# Patient Record
Sex: FEMALE | Race: WHITE | NOT HISPANIC OR LATINO | Employment: FULL TIME | URBAN - METROPOLITAN AREA
[De-identification: names, ages, dates, MRNs, and addresses within clinical notes are randomized per-mention and may not be internally consistent; named-entity substitution may affect disease eponyms.]

---

## 2017-01-18 PROBLEM — J68.0: Status: ACTIVE | Noted: 2017-01-18

## 2017-01-18 PROBLEM — J45.901 ASTHMA WITH ACUTE EXACERBATION: Status: ACTIVE | Noted: 2017-01-18

## 2017-01-18 PROBLEM — J96.01 ACUTE HYPOXEMIC RESPIRATORY FAILURE: Status: ACTIVE | Noted: 2017-01-18

## 2017-11-01 ENCOUNTER — OFFICE VISIT (OUTPATIENT)
Dept: FAMILY MEDICINE | Facility: CLINIC | Age: 32
End: 2017-11-01
Payer: COMMERCIAL

## 2017-11-01 VITALS
HEIGHT: 67 IN | BODY MASS INDEX: 40.83 KG/M2 | TEMPERATURE: 99 F | HEART RATE: 104 BPM | DIASTOLIC BLOOD PRESSURE: 93 MMHG | RESPIRATION RATE: 18 BRPM | OXYGEN SATURATION: 98 % | SYSTOLIC BLOOD PRESSURE: 156 MMHG | WEIGHT: 260.13 LBS

## 2017-11-01 DIAGNOSIS — J45.42 MODERATE PERSISTENT ASTHMA WITH STATUS ASTHMATICUS: Primary | ICD-10-CM

## 2017-11-01 PROCEDURE — 99999 PR PBB SHADOW E&M-EST. PATIENT-LVL III: CPT | Mod: PBBFAC,,, | Performed by: FAMILY MEDICINE

## 2017-11-01 PROCEDURE — 94640 AIRWAY INHALATION TREATMENT: CPT | Mod: S$GLB,,, | Performed by: FAMILY MEDICINE

## 2017-11-01 PROCEDURE — 96372 THER/PROPH/DIAG INJ SC/IM: CPT | Mod: 59,S$GLB,, | Performed by: FAMILY MEDICINE

## 2017-11-01 PROCEDURE — 99214 OFFICE O/P EST MOD 30 MIN: CPT | Mod: 25,S$GLB,, | Performed by: FAMILY MEDICINE

## 2017-11-01 RX ORDER — ALBUTEROL SULFATE 2.5 MG/.5ML
2.5 SOLUTION RESPIRATORY (INHALATION)
Status: COMPLETED | OUTPATIENT
Start: 2017-11-01 | End: 2017-11-01

## 2017-11-01 RX ORDER — EPINEPHRINE 1 MG/ML
0.3 INJECTION, SOLUTION INTRACARDIAC; INTRAMUSCULAR; INTRAVENOUS; SUBCUTANEOUS ONCE
Status: COMPLETED | OUTPATIENT
Start: 2017-11-01 | End: 2017-11-01

## 2017-11-01 RX ADMIN — ALBUTEROL SULFATE 2.5 MG: 2.5 SOLUTION RESPIRATORY (INHALATION) at 04:11

## 2017-11-01 RX ADMIN — EPINEPHRINE 0.3 MG: 1 INJECTION, SOLUTION INTRACARDIAC; INTRAMUSCULAR; INTRAVENOUS; SUBCUTANEOUS at 04:11

## 2017-11-01 NOTE — PROGRESS NOTES
Subjective:       Patient ID: Sonia Goldberg is a 31 y.o. female.    Chief Complaint: Cough    Onset 5 days ago with coughing and wheezing.  [facility] visit and given IM steroids and Zpak; ER visit 2 days later and given treatments, and IV Mag and SoulMedrol with improvement.  Past 2 days declining again and has been using Neds or MDI q 4 hours with no relief.  Sats down to 92% yesterday.  In past intubation has been recommended but she had refuised and turned around.       Asthma   She complains of chest tightness, cough, difficulty breathing, shortness of breath and wheezing. This is a recurrent problem. The current episode started in the past 7 days. The problem has been rapidly worsening. The cough is productive of purulent sputum. Associated symptoms include dyspnea on exertion. Pertinent negatives include no chest pain or fever. Her symptoms are alleviated by nothing. Her symptoms are not alleviated by beta-agonist and oral steroids. Her past medical history is significant for asthma.     Review of Systems   Constitutional: Negative for fever.   Respiratory: Positive for cough, shortness of breath and wheezing.    Cardiovascular: Positive for dyspnea on exertion. Negative for chest pain.   Gastrointestinal: Negative for abdominal pain and nausea.   Skin: Negative for rash.   All other systems reviewed and are negative.      Objective:      Physical Exam   Constitutional: She appears well-developed and well-nourished. No distress.   HENT:   Right Ear: Tympanic membrane is not erythematous.   Left Ear: Tympanic membrane is not erythematous.   Nose: Mucosal edema present. Right sinus exhibits no maxillary sinus tenderness. Left sinus exhibits no maxillary sinus tenderness.   Mouth/Throat: Posterior oropharyngeal erythema present.   Eyes: Pupils are equal, round, and reactive to light. No scleral icterus.   Neck: Neck supple.   Cardiovascular: Normal rate and regular rhythm.    No murmur heard.  Pulmonary/Chest:  Effort normal. She has wheezes. She has no rales.   98% sat but she has tachypnea and I and E wheezing diffusely upon arrival.  Decreased BS at post bases.  Re-check after epi SQ and Albuterol Neb and still full wheezing with a little better basilar air movement   Musculoskeletal: She exhibits no edema or tenderness.   Lymphadenopathy:     She has no cervical adenopathy.   Skin: Skin is warm and dry.       Assessment:       1. Moderate persistent asthma with status asthmaticus        Plan:         Sonia was seen today for cough.    Diagnoses and all orders for this visit:    Moderate persistent asthma with status asthmaticus    Other orders  -     EPINEPHrine injection 0.3 mg; Inject 0.3 mLs (0.3 mg total) into the skin once.  -     albuterol sulfate nebulizer solution 2.5 mg; Take 2.5 mg by nebulization one time.    Outpatient treament failure.  To [facility] ER now for eval for admit.  I called ED and gave report to Will.  VS are stable as is O2 sat; she declined transport to ED and has improved some after Rx here and she appears stable and safe to drive herself.

## 2017-11-01 NOTE — PROGRESS NOTES
2 patient identifiers used ( name &  ).  Administered 0.3 mg Epi Pen lateral left thigh.  Patient tolerated well.  No bleeding at insertion site noted.  Pain scale 0/10.  Allergies reviewed.    Aseptic technique maintained      2 patient identifiers used ( name &  ).  Administered Albuterol for breathing treatment.

## 2017-12-19 ENCOUNTER — TELEPHONE (OUTPATIENT)
Dept: FAMILY MEDICINE | Facility: CLINIC | Age: 32
End: 2017-12-19

## 2017-12-19 NOTE — TELEPHONE ENCOUNTER
Spoke w/ pt , pt wants to be seen for antidepressant / anxiety and something to sleep . Pt aware that NP cannot prescribe controlled substances for sleep and anxiety. Pt states that she will be happy to get antidepressant. Pt did not want to change appt to physician . Pt understands that no controlled substances will be prescribed.--lp

## 2017-12-19 NOTE — TELEPHONE ENCOUNTER
"Can you please ask her what she needs refilled as I can not write controlled meds for what she scheduled the appt for "behavioral meds per therapist"  She will need to make it with a physician   "

## 2018-01-02 ENCOUNTER — OFFICE VISIT (OUTPATIENT)
Dept: FAMILY MEDICINE | Facility: CLINIC | Age: 33
End: 2018-01-02
Payer: COMMERCIAL

## 2018-01-02 VITALS
HEIGHT: 67 IN | DIASTOLIC BLOOD PRESSURE: 82 MMHG | HEART RATE: 80 BPM | WEIGHT: 257.06 LBS | RESPIRATION RATE: 16 BRPM | SYSTOLIC BLOOD PRESSURE: 124 MMHG | OXYGEN SATURATION: 97 % | BODY MASS INDEX: 40.35 KG/M2 | TEMPERATURE: 99 F

## 2018-01-02 DIAGNOSIS — N63.20 LEFT BREAST MASS: ICD-10-CM

## 2018-01-02 DIAGNOSIS — Z91.09 ENVIRONMENTAL ALLERGIES: ICD-10-CM

## 2018-01-02 DIAGNOSIS — F32.A DEPRESSION, UNSPECIFIED DEPRESSION TYPE: Primary | ICD-10-CM

## 2018-01-02 DIAGNOSIS — F41.9 ANXIETY: ICD-10-CM

## 2018-01-02 DIAGNOSIS — E78.5 HYPERLIPIDEMIA, UNSPECIFIED HYPERLIPIDEMIA TYPE: ICD-10-CM

## 2018-01-02 DIAGNOSIS — J45.909 ASTHMA, UNSPECIFIED ASTHMA SEVERITY, UNSPECIFIED WHETHER COMPLICATED, UNSPECIFIED WHETHER PERSISTENT: ICD-10-CM

## 2018-01-02 DIAGNOSIS — G47.00 INSOMNIA, UNSPECIFIED TYPE: ICD-10-CM

## 2018-01-02 PROBLEM — J68.0: Status: RESOLVED | Noted: 2017-01-18 | Resolved: 2018-01-02

## 2018-01-02 PROBLEM — J96.01 ACUTE HYPOXEMIC RESPIRATORY FAILURE: Status: RESOLVED | Noted: 2017-01-18 | Resolved: 2018-01-02

## 2018-01-02 PROBLEM — J45.901 ASTHMA WITH ACUTE EXACERBATION: Status: RESOLVED | Noted: 2017-01-18 | Resolved: 2018-01-02

## 2018-01-02 PROCEDURE — 99999 PR PBB SHADOW E&M-EST. PATIENT-LVL V: CPT | Mod: PBBFAC,,, | Performed by: NURSE PRACTITIONER

## 2018-01-02 PROCEDURE — 99214 OFFICE O/P EST MOD 30 MIN: CPT | Mod: S$GLB,,, | Performed by: NURSE PRACTITIONER

## 2018-01-02 RX ORDER — FLUOXETINE 20 MG/1
20 TABLET ORAL DAILY
Qty: 30 TABLET | Refills: 11 | Status: SHIPPED | OUTPATIENT
Start: 2018-01-02 | End: 2018-06-06 | Stop reason: SDUPTHER

## 2018-01-02 RX ORDER — TEMAZEPAM 15 MG/1
15 CAPSULE ORAL NIGHTLY PRN
Qty: 30 CAPSULE | Refills: 0 | Status: SHIPPED | OUTPATIENT
Start: 2018-01-02 | End: 2018-02-01

## 2018-01-02 RX ORDER — ATORVASTATIN CALCIUM 20 MG/1
20 TABLET, FILM COATED ORAL DAILY
Qty: 30 TABLET | Refills: 1 | Status: SHIPPED | OUTPATIENT
Start: 2018-01-02 | End: 2018-02-25 | Stop reason: SDUPTHER

## 2018-01-02 RX ORDER — EPINEPHRINE 0.3 MG/.3ML
1 INJECTION SUBCUTANEOUS ONCE
Qty: 0.3 ML | Refills: 0 | Status: ON HOLD | OUTPATIENT
Start: 2018-01-02 | End: 2018-01-29 | Stop reason: HOSPADM
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
## 2018-01-05 ENCOUNTER — HOSPITAL ENCOUNTER (OUTPATIENT)
Dept: RADIOLOGY | Facility: HOSPITAL | Age: 33
Discharge: HOME | End: 2018-01-05
Attending: NURSE PRACTITIONER
Payer: COMMERCIAL

## 2018-01-05 DIAGNOSIS — N63.20 LEFT BREAST MASS: ICD-10-CM

## 2018-01-05 PROCEDURE — 77066 DX MAMMO INCL CAD BI: CPT | Mod: 26,,, | Performed by: RADIOLOGY

## 2018-01-05 PROCEDURE — 77062 BREAST TOMOSYNTHESIS BI: CPT | Mod: TC,PO

## 2018-01-05 PROCEDURE — 77062 BREAST TOMOSYNTHESIS BI: CPT | Mod: 26,,, | Performed by: RADIOLOGY

## 2018-01-05 PROCEDURE — 76642 ULTRASOUND BREAST LIMITED: CPT | Mod: 26,LT,, | Performed by: RADIOLOGY

## 2018-01-05 PROCEDURE — 76642 ULTRASOUND BREAST LIMITED: CPT | Mod: TC,PO,LT

## 2018-01-28 ENCOUNTER — HOSPITAL ENCOUNTER (OUTPATIENT)
Facility: OTHER | Age: 33
Discharge: HOME | End: 2018-01-29
Attending: EMERGENCY MEDICINE | Admitting: EMERGENCY MEDICINE
Payer: COMMERCIAL

## 2018-01-28 DIAGNOSIS — J45.901 ASTHMA EXACERBATION: Primary | ICD-10-CM

## 2018-01-28 DIAGNOSIS — J45.909 ASTHMA: ICD-10-CM

## 2018-01-28 DIAGNOSIS — R06.02 SHORTNESS OF BREATH: ICD-10-CM

## 2018-01-28 LAB
ALBUMIN SERPL BCP-MCNC: 3.5 G/DL
ALP SERPL-CCNC: 83 U/L
ALT SERPL W/O P-5'-P-CCNC: 14 U/L
ANION GAP SERPL CALC-SCNC: 8 MMOL/L
AST SERPL-CCNC: 14 U/L
B-HCG UR QL: NEGATIVE
BASOPHILS # BLD AUTO: 0.02 K/UL
BASOPHILS NFR BLD: 0.2 %
BILIRUB SERPL-MCNC: 0.3 MG/DL
BUN SERPL-MCNC: 16 MG/DL
CALCIUM SERPL-MCNC: 9.3 MG/DL
CHLORIDE SERPL-SCNC: 107 MMOL/L
CO2 SERPL-SCNC: 25 MMOL/L
CREAT SERPL-MCNC: 0.7 MG/DL
CTP QC/QA: YES
DIFFERENTIAL METHOD: ABNORMAL
EOSINOPHIL # BLD AUTO: 0.2 K/UL
EOSINOPHIL NFR BLD: 1.6 %
ERYTHROCYTE [DISTWIDTH] IN BLOOD BY AUTOMATED COUNT: 15.4 %
EST. GFR  (AFRICAN AMERICAN): >60 ML/MIN/1.73 M^2
EST. GFR  (NON AFRICAN AMERICAN): >60 ML/MIN/1.73 M^2
FLUAV AG SPEC QL IA: NEGATIVE
FLUBV AG SPEC QL IA: NEGATIVE
GLUCOSE SERPL-MCNC: 84 MG/DL
HCT VFR BLD AUTO: 37.7 %
HGB BLD-MCNC: 11.9 G/DL
LYMPHOCYTES # BLD AUTO: 3.6 K/UL
LYMPHOCYTES NFR BLD: 35.3 %
MCH RBC QN AUTO: 26.6 PG
MCHC RBC AUTO-ENTMCNC: 31.6 G/DL
MCV RBC AUTO: 84 FL
MONOCYTES # BLD AUTO: 0.6 K/UL
MONOCYTES NFR BLD: 6.1 %
NEUTROPHILS # BLD AUTO: 5.8 K/UL
NEUTROPHILS NFR BLD: 56.6 %
PLATELET # BLD AUTO: 300 K/UL
PMV BLD AUTO: 10.5 FL
POTASSIUM SERPL-SCNC: 3.6 MMOL/L
PROT SERPL-MCNC: 6.7 G/DL
RBC # BLD AUTO: 4.47 M/UL
SODIUM SERPL-SCNC: 140 MMOL/L
SPECIMEN SOURCE: NORMAL
WBC # BLD AUTO: 10.17 K/UL

## 2018-01-28 PROCEDURE — 99900035 HC TECH TIME PER 15 MIN (STAT)

## 2018-01-28 PROCEDURE — 94645 CONT INHLJ TX EACH ADDL HOUR: CPT | Mod: 59

## 2018-01-28 PROCEDURE — G0378 HOSPITAL OBSERVATION PER HR: HCPCS

## 2018-01-28 PROCEDURE — 25000242 PHARM REV CODE 250 ALT 637 W/ HCPCS: Performed by: EMERGENCY MEDICINE

## 2018-01-28 PROCEDURE — 94640 AIRWAY INHALATION TREATMENT: CPT

## 2018-01-28 PROCEDURE — 25000003 PHARM REV CODE 250: Performed by: HOSPITALIST

## 2018-01-28 PROCEDURE — 85025 COMPLETE CBC W/AUTO DIFF WBC: CPT

## 2018-01-28 PROCEDURE — 63600175 PHARM REV CODE 636 W HCPCS: Performed by: EMERGENCY MEDICINE

## 2018-01-28 PROCEDURE — 63600175 PHARM REV CODE 636 W HCPCS: Performed by: HOSPITALIST

## 2018-01-28 PROCEDURE — 93010 ELECTROCARDIOGRAM REPORT: CPT | Mod: ,,, | Performed by: INTERNAL MEDICINE

## 2018-01-28 PROCEDURE — 27000190 HC CPAP FULL FACE MASK W/VALVE

## 2018-01-28 PROCEDURE — 81025 URINE PREGNANCY TEST: CPT | Performed by: EMERGENCY MEDICINE

## 2018-01-28 PROCEDURE — 99220 PR INITIAL OBSERVATION CARE,LEVL III: CPT | Mod: SA,,, | Performed by: PHYSICIAN ASSISTANT

## 2018-01-28 PROCEDURE — 96365 THER/PROPH/DIAG IV INF INIT: CPT

## 2018-01-28 PROCEDURE — 25000242 PHARM REV CODE 250 ALT 637 W/ HCPCS: Performed by: HOSPITALIST

## 2018-01-28 PROCEDURE — 96372 THER/PROPH/DIAG INJ SC/IM: CPT | Mod: 59

## 2018-01-28 PROCEDURE — 27000221 HC OXYGEN, UP TO 24 HOURS

## 2018-01-28 PROCEDURE — 80053 COMPREHEN METABOLIC PANEL: CPT

## 2018-01-28 PROCEDURE — 94660 CPAP INITIATION&MGMT: CPT

## 2018-01-28 PROCEDURE — 25000003 PHARM REV CODE 250: Performed by: PHYSICIAN ASSISTANT

## 2018-01-28 PROCEDURE — 94644 CONT INHLJ TX 1ST HOUR: CPT

## 2018-01-28 PROCEDURE — 99285 EMERGENCY DEPT VISIT HI MDM: CPT | Mod: 25

## 2018-01-28 PROCEDURE — 96375 TX/PRO/DX INJ NEW DRUG ADDON: CPT

## 2018-01-28 PROCEDURE — 94761 N-INVAS EAR/PLS OXIMETRY MLT: CPT

## 2018-01-28 PROCEDURE — 25000003 PHARM REV CODE 250: Performed by: EMERGENCY MEDICINE

## 2018-01-28 PROCEDURE — 87400 INFLUENZA A/B EACH AG IA: CPT

## 2018-01-28 RX ORDER — MAGNESIUM SULFATE HEPTAHYDRATE 40 MG/ML
2 INJECTION, SOLUTION INTRAVENOUS
Status: COMPLETED | OUTPATIENT
Start: 2018-01-28 | End: 2018-01-28

## 2018-01-28 RX ORDER — ZOLPIDEM TARTRATE 5 MG/1
5 TABLET ORAL NIGHTLY PRN
Status: DISCONTINUED | OUTPATIENT
Start: 2018-01-28 | End: 2018-01-28

## 2018-01-28 RX ORDER — KETOROLAC TROMETHAMINE 30 MG/ML
15 INJECTION, SOLUTION INTRAMUSCULAR; INTRAVENOUS EVERY 6 HOURS PRN
Status: DISCONTINUED | OUTPATIENT
Start: 2018-01-28 | End: 2018-01-29 | Stop reason: HOSPADM

## 2018-01-28 RX ORDER — MAGNESIUM SULFATE HEPTAHYDRATE 40 MG/ML
INJECTION, SOLUTION INTRAVENOUS
Status: DISPENSED
Start: 2018-01-28 | End: 2018-01-28

## 2018-01-28 RX ORDER — ALPRAZOLAM 0.5 MG/1
0.5 TABLET ORAL NIGHTLY PRN
Status: DISCONTINUED | OUTPATIENT
Start: 2018-01-28 | End: 2018-01-29 | Stop reason: HOSPADM

## 2018-01-28 RX ORDER — ENOXAPARIN SODIUM 100 MG/ML
40 INJECTION SUBCUTANEOUS DAILY
Status: DISCONTINUED | OUTPATIENT
Start: 2018-01-28 | End: 2018-01-29 | Stop reason: HOSPADM

## 2018-01-28 RX ORDER — ALBUTEROL SULFATE 0.83 MG/ML
2.5 SOLUTION RESPIRATORY (INHALATION)
Status: DISCONTINUED | OUTPATIENT
Start: 2018-01-28 | End: 2018-01-29 | Stop reason: HOSPADM

## 2018-01-28 RX ORDER — PREDNISONE 20 MG/1
40 TABLET ORAL DAILY
Status: DISCONTINUED | OUTPATIENT
Start: 2018-01-28 | End: 2018-01-29 | Stop reason: HOSPADM

## 2018-01-28 RX ORDER — MONTELUKAST SODIUM 10 MG/1
10 TABLET ORAL DAILY
Status: DISCONTINUED | OUTPATIENT
Start: 2018-01-28 | End: 2018-01-29 | Stop reason: HOSPADM

## 2018-01-28 RX ORDER — ALBUTEROL SULFATE 0.83 MG/ML
15 SOLUTION RESPIRATORY (INHALATION)
Status: COMPLETED | OUTPATIENT
Start: 2018-01-28 | End: 2018-01-28

## 2018-01-28 RX ORDER — IPRATROPIUM BROMIDE 0.5 MG/2.5ML
1 SOLUTION RESPIRATORY (INHALATION)
Status: COMPLETED | OUTPATIENT
Start: 2018-01-28 | End: 2018-01-28

## 2018-01-28 RX ORDER — RAMELTEON 8 MG/1
8 TABLET ORAL NIGHTLY PRN
Status: DISCONTINUED | OUTPATIENT
Start: 2018-01-28 | End: 2018-01-29 | Stop reason: HOSPADM

## 2018-01-28 RX ORDER — FLUTICASONE PROPIONATE 50 MCG
2 SPRAY, SUSPENSION (ML) NASAL DAILY
Status: DISCONTINUED | OUTPATIENT
Start: 2018-01-29 | End: 2018-01-29 | Stop reason: HOSPADM

## 2018-01-28 RX ORDER — KETOROLAC TROMETHAMINE 30 MG/ML
30 INJECTION, SOLUTION INTRAMUSCULAR; INTRAVENOUS
Status: COMPLETED | OUTPATIENT
Start: 2018-01-28 | End: 2018-01-28

## 2018-01-28 RX ORDER — FLUTICASONE FUROATE AND VILANTEROL 100; 25 UG/1; UG/1
1 POWDER RESPIRATORY (INHALATION) DAILY
Status: DISCONTINUED | OUTPATIENT
Start: 2018-01-29 | End: 2018-01-29 | Stop reason: HOSPADM

## 2018-01-28 RX ORDER — ATORVASTATIN CALCIUM 20 MG/1
20 TABLET, FILM COATED ORAL DAILY
Status: DISCONTINUED | OUTPATIENT
Start: 2018-01-29 | End: 2018-01-29 | Stop reason: HOSPADM

## 2018-01-28 RX ORDER — FLUOXETINE 10 MG/1
20 CAPSULE ORAL DAILY
Status: DISCONTINUED | OUTPATIENT
Start: 2018-01-29 | End: 2018-01-29 | Stop reason: HOSPADM

## 2018-01-28 RX ORDER — METHYLPREDNISOLONE SOD SUCC 125 MG
125 VIAL (EA) INJECTION
Status: COMPLETED | OUTPATIENT
Start: 2018-01-28 | End: 2018-01-28

## 2018-01-28 RX ADMIN — ALBUTEROL SULFATE 2.5 MG: 2.5 SOLUTION RESPIRATORY (INHALATION) at 08:01

## 2018-01-28 RX ADMIN — IPRATROPIUM BROMIDE 1 MG: 0.5 SOLUTION RESPIRATORY (INHALATION) at 10:01

## 2018-01-28 RX ADMIN — ALPRAZOLAM 0.5 MG: 0.5 TABLET ORAL at 09:01

## 2018-01-28 RX ADMIN — SODIUM CHLORIDE 1000 ML: 0.9 INJECTION, SOLUTION INTRAVENOUS at 10:01

## 2018-01-28 RX ADMIN — MONTELUKAST SODIUM 10 MG: 10 TABLET, FILM COATED ORAL at 05:01

## 2018-01-28 RX ADMIN — ALBUTEROL SULFATE 2.5 MG: 2.5 SOLUTION RESPIRATORY (INHALATION) at 03:01

## 2018-01-28 RX ADMIN — METHYLPREDNISOLONE SODIUM SUCCINATE 125 MG: 125 INJECTION, POWDER, FOR SOLUTION INTRAMUSCULAR; INTRAVENOUS at 10:01

## 2018-01-28 RX ADMIN — ALBUTEROL SULFATE 15 MG: 2.5 SOLUTION RESPIRATORY (INHALATION) at 10:01

## 2018-01-28 RX ADMIN — MAGNESIUM SULFATE IN WATER 2 G: 40 INJECTION, SOLUTION INTRAVENOUS at 10:01

## 2018-01-28 RX ADMIN — ALBUTEROL SULFATE 15 MG: 2.5 SOLUTION RESPIRATORY (INHALATION) at 12:01

## 2018-01-28 RX ADMIN — KETOROLAC TROMETHAMINE 30 MG: 30 INJECTION, SOLUTION INTRAMUSCULAR at 01:01

## 2018-01-28 RX ADMIN — RAMELTEON 8 MG: 8 TABLET, FILM COATED ORAL at 09:01

## 2018-01-28 RX ADMIN — ENOXAPARIN SODIUM 40 MG: 100 INJECTION SUBCUTANEOUS at 03:01

## 2018-01-28 RX ADMIN — IPRATROPIUM BROMIDE 1 MG: 0.5 SOLUTION RESPIRATORY (INHALATION) at 12:01

## 2018-01-28 RX ADMIN — KETOROLAC TROMETHAMINE 15 MG: 30 INJECTION, SOLUTION INTRAMUSCULAR at 09:01

## 2018-01-28 RX ADMIN — PREDNISONE 40 MG: 20 TABLET ORAL at 02:01

## 2018-01-28 NOTE — CARE UPDATE
Report received from Raiza in ER. Pt transferred to room 354. Pt AAO x 4. Resp. Even and unlabored. No SOB or wheezing noted. Has c/o bilateral chest pain and side pain @ 6/10. Peripheral iv's patent x 2. Oriented to room and surroundings. VSS. SAfety maintained. Call light in reach.

## 2018-01-28 NOTE — ED PROVIDER NOTES
"Encounter Date: 2018    SCRIBE #1 NOTE: Lynne SUGGS am scribing for, and in the presence of, Dr. Pride.       History     Chief Complaint   Patient presents with    Shortness of Breath     hx of asthma, hx of intubation, SOB x3 days, home meds not helping, diminished air movement w/ audible wheezing     Time seen by provider: 9:54 AM    This is a 32 y.o. female with asthma, sleep apnea, HTN, and migraine HA who presents with complaint of SOB x 5 days, exacerbated one hour ago. She reports associated fever and wheezing, but denies chest pain, chills, lightheadedness, and weakness. Pt started to have a respiratory infection 5 days ago with associated cough/congestion and has been on 10mg/day prednisone for 5 days, and she was started on Z-Joesph 2 days ago when seen at urgent care. Pt is negative for influenza. She has a hx of intubation "a while ago." Pt has not had any relief with home breathing treatments.      The history is provided by the patient.     Review of patient's allergies indicates:   Allergen Reactions    Contrast media Anaphylaxis    Iodine and iodide containing products Anaphylaxis    Sulfa (sulfonamide antibiotics) Anaphylaxis    Magnesium     Morphine Hives    Adhesive Rash    Nut [tree nut] Hives     Past Medical History:   Diagnosis Date    Asthma     Heart palpitations     Herniated disc     Hypertension     IBS (irritable bowel syndrome)     Insomnia     Lower back pain     L5 S1 herniated disks    Migraine headache     Palpitations     and pvcs with stress.  Not on any meds.    Sleep apnea     history of.  Dont use cpap.  lost weight.     Past Surgical History:   Procedure Laterality Date    ABDOMINAL SURGERY           SECTION, CLASSIC       SECTION, LOW TRANSVERSE      DILATION AND CURETTAGE OF UTERUS      DILATION AND CURETTAGE OF UTERUS      perferated uterus during procedure    endometrioma      removed on right lower quadrant of " uterus    epidural steriod injections  x 3    TONSILLECTOMY      TUBAL LIGATION       Family History   Problem Relation Age of Onset    Heart disease Mother     Hyperlipidemia Mother     Asthma Mother     Cancer Father     Heart disease Father     Hypertension Father     Hyperlipidemia Father     Arthritis Father     Diabetes Maternal Grandmother     Cancer Maternal Grandmother     Cancer Maternal Grandfather     Diabetes Paternal Grandfather     Breast cancer Maternal Aunt 40     Social History   Substance Use Topics    Smoking status: Passive Smoke Exposure - Never Smoker     Types: Vaping with nicotine    Smokeless tobacco: Never Used    Alcohol use Yes      Comment: socially, occasionally     Review of Systems   Constitutional: Positive for fever. Negative for chills.   HENT: Negative for congestion, rhinorrhea and sore throat.    Respiratory: Positive for shortness of breath and wheezing. Negative for cough.    Cardiovascular: Negative for chest pain.   Gastrointestinal: Negative for abdominal pain, diarrhea, nausea and vomiting.   Endocrine: Negative for polyuria.   Genitourinary: Negative for decreased urine volume and dysuria.   Musculoskeletal: Negative for back pain.   Skin: Negative for rash.   Allergic/Immunologic: Negative for immunocompromised state.   Neurological: Negative for dizziness, weakness and headaches.   Hematological: Does not bruise/bleed easily.   Psychiatric/Behavioral: Negative for confusion.       Physical Exam     Initial Vitals   BP Pulse Resp Temp SpO2   -- -- -- -- --      MAP       --         Physical Exam    Nursing note and vitals reviewed.  Constitutional: She appears well-developed and well-nourished. She is not diaphoretic. No distress.   HENT:   Head: Normocephalic and atraumatic.   Right Ear: External ear normal.   Left Ear: External ear normal.   Eyes: Right eye exhibits no discharge. Left eye exhibits no discharge.   Neck: Normal range of motion. Neck  supple.   Cardiovascular: Regular rhythm and normal heart sounds. Tachycardia present.  Exam reveals no gallop.    Pulmonary/Chest: Accessory muscle usage present. She is in respiratory distress. She has wheezes. She has no rhonchi. She has no rales.   Moderate to severe respiratory distress. Diffuse wheezing with decreased air movement and prolonged expiration.   Abdominal: Soft. Bowel sounds are normal. She exhibits no distension. There is no tenderness. There is no rebound and no guarding.   Musculoskeletal: Normal range of motion. She exhibits no edema or tenderness.   Neurological: She is alert and oriented to person, place, and time. She has normal strength. No sensory deficit.   Skin: Skin is warm and dry. No rash noted. No erythema.   Psychiatric: She has a normal mood and affect. Her behavior is normal.         ED Course   Critical Care  Date/Time: 1/28/2018 9:54 AM  Performed by: FOREIGN PHAM.  Authorized by: FOREIGN PHAM   Direct patient critical care time: 25 minutes  Additional history critical care time: 5 minutes  Ordering / reviewing critical care time: 5 minutes  Documentation critical care time: 5 minutes  Total critical care time (exclusive of procedural time) : 40 minutes  Critical care time was exclusive of separately billable procedures and treating other patients.  Critical care was necessary to treat or prevent imminent or life-threatening deterioration of the following conditions: respiratory failure.  Critical care was time spent personally by me on the following activities: development of treatment plan with patient or surrogate, evaluation of patient's response to treatment, examination of patient, obtaining history from patient or surrogate, ordering and performing treatments and interventions, ordering and review of laboratory studies, ordering and review of radiographic studies, re-evaluation of patient's condition and review of old charts.        Labs Reviewed   CBC W/ AUTO  DIFFERENTIAL - Abnormal; Notable for the following:        Result Value    Hemoglobin 11.9 (*)     MCH 26.6 (*)     MCHC 31.6 (*)     RDW 15.4 (*)     All other components within normal limits   COMPREHENSIVE METABOLIC PANEL   INFLUENZA A AND B ANTIGEN   POCT URINE PREGNANCY     EKG Readings: (Independently Interpreted)   10:10 - NSR at 66 bpm. Normal axis. Normal intervals. No ST or ischemic changes.       X-Rays:   Independently Interpreted Readings:   Chest X-Ray: Trachea midline. No cardiomegaly. No effusion, edema or pneumothorax.      Imaging Results          X-Ray Chest 1 View (Final result)  Result time 01/28/18 11:47:27    Final result by Jared Lo DO (01/28/18 11:47:27)                 Impression:        1. No acute cardiopulmonary process.      Electronically signed by: JARED LO D.O.  Date:     01/28/18  Time:    11:47              Narrative:    Single view chest    Comparison:01/18/2017    Findings:     There are no infiltrates or pleural effusions.The heart is not enlarged.                              Medical Decision Making:   History:   Old Medical Records: I decided to obtain old medical records.  Old Records Summarized: records from clinic visits, records from previous admission(s) and other records.  Initial Assessment:   9:54AM:  Pt is a 31 y/o F who presents to ED with SOB, wheezing. Pt is in moderate-severe respiratory distress with increased WOB and accessory muscle usage.  Will plan for labs, breathing treatments, will continue to follow and reassess.    Independently Interpreted Test(s):   I have ordered and independently interpreted X-rays - see prior notes.  I have ordered and independently interpreted EKG Reading(s) - see prior notes  Clinical Tests:   Lab Tests: Ordered and Reviewed  Radiological Study: Ordered and Reviewed  Medical Tests: Ordered and Reviewed  Other:   I have discussed this case with another health care provider.    10:22AM:  Pt continues to be in  respiratory distress.  Will plan for BIPAP, will continue to follow.      12:30PM:  Pt improved, but still with significant WOB, though not requiring BIPAP anymore.  Will plan for another hour of continuous neb treatmetns, will continue to follow.    1:27 PM:  Pt feeling better, much improved air movement, will continue to follow.      2:19 PM:  Pt able to ambulate, though became very SOB with labored breathing with some mild ambulation.  Pt's labs/CXR unremarkable. I updated pt regarding results.  Given her WOB with mild ambulation, will plan to admit for further observation and management.  Pt agreeable to plan and all questions answered    2:26 PM:  I discussed pt with Dr. Velasquez who will admit patient.            Scribe Attestation:   Scribe #1: I performed the above scribed service and the documentation accurately describes the services I performed. I attest to the accuracy of the note.    Attending Attestation:           Physician Attestation for Scribe:  Physician Attestation Statement for Scribe #1: I, Dr. Pride, reviewed documentation, as scribed by Lynne Johnson in my presence, and it is both accurate and complete.                 ED Course      Clinical Impression:     1. Asthma exacerbation    2. Shortness of breath    3. Asthma                               Hina Pride MD  01/28/18 1266

## 2018-01-28 NOTE — HPI
"31 y/o female presents to ED with c/o SOB that started today. Patient states that she has been feeling "ill" for the last week with on and off fevers. She was been taking Zithromax and prednisone 10 mg daily. States she felt fine yesterday and went to work when symptoms worsened. Reports fever this a.m. for which she has been taking Ibuprofen. Symptoms unrelieved by her rescue inhaler. Denies CP, N/V/D, abd pain, dysuria. Hx of HTN, asthma, migraines, sleep apnea, depression.     ED evaluation patient was initially in respiratory distress and placed on BIPAP; currently in no distress, symptoms improved on NC. CXR no cardiopulmonary disease.   "

## 2018-01-28 NOTE — ED NOTES
"Pt is EMS employee, was brining a pt to ED, reporting acute onset of wheezing, SOB, and diffculty breathing. Pt with audible wheezing heard, accessory muscle use, and tachypnea  in ED stretcher. Pt reporting general illness with nonproductive cough, URI symptoms, "feeling bad" over the last couple days. Has PMH of asthma, used her inhaler yesterday. Pt AAOx4 and appropriate at this time.  Awaiting further orders. Pt updated on POC. Bed is locked and in lowest position with side rails up x2. Call bell within reach and pt oriented to use of call bell. Pt placed on continuous cardiac monitoring, continuous pulse ox, and continuous BP cuff. Will continue to monitor.     "

## 2018-01-28 NOTE — ED NOTES
Pt with less labored RR after being on BIPAP. No use of accessory muscle used noted. No audible wheezing heard. Pt AAOx4 and appropriate at this time. Respirations even and unlabored. No acute distress noted.

## 2018-01-28 NOTE — PLAN OF CARE
Received pt on RA sat's 99% with increased WOB and  SOB continuous albuterol 15 mg and ipratropium 1 mg given.

## 2018-01-28 NOTE — ED NOTES
"Rounding on pt has been done. Pt still in stretcher with even non labored RR. Pt AAOx4 and appropriate at this time. Respirations even and unlabored. No acute distress noted. Pt reporting pain in her ribs "from breathing so hard earlier." MD notified of pt statement.   "

## 2018-01-28 NOTE — H&P
"[facility] Hospital Medicine  History & Physical    Patient Name: Sonia Goldberg  MRN: [id number]  Admission Date: 2018  Attending Physician: Hina Pride MD   Primary Care Provider: Malcolm Ma MD         Patient information was obtained from patient, past medical records and ER records.     Subjective:     Principal Problem:Asthma exacerbation    Chief Complaint:   Chief Complaint   Patient presents with    Shortness of Breath     hx of asthma, hx of intubation, SOB x3 days, home meds not helping, diminished air movement w/ audible wheezing        HPI: 31 y/o female presents to ED with c/o SOB that started today. Patient states that she has been feeling "ill" for the last week with on and off fevers. She was been taking Zithromax and prednisone 10 mg daily. States she felt fine yesterday and went to work when symptoms worsened. Reports fever this a.m. for which she has been taking Ibuprofen. Symptoms unrelieved by her rescue inhaler. Denies CP, N/V/D, abd pain, dysuria. Hx of HTN, asthma, migraines, sleep apnea, depression.     ED evaluation patient was initially in respiratory distress and placed on BIPAP; currently in no distress, symptoms improved on NC. CXR no cardiopulmonary disease.     Past Medical History:   Diagnosis Date    Asthma     Heart palpitations     Herniated disc     Hypertension     IBS (irritable bowel syndrome)     Insomnia     Lower back pain     L5 S1 herniated disks    Migraine headache     Palpitations     and pvcs with stress.  Not on any meds.    Sleep apnea     history of.  Dont use cpap.  lost weight.       Past Surgical History:   Procedure Laterality Date    ABDOMINAL SURGERY           SECTION, CLASSIC       SECTION, LOW TRANSVERSE      DILATION AND CURETTAGE OF UTERUS      DILATION AND CURETTAGE OF UTERUS      perferated uterus during procedure    endometrioma      removed on right lower quadrant of uterus "    epidural steriod injections  x 3    TONSILLECTOMY      TUBAL LIGATION         Review of patient's allergies indicates:   Allergen Reactions    Contrast media Anaphylaxis    Iodine and iodide containing products Anaphylaxis    Sulfa (sulfonamide antibiotics) Anaphylaxis    Magnesium      Pt reporting she is allergic to magnesium citrate oral drink.     Morphine Hives    Adhesive Rash    Nut [tree nut] Hives       No current facility-administered medications on file prior to encounter.      Current Outpatient Prescriptions on File Prior to Encounter   Medication Sig    atorvastatin (LIPITOR) 20 MG tablet Take 1 tablet (20 mg total) by mouth once daily.    FLUoxetine 20 MG tablet Take 1 tablet (20 mg total) by mouth once daily.    PROAIR HFA 90 mcg/actuation inhaler INHALE ONE TO TWO INHALATIONS EVERY 4-6 HOURS AS NEEDED    temazepam (RESTORIL) 15 mg Cap Take 1 capsule (15 mg total) by mouth nightly as needed.    ADVAIR DISKUS 250-50 mcg/dose diskus inhaler     albuterol-ipratropium 2.5mg-0.5mg/3mL (DUO-NEB) 0.5 mg-3 mg(2.5 mg base)/3 mL nebulizer solution Take 3 mLs by nebulization 4 (four) times daily.    EPINEPHrine (EPIPEN) 0.3 mg/0.3 mL AtIn Inject 0.3 mLs (0.3 mg total) into the muscle once.    fluticasone (FLONASE) 50 mcg/actuation nasal spray 2 sprays by Each Nare route once daily.     Family History     Problem Relation (Age of Onset)    Arthritis Father    Asthma Mother    Breast cancer Maternal Aunt (40)    Cancer Father, Maternal Grandmother, Maternal Grandfather    Diabetes Maternal Grandmother, Paternal Grandfather    Heart disease Mother, Father    Hyperlipidemia Mother, Father    Hypertension Father        Social History Main Topics    Smoking status: Passive Smoke Exposure - Never Smoker     Types: Vaping with nicotine    Smokeless tobacco: Never Used    Alcohol use Yes      Comment: socially, occasionally    Drug use: No    Sexual activity: Yes     Partners: Male     Birth  control/ protection: None     Review of Systems   Constitutional: Positive for fever. Negative for activity change, chills and unexpected weight change.   HENT: Positive for congestion. Negative for rhinorrhea and sore throat.    Eyes: Negative.    Respiratory: Positive for shortness of breath.    Cardiovascular: Negative for chest pain, palpitations and leg swelling.   Gastrointestinal: Negative for abdominal pain, diarrhea, nausea and vomiting.   Genitourinary: Negative for difficulty urinating.   Musculoskeletal: Negative for arthralgias and myalgias.   Skin: Negative for color change.   Neurological: Negative for dizziness, light-headedness and numbness.   Psychiatric/Behavioral: Negative for agitation.     Objective:     Vital Signs (Most Recent):  Temp: 98.2 °F (36.8 °C) (01/28/18 1517)  Pulse: 98 (01/28/18 1540)  Resp: 20 (01/28/18 1537)  BP: (!) 113/56 (01/28/18 1540)  SpO2: 100 % (01/28/18 1540) Vital Signs (24h Range):  Temp:  [98.2 °F (36.8 °C)] 98.2 °F (36.8 °C)  Pulse:  [] 98  Resp:  [17-35] 20  SpO2:  [95 %-100 %] 100 %  BP: (110-148)/(54-68) 113/56        There is no height or weight on file to calculate BMI.    Physical Exam   Constitutional: She is oriented to person, place, and time. She appears well-developed and well-nourished. No distress.   HENT:   Head: Normocephalic and atraumatic.   Mouth/Throat: Oropharynx is clear and moist.   Eyes: Conjunctivae are normal. Pupils are equal, round, and reactive to light. Right eye exhibits no discharge. Left eye exhibits no discharge. No scleral icterus.   Neck: Normal range of motion. Neck supple.   Cardiovascular: Intact distal pulses.    tachycardic   Pulmonary/Chest: No respiratory distress. She has wheezes (mild exp).   Abdominal: Soft. Bowel sounds are normal. She exhibits no distension. There is no tenderness.   Musculoskeletal: Normal range of motion. She exhibits no edema or deformity.   Neurological: She is alert and oriented to person,  place, and time. No cranial nerve deficit.   Skin: Skin is warm and dry. Capillary refill takes less than 2 seconds. She is not diaphoretic.   Psychiatric: She has a normal mood and affect.   Nursing note and vitals reviewed.        CRANIAL NERVES     CN III, IV, VI   Pupils are equal, round, and reactive to light.       Significant Labs:   CBC:   Recent Labs  Lab 01/28/18  1033   WBC 10.17   HGB 11.9*   HCT 37.7        CMP:   Recent Labs  Lab 01/28/18  1033      K 3.6      CO2 25   GLU 84   BUN 16   CREATININE 0.7   CALCIUM 9.3   PROT 6.7   ALBUMIN 3.5   BILITOT 0.3   ALKPHOS 83   AST 14   ALT 14   ANIONGAP 8   EGFRNONAA >60     All pertinent labs within the past 24 hours have been reviewed.    Significant Imaging: I have reviewed all pertinent imaging results/findings within the past 24 hours.   Imaging Results          X-Ray Chest 1 View (Final result)  Result time 01/28/18 11:47:27    Final result by Jared Lo DO (01/28/18 11:47:27)                 Impression:        1. No acute cardiopulmonary process.      Electronically signed by: JARED LO D.O.  Date:     01/28/18  Time:    11:47              Narrative:    Single view chest    Comparison:01/18/2017    Findings:     There are no infiltrates or pleural effusions.The heart is not enlarged.                                Assessment/Plan:     * Asthma exacerbation    - off Bipap, now on nasal cannula  - albuterol nebs  - prednisone 40 mg daily  - singulair              VTE Risk Mitigation         Ordered     enoxaparin injection 40 mg  Daily     Route:  Subcutaneous        01/28/18 2272             Emelia Xavier PA-C  Department of Hospital Medicine   [facility]

## 2018-01-28 NOTE — ED NOTES
"Pt requesting to be removed from BIPAP. States "I feel better, less short of breath" Pt with even, non labored RR noted while resting in stretcher. Pt o2 sats noted at 100% on monitor. No accessory muscle use noted. Pt AAOx4 and appropriate at this time. Respirations even and unlabored. No acute distress noted.  Pt remains on cont cardiac, pulse ox and BP monitoring.   "

## 2018-01-28 NOTE — ED NOTES
Pt ambulatory with steady gait. Pt placed on O2 and HR monitor while ambulating. Pt with oxygen sats at 98% and 116 bpm HR noted on monitor. Pt reporting SOB with ambulation. Sat pt down in ED bed. Oxygen sats at 98%. Notified MD.

## 2018-01-28 NOTE — SUBJECTIVE & OBJECTIVE
Past Medical History:   Diagnosis Date    Asthma     Heart palpitations     Herniated disc     Hypertension     IBS (irritable bowel syndrome)     Insomnia     Lower back pain     L5 S1 herniated disks    Migraine headache     Palpitations     and pvcs with stress.  Not on any meds.    Sleep apnea     history of.  Dont use cpap.  lost weight.       Past Surgical History:   Procedure Laterality Date    ABDOMINAL SURGERY           SECTION, CLASSIC       SECTION, LOW TRANSVERSE      DILATION AND CURETTAGE OF UTERUS      DILATION AND CURETTAGE OF UTERUS      perferated uterus during procedure    endometrioma      removed on right lower quadrant of uterus    epidural steriod injections  x 3    TONSILLECTOMY      TUBAL LIGATION         Review of patient's allergies indicates:   Allergen Reactions    Contrast media Anaphylaxis    Iodine and iodide containing products Anaphylaxis    Sulfa (sulfonamide antibiotics) Anaphylaxis    Magnesium      Pt reporting she is allergic to magnesium citrate oral drink.     Morphine Hives    Adhesive Rash    Nut [tree nut] Hives       No current facility-administered medications on file prior to encounter.      Current Outpatient Prescriptions on File Prior to Encounter   Medication Sig    atorvastatin (LIPITOR) 20 MG tablet Take 1 tablet (20 mg total) by mouth once daily.    FLUoxetine 20 MG tablet Take 1 tablet (20 mg total) by mouth once daily.    PROAIR HFA 90 mcg/actuation inhaler INHALE ONE TO TWO INHALATIONS EVERY 4-6 HOURS AS NEEDED    temazepam (RESTORIL) 15 mg Cap Take 1 capsule (15 mg total) by mouth nightly as needed.    ADVAIR DISKUS 250-50 mcg/dose diskus inhaler     albuterol-ipratropium 2.5mg-0.5mg/3mL (DUO-NEB) 0.5 mg-3 mg(2.5 mg base)/3 mL nebulizer solution Take 3 mLs by nebulization 4 (four) times daily.    EPINEPHrine (EPIPEN) 0.3 mg/0.3 mL AtIn Inject 0.3 mLs (0.3 mg total) into the muscle once.    fluticasone  (FLONASE) 50 mcg/actuation nasal spray 2 sprays by Each Nare route once daily.     Family History     Problem Relation (Age of Onset)    Arthritis Father    Asthma Mother    Breast cancer Maternal Aunt (40)    Cancer Father, Maternal Grandmother, Maternal Grandfather    Diabetes Maternal Grandmother, Paternal Grandfather    Heart disease Mother, Father    Hyperlipidemia Mother, Father    Hypertension Father        Social History Main Topics    Smoking status: Passive Smoke Exposure - Never Smoker     Types: Vaping with nicotine    Smokeless tobacco: Never Used    Alcohol use Yes      Comment: socially, occasionally    Drug use: No    Sexual activity: Yes     Partners: Male     Birth control/ protection: None     Review of Systems   Constitutional: Positive for fever. Negative for activity change, chills and unexpected weight change.   HENT: Positive for congestion. Negative for rhinorrhea and sore throat.    Eyes: Negative.    Respiratory: Positive for shortness of breath.    Cardiovascular: Negative for chest pain, palpitations and leg swelling.   Gastrointestinal: Negative for abdominal pain, diarrhea, nausea and vomiting.   Genitourinary: Negative for difficulty urinating.   Musculoskeletal: Negative for arthralgias and myalgias.   Skin: Negative for color change.   Neurological: Negative for dizziness, light-headedness and numbness.   Psychiatric/Behavioral: Negative for agitation.     Objective:     Vital Signs (Most Recent):  Temp: 98.2 °F (36.8 °C) (01/28/18 1517)  Pulse: 98 (01/28/18 1540)  Resp: 20 (01/28/18 1537)  BP: (!) 113/56 (01/28/18 1540)  SpO2: 100 % (01/28/18 1540) Vital Signs (24h Range):  Temp:  [98.2 °F (36.8 °C)] 98.2 °F (36.8 °C)  Pulse:  [] 98  Resp:  [17-35] 20  SpO2:  [95 %-100 %] 100 %  BP: (110-148)/(54-68) 113/56        There is no height or weight on file to calculate BMI.    Physical Exam   Constitutional: She is oriented to person, place, and time. She appears  well-developed and well-nourished. No distress.   HENT:   Head: Normocephalic and atraumatic.   Mouth/Throat: Oropharynx is clear and moist.   Eyes: Conjunctivae are normal. Pupils are equal, round, and reactive to light. Right eye exhibits no discharge. Left eye exhibits no discharge. No scleral icterus.   Neck: Normal range of motion. Neck supple.   Cardiovascular: Intact distal pulses.    tachycardic   Pulmonary/Chest: No respiratory distress. She has wheezes (mild exp).   Abdominal: Soft. Bowel sounds are normal. She exhibits no distension. There is no tenderness.   Musculoskeletal: Normal range of motion. She exhibits no edema or deformity.   Neurological: She is alert and oriented to person, place, and time. No cranial nerve deficit.   Skin: Skin is warm and dry. Capillary refill takes less than 2 seconds. She is not diaphoretic.   Psychiatric: She has a normal mood and affect.   Nursing note and vitals reviewed.        CRANIAL NERVES     CN III, IV, VI   Pupils are equal, round, and reactive to light.       Significant Labs:   CBC:   Recent Labs  Lab 01/28/18  1033   WBC 10.17   HGB 11.9*   HCT 37.7        CMP:   Recent Labs  Lab 01/28/18  1033      K 3.6      CO2 25   GLU 84   BUN 16   CREATININE 0.7   CALCIUM 9.3   PROT 6.7   ALBUMIN 3.5   BILITOT 0.3   ALKPHOS 83   AST 14   ALT 14   ANIONGAP 8   EGFRNONAA >60     All pertinent labs within the past 24 hours have been reviewed.    Significant Imaging: I have reviewed all pertinent imaging results/findings within the past 24 hours.   Imaging Results          X-Ray Chest 1 View (Final result)  Result time 01/28/18 11:47:27    Final result by Jared Lo DO (01/28/18 11:47:27)                 Impression:        1. No acute cardiopulmonary process.      Electronically signed by: JARED LO D.O.  Date:     01/28/18  Time:    11:47              Narrative:    Single view chest    Comparison:01/18/2017    Findings:     There are no  infiltrates or pleural effusions.The heart is not enlarged.

## 2018-01-29 VITALS
DIASTOLIC BLOOD PRESSURE: 67 MMHG | HEART RATE: 98 BPM | WEIGHT: 260.13 LBS | TEMPERATURE: 98 F | BODY MASS INDEX: 40.83 KG/M2 | SYSTOLIC BLOOD PRESSURE: 115 MMHG | RESPIRATION RATE: 20 BRPM | OXYGEN SATURATION: 99 % | HEIGHT: 67 IN

## 2018-01-29 PROCEDURE — 25000003 PHARM REV CODE 250: Performed by: HOSPITALIST

## 2018-01-29 PROCEDURE — 94640 AIRWAY INHALATION TREATMENT: CPT

## 2018-01-29 PROCEDURE — G0378 HOSPITAL OBSERVATION PER HR: HCPCS

## 2018-01-29 PROCEDURE — 25000242 PHARM REV CODE 250 ALT 637 W/ HCPCS: Performed by: HOSPITALIST

## 2018-01-29 PROCEDURE — 63600175 PHARM REV CODE 636 W HCPCS: Performed by: HOSPITALIST

## 2018-01-29 PROCEDURE — 25000003 PHARM REV CODE 250: Performed by: PHYSICIAN ASSISTANT

## 2018-01-29 PROCEDURE — 99217 PR OBSERVATION CARE DISCHARGE: CPT | Mod: SA,,, | Performed by: PHYSICIAN ASSISTANT

## 2018-01-29 PROCEDURE — 94761 N-INVAS EAR/PLS OXIMETRY MLT: CPT

## 2018-01-29 RX ORDER — FLUTICASONE PROPIONATE AND SALMETEROL 50; 250 UG/1; UG/1
1 POWDER RESPIRATORY (INHALATION) 2 TIMES DAILY
Qty: 1 EACH | Refills: 0 | Status: SHIPPED | OUTPATIENT
Start: 2018-01-29 | End: 2019-08-26

## 2018-01-29 RX ORDER — PREDNISONE 20 MG/1
40 TABLET ORAL DAILY
Qty: 8 TABLET | Refills: 0 | Status: SHIPPED | OUTPATIENT
Start: 2018-01-30 | End: 2018-02-03

## 2018-01-29 RX ORDER — MONTELUKAST SODIUM 10 MG/1
10 TABLET ORAL DAILY
Qty: 30 TABLET | Refills: 0 | Status: SHIPPED | OUTPATIENT
Start: 2018-01-30 | End: 2018-03-01

## 2018-01-29 RX ADMIN — ENOXAPARIN SODIUM 40 MG: 100 INJECTION SUBCUTANEOUS at 08:01

## 2018-01-29 RX ADMIN — ATORVASTATIN CALCIUM 20 MG: 20 TABLET, FILM COATED ORAL at 08:01

## 2018-01-29 RX ADMIN — ALBUTEROL SULFATE 2.5 MG: 2.5 SOLUTION RESPIRATORY (INHALATION) at 08:01

## 2018-01-29 RX ADMIN — FLUOXETINE 20 MG: 10 CAPSULE ORAL at 08:01

## 2018-01-29 RX ADMIN — FLUTICASONE FUROATE AND VILANTEROL TRIFENATATE 1 PUFF: 100; 25 POWDER RESPIRATORY (INHALATION) at 09:01

## 2018-01-29 RX ADMIN — FLUTICASONE PROPIONATE 100 MCG: 50 SPRAY, METERED NASAL at 08:01

## 2018-01-29 RX ADMIN — MONTELUKAST SODIUM 10 MG: 10 TABLET, FILM COATED ORAL at 08:01

## 2018-01-29 RX ADMIN — PREDNISONE 40 MG: 20 TABLET ORAL at 08:01

## 2018-01-29 NOTE — PLAN OF CARE
SW met pt at bedside to  Complete discharge assessment. Pt independent with ADLs, IADLs and drives self.  No needs identified at this time.     01/29/18 0959   Discharge Assessment   Assessment Type Discharge Planning Assessment   Confirmed/corrected address and phone number on facesheet? Yes   Assessment information obtained from? Patient   Communicated expected length of stay with patient/caregiver no   Prior to hospitilization cognitive status: Alert/Oriented   Prior to hospitalization functional status: Independent   Current cognitive status: Alert/Oriented   Current Functional Status: Independent   Lives With spouse;child(meenakshi), dependent  (15 and 10 yo)   Able to Return to Prior Arrangements yes   Is patient able to care for self after discharge? Yes   Patient's perception of discharge disposition home or selfcare   Readmission Within The Last 30 Days no previous admission in last 30 days   Patient currently being followed by outpatient case management? No   Patient currently receives any other outside agency services? No   Equipment Currently Used at Home none   Do you have any problems affording any of your prescribed medications? No   Is the patient taking medications as prescribed? yes   Does the patient have transportation home? Yes   Transportation Available family or friend will provide   Does the patient receive services at the [facility]? No   Discharge Plan A Home   Patient/Family In Agreement With Plan yes

## 2018-01-29 NOTE — PLAN OF CARE
Problem: Patient Care Overview  Goal: Plan of Care Review  Outcome: Ongoing (interventions implemented as appropriate)  Pt remains on RA. Tx given and tolerated well. No distress noted.

## 2018-01-29 NOTE — PLAN OF CARE
Problem: Patient Care Overview  Goal: Plan of Care Review  Outcome: Ongoing (interventions implemented as appropriate)  Patient on RA. Sats 97%. Tx given. Pt. in no distress, will continue to monitor.

## 2018-01-29 NOTE — HOSPITAL COURSE
Admitted with asthma exacerbation initially placed on Bipap in ED with continuous breathing treatments. Weaned off Bipap admitted to floor with frequent bronchodilators, prednisone. Symptoms improved, spo2 98% on RA. Clinically improved, vitals stable, discharged home.

## 2018-01-29 NOTE — ASSESSMENT & PLAN NOTE
- clinically improved  - short course prednisone 40 mg daily  - singulair  - follow up outpt, discussed with patient

## 2018-01-29 NOTE — DISCHARGE SUMMARY
"[facility]  Hospital Medicine  Discharge Summary      Patient Name: Sonia Goldberg  MRN: [id number]  Admission Date: 1/28/2018  Hospital Length of Stay: 0 days  Discharge Date and Time:  01/29/2018 10:50 AM  Attending Physician: Fortunato Velasquez MD   Discharging Provider: Emelia Xavier PA-C  Primary Care Provider: Malcolm Ma MD      HPI:   31 y/o female presents to ED with c/o SOB that started today. Patient states that she has been feeling "ill" for the last week with on and off fevers. She was been taking Zithromax and prednisone 10 mg daily. States she felt fine yesterday and went to work when symptoms worsened. Reports fever this a.m. for which she has been taking Ibuprofen. Symptoms unrelieved by her rescue inhaler. Denies CP, N/V/D, abd pain, dysuria. Hx of HTN, asthma, migraines, sleep apnea, depression.     ED evaluation patient was initially in respiratory distress and placed on BIPAP; currently in no distress, symptoms improved on NC. CXR no cardiopulmonary disease.     * No surgery found *      Hospital Course:   Admitted with asthma exacerbation initially placed on Bipap in ED with continuous breathing treatments. Weaned off Bipap admitted to floor with frequent bronchodilators, prednisone. Symptoms improved, spo2 98% on RA. Clinically improved, vitals stable, discharged home.      Consults:     * Asthma exacerbation    - clinically improved  - short course prednisone 40 mg daily  - singulair  - follow up outpt, discussed with patient              Final Active Diagnoses:    Diagnosis Date Noted POA    PRINCIPAL PROBLEM:  Asthma exacerbation [J45.901] 01/18/2017 Yes      Problems Resolved During this Admission:    Diagnosis Date Noted Date Resolved POA       Discharged Condition: stable    Disposition: Home or Self Care    Follow Up:  Follow-up Information     Schedule an appointment as soon as possible for a visit with Malcolm Ma MD.    Specialties:  Family Medicine, Internal " Medicine  Contact information:  [address]  [facility] [address]  [phone]                 Patient Instructions:     Diet Adult Regular     Activity as tolerated     Notify your health care provider if you experience any of the following:  temperature >100.4     Notify your health care provider if you experience any of the following:  difficulty breathing or increased cough     Notify your health care provider if you experience any of the following:  persistent nausea and vomiting or diarrhea         Significant Diagnostic Studies: Labs:   CMP   Recent Labs  Lab 01/28/18  1033      K 3.6      CO2 25   GLU 84   BUN 16   CREATININE 0.7   CALCIUM 9.3   PROT 6.7   ALBUMIN 3.5   BILITOT 0.3   ALKPHOS 83   AST 14   ALT 14   ANIONGAP 8   ESTGFRAFRICA >60   EGFRNONAA >60   , CBC   Recent Labs  Lab 01/28/18  1033   WBC 10.17   HGB 11.9*   HCT 37.7       and All labs within the past 24 hours have been reviewed  Radiology:  Imaging Results          X-Ray Chest 1 View (Final result)  Result time 01/28/18 11:47:27    Final result by Jared Lo DO (01/28/18 11:47:27)                 Impression:        1. No acute cardiopulmonary process.      Electronically signed by: JARED LO D.O.  Date:     01/28/18  Time:    11:47              Narrative:    Single view chest    Comparison:01/18/2017    Findings:     There are no infiltrates or pleural effusions.The heart is not enlarged.                                Pending Diagnostic Studies:     None         Medications:  Reconciled Home Medications:   Current Discharge Medication List      START taking these medications    Details   montelukast (SINGULAIR) 10 mg tablet Take 1 tablet (10 mg total) by mouth once daily.  Qty: 30 tablet, Refills: 0      predniSONE (DELTASONE) 20 MG tablet Take 2 tablets (40 mg total) by mouth once daily.  Qty: 8 tablet, Refills: 0         CONTINUE these medications which have CHANGED    Details   ADVAIR DISKUS 250-50  mcg/dose diskus inhaler Inhale 1 puff into the lungs 2 (two) times daily.  Qty: 1 each, Refills: 0         CONTINUE these medications which have NOT CHANGED    Details   atorvastatin (LIPITOR) 20 MG tablet Take 1 tablet (20 mg total) by mouth once daily.  Qty: 30 tablet, Refills: 1    Associated Diagnoses: Hyperlipidemia, unspecified hyperlipidemia type      FLUoxetine 20 MG tablet Take 1 tablet (20 mg total) by mouth once daily.  Qty: 30 tablet, Refills: 11    Associated Diagnoses: Depression, unspecified depression type; Anxiety      PROAIR HFA 90 mcg/actuation inhaler INHALE ONE TO TWO INHALATIONS EVERY 4-6 HOURS AS NEEDED  Refills: 2      temazepam (RESTORIL) 15 mg Cap Take 1 capsule (15 mg total) by mouth nightly as needed.  Qty: 30 capsule, Refills: 0    Associated Diagnoses: Insomnia, unspecified type         STOP taking these medications       albuterol-ipratropium 2.5mg-0.5mg/3mL (DUO-NEB) 0.5 mg-3 mg(2.5 mg base)/3 mL nebulizer solution Comments:   Reason for Stopping:         EPINEPHrine (EPIPEN) 0.3 mg/0.3 mL AtIn Comments:   Reason for Stopping:         fluticasone (FLONASE) 50 mcg/actuation nasal spray Comments:   Reason for Stopping:               Indwelling Lines/Drains at time of discharge:   Lines/Drains/Airways          No matching active lines, drains, or airways          Time spent on the discharge of patient: >30 minutes  Patient was seen and examined on the date of discharge and determined to be suitable for discharge.         Emelia Xavier PA-C  Department of Hospital Medicine  [facility]

## 2018-01-29 NOTE — TREATMENT PLAN
1/29/2018    Patient: Sonia Goldberg    YOB: 1985    To whom it may concern,    Sonia Goldberg was admitted to the hospital under my care on 1/28/2018 and was discharged on 1/29/2018.  Cleared to return to work on 1/31/2018.      If you have any questions or concerns, please don't hesitate to contact the department of hospital medicine at [phone]        Sincerely,          Emelia Xavier PA-C  Department of Hospital Medicine

## 2018-01-29 NOTE — PLAN OF CARE
Problem: Patient Care Overview  Goal: Plan of Care Review  Outcome: Ongoing (interventions implemented as appropriate)  Patient admitted with asthma exacerbation, stable on room air. Pain d/t work injury managed effectively with Toradol IVP. Insomnia managed effectively with xanax and remeron.

## 2018-01-29 NOTE — PLAN OF CARE
01/29/18 1103   Final Note   Assessment Type Final Discharge Note   Discharge Disposition Home   What phone number can be called within the next 1-3 days to see how you are doing after discharge? [phone]   Discharge plans and expectations educations in teach back method with documentation complete? Yes   Right Care Referral Info   Post Acute Recommendation No Care

## 2018-02-25 DIAGNOSIS — E78.5 HYPERLIPIDEMIA, UNSPECIFIED HYPERLIPIDEMIA TYPE: ICD-10-CM

## 2018-02-25 RX ORDER — ATORVASTATIN CALCIUM 20 MG/1
20 TABLET, FILM COATED ORAL DAILY
Qty: 30 TABLET | Refills: 1 | Status: SHIPPED | OUTPATIENT
Start: 2018-02-25 | End: 2020-05-07

## 2018-06-06 ENCOUNTER — TELEPHONE (OUTPATIENT)
Dept: FAMILY MEDICINE | Facility: CLINIC | Age: 33
End: 2018-06-06

## 2018-06-06 DIAGNOSIS — F32.A DEPRESSION, UNSPECIFIED DEPRESSION TYPE: ICD-10-CM

## 2018-06-06 DIAGNOSIS — F41.9 ANXIETY: ICD-10-CM

## 2018-06-06 RX ORDER — FLUOXETINE HYDROCHLORIDE 20 MG/1
CAPSULE ORAL
Qty: 30 CAPSULE | Refills: 0 | Status: SHIPPED | OUTPATIENT
Start: 2018-06-06 | End: 2018-06-06 | Stop reason: DRUGHIGH

## 2018-06-06 RX ORDER — FLUOXETINE HYDROCHLORIDE 40 MG/1
40 CAPSULE ORAL DAILY
Qty: 30 CAPSULE | Refills: 0 | Status: SHIPPED | OUTPATIENT
Start: 2018-06-06 | End: 2018-07-24 | Stop reason: SDUPTHER

## 2018-06-06 NOTE — TELEPHONE ENCOUNTER
Patient notified that last refill was sent in. States hat she is on 40 mg now. States that she will try and get established with pcp.

## 2018-07-24 DIAGNOSIS — F32.A DEPRESSION, UNSPECIFIED DEPRESSION TYPE: ICD-10-CM

## 2018-07-24 RX ORDER — FLUOXETINE HYDROCHLORIDE 40 MG/1
40 CAPSULE ORAL DAILY
Qty: 30 CAPSULE | Refills: 0 | Status: SHIPPED | OUTPATIENT
Start: 2018-07-24 | End: 2019-08-26

## 2019-05-01 ENCOUNTER — LAB VISIT (OUTPATIENT)
Dept: LAB | Facility: HOSPITAL | Age: 34
End: 2019-05-01
Attending: SPECIALIST
Payer: COMMERCIAL

## 2019-05-01 ENCOUNTER — OFFICE VISIT (OUTPATIENT)
Dept: OBSTETRICS AND GYNECOLOGY | Facility: CLINIC | Age: 34
End: 2019-05-01
Payer: COMMERCIAL

## 2019-05-01 VITALS
SYSTOLIC BLOOD PRESSURE: 128 MMHG | DIASTOLIC BLOOD PRESSURE: 74 MMHG | WEIGHT: 291.88 LBS | HEIGHT: 67 IN | BODY MASS INDEX: 45.81 KG/M2

## 2019-05-01 DIAGNOSIS — N64.3 GALACTORRHEA OF LEFT BREAST: ICD-10-CM

## 2019-05-01 DIAGNOSIS — Z12.4 ENCOUNTER FOR PAP SMEAR OF CERVIX WITH HPV DNA COTESTING: ICD-10-CM

## 2019-05-01 DIAGNOSIS — N91.0 DELAYED MENSES: Primary | ICD-10-CM

## 2019-05-01 LAB
B-HCG UR QL: NEGATIVE
CTP QC/QA: YES
PROLACTIN SERPL IA-MCNC: 40.8 NG/ML (ref 5.2–26.5)

## 2019-05-01 PROCEDURE — 36415 COLL VENOUS BLD VENIPUNCTURE: CPT | Mod: PO

## 2019-05-01 PROCEDURE — 3078F DIAST BP <80 MM HG: CPT | Mod: CPTII,S$GLB,, | Performed by: SPECIALIST

## 2019-05-01 PROCEDURE — 81025 URINE PREGNANCY TEST: CPT | Mod: S$GLB,,, | Performed by: SPECIALIST

## 2019-05-01 PROCEDURE — 99999 PR PBB SHADOW E&M-EST. PATIENT-LVL III: CPT | Mod: PBBFAC,,, | Performed by: SPECIALIST

## 2019-05-01 PROCEDURE — 99999 PR PBB SHADOW E&M-EST. PATIENT-LVL III: ICD-10-PCS | Mod: PBBFAC,,, | Performed by: SPECIALIST

## 2019-05-01 PROCEDURE — 3078F PR MOST RECENT DIASTOLIC BLOOD PRESSURE < 80 MM HG: ICD-10-PCS | Mod: CPTII,S$GLB,, | Performed by: SPECIALIST

## 2019-05-01 PROCEDURE — 99204 PR OFFICE/OUTPT VISIT, NEW, LEVL IV, 45-59 MIN: ICD-10-PCS | Mod: S$GLB,,, | Performed by: SPECIALIST

## 2019-05-01 PROCEDURE — 84146 ASSAY OF PROLACTIN: CPT

## 2019-05-01 PROCEDURE — 3008F BODY MASS INDEX DOCD: CPT | Mod: CPTII,S$GLB,, | Performed by: SPECIALIST

## 2019-05-01 PROCEDURE — 99204 OFFICE O/P NEW MOD 45 MIN: CPT | Mod: S$GLB,,, | Performed by: SPECIALIST

## 2019-05-01 PROCEDURE — 88175 CYTOPATH C/V AUTO FLUID REDO: CPT

## 2019-05-01 PROCEDURE — 3074F SYST BP LT 130 MM HG: CPT | Mod: CPTII,S$GLB,, | Performed by: SPECIALIST

## 2019-05-01 PROCEDURE — 3008F PR BODY MASS INDEX (BMI) DOCUMENTED: ICD-10-PCS | Mod: CPTII,S$GLB,, | Performed by: SPECIALIST

## 2019-05-01 PROCEDURE — 3074F PR MOST RECENT SYSTOLIC BLOOD PRESSURE < 130 MM HG: ICD-10-PCS | Mod: CPTII,S$GLB,, | Performed by: SPECIALIST

## 2019-05-01 PROCEDURE — 87624 HPV HI-RISK TYP POOLED RSLT: CPT

## 2019-05-01 PROCEDURE — 81025 POCT URINE PREGNANCY: ICD-10-PCS | Mod: S$GLB,,, | Performed by: SPECIALIST

## 2019-05-01 RX ORDER — DESVENLAFAXINE 100 MG/1
100 TABLET, EXTENDED RELEASE ORAL DAILY
Refills: 2 | COMMUNITY
Start: 2019-04-23 | End: 2019-09-04

## 2019-05-01 RX ORDER — MODAFINIL 100 MG/1
TABLET ORAL
Refills: 2 | COMMUNITY
Start: 2019-04-26 | End: 2020-08-20

## 2019-05-01 RX ORDER — LURASIDONE HYDROCHLORIDE 80 MG/1
80 TABLET, FILM COATED ORAL NIGHTLY
Refills: 2 | COMMUNITY
Start: 2019-04-23 | End: 2021-06-16 | Stop reason: SDUPTHER

## 2019-05-01 NOTE — PROGRESS NOTES
34 yo obese WF , H/o BTL presents for evaluation c/o left breast gallactorhea x a pprox 1 week as well as missed menses with LMP approx 3/6.  Pt UPT negative. Denies change in diet, med, activity. Negative family and personal breast history.  Past Medical History:   Diagnosis Date    Asthma     Heart palpitations     Herniated disc     Hypertension     IBS (irritable bowel syndrome)     Insomnia     Lower back pain     L5 S1 herniated disks    Migraine headache     Palpitations     and pvcs with stress.  Not on any meds.    Sleep apnea     history of.  Dont use cpap.  lost weight.       Past Surgical History:   Procedure Laterality Date    ABDOMINAL SURGERY           SECTION, CLASSIC       SECTION, LOW TRANSVERSE      DILATION AND CURETTAGE OF UTERUS      DILATION AND CURETTAGE OF UTERUS      perferated uterus during procedure    endometrioma      removed on right lower quadrant of uterus    epidural steriod injections  x 3    EXCISION-MASS N/A 2012    [facility]    TONSILLECTOMY      TUBAL LIGATION         Family History   Problem Relation Age of Onset    Heart disease Mother     Hyperlipidemia Mother     Asthma Mother     Cancer Father     Heart disease Father     Hypertension Father     Hyperlipidemia Father     Arthritis Father     Diabetes Maternal Grandmother     Cancer Maternal Grandmother     Cancer Maternal Grandfather     Diabetes Paternal Grandfather     Breast cancer Maternal Aunt 40       Social History     Socioeconomic History    Marital status:      Spouse name: Not on file    Number of children: Not on file    Years of education: Not on file    Highest education level: Not on file   Occupational History    Not on file   Social Needs    Financial resource strain: Not on file    Food insecurity:     Worry: Not on file     Inability: Not on file    Transportation needs:     Medical: Not on  file     Non-medical: Not on file   Tobacco Use    Smoking status: Current Every Day Smoker     Types: Vaping with nicotine    Smokeless tobacco: Never Used   Substance and Sexual Activity    Alcohol use: Yes     Comment: socially, occasionally    Drug use: No    Sexual activity: Yes     Partners: Male     Birth control/protection: See Surgical Hx   Lifestyle    Physical activity:     Days per week: Not on file     Minutes per session: Not on file    Stress: Not on file   Relationships    Social connections:     Talks on phone: Not on file     Gets together: Not on file     Attends [employer] service: Not on file     Active member of club or organization: Not on file     Attends meetings of clubs or organizations: Not on file     Relationship status: Not on file   Other Topics Concern    Not on file   Social History Narrative    ** Merged History Encounter **            Current Outpatient Medications   Medication Sig Dispense Refill    desvenlafaxine succinate (PRISTIQ) 100 MG Tb24 Take 100 mg by mouth once daily.  2    LATUDA 80 mg Tab tablet Take 80 mg by mouth once daily. With food.  2    modafinil (PROVIGIL) 100 MG Tab TAKE 1 TABLET (100 MG) BY MOUTH DAILY IN THE MORNING  2    ADVAIR DISKUS 250-50 mcg/dose diskus inhaler Inhale 1 puff into the lungs 2 (two) times daily. 1 each 0    atorvastatin (LIPITOR) 20 MG tablet TAKE 1 TABLET (20 MG TOTAL) BY MOUTH ONCE DAILY. 30 tablet 1    FLUoxetine (PROZAC) 40 MG capsule TAKE 1 CAPSULE (40 MG TOTAL) BY MOUTH ONCE DAILY. PLEASE DISCONTINUE THE 20 MG TABLETS 30 capsule 0    PROAIR HFA 90 mcg/actuation inhaler INHALE ONE TO TWO INHALATIONS EVERY 4-6 HOURS AS NEEDED  2     No current facility-administered medications for this visit.        Review of patient's allergies indicates:   Allergen Reactions    Contrast media Anaphylaxis    Iodine and iodide containing products Anaphylaxis    Sulfa (sulfonamide antibiotics) Anaphylaxis    Magnesium      Pt  reporting she is allergic to magnesium citrate oral drink.     Morphine Hives    Adhesive Rash    Nut [tree nut] Hives       Review of System:   General: no chills, fever, night sweats, weight gain or weight loss  Psychological: no depression or suicidal ideation  Breasts: no new or changing breast lumps,  or masses. LEFT BREAST LEAKING  Respiratory: no cough, shortness of breath, or wheezing  Cardiovascular: no chest pain or dyspnea on exertion  Gastrointestinal: no abdominal pain, change in bowel habits, or black or bloody stools  Genito-Urinary: no incontinence, urinary frequency/urgency or vulvar/vaginal symptoms, pelvic pain.POSITIVE  Missed menses  Musculoskeletal: no gait disturbance or muscular weakness                                              General Appearance    A and O x 4, Cooperative, no distress   Breasts    Abdomen   Symmetrical, no masses, no discharge, skin changes , erythema or retraction. No adenopathy  Soft, non-tender, bowel sounds active all four quadrants,  no masses, no organomegaly    Genitourinary:   External rectal exam shows no thrombosed external hemorrhoids.   Pelvic exam was performed with patient supine.  No labial fusion.  There is no rash, lesion or injury on the right labia.  There is no rash, lesion or injury on the left labia.  No bleeding and no signs of injury around the vaginal introitus, urethra is without lesions and well supported. The cervix is visualized with no discharge, lesions or friability.  No vaginal discharge found.  No significant Cystocele, Enterocele or rectocele, and uterus well supported.  Bimanual exam:  The urethra is normal to palpation and there are no palpable vaginal wall masses.  Uterus is not deviated, not enlarged, not fixed, normal shape and not tender.  Cervix exhibits no motion tenderness.   Right adnexum displays no mass and no tenderness.  Left adnexum displays no mass and no tenderness.   Extremities: Extremities normal, atraumatic, no  cyanosis or edema                       PAP submitted    I discussed idiopathic gallactorhea and possible elevated prolactin with resultant anovulation and amenorrhea  I rec a serum prolactin level and will obtain a Dx mammo  I will follow results with care plan pending results  At the end of patient visit, nurse and MD both asked pt if any improvements needed in visit experience and asked whether any further pt questions or concerns.

## 2019-05-02 ENCOUNTER — PATIENT MESSAGE (OUTPATIENT)
Dept: OBSTETRICS AND GYNECOLOGY | Facility: CLINIC | Age: 34
End: 2019-05-02

## 2019-05-06 ENCOUNTER — HOSPITAL ENCOUNTER (OUTPATIENT)
Dept: RADIOLOGY | Facility: HOSPITAL | Age: 34
Discharge: HOME | End: 2019-05-06
Attending: SPECIALIST
Payer: COMMERCIAL

## 2019-05-06 DIAGNOSIS — N64.3 GALACTORRHEA OF LEFT BREAST: ICD-10-CM

## 2019-05-06 PROCEDURE — 77066 MAMMO DIGITAL DIAGNOSTIC BILAT WITH TOMOSYNTHESIS_CAD: ICD-10-PCS | Mod: 26,,, | Performed by: RADIOLOGY

## 2019-05-06 PROCEDURE — 77066 DX MAMMO INCL CAD BI: CPT | Mod: 26,,, | Performed by: RADIOLOGY

## 2019-05-06 PROCEDURE — 76642 ULTRASOUND BREAST LIMITED: CPT | Mod: 26,RT,, | Performed by: RADIOLOGY

## 2019-05-06 PROCEDURE — 76642 PR US BREAST UNILAT LIMITED: ICD-10-PCS | Mod: 26,LT,, | Performed by: RADIOLOGY

## 2019-05-06 PROCEDURE — 76642 ULTRASOUND BREAST LIMITED: CPT | Mod: TC,50

## 2019-05-06 PROCEDURE — 77062 BREAST TOMOSYNTHESIS BI: CPT | Mod: TC

## 2019-05-06 PROCEDURE — 77062 MAMMO DIGITAL DIAGNOSTIC BILAT WITH TOMOSYNTHESIS_CAD: ICD-10-PCS | Mod: 26,,, | Performed by: RADIOLOGY

## 2019-05-06 PROCEDURE — 77062 BREAST TOMOSYNTHESIS BI: CPT | Mod: 26,,, | Performed by: RADIOLOGY

## 2019-05-06 PROCEDURE — 76642 ULTRASOUND BREAST LIMITED: CPT | Mod: 26,LT,, | Performed by: RADIOLOGY

## 2019-05-07 LAB
HPV HR 12 DNA CVX QL NAA+PROBE: NEGATIVE
HPV16 AG SPEC QL: NEGATIVE
HPV18 DNA SPEC QL NAA+PROBE: NEGATIVE

## 2019-06-10 RX ORDER — BROMOCRIPTINE MESYLATE 2.5 MG/1
TABLET ORAL
Qty: 42 TABLET | Refills: 0 | Status: SHIPPED | OUTPATIENT
Start: 2019-06-10 | End: 2019-08-26

## 2019-07-17 RX ORDER — BROMOCRIPTINE MESYLATE 2.5 MG/1
TABLET ORAL
Qty: 30 TABLET | Refills: 1 | OUTPATIENT
Start: 2019-07-17

## 2019-08-12 ENCOUNTER — TELEPHONE (OUTPATIENT)
Dept: FAMILY MEDICINE | Facility: CLINIC | Age: 34
End: 2019-08-12

## 2019-08-12 NOTE — TELEPHONE ENCOUNTER
----- Message from Ny Velasquez sent at 8/9/2019  4:58 PM CDT -----  Contact: [phone]-self  Patient would like to re-est care. Unable to book, please call.

## 2019-08-26 ENCOUNTER — OFFICE VISIT (OUTPATIENT)
Dept: FAMILY MEDICINE | Facility: CLINIC | Age: 34
End: 2019-08-26
Payer: COMMERCIAL

## 2019-08-26 ENCOUNTER — DOCUMENTATION ONLY (OUTPATIENT)
Dept: FAMILY MEDICINE | Facility: CLINIC | Age: 34
End: 2019-08-26

## 2019-08-26 VITALS
SYSTOLIC BLOOD PRESSURE: 116 MMHG | OXYGEN SATURATION: 97 % | TEMPERATURE: 98 F | BODY MASS INDEX: 45.5 KG/M2 | HEIGHT: 67 IN | WEIGHT: 289.88 LBS | HEART RATE: 84 BPM | RESPIRATION RATE: 16 BRPM | DIASTOLIC BLOOD PRESSURE: 86 MMHG

## 2019-08-26 DIAGNOSIS — N30.01 ACUTE CYSTITIS WITH HEMATURIA: Primary | ICD-10-CM

## 2019-08-26 DIAGNOSIS — G47.33 OBSTRUCTIVE SLEEP APNEA: ICD-10-CM

## 2019-08-26 DIAGNOSIS — E66.01 MORBID OBESITY: ICD-10-CM

## 2019-08-26 LAB
BILIRUB SERPL-MCNC: ABNORMAL MG/DL
BLOOD URINE, POC: 250
COLOR, POC UA: YELLOW
GLUCOSE UR QL STRIP: ABNORMAL
KETONES UR QL STRIP: ABNORMAL
LEUKOCYTE ESTERASE URINE, POC: ABNORMAL
NITRITE, POC UA: ABNORMAL
PH, POC UA: 7
PROTEIN, POC: ABNORMAL
SPECIFIC GRAVITY, POC UA: 1.01
UROBILINOGEN, POC UA: ABNORMAL

## 2019-08-26 PROCEDURE — 99213 OFFICE O/P EST LOW 20 MIN: CPT | Mod: 25,S$GLB,, | Performed by: INTERNAL MEDICINE

## 2019-08-26 PROCEDURE — 3008F BODY MASS INDEX DOCD: CPT | Mod: CPTII,S$GLB,, | Performed by: INTERNAL MEDICINE

## 2019-08-26 PROCEDURE — 87088 URINE BACTERIA CULTURE: CPT

## 2019-08-26 PROCEDURE — 81002 URINALYSIS NONAUTO W/O SCOPE: CPT | Mod: S$GLB,,, | Performed by: INTERNAL MEDICINE

## 2019-08-26 PROCEDURE — 3079F PR MOST RECENT DIASTOLIC BLOOD PRESSURE 80-89 MM HG: ICD-10-PCS | Mod: CPTII,S$GLB,, | Performed by: INTERNAL MEDICINE

## 2019-08-26 PROCEDURE — 81002 POCT URINE DIPSTICK WITHOUT MICROSCOPE: ICD-10-PCS | Mod: S$GLB,,, | Performed by: INTERNAL MEDICINE

## 2019-08-26 PROCEDURE — 3074F SYST BP LT 130 MM HG: CPT | Mod: CPTII,S$GLB,, | Performed by: INTERNAL MEDICINE

## 2019-08-26 PROCEDURE — 99213 PR OFFICE/OUTPT VISIT, EST, LEVL III, 20-29 MIN: ICD-10-PCS | Mod: 25,S$GLB,, | Performed by: INTERNAL MEDICINE

## 2019-08-26 PROCEDURE — 87186 SC STD MICRODIL/AGAR DIL: CPT

## 2019-08-26 PROCEDURE — 3008F PR BODY MASS INDEX (BMI) DOCUMENTED: ICD-10-PCS | Mod: CPTII,S$GLB,, | Performed by: INTERNAL MEDICINE

## 2019-08-26 PROCEDURE — 87086 URINE CULTURE/COLONY COUNT: CPT

## 2019-08-26 PROCEDURE — 87077 CULTURE AEROBIC IDENTIFY: CPT

## 2019-08-26 PROCEDURE — 3079F DIAST BP 80-89 MM HG: CPT | Mod: CPTII,S$GLB,, | Performed by: INTERNAL MEDICINE

## 2019-08-26 PROCEDURE — 3074F PR MOST RECENT SYSTOLIC BLOOD PRESSURE < 130 MM HG: ICD-10-PCS | Mod: CPTII,S$GLB,, | Performed by: INTERNAL MEDICINE

## 2019-08-26 RX ORDER — NITROFURANTOIN 25; 75 MG/1; MG/1
100 CAPSULE ORAL 2 TIMES DAILY
Qty: 10 CAPSULE | Refills: 0 | Status: SHIPPED | OUTPATIENT
Start: 2019-08-26 | End: 2019-08-31

## 2019-08-26 NOTE — PROGRESS NOTES
"Subjective:      9:16 AM     Patient ID: Sonia Goldberg is a 33 y.o. female.    Chief Complaint: Establish Care and Dysuria (odor and urge to go)    HPI          CHIEF COMPLAINT: Bladder/UTI(+).  HPI:     ONSET/TIMING: Onset  2 days    ago. Sudden:: no .     DURATION:  continuous    QUALITY/COURSE:   Worse     LOCATION: pain/pressure: suprapubic    .     INTENSITY/SEVERITY: # 5   /10 (on a 1 to 10 scale).    CONTEXT/WHEN: --Similar problems: yes. .  Past_treatments: no . Past_evaluations: no    MODIFIERS/TREATMENTS:  .     The following symptoms are positive if BOLD, negative otherwise.       REVIEW OF SYMPTOMS:Urine_Frequency . Urine_Urgency . Dysuria . Suprapubic_Pain . Hematuria . Urine_Incontinence . . Fever . Chills . Nausea . Vomiting . Perineal Pain .  Sharp_pain . Dull_pain . Burning_pain .Tenesmus . Back_pain . Vaginal_Discharge . Vaginal_Itching . Vaginal_Burning . Dyspareunia .       The patient has morbid obesity.  She has not been checked for sleep apnea even though she snored her whole life.  She has daytime fatigue for which she takes Provigil.  She knows that she is having pain in her knees.  She is interested in bariatric surgery.                     Review of Systems      Objective:      Vitals:    08/26/19 0901   BP: 116/86   Pulse: 84   Resp: 16   Temp: 98 °F (36.7 °C)   TempSrc: Oral   SpO2: 97%   Weight: 131.5 kg (289 lb 14.5 oz)   Height: 5' 7" (1.702 m)   PainSc: 0-No pain     Physical Exam      Assessment:       1. Acute cystitis with hematuria    2. Obstructive sleep apnea    3. Morbid obesity          Plan:       Acute cystitis with hematuria  -     nitrofurantoin, macrocrystal-monohydrate, (MACROBID) 100 MG capsule; Take 1 capsule (100 mg total) by mouth 2 (two) times daily. for 5 days  Dispense: 10 capsule; Refill: 0  -     Urine culture    Obstructive sleep apnea  -     Ambulatory referral to Pulmonology    Morbid obesity  -     Ambulatory Referral to Bariatric " Medicine      Follow up for if you are not better return in one week.

## 2019-08-26 NOTE — PROGRESS NOTES
Health Maintenance Due   Topic Date Due    Pneumococcal Vaccine (Medium Risk) (1 of 1 - PPSV23) 12/02/2002

## 2019-08-29 LAB — BACTERIA UR CULT: ABNORMAL

## 2019-09-04 ENCOUNTER — OFFICE VISIT (OUTPATIENT)
Dept: BARIATRICS | Facility: CLINIC | Age: 34
End: 2019-09-04
Payer: COMMERCIAL

## 2019-09-04 VITALS — HEIGHT: 67 IN | WEIGHT: 286.63 LBS | RESPIRATION RATE: 16 BRPM | BODY MASS INDEX: 44.99 KG/M2

## 2019-09-04 DIAGNOSIS — E66.01 MORBID OBESITY WITH BMI OF 40.0-44.9, ADULT: ICD-10-CM

## 2019-09-04 DIAGNOSIS — E66.01 MORBID OBESITY: Primary | ICD-10-CM

## 2019-09-04 DIAGNOSIS — G47.33 OSA (OBSTRUCTIVE SLEEP APNEA): ICD-10-CM

## 2019-09-04 PROCEDURE — 3008F BODY MASS INDEX DOCD: CPT | Mod: CPTII,S$GLB,, | Performed by: SURGERY

## 2019-09-04 PROCEDURE — 99999 PR PBB SHADOW E&M-EST. PATIENT-LVL III: ICD-10-PCS | Mod: PBBFAC,,, | Performed by: SURGERY

## 2019-09-04 PROCEDURE — 99999 PR PBB SHADOW E&M-EST. PATIENT-LVL III: CPT | Mod: PBBFAC,,, | Performed by: SURGERY

## 2019-09-04 PROCEDURE — 99201 PR OFFICE/OUTPT VISIT,NEW,LEVL I: ICD-10-PCS | Mod: S$GLB,,, | Performed by: SURGERY

## 2019-09-04 PROCEDURE — 99201 PR OFFICE/OUTPT VISIT,NEW,LEVL I: CPT | Mod: S$GLB,,, | Performed by: SURGERY

## 2019-09-04 PROCEDURE — 3008F PR BODY MASS INDEX (BMI) DOCUMENTED: ICD-10-PCS | Mod: CPTII,S$GLB,, | Performed by: SURGERY

## 2019-09-04 NOTE — PROGRESS NOTES
Medically Supervised Weight Loss Documentation    Date of Visit: 09/04/2019    Patient: Sonia Glodberg    Current Weight: 286  Current BMI: Body mass index is 44.89 kg/m².  Weight Change: 0    Last Weight: 286    Beginning Weight: 286      Vitals:   Vitals:    09/04/19 1326   Resp: 16       Comorbidities:   Past Medical History:   Diagnosis Date    Asthma     Heart palpitations     Herniated disc     Hypertension     IBS (irritable bowel syndrome)     Insomnia     Lower back pain     L5 S1 herniated disks    Migraine headache     Palpitations     and pvcs with stress.  Not on any meds.    Sleep apnea     history of.  Dont use cpap.  lost weight.       Medications:  Current Outpatient Medications on File Prior to Visit   Medication Sig Dispense Refill    bupropion HCl (WELLBUTRIN ORAL) Take 1 tablet by mouth once daily.      LATUDA 80 mg Tab tablet Take 80 mg by mouth once daily. With food.  2    modafinil (PROVIGIL) 100 MG Tab TAKE 1 TABLET (100 MG) BY MOUTH DAILY IN THE MORNING  2    atorvastatin (LIPITOR) 20 MG tablet TAKE 1 TABLET (20 MG TOTAL) BY MOUTH ONCE DAILY. 30 tablet 1    [DISCONTINUED] desvenlafaxine succinate (PRISTIQ) 100 MG Tb24 Take 100 mg by mouth once daily.  2     No current facility-administered medications on file prior to visit.          Body comp:  Fat Percent:  50.8 %  Fat Mass:  145.6 lb  FFM:  141 lb  TBW: 105 lb  TBW %:  36.6 %  BMR: 2053 kcal      Diet Education Discussed:    Made some changes    Exercise/Activity Discussed:    Walking some    Behavior or Diet Goals for this patient:    Low carb, low pierre dieting  Cardiovascular exercising 20 minutes 3 times per week      dietary consult  psych consult   Seminar  Labs  Off steroids for 6 weeks before surgery     I will obtain the following clearances prior to surgery: none          Diet plan: high protein low carb- mainly meats and vegetables  Exercise plan: Cardiovascular exercise, get HR over 100 for 20 minutes 3  times per week.  Start multivitamin      : I met with the patient for 15 minutes and counseled her for over 50% of that time

## 2020-01-07 ENCOUNTER — TELEPHONE (OUTPATIENT)
Dept: FAMILY MEDICINE | Facility: CLINIC | Age: 35
End: 2020-01-07

## 2020-01-07 NOTE — TELEPHONE ENCOUNTER
----- Message from Karen Duran sent at 1/7/2020 11:56 AM CST -----  Contact: Pt   PT would like to be seen tomorrow for ear infection   She can be reached at [phone]    thanks

## 2020-01-09 ENCOUNTER — OFFICE VISIT (OUTPATIENT)
Dept: FAMILY MEDICINE | Facility: CLINIC | Age: 35
End: 2020-01-09
Payer: COMMERCIAL

## 2020-01-09 VITALS
HEART RATE: 83 BPM | DIASTOLIC BLOOD PRESSURE: 88 MMHG | TEMPERATURE: 98 F | SYSTOLIC BLOOD PRESSURE: 130 MMHG | OXYGEN SATURATION: 96 % | BODY MASS INDEX: 43.08 KG/M2 | HEIGHT: 67 IN | WEIGHT: 274.5 LBS

## 2020-01-09 DIAGNOSIS — M62.838 MUSCLE SPASM: Primary | ICD-10-CM

## 2020-01-09 DIAGNOSIS — H92.02 LEFT EAR PAIN: ICD-10-CM

## 2020-01-09 PROCEDURE — 99213 PR OFFICE/OUTPT VISIT, EST, LEVL III, 20-29 MIN: ICD-10-PCS | Mod: S$GLB,,, | Performed by: NURSE PRACTITIONER

## 2020-01-09 PROCEDURE — 99213 OFFICE O/P EST LOW 20 MIN: CPT | Mod: S$GLB,,, | Performed by: NURSE PRACTITIONER

## 2020-01-09 RX ORDER — CYCLOBENZAPRINE HCL 5 MG
TABLET ORAL
Qty: 60 TABLET | Refills: 0 | Status: SHIPPED | OUTPATIENT
Start: 2020-01-09 | End: 2020-08-20

## 2020-01-09 RX ORDER — NAPROXEN SODIUM 550 MG/1
550 TABLET ORAL 2 TIMES DAILY WITH MEALS
Qty: 60 TABLET | Refills: 0 | Status: SHIPPED | OUTPATIENT
Start: 2020-01-09 | End: 2020-08-20

## 2020-01-09 NOTE — PROGRESS NOTES
Subjective:       Patient ID: Sonia Goldberg is a 34 y.o. female.    Chief Complaint: Otalgia (left side. currently no pain present)  This is an established patient of Dr. Ma. I have not seen her in more than 3 years.   Otalgia    There is pain in the left ear. This is a new problem. The current episode started in the past 7 days. The problem occurs every few hours. The problem has been unchanged. There has been no fever. The pain is at a severity of 8/10. Associated symptoms include hearing loss (tinnitus). Pertinent negatives include no coughing, ear discharge, rhinorrhea or sore throat. She has tried nothing for the symptoms. There is no history of a chronic ear infection, hearing loss or a tympanostomy tube.     Has high LDL cholesterol, was on a statin in the past, but risk score (with age of 38 as calculator will not run under that age) is 5.7% based on lipids from 2016.Denies any severe family history of MIs.   Review of Systems   HENT: Positive for ear pain and hearing loss (tinnitus). Negative for ear discharge, rhinorrhea and sore throat.    Respiratory: Negative for cough.        Objective:      Physical Exam   Constitutional: She is oriented to person, place, and time. She appears well-developed and well-nourished. No distress.   HENT:   Head: Normocephalic and atraumatic.   Right Ear: External ear normal.   Left Ear: External ear normal.   Mouth/Throat: Oropharynx is clear and moist. No oropharyngeal exudate.   TMs pearly grey with light reflex bilaterally.      Eyes: Conjunctivae are normal. Right eye exhibits no discharge. Left eye exhibits no discharge. No scleral icterus.   Neck: Normal range of motion. Neck supple.   Cardiovascular: Normal rate, regular rhythm and normal heart sounds. Exam reveals no gallop and no friction rub.   No murmur heard.  Pulmonary/Chest: Effort normal and breath sounds normal. No stridor. No respiratory distress. She has no wheezes. She has no rales.    Musculoskeletal: She exhibits no edema.   Lymphadenopathy:     She has no cervical adenopathy.   Neurological: She is alert and oriented to person, place, and time.   Skin: Skin is warm and dry. No rash noted. She is not diaphoretic. No pallor.   Psychiatric: She has a normal mood and affect. Her behavior is normal.   Nursing note and vitals reviewed.    Assessment:       1. Muscle spasm    2. Left ear pain        Plan:       Muscle spasm  -     cyclobenzaprine (FLEXERIL) 5 MG tablet; 1-2 tabs po qhs  Dispense: 60 tablet; Refill: 0    Left ear pain  -     naproxen sodium (ANAPROX) 550 MG tablet; Take 1 tablet (550 mg total) by mouth 2 (two) times daily with meals.  Dispense: 60 tablet; Refill: 0         if rash on face or neck develops, come in immediately. Eat soft foods, no chewing gum for 1 month, use muscle relaxer before you go to sleep and naproxen twice a day with food (about 12 hours apart). If pain persists with treatment, see your dentist. No need for cholesterol medication at this time. Try to modify diet with low fat foods except for fatty fish like salmon, tuna, mackerel and sardines. Avoid fried foods and try to quit smoking.

## 2020-01-09 NOTE — PATIENT INSTRUCTIONS
if rash on face or neck develops, come in immediately. Eat soft foods, no chewing gum for 1 month, use muscle relaxer before you go to sleep and naproxen twice a day with food (about 12 hours apart). If pain persists with treatment, see your dentist. No need for cholesterol medication at this time. Try to modify diet with low fat foods except for fatty fish like salmon, tuna, mackerel and sardines. Avoid fried foods and try to quit smoking.

## 2020-04-20 ENCOUNTER — TELEPHONE (OUTPATIENT)
Dept: FAMILY MEDICINE | Facility: CLINIC | Age: 35
End: 2020-04-20

## 2020-04-20 NOTE — TELEPHONE ENCOUNTER
----- Message from Karen Kelley sent at 4/20/2020  9:16 AM CDT -----  Contact: pt  Type:  Sooner Apoointment Request    Caller is requesting a sooner appointment.  Caller declined first available appointment listed below.  Caller will not accept being placed on the waitlist and is requesting a message be sent to doctor.  Name of Caller PtWhen is the first available appointment?  Symptoms UTI  Would the patient rather a call back or a response via [facility]?   Best Call Back [facility] [phone]  Additional Information:

## 2020-04-23 ENCOUNTER — OFFICE VISIT (OUTPATIENT)
Dept: FAMILY MEDICINE | Facility: CLINIC | Age: 35
End: 2020-04-23
Payer: COMMERCIAL

## 2020-04-23 DIAGNOSIS — N30.01 ACUTE CYSTITIS WITH HEMATURIA: Primary | ICD-10-CM

## 2020-04-23 LAB
BILIRUB SERPL-MCNC: NEGATIVE MG/DL
BLOOD URINE, POC: ABNORMAL
COLOR, POC UA: YELLOW
GLUCOSE UR QL STRIP: NORMAL
KETONES UR QL STRIP: NEGATIVE
LEUKOCYTE ESTERASE URINE, POC: ABNORMAL
NITRITE, POC UA: NEGATIVE
PH, POC UA: 5
PROTEIN, POC: ABNORMAL
SPECIFIC GRAVITY, POC UA: 1.02
UROBILINOGEN, POC UA: NEGATIVE

## 2020-04-23 PROCEDURE — 87086 URINE CULTURE/COLONY COUNT: CPT

## 2020-04-23 PROCEDURE — 99213 PR OFFICE/OUTPT VISIT, EST, LEVL III, 20-29 MIN: ICD-10-PCS | Mod: 95,25,, | Performed by: NURSE PRACTITIONER

## 2020-04-23 PROCEDURE — 99213 OFFICE O/P EST LOW 20 MIN: CPT | Mod: 95,25,, | Performed by: NURSE PRACTITIONER

## 2020-04-23 PROCEDURE — 81002 URINALYSIS NONAUTO W/O SCOPE: CPT | Mod: ,,, | Performed by: NURSE PRACTITIONER

## 2020-04-23 PROCEDURE — 81002 POCT URINE DIPSTICK WITHOUT MICROSCOPE: ICD-10-PCS | Mod: ,,, | Performed by: NURSE PRACTITIONER

## 2020-04-23 RX ORDER — PHENAZOPYRIDINE HYDROCHLORIDE 200 MG/1
200 TABLET, FILM COATED ORAL 3 TIMES DAILY PRN
Qty: 20 TABLET | Refills: 0 | Status: SHIPPED | OUTPATIENT
Start: 2020-04-23 | End: 2020-04-26

## 2020-04-23 RX ORDER — METHOCARBAMOL 500 MG/1
TABLET, FILM COATED ORAL
COMMUNITY
Start: 2020-02-27 | End: 2020-08-20

## 2020-04-23 RX ORDER — NITROFURANTOIN (MACROCRYSTALS) 100 MG/1
100 CAPSULE ORAL EVERY 12 HOURS
Qty: 14 CAPSULE | Refills: 0 | Status: SHIPPED | OUTPATIENT
Start: 2020-04-23 | End: 2020-08-20

## 2020-04-23 NOTE — PROGRESS NOTES
Subjective:       Patient ID: Sonia Goldberg is a 34 y.o. female.  The patient location is: [place]  The chief complaint leading to consultation is: uti  Visit type: audiovisual  Total time spent with patient: 12 minutes  Each patient to whom he or she provides medical services by telemedicine is:  (1) informed of the relationship between the physician and patient and the respective role of any other health care provider with respect to management of the patient; and (2) notified that he or she may decline to receive medical services by telemedicine and may withdraw from such care at any time.    Notes: Patient was in office earlier today and gave urine specimen.   Chief Complaint: Urinary Tract Infection    Urinary Tract Infection    This is a new problem. The current episode started acute onset (started 5 days ago). The problem occurs every urination. The quality of the pain is described as burning and stabbing. The pain is at a severity of 6/10. There has been no fever. She is sexually active. There is no history of pyelonephritis. Associated symptoms include flank pain. Pertinent negatives include no chills, sweats, vomiting or bubble bath use. She has tried increased fluids for the symptoms. The treatment provided no relief.     Review of Systems   Constitutional: Negative for chills.   Gastrointestinal: Negative for vomiting.   Genitourinary: Positive for flank pain.       Objective:     There is no physical exam except for those things that patient could perform under my direction, or that I could see, as this was a virtual visit. All medications were reviewed with the patient.   U/A positive for leukocytes 2+, blood trace, protein trace. Negative for nitrites  Physical Exam   Constitutional: She is oriented to person, place, and time. She appears well-developed and well-nourished. No distress.   HENT:   Head: Normocephalic and atraumatic.   Pulmonary/Chest: No respiratory distress.   Musculoskeletal:  She exhibits tenderness.   Tender over left lower back, below CVA, with self percussion (as directed by me)   Neurological: She is alert and oriented to person, place, and time.   Skin: She is not diaphoretic.   Psychiatric: She has a normal mood and affect. Her behavior is normal. Thought content normal.       Assessment:       1. Acute cystitis with hematuria        Plan:       Acute cystitis with hematuria  -     nitrofurantoin (MACRODANTIN) 100 MG capsule; Take 1 capsule (100 mg total) by mouth every 12 (twelve) hours.  Dispense: 14 capsule; Refill: 0  -     POCT urine dipstick without microscope  -     Urine culture  -     phenazopyridine (PYRIDIUM) 200 MG tablet; Take 1 tablet (200 mg total) by mouth 3 (three) times daily as needed for Pain.  Dispense: 20 tablet; Refill: 0         Use ice on muscle spasm. 20 minutes on, 20 minutes off. Repeat as often as needed. Always use ice after exercising.  If you don't have an ice pack, Smart temp cold packs are sold at [facility] and other pharmacies.  Drink at least 64 ounces of fluid a day.   If back pain or abdominal pain worsens, you start running fever, or have uncontrolled vomiting, or cannot urinate, go to ER.

## 2020-04-23 NOTE — PATIENT INSTRUCTIONS
If you are asked to answer a survey from [facility], please do so and let them know how I did at your visit today.        Use ice on muscle spasm. 20 minutes on, 20 minutes off. Repeat as often as needed. Always use ice after exercising.  If you don't have an ice pack, Smart temp cold packs are sold at [facility] and other pharmacies.  Drink at least 64 ounces of fluid a day.   If back pain or abdominal pain worsens, you start running fever, or have uncontrolled vomiting, or cannot urinate, go to ER. We will send urine for culture and put results in your [facility] portal (probably will not be until Monday).   Follow up in 1 week if not improving.

## 2020-04-24 LAB
BACTERIA UR CULT: NORMAL
BACTERIA UR CULT: NORMAL

## 2020-05-07 ENCOUNTER — TELEPHONE (OUTPATIENT)
Dept: FAMILY MEDICINE | Facility: CLINIC | Age: 35
End: 2020-05-07

## 2020-05-07 ENCOUNTER — OFFICE VISIT (OUTPATIENT)
Dept: FAMILY MEDICINE | Facility: CLINIC | Age: 35
End: 2020-05-07
Payer: COMMERCIAL

## 2020-05-07 DIAGNOSIS — J45.901 EXACERBATION OF ASTHMA, UNSPECIFIED ASTHMA SEVERITY, UNSPECIFIED WHETHER PERSISTENT: Primary | ICD-10-CM

## 2020-05-07 DIAGNOSIS — F41.9 ANXIETY: ICD-10-CM

## 2020-05-07 PROCEDURE — 99213 OFFICE O/P EST LOW 20 MIN: CPT | Mod: 95,,, | Performed by: INTERNAL MEDICINE

## 2020-05-07 PROCEDURE — 99213 PR OFFICE/OUTPT VISIT, EST, LEVL III, 20-29 MIN: ICD-10-PCS | Mod: 95,,, | Performed by: INTERNAL MEDICINE

## 2020-05-07 RX ORDER — BUSPIRONE HYDROCHLORIDE 15 MG/1
15 TABLET ORAL 2 TIMES DAILY
Qty: 60 TABLET | Refills: 11 | Status: SHIPPED | OUTPATIENT
Start: 2020-05-07 | End: 2020-08-20

## 2020-05-07 RX ORDER — ALBUTEROL SULFATE 90 UG/1
2 AEROSOL, METERED RESPIRATORY (INHALATION) EVERY 6 HOURS PRN
Qty: 18 G | Refills: 11 | Status: ON HOLD | OUTPATIENT
Start: 2020-05-07 | End: 2023-06-26 | Stop reason: HOSPADM

## 2020-05-07 NOTE — TELEPHONE ENCOUNTER
----- Message from Karen Duran sent at 5/7/2020  2:37 PM CDT -----  Contact: PT   Pt is requesting a call from the office in regards to a letter that Dr aM wrote   She can be reached at [phone]    Thanks

## 2020-05-07 NOTE — PROGRESS NOTES
Subjective:      1:06 PM     Patient ID: Sonia Goldberg is a 34 y.o. female.  Video visit:    The patient location is: [place]  The chief complaint leading to consultation is: work excuse  Visit type: Virtual visit with synchronous audio and video  Total time spent with patient: 16 mins  Each patient to whom he or she provides medical services by telemedicine is:  (1) informed of the relationship between the physician and patient and the respective role of any other health care provider with respect to management of the patient; and (2) notified that he or she may decline to receive medical services by telemedicine and may withdraw from such care at any time.    Notes:       Chief Complaint: No chief complaint on file.    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  She has been off work because of corona virus but the amount of corona virus on the calls is much less now.  She wishes to go back and states that they now have full PPE.      eReview of Systems      Objective:      There were no vitals filed for this visit.  Physical Exam  Recent Results (from the past 1008 hour(s))   Urine culture    Collection Time: 04/23/20  1:54 PM   Result Value Ref Range    Urine Culture, Routine       Multiple organisms isolated. None in predominance.  Repeat if    Urine Culture, Routine clinically necessary.    POCT urine dipstick without microscope    Collection Time: 04/23/20  2:39 PM   Result Value Ref Range    Color, UA yellow     Spec Grav UA 1.020     pH, UA 5     WBC, UA ++     Nitrite, UA negative     Protein trace     Glucose, UA normal     Ketones, UA negative     Urobilinogen, UA negative     Bilirubin negative     Blood, UA trace           Assessment:       1. Exacerbation of asthma, unspecified asthma severity, unspecified whether persistent    2. Anxiety          Plan:   Discussed with the patient the need to disinfected after each run not just once a day.  The patient is released for full work  duty  Exacerbation of asthma, unspecified asthma severity, unspecified whether persistent  -     albuterol (PROVENTIL/VENTOLIN HFA) 90 mcg/actuation inhaler; Inhale 2 puffs into the lungs every 6 (six) hours as needed for Wheezing.  Dispense: 18 g; Refill: 11  -     NEBULIZER FOR HOME USE    Anxiety  -     busPIRone (BUSPAR) 15 MG tablet; Take 1 tablet (15 mg total) by mouth 2 (two) times daily.  Dispense: 60 tablet; Refill: 11      Follow up in about 3 months (around 8/7/2020).

## 2020-05-07 NOTE — PATIENT INSTRUCTIONS
Get immunotouch bracelets    Disinfect after each run.    Thank you for choosing [facility].     Please fill out the patient experience survey.

## 2020-05-08 ENCOUNTER — TELEPHONE (OUTPATIENT)
Dept: FAMILY MEDICINE | Facility: CLINIC | Age: 35
End: 2020-05-08

## 2020-08-20 ENCOUNTER — OFFICE VISIT (OUTPATIENT)
Dept: FAMILY MEDICINE | Facility: CLINIC | Age: 35
End: 2020-08-20
Payer: COMMERCIAL

## 2020-08-20 VITALS
DIASTOLIC BLOOD PRESSURE: 84 MMHG | WEIGHT: 243.63 LBS | SYSTOLIC BLOOD PRESSURE: 126 MMHG | HEIGHT: 67 IN | RESPIRATION RATE: 16 BRPM | BODY MASS INDEX: 38.24 KG/M2 | OXYGEN SATURATION: 97 % | TEMPERATURE: 98 F | HEART RATE: 87 BPM

## 2020-08-20 DIAGNOSIS — R42 ORTHOSTATIC DIZZINESS: ICD-10-CM

## 2020-08-20 DIAGNOSIS — R00.2 PALPITATIONS: Primary | ICD-10-CM

## 2020-08-20 DIAGNOSIS — R55 NEAR SYNCOPE: ICD-10-CM

## 2020-08-20 DIAGNOSIS — E66.9 CLASS 2 OBESITY WITH BODY MASS INDEX (BMI) OF 37.0 TO 37.9 IN ADULT, UNSPECIFIED OBESITY TYPE, UNSPECIFIED WHETHER SERIOUS COMORBIDITY PRESENT: ICD-10-CM

## 2020-08-20 PROCEDURE — 93010 ELECTROCARDIOGRAM REPORT: CPT | Mod: S$GLB,,, | Performed by: INTERNAL MEDICINE

## 2020-08-20 PROCEDURE — 99214 OFFICE O/P EST MOD 30 MIN: CPT | Mod: S$GLB,,, | Performed by: INTERNAL MEDICINE

## 2020-08-20 PROCEDURE — 93005 EKG 12-LEAD: ICD-10-PCS | Mod: S$GLB,,, | Performed by: INTERNAL MEDICINE

## 2020-08-20 PROCEDURE — 99214 PR OFFICE/OUTPT VISIT, EST, LEVL IV, 30-39 MIN: ICD-10-PCS | Mod: S$GLB,,, | Performed by: INTERNAL MEDICINE

## 2020-08-20 PROCEDURE — 93010 EKG 12-LEAD: ICD-10-PCS | Mod: S$GLB,,, | Performed by: INTERNAL MEDICINE

## 2020-08-20 PROCEDURE — 93005 ELECTROCARDIOGRAM TRACING: CPT | Mod: S$GLB,,, | Performed by: INTERNAL MEDICINE

## 2020-08-20 PROCEDURE — 3074F PR MOST RECENT SYSTOLIC BLOOD PRESSURE < 130 MM HG: ICD-10-PCS | Mod: CPTII,S$GLB,, | Performed by: INTERNAL MEDICINE

## 2020-08-20 PROCEDURE — 3079F PR MOST RECENT DIASTOLIC BLOOD PRESSURE 80-89 MM HG: ICD-10-PCS | Mod: CPTII,S$GLB,, | Performed by: INTERNAL MEDICINE

## 2020-08-20 PROCEDURE — 3079F DIAST BP 80-89 MM HG: CPT | Mod: CPTII,S$GLB,, | Performed by: INTERNAL MEDICINE

## 2020-08-20 PROCEDURE — 3008F BODY MASS INDEX DOCD: CPT | Mod: CPTII,S$GLB,, | Performed by: INTERNAL MEDICINE

## 2020-08-20 PROCEDURE — 3008F PR BODY MASS INDEX (BMI) DOCUMENTED: ICD-10-PCS | Mod: CPTII,S$GLB,, | Performed by: INTERNAL MEDICINE

## 2020-08-20 PROCEDURE — 3074F SYST BP LT 130 MM HG: CPT | Mod: CPTII,S$GLB,, | Performed by: INTERNAL MEDICINE

## 2020-08-20 RX ORDER — LORAZEPAM 0.5 MG/1
1 TABLET ORAL DAILY PRN
COMMUNITY
Start: 2020-06-05 | End: 2021-08-20 | Stop reason: CLARIF

## 2020-08-20 RX ORDER — BUPROPION HYDROCHLORIDE 150 MG/1
150 TABLET ORAL DAILY
COMMUNITY
Start: 2020-06-05 | End: 2021-06-16 | Stop reason: SDUPTHER

## 2020-08-20 NOTE — PATIENT INSTRUCTIONS
Push salty fluids.  Try a low carb diet    Lose 5 pounds.  Suggest Maybee diet    Thank you for choosing [facility].     Please fill out the patient experience survey.

## 2020-08-20 NOTE — PROGRESS NOTES
Subjective:      11:22 AM     Patient ID: Sonia Goldberg is a 34 y.o. female.    Chief Complaint: Headache, Dizziness, near syncope, and Hypertension    HPI     CHIEF COMPLAINT: dizziness .  HPI:  Near syncope on standing.  Had diarrhea yesterday before it started.  She is only eating 1 meal a day.  She had eaten a high carb meal an hour before the 1st episode yesterday.    ONSET/TIMING: Onset     1 day  ago.         Trauma: no    DURATION:  Intermittent     QUALITY/COURSE:  unchanged    INTENSITY/SEVERITY:  severity 8  (on a 1-10 scale).        MODIFIERS/TREATMENTS:   Taking medications:   . ENT consult done: no.     SYMPTOMS/RELATED:  Possible medication side effects include:        The following symptoms/statements are positive if BOLDED, otherwise negative.          CONTEXT/WHEN:  Lying down.  Sitting up .Standing up. turning head.  Sudden.. Trauma. Similar_problems_in_past.           .     REVIEW OF SYSTEMS :   vertigo . visual aura.    CULPRIT MEDICATIONS: : alpha blockers, beta blockers, calcium channel blockers, diuretics, epileptogenic medication, hypoglycemic agents, hypotensive medications                CHIEF COMPLAINT: Hypertension  HPI:     ONSET:      QUALITY/COURSE:   Unchanged.     INTENSITY/SEVERITY:  Average blood pressure is unknown.      MODIFIERS/TREATMENTS:  Taking medications: yes. .High sodium intake: no. alcohol: no      The following symptoms are positive only if BOLDED, otherwise are negative.      SYMPTOMS/RELATED: Possible medication side effects include:   Depression..  . Cough. . Constipation.    REVIEW OF SYMPTOMS: . Weight_loss . Weight_gain . Leg_cramps .Potency_problems .    TARGET ORGAN DAMAGE:: angina/ prior myocardial infarction, chronic kidney disease, heart failure, left ventricular hypertrophy, peripheral artery disease, prior coronary revascularization, retinopathy, stroke. transient ischemic attack.      Review of Systems      Objective:      Vitals:    08/20/20 1107  "  BP: 126/84   Pulse: 87   Resp: 16   Temp: 97.9 °F (36.6 °C)   TempSrc: Oral   SpO2: 97%   Weight: 110.5 kg (243 lb 9.7 oz)   Height: 5' 7" (1.702 m)   PainSc: 0-No pain    The blood pressure lying 124/84 pulse 80  Blood pressure standing 132/88 pulse 97    Physical Exam  Vitals signs and nursing note reviewed.   Constitutional:       Appearance: She is well-developed.   HENT:      Right Ear: Tympanic membrane and external ear normal.      Left Ear: Tympanic membrane and external ear normal.      Ears:      Comments: Hallpike positive on turning the head to the right     Mouth/Throat:      Pharynx: No oropharyngeal exudate.   Cardiovascular:      Rate and Rhythm: Normal rate.      Heart sounds: Normal heart sounds. No murmur. No friction rub.      Comments: When she became dizzy on standing I checked her pulse and she had a 5 beat run of tachycardia by palpation.  Pulmonary:      Effort: Pulmonary effort is normal. No respiratory distress.      Breath sounds: Normal breath sounds. No wheezing or rales.   Chest:      Chest wall: No tenderness.   Abdominal:      General: Bowel sounds are normal.      Palpations: Abdomen is soft.      Tenderness: There is no abdominal tenderness.   Lymphadenopathy:      Cervical: No cervical adenopathy.   Neurological:      Mental Status: She is alert.      Gait: Gait abnormal (Unsteady).   Psychiatric:         Behavior: Behavior normal.         Thought Content: Thought content normal.       No results found for this or any previous visit (from the past 1008 hour(s)).       Assessment:       1. Palpitations    2. Orthostatic dizziness    3. Near syncope    4. Class 2 obesity with body mass index (BMI) of 37.0 to 37.9 in adult, unspecified obesity type, unspecified whether serious comorbidity present          Plan:   Chicken soup given    Palpitations  -     EKG 12-lead; Future  -     GLUCOSE TOLERANCE, 3 HOURS; Future; Expected date: 08/20/2020  -     Holter monitor - 24 hour; " Future    Orthostatic dizziness  -     EKG 12-lead; Future    Near syncope  -     EKG 12-lead; Future  -     Holter monitor - 24 hour; Future    Class 2 obesity with body mass index (BMI) of 37.0 to 37.9 in adult, unspecified obesity type, unspecified whether serious comorbidity present      Follow up for if you are not better return in one week.

## 2020-08-24 ENCOUNTER — HOSPITAL ENCOUNTER (OUTPATIENT)
Dept: CARDIOLOGY | Facility: HOSPITAL | Age: 35
Discharge: HOME | End: 2020-08-24
Attending: INTERNAL MEDICINE
Payer: COMMERCIAL

## 2020-08-24 DIAGNOSIS — R00.2 PALPITATIONS: ICD-10-CM

## 2020-08-24 DIAGNOSIS — R55 NEAR SYNCOPE: ICD-10-CM

## 2020-08-24 PROCEDURE — 93226 XTRNL ECG REC<48 HR SCAN A/R: CPT

## 2020-08-31 LAB
OHS CV EVENT MONITOR DAY: 0
OHS CV HOLTER LENGTH DECIMAL HOURS: 23.98
OHS CV HOLTER LENGTH HOURS: 23
OHS CV HOLTER LENGTH MINUTES: 59

## 2020-09-03 ENCOUNTER — OFFICE VISIT (OUTPATIENT)
Dept: FAMILY MEDICINE | Facility: CLINIC | Age: 35
End: 2020-09-03
Payer: COMMERCIAL

## 2020-09-03 VITALS
HEART RATE: 90 BPM | OXYGEN SATURATION: 99 % | DIASTOLIC BLOOD PRESSURE: 82 MMHG | HEIGHT: 67 IN | WEIGHT: 246.5 LBS | RESPIRATION RATE: 16 BRPM | BODY MASS INDEX: 38.69 KG/M2 | TEMPERATURE: 98 F | SYSTOLIC BLOOD PRESSURE: 118 MMHG

## 2020-09-03 DIAGNOSIS — R00.0 SINUS TACHYCARDIA: Primary | ICD-10-CM

## 2020-09-03 DIAGNOSIS — R55 SYNCOPE, NEAR: ICD-10-CM

## 2020-09-03 PROCEDURE — 3008F PR BODY MASS INDEX (BMI) DOCUMENTED: ICD-10-PCS | Mod: CPTII,S$GLB,, | Performed by: INTERNAL MEDICINE

## 2020-09-03 PROCEDURE — 3074F PR MOST RECENT SYSTOLIC BLOOD PRESSURE < 130 MM HG: ICD-10-PCS | Mod: CPTII,S$GLB,, | Performed by: INTERNAL MEDICINE

## 2020-09-03 PROCEDURE — 99214 OFFICE O/P EST MOD 30 MIN: CPT | Mod: S$GLB,,, | Performed by: INTERNAL MEDICINE

## 2020-09-03 PROCEDURE — 3008F BODY MASS INDEX DOCD: CPT | Mod: CPTII,S$GLB,, | Performed by: INTERNAL MEDICINE

## 2020-09-03 PROCEDURE — 3079F DIAST BP 80-89 MM HG: CPT | Mod: CPTII,S$GLB,, | Performed by: INTERNAL MEDICINE

## 2020-09-03 PROCEDURE — 3079F PR MOST RECENT DIASTOLIC BLOOD PRESSURE 80-89 MM HG: ICD-10-PCS | Mod: CPTII,S$GLB,, | Performed by: INTERNAL MEDICINE

## 2020-09-03 PROCEDURE — 3074F SYST BP LT 130 MM HG: CPT | Mod: CPTII,S$GLB,, | Performed by: INTERNAL MEDICINE

## 2020-09-03 PROCEDURE — 99214 PR OFFICE/OUTPT VISIT, EST, LEVL IV, 30-39 MIN: ICD-10-PCS | Mod: S$GLB,,, | Performed by: INTERNAL MEDICINE

## 2020-09-03 RX ORDER — VERAPAMIL HYDROCHLORIDE 120 MG/1
120 CAPSULE, EXTENDED RELEASE ORAL DAILY
Qty: 30 CAPSULE | Refills: 11 | Status: SHIPPED | OUTPATIENT
Start: 2020-09-03 | End: 2020-10-09

## 2020-09-03 NOTE — PATIENT INSTRUCTIONS
Increase the salt in your diet  .  Increase fiber and possibly MiraLax to prevent constipation on the new medicine.    If dizzy drink some Gatorade or preferably Pedialyte or chicken soup    When having intercourse try to do recumbent, that is laying down    Thank you for choosing [facility].     Please fill out the patient experience survey.

## 2020-09-03 NOTE — PROGRESS NOTES
"Subjective:      11:48 AM     Patient ID: Sonia Goldberg is a 34 y.o. female.    Chief Complaint: ekg results (no refills needed)    HPI   The patient had a Holter showing tachycardia for an hour in 42 min with the highest rate 143.  She says that that time she was just lying down.  There is no atrial fibrillation.  Patient did not have any dizziness or other symptoms during that time period.  Today she did a Holter she was off work and on under stress.  She is worried that might be much worse was during work day.  During the Holter she had intercourse and nearly passed out.  She was in the sitting position at that time, on top.  She says that she has been having these episodes last month.      Review of Systems      Objective:      Vitals:    09/03/20 1137   BP: 118/82   Pulse: 90   Resp: 16   Temp: 97.7 °F (36.5 °C)   TempSrc: Oral   SpO2: 99%   Weight: 111.8 kg (246 lb 7.6 oz)   Height: 5' 7" (1.702 m)   PainSc: 0-No pain     Physical Exam  Vitals signs and nursing note reviewed.   Constitutional:       Appearance: She is well-developed.   Cardiovascular:      Rate and Rhythm: Normal rate and regular rhythm.      Heart sounds: Normal heart sounds.   Pulmonary:      Effort: Pulmonary effort is normal.      Breath sounds: Normal breath sounds.   Abdominal:      Palpations: Abdomen is soft.      Tenderness: There is no abdominal tenderness.   Neurological:      Mental Status: She is alert.   Psychiatric:         Behavior: Behavior normal.         Thought Content: Thought content normal.       Recent Results (from the past 1008 hour(s))   GLUCOSE TOLERANCE, 3 HOURS    Collection Time: 08/24/20  8:53 AM   Result Value Ref Range    Gluc Fast 86 70 - 110 mg/dL    Gluc 1  mg/dL    Gluc 2  mg/dL    Gluc 3 HR 96 mg/dL   Holter monitor - 24 hour    Collection Time: 08/24/20  9:09 AM   Result Value Ref Range    Holter length hours 23     holter length minutes 59     holter length dec hours 23.98     Event " Monitor Day 0           Assessment:       1. Sinus tachycardia    2. Syncope, near          Plan:       Sinus tachycardia  -     verapamiL (VERELAN) 120 MG C24P; Take 1 capsule (120 mg total) by mouth once daily.  Dispense: 30 capsule; Refill: 11  -     Ambulatory referral/consult to Cardiology; Future; Expected date: 09/10/2020    Syncope, near      Follow up in about 3 months (around 12/3/2020).

## 2020-09-16 ENCOUNTER — OFFICE VISIT (OUTPATIENT)
Dept: CARDIOLOGY | Facility: CLINIC | Age: 35
End: 2020-09-16
Payer: COMMERCIAL

## 2020-09-16 VITALS
HEIGHT: 67 IN | RESPIRATION RATE: 16 BRPM | HEART RATE: 108 BPM | DIASTOLIC BLOOD PRESSURE: 80 MMHG | WEIGHT: 249 LBS | BODY MASS INDEX: 39.08 KG/M2 | SYSTOLIC BLOOD PRESSURE: 118 MMHG | OXYGEN SATURATION: 98 %

## 2020-09-16 DIAGNOSIS — R00.0 SINUS TACHYCARDIA: ICD-10-CM

## 2020-09-16 DIAGNOSIS — F41.9 ANXIETY: ICD-10-CM

## 2020-09-16 DIAGNOSIS — E66.01 MORBID OBESITY: ICD-10-CM

## 2020-09-16 DIAGNOSIS — R07.89 CHEST DISCOMFORT: ICD-10-CM

## 2020-09-16 DIAGNOSIS — I95.1 ORTHOSTATIC HYPOTENSION: ICD-10-CM

## 2020-09-16 DIAGNOSIS — R00.2 PALPITATIONS: Primary | ICD-10-CM

## 2020-09-16 DIAGNOSIS — R06.02 SHORTNESS OF BREATH: ICD-10-CM

## 2020-09-16 DIAGNOSIS — I47.10 PAROXYSMAL SVT (SUPRAVENTRICULAR TACHYCARDIA): ICD-10-CM

## 2020-09-16 DIAGNOSIS — R55 NEAR SYNCOPE: ICD-10-CM

## 2020-09-16 DIAGNOSIS — R55 SYNCOPE AND COLLAPSE: ICD-10-CM

## 2020-09-16 PROCEDURE — 3079F PR MOST RECENT DIASTOLIC BLOOD PRESSURE 80-89 MM HG: ICD-10-PCS | Mod: CPTII,S$GLB,, | Performed by: INTERNAL MEDICINE

## 2020-09-16 PROCEDURE — 93000 EKG 12-LEAD: ICD-10-PCS | Mod: S$GLB,,, | Performed by: SPECIALIST

## 2020-09-16 PROCEDURE — 1126F AMNT PAIN NOTED NONE PRSNT: CPT | Mod: S$GLB,,, | Performed by: INTERNAL MEDICINE

## 2020-09-16 PROCEDURE — 99205 OFFICE O/P NEW HI 60 MIN: CPT | Mod: S$GLB,,, | Performed by: INTERNAL MEDICINE

## 2020-09-16 PROCEDURE — 3074F SYST BP LT 130 MM HG: CPT | Mod: CPTII,S$GLB,, | Performed by: INTERNAL MEDICINE

## 2020-09-16 PROCEDURE — 1126F PR PAIN SEVERITY QUANTIFIED, NO PAIN PRESENT: ICD-10-PCS | Mod: S$GLB,,, | Performed by: INTERNAL MEDICINE

## 2020-09-16 PROCEDURE — 3008F BODY MASS INDEX DOCD: CPT | Mod: CPTII,S$GLB,, | Performed by: INTERNAL MEDICINE

## 2020-09-16 PROCEDURE — 3008F PR BODY MASS INDEX (BMI) DOCUMENTED: ICD-10-PCS | Mod: CPTII,S$GLB,, | Performed by: INTERNAL MEDICINE

## 2020-09-16 PROCEDURE — 99205 PR OFFICE/OUTPT VISIT, NEW, LEVL V, 60-74 MIN: ICD-10-PCS | Mod: S$GLB,,, | Performed by: INTERNAL MEDICINE

## 2020-09-16 PROCEDURE — 93000 ELECTROCARDIOGRAM COMPLETE: CPT | Mod: S$GLB,,, | Performed by: SPECIALIST

## 2020-09-16 PROCEDURE — 3074F PR MOST RECENT SYSTOLIC BLOOD PRESSURE < 130 MM HG: ICD-10-PCS | Mod: CPTII,S$GLB,, | Performed by: INTERNAL MEDICINE

## 2020-09-16 PROCEDURE — 3079F DIAST BP 80-89 MM HG: CPT | Mod: CPTII,S$GLB,, | Performed by: INTERNAL MEDICINE

## 2020-09-16 NOTE — PROGRESS NOTES
Subjective:    Patient ID:  Sonia Goldberg is a 34 y.o. female patient here for initial evaluation Palpitations and Dizziness      History of Present Illness  Mrs. Goldberg is here for her initial consultation.  She has been having syncopal and near syncopal episodes for the past 3-4 weeks time.  Patient stated that she has been having syncopal episode she had 2 major episodes both witnessed an unwitnessed.  And mushtaq time she has realized that both of them are related to are getting up from a lying position to standing position the 1st episode happened when she was planning to go to the bathroom and woke up in the middle of the night and found herself lying between the bed and the wall.  She has multiple near syncopal episodes on during the day and they are transient however they are very inconvenient to her.  Her primary physician had done a 24 hr Holter monitor and was found to have episodes of tachycardia and was diagnosed with having paroxysmal supraventricular tachycardia.  Patient was started on verapamil 120 mg p.o. q.day for the same.  Patient states that when she checks her heart rate her heart rate is usually in the 120s and 130s intermittently.  And goes up as high as 140s.  Patient tries to compensate by drinking lots of water.  Patient also stated that in her younger days when she was a child she had syncopal vasovagal syncope and after the she was 13 the episodes pretty much did not bother her anymore.  And patient did not have any more significant episodes.  When number patient has having episodes of tachycardia with heart rate goes up as high as 140 she does feel some short winded at times and also has chest discomfort.  Does not happen all the time but certainly does happen when her heart rate is fast.          Review of patient's allergies indicates:   Allergen Reactions    Contrast media Anaphylaxis    Iodine and iodide containing products Anaphylaxis    Sulfa (sulfonamide antibiotics)  Anaphylaxis and Hives    Iodinated contrast media Hives    Magnesium      Pt reporting she is allergic to magnesium citrate oral drink.     Morphine Hives    Adhesive Rash    Nut [tree nut] Hives       Past Medical History:   Diagnosis Date    Asthma     Heart palpitations     Herniated disc     Hypertension     IBS (irritable bowel syndrome)     Insomnia     Lower back pain     L5 S1 herniated disks    Migraine headache     Palpitations     and pvcs with stress.  Not on any meds.    Sleep apnea     history of.  Dont use cpap.  lost weight.     Past Surgical History:   Procedure Laterality Date    ABDOMINAL SURGERY           SECTION, CLASSIC       SECTION, LOW TRANSVERSE      DILATION AND CURETTAGE OF UTERUS      DILATION AND CURETTAGE OF UTERUS      perferated uterus during procedure    endometrioma      removed on right lower quadrant of uterus    epidural steriod injections  x 3    TONSILLECTOMY      TUBAL LIGATION       Social History     Tobacco Use    Smoking status: Current Every Day Smoker     Types: Vaping with nicotine, Vaping w/o nicotine    Smokeless tobacco: Never Used   Substance Use Topics    Alcohol use: Yes     Comment: socially, occasionally    Drug use: No        Review of Systems     Constitutional: Negative for chills, fatigue and fever.   Eyes: No double vision, No blurred vision  Neuro:  Migraine headaches 3 months ago.  intermittent episodes of dizziness and lightheadedness and near syncopal episodes.  And 2 rosa syncopal episodes.  Respiratory: Negative for cough, shortness of breath and wheezing.    Cardiovascular:  Negative for palpitations and leg swelling.  Episodes of chest discomfort and shortness of breath with tachycardia.  Syncopal and near syncopal episodes    Gastrointestinal: Negative for abdominal pain, No melena, diarrhea, nausea and vomiting.   Genitourinary: Negative for dysuria and frequency, Negative for hematuria  Skin:  Negative for bruising, Negative for edema or discoloration noted.   Endocrine: Negative for polyphagia, Negative for heat intolerance, Negative for cold intolerance  Psychiatric: Negative for depression, Negative for anxiety, Negative for memory loss  Musculoskeletal: Negative for neck pain, Negative for muscle weakness, Negative for back pain          Objective:        Vitals:    09/16/20 1452   BP: 118/80   Pulse: 108   Resp: 16       Lab Results   Component Value Date    WBC 10.17 01/28/2018    HGB 11.9 (L) 01/28/2018    HCT 37.7 01/28/2018     01/28/2018    CHOL 266 (H) 08/05/2016    TRIG 233 (H) 08/05/2016    HDL 48 08/05/2016    ALT 14 01/28/2018    AST 14 01/28/2018     01/28/2018    K 3.6 01/28/2018     01/28/2018    CREATININE 0.7 01/28/2018    BUN 16 01/28/2018    CO2 25 01/28/2018    TSH 2.114 08/16/2016        ECHOCARDIOGRAM RESULTS  No results found for this or any previous visit.      CURRENT/PREVIOUS VISIT EKG  Results for orders placed or performed in visit on 09/16/20   IN OFFICE EKG 12-LEAD (to [facility])    Collection Time: 09/16/20  3:00 PM    Narrative    Test Reason : R00.0,R00.2,    Vent. Rate : 106 BPM     Atrial Rate : 106 BPM     P-R Int : 134 ms          QRS Dur : 084 ms      QT Int : 354 ms       P-R-T Axes : 035 076 -07 degrees     QTc Int : 470 ms    Sinus tachycardia  Possible Inferior infarct ,age undetermined  Possible Anterolateral infarct ,age undetermined  Abnormal ECG  When compared with ECG of 20-AUG-2020 11:25,  Borderline criteria for Anterior infarct are now Present  Borderline criteria for Anterolateral infarct are now Present  Borderline criteria for Inferior infarct are now Present  Nonspecific T wave abnormality now evident in Anterior-lateral leads    Referred By: MELISSA BUTT           Confirmed By:      No procedure found.   No results found for this or any previous visit.  No procedure found.        PHYSICAL EXAM    GENERAL: well built, well nourished,  well-developed in no apparent distress alert and oriented.   HEENT: Normocephalic. Pupils normal and conjunctivae normal.  Mucous membranes normal, no cyanosis or icterus, trachea central,no pallor or icterus is noted..   NECK: No JVD. No bruit..   THYROID: Thyroid not enlarged. No nodules present..   CARDIAC: Regular rate and rhythm. S1 is normal.S2 is normal.No gallops, clicks or murmurs noted at this time.  CHEST ANATOMY: normal.   LUNGS: Clear to auscultation. No wheezing or rhonchi..   ABDOMEN: Soft no masses or organomegaly.  No abdomen pulsations or bruits.  Normal bowel sounds. No pulsations and no masses felt, No guarding or rebound.   URINARY: No evangelista catheter with clear yellow urine  EXTREMITIES: No cyanosis, clubbing or edema noted at this time., no calf tenderness bilaterally.   PERIPHERAL VASCULAR SYSTEM: Good palpable distal pulses.   CENTRAL NERVOUS SYSTEM: No focal motor or sensory deficits noted.   SKIN: Skin without lesions, moist, well perfused.   MUSCLE STRENGTH & TONE: No noteable weakness, atrophy or abnormal movement.     I HAVE REVIEWED :    The vital signs, nurses notes, and all the pertinent radiology and labs.         Current Outpatient Medications   Medication Instructions    albuterol (PROVENTIL/VENTOLIN HFA) 90 mcg/actuation inhaler 2 puffs, Inhalation, Every 6 hours PRN    buPROPion (WELLBUTRIN XL) 150 mg, Oral, Daily    LATUDA 80 mg, Oral, Daily, With food.     LORazepam (ATIVAN) 0.5 MG tablet 1 tablet, Oral, Daily PRN    verapamiL (VERELAN) 120 mg, Oral, Daily                  Assessment:   1.  Syncope/rosa syncopal episodes x2  2.  Multiple near syncopal episodes  3.  Orthostatic hypotension  4.  History of vasovagal syncope  5.  Asthma  6.  Hypertension associated with steroid use  7.  Chest discomfort and shortness of breath with tachycardia  8.  Obstructive sleep apnea  9.  Obesity  10.  Anxiety  11.  SVT/super ventricular tachycardia/tachycardia.      Plan:   1.  Patient  has been having episodes of syncope and near-syncope that started probably 3-4 weeks ago.  When she started realizing that she has been having syncopal episodes when she gets up from lying position to sitting position.  Etiology is unclear why it started all of sudden.  Discussed with patient that she would need further testing to evaluate her syncopal episodes.  With schedule her for repeat Holter monitor for 24 hr patient would also need a tilt-table test to evaluate for orthostatic hypotension as well as to rule out POTS  2.  Patient has had SVT and the try to get the images of her rhythm strip.  Would also consult EP for further evaluation of for SVT.  3.  Discussed with patient that would also do a 2D echocardiogram for structural and organic heart disease   4.  Discussed with patient that compression stockings would help her out and that she needs to wear knee-high compression stocking at the very least.  Would prefer thigh-high compression stockings if possible  5.  Patient to do exercises especially to buildup the tone of her lower extremities.  6.  Patient to keep Gatorade or Powerade to help keep the blood pressure up.  7.  Patient to check her blood pressure when her when she is having near syncopal episodes.  8.  I will see her back in the office after the testing is completed.      Follow up in about 4 weeks (around 10/14/2020).

## 2020-09-17 ENCOUNTER — HOSPITAL ENCOUNTER (OUTPATIENT)
Dept: RADIOLOGY | Facility: HOSPITAL | Age: 35
Discharge: HOME | End: 2020-09-17
Attending: INTERNAL MEDICINE
Payer: COMMERCIAL

## 2020-09-17 PROCEDURE — 71046 X-RAY EXAM CHEST 2 VIEWS: CPT | Mod: TC

## 2020-09-18 ENCOUNTER — PATIENT MESSAGE (OUTPATIENT)
Dept: FAMILY MEDICINE | Facility: CLINIC | Age: 35
End: 2020-09-18

## 2020-09-21 ENCOUNTER — TELEPHONE (OUTPATIENT)
Dept: CARDIOLOGY | Facility: HOSPITAL | Age: 35
End: 2020-09-21

## 2020-09-22 ENCOUNTER — CLINICAL SUPPORT (OUTPATIENT)
Dept: CARDIOLOGY | Facility: HOSPITAL | Age: 35
End: 2020-09-22
Attending: INTERNAL MEDICINE
Payer: COMMERCIAL

## 2020-09-22 VITALS — HEIGHT: 67 IN | WEIGHT: 243.94 LBS | BODY MASS INDEX: 38.29 KG/M2

## 2020-09-22 DIAGNOSIS — R07.89 CHEST DISCOMFORT: ICD-10-CM

## 2020-09-22 DIAGNOSIS — R00.0 SINUS TACHYCARDIA: ICD-10-CM

## 2020-09-22 DIAGNOSIS — R55 SYNCOPE AND COLLAPSE: ICD-10-CM

## 2020-09-22 DIAGNOSIS — R00.2 PALPITATIONS: ICD-10-CM

## 2020-09-22 DIAGNOSIS — R55 NEAR SYNCOPE: ICD-10-CM

## 2020-09-22 DIAGNOSIS — I47.10 PAROXYSMAL SVT (SUPRAVENTRICULAR TACHYCARDIA): ICD-10-CM

## 2020-09-22 DIAGNOSIS — I95.1 ORTHOSTATIC HYPOTENSION: ICD-10-CM

## 2020-09-22 DIAGNOSIS — R06.02 SHORTNESS OF BREATH: ICD-10-CM

## 2020-09-22 PROCEDURE — 93660 TILT TABLE EVALUATION: CPT | Mod: 26,,, | Performed by: INTERNAL MEDICINE

## 2020-09-22 PROCEDURE — 93660 TILT TABLE EVALUATION: CPT

## 2020-09-22 PROCEDURE — 93660 TILT TABLE (CUPID ONLY): ICD-10-PCS | Mod: 26,,, | Performed by: INTERNAL MEDICINE

## 2020-09-25 ENCOUNTER — HOSPITAL ENCOUNTER (OUTPATIENT)
Dept: CARDIOLOGY | Facility: CLINIC | Age: 35
Discharge: HOME | End: 2020-09-25
Attending: INTERNAL MEDICINE
Payer: COMMERCIAL

## 2020-09-25 VITALS — BODY MASS INDEX: 38.14 KG/M2 | HEIGHT: 67 IN | WEIGHT: 243 LBS

## 2020-09-25 DIAGNOSIS — R07.89 CHEST DISCOMFORT: ICD-10-CM

## 2020-09-25 DIAGNOSIS — I95.1 ORTHOSTATIC HYPOTENSION: ICD-10-CM

## 2020-09-25 DIAGNOSIS — R55 SYNCOPE AND COLLAPSE: ICD-10-CM

## 2020-09-25 DIAGNOSIS — R06.02 SHORTNESS OF BREATH: ICD-10-CM

## 2020-09-25 DIAGNOSIS — I47.10 PAROXYSMAL SVT (SUPRAVENTRICULAR TACHYCARDIA): ICD-10-CM

## 2020-09-25 DIAGNOSIS — R00.2 PALPITATIONS: ICD-10-CM

## 2020-09-25 DIAGNOSIS — R55 NEAR SYNCOPE: ICD-10-CM

## 2020-09-25 DIAGNOSIS — R00.0 SINUS TACHYCARDIA: ICD-10-CM

## 2020-09-25 PROCEDURE — 93224 XTRNL ECG REC UP TO 48 HRS: CPT | Mod: S$GLB,,, | Performed by: INTERNAL MEDICINE

## 2020-09-25 PROCEDURE — 93306 ECHO (CUPID ONLY): ICD-10-PCS | Mod: S$GLB,,, | Performed by: INTERNAL MEDICINE

## 2020-09-25 PROCEDURE — 93306 TTE W/DOPPLER COMPLETE: CPT | Mod: S$GLB,,, | Performed by: INTERNAL MEDICINE

## 2020-09-25 PROCEDURE — 93224 HOLTER MONITOR - 24 HOUR (CUPID ONLY): ICD-10-PCS | Mod: S$GLB,,, | Performed by: INTERNAL MEDICINE

## 2020-09-30 ENCOUNTER — TELEPHONE (OUTPATIENT)
Dept: ELECTROPHYSIOLOGY | Facility: CLINIC | Age: 35
End: 2020-09-30

## 2020-09-30 NOTE — TELEPHONE ENCOUNTER
Records will be requested from ----- Message from Mira Pickard sent at 9/30/2020  4:15 PM CDT -----  Regarding: returning a call  Contact: patient  The pt is returning a call. Please call her back @ [phone]. Thanks, Mira

## 2020-10-05 ENCOUNTER — TELEPHONE (OUTPATIENT)
Dept: ELECTROPHYSIOLOGY | Facility: CLINIC | Age: 35
End: 2020-10-05

## 2020-10-05 NOTE — TELEPHONE ENCOUNTER
----- Message from Marla Ackerman sent at 10/5/2020 11:36 AM CDT -----  Regarding: ret call  Pt is returning your call and can be reached at [phone].    Thank you

## 2020-10-06 ENCOUNTER — PATIENT OUTREACH (OUTPATIENT)
Dept: ADMINISTRATIVE | Facility: OTHER | Age: 35
End: 2020-10-06

## 2020-10-06 NOTE — PROGRESS NOTES
Chart was reviewed for overdue Proactive [facility] Encounters ([facility]ARON)  topics  Updates were requested from care everywhere  Health Maintenance has been updated  LINKS immunization registry triggered

## 2020-10-08 NOTE — PROGRESS NOTES
"Subjective:     HPI    I had the pleasure of seeing Sonia Goldberg in consultation at your request for the evaluation of syncope. She is a 34 year old female with a history of morbid obesity, KERI (has not worn CPAP for years), and asthma, who was well until 2 months ago when she began to have episodes of syncope which would occur when transferring positions. She denies trauma, and or loss of bowel/bladder function. Last episode was about 1 month ago. Ms. Goldberg also describes occasional palpitations which occur "out of the blue", which last several minutes and resolve on their own.    Notably, a work-up was initiated earlier this year, which included a 24 hour Holter monitor which showed periods of tachycardia. She was started on verapamil sr 120 mg daiy at that point. She does not believe the verapamil helped with either her palpitations or syncope. I reviewed a 24 hour Holter monitor (8/2020) which showed sinus rhythm with an average HR of 85 bpm (range  bpm). She had an episode of chest pains and palpitations during this Holter. A repeat Holter monitor was performed in 9/2020 (on verapamil) which showed sinus rhythm with an average HR of 99 bpm (range  bpm). She was asymptomatic with the second Holter.    I reviewed all ECGs in the EMR dating back to 2013, which show sinus rhythm and sinus tachycardia.    My interpretation of today's ECG is sinus rhythm at 88 bpm.    Review of Systems   Constitution: Positive for malaise/fatigue. Negative for decreased appetite, weight gain and weight loss.   HENT: Negative for sore throat.    Eyes: Negative for blurred vision.   Cardiovascular: Positive for palpitations and syncope. Negative for chest pain, dyspnea on exertion, irregular heartbeat, leg swelling, near-syncope, orthopnea and paroxysmal nocturnal dyspnea.   Respiratory: Negative for shortness of breath.    Skin: Negative for rash.   Musculoskeletal: Negative for arthritis.   Gastrointestinal: " Negative for abdominal pain.   Neurological: Negative for focal weakness.   Psychiatric/Behavioral: Negative for altered mental status.        Objective:    Physical Exam   Constitutional: She is oriented to person, place, and time. She appears well-developed and well-nourished. No distress.   HENT:   Head: Normocephalic and atraumatic.   Mouth/Throat: Oropharynx is clear and moist.   Eyes: Pupils are equal, round, and reactive to light. Conjunctivae are normal. No scleral icterus.   Neck: Normal range of motion. Neck supple. No JVD present. No thyromegaly present.   Cardiovascular: Normal rate, regular rhythm, normal heart sounds and intact distal pulses. Exam reveals no gallop and no friction rub.   No murmur heard.  Pulmonary/Chest: Effort normal and breath sounds normal. No respiratory distress.   Abdominal: Soft. Bowel sounds are normal. She exhibits no distension.   Musculoskeletal:         General: No edema.   Neurological: She is alert and oriented to person, place, and time.   Skin: Skin is warm and dry.   Psychiatric: She has a normal mood and affect. Her behavior is normal.   Vitals reviewed.        Assessment:       1. Syncope and collapse    2. KERI (obstructive sleep apnea)    3. Chest discomfort    4. Paroxysmal SVT (supraventricular tachycardia)    5. Orthostatic hypotension    6. Morbid obesity with BMI of 40.0-44.9, adult         Plan:       In summary, Sonia Goldberg is a 34 year old female with a history of reflex syncope and symptomatic sinus tachycardia. With regard to the syncope, we discussed the importance of maintaining adequate hydration, taking time when changing positions, and the role compression stockings play in preventing events. With regard to her palpitations, they seem to be due to symptomatic sinus tachycardia. There is no evidence on Holter or ECGs that she has SVT. She cannot be on beta blockers (asthma), and verapamil  mg daily is not helping her symptoms. The plan  is to increase her dose to 180 mg daily. She will see me again in 3 months.    Thank you for allowing me to participate in the care of this patient. Please do not hesitate to call me with any questions or concerns.

## 2020-10-09 ENCOUNTER — OFFICE VISIT (OUTPATIENT)
Dept: CARDIOLOGY | Facility: CLINIC | Age: 35
End: 2020-10-09
Payer: COMMERCIAL

## 2020-10-09 VITALS
DIASTOLIC BLOOD PRESSURE: 80 MMHG | OXYGEN SATURATION: 98 % | BODY MASS INDEX: 38.3 KG/M2 | SYSTOLIC BLOOD PRESSURE: 134 MMHG | RESPIRATION RATE: 16 BRPM | HEART RATE: 97 BPM | WEIGHT: 244 LBS | HEIGHT: 67 IN

## 2020-10-09 DIAGNOSIS — I95.1 ORTHOSTATIC HYPOTENSION: ICD-10-CM

## 2020-10-09 DIAGNOSIS — G47.33 OSA (OBSTRUCTIVE SLEEP APNEA): ICD-10-CM

## 2020-10-09 DIAGNOSIS — E66.01 MORBID OBESITY WITH BMI OF 40.0-44.9, ADULT: ICD-10-CM

## 2020-10-09 DIAGNOSIS — R07.89 CHEST DISCOMFORT: ICD-10-CM

## 2020-10-09 DIAGNOSIS — R55 SYNCOPE AND COLLAPSE: Primary | ICD-10-CM

## 2020-10-09 DIAGNOSIS — I47.10 PAROXYSMAL SVT (SUPRAVENTRICULAR TACHYCARDIA): ICD-10-CM

## 2020-10-09 LAB
AORTIC ROOT ANNULUS: 2.6 CM
AORTIC VALVE CUSP SEPERATION: 1.9 CM
AV INDEX (PROSTH): 0.82
AV MEAN GRADIENT: 3 MMHG
AV PEAK GRADIENT: 5 MMHG
AV VALVE AREA: 3.1 CM2
AV VELOCITY RATIO: 0.79
BSA FOR ECHO PROCEDURE: 2.28 M2
CV ECHO LV RWT: 0.45 CM
DOP CALC AO PEAK VEL: 1.17 M/S
DOP CALC AO VTI: 22.2 CM
DOP CALC LVOT AREA: 3.8 CM2
DOP CALC LVOT DIAMETER: 2.2 CM
DOP CALC LVOT PEAK VEL: 0.93 M/S
DOP CALC LVOT STROKE VOLUME: 68.77 CM3
DOP CALCLVOT PEAK VEL VTI: 18.1 CM
E WAVE DECELERATION TIME: 248 MS
E/A RATIO: 1.44
E/E' RATIO: 6.53 M/S
ECHO LV POSTERIOR WALL: 1 CM (ref 0.6–1.1)
FRACTIONAL SHORTENING: 37 % (ref 28–44)
INTERVENTRICULAR SEPTUM: 0.93 CM (ref 0.6–1.1)
IVRT: 74 MS
LA MAJOR: 3.2 CM
LEFT ATRIUM SIZE: 3.2 CM
LEFT INTERNAL DIMENSION IN SYSTOLE: 2.77 CM (ref 2.1–4)
LEFT VENTRICLE MASS INDEX: 65 G/M2
LEFT VENTRICULAR INTERNAL DIMENSION IN DIASTOLE: 4.42 CM (ref 3.5–6)
LEFT VENTRICULAR MASS: 141.79 G
LV LATERAL E/E' RATIO: 5.64 M/S
LV SEPTAL E/E' RATIO: 7.75 M/S
MV PEAK A VEL: 0.43 M/S
MV PEAK E VEL: 0.62 M/S
MV STENOSIS PRESSURE HALF TIME: 41 MS
MV VALVE AREA P 1/2 METHOD: 5.37 CM2
PISA TR MAX VEL: 2.17 M/S
RIGHT VENTRICULAR END-DIASTOLIC DIMENSION: 3.07 CM
TDI LATERAL: 0.11 M/S
TDI SEPTAL: 0.08 M/S
TDI: 0.1 M/S
TR MAX PG: 19 MMHG

## 2020-10-09 PROCEDURE — 93010 ELECTROCARDIOGRAM REPORT: CPT | Mod: S$GLB,,, | Performed by: INTERNAL MEDICINE

## 2020-10-09 PROCEDURE — 93005 EKG 12-LEAD: ICD-10-PCS | Mod: S$GLB,,, | Performed by: INTERNAL MEDICINE

## 2020-10-09 PROCEDURE — 93010 EKG 12-LEAD: ICD-10-PCS | Mod: S$GLB,,, | Performed by: INTERNAL MEDICINE

## 2020-10-09 PROCEDURE — 93005 ELECTROCARDIOGRAM TRACING: CPT | Mod: S$GLB,,, | Performed by: INTERNAL MEDICINE

## 2020-10-09 PROCEDURE — 3075F PR MOST RECENT SYSTOLIC BLOOD PRESS GE 130-139MM HG: ICD-10-PCS | Mod: CPTII,S$GLB,, | Performed by: INTERNAL MEDICINE

## 2020-10-09 PROCEDURE — 3079F DIAST BP 80-89 MM HG: CPT | Mod: CPTII,S$GLB,, | Performed by: INTERNAL MEDICINE

## 2020-10-09 PROCEDURE — 99205 PR OFFICE/OUTPT VISIT, NEW, LEVL V, 60-74 MIN: ICD-10-PCS | Mod: S$GLB,,, | Performed by: INTERNAL MEDICINE

## 2020-10-09 PROCEDURE — 3075F SYST BP GE 130 - 139MM HG: CPT | Mod: CPTII,S$GLB,, | Performed by: INTERNAL MEDICINE

## 2020-10-09 PROCEDURE — 3079F PR MOST RECENT DIASTOLIC BLOOD PRESSURE 80-89 MM HG: ICD-10-PCS | Mod: CPTII,S$GLB,, | Performed by: INTERNAL MEDICINE

## 2020-10-09 PROCEDURE — 99205 OFFICE O/P NEW HI 60 MIN: CPT | Mod: S$GLB,,, | Performed by: INTERNAL MEDICINE

## 2020-10-09 PROCEDURE — 3008F PR BODY MASS INDEX (BMI) DOCUMENTED: ICD-10-PCS | Mod: CPTII,S$GLB,, | Performed by: INTERNAL MEDICINE

## 2020-10-09 PROCEDURE — 3008F BODY MASS INDEX DOCD: CPT | Mod: CPTII,S$GLB,, | Performed by: INTERNAL MEDICINE

## 2020-10-09 RX ORDER — VERAPAMIL HYDROCHLORIDE 180 MG/1
180 CAPSULE, EXTENDED RELEASE ORAL DAILY
Qty: 30 CAPSULE | Refills: 11 | Status: SHIPPED | OUTPATIENT
Start: 2020-10-09 | End: 2021-01-25 | Stop reason: SDUPTHER

## 2020-10-09 NOTE — LETTER
October 9, 2020      Trey Hernandez MD  [address]  [address]  [place] [address]           [facility] [place] [facility]  [address]  [place] [address]  Phone: [phone]  Fax: [phone]          Patient: Sonia Goldberg   MR Number: [id number]   YOB: 1985   Date of Visit: 10/9/2020       Dear Dr. Trey Hernandez:    Thank you for referring Sonia Goldberg to me for evaluation. Attached you will find relevant portions of my assessment and plan of care.    If you have questions, please do not hesitate to call me. I look forward to following Sonia Goldberg along with you.    Sincerely,    Malcolm Church MD    Enclosure  CC:  No Recipients    If you would like to receive this communication electronically, please contact [email or web address] or [phone] to request more information on [employer] Link access.    For providers and/or their staff who would like to refer a patient to [facility], please contact us through our one-stop-shop provider referral line, [facility], at [phone].    If you feel you have received this communication in error or would no longer like to receive these types of communications, please e-mail [email or web address]

## 2020-10-14 ENCOUNTER — OFFICE VISIT (OUTPATIENT)
Dept: CARDIOLOGY | Facility: CLINIC | Age: 35
End: 2020-10-14
Payer: COMMERCIAL

## 2020-10-14 ENCOUNTER — TELEPHONE (OUTPATIENT)
Dept: FAMILY MEDICINE | Facility: CLINIC | Age: 35
End: 2020-10-14

## 2020-10-14 ENCOUNTER — PATIENT MESSAGE (OUTPATIENT)
Dept: FAMILY MEDICINE | Facility: CLINIC | Age: 35
End: 2020-10-14

## 2020-10-14 VITALS
OXYGEN SATURATION: 99 % | HEART RATE: 84 BPM | SYSTOLIC BLOOD PRESSURE: 138 MMHG | RESPIRATION RATE: 16 BRPM | WEIGHT: 245 LBS | BODY MASS INDEX: 38.45 KG/M2 | HEIGHT: 67 IN | DIASTOLIC BLOOD PRESSURE: 74 MMHG

## 2020-10-14 DIAGNOSIS — E66.01 MORBID OBESITY: ICD-10-CM

## 2020-10-14 DIAGNOSIS — I10 HYPERTENSION, UNSPECIFIED TYPE: ICD-10-CM

## 2020-10-14 DIAGNOSIS — F41.9 ANXIETY: ICD-10-CM

## 2020-10-14 DIAGNOSIS — J45.909 ASTHMA, UNSPECIFIED ASTHMA SEVERITY, UNSPECIFIED WHETHER COMPLICATED, UNSPECIFIED WHETHER PERSISTENT: ICD-10-CM

## 2020-10-14 DIAGNOSIS — G47.33 OBSTRUCTIVE SLEEP APNEA: ICD-10-CM

## 2020-10-14 DIAGNOSIS — I95.1 ORTHOSTATIC HYPOTENSION: ICD-10-CM

## 2020-10-14 DIAGNOSIS — D64.9 ANEMIA, UNSPECIFIED TYPE: Primary | ICD-10-CM

## 2020-10-14 DIAGNOSIS — R00.0 TACHYCARDIA: ICD-10-CM

## 2020-10-14 DIAGNOSIS — R55 SYNCOPE AND COLLAPSE: ICD-10-CM

## 2020-10-14 PROCEDURE — 1126F PR PAIN SEVERITY QUANTIFIED, NO PAIN PRESENT: ICD-10-PCS | Mod: S$GLB,,, | Performed by: INTERNAL MEDICINE

## 2020-10-14 PROCEDURE — 3075F SYST BP GE 130 - 139MM HG: CPT | Mod: CPTII,S$GLB,, | Performed by: INTERNAL MEDICINE

## 2020-10-14 PROCEDURE — 3078F PR MOST RECENT DIASTOLIC BLOOD PRESSURE < 80 MM HG: ICD-10-PCS | Mod: CPTII,S$GLB,, | Performed by: INTERNAL MEDICINE

## 2020-10-14 PROCEDURE — 3075F PR MOST RECENT SYSTOLIC BLOOD PRESS GE 130-139MM HG: ICD-10-PCS | Mod: CPTII,S$GLB,, | Performed by: INTERNAL MEDICINE

## 2020-10-14 PROCEDURE — 1126F AMNT PAIN NOTED NONE PRSNT: CPT | Mod: S$GLB,,, | Performed by: INTERNAL MEDICINE

## 2020-10-14 PROCEDURE — 3008F PR BODY MASS INDEX (BMI) DOCUMENTED: ICD-10-PCS | Mod: CPTII,S$GLB,, | Performed by: INTERNAL MEDICINE

## 2020-10-14 PROCEDURE — 3008F BODY MASS INDEX DOCD: CPT | Mod: CPTII,S$GLB,, | Performed by: INTERNAL MEDICINE

## 2020-10-14 PROCEDURE — 3078F DIAST BP <80 MM HG: CPT | Mod: CPTII,S$GLB,, | Performed by: INTERNAL MEDICINE

## 2020-10-14 PROCEDURE — 99215 PR OFFICE/OUTPT VISIT, EST, LEVL V, 40-54 MIN: ICD-10-PCS | Mod: S$GLB,,, | Performed by: INTERNAL MEDICINE

## 2020-10-14 PROCEDURE — 99215 OFFICE O/P EST HI 40 MIN: CPT | Mod: S$GLB,,, | Performed by: INTERNAL MEDICINE

## 2020-10-14 NOTE — TELEPHONE ENCOUNTER
----- Message from Betty Martin sent at 10/14/2020  4:26 PM CDT -----  Regarding: Appt Request  Contact: patient  Type:  Sooner Apoointment Request    Caller is requesting a sooner appointment.  Caller declined first available appointment listed below.  Caller will not accept being placed on the waitlist and is requesting a message be sent to doctor.    Name of Caller:  patient  When is the first available appointment?  11/10  Symptoms:  possible internal bleeding per cardiologist  Best Call Back Number:  [phone] (home)     Additional Information:  pt states she will see him in [place] or [place] which ever is available first.

## 2020-10-15 ENCOUNTER — OFFICE VISIT (OUTPATIENT)
Dept: FAMILY MEDICINE | Facility: CLINIC | Age: 35
End: 2020-10-15
Payer: COMMERCIAL

## 2020-10-15 VITALS
WEIGHT: 243.38 LBS | HEIGHT: 67 IN | HEART RATE: 85 BPM | BODY MASS INDEX: 38.2 KG/M2 | RESPIRATION RATE: 16 BRPM | DIASTOLIC BLOOD PRESSURE: 86 MMHG | OXYGEN SATURATION: 99 % | TEMPERATURE: 98 F | SYSTOLIC BLOOD PRESSURE: 132 MMHG

## 2020-10-15 DIAGNOSIS — R63.4 UNINTENTIONAL WEIGHT LOSS: ICD-10-CM

## 2020-10-15 DIAGNOSIS — R19.7 DIARRHEA, UNSPECIFIED TYPE: ICD-10-CM

## 2020-10-15 DIAGNOSIS — D64.9 ANEMIA, UNSPECIFIED TYPE: Primary | ICD-10-CM

## 2020-10-15 PROCEDURE — 99214 PR OFFICE/OUTPT VISIT, EST, LEVL IV, 30-39 MIN: ICD-10-PCS | Mod: S$GLB,,, | Performed by: NURSE PRACTITIONER

## 2020-10-15 PROCEDURE — 99214 OFFICE O/P EST MOD 30 MIN: CPT | Mod: S$GLB,,, | Performed by: NURSE PRACTITIONER

## 2020-10-15 NOTE — PROGRESS NOTES
Subjective:      2:48 PM     Patient ID: Sonia Goldberg is a 34 y.o. female.    Chief Complaint: Follow-up (poss internal bleeding)  Was seen by cardiologist for tachycardia and he got cbc which shows significant anemia which is new. Cardiologist sent her here for work up. Has also been losing weight, about 40 pounds in past year, without significant change in diet or exercise.   Anemia  Presents for initial visit. Symptoms include bruises/bleeds easily, malaise/fatigue, palpitations and weight loss. There has been no abdominal pain, fever, pallor or pica. Signs of blood loss that are not present include hematemesis, hematochezia, melena and menorrhagia. Past treatments include nothing. Family history of anemia: none.   Father had colon cancer in his 50s. She is unaware if anyone on father's side of family had breast or ovarian cancer, but there is a strong history of cancer in his family. Had normal mammogram 1 year ago. Had normal CXR 1 month ago.   Review of Systems   Constitutional: Positive for fatigue, malaise/fatigue, unexpected weight change and weight loss. Negative for fever.   HENT: Negative for congestion, hearing loss and sore throat.    Eyes: Negative for pain, redness and visual disturbance.   Respiratory: Positive for shortness of breath. Negative for cough.    Cardiovascular: Positive for chest pain and palpitations. Negative for leg swelling.   Gastrointestinal: Positive for diarrhea. Negative for abdominal pain, blood in stool, constipation, hematemesis, hematochezia, melena, nausea and vomiting.   Endocrine: Negative for cold intolerance and heat intolerance.   Genitourinary: Negative for dysuria, hematuria, menorrhagia, menstrual problem and pelvic pain.   Musculoskeletal: Negative for arthralgias and myalgias.   Skin: Negative for pallor and rash.   Neurological: Positive for dizziness, syncope and weakness. Negative for headaches.   Hematological: Bruises/bleeds easily.  "  Psychiatric/Behavioral: Negative for dysphoric mood. The patient is not nervous/anxious.          Objective:      Vitals:    10/15/20 1429   BP: 132/86   Pulse: 85   Resp: 16   Temp: 98.1 °F (36.7 °C)   TempSrc: Temporal   SpO2: 99%   Weight: 110.4 kg (243 lb 6.2 oz)   Height: 5' 7" (1.702 m)   PainSc: 0-No pain     Recent Results (from the past 1008 hour(s))   CBC auto differential    Collection Time: 09/17/20  7:58 AM   Result Value Ref Range    WBC 9.01 3.90 - 12.70 K/uL    RBC 4.48 4.00 - 5.40 M/uL    Hemoglobin 8.8 (L) 12.0 - 16.0 g/dL    Hematocrit 31.4 (L) 37.0 - 48.5 %    Mean Corpuscular Volume 70 (L) 82 - 98 fL    Mean Corpuscular Hemoglobin 19.6 (L) 27.0 - 31.0 pg    Mean Corpuscular Hemoglobin Conc 28.0 (L) 32.0 - 36.0 g/dL    RDW 18.6 (H) 11.5 - 14.5 %    Platelets 443 (H) 150 - 350 K/uL    MPV 10.3 9.2 - 12.9 fL    Immature Granulocytes 0.3 0.0 - 0.5 %    Gran # (ANC) 5.5 1.8 - 7.7 K/uL    Immature Grans (Abs) 0.03 0.00 - 0.04 K/uL    Lymph # 2.7 1.0 - 4.8 K/uL    Mono # 0.6 0.3 - 1.0 K/uL    Eos # 0.1 0.0 - 0.5 K/uL    Baso # 0.03 0.00 - 0.20 K/uL    nRBC 0 0 /100 WBC    Gran% 61.5 38.0 - 73.0 %    Lymph% 30.0 18.0 - 48.0 %    Mono% 6.3 4.0 - 15.0 %    Eosinophil% 1.6 0.0 - 8.0 %    Basophil% 0.3 0.0 - 1.9 %    Differential Method Automated    Comprehensive metabolic panel    Collection Time: 09/17/20  7:58 AM   Result Value Ref Range    Sodium 140 136 - 145 mmol/L    Potassium 4.0 3.5 - 5.1 mmol/L    Chloride 105 95 - 110 mmol/L    CO2 27 23 - 29 mmol/L    Glucose 96 70 - 110 mg/dL    BUN, Bld 9 6 - 20 mg/dL    Creatinine 0.7 0.5 - 1.4 mg/dL    Calcium 9.7 8.7 - 10.5 mg/dL    Total Protein 6.4 6.0 - 8.4 g/dL    Albumin 3.5 3.5 - 5.2 g/dL    Total Bilirubin 0.4 0.1 - 1.0 mg/dL    Alkaline Phosphatase 71 55 - 135 U/L    AST 14 10 - 40 U/L    ALT 13 10 - 44 U/L    Anion Gap 8 8 - 16 mmol/L    eGFR if African American >60.0 >60 mL/min/1.73 m^2    eGFR if non African American >60.0 >60 mL/min/1.73 m^2 "   Lipid Panel    Collection Time: 09/17/20  7:58 AM   Result Value Ref Range    Cholesterol 189 120 - 199 mg/dL    Triglycerides 86 30 - 150 mg/dL    HDL 38 (L) 40 - 75 mg/dL    LDL Cholesterol 133.8 63.0 - 159.0 mg/dL    Hdl/Cholesterol Ratio 20.1 20.0 - 50.0 %    Total Cholesterol/HDL Ratio 5.0 2.0 - 5.0    Non-HDL Cholesterol 151 mg/dL   Magnesium    Collection Time: 09/17/20  7:58 AM   Result Value Ref Range    Magnesium 2.3 1.6 - 2.6 mg/dL   TSH    Collection Time: 09/17/20  7:58 AM   Result Value Ref Range    TSH 1.920 0.340 - 5.600 uIU/mL   Hemoglobin A1C    Collection Time: 09/17/20  7:58 AM   Result Value Ref Range    Hemoglobin A1C 5.2 4.5 - 6.2 %    Estimated Avg Glucose 103 68 - 131 mg/dL   Echo Color Flow Doppler? Yes; Bubble Contrast? No    Collection Time: 09/25/20 12:26 PM   Result Value Ref Range    AORTIC VALVE CUSP SEPERATION 1.90 cm    AV mean gradient 3 mmHg    Ao VTI 22.20 cm    Ao peak patricio 1.17 m/s    AV peak gradient 5 mmHg    Ao root annulus 2.60 cm    IVRT 74.00 ms    IVS 0.93 0.6 - 1.1 cm    LVIDd 4.42 3.5 - 6.0 cm    LVIDs 2.77 2.1 - 4.0 cm    LVOT diameter 2.20 cm    LVOT peak VTI 18.10 cm    LVOT peak patricio 0.93 m/s    Posterior Wall 1.00 0.6 - 1.1 cm    Left Atrium Major Axis 3.20 cm    LA size 3.20 cm    E wave decelartion time 248.00 ms    MV stenosis pressure 1/2 time 41.00 ms    MV Peak A Patricio 0.43 m/s    MV Peak E Patricio 0.62 m/s    RVDD 3.07 cm    Triscuspid Valve Regurgitation Peak Gradient 19 mmHg    BSA 2.28 m2    TDI SEPTAL 0.08 m/s    LV LATERAL E/E' RATIO 5.64 m/s    LV SEPTAL E/E' RATIO 7.75 m/s    TDI LATERAL 0.11 m/s    FS 37 28 - 44 %    LV mass 141.79 g    Left Ventricle Relative Wall Thickness 0.45 cm    AV valve area 3.10 cm2    AV Velocity Ratio 0.79     AV index (prosthetic) 0.82     MV valve area p 1/2 method 5.37 cm2    E/A ratio 1.44     Mean e' 0.10 m/s    LVOT area 3.8 cm2    LVOT stroke volume 68.77 cm3    E/E' ratio 6.53 m/s    TR Max Patricio 2.17 m/s    LV Mass Index  65 g/m2   X-Ray Chest PA And Lateral  Order: [id number]  Status:  Final result   Visible to patient:  Yes (Patient Portal) Next appt:  10/19/2020 at 06:00 AM in Lab (LAB, [facility]) Dx:  Palpitations; Morbid obesity; Orthost...  Details    Reading Physician Reading Date Result Priority   Amita Cabrera MD  [phone] 9/17/2020 Routine      Narrative & Impression     EXAMINATION:  XR CHEST PA AND LATERAL     CLINICAL HISTORY:  Tachycardia, unspecified     FINDINGS:  PA and lateral chest is compared to 05/06/2018 shows normal cardiomediastinal silhouette.     Lungs are clear. Pulmonary vasculature is normal. No acute osseous abnormality.     Impression:     Normal chest.        Electronically signed by: Amita Cabrera MD  Date:                                            09/17/2020  Time:                                           10:28             Last Resulted: 09/17/20 10:28 Order Details View Encounter Lab and Collection Details Routing Result History            External   Result:   US Breast Bilateral Limited  Mammo Digital Diagnostic Bilat w/ Allan     History:  Patient is 33 y.o. and is seen for a diagnostic mammogram.     Films Compared:  Prior images (if available) were compared.     Findings:     The breasts are heterogeneously dense, which may obscure small masses.      Mammo Digital Diagnostic Bilat w/ Allan  Right  Mass: There is a round mass with obscured margins seen in the upper region of the right breast. This is a new finding.      US Breast Bilateral Limited  Right  Cyst: There is a 14 mm simple cyst seen in the right breast at 12 o'clock.      Left  There is no evidence of suspicious masses, calcifications, or other abnormal findings.     Impression:  Bilateral  There is no mammographic or sonographic evidence of malignancy.     BI-RADS Category:   Overall: 2 - Benign     Recommendation:  Annual mammogram is recommended beginning at age 40.     The patient's estimated lifetime risk of breast  cancer (to age 85) based on Tyrer-Cuzick - 7 risk assessment model is: Tyrer-Cuzick: 17.65 %. According to the American Cancer Society,  patients with a lifetime breast cancer risk of 20% or higher might benefit from supplemental screening tests.             Last Resulted: 05/06/19 17:31            Physical Exam  Vitals signs and nursing note reviewed.   Constitutional:       General: She is not in acute distress.     Appearance: Normal appearance. She is well-developed. She is not diaphoretic.   HENT:      Head: Normocephalic and atraumatic.   Eyes:      General: No scleral icterus.        Right eye: No discharge.         Left eye: No discharge.      Conjunctiva/sclera: Conjunctivae normal.   Cardiovascular:      Rate and Rhythm: Normal rate and regular rhythm.      Heart sounds: Normal heart sounds. No murmur. No friction rub. No gallop.    Pulmonary:      Effort: Pulmonary effort is normal. No respiratory distress.      Breath sounds: Normal breath sounds. No stridor. No wheezing, rhonchi or rales.   Abdominal:      General: There is no distension.      Palpations: Abdomen is soft. There is no mass.      Tenderness: There is no abdominal tenderness. There is no guarding or rebound.      Comments: bs mildly hyperactive.    Musculoskeletal:         General: No swelling.   Skin:     General: Skin is warm and dry.      Capillary Refill: Capillary refill takes less than 2 seconds.      Coloration: Skin is not jaundiced or pale.      Findings: Bruising present. No rash.   Neurological:      Mental Status: She is alert and oriented to person, place, and time.   Psychiatric:         Mood and Affect: Mood normal.         Behavior: Behavior normal.           Assessment:       1. Anemia, unspecified type    2. Unintentional weight loss    3. Diarrhea, unspecified type          Plan:       Anemia, unspecified type  -     Ferritin; Future; Expected date: 10/15/2020  -     Iron and TIBC; Future; Expected date: 10/15/2020  -      Vitamin B12; Future; Expected date: 10/15/2020  -     Ambulatory referral/consult to Gastroenterology; Future; Expected date: 10/22/2020  -     Occult blood x 1, stool; Future; Expected date: 10/15/2020  -     Occult blood x 1, stool; Future; Expected date: 10/15/2020  -     Occult blood x 1, stool; Future; Expected date: 10/15/2020    Unintentional weight loss  -     US Abdomen Complete; Future; Expected date: 10/15/2020    Diarrhea, unspecified type  -     Occult blood x 1, stool; Future; Expected date: 10/15/2020  -     Occult blood x 1, stool; Future; Expected date: 10/15/2020  -     Occult blood x 1, stool; Future; Expected date: 10/15/2020      2 weeks

## 2020-10-16 PROBLEM — D64.9 ANEMIA: Status: ACTIVE | Noted: 2020-10-16

## 2020-10-19 ENCOUNTER — HOSPITAL ENCOUNTER (OUTPATIENT)
Dept: RADIOLOGY | Facility: HOSPITAL | Age: 35
Discharge: HOME | End: 2020-10-19
Attending: NURSE PRACTITIONER
Payer: COMMERCIAL

## 2020-10-19 ENCOUNTER — PATIENT MESSAGE (OUTPATIENT)
Dept: FAMILY MEDICINE | Facility: CLINIC | Age: 35
End: 2020-10-19

## 2020-10-19 DIAGNOSIS — R63.4 UNINTENTIONAL WEIGHT LOSS: ICD-10-CM

## 2020-10-19 PROCEDURE — 76700 US EXAM ABDOM COMPLETE: CPT | Mod: TC

## 2020-10-19 PROCEDURE — 76700 US EXAM ABDOM COMPLETE: CPT | Mod: 26,,, | Performed by: RADIOLOGY

## 2020-10-19 PROCEDURE — 76700 US ABDOMEN COMPLETE: ICD-10-PCS | Mod: 26,,, | Performed by: RADIOLOGY

## 2020-10-20 ENCOUNTER — TELEPHONE (OUTPATIENT)
Dept: FAMILY MEDICINE | Facility: CLINIC | Age: 35
End: 2020-10-20

## 2020-10-20 NOTE — TELEPHONE ENCOUNTER
----- Message from MAYITO Harden sent at 10/19/2020  6:28 PM CDT -----  Call the lab and find out what happened to her TIBC and IRON results?

## 2020-10-24 ENCOUNTER — HOSPITAL ENCOUNTER (EMERGENCY)
Facility: HOSPITAL | Age: 35
Discharge: HOME | End: 2020-10-24
Attending: EMERGENCY MEDICINE
Payer: COMMERCIAL

## 2020-10-24 VITALS
BODY MASS INDEX: 37.67 KG/M2 | OXYGEN SATURATION: 98 % | WEIGHT: 240 LBS | TEMPERATURE: 99 F | SYSTOLIC BLOOD PRESSURE: 176 MMHG | RESPIRATION RATE: 16 BRPM | HEART RATE: 86 BPM | HEIGHT: 67 IN | DIASTOLIC BLOOD PRESSURE: 98 MMHG

## 2020-10-24 DIAGNOSIS — R10.13 EPIGASTRIC PAIN: ICD-10-CM

## 2020-10-24 DIAGNOSIS — R10.84 GENERALIZED ABDOMINAL PAIN: Primary | ICD-10-CM

## 2020-10-24 LAB
ALBUMIN SERPL BCP-MCNC: 3.8 G/DL (ref 3.5–5.2)
ALP SERPL-CCNC: 90 U/L (ref 55–135)
ALT SERPL W/O P-5'-P-CCNC: 13 U/L (ref 10–44)
ANION GAP SERPL CALC-SCNC: 8 MMOL/L (ref 8–16)
AST SERPL-CCNC: 11 U/L (ref 10–40)
B-HCG UR QL: NEGATIVE
BASOPHILS # BLD AUTO: 0.03 K/UL (ref 0–0.2)
BASOPHILS NFR BLD: 0.3 % (ref 0–1.9)
BILIRUB SERPL-MCNC: 0.2 MG/DL (ref 0.1–1)
BILIRUB UR QL STRIP: NEGATIVE
BUN SERPL-MCNC: 11 MG/DL (ref 6–20)
CALCIUM SERPL-MCNC: 10.8 MG/DL (ref 8.7–10.5)
CHLORIDE SERPL-SCNC: 107 MMOL/L (ref 95–110)
CLARITY UR: CLEAR
CO2 SERPL-SCNC: 25 MMOL/L (ref 23–29)
COLOR UR: YELLOW
CREAT SERPL-MCNC: 0.9 MG/DL (ref 0.5–1.4)
CTP QC/QA: YES
DIFFERENTIAL METHOD: ABNORMAL
EOSINOPHIL # BLD AUTO: 0.2 K/UL (ref 0–0.5)
EOSINOPHIL NFR BLD: 2.3 % (ref 0–8)
ERYTHROCYTE [DISTWIDTH] IN BLOOD BY AUTOMATED COUNT: 19.5 % (ref 11.5–14.5)
EST. GFR  (AFRICAN AMERICAN): >60 ML/MIN/1.73 M^2
EST. GFR  (NON AFRICAN AMERICAN): >60 ML/MIN/1.73 M^2
GLUCOSE SERPL-MCNC: 89 MG/DL (ref 70–110)
GLUCOSE UR QL STRIP: NEGATIVE
HCT VFR BLD AUTO: 36.1 % (ref 37–48.5)
HGB BLD-MCNC: 9.9 G/DL (ref 12–16)
HGB UR QL STRIP: ABNORMAL
IMM GRANULOCYTES # BLD AUTO: 0.03 K/UL (ref 0–0.04)
IMM GRANULOCYTES NFR BLD AUTO: 0.3 % (ref 0–0.5)
KETONES UR QL STRIP: NEGATIVE
LEUKOCYTE ESTERASE UR QL STRIP: NEGATIVE
LIPASE SERPL-CCNC: 23 U/L (ref 4–60)
LYMPHOCYTES # BLD AUTO: 2.8 K/UL (ref 1–4.8)
LYMPHOCYTES NFR BLD: 29.2 % (ref 18–48)
MCH RBC QN AUTO: 19.7 PG (ref 27–31)
MCHC RBC AUTO-ENTMCNC: 27.4 G/DL (ref 32–36)
MCV RBC AUTO: 72 FL (ref 82–98)
MONOCYTES # BLD AUTO: 0.6 K/UL (ref 0.3–1)
MONOCYTES NFR BLD: 5.7 % (ref 4–15)
NEUTROPHILS # BLD AUTO: 6 K/UL (ref 1.8–7.7)
NEUTROPHILS NFR BLD: 62.2 % (ref 38–73)
NITRITE UR QL STRIP: NEGATIVE
NRBC BLD-RTO: 0 /100 WBC
PH UR STRIP: 6 [PH] (ref 5–8)
PLATELET # BLD AUTO: 458 K/UL (ref 150–350)
PMV BLD AUTO: 9.3 FL (ref 9.2–12.9)
POTASSIUM SERPL-SCNC: 4.2 MMOL/L (ref 3.5–5.1)
PROT SERPL-MCNC: 7.4 G/DL (ref 6–8.4)
PROT UR QL STRIP: NEGATIVE
RBC # BLD AUTO: 5.02 M/UL (ref 4–5.4)
SODIUM SERPL-SCNC: 140 MMOL/L (ref 136–145)
SP GR UR STRIP: 1.02 (ref 1–1.03)
URN SPEC COLLECT METH UR: ABNORMAL
UROBILINOGEN UR STRIP-ACNC: NEGATIVE EU/DL
WBC # BLD AUTO: 9.67 K/UL (ref 3.9–12.7)

## 2020-10-24 PROCEDURE — 81003 URINALYSIS AUTO W/O SCOPE: CPT

## 2020-10-24 PROCEDURE — 25000003 PHARM REV CODE 250: Performed by: EMERGENCY MEDICINE

## 2020-10-24 PROCEDURE — 81025 URINE PREGNANCY TEST: CPT | Performed by: EMERGENCY MEDICINE

## 2020-10-24 PROCEDURE — 83690 ASSAY OF LIPASE: CPT

## 2020-10-24 PROCEDURE — 36415 COLL VENOUS BLD VENIPUNCTURE: CPT

## 2020-10-24 PROCEDURE — 99283 EMERGENCY DEPT VISIT LOW MDM: CPT

## 2020-10-24 PROCEDURE — 85025 COMPLETE CBC W/AUTO DIFF WBC: CPT

## 2020-10-24 PROCEDURE — 80053 COMPREHEN METABOLIC PANEL: CPT

## 2020-10-24 RX ORDER — OMEPRAZOLE 20 MG/1
20 CAPSULE, DELAYED RELEASE ORAL DAILY
Qty: 30 CAPSULE | Refills: 0 | Status: SHIPPED | OUTPATIENT
Start: 2020-10-24 | End: 2021-04-21

## 2020-10-24 RX ORDER — DICYCLOMINE HYDROCHLORIDE 10 MG/1
10 CAPSULE ORAL
Status: COMPLETED | OUTPATIENT
Start: 2020-10-24 | End: 2020-10-24

## 2020-10-24 RX ADMIN — DICYCLOMINE HYDROCHLORIDE 10 MG: 10 CAPSULE ORAL at 10:10

## 2020-10-24 NOTE — ED PROVIDER NOTES
"Encounter Date: 10/24/2020    SCRIBE #1 NOTE: I, Wood Salgado, am scribing for, and in the presence of, Dr. Chaves.       History     Chief Complaint   Patient presents with    Abdominal Pain     radiating to back beginning last night, N/V/D last night     Time seen by provider: 9:51 AM on 10/24/2020      Sonia Goldberg is a 34 y.o. female with a PMHx of palpations, HTN, and IBS who presents to the ED for diffuse abdominal pain that started 1 day ago. She states that this pain started gradually and has worsened since onset. She states that this pain is generalized but worse in the epigastric region, but diffuse throughout the upper abdomen and radiates to the back slightly. She states that this pain is more of a cramping soreness like pain. She reports that she has had nausea, 1 episode of non-bloody emesis, and 2 episodes of non-bloody diarrhea. She states that "I go to a cardiologist for tachycardia and he thinks I have a bleed because my H&H is low." She denies chest pain, SOB, fever, sore throat, hematemesis, or any other complaint at this time. The patient has a PSHx of D&C, , tonsillectomy, tubal ligation, and abdominal surgery.     The history is provided by the patient.     Review of patient's allergies indicates:   Allergen Reactions    Contrast media Anaphylaxis    Iodine and iodide containing products Anaphylaxis    Sulfa (sulfonamide antibiotics) Anaphylaxis and Hives    Iodinated contrast media Hives    Magnesium      Pt reporting she is allergic to magnesium citrate oral drink.     Morphine Hives    Adhesive Rash    Nut [tree nut] Hives     Past Medical History:   Diagnosis Date    Asthma     Heart palpitations     Herniated disc     Hypertension     IBS (irritable bowel syndrome)     Insomnia     Lower back pain     L5 S1 herniated disks    Migraine headache     Obstructive sleep apnea     Palpitations     and pvcs with stress.  Not on any meds.    Sleep apnea     " history of.  Dont use cpap.  lost weight.     Past Surgical History:   Procedure Laterality Date    ABDOMINAL SURGERY           SECTION, CLASSIC       SECTION, LOW TRANSVERSE      DILATION AND CURETTAGE OF UTERUS      DILATION AND CURETTAGE OF UTERUS      perferated uterus during procedure    endometrioma      removed on right lower quadrant of uterus    epidural steriod injections  x 3    TONSILLECTOMY      TUBAL LIGATION       Family History   Problem Relation Age of Onset    Heart disease Mother     Hyperlipidemia Mother     Asthma Mother     Cancer Father     Heart disease Father     Hypertension Father     Hyperlipidemia Father     Arthritis Father     Diabetes Maternal Grandmother     Cancer Maternal Grandmother     Cancer Maternal Grandfather     Diabetes Paternal Grandfather     Breast cancer Maternal Aunt 40     Social History     Tobacco Use    Smoking status: Current Every Day Smoker     Types: Vaping with nicotine, Vaping w/o nicotine    Smokeless tobacco: Never Used   Substance Use Topics    Alcohol use: Yes     Comment: socially, occasionally    Drug use: No     Review of Systems   Constitutional: Negative for activity change, diaphoresis and fever.   HENT: Negative for ear pain, rhinorrhea, sore throat and trouble swallowing.    Eyes: Negative for pain and visual disturbance.   Respiratory: Negative for cough, shortness of breath and stridor.    Cardiovascular: Negative for chest pain.   Gastrointestinal: Positive for abdominal pain, diarrhea, nausea and vomiting. Negative for anal bleeding, blood in stool and rectal pain.   Genitourinary: Negative for dysuria, hematuria, vaginal bleeding and vaginal discharge.   Musculoskeletal: Negative for gait problem.   Skin: Negative for rash and wound.   Neurological: Negative for seizures and headaches.   Psychiatric/Behavioral: Negative for hallucinations and suicidal ideas.       Physical Exam     Initial  Vitals [10/24/20 0932]   BP Pulse Resp Temp SpO2   (!) 176/98 86 16 98.6 °F (37 °C) 98 %      MAP       --         Physical Exam    Nursing note and vitals reviewed.  Constitutional: She appears well-developed. She is not diaphoretic. No distress.   HENT:   Head: Normocephalic and atraumatic.   Nose: Nose normal.   Eyes: EOM are normal. No scleral icterus.   Neck: Normal range of motion. Neck supple.   Cardiovascular: Normal rate, regular rhythm, normal heart sounds and intact distal pulses. Exam reveals no gallop and no friction rub.    No murmur heard.  Pulmonary/Chest: Breath sounds normal. No stridor. No respiratory distress. She has no wheezes. She has no rhonchi. She has no rales.   Abdominal: Soft. Bowel sounds are normal. She exhibits no distension. There is no abdominal tenderness. There is no rebound and no guarding.   No signs of peritonitis   Musculoskeletal: Normal range of motion.   Neurological: She is alert and oriented to person, place, and time. No cranial nerve deficit.   Skin: Skin is warm and dry. Capillary refill takes less than 2 seconds. No rash noted.   Psychiatric: She has a normal mood and affect.         ED Course   Procedures  Labs Reviewed   CBC W/ AUTO DIFFERENTIAL - Abnormal; Notable for the following components:       Result Value    Hemoglobin 9.9 (*)     Hematocrit 36.1 (*)     Mean Corpuscular Volume 72 (*)     Mean Corpuscular Hemoglobin 19.7 (*)     Mean Corpuscular Hemoglobin Conc 27.4 (*)     RDW 19.5 (*)     Platelets 458 (*)     All other components within normal limits   COMPREHENSIVE METABOLIC PANEL - Abnormal; Notable for the following components:    Calcium 10.8 (*)     All other components within normal limits   URINALYSIS, REFLEX TO URINE CULTURE - Abnormal; Notable for the following components:    Occult Blood UA Trace (*)     All other components within normal limits    Narrative:     Specimen Source->Urine   LIPASE   POCT URINE PREGNANCY          Imaging Results     None          Medical Decision Making:   History:   Old Medical Records: I decided to obtain old medical records.  Clinical Tests:   Lab Tests: Reviewed and Ordered  ED Management:  Patient's symptoms not typical for other emergent causes of abdominal pain such as, but not limited to, appendicitis, abdominal aortic aneurysm, surgical biliary disease, pancreatitis, SBO, mesenteric ischemia, serious intra-abdominal bacterial illness. Presentation also not typical of gynecologic emergencies such as TOA, Ovarian Torsion, PID.  Not Ectopic.  Doubt atypical ACS.  Pt tolerating PO and pain controlled.  Patient will be discharged with strict return precautions and follow up closely with PCP/Gyn for further evaluation. Pt understands and agrees with discharge instructions. Pt also given strict return precautions for any new or worsening symptoms and plans to follow up closely with PCP.               Scribe Attestation:   Scribe #1: I performed the above scribed service and the documentation accurately describes the services I performed. I attest to the accuracy of the note.      Attending Attestation:     Physician Attestation for Scribe:    I, Dr. Ruben Chaves, personally performed the services described in this documentation.   All medical record entries made by the scribe were at my direction and in my presence.   I have reviewed the chart and agree that the record is accurate and complete.   Ruben Chaves MD  10:07 PM 10/24/2020     DISCLAIMER: This note was prepared with [employer] voice recognition transcription software. Garbled syntax, mangled pronouns, and other bizarre constructions may be attributed to that software system.                    Clinical Impression:     ICD-10-CM ICD-9-CM   1. Generalized abdominal pain  R10.84 789.07   2. Epigastric pain  R10.13 789.06                      Disposition:   Disposition: Discharged  Condition: Stable     ED Disposition Condition    Discharge Stable         ED Prescriptions     Medication Sig Dispense Start Date End Date Auth. Provider    omeprazole (PRILOSEC) 20 MG capsule Take 1 capsule (20 mg total) by mouth once daily. 30 capsule 10/24/2020 11/23/2020 Ruben Chaves MD        Follow-up Information     Follow up With Specialties Details Why Contact Info    Enrike Garcia MD Gastroenterology Go in 2 days  [address] [place] [address]  [phone]      Malcolm Ma MD Family Medicine, Internal Medicine Go to  As needed, If symptoms worsen [address]  [facility] [address]  [phone]      [facility] Emergency Medicine Go to  As needed, If symptoms worsen [address]  [facility] [address] [phone]                                       Ruben Chaves MD  10/24/20 [id number]

## 2020-10-24 NOTE — ED NOTES
HPI: Syncopal episodes x 2 months ago. Sent to cardiology, wore Holter, Dx tachycardia, not SVT, had blood work that showed low Hgb. Iron low. Had abd US last week, showing gallstone. Started with epigastric abd pain, n,v,d last night. Hx IBS. Diarrhea not normal for pt. Denies black,bloody stools/emesis.  Denies fever, urinary problems.

## 2020-10-25 ENCOUNTER — HOSPITAL ENCOUNTER (OUTPATIENT)
Facility: HOSPITAL | Age: 35
Discharge: HOME | End: 2020-10-28
Attending: EMERGENCY MEDICINE | Admitting: INTERNAL MEDICINE
Payer: COMMERCIAL

## 2020-10-25 DIAGNOSIS — K92.2 GI BLEED: Primary | ICD-10-CM

## 2020-10-25 DIAGNOSIS — D50.9 IRON DEFICIENCY ANEMIA, UNSPECIFIED IRON DEFICIENCY ANEMIA TYPE: ICD-10-CM

## 2020-10-25 DIAGNOSIS — D64.9 ANEMIA: ICD-10-CM

## 2020-10-25 DIAGNOSIS — R19.7 DIARRHEA, UNSPECIFIED TYPE: ICD-10-CM

## 2020-10-25 PROBLEM — K21.9 GERD (GASTROESOPHAGEAL REFLUX DISEASE): Status: ACTIVE | Noted: 2020-10-25

## 2020-10-25 LAB
ALBUMIN SERPL BCP-MCNC: 3.7 G/DL (ref 3.5–5.2)
ALP SERPL-CCNC: 78 U/L (ref 55–135)
ALT SERPL W/O P-5'-P-CCNC: 13 U/L (ref 10–44)
ANION GAP SERPL CALC-SCNC: 10 MMOL/L (ref 8–16)
AST SERPL-CCNC: 12 U/L (ref 10–40)
B-HCG UR QL: NEGATIVE
BASOPHILS # BLD AUTO: 0.04 K/UL (ref 0–0.2)
BASOPHILS NFR BLD: 0.4 % (ref 0–1.9)
BILIRUB SERPL-MCNC: 0.2 MG/DL (ref 0.1–1)
BILIRUB UR QL STRIP: NEGATIVE
BUN SERPL-MCNC: 12 MG/DL (ref 6–20)
CALCIUM SERPL-MCNC: 9.4 MG/DL (ref 8.7–10.5)
CHLORIDE SERPL-SCNC: 102 MMOL/L (ref 95–110)
CLARITY UR: CLEAR
CO2 SERPL-SCNC: 25 MMOL/L (ref 23–29)
COLOR UR: YELLOW
CREAT SERPL-MCNC: 0.9 MG/DL (ref 0.5–1.4)
CTP QC/QA: YES
DIFFERENTIAL METHOD: ABNORMAL
EOSINOPHIL # BLD AUTO: 0.2 K/UL (ref 0–0.5)
EOSINOPHIL NFR BLD: 1.5 % (ref 0–8)
ERYTHROCYTE [DISTWIDTH] IN BLOOD BY AUTOMATED COUNT: 18.9 % (ref 11.5–14.5)
EST. GFR  (AFRICAN AMERICAN): >60 ML/MIN/1.73 M^2
EST. GFR  (NON AFRICAN AMERICAN): >60 ML/MIN/1.73 M^2
GLUCOSE SERPL-MCNC: 87 MG/DL (ref 70–110)
GLUCOSE UR QL STRIP: NEGATIVE
HCT VFR BLD AUTO: 32.3 % (ref 37–48.5)
HGB BLD-MCNC: 9 G/DL (ref 12–16)
HGB UR QL STRIP: NEGATIVE
IMM GRANULOCYTES # BLD AUTO: 0.05 K/UL (ref 0–0.04)
IMM GRANULOCYTES NFR BLD AUTO: 0.5 % (ref 0–0.5)
KETONES UR QL STRIP: NEGATIVE
LEUKOCYTE ESTERASE UR QL STRIP: NEGATIVE
LIPASE SERPL-CCNC: 24 U/L (ref 4–60)
LYMPHOCYTES # BLD AUTO: 3.1 K/UL (ref 1–4.8)
LYMPHOCYTES NFR BLD: 28.7 % (ref 18–48)
MCH RBC QN AUTO: 19.5 PG (ref 27–31)
MCHC RBC AUTO-ENTMCNC: 27.9 G/DL (ref 32–36)
MCV RBC AUTO: 70 FL (ref 82–98)
MONOCYTES # BLD AUTO: 0.6 K/UL (ref 0.3–1)
MONOCYTES NFR BLD: 5.6 % (ref 4–15)
NEUTROPHILS # BLD AUTO: 6.9 K/UL (ref 1.8–7.7)
NEUTROPHILS NFR BLD: 63.3 % (ref 38–73)
NITRITE UR QL STRIP: NEGATIVE
NRBC BLD-RTO: 0 /100 WBC
PH UR STRIP: 6 [PH] (ref 5–8)
PLATELET # BLD AUTO: 400 K/UL (ref 150–350)
PMV BLD AUTO: 9.7 FL (ref 9.2–12.9)
POTASSIUM SERPL-SCNC: 3.6 MMOL/L (ref 3.5–5.1)
PROT SERPL-MCNC: 7 G/DL (ref 6–8.4)
PROT UR QL STRIP: NEGATIVE
RBC # BLD AUTO: 4.61 M/UL (ref 4–5.4)
SARS-COV-2 RDRP RESP QL NAA+PROBE: NEGATIVE
SODIUM SERPL-SCNC: 137 MMOL/L (ref 136–145)
SP GR UR STRIP: 1.01 (ref 1–1.03)
URN SPEC COLLECT METH UR: NORMAL
UROBILINOGEN UR STRIP-ACNC: NEGATIVE EU/DL
WBC # BLD AUTO: 10.8 K/UL (ref 3.9–12.7)

## 2020-10-25 PROCEDURE — U0002 COVID-19 LAB TEST NON-CDC: HCPCS

## 2020-10-25 PROCEDURE — 96361 HYDRATE IV INFUSION ADD-ON: CPT

## 2020-10-25 PROCEDURE — 81003 URINALYSIS AUTO W/O SCOPE: CPT

## 2020-10-25 PROCEDURE — G0378 HOSPITAL OBSERVATION PER HR: HCPCS

## 2020-10-25 PROCEDURE — 80053 COMPREHEN METABOLIC PANEL: CPT

## 2020-10-25 PROCEDURE — 96375 TX/PRO/DX INJ NEW DRUG ADDON: CPT

## 2020-10-25 PROCEDURE — C9113 INJ PANTOPRAZOLE SODIUM, VIA: HCPCS | Performed by: NURSE PRACTITIONER

## 2020-10-25 PROCEDURE — 81025 URINE PREGNANCY TEST: CPT | Performed by: EMERGENCY MEDICINE

## 2020-10-25 PROCEDURE — 36415 COLL VENOUS BLD VENIPUNCTURE: CPT

## 2020-10-25 PROCEDURE — 99285 EMERGENCY DEPT VISIT HI MDM: CPT | Mod: 25

## 2020-10-25 PROCEDURE — 96374 THER/PROPH/DIAG INJ IV PUSH: CPT

## 2020-10-25 PROCEDURE — 63600175 PHARM REV CODE 636 W HCPCS: Performed by: NURSE PRACTITIONER

## 2020-10-25 PROCEDURE — 25000003 PHARM REV CODE 250: Performed by: EMERGENCY MEDICINE

## 2020-10-25 PROCEDURE — 63600175 PHARM REV CODE 636 W HCPCS: Performed by: EMERGENCY MEDICINE

## 2020-10-25 PROCEDURE — 85025 COMPLETE CBC W/AUTO DIFF WBC: CPT

## 2020-10-25 PROCEDURE — 83690 ASSAY OF LIPASE: CPT

## 2020-10-25 RX ORDER — PANTOPRAZOLE SODIUM 40 MG/10ML
40 INJECTION, POWDER, LYOPHILIZED, FOR SOLUTION INTRAVENOUS ONCE
Status: COMPLETED | OUTPATIENT
Start: 2020-10-25 | End: 2020-10-25

## 2020-10-25 RX ORDER — HYDROMORPHONE HYDROCHLORIDE 2 MG/ML
1 INJECTION, SOLUTION INTRAMUSCULAR; INTRAVENOUS; SUBCUTANEOUS
Status: COMPLETED | OUTPATIENT
Start: 2020-10-25 | End: 2020-10-25

## 2020-10-25 RX ADMIN — SODIUM CHLORIDE 1000 ML: 0.9 INJECTION, SOLUTION INTRAVENOUS at 09:10

## 2020-10-25 RX ADMIN — HYDROMORPHONE HYDROCHLORIDE 1 MG: 2 INJECTION, SOLUTION INTRAMUSCULAR; INTRAVENOUS; SUBCUTANEOUS at 10:10

## 2020-10-25 RX ADMIN — PANTOPRAZOLE SODIUM 40 MG: 40 INJECTION, POWDER, LYOPHILIZED, FOR SOLUTION INTRAVENOUS at 11:10

## 2020-10-25 NOTE — LETTER
October 28, 2020         [address] [place] [address]  Phone: [phone]       Patient: Sonia Goldberg   YOB: 1985  Date of Visit: 10/28/2020    To Whom It May Concern:    Ness Goldberg  was at [facility] on 10/28/2020. She may return to work/school on 10/29/2020 with no restrictions. If you have any questions or concerns, or if I can be of further assistance, please do not hesitate to contact me.    Sincerely,    Yarelis Herbert MD

## 2020-10-25 NOTE — LETTER
October 28, 2020         [address] [place] [address]  [facility] [address]  Phone: [phone]       Patient: Sonia Goldberg   YOB: 1985  Date of Visit: 10/28/2020    To Whom It May Concern:    Ness Goldberg  was at [facility] on 10/25/20 - 10/28/2020 She may return to work/school on 11/01/2020 with no restrictions. If you have any questions or concerns, or if I can be of further assistance, please do not hesitate to contact me.    Sincerely,    Yarelis Herbert MD

## 2020-10-25 NOTE — LETTER
October 28, 2020         [address] [place] [address]  Phone: [phone]       Patient: Sonia Goldberg   YOB: 1985  Date of Visit: 10/28/2020    To Whom It May Concern:    Ness Goldberg  was at [facility] on 10/28/2020. She may return to work/school on 10/01/2020 with no restrictions. If you have any questions or concerns, or if I can be of further assistance, please do not hesitate to contact me.    Sincerely,    Yarelis Herbert MD

## 2020-10-26 ENCOUNTER — ANESTHESIA EVENT (OUTPATIENT)
Dept: ENDOSCOPY | Facility: HOSPITAL | Age: 35
End: 2020-10-26
Payer: COMMERCIAL

## 2020-10-26 ENCOUNTER — ANESTHESIA (OUTPATIENT)
Dept: ENDOSCOPY | Facility: HOSPITAL | Age: 35
End: 2020-10-26
Payer: COMMERCIAL

## 2020-10-26 LAB
ABO + RH BLD: NORMAL
BASOPHILS # BLD AUTO: 0.02 K/UL (ref 0–0.2)
BASOPHILS NFR BLD: 0.2 % (ref 0–1.9)
BLD GP AB SCN CELLS X3 SERPL QL: NORMAL
DIFFERENTIAL METHOD: ABNORMAL
EOSINOPHIL # BLD AUTO: 0.2 K/UL (ref 0–0.5)
EOSINOPHIL NFR BLD: 1.9 % (ref 0–8)
ERYTHROCYTE [DISTWIDTH] IN BLOOD BY AUTOMATED COUNT: 18.8 % (ref 11.5–14.5)
FERRITIN SERPL-MCNC: <1 NG/ML (ref 20–300)
HCT VFR BLD AUTO: 28.8 % (ref 37–48.5)
HGB BLD-MCNC: 7.9 G/DL (ref 12–16)
IMM GRANULOCYTES # BLD AUTO: 0.03 K/UL (ref 0–0.04)
IMM GRANULOCYTES NFR BLD AUTO: 0.3 % (ref 0–0.5)
IRON SERPL-MCNC: 17 UG/DL (ref 30–160)
LYMPHOCYTES # BLD AUTO: 2.8 K/UL (ref 1–4.8)
LYMPHOCYTES NFR BLD: 31.4 % (ref 18–48)
MCH RBC QN AUTO: 19.6 PG (ref 27–31)
MCHC RBC AUTO-ENTMCNC: 27.4 G/DL (ref 32–36)
MCV RBC AUTO: 72 FL (ref 82–98)
MONOCYTES # BLD AUTO: 0.6 K/UL (ref 0.3–1)
MONOCYTES NFR BLD: 6.6 % (ref 4–15)
NEUTROPHILS # BLD AUTO: 5.4 K/UL (ref 1.8–7.7)
NEUTROPHILS NFR BLD: 59.6 % (ref 38–73)
NRBC BLD-RTO: 0 /100 WBC
PLATELET # BLD AUTO: 340 K/UL (ref 150–350)
PMV BLD AUTO: 9.9 FL (ref 9.2–12.9)
RBC # BLD AUTO: 4.03 M/UL (ref 4–5.4)
SATURATED IRON: 4 % (ref 20–50)
TOTAL IRON BINDING CAPACITY: 462 UG/DL (ref 250–450)
TRANSFERRIN SERPL-MCNC: 312 MG/DL (ref 200–375)
VIT B12 SERPL-MCNC: 332 PG/ML (ref 210–950)
WBC # BLD AUTO: 8.99 K/UL (ref 3.9–12.7)

## 2020-10-26 PROCEDURE — 99223 PR INITIAL HOSPITAL CARE,LEVL III: ICD-10-PCS | Mod: 25,,, | Performed by: INTERNAL MEDICINE

## 2020-10-26 PROCEDURE — 96376 TX/PRO/DX INJ SAME DRUG ADON: CPT

## 2020-10-26 PROCEDURE — C9113 INJ PANTOPRAZOLE SODIUM, VIA: HCPCS | Performed by: NURSE PRACTITIONER

## 2020-10-26 PROCEDURE — 82728 ASSAY OF FERRITIN: CPT

## 2020-10-26 PROCEDURE — 86920 COMPATIBILITY TEST SPIN: CPT

## 2020-10-26 PROCEDURE — 37000008 HC ANESTHESIA 1ST 15 MINUTES: Performed by: INTERNAL MEDICINE

## 2020-10-26 PROCEDURE — 25000003 PHARM REV CODE 250: Performed by: INTERNAL MEDICINE

## 2020-10-26 PROCEDURE — 94761 N-INVAS EAR/PLS OXIMETRY MLT: CPT

## 2020-10-26 PROCEDURE — D9220A PRA ANESTHESIA: Mod: ANES,,, | Performed by: ANESTHESIOLOGY

## 2020-10-26 PROCEDURE — P9016 RBC LEUKOCYTES REDUCED: HCPCS

## 2020-10-26 PROCEDURE — 82607 VITAMIN B-12: CPT

## 2020-10-26 PROCEDURE — 83540 ASSAY OF IRON: CPT

## 2020-10-26 PROCEDURE — 88342 CHG IMMUNOCYTOCHEMISTRY: ICD-10-PCS | Mod: 26,,, | Performed by: PATHOLOGY

## 2020-10-26 PROCEDURE — 88342 IMHCHEM/IMCYTCHM 1ST ANTB: CPT | Performed by: PATHOLOGY

## 2020-10-26 PROCEDURE — 88305 TISSUE EXAM BY PATHOLOGIST: CPT | Performed by: PATHOLOGY

## 2020-10-26 PROCEDURE — 88305 TISSUE EXAM BY PATHOLOGIST: CPT | Mod: 26,,, | Performed by: PATHOLOGY

## 2020-10-26 PROCEDURE — G0378 HOSPITAL OBSERVATION PER HR: HCPCS

## 2020-10-26 PROCEDURE — 96372 THER/PROPH/DIAG INJ SC/IM: CPT | Mod: 59

## 2020-10-26 PROCEDURE — D9220A PRA ANESTHESIA: ICD-10-PCS | Mod: ANES,,, | Performed by: ANESTHESIOLOGY

## 2020-10-26 PROCEDURE — 25000003 PHARM REV CODE 250: Performed by: NURSE ANESTHETIST, CERTIFIED REGISTERED

## 2020-10-26 PROCEDURE — 86901 BLOOD TYPING SEROLOGIC RH(D): CPT

## 2020-10-26 PROCEDURE — D9220A PRA ANESTHESIA: Mod: CRNA,,, | Performed by: NURSE ANESTHETIST, CERTIFIED REGISTERED

## 2020-10-26 PROCEDURE — 36430 TRANSFUSION BLD/BLD COMPNT: CPT

## 2020-10-26 PROCEDURE — 43239 PR EGD, FLEX, W/BIOPSY, SGL/MULTI: ICD-10-PCS | Mod: ,,, | Performed by: INTERNAL MEDICINE

## 2020-10-26 PROCEDURE — 88305 TISSUE EXAM BY PATHOLOGIST: ICD-10-PCS | Mod: 26,,, | Performed by: PATHOLOGY

## 2020-10-26 PROCEDURE — 43239 EGD BIOPSY SINGLE/MULTIPLE: CPT | Performed by: INTERNAL MEDICINE

## 2020-10-26 PROCEDURE — 99223 1ST HOSP IP/OBS HIGH 75: CPT | Mod: 25,,, | Performed by: INTERNAL MEDICINE

## 2020-10-26 PROCEDURE — 25000003 PHARM REV CODE 250: Performed by: NURSE PRACTITIONER

## 2020-10-26 PROCEDURE — 88342 IMHCHEM/IMCYTCHM 1ST ANTB: CPT | Mod: 26,,, | Performed by: PATHOLOGY

## 2020-10-26 PROCEDURE — 85025 COMPLETE CBC W/AUTO DIFF WBC: CPT

## 2020-10-26 PROCEDURE — D9220A PRA ANESTHESIA: ICD-10-PCS | Mod: CRNA,,, | Performed by: NURSE ANESTHETIST, CERTIFIED REGISTERED

## 2020-10-26 PROCEDURE — 37000009 HC ANESTHESIA EA ADD 15 MINS: Performed by: INTERNAL MEDICINE

## 2020-10-26 PROCEDURE — 43239 EGD BIOPSY SINGLE/MULTIPLE: CPT | Mod: ,,, | Performed by: INTERNAL MEDICINE

## 2020-10-26 PROCEDURE — 36415 COLL VENOUS BLD VENIPUNCTURE: CPT

## 2020-10-26 PROCEDURE — 27201012 HC FORCEPS, HOT/COLD, DISP: Performed by: INTERNAL MEDICINE

## 2020-10-26 PROCEDURE — 96361 HYDRATE IV INFUSION ADD-ON: CPT

## 2020-10-26 PROCEDURE — 99900035 HC TECH TIME PER 15 MIN (STAT)

## 2020-10-26 PROCEDURE — 63600175 PHARM REV CODE 636 W HCPCS: Performed by: NURSE PRACTITIONER

## 2020-10-26 PROCEDURE — 12000002 HC ACUTE/MED SURGE SEMI-PRIVATE ROOM

## 2020-10-26 PROCEDURE — 63600175 PHARM REV CODE 636 W HCPCS: Performed by: NURSE ANESTHETIST, CERTIFIED REGISTERED

## 2020-10-26 RX ORDER — LIDOCAINE HCL/PF 100 MG/5ML
SYRINGE (ML) INTRAVENOUS
Status: DISCONTINUED | OUTPATIENT
Start: 2020-10-26 | End: 2020-10-26

## 2020-10-26 RX ORDER — HYDROCODONE BITARTRATE AND ACETAMINOPHEN 500; 5 MG/1; MG/1
TABLET ORAL
Status: DISCONTINUED | OUTPATIENT
Start: 2020-10-26 | End: 2020-10-28 | Stop reason: HOSPADM

## 2020-10-26 RX ORDER — SODIUM CHLORIDE 9 MG/ML
INJECTION, SOLUTION INTRAVENOUS CONTINUOUS
Status: DISCONTINUED | OUTPATIENT
Start: 2020-10-26 | End: 2020-10-27

## 2020-10-26 RX ORDER — PANTOPRAZOLE SODIUM 40 MG/10ML
40 INJECTION, POWDER, LYOPHILIZED, FOR SOLUTION INTRAVENOUS 2 TIMES DAILY
Status: DISCONTINUED | OUTPATIENT
Start: 2020-10-26 | End: 2020-10-28 | Stop reason: HOSPADM

## 2020-10-26 RX ORDER — BUPROPION HYDROCHLORIDE 150 MG/1
150 TABLET ORAL DAILY
Status: DISCONTINUED | OUTPATIENT
Start: 2020-10-26 | End: 2020-10-26

## 2020-10-26 RX ORDER — PROPOFOL 10 MG/ML
INJECTION, EMULSION INTRAVENOUS
Status: DISCONTINUED | OUTPATIENT
Start: 2020-10-26 | End: 2020-10-26

## 2020-10-26 RX ORDER — ALBUTEROL SULFATE 90 UG/1
2 AEROSOL, METERED RESPIRATORY (INHALATION) EVERY 4 HOURS PRN
Status: DISCONTINUED | OUTPATIENT
Start: 2020-10-26 | End: 2020-10-28 | Stop reason: HOSPADM

## 2020-10-26 RX ORDER — MONTELUKAST SODIUM 10 MG/1
10 TABLET ORAL DAILY
Status: DISCONTINUED | OUTPATIENT
Start: 2020-10-26 | End: 2020-10-28 | Stop reason: HOSPADM

## 2020-10-26 RX ORDER — FERROUS SULFATE 325(65) MG
325 TABLET, DELAYED RELEASE (ENTERIC COATED) ORAL DAILY
Status: DISCONTINUED | OUTPATIENT
Start: 2020-10-26 | End: 2020-10-28 | Stop reason: HOSPADM

## 2020-10-26 RX ORDER — BISACODYL 5 MG
10 TABLET, DELAYED RELEASE (ENTERIC COATED) ORAL ONCE
Status: COMPLETED | OUTPATIENT
Start: 2020-10-26 | End: 2020-10-26

## 2020-10-26 RX ORDER — DICYCLOMINE HYDROCHLORIDE 10 MG/ML
10 INJECTION INTRAMUSCULAR EVERY 6 HOURS PRN
Status: DISCONTINUED | OUTPATIENT
Start: 2020-10-26 | End: 2020-10-28 | Stop reason: HOSPADM

## 2020-10-26 RX ORDER — ONDANSETRON 2 MG/ML
4 INJECTION INTRAMUSCULAR; INTRAVENOUS EVERY 6 HOURS PRN
Status: DISCONTINUED | OUTPATIENT
Start: 2020-10-26 | End: 2020-10-28 | Stop reason: HOSPADM

## 2020-10-26 RX ORDER — POLYETHYLENE GLYCOL 3350, SODIUM CHLORIDE, SODIUM BICARBONATE, POTASSIUM CHLORIDE 420; 11.2; 5.72; 1.48 G/4L; G/4L; G/4L; G/4L
4000 POWDER, FOR SOLUTION ORAL ONCE
Status: COMPLETED | OUTPATIENT
Start: 2020-10-26 | End: 2020-10-26

## 2020-10-26 RX ORDER — BUPROPION HYDROCHLORIDE 150 MG/1
150 TABLET ORAL NIGHTLY
Status: DISCONTINUED | OUTPATIENT
Start: 2020-10-26 | End: 2020-10-28 | Stop reason: HOSPADM

## 2020-10-26 RX ADMIN — BISACODYL 10 MG: 5 TABLET, COATED ORAL at 06:10

## 2020-10-26 RX ADMIN — LIDOCAINE HYDROCHLORIDE 100 MG: 20 INJECTION INTRAVENOUS at 12:10

## 2020-10-26 RX ADMIN — PANTOPRAZOLE SODIUM 40 MG: 40 INJECTION, POWDER, LYOPHILIZED, FOR SOLUTION INTRAVENOUS at 09:10

## 2020-10-26 RX ADMIN — POLYETHYLENE GLYCOL 3350, SODIUM CHLORIDE, SODIUM BICARBONATE AND POTASSIUM CHLORIDE 4000 ML: 420; 5.72; 11.2; 1.48 POWDER, FOR SOLUTION ORAL at 06:10

## 2020-10-26 RX ADMIN — PROPOFOL 40 MG: 10 INJECTION, EMULSION INTRAVENOUS at 12:10

## 2020-10-26 RX ADMIN — FERROUS SULFATE TAB EC 325 MG (65 MG FE EQUIVALENT) 325 MG: 325 (65 FE) TABLET DELAYED RESPONSE at 08:10

## 2020-10-26 RX ADMIN — ONDANSETRON 4 MG: 2 INJECTION INTRAMUSCULAR; INTRAVENOUS at 07:10

## 2020-10-26 RX ADMIN — BUPROPION HYDROCHLORIDE 150 MG: 150 TABLET, FILM COATED, EXTENDED RELEASE ORAL at 02:10

## 2020-10-26 RX ADMIN — BUPROPION HYDROCHLORIDE 150 MG: 150 TABLET, FILM COATED, EXTENDED RELEASE ORAL at 09:10

## 2020-10-26 RX ADMIN — SODIUM CHLORIDE: 0.9 INJECTION, SOLUTION INTRAVENOUS at 12:10

## 2020-10-26 RX ADMIN — PROPOFOL 120 MG: 10 INJECTION, EMULSION INTRAVENOUS at 12:10

## 2020-10-26 RX ADMIN — DICYCLOMINE HYDROCHLORIDE 10 MG: 20 INJECTION, SOLUTION INTRAMUSCULAR at 08:10

## 2020-10-26 RX ADMIN — PANTOPRAZOLE SODIUM 40 MG: 40 INJECTION, POWDER, LYOPHILIZED, FOR SOLUTION INTRAVENOUS at 08:10

## 2020-10-26 NOTE — PROGRESS NOTES
[facility] Hospital Medicine  Progress Note    Patient Name: Sonia Goldberg  MRN: [id number]  Patient Class: IP- Inpatient   Admission Date: 10/25/2020  Length of Stay: 0 days  Attending Physician: Yarelis Herbert MD  Primary Care Provider: Malcolm Ma MD        Subjective:     Principal Problem:GI bleed        HPI:  Sonia Goldberg is a 35 y/o female who presented to the ED with c/o of a near syncopal episode today.  Reports that she is being worked up outpatient for tachycardia, near syncopal episodes and anemia.  Yesterday, she began having abdominal pain and reports a large melanotic stool today.  Pt was seen in the ED yesterday with c/o of N/V/D and abd pain.  Hgb of 9.9 noted yesterday which has decreased to 9.0 today.  Denies fever, chest pain or dysuria.  Pt required IV narcotics while in the ED for pain control.  She will be placed in observation under the service of hospital medicine for continued treatment.    Overview/Hospital Course:  No notes on file    Interval History:  Patient is seen and examined today.  Patient complaining of fatigue.  She has not had a bowel movement over the night however the hemoglobin has dropped to 7.9 will transfuse 2 units today.  Patient scheduled to get EGD today.  Also complains of lower abdominal crampy pain, denies nausea vomiting    Review of Systems   Constitutional: Positive for appetite change and fatigue. Negative for chills and fever.   HENT: Negative for congestion and rhinorrhea.    Eyes: Negative for photophobia and visual disturbance.   Respiratory: Positive for shortness of breath. Negative for cough and wheezing.    Cardiovascular: Positive for palpitations. Negative for chest pain.   Gastrointestinal: Positive for abdominal pain and blood in stool. Negative for nausea and vomiting.   Genitourinary: Negative for dysuria and frequency.   Musculoskeletal: Negative for back pain and neck pain.   Skin: Negative for rash and wound.    Neurological: Positive for dizziness, syncope (near) and light-headedness.   Hematological: Negative for adenopathy.   Psychiatric/Behavioral: Negative for agitation and confusion.   All other systems reviewed and are negative.    Objective:     Vital Signs (Most Recent):  Temp: 98.4 °F (36.9 °C) (10/26/20 1304)  Pulse: 72 (10/26/20 1304)  Resp: 16 (10/26/20 1304)  BP: 134/70 (10/26/20 1304)  SpO2: 99 % (10/26/20 1304) Vital Signs (24h Range):  Temp:  [96.4 °F (35.8 °C)-98.4 °F (36.9 °C)] 98.4 °F (36.9 °C)  Pulse:  [] 72  Resp:  [14-20] 16  SpO2:  [97 %-100 %] 99 %  BP: (116-161)/(57-92) 134/70     Weight: 108.9 kg (240 lb)  Body mass index is 37.59 kg/m².    Intake/Output Summary (Last 24 hours) at 10/26/2020 1322  Last data filed at 10/26/2020 1246  Gross per 24 hour   Intake 1300 ml   Output --   Net 1300 ml      Physical Exam  Vitals signs and nursing note reviewed.   Constitutional:       General: She is not in acute distress.     Appearance: She is well-developed.   HENT:      Head: Normocephalic and atraumatic.   Eyes:      Conjunctiva/sclera: Conjunctivae normal.      Pupils: Pupils are equal, round, and reactive to light.   Neck:      Musculoskeletal: Normal range of motion and neck supple.   Cardiovascular:      Rate and Rhythm: Normal rate and regular rhythm.      Heart sounds: Normal heart sounds.   Pulmonary:      Effort: Pulmonary effort is normal. No respiratory distress.      Breath sounds: Normal breath sounds.   Abdominal:      General: Bowel sounds are normal.      Palpations: Abdomen is soft.      Tenderness: There is abdominal tenderness (LLQ, RLQ).   Musculoskeletal: Normal range of motion.   Skin:     General: Skin is warm and dry.      Findings: No rash.   Neurological:      Mental Status: She is alert and oriented to person, place, and time.   Psychiatric:         Mood and Affect: Mood normal.         Behavior: Behavior normal.         Thought Content: Thought content normal.          Judgment: Judgment normal.         Significant Labs:   BMP:   Recent Labs   Lab 10/25/20  2152   GLU 87      K 3.6      CO2 25   BUN 12   CREATININE 0.9   CALCIUM 9.4     CBC:   Recent Labs   Lab 10/25/20  2152 10/26/20  0612   WBC 10.80 8.99   HGB 9.0* 7.9*   HCT 32.3* 28.8*   * 340     CMP:   Recent Labs   Lab 10/25/20  2152      K 3.6      CO2 25   GLU 87   BUN 12   CREATININE 0.9   CALCIUM 9.4   PROT 7.0   ALBUMIN 3.7   BILITOT 0.2   ALKPHOS 78   AST 12   ALT 13   ANIONGAP 10   EGFRNONAA >60       Significant Imaging: I have reviewed all pertinent imaging results/findings within the past 24 hours.      Assessment/Plan:      * GI bleed  Keep patient NPO, plan for EGD today  Follow H/H closely. Type and screen blood and transfuse as needed.  Continue IV Protonix b.i.d.  GI consulted   Continue IVF hydration.   Use IV anti-emetics as needed.     GERD (gastroesophageal reflux disease)  Chronic, continue PPI.    Anemia  Patient's anemia is currently controlled.  Will transfuse 2 units of PRBC today plan for EGD  Lab Results   Component Value Date    HGB 7.9 (L) 10/26/2020    HCT 28.8 (L) 10/26/2020     Monitor serial CBC and transfuse if patient becomes hemodynamically unstable, symptomatic or H/H drops below 7/21.     Near syncope  Appears to be a chronic complaint for which patient is being worked up outpatient.  Hold chronic verapamil for now and check orthostatic VS. Resume chronic verapamil when appropriate.  Monitor telemetry.  Pt with report of melanotic stool today-trend H/H.  Fall precautions.    Asthma  Chronic, stable.   Continue singulair and prn albuterol inhaler.    Class 2 obesity in adult  Body mass index is 37.59 kg/m². Obesity complicates all aspects of disease management from diagnostic modalities to treatment. Weight loss encouraged and health benefits explained to patient.      VTE Risk Mitigation (From admission, onward)         Ordered     IP VTE HIGH RISK PATIENT   Once      10/26/20 0017     Place sequential compression device  Until discontinued      10/26/20 0017                Discharge Planning   [place]:      Code Status: Full Code   Is the patient medically ready for discharge?:     Reason for patient still in hospital (select all that apply): Patient trending condition he                     Yarelis Herbert MD  Department of Hospital Medicine   [facility]

## 2020-10-26 NOTE — PLAN OF CARE
Patient AAO, VSS. PIV CDI/Infusing as ordered. Orthostatic vitals performed this shift. Pt c/o pain, PRN meds ordered. SCD in place. Telemetry monitoring in place. GI to be consulted. Monitoring for bleed. Pt verbalized understanding of POC. Purposeful hourly/q2hr rounding done during shift to promote patient safety. Patient free from falls and injury during shift.  Bed in lowest position, brakes locked, and call light within reach.  Will continue to monitor and report.

## 2020-10-26 NOTE — [employer] PATIENT INSTRUCTIONS
Discharge Summary/Instructions after an Endoscopic Procedure  Patient Name: Sonia Goldberg  Patient MRN: [id number]  Patient YOB: 1985 Monday, October 26, 2020  Enrike Garcia MD  RESTRICTIONS:  During your procedure today, you received medications for sedation.  These   medications may affect your judgment, balance and coordination.  Therefore,   for 24 hours, you have the following restrictions:   - DO NOT drive a car, operate machinery, make legal/financial decisions,   sign important papers or drink alcohol.    ACTIVITY:  Today: no heavy lifting, straining or running due to procedural   sedation/anesthesia.  The following day: return to full activity including work.  DIET:  Eat and drink normally unless instructed otherwise.     TREATMENT FOR COMMON SIDE EFFECTS:  - Mild abdominal pain, nausea, belching, bloating or excessive gas:  rest,   eat lightly and use a heating pad.  - Sore Throat: treat with throat lozenges and/or gargle with warm salt   water.  - Because air was used during the procedure, expelling large amounts of air   from your rectum or belching is normal.  - If a bowel prep was taken, you may not have a bowel movement for 1-3 days.    This is normal.  SYMPTOMS TO WATCH FOR AND REPORT TO YOUR PHYSICIAN:  1. Abdominal pain or bloating, other than gas cramps.  2. Chest pain.  3. Back pain.  4. Signs of infection such as: chills or fever occurring within 24 hours   after the procedure.  5. Rectal bleeding, which would show as bright red, maroon, or black stools.   (A tablespoon of blood from the rectum is not serious, especially if   hemorrhoids are present.)  6. Vomiting.  7. Weakness or dizziness.  GO DIRECTLY TO THE NEAREST EMERGENCY ROOM IF YOU HAVE ANY OF THE FOLLOWING:      Difficulty breathing              Chills and/or fever over 101 F   Persistent vomiting and/or vomiting blood   Severe abdominal pain   Severe chest pain   Black, tarry stools   Bleeding- more than one  tablespoon   Any other symptom or condition that you feel may need urgent attention  Your doctor recommends these additional instructions:  If any biopsies were taken, your doctors clinic will contact you in 1 to 2   weeks with any results.  - Return patient to hospital horn for ongoing care.   - Clear liquid diet.   - Continue present medications.   - Await pathology results.   - Perform a colonoscopy tomorrow.  For questions, problems or results please call your physician - Enrike Garcia MD at Work:  [phone].  [facility], EMERGENCY ROOM PHONE NUMBER: [phone]  IF A COMPLICATION OR EMERGENCY SITUATION ARISES AND YOU ARE UNABLE TO REACH   YOUR PHYSICIAN - GO DIRECTLY TO THE EMERGENCY ROOM.  Enrike Garcia MD  10/26/2020 12:51:40 PM  This report has been verified and signed electronically.  [employer]

## 2020-10-26 NOTE — PLAN OF CARE
POC discussed with patient. Patient verbalized understanding. VSS. Afebrile. AAO. Abd pain controlled with dicyclomine successfully. ENDO negative for blood. Pt on clear liquids. Colonoscopy tomorrow with colon prep. Pt given one unit of blood. Tolerated well. Tele monitoring maintained. SCD's maintained. Bed low. Call light in place. Safety maintained. Will continue to monitor.

## 2020-10-26 NOTE — ASSESSMENT & PLAN NOTE
Body mass index is 37.59 kg/m². Obesity complicates all aspects of disease management from diagnostic modalities to treatment. Weight loss encouraged and health benefits explained to patient.

## 2020-10-26 NOTE — ED TRIAGE NOTES
"Sonia CHEPE Ashlyn is here with abdominal pain and with "lower GI bleed and syncopal episode", seen here yesterday and started with bleeding 30 minutes PTA, with "dark tarry stool with bright red blood"  "

## 2020-10-26 NOTE — ASSESSMENT & PLAN NOTE
Keep patient NPO.  Follow H/H closely. Type and screen blood and transfuse as needed.  Continue IV Protonix b.i.d.  Consult GI.   Continue IVF hydration.   Use IV anti-emetics as needed.

## 2020-10-26 NOTE — PLAN OF CARE
Report received from RHIANNA Salgado RN. Assumed care of patient via wheelchair with blood transfusion in progress from room 315 to Endoscopy. Pre op in progress.

## 2020-10-26 NOTE — ANESTHESIA POSTPROCEDURE EVALUATION
Anesthesia Post Evaluation    Patient: Sonia Goldberg    Procedure(s) Performed: Procedure(s) (LRB):  EGD (ESOPHAGOGASTRODUODENOSCOPY) (N/A)    Final Anesthesia Type: general    Patient location during evaluation: PACU  Patient participation: Yes- Able to Participate  Level of consciousness: awake and alert  Post-procedure vital signs: reviewed and stable  Pain management: adequate  Airway patency: patent    PONV status at discharge: No PONV  Anesthetic complications: no      Cardiovascular status: hemodynamically stable  Respiratory status: unassisted and room air  Hydration status: euvolemic  Follow-up not needed.          Vitals Value Taken Time   /70 10/26/20 1304   Temp 36.9 °C (98.4 °F) 10/26/20 1304   Pulse 72 10/26/20 1304   Resp 16 10/26/20 1304   SpO2 99 % 10/26/20 1304         Event Time   Out of Recovery 10/26/2020 13:08:59         Pain/Kevin Score: Pain Rating Prior to Med Admin: 8 (10/25/2020 10:05 PM)  Kevin Score: 10 (10/26/2020  1:05 PM)

## 2020-10-26 NOTE — ED PROVIDER NOTES
"Encounter Date: 10/25/2020    SCRIBE #1 NOTE: I, Luz Huff, am scribing for, and in the presence of, Dr. Agosto.       History     Chief Complaint   Patient presents with    Abdominal Pain     with "lower GI bleed and syncopal episode"       Time seen by provider: 9:26 PM on 10/25/2020    Sonia Goldberg is a 34 y.o. female with PMHx of palpations, HTN, and IBS who presents to the ED with an onset of abdominal pain x 2 days and bloody stool today.  Patient was last seen 1 day ago for abdominal pain. She believes that she has a GI bleed and is scheduled to follow up with GI doctor on 2020.  Patient reports tachycardia "all of the time", being "severly anemic" and having a near syncopal episodes earlier today.  She confirms 1 episode of bright red bloody stool and notes the last occurrence was long ago.  A colonoscopy and EGD was performed 10+ years prior.  She endorses lower abdominal pain that is exacerbated with laying flat.  The patient denies SOB, back pain, painful urination, bloody urine, N/V or any other symptoms at this time.  PSHx includes D&C, , tubal ligation and endometrioma.  She also denies a SHx of appendectomy, missing any cycles or being pregnant currently.  Patient is allergic to morphine and takes Latuda and Wellbutrin currently.    The history is provided by the patient.     Review of patient's allergies indicates:   Allergen Reactions    Contrast media Anaphylaxis    Iodine and iodide containing products Anaphylaxis    Sulfa (sulfonamide antibiotics) Anaphylaxis and Hives    Iodinated contrast media Hives    Magnesium      Pt reporting she is allergic to magnesium citrate oral drink.     Morphine Hives    Adhesive Rash    Nut [tree nut] Hives     Past Medical History:   Diagnosis Date    Asthma     Heart palpitations     Herniated disc     Hypertension     IBS (irritable bowel syndrome)     Insomnia     Lower back pain     L5 S1 herniated disks    " Migraine headache     Obstructive sleep apnea     Palpitations     and pvcs with stress.  Not on any meds.    Sleep apnea     history of.  Dont use cpap.  lost weight.     Past Surgical History:   Procedure Laterality Date    ABDOMINAL SURGERY           SECTION, CLASSIC       SECTION, LOW TRANSVERSE      DILATION AND CURETTAGE OF UTERUS      DILATION AND CURETTAGE OF UTERUS      perferated uterus during procedure    endometrioma      removed on right lower quadrant of uterus    epidural steriod injections  x 3    TONSILLECTOMY      TUBAL LIGATION       Family History   Problem Relation Age of Onset    Heart disease Mother     Hyperlipidemia Mother     Asthma Mother     Cancer Father     Heart disease Father     Hypertension Father     Hyperlipidemia Father     Arthritis Father     Diabetes Maternal Grandmother     Cancer Maternal Grandmother     Cancer Maternal Grandfather     Diabetes Paternal Grandfather     Breast cancer Maternal Aunt 40     Social History     Tobacco Use    Smoking status: Current Every Day Smoker     Types: Vaping with nicotine, Vaping w/o nicotine    Smokeless tobacco: Never Used   Substance Use Topics    Alcohol use: Yes     Comment: socially, occasionally    Drug use: No     Review of Systems   Constitutional: Negative.    Respiratory: Negative for shortness of breath.    Cardiovascular: Positive for palpitations.   Gastrointestinal: Positive for abdominal pain and blood in stool. Negative for nausea and vomiting.   Genitourinary: Negative for dysuria and hematuria.   Musculoskeletal: Negative for back pain.   Neurological: Positive for syncope.   All other systems reviewed and are negative.      Physical Exam     Initial Vitals [10/25/20 2112]   BP Pulse Resp Temp SpO2   (!) 160/92 101 18 98.2 °F (36.8 °C) 99 %      MAP       --         Physical Exam    Nursing note and vitals reviewed.  Constitutional: She appears well-developed and  well-nourished. She is not diaphoretic.  Non-toxic appearance. She does not have a sickly appearance. She does not appear ill. No distress.   HENT:   Head: Normocephalic and atraumatic.   Eyes: EOM are normal.   Neck: Normal range of motion. Neck supple.   Cardiovascular: Normal rate, regular rhythm and normal heart sounds. Exam reveals no gallop and no friction rub.    No murmur heard.  Pulmonary/Chest: Breath sounds normal. No respiratory distress. She has no wheezes. She has no rhonchi. She has no rales.   Abdominal: There is abdominal tenderness in the suprapubic area and left lower quadrant.   Musculoskeletal: Normal range of motion.   Neurological: She is alert and oriented to person, place, and time.   Skin: Skin is warm and dry.   Psychiatric: She has a normal mood and affect. Her behavior is normal. Judgment and thought content normal.         ED Course   Procedures  Labs Reviewed   CBC W/ AUTO DIFFERENTIAL - Abnormal; Notable for the following components:       Result Value    Hemoglobin 9.0 (*)     Hematocrit 32.3 (*)     Mean Corpuscular Volume 70 (*)     Mean Corpuscular Hemoglobin 19.5 (*)     Mean Corpuscular Hemoglobin Conc 27.9 (*)     RDW 18.9 (*)     Platelets 400 (*)     Immature Grans (Abs) 0.05 (*)     All other components within normal limits   COMPREHENSIVE METABOLIC PANEL   LIPASE   URINALYSIS, REFLEX TO URINE CULTURE    Narrative:     Specimen Source->Urine   SARS-COV-2 RNA AMPLIFICATION, QUAL   POCT URINE PREGNANCY          Imaging Results          CT Abdomen Pelvis  Without Contrast (Final result)  Result time 10/25/20 22:34:37    Final result by Dorian Lopez MD (10/25/20 22:34:37)                 Impression:      No acute findings identified in the abdomen or pelvis.  No findings to account for the patient's reported symptoms.      Electronically signed by: Dorian Lopez  Date:    10/25/2020  Time:    22:34             Narrative:    EXAMINATION:  CT ABDOMEN PELVIS WITHOUT  CONTRAST    CLINICAL HISTORY:  LLQ abdominal pain, diverticulitis suspected;Abdominal pain, acute, nonlocalized;    TECHNIQUE:  Low dose axial images, sagittal and coronal reformations were obtained from the lung bases to the pubic symphysis.    COMPARISON:  Abdominal ultrasound performed 10/19/2020.  CT of the abdomen pelvis performed 04/12/2014    FINDINGS:  This examination is limited due to lack of intravenous contrast.    Lower chest: Unremarkable.    Liver: Normal contour.    Gallbladder and bile ducts: Cholelithiasis without CT findings suggestive of acute cholecystitis.    Pancreas: Normal contour.    Spleen: Normal contour.    Adrenals: Normal contour.    Kidneys: Normal contour both kidneys.  Nonobstructing 4 mm calculus in the left inferior pole.    Lymph nodes: No abdominal or pelvic lymphadenopathy.    Bowel and mesentery: No evidence of bowel obstruction or inflammation.  Normal appearing appendix is identified.  Evidence of diverticulosis without CT findings of acute diverticulitis.    Abdominal aorta: Unremarkable.    Inferior vena cava: Unremarkable.    Free fluid or free air: None.    Pelvis: Uterus is present.    Urinary bladder: Unremarkable.    Body wall: Unremarkable.    Bones: Unremarkable.                                 Medical Decision Making:   History:   Old Medical Records: I decided to obtain old medical records.  Clinical Tests:   Lab Tests: Ordered and Reviewed  Radiological Study: Ordered and Reviewed            Scribe Attestation:   Scribe #1: I performed the above scribed service and the documentation accurately describes the services I performed. I attest to the accuracy of the note.    I, Dr. Agosto, personally performed the services described in this documentation. All medical record entries made by the scribe were at my direction and in my presence.  I have reviewed the chart and agree that the record reflects my personal performance and is accurate and complete.12:11 AM  10/26/2020            ED Course as of Oct 25 2319   Sun Oct 25, 2020   2116 SpO2: 99 % [EF]   2116 Resp: 18 [EF]   2116 Pulse: 101 [EF]   2116 Temp src: Oral [EF]   2116 Temp: 98.2 °F (36.8 °C) [EF]   2116 BP(!): 160/92 [EF]   2157 Preg Test, Ur: Negative [EF]   2204 Hemoglobin(!): 9.0 [EF]   2204 Platelets(!): 400 [EF]   2243 CT Abdomen Pelvis  Without Contrast [EF]   2259 SARS-CoV-2 RNA, Amplification, Qual: Negative [EF]   2300 Case to admit    [EF]      ED Course User Index  [EF] Gagandeep Agosto MD            Clinical Impression:     ICD-10-CM ICD-9-CM   1. GI bleed  K92.2 578.9                      Disposition:   Disposition: Placed in Observation     ED Disposition Condition    Observation                      34-year-old female presents to the ER for an episode of syncope today also bloody bowel movement x1.  Hemoglobin is 9, was 10 yesterday.  Patient requests to be admitted to the hospital for further evaluation.  [facility] to admit.  CT unremarkable.       Gagandeep Agosto MD  10/26/20 0011

## 2020-10-26 NOTE — SUBJECTIVE & OBJECTIVE
Interval History:  Patient is seen and examined today.  Patient complaining of fatigue.  She has not had a bowel movement over the night however the hemoglobin has dropped to 7.9 will transfuse 2 units today.  Patient scheduled to get EGD today.  Also complains of lower abdominal crampy pain, denies nausea vomiting    Review of Systems   Constitutional: Positive for appetite change and fatigue. Negative for chills and fever.   HENT: Negative for congestion and rhinorrhea.    Eyes: Negative for photophobia and visual disturbance.   Respiratory: Positive for shortness of breath. Negative for cough and wheezing.    Cardiovascular: Positive for palpitations. Negative for chest pain.   Gastrointestinal: Positive for abdominal pain and blood in stool. Negative for nausea and vomiting.   Genitourinary: Negative for dysuria and frequency.   Musculoskeletal: Negative for back pain and neck pain.   Skin: Negative for rash and wound.   Neurological: Positive for dizziness, syncope (near) and light-headedness.   Hematological: Negative for adenopathy.   Psychiatric/Behavioral: Negative for agitation and confusion.   All other systems reviewed and are negative.    Objective:     Vital Signs (Most Recent):  Temp: 98.4 °F (36.9 °C) (10/26/20 1304)  Pulse: 72 (10/26/20 1304)  Resp: 16 (10/26/20 1304)  BP: 134/70 (10/26/20 1304)  SpO2: 99 % (10/26/20 1304) Vital Signs (24h Range):  Temp:  [96.4 °F (35.8 °C)-98.4 °F (36.9 °C)] 98.4 °F (36.9 °C)  Pulse:  [] 72  Resp:  [14-20] 16  SpO2:  [97 %-100 %] 99 %  BP: (116-161)/(57-92) 134/70     Weight: 108.9 kg (240 lb)  Body mass index is 37.59 kg/m².    Intake/Output Summary (Last 24 hours) at 10/26/2020 1322  Last data filed at 10/26/2020 1246  Gross per 24 hour   Intake 1300 ml   Output --   Net 1300 ml      Physical Exam  Vitals signs and nursing note reviewed.   Constitutional:       General: She is not in acute distress.     Appearance: She is well-developed.   HENT:      Head:  Normocephalic and atraumatic.   Eyes:      Conjunctiva/sclera: Conjunctivae normal.      Pupils: Pupils are equal, round, and reactive to light.   Neck:      Musculoskeletal: Normal range of motion and neck supple.   Cardiovascular:      Rate and Rhythm: Normal rate and regular rhythm.      Heart sounds: Normal heart sounds.   Pulmonary:      Effort: Pulmonary effort is normal. No respiratory distress.      Breath sounds: Normal breath sounds.   Abdominal:      General: Bowel sounds are normal.      Palpations: Abdomen is soft.      Tenderness: There is abdominal tenderness (LLQ, RLQ).   Musculoskeletal: Normal range of motion.   Skin:     General: Skin is warm and dry.      Findings: No rash.   Neurological:      Mental Status: She is alert and oriented to person, place, and time.   Psychiatric:         Mood and Affect: Mood normal.         Behavior: Behavior normal.         Thought Content: Thought content normal.         Judgment: Judgment normal.         Significant Labs:   BMP:   Recent Labs   Lab 10/25/20  2152   GLU 87      K 3.6      CO2 25   BUN 12   CREATININE 0.9   CALCIUM 9.4     CBC:   Recent Labs   Lab 10/25/20  2152 10/26/20  0612   WBC 10.80 8.99   HGB 9.0* 7.9*   HCT 32.3* 28.8*   * 340     CMP:   Recent Labs   Lab 10/25/20  2152      K 3.6      CO2 25   GLU 87   BUN 12   CREATININE 0.9   CALCIUM 9.4   PROT 7.0   ALBUMIN 3.7   BILITOT 0.2   ALKPHOS 78   AST 12   ALT 13   ANIONGAP 10   EGFRNONAA >60       Significant Imaging: I have reviewed all pertinent imaging results/findings within the past 24 hours.

## 2020-10-26 NOTE — ED NOTES
Patient identifiers for Sonia Goldberg checked and correct.  LOC:  Sonia Goldberg is awake, alert, and aware of environment with an appropriate affect. She is oriented x 3 and speaking appropriately.  APPEARANCE:  She is resting comfortably with mild distress noted . She is clean and well groomed, patient's clothing is properly fastened.  SKIN:  The skin is warm and dry. She has normal skin turgor and moist mucus membranes. Skin is intact; no bruising or breakdown noted.  MUSCULOSKELETAL:  She is moving all extremities well, no obvious deformities noted. Pulses intact.   RESPIRATORY:  Airway is open and patent. Respirations are spontaneous and non-labored with normal effort and rate.  CARDIAC:  She has a normal rate and rhythm. No peripheral edema noted. Capillary refill < 3 seconds.  ABDOMEN:  No distention noted.  Diffuse lower ABD pain x 2 days.  Was seen here yesterday for same without improvement.  Pt endorses that she had a bloody BM today and a near syncopal episode earlier today as well.   NEUROLOGICAL:  PERRL. Facial expression is symmetrical. Hand grasps are equal bilaterally. Normal sensation in all extremities when touched with finger.  Allergies reported:    Review of patient's allergies indicates:   Allergen Reactions    Contrast media Anaphylaxis    Iodine and iodide containing products Anaphylaxis    Sulfa (sulfonamide antibiotics) Anaphylaxis and Hives    Iodinated contrast media Hives    Magnesium      Pt reporting she is allergic to magnesium citrate oral drink.     Morphine Hives    Adhesive Rash    Nut [tree nut] Hives     OTHER NOTES:

## 2020-10-26 NOTE — ANESTHESIA PREPROCEDURE EVALUATION
10/26/2020  Sonia Goldberg is a 34 y.o., female.    Anesthesia Evaluation    I have reviewed the Patient Summary Reports.    I have reviewed the Nursing Notes. I have reviewed the NPO Status.   I have reviewed the Medications.     Review of Systems  Anesthesia Hx:  No problems with previous Anesthesia    Social:  Smoker    Hematology/Oncology:         -- Anemia:   Cardiovascular:   Hypertension    Pulmonary:   Asthma Shortness of breath Sleep Apnea    Hepatic/GI:   GERD    Neurological:   Headaches    Psych:   anxiety          Physical Exam  General:  Obesity    Airway/Jaw/Neck:  Airway Findings: Mouth Opening: Normal Tongue: Normal  General Airway Assessment: Adult  Mallampati: II  TM Distance: Normal, at least 6 cm        Eyes/Ears/Nose:  EYES/EARS/NOSE FINDINGS: Normal   Dental:  Dental Findings: In tact   Chest/Lungs:  Chest/Lungs Findings: Clear to auscultation, Normal Respiratory Rate     Heart/Vascular:  Heart Findings: Rate: Normal  Rhythm: Regular Rhythm        Mental Status:  Mental Status Findings:  Cooperative, Alert and Oriented         Anesthesia Plan  Type of Anesthesia, risks & benefits discussed:  Anesthesia Type:  general  Patient's Preference:   Intra-op Monitoring Plan: standard ASA monitors  Intra-op Monitoring Plan Comments:   Post Op Pain Control Plan:   Post Op Pain Control Plan Comments:   Induction:   IV  Beta Blocker:  Patient is not currently on a Beta-Blocker (No further documentation required).       Informed Consent: Patient understands risks and agrees with Anesthesia plan.  Questions answered. Anesthesia consent signed with patient.  ASA Score: 2     Day of Surgery Review of History & Physical: I have interviewed and examined the patient. I have reviewed the patient's H&P dated:    H&P update referred to the provider.         Ready For Surgery From Anesthesia  Perspective.

## 2020-10-26 NOTE — PLAN OF CARE
Report given to Gracia. Patient to 315 via wheelchair per RN. Vital signs and RN assessment per flowsheet.

## 2020-10-26 NOTE — SUBJECTIVE & OBJECTIVE
Past Medical History:   Diagnosis Date    Asthma     Heart palpitations     Herniated disc     Hypertension     IBS (irritable bowel syndrome)     Insomnia     Lower back pain     L5 S1 herniated disks    Migraine headache     Obstructive sleep apnea     Palpitations     and pvcs with stress.  Not on any meds.    Sleep apnea     history of.  Dont use cpap.  lost weight.       Past Surgical History:   Procedure Laterality Date    ABDOMINAL SURGERY           SECTION, CLASSIC       SECTION, LOW TRANSVERSE      DILATION AND CURETTAGE OF UTERUS      DILATION AND CURETTAGE OF UTERUS      perferated uterus during procedure    endometrioma      removed on right lower quadrant of uterus    epidural steriod injections  x 3    TONSILLECTOMY      TUBAL LIGATION         Review of patient's allergies indicates:   Allergen Reactions    Contrast media Anaphylaxis    Iodine and iodide containing products Anaphylaxis    Sulfa (sulfonamide antibiotics) Anaphylaxis and Hives    Iodinated contrast media Hives    Magnesium      Pt reporting she is allergic to magnesium citrate oral drink.     Morphine Hives    Adhesive Rash    Nut [tree nut] Hives       No current facility-administered medications on file prior to encounter.      Current Outpatient Medications on File Prior to Encounter   Medication Sig    buPROPion (WELLBUTRIN XL) 150 MG TB24 tablet Take 150 mg by mouth once daily.    LATUDA 80 mg Tab tablet Take 80 mg by mouth once daily. With food.    LORazepam (ATIVAN) 0.5 MG tablet Take 1 tablet by mouth daily as needed.    verapamiL (VERELAN) 180 MG C24P Take 1 capsule (180 mg total) by mouth once daily.    albuterol (PROVENTIL/VENTOLIN HFA) 90 mcg/actuation inhaler Inhale 2 puffs into the lungs every 6 (six) hours as needed for Wheezing.    omeprazole (PRILOSEC) 20 MG capsule Take 1 capsule (20 mg total) by mouth once daily.     Family History     Problem Relation (Age of  Onset)    Arthritis Father    Asthma Mother    Breast cancer Maternal Aunt (40)    Cancer Father, Maternal Grandmother, Maternal Grandfather    Diabetes Maternal Grandmother, Paternal Grandfather    Heart disease Mother, Father    Hyperlipidemia Mother, Father    Hypertension Father        Tobacco Use    Smoking status: Current Every Day Smoker     Types: Vaping with nicotine, Vaping w/o nicotine    Smokeless tobacco: Never Used   Substance and Sexual Activity    Alcohol use: Yes     Comment: socially, occasionally    Drug use: No    Sexual activity: Yes     Partners: Male     Birth control/protection: See Surgical Hx     Review of Systems   Constitutional: Positive for appetite change and fatigue. Negative for chills and fever.   HENT: Negative for congestion and rhinorrhea.    Eyes: Negative for photophobia and visual disturbance.   Respiratory: Positive for shortness of breath. Negative for cough and wheezing.    Cardiovascular: Positive for palpitations. Negative for chest pain.   Gastrointestinal: Positive for abdominal pain, blood in stool, nausea and vomiting.   Genitourinary: Negative for dysuria and frequency.   Musculoskeletal: Negative for back pain and neck pain.   Skin: Negative for rash and wound.   Neurological: Positive for dizziness, syncope (near) and light-headedness.   Hematological: Negative for adenopathy.   Psychiatric/Behavioral: Negative for agitation and confusion.   All other systems reviewed and are negative.    Objective:     Vital Signs (Most Recent):  Temp: 96.4 °F (35.8 °C) (10/26/20 0007)  Pulse: 84 (10/26/20 0034)  Resp: 16 (10/26/20 0033)  BP: 139/70 (10/26/20 0034)  SpO2: 100 % (10/26/20 0033) Vital Signs (24h Range):  Temp:  [96.4 °F (35.8 °C)-98.2 °F (36.8 °C)] 96.4 °F (35.8 °C)  Pulse:  [] 84  Resp:  [16-18] 16  SpO2:  [97 %-100 %] 100 %  BP: (118-160)/(68-92) 139/70     Weight: 108.9 kg (240 lb)  Body mass index is 37.59 kg/m².    Physical Exam  Vitals signs and  nursing note reviewed.   Constitutional:       General: She is not in acute distress.     Appearance: She is well-developed.   HENT:      Head: Normocephalic and atraumatic.   Eyes:      Conjunctiva/sclera: Conjunctivae normal.      Pupils: Pupils are equal, round, and reactive to light.   Neck:      Musculoskeletal: Normal range of motion and neck supple.   Cardiovascular:      Rate and Rhythm: Normal rate and regular rhythm.      Heart sounds: Normal heart sounds.   Pulmonary:      Effort: Pulmonary effort is normal. No respiratory distress.      Breath sounds: Normal breath sounds.   Abdominal:      General: Bowel sounds are normal.      Palpations: Abdomen is soft.      Tenderness: There is abdominal tenderness (LLQ, RLQ).   Musculoskeletal: Normal range of motion.   Skin:     General: Skin is warm and dry.      Findings: No rash.   Neurological:      Mental Status: She is alert and oriented to person, place, and time.   Psychiatric:         Mood and Affect: Mood normal.         Behavior: Behavior normal.         Thought Content: Thought content normal.         Judgment: Judgment normal.           CRANIAL NERVES     CN III, IV, VI   Pupils are equal, round, and reactive to light.       Significant Labs:   CBC:   Recent Labs   Lab 10/24/20  1008 10/25/20  2152   WBC 9.67 10.80   HGB 9.9* 9.0*   HCT 36.1* 32.3*   * 400*     CMP:   Recent Labs   Lab 10/24/20  1008 10/25/20  2152    137   K 4.2 3.6    102   CO2 25 25   GLU 89 87   BUN 11 12   CREATININE 0.9 0.9   CALCIUM 10.8* 9.4   PROT 7.4 7.0   ALBUMIN 3.8 3.7   BILITOT 0.2 0.2   ALKPHOS 90 78   AST 11 12   ALT 13 13   ANIONGAP 8 10   EGFRNONAA >60 >60     Urine Studies:   Recent Labs   Lab 10/25/20  2149   COLORU Yellow   APPEARANCEUA Clear   PHUR 6.0   SPECGRAV 1.015   PROTEINUA Negative   GLUCUA Negative   KETONESU Negative   BILIRUBINUA Negative   OCCULTUA Negative   NITRITE Negative   UROBILINOGEN Negative   LEUKOCYTESUR Negative        Significant Imaging: CT abd/pelvis:   No acute findings identified in the abdomen or pelvis.  No findings to account for the patient's reported symptoms.

## 2020-10-26 NOTE — H&P
"[facility] Medicine  History & Physical    Patient Name: Sonia Goldberg  MRN: [id number]  Admission Date: 10/25/2020  Attending Physician: Yarelis Herbert MD   Primary Care Provider: Malcolm Ma MD         Patient information was obtained from patient, spouse/SO, past medical records and ER records.     Subjective:     Principal Problem:GI bleed    Chief Complaint:   Chief Complaint   Patient presents with    Abdominal Pain     with "lower GI bleed and syncopal episode"        HPI: Sonia Goldberg is a 35 y/o female who presented to the ED with c/o of a near syncopal episode today.  Reports that she is being worked up outpatient for tachycardia, near syncopal episodes and anemia.  Yesterday, she began having abdominal pain and reports a large melanotic stool today.  Pt was seen in the ED yesterday with c/o of N/V/D and abd pain.  Hgb of 9.9 noted yesterday which has decreased to 9.0 today.  Denies fever, chest pain or dysuria.  Pt required IV narcotics while in the ED for pain control.  She will be placed in observation under the service of hospital medicine for continued treatment.    Past Medical History:   Diagnosis Date    Asthma     Heart palpitations     Herniated disc     Hypertension     IBS (irritable bowel syndrome)     Insomnia     Lower back pain     L5 S1 herniated disks    Migraine headache     Obstructive sleep apnea     Palpitations     and pvcs with stress.  Not on any meds.    Sleep apnea     history of.  Dont use cpap.  lost weight.       Past Surgical History:   Procedure Laterality Date    ABDOMINAL SURGERY           SECTION, CLASSIC       SECTION, LOW TRANSVERSE      DILATION AND CURETTAGE OF UTERUS      DILATION AND CURETTAGE OF UTERUS      perferated uterus during procedure    endometrioma      removed on right lower quadrant of uterus    epidural steriod injections  x 3    TONSILLECTOMY      TUBAL LIGATION   "       Review of patient's allergies indicates:   Allergen Reactions    Contrast media Anaphylaxis    Iodine and iodide containing products Anaphylaxis    Sulfa (sulfonamide antibiotics) Anaphylaxis and Hives    Iodinated contrast media Hives    Magnesium      Pt reporting she is allergic to magnesium citrate oral drink.     Morphine Hives    Adhesive Rash    Nut [tree nut] Hives       No current facility-administered medications on file prior to encounter.      Current Outpatient Medications on File Prior to Encounter   Medication Sig    buPROPion (WELLBUTRIN XL) 150 MG TB24 tablet Take 150 mg by mouth once daily.    LATUDA 80 mg Tab tablet Take 80 mg by mouth once daily. With food.    LORazepam (ATIVAN) 0.5 MG tablet Take 1 tablet by mouth daily as needed.    verapamiL (VERELAN) 180 MG C24P Take 1 capsule (180 mg total) by mouth once daily.    albuterol (PROVENTIL/VENTOLIN HFA) 90 mcg/actuation inhaler Inhale 2 puffs into the lungs every 6 (six) hours as needed for Wheezing.    omeprazole (PRILOSEC) 20 MG capsule Take 1 capsule (20 mg total) by mouth once daily.     Family History     Problem Relation (Age of Onset)    Arthritis Father    Asthma Mother    Breast cancer Maternal Aunt (40)    Cancer Father, Maternal Grandmother, Maternal Grandfather    Diabetes Maternal Grandmother, Paternal Grandfather    Heart disease Mother, Father    Hyperlipidemia Mother, Father    Hypertension Father        Tobacco Use    Smoking status: Current Every Day Smoker     Types: Vaping with nicotine, Vaping w/o nicotine    Smokeless tobacco: Never Used   Substance and Sexual Activity    Alcohol use: Yes     Comment: socially, occasionally    Drug use: No    Sexual activity: Yes     Partners: Male     Birth control/protection: See Surgical Hx     Review of Systems   Constitutional: Positive for appetite change and fatigue. Negative for chills and fever.   HENT: Negative for congestion and rhinorrhea.    Eyes:  Negative for photophobia and visual disturbance.   Respiratory: Positive for shortness of breath. Negative for cough and wheezing.    Cardiovascular: Positive for palpitations. Negative for chest pain.   Gastrointestinal: Positive for abdominal pain, blood in stool, nausea and vomiting.   Genitourinary: Negative for dysuria and frequency.   Musculoskeletal: Negative for back pain and neck pain.   Skin: Negative for rash and wound.   Neurological: Positive for dizziness, syncope (near) and light-headedness.   Hematological: Negative for adenopathy.   Psychiatric/Behavioral: Negative for agitation and confusion.   All other systems reviewed and are negative.    Objective:     Vital Signs (Most Recent):  Temp: 96.4 °F (35.8 °C) (10/26/20 0007)  Pulse: 84 (10/26/20 0034)  Resp: 16 (10/26/20 0033)  BP: 139/70 (10/26/20 0034)  SpO2: 100 % (10/26/20 0033) Vital Signs (24h Range):  Temp:  [96.4 °F (35.8 °C)-98.2 °F (36.8 °C)] 96.4 °F (35.8 °C)  Pulse:  [] 84  Resp:  [16-18] 16  SpO2:  [97 %-100 %] 100 %  BP: (118-160)/(68-92) 139/70     Weight: 108.9 kg (240 lb)  Body mass index is 37.59 kg/m².    Physical Exam  Vitals signs and nursing note reviewed.   Constitutional:       General: She is not in acute distress.     Appearance: She is well-developed.   HENT:      Head: Normocephalic and atraumatic.   Eyes:      Conjunctiva/sclera: Conjunctivae normal.      Pupils: Pupils are equal, round, and reactive to light.   Neck:      Musculoskeletal: Normal range of motion and neck supple.   Cardiovascular:      Rate and Rhythm: Normal rate and regular rhythm.      Heart sounds: Normal heart sounds.   Pulmonary:      Effort: Pulmonary effort is normal. No respiratory distress.      Breath sounds: Normal breath sounds.   Abdominal:      General: Bowel sounds are normal.      Palpations: Abdomen is soft.      Tenderness: There is abdominal tenderness (LLQ, RLQ).   Musculoskeletal: Normal range of motion.   Skin:     General:  Skin is warm and dry.      Findings: No rash.   Neurological:      Mental Status: She is alert and oriented to person, place, and time.   Psychiatric:         Mood and Affect: Mood normal.         Behavior: Behavior normal.         Thought Content: Thought content normal.         Judgment: Judgment normal.           CRANIAL NERVES     CN III, IV, VI   Pupils are equal, round, and reactive to light.       Significant Labs:   CBC:   Recent Labs   Lab 10/24/20  1008 10/25/20  2152   WBC 9.67 10.80   HGB 9.9* 9.0*   HCT 36.1* 32.3*   * 400*     CMP:   Recent Labs   Lab 10/24/20  1008 10/25/20  2152    137   K 4.2 3.6    102   CO2 25 25   GLU 89 87   BUN 11 12   CREATININE 0.9 0.9   CALCIUM 10.8* 9.4   PROT 7.4 7.0   ALBUMIN 3.8 3.7   BILITOT 0.2 0.2   ALKPHOS 90 78   AST 11 12   ALT 13 13   ANIONGAP 8 10   EGFRNONAA >60 >60     Urine Studies:   Recent Labs   Lab 10/25/20  2149   COLORU Yellow   APPEARANCEUA Clear   PHUR 6.0   SPECGRAV 1.015   PROTEINUA Negative   GLUCUA Negative   KETONESU Negative   BILIRUBINUA Negative   OCCULTUA Negative   NITRITE Negative   UROBILINOGEN Negative   LEUKOCYTESUR Negative       Significant Imaging: CT abd/pelvis:   No acute findings identified in the abdomen or pelvis.  No findings to account for the patient's reported symptoms.    Assessment/Plan:     * GI bleed  Keep patient NPO.  Follow H/H closely. Type and screen blood and transfuse as needed.  Continue IV Protonix b.i.d.  Consult GI.   Continue IVF hydration.   Use IV anti-emetics as needed.     GERD (gastroesophageal reflux disease)  Chronic, continue PPI.    Anemia  Patient's anemia is currently controlled. S/p 0 units of PRBCs.   Current CBC reviewed-   Lab Results   Component Value Date    HGB 9.0 (L) 10/25/2020    HCT 32.3 (L) 10/25/2020     Monitor serial CBC and transfuse if patient becomes hemodynamically unstable, symptomatic or H/H drops below 7/21.     Near syncope  Appears to be a chronic complaint  for which patient is being worked up outpatient.  Hold chronic verapamil for now and check orthostatic VS. Resume chronic verapamil when appropriate.  Monitor telemetry.  Pt with report of melanotic stool today-trend H/H.  Fall precautions.    Asthma  Chronic, stable.   Continue singulair and prn albuterol inhaler.    Class 2 obesity in adult  Body mass index is 37.59 kg/m². Obesity complicates all aspects of disease management from diagnostic modalities to treatment. Weight loss encouraged and health benefits explained to patient.      VTE Risk Mitigation (From admission, onward)         Ordered     IP VTE HIGH RISK PATIENT  Once      10/26/20 0017     Place sequential compression device  Until discontinued      10/26/20 0017                   JUAREZ Helms  Department of Hospital Medicine   [facility]

## 2020-10-26 NOTE — ED NOTES
Pt placed on remote Telemetry.  [address] applied to pt Monitor room notified and confirmed placement

## 2020-10-26 NOTE — ASSESSMENT & PLAN NOTE
Keep patient NPO, plan for EGD today  Follow H/H closely. Type and screen blood and transfuse as needed.  Continue IV Protonix b.i.d.  GI consulted   Continue IVF hydration.   Use IV anti-emetics as needed.

## 2020-10-26 NOTE — TRANSFER OF CARE
"Anesthesia Transfer of Care Note    Patient: Sonia Goldberg    Procedure(s) Performed: Procedure(s) (LRB):  EGD (ESOPHAGOGASTRODUODENOSCOPY) (N/A)    Patient location: PACU    Anesthesia Type: general    Transport from OR: Transported from OR on room air with adequate spontaneous ventilation    Post pain: adequate analgesia    Post assessment: no apparent anesthetic complications    Post vital signs: stable    Level of consciousness: awake    Nausea/Vomiting: no nausea/vomiting    Complications: none    Transfer of care protocol was followed      Last vitals:   Visit Vitals  /64 (BP Location: Left arm, Patient Position: Lying)   Pulse 82   Temp 36.9 °C (98.4 °F) (Skin)   Resp 18   Ht 5' 7" (1.702 m)   Wt 108.9 kg (240 lb)   LMP 10/10/2020   SpO2 100%   Breastfeeding No   BMI 37.59 kg/m²     "

## 2020-10-26 NOTE — ASSESSMENT & PLAN NOTE
Patient's anemia is currently controlled. S/p 0 units of PRBCs.   Current CBC reviewed-   Lab Results   Component Value Date    HGB 9.0 (L) 10/25/2020    HCT 32.3 (L) 10/25/2020     Monitor serial CBC and transfuse if patient becomes hemodynamically unstable, symptomatic or H/H drops below 7/21.

## 2020-10-26 NOTE — NURSING
0000 received report from MAXIMINO Fitzgerald  Pt arrived to unit, oriented to and settled into room. Vitals taken, orders reconciled, assuming care    Ortho static vs taken per order.

## 2020-10-26 NOTE — ASSESSMENT & PLAN NOTE
Appears to be a chronic complaint for which patient is being worked up outpatient.  Hold chronic verapamil for now and check orthostatic VS. Resume chronic verapamil when appropriate.  Monitor telemetry.  Pt with report of melanotic stool today-trend H/H.  Fall precautions.

## 2020-10-26 NOTE — H&P
History & Physical - Short Stay  Gastroenterology    SUBJECTIVE:     Procedure: EGD    Chief Complaint/Indication for Procedure:DARRION, Anemia, GI bleeding    PTA Medications   Medication Sig    buPROPion (WELLBUTRIN XL) 150 MG TB24 tablet Take 150 mg by mouth once daily.    LATUDA 80 mg Tab tablet Take 80 mg by mouth once daily. With food.    LORazepam (ATIVAN) 0.5 MG tablet Take 1 tablet by mouth daily as needed.    verapamiL (VERELAN) 180 MG C24P Take 1 capsule (180 mg total) by mouth once daily.    albuterol (PROVENTIL/VENTOLIN HFA) 90 mcg/actuation inhaler Inhale 2 puffs into the lungs every 6 (six) hours as needed for Wheezing.    omeprazole (PRILOSEC) 20 MG capsule Take 1 capsule (20 mg total) by mouth once daily.     Review of patient's allergies indicates:   Allergen Reactions    Contrast media Anaphylaxis    Iodine and iodide containing products Anaphylaxis    Sulfa (sulfonamide antibiotics) Anaphylaxis and Hives    Iodinated contrast media Hives    Magnesium      Pt reporting she is allergic to magnesium citrate oral drink.     Morphine Hives    Adhesive Rash    Nut [tree nut] Hives        Past Medical History:   Diagnosis Date    Asthma     Heart palpitations     Herniated disc     Hypertension     IBS (irritable bowel syndrome)     Insomnia     Lower back pain     L5 S1 herniated disks    Migraine headache     Obstructive sleep apnea     Palpitations     and pvcs with stress.  Not on any meds.    Sleep apnea     history of.  Dont use cpap.  lost weight.     Past Surgical History:   Procedure Laterality Date    ABDOMINAL SURGERY           SECTION, CLASSIC       SECTION, LOW TRANSVERSE      DILATION AND CURETTAGE OF UTERUS      DILATION AND CURETTAGE OF UTERUS      perferated uterus during procedure    endometrioma      removed on right lower quadrant of uterus    epidural steriod injections  x 3    TONSILLECTOMY      TUBAL LIGATION       Family  History   Problem Relation Age of Onset    Heart disease Mother     Hyperlipidemia Mother     Asthma Mother     Cancer Father     Heart disease Father     Hypertension Father     Hyperlipidemia Father     Arthritis Father     Diabetes Maternal Grandmother     Cancer Maternal Grandmother     Cancer Maternal Grandfather     Diabetes Paternal Grandfather     Breast cancer Maternal Aunt 40     Social History     Tobacco Use    Smoking status: Current Every Day Smoker     Types: Vaping with nicotine, Vaping w/o nicotine    Smokeless tobacco: Never Used   Substance Use Topics    Alcohol use: Yes     Comment: socially, occasionally    Drug use: No     OBJECTIVE:     Vital Signs (Most Recent)  Temp: 98.4 °F (36.9 °C) (10/26/20 1222)  Pulse: 82 (10/26/20 1222)  Resp: 18 (10/26/20 1222)  BP: 128/64 (10/26/20 1222)  SpO2: 100 % (10/26/20 1222)    Physical Exam:                                                       GENERAL:  Comfortable, in no acute distress.        HEENT EXAM:  Nonicteric.  No adenopathy.  Oropharynx is clear.               NECK:  Supple.                                                          LUNGS:  Clear.                                                          CARDIAC:  Regular rate and rhythm.  S1, S2.  No murmur.                      ABDOMEN:  Soft, positive bowel sounds, nontender.  No hepatosplenomegaly or masses.  No rebound or guarding.                                            EXTREMITIES:  No edema.     MENTAL STATUS:  Normal, alert and oriented.    ASSESSMENT/PLAN:     Assessment: DARRION, Anemia, GI bleeding    Plan: EGD    Anesthesia Plan: General    ASA Grade: ASA 2 - Patient with mild systemic disease with no functional limitations    MALLAMPATI SCORE:  II (hard and soft palate, upper portion of tonsils anduvula visible)

## 2020-10-26 NOTE — ASSESSMENT & PLAN NOTE
Patient's anemia is currently controlled.  Will transfuse 2 units of PRBC today plan for EGD  Lab Results   Component Value Date    HGB 7.9 (L) 10/26/2020    HCT 28.8 (L) 10/26/2020     Monitor serial CBC and transfuse if patient becomes hemodynamically unstable, symptomatic or H/H drops below 7/21.

## 2020-10-26 NOTE — HPI
Sonia Goldberg is a 35 y/o female who presented to the ED with c/o of a near syncopal episode today.  Reports that she is being worked up outpatient for tachycardia, near syncopal episodes and anemia.  Yesterday, she began having abdominal pain and reports a large melanotic stool today.  Pt was seen in the ED yesterday with c/o of N/V/D and abd pain.  Hgb of 9.9 noted yesterday which has decreased to 9.0 today.  Denies fever, chest pain or dysuria.  Pt required IV narcotics while in the ED for pain control.  She will be placed in observation under the service of hospital medicine for continued treatment.

## 2020-10-27 ENCOUNTER — ANESTHESIA EVENT (OUTPATIENT)
Dept: ENDOSCOPY | Facility: HOSPITAL | Age: 35
End: 2020-10-27
Payer: COMMERCIAL

## 2020-10-27 ENCOUNTER — ANESTHESIA (OUTPATIENT)
Dept: ENDOSCOPY | Facility: HOSPITAL | Age: 35
End: 2020-10-27
Payer: COMMERCIAL

## 2020-10-27 LAB
BASOPHILS # BLD AUTO: 0.03 K/UL (ref 0–0.2)
BASOPHILS NFR BLD: 0.4 % (ref 0–1.9)
DIFFERENTIAL METHOD: ABNORMAL
EOSINOPHIL # BLD AUTO: 0.2 K/UL (ref 0–0.5)
EOSINOPHIL NFR BLD: 2.2 % (ref 0–8)
ERYTHROCYTE [DISTWIDTH] IN BLOOD BY AUTOMATED COUNT: 20.2 % (ref 11.5–14.5)
HCT VFR BLD AUTO: 36.5 % (ref 37–48.5)
HGB BLD-MCNC: 10.2 G/DL (ref 12–16)
IMM GRANULOCYTES # BLD AUTO: 0.03 K/UL (ref 0–0.04)
IMM GRANULOCYTES NFR BLD AUTO: 0.4 % (ref 0–0.5)
LYMPHOCYTES # BLD AUTO: 1.9 K/UL (ref 1–4.8)
LYMPHOCYTES NFR BLD: 25.9 % (ref 18–48)
MCH RBC QN AUTO: 20.4 PG (ref 27–31)
MCHC RBC AUTO-ENTMCNC: 27.9 G/DL (ref 32–36)
MCV RBC AUTO: 73 FL (ref 82–98)
MONOCYTES # BLD AUTO: 0.5 K/UL (ref 0.3–1)
MONOCYTES NFR BLD: 6.2 % (ref 4–15)
NEUTROPHILS # BLD AUTO: 4.7 K/UL (ref 1.8–7.7)
NEUTROPHILS NFR BLD: 64.9 % (ref 38–73)
NRBC BLD-RTO: 0 /100 WBC
PLATELET # BLD AUTO: 375 K/UL (ref 150–350)
PMV BLD AUTO: 10.4 FL (ref 9.2–12.9)
RBC # BLD AUTO: 5.01 M/UL (ref 4–5.4)
WBC # BLD AUTO: 7.26 K/UL (ref 3.9–12.7)

## 2020-10-27 PROCEDURE — D9220A PRA ANESTHESIA: ICD-10-PCS | Mod: ANES,,, | Performed by: ANESTHESIOLOGY

## 2020-10-27 PROCEDURE — C9113 INJ PANTOPRAZOLE SODIUM, VIA: HCPCS | Performed by: NURSE PRACTITIONER

## 2020-10-27 PROCEDURE — 36415 COLL VENOUS BLD VENIPUNCTURE: CPT

## 2020-10-27 PROCEDURE — 12000002 HC ACUTE/MED SURGE SEMI-PRIVATE ROOM

## 2020-10-27 PROCEDURE — G0378 HOSPITAL OBSERVATION PER HR: HCPCS

## 2020-10-27 PROCEDURE — D9220A PRA ANESTHESIA: ICD-10-PCS | Mod: CRNA,,, | Performed by: NURSE ANESTHETIST, CERTIFIED REGISTERED

## 2020-10-27 PROCEDURE — 37000009 HC ANESTHESIA EA ADD 15 MINS: Performed by: INTERNAL MEDICINE

## 2020-10-27 PROCEDURE — 45378 DIAGNOSTIC COLONOSCOPY: CPT | Performed by: INTERNAL MEDICINE

## 2020-10-27 PROCEDURE — 37000008 HC ANESTHESIA 1ST 15 MINUTES: Performed by: INTERNAL MEDICINE

## 2020-10-27 PROCEDURE — 63600175 PHARM REV CODE 636 W HCPCS: Performed by: NURSE PRACTITIONER

## 2020-10-27 PROCEDURE — 45378 DIAGNOSTIC COLONOSCOPY: CPT | Mod: ,,, | Performed by: INTERNAL MEDICINE

## 2020-10-27 PROCEDURE — 94761 N-INVAS EAR/PLS OXIMETRY MLT: CPT

## 2020-10-27 PROCEDURE — 25000003 PHARM REV CODE 250: Performed by: NURSE ANESTHETIST, CERTIFIED REGISTERED

## 2020-10-27 PROCEDURE — D9220A PRA ANESTHESIA: Mod: ANES,,, | Performed by: ANESTHESIOLOGY

## 2020-10-27 PROCEDURE — 63600175 PHARM REV CODE 636 W HCPCS: Performed by: NURSE ANESTHETIST, CERTIFIED REGISTERED

## 2020-10-27 PROCEDURE — 45378 PR COLONOSCOPY,DIAGNOSTIC: ICD-10-PCS | Mod: ,,, | Performed by: INTERNAL MEDICINE

## 2020-10-27 PROCEDURE — 25000003 PHARM REV CODE 250: Performed by: NURSE PRACTITIONER

## 2020-10-27 PROCEDURE — 99900035 HC TECH TIME PER 15 MIN (STAT)

## 2020-10-27 PROCEDURE — 85025 COMPLETE CBC W/AUTO DIFF WBC: CPT

## 2020-10-27 PROCEDURE — D9220A PRA ANESTHESIA: Mod: CRNA,,, | Performed by: NURSE ANESTHETIST, CERTIFIED REGISTERED

## 2020-10-27 RX ORDER — FERROUS SULFATE 325(65) MG
325 TABLET, DELAYED RELEASE (ENTERIC COATED) ORAL 2 TIMES DAILY
Qty: 60 TABLET | Refills: 3 | Status: SHIPPED | OUTPATIENT
Start: 2020-10-27 | End: 2021-01-25

## 2020-10-27 RX ORDER — DOCUSATE SODIUM 100 MG/1
100 CAPSULE, LIQUID FILLED ORAL 2 TIMES DAILY PRN
Qty: 60 CAPSULE | Refills: 0 | Status: SHIPPED | OUTPATIENT
Start: 2020-10-27 | End: 2021-01-25

## 2020-10-27 RX ORDER — PROPOFOL 10 MG/ML
VIAL (ML) INTRAVENOUS
Status: DISCONTINUED | OUTPATIENT
Start: 2020-10-27 | End: 2020-10-27

## 2020-10-27 RX ORDER — LIDOCAINE HYDROCHLORIDE 20 MG/ML
INJECTION INTRAVENOUS
Status: DISCONTINUED | OUTPATIENT
Start: 2020-10-27 | End: 2020-10-27

## 2020-10-27 RX ADMIN — PROPOFOL 50 MG: 10 INJECTION, EMULSION INTRAVENOUS at 01:10

## 2020-10-27 RX ADMIN — PANTOPRAZOLE SODIUM 40 MG: 40 INJECTION, POWDER, LYOPHILIZED, FOR SOLUTION INTRAVENOUS at 08:10

## 2020-10-27 RX ADMIN — ONDANSETRON 4 MG: 2 INJECTION INTRAMUSCULAR; INTRAVENOUS at 10:10

## 2020-10-27 RX ADMIN — PROPOFOL 150 MG: 10 INJECTION, EMULSION INTRAVENOUS at 12:10

## 2020-10-27 RX ADMIN — BUPROPION HYDROCHLORIDE 150 MG: 150 TABLET, FILM COATED, EXTENDED RELEASE ORAL at 08:10

## 2020-10-27 RX ADMIN — ONDANSETRON 4 MG: 2 INJECTION INTRAMUSCULAR; INTRAVENOUS at 07:10

## 2020-10-27 RX ADMIN — LIDOCAINE HYDROCHLORIDE 50 MG: 20 INJECTION, SOLUTION INTRAVENOUS at 12:10

## 2020-10-27 NOTE — PLAN OF CARE
POC discussed with patient. Pt verbalized understanding. VSS. Afebrile. AAO. Fiance at bedside. Colonoscopy shows hemorroids. PIV cdi and infusing. . Tele monitoring NSR. Pt ambulates to the bathroom. SCD's maintained when in be. Bed low. Call in place. Safety maintained. Will continue to monitor.

## 2020-10-27 NOTE — PLAN OF CARE
Cm completed the assessment with pt at bedside. Pt lives with significant other-carlos.  Pt is independent in care and works for [employer].  PCP is Dr. Ma.  Insurance verified as [facility]. Pt denies diabetes, dialysis and coumadin. Disposition: Pt will discharge to home.  No needs verbalized  at this time.. Pharmacy of choice is [facility] on [address].       10/27/20 1535   Discharge Assessment   Assessment Type Discharge Planning Assessment   Confirmed/corrected address and phone number on facesheet? Yes   Assessment information obtained from? Patient   Expected Length of Stay (days) 3   Communicated expected length of stay with patient/caregiver yes   Prior to hospitilization cognitive status: Alert/Oriented   Prior to hospitalization functional status: Independent   Current cognitive status: Alert/Oriented   Current Functional Status: Independent   Facility Arrived From: home   Lives With significant other   Able to Return to Prior Arrangements yes   Is patient able to care for self after discharge? Yes   Who are your caregiver(s) and their phone number(s)? [phone]   Patient's perception of discharge disposition home or selfcare   Readmission Within the Last 30 Days no previous admission in last 30 days   Patient currently being followed by outpatient case management? No   Patient currently receives any other outside agency services? No   Equipment Currently Used at Home nebulizer   Do you have any problems affording any of your prescribed medications? No   Is the patient taking medications as prescribed? yes   Does the patient have transportation home? Yes   Transportation Anticipated family or friend will provide   Dialysis Name and Scheduled days na   Does the patient receive services at the Coumadin Clinic? No   Discharge Plan A Home with family   Discharge Plan B Home with family   DME Needed Upon Discharge  none   Patient/Family in Agreement with Plan yes

## 2020-10-27 NOTE — CONSULTS
CC: anemia    HPI 34 y.o. female with history of obesity, HTN, KERI, migraines, asthma who presents with acute onset, microcytic iron deficiency anemia associated with pre-syncope. She does report nausea, vomiting and associated abdominal pain with diarrhea. She was found to have H/H 9.9/36.1 on admission. Ferritin was checked and <1. She has reported episodes of tachycardia for which she is seeing cardiology for work-up. She denies heavy menstrual cycles. On review of records she has been anemic dating back to . She does have overt GI blood loss with rectal bleeding intermittently. No family history of colon cancer. No prior endoscopy. EGD today unrevealing of cause of GI blood loss.    Medical records reviewed. Additional history supplemented by nursing.     Past Medical History:   Diagnosis Date    Asthma     Heart palpitations     Herniated disc     Hypertension     IBS (irritable bowel syndrome)     Insomnia     Lower back pain     L5 S1 herniated disks    Migraine headache     Obstructive sleep apnea     Palpitations     and pvcs with stress.  Not on any meds.    Sleep apnea     history of.  Dont use cpap.  lost weight.         Past Surgical History:   Procedure Laterality Date    ABDOMINAL SURGERY           SECTION, CLASSIC       SECTION, LOW TRANSVERSE      DILATION AND CURETTAGE OF UTERUS      DILATION AND CURETTAGE OF UTERUS      perferated uterus during procedure    endometrioma      removed on right lower quadrant of uterus    epidural steriod injections  x 3    TONSILLECTOMY      TUBAL LIGATION         Social History  Social History     Tobacco Use    Smoking status: Current Every Day Smoker     Types: Vaping with nicotine, Vaping w/o nicotine    Smokeless tobacco: Never Used   Substance Use Topics    Alcohol use: Yes     Comment: socially, occasionally    Drug use: No         Family History   Problem Relation Age of Onset    Heart disease Mother   "   Hyperlipidemia Mother     Asthma Mother     Cancer Father     Heart disease Father     Hypertension Father     Hyperlipidemia Father     Arthritis Father     Diabetes Maternal Grandmother     Cancer Maternal Grandmother     Cancer Maternal Grandfather     Diabetes Paternal Grandfather     Breast cancer Maternal Aunt 40       Review of Systems  General ROS: +fatigue, +lethargy, negative for chills, fever or weight loss  Psychological ROS: negative for hallucination, depression or suicidal ideation  Ophthalmic ROS: negative for blurry vision, photophobia or eye pain  ENT ROS: negative for epistaxis, sore throat or rhinorrhea  Respiratory ROS: no cough, +shortness of breath, or wheezing  Cardiovascular ROS: +palpitations, no chest pain, +dyspnea on exertion  Gastrointestinal ROS: +diarrhea, +abdominal pain, +change in bowel habits, +blood in stools  Genito-Urinary ROS: no dysuria, trouble voiding, or hematuria  Musculoskeletal ROS: negative for gait disturbance or muscular weakness  Neurological ROS:+pre-syncope, no seizures; no ataxia  Dermatological ROS: negative for pruritis, rash and jaundice    Physical Examination  /62   Pulse 81   Temp 99.1 °F (37.3 °C) (Oral)   Resp 18   Ht 5' 7" (1.702 m)   Wt 108.9 kg (240 lb)   LMP 10/10/2020   SpO2 96%   Breastfeeding No   BMI 37.59 kg/m²   General appearance: morbidly obese, alert, cooperative, no distress  HENT: Normocephalic, atraumatic, neck symmetrical, no nasal discharge   Eyes: conjunctivae/corneas clear, PERRL, EOM's intact  Lungs: clear to auscultation bilaterally, symmetric chest wall expansion bilaterally  Heart: regular rate and rhythm without rub; no displacement of the PMI   Abdomen: soft, protuberant abdomen, non-tender; bowel sounds normoactive; no organomegaly  Extremities: extremities symmetric; no clubbing, cyanosis, or edema  Integument: Skin color, texture, turgor normal; no rashes; hair distrubution normal  Neurologic: " Alert and oriented X 3, normal strength, normal coordination and gait  Psychiatric: no pressured speech; normal affect; no evidence of impaired cognition     Labs:  Lab Results   Component Value Date    WBC 8.99 10/26/2020    HGB 7.9 (L) 10/26/2020    HCT 28.8 (L) 10/26/2020    MCV 72 (L) 10/26/2020     10/26/2020       CMP  Sodium   Date Value Ref Range Status   10/25/2020 137 136 - 145 mmol/L Final     Potassium   Date Value Ref Range Status   10/25/2020 3.6 3.5 - 5.1 mmol/L Final     Chloride   Date Value Ref Range Status   10/25/2020 102 95 - 110 mmol/L Final     CO2   Date Value Ref Range Status   10/25/2020 25 23 - 29 mmol/L Final     Glucose   Date Value Ref Range Status   10/25/2020 87 70 - 110 mg/dL Final     BUN, Bld   Date Value Ref Range Status   10/25/2020 12 6 - 20 mg/dL Final     Creatinine   Date Value Ref Range Status   10/25/2020 0.9 0.5 - 1.4 mg/dL Final   08/15/2013 0.9 0.5 - 1.4 mg/dL Final     Calcium   Date Value Ref Range Status   10/25/2020 9.4 8.7 - 10.5 mg/dL Final   08/15/2013 9.7 8.7 - 10.5 mg/dL Final     Total Protein   Date Value Ref Range Status   10/25/2020 7.0 6.0 - 8.4 g/dL Final     Albumin   Date Value Ref Range Status   10/25/2020 3.7 3.5 - 5.2 g/dL Final     Total Bilirubin   Date Value Ref Range Status   10/25/2020 0.2 0.1 - 1.0 mg/dL Final     Comment:     For infants and newborns, interpretation of results should be based  on gestational age, weight and in agreement with clinical  observations.  Premature Infant recommended reference ranges:  Up to 24 hours.............<8.0 mg/dL  Up to 48 hours............<12.0 mg/dL  3-5 days..................<15.0 mg/dL  6-29 days.................<15.0 mg/dL       Alkaline Phosphatase   Date Value Ref Range Status   10/25/2020 78 55 - 135 U/L Final     AST   Date Value Ref Range Status   10/25/2020 12 10 - 40 U/L Final     ALT   Date Value Ref Range Status   10/25/2020 13 10 - 44 U/L Final     Anion Gap   Date Value Ref Range  Status   10/25/2020 10 8 - 16 mmol/L Final   08/15/2013 11 5 - 15 meq/L Final     eGFR if    Date Value Ref Range Status   10/25/2020 >60 >60 mL/min/1.73 m^2 Final     eGFR if non    Date Value Ref Range Status   10/25/2020 >60 >60 mL/min/1.73 m^2 Final     Comment:     Calculation used to obtain the estimated glomerular filtration  rate (eGFR) is the CKD-EPI equation.          Imaging:  CT A/P independently reviewed by me and showed  No acute findings identified in the abdomen or pelvis. No findings to account for the patient's reported symptoms.    Assessment:   1. Iron deficiency anemia  2. Chronic diarrhea  3. Blood per rectum    Plan:   -Recommend intravenous iron administration  -Transfusion for Hb<7 or signs of active blood loss  -EGD performed today without evidence of GI blood loss or etiology of anemia, will plan clear liquid diet and colonoscopy tomorrow  -Golytely split prep starting this evening, npo tomorrow after completion of prep by 5:00 AM  -Dulcolax 10 mg PO x1 now  -Further recommendations to follow endoscopy  -Dr. Vergara will be covering tomorrow        Enrike Garcia MD  [facility]  [address] [place] [address]  Office: [phone]  Fax: [phone]

## 2020-10-27 NOTE — PLAN OF CARE
Report received from RHIANNA Salgado RN. Assumed care of patient from room 315 via  Wheelchair. Pre op in progress.

## 2020-10-27 NOTE — CARE UPDATE
10/27/20 0710   Patient Assessment/Suction   Level of Consciousness (AVPU) alert   Respiratory Effort Normal   Expansion/Accessory Muscles/Retractions expansion symmetric   All Lung Fields Breath Sounds Anterior:   ZACH Breath Sounds clear   RUL Breath Sounds clear   Cough Frequency no cough   PRE-TX-O2   O2 Device (Oxygen Therapy) room air   Oxygen Concentration (%) 21   SpO2 95 %   Pulse Oximetry Type Intermittent   $ Pulse Oximetry - Multiple Charge Pulse Oximetry - Multiple   Probe Placed On (Pulse Ox) finger   Pulse 74   Resp 15   Positioning HOB elevated 30 degrees   Inhaler   $ Inhaler Charges PRN treatment not required   Roberto Persaud

## 2020-10-27 NOTE — ANESTHESIA PREPROCEDURE EVALUATION
10/27/2020  Sonia Goldberg is a 34 y.o., female.    Pre-op Assessment    I have reviewed the Patient Summary Reports.     I have reviewed the Nursing Notes. I have reviewed the NPO Status.   I have reviewed the Medications.     Review of Systems  Anesthesia Hx:  No problems with previous Anesthesia    Social:  Smoker    Hematology/Oncology:         -- Anemia:   Cardiovascular:   Hypertension    Pulmonary:   Asthma Shortness of breath Sleep Apnea    Hepatic/GI:   Bowel Prep. GERD    Neurological:   Headaches    Psych:   anxiety          Physical Exam  General:  Obesity    Airway/Jaw/Neck:  Airway Findings: Mouth Opening: Normal Tongue: Normal  General Airway Assessment: Adult  Mallampati: II  TM Distance: Normal, at least 6 cm        Eyes/Ears/Nose:  EYES/EARS/NOSE FINDINGS: Normal   Dental:  Dental Findings: In tact   Chest/Lungs:  Chest/Lungs Findings: Clear to auscultation, Normal Respiratory Rate     Heart/Vascular:  Heart Findings: Rate: Normal  Rhythm: Regular Rhythm        Mental Status:  Mental Status Findings:  Cooperative, Alert and Oriented         Anesthesia Plan  Type of Anesthesia, risks & benefits discussed:  Anesthesia Type:  general  Patient's Preference:   Intra-op Monitoring Plan: standard ASA monitors  Intra-op Monitoring Plan Comments:   Post Op Pain Control Plan:   Post Op Pain Control Plan Comments:   Induction:   IV  Beta Blocker:  Patient is not currently on a Beta-Blocker (No further documentation required).       Informed Consent: Patient understands risks and agrees with Anesthesia plan.  Questions answered. Anesthesia consent signed with patient.  ASA Score: 2     Day of Surgery Review of History & Physical: I have interviewed and examined the patient. I have reviewed the patient's H&P dated:    H&P update referred to the provider.         Ready For Surgery From Anesthesia  Perspective.

## 2020-10-27 NOTE — PLAN OF CARE
Patient free of falls and injuries this shift. Awake alert and oriented x4. Follows commands. Up out of bed and ambulates to bathroom independently. Attempt to drink bowel prep unsuccessful. Reports c/o nausea when drinking the prep. About a quarter of the prep consumed even with great encouragement. Denies any complaints. Sinus on monitor. Reviewed with this patient NPO status at 5am. Verbalizes understanding.

## 2020-10-27 NOTE — TRANSFER OF CARE
"Anesthesia Transfer of Care Note    Patient: Sonia Goldberg    Procedure(s) Performed: Procedure(s) (LRB):  COLONOSCOPY (N/A)    Patient location: PACU    Anesthesia Type: general    Transport from OR: Transported from OR on 2-3 L/min O2 by NC with adequate spontaneous ventilation    Post pain: adequate analgesia    Post assessment: no apparent anesthetic complications and tolerated procedure well    Post vital signs: stable    Level of consciousness: sedated    Nausea/Vomiting: no nausea/vomiting    Complications: none    Transfer of care protocol was followed      Last vitals:   Visit Vitals  /74 (BP Location: Left arm, Patient Position: Lying)   Pulse 70   Temp 36.7 °C (98.1 °F) (Skin)   Resp 16   Ht 5' 7" (1.702 m)   Wt 108.9 kg (240 lb)   LMP 10/10/2020   SpO2 99%   Breastfeeding No   BMI 37.59 kg/m²     "

## 2020-10-27 NOTE — NURSING
"Patient has consumed about one quarter of the bowel prep at bedside. States "I can't drink any of this anymore." "I can't it is making me feel like I am just going to throw it all back up." Questions answered and reassurance given,  denies need for anti-emetic at this time. Charge nurse notified.  "

## 2020-10-27 NOTE — PLAN OF CARE
Colonoscopy:  -Hemorrhoids, otherwise unremarkable    Recommendation:  -Ok to discharge home today from GI perspective  -Regular diet  -Oral iron supplementation at discharge  -Will arrange outpatient VCE  -HIgh fiber diet/fiber supplement + stool softener daily for hemorrhoidal disease    Patito Vergara MD

## 2020-10-27 NOTE — PLAN OF CARE
Report given to Gracia. Patient to room 315 via wheelchair per RN. RN assessment and vital signs per flowsheet.

## 2020-10-27 NOTE — [employer] PATIENT INSTRUCTIONS
Discharge Summary/Instructions after an Endoscopic Procedure  Patient Name: Sonia Goldberg  Patient MRN: [id number]  Patient YOB: 1985 Tuesday, October 27, 2020  Patito Vergara MD  RESTRICTIONS:  During your procedure today, you received medications for sedation.  These   medications may affect your judgment, balance and coordination.  Therefore,   for 24 hours, you have the following restrictions:   - DO NOT drive a car, operate machinery, make legal/financial decisions,   sign important papers or drink alcohol.    ACTIVITY:  Today: no heavy lifting, straining or running due to procedural   sedation/anesthesia.  The following day: return to full activity including work.  DIET:  Eat and drink normally unless instructed otherwise.     TREATMENT FOR COMMON SIDE EFFECTS:  - Mild abdominal pain, nausea, belching, bloating or excessive gas:  rest,   eat lightly and use a heating pad.  - Sore Throat: treat with throat lozenges and/or gargle with warm salt   water.  - Because air was used during the procedure, expelling large amounts of air   from your rectum or belching is normal.  - If a bowel prep was taken, you may not have a bowel movement for 1-3 days.    This is normal.  SYMPTOMS TO WATCH FOR AND REPORT TO YOUR PHYSICIAN:  1. Abdominal pain or bloating, other than gas cramps.  2. Chest pain.  3. Back pain.  4. Signs of infection such as: chills or fever occurring within 24 hours   after the procedure.  5. Rectal bleeding, which would show as bright red, maroon, or black stools.   (A tablespoon of blood from the rectum is not serious, especially if   hemorrhoids are present.)  6. Vomiting.  7. Weakness or dizziness.  GO DIRECTLY TO THE NEAREST EMERGENCY ROOM IF YOU HAVE ANY OF THE FOLLOWING:      Difficulty breathing              Chills and/or fever over 101 F   Persistent vomiting and/or vomiting blood   Severe abdominal pain   Severe chest pain   Black, tarry stools   Bleeding- more  than one tablespoon   Any other symptom or condition that you feel may need urgent attention  Your doctor recommends these additional instructions:  If any biopsies were taken, your doctors clinic will contact you in 1 to 2   weeks with any results.  - Return patient to hospital horn for ongoing care.   - Resume regular diet.   - Continue present medications.   - Repeat colonoscopy at age 48 for screening purposes.   - Return to my office PRN.  -High fiber diet/fiber supplement daily + stool softener for hemorrhoidal   disease  For questions, problems or results please call your physician - Patito Vergara MD at Work:  [phone].  [facility], EMERGENCY ROOM PHONE NUMBER: [phone]  IF A COMPLICATION OR EMERGENCY SITUATION ARISES AND YOU ARE UNABLE TO REACH   YOUR PHYSICIAN - GO DIRECTLY TO THE EMERGENCY ROOM.  Patito Vergara MD  10/27/2020 1:11:46 PM  This report has been verified and signed electronically.  [employer]

## 2020-10-28 VITALS
BODY MASS INDEX: 37.67 KG/M2 | OXYGEN SATURATION: 98 % | RESPIRATION RATE: 15 BRPM | SYSTOLIC BLOOD PRESSURE: 117 MMHG | HEART RATE: 79 BPM | DIASTOLIC BLOOD PRESSURE: 68 MMHG | HEIGHT: 67 IN | TEMPERATURE: 98 F | WEIGHT: 240 LBS

## 2020-10-28 LAB
BASOPHILS # BLD AUTO: 0.05 K/UL (ref 0–0.2)
BASOPHILS NFR BLD: 0.6 % (ref 0–1.9)
DIFFERENTIAL METHOD: ABNORMAL
EOSINOPHIL # BLD AUTO: 0.2 K/UL (ref 0–0.5)
EOSINOPHIL NFR BLD: 2.2 % (ref 0–8)
ERYTHROCYTE [DISTWIDTH] IN BLOOD BY AUTOMATED COUNT: 20.2 % (ref 11.5–14.5)
HCT VFR BLD AUTO: 33.2 % (ref 37–48.5)
HGB BLD-MCNC: 9.3 G/DL (ref 12–16)
IMM GRANULOCYTES # BLD AUTO: 0.02 K/UL (ref 0–0.04)
IMM GRANULOCYTES NFR BLD AUTO: 0.2 % (ref 0–0.5)
LYMPHOCYTES # BLD AUTO: 2.4 K/UL (ref 1–4.8)
LYMPHOCYTES NFR BLD: 26 % (ref 18–48)
MCH RBC QN AUTO: 20.8 PG (ref 27–31)
MCHC RBC AUTO-ENTMCNC: 28 G/DL (ref 32–36)
MCV RBC AUTO: 74 FL (ref 82–98)
MONOCYTES # BLD AUTO: 0.6 K/UL (ref 0.3–1)
MONOCYTES NFR BLD: 6.4 % (ref 4–15)
NEUTROPHILS # BLD AUTO: 5.9 K/UL (ref 1.8–7.7)
NEUTROPHILS NFR BLD: 64.6 % (ref 38–73)
NRBC BLD-RTO: 0 /100 WBC
PLATELET # BLD AUTO: 329 K/UL (ref 150–350)
PMV BLD AUTO: 10.1 FL (ref 9.2–12.9)
RBC # BLD AUTO: 4.48 M/UL (ref 4–5.4)
WBC # BLD AUTO: 9.09 K/UL (ref 3.9–12.7)

## 2020-10-28 PROCEDURE — 85025 COMPLETE CBC W/AUTO DIFF WBC: CPT

## 2020-10-28 PROCEDURE — 36415 COLL VENOUS BLD VENIPUNCTURE: CPT

## 2020-10-28 PROCEDURE — 94761 N-INVAS EAR/PLS OXIMETRY MLT: CPT

## 2020-10-28 PROCEDURE — 99900035 HC TECH TIME PER 15 MIN (STAT)

## 2020-10-28 PROCEDURE — G0378 HOSPITAL OBSERVATION PER HR: HCPCS

## 2020-10-28 NOTE — PLAN OF CARE
Pt is cleared form  for D/C.       10/28/20 0827   Final Note   Assessment Type Final Discharge Note

## 2020-10-28 NOTE — PLAN OF CARE
POC reviewed with pt, pt verbalizes understanding. AAOx4. Ambulatory. Resting comfortably throughout night, denies pain, denies n/v. Regular diet. SCDs. Tele [id number] NSR. VSS. Afebrile. Room air. No complaints or requests at this time. Safety maintained, bed locked and low, bed alarm on, call light within reach, rounding for safety.

## 2020-10-28 NOTE — CARE UPDATE
10/28/20 0728   Patient Assessment/Suction   Level of Consciousness (AVPU) alert   Respiratory Effort Normal   Expansion/Accessory Muscles/Retractions expansion symmetric   All Lung Fields Breath Sounds Anterior:   ZACH Breath Sounds clear   RUL Breath Sounds clear   Rhythm/Pattern, Respiratory pattern regular   Cough Frequency no cough   PRE-TX-O2   O2 Device (Oxygen Therapy) room air   Oxygen Concentration (%) 21   SpO2 98 %   Pulse Oximetry Type Intermittent   $ Pulse Oximetry - Multiple Charge Pulse Oximetry - Multiple   Probe Placed On (Pulse Ox) finger   Pulse 79   Resp 15   Positioning HOB elevated 30 degrees   Inhaler   $ Inhaler Charges PRN treatment not required   POx, nebs prn

## 2020-10-28 NOTE — ANESTHESIA POSTPROCEDURE EVALUATION
Anesthesia Post Evaluation    Patient: Sonia Goldberg    Procedure(s) Performed: Procedure(s) (LRB):  COLONOSCOPY (N/A)    Final Anesthesia Type: general    Patient location during evaluation: PACU  Patient participation: Yes- Able to Participate  Level of consciousness: awake and alert  Post-procedure vital signs: reviewed and stable  Pain management: adequate  Airway patency: patent    PONV status at discharge: No PONV  Anesthetic complications: no      Cardiovascular status: hemodynamically stable  Respiratory status: unassisted and room air  Hydration status: euvolemic  Follow-up not needed.          Vitals Value Taken Time   /68 10/28/20 0324   Temp 36.7 °C (98.1 °F) 10/28/20 0324   Pulse 80 10/28/20 0324   Resp 18 10/28/20 0324   SpO2 98 % 10/28/20 0324         Event Time   Out of Recovery 10/27/2020 13:35:08         Pain/Kevin Score: Kevin Score: 10 (10/27/2020  1:34 PM)

## 2020-10-28 NOTE — NURSING
Pt discharged per MD orders. IV sites discontinued with no bleeding. Pt verbalized understanding of discharge instructions. Pt in NAD.

## 2020-10-29 ENCOUNTER — PATIENT MESSAGE (OUTPATIENT)
Dept: GASTROENTEROLOGY | Facility: CLINIC | Age: 35
End: 2020-10-29

## 2020-10-29 ENCOUNTER — TELEPHONE (OUTPATIENT)
Dept: GASTROENTEROLOGY | Facility: CLINIC | Age: 35
End: 2020-10-29

## 2020-10-29 LAB
FINAL PATHOLOGIC DIAGNOSIS: NORMAL
GROSS: NORMAL
Lab: NORMAL
SUPPLEMENTAL DIAGNOSIS: NORMAL

## 2020-10-29 NOTE — TELEPHONE ENCOUNTER
----- Message from Patito Vergara MD sent at 10/28/2020  3:12 PM CDT -----  Please arrange outpatient VCE for DARRION

## 2020-10-30 ENCOUNTER — TELEPHONE (OUTPATIENT)
Dept: MEDSURG UNIT | Facility: HOSPITAL | Age: 35
End: 2020-10-30

## 2020-10-30 LAB
BLD PROD TYP BPU: NORMAL
BLD PROD TYP BPU: NORMAL
BLOOD UNIT EXPIRATION DATE: NORMAL
BLOOD UNIT EXPIRATION DATE: NORMAL
BLOOD UNIT TYPE CODE: 5100
BLOOD UNIT TYPE CODE: 5100
BLOOD UNIT TYPE: NORMAL
BLOOD UNIT TYPE: NORMAL
CODING SYSTEM: NORMAL
CODING SYSTEM: NORMAL
DISPENSE STATUS: NORMAL
DISPENSE STATUS: NORMAL
NUM UNITS TRANS PACKED RBC: NORMAL
NUM UNITS TRANS PACKED RBC: NORMAL

## 2020-10-30 NOTE — DISCHARGE SUMMARY
[facility] Hospital Medicine  Discharge Summary      Patient Name: Sonia Goldberg  MRN: [id number]  Admission Date: 10/25/2020  Hospital Length of Stay: 2 days  Discharge Date and Time: 10/28/2020  9:58 AM  Attending Physician: Cara att. providers found   Discharging Provider: Yarelis Herbert MD  Primary Care Provider: Malcolm Ma MD      HPI:   Sonia Goldberg is a 33 y/o female who presented to the ED with c/o of a near syncopal episode today.  Reports that she is being worked up outpatient for tachycardia, near syncopal episodes and anemia.  Yesterday, she began having abdominal pain and reports a large melanotic stool today.  Pt was seen in the ED yesterday with c/o of N/V/D and abd pain.  Hgb of 9.9 noted yesterday which has decreased to 9.0 today.  Denies fever, chest pain or dysuria.  Pt required IV narcotics while in the ED for pain control.  She will be placed in observation under the service of hospital medicine for continued treatment.    Procedure(s) (LRB):  COLONOSCOPY (N/A)      Hospital Course:   Patient was admitted for GI bleeding.  Her hemoglobin trended down to from 9 to 7.9 she received 2 units of PRBC and hemoglobin was stable after that at 10.2 patient did not have any active GI bleeding further.  Gastroenterology was consulted and patient received EGD and colonoscopy.  Colonoscopy was showed hemorrhoids otherwise unremarkable.  EGD was unremarkable as well GI cleared patient to discharge patient Ester rating oral diet.  Patient has been discharged on oral iron supplementation and will follow-up with GI outpatient patient has been instructed to have high-fiber diet and take stool softeners daily for hemorrhoid physician outpatient al disease.  Patient will follow-up with primary care. Patient  was seen on day of discharge by myself and was examined.  She was stable for discharge. Discharge plan, follow up instructions, and contacts in case of further needs were  discussed in detail.       Consults:   Consults (From admission, onward)        Status Ordering Provider     Inpatient consult to Gastroenterology  Once     Provider:  MD Cj Barker BARBARA D.          No new Assessment & Plan notes have been filed under this hospital service since the last note was generated.  Service: Hospital Medicine    Final Active Diagnoses:    Diagnosis Date Noted POA    PRINCIPAL PROBLEM:  GI bleed [K92.2] 10/25/2020 Yes    GERD (gastroesophageal reflux disease) [K21.9] 10/25/2020 Yes    Anemia [D64.9] 10/16/2020 Yes    Near syncope [R55] 09/16/2020 Yes    Asthma [J45.909] 01/18/2017 Yes    Class 2 obesity in adult [E66.9] 08/17/2016 Yes      Problems Resolved During this Admission:       Discharged Condition: stable    Disposition: Home or Self Care    Follow Up:  Follow-up Information     MAYITO Miranda In 1 week.    Specialty: Family Medicine  Why: appt in avs  Contact information:  [address]  [facility] [address] [phone]             Patito Vergara MD On 11/17/2020.    Specialties: Gastroenterology, Internal Medicine  Why: appt in avs  Contact information:  [address]  [place] [address] [phone]                 Patient Instructions:      Ambulatory referral/consult to Gastroenterology   Standing Status: Future   Referral Priority: Routine Referral Type: Consultation   Referral Reason: Specialty Services Required   Requested Specialty: Gastroenterology   Number of Visits Requested: 1     Diet Adult Regular     Notify your health care provider if you experience any of the following:  persistent nausea and vomiting or diarrhea     Notify your health care provider if you experience any of the following:  persistent dizziness, light-headedness, or visual disturbances     Activity as tolerated       Significant Diagnostic Studies: Labs: BMP: No results for input(s): GLU, NA, K, CL, CO2, BUN, CREATININE, CALCIUM, MG  in the last 48 hours., CMP No results for input(s): NA, K, CL, CO2, GLU, BUN, CREATININE, CALCIUM, PROT, ALBUMIN, BILITOT, ALKPHOS, AST, ALT, ANIONGAP, ESTGFRAFRICA, EGFRNONAA in the last 48 hours. and CBC No results for input(s): WBC, HGB, HCT, PLT in the last 48 hours.    Pending Diagnostic Studies:     None         Medications:  Reconciled Home Medications:      Medication List      START taking these medications    docusate sodium 100 MG capsule  Commonly known as: COLACE  Take 1 capsule (100 mg total) by mouth 2 (two) times daily as needed for Constipation.     ferrous sulfate 325 (65 FE) MG EC tablet  Take 1 tablet (325 mg total) by mouth 2 (two) times daily.        CONTINUE taking these medications    albuterol 90 mcg/actuation inhaler  Commonly known as: PROVENTIL/VENTOLIN HFA  Inhale 2 puffs into the lungs every 6 (six) hours as needed for Wheezing.     buPROPion 150 MG TB24 tablet  Commonly known as: WELLBUTRIN XL  Take 150 mg by mouth once daily.     LATUDA 80 mg Tab tablet  Generic drug: lurasidone  Take 80 mg by mouth once daily. With food.     LORazepam 0.5 MG tablet  Commonly known as: ATIVAN  Take 1 tablet by mouth daily as needed.     omeprazole 20 MG capsule  Commonly known as: PRILOSEC  Take 1 capsule (20 mg total) by mouth once daily.     verapamiL 180 MG C24p  Commonly known as: VERELAN  Take 1 capsule (180 mg total) by mouth once daily.            Indwelling Lines/Drains at time of discharge:   Lines/Drains/Airways     None                 Time spent on the discharge of patient: 45 minutes  Patient was seen and examined on the date of discharge and determined to be suitable for discharge.         Yarelis Herbert MD  Department of Hospital Medicine  [facility]

## 2020-10-30 NOTE — HOSPITAL COURSE
Patient was admitted for GI bleeding.  Her hemoglobin trended down to from 9 to 7.9 she received 2 units of PRBC and hemoglobin was stable after that at 10.2 patient did not have any active GI bleeding further.  Gastroenterology was consulted and patient received EGD and colonoscopy.  Colonoscopy was showed hemorrhoids otherwise unremarkable.  EGD was unremarkable.

## 2020-11-02 ENCOUNTER — TELEPHONE (OUTPATIENT)
Dept: GASTROENTEROLOGY | Facility: CLINIC | Age: 35
End: 2020-11-02

## 2020-11-02 DIAGNOSIS — D50.9 IRON DEFICIENCY ANEMIA, UNSPECIFIED IRON DEFICIENCY ANEMIA TYPE: Primary | ICD-10-CM

## 2020-11-02 NOTE — TELEPHONE ENCOUNTER
Call placed to MsVickie Ashlyn. I advised her that I cancelled her appt that she had with Dr. Vergara on 11/17 since she is being seen in the office tomorrow 11/3. She verbalized understanding, and thought her procedure was on 11/17. I advised her that her VCE is scheduled for 11/20. She verbalized understanding. No further issues noted.

## 2020-11-02 NOTE — TELEPHONE ENCOUNTER
----- Message from Gracia Carl sent at 11/2/2020  4:55 PM CST -----  Regarding: pt advice  Contact: pt  Pt would like to know why her procedure on 11-17 was cancelled. She can be reached at [phone]

## 2020-11-03 ENCOUNTER — LAB VISIT (OUTPATIENT)
Dept: LAB | Facility: HOSPITAL | Age: 35
End: 2020-11-03
Attending: INTERNAL MEDICINE
Payer: COMMERCIAL

## 2020-11-03 ENCOUNTER — OFFICE VISIT (OUTPATIENT)
Dept: GASTROENTEROLOGY | Facility: CLINIC | Age: 35
End: 2020-11-03
Payer: COMMERCIAL

## 2020-11-03 VITALS
HEIGHT: 67 IN | DIASTOLIC BLOOD PRESSURE: 92 MMHG | SYSTOLIC BLOOD PRESSURE: 139 MMHG | WEIGHT: 246.06 LBS | HEART RATE: 86 BPM | BODY MASS INDEX: 38.62 KG/M2

## 2020-11-03 DIAGNOSIS — D50.0 IRON DEFICIENCY ANEMIA DUE TO CHRONIC BLOOD LOSS: ICD-10-CM

## 2020-11-03 DIAGNOSIS — D64.9 ANEMIA, UNSPECIFIED TYPE: ICD-10-CM

## 2020-11-03 DIAGNOSIS — R10.84 GENERALIZED POSTPRANDIAL ABDOMINAL PAIN: ICD-10-CM

## 2020-11-03 DIAGNOSIS — D50.0 IRON DEFICIENCY ANEMIA DUE TO CHRONIC BLOOD LOSS: Primary | ICD-10-CM

## 2020-11-03 DIAGNOSIS — K62.5 GASTROINTESTINAL HEMORRHAGE ASSOCIATED WITH ANORECTAL SOURCE: ICD-10-CM

## 2020-11-03 LAB
BASOPHILS # BLD AUTO: 0.04 K/UL (ref 0–0.2)
BASOPHILS NFR BLD: 0.5 % (ref 0–1.9)
DIFFERENTIAL METHOD: ABNORMAL
EOSINOPHIL # BLD AUTO: 0.1 K/UL (ref 0–0.5)
EOSINOPHIL NFR BLD: 1.5 % (ref 0–8)
ERYTHROCYTE [DISTWIDTH] IN BLOOD BY AUTOMATED COUNT: 21.6 % (ref 11.5–14.5)
HCT VFR BLD AUTO: 35.4 % (ref 37–48.5)
HGB BLD-MCNC: 10.1 G/DL (ref 12–16)
IMM GRANULOCYTES # BLD AUTO: 0.04 K/UL (ref 0–0.04)
IMM GRANULOCYTES NFR BLD AUTO: 0.5 % (ref 0–0.5)
LYMPHOCYTES # BLD AUTO: 2.8 K/UL (ref 1–4.8)
LYMPHOCYTES NFR BLD: 31.4 % (ref 18–48)
MCH RBC QN AUTO: 21.2 PG (ref 27–31)
MCHC RBC AUTO-ENTMCNC: 28.5 G/DL (ref 32–36)
MCV RBC AUTO: 74 FL (ref 82–98)
MONOCYTES # BLD AUTO: 0.6 K/UL (ref 0.3–1)
MONOCYTES NFR BLD: 6.5 % (ref 4–15)
NEUTROPHILS # BLD AUTO: 5.2 K/UL (ref 1.8–7.7)
NEUTROPHILS NFR BLD: 59.6 % (ref 38–73)
NRBC BLD-RTO: 0 /100 WBC
PLATELET # BLD AUTO: 374 K/UL (ref 150–350)
PMV BLD AUTO: 9.8 FL (ref 9.2–12.9)
RBC # BLD AUTO: 4.77 M/UL (ref 4–5.4)
WBC # BLD AUTO: 8.76 K/UL (ref 3.9–12.7)

## 2020-11-03 PROCEDURE — 3080F PR MOST RECENT DIASTOLIC BLOOD PRESSURE >= 90 MM HG: ICD-10-PCS | Mod: CPTII,S$GLB,, | Performed by: INTERNAL MEDICINE

## 2020-11-03 PROCEDURE — 99214 OFFICE O/P EST MOD 30 MIN: CPT | Mod: S$GLB,,, | Performed by: INTERNAL MEDICINE

## 2020-11-03 PROCEDURE — 3075F PR MOST RECENT SYSTOLIC BLOOD PRESS GE 130-139MM HG: ICD-10-PCS | Mod: CPTII,S$GLB,, | Performed by: INTERNAL MEDICINE

## 2020-11-03 PROCEDURE — 85025 COMPLETE CBC W/AUTO DIFF WBC: CPT

## 2020-11-03 PROCEDURE — 3075F SYST BP GE 130 - 139MM HG: CPT | Mod: CPTII,S$GLB,, | Performed by: INTERNAL MEDICINE

## 2020-11-03 PROCEDURE — 3008F BODY MASS INDEX DOCD: CPT | Mod: CPTII,S$GLB,, | Performed by: INTERNAL MEDICINE

## 2020-11-03 PROCEDURE — 99999 PR PBB SHADOW E&M-EST. PATIENT-LVL IV: ICD-10-PCS | Mod: PBBFAC,,, | Performed by: INTERNAL MEDICINE

## 2020-11-03 PROCEDURE — 99214 PR OFFICE/OUTPT VISIT, EST, LEVL IV, 30-39 MIN: ICD-10-PCS | Mod: S$GLB,,, | Performed by: INTERNAL MEDICINE

## 2020-11-03 PROCEDURE — 3008F PR BODY MASS INDEX (BMI) DOCUMENTED: ICD-10-PCS | Mod: CPTII,S$GLB,, | Performed by: INTERNAL MEDICINE

## 2020-11-03 PROCEDURE — 99999 PR PBB SHADOW E&M-EST. PATIENT-LVL IV: CPT | Mod: PBBFAC,,, | Performed by: INTERNAL MEDICINE

## 2020-11-03 PROCEDURE — 3080F DIAST BP >= 90 MM HG: CPT | Mod: CPTII,S$GLB,, | Performed by: INTERNAL MEDICINE

## 2020-11-03 PROCEDURE — 36415 COLL VENOUS BLD VENIPUNCTURE: CPT

## 2020-11-03 NOTE — PROGRESS NOTES
"[facility] Note    CC: Anemia, blood in stool     HPI 34 y.o. female with history of obesity, HTN, KERI, migraines and asthma presents for follow up after recent hospitalization for acute, severe, DARRION requiring blood transfusion, associated with red blood per rectum likely due to hemorrhoidal disease. EGD and colonoscopy last week notable for hiatal hernia (biopsies negative for celiac and H.pylori) and hemorrhoids respectively. She continues to have small amount of red blood in stool with occasional near syncopal episodes. She notes mid-abdominal pain after eating, specifically after eating Chinese food and milk.     Past Medical History:   Diagnosis Date    Asthma     Heart palpitations     Herniated disc     Hypertension     IBS (irritable bowel syndrome)     Insomnia     Lower back pain     L5 S1 herniated disks    Migraine headache     Obstructive sleep apnea     Palpitations     and pvcs with stress.  Not on any meds.    Sleep apnea     history of.  Dont use cpap.  lost weight.       Allergies and Medications reviewed     Review of Systems  General ROS: negative for - chills, fever or weight loss  Cardiovascular ROS: no chest pain or dyspnea on exertion  Gastrointestinal ROS: + blood in stool, + abdominal pain, no hematemesis     Physical Examination  BP (!) 139/92 (BP Location: Left arm, Patient Position: Sitting)   Pulse 86   Ht 5' 7" (1.702 m)   Wt 111.6 kg (246 lb 0.5 oz)   LMP 10/10/2020   BMI 38.53 kg/m²   General appearance: alert, cooperative, no distress  HENT: Normocephalic, atraumatic, neck symmetrical, no nasal discharge, sclera anicteric   Lungs: clear to auscultation bilaterally, symmetric chest wall expansion bilaterally  Heart: regular rate and rhythm without rub; no displacement of the PMI   Abdomen: obese, soft, mild ttp in epigastrium without rebound or guarding, BS active ,no masses appreciated   Extremities: extremities symmetric; no clubbing, cyanosis, or " edema    Labs:  10/28:  -Hgb- 9.3, Hct - 33.2, MCV- 78     -Ferritin- <1, FE- 17, TIBC- 462    Imaging:  CT abdomen was independently visualized and reviewed by me and showed No acute findings identified in the abdomen or pelvis, cholelithiasis    Assessment:   34 y.o. female presents to follow up significant DARRION, hemorrhoidal bleeding and post-prandial abdominal pain.    Plan:  1.DARRION  -IV iron infusion  -CBC today  -Keep schedule VCE      2.Post-prandial abdominal pain  -Continue daily PPI  -HIDA scan with EF      Patito Vergara MD  [facility]  [address], [address]  [place] [address]  Office: [phone]  Fax: [phone]

## 2020-11-03 NOTE — LETTER
November 4, 2020      Saloin Rodríguez, APRN  [address]  [facility] [address]           [facility]  [address]  [facility] [address]  Phone: [phone]          Patient: Sonia Goldberg   MR Number: [id number]   YOB: 1985   Date of Visit: 11/3/2020       Dear Saloni Rodríguez:    Thank you for referring Sonia Goldberg to me for evaluation. Attached you will find relevant portions of my assessment and plan of care.    If you have questions, please do not hesitate to call me. I look forward to following Sonia Goldberg along with you.    Sincerely,    Patito Vergara MD    Enclosure  CC:  No Recipients    If you would like to receive this communication electronically, please contact [email or web address] or [phone] to request more information on [facility] access.    For providers and/or their staff who would like to refer a patient to [facility], please contact us through our one-stop-shop provider referral line, [facility], at [phone].    If you feel you have received this communication in error or would no longer like to receive these types of communications, please e-mail [email or web address]

## 2020-11-04 ENCOUNTER — TELEPHONE (OUTPATIENT)
Dept: GASTROENTEROLOGY | Facility: CLINIC | Age: 35
End: 2020-11-04

## 2020-11-04 PROBLEM — D50.0 IRON DEFICIENCY ANEMIA DUE TO CHRONIC BLOOD LOSS: Status: ACTIVE | Noted: 2020-11-04

## 2020-11-04 RX ORDER — METHYLPREDNISOLONE SOD SUCC 125 MG
125 VIAL (EA) INJECTION ONCE AS NEEDED
Status: CANCELLED | OUTPATIENT
Start: 2020-11-04

## 2020-11-04 RX ORDER — EPINEPHRINE 0.3 MG/.3ML
0.3 INJECTION SUBCUTANEOUS ONCE AS NEEDED
Status: CANCELLED | OUTPATIENT
Start: 2020-11-04

## 2020-11-04 RX ORDER — HEPARIN 100 UNIT/ML
500 SYRINGE INTRAVENOUS
Status: CANCELLED | OUTPATIENT
Start: 2020-11-04

## 2020-11-04 RX ORDER — SODIUM CHLORIDE 0.9 % (FLUSH) 0.9 %
10 SYRINGE (ML) INJECTION
Status: CANCELLED | OUTPATIENT
Start: 2020-11-04

## 2020-11-04 RX ORDER — DIPHENHYDRAMINE HYDROCHLORIDE 50 MG/ML
50 INJECTION INTRAMUSCULAR; INTRAVENOUS ONCE AS NEEDED
Status: CANCELLED | OUTPATIENT
Start: 2020-11-04

## 2020-11-04 NOTE — TELEPHONE ENCOUNTER
----- Message from Patito Vergara MD sent at 11/4/2020  4:21 PM CST -----  Please let [facility] infusion know I ordered IV iron thanks

## 2020-11-06 ENCOUNTER — PATIENT MESSAGE (OUTPATIENT)
Dept: ENDOSCOPY | Facility: HOSPITAL | Age: 35
End: 2020-11-06

## 2020-11-13 ENCOUNTER — PATIENT MESSAGE (OUTPATIENT)
Dept: ENDOSCOPY | Facility: HOSPITAL | Age: 35
End: 2020-11-13

## 2020-11-18 ENCOUNTER — HOSPITAL ENCOUNTER (EMERGENCY)
Facility: HOSPITAL | Age: 35
Discharge: HOME | End: 2020-11-18
Attending: EMERGENCY MEDICINE
Payer: COMMERCIAL

## 2020-11-18 VITALS
OXYGEN SATURATION: 100 % | TEMPERATURE: 98 F | RESPIRATION RATE: 20 BRPM | SYSTOLIC BLOOD PRESSURE: 157 MMHG | BODY MASS INDEX: 38.56 KG/M2 | HEIGHT: 67 IN | DIASTOLIC BLOOD PRESSURE: 91 MMHG | HEART RATE: 71 BPM | WEIGHT: 245.69 LBS

## 2020-11-18 DIAGNOSIS — R11.0 NAUSEA: ICD-10-CM

## 2020-11-18 DIAGNOSIS — R10.13 EPIGASTRIC ABDOMINAL PAIN: Primary | ICD-10-CM

## 2020-11-18 LAB
ALBUMIN SERPL BCP-MCNC: 3.5 G/DL (ref 3.5–5.2)
ALP SERPL-CCNC: 88 U/L (ref 55–135)
ALT SERPL W/O P-5'-P-CCNC: 14 U/L (ref 10–44)
ANION GAP SERPL CALC-SCNC: 9 MMOL/L (ref 8–16)
AST SERPL-CCNC: 16 U/L (ref 10–40)
B-HCG UR QL: NEGATIVE
BASOPHILS # BLD AUTO: 0.04 K/UL (ref 0–0.2)
BASOPHILS NFR BLD: 0.5 % (ref 0–1.9)
BILIRUB SERPL-MCNC: 0.2 MG/DL (ref 0.1–1)
BILIRUB UR QL STRIP: NEGATIVE
BUN SERPL-MCNC: 12 MG/DL (ref 6–20)
CALCIUM SERPL-MCNC: 9.8 MG/DL (ref 8.7–10.5)
CHLORIDE SERPL-SCNC: 106 MMOL/L (ref 95–110)
CLARITY UR: CLEAR
CO2 SERPL-SCNC: 24 MMOL/L (ref 23–29)
COLOR UR: YELLOW
CREAT SERPL-MCNC: 0.8 MG/DL (ref 0.5–1.4)
CTP QC/QA: YES
DIFFERENTIAL METHOD: ABNORMAL
EOSINOPHIL # BLD AUTO: 0.1 K/UL (ref 0–0.5)
EOSINOPHIL NFR BLD: 1.5 % (ref 0–8)
ERYTHROCYTE [DISTWIDTH] IN BLOOD BY AUTOMATED COUNT: 21 % (ref 11.5–14.5)
EST. GFR  (AFRICAN AMERICAN): >60 ML/MIN/1.73 M^2
EST. GFR  (NON AFRICAN AMERICAN): >60 ML/MIN/1.73 M^2
GLUCOSE SERPL-MCNC: 89 MG/DL (ref 70–110)
GLUCOSE UR QL STRIP: NEGATIVE
HCT VFR BLD AUTO: 35.4 % (ref 37–48.5)
HGB BLD-MCNC: 10.1 G/DL (ref 12–16)
HGB UR QL STRIP: ABNORMAL
IMM GRANULOCYTES # BLD AUTO: 0.04 K/UL (ref 0–0.04)
IMM GRANULOCYTES NFR BLD AUTO: 0.5 % (ref 0–0.5)
KETONES UR QL STRIP: NEGATIVE
LEUKOCYTE ESTERASE UR QL STRIP: NEGATIVE
LIPASE SERPL-CCNC: 23 U/L (ref 4–60)
LYMPHOCYTES # BLD AUTO: 2.6 K/UL (ref 1–4.8)
LYMPHOCYTES NFR BLD: 29 % (ref 18–48)
MCH RBC QN AUTO: 21.4 PG (ref 27–31)
MCHC RBC AUTO-ENTMCNC: 28.5 G/DL (ref 32–36)
MCV RBC AUTO: 75 FL (ref 82–98)
MICROSCOPIC COMMENT: NORMAL
MONOCYTES # BLD AUTO: 0.6 K/UL (ref 0.3–1)
MONOCYTES NFR BLD: 6.5 % (ref 4–15)
NEUTROPHILS # BLD AUTO: 5.5 K/UL (ref 1.8–7.7)
NEUTROPHILS NFR BLD: 62 % (ref 38–73)
NITRITE UR QL STRIP: NEGATIVE
NRBC BLD-RTO: 0 /100 WBC
PH UR STRIP: 6 [PH] (ref 5–8)
PLATELET # BLD AUTO: 421 K/UL (ref 150–350)
PMV BLD AUTO: 10 FL (ref 9.2–12.9)
POTASSIUM SERPL-SCNC: 3.8 MMOL/L (ref 3.5–5.1)
PROT SERPL-MCNC: 6.6 G/DL (ref 6–8.4)
PROT UR QL STRIP: NEGATIVE
RBC # BLD AUTO: 4.73 M/UL (ref 4–5.4)
RBC #/AREA URNS HPF: 2 /HPF (ref 0–4)
SODIUM SERPL-SCNC: 139 MMOL/L (ref 136–145)
SP GR UR STRIP: >=1.03 (ref 1–1.03)
SQUAMOUS #/AREA URNS HPF: 4 /HPF
URN SPEC COLLECT METH UR: ABNORMAL
UROBILINOGEN UR STRIP-ACNC: NEGATIVE EU/DL
WBC # BLD AUTO: 8.82 K/UL (ref 3.9–12.7)
WBC #/AREA URNS HPF: 1 /HPF (ref 0–5)

## 2020-11-18 PROCEDURE — 99284 EMERGENCY DEPT VISIT MOD MDM: CPT | Mod: 25

## 2020-11-18 PROCEDURE — 96361 HYDRATE IV INFUSION ADD-ON: CPT

## 2020-11-18 PROCEDURE — 81025 URINE PREGNANCY TEST: CPT | Performed by: EMERGENCY MEDICINE

## 2020-11-18 PROCEDURE — 36415 COLL VENOUS BLD VENIPUNCTURE: CPT

## 2020-11-18 PROCEDURE — 96375 TX/PRO/DX INJ NEW DRUG ADDON: CPT

## 2020-11-18 PROCEDURE — 80053 COMPREHEN METABOLIC PANEL: CPT

## 2020-11-18 PROCEDURE — 96365 THER/PROPH/DIAG IV INF INIT: CPT

## 2020-11-18 PROCEDURE — 81000 URINALYSIS NONAUTO W/SCOPE: CPT

## 2020-11-18 PROCEDURE — 85025 COMPLETE CBC W/AUTO DIFF WBC: CPT

## 2020-11-18 PROCEDURE — 63600175 PHARM REV CODE 636 W HCPCS: Performed by: EMERGENCY MEDICINE

## 2020-11-18 PROCEDURE — 83690 ASSAY OF LIPASE: CPT

## 2020-11-18 PROCEDURE — 96374 THER/PROPH/DIAG INJ IV PUSH: CPT

## 2020-11-18 PROCEDURE — 25000003 PHARM REV CODE 250: Performed by: EMERGENCY MEDICINE

## 2020-11-18 RX ORDER — PROMETHAZINE HYDROCHLORIDE 25 MG/1
25 TABLET ORAL EVERY 6 HOURS PRN
Qty: 15 TABLET | Refills: 0 | Status: SHIPPED | OUTPATIENT
Start: 2020-11-18 | End: 2021-01-25

## 2020-11-18 RX ORDER — HYDROCODONE BITARTRATE AND ACETAMINOPHEN 5; 325 MG/1; MG/1
1 TABLET ORAL EVERY 8 HOURS PRN
Qty: 6 TABLET | Refills: 0 | Status: SHIPPED | OUTPATIENT
Start: 2020-11-18 | End: 2020-11-22 | Stop reason: SDUPTHER

## 2020-11-18 RX ORDER — HYDROMORPHONE HYDROCHLORIDE 2 MG/ML
1 INJECTION, SOLUTION INTRAMUSCULAR; INTRAVENOUS; SUBCUTANEOUS
Status: COMPLETED | OUTPATIENT
Start: 2020-11-18 | End: 2020-11-18

## 2020-11-18 RX ADMIN — PROMETHAZINE HYDROCHLORIDE 12.5 MG: 25 INJECTION INTRAMUSCULAR; INTRAVENOUS at 04:11

## 2020-11-18 RX ADMIN — HYDROMORPHONE HYDROCHLORIDE 1 MG: 2 INJECTION INTRAMUSCULAR; INTRAVENOUS; SUBCUTANEOUS at 04:11

## 2020-11-18 RX ADMIN — SODIUM CHLORIDE 1000 ML: 0.9 INJECTION, SOLUTION INTRAVENOUS at 04:11

## 2020-11-18 NOTE — Clinical Note
"Sonia"Dinorah Goldberg was seen and treated in our emergency department on 11/18/2020.  She may return to work on 11/19/2020.       If you have any questions or concerns, please don't hesitate to call.      hCina SOLITARIO Rn RN    "

## 2020-11-18 NOTE — ED PROVIDER NOTES
Encounter Date: 2020       History     Chief Complaint   Patient presents with    Abdominal Pain     all across upper abdomen. Started around 1 am. No vomiting or diarrhea but nausea.     This patient is a 34-year-old female with a past history of asthma, herniated disc, hypertension, IBS, insomnia, migraines, KERI, sleep apnea presenting complaints of epigastric abdominal pain, nausea starting approximately 3 hr prior to arrival.  She denies associated vomiting or diarrhea.  She denies fever, back pain, urinary symptoms, vaginal symptoms.  She reports she is scheduled to receive a HIDA scan in approximately 1 week.  She additionally states she is supposed to receive pill endoscopy within the next few weeks also.  She reports a history of gallbladder stones.  She denies taking anything for symptoms prior to arrival.        Review of patient's allergies indicates:   Allergen Reactions    Contrast media Anaphylaxis    Iodine and iodide containing products Anaphylaxis    Sulfa (sulfonamide antibiotics) Anaphylaxis and Hives    Iodinated contrast media Hives    Magnesium      Pt reporting she is allergic to magnesium citrate oral drink.     Morphine Hives    Adhesive Rash    Nut [tree nut] Hives     Past Medical History:   Diagnosis Date    Asthma     Heart palpitations     Herniated disc     Hypertension     IBS (irritable bowel syndrome)     Insomnia     Lower back pain     L5 S1 herniated disks    Migraine headache     Obstructive sleep apnea     Palpitations     and pvcs with stress.  Not on any meds.    Sleep apnea     history of.  Dont use cpap.  lost weight.     Past Surgical History:   Procedure Laterality Date    ABDOMINAL SURGERY           SECTION, CLASSIC       SECTION, LOW TRANSVERSE      COLONOSCOPY N/A 10/27/2020    Procedure: COLONOSCOPY;  Surgeon: Patito Vergara MD;  [facility];  Service: Endoscopy;  Laterality: N/A;    DILATION AND  CURETTAGE OF UTERUS      DILATION AND CURETTAGE OF UTERUS      perferated uterus during procedure    endometrioma      removed on right lower quadrant of uterus    epidural steriod injections  x 3    ESOPHAGOGASTRODUODENOSCOPY N/A 10/26/2020    Procedure: EGD (ESOPHAGOGASTRODUODENOSCOPY);  Surgeon: Enrike Garcia MD;  Location: [facility] ENDO;  Service: Endoscopy;  Laterality: N/A;    INTRALUMINAL GASTROINTESTINAL TRACT IMAGING VIA CAPSULE N/A 11/20/2020    Procedure: IMAGING PROCEDURE, GI TRACT, INTRALUMINAL, VIA CAPSULE;  Surgeon: Patito Vergara MD;  Location: [facility] ENDO;  Service: Endoscopy;  Laterality: N/A;    TONSILLECTOMY      TUBAL LIGATION       Family History   Problem Relation Age of Onset    Heart disease Mother     Hyperlipidemia Mother     Asthma Mother     Cancer Father     Heart disease Father     Hypertension Father     Hyperlipidemia Father     Arthritis Father     Diabetes Maternal Grandmother     Cancer Maternal Grandmother     Cancer Maternal Grandfather     Diabetes Paternal Grandfather     Breast cancer Maternal Aunt 40     Social History     Tobacco Use    Smoking status: Current Every Day Smoker     Types: Vaping with nicotine, Vaping w/o nicotine    Smokeless tobacco: Never Used   Substance Use Topics    Alcohol use: Yes     Comment: socially, occasionally    Drug use: No     Review of Systems   Constitutional: Negative for fever.   HENT: Negative for congestion.    Respiratory: Negative for shortness of breath.    Cardiovascular: Negative for chest pain.   Gastrointestinal: Positive for abdominal pain.   Genitourinary: Negative for dysuria.   Musculoskeletal: Negative for back pain.   Skin: Negative for rash.   Neurological: Negative for headaches.   Psychiatric/Behavioral: Negative for confusion.       Physical Exam     Initial Vitals [11/18/20 0340]   BP Pulse Resp Temp SpO2   (!) 157/91 71 20 98 °F (36.7 °C) 100 %      MAP       --         Physical  Exam    Nursing note and vitals reviewed.  Constitutional: She appears well-developed and well-nourished.   HENT:   Head: Normocephalic and atraumatic.   Eyes: Conjunctivae and EOM are normal.   Neck: Normal range of motion. Neck supple.   Cardiovascular: Normal rate and regular rhythm.   Pulmonary/Chest: Breath sounds normal. No respiratory distress. She has no wheezes.   Abdominal: Bowel sounds are normal. She exhibits no distension. There is abdominal tenderness. There is no rebound and no guarding.   Epigastric tenderness to palpation, Morrow sign negative, no guarding, no mass, abdomen is soft and nonsurgical   Musculoskeletal: Normal range of motion.   Neurological: She is alert and oriented to person, place, and time. She has normal strength. No sensory deficit. GCS score is 15. GCS eye subscore is 4. GCS verbal subscore is 5. GCS motor subscore is 6.   Skin: Skin is warm and dry.   Psychiatric: She has a normal mood and affect. Thought content normal.         ED Course   Procedures  Labs Reviewed   URINALYSIS, REFLEX TO URINE CULTURE - Abnormal; Notable for the following components:       Result Value    Specific Gravity, UA >=1.030 (*)     Occult Blood UA 2+ (*)     All other components within normal limits    Narrative:     Specimen Source->Urine   CBC W/ AUTO DIFFERENTIAL - Abnormal; Notable for the following components:    Hemoglobin 10.1 (*)     Hematocrit 35.4 (*)     MCV 75 (*)     MCH 21.4 (*)     MCHC 28.5 (*)     RDW 21.0 (*)     Platelets 421 (*)     All other components within normal limits   COMPREHENSIVE METABOLIC PANEL   LIPASE   URINALYSIS MICROSCOPIC    Narrative:     Specimen Source->Urine   POCT URINE PREGNANCY          Imaging Results          US Abdomen Limited (Final result)  Result time 11/18/20 08:06:49    Final result by Irineo Jackson Jr., MD (11/18/20 08:06:49)                 Impression:      Mild hepatomegaly.      Electronically signed by: Irineo Jackson  MD  Date:    11/18/2020  Time:    08:06             Narrative:    EXAMINATION:  US ABDOMEN LIMITED    CLINICAL HISTORY:  Abdominal Pain - Gallbladder;    TECHNIQUE:  Limited ultrasound of the right upper quadrant of the abdomen (including pancreas, liver, gallbladder, common bile duct, and spleen) was performed.    COMPARISON:  None.    FINDINGS:  Liver: Enlarged measuring 19.7 cm. Homogeneous echotexture. No focal hepatic lesions.    Gallbladder: No calculi, wall thickening, or pericholecystic fluid.  No sonographic Morrow's sign.    Biliary system: The common duct is not dilated, measuring 4.8 mm.  No intrahepatic ductal dilatation.    The right kidney measures 11.1 cm without mass or hydronephrosis    Miscellaneous: No upper abdominal ascites.                                 Medical Decision Making:   ED Management:  This patient was emergently received and assessed upon arrival.  Initial vital signs are stable.  Patient reporting epigastric and right upper quadrant abdominal pain.  There is no guarding.  Morrow sign negative.  Patient had full resolution of pain and nausea single dosing morphine and IV antiemetic here.  Screening labs were obtained and indicate persistent anemia not significantly changed from previous values.  Additional labs including LFTs, bilirubin are within normal limits.  Ultrasound does not indicate evidence of progressing gallbladder disease.  On reassessment patient continues to deny ongoing symptomatology. I do think she is stable for discharge. I discussed with patient and sig other that evaluation in the ED does not suggest any emergent or life threatening condition medical condition requiring immediate intervention beyond what was provided in the ED, and I believe patient is safe for discharge.  Regardless, an unremarkable evaluation in the ED does not preclude the development or presence of a serious of life threatening condition. As such, patient was instructed to return  immediately for any worsening or change in current symptoms.  She is strongly encouraged follow up with her gastroenterologist and her scheduled HIDA scan. Pt agreeable and she was discharged in stable condition with her sig other as a .                             Clinical Impression:     ICD-10-CM ICD-9-CM   1. Epigastric abdominal pain  R10.13 789.06   2. Nausea  R11.0 787.02                      Disposition:   Disposition: Discharged  Condition: Stable     ED Disposition Condition    Discharge Stable        ED Prescriptions     Medication Sig Dispense Start Date End Date Auth. Provider    promethazine (PHENERGAN) 25 MG tablet Take 1 tablet (25 mg total) by mouth every 6 (six) hours as needed for Nausea. 15 tablet 2020  Juan Roper MD    HYDROcodone-acetaminophen (NORCO) 5-325 mg per tablet () Take 1 tablet by mouth every 8 (eight) hours as needed for Pain. 6 tablet 2020 Juan Roper MD        Follow-up Information     Follow up With Specialties Details Why Contact Info    Patito Vergara MD Gastroenterology, Internal Medicine Schedule an appointment as soon as possible for a visit   [address]  [address]  [facility] [address]  [phone]                                         Juan Roper MD  20 0810

## 2020-11-20 ENCOUNTER — HOSPITAL ENCOUNTER (OUTPATIENT)
Facility: HOSPITAL | Age: 35
Discharge: HOME | End: 2020-11-20
Attending: INTERNAL MEDICINE | Admitting: INTERNAL MEDICINE
Payer: COMMERCIAL

## 2020-11-20 VITALS
RESPIRATION RATE: 18 BRPM | DIASTOLIC BLOOD PRESSURE: 101 MMHG | HEART RATE: 88 BPM | SYSTOLIC BLOOD PRESSURE: 140 MMHG | OXYGEN SATURATION: 99 % | TEMPERATURE: 98 F

## 2020-11-20 DIAGNOSIS — D50.9 IRON DEFICIENCY ANEMIA: ICD-10-CM

## 2020-11-20 PROCEDURE — 25000003 PHARM REV CODE 250: Performed by: INTERNAL MEDICINE

## 2020-11-20 PROCEDURE — 91110 GI TRC IMG INTRAL ESOPH-ILE: CPT | Performed by: INTERNAL MEDICINE

## 2020-11-20 RX ORDER — DEXTROMETHORPHAN/PSEUDOEPHED 2.5-7.5/.8
40 DROPS ORAL ONCE
Status: COMPLETED | OUTPATIENT
Start: 2020-11-20 | End: 2020-11-20

## 2020-11-20 RX ADMIN — Medication 40 MG: at 07:11

## 2020-11-20 NOTE — DISCHARGE INSTRUCTIONS
Capsule Discharge Instructions     You have just swallowed a capsule endoscope.  This contains information about what to expect over the next 8 hours.  Please call our office if you have severe or persistent abdominal or chest pain, fever, difficulty swallowing or if you have any questions.  Our phone number is [phone].    Time Capsule ingested:___________0730____    · You may drink clear liquids (water, apple juice) 4 hours after swallowing the capsule.  · You may eat a light meal 6 hours after swallowing the capsule.  Medications may be resumed at 6 hours after swallowing the capsule.  · Do not exercise, avoid heavy lifting.  You may walk, sit and lay down.  You can drive a car.  You may return to work, if you work allows avoiding unsuitable environments and /or physical movements.  · Avoid going near MRI machines and radio transmitters.  You may use a computer, cell phone, radio or stereo.  · Do not stand directly next to another person undergoing capsule endoscopy.  · Try not to touch the recorder or the sensor array leads.  Do not remove the leads before 8 hours.  · Avoid getting the data recorder or sensor array leads wet.  · You may loosen the belt to allow yourself to go to the bathroom.  Do not take the belt off until the 8 hours have passed.  · Observe the LED light on the data recorder at least every 15 minutes.  If the light stops blinking, document the time and call our office.  · Return the unit to the hospital at the completion of 8 hour time frame.    May have clear liquids at __________1130____    May have light snack and medications at _____1330_________    May remove the unit at _________1530_____    Return the unit to Registration Desk at the completion of the study.    Post Capsule Instructions     This information is what to expect over the next 2 days.  Please call us or your doctor if you have severe or persistent abdominal or chest pain, fever, difficulty swallowing, or if you have  any questions.  Our phone number is [phone].    · Pain:  Pain is uncommon following capsule endoscopy.  Should you feel sharp or persistent pain, please call your doctor's office.  · Nausea:  Nausea is also very uncommon and should it occur, please notify your doctor's office  · Diet:  You may eat and drink with no restrictions 8 hours after ingesting capsule.  · Activities:  Following the exam you may resume normal activities, including exercise.  · Medications:  You may resume your medications 6 hours after ingesting the capsule.  Do NOT make up doses you have missed, just resume your normal dosage.  · Further Testing:  Until the capsule passes, further testing which includes any type of MRI should be avoided.  If you have any type of MRI examination scheduled in the next 3 days, it should be postponed.  · The Capsule:  The capsule passes naturally in a bowel movement typically in about 24 hours.  Most likely, you will be unaware of its passage.  It does not need to be retrieved and can safely be flushed down the toilet.  Occasionally the capsule light will still be flashing when it passes.  Should you be concerned that the capsule didn't pass, in the absence of symptoms; an abdominal x-ray can be obtained after 7 days to confirm its passage.  · The  will make a beeping noise when finished.  It will shut off automatically.

## 2020-11-22 ENCOUNTER — HOSPITAL ENCOUNTER (EMERGENCY)
Facility: HOSPITAL | Age: 35
Discharge: HOME | End: 2020-11-22
Attending: EMERGENCY MEDICINE
Payer: COMMERCIAL

## 2020-11-22 VITALS
HEART RATE: 64 BPM | RESPIRATION RATE: 18 BRPM | TEMPERATURE: 98 F | BODY MASS INDEX: 38.4 KG/M2 | OXYGEN SATURATION: 99 % | DIASTOLIC BLOOD PRESSURE: 111 MMHG | HEIGHT: 67 IN | WEIGHT: 244.69 LBS | SYSTOLIC BLOOD PRESSURE: 154 MMHG

## 2020-11-22 DIAGNOSIS — R10.10 PAIN OF UPPER ABDOMEN: Primary | ICD-10-CM

## 2020-11-22 LAB
ALBUMIN SERPL BCP-MCNC: 3.6 G/DL (ref 3.5–5.2)
ALP SERPL-CCNC: 92 U/L (ref 55–135)
ALT SERPL W/O P-5'-P-CCNC: 12 U/L (ref 10–44)
ANION GAP SERPL CALC-SCNC: 13 MMOL/L (ref 8–16)
AST SERPL-CCNC: 15 U/L (ref 10–40)
BASOPHILS # BLD AUTO: 0.07 K/UL (ref 0–0.2)
BASOPHILS NFR BLD: 0.6 % (ref 0–1.9)
BILIRUB SERPL-MCNC: 0.2 MG/DL (ref 0.1–1)
BUN SERPL-MCNC: 9 MG/DL (ref 6–20)
CALCIUM SERPL-MCNC: 10 MG/DL (ref 8.7–10.5)
CHLORIDE SERPL-SCNC: 102 MMOL/L (ref 95–110)
CO2 SERPL-SCNC: 25 MMOL/L (ref 23–29)
CREAT SERPL-MCNC: 0.8 MG/DL (ref 0.5–1.4)
DIFFERENTIAL METHOD: ABNORMAL
EOSINOPHIL # BLD AUTO: 0.2 K/UL (ref 0–0.5)
EOSINOPHIL NFR BLD: 1.7 % (ref 0–8)
ERYTHROCYTE [DISTWIDTH] IN BLOOD BY AUTOMATED COUNT: 21.7 % (ref 11.5–14.5)
EST. GFR  (AFRICAN AMERICAN): >60 ML/MIN/1.73 M^2
EST. GFR  (NON AFRICAN AMERICAN): >60 ML/MIN/1.73 M^2
GLUCOSE SERPL-MCNC: 117 MG/DL (ref 70–110)
HCT VFR BLD AUTO: 38.7 % (ref 37–48.5)
HGB BLD-MCNC: 10.6 G/DL (ref 12–16)
IMM GRANULOCYTES # BLD AUTO: 0.04 K/UL (ref 0–0.04)
IMM GRANULOCYTES NFR BLD AUTO: 0.4 % (ref 0–0.5)
LIPASE SERPL-CCNC: 31 U/L (ref 4–60)
LYMPHOCYTES # BLD AUTO: 3.5 K/UL (ref 1–4.8)
LYMPHOCYTES NFR BLD: 31.1 % (ref 18–48)
MCH RBC QN AUTO: 20.7 PG (ref 27–31)
MCHC RBC AUTO-ENTMCNC: 27.4 G/DL (ref 32–36)
MCV RBC AUTO: 75 FL (ref 82–98)
MONOCYTES # BLD AUTO: 0.7 K/UL (ref 0.3–1)
MONOCYTES NFR BLD: 6.4 % (ref 4–15)
NEUTROPHILS # BLD AUTO: 6.7 K/UL (ref 1.8–7.7)
NEUTROPHILS NFR BLD: 59.8 % (ref 38–73)
NRBC BLD-RTO: 0 /100 WBC
PLATELET # BLD AUTO: 447 K/UL (ref 150–350)
PMV BLD AUTO: 10.3 FL (ref 9.2–12.9)
POTASSIUM SERPL-SCNC: 3.6 MMOL/L (ref 3.5–5.1)
PROT SERPL-MCNC: 7 G/DL (ref 6–8.4)
RBC # BLD AUTO: 5.13 M/UL (ref 4–5.4)
SODIUM SERPL-SCNC: 140 MMOL/L (ref 136–145)
WBC # BLD AUTO: 11.17 K/UL (ref 3.9–12.7)

## 2020-11-22 PROCEDURE — 96374 THER/PROPH/DIAG INJ IV PUSH: CPT

## 2020-11-22 PROCEDURE — 63600175 PHARM REV CODE 636 W HCPCS: Performed by: EMERGENCY MEDICINE

## 2020-11-22 PROCEDURE — 80053 COMPREHEN METABOLIC PANEL: CPT

## 2020-11-22 PROCEDURE — 36415 COLL VENOUS BLD VENIPUNCTURE: CPT

## 2020-11-22 PROCEDURE — 96375 TX/PRO/DX INJ NEW DRUG ADDON: CPT

## 2020-11-22 PROCEDURE — 83690 ASSAY OF LIPASE: CPT

## 2020-11-22 PROCEDURE — 85025 COMPLETE CBC W/AUTO DIFF WBC: CPT

## 2020-11-22 PROCEDURE — 25000003 PHARM REV CODE 250: Performed by: EMERGENCY MEDICINE

## 2020-11-22 PROCEDURE — 99284 EMERGENCY DEPT VISIT MOD MDM: CPT | Mod: 25

## 2020-11-22 RX ORDER — ONDANSETRON 4 MG/1
4 TABLET, ORALLY DISINTEGRATING ORAL EVERY 8 HOURS PRN
Qty: 30 TABLET | Refills: 0 | Status: SHIPPED | OUTPATIENT
Start: 2020-11-22 | End: 2021-04-21

## 2020-11-22 RX ORDER — OXYCODONE AND ACETAMINOPHEN 5; 325 MG/1; MG/1
1 TABLET ORAL
Status: COMPLETED | OUTPATIENT
Start: 2020-11-22 | End: 2020-11-22

## 2020-11-22 RX ORDER — DICYCLOMINE HYDROCHLORIDE 10 MG/1
20 CAPSULE ORAL
Status: COMPLETED | OUTPATIENT
Start: 2020-11-22 | End: 2020-11-22

## 2020-11-22 RX ORDER — HYDROCODONE BITARTRATE AND ACETAMINOPHEN 5; 325 MG/1; MG/1
1 TABLET ORAL EVERY 8 HOURS PRN
Qty: 9 TABLET | Refills: 0 | Status: ON HOLD | OUTPATIENT
Start: 2020-11-22 | End: 2020-11-28

## 2020-11-22 RX ORDER — HALOPERIDOL 5 MG/ML
5 INJECTION INTRAMUSCULAR
Status: COMPLETED | OUTPATIENT
Start: 2020-11-22 | End: 2020-11-22

## 2020-11-22 RX ORDER — DIPHENHYDRAMINE HYDROCHLORIDE 50 MG/ML
25 INJECTION INTRAMUSCULAR; INTRAVENOUS
Status: COMPLETED | OUTPATIENT
Start: 2020-11-22 | End: 2020-11-22

## 2020-11-22 RX ORDER — DICYCLOMINE HYDROCHLORIDE 20 MG/1
20 TABLET ORAL 2 TIMES DAILY
Qty: 20 TABLET | Refills: 0 | Status: SHIPPED | OUTPATIENT
Start: 2020-11-22 | End: 2020-12-22

## 2020-11-22 RX ADMIN — DIPHENHYDRAMINE HYDROCHLORIDE 25 MG: 50 INJECTION INTRAMUSCULAR; INTRAVENOUS at 06:11

## 2020-11-22 RX ADMIN — DICYCLOMINE HYDROCHLORIDE 20 MG: 10 CAPSULE ORAL at 08:11

## 2020-11-22 RX ADMIN — OXYCODONE HYDROCHLORIDE AND ACETAMINOPHEN 1 TABLET: 5; 325 TABLET ORAL at 08:11

## 2020-11-22 RX ADMIN — LIDOCAINE HYDROCHLORIDE: 20 SOLUTION ORAL; TOPICAL at 06:11

## 2020-11-22 RX ADMIN — HALOPERIDOL LACTATE 5 MG: 5 INJECTION, SOLUTION INTRAMUSCULAR at 06:11

## 2020-11-22 NOTE — ED PROVIDER NOTES
Encounter Date: 2020    SCRIBE #1 NOTE: Mignon SUGGS am scribing for, and in the presence of, Wood Lopez MD.       History     Chief Complaint   Patient presents with    Abdominal Pain     Time seen by provider: 6:02 AM on 2020    Sonia Goldberg is a 34 y.o. female with a PMHx of HTN, and iron deficiency anemia secondary to blood loss with iron transfusions, and IBS who presents to the ED with an onset of abdominal pain. The patient complains of middle abdominal pain radiating into her chest and back x1 week. She has associated nausea, vomiting, and diarrhea. She reports the pain onset is around 3 am every morning. The patient has had multiple ED visits regarding similar symptoms, along with follow up with a gastroenterologist. Patient had upper and lower scopes performed which did not show any source of active bleeding, gastritis, ulcers, or diverticulitis, but did have some hemorrhoids. She had a CT scan performed on 10/25/20, which was normal. She was also evaluated through US on 20, which was normal. She is directed to undergo video capsular endoscopy and HIDA with ejection fracture. Patient is on PPIs. She denies relief with Norco and Phenergan. The patient denies blood in stool, hematemesis, fever, dysuria, hematuria or any other symptoms at this time. PSHx of  sections, tubal ligation, dilation and curettage of uterus, endometrioma, EGD, and colonoscopy.    The history is provided by the patient and medical records.     Review of patient's allergies indicates:   Allergen Reactions    Contrast media Anaphylaxis    Iodine and iodide containing products Anaphylaxis    Sulfa (sulfonamide antibiotics) Anaphylaxis and Hives    Iodinated contrast media Hives    Magnesium      Pt reporting she is allergic to magnesium citrate oral drink.     Morphine Hives    Adhesive Rash    Nut [tree nut] Hives     Past Medical History:   Diagnosis Date    Asthma     Heart  palpitations     Herniated disc     Hypertension     IBS (irritable bowel syndrome)     Insomnia     Lower back pain     L5 S1 herniated disks    Migraine headache     Obstructive sleep apnea     Palpitations     and pvcs with stress.  Not on any meds.    Sleep apnea     history of.  Dont use cpap.  lost weight.     Past Surgical History:   Procedure Laterality Date    ABDOMINAL SURGERY           SECTION, CLASSIC       SECTION, LOW TRANSVERSE      COLONOSCOPY N/A 10/27/2020    Procedure: COLONOSCOPY;  Surgeon: Patito Vergara MD;  Location: [facility] ENDO;  Service: Endoscopy;  Laterality: N/A;    DILATION AND CURETTAGE OF UTERUS      DILATION AND CURETTAGE OF UTERUS      perferated uterus during procedure    endometrioma      removed on right lower quadrant of uterus    epidural steriod injections  x 3    ESOPHAGOGASTRODUODENOSCOPY N/A 10/26/2020    Procedure: EGD (ESOPHAGOGASTRODUODENOSCOPY);  Surgeon: Enrike Garcia MD;  Location: [facility] ENDO;  Service: Endoscopy;  Laterality: N/A;    TONSILLECTOMY      TUBAL LIGATION       Family History   Problem Relation Age of Onset    Heart disease Mother     Hyperlipidemia Mother     Asthma Mother     Cancer Father     Heart disease Father     Hypertension Father     Hyperlipidemia Father     Arthritis Father     Diabetes Maternal Grandmother     Cancer Maternal Grandmother     Cancer Maternal Grandfather     Diabetes Paternal Grandfather     Breast cancer Maternal Aunt 40     Social History     Tobacco Use    Smoking status: Current Every Day Smoker     Types: Vaping with nicotine, Vaping w/o nicotine    Smokeless tobacco: Never Used   Substance Use Topics    Alcohol use: Yes     Comment: socially, occasionally    Drug use: No     Review of Systems   Constitutional: Negative for fever.   HENT: Negative for sore throat.    Respiratory: Negative for shortness of breath.    Cardiovascular: Negative for chest pain.    Gastrointestinal: Positive for abdominal pain, diarrhea, nausea and vomiting. Negative for blood in stool.        - Hematemesis.   Genitourinary: Negative for dysuria and hematuria.   Musculoskeletal: Negative for back pain.   Skin: Negative for rash.   Neurological: Negative for weakness.   Hematological: Does not bruise/bleed easily.       Physical Exam     Initial Vitals [11/22/20 0546]   BP Pulse Resp Temp SpO2   (!) 151/93 90 (!) 24 97.8 °F (36.6 °C) 100 %      MAP       --         Physical Exam    Nursing note and vitals reviewed.  Constitutional: She appears well-developed.  Non-toxic appearance.   Nontoxic, well appearing female.    HENT:   Head: Normocephalic and atraumatic.   Eyes: EOM are normal. Pupils are equal, round, and reactive to light.   Neck: Neck supple.   Cardiovascular: Normal rate, regular rhythm, normal heart sounds and intact distal pulses. Exam reveals no gallop and no friction rub.    No murmur heard.  Pulmonary/Chest: Breath sounds normal. No respiratory distress. She has no decreased breath sounds. She has no wheezes. She has no rhonchi. She has no rales.   Abdominal: Soft. Bowel sounds are normal. She exhibits no distension. There is no abdominal tenderness.   Musculoskeletal: Normal range of motion.   Neurological: She is alert and oriented to person, place, and time.   Skin: Skin is warm and dry.   Psychiatric: She has a normal mood and affect.         ED Course   Procedures  Labs Reviewed   CBC W/ AUTO DIFFERENTIAL - Abnormal; Notable for the following components:       Result Value    Hemoglobin 10.6 (*)     MCV 75 (*)     MCH 20.7 (*)     MCHC 27.4 (*)     RDW 21.7 (*)     Platelets 447 (*)     All other components within normal limits   COMPREHENSIVE METABOLIC PANEL - Abnormal; Notable for the following components:    Glucose 117 (*)     All other components within normal limits   LIPASE          Imaging Results    None          Medical Decision Making:   History:   Old Medical  Records: I decided to obtain old medical records.  Clinical Tests:   Lab Tests: Ordered and Reviewed            Scribe Attestation:   Scribe #1: I performed the above scribed service and the documentation accurately describes the services I performed. I attest to the accuracy of the note.    I, Dr. Wood Lopez personally performed the services described in this documentation. All medical record entries made by the scribe were at my direction and in my presence.  I have reviewed the chart and agree that the record reflects my personal performance and is accurate and complete. Wood Lopez MD.  3:46 AM 11/24/2020    DISCLAIMER: This note was prepared with Dragon NaturallySpeaking voice recognition transcription software. Garbled syntax, mangled pronouns, and other bizarre constructions may be attributed to that software system         ED Course as of Nov 22 0840   Sun Nov 22, 2020   0652 Patient appears to be   Restless and nauseous and in pain.  According to nurse she had similar presentation last time.  I will get labs give Haldol and Zofran fluids and reassess.  Will repeat CBC CMP and lipase.  Apparently, patient is awaiting a HIDA scan as an outpatient.  She has bilateral upper quadrant abdominal tenderness or radiates upwards towards her chest.  I do not think this is cardiac in nature.  However, I will add on a troponin.    [JS]   0731 Liver function test appear to be normal as well as lipase.  Awaiting CBC.    [JS]   0740 Patient's LFTs are normal.  Hemoglobin is 10.6 and 38.7.  I do not think she needs a CT scan at this time.  She has a HIDA scan ordered for Wednesday.    [JS]      ED Course User Index  [JS] Wood Lopez MD            Clinical Impression:     ICD-10-CM ICD-9-CM   1. Pain of upper abdomen  R10.10 789.09                      Disposition:   Disposition: Discharged  Condition: Stable     ED Disposition Condition    Discharge Stable        ED Prescriptions     Medication Sig Dispense  Start Date End Date Auth. Provider    dicyclomine (BENTYL) 20 mg tablet Take 1 tablet (20 mg total) by mouth 2 (two) times daily. 20 tablet 11/22/2020 12/22/2020 Wood Lopez MD    ondansetron (ZOFRAN-ODT) 4 MG TbDL Take 1 tablet (4 mg total) by mouth every 8 (eight) hours as needed. 30 tablet 11/22/2020  Wood Lopez MD    HYDROcodone-acetaminophen (NORCO) 5-325 mg per tablet Take 1 tablet by mouth every 8 (eight) hours as needed for Pain. 9 tablet 11/22/2020 11/25/2020 Wood Lopez MD        Follow-up Information     Follow up With Specialties Details Why Contact Info        Follow-up with your gastroenterologist and make sure to get her HIDA scan on Wednesday.    Patito Vergara MD Gastroenterology, Internal Medicine Schedule an appointment as soon as possible for a visit  Return to the ER for persistent vomiting fevers increasing abdominal pain despite medications [address]  [address]  [place] [facility] [address]  [phone]                                         Wood Lopez MD  11/24/20 0345

## 2020-11-22 NOTE — ED TRIAGE NOTES
Seen Wednesday for same.  meds worked each day until this morning.  Worsening diarrhea and vomiting.

## 2020-11-22 NOTE — ED NOTES
Received report from Zeferino Mo RN. Assumed care of the pt at this time. Pt resting in bed. No needs at this time. Will continue to monitor.

## 2020-11-23 ENCOUNTER — PATIENT MESSAGE (OUTPATIENT)
Dept: GASTROENTEROLOGY | Facility: CLINIC | Age: 35
End: 2020-11-23

## 2020-11-24 ENCOUNTER — PATIENT MESSAGE (OUTPATIENT)
Dept: GASTROENTEROLOGY | Facility: CLINIC | Age: 35
End: 2020-11-24

## 2020-11-24 DIAGNOSIS — K80.20 CHOLELITHIASIS WITHOUT CHOLECYSTITIS: Primary | ICD-10-CM

## 2020-11-25 ENCOUNTER — HOSPITAL ENCOUNTER (OUTPATIENT)
Dept: RADIOLOGY | Facility: HOSPITAL | Age: 35
Discharge: HOME | DRG: 419 | End: 2020-11-25
Attending: INTERNAL MEDICINE
Payer: COMMERCIAL

## 2020-11-25 ENCOUNTER — PATIENT MESSAGE (OUTPATIENT)
Dept: GASTROENTEROLOGY | Facility: CLINIC | Age: 35
End: 2020-11-25

## 2020-11-25 ENCOUNTER — TELEPHONE (OUTPATIENT)
Dept: GASTROENTEROLOGY | Facility: HOSPITAL | Age: 35
End: 2020-11-25

## 2020-11-25 DIAGNOSIS — R10.84 GENERALIZED POSTPRANDIAL ABDOMINAL PAIN: ICD-10-CM

## 2020-11-25 PROCEDURE — 78226 HEPATOBILIARY SYSTEM IMAGING: CPT | Mod: 26,,, | Performed by: RADIOLOGY

## 2020-11-25 PROCEDURE — 78226 NM HEPATOBILIARY IMAGING INC GB (HIDA): ICD-10-PCS | Mod: 26,,, | Performed by: RADIOLOGY

## 2020-11-25 PROCEDURE — A9537 TC99M MEBROFENIN: HCPCS

## 2020-11-25 NOTE — TELEPHONE ENCOUNTER
Called patient due to abnormal HIDA scan showing nonvisualization of the gallbladder, no answer, left message for patient to go the ED     Patito Vergara MD

## 2020-11-26 ENCOUNTER — HOSPITAL ENCOUNTER (OUTPATIENT)
Facility: HOSPITAL | Age: 35
Discharge: HOME | DRG: 419 | End: 2020-11-28
Attending: EMERGENCY MEDICINE | Admitting: INTERNAL MEDICINE
Payer: COMMERCIAL

## 2020-11-26 DIAGNOSIS — K80.20 CHOLELITHIASES: ICD-10-CM

## 2020-11-26 DIAGNOSIS — E66.01 MORBID OBESITY: ICD-10-CM

## 2020-11-26 DIAGNOSIS — K80.00 CALCULUS OF GALLBLADDER WITH ACUTE CHOLECYSTITIS WITHOUT OBSTRUCTION: Primary | ICD-10-CM

## 2020-11-26 LAB
ALBUMIN SERPL BCP-MCNC: 3.4 G/DL (ref 3.5–5.2)
ALP SERPL-CCNC: 80 U/L (ref 55–135)
ALT SERPL W/O P-5'-P-CCNC: 13 U/L (ref 10–44)
ANION GAP SERPL CALC-SCNC: 10 MMOL/L (ref 8–16)
AST SERPL-CCNC: 14 U/L (ref 10–40)
BACTERIA #/AREA URNS HPF: ABNORMAL /HPF
BASOPHILS # BLD AUTO: 0.04 K/UL (ref 0–0.2)
BASOPHILS NFR BLD: 0.4 % (ref 0–1.9)
BILIRUB SERPL-MCNC: 0.2 MG/DL (ref 0.1–1)
BILIRUB UR QL STRIP: NEGATIVE
BUN SERPL-MCNC: 12 MG/DL (ref 6–20)
CALCIUM SERPL-MCNC: 9.7 MG/DL (ref 8.7–10.5)
CHLORIDE SERPL-SCNC: 102 MMOL/L (ref 95–110)
CLARITY UR: CLEAR
CO2 SERPL-SCNC: 25 MMOL/L (ref 23–29)
COLOR UR: YELLOW
CREAT SERPL-MCNC: 1 MG/DL (ref 0.5–1.4)
DIFFERENTIAL METHOD: ABNORMAL
EOSINOPHIL # BLD AUTO: 0.2 K/UL (ref 0–0.5)
EOSINOPHIL NFR BLD: 1.8 % (ref 0–8)
ERYTHROCYTE [DISTWIDTH] IN BLOOD BY AUTOMATED COUNT: 21.3 % (ref 11.5–14.5)
EST. GFR  (AFRICAN AMERICAN): >60 ML/MIN/1.73 M^2
EST. GFR  (NON AFRICAN AMERICAN): >60 ML/MIN/1.73 M^2
GLUCOSE SERPL-MCNC: 78 MG/DL (ref 70–110)
GLUCOSE UR QL STRIP: NEGATIVE
HCT VFR BLD AUTO: 35.1 % (ref 37–48.5)
HGB BLD-MCNC: 10.1 G/DL (ref 12–16)
HGB UR QL STRIP: ABNORMAL
IMM GRANULOCYTES # BLD AUTO: 0.03 K/UL (ref 0–0.04)
IMM GRANULOCYTES NFR BLD AUTO: 0.3 % (ref 0–0.5)
KETONES UR QL STRIP: NEGATIVE
LEUKOCYTE ESTERASE UR QL STRIP: ABNORMAL
LIPASE SERPL-CCNC: 27 U/L (ref 4–60)
LYMPHOCYTES # BLD AUTO: 3.2 K/UL (ref 1–4.8)
LYMPHOCYTES NFR BLD: 32.7 % (ref 18–48)
MCH RBC QN AUTO: 21.7 PG (ref 27–31)
MCHC RBC AUTO-ENTMCNC: 28.8 G/DL (ref 32–36)
MCV RBC AUTO: 75 FL (ref 82–98)
MICROSCOPIC COMMENT: ABNORMAL
MONOCYTES # BLD AUTO: 0.7 K/UL (ref 0.3–1)
MONOCYTES NFR BLD: 6.8 % (ref 4–15)
NEUTROPHILS # BLD AUTO: 5.7 K/UL (ref 1.8–7.7)
NEUTROPHILS NFR BLD: 58 % (ref 38–73)
NITRITE UR QL STRIP: NEGATIVE
NRBC BLD-RTO: 0 /100 WBC
PH UR STRIP: 6 [PH] (ref 5–8)
PLATELET # BLD AUTO: 355 K/UL (ref 150–350)
PMV BLD AUTO: 9.8 FL (ref 9.2–12.9)
POTASSIUM SERPL-SCNC: 3.9 MMOL/L (ref 3.5–5.1)
PROT SERPL-MCNC: 6.7 G/DL (ref 6–8.4)
PROT UR QL STRIP: NEGATIVE
RBC # BLD AUTO: 4.66 M/UL (ref 4–5.4)
RBC #/AREA URNS HPF: 6 /HPF (ref 0–4)
SARS-COV-2 RDRP RESP QL NAA+PROBE: NEGATIVE
SODIUM SERPL-SCNC: 137 MMOL/L (ref 136–145)
SP GR UR STRIP: 1.01 (ref 1–1.03)
SQUAMOUS #/AREA URNS HPF: 43 /HPF
URN SPEC COLLECT METH UR: ABNORMAL
UROBILINOGEN UR STRIP-ACNC: NEGATIVE EU/DL
WBC # BLD AUTO: 9.85 K/UL (ref 3.9–12.7)
WBC #/AREA URNS HPF: >100 /HPF (ref 0–5)

## 2020-11-26 PROCEDURE — 99285 EMERGENCY DEPT VISIT HI MDM: CPT | Mod: 25

## 2020-11-26 PROCEDURE — 85025 COMPLETE CBC W/AUTO DIFF WBC: CPT

## 2020-11-26 PROCEDURE — 87086 URINE CULTURE/COLONY COUNT: CPT

## 2020-11-26 PROCEDURE — 80053 COMPREHEN METABOLIC PANEL: CPT

## 2020-11-26 PROCEDURE — 96361 HYDRATE IV INFUSION ADD-ON: CPT

## 2020-11-26 PROCEDURE — 25000003 PHARM REV CODE 250: Performed by: EMERGENCY MEDICINE

## 2020-11-26 PROCEDURE — 96367 TX/PROPH/DG ADDL SEQ IV INF: CPT

## 2020-11-26 PROCEDURE — 36415 COLL VENOUS BLD VENIPUNCTURE: CPT

## 2020-11-26 PROCEDURE — 96365 THER/PROPH/DIAG IV INF INIT: CPT

## 2020-11-26 PROCEDURE — 25000003 PHARM REV CODE 250: Performed by: NURSE PRACTITIONER

## 2020-11-26 PROCEDURE — 63600175 PHARM REV CODE 636 W HCPCS: Performed by: EMERGENCY MEDICINE

## 2020-11-26 PROCEDURE — 63600175 PHARM REV CODE 636 W HCPCS: Performed by: NURSE PRACTITIONER

## 2020-11-26 PROCEDURE — 81000 URINALYSIS NONAUTO W/SCOPE: CPT

## 2020-11-26 PROCEDURE — 96375 TX/PRO/DX INJ NEW DRUG ADDON: CPT

## 2020-11-26 PROCEDURE — G0378 HOSPITAL OBSERVATION PER HR: HCPCS

## 2020-11-26 PROCEDURE — 83690 ASSAY OF LIPASE: CPT

## 2020-11-26 PROCEDURE — U0002 COVID-19 LAB TEST NON-CDC: HCPCS

## 2020-11-26 RX ORDER — SODIUM CHLORIDE 9 MG/ML
INJECTION, SOLUTION INTRAVENOUS CONTINUOUS
Status: DISCONTINUED | OUTPATIENT
Start: 2020-11-26 | End: 2020-11-28 | Stop reason: HOSPADM

## 2020-11-26 RX ORDER — POTASSIUM CHLORIDE 1.5 G/1.58G
40 POWDER, FOR SOLUTION ORAL
Status: DISCONTINUED | OUTPATIENT
Start: 2020-11-26 | End: 2020-11-28 | Stop reason: HOSPADM

## 2020-11-26 RX ORDER — VERAPAMIL HYDROCHLORIDE 180 MG/1
180 TABLET, FILM COATED, EXTENDED RELEASE ORAL DAILY
Status: CANCELLED | OUTPATIENT
Start: 2020-11-27

## 2020-11-26 RX ORDER — ALBUTEROL SULFATE 90 UG/1
2 AEROSOL, METERED RESPIRATORY (INHALATION) EVERY 6 HOURS PRN
Status: DISCONTINUED | OUTPATIENT
Start: 2020-11-26 | End: 2020-11-28 | Stop reason: HOSPADM

## 2020-11-26 RX ORDER — ONDANSETRON 2 MG/ML
8 INJECTION INTRAMUSCULAR; INTRAVENOUS EVERY 8 HOURS PRN
Status: DISCONTINUED | OUTPATIENT
Start: 2020-11-26 | End: 2020-11-28 | Stop reason: HOSPADM

## 2020-11-26 RX ORDER — HYDROMORPHONE HYDROCHLORIDE 2 MG/ML
1 INJECTION, SOLUTION INTRAMUSCULAR; INTRAVENOUS; SUBCUTANEOUS EVERY 6 HOURS PRN
Status: DISCONTINUED | OUTPATIENT
Start: 2020-11-27 | End: 2020-11-28 | Stop reason: HOSPADM

## 2020-11-26 RX ORDER — ACETAMINOPHEN 325 MG/1
650 TABLET ORAL EVERY 6 HOURS PRN
Status: DISCONTINUED | OUTPATIENT
Start: 2020-11-26 | End: 2020-11-28 | Stop reason: HOSPADM

## 2020-11-26 RX ORDER — HYDROMORPHONE HYDROCHLORIDE 2 MG/ML
1 INJECTION, SOLUTION INTRAMUSCULAR; INTRAVENOUS; SUBCUTANEOUS
Status: COMPLETED | OUTPATIENT
Start: 2020-11-26 | End: 2020-11-26

## 2020-11-26 RX ORDER — GLUCAGON 1 MG
1 KIT INJECTION
Status: DISCONTINUED | OUTPATIENT
Start: 2020-11-26 | End: 2020-11-28 | Stop reason: HOSPADM

## 2020-11-26 RX ORDER — IBUPROFEN 200 MG
16 TABLET ORAL
Status: DISCONTINUED | OUTPATIENT
Start: 2020-11-26 | End: 2020-11-28 | Stop reason: HOSPADM

## 2020-11-26 RX ORDER — IBUPROFEN 200 MG
24 TABLET ORAL
Status: DISCONTINUED | OUTPATIENT
Start: 2020-11-26 | End: 2020-11-28 | Stop reason: HOSPADM

## 2020-11-26 RX ORDER — TALC
9 POWDER (GRAM) TOPICAL NIGHTLY PRN
Status: DISCONTINUED | OUTPATIENT
Start: 2020-11-26 | End: 2020-11-28 | Stop reason: HOSPADM

## 2020-11-26 RX ORDER — BUPROPION HYDROCHLORIDE 150 MG/1
150 TABLET ORAL DAILY
Status: DISCONTINUED | OUTPATIENT
Start: 2020-11-27 | End: 2020-11-28 | Stop reason: HOSPADM

## 2020-11-26 RX ORDER — SODIUM CHLORIDE 0.9 % (FLUSH) 0.9 %
10 SYRINGE (ML) INJECTION
Status: DISCONTINUED | OUTPATIENT
Start: 2020-11-26 | End: 2020-11-28 | Stop reason: HOSPADM

## 2020-11-26 RX ADMIN — HYDROMORPHONE HYDROCHLORIDE 1 MG: 2 INJECTION INTRAMUSCULAR; INTRAVENOUS; SUBCUTANEOUS at 10:11

## 2020-11-26 RX ADMIN — ONDANSETRON 8 MG: 2 INJECTION INTRAMUSCULAR; INTRAVENOUS at 10:11

## 2020-11-26 RX ADMIN — HYDROMORPHONE HYDROCHLORIDE 1 MG: 2 INJECTION INTRAMUSCULAR; INTRAVENOUS; SUBCUTANEOUS at 06:11

## 2020-11-26 RX ADMIN — SODIUM CHLORIDE 1000 ML: 0.9 INJECTION, SOLUTION INTRAVENOUS at 06:11

## 2020-11-26 RX ADMIN — PROMETHAZINE HYDROCHLORIDE 12.5 MG: 25 INJECTION INTRAMUSCULAR; INTRAVENOUS at 06:11

## 2020-11-26 RX ADMIN — SODIUM CHLORIDE: 0.9 INJECTION, SOLUTION INTRAVENOUS at 09:11

## 2020-11-26 RX ADMIN — PIPERACILLIN AND TAZOBACTAM 4.5 G: 4; .5 INJECTION, POWDER, LYOPHILIZED, FOR SOLUTION INTRAVENOUS; PARENTERAL at 07:11

## 2020-11-27 ENCOUNTER — ANESTHESIA EVENT (OUTPATIENT)
Dept: SURGERY | Facility: HOSPITAL | Age: 35
DRG: 419 | End: 2020-11-27
Payer: COMMERCIAL

## 2020-11-27 ENCOUNTER — ANESTHESIA (OUTPATIENT)
Dept: SURGERY | Facility: HOSPITAL | Age: 35
DRG: 419 | End: 2020-11-27
Payer: COMMERCIAL

## 2020-11-27 PROBLEM — K80.00 CALCULUS OF GALLBLADDER WITH ACUTE CHOLECYSTITIS WITHOUT OBSTRUCTION: Status: ACTIVE | Noted: 2020-11-26

## 2020-11-27 PROBLEM — K80.00 ACUTE CALCULOUS CHOLECYSTITIS: Status: ACTIVE | Noted: 2020-11-27

## 2020-11-27 LAB
ALBUMIN SERPL BCP-MCNC: 2.8 G/DL (ref 3.5–5.2)
ALP SERPL-CCNC: 64 U/L (ref 55–135)
ALT SERPL W/O P-5'-P-CCNC: 10 U/L (ref 10–44)
ANION GAP SERPL CALC-SCNC: 6 MMOL/L (ref 8–16)
AST SERPL-CCNC: 14 U/L (ref 10–40)
B-HCG UR QL: NEGATIVE
BASOPHILS # BLD AUTO: 0.03 K/UL (ref 0–0.2)
BASOPHILS NFR BLD: 0.3 % (ref 0–1.9)
BILIRUB SERPL-MCNC: 0.2 MG/DL (ref 0.1–1)
BUN SERPL-MCNC: 11 MG/DL (ref 6–20)
CALCIUM SERPL-MCNC: 8.6 MG/DL (ref 8.7–10.5)
CHLORIDE SERPL-SCNC: 107 MMOL/L (ref 95–110)
CO2 SERPL-SCNC: 23 MMOL/L (ref 23–29)
CREAT SERPL-MCNC: 0.7 MG/DL (ref 0.5–1.4)
CTP QC/QA: YES
DIFFERENTIAL METHOD: ABNORMAL
EOSINOPHIL # BLD AUTO: 0.2 K/UL (ref 0–0.5)
EOSINOPHIL NFR BLD: 2 % (ref 0–8)
ERYTHROCYTE [DISTWIDTH] IN BLOOD BY AUTOMATED COUNT: 21.3 % (ref 11.5–14.5)
EST. GFR  (AFRICAN AMERICAN): >60 ML/MIN/1.73 M^2
EST. GFR  (NON AFRICAN AMERICAN): >60 ML/MIN/1.73 M^2
GLUCOSE SERPL-MCNC: 79 MG/DL (ref 70–110)
HCT VFR BLD AUTO: 32.9 % (ref 37–48.5)
HGB BLD-MCNC: 9.4 G/DL (ref 12–16)
IMM GRANULOCYTES # BLD AUTO: 0.02 K/UL (ref 0–0.04)
IMM GRANULOCYTES NFR BLD AUTO: 0.2 % (ref 0–0.5)
LYMPHOCYTES # BLD AUTO: 2.8 K/UL (ref 1–4.8)
LYMPHOCYTES NFR BLD: 31.1 % (ref 18–48)
MAGNESIUM SERPL-MCNC: 2 MG/DL (ref 1.6–2.6)
MCH RBC QN AUTO: 21.7 PG (ref 27–31)
MCHC RBC AUTO-ENTMCNC: 28.6 G/DL (ref 32–36)
MCV RBC AUTO: 76 FL (ref 82–98)
MONOCYTES # BLD AUTO: 0.5 K/UL (ref 0.3–1)
MONOCYTES NFR BLD: 5.6 % (ref 4–15)
NEUTROPHILS # BLD AUTO: 5.5 K/UL (ref 1.8–7.7)
NEUTROPHILS NFR BLD: 60.8 % (ref 38–73)
NRBC BLD-RTO: 0 /100 WBC
PHOSPHATE SERPL-MCNC: 2.8 MG/DL (ref 2.7–4.5)
PLATELET # BLD AUTO: 291 K/UL (ref 150–350)
PMV BLD AUTO: 10.1 FL (ref 9.2–12.9)
POTASSIUM SERPL-SCNC: 3.9 MMOL/L (ref 3.5–5.1)
PROT SERPL-MCNC: 5.5 G/DL (ref 6–8.4)
RBC # BLD AUTO: 4.34 M/UL (ref 4–5.4)
SODIUM SERPL-SCNC: 136 MMOL/L (ref 136–145)
WBC # BLD AUTO: 8.97 K/UL (ref 3.9–12.7)

## 2020-11-27 PROCEDURE — 25000003 PHARM REV CODE 250: Performed by: REGISTERED NURSE

## 2020-11-27 PROCEDURE — 99222 PR INITIAL HOSPITAL CARE,LEVL II: ICD-10-PCS | Mod: 57,,, | Performed by: SURGERY

## 2020-11-27 PROCEDURE — 25000003 PHARM REV CODE 250: Performed by: ANESTHESIOLOGY

## 2020-11-27 PROCEDURE — 63600175 PHARM REV CODE 636 W HCPCS: Performed by: ANESTHESIOLOGY

## 2020-11-27 PROCEDURE — 11000001 HC ACUTE MED/SURG PRIVATE ROOM

## 2020-11-27 PROCEDURE — 36415 COLL VENOUS BLD VENIPUNCTURE: CPT

## 2020-11-27 PROCEDURE — 25000003 PHARM REV CODE 250: Performed by: SURGERY

## 2020-11-27 PROCEDURE — 37000008 HC ANESTHESIA 1ST 15 MINUTES: Performed by: SURGERY

## 2020-11-27 PROCEDURE — 63600175 PHARM REV CODE 636 W HCPCS: Performed by: SURGERY

## 2020-11-27 PROCEDURE — D9220A PRA ANESTHESIA: Mod: ANES,,, | Performed by: ANESTHESIOLOGY

## 2020-11-27 PROCEDURE — 27201423 OPTIME MED/SURG SUP & DEVICES STERILE SUPPLY: Performed by: SURGERY

## 2020-11-27 PROCEDURE — 88304 PR  SURG PATH,LEVEL III: ICD-10-PCS | Mod: 26,,, | Performed by: PATHOLOGY

## 2020-11-27 PROCEDURE — 25000242 PHARM REV CODE 250 ALT 637 W/ HCPCS: Performed by: ANESTHESIOLOGY

## 2020-11-27 PROCEDURE — D9220A PRA ANESTHESIA: Mod: CRNA,,, | Performed by: REGISTERED NURSE

## 2020-11-27 PROCEDURE — G0378 HOSPITAL OBSERVATION PER HR: HCPCS

## 2020-11-27 PROCEDURE — 99222 1ST HOSP IP/OBS MODERATE 55: CPT | Mod: 57,,, | Performed by: SURGERY

## 2020-11-27 PROCEDURE — 47562 LAPAROSCOPIC CHOLECYSTECTOMY: CPT | Mod: ,,, | Performed by: SURGERY

## 2020-11-27 PROCEDURE — 63600175 PHARM REV CODE 636 W HCPCS: Performed by: REGISTERED NURSE

## 2020-11-27 PROCEDURE — D9220A PRA ANESTHESIA: ICD-10-PCS | Mod: ANES,,, | Performed by: ANESTHESIOLOGY

## 2020-11-27 PROCEDURE — 85025 COMPLETE CBC W/AUTO DIFF WBC: CPT

## 2020-11-27 PROCEDURE — 80053 COMPREHEN METABOLIC PANEL: CPT

## 2020-11-27 PROCEDURE — 25000003 PHARM REV CODE 250: Performed by: NURSE PRACTITIONER

## 2020-11-27 PROCEDURE — 88304 TISSUE EXAM BY PATHOLOGIST: CPT | Mod: 26,,, | Performed by: PATHOLOGY

## 2020-11-27 PROCEDURE — C9290 INJ, BUPIVACAINE LIPOSOME: HCPCS | Performed by: SURGERY

## 2020-11-27 PROCEDURE — 94761 N-INVAS EAR/PLS OXIMETRY MLT: CPT

## 2020-11-27 PROCEDURE — 47562 PR LAP,CHOLECYSTECTOMY: ICD-10-PCS | Mod: ,,, | Performed by: SURGERY

## 2020-11-27 PROCEDURE — 81025 URINE PREGNANCY TEST: CPT | Performed by: ANESTHESIOLOGY

## 2020-11-27 PROCEDURE — 36000708 HC OR TIME LEV III 1ST 15 MIN: Performed by: SURGERY

## 2020-11-27 PROCEDURE — 63600175 PHARM REV CODE 636 W HCPCS: Performed by: NURSE PRACTITIONER

## 2020-11-27 PROCEDURE — 99900103 DSU ONLY-NO CHARGE-INITIAL HR (STAT): Performed by: SURGERY

## 2020-11-27 PROCEDURE — 71000033 HC RECOVERY, INTIAL HOUR: Performed by: SURGERY

## 2020-11-27 PROCEDURE — 25000003 PHARM REV CODE 250: Performed by: INTERNAL MEDICINE

## 2020-11-27 PROCEDURE — 37000009 HC ANESTHESIA EA ADD 15 MINS: Performed by: SURGERY

## 2020-11-27 PROCEDURE — D9220A PRA ANESTHESIA: ICD-10-PCS | Mod: CRNA,,, | Performed by: REGISTERED NURSE

## 2020-11-27 PROCEDURE — 84100 ASSAY OF PHOSPHORUS: CPT

## 2020-11-27 PROCEDURE — 94799 UNLISTED PULMONARY SVC/PX: CPT

## 2020-11-27 PROCEDURE — 99900035 HC TECH TIME PER 15 MIN (STAT)

## 2020-11-27 PROCEDURE — 36000709 HC OR TIME LEV III EA ADD 15 MIN: Performed by: SURGERY

## 2020-11-27 PROCEDURE — 88304 TISSUE EXAM BY PATHOLOGIST: CPT | Performed by: PATHOLOGY

## 2020-11-27 PROCEDURE — 71000039 HC RECOVERY, EACH ADD'L HOUR: Performed by: SURGERY

## 2020-11-27 PROCEDURE — 94640 AIRWAY INHALATION TREATMENT: CPT

## 2020-11-27 PROCEDURE — 83735 ASSAY OF MAGNESIUM: CPT

## 2020-11-27 RX ORDER — SODIUM CHLORIDE 0.9 % (FLUSH) 0.9 %
3 SYRINGE (ML) INJECTION
Status: DISCONTINUED | OUTPATIENT
Start: 2020-11-27 | End: 2020-11-27 | Stop reason: HOSPADM

## 2020-11-27 RX ORDER — ROCURONIUM BROMIDE 10 MG/ML
INJECTION, SOLUTION INTRAVENOUS
Status: DISCONTINUED | OUTPATIENT
Start: 2020-11-27 | End: 2020-11-27

## 2020-11-27 RX ORDER — BUPIVACAINE HYDROCHLORIDE 2.5 MG/ML
INJECTION, SOLUTION EPIDURAL; INFILTRATION; INTRACAUDAL
Status: DISCONTINUED | OUTPATIENT
Start: 2020-11-27 | End: 2020-11-27 | Stop reason: HOSPADM

## 2020-11-27 RX ORDER — KETOROLAC TROMETHAMINE 30 MG/ML
INJECTION, SOLUTION INTRAMUSCULAR; INTRAVENOUS
Status: DISCONTINUED | OUTPATIENT
Start: 2020-11-27 | End: 2020-11-27

## 2020-11-27 RX ORDER — OXYCODONE HYDROCHLORIDE 5 MG/1
5 TABLET ORAL
Status: DISCONTINUED | OUTPATIENT
Start: 2020-11-27 | End: 2020-11-27 | Stop reason: HOSPADM

## 2020-11-27 RX ORDER — DIPHENHYDRAMINE HYDROCHLORIDE 50 MG/ML
25 INJECTION INTRAMUSCULAR; INTRAVENOUS EVERY 6 HOURS PRN
Status: DISCONTINUED | OUTPATIENT
Start: 2020-11-27 | End: 2020-11-27 | Stop reason: HOSPADM

## 2020-11-27 RX ORDER — FENTANYL CITRATE 50 UG/ML
INJECTION, SOLUTION INTRAMUSCULAR; INTRAVENOUS
Status: DISCONTINUED | OUTPATIENT
Start: 2020-11-27 | End: 2020-11-27

## 2020-11-27 RX ORDER — HYDROCODONE BITARTRATE AND ACETAMINOPHEN 5; 325 MG/1; MG/1
1 TABLET ORAL EVERY 4 HOURS PRN
Status: DISCONTINUED | OUTPATIENT
Start: 2020-11-27 | End: 2020-11-28 | Stop reason: HOSPADM

## 2020-11-27 RX ORDER — ACETAMINOPHEN 10 MG/ML
INJECTION, SOLUTION INTRAVENOUS
Status: DISCONTINUED | OUTPATIENT
Start: 2020-11-27 | End: 2020-11-27

## 2020-11-27 RX ORDER — PROPOFOL 10 MG/ML
VIAL (ML) INTRAVENOUS
Status: DISCONTINUED | OUTPATIENT
Start: 2020-11-27 | End: 2020-11-27

## 2020-11-27 RX ORDER — LEVALBUTEROL 1.25 MG/.5ML
1.25 SOLUTION, CONCENTRATE RESPIRATORY (INHALATION)
Status: COMPLETED | OUTPATIENT
Start: 2020-11-27 | End: 2020-11-27

## 2020-11-27 RX ORDER — MIDAZOLAM HYDROCHLORIDE 1 MG/ML
INJECTION, SOLUTION INTRAMUSCULAR; INTRAVENOUS
Status: DISCONTINUED | OUTPATIENT
Start: 2020-11-27 | End: 2020-11-27

## 2020-11-27 RX ORDER — NEOSTIGMINE METHYLSULFATE 1 MG/ML
INJECTION, SOLUTION INTRAVENOUS
Status: DISCONTINUED | OUTPATIENT
Start: 2020-11-27 | End: 2020-11-27

## 2020-11-27 RX ORDER — HYDROMORPHONE HYDROCHLORIDE 2 MG/ML
0.2 INJECTION, SOLUTION INTRAMUSCULAR; INTRAVENOUS; SUBCUTANEOUS EVERY 5 MIN PRN
Status: DISCONTINUED | OUTPATIENT
Start: 2020-11-27 | End: 2020-11-27 | Stop reason: HOSPADM

## 2020-11-27 RX ORDER — SODIUM CHLORIDE 0.9 % (FLUSH) 0.9 %
3 SYRINGE (ML) INJECTION EVERY 8 HOURS
Status: DISCONTINUED | OUTPATIENT
Start: 2020-11-27 | End: 2020-11-27 | Stop reason: HOSPADM

## 2020-11-27 RX ORDER — FENTANYL CITRATE 50 UG/ML
25 INJECTION, SOLUTION INTRAMUSCULAR; INTRAVENOUS EVERY 5 MIN PRN
Status: COMPLETED | OUTPATIENT
Start: 2020-11-27 | End: 2020-11-27

## 2020-11-27 RX ORDER — MUPIROCIN 20 MG/G
1 OINTMENT TOPICAL 2 TIMES DAILY
Status: DISCONTINUED | OUTPATIENT
Start: 2020-11-27 | End: 2020-11-28 | Stop reason: HOSPADM

## 2020-11-27 RX ORDER — ONDANSETRON 4 MG/1
8 TABLET, ORALLY DISINTEGRATING ORAL EVERY 8 HOURS PRN
Status: DISCONTINUED | OUTPATIENT
Start: 2020-11-27 | End: 2020-11-28 | Stop reason: HOSPADM

## 2020-11-27 RX ORDER — SUCCINYLCHOLINE CHLORIDE 20 MG/ML
INJECTION INTRAMUSCULAR; INTRAVENOUS
Status: DISCONTINUED | OUTPATIENT
Start: 2020-11-27 | End: 2020-11-27

## 2020-11-27 RX ORDER — DEXAMETHASONE SODIUM PHOSPHATE 4 MG/ML
INJECTION, SOLUTION INTRA-ARTICULAR; INTRALESIONAL; INTRAMUSCULAR; INTRAVENOUS; SOFT TISSUE
Status: DISCONTINUED | OUTPATIENT
Start: 2020-11-27 | End: 2020-11-27

## 2020-11-27 RX ORDER — ONDANSETRON 2 MG/ML
4 INJECTION INTRAMUSCULAR; INTRAVENOUS EVERY 12 HOURS PRN
Status: DISCONTINUED | OUTPATIENT
Start: 2020-11-27 | End: 2020-11-28 | Stop reason: HOSPADM

## 2020-11-27 RX ORDER — LIDOCAINE HYDROCHLORIDE 20 MG/ML
INJECTION INTRAVENOUS
Status: DISCONTINUED | OUTPATIENT
Start: 2020-11-27 | End: 2020-11-27

## 2020-11-27 RX ORDER — ONDANSETRON 2 MG/ML
4 INJECTION INTRAMUSCULAR; INTRAVENOUS DAILY PRN
Status: DISCONTINUED | OUTPATIENT
Start: 2020-11-27 | End: 2020-11-27 | Stop reason: HOSPADM

## 2020-11-27 RX ADMIN — PIPERACILLIN SODIUM AND TAZOBACTAM SODIUM 4.5 G: 4; .5 INJECTION, POWDER, LYOPHILIZED, FOR SOLUTION INTRAVENOUS at 10:11

## 2020-11-27 RX ADMIN — FENTANYL CITRATE 25 MCG: 50 INJECTION INTRAMUSCULAR; INTRAVENOUS at 12:11

## 2020-11-27 RX ADMIN — DEXAMETHASONE SODIUM PHOSPHATE 4 MG: 4 INJECTION, SOLUTION INTRA-ARTICULAR; INTRALESIONAL; INTRAMUSCULAR; INTRAVENOUS; SOFT TISSUE at 10:11

## 2020-11-27 RX ADMIN — FENTANYL CITRATE 50 MCG: 50 INJECTION, SOLUTION INTRAMUSCULAR; INTRAVENOUS at 10:11

## 2020-11-27 RX ADMIN — MUPIROCIN 1 G: 20 OINTMENT TOPICAL at 08:11

## 2020-11-27 RX ADMIN — MIDAZOLAM 1 MG: 1 INJECTION INTRAMUSCULAR; INTRAVENOUS at 10:11

## 2020-11-27 RX ADMIN — SODIUM CHLORIDE, SODIUM GLUCONATE, SODIUM ACETATE, POTASSIUM CHLORIDE, MAGNESIUM CHLORIDE, SODIUM PHOSPHATE, DIBASIC, AND POTASSIUM PHOSPHATE: .53; .5; .37; .037; .03; .012; .00082 INJECTION, SOLUTION INTRAVENOUS at 10:11

## 2020-11-27 RX ADMIN — ONDANSETRON 8 MG: 2 INJECTION INTRAMUSCULAR; INTRAVENOUS at 08:11

## 2020-11-27 RX ADMIN — FENTANYL CITRATE 25 MCG: 50 INJECTION INTRAMUSCULAR; INTRAVENOUS at 01:11

## 2020-11-27 RX ADMIN — KETOROLAC TROMETHAMINE 30 MG: 30 INJECTION, SOLUTION INTRAMUSCULAR; INTRAVENOUS at 11:11

## 2020-11-27 RX ADMIN — HYDROCODONE BITARTRATE AND ACETAMINOPHEN 1 TABLET: 5; 325 TABLET ORAL at 06:11

## 2020-11-27 RX ADMIN — ROCURONIUM BROMIDE 5 MG: 10 INJECTION, SOLUTION INTRAVENOUS at 10:11

## 2020-11-27 RX ADMIN — PIPERACILLIN SODIUM AND TAZOBACTAM SODIUM 4.5 G: 4; .5 INJECTION, POWDER, LYOPHILIZED, FOR SOLUTION INTRAVENOUS at 03:11

## 2020-11-27 RX ADMIN — FENTANYL CITRATE 50 MCG: 50 INJECTION, SOLUTION INTRAMUSCULAR; INTRAVENOUS at 11:11

## 2020-11-27 RX ADMIN — BUPROPION HYDROCHLORIDE 150 MG: 150 TABLET, FILM COATED, EXTENDED RELEASE ORAL at 02:11

## 2020-11-27 RX ADMIN — HYDROMORPHONE HYDROCHLORIDE 1 MG: 2 INJECTION INTRAMUSCULAR; INTRAVENOUS; SUBCUTANEOUS at 05:11

## 2020-11-27 RX ADMIN — OXYCODONE HYDROCHLORIDE 5 MG: 5 TABLET ORAL at 01:11

## 2020-11-27 RX ADMIN — ONDANSETRON 4 MG: 2 INJECTION INTRAMUSCULAR; INTRAVENOUS at 01:11

## 2020-11-27 RX ADMIN — ACETAMINOPHEN 1000 MG: 10 INJECTION, SOLUTION INTRAVENOUS at 11:11

## 2020-11-27 RX ADMIN — SODIUM CHLORIDE, SODIUM GLUCONATE, SODIUM ACETATE, POTASSIUM CHLORIDE, MAGNESIUM CHLORIDE, SODIUM PHOSPHATE, DIBASIC, AND POTASSIUM PHOSPHATE: .53; .5; .37; .037; .03; .012; .00082 INJECTION, SOLUTION INTRAVENOUS at 12:11

## 2020-11-27 RX ADMIN — MIDAZOLAM 2 MG: 1 INJECTION INTRAMUSCULAR; INTRAVENOUS at 10:11

## 2020-11-27 RX ADMIN — SUCCINYLCHOLINE CHLORIDE 160 MG: 20 INJECTION, SOLUTION INTRAMUSCULAR; INTRAVENOUS; PARENTERAL at 10:11

## 2020-11-27 RX ADMIN — LEVALBUTEROL HYDROCHLORIDE 1.25 MG: 1.25 SOLUTION, CONCENTRATE RESPIRATORY (INHALATION) at 10:11

## 2020-11-27 RX ADMIN — SODIUM CHLORIDE, SODIUM GLUCONATE, SODIUM ACETATE, POTASSIUM CHLORIDE, MAGNESIUM CHLORIDE, SODIUM PHOSPHATE, DIBASIC, AND POTASSIUM PHOSPHATE: .53; .5; .37; .037; .03; .012; .00082 INJECTION, SOLUTION INTRAVENOUS at 11:11

## 2020-11-27 RX ADMIN — HYDROMORPHONE HYDROCHLORIDE 1 MG: 2 INJECTION INTRAMUSCULAR; INTRAVENOUS; SUBCUTANEOUS at 02:11

## 2020-11-27 RX ADMIN — GLYCOPYRROLATE 0.4 MG: 0.2 INJECTION, SOLUTION INTRAMUSCULAR; INTRAVENOUS at 11:11

## 2020-11-27 RX ADMIN — HYDROMORPHONE HYDROCHLORIDE 1 MG: 2 INJECTION INTRAMUSCULAR; INTRAVENOUS; SUBCUTANEOUS at 11:11

## 2020-11-27 RX ADMIN — PROPOFOL 200 MG: 10 INJECTION, EMULSION INTRAVENOUS at 10:11

## 2020-11-27 RX ADMIN — ROCURONIUM BROMIDE 25 MG: 10 INJECTION, SOLUTION INTRAVENOUS at 11:11

## 2020-11-27 RX ADMIN — PIPERACILLIN SODIUM AND TAZOBACTAM SODIUM 4.5 G: 4; .5 INJECTION, POWDER, LYOPHILIZED, FOR SOLUTION INTRAVENOUS at 06:11

## 2020-11-27 RX ADMIN — NEOSTIGMINE METHYLSULFATE 4 MG: 1 INJECTION INTRAVENOUS at 11:11

## 2020-11-27 RX ADMIN — LIDOCAINE HYDROCHLORIDE 75 MG: 20 INJECTION, SOLUTION INTRAVENOUS at 10:11

## 2020-11-27 NOTE — TRANSFER OF CARE
"Anesthesia Transfer of Care Note    Patient: Sonia Goldberg    Procedure(s) Performed: Procedure(s) (LRB):  CHOLECYSTECTOMY, LAPAROSCOPIC (N/A)    Patient location: PACU    Anesthesia Type: general    Transport from OR: Transported from OR on 6-10 L/min O2 by face mask with adequate spontaneous ventilation    Post pain: adequate analgesia    Post assessment: tolerated procedure well and no apparent anesthetic complications    Post vital signs: stable    Level of consciousness: sedated    Nausea/Vomiting: no nausea/vomiting    Complications: none    Transfer of care protocol was followed      Last vitals:   Visit Vitals  BP (!) 114/57 (BP Location: Left arm, Patient Position: Lying)   Pulse 63   Temp 36.8 °C (98.2 °F) (Temporal)   Resp 16   Ht 5' 7" (1.702 m)   Wt 112 kg (247 lb)   LMP 11/11/2020 (Exact Date)   SpO2 96%   Breastfeeding No   BMI 38.69 kg/m²     "

## 2020-11-27 NOTE — ANESTHESIA PROCEDURE NOTES
Intubation  Performed by: Petar Lipscomb CRNA  Authorized by: Irineo Benavidez MD     Intubation:     Induction:  Intravenous    Intubated:  Postinduction    Attempts:  1    Attempted By:  CRNA    Method of Intubation:  Direct    Laryngeal View Grade: Grade IIA - cords partially seen      Difficult Airway Encountered?: No      Complications:  None    Airway Device:  Oral endotracheal tube    Airway Device Size:  7.0    Style/Cuff Inflation:  Cuffed    Tube secured:  22    Secured at:  The lips    Placement Verified By:  Capnometry    Complicating Factors:  None    Findings Post-Intubation:  BS equal bilateral

## 2020-11-27 NOTE — ANESTHESIA POSTPROCEDURE EVALUATION
Anesthesia Post Evaluation    Patient: Sonia Goldberg    Procedure(s) Performed: Procedure(s) (LRB):  CHOLECYSTECTOMY, LAPAROSCOPIC (N/A)    Final Anesthesia Type: general    Patient location during evaluation: PACU  Patient participation: Yes- Able to Participate  Level of consciousness: awake and alert and oriented  Post-procedure vital signs: reviewed and stable  Pain management: adequate  Airway patency: patent    PONV status at discharge: No PONV  Anesthetic complications: no      Cardiovascular status: blood pressure returned to baseline and stable  Respiratory status: unassisted and spontaneous ventilation  Hydration status: euvolemic  Follow-up not needed.          Vitals Value Taken Time   /70 11/27/20 1251   Temp 36.7 °C (98.1 °F) 11/27/20 1215   Pulse 76 11/27/20 1255   Resp 29 11/27/20 1255   SpO2 100 % 11/27/20 1255   Vitals shown include unvalidated device data.      No case tracking events are documented in the log.      Pain/Kevin Score: Pain Rating Prior to Med Admin: 10 (11/27/2020 12:45 PM)  Pain Rating Post Med Admin: 1 (11/27/2020  5:35 AM)  Kevin Score: 8 (11/27/2020 12:40 PM)

## 2020-11-27 NOTE — ANESTHESIA PREPROCEDURE EVALUATION
11/27/2020  Sonia Goldberg is a 34 y.o., female.    Anesthesia Evaluation    I have reviewed the Patient Summary Reports.    I have reviewed the Nursing Notes. I have reviewed the NPO Status.   I have reviewed the Medications.     Review of Systems  Social:  Smoker Smoking Status: Current Every Day Smoker  Smokeless Tobacco Status: Never Used  Alcohol use: Yes, unspecified volume  Drug use: No       Cardiovascular:   Hypertension    Pulmonary:   Asthma asymptomatic Shortness of breath Sleep Apnea    Hepatic/GI:   GERD    Neurological:   Headaches        Physical Exam  General:  Morbid Obesity    Airway/Jaw/Neck:  Airway Findings: Mouth Opening: Normal Tongue: Normal  General Airway Assessment: Adult, Good  Mallampati: II  Improves to II with phonation.  TM Distance: 4-6 cm      Dental:  Dental Findings: In tact   Chest/Lungs:  Chest/Lungs Findings: Clear to auscultation, Normal Respiratory Rate     Heart/Vascular:  Heart Findings: Rate: Normal  Rhythm: Regular Rhythm  Sounds: Normal  Heart murmur: negative       Mental Status:  Mental Status Findings:  Cooperative, Alert and Oriented         Anesthesia Plan  Type of Anesthesia, risks & benefits discussed:  Anesthesia Type:  general  Patient's Preference:   Intra-op Monitoring Plan:   Intra-op Monitoring Plan Comments:   Post Op Pain Control Plan:   Post Op Pain Control Plan Comments:   Induction:   IV  Beta Blocker:  Patient is not currently on a Beta-Blocker (No further documentation required).       Informed Consent: Patient understands risks and agrees with Anesthesia plan.  Questions answered. Anesthesia consent signed with patient.  ASA Score: 2     Day of Surgery Review of History & Physical: I have interviewed and examined the patient. I have reviewed the patient's H&P dated:  There are no significant changes.  H&P update referred to the surgeon.   H&P completed by Anesthesiologist.       Ready For Surgery From Anesthesia Perspective.

## 2020-11-28 VITALS
OXYGEN SATURATION: 97 % | BODY MASS INDEX: 38.77 KG/M2 | WEIGHT: 247 LBS | HEIGHT: 67 IN | TEMPERATURE: 98 F | RESPIRATION RATE: 16 BRPM | DIASTOLIC BLOOD PRESSURE: 60 MMHG | SYSTOLIC BLOOD PRESSURE: 112 MMHG | HEART RATE: 69 BPM

## 2020-11-28 LAB
ALBUMIN SERPL BCP-MCNC: 3 G/DL (ref 3.5–5.2)
ALP SERPL-CCNC: 77 U/L (ref 55–135)
ALT SERPL W/O P-5'-P-CCNC: 29 U/L (ref 10–44)
ANION GAP SERPL CALC-SCNC: 8 MMOL/L (ref 8–16)
AST SERPL-CCNC: 32 U/L (ref 10–40)
BACTERIA UR CULT: NO GROWTH
BASOPHILS # BLD AUTO: 0.03 K/UL (ref 0–0.2)
BASOPHILS NFR BLD: 0.2 % (ref 0–1.9)
BILIRUB SERPL-MCNC: 0.3 MG/DL (ref 0.1–1)
BUN SERPL-MCNC: 6 MG/DL (ref 6–20)
CALCIUM SERPL-MCNC: 9.1 MG/DL (ref 8.7–10.5)
CHLORIDE SERPL-SCNC: 106 MMOL/L (ref 95–110)
CO2 SERPL-SCNC: 22 MMOL/L (ref 23–29)
CREAT SERPL-MCNC: 0.8 MG/DL (ref 0.5–1.4)
DIFFERENTIAL METHOD: ABNORMAL
EOSINOPHIL # BLD AUTO: 0 K/UL (ref 0–0.5)
EOSINOPHIL NFR BLD: 0.1 % (ref 0–8)
ERYTHROCYTE [DISTWIDTH] IN BLOOD BY AUTOMATED COUNT: 21.3 % (ref 11.5–14.5)
EST. GFR  (AFRICAN AMERICAN): >60 ML/MIN/1.73 M^2
EST. GFR  (NON AFRICAN AMERICAN): >60 ML/MIN/1.73 M^2
GLUCOSE SERPL-MCNC: 118 MG/DL (ref 70–110)
HCT VFR BLD AUTO: 31.8 % (ref 37–48.5)
HGB BLD-MCNC: 8.9 G/DL (ref 12–16)
IMM GRANULOCYTES # BLD AUTO: 0.05 K/UL (ref 0–0.04)
IMM GRANULOCYTES NFR BLD AUTO: 0.4 % (ref 0–0.5)
LYMPHOCYTES # BLD AUTO: 1.7 K/UL (ref 1–4.8)
LYMPHOCYTES NFR BLD: 12.4 % (ref 18–48)
MAGNESIUM SERPL-MCNC: 2.3 MG/DL (ref 1.6–2.6)
MCH RBC QN AUTO: 21.4 PG (ref 27–31)
MCHC RBC AUTO-ENTMCNC: 28 G/DL (ref 32–36)
MCV RBC AUTO: 77 FL (ref 82–98)
MONOCYTES # BLD AUTO: 0.5 K/UL (ref 0.3–1)
MONOCYTES NFR BLD: 4 % (ref 4–15)
NEUTROPHILS # BLD AUTO: 11.3 K/UL (ref 1.8–7.7)
NEUTROPHILS NFR BLD: 82.9 % (ref 38–73)
NRBC BLD-RTO: 0 /100 WBC
PHOSPHATE SERPL-MCNC: 2.1 MG/DL (ref 2.7–4.5)
PLATELET # BLD AUTO: 325 K/UL (ref 150–350)
PMV BLD AUTO: 10.7 FL (ref 9.2–12.9)
POTASSIUM SERPL-SCNC: 4.2 MMOL/L (ref 3.5–5.1)
PROT SERPL-MCNC: 6 G/DL (ref 6–8.4)
RBC # BLD AUTO: 4.15 M/UL (ref 4–5.4)
SODIUM SERPL-SCNC: 136 MMOL/L (ref 136–145)
WBC # BLD AUTO: 13.6 K/UL (ref 3.9–12.7)

## 2020-11-28 PROCEDURE — 85025 COMPLETE CBC W/AUTO DIFF WBC: CPT

## 2020-11-28 PROCEDURE — 36415 COLL VENOUS BLD VENIPUNCTURE: CPT

## 2020-11-28 PROCEDURE — 83735 ASSAY OF MAGNESIUM: CPT

## 2020-11-28 PROCEDURE — G0378 HOSPITAL OBSERVATION PER HR: HCPCS

## 2020-11-28 PROCEDURE — 25000003 PHARM REV CODE 250: Performed by: SURGERY

## 2020-11-28 PROCEDURE — 94799 UNLISTED PULMONARY SVC/PX: CPT

## 2020-11-28 PROCEDURE — 99900035 HC TECH TIME PER 15 MIN (STAT)

## 2020-11-28 PROCEDURE — 63600175 PHARM REV CODE 636 W HCPCS: Performed by: SURGERY

## 2020-11-28 PROCEDURE — 80053 COMPREHEN METABOLIC PANEL: CPT

## 2020-11-28 PROCEDURE — 94761 N-INVAS EAR/PLS OXIMETRY MLT: CPT

## 2020-11-28 PROCEDURE — 84100 ASSAY OF PHOSPHORUS: CPT

## 2020-11-28 RX ORDER — HYDROCODONE BITARTRATE AND ACETAMINOPHEN 5; 325 MG/1; MG/1
1 TABLET ORAL EVERY 8 HOURS PRN
Qty: 12 TABLET | Refills: 0 | Status: SHIPPED | OUTPATIENT
Start: 2020-11-28 | End: 2020-12-01

## 2020-11-28 RX ADMIN — PIPERACILLIN SODIUM AND TAZOBACTAM SODIUM 4.5 G: 4; .5 INJECTION, POWDER, LYOPHILIZED, FOR SOLUTION INTRAVENOUS at 02:11

## 2020-11-28 RX ADMIN — HYDROCODONE BITARTRATE AND ACETAMINOPHEN 1 TABLET: 5; 325 TABLET ORAL at 02:11

## 2020-11-28 RX ADMIN — SODIUM CHLORIDE: 0.9 INJECTION, SOLUTION INTRAVENOUS at 02:11

## 2020-11-28 RX ADMIN — HYDROCODONE BITARTRATE AND ACETAMINOPHEN 1 TABLET: 5; 325 TABLET ORAL at 07:11

## 2020-11-28 RX ADMIN — BUPROPION HYDROCHLORIDE 150 MG: 150 TABLET, FILM COATED, EXTENDED RELEASE ORAL at 08:11

## 2020-11-30 ENCOUNTER — TELEPHONE (OUTPATIENT)
Dept: MEDSURG UNIT | Facility: HOSPITAL | Age: 35
End: 2020-11-30

## 2020-12-01 ENCOUNTER — PATIENT MESSAGE (OUTPATIENT)
Dept: GASTROENTEROLOGY | Facility: CLINIC | Age: 35
End: 2020-12-01

## 2020-12-01 PROCEDURE — 91110 GI TRC IMG INTRAL ESOPH-ILE: CPT | Mod: 26,,, | Performed by: INTERNAL MEDICINE

## 2020-12-01 PROCEDURE — 91110 PR GI TRACT CAPSULE ENDOSCOPY: ICD-10-PCS | Mod: 26,,, | Performed by: INTERNAL MEDICINE

## 2020-12-01 NOTE — [employer] PATIENT INSTRUCTIONS
Discharge Summary/Instructions after an Endoscopic Procedure  Patient Name: Sonia Goldberg  Patient MRN: [id number]  Patient YOB: 1985 Friday, November 20, 2020  Patito Vergara MD  RESTRICTIONS:  During your procedure today, you received medications for sedation.  These   medications may affect your judgment, balance and coordination.  Therefore,   for 24 hours, you have the following restrictions:   - DO NOT drive a car, operate machinery, make legal/financial decisions,   sign important papers or drink alcohol.    ACTIVITY:  Today: no heavy lifting, straining or running due to procedural   sedation/anesthesia.  The following day: return to full activity including work.  DIET:  Eat and drink normally unless instructed otherwise.     TREATMENT FOR COMMON SIDE EFFECTS:  - Mild abdominal pain, nausea, belching, bloating or excessive gas:  rest,   eat lightly and use a heating pad.  - Sore Throat: treat with throat lozenges and/or gargle with warm salt   water.  - Because air was used during the procedure, expelling large amounts of air   from your rectum or belching is normal.  - If a bowel prep was taken, you may not have a bowel movement for 1-3 days.    This is normal.  SYMPTOMS TO WATCH FOR AND REPORT TO YOUR PHYSICIAN:  1. Abdominal pain or bloating, other than gas cramps.  2. Chest pain.  3. Back pain.  4. Signs of infection such as: chills or fever occurring within 24 hours   after the procedure.  5. Rectal bleeding, which would show as bright red, maroon, or black stools.   (A tablespoon of blood from the rectum is not serious, especially if   hemorrhoids are present.)  6. Vomiting.  7. Weakness or dizziness.  GO DIRECTLY TO THE NEAREST EMERGENCY ROOM IF YOU HAVE ANY OF THE FOLLOWING:      Difficulty breathing              Chills and/or fever over 101 F   Persistent vomiting and/or vomiting blood   Severe abdominal pain   Severe chest pain   Black, tarry stools   Bleeding- more  than one tablespoon   Any other symptom or condition that you feel may need urgent attention  Your doctor recommends these additional instructions:  If any biopsies were taken, your doctors clinic will contact you in 1 to 2   weeks with any results.  -Continue IV iron as discussed in clinic  -Repeat CBC with iron studies in 3 months  For questions, problems or results please call your physician - Patito Vergara MD at Work:  [phone].  [facility], EMERGENCY ROOM PHONE NUMBER: [phone]  IF A COMPLICATION OR EMERGENCY SITUATION ARISES AND YOU ARE UNABLE TO REACH   YOUR PHYSICIAN - GO DIRECTLY TO THE EMERGENCY ROOM.  Patito Vergara MD  12/1/2020 2:30:59 PM  This report has been verified and signed electronically.  [employer]

## 2020-12-02 LAB
FINAL PATHOLOGIC DIAGNOSIS: NORMAL
GROSS: NORMAL
Lab: NORMAL

## 2020-12-04 ENCOUNTER — PATIENT OUTREACH (OUTPATIENT)
Dept: ADMINISTRATIVE | Facility: CLINIC | Age: 35
End: 2020-12-04

## 2020-12-04 NOTE — PROGRESS NOTES
C3 nurse attempted to contact patient. No answer. The following message was left for the patient to return the call:  Good afternoon I am a nurse calling on behalf of [facility] from the Care Coordination Center.  This is a Transitional Care Call for Sonia. When you have a moment please contact us at [phone] or [phone] Monday through Friday, between the hours of 8 am to 4 pm. We look forward to speaking with you. On behalf of [facility] have a nice day.    The patient does not have a scheduled HOSFU appointment within 7-14 days post hospital discharge date 11/28/20. Message sent to Physician staff to assist with HOSFU appointment scheduling.

## 2020-12-08 RX ORDER — HEPARIN 100 UNIT/ML
500 SYRINGE INTRAVENOUS
Status: CANCELLED | OUTPATIENT
Start: 2020-12-08

## 2020-12-08 RX ORDER — DIPHENHYDRAMINE HYDROCHLORIDE 50 MG/ML
50 INJECTION INTRAMUSCULAR; INTRAVENOUS ONCE AS NEEDED
Status: CANCELLED | OUTPATIENT
Start: 2020-12-08

## 2020-12-08 RX ORDER — EPINEPHRINE 0.3 MG/.3ML
0.3 INJECTION SUBCUTANEOUS ONCE AS NEEDED
Status: CANCELLED | OUTPATIENT
Start: 2020-12-08

## 2020-12-08 RX ORDER — METHYLPREDNISOLONE SOD SUCC 125 MG
125 VIAL (EA) INJECTION ONCE AS NEEDED
Status: CANCELLED | OUTPATIENT
Start: 2020-12-08

## 2020-12-08 RX ORDER — SODIUM CHLORIDE 0.9 % (FLUSH) 0.9 %
10 SYRINGE (ML) INJECTION
Status: CANCELLED | OUTPATIENT
Start: 2020-12-08

## 2020-12-09 ENCOUNTER — TELEPHONE (OUTPATIENT)
Dept: GASTROENTEROLOGY | Facility: CLINIC | Age: 35
End: 2020-12-09

## 2020-12-09 DIAGNOSIS — D50.0 IRON DEFICIENCY ANEMIA DUE TO CHRONIC BLOOD LOSS: Primary | ICD-10-CM

## 2020-12-09 NOTE — TELEPHONE ENCOUNTER
----- Message from Jessica Peterson MA sent at 12/9/2020  9:28 AM CST -----  Type: Needs Medical Advice  Who Called:  Sonia  Bert Call Back Number: [phone]  Additional Information: patient reports she is having more near syncope episodes.  She would like to know if labs to check her HNH need to be done.  Please call to discuss

## 2020-12-10 ENCOUNTER — LAB VISIT (OUTPATIENT)
Dept: LAB | Facility: HOSPITAL | Age: 35
End: 2020-12-10
Attending: INTERNAL MEDICINE
Payer: COMMERCIAL

## 2020-12-10 ENCOUNTER — INFUSION (OUTPATIENT)
Dept: INFUSION THERAPY | Facility: HOSPITAL | Age: 35
End: 2020-12-10
Attending: INTERNAL MEDICINE
Payer: COMMERCIAL

## 2020-12-10 VITALS
BODY MASS INDEX: 38.45 KG/M2 | DIASTOLIC BLOOD PRESSURE: 73 MMHG | RESPIRATION RATE: 18 BRPM | TEMPERATURE: 99 F | HEIGHT: 67 IN | HEART RATE: 77 BPM | WEIGHT: 245 LBS | SYSTOLIC BLOOD PRESSURE: 108 MMHG

## 2020-12-10 DIAGNOSIS — D50.0 IRON DEFICIENCY ANEMIA DUE TO CHRONIC BLOOD LOSS: ICD-10-CM

## 2020-12-10 DIAGNOSIS — D50.0 IRON DEFICIENCY ANEMIA DUE TO CHRONIC BLOOD LOSS: Primary | ICD-10-CM

## 2020-12-10 LAB
BASOPHILS # BLD AUTO: 0.05 K/UL (ref 0–0.2)
BASOPHILS NFR BLD: 0.6 % (ref 0–1.9)
DIFFERENTIAL METHOD: ABNORMAL
EOSINOPHIL # BLD AUTO: 0.2 K/UL (ref 0–0.5)
EOSINOPHIL NFR BLD: 2.9 % (ref 0–8)
ERYTHROCYTE [DISTWIDTH] IN BLOOD BY AUTOMATED COUNT: 20.9 % (ref 11.5–14.5)
HCT VFR BLD AUTO: 36 % (ref 37–48.5)
HGB BLD-MCNC: 10.2 G/DL (ref 12–16)
IMM GRANULOCYTES # BLD AUTO: 0.03 K/UL (ref 0–0.04)
IMM GRANULOCYTES NFR BLD AUTO: 0.4 % (ref 0–0.5)
LYMPHOCYTES # BLD AUTO: 2.5 K/UL (ref 1–4.8)
LYMPHOCYTES NFR BLD: 32.4 % (ref 18–48)
MCH RBC QN AUTO: 21.9 PG (ref 27–31)
MCHC RBC AUTO-ENTMCNC: 28.3 G/DL (ref 32–36)
MCV RBC AUTO: 77 FL (ref 82–98)
MONOCYTES # BLD AUTO: 0.5 K/UL (ref 0.3–1)
MONOCYTES NFR BLD: 6.8 % (ref 4–15)
NEUTROPHILS # BLD AUTO: 4.5 K/UL (ref 1.8–7.7)
NEUTROPHILS NFR BLD: 56.9 % (ref 38–73)
NRBC BLD-RTO: 0 /100 WBC
PLATELET # BLD AUTO: 391 K/UL (ref 150–350)
PMV BLD AUTO: 9.9 FL (ref 9.2–12.9)
RBC # BLD AUTO: 4.65 M/UL (ref 4–5.4)
WBC # BLD AUTO: 7.83 K/UL (ref 3.9–12.7)

## 2020-12-10 PROCEDURE — 63600175 PHARM REV CODE 636 W HCPCS: Performed by: INTERNAL MEDICINE

## 2020-12-10 PROCEDURE — 25000003 PHARM REV CODE 250: Performed by: INTERNAL MEDICINE

## 2020-12-10 PROCEDURE — 36415 COLL VENOUS BLD VENIPUNCTURE: CPT

## 2020-12-10 PROCEDURE — 85025 COMPLETE CBC W/AUTO DIFF WBC: CPT

## 2020-12-10 PROCEDURE — 96365 THER/PROPH/DIAG IV INF INIT: CPT

## 2020-12-10 RX ORDER — HEPARIN 100 UNIT/ML
500 SYRINGE INTRAVENOUS
Status: CANCELLED | OUTPATIENT
Start: 2020-12-17

## 2020-12-10 RX ORDER — METHYLPREDNISOLONE SOD SUCC 125 MG
125 VIAL (EA) INJECTION ONCE AS NEEDED
Status: CANCELLED | OUTPATIENT
Start: 2020-12-17

## 2020-12-10 RX ORDER — SODIUM CHLORIDE 0.9 % (FLUSH) 0.9 %
10 SYRINGE (ML) INJECTION
Status: CANCELLED | OUTPATIENT
Start: 2020-12-17

## 2020-12-10 RX ORDER — DIPHENHYDRAMINE HYDROCHLORIDE 50 MG/ML
50 INJECTION INTRAMUSCULAR; INTRAVENOUS ONCE AS NEEDED
Status: CANCELLED | OUTPATIENT
Start: 2020-12-17

## 2020-12-10 RX ORDER — EPINEPHRINE 0.3 MG/.3ML
0.3 INJECTION SUBCUTANEOUS ONCE AS NEEDED
Status: CANCELLED | OUTPATIENT
Start: 2020-12-17

## 2020-12-10 RX ADMIN — IRON SUCROSE 100 MG: 20 INJECTION, SOLUTION INTRAVENOUS at 11:12

## 2020-12-10 NOTE — PLAN OF CARE
Problem: Activity Intolerance  Goal: Enhanced Capacity and Energy  Outcome: Ongoing, Progressing  Intervention: Optimize Activity Tolerance  Flowsheets (Taken 12/10/2020 1123)  Self-Care Promotion: independence encouraged  Activity Management:   ambulated - L4   ambulated in farrar - L4  Environmental Support:   calm environment promoted   distractions minimized   rest periods encouraged

## 2020-12-13 LAB
OHS CV EVENT MONITOR DAY: 0
OHS CV HOLTER LENGTH DECIMAL HOURS: 24
OHS CV HOLTER LENGTH HOURS: 24
OHS CV HOLTER LENGTH MINUTES: 0

## 2020-12-15 ENCOUNTER — OFFICE VISIT (OUTPATIENT)
Dept: SURGERY | Facility: CLINIC | Age: 35
End: 2020-12-15
Payer: COMMERCIAL

## 2020-12-15 VITALS
HEIGHT: 67 IN | HEART RATE: 79 BPM | DIASTOLIC BLOOD PRESSURE: 80 MMHG | RESPIRATION RATE: 20 BRPM | BODY MASS INDEX: 38.45 KG/M2 | SYSTOLIC BLOOD PRESSURE: 120 MMHG | WEIGHT: 245 LBS | TEMPERATURE: 97 F

## 2020-12-15 DIAGNOSIS — K80.00 CALCULUS OF GALLBLADDER WITH ACUTE CHOLECYSTITIS WITHOUT OBSTRUCTION: Primary | ICD-10-CM

## 2020-12-15 PROCEDURE — 99024 PR POST-OP FOLLOW-UP VISIT: ICD-10-PCS | Mod: S$GLB,,, | Performed by: SURGERY

## 2020-12-15 PROCEDURE — 99024 POSTOP FOLLOW-UP VISIT: CPT | Mod: S$GLB,,, | Performed by: SURGERY

## 2020-12-15 PROCEDURE — 3008F BODY MASS INDEX DOCD: CPT | Mod: CPTII,S$GLB,, | Performed by: SURGERY

## 2020-12-15 PROCEDURE — 1125F AMNT PAIN NOTED PAIN PRSNT: CPT | Mod: S$GLB,,, | Performed by: SURGERY

## 2020-12-15 PROCEDURE — 1125F PR PAIN SEVERITY QUANTIFIED, PAIN PRESENT: ICD-10-PCS | Mod: S$GLB,,, | Performed by: SURGERY

## 2020-12-15 PROCEDURE — 3008F PR BODY MASS INDEX (BMI) DOCUMENTED: ICD-10-PCS | Mod: CPTII,S$GLB,, | Performed by: SURGERY

## 2020-12-15 NOTE — PROGRESS NOTES
Subjective:       Patient ID: Sonia Goldberg is a 35 y.o. female.    Chief Complaint: Other (FU DOS 11/27/20 Cholecystectomy, laparosopic)      HPI:  Patient is status post a risk op cholecystectomy for acute cholecystitis.  Patient has no fevers or chills.  States that certain fatty foods still cause some nausea and diarrhea but overall tolerating diet.  Pathology is benign    Review of Systems    Objective:      Physical Exam  Constitutional:       General: She is not in acute distress.  Pulmonary:      Effort: Pulmonary effort is normal. No respiratory distress.   Abdominal:      General: There is no distension.      Palpations: Abdomen is soft.      Tenderness: There is no abdominal tenderness. There is no guarding or rebound.   Skin:     Comments: Incisions are clean, dry and intact  There is no evidence of infection, hematoma or seroma    Neurological:      Mental Status: She is alert and oriented to person, place, and time.   Psychiatric:         Behavior: Behavior is cooperative.         Assessment/Plan:   Calculus of gallbladder with acute cholecystitis without obstruction      Status post cholecystectomy.  Doing well.  Okay to return to work to light duty.  Would not recommend heavy duty work until 8 weeks postop.  Follow up if symptoms worsen or fail to improve.

## 2020-12-17 ENCOUNTER — INFUSION (OUTPATIENT)
Dept: INFUSION THERAPY | Facility: HOSPITAL | Age: 35
End: 2020-12-17
Attending: INTERNAL MEDICINE
Payer: COMMERCIAL

## 2020-12-17 VITALS
BODY MASS INDEX: 39.56 KG/M2 | DIASTOLIC BLOOD PRESSURE: 66 MMHG | RESPIRATION RATE: 16 BRPM | SYSTOLIC BLOOD PRESSURE: 120 MMHG | WEIGHT: 252.63 LBS | HEART RATE: 69 BPM | TEMPERATURE: 98 F | OXYGEN SATURATION: 100 %

## 2020-12-17 DIAGNOSIS — D50.0 IRON DEFICIENCY ANEMIA DUE TO CHRONIC BLOOD LOSS: Primary | ICD-10-CM

## 2020-12-17 PROCEDURE — 63600175 PHARM REV CODE 636 W HCPCS: Performed by: INTERNAL MEDICINE

## 2020-12-17 PROCEDURE — 25000003 PHARM REV CODE 250: Performed by: INTERNAL MEDICINE

## 2020-12-17 PROCEDURE — 96365 THER/PROPH/DIAG IV INF INIT: CPT

## 2020-12-17 RX ORDER — HEPARIN 100 UNIT/ML
500 SYRINGE INTRAVENOUS
Status: CANCELLED | OUTPATIENT
Start: 2020-12-24

## 2020-12-17 RX ORDER — SODIUM CHLORIDE 0.9 % (FLUSH) 0.9 %
10 SYRINGE (ML) INJECTION
Status: DISCONTINUED | OUTPATIENT
Start: 2020-12-17 | End: 2020-12-17 | Stop reason: HOSPADM

## 2020-12-17 RX ORDER — DIPHENHYDRAMINE HYDROCHLORIDE 50 MG/ML
50 INJECTION INTRAMUSCULAR; INTRAVENOUS ONCE AS NEEDED
Status: CANCELLED | OUTPATIENT
Start: 2020-12-24

## 2020-12-17 RX ORDER — METHYLPREDNISOLONE SOD SUCC 125 MG
125 VIAL (EA) INJECTION ONCE AS NEEDED
Status: CANCELLED | OUTPATIENT
Start: 2020-12-24

## 2020-12-17 RX ORDER — SODIUM CHLORIDE 0.9 % (FLUSH) 0.9 %
10 SYRINGE (ML) INJECTION
Status: CANCELLED | OUTPATIENT
Start: 2020-12-24

## 2020-12-17 RX ORDER — EPINEPHRINE 0.3 MG/.3ML
0.3 INJECTION SUBCUTANEOUS ONCE AS NEEDED
Status: CANCELLED | OUTPATIENT
Start: 2020-12-24

## 2020-12-17 RX ADMIN — SODIUM CHLORIDE: 0.9 INJECTION, SOLUTION INTRAVENOUS at 02:12

## 2020-12-17 RX ADMIN — IRON SUCROSE 100 MG: 20 INJECTION, SOLUTION INTRAVENOUS at 02:12

## 2020-12-17 NOTE — PLAN OF CARE
Problem: Anemia  Goal: Anemia Symptom Improvement  Outcome: Ongoing, Progressing  Intervention: Monitor and Manage Anemia  Flowsheets (Taken 12/17/2020 1350)  Oral Nutrition Promotion: rest periods promoted  Fatigue Management: frequent rest breaks encouraged

## 2020-12-22 ENCOUNTER — INFUSION (OUTPATIENT)
Dept: INFUSION THERAPY | Facility: HOSPITAL | Age: 35
End: 2020-12-22
Attending: INTERNAL MEDICINE
Payer: COMMERCIAL

## 2020-12-22 VITALS
TEMPERATURE: 98 F | WEIGHT: 251.38 LBS | RESPIRATION RATE: 18 BRPM | DIASTOLIC BLOOD PRESSURE: 70 MMHG | HEART RATE: 55 BPM | SYSTOLIC BLOOD PRESSURE: 128 MMHG | BODY MASS INDEX: 39.37 KG/M2

## 2020-12-22 DIAGNOSIS — D50.0 IRON DEFICIENCY ANEMIA DUE TO CHRONIC BLOOD LOSS: Primary | ICD-10-CM

## 2020-12-22 PROCEDURE — 96365 THER/PROPH/DIAG IV INF INIT: CPT

## 2020-12-22 PROCEDURE — 25000003 PHARM REV CODE 250: Performed by: INTERNAL MEDICINE

## 2020-12-22 PROCEDURE — 63600175 PHARM REV CODE 636 W HCPCS: Performed by: INTERNAL MEDICINE

## 2020-12-22 RX ORDER — SODIUM CHLORIDE 0.9 % (FLUSH) 0.9 %
10 SYRINGE (ML) INJECTION
Status: CANCELLED | OUTPATIENT
Start: 2020-12-24

## 2020-12-22 RX ORDER — DIPHENHYDRAMINE HYDROCHLORIDE 50 MG/ML
50 INJECTION INTRAMUSCULAR; INTRAVENOUS ONCE AS NEEDED
Status: CANCELLED | OUTPATIENT
Start: 2020-12-24

## 2020-12-22 RX ORDER — HEPARIN 100 UNIT/ML
500 SYRINGE INTRAVENOUS
Status: CANCELLED | OUTPATIENT
Start: 2020-12-24

## 2020-12-22 RX ORDER — METHYLPREDNISOLONE SOD SUCC 125 MG
125 VIAL (EA) INJECTION ONCE AS NEEDED
Status: CANCELLED | OUTPATIENT
Start: 2020-12-24

## 2020-12-22 RX ORDER — HEPARIN 100 UNIT/ML
500 SYRINGE INTRAVENOUS
Status: DISCONTINUED | OUTPATIENT
Start: 2020-12-22 | End: 2020-12-22 | Stop reason: HOSPADM

## 2020-12-22 RX ORDER — EPINEPHRINE 0.3 MG/.3ML
0.3 INJECTION SUBCUTANEOUS ONCE AS NEEDED
Status: CANCELLED | OUTPATIENT
Start: 2020-12-24

## 2020-12-22 RX ORDER — SODIUM CHLORIDE 0.9 % (FLUSH) 0.9 %
10 SYRINGE (ML) INJECTION
Status: DISCONTINUED | OUTPATIENT
Start: 2020-12-22 | End: 2020-12-22 | Stop reason: HOSPADM

## 2020-12-22 RX ADMIN — IRON SUCROSE 100 MG: 20 INJECTION, SOLUTION INTRAVENOUS at 02:12

## 2020-12-29 ENCOUNTER — INFUSION (OUTPATIENT)
Dept: INFUSION THERAPY | Facility: HOSPITAL | Age: 35
End: 2020-12-29
Attending: INTERNAL MEDICINE
Payer: COMMERCIAL

## 2020-12-29 VITALS
DIASTOLIC BLOOD PRESSURE: 70 MMHG | TEMPERATURE: 98 F | HEIGHT: 67 IN | SYSTOLIC BLOOD PRESSURE: 120 MMHG | OXYGEN SATURATION: 100 % | BODY MASS INDEX: 39.46 KG/M2 | WEIGHT: 251.44 LBS | HEART RATE: 79 BPM | RESPIRATION RATE: 18 BRPM

## 2020-12-29 DIAGNOSIS — D50.0 IRON DEFICIENCY ANEMIA DUE TO CHRONIC BLOOD LOSS: Primary | ICD-10-CM

## 2020-12-29 PROCEDURE — 96365 THER/PROPH/DIAG IV INF INIT: CPT

## 2020-12-29 PROCEDURE — 25000003 PHARM REV CODE 250: Performed by: INTERNAL MEDICINE

## 2020-12-29 PROCEDURE — 63600175 PHARM REV CODE 636 W HCPCS: Performed by: INTERNAL MEDICINE

## 2020-12-29 RX ORDER — METHYLPREDNISOLONE SOD SUCC 125 MG
125 VIAL (EA) INJECTION ONCE AS NEEDED
Status: CANCELLED | OUTPATIENT
Start: 2021-01-05

## 2020-12-29 RX ORDER — HEPARIN 100 UNIT/ML
500 SYRINGE INTRAVENOUS
Status: CANCELLED | OUTPATIENT
Start: 2021-01-05

## 2020-12-29 RX ORDER — SODIUM CHLORIDE 0.9 % (FLUSH) 0.9 %
10 SYRINGE (ML) INJECTION
Status: DISCONTINUED | OUTPATIENT
Start: 2020-12-29 | End: 2020-12-29 | Stop reason: HOSPADM

## 2020-12-29 RX ORDER — DIPHENHYDRAMINE HYDROCHLORIDE 50 MG/ML
50 INJECTION INTRAMUSCULAR; INTRAVENOUS ONCE AS NEEDED
Status: CANCELLED | OUTPATIENT
Start: 2021-01-05

## 2020-12-29 RX ORDER — EPINEPHRINE 0.3 MG/.3ML
0.3 INJECTION SUBCUTANEOUS ONCE AS NEEDED
Status: CANCELLED | OUTPATIENT
Start: 2021-01-05

## 2020-12-29 RX ORDER — HEPARIN 100 UNIT/ML
500 SYRINGE INTRAVENOUS
Status: DISCONTINUED | OUTPATIENT
Start: 2020-12-29 | End: 2020-12-29 | Stop reason: HOSPADM

## 2020-12-29 RX ORDER — SODIUM CHLORIDE 0.9 % (FLUSH) 0.9 %
10 SYRINGE (ML) INJECTION
Status: CANCELLED | OUTPATIENT
Start: 2021-01-05

## 2020-12-29 RX ADMIN — IRON SUCROSE 100 MG: 20 INJECTION, SOLUTION INTRAVENOUS at 02:12

## 2020-12-29 NOTE — PLAN OF CARE
Problem: Anemia  Goal: Anemia Symptom Improvement  Outcome: Ongoing, Progressing  Intervention: Monitor and Manage Anemia  Flowsheets (Taken 12/29/2020 4729)  Fatigue Management:   frequent rest breaks encouraged   paced activity encouraged

## 2021-01-05 ENCOUNTER — LAB VISIT (OUTPATIENT)
Dept: LAB | Facility: HOSPITAL | Age: 36
End: 2021-01-05
Attending: INTERNAL MEDICINE
Payer: COMMERCIAL

## 2021-01-05 ENCOUNTER — INFUSION (OUTPATIENT)
Dept: INFUSION THERAPY | Facility: HOSPITAL | Age: 36
End: 2021-01-05
Attending: INTERNAL MEDICINE
Payer: COMMERCIAL

## 2021-01-05 ENCOUNTER — OFFICE VISIT (OUTPATIENT)
Dept: GASTROENTEROLOGY | Facility: CLINIC | Age: 36
End: 2021-01-05
Payer: COMMERCIAL

## 2021-01-05 VITALS
TEMPERATURE: 98 F | BODY MASS INDEX: 39.28 KG/M2 | SYSTOLIC BLOOD PRESSURE: 119 MMHG | HEIGHT: 67 IN | HEART RATE: 72 BPM | RESPIRATION RATE: 18 BRPM | WEIGHT: 250.25 LBS | DIASTOLIC BLOOD PRESSURE: 80 MMHG

## 2021-01-05 VITALS
DIASTOLIC BLOOD PRESSURE: 72 MMHG | HEIGHT: 67 IN | BODY MASS INDEX: 39.35 KG/M2 | SYSTOLIC BLOOD PRESSURE: 141 MMHG | WEIGHT: 250.69 LBS | HEART RATE: 92 BPM

## 2021-01-05 DIAGNOSIS — D50.0 IRON DEFICIENCY ANEMIA DUE TO CHRONIC BLOOD LOSS: ICD-10-CM

## 2021-01-05 DIAGNOSIS — D50.0 IRON DEFICIENCY ANEMIA DUE TO CHRONIC BLOOD LOSS: Primary | ICD-10-CM

## 2021-01-05 DIAGNOSIS — R19.7 DIARRHEA, UNSPECIFIED TYPE: Primary | ICD-10-CM

## 2021-01-05 LAB
BASOPHILS # BLD AUTO: 0.03 K/UL (ref 0–0.2)
BASOPHILS NFR BLD: 0.5 % (ref 0–1.9)
DIFFERENTIAL METHOD: ABNORMAL
EOSINOPHIL # BLD AUTO: 0.2 K/UL (ref 0–0.5)
EOSINOPHIL NFR BLD: 3.1 % (ref 0–8)
ERYTHROCYTE [DISTWIDTH] IN BLOOD BY AUTOMATED COUNT: 21.9 % (ref 11.5–14.5)
FERRITIN SERPL-MCNC: 62 NG/ML (ref 20–300)
HCT VFR BLD AUTO: 38.5 % (ref 37–48.5)
HGB BLD-MCNC: 10.8 G/DL (ref 12–16)
IGA SERPL-MCNC: 184 MG/DL (ref 40–350)
IMM GRANULOCYTES # BLD AUTO: 0.02 K/UL (ref 0–0.04)
IMM GRANULOCYTES NFR BLD AUTO: 0.3 % (ref 0–0.5)
IRON SERPL-MCNC: 247 UG/DL (ref 30–160)
LYMPHOCYTES # BLD AUTO: 1.9 K/UL (ref 1–4.8)
LYMPHOCYTES NFR BLD: 31.3 % (ref 18–48)
MCH RBC QN AUTO: 22.9 PG (ref 27–31)
MCHC RBC AUTO-ENTMCNC: 28.1 G/DL (ref 32–36)
MCV RBC AUTO: 82 FL (ref 82–98)
MONOCYTES # BLD AUTO: 0.4 K/UL (ref 0.3–1)
MONOCYTES NFR BLD: 6.9 % (ref 4–15)
NEUTROPHILS # BLD AUTO: 3.5 K/UL (ref 1.8–7.7)
NEUTROPHILS NFR BLD: 57.9 % (ref 38–73)
NRBC BLD-RTO: 0 /100 WBC
PLATELET # BLD AUTO: 331 K/UL (ref 150–350)
PMV BLD AUTO: 10.3 FL (ref 9.2–12.9)
RBC # BLD AUTO: 4.71 M/UL (ref 4–5.4)
SATURATED IRON: 51 % (ref 20–50)
TOTAL IRON BINDING CAPACITY: 487 UG/DL (ref 250–450)
TRANSFERRIN SERPL-MCNC: 329 MG/DL (ref 200–375)
WBC # BLD AUTO: 6.11 K/UL (ref 3.9–12.7)

## 2021-01-05 PROCEDURE — 3074F PR MOST RECENT SYSTOLIC BLOOD PRESSURE < 130 MM HG: ICD-10-PCS | Mod: CPTII,S$GLB,, | Performed by: INTERNAL MEDICINE

## 2021-01-05 PROCEDURE — 99214 PR OFFICE/OUTPT VISIT, EST, LEVL IV, 30-39 MIN: ICD-10-PCS | Mod: S$GLB,,, | Performed by: INTERNAL MEDICINE

## 2021-01-05 PROCEDURE — 1126F PR PAIN SEVERITY QUANTIFIED, NO PAIN PRESENT: ICD-10-PCS | Mod: S$GLB,,, | Performed by: INTERNAL MEDICINE

## 2021-01-05 PROCEDURE — 83540 ASSAY OF IRON: CPT

## 2021-01-05 PROCEDURE — 82728 ASSAY OF FERRITIN: CPT

## 2021-01-05 PROCEDURE — 25000003 PHARM REV CODE 250: Performed by: INTERNAL MEDICINE

## 2021-01-05 PROCEDURE — 85025 COMPLETE CBC W/AUTO DIFF WBC: CPT

## 2021-01-05 PROCEDURE — 99999 PR PBB SHADOW E&M-EST. PATIENT-LVL III: ICD-10-PCS | Mod: PBBFAC,,, | Performed by: INTERNAL MEDICINE

## 2021-01-05 PROCEDURE — 96365 THER/PROPH/DIAG IV INF INIT: CPT

## 2021-01-05 PROCEDURE — 99214 OFFICE O/P EST MOD 30 MIN: CPT | Mod: S$GLB,,, | Performed by: INTERNAL MEDICINE

## 2021-01-05 PROCEDURE — 63600175 PHARM REV CODE 636 W HCPCS: Performed by: INTERNAL MEDICINE

## 2021-01-05 PROCEDURE — 82784 ASSAY IGA/IGD/IGG/IGM EACH: CPT

## 2021-01-05 PROCEDURE — 83516 IMMUNOASSAY NONANTIBODY: CPT

## 2021-01-05 PROCEDURE — 3078F DIAST BP <80 MM HG: CPT | Mod: CPTII,S$GLB,, | Performed by: INTERNAL MEDICINE

## 2021-01-05 PROCEDURE — 3078F PR MOST RECENT DIASTOLIC BLOOD PRESSURE < 80 MM HG: ICD-10-PCS | Mod: CPTII,S$GLB,, | Performed by: INTERNAL MEDICINE

## 2021-01-05 PROCEDURE — 1126F AMNT PAIN NOTED NONE PRSNT: CPT | Mod: S$GLB,,, | Performed by: INTERNAL MEDICINE

## 2021-01-05 PROCEDURE — 36415 COLL VENOUS BLD VENIPUNCTURE: CPT

## 2021-01-05 PROCEDURE — 3008F PR BODY MASS INDEX (BMI) DOCUMENTED: ICD-10-PCS | Mod: CPTII,S$GLB,, | Performed by: INTERNAL MEDICINE

## 2021-01-05 PROCEDURE — 3074F SYST BP LT 130 MM HG: CPT | Mod: CPTII,S$GLB,, | Performed by: INTERNAL MEDICINE

## 2021-01-05 PROCEDURE — 99999 PR PBB SHADOW E&M-EST. PATIENT-LVL III: CPT | Mod: PBBFAC,,, | Performed by: INTERNAL MEDICINE

## 2021-01-05 PROCEDURE — 3008F BODY MASS INDEX DOCD: CPT | Mod: CPTII,S$GLB,, | Performed by: INTERNAL MEDICINE

## 2021-01-05 RX ORDER — DIPHENHYDRAMINE HYDROCHLORIDE 50 MG/ML
50 INJECTION INTRAMUSCULAR; INTRAVENOUS ONCE AS NEEDED
Status: CANCELLED | OUTPATIENT
Start: 2021-01-12

## 2021-01-05 RX ORDER — SODIUM CHLORIDE 0.9 % (FLUSH) 0.9 %
10 SYRINGE (ML) INJECTION
Status: CANCELLED | OUTPATIENT
Start: 2021-01-12

## 2021-01-05 RX ORDER — EPINEPHRINE 0.3 MG/.3ML
0.3 INJECTION SUBCUTANEOUS ONCE AS NEEDED
Status: CANCELLED | OUTPATIENT
Start: 2021-01-12

## 2021-01-05 RX ORDER — HEPARIN 100 UNIT/ML
500 SYRINGE INTRAVENOUS
Status: DISCONTINUED | OUTPATIENT
Start: 2021-01-05 | End: 2021-01-05 | Stop reason: HOSPADM

## 2021-01-05 RX ORDER — HEPARIN 100 UNIT/ML
500 SYRINGE INTRAVENOUS
Status: CANCELLED | OUTPATIENT
Start: 2021-01-12

## 2021-01-05 RX ORDER — METHYLPREDNISOLONE SOD SUCC 125 MG
125 VIAL (EA) INJECTION ONCE AS NEEDED
Status: CANCELLED | OUTPATIENT
Start: 2021-01-12

## 2021-01-05 RX ORDER — SODIUM CHLORIDE 0.9 % (FLUSH) 0.9 %
10 SYRINGE (ML) INJECTION
Status: DISCONTINUED | OUTPATIENT
Start: 2021-01-05 | End: 2021-01-05 | Stop reason: HOSPADM

## 2021-01-05 RX ADMIN — IRON SUCROSE 100 MG: 20 INJECTION, SOLUTION INTRAVENOUS at 11:01

## 2021-01-07 ENCOUNTER — OFFICE VISIT (OUTPATIENT)
Dept: SURGERY | Facility: CLINIC | Age: 36
End: 2021-01-07
Payer: COMMERCIAL

## 2021-01-07 VITALS
HEIGHT: 67 IN | TEMPERATURE: 97 F | BODY MASS INDEX: 39.24 KG/M2 | RESPIRATION RATE: 20 BRPM | SYSTOLIC BLOOD PRESSURE: 130 MMHG | DIASTOLIC BLOOD PRESSURE: 90 MMHG | HEART RATE: 90 BPM | WEIGHT: 250 LBS

## 2021-01-07 DIAGNOSIS — K80.00 CALCULUS OF GALLBLADDER WITH ACUTE CHOLECYSTITIS WITHOUT OBSTRUCTION: Primary | ICD-10-CM

## 2021-01-07 LAB — TTG IGA SER-ACNC: 4 UNITS

## 2021-01-07 PROCEDURE — 1125F AMNT PAIN NOTED PAIN PRSNT: CPT | Mod: S$GLB,,, | Performed by: SURGERY

## 2021-01-07 PROCEDURE — 99024 POSTOP FOLLOW-UP VISIT: CPT | Mod: S$GLB,,, | Performed by: SURGERY

## 2021-01-07 PROCEDURE — 99024 PR POST-OP FOLLOW-UP VISIT: ICD-10-PCS | Mod: S$GLB,,, | Performed by: SURGERY

## 2021-01-07 PROCEDURE — 3008F PR BODY MASS INDEX (BMI) DOCUMENTED: ICD-10-PCS | Mod: CPTII,S$GLB,, | Performed by: SURGERY

## 2021-01-07 PROCEDURE — 3008F BODY MASS INDEX DOCD: CPT | Mod: CPTII,S$GLB,, | Performed by: SURGERY

## 2021-01-07 PROCEDURE — 1125F PR PAIN SEVERITY QUANTIFIED, PAIN PRESENT: ICD-10-PCS | Mod: S$GLB,,, | Performed by: SURGERY

## 2021-01-12 ENCOUNTER — INFUSION (OUTPATIENT)
Dept: INFUSION THERAPY | Facility: HOSPITAL | Age: 36
End: 2021-01-12
Attending: INTERNAL MEDICINE
Payer: COMMERCIAL

## 2021-01-12 VITALS
RESPIRATION RATE: 18 BRPM | SYSTOLIC BLOOD PRESSURE: 126 MMHG | WEIGHT: 253.81 LBS | BODY MASS INDEX: 39.84 KG/M2 | OXYGEN SATURATION: 96 % | TEMPERATURE: 98 F | HEART RATE: 79 BPM | DIASTOLIC BLOOD PRESSURE: 76 MMHG | HEIGHT: 67 IN

## 2021-01-12 DIAGNOSIS — D50.0 IRON DEFICIENCY ANEMIA DUE TO CHRONIC BLOOD LOSS: Primary | ICD-10-CM

## 2021-01-12 PROCEDURE — 96365 THER/PROPH/DIAG IV INF INIT: CPT

## 2021-01-12 PROCEDURE — 25000003 PHARM REV CODE 250: Performed by: INTERNAL MEDICINE

## 2021-01-12 PROCEDURE — 63600175 PHARM REV CODE 636 W HCPCS: Performed by: INTERNAL MEDICINE

## 2021-01-12 RX ORDER — SODIUM CHLORIDE 0.9 % (FLUSH) 0.9 %
10 SYRINGE (ML) INJECTION
Status: DISCONTINUED | OUTPATIENT
Start: 2021-01-12 | End: 2021-01-12 | Stop reason: HOSPADM

## 2021-01-12 RX ORDER — METHYLPREDNISOLONE SOD SUCC 125 MG
125 VIAL (EA) INJECTION ONCE AS NEEDED
Status: CANCELLED | OUTPATIENT
Start: 2021-01-19

## 2021-01-12 RX ORDER — HEPARIN 100 UNIT/ML
500 SYRINGE INTRAVENOUS
Status: CANCELLED | OUTPATIENT
Start: 2021-01-19

## 2021-01-12 RX ORDER — HEPARIN 100 UNIT/ML
500 SYRINGE INTRAVENOUS
Status: DISCONTINUED | OUTPATIENT
Start: 2021-01-12 | End: 2021-01-12 | Stop reason: HOSPADM

## 2021-01-12 RX ORDER — SODIUM CHLORIDE 0.9 % (FLUSH) 0.9 %
10 SYRINGE (ML) INJECTION
Status: CANCELLED | OUTPATIENT
Start: 2021-01-19

## 2021-01-12 RX ORDER — EPINEPHRINE 0.3 MG/.3ML
0.3 INJECTION SUBCUTANEOUS ONCE AS NEEDED
Status: CANCELLED | OUTPATIENT
Start: 2021-01-19

## 2021-01-12 RX ORDER — DIPHENHYDRAMINE HYDROCHLORIDE 50 MG/ML
50 INJECTION INTRAMUSCULAR; INTRAVENOUS ONCE AS NEEDED
Status: CANCELLED | OUTPATIENT
Start: 2021-01-19

## 2021-01-12 RX ADMIN — IRON SUCROSE 100 MG: 20 INJECTION, SOLUTION INTRAVENOUS at 10:01

## 2021-01-19 ENCOUNTER — INFUSION (OUTPATIENT)
Dept: INFUSION THERAPY | Facility: HOSPITAL | Age: 36
End: 2021-01-19
Attending: INTERNAL MEDICINE
Payer: COMMERCIAL

## 2021-01-19 VITALS
HEART RATE: 75 BPM | OXYGEN SATURATION: 98 % | TEMPERATURE: 98 F | WEIGHT: 250.63 LBS | RESPIRATION RATE: 18 BRPM | SYSTOLIC BLOOD PRESSURE: 120 MMHG | BODY MASS INDEX: 39.25 KG/M2 | DIASTOLIC BLOOD PRESSURE: 80 MMHG

## 2021-01-19 DIAGNOSIS — D50.0 IRON DEFICIENCY ANEMIA DUE TO CHRONIC BLOOD LOSS: Primary | ICD-10-CM

## 2021-01-19 PROCEDURE — 96365 THER/PROPH/DIAG IV INF INIT: CPT

## 2021-01-19 PROCEDURE — 63600175 PHARM REV CODE 636 W HCPCS: Performed by: INTERNAL MEDICINE

## 2021-01-19 PROCEDURE — 25000003 PHARM REV CODE 250: Performed by: INTERNAL MEDICINE

## 2021-01-19 RX ORDER — EPINEPHRINE 0.3 MG/.3ML
0.3 INJECTION SUBCUTANEOUS ONCE AS NEEDED
Status: CANCELLED | OUTPATIENT
Start: 2021-01-26

## 2021-01-19 RX ORDER — HEPARIN 100 UNIT/ML
500 SYRINGE INTRAVENOUS
Status: DISCONTINUED | OUTPATIENT
Start: 2021-01-19 | End: 2021-01-19 | Stop reason: HOSPADM

## 2021-01-19 RX ORDER — HEPARIN 100 UNIT/ML
500 SYRINGE INTRAVENOUS
Status: CANCELLED | OUTPATIENT
Start: 2021-01-26

## 2021-01-19 RX ORDER — SODIUM CHLORIDE 0.9 % (FLUSH) 0.9 %
10 SYRINGE (ML) INJECTION
Status: DISCONTINUED | OUTPATIENT
Start: 2021-01-19 | End: 2021-01-19 | Stop reason: HOSPADM

## 2021-01-19 RX ORDER — DIPHENHYDRAMINE HYDROCHLORIDE 50 MG/ML
50 INJECTION INTRAMUSCULAR; INTRAVENOUS ONCE AS NEEDED
Status: CANCELLED | OUTPATIENT
Start: 2021-01-26

## 2021-01-19 RX ORDER — METHYLPREDNISOLONE SOD SUCC 125 MG
125 VIAL (EA) INJECTION ONCE AS NEEDED
Status: CANCELLED | OUTPATIENT
Start: 2021-01-26

## 2021-01-19 RX ORDER — SODIUM CHLORIDE 0.9 % (FLUSH) 0.9 %
10 SYRINGE (ML) INJECTION
Status: CANCELLED | OUTPATIENT
Start: 2021-01-26

## 2021-01-19 RX ADMIN — IRON SUCROSE 100 MG: 20 INJECTION, SOLUTION INTRAVENOUS at 02:01

## 2021-01-25 ENCOUNTER — OFFICE VISIT (OUTPATIENT)
Dept: FAMILY MEDICINE | Facility: CLINIC | Age: 36
End: 2021-01-25
Payer: COMMERCIAL

## 2021-01-25 ENCOUNTER — LAB VISIT (OUTPATIENT)
Dept: LAB | Facility: HOSPITAL | Age: 36
End: 2021-01-25
Attending: INTERNAL MEDICINE
Payer: COMMERCIAL

## 2021-01-25 ENCOUNTER — TELEPHONE (OUTPATIENT)
Dept: OBSTETRICS AND GYNECOLOGY | Facility: CLINIC | Age: 36
End: 2021-01-25

## 2021-01-25 VITALS
RESPIRATION RATE: 16 BRPM | TEMPERATURE: 98 F | BODY MASS INDEX: 40.07 KG/M2 | SYSTOLIC BLOOD PRESSURE: 110 MMHG | HEART RATE: 90 BPM | DIASTOLIC BLOOD PRESSURE: 76 MMHG | OXYGEN SATURATION: 98 % | WEIGHT: 255.31 LBS | HEIGHT: 67 IN

## 2021-01-25 DIAGNOSIS — E66.01 MORBID (SEVERE) OBESITY DUE TO EXCESS CALORIES: ICD-10-CM

## 2021-01-25 DIAGNOSIS — R53.81 MALAISE AND FATIGUE: ICD-10-CM

## 2021-01-25 DIAGNOSIS — K55.20 ANGIODYSPLASIA OF COLON: ICD-10-CM

## 2021-01-25 DIAGNOSIS — R53.81 MALAISE AND FATIGUE: Primary | ICD-10-CM

## 2021-01-25 DIAGNOSIS — R53.83 MALAISE AND FATIGUE: Primary | ICD-10-CM

## 2021-01-25 DIAGNOSIS — R53.83 MALAISE AND FATIGUE: ICD-10-CM

## 2021-01-25 DIAGNOSIS — N92.6 LATE MENSES: ICD-10-CM

## 2021-01-25 DIAGNOSIS — I47.10 PAROXYSMAL SVT (SUPRAVENTRICULAR TACHYCARDIA): ICD-10-CM

## 2021-01-25 LAB
B-HCG UR QL: NEGATIVE
BASOPHILS # BLD AUTO: 0.04 K/UL (ref 0–0.2)
BASOPHILS NFR BLD: 0.5 % (ref 0–1.9)
CTP QC/QA: YES
DIFFERENTIAL METHOD: ABNORMAL
EOSINOPHIL # BLD AUTO: 0.1 K/UL (ref 0–0.5)
EOSINOPHIL NFR BLD: 1.6 % (ref 0–8)
ERYTHROCYTE [DISTWIDTH] IN BLOOD BY AUTOMATED COUNT: 19.9 % (ref 11.5–14.5)
HCT VFR BLD AUTO: 39.8 % (ref 37–48.5)
HGB BLD-MCNC: 11.6 G/DL (ref 12–16)
IMM GRANULOCYTES # BLD AUTO: 0.02 K/UL (ref 0–0.04)
IMM GRANULOCYTES NFR BLD AUTO: 0.3 % (ref 0–0.5)
LYMPHOCYTES # BLD AUTO: 2.3 K/UL (ref 1–4.8)
LYMPHOCYTES NFR BLD: 31.5 % (ref 18–48)
MCH RBC QN AUTO: 24.8 PG (ref 27–31)
MCHC RBC AUTO-ENTMCNC: 29.1 G/DL (ref 32–36)
MCV RBC AUTO: 85 FL (ref 82–98)
MONOCYTES # BLD AUTO: 0.5 K/UL (ref 0.3–1)
MONOCYTES NFR BLD: 6.8 % (ref 4–15)
NEUTROPHILS # BLD AUTO: 4.4 K/UL (ref 1.8–7.7)
NEUTROPHILS NFR BLD: 59.3 % (ref 38–73)
NRBC BLD-RTO: 0 /100 WBC
PLATELET # BLD AUTO: 343 K/UL (ref 150–350)
PMV BLD AUTO: 10.3 FL (ref 9.2–12.9)
RBC # BLD AUTO: 4.68 M/UL (ref 4–5.4)
WBC # BLD AUTO: 7.39 K/UL (ref 3.9–12.7)

## 2021-01-25 PROCEDURE — 36415 COLL VENOUS BLD VENIPUNCTURE: CPT

## 2021-01-25 PROCEDURE — 99214 OFFICE O/P EST MOD 30 MIN: CPT | Mod: S$GLB,,, | Performed by: INTERNAL MEDICINE

## 2021-01-25 PROCEDURE — 3074F PR MOST RECENT SYSTOLIC BLOOD PRESSURE < 130 MM HG: ICD-10-PCS | Mod: CPTII,S$GLB,, | Performed by: INTERNAL MEDICINE

## 2021-01-25 PROCEDURE — 3008F PR BODY MASS INDEX (BMI) DOCUMENTED: ICD-10-PCS | Mod: CPTII,S$GLB,, | Performed by: INTERNAL MEDICINE

## 2021-01-25 PROCEDURE — 85025 COMPLETE CBC W/AUTO DIFF WBC: CPT

## 2021-01-25 PROCEDURE — 3074F SYST BP LT 130 MM HG: CPT | Mod: CPTII,S$GLB,, | Performed by: INTERNAL MEDICINE

## 2021-01-25 PROCEDURE — 1126F PR PAIN SEVERITY QUANTIFIED, NO PAIN PRESENT: ICD-10-PCS | Mod: S$GLB,,, | Performed by: INTERNAL MEDICINE

## 2021-01-25 PROCEDURE — 3008F BODY MASS INDEX DOCD: CPT | Mod: CPTII,S$GLB,, | Performed by: INTERNAL MEDICINE

## 2021-01-25 PROCEDURE — 3078F PR MOST RECENT DIASTOLIC BLOOD PRESSURE < 80 MM HG: ICD-10-PCS | Mod: CPTII,S$GLB,, | Performed by: INTERNAL MEDICINE

## 2021-01-25 PROCEDURE — 1126F AMNT PAIN NOTED NONE PRSNT: CPT | Mod: S$GLB,,, | Performed by: INTERNAL MEDICINE

## 2021-01-25 PROCEDURE — 99214 PR OFFICE/OUTPT VISIT, EST, LEVL IV, 30-39 MIN: ICD-10-PCS | Mod: S$GLB,,, | Performed by: INTERNAL MEDICINE

## 2021-01-25 PROCEDURE — 3078F DIAST BP <80 MM HG: CPT | Mod: CPTII,S$GLB,, | Performed by: INTERNAL MEDICINE

## 2021-01-25 RX ORDER — VERAPAMIL HYDROCHLORIDE 180 MG/1
180 CAPSULE, EXTENDED RELEASE ORAL DAILY
Qty: 30 CAPSULE | Refills: 11 | Status: SHIPPED | OUTPATIENT
Start: 2021-01-25 | End: 2021-04-21

## 2021-01-26 ENCOUNTER — INFUSION (OUTPATIENT)
Dept: INFUSION THERAPY | Facility: HOSPITAL | Age: 36
End: 2021-01-26
Attending: INTERNAL MEDICINE
Payer: COMMERCIAL

## 2021-01-26 ENCOUNTER — PATIENT MESSAGE (OUTPATIENT)
Dept: FAMILY MEDICINE | Facility: CLINIC | Age: 36
End: 2021-01-26

## 2021-01-26 VITALS
BODY MASS INDEX: 39.63 KG/M2 | WEIGHT: 253 LBS | DIASTOLIC BLOOD PRESSURE: 83 MMHG | SYSTOLIC BLOOD PRESSURE: 130 MMHG | HEART RATE: 84 BPM | RESPIRATION RATE: 18 BRPM | TEMPERATURE: 98 F

## 2021-01-26 DIAGNOSIS — D50.0 IRON DEFICIENCY ANEMIA DUE TO CHRONIC BLOOD LOSS: Primary | ICD-10-CM

## 2021-01-26 PROCEDURE — 96365 THER/PROPH/DIAG IV INF INIT: CPT

## 2021-01-26 PROCEDURE — 63600175 PHARM REV CODE 636 W HCPCS: Performed by: INTERNAL MEDICINE

## 2021-01-26 PROCEDURE — 25000003 PHARM REV CODE 250: Performed by: INTERNAL MEDICINE

## 2021-01-26 RX ORDER — SODIUM CHLORIDE 0.9 % (FLUSH) 0.9 %
10 SYRINGE (ML) INJECTION
Status: CANCELLED | OUTPATIENT
Start: 2021-02-02

## 2021-01-26 RX ORDER — EPINEPHRINE 0.3 MG/.3ML
0.3 INJECTION SUBCUTANEOUS ONCE AS NEEDED
Status: CANCELLED | OUTPATIENT
Start: 2021-02-02

## 2021-01-26 RX ORDER — DIPHENHYDRAMINE HYDROCHLORIDE 50 MG/ML
50 INJECTION INTRAMUSCULAR; INTRAVENOUS ONCE AS NEEDED
Status: CANCELLED | OUTPATIENT
Start: 2021-02-02

## 2021-01-26 RX ORDER — METHYLPREDNISOLONE SOD SUCC 125 MG
125 VIAL (EA) INJECTION ONCE AS NEEDED
Status: CANCELLED | OUTPATIENT
Start: 2021-02-02

## 2021-01-26 RX ORDER — HEPARIN 100 UNIT/ML
500 SYRINGE INTRAVENOUS
Status: CANCELLED | OUTPATIENT
Start: 2021-02-02

## 2021-01-26 RX ORDER — HEPARIN 100 UNIT/ML
500 SYRINGE INTRAVENOUS
Status: DISCONTINUED | OUTPATIENT
Start: 2021-01-26 | End: 2021-01-26 | Stop reason: HOSPADM

## 2021-01-26 RX ORDER — SODIUM CHLORIDE 0.9 % (FLUSH) 0.9 %
10 SYRINGE (ML) INJECTION
Status: DISCONTINUED | OUTPATIENT
Start: 2021-01-26 | End: 2021-01-26 | Stop reason: HOSPADM

## 2021-01-26 RX ADMIN — IRON SUCROSE 100 MG: 20 INJECTION, SOLUTION INTRAVENOUS at 02:01

## 2021-01-31 NOTE — PROGRESS NOTES
[facility] Hospital Medicine  Progress Note    Patient Name: Sonia Goldberg  MRN: [id number]  Patient Class: OP- Observation   Admission Date: 10/25/2020  Length of Stay: 0 days  Attending Physician: No att. providers found  Primary Care Provider: Malcolm Ma MD        Subjective:     Principal Problem:GI bleed        HPI:  Sonia Goldberg is a 33 y/o female who presented to the ED with c/o of a near syncopal episode today.  Reports that she is being worked up outpatient for tachycardia, near syncopal episodes and anemia.  Yesterday, she began having abdominal pain and reports a large melanotic stool today.  Pt was seen in the ED yesterday with c/o of N/V/D and abd pain.  Hgb of 9.9 noted yesterday which has decreased to 9.0 today.  Denies fever, chest pain or dysuria.  Pt required IV narcotics while in the ED for pain control.  She will be placed in observation under the service of hospital medicine for continued treatment.      Overview/Hospital Course:  Patient was admitted for GI bleeding.  Her hemoglobin trended down to from 9 to 7.9 she received 2 units of PRBC and hemoglobin was stable after that at 10.2 patient did not have any active GI bleeding further.  Gastroenterology was consulted and patient received EGD and colonoscopy.  Colonoscopy was showed hemorrhoids otherwise unremarkable.  EGD was unremarkable.      Interval History:  Patient is seen and examined today.  Iron in fusion as per GI recommendations.  EEG performed on October 26, 2020 without any evidence of GI blood loss, patient is NPO  Patient is scheduled for colonoscopy by Dr. watson today.    Review of Systems   Constitutional: Positive for appetite change and fatigue. Negative for chills and fever.   HENT: Negative for congestion and rhinorrhea.    Eyes: Negative for photophobia and visual disturbance.   Respiratory: Positive for shortness of breath. Negative for cough and wheezing.    Cardiovascular: Positive  for palpitations. Negative for chest pain.   Gastrointestinal: Positive for abdominal pain and blood in stool. Negative for nausea and vomiting.   Genitourinary: Negative for dysuria and frequency.   Musculoskeletal: Negative for back pain and neck pain.   Skin: Negative for rash and wound.   Neurological: Positive for dizziness, syncope (near) and light-headedness.   Hematological: Negative for adenopathy.   Psychiatric/Behavioral: Negative for agitation and confusion.   All other systems reviewed and are negative.    Objective:     Vital Signs (Most Recent):  Temp: 98.1 °F (36.7 °C) (10/28/20 0324)  Pulse: 79 (10/28/20 0728)  Resp: 15 (10/28/20 0728)  BP: 117/68 (10/28/20 0324)  SpO2: 98 % (10/28/20 0728) Vital Signs (24h Range):        Weight: 108.9 kg (240 lb)  Body mass index is 37.59 kg/m².  No intake or output data in the 24 hours ending 01/31/21 0843   Physical Exam  Vitals and nursing note reviewed.   Constitutional:       General: She is not in acute distress.     Appearance: She is well-developed.   HENT:      Head: Normocephalic and atraumatic.   Eyes:      Conjunctiva/sclera: Conjunctivae normal.      Pupils: Pupils are equal, round, and reactive to light.   Cardiovascular:      Rate and Rhythm: Normal rate and regular rhythm.      Heart sounds: Normal heart sounds.   Pulmonary:      Effort: Pulmonary effort is normal. No respiratory distress.      Breath sounds: Normal breath sounds.   Abdominal:      General: Bowel sounds are normal.      Palpations: Abdomen is soft.      Tenderness: There is abdominal tenderness (LLQ, RLQ).   Musculoskeletal:         General: Normal range of motion.      Cervical back: Normal range of motion and neck supple.   Skin:     General: Skin is warm and dry.      Findings: No rash.   Neurological:      Mental Status: She is alert and oriented to person, place, and time.   Psychiatric:         Mood and Affect: Mood normal.         Behavior: Behavior normal.         Thought  Content: Thought content normal.         Judgment: Judgment normal.         Significant Labs: BMP: No results for input(s): GLU, NA, K, CL, CO2, BUN, CREATININE, CALCIUM, MG in the last 48 hours.  CBC: No results for input(s): WBC, HGB, HCT, PLT in the last 48 hours.    Significant Imaging: I have reviewed all pertinent imaging results/findings within the past 24 hours.      Assessment/Plan:      * GI bleed  Keep patient NPO, plan for colonoscopy today  Follow H/H closely.   Continue IV Protonix b.i.d.  GI consulted   Continue IVF hydration.   Use IV anti-emetics as needed.     GERD (gastroesophageal reflux disease)  Chronic, continue PPI.    Anemia  Patient's anemia is currently controlled.  Will transfuse 2 units of PRBC today plan for EGD  Lab Results   Component Value Date    HGB 7.9 (L) 10/26/2020    HCT 28.8 (L) 10/26/2020     Monitor serial CBC and transfuse if patient becomes hemodynamically unstable, symptomatic or H/H drops below 7/21.     Near syncope  Appears to be a chronic complaint for which patient is being worked up outpatient.  Hold chronic verapamil for now and check orthostatic VS. Resume chronic verapamil when appropriate.  Monitor telemetry.  Pt with report of melanotic stool today-trend H/H.  Fall precautions.    Asthma  Chronic, stable.   Continue singulair and prn albuterol inhaler.    Class 2 obesity in adult  Body mass index is 37.59 kg/m². Obesity complicates all aspects of disease management from diagnostic modalities to treatment. Weight loss encouraged and health benefits explained to patient.      VTE Risk Mitigation (From admission, onward)         Ordered     IP VTE HIGH RISK PATIENT  Once      10/26/20 0017                Discharge Planning   [place]: 10/28/2020     Code Status: Prior   Is the patient medically ready for discharge?:     Reason for patient still in hospital (select all that apply): Patient trending condition  Discharge Plan A: Home with family                  Yarelis  MD Keon  Department of Hospital Medicine   [facility]

## 2021-01-31 NOTE — ASSESSMENT & PLAN NOTE
Keep patient NPO, plan for colonoscopy today  Follow H/H closely.   Continue IV Protonix b.i.d.  GI consulted   Continue IVF hydration.   Use IV anti-emetics as needed.

## 2021-01-31 NOTE — SUBJECTIVE & OBJECTIVE
Interval History:  Patient is seen and examined today.  Iron in fusion as per GI recommendations.  EEG performed on October 26, 2020 without any evidence of GI blood loss, patient is NPO  Patient is scheduled for colonoscopy by Dr. watson today.    Review of Systems   Constitutional: Positive for appetite change and fatigue. Negative for chills and fever.   HENT: Negative for congestion and rhinorrhea.    Eyes: Negative for photophobia and visual disturbance.   Respiratory: Positive for shortness of breath. Negative for cough and wheezing.    Cardiovascular: Positive for palpitations. Negative for chest pain.   Gastrointestinal: Positive for abdominal pain and blood in stool. Negative for nausea and vomiting.   Genitourinary: Negative for dysuria and frequency.   Musculoskeletal: Negative for back pain and neck pain.   Skin: Negative for rash and wound.   Neurological: Positive for dizziness, syncope (near) and light-headedness.   Hematological: Negative for adenopathy.   Psychiatric/Behavioral: Negative for agitation and confusion.   All other systems reviewed and are negative.    Objective:     Vital Signs (Most Recent):  Temp: 98.1 °F (36.7 °C) (10/28/20 0324)  Pulse: 79 (10/28/20 0728)  Resp: 15 (10/28/20 0728)  BP: 117/68 (10/28/20 0324)  SpO2: 98 % (10/28/20 0728) Vital Signs (24h Range):        Weight: 108.9 kg (240 lb)  Body mass index is 37.59 kg/m².  No intake or output data in the 24 hours ending 01/31/21 0843   Physical Exam  Vitals and nursing note reviewed.   Constitutional:       General: She is not in acute distress.     Appearance: She is well-developed.   HENT:      Head: Normocephalic and atraumatic.   Eyes:      Conjunctiva/sclera: Conjunctivae normal.      Pupils: Pupils are equal, round, and reactive to light.   Cardiovascular:      Rate and Rhythm: Normal rate and regular rhythm.      Heart sounds: Normal heart sounds.   Pulmonary:      Effort: Pulmonary effort is normal. No respiratory  distress.      Breath sounds: Normal breath sounds.   Abdominal:      General: Bowel sounds are normal.      Palpations: Abdomen is soft.      Tenderness: There is abdominal tenderness (LLQ, RLQ).   Musculoskeletal:         General: Normal range of motion.      Cervical back: Normal range of motion and neck supple.   Skin:     General: Skin is warm and dry.      Findings: No rash.   Neurological:      Mental Status: She is alert and oriented to person, place, and time.   Psychiatric:         Mood and Affect: Mood normal.         Behavior: Behavior normal.         Thought Content: Thought content normal.         Judgment: Judgment normal.         Significant Labs: BMP: No results for input(s): GLU, NA, K, CL, CO2, BUN, CREATININE, CALCIUM, MG in the last 48 hours.  CBC: No results for input(s): WBC, HGB, HCT, PLT in the last 48 hours.    Significant Imaging: I have reviewed all pertinent imaging results/findings within the past 24 hours.

## 2021-02-04 ENCOUNTER — INFUSION (OUTPATIENT)
Dept: INFUSION THERAPY | Facility: HOSPITAL | Age: 36
End: 2021-02-04
Attending: INTERNAL MEDICINE
Payer: COMMERCIAL

## 2021-02-04 VITALS
DIASTOLIC BLOOD PRESSURE: 80 MMHG | HEART RATE: 85 BPM | HEIGHT: 67 IN | RESPIRATION RATE: 18 BRPM | WEIGHT: 252.63 LBS | BODY MASS INDEX: 39.65 KG/M2 | SYSTOLIC BLOOD PRESSURE: 128 MMHG | TEMPERATURE: 98 F

## 2021-02-04 DIAGNOSIS — D50.0 IRON DEFICIENCY ANEMIA DUE TO CHRONIC BLOOD LOSS: Primary | ICD-10-CM

## 2021-02-04 PROCEDURE — 25000003 PHARM REV CODE 250: Performed by: INTERNAL MEDICINE

## 2021-02-04 PROCEDURE — 96365 THER/PROPH/DIAG IV INF INIT: CPT

## 2021-02-04 PROCEDURE — A4216 STERILE WATER/SALINE, 10 ML: HCPCS | Performed by: INTERNAL MEDICINE

## 2021-02-04 PROCEDURE — 63600175 PHARM REV CODE 636 W HCPCS: Performed by: INTERNAL MEDICINE

## 2021-02-04 RX ORDER — SODIUM CHLORIDE 0.9 % (FLUSH) 0.9 %
10 SYRINGE (ML) INJECTION
Status: DISCONTINUED | OUTPATIENT
Start: 2021-02-04 | End: 2021-02-04 | Stop reason: HOSPADM

## 2021-02-04 RX ORDER — SODIUM CHLORIDE 0.9 % (FLUSH) 0.9 %
10 SYRINGE (ML) INJECTION
Status: CANCELLED | OUTPATIENT
Start: 2021-02-09

## 2021-02-04 RX ORDER — METHYLPREDNISOLONE SOD SUCC 125 MG
125 VIAL (EA) INJECTION ONCE AS NEEDED
Status: CANCELLED | OUTPATIENT
Start: 2021-02-09

## 2021-02-04 RX ORDER — HEPARIN 100 UNIT/ML
500 SYRINGE INTRAVENOUS
Status: CANCELLED | OUTPATIENT
Start: 2021-02-09

## 2021-02-04 RX ORDER — EPINEPHRINE 0.3 MG/.3ML
0.3 INJECTION SUBCUTANEOUS ONCE AS NEEDED
Status: CANCELLED | OUTPATIENT
Start: 2021-02-09

## 2021-02-04 RX ORDER — DIPHENHYDRAMINE HYDROCHLORIDE 50 MG/ML
50 INJECTION INTRAMUSCULAR; INTRAVENOUS ONCE AS NEEDED
Status: CANCELLED | OUTPATIENT
Start: 2021-02-09

## 2021-02-04 RX ADMIN — IRON SUCROSE 100 MG: 20 INJECTION, SOLUTION INTRAVENOUS at 02:02

## 2021-02-04 RX ADMIN — SODIUM CHLORIDE 10 ML: 9 INJECTION INTRAMUSCULAR; INTRAVENOUS; SUBCUTANEOUS at 03:02

## 2021-02-09 ENCOUNTER — INFUSION (OUTPATIENT)
Dept: INFUSION THERAPY | Facility: HOSPITAL | Age: 36
End: 2021-02-09
Attending: INTERNAL MEDICINE
Payer: COMMERCIAL

## 2021-02-09 VITALS
RESPIRATION RATE: 18 BRPM | TEMPERATURE: 98 F | WEIGHT: 258.13 LBS | SYSTOLIC BLOOD PRESSURE: 134 MMHG | DIASTOLIC BLOOD PRESSURE: 74 MMHG | BODY MASS INDEX: 40.42 KG/M2 | HEART RATE: 80 BPM | OXYGEN SATURATION: 98 %

## 2021-02-09 DIAGNOSIS — D50.0 IRON DEFICIENCY ANEMIA DUE TO CHRONIC BLOOD LOSS: Primary | ICD-10-CM

## 2021-02-09 PROCEDURE — 25000003 PHARM REV CODE 250: Performed by: INTERNAL MEDICINE

## 2021-02-09 PROCEDURE — 96365 THER/PROPH/DIAG IV INF INIT: CPT

## 2021-02-09 PROCEDURE — 63600175 PHARM REV CODE 636 W HCPCS: Performed by: INTERNAL MEDICINE

## 2021-02-09 RX ORDER — HEPARIN 100 UNIT/ML
500 SYRINGE INTRAVENOUS
Status: DISCONTINUED | OUTPATIENT
Start: 2021-02-09 | End: 2021-02-09 | Stop reason: HOSPADM

## 2021-02-09 RX ORDER — EPINEPHRINE 0.3 MG/.3ML
0.3 INJECTION SUBCUTANEOUS ONCE AS NEEDED
Status: CANCELLED | OUTPATIENT
Start: 2021-02-16

## 2021-02-09 RX ORDER — SODIUM CHLORIDE 0.9 % (FLUSH) 0.9 %
10 SYRINGE (ML) INJECTION
Status: DISCONTINUED | OUTPATIENT
Start: 2021-02-09 | End: 2021-02-09 | Stop reason: HOSPADM

## 2021-02-09 RX ORDER — SODIUM CHLORIDE 0.9 % (FLUSH) 0.9 %
10 SYRINGE (ML) INJECTION
Status: CANCELLED | OUTPATIENT
Start: 2021-02-16

## 2021-02-09 RX ORDER — DIPHENHYDRAMINE HYDROCHLORIDE 50 MG/ML
50 INJECTION INTRAMUSCULAR; INTRAVENOUS ONCE AS NEEDED
Status: CANCELLED | OUTPATIENT
Start: 2021-02-16

## 2021-02-09 RX ORDER — METHYLPREDNISOLONE SOD SUCC 125 MG
125 VIAL (EA) INJECTION ONCE AS NEEDED
Status: CANCELLED | OUTPATIENT
Start: 2021-02-16

## 2021-02-09 RX ORDER — HEPARIN 100 UNIT/ML
500 SYRINGE INTRAVENOUS
Status: CANCELLED | OUTPATIENT
Start: 2021-02-16

## 2021-02-09 RX ADMIN — IRON SUCROSE 100 MG: 20 INJECTION, SOLUTION INTRAVENOUS at 02:02

## 2021-02-28 ENCOUNTER — HOSPITAL ENCOUNTER (EMERGENCY)
Facility: HOSPITAL | Age: 36
Discharge: HOME | End: 2021-02-28
Attending: EMERGENCY MEDICINE
Payer: OTHER MISCELLANEOUS

## 2021-02-28 VITALS
SYSTOLIC BLOOD PRESSURE: 137 MMHG | WEIGHT: 251 LBS | HEART RATE: 94 BPM | RESPIRATION RATE: 20 BRPM | BODY MASS INDEX: 39.39 KG/M2 | DIASTOLIC BLOOD PRESSURE: 79 MMHG | OXYGEN SATURATION: 96 % | HEIGHT: 67 IN | TEMPERATURE: 98 F

## 2021-02-28 DIAGNOSIS — M25.569 KNEE PAIN: Primary | ICD-10-CM

## 2021-02-28 LAB
B-HCG UR QL: NEGATIVE
CTP QC/QA: YES

## 2021-02-28 PROCEDURE — 81025 URINE PREGNANCY TEST: CPT | Performed by: PHYSICIAN ASSISTANT

## 2021-02-28 PROCEDURE — 25000003 PHARM REV CODE 250: Performed by: PHYSICIAN ASSISTANT

## 2021-02-28 PROCEDURE — 29505 APPLICATION LONG LEG SPLINT: CPT | Mod: RT

## 2021-02-28 PROCEDURE — 99283 EMERGENCY DEPT VISIT LOW MDM: CPT | Mod: 25

## 2021-02-28 RX ORDER — HYDROCODONE BITARTRATE AND ACETAMINOPHEN 5; 325 MG/1; MG/1
1 TABLET ORAL EVERY 4 HOURS PRN
Qty: 18 TABLET | Refills: 0 | Status: SHIPPED | OUTPATIENT
Start: 2021-02-28 | End: 2021-05-14 | Stop reason: ALTCHOICE

## 2021-02-28 RX ORDER — HYDROCODONE BITARTRATE AND ACETAMINOPHEN 5; 325 MG/1; MG/1
1 TABLET ORAL
Status: COMPLETED | OUTPATIENT
Start: 2021-02-28 | End: 2021-02-28

## 2021-02-28 RX ADMIN — HYDROCODONE BITARTRATE AND ACETAMINOPHEN 1 TABLET: 5; 325 TABLET ORAL at 02:02

## 2021-03-03 ENCOUNTER — TELEPHONE (OUTPATIENT)
Dept: URGENT CARE | Facility: CLINIC | Age: 36
End: 2021-03-03

## 2021-03-04 ENCOUNTER — PATIENT OUTREACH (OUTPATIENT)
Dept: EMERGENCY MEDICINE | Facility: HOSPITAL | Age: 36
End: 2021-03-04

## 2021-03-08 ENCOUNTER — OFFICE VISIT (OUTPATIENT)
Dept: ORTHOPEDICS | Facility: CLINIC | Age: 36
End: 2021-03-08
Payer: OTHER MISCELLANEOUS

## 2021-03-08 VITALS
HEART RATE: 91 BPM | DIASTOLIC BLOOD PRESSURE: 82 MMHG | WEIGHT: 250 LBS | SYSTOLIC BLOOD PRESSURE: 128 MMHG | BODY MASS INDEX: 39.24 KG/M2 | HEIGHT: 67 IN

## 2021-03-08 DIAGNOSIS — S83.241A ACUTE MEDIAL MENISCUS TEAR OF RIGHT KNEE, INITIAL ENCOUNTER: Primary | ICD-10-CM

## 2021-03-08 PROCEDURE — 99203 OFFICE O/P NEW LOW 30 MIN: CPT | Mod: S$GLB,,, | Performed by: ORTHOPAEDIC SURGERY

## 2021-03-08 PROCEDURE — 99203 PR OFFICE/OUTPT VISIT, NEW, LEVL III, 30-44 MIN: ICD-10-PCS | Mod: S$GLB,,, | Performed by: ORTHOPAEDIC SURGERY

## 2021-03-08 RX ORDER — DICLOFENAC SODIUM 10 MG/G
GEL TOPICAL
COMMUNITY
Start: 2021-02-23 | End: 2021-05-14 | Stop reason: ALTCHOICE

## 2021-03-08 RX ORDER — TRAMADOL HYDROCHLORIDE 50 MG/1
50 TABLET ORAL EVERY 6 HOURS PRN
Qty: 28 TABLET | Refills: 0 | Status: SHIPPED | OUTPATIENT
Start: 2021-03-08 | End: 2021-03-15

## 2021-03-08 RX ORDER — IBUPROFEN 800 MG/1
800 TABLET ORAL EVERY 8 HOURS PRN
COMMUNITY
Start: 2021-02-23 | End: 2021-04-21

## 2021-03-08 RX ORDER — METHOCARBAMOL 500 MG/1
500 TABLET, FILM COATED ORAL 3 TIMES DAILY
Qty: 30 TABLET | Refills: 0 | Status: SHIPPED | OUTPATIENT
Start: 2021-03-08 | End: 2021-03-18

## 2021-03-10 ENCOUNTER — PATIENT MESSAGE (OUTPATIENT)
Dept: ORTHOPEDICS | Facility: CLINIC | Age: 36
End: 2021-03-10

## 2021-03-19 ENCOUNTER — HOSPITAL ENCOUNTER (OUTPATIENT)
Dept: RADIOLOGY | Facility: HOSPITAL | Age: 36
Discharge: HOME | End: 2021-03-19
Attending: ORTHOPAEDIC SURGERY
Payer: OTHER MISCELLANEOUS

## 2021-03-19 DIAGNOSIS — S83.241A ACUTE MEDIAL MENISCUS TEAR OF RIGHT KNEE, INITIAL ENCOUNTER: ICD-10-CM

## 2021-03-19 PROCEDURE — 73721 MRI JNT OF LWR EXTRE W/O DYE: CPT | Mod: TC,PO,RT

## 2021-03-24 ENCOUNTER — OFFICE VISIT (OUTPATIENT)
Dept: ORTHOPEDICS | Facility: CLINIC | Age: 36
End: 2021-03-24
Payer: OTHER MISCELLANEOUS

## 2021-03-24 VITALS
HEART RATE: 86 BPM | WEIGHT: 250 LBS | SYSTOLIC BLOOD PRESSURE: 136 MMHG | HEIGHT: 67 IN | DIASTOLIC BLOOD PRESSURE: 82 MMHG | BODY MASS INDEX: 39.24 KG/M2

## 2021-03-24 DIAGNOSIS — M22.41 CHONDROMALACIA PATELLAE, RIGHT: ICD-10-CM

## 2021-03-24 DIAGNOSIS — M94.262: Primary | ICD-10-CM

## 2021-03-24 PROCEDURE — 99213 OFFICE O/P EST LOW 20 MIN: CPT | Mod: S$GLB,,, | Performed by: ORTHOPAEDIC SURGERY

## 2021-03-24 PROCEDURE — 99213 PR OFFICE/OUTPT VISIT, EST, LEVL III, 20-29 MIN: ICD-10-PCS | Mod: S$GLB,,, | Performed by: ORTHOPAEDIC SURGERY

## 2021-04-14 ENCOUNTER — OFFICE VISIT (OUTPATIENT)
Dept: ORTHOPEDICS | Facility: CLINIC | Age: 36
End: 2021-04-14
Payer: OTHER MISCELLANEOUS

## 2021-04-14 VITALS
DIASTOLIC BLOOD PRESSURE: 74 MMHG | WEIGHT: 250 LBS | SYSTOLIC BLOOD PRESSURE: 138 MMHG | BODY MASS INDEX: 39.24 KG/M2 | HEIGHT: 67 IN | HEART RATE: 80 BPM

## 2021-04-14 DIAGNOSIS — M22.41 CHONDROMALACIA PATELLAE, RIGHT: ICD-10-CM

## 2021-04-14 DIAGNOSIS — S83.001S: Primary | ICD-10-CM

## 2021-04-14 PROCEDURE — 99213 OFFICE O/P EST LOW 20 MIN: CPT | Mod: S$GLB,,, | Performed by: ORTHOPAEDIC SURGERY

## 2021-04-14 PROCEDURE — 99213 PR OFFICE/OUTPT VISIT, EST, LEVL III, 20-29 MIN: ICD-10-PCS | Mod: S$GLB,,, | Performed by: ORTHOPAEDIC SURGERY

## 2021-04-21 ENCOUNTER — OFFICE VISIT (OUTPATIENT)
Dept: ORTHOPEDICS | Facility: CLINIC | Age: 36
End: 2021-04-21
Payer: OTHER MISCELLANEOUS

## 2021-04-21 VITALS — BODY MASS INDEX: 39.24 KG/M2 | HEIGHT: 67 IN | WEIGHT: 250 LBS

## 2021-04-21 DIAGNOSIS — M22.41 CHONDROMALACIA PATELLAE, RIGHT: ICD-10-CM

## 2021-04-21 DIAGNOSIS — S83.001S: Primary | ICD-10-CM

## 2021-04-21 DIAGNOSIS — R29.6 FREQUENT FALLS: ICD-10-CM

## 2021-04-21 DIAGNOSIS — M23.91 KNEE LOCKING, RIGHT: ICD-10-CM

## 2021-04-21 PROCEDURE — 20610 DRAIN/INJ JOINT/BURSA W/O US: CPT | Mod: RT,S$GLB,, | Performed by: ORTHOPAEDIC SURGERY

## 2021-04-21 PROCEDURE — 99213 PR OFFICE/OUTPT VISIT, EST, LEVL III, 20-29 MIN: ICD-10-PCS | Mod: 25,S$GLB,, | Performed by: ORTHOPAEDIC SURGERY

## 2021-04-21 PROCEDURE — 20610 LARGE JOINT ASPIRATION/INJECTION: R KNEE: ICD-10-PCS | Mod: RT,S$GLB,, | Performed by: ORTHOPAEDIC SURGERY

## 2021-04-21 PROCEDURE — 99213 OFFICE O/P EST LOW 20 MIN: CPT | Mod: 25,S$GLB,, | Performed by: ORTHOPAEDIC SURGERY

## 2021-04-21 RX ORDER — METHYLPREDNISOLONE ACETATE 40 MG/ML
40 INJECTION, SUSPENSION INTRA-ARTICULAR; INTRALESIONAL; INTRAMUSCULAR; SOFT TISSUE
Status: DISCONTINUED | OUTPATIENT
Start: 2021-04-21 | End: 2021-04-21 | Stop reason: HOSPADM

## 2021-04-21 RX ORDER — OXYCODONE AND ACETAMINOPHEN 7.5; 325 MG/1; MG/1
1 TABLET ORAL EVERY 6 HOURS PRN
Qty: 28 TABLET | Refills: 0 | Status: SHIPPED | OUTPATIENT
Start: 2021-04-21 | End: 2021-08-20 | Stop reason: CLARIF

## 2021-04-21 RX ORDER — METHOCARBAMOL 500 MG/1
500 TABLET, FILM COATED ORAL 4 TIMES DAILY PRN
COMMUNITY
End: 2021-08-20 | Stop reason: CLARIF

## 2021-04-21 RX ADMIN — METHYLPREDNISOLONE ACETATE 40 MG: 40 INJECTION, SUSPENSION INTRA-ARTICULAR; INTRALESIONAL; INTRAMUSCULAR; SOFT TISSUE at 02:04

## 2021-04-27 ENCOUNTER — OFFICE VISIT (OUTPATIENT)
Dept: ORTHOPEDICS | Facility: CLINIC | Age: 36
End: 2021-04-27
Payer: OTHER MISCELLANEOUS

## 2021-04-27 VITALS
WEIGHT: 250 LBS | DIASTOLIC BLOOD PRESSURE: 94 MMHG | HEART RATE: 94 BPM | SYSTOLIC BLOOD PRESSURE: 138 MMHG | HEIGHT: 67 IN | BODY MASS INDEX: 39.24 KG/M2

## 2021-04-27 DIAGNOSIS — M17.11 PATELLOFEMORAL ARTHRITIS OF RIGHT KNEE: ICD-10-CM

## 2021-04-27 DIAGNOSIS — M22.41 CHONDROMALACIA PATELLAE, RIGHT: ICD-10-CM

## 2021-04-27 DIAGNOSIS — S83.001S: Primary | ICD-10-CM

## 2021-04-27 PROCEDURE — 99213 OFFICE O/P EST LOW 20 MIN: CPT | Mod: S$GLB,,, | Performed by: ORTHOPAEDIC SURGERY

## 2021-04-27 PROCEDURE — 99213 PR OFFICE/OUTPT VISIT, EST, LEVL III, 20-29 MIN: ICD-10-PCS | Mod: S$GLB,,, | Performed by: ORTHOPAEDIC SURGERY

## 2021-05-11 ENCOUNTER — TELEPHONE (OUTPATIENT)
Dept: ORTHOPEDICS | Facility: CLINIC | Age: 36
End: 2021-05-11

## 2021-05-13 DIAGNOSIS — M22.41 CHONDROMALACIA PATELLAE, RIGHT: ICD-10-CM

## 2021-05-13 DIAGNOSIS — S83.001S: Primary | ICD-10-CM

## 2021-05-14 ENCOUNTER — HOSPITAL ENCOUNTER (OUTPATIENT)
Dept: PREADMISSION TESTING | Facility: HOSPITAL | Age: 36
Discharge: HOME | End: 2021-05-14
Attending: ORTHOPAEDIC SURGERY
Payer: OTHER MISCELLANEOUS

## 2021-05-14 ENCOUNTER — HOSPITAL ENCOUNTER (OUTPATIENT)
Dept: RADIOLOGY | Facility: HOSPITAL | Age: 36
Discharge: HOME | End: 2021-05-14
Attending: ORTHOPAEDIC SURGERY
Payer: OTHER MISCELLANEOUS

## 2021-05-14 ENCOUNTER — OFFICE VISIT (OUTPATIENT)
Dept: FAMILY MEDICINE | Facility: CLINIC | Age: 36
End: 2021-05-14
Payer: COMMERCIAL

## 2021-05-14 VITALS
WEIGHT: 269.63 LBS | HEART RATE: 83 BPM | HEIGHT: 67 IN | TEMPERATURE: 98 F | RESPIRATION RATE: 18 BRPM | BODY MASS INDEX: 42.32 KG/M2 | SYSTOLIC BLOOD PRESSURE: 133 MMHG | DIASTOLIC BLOOD PRESSURE: 91 MMHG | OXYGEN SATURATION: 97 %

## 2021-05-14 DIAGNOSIS — J01.00 ACUTE MAXILLARY SINUSITIS, RECURRENCE NOT SPECIFIED: Primary | ICD-10-CM

## 2021-05-14 DIAGNOSIS — F41.9 CHRONIC ANXIETY: ICD-10-CM

## 2021-05-14 DIAGNOSIS — R06.2 WHEEZING: ICD-10-CM

## 2021-05-14 DIAGNOSIS — M22.41 CHONDROMALACIA PATELLAE, RIGHT: ICD-10-CM

## 2021-05-14 DIAGNOSIS — J02.9 SORE THROAT: ICD-10-CM

## 2021-05-14 DIAGNOSIS — S83.001S: ICD-10-CM

## 2021-05-14 DIAGNOSIS — F51.3 SLEEP WALKING: ICD-10-CM

## 2021-05-14 DIAGNOSIS — Z01.818 PRE-OP TESTING: Primary | ICD-10-CM

## 2021-05-14 LAB
ALBUMIN SERPL BCP-MCNC: 3.6 G/DL (ref 3.5–5.2)
ALP SERPL-CCNC: 81 U/L (ref 55–135)
ALT SERPL W/O P-5'-P-CCNC: 13 U/L (ref 10–44)
ANION GAP SERPL CALC-SCNC: 9 MMOL/L (ref 8–16)
AST SERPL-CCNC: 12 U/L (ref 10–40)
BASOPHILS # BLD AUTO: 0.04 K/UL (ref 0–0.2)
BASOPHILS NFR BLD: 0.4 % (ref 0–1.9)
BILIRUB SERPL-MCNC: 0.4 MG/DL (ref 0.1–1)
BUN SERPL-MCNC: 13 MG/DL (ref 6–20)
CALCIUM SERPL-MCNC: 10.1 MG/DL (ref 8.7–10.5)
CHLORIDE SERPL-SCNC: 101 MMOL/L (ref 95–110)
CO2 SERPL-SCNC: 28 MMOL/L (ref 23–29)
CREAT SERPL-MCNC: 0.7 MG/DL (ref 0.5–1.4)
DIFFERENTIAL METHOD: ABNORMAL
EOSINOPHIL # BLD AUTO: 0.1 K/UL (ref 0–0.5)
EOSINOPHIL NFR BLD: 1.3 % (ref 0–8)
ERYTHROCYTE [DISTWIDTH] IN BLOOD BY AUTOMATED COUNT: 14 % (ref 11.5–14.5)
EST. GFR  (AFRICAN AMERICAN): >60 ML/MIN/1.73 M^2
EST. GFR  (NON AFRICAN AMERICAN): >60 ML/MIN/1.73 M^2
GLUCOSE SERPL-MCNC: 93 MG/DL (ref 70–110)
HCT VFR BLD AUTO: 40.9 % (ref 37–48.5)
HGB BLD-MCNC: 12.9 G/DL (ref 12–16)
IMM GRANULOCYTES # BLD AUTO: 0.03 K/UL (ref 0–0.04)
IMM GRANULOCYTES NFR BLD AUTO: 0.3 % (ref 0–0.5)
LYMPHOCYTES # BLD AUTO: 2 K/UL (ref 1–4.8)
LYMPHOCYTES NFR BLD: 20.2 % (ref 18–48)
MCH RBC QN AUTO: 29.4 PG (ref 27–31)
MCHC RBC AUTO-ENTMCNC: 31.5 G/DL (ref 32–36)
MCV RBC AUTO: 93 FL (ref 82–98)
MONOCYTES # BLD AUTO: 0.5 K/UL (ref 0.3–1)
MONOCYTES NFR BLD: 5.6 % (ref 4–15)
NEUTROPHILS # BLD AUTO: 7 K/UL (ref 1.8–7.7)
NEUTROPHILS NFR BLD: 72.2 % (ref 38–73)
NRBC BLD-RTO: 0 /100 WBC
PLATELET # BLD AUTO: 347 K/UL (ref 150–450)
PMV BLD AUTO: 10.5 FL (ref 9.2–12.9)
POTASSIUM SERPL-SCNC: 4 MMOL/L (ref 3.5–5.1)
PROT SERPL-MCNC: 6.8 G/DL (ref 6–8.4)
RBC # BLD AUTO: 4.39 M/UL (ref 4–5.4)
SODIUM SERPL-SCNC: 138 MMOL/L (ref 136–145)
WBC # BLD AUTO: 9.7 K/UL (ref 3.9–12.7)

## 2021-05-14 PROCEDURE — 85025 COMPLETE CBC W/AUTO DIFF WBC: CPT | Performed by: ORTHOPAEDIC SURGERY

## 2021-05-14 PROCEDURE — 71046 X-RAY EXAM CHEST 2 VIEWS: CPT | Mod: TC

## 2021-05-14 PROCEDURE — 99214 OFFICE O/P EST MOD 30 MIN: CPT | Mod: 95,,, | Performed by: PHYSICIAN ASSISTANT

## 2021-05-14 PROCEDURE — 80053 COMPREHEN METABOLIC PANEL: CPT | Performed by: ORTHOPAEDIC SURGERY

## 2021-05-14 PROCEDURE — 36415 COLL VENOUS BLD VENIPUNCTURE: CPT | Performed by: ORTHOPAEDIC SURGERY

## 2021-05-14 PROCEDURE — 99214 PR OFFICE/OUTPT VISIT, EST, LEVL IV, 30-39 MIN: ICD-10-PCS | Mod: 95,,, | Performed by: PHYSICIAN ASSISTANT

## 2021-05-14 RX ORDER — AMOXICILLIN AND CLAVULANATE POTASSIUM 875; 125 MG/1; MG/1
1 TABLET, FILM COATED ORAL 2 TIMES DAILY
Qty: 20 TABLET | Refills: 0 | Status: SHIPPED | OUTPATIENT
Start: 2021-05-14 | End: 2021-05-24

## 2021-05-14 RX ORDER — ALBUTEROL SULFATE 1.25 MG/3ML
1.25 SOLUTION RESPIRATORY (INHALATION) EVERY 6 HOURS PRN
Qty: 1 BOX | Refills: 0 | Status: SHIPPED | OUTPATIENT
Start: 2021-05-14 | End: 2022-05-14

## 2021-05-14 RX ORDER — METHYLPREDNISOLONE 4 MG/1
TABLET ORAL
Qty: 1 PACKAGE | Refills: 0 | Status: ON HOLD | OUTPATIENT
Start: 2021-05-14 | End: 2021-05-26

## 2021-05-17 ENCOUNTER — TELEPHONE (OUTPATIENT)
Dept: FAMILY MEDICINE | Facility: CLINIC | Age: 36
End: 2021-05-17

## 2021-05-24 ENCOUNTER — TELEPHONE (OUTPATIENT)
Dept: CARDIOLOGY | Facility: CLINIC | Age: 36
End: 2021-05-24

## 2021-05-25 ENCOUNTER — HOSPITAL ENCOUNTER (OUTPATIENT)
Dept: PREADMISSION TESTING | Facility: HOSPITAL | Age: 36
Discharge: HOME | End: 2021-05-25
Attending: ORTHOPAEDIC SURGERY
Payer: OTHER MISCELLANEOUS

## 2021-05-25 DIAGNOSIS — Z01.818 PRE-OP TESTING: Primary | ICD-10-CM

## 2021-05-25 LAB — SARS-COV-2 RDRP RESP QL NAA+PROBE: NEGATIVE

## 2021-05-25 PROCEDURE — 93010 EKG 12-LEAD: ICD-10-PCS | Mod: ,,, | Performed by: SPECIALIST

## 2021-05-25 PROCEDURE — 93005 ELECTROCARDIOGRAM TRACING: CPT | Performed by: SPECIALIST

## 2021-05-25 PROCEDURE — 93010 ELECTROCARDIOGRAM REPORT: CPT | Mod: ,,, | Performed by: SPECIALIST

## 2021-05-25 PROCEDURE — U0002 COVID-19 LAB TEST NON-CDC: HCPCS | Performed by: ORTHOPAEDIC SURGERY

## 2021-05-26 ENCOUNTER — ANESTHESIA EVENT (OUTPATIENT)
Dept: SURGERY | Facility: HOSPITAL | Age: 36
End: 2021-05-26
Payer: OTHER MISCELLANEOUS

## 2021-05-26 ENCOUNTER — HOSPITAL ENCOUNTER (OUTPATIENT)
Facility: HOSPITAL | Age: 36
Discharge: HOME | End: 2021-05-26
Attending: ORTHOPAEDIC SURGERY | Admitting: ORTHOPAEDIC SURGERY
Payer: OTHER MISCELLANEOUS

## 2021-05-26 ENCOUNTER — ANESTHESIA (OUTPATIENT)
Dept: SURGERY | Facility: HOSPITAL | Age: 36
End: 2021-05-26
Payer: OTHER MISCELLANEOUS

## 2021-05-26 VITALS
OXYGEN SATURATION: 95 % | TEMPERATURE: 98 F | SYSTOLIC BLOOD PRESSURE: 118 MMHG | RESPIRATION RATE: 16 BRPM | DIASTOLIC BLOOD PRESSURE: 87 MMHG | HEART RATE: 74 BPM

## 2021-05-26 DIAGNOSIS — M22.41 CHONDROMALACIA PATELLAE, RIGHT: Primary | ICD-10-CM

## 2021-05-26 DIAGNOSIS — Z01.818 PREOP TESTING: ICD-10-CM

## 2021-05-26 DIAGNOSIS — S83.001S: ICD-10-CM

## 2021-05-26 PROCEDURE — 25000003 PHARM REV CODE 250: Performed by: NURSE ANESTHETIST, CERTIFIED REGISTERED

## 2021-05-26 PROCEDURE — 63600175 PHARM REV CODE 636 W HCPCS: Performed by: ANESTHESIOLOGY

## 2021-05-26 PROCEDURE — 63600175 PHARM REV CODE 636 W HCPCS: Performed by: NURSE ANESTHETIST, CERTIFIED REGISTERED

## 2021-05-26 PROCEDURE — 37000009 HC ANESTHESIA EA ADD 15 MINS: Performed by: ORTHOPAEDIC SURGERY

## 2021-05-26 PROCEDURE — 27000671 HC TUBING MICROBORE EXT: Performed by: ANESTHESIOLOGY

## 2021-05-26 PROCEDURE — 63600175 PHARM REV CODE 636 W HCPCS: Performed by: ORTHOPAEDIC SURGERY

## 2021-05-26 PROCEDURE — 36000711: Performed by: ORTHOPAEDIC SURGERY

## 2021-05-26 PROCEDURE — 27200651 HC AIRWAY, LMA: Performed by: ANESTHESIOLOGY

## 2021-05-26 PROCEDURE — 27000673 HC TUBING BLOOD Y: Performed by: ANESTHESIOLOGY

## 2021-05-26 PROCEDURE — 71000033 HC RECOVERY, INTIAL HOUR: Performed by: ORTHOPAEDIC SURGERY

## 2021-05-26 PROCEDURE — 71000039 HC RECOVERY, EACH ADD'L HOUR: Performed by: ORTHOPAEDIC SURGERY

## 2021-05-26 PROCEDURE — 27202107 HC XP QUATRO SENSOR: Performed by: ANESTHESIOLOGY

## 2021-05-26 PROCEDURE — 71000015 HC POSTOP RECOV 1ST HR: Performed by: ORTHOPAEDIC SURGERY

## 2021-05-26 PROCEDURE — 25000003 PHARM REV CODE 250: Performed by: ORTHOPAEDIC SURGERY

## 2021-05-26 PROCEDURE — 25000003 PHARM REV CODE 250: Performed by: STUDENT IN AN ORGANIZED HEALTH CARE EDUCATION/TRAINING PROGRAM

## 2021-05-26 PROCEDURE — 25000003 PHARM REV CODE 250: Performed by: ANESTHESIOLOGY

## 2021-05-26 PROCEDURE — 36000710: Performed by: ORTHOPAEDIC SURGERY

## 2021-05-26 PROCEDURE — 37000008 HC ANESTHESIA 1ST 15 MINUTES: Performed by: ORTHOPAEDIC SURGERY

## 2021-05-26 PROCEDURE — 27201423 OPTIME MED/SURG SUP & DEVICES STERILE SUPPLY: Performed by: ORTHOPAEDIC SURGERY

## 2021-05-26 RX ORDER — FENTANYL CITRATE 50 UG/ML
25 INJECTION, SOLUTION INTRAMUSCULAR; INTRAVENOUS
Status: COMPLETED | OUTPATIENT
Start: 2021-05-26 | End: 2021-05-26

## 2021-05-26 RX ORDER — CEFAZOLIN SODIUM 2 G/50ML
2 SOLUTION INTRAVENOUS
Status: DISCONTINUED | OUTPATIENT
Start: 2021-05-26 | End: 2021-05-26 | Stop reason: HOSPADM

## 2021-05-26 RX ORDER — DIPHENHYDRAMINE HYDROCHLORIDE 50 MG/ML
12.5 INJECTION INTRAMUSCULAR; INTRAVENOUS
Status: DISCONTINUED | OUTPATIENT
Start: 2021-05-26 | End: 2021-05-26 | Stop reason: HOSPADM

## 2021-05-26 RX ORDER — OXYCODONE HYDROCHLORIDE 5 MG/1
5 TABLET ORAL
Status: DISCONTINUED | OUTPATIENT
Start: 2021-05-26 | End: 2021-05-26 | Stop reason: HOSPADM

## 2021-05-26 RX ORDER — GABAPENTIN 300 MG/1
300 CAPSULE ORAL ONCE
Status: COMPLETED | OUTPATIENT
Start: 2021-05-26 | End: 2021-05-26

## 2021-05-26 RX ORDER — FENTANYL CITRATE 50 UG/ML
INJECTION, SOLUTION INTRAMUSCULAR; INTRAVENOUS
Status: DISCONTINUED | OUTPATIENT
Start: 2021-05-26 | End: 2021-05-26

## 2021-05-26 RX ORDER — METHYLPREDNISOLONE ACETATE 80 MG/ML
INJECTION, SUSPENSION INTRA-ARTICULAR; INTRALESIONAL; INTRAMUSCULAR; SOFT TISSUE
Status: DISCONTINUED | OUTPATIENT
Start: 2021-05-26 | End: 2021-05-26 | Stop reason: HOSPADM

## 2021-05-26 RX ORDER — LIDOCAINE HYDROCHLORIDE 20 MG/ML
INJECTION, SOLUTION EPIDURAL; INFILTRATION; INTRACAUDAL; PERINEURAL
Status: DISCONTINUED | OUTPATIENT
Start: 2021-05-26 | End: 2021-05-26

## 2021-05-26 RX ORDER — MUPIROCIN 20 MG/G
1 OINTMENT TOPICAL 2 TIMES DAILY
Status: CANCELLED | OUTPATIENT
Start: 2021-05-26 | End: 2021-05-31

## 2021-05-26 RX ORDER — HYDROCODONE BITARTRATE AND ACETAMINOPHEN 5; 325 MG/1; MG/1
1 TABLET ORAL EVERY 4 HOURS PRN
Status: CANCELLED | OUTPATIENT
Start: 2021-05-26

## 2021-05-26 RX ORDER — OXYCODONE AND ACETAMINOPHEN 7.5; 325 MG/1; MG/1
1 TABLET ORAL EVERY 4 HOURS PRN
Qty: 28 TABLET | Refills: 0 | Status: SHIPPED | OUTPATIENT
Start: 2021-05-26 | End: 2021-07-13 | Stop reason: ALTCHOICE

## 2021-05-26 RX ORDER — FENTANYL CITRATE 50 UG/ML
25 INJECTION, SOLUTION INTRAMUSCULAR; INTRAVENOUS
Status: DISCONTINUED | OUTPATIENT
Start: 2021-05-26 | End: 2021-05-26 | Stop reason: HOSPADM

## 2021-05-26 RX ORDER — BENZONATATE 200 MG/1
200 CAPSULE ORAL 3 TIMES DAILY PRN
COMMUNITY
End: 2021-07-13 | Stop reason: ALTCHOICE

## 2021-05-26 RX ORDER — PROPOFOL 10 MG/ML
VIAL (ML) INTRAVENOUS
Status: DISCONTINUED | OUTPATIENT
Start: 2021-05-26 | End: 2021-05-26

## 2021-05-26 RX ORDER — ACETAMINOPHEN 10 MG/ML
INJECTION, SOLUTION INTRAVENOUS
Status: DISCONTINUED | OUTPATIENT
Start: 2021-05-26 | End: 2021-05-26

## 2021-05-26 RX ORDER — DEXAMETHASONE SODIUM PHOSPHATE 4 MG/ML
INJECTION, SOLUTION INTRA-ARTICULAR; INTRALESIONAL; INTRAMUSCULAR; INTRAVENOUS; SOFT TISSUE
Status: DISCONTINUED | OUTPATIENT
Start: 2021-05-26 | End: 2021-05-26

## 2021-05-26 RX ORDER — AMOXICILLIN AND CLAVULANATE POTASSIUM 875; 125 MG/1; MG/1
1 TABLET, FILM COATED ORAL
COMMUNITY
End: 2021-07-13 | Stop reason: ALTCHOICE

## 2021-05-26 RX ORDER — LIDOCAINE HYDROCHLORIDE 20 MG/ML
JELLY TOPICAL
Status: DISCONTINUED | OUTPATIENT
Start: 2021-05-26 | End: 2021-05-26

## 2021-05-26 RX ORDER — CELECOXIB 100 MG/1
100 CAPSULE ORAL ONCE
Status: COMPLETED | OUTPATIENT
Start: 2021-05-26 | End: 2021-05-26

## 2021-05-26 RX ORDER — SODIUM CHLORIDE 0.9 % (FLUSH) 0.9 %
10 SYRINGE (ML) INJECTION
Status: DISCONTINUED | OUTPATIENT
Start: 2021-05-26 | End: 2021-05-26 | Stop reason: HOSPADM

## 2021-05-26 RX ORDER — SODIUM CHLORIDE, SODIUM LACTATE, POTASSIUM CHLORIDE, CALCIUM CHLORIDE 600; 310; 30; 20 MG/100ML; MG/100ML; MG/100ML; MG/100ML
INJECTION, SOLUTION INTRAVENOUS CONTINUOUS PRN
Status: DISCONTINUED | OUTPATIENT
Start: 2021-05-26 | End: 2021-05-26

## 2021-05-26 RX ORDER — ONDANSETRON 2 MG/ML
4 INJECTION INTRAMUSCULAR; INTRAVENOUS DAILY PRN
Status: DISCONTINUED | OUTPATIENT
Start: 2021-05-26 | End: 2021-05-26 | Stop reason: HOSPADM

## 2021-05-26 RX ORDER — MIDAZOLAM HYDROCHLORIDE 1 MG/ML
INJECTION INTRAMUSCULAR; INTRAVENOUS
Status: DISCONTINUED | OUTPATIENT
Start: 2021-05-26 | End: 2021-05-26

## 2021-05-26 RX ORDER — ONDANSETRON 2 MG/ML
INJECTION INTRAMUSCULAR; INTRAVENOUS
Status: DISCONTINUED | OUTPATIENT
Start: 2021-05-26 | End: 2021-05-26

## 2021-05-26 RX ORDER — MEPERIDINE HYDROCHLORIDE 50 MG/ML
12.5 INJECTION INTRAMUSCULAR; INTRAVENOUS; SUBCUTANEOUS EVERY 10 MIN PRN
Status: DISCONTINUED | OUTPATIENT
Start: 2021-05-26 | End: 2021-05-26 | Stop reason: HOSPADM

## 2021-05-26 RX ORDER — OXYCODONE HYDROCHLORIDE 5 MG/1
5 TABLET ORAL ONCE
Status: COMPLETED | OUTPATIENT
Start: 2021-05-26 | End: 2021-05-26

## 2021-05-26 RX ORDER — BUPIVACAINE HYDROCHLORIDE 2.5 MG/ML
INJECTION, SOLUTION EPIDURAL; INFILTRATION; INTRACAUDAL
Status: DISCONTINUED | OUTPATIENT
Start: 2021-05-26 | End: 2021-05-26 | Stop reason: HOSPADM

## 2021-05-26 RX ADMIN — FENTANYL CITRATE 25 MCG: 50 INJECTION, SOLUTION INTRAMUSCULAR; INTRAVENOUS at 09:05

## 2021-05-26 RX ADMIN — FENTANYL CITRATE 50 MCG: 50 INJECTION INTRAMUSCULAR; INTRAVENOUS at 08:05

## 2021-05-26 RX ADMIN — PROPOFOL 170 MG: 10 INJECTION, EMULSION INTRAVENOUS at 08:05

## 2021-05-26 RX ADMIN — LIDOCAINE HYDROCHLORIDE 4 ML: 20 JELLY TOPICAL at 08:05

## 2021-05-26 RX ADMIN — ACETAMINOPHEN 1000 MG: 10 INJECTION, SOLUTION INTRAVENOUS at 08:05

## 2021-05-26 RX ADMIN — ONDANSETRON 4 MG: 2 INJECTION INTRAMUSCULAR; INTRAVENOUS at 08:05

## 2021-05-26 RX ADMIN — OXYCODONE HYDROCHLORIDE 5 MG: 5 TABLET ORAL at 08:05

## 2021-05-26 RX ADMIN — FENTANYL CITRATE 25 MCG: 50 INJECTION, SOLUTION INTRAMUSCULAR; INTRAVENOUS at 08:05

## 2021-05-26 RX ADMIN — GABAPENTIN 300 MG: 300 CAPSULE ORAL at 07:05

## 2021-05-26 RX ADMIN — MIDAZOLAM HYDROCHLORIDE 2 MG: 1 INJECTION, SOLUTION INTRAMUSCULAR; INTRAVENOUS at 08:05

## 2021-05-26 RX ADMIN — SODIUM CHLORIDE, SODIUM LACTATE, POTASSIUM CHLORIDE, AND CALCIUM CHLORIDE: .6; .31; .03; .02 INJECTION, SOLUTION INTRAVENOUS at 08:05

## 2021-05-26 RX ADMIN — DEXAMETHASONE SODIUM PHOSPHATE 8 MG: 4 INJECTION, SOLUTION INTRAMUSCULAR; INTRAVENOUS at 08:05

## 2021-05-26 RX ADMIN — OXYCODONE HYDROCHLORIDE 5 MG: 5 TABLET ORAL at 09:05

## 2021-05-26 RX ADMIN — CELECOXIB 100 MG: 100 CAPSULE ORAL at 07:05

## 2021-05-26 RX ADMIN — LIDOCAINE HYDROCHLORIDE 80 MG: 20 INJECTION, SOLUTION EPIDURAL; INFILTRATION; INTRACAUDAL; PERINEURAL at 08:05

## 2021-06-08 ENCOUNTER — OFFICE VISIT (OUTPATIENT)
Dept: ORTHOPEDICS | Facility: CLINIC | Age: 36
End: 2021-06-08
Payer: OTHER MISCELLANEOUS

## 2021-06-08 VITALS — HEIGHT: 67 IN | WEIGHT: 269 LBS | BODY MASS INDEX: 42.22 KG/M2

## 2021-06-08 DIAGNOSIS — M17.11 PRIMARY OSTEOARTHRITIS OF RIGHT KNEE: ICD-10-CM

## 2021-06-08 DIAGNOSIS — Z98.890 S/P RIGHT KNEE ARTHROSCOPY: Primary | ICD-10-CM

## 2021-06-08 PROCEDURE — 99024 PR POST-OP FOLLOW-UP VISIT: ICD-10-PCS | Mod: S$GLB,,, | Performed by: ORTHOPAEDIC SURGERY

## 2021-06-08 PROCEDURE — 99024 POSTOP FOLLOW-UP VISIT: CPT | Mod: S$GLB,,, | Performed by: ORTHOPAEDIC SURGERY

## 2021-06-16 ENCOUNTER — OFFICE VISIT (OUTPATIENT)
Dept: PSYCHIATRY | Facility: CLINIC | Age: 36
End: 2021-06-16
Payer: COMMERCIAL

## 2021-06-16 VITALS
BODY MASS INDEX: 41.39 KG/M2 | HEART RATE: 81 BPM | DIASTOLIC BLOOD PRESSURE: 93 MMHG | SYSTOLIC BLOOD PRESSURE: 134 MMHG | WEIGHT: 263.75 LBS | HEIGHT: 67 IN

## 2021-06-16 DIAGNOSIS — F41.1 GAD (GENERALIZED ANXIETY DISORDER): ICD-10-CM

## 2021-06-16 DIAGNOSIS — F43.10 PTSD (POST-TRAUMATIC STRESS DISORDER): ICD-10-CM

## 2021-06-16 DIAGNOSIS — F31.81 BIPOLAR II DISORDER: Primary | ICD-10-CM

## 2021-06-16 PROCEDURE — 1125F PR PAIN SEVERITY QUANTIFIED, PAIN PRESENT: ICD-10-PCS | Mod: S$GLB,,, | Performed by: PHYSICIAN ASSISTANT

## 2021-06-16 PROCEDURE — 99999 PR PBB SHADOW E&M-EST. PATIENT-LVL IV: ICD-10-PCS | Mod: PBBFAC,,, | Performed by: PHYSICIAN ASSISTANT

## 2021-06-16 PROCEDURE — 3008F PR BODY MASS INDEX (BMI) DOCUMENTED: ICD-10-PCS | Mod: CPTII,S$GLB,, | Performed by: PHYSICIAN ASSISTANT

## 2021-06-16 PROCEDURE — 90792 PR PSYCHIATRIC DIAGNOSTIC EVALUATION W/MEDICAL SERVICES: ICD-10-PCS | Mod: S$GLB,,, | Performed by: PHYSICIAN ASSISTANT

## 2021-06-16 PROCEDURE — 99999 PR PBB SHADOW E&M-EST. PATIENT-LVL IV: CPT | Mod: PBBFAC,,, | Performed by: PHYSICIAN ASSISTANT

## 2021-06-16 PROCEDURE — 90792 PSYCH DIAG EVAL W/MED SRVCS: CPT | Mod: S$GLB,,, | Performed by: PHYSICIAN ASSISTANT

## 2021-06-16 PROCEDURE — 1125F AMNT PAIN NOTED PAIN PRSNT: CPT | Mod: S$GLB,,, | Performed by: PHYSICIAN ASSISTANT

## 2021-06-16 PROCEDURE — 3008F BODY MASS INDEX DOCD: CPT | Mod: CPTII,S$GLB,, | Performed by: PHYSICIAN ASSISTANT

## 2021-06-16 RX ORDER — BUPROPION HYDROCHLORIDE 150 MG/1
150 TABLET ORAL DAILY
Qty: 30 TABLET | Refills: 1 | Status: SHIPPED | OUTPATIENT
Start: 2021-06-16 | End: 2021-07-09

## 2021-06-16 RX ORDER — LURASIDONE HYDROCHLORIDE 80 MG/1
80 TABLET, FILM COATED ORAL NIGHTLY
Qty: 30 TABLET | Refills: 1 | Status: SHIPPED | OUTPATIENT
Start: 2021-06-16 | End: 2021-07-13 | Stop reason: SDUPTHER

## 2021-06-17 ENCOUNTER — HOSPITAL ENCOUNTER (EMERGENCY)
Facility: HOSPITAL | Age: 36
Discharge: HOME | End: 2021-06-17
Attending: EMERGENCY MEDICINE
Payer: COMMERCIAL

## 2021-06-17 ENCOUNTER — PATIENT MESSAGE (OUTPATIENT)
Dept: GASTROENTEROLOGY | Facility: CLINIC | Age: 36
End: 2021-06-17

## 2021-06-17 VITALS
SYSTOLIC BLOOD PRESSURE: 124 MMHG | WEIGHT: 263 LBS | HEART RATE: 78 BPM | OXYGEN SATURATION: 96 % | HEIGHT: 67 IN | TEMPERATURE: 98 F | BODY MASS INDEX: 41.28 KG/M2 | RESPIRATION RATE: 18 BRPM | DIASTOLIC BLOOD PRESSURE: 84 MMHG

## 2021-06-17 DIAGNOSIS — K62.5 RECTAL BLEEDING: ICD-10-CM

## 2021-06-17 DIAGNOSIS — R19.7 NAUSEA VOMITING AND DIARRHEA: Primary | ICD-10-CM

## 2021-06-17 DIAGNOSIS — R11.2 NAUSEA VOMITING AND DIARRHEA: Primary | ICD-10-CM

## 2021-06-17 LAB
ALBUMIN SERPL BCP-MCNC: 3.4 G/DL (ref 3.5–5.2)
ALP SERPL-CCNC: 90 U/L (ref 55–135)
ALT SERPL W/O P-5'-P-CCNC: 13 U/L (ref 10–44)
ANION GAP SERPL CALC-SCNC: 7 MMOL/L (ref 8–16)
AST SERPL-CCNC: 7 U/L (ref 10–40)
B-HCG UR QL: NEGATIVE
BACTERIA #/AREA URNS HPF: ABNORMAL /HPF
BASOPHILS # BLD AUTO: 0.05 K/UL (ref 0–0.2)
BASOPHILS NFR BLD: 0.5 % (ref 0–1.9)
BILIRUB SERPL-MCNC: 0.2 MG/DL (ref 0.1–1)
BILIRUB UR QL STRIP: NEGATIVE
BUN SERPL-MCNC: 10 MG/DL (ref 6–20)
CALCIUM SERPL-MCNC: 10.4 MG/DL (ref 8.7–10.5)
CHLORIDE SERPL-SCNC: 102 MMOL/L (ref 95–110)
CLARITY UR: CLEAR
CO2 SERPL-SCNC: 29 MMOL/L (ref 23–29)
COLOR UR: YELLOW
CREAT SERPL-MCNC: 0.8 MG/DL (ref 0.5–1.4)
CTP QC/QA: YES
DIFFERENTIAL METHOD: NORMAL
EOSINOPHIL # BLD AUTO: 0.1 K/UL (ref 0–0.5)
EOSINOPHIL NFR BLD: 0.9 % (ref 0–8)
ERYTHROCYTE [DISTWIDTH] IN BLOOD BY AUTOMATED COUNT: 13.6 % (ref 11.5–14.5)
EST. GFR  (AFRICAN AMERICAN): >60 ML/MIN/1.73 M^2
EST. GFR  (NON AFRICAN AMERICAN): >60 ML/MIN/1.73 M^2
GLUCOSE SERPL-MCNC: 84 MG/DL (ref 70–110)
GLUCOSE UR QL STRIP: NEGATIVE
HCT VFR BLD AUTO: 41.4 % (ref 37–48.5)
HGB BLD-MCNC: 13.4 G/DL (ref 12–16)
HGB UR QL STRIP: ABNORMAL
IMM GRANULOCYTES # BLD AUTO: 0.03 K/UL (ref 0–0.04)
IMM GRANULOCYTES NFR BLD AUTO: 0.3 % (ref 0–0.5)
KETONES UR QL STRIP: NEGATIVE
LEUKOCYTE ESTERASE UR QL STRIP: NEGATIVE
LIPASE SERPL-CCNC: 26 U/L (ref 4–60)
LYMPHOCYTES # BLD AUTO: 3 K/UL (ref 1–4.8)
LYMPHOCYTES NFR BLD: 28.3 % (ref 18–48)
MCH RBC QN AUTO: 29.8 PG (ref 27–31)
MCHC RBC AUTO-ENTMCNC: 32.4 G/DL (ref 32–36)
MCV RBC AUTO: 92 FL (ref 82–98)
MICROSCOPIC COMMENT: ABNORMAL
MONOCYTES # BLD AUTO: 0.6 K/UL (ref 0.3–1)
MONOCYTES NFR BLD: 5.6 % (ref 4–15)
NEUTROPHILS # BLD AUTO: 6.8 K/UL (ref 1.8–7.7)
NEUTROPHILS NFR BLD: 64.4 % (ref 38–73)
NITRITE UR QL STRIP: NEGATIVE
NRBC BLD-RTO: 0 /100 WBC
PH UR STRIP: 6 [PH] (ref 5–8)
PLATELET # BLD AUTO: 369 K/UL (ref 150–450)
PMV BLD AUTO: 9.7 FL (ref 9.2–12.9)
POTASSIUM SERPL-SCNC: 3.9 MMOL/L (ref 3.5–5.1)
PROT SERPL-MCNC: 6.7 G/DL (ref 6–8.4)
PROT UR QL STRIP: NEGATIVE
RBC # BLD AUTO: 4.49 M/UL (ref 4–5.4)
RBC #/AREA URNS HPF: 13 /HPF (ref 0–4)
SODIUM SERPL-SCNC: 138 MMOL/L (ref 136–145)
SP GR UR STRIP: 1.02 (ref 1–1.03)
SQUAMOUS #/AREA URNS HPF: 1 /HPF
URN SPEC COLLECT METH UR: ABNORMAL
UROBILINOGEN UR STRIP-ACNC: NEGATIVE EU/DL
WBC # BLD AUTO: 10.59 K/UL (ref 3.9–12.7)
WBC #/AREA URNS HPF: 1 /HPF (ref 0–5)

## 2021-06-17 PROCEDURE — 81000 URINALYSIS NONAUTO W/SCOPE: CPT | Performed by: EMERGENCY MEDICINE

## 2021-06-17 PROCEDURE — 81025 URINE PREGNANCY TEST: CPT | Performed by: EMERGENCY MEDICINE

## 2021-06-17 PROCEDURE — 80053 COMPREHEN METABOLIC PANEL: CPT | Performed by: EMERGENCY MEDICINE

## 2021-06-17 PROCEDURE — 25000003 PHARM REV CODE 250: Performed by: EMERGENCY MEDICINE

## 2021-06-17 PROCEDURE — 83690 ASSAY OF LIPASE: CPT | Performed by: EMERGENCY MEDICINE

## 2021-06-17 PROCEDURE — 96374 THER/PROPH/DIAG INJ IV PUSH: CPT

## 2021-06-17 PROCEDURE — 36415 COLL VENOUS BLD VENIPUNCTURE: CPT | Performed by: EMERGENCY MEDICINE

## 2021-06-17 PROCEDURE — 63600175 PHARM REV CODE 636 W HCPCS: Performed by: EMERGENCY MEDICINE

## 2021-06-17 PROCEDURE — 85025 COMPLETE CBC W/AUTO DIFF WBC: CPT | Performed by: EMERGENCY MEDICINE

## 2021-06-17 PROCEDURE — 99284 EMERGENCY DEPT VISIT MOD MDM: CPT | Mod: 25

## 2021-06-17 RX ORDER — ONDANSETRON 4 MG/1
4 TABLET, FILM COATED ORAL EVERY 6 HOURS
Qty: 12 TABLET | Refills: 0 | Status: SHIPPED | OUTPATIENT
Start: 2021-06-17 | End: 2021-07-13 | Stop reason: ALTCHOICE

## 2021-06-17 RX ORDER — DROPERIDOL 2.5 MG/ML
2.5 INJECTION, SOLUTION INTRAMUSCULAR; INTRAVENOUS
Status: COMPLETED | OUTPATIENT
Start: 2021-06-17 | End: 2021-06-17

## 2021-06-17 RX ADMIN — DROPERIDOL 2.5 MG: 2.5 INJECTION, SOLUTION INTRAMUSCULAR; INTRAVENOUS at 10:06

## 2021-06-17 RX ADMIN — SODIUM CHLORIDE 500 ML: 0.9 INJECTION, SOLUTION INTRAVENOUS at 10:06

## 2021-06-22 ENCOUNTER — OFFICE VISIT (OUTPATIENT)
Dept: ORTHOPEDICS | Facility: CLINIC | Age: 36
End: 2021-06-22
Payer: OTHER MISCELLANEOUS

## 2021-06-22 VITALS — HEIGHT: 67 IN | WEIGHT: 263 LBS | BODY MASS INDEX: 41.28 KG/M2

## 2021-06-22 DIAGNOSIS — Z98.890 S/P RIGHT KNEE ARTHROSCOPY: Primary | ICD-10-CM

## 2021-06-22 DIAGNOSIS — M17.11 PRIMARY OSTEOARTHRITIS OF RIGHT KNEE: ICD-10-CM

## 2021-06-22 PROCEDURE — 20610 DRAIN/INJ JOINT/BURSA W/O US: CPT | Mod: 58,RT,S$GLB, | Performed by: ORTHOPAEDIC SURGERY

## 2021-06-22 PROCEDURE — 20610 LARGE JOINT ASPIRATION/INJECTION: R KNEE: ICD-10-PCS | Mod: 58,RT,S$GLB, | Performed by: ORTHOPAEDIC SURGERY

## 2021-06-22 PROCEDURE — 99024 PR POST-OP FOLLOW-UP VISIT: ICD-10-PCS | Mod: S$GLB,,, | Performed by: ORTHOPAEDIC SURGERY

## 2021-06-22 PROCEDURE — 99024 POSTOP FOLLOW-UP VISIT: CPT | Mod: S$GLB,,, | Performed by: ORTHOPAEDIC SURGERY

## 2021-06-22 RX ORDER — METHYLPREDNISOLONE ACETATE 40 MG/ML
40 INJECTION, SUSPENSION INTRA-ARTICULAR; INTRALESIONAL; INTRAMUSCULAR; SOFT TISSUE
Status: DISCONTINUED | OUTPATIENT
Start: 2021-06-22 | End: 2021-06-22 | Stop reason: HOSPADM

## 2021-06-22 RX ADMIN — METHYLPREDNISOLONE ACETATE 40 MG: 40 INJECTION, SUSPENSION INTRA-ARTICULAR; INTRALESIONAL; INTRAMUSCULAR; SOFT TISSUE at 11:06

## 2021-07-13 ENCOUNTER — OFFICE VISIT (OUTPATIENT)
Dept: PSYCHIATRY | Facility: CLINIC | Age: 36
End: 2021-07-13
Payer: COMMERCIAL

## 2021-07-13 DIAGNOSIS — F31.81 BIPOLAR II DISORDER: Primary | ICD-10-CM

## 2021-07-13 DIAGNOSIS — F43.10 PTSD (POST-TRAUMATIC STRESS DISORDER): ICD-10-CM

## 2021-07-13 DIAGNOSIS — F41.1 GAD (GENERALIZED ANXIETY DISORDER): ICD-10-CM

## 2021-07-13 PROCEDURE — 99214 OFFICE O/P EST MOD 30 MIN: CPT | Mod: 95,,, | Performed by: PHYSICIAN ASSISTANT

## 2021-07-13 PROCEDURE — 99214 PR OFFICE/OUTPT VISIT, EST, LEVL IV, 30-39 MIN: ICD-10-PCS | Mod: 95,,, | Performed by: PHYSICIAN ASSISTANT

## 2021-07-13 RX ORDER — BUPROPION HYDROCHLORIDE 300 MG/1
300 TABLET ORAL DAILY
Qty: 30 TABLET | Refills: 2 | Status: SHIPPED | OUTPATIENT
Start: 2021-07-13 | End: 2021-10-26

## 2021-07-13 RX ORDER — LURASIDONE HYDROCHLORIDE 80 MG/1
80 TABLET, FILM COATED ORAL NIGHTLY
Qty: 30 TABLET | Refills: 2 | Status: SHIPPED | OUTPATIENT
Start: 2021-07-13 | End: 2022-04-04

## 2021-07-27 ENCOUNTER — OFFICE VISIT (OUTPATIENT)
Dept: ORTHOPEDICS | Facility: CLINIC | Age: 36
End: 2021-07-27
Payer: OTHER MISCELLANEOUS

## 2021-07-27 VITALS — BODY MASS INDEX: 41.28 KG/M2 | HEIGHT: 67 IN | WEIGHT: 263 LBS

## 2021-07-27 DIAGNOSIS — M17.11 PRIMARY OSTEOARTHRITIS OF RIGHT KNEE: ICD-10-CM

## 2021-07-27 DIAGNOSIS — Z98.890 S/P RIGHT KNEE ARTHROSCOPY: Primary | ICD-10-CM

## 2021-07-27 PROCEDURE — 20610 LARGE JOINT ASPIRATION/INJECTION: R KNEE: ICD-10-PCS | Mod: 58,RT,S$GLB, | Performed by: ORTHOPAEDIC SURGERY

## 2021-07-27 PROCEDURE — 99213 OFFICE O/P EST LOW 20 MIN: CPT | Mod: 25,S$GLB,, | Performed by: ORTHOPAEDIC SURGERY

## 2021-07-27 PROCEDURE — 99213 PR OFFICE/OUTPT VISIT, EST, LEVL III, 20-29 MIN: ICD-10-PCS | Mod: 25,S$GLB,, | Performed by: ORTHOPAEDIC SURGERY

## 2021-07-27 PROCEDURE — 20610 DRAIN/INJ JOINT/BURSA W/O US: CPT | Mod: 58,RT,S$GLB, | Performed by: ORTHOPAEDIC SURGERY

## 2021-07-27 RX ORDER — METHYLPREDNISOLONE ACETATE 40 MG/ML
40 INJECTION, SUSPENSION INTRA-ARTICULAR; INTRALESIONAL; INTRAMUSCULAR; SOFT TISSUE
Status: DISCONTINUED | OUTPATIENT
Start: 2021-07-27 | End: 2021-07-27 | Stop reason: HOSPADM

## 2021-07-27 RX ADMIN — METHYLPREDNISOLONE ACETATE 40 MG: 40 INJECTION, SUSPENSION INTRA-ARTICULAR; INTRALESIONAL; INTRAMUSCULAR; SOFT TISSUE at 01:07

## 2021-08-06 ENCOUNTER — TELEPHONE (OUTPATIENT)
Dept: PSYCHIATRY | Facility: CLINIC | Age: 36
End: 2021-08-06

## 2021-08-19 ENCOUNTER — OFFICE VISIT (OUTPATIENT)
Dept: ORTHOPEDICS | Facility: CLINIC | Age: 36
End: 2021-08-19
Payer: OTHER MISCELLANEOUS

## 2021-08-19 VITALS — WEIGHT: 263 LBS | HEIGHT: 67 IN | BODY MASS INDEX: 41.28 KG/M2

## 2021-08-19 DIAGNOSIS — M17.11 PRIMARY OSTEOARTHRITIS OF RIGHT KNEE: ICD-10-CM

## 2021-08-19 DIAGNOSIS — Z98.890 S/P RIGHT KNEE ARTHROSCOPY: Primary | ICD-10-CM

## 2021-08-19 PROCEDURE — 20610 DRAIN/INJ JOINT/BURSA W/O US: CPT | Mod: 58,RT,S$GLB, | Performed by: ORTHOPAEDIC SURGERY

## 2021-08-19 PROCEDURE — 20610 LARGE JOINT ASPIRATION/INJECTION: R KNEE: ICD-10-PCS | Mod: 58,RT,S$GLB, | Performed by: ORTHOPAEDIC SURGERY

## 2021-08-19 PROCEDURE — 99499 NO LOS: ICD-10-PCS | Mod: S$GLB,,, | Performed by: ORTHOPAEDIC SURGERY

## 2021-08-19 PROCEDURE — 99499 UNLISTED E&M SERVICE: CPT | Mod: S$GLB,,, | Performed by: ORTHOPAEDIC SURGERY

## 2021-08-20 ENCOUNTER — HOSPITAL ENCOUNTER (EMERGENCY)
Facility: HOSPITAL | Age: 36
Discharge: HOME | End: 2021-08-20
Attending: EMERGENCY MEDICINE
Payer: COMMERCIAL

## 2021-08-20 VITALS
RESPIRATION RATE: 16 BRPM | HEART RATE: 78 BPM | SYSTOLIC BLOOD PRESSURE: 114 MMHG | DIASTOLIC BLOOD PRESSURE: 74 MMHG | OXYGEN SATURATION: 96 %

## 2021-08-20 DIAGNOSIS — R51.9 ACUTE NONINTRACTABLE HEADACHE, UNSPECIFIED HEADACHE TYPE: ICD-10-CM

## 2021-08-20 DIAGNOSIS — N39.0 ACUTE UTI: Primary | ICD-10-CM

## 2021-08-20 DIAGNOSIS — R42 DIZZINESS: ICD-10-CM

## 2021-08-20 LAB
ALBUMIN SERPL BCP-MCNC: 3.3 G/DL (ref 3.5–5.2)
ALP SERPL-CCNC: 83 U/L (ref 55–135)
ALT SERPL W/O P-5'-P-CCNC: 11 U/L (ref 10–44)
ANION GAP SERPL CALC-SCNC: 9 MMOL/L (ref 8–16)
AST SERPL-CCNC: 10 U/L (ref 10–40)
B-HCG UR QL: NEGATIVE
BACTERIA #/AREA URNS HPF: ABNORMAL /HPF
BASOPHILS # BLD AUTO: 0.05 K/UL (ref 0–0.2)
BASOPHILS NFR BLD: 0.4 % (ref 0–1.9)
BILIRUB SERPL-MCNC: 0.2 MG/DL (ref 0.1–1)
BILIRUB UR QL STRIP: NEGATIVE
BUN SERPL-MCNC: 11 MG/DL (ref 6–20)
CALCIUM SERPL-MCNC: 10.1 MG/DL (ref 8.7–10.5)
CHLORIDE SERPL-SCNC: 104 MMOL/L (ref 95–110)
CLARITY UR: ABNORMAL
CO2 SERPL-SCNC: 27 MMOL/L (ref 23–29)
COLOR UR: YELLOW
CREAT SERPL-MCNC: 0.7 MG/DL (ref 0.5–1.4)
CTP QC/QA: YES
DIFFERENTIAL METHOD: ABNORMAL
EOSINOPHIL # BLD AUTO: 0.1 K/UL (ref 0–0.5)
EOSINOPHIL NFR BLD: 0.6 % (ref 0–8)
ERYTHROCYTE [DISTWIDTH] IN BLOOD BY AUTOMATED COUNT: 12.8 % (ref 11.5–14.5)
EST. GFR  (AFRICAN AMERICAN): >60 ML/MIN/1.73 M^2
EST. GFR  (NON AFRICAN AMERICAN): >60 ML/MIN/1.73 M^2
GLUCOSE SERPL-MCNC: 83 MG/DL (ref 70–110)
GLUCOSE UR QL STRIP: NEGATIVE
HCT VFR BLD AUTO: 39.2 % (ref 37–48.5)
HGB BLD-MCNC: 12.4 G/DL (ref 12–16)
HGB UR QL STRIP: ABNORMAL
IMM GRANULOCYTES # BLD AUTO: 0.06 K/UL (ref 0–0.04)
IMM GRANULOCYTES NFR BLD AUTO: 0.4 % (ref 0–0.5)
KETONES UR QL STRIP: NEGATIVE
LEUKOCYTE ESTERASE UR QL STRIP: ABNORMAL
LYMPHOCYTES # BLD AUTO: 3.3 K/UL (ref 1–4.8)
LYMPHOCYTES NFR BLD: 23.6 % (ref 18–48)
MAGNESIUM SERPL-MCNC: 2 MG/DL (ref 1.6–2.6)
MCH RBC QN AUTO: 29.3 PG (ref 27–31)
MCHC RBC AUTO-ENTMCNC: 31.6 G/DL (ref 32–36)
MCV RBC AUTO: 93 FL (ref 82–98)
MICROSCOPIC COMMENT: ABNORMAL
MONOCYTES # BLD AUTO: 0.7 K/UL (ref 0.3–1)
MONOCYTES NFR BLD: 5.2 % (ref 4–15)
NEUTROPHILS # BLD AUTO: 9.7 K/UL (ref 1.8–7.7)
NEUTROPHILS NFR BLD: 69.8 % (ref 38–73)
NITRITE UR QL STRIP: NEGATIVE
NRBC BLD-RTO: 0 /100 WBC
PH UR STRIP: 6 [PH] (ref 5–8)
PLATELET # BLD AUTO: 340 K/UL (ref 150–450)
PMV BLD AUTO: 10.1 FL (ref 9.2–12.9)
POTASSIUM SERPL-SCNC: 3.5 MMOL/L (ref 3.5–5.1)
PROT SERPL-MCNC: 6.3 G/DL (ref 6–8.4)
PROT UR QL STRIP: NEGATIVE
RBC # BLD AUTO: 4.23 M/UL (ref 4–5.4)
RBC #/AREA URNS HPF: 2 /HPF (ref 0–4)
SARS-COV-2 RDRP RESP QL NAA+PROBE: NEGATIVE
SODIUM SERPL-SCNC: 140 MMOL/L (ref 136–145)
SP GR UR STRIP: 1.01 (ref 1–1.03)
SQUAMOUS #/AREA URNS HPF: 18 /HPF
URN SPEC COLLECT METH UR: ABNORMAL
UROBILINOGEN UR STRIP-ACNC: NEGATIVE EU/DL
WBC # BLD AUTO: 13.85 K/UL (ref 3.9–12.7)
WBC #/AREA URNS HPF: 30 /HPF (ref 0–5)

## 2021-08-20 PROCEDURE — 87077 CULTURE AEROBIC IDENTIFY: CPT | Performed by: EMERGENCY MEDICINE

## 2021-08-20 PROCEDURE — 85025 COMPLETE CBC W/AUTO DIFF WBC: CPT | Performed by: EMERGENCY MEDICINE

## 2021-08-20 PROCEDURE — 25000003 PHARM REV CODE 250: Performed by: EMERGENCY MEDICINE

## 2021-08-20 PROCEDURE — 93005 ELECTROCARDIOGRAM TRACING: CPT

## 2021-08-20 PROCEDURE — 96365 THER/PROPH/DIAG IV INF INIT: CPT

## 2021-08-20 PROCEDURE — 96375 TX/PRO/DX INJ NEW DRUG ADDON: CPT

## 2021-08-20 PROCEDURE — 87186 SC STD MICRODIL/AGAR DIL: CPT | Performed by: EMERGENCY MEDICINE

## 2021-08-20 PROCEDURE — 96361 HYDRATE IV INFUSION ADD-ON: CPT

## 2021-08-20 PROCEDURE — 83735 ASSAY OF MAGNESIUM: CPT | Performed by: EMERGENCY MEDICINE

## 2021-08-20 PROCEDURE — 81025 URINE PREGNANCY TEST: CPT | Performed by: EMERGENCY MEDICINE

## 2021-08-20 PROCEDURE — 99285 EMERGENCY DEPT VISIT HI MDM: CPT | Mod: 25

## 2021-08-20 PROCEDURE — 63600175 PHARM REV CODE 636 W HCPCS: Performed by: EMERGENCY MEDICINE

## 2021-08-20 PROCEDURE — 81000 URINALYSIS NONAUTO W/SCOPE: CPT | Performed by: EMERGENCY MEDICINE

## 2021-08-20 PROCEDURE — 87086 URINE CULTURE/COLONY COUNT: CPT | Performed by: EMERGENCY MEDICINE

## 2021-08-20 PROCEDURE — 87088 URINE BACTERIA CULTURE: CPT | Performed by: EMERGENCY MEDICINE

## 2021-08-20 PROCEDURE — 36415 COLL VENOUS BLD VENIPUNCTURE: CPT | Performed by: EMERGENCY MEDICINE

## 2021-08-20 PROCEDURE — U0002 COVID-19 LAB TEST NON-CDC: HCPCS | Performed by: EMERGENCY MEDICINE

## 2021-08-20 PROCEDURE — 80053 COMPREHEN METABOLIC PANEL: CPT | Performed by: EMERGENCY MEDICINE

## 2021-08-20 RX ORDER — ONDANSETRON 4 MG/1
4 TABLET, FILM COATED ORAL EVERY 6 HOURS PRN
Qty: 12 TABLET | Refills: 0 | Status: ON HOLD | OUTPATIENT
Start: 2021-08-20 | End: 2021-10-27

## 2021-08-20 RX ORDER — CEPHALEXIN 500 MG/1
500 CAPSULE ORAL EVERY 8 HOURS
Qty: 18 CAPSULE | Refills: 0 | Status: SHIPPED | OUTPATIENT
Start: 2021-08-20 | End: 2021-08-26

## 2021-08-20 RX ORDER — CEPHALEXIN 250 MG/1
500 CAPSULE ORAL
Status: DISCONTINUED | OUTPATIENT
Start: 2021-08-20 | End: 2021-08-20

## 2021-08-20 RX ORDER — KETOROLAC TROMETHAMINE 30 MG/ML
15 INJECTION, SOLUTION INTRAMUSCULAR; INTRAVENOUS
Status: COMPLETED | OUTPATIENT
Start: 2021-08-20 | End: 2021-08-20

## 2021-08-20 RX ORDER — SODIUM CHLORIDE 9 MG/ML
INJECTION, SOLUTION INTRAVENOUS
Status: COMPLETED | OUTPATIENT
Start: 2021-08-20 | End: 2021-08-20

## 2021-08-20 RX ADMIN — CEFTRIAXONE 1 G: 1 INJECTION, SOLUTION INTRAVENOUS at 07:08

## 2021-08-20 RX ADMIN — KETOROLAC TROMETHAMINE 15 MG: 30 INJECTION, SOLUTION INTRAMUSCULAR; INTRAVENOUS at 08:08

## 2021-08-20 RX ADMIN — SODIUM CHLORIDE: 0.9 INJECTION, SOLUTION INTRAVENOUS at 08:08

## 2021-08-20 RX ADMIN — SODIUM CHLORIDE 1000 ML: 0.9 INJECTION, SOLUTION INTRAVENOUS at 06:08

## 2021-08-23 LAB — BACTERIA UR CULT: ABNORMAL

## 2021-09-10 ENCOUNTER — OFFICE VISIT (OUTPATIENT)
Dept: PSYCHIATRY | Facility: CLINIC | Age: 36
End: 2021-09-10
Payer: COMMERCIAL

## 2021-09-10 DIAGNOSIS — F41.1 GAD (GENERALIZED ANXIETY DISORDER): ICD-10-CM

## 2021-09-10 DIAGNOSIS — F31.81 BIPOLAR II DISORDER: Primary | ICD-10-CM

## 2021-09-10 DIAGNOSIS — F43.10 PTSD (POST-TRAUMATIC STRESS DISORDER): ICD-10-CM

## 2021-09-10 PROCEDURE — 1160F RVW MEDS BY RX/DR IN RCRD: CPT | Mod: CPTII,95,, | Performed by: PHYSICIAN ASSISTANT

## 2021-09-10 PROCEDURE — 1159F MED LIST DOCD IN RCRD: CPT | Mod: CPTII,95,, | Performed by: PHYSICIAN ASSISTANT

## 2021-09-10 PROCEDURE — 99214 OFFICE O/P EST MOD 30 MIN: CPT | Mod: 95,,, | Performed by: PHYSICIAN ASSISTANT

## 2021-09-10 PROCEDURE — 99214 PR OFFICE/OUTPT VISIT, EST, LEVL IV, 30-39 MIN: ICD-10-PCS | Mod: 95,,, | Performed by: PHYSICIAN ASSISTANT

## 2021-09-10 PROCEDURE — 1160F PR REVIEW ALL MEDS BY PRESCRIBER/CLIN PHARMACIST DOCUMENTED: ICD-10-PCS | Mod: CPTII,95,, | Performed by: PHYSICIAN ASSISTANT

## 2021-09-10 PROCEDURE — 1159F PR MEDICATION LIST DOCUMENTED IN MEDICAL RECORD: ICD-10-PCS | Mod: CPTII,95,, | Performed by: PHYSICIAN ASSISTANT

## 2021-09-13 ENCOUNTER — TELEPHONE (OUTPATIENT)
Dept: PSYCHIATRY | Facility: CLINIC | Age: 36
End: 2021-09-13

## 2021-09-24 ENCOUNTER — PATIENT MESSAGE (OUTPATIENT)
Dept: PSYCHIATRY | Facility: CLINIC | Age: 36
End: 2021-09-24

## 2021-09-27 ENCOUNTER — TELEPHONE (OUTPATIENT)
Dept: PSYCHIATRY | Facility: CLINIC | Age: 36
End: 2021-09-27

## 2021-09-27 ENCOUNTER — OFFICE VISIT (OUTPATIENT)
Dept: PSYCHIATRY | Facility: CLINIC | Age: 36
End: 2021-09-27
Payer: COMMERCIAL

## 2021-09-27 DIAGNOSIS — F43.10 PTSD (POST-TRAUMATIC STRESS DISORDER): ICD-10-CM

## 2021-09-27 DIAGNOSIS — F41.1 GAD (GENERALIZED ANXIETY DISORDER): ICD-10-CM

## 2021-09-27 DIAGNOSIS — F31.81 BIPOLAR II DISORDER: Primary | ICD-10-CM

## 2021-09-27 PROCEDURE — 1160F PR REVIEW ALL MEDS BY PRESCRIBER/CLIN PHARMACIST DOCUMENTED: ICD-10-PCS | Mod: CPTII,95,, | Performed by: PHYSICIAN ASSISTANT

## 2021-09-27 PROCEDURE — 1159F PR MEDICATION LIST DOCUMENTED IN MEDICAL RECORD: ICD-10-PCS | Mod: CPTII,95,, | Performed by: PHYSICIAN ASSISTANT

## 2021-09-27 PROCEDURE — 99214 PR OFFICE/OUTPT VISIT, EST, LEVL IV, 30-39 MIN: ICD-10-PCS | Mod: 95,,, | Performed by: PHYSICIAN ASSISTANT

## 2021-09-27 PROCEDURE — 1160F RVW MEDS BY RX/DR IN RCRD: CPT | Mod: CPTII,95,, | Performed by: PHYSICIAN ASSISTANT

## 2021-09-27 PROCEDURE — 1159F MED LIST DOCD IN RCRD: CPT | Mod: CPTII,95,, | Performed by: PHYSICIAN ASSISTANT

## 2021-09-27 PROCEDURE — 99214 OFFICE O/P EST MOD 30 MIN: CPT | Mod: 95,,, | Performed by: PHYSICIAN ASSISTANT

## 2021-09-27 RX ORDER — DIVALPROEX SODIUM 250 MG/1
500 TABLET, FILM COATED, EXTENDED RELEASE ORAL NIGHTLY
Qty: 60 TABLET | Refills: 1 | Status: SHIPPED | OUTPATIENT
Start: 2021-09-27 | End: 2021-10-19

## 2021-09-29 ENCOUNTER — TELEPHONE (OUTPATIENT)
Dept: PSYCHIATRY | Facility: CLINIC | Age: 36
End: 2021-09-29

## 2021-09-30 ENCOUNTER — OFFICE VISIT (OUTPATIENT)
Dept: FAMILY MEDICINE | Facility: CLINIC | Age: 36
End: 2021-09-30
Payer: COMMERCIAL

## 2021-09-30 ENCOUNTER — OFFICE VISIT (OUTPATIENT)
Dept: ORTHOPEDICS | Facility: CLINIC | Age: 36
End: 2021-09-30
Payer: OTHER MISCELLANEOUS

## 2021-09-30 ENCOUNTER — HOSPITAL ENCOUNTER (OUTPATIENT)
Dept: RADIOLOGY | Facility: HOSPITAL | Age: 36
Discharge: HOME | End: 2021-09-30
Attending: FAMILY MEDICINE
Payer: COMMERCIAL

## 2021-09-30 VITALS — WEIGHT: 263 LBS | BODY MASS INDEX: 41.28 KG/M2 | HEIGHT: 67 IN

## 2021-09-30 VITALS
WEIGHT: 269.63 LBS | BODY MASS INDEX: 39.93 KG/M2 | SYSTOLIC BLOOD PRESSURE: 122 MMHG | RESPIRATION RATE: 18 BRPM | OXYGEN SATURATION: 98 % | DIASTOLIC BLOOD PRESSURE: 76 MMHG | HEIGHT: 69 IN | HEART RATE: 87 BPM

## 2021-09-30 DIAGNOSIS — N39.0 URINARY TRACT INFECTION WITH HEMATURIA, SITE UNSPECIFIED: ICD-10-CM

## 2021-09-30 DIAGNOSIS — R31.9 URINARY TRACT INFECTION WITH HEMATURIA, SITE UNSPECIFIED: Primary | ICD-10-CM

## 2021-09-30 DIAGNOSIS — N39.0 URINARY TRACT INFECTION WITH HEMATURIA, SITE UNSPECIFIED: Primary | ICD-10-CM

## 2021-09-30 DIAGNOSIS — M17.11 PRIMARY OSTEOARTHRITIS OF RIGHT KNEE: ICD-10-CM

## 2021-09-30 DIAGNOSIS — R31.9 URINARY TRACT INFECTION WITH HEMATURIA, SITE UNSPECIFIED: ICD-10-CM

## 2021-09-30 DIAGNOSIS — N39.0 RECURRENT UTI (URINARY TRACT INFECTION): ICD-10-CM

## 2021-09-30 DIAGNOSIS — Z98.890 HISTORY OF ARTHROSCOPY OF RIGHT KNEE: Primary | ICD-10-CM

## 2021-09-30 LAB
BILIRUB SERPL-MCNC: NEGATIVE MG/DL
BLOOD URINE, POC: NORMAL
CLARITY, POC UA: CLEAR
COLOR, POC UA: NORMAL
GLUCOSE UR QL STRIP: NEGATIVE
KETONES UR QL STRIP: NEGATIVE
LEUKOCYTE ESTERASE URINE, POC: NEGATIVE
NITRITE, POC UA: NEGATIVE
PH, POC UA: 6
PROTEIN, POC: NEGATIVE
SPECIFIC GRAVITY, POC UA: 1.01
UROBILINOGEN, POC UA: NORMAL

## 2021-09-30 PROCEDURE — 3074F SYST BP LT 130 MM HG: CPT | Mod: CPTII,S$GLB,, | Performed by: FAMILY MEDICINE

## 2021-09-30 PROCEDURE — 99999 PR PBB SHADOW E&M-EST. PATIENT-LVL IV: ICD-10-PCS | Mod: PBBFAC,,, | Performed by: FAMILY MEDICINE

## 2021-09-30 PROCEDURE — 96372 PR INJECTION,THERAP/PROPH/DIAG2ST, IM OR SUBCUT: ICD-10-PCS | Mod: S$GLB,,, | Performed by: FAMILY MEDICINE

## 2021-09-30 PROCEDURE — 3008F PR BODY MASS INDEX (BMI) DOCUMENTED: ICD-10-PCS | Mod: CPTII,S$GLB,, | Performed by: FAMILY MEDICINE

## 2021-09-30 PROCEDURE — 99214 PR OFFICE/OUTPT VISIT, EST, LEVL IV, 30-39 MIN: ICD-10-PCS | Mod: 25,S$GLB,, | Performed by: FAMILY MEDICINE

## 2021-09-30 PROCEDURE — 87086 URINE CULTURE/COLONY COUNT: CPT | Performed by: FAMILY MEDICINE

## 2021-09-30 PROCEDURE — 3008F BODY MASS INDEX DOCD: CPT | Mod: CPTII,S$GLB,, | Performed by: FAMILY MEDICINE

## 2021-09-30 PROCEDURE — 99214 OFFICE O/P EST MOD 30 MIN: CPT | Mod: 25,S$GLB,, | Performed by: FAMILY MEDICINE

## 2021-09-30 PROCEDURE — 76770 US EXAM ABDO BACK WALL COMP: CPT | Mod: TC

## 2021-09-30 PROCEDURE — 81002 POCT URINE DIPSTICK WITHOUT MICROSCOPE: ICD-10-PCS | Mod: S$GLB,,, | Performed by: FAMILY MEDICINE

## 2021-09-30 PROCEDURE — 20610 DRAIN/INJ JOINT/BURSA W/O US: CPT | Mod: RT,S$GLB,, | Performed by: ORTHOPAEDIC SURGERY

## 2021-09-30 PROCEDURE — 99999 PR PBB SHADOW E&M-EST. PATIENT-LVL IV: CPT | Mod: PBBFAC,,, | Performed by: FAMILY MEDICINE

## 2021-09-30 PROCEDURE — 81001 URINALYSIS AUTO W/SCOPE: CPT | Performed by: FAMILY MEDICINE

## 2021-09-30 PROCEDURE — 3078F PR MOST RECENT DIASTOLIC BLOOD PRESSURE < 80 MM HG: ICD-10-PCS | Mod: CPTII,S$GLB,, | Performed by: FAMILY MEDICINE

## 2021-09-30 PROCEDURE — 3078F DIAST BP <80 MM HG: CPT | Mod: CPTII,S$GLB,, | Performed by: FAMILY MEDICINE

## 2021-09-30 PROCEDURE — 81002 URINALYSIS NONAUTO W/O SCOPE: CPT | Mod: S$GLB,,, | Performed by: FAMILY MEDICINE

## 2021-09-30 PROCEDURE — 99213 OFFICE O/P EST LOW 20 MIN: CPT | Mod: 25,S$GLB,, | Performed by: ORTHOPAEDIC SURGERY

## 2021-09-30 PROCEDURE — 96372 THER/PROPH/DIAG INJ SC/IM: CPT | Mod: S$GLB,,, | Performed by: FAMILY MEDICINE

## 2021-09-30 PROCEDURE — 1159F MED LIST DOCD IN RCRD: CPT | Mod: CPTII,S$GLB,, | Performed by: FAMILY MEDICINE

## 2021-09-30 PROCEDURE — 99213 PR OFFICE/OUTPT VISIT, EST, LEVL III, 20-29 MIN: ICD-10-PCS | Mod: 25,S$GLB,, | Performed by: ORTHOPAEDIC SURGERY

## 2021-09-30 PROCEDURE — 3074F PR MOST RECENT SYSTOLIC BLOOD PRESSURE < 130 MM HG: ICD-10-PCS | Mod: CPTII,S$GLB,, | Performed by: FAMILY MEDICINE

## 2021-09-30 PROCEDURE — 20610 LARGE JOINT ASPIRATION/INJECTION: R KNEE: ICD-10-PCS | Mod: RT,S$GLB,, | Performed by: ORTHOPAEDIC SURGERY

## 2021-09-30 PROCEDURE — 1159F PR MEDICATION LIST DOCUMENTED IN MEDICAL RECORD: ICD-10-PCS | Mod: CPTII,S$GLB,, | Performed by: FAMILY MEDICINE

## 2021-09-30 RX ORDER — LEVOFLOXACIN 750 MG/1
750 TABLET ORAL DAILY
Qty: 14 TABLET | Refills: 0 | Status: SHIPPED | OUTPATIENT
Start: 2021-09-30 | End: 2021-10-05

## 2021-09-30 RX ORDER — CEFTRIAXONE 1 G/1
1 INJECTION, POWDER, FOR SOLUTION INTRAMUSCULAR; INTRAVENOUS
Status: COMPLETED | OUTPATIENT
Start: 2021-09-30 | End: 2021-09-30

## 2021-09-30 RX ORDER — TRIAMCINOLONE ACETONIDE 40 MG/ML
40 INJECTION, SUSPENSION INTRA-ARTICULAR; INTRAMUSCULAR
Status: DISCONTINUED | OUTPATIENT
Start: 2021-09-30 | End: 2021-09-30 | Stop reason: HOSPADM

## 2021-09-30 RX ORDER — TRIAMCINOLONE ACETONIDE 40 MG/ML
20 INJECTION, SUSPENSION INTRA-ARTICULAR; INTRAMUSCULAR
Status: DISCONTINUED | OUTPATIENT
Start: 2021-09-30 | End: 2021-09-30 | Stop reason: HOSPADM

## 2021-09-30 RX ORDER — TRAMADOL HYDROCHLORIDE 50 MG/1
50 TABLET ORAL EVERY 8 HOURS PRN
Qty: 21 TABLET | Refills: 0 | Status: ON HOLD | OUTPATIENT
Start: 2021-09-30 | End: 2021-10-27 | Stop reason: HOSPADM

## 2021-09-30 RX ADMIN — CEFTRIAXONE 1 G: 1 INJECTION, POWDER, FOR SOLUTION INTRAMUSCULAR; INTRAVENOUS at 04:09

## 2021-09-30 RX ADMIN — TRIAMCINOLONE ACETONIDE 40 MG: 40 INJECTION, SUSPENSION INTRA-ARTICULAR; INTRAMUSCULAR at 10:09

## 2021-09-30 RX ADMIN — TRIAMCINOLONE ACETONIDE 20 MG: 40 INJECTION, SUSPENSION INTRA-ARTICULAR; INTRAMUSCULAR at 10:09

## 2021-10-01 ENCOUNTER — NURSE TRIAGE (OUTPATIENT)
Dept: ADMINISTRATIVE | Facility: CLINIC | Age: 36
End: 2021-10-01

## 2021-10-01 ENCOUNTER — HOSPITAL ENCOUNTER (EMERGENCY)
Facility: HOSPITAL | Age: 36
Discharge: HOME | End: 2021-10-01
Attending: EMERGENCY MEDICINE
Payer: COMMERCIAL

## 2021-10-01 VITALS
SYSTOLIC BLOOD PRESSURE: 129 MMHG | TEMPERATURE: 99 F | HEART RATE: 66 BPM | DIASTOLIC BLOOD PRESSURE: 65 MMHG | OXYGEN SATURATION: 96 % | HEIGHT: 67 IN | RESPIRATION RATE: 28 BRPM | WEIGHT: 262.38 LBS | BODY MASS INDEX: 41.18 KG/M2

## 2021-10-01 DIAGNOSIS — T78.40XA ALLERGIC REACTION, INITIAL ENCOUNTER: Primary | ICD-10-CM

## 2021-10-01 LAB
MICROSCOPIC COMMENT: NORMAL
RBC #/AREA URNS AUTO: 2 /HPF (ref 0–4)
SQUAMOUS #/AREA URNS AUTO: 3 /HPF
WBC #/AREA URNS AUTO: 0 /HPF (ref 0–5)

## 2021-10-01 PROCEDURE — 96375 TX/PRO/DX INJ NEW DRUG ADDON: CPT

## 2021-10-01 PROCEDURE — 99284 EMERGENCY DEPT VISIT MOD MDM: CPT | Mod: 25

## 2021-10-01 PROCEDURE — 96374 THER/PROPH/DIAG INJ IV PUSH: CPT

## 2021-10-01 PROCEDURE — 96372 THER/PROPH/DIAG INJ SC/IM: CPT

## 2021-10-01 PROCEDURE — 25000003 PHARM REV CODE 250: Performed by: EMERGENCY MEDICINE

## 2021-10-01 PROCEDURE — 63600175 PHARM REV CODE 636 W HCPCS: Performed by: EMERGENCY MEDICINE

## 2021-10-01 RX ORDER — FAMOTIDINE 10 MG/ML
20 INJECTION INTRAVENOUS
Status: COMPLETED | OUTPATIENT
Start: 2021-10-01 | End: 2021-10-01

## 2021-10-01 RX ORDER — EPINEPHRINE 0.3 MG/.3ML
0.3 INJECTION SUBCUTANEOUS
Status: COMPLETED | OUTPATIENT
Start: 2021-10-01 | End: 2021-10-01

## 2021-10-01 RX ORDER — DIPHENHYDRAMINE HYDROCHLORIDE 50 MG/ML
50 INJECTION INTRAMUSCULAR; INTRAVENOUS
Status: COMPLETED | OUTPATIENT
Start: 2021-10-01 | End: 2021-10-01

## 2021-10-01 RX ORDER — DOXYCYCLINE 100 MG/1
100 CAPSULE ORAL 2 TIMES DAILY
Qty: 20 CAPSULE | Refills: 0 | Status: SHIPPED | OUTPATIENT
Start: 2021-10-01 | End: 2021-10-05

## 2021-10-01 RX ORDER — EPINEPHRINE 0.3 MG/.3ML
1 INJECTION SUBCUTANEOUS
Qty: 1 EACH | Refills: 1 | Status: ON HOLD | OUTPATIENT
Start: 2021-10-01 | End: 2023-06-26 | Stop reason: HOSPADM

## 2021-10-01 RX ORDER — METHYLPREDNISOLONE SOD SUCC 125 MG
125 VIAL (EA) INJECTION
Status: COMPLETED | OUTPATIENT
Start: 2021-10-01 | End: 2021-10-01

## 2021-10-01 RX ADMIN — METHYLPREDNISOLONE SODIUM SUCCINATE 125 MG: 125 INJECTION, POWDER, LYOPHILIZED, FOR SOLUTION INTRAMUSCULAR; INTRAVENOUS at 06:10

## 2021-10-01 RX ADMIN — EPINEPHRINE 0.3 MG: 0.3 INJECTION INTRAMUSCULAR at 06:10

## 2021-10-01 RX ADMIN — DIPHENHYDRAMINE HYDROCHLORIDE 50 MG: 50 INJECTION INTRAMUSCULAR; INTRAVENOUS at 06:10

## 2021-10-01 RX ADMIN — FAMOTIDINE 20 MG: 10 INJECTION INTRAVENOUS at 06:10

## 2021-10-02 ENCOUNTER — NURSE TRIAGE (OUTPATIENT)
Dept: ADMINISTRATIVE | Facility: CLINIC | Age: 36
End: 2021-10-02

## 2021-10-02 LAB — BACTERIA UR CULT: NO GROWTH

## 2021-10-04 ENCOUNTER — TELEPHONE (OUTPATIENT)
Dept: FAMILY MEDICINE | Facility: CLINIC | Age: 36
End: 2021-10-04

## 2021-10-04 ENCOUNTER — PATIENT MESSAGE (OUTPATIENT)
Dept: PSYCHIATRY | Facility: CLINIC | Age: 36
End: 2021-10-04

## 2021-10-04 ENCOUNTER — LAB VISIT (OUTPATIENT)
Dept: LAB | Facility: HOSPITAL | Age: 36
End: 2021-10-04
Attending: PHYSICIAN ASSISTANT
Payer: COMMERCIAL

## 2021-10-04 DIAGNOSIS — N39.0 RECURRENT UTI (URINARY TRACT INFECTION): Primary | ICD-10-CM

## 2021-10-04 DIAGNOSIS — F31.81 BIPOLAR II DISORDER: ICD-10-CM

## 2021-10-04 DIAGNOSIS — F41.1 GAD (GENERALIZED ANXIETY DISORDER): Primary | ICD-10-CM

## 2021-10-04 LAB
ALBUMIN SERPL BCP-MCNC: 3.1 G/DL (ref 3.5–5.2)
ALP SERPL-CCNC: 68 U/L (ref 55–135)
ALT SERPL W/O P-5'-P-CCNC: 23 U/L (ref 10–44)
ANION GAP SERPL CALC-SCNC: 8 MMOL/L (ref 8–16)
AST SERPL-CCNC: 12 U/L (ref 10–40)
BASOPHILS # BLD AUTO: 0.04 K/UL (ref 0–0.2)
BASOPHILS NFR BLD: 0.3 % (ref 0–1.9)
BILIRUB SERPL-MCNC: 0.3 MG/DL (ref 0.1–1)
BUN SERPL-MCNC: 14 MG/DL (ref 6–20)
CALCIUM SERPL-MCNC: 9.7 MG/DL (ref 8.7–10.5)
CHLORIDE SERPL-SCNC: 105 MMOL/L (ref 95–110)
CO2 SERPL-SCNC: 28 MMOL/L (ref 23–29)
CREAT SERPL-MCNC: 0.8 MG/DL (ref 0.5–1.4)
DIFFERENTIAL METHOD: ABNORMAL
EOSINOPHIL # BLD AUTO: 0.1 K/UL (ref 0–0.5)
EOSINOPHIL NFR BLD: 1 % (ref 0–8)
ERYTHROCYTE [DISTWIDTH] IN BLOOD BY AUTOMATED COUNT: 13.6 % (ref 11.5–14.5)
EST. GFR  (AFRICAN AMERICAN): >60 ML/MIN/1.73 M^2
EST. GFR  (NON AFRICAN AMERICAN): >60 ML/MIN/1.73 M^2
GLUCOSE SERPL-MCNC: 75 MG/DL (ref 70–110)
HCT VFR BLD AUTO: 42.8 % (ref 37–48.5)
HGB BLD-MCNC: 13.3 G/DL (ref 12–16)
IMM GRANULOCYTES # BLD AUTO: 0.08 K/UL (ref 0–0.04)
IMM GRANULOCYTES NFR BLD AUTO: 0.7 % (ref 0–0.5)
LYMPHOCYTES # BLD AUTO: 3.9 K/UL (ref 1–4.8)
LYMPHOCYTES NFR BLD: 34.2 % (ref 18–48)
MCH RBC QN AUTO: 28.3 PG (ref 27–31)
MCHC RBC AUTO-ENTMCNC: 31.1 G/DL (ref 32–36)
MCV RBC AUTO: 91 FL (ref 82–98)
MONOCYTES # BLD AUTO: 0.8 K/UL (ref 0.3–1)
MONOCYTES NFR BLD: 6.9 % (ref 4–15)
NEUTROPHILS # BLD AUTO: 6.5 K/UL (ref 1.8–7.7)
NEUTROPHILS NFR BLD: 56.9 % (ref 38–73)
NRBC BLD-RTO: 0 /100 WBC
PLATELET # BLD AUTO: 389 K/UL (ref 150–450)
PMV BLD AUTO: 10.3 FL (ref 9.2–12.9)
POTASSIUM SERPL-SCNC: 3.8 MMOL/L (ref 3.5–5.1)
PROT SERPL-MCNC: 6 G/DL (ref 6–8.4)
RBC # BLD AUTO: 4.7 M/UL (ref 4–5.4)
SODIUM SERPL-SCNC: 141 MMOL/L (ref 136–145)
VALPROATE SERPL-MCNC: <12.5 UG/ML (ref 50–100)
WBC # BLD AUTO: 11.47 K/UL (ref 3.9–12.7)

## 2021-10-04 PROCEDURE — 80164 ASSAY DIPROPYLACETIC ACD TOT: CPT | Performed by: PHYSICIAN ASSISTANT

## 2021-10-04 PROCEDURE — 80053 COMPREHEN METABOLIC PANEL: CPT | Performed by: PHYSICIAN ASSISTANT

## 2021-10-04 PROCEDURE — 85025 COMPLETE CBC W/AUTO DIFF WBC: CPT | Performed by: PHYSICIAN ASSISTANT

## 2021-10-04 PROCEDURE — 36415 COLL VENOUS BLD VENIPUNCTURE: CPT | Performed by: PHYSICIAN ASSISTANT

## 2021-10-04 RX ORDER — LORAZEPAM 0.5 MG/1
0.5 TABLET ORAL EVERY 6 HOURS PRN
Qty: 15 TABLET | Refills: 0 | Status: ON HOLD | OUTPATIENT
Start: 2021-10-04 | End: 2022-02-08 | Stop reason: HOSPADM

## 2021-10-04 RX ORDER — BUSPIRONE HYDROCHLORIDE 5 MG/1
5 TABLET ORAL 3 TIMES DAILY
Qty: 90 TABLET | Refills: 1 | Status: ON HOLD | OUTPATIENT
Start: 2021-10-04 | End: 2022-02-08 | Stop reason: HOSPADM

## 2021-10-05 ENCOUNTER — OFFICE VISIT (OUTPATIENT)
Dept: PSYCHIATRY | Facility: CLINIC | Age: 36
End: 2021-10-05
Payer: COMMERCIAL

## 2021-10-05 ENCOUNTER — TELEPHONE (OUTPATIENT)
Dept: FAMILY MEDICINE | Facility: CLINIC | Age: 36
End: 2021-10-05

## 2021-10-05 DIAGNOSIS — F41.1 GAD (GENERALIZED ANXIETY DISORDER): Primary | ICD-10-CM

## 2021-10-05 DIAGNOSIS — F31.81 BIPOLAR II DISORDER: ICD-10-CM

## 2021-10-05 DIAGNOSIS — F43.10 PTSD (POST-TRAUMATIC STRESS DISORDER): ICD-10-CM

## 2021-10-05 PROCEDURE — 99213 PR OFFICE/OUTPT VISIT, EST, LEVL III, 20-29 MIN: ICD-10-PCS | Mod: 95,,, | Performed by: PHYSICIAN ASSISTANT

## 2021-10-05 PROCEDURE — 99213 OFFICE O/P EST LOW 20 MIN: CPT | Mod: 95,,, | Performed by: PHYSICIAN ASSISTANT

## 2021-10-05 PROCEDURE — 1160F PR REVIEW ALL MEDS BY PRESCRIBER/CLIN PHARMACIST DOCUMENTED: ICD-10-PCS | Mod: CPTII,95,, | Performed by: PHYSICIAN ASSISTANT

## 2021-10-05 PROCEDURE — 1159F MED LIST DOCD IN RCRD: CPT | Mod: CPTII,95,, | Performed by: PHYSICIAN ASSISTANT

## 2021-10-05 PROCEDURE — 1160F RVW MEDS BY RX/DR IN RCRD: CPT | Mod: CPTII,95,, | Performed by: PHYSICIAN ASSISTANT

## 2021-10-05 PROCEDURE — 1159F PR MEDICATION LIST DOCUMENTED IN MEDICAL RECORD: ICD-10-PCS | Mod: CPTII,95,, | Performed by: PHYSICIAN ASSISTANT

## 2021-10-06 ENCOUNTER — PATIENT MESSAGE (OUTPATIENT)
Dept: PSYCHIATRY | Facility: CLINIC | Age: 36
End: 2021-10-06

## 2021-10-07 ENCOUNTER — PATIENT OUTREACH (OUTPATIENT)
Dept: ADMINISTRATIVE | Facility: OTHER | Age: 36
End: 2021-10-07

## 2021-10-08 ENCOUNTER — OFFICE VISIT (OUTPATIENT)
Dept: UROLOGY | Facility: CLINIC | Age: 36
End: 2021-10-08
Payer: COMMERCIAL

## 2021-10-08 VITALS
DIASTOLIC BLOOD PRESSURE: 89 MMHG | HEART RATE: 94 BPM | HEIGHT: 67 IN | SYSTOLIC BLOOD PRESSURE: 130 MMHG | BODY MASS INDEX: 41.18 KG/M2 | WEIGHT: 262.38 LBS

## 2021-10-08 DIAGNOSIS — R31.0 GROSS HEMATURIA: Primary | ICD-10-CM

## 2021-10-08 DIAGNOSIS — Z01.818 PRE-OP TESTING: ICD-10-CM

## 2021-10-08 DIAGNOSIS — R10.9 FLANK PAIN: ICD-10-CM

## 2021-10-08 DIAGNOSIS — R31.9 HEMATURIA OF UNKNOWN CAUSE: ICD-10-CM

## 2021-10-08 DIAGNOSIS — N39.0 RECURRENT UTI (URINARY TRACT INFECTION): ICD-10-CM

## 2021-10-08 LAB — POC RESIDUAL URINE VOLUME: 84 ML (ref 0–100)

## 2021-10-08 PROCEDURE — 99999 PR PBB SHADOW E&M-EST. PATIENT-LVL V: ICD-10-PCS | Mod: PBBFAC,,, | Performed by: UROLOGY

## 2021-10-08 PROCEDURE — 3008F PR BODY MASS INDEX (BMI) DOCUMENTED: ICD-10-PCS | Mod: CPTII,S$GLB,, | Performed by: UROLOGY

## 2021-10-08 PROCEDURE — 88112 PR  CYTOPATH, CELL ENHANCE TECH: ICD-10-PCS | Mod: 26,,, | Performed by: PATHOLOGY

## 2021-10-08 PROCEDURE — 1160F RVW MEDS BY RX/DR IN RCRD: CPT | Mod: CPTII,S$GLB,, | Performed by: UROLOGY

## 2021-10-08 PROCEDURE — 1159F PR MEDICATION LIST DOCUMENTED IN MEDICAL RECORD: ICD-10-PCS | Mod: CPTII,S$GLB,, | Performed by: UROLOGY

## 2021-10-08 PROCEDURE — 99204 PR OFFICE/OUTPT VISIT, NEW, LEVL IV, 45-59 MIN: ICD-10-PCS | Mod: S$GLB,CS,, | Performed by: UROLOGY

## 2021-10-08 PROCEDURE — 3075F PR MOST RECENT SYSTOLIC BLOOD PRESS GE 130-139MM HG: ICD-10-PCS | Mod: CPTII,S$GLB,, | Performed by: UROLOGY

## 2021-10-08 PROCEDURE — 99999 PR PBB SHADOW E&M-EST. PATIENT-LVL V: CPT | Mod: PBBFAC,,, | Performed by: UROLOGY

## 2021-10-08 PROCEDURE — 88112 CYTOPATH CELL ENHANCE TECH: CPT | Performed by: PATHOLOGY

## 2021-10-08 PROCEDURE — 1160F PR REVIEW ALL MEDS BY PRESCRIBER/CLIN PHARMACIST DOCUMENTED: ICD-10-PCS | Mod: CPTII,S$GLB,, | Performed by: UROLOGY

## 2021-10-08 PROCEDURE — 3075F SYST BP GE 130 - 139MM HG: CPT | Mod: CPTII,S$GLB,, | Performed by: UROLOGY

## 2021-10-08 PROCEDURE — 3079F DIAST BP 80-89 MM HG: CPT | Mod: CPTII,S$GLB,, | Performed by: UROLOGY

## 2021-10-08 PROCEDURE — 3079F PR MOST RECENT DIASTOLIC BLOOD PRESSURE 80-89 MM HG: ICD-10-PCS | Mod: CPTII,S$GLB,, | Performed by: UROLOGY

## 2021-10-08 PROCEDURE — 51798 POCT BLADDER SCAN: ICD-10-PCS | Mod: S$GLB,,, | Performed by: UROLOGY

## 2021-10-08 PROCEDURE — 88112 CYTOPATH CELL ENHANCE TECH: CPT | Mod: 26,,, | Performed by: PATHOLOGY

## 2021-10-08 PROCEDURE — 51798 US URINE CAPACITY MEASURE: CPT | Mod: S$GLB,,, | Performed by: UROLOGY

## 2021-10-08 PROCEDURE — 3008F BODY MASS INDEX DOCD: CPT | Mod: CPTII,S$GLB,, | Performed by: UROLOGY

## 2021-10-08 PROCEDURE — 1159F MED LIST DOCD IN RCRD: CPT | Mod: CPTII,S$GLB,, | Performed by: UROLOGY

## 2021-10-08 PROCEDURE — 99204 OFFICE O/P NEW MOD 45 MIN: CPT | Mod: S$GLB,CS,, | Performed by: UROLOGY

## 2021-10-08 RX ORDER — DIAZEPAM 10 MG/1
10 TABLET ORAL DAILY
Qty: 3 TABLET | Refills: 0 | Status: ON HOLD | OUTPATIENT
Start: 2021-10-08 | End: 2022-02-08 | Stop reason: HOSPADM

## 2021-10-12 LAB
FINAL PATHOLOGIC DIAGNOSIS: NORMAL
Lab: NORMAL

## 2021-10-14 ENCOUNTER — HOSPITAL ENCOUNTER (OUTPATIENT)
Dept: RADIOLOGY | Facility: HOSPITAL | Age: 36
Discharge: HOME | End: 2021-10-14
Attending: UROLOGY
Payer: COMMERCIAL

## 2021-10-14 DIAGNOSIS — R31.9 HEMATURIA OF UNKNOWN CAUSE: ICD-10-CM

## 2021-10-14 PROCEDURE — 74176 CT ABD & PELVIS W/O CONTRAST: CPT | Mod: 26,,, | Performed by: RADIOLOGY

## 2021-10-14 PROCEDURE — 74176 CT RENAL STONE STUDY ABD PELVIS WO: ICD-10-PCS | Mod: 26,,, | Performed by: RADIOLOGY

## 2021-10-14 PROCEDURE — 74176 CT ABD & PELVIS W/O CONTRAST: CPT | Mod: TC

## 2021-10-22 ENCOUNTER — OFFICE VISIT (OUTPATIENT)
Dept: PSYCHIATRY | Facility: CLINIC | Age: 36
End: 2021-10-22
Payer: COMMERCIAL

## 2021-10-22 DIAGNOSIS — F43.10 PTSD (POST-TRAUMATIC STRESS DISORDER): ICD-10-CM

## 2021-10-22 DIAGNOSIS — F41.1 GAD (GENERALIZED ANXIETY DISORDER): ICD-10-CM

## 2021-10-22 DIAGNOSIS — F31.81 BIPOLAR II DISORDER: Primary | ICD-10-CM

## 2021-10-22 PROCEDURE — 1159F MED LIST DOCD IN RCRD: CPT | Mod: CPTII,95,, | Performed by: PHYSICIAN ASSISTANT

## 2021-10-22 PROCEDURE — 1160F RVW MEDS BY RX/DR IN RCRD: CPT | Mod: CPTII,95,, | Performed by: PHYSICIAN ASSISTANT

## 2021-10-22 PROCEDURE — 99214 OFFICE O/P EST MOD 30 MIN: CPT | Mod: 95,,, | Performed by: PHYSICIAN ASSISTANT

## 2021-10-22 PROCEDURE — 99214 PR OFFICE/OUTPT VISIT, EST, LEVL IV, 30-39 MIN: ICD-10-PCS | Mod: 95,,, | Performed by: PHYSICIAN ASSISTANT

## 2021-10-22 PROCEDURE — 1159F PR MEDICATION LIST DOCUMENTED IN MEDICAL RECORD: ICD-10-PCS | Mod: CPTII,95,, | Performed by: PHYSICIAN ASSISTANT

## 2021-10-22 PROCEDURE — 1160F PR REVIEW ALL MEDS BY PRESCRIBER/CLIN PHARMACIST DOCUMENTED: ICD-10-PCS | Mod: CPTII,95,, | Performed by: PHYSICIAN ASSISTANT

## 2021-10-25 ENCOUNTER — PATIENT MESSAGE (OUTPATIENT)
Dept: SURGERY | Facility: AMBULARY SURGERY CENTER | Age: 36
End: 2021-10-25
Payer: COMMERCIAL

## 2021-10-26 ENCOUNTER — TELEPHONE (OUTPATIENT)
Dept: PSYCHIATRY | Facility: CLINIC | Age: 36
End: 2021-10-26
Payer: COMMERCIAL

## 2021-10-26 ENCOUNTER — OFFICE VISIT (OUTPATIENT)
Dept: ORTHOPEDICS | Facility: CLINIC | Age: 36
End: 2021-10-26
Payer: OTHER MISCELLANEOUS

## 2021-10-26 VITALS
SYSTOLIC BLOOD PRESSURE: 132 MMHG | BODY MASS INDEX: 41.12 KG/M2 | HEIGHT: 67 IN | OXYGEN SATURATION: 97 % | DIASTOLIC BLOOD PRESSURE: 80 MMHG | WEIGHT: 262 LBS | HEART RATE: 92 BPM

## 2021-10-26 DIAGNOSIS — M17.11 PRIMARY OSTEOARTHRITIS OF RIGHT KNEE: ICD-10-CM

## 2021-10-26 DIAGNOSIS — S83.241A ACUTE MEDIAL MENISCUS TEAR OF RIGHT KNEE, INITIAL ENCOUNTER: ICD-10-CM

## 2021-10-26 DIAGNOSIS — Z98.890 HISTORY OF ARTHROSCOPY OF RIGHT KNEE: Primary | ICD-10-CM

## 2021-10-26 PROCEDURE — 99214 PR OFFICE/OUTPT VISIT, EST, LEVL IV, 30-39 MIN: ICD-10-PCS | Mod: S$GLB,,, | Performed by: ORTHOPAEDIC SURGERY

## 2021-10-26 PROCEDURE — 99214 OFFICE O/P EST MOD 30 MIN: CPT | Mod: S$GLB,,, | Performed by: ORTHOPAEDIC SURGERY

## 2021-10-27 ENCOUNTER — HOSPITAL ENCOUNTER (OUTPATIENT)
Facility: AMBULARY SURGERY CENTER | Age: 36
Discharge: HOME | End: 2021-10-27
Attending: UROLOGY | Admitting: UROLOGY
Payer: COMMERCIAL

## 2021-10-27 DIAGNOSIS — R31.0 GROSS HEMATURIA: ICD-10-CM

## 2021-10-27 DIAGNOSIS — N39.0 RECURRENT UTI (URINARY TRACT INFECTION): Primary | ICD-10-CM

## 2021-10-27 PROCEDURE — 52000 CYSTOURETHROSCOPY: CPT | Mod: ,,, | Performed by: UROLOGY

## 2021-10-27 PROCEDURE — 52000 PR CYSTOURETHROSCOPY: ICD-10-PCS | Mod: ,,, | Performed by: UROLOGY

## 2021-10-27 PROCEDURE — 52000 CYSTOURETHROSCOPY: CPT | Performed by: UROLOGY

## 2021-10-27 RX ORDER — WATER 1 ML/ML
IRRIGANT IRRIGATION
Status: DISCONTINUED | OUTPATIENT
Start: 2021-10-27 | End: 2021-10-27 | Stop reason: HOSPADM

## 2021-10-27 RX ORDER — CEPHALEXIN 500 MG/1
500 CAPSULE ORAL EVERY 8 HOURS
Qty: 9 CAPSULE | Refills: 0 | Status: SHIPPED | OUTPATIENT
Start: 2021-10-27 | End: 2021-10-30

## 2021-10-27 RX ORDER — LIDOCAINE HYDROCHLORIDE 20 MG/ML
JELLY TOPICAL
Status: DISCONTINUED | OUTPATIENT
Start: 2021-10-27 | End: 2021-10-27 | Stop reason: HOSPADM

## 2021-10-28 VITALS
HEIGHT: 67 IN | BODY MASS INDEX: 41.18 KG/M2 | WEIGHT: 262.38 LBS | OXYGEN SATURATION: 99 % | DIASTOLIC BLOOD PRESSURE: 99 MMHG | RESPIRATION RATE: 20 BRPM | TEMPERATURE: 98 F | SYSTOLIC BLOOD PRESSURE: 152 MMHG | HEART RATE: 80 BPM

## 2021-11-19 ENCOUNTER — OFFICE VISIT (OUTPATIENT)
Dept: FAMILY MEDICINE | Facility: CLINIC | Age: 36
End: 2021-11-19
Payer: COMMERCIAL

## 2021-11-19 VITALS
SYSTOLIC BLOOD PRESSURE: 132 MMHG | DIASTOLIC BLOOD PRESSURE: 80 MMHG | WEIGHT: 272.69 LBS | OXYGEN SATURATION: 97 % | BODY MASS INDEX: 42.71 KG/M2 | HEART RATE: 92 BPM

## 2021-11-19 DIAGNOSIS — Z72.0 VAPES NICOTINE CONTAINING SUBSTANCE: Primary | ICD-10-CM

## 2021-11-19 DIAGNOSIS — R42 DIZZINESS: ICD-10-CM

## 2021-11-19 DIAGNOSIS — R11.2 NAUSEA AND VOMITING, INTRACTABILITY OF VOMITING NOT SPECIFIED, UNSPECIFIED VOMITING TYPE: ICD-10-CM

## 2021-11-19 PROCEDURE — 3079F DIAST BP 80-89 MM HG: CPT | Mod: CPTII,S$GLB,, | Performed by: FAMILY MEDICINE

## 2021-11-19 PROCEDURE — 99999 PR PBB SHADOW E&M-EST. PATIENT-LVL V: CPT | Mod: PBBFAC,,, | Performed by: FAMILY MEDICINE

## 2021-11-19 PROCEDURE — 3008F BODY MASS INDEX DOCD: CPT | Mod: CPTII,S$GLB,, | Performed by: FAMILY MEDICINE

## 2021-11-19 PROCEDURE — 99999 PR PBB SHADOW E&M-EST. PATIENT-LVL V: ICD-10-PCS | Mod: PBBFAC,,, | Performed by: FAMILY MEDICINE

## 2021-11-19 PROCEDURE — 3079F PR MOST RECENT DIASTOLIC BLOOD PRESSURE 80-89 MM HG: ICD-10-PCS | Mod: CPTII,S$GLB,, | Performed by: FAMILY MEDICINE

## 2021-11-19 PROCEDURE — 99214 PR OFFICE/OUTPT VISIT, EST, LEVL IV, 30-39 MIN: ICD-10-PCS | Mod: S$GLB,,, | Performed by: FAMILY MEDICINE

## 2021-11-19 PROCEDURE — 3008F PR BODY MASS INDEX (BMI) DOCUMENTED: ICD-10-PCS | Mod: CPTII,S$GLB,, | Performed by: FAMILY MEDICINE

## 2021-11-19 PROCEDURE — 3075F PR MOST RECENT SYSTOLIC BLOOD PRESS GE 130-139MM HG: ICD-10-PCS | Mod: CPTII,S$GLB,, | Performed by: FAMILY MEDICINE

## 2021-11-19 PROCEDURE — 99214 OFFICE O/P EST MOD 30 MIN: CPT | Mod: S$GLB,,, | Performed by: FAMILY MEDICINE

## 2021-11-19 PROCEDURE — 1159F PR MEDICATION LIST DOCUMENTED IN MEDICAL RECORD: ICD-10-PCS | Mod: CPTII,S$GLB,, | Performed by: FAMILY MEDICINE

## 2021-11-19 PROCEDURE — 3075F SYST BP GE 130 - 139MM HG: CPT | Mod: CPTII,S$GLB,, | Performed by: FAMILY MEDICINE

## 2021-11-19 PROCEDURE — 1159F MED LIST DOCD IN RCRD: CPT | Mod: CPTII,S$GLB,, | Performed by: FAMILY MEDICINE

## 2021-11-19 RX ORDER — PROMETHAZINE HYDROCHLORIDE 25 MG/1
25 TABLET ORAL EVERY 6 HOURS PRN
Qty: 20 TABLET | Refills: 0 | Status: SHIPPED | OUTPATIENT
Start: 2021-11-19 | End: 2022-04-04

## 2021-11-19 RX ORDER — PYRIDOXINE HCL (VITAMIN B6) 25 MG
25 TABLET ORAL DAILY
Qty: 30 TABLET | Refills: 0 | Status: SHIPPED | OUTPATIENT
Start: 2021-11-19 | End: 2021-12-13

## 2021-11-30 ENCOUNTER — HOSPITAL ENCOUNTER (OUTPATIENT)
Dept: RADIOLOGY | Facility: HOSPITAL | Age: 36
Discharge: HOME | End: 2021-11-30
Attending: ORTHOPAEDIC SURGERY
Payer: OTHER MISCELLANEOUS

## 2021-11-30 DIAGNOSIS — S83.241A ACUTE MEDIAL MENISCUS TEAR OF RIGHT KNEE, INITIAL ENCOUNTER: ICD-10-CM

## 2021-11-30 PROCEDURE — 73721 MRI JNT OF LWR EXTRE W/O DYE: CPT | Mod: TC,PO,RT

## 2021-12-02 ENCOUNTER — OFFICE VISIT (OUTPATIENT)
Dept: ORTHOPEDICS | Facility: CLINIC | Age: 36
End: 2021-12-02
Payer: OTHER MISCELLANEOUS

## 2021-12-02 VITALS — WEIGHT: 272 LBS | BODY MASS INDEX: 42.69 KG/M2 | HEIGHT: 67 IN

## 2021-12-02 DIAGNOSIS — Z98.890 HISTORY OF ARTHROSCOPY OF RIGHT KNEE: ICD-10-CM

## 2021-12-02 DIAGNOSIS — M17.11 PRIMARY OSTEOARTHRITIS OF RIGHT KNEE: Primary | ICD-10-CM

## 2021-12-02 PROCEDURE — 99214 OFFICE O/P EST MOD 30 MIN: CPT | Mod: S$GLB,,, | Performed by: ORTHOPAEDIC SURGERY

## 2021-12-02 PROCEDURE — 99214 PR OFFICE/OUTPT VISIT, EST, LEVL IV, 30-39 MIN: ICD-10-PCS | Mod: S$GLB,,, | Performed by: ORTHOPAEDIC SURGERY

## 2021-12-13 ENCOUNTER — OFFICE VISIT (OUTPATIENT)
Dept: PULMONOLOGY | Facility: CLINIC | Age: 36
End: 2021-12-13
Payer: COMMERCIAL

## 2021-12-13 VITALS
SYSTOLIC BLOOD PRESSURE: 139 MMHG | BODY MASS INDEX: 43.77 KG/M2 | DIASTOLIC BLOOD PRESSURE: 105 MMHG | OXYGEN SATURATION: 98 % | WEIGHT: 278.88 LBS | HEART RATE: 88 BPM | RESPIRATION RATE: 16 BRPM | HEIGHT: 67 IN

## 2021-12-13 DIAGNOSIS — J45.30 MILD PERSISTENT ASTHMA WITHOUT COMPLICATION: Primary | ICD-10-CM

## 2021-12-13 DIAGNOSIS — G47.33 OBSTRUCTIVE SLEEP APNEA: ICD-10-CM

## 2021-12-13 PROCEDURE — 99999 PR PBB SHADOW E&M-EST. PATIENT-LVL IV: ICD-10-PCS | Mod: PBBFAC,,, | Performed by: NURSE PRACTITIONER

## 2021-12-13 PROCEDURE — 99203 PR OFFICE/OUTPT VISIT, NEW, LEVL III, 30-44 MIN: ICD-10-PCS | Mod: S$GLB,,, | Performed by: NURSE PRACTITIONER

## 2021-12-13 PROCEDURE — 99203 OFFICE O/P NEW LOW 30 MIN: CPT | Mod: S$GLB,,, | Performed by: NURSE PRACTITIONER

## 2021-12-13 PROCEDURE — 99999 PR PBB SHADOW E&M-EST. PATIENT-LVL IV: CPT | Mod: PBBFAC,,, | Performed by: NURSE PRACTITIONER

## 2021-12-13 RX ORDER — FLUTICASONE PROPIONATE AND SALMETEROL XINAFOATE 230; 21 UG/1; UG/1
2 AEROSOL, METERED RESPIRATORY (INHALATION) 2 TIMES DAILY
Qty: 12 G | Refills: 6 | Status: SHIPPED | OUTPATIENT
Start: 2021-12-13 | End: 2022-02-07

## 2021-12-16 ENCOUNTER — TELEPHONE (OUTPATIENT)
Dept: PULMONOLOGY | Facility: CLINIC | Age: 36
End: 2021-12-16
Payer: COMMERCIAL

## 2021-12-23 ENCOUNTER — HOSPITAL ENCOUNTER (EMERGENCY)
Facility: HOSPITAL | Age: 36
Discharge: HOME | End: 2021-12-23
Attending: EMERGENCY MEDICINE
Payer: COMMERCIAL

## 2021-12-23 VITALS
HEART RATE: 80 BPM | HEIGHT: 67 IN | WEIGHT: 278 LBS | RESPIRATION RATE: 18 BRPM | DIASTOLIC BLOOD PRESSURE: 78 MMHG | OXYGEN SATURATION: 98 % | SYSTOLIC BLOOD PRESSURE: 172 MMHG | BODY MASS INDEX: 43.63 KG/M2 | TEMPERATURE: 98 F

## 2021-12-23 DIAGNOSIS — B34.9 VIRAL SYNDROME: Primary | ICD-10-CM

## 2021-12-23 LAB
ALBUMIN SERPL BCP-MCNC: 3 G/DL (ref 3.5–5.2)
ALP SERPL-CCNC: 69 U/L (ref 55–135)
ALT SERPL W/O P-5'-P-CCNC: 14 U/L (ref 10–44)
ANION GAP SERPL CALC-SCNC: 10 MMOL/L (ref 8–16)
AST SERPL-CCNC: 11 U/L (ref 10–40)
BILIRUB SERPL-MCNC: 0.2 MG/DL (ref 0.1–1)
BUN SERPL-MCNC: 9 MG/DL (ref 6–20)
CALCIUM SERPL-MCNC: 9.3 MG/DL (ref 8.7–10.5)
CHLORIDE SERPL-SCNC: 106 MMOL/L (ref 95–110)
CO2 SERPL-SCNC: 24 MMOL/L (ref 23–29)
CREAT SERPL-MCNC: 0.8 MG/DL (ref 0.5–1.4)
EST. GFR  (AFRICAN AMERICAN): >60 ML/MIN/1.73 M^2
EST. GFR  (NON AFRICAN AMERICAN): >60 ML/MIN/1.73 M^2
GLUCOSE SERPL-MCNC: 101 MG/DL (ref 70–110)
INFLUENZA A, MOLECULAR: NEGATIVE
INFLUENZA B, MOLECULAR: NEGATIVE
POTASSIUM SERPL-SCNC: 3.6 MMOL/L (ref 3.5–5.1)
PROT SERPL-MCNC: 5.9 G/DL (ref 6–8.4)
SARS-COV-2 RDRP RESP QL NAA+PROBE: NEGATIVE
SODIUM SERPL-SCNC: 140 MMOL/L (ref 136–145)
SPECIMEN SOURCE: NORMAL

## 2021-12-23 PROCEDURE — 96374 THER/PROPH/DIAG INJ IV PUSH: CPT

## 2021-12-23 PROCEDURE — 87502 INFLUENZA DNA AMP PROBE: CPT | Performed by: NURSE PRACTITIONER

## 2021-12-23 PROCEDURE — 36415 COLL VENOUS BLD VENIPUNCTURE: CPT | Performed by: EMERGENCY MEDICINE

## 2021-12-23 PROCEDURE — 99284 EMERGENCY DEPT VISIT MOD MDM: CPT | Mod: 25

## 2021-12-23 PROCEDURE — 80053 COMPREHEN METABOLIC PANEL: CPT | Performed by: EMERGENCY MEDICINE

## 2021-12-23 PROCEDURE — 25000003 PHARM REV CODE 250: Performed by: NURSE PRACTITIONER

## 2021-12-23 PROCEDURE — 96361 HYDRATE IV INFUSION ADD-ON: CPT

## 2021-12-23 PROCEDURE — 25000003 PHARM REV CODE 250: Performed by: EMERGENCY MEDICINE

## 2021-12-23 PROCEDURE — U0002 COVID-19 LAB TEST NON-CDC: HCPCS | Performed by: EMERGENCY MEDICINE

## 2021-12-23 PROCEDURE — 63600175 PHARM REV CODE 636 W HCPCS: Performed by: EMERGENCY MEDICINE

## 2021-12-23 RX ORDER — DIPHENOXYLATE HYDROCHLORIDE AND ATROPINE SULFATE 2.5; .025 MG/1; MG/1
2 TABLET ORAL
Status: COMPLETED | OUTPATIENT
Start: 2021-12-23 | End: 2021-12-23

## 2021-12-23 RX ORDER — DIPHENOXYLATE HYDROCHLORIDE AND ATROPINE SULFATE 2.5; .025 MG/1; MG/1
1 TABLET ORAL 3 TIMES DAILY PRN
Qty: 15 TABLET | Refills: 0 | Status: SHIPPED | OUTPATIENT
Start: 2021-12-23 | End: 2021-12-28

## 2021-12-23 RX ORDER — ONDANSETRON 2 MG/ML
8 INJECTION INTRAMUSCULAR; INTRAVENOUS
Status: COMPLETED | OUTPATIENT
Start: 2021-12-23 | End: 2021-12-23

## 2021-12-23 RX ORDER — ONDANSETRON 4 MG/1
4 TABLET, ORALLY DISINTEGRATING ORAL EVERY 8 HOURS PRN
Qty: 12 TABLET | Refills: 0 | Status: SHIPPED | OUTPATIENT
Start: 2021-12-23 | End: 2022-01-09 | Stop reason: SDUPTHER

## 2021-12-23 RX ADMIN — DIPHENOXYLATE HYDROCHLORIDE AND ATROPINE SULFATE 2 TABLET: 2.5; .025 TABLET ORAL at 06:12

## 2021-12-23 RX ADMIN — SODIUM CHLORIDE 1000 ML: 0.9 INJECTION, SOLUTION INTRAVENOUS at 06:12

## 2021-12-23 RX ADMIN — ONDANSETRON 8 MG: 2 INJECTION INTRAMUSCULAR; INTRAVENOUS at 06:12

## 2021-12-23 NOTE — Clinical Note
"Sonia"Dinorah Goldberg was seen and treated in our emergency department on 12/23/2021.  She may return to work on 12/27/2021.       If you have any questions or concerns, please don't hesitate to call.      Wood Lopez MD"

## 2021-12-23 NOTE — FIRST PROVIDER EVALUATION
Emergency Department TeleTriage Encounter Note      CHIEF COMPLAINT    Chief Complaint   Patient presents with    Diarrhea       VITAL SIGNS   Initial Vitals [12/23/21 1643]   BP Pulse Resp Temp SpO2   (!) 145/80 102 20 98.1 °F (36.7 °C) 97 %      MAP       --            ALLERGIES    Review of patient's allergies indicates:   Allergen Reactions    Contrast media Anaphylaxis    Iodine and iodide containing products Anaphylaxis    Levaquin [levofloxacin] Anaphylaxis    Sulfa (sulfonamide antibiotics) Anaphylaxis and Hives    Iodinated contrast media Hives    Magnesium      Pt reporting she is allergic to magnesium citrate oral drink.     Morphine Hives    Adhesive Rash    Nut [tree nut] Hives       PROVIDER TRIAGE NOTE  TeleTriage Note: Sonia Goldberg, a nontoxic/well appearing, 36 y.o. female, presented to the ED with c/o cough and sore throat for the past few days and then started with fever this morning of 101.0. Took theraflu. She is also having diarrhea. Fell earlier to do her weakness. Denies hitting her head of LOC.     All ED beds are full at present; patient notified of this status.  Patient seen and medically screened by Nurse Practitioner via teletriage. Orders initiated at triage to expedite care.  Patient is stable to return to the waiting room and will be placed in an ED bed when available.  Care will be transferred to an alternate provider when patient has been placed in an Exam Room from the [facility] for physical exam, additional orders, and disposition.  5:05 PM Ximena Parks DNP, FNP-C        ORDERS  Labs Reviewed - No data to display    ED Orders (720h ago, onward)    Start Ordered     Status Ordering Provider    12/23/21 1715 12/23/21 1706  sodium chloride 0.9% bolus 1,000 mL  ED 1 Time         Ordered XIMENA PARKS    12/23/21 1707 12/23/21 1706  Insert peripheral IV  Continuous         Ordered XIMENA PARKS    12/23/21 1707 12/23/21 1706  POCT COVID-19 Rapid Screening   Once         Ordered KAREN BOCANEGRA    12/23/21 1707 12/23/21 1706  Influenza A & B by Molecular  STAT         Ordered KAREN BOCANEGRA            Virtual Visit Note: The provider triage portion of this emergency department evaluation and documentation was performed via [employer], a HIPAA-compliant telemedicine application, in concert with a tele-presenter in the room. A face to face patient evaluation with one of my colleagues will occur once the patient is placed in an emergency department room.      DISCLAIMER: This note was prepared with [employer] voice recognition transcription software. Garbled syntax, mangled pronouns, and other bizarre constructions may be attributed to that software system.
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
## 2021-12-27 ENCOUNTER — TELEPHONE (OUTPATIENT)
Dept: FAMILY MEDICINE | Facility: CLINIC | Age: 36
End: 2021-12-27
Payer: COMMERCIAL

## 2021-12-27 ENCOUNTER — HOSPITAL ENCOUNTER (EMERGENCY)
Facility: HOSPITAL | Age: 36
Discharge: HOME | End: 2021-12-27
Attending: EMERGENCY MEDICINE
Payer: COMMERCIAL

## 2021-12-27 VITALS
RESPIRATION RATE: 18 BRPM | SYSTOLIC BLOOD PRESSURE: 145 MMHG | WEIGHT: 273 LBS | BODY MASS INDEX: 42.85 KG/M2 | TEMPERATURE: 98 F | HEIGHT: 67 IN | HEART RATE: 103 BPM | OXYGEN SATURATION: 98 % | DIASTOLIC BLOOD PRESSURE: 75 MMHG

## 2021-12-27 DIAGNOSIS — J02.9 VIRAL PHARYNGITIS: Primary | ICD-10-CM

## 2021-12-27 LAB
B-HCG UR QL: NEGATIVE
CTP QC/QA: YES
GROUP A STREP, MOLECULAR: NEGATIVE
INFLUENZA A, MOLECULAR: NEGATIVE
INFLUENZA B, MOLECULAR: NEGATIVE
SPECIMEN SOURCE: NORMAL

## 2021-12-27 PROCEDURE — 87651 STREP A DNA AMP PROBE: CPT | Performed by: PHYSICIAN ASSISTANT

## 2021-12-27 PROCEDURE — 99283 EMERGENCY DEPT VISIT LOW MDM: CPT

## 2021-12-27 PROCEDURE — U0005 INFEC AGEN DETEC AMPLI PROBE: HCPCS | Performed by: PHYSICIAN ASSISTANT

## 2021-12-27 PROCEDURE — 25000003 PHARM REV CODE 250: Performed by: EMERGENCY MEDICINE

## 2021-12-27 PROCEDURE — U0003 INFECTIOUS AGENT DETECTION BY NUCLEIC ACID (DNA OR RNA); SEVERE ACUTE RESPIRATORY SYNDROME CORONAVIRUS 2 (SARS-COV-2) (CORONAVIRUS DISEASE [COVID-19]), AMPLIFIED PROBE TECHNIQUE, MAKING USE OF HIGH THROUGHPUT TECHNOLOGIES AS DESCRIBED BY CMS-2020-01-R: HCPCS | Performed by: PHYSICIAN ASSISTANT

## 2021-12-27 PROCEDURE — 81025 URINE PREGNANCY TEST: CPT | Performed by: EMERGENCY MEDICINE

## 2021-12-27 PROCEDURE — 87502 INFLUENZA DNA AMP PROBE: CPT | Performed by: PHYSICIAN ASSISTANT

## 2021-12-27 RX ORDER — TRIPROLIDINE/PSEUDOEPHEDRINE 2.5MG-60MG
400 TABLET ORAL
Status: COMPLETED | OUTPATIENT
Start: 2021-12-27 | End: 2021-12-27

## 2021-12-27 RX ADMIN — IBUPROFEN 400 MG: 200 SUSPENSION ORAL at 09:12

## 2021-12-28 LAB
SARS-COV-2 RNA RESP QL NAA+PROBE: NOT DETECTED
SARS-COV-2- CYCLE NUMBER: NORMAL
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
## 2021-12-28 NOTE — PROGRESS NOTES
I have evaluated and performed a medical screening assessment on this patient while awaiting a thorough evaluation and treatment. All of the emergency department beds are occupied at this time. When appropriate, laboratory studies will be ordered from triage. The patient has been advised of this process and care plan. Patient complains of sore throat    Orders pending: COVID, strep and influenza

## 2021-12-30 ENCOUNTER — PATIENT MESSAGE (OUTPATIENT)
Dept: PULMONOLOGY | Facility: CLINIC | Age: 36
End: 2021-12-30
Payer: COMMERCIAL

## 2021-12-30 DIAGNOSIS — G47.33 OBSTRUCTIVE SLEEP APNEA: Primary | ICD-10-CM

## 2022-01-03 ENCOUNTER — TELEPHONE (OUTPATIENT)
Dept: SMOKING CESSATION | Facility: CLINIC | Age: 37
End: 2022-01-03
Payer: COMMERCIAL

## 2022-01-05 ENCOUNTER — HOSPITAL ENCOUNTER (OUTPATIENT)
Dept: PULMONOLOGY | Facility: HOSPITAL | Age: 37
Discharge: HOME | End: 2022-01-05
Attending: NURSE PRACTITIONER
Payer: COMMERCIAL

## 2022-01-05 DIAGNOSIS — J45.30 MILD PERSISTENT ASTHMA WITHOUT COMPLICATION: ICD-10-CM

## 2022-01-05 PROCEDURE — 94727 GAS DIL/WSHOT DETER LNG VOL: CPT | Mod: 26,,, | Performed by: INTERNAL MEDICINE

## 2022-01-05 PROCEDURE — 94729 PR C02/MEMBANE DIFFUSE CAPACITY: ICD-10-PCS | Mod: 26,,, | Performed by: INTERNAL MEDICINE

## 2022-01-05 PROCEDURE — 94729 DIFFUSING CAPACITY: CPT

## 2022-01-05 PROCEDURE — 94060 PR EVAL OF BRONCHOSPASM: ICD-10-PCS | Mod: 26,,, | Performed by: INTERNAL MEDICINE

## 2022-01-05 PROCEDURE — 94060 EVALUATION OF WHEEZING: CPT | Mod: 26,,, | Performed by: INTERNAL MEDICINE

## 2022-01-05 PROCEDURE — 94727 PR PULM FUNCTION TEST BY GAS: ICD-10-PCS | Mod: 26,,, | Performed by: INTERNAL MEDICINE

## 2022-01-05 PROCEDURE — 94729 DIFFUSING CAPACITY: CPT | Mod: 26,,, | Performed by: INTERNAL MEDICINE

## 2022-01-05 PROCEDURE — 94060 EVALUATION OF WHEEZING: CPT

## 2022-01-05 PROCEDURE — 94727 GAS DIL/WSHOT DETER LNG VOL: CPT

## 2022-01-06 LAB
BRPFT: NORMAL
DLCO ADJ PRE: 24.36 ML/(MIN*MMHG)
DLCO SINGLE BREATH LLN: 22.05
DLCO SINGLE BREATH PRE REF: 87.7 %
DLCO SINGLE BREATH REF: 27.78
DLCOC SBVA LLN: 3.71
DLCOC SBVA PRE REF: 92.4 %
DLCOC SBVA REF: 5.12
DLCOC SINGLE BREATH LLN: 22.05
DLCOC SINGLE BREATH PRE REF: 87.7 %
DLCOC SINGLE BREATH REF: 27.78
DLCOVA LLN: 3.71
DLCOVA PRE REF: 92.4 %
DLCOVA PRE: 4.73 ML/(MIN*MMHG*L)
DLCOVA REF: 5.12
DLVAADJ PRE: 4.73 ML/(MIN*MMHG*L)
ERVN2 LLN: -16448.84
ERVN2 PRE REF: 24.1 %
ERVN2 PRE: 0.28 L
ERVN2 REF: 1.16
FEF 25 75 CHG: -1.8 %
FEF 25 75 LLN: 2.14
FEF 25 75 POST REF: 115.1 %
FEF 25 75 PRE REF: 117.2 %
FEF 25 75 REF: 3.47
FET100 CHG: -8.2 %
FEV1 CHG: 0.1 %
FEV1 FVC CHG: 1.5 %
FEV1 FVC LLN: 71
FEV1 FVC POST REF: 102.2 %
FEV1 FVC PRE REF: 100.7 %
FEV1 FVC REF: 82
FEV1 LLN: 2.69
FEV1 POST REF: 101 %
FEV1 PRE REF: 100.9 %
FEV1 REF: 3.37
FRCN2 LLN: 2.02
FRCN2 PRE REF: 52.9 %
FRCN2 REF: 2.85
FVC CHG: -1.4 %
FVC LLN: 3.28
FVC POST REF: 98.2 %
FVC PRE REF: 99.6 %
FVC REF: 4.11
IVC PRE: 3.81 L
IVC SINGLE BREATH LLN: 3.28
IVC SINGLE BREATH PRE REF: 92.7 %
IVC SINGLE BREATH REF: 4.11
MVV LLN: 108
MVV PRE REF: 97.9 %
MVV REF: 127
PEF CHG: -3.7 %
PEF LLN: 5.58
PEF POST REF: 103.3 %
PEF PRE REF: 107.2 %
PEF REF: 7.45
POST FEF 25 75: 3.99 L/S
POST FET 100: 7.74 SEC
POST FEV1 FVC: 84.22 %
POST FEV1: 3.4 L
POST FVC: 4.04 L
POST PEF: 7.7 L/S
PRE DLCO: 24.36 ML/(MIN*MMHG)
PRE FEF 25 75: 4.07 L/S
PRE FET 100: 8.42 SEC
PRE FEV1 FVC: 83 %
PRE FEV1: 3.4 L
PRE FRC N2: 1.51 L
PRE FVC: 4.09 L
PRE MVV: 124 L/MIN
PRE PEF: 7.99 L/S
RVN2 LLN: 1.11
RVN2 PRE REF: 64.9 %
RVN2 PRE: 1.09 L
RVN2 REF: 1.68
RVN2TLCN2 LLN: 22.29
RVN2TLCN2 PRE REF: 66.2 %
RVN2TLCN2 PRE: 21.09 %
RVN2TLCN2 REF: 31.88
TLCN2 LLN: 4.44
TLCN2 PRE REF: 95.5 %
TLCN2 PRE: 5.19 L
TLCN2 REF: 5.43
VA PRE: 5.16 L
VA SINGLE BREATH LLN: 5.28
VA SINGLE BREATH PRE REF: 97.7 %
VA SINGLE BREATH REF: 5.28
VCMAXN2 LLN: 3.28
VCMAXN2 PRE REF: 99.6 %
VCMAXN2 PRE: 4.09 L
VCMAXN2 REF: 4.11

## 2022-01-09 ENCOUNTER — HOSPITAL ENCOUNTER (EMERGENCY)
Facility: HOSPITAL | Age: 37
Discharge: HOME | End: 2022-01-09
Attending: EMERGENCY MEDICINE
Payer: COMMERCIAL

## 2022-01-09 ENCOUNTER — PATIENT MESSAGE (OUTPATIENT)
Dept: ADMINISTRATIVE | Facility: CLINIC | Age: 37
End: 2022-01-09
Payer: COMMERCIAL

## 2022-01-09 ENCOUNTER — PATIENT MESSAGE (OUTPATIENT)
Dept: ADMINISTRATIVE | Facility: OTHER | Age: 37
End: 2022-01-09
Payer: COMMERCIAL

## 2022-01-09 VITALS
BODY MASS INDEX: 42.85 KG/M2 | HEART RATE: 96 BPM | TEMPERATURE: 97 F | SYSTOLIC BLOOD PRESSURE: 162 MMHG | WEIGHT: 273 LBS | RESPIRATION RATE: 18 BRPM | DIASTOLIC BLOOD PRESSURE: 85 MMHG | OXYGEN SATURATION: 96 % | HEIGHT: 67 IN

## 2022-01-09 DIAGNOSIS — U07.1 COVID-19: Primary | ICD-10-CM

## 2022-01-09 DIAGNOSIS — R06.02 SOB (SHORTNESS OF BREATH): ICD-10-CM

## 2022-01-09 PROCEDURE — 99283 EMERGENCY DEPT VISIT LOW MDM: CPT | Mod: 25

## 2022-01-09 PROCEDURE — 25000003 PHARM REV CODE 250: Performed by: PHYSICIAN ASSISTANT

## 2022-01-09 RX ORDER — ONDANSETRON 4 MG/1
4 TABLET, ORALLY DISINTEGRATING ORAL
Status: COMPLETED | OUTPATIENT
Start: 2022-01-09 | End: 2022-01-09

## 2022-01-09 RX ORDER — ONDANSETRON 4 MG/1
8 TABLET, ORALLY DISINTEGRATING ORAL EVERY 8 HOURS PRN
Qty: 30 TABLET | Refills: 0 | Status: SHIPPED | OUTPATIENT
Start: 2022-01-09 | End: 2022-02-07

## 2022-01-09 RX ADMIN — ONDANSETRON 4 MG: 4 TABLET, ORALLY DISINTEGRATING ORAL at 05:01
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
## 2022-01-09 NOTE — DISCHARGE INSTRUCTIONS
Use your inhaler as needed. I have signed you up for the home monitoring program. You can  a pulse ox from [facility] and they will call and monitor your vital signs. Continue tylenol or motrin as needed.  Follow up with your primary care provider.  For worsening symptoms, chest pain, shortness of breath, increased abdominal pain, high grade fever, stroke or stroke like symptoms, immediately go to the nearest Emergency Room or call 911 as soon as possible.

## 2022-01-10 ENCOUNTER — PATIENT MESSAGE (OUTPATIENT)
Dept: FAMILY MEDICINE | Facility: CLINIC | Age: 37
End: 2022-01-10
Payer: COMMERCIAL

## 2022-01-10 ENCOUNTER — NURSE TRIAGE (OUTPATIENT)
Dept: ADMINISTRATIVE | Facility: CLINIC | Age: 37
End: 2022-01-10
Payer: COMMERCIAL

## 2022-01-10 ENCOUNTER — PATIENT MESSAGE (OUTPATIENT)
Dept: ADMINISTRATIVE | Facility: CLINIC | Age: 37
End: 2022-01-10
Payer: COMMERCIAL

## 2022-01-10 ENCOUNTER — PATIENT MESSAGE (OUTPATIENT)
Dept: ADMINISTRATIVE | Facility: OTHER | Age: 37
End: 2022-01-10
Payer: COMMERCIAL

## 2022-01-10 ENCOUNTER — TELEPHONE (OUTPATIENT)
Dept: HOME HEALTH SERVICES | Facility: CLINIC | Age: 37
End: 2022-01-10
Payer: COMMERCIAL

## 2022-01-10 DIAGNOSIS — U07.1 COVID: Primary | ICD-10-CM

## 2022-01-10 NOTE — TELEPHONE ENCOUNTER
Pt contacted for Surveillance escalation - Pt reports seen at ED yesterday and is still feeling SOB. Covid 19 protocol used and pt advised to call 911 for reports she is to weak to stand. Pt declines stating she was there and they sent her home. Pt instructed to call OOC for worsening of symptoms or health questions.    Reason for Disposition   Shock suspected (e.g., cold/pale/clammy skin, too weak to stand, low BP, rapid pulse)    Additional Information   Negative: SEVERE difficulty breathing (e.g., struggling for each breath, speaks in single words)   Negative: Difficult to awaken or acting confused (e.g., disoriented, slurred speech)   Negative: Bluish (or gray) lips or face now   Negative: Sounds like a life-threatening emergency to the triager    Protocols used: CORONAVIRUS (COVID-19) DIAGNOSED OR [id number]

## 2022-01-10 NOTE — TELEPHONE ENCOUNTER
Called patient due to RN escalation in COVID Surveillance program. Pt escalated due to 3/5 SOB at rest and 4/5 with activity- Her first escalation today she was advised call 911 and declined.    Patient location: [place]    Vitals: Sp02 : 97%. P: 87. Temp: 98.7  Ambulatory Sp02 on the phone (if applicable): 95%    36 y.o. female with pertinent PMHx asthma, SVT, htn. Anemia, morbid obesity, smoker, and GERD  on day 5 of Covid symptoms. Positive Covid screen 1/7/22. CXR on 1/9/22. Home oxygen: no. COVID-19 Hospitalization History: NA. Received antibody infusion: no. Fully vaccinated: yes, no booster. Remdesivir treatment day: NA. SpO2 goal on hospital discharge: NA.    HPI: Felt ill starting Thurs 1/6/22, tested positive at urgent care 1/7.  Feeling SOB at rest and weak + SOB while ambulating. Lowest O2 sat while ambulating 95%.    ROS: Denies worsening cough, light headedness, fever, chills, diaphoresis, chest pain, abdominal pain, emesis, diarrhea or further symptoms.     Assessment: Vitals appear WNL. During phone call, patient appears alert and oriented. Able to speak in full sentences without difficulty. No audible wheezing heard during call.   Plan: Encouraged rest, hydration, use albuterol inhaler q 4-6 hrs prn, warm moist air.    Reviewed with patient the reasons for seeking emergency care. Pt aware that if Sp02 <94% or if they have any worsening symptoms, they need to go to the emergency department. If they are having a medical emergency, they will call 911. Otherwise, patient will continue to submit data as scheduled. Reviewed importance of wearing mask if self or family members leave the house.     Advised next steps: Continue care at home

## 2022-01-11 ENCOUNTER — PATIENT MESSAGE (OUTPATIENT)
Dept: PULMONOLOGY | Facility: CLINIC | Age: 37
End: 2022-01-11
Payer: COMMERCIAL

## 2022-01-11 ENCOUNTER — PATIENT MESSAGE (OUTPATIENT)
Dept: FAMILY MEDICINE | Facility: CLINIC | Age: 37
End: 2022-01-11
Payer: COMMERCIAL

## 2022-01-11 ENCOUNTER — PATIENT MESSAGE (OUTPATIENT)
Dept: ADMINISTRATIVE | Facility: CLINIC | Age: 37
End: 2022-01-11
Payer: COMMERCIAL

## 2022-01-11 NOTE — TELEPHONE ENCOUNTER
Responded to patient message.  I ordered infusion however she may not qualify for it due to her age and medical history.

## 2022-01-12 ENCOUNTER — PATIENT MESSAGE (OUTPATIENT)
Dept: ADMINISTRATIVE | Facility: CLINIC | Age: 37
End: 2022-01-12
Payer: COMMERCIAL

## 2022-01-12 NOTE — TELEPHONE ENCOUNTER
I called patient to offer an opportunity to reschedule her tobacco cessation intake appointment. She has rescheduled for 1/11/2022.

## 2022-01-13 ENCOUNTER — NURSE TRIAGE (OUTPATIENT)
Dept: ADMINISTRATIVE | Facility: CLINIC | Age: 37
End: 2022-01-13
Payer: COMMERCIAL

## 2022-01-13 ENCOUNTER — TELEPHONE (OUTPATIENT)
Dept: SMOKING CESSATION | Facility: CLINIC | Age: 37
End: 2022-01-13
Payer: COMMERCIAL

## 2022-01-13 NOTE — TELEPHONE ENCOUNTER
I called patient to offer her an opportunity to reschedule her tobacco cessation intake appointment. She was scheduled 1/11/2022 and tested Covid positive just prior. I was unable to reach her at this time. I did leave my name and number for return call.

## 2022-01-13 NOTE — TELEPHONE ENCOUNTER
Pt has no responded to >3 no response messages for surveillance Program. Tasks will be removed. No further outreach made at this time. [employer] message for unenrollment sent.Will enroll into [facility] Program    Reason for Disposition   Caller has cancelled the call before the first contact    Protocols used: NO CONTACT OR DUPLICATE CONTACT CALL-A-[place]

## 2022-01-14 ENCOUNTER — INFUSION (OUTPATIENT)
Dept: INFECTIOUS DISEASES | Facility: HOSPITAL | Age: 37
End: 2022-01-14
Attending: FAMILY MEDICINE
Payer: COMMERCIAL

## 2022-01-14 VITALS
HEART RATE: 81 BPM | RESPIRATION RATE: 19 BRPM | SYSTOLIC BLOOD PRESSURE: 132 MMHG | TEMPERATURE: 98 F | OXYGEN SATURATION: 95 % | DIASTOLIC BLOOD PRESSURE: 83 MMHG

## 2022-01-14 DIAGNOSIS — U07.1 COVID: ICD-10-CM

## 2022-01-14 PROCEDURE — 25000003 PHARM REV CODE 250: Performed by: FAMILY MEDICINE

## 2022-01-14 RX ORDER — ACETAMINOPHEN 325 MG/1
650 TABLET ORAL ONCE AS NEEDED
Status: DISCONTINUED | OUTPATIENT
Start: 2022-01-14 | End: 2022-02-07

## 2022-01-14 RX ORDER — DIPHENHYDRAMINE HYDROCHLORIDE 50 MG/ML
25 INJECTION INTRAMUSCULAR; INTRAVENOUS ONCE AS NEEDED
Status: DISCONTINUED | OUTPATIENT
Start: 2022-01-14 | End: 2022-02-07

## 2022-01-14 RX ORDER — ALBUTEROL SULFATE 90 UG/1
2 AEROSOL, METERED RESPIRATORY (INHALATION)
Status: DISCONTINUED | OUTPATIENT
Start: 2022-01-14 | End: 2022-02-07

## 2022-01-14 RX ORDER — ONDANSETRON 4 MG/1
4 TABLET, ORALLY DISINTEGRATING ORAL ONCE AS NEEDED
Status: DISCONTINUED | OUTPATIENT
Start: 2022-01-14 | End: 2022-02-07

## 2022-01-14 RX ORDER — SODIUM CHLORIDE 0.9 % (FLUSH) 0.9 %
10 SYRINGE (ML) INJECTION
Status: DISCONTINUED | OUTPATIENT
Start: 2022-01-14 | End: 2022-02-07

## 2022-01-14 RX ORDER — EPINEPHRINE 0.3 MG/.3ML
0.3 INJECTION SUBCUTANEOUS
Status: DISCONTINUED | OUTPATIENT
Start: 2022-01-14 | End: 2022-02-07

## 2022-01-14 RX ADMIN — SODIUM CHLORIDE: 0.9 INJECTION, SOLUTION INTRAVENOUS at 09:01

## 2022-01-14 NOTE — PATIENT INSTRUCTIONS
Continue to isolate for a full 5 days from either the start of symptoms or positive Covid test. You also will need to be free of fever for 24 hours before getting around other people.     If you develop any unsual symptoms after leaving infusion or feel like you are getting worse, please call the [facility] On-Call RN at [phone].    Per the CDC, you should not receive any Covid vaccines within the next 90 days.     Continue to monitor and treat any fevers or congestion. Symptoms should begin to improve over the next 24-48 hours. Hydrate well with non-caffeinated beverages, eat every 3-4 hours, and move as much as you can.    In addition to your AVS, you were provided with medication fact sheets regarding Regeneron (casirivimab-imdevimab) which is the medication you received today.

## 2022-01-14 NOTE — PLAN OF CARE
Problem: Adult Inpatient Plan of Care  Goal: Plan of Care Review  Outcome: Ongoing, Progressing  Flowsheets (Taken 1/14/2022 6272)  Plan of Care Reviewed With: patient    Patient tolerated treatment without any reactions. Patient observed for 1 hour following infusion completion. IV removed. AVS and medication information provided. Patient ambulated upon discharge per self.

## 2022-01-18 ENCOUNTER — TELEPHONE (OUTPATIENT)
Dept: FAMILY MEDICINE | Facility: CLINIC | Age: 37
End: 2022-01-18
Payer: COMMERCIAL

## 2022-01-18 ENCOUNTER — TELEPHONE (OUTPATIENT)
Dept: ORTHOPEDICS | Facility: CLINIC | Age: 37
End: 2022-01-18
Payer: COMMERCIAL

## 2022-01-18 NOTE — TELEPHONE ENCOUNTER
Patient states she tested positive for Covid on 1-7-2022. States her place of employment is requesting a return to work letter in order for her to return to work. Patient states she gets sob and also experiences increase pulse when she ambulates for 30 minutes or more. Patient would like to schedule an appointment related to this. Appointment scheduled for tomorrow's date 1-. Patient agreed to appointment date, time, and location.

## 2022-01-18 NOTE — TELEPHONE ENCOUNTER
----- Message from Rea Mcmahon sent at 1/18/2022 12:29 PM CST -----  Regarding: appointemnt  Contact: self  Type:  Sooner Apoointment Request    Caller is requesting a sooner appointment.  Caller declined first available appointment listed below.  Caller will not accept being placed on the waitlist and is requesting a message be sent to doctor.    Name of Caller:  self   When is the first available appointment?  none  Symptoms:    Best Call Back Number:  [phone]  Additional Information:  Patient test positive for coivd 01/07/2022, patient requesting a letter to return to work. Patient requesting an appointment.

## 2022-01-18 NOTE — TELEPHONE ENCOUNTER
----- Message from Eliane Beltran sent at 1/18/2022 12:04 PM CST -----  She was referred to a Dr macedo, please call her with his phone#-call her at [phone]

## 2022-01-19 ENCOUNTER — OFFICE VISIT (OUTPATIENT)
Dept: FAMILY MEDICINE | Facility: CLINIC | Age: 37
End: 2022-01-19
Payer: COMMERCIAL

## 2022-01-19 ENCOUNTER — TELEPHONE (OUTPATIENT)
Dept: FAMILY MEDICINE | Facility: CLINIC | Age: 37
End: 2022-01-19

## 2022-01-19 ENCOUNTER — HOSPITAL ENCOUNTER (OUTPATIENT)
Dept: RADIOLOGY | Facility: HOSPITAL | Age: 37
Discharge: HOME | End: 2022-01-19
Attending: NURSE PRACTITIONER
Payer: COMMERCIAL

## 2022-01-19 VITALS
TEMPERATURE: 99 F | BODY MASS INDEX: 43.56 KG/M2 | OXYGEN SATURATION: 98 % | HEIGHT: 67 IN | HEART RATE: 94 BPM | WEIGHT: 277.56 LBS | DIASTOLIC BLOOD PRESSURE: 82 MMHG | RESPIRATION RATE: 18 BRPM | SYSTOLIC BLOOD PRESSURE: 124 MMHG

## 2022-01-19 DIAGNOSIS — R42 DIZZINESS: ICD-10-CM

## 2022-01-19 DIAGNOSIS — R06.02 SOB (SHORTNESS OF BREATH): ICD-10-CM

## 2022-01-19 DIAGNOSIS — U09.9 POST COVID-19 CONDITION, UNSPECIFIED: ICD-10-CM

## 2022-01-19 DIAGNOSIS — R06.02 SOB (SHORTNESS OF BREATH): Primary | ICD-10-CM

## 2022-01-19 DIAGNOSIS — R00.0 TACHYCARDIA: ICD-10-CM

## 2022-01-19 PROCEDURE — 93005 ELECTROCARDIOGRAM TRACING: CPT | Mod: S$GLB,,, | Performed by: NURSE PRACTITIONER

## 2022-01-19 PROCEDURE — 1159F PR MEDICATION LIST DOCUMENTED IN MEDICAL RECORD: ICD-10-PCS | Mod: CPTII,S$GLB,, | Performed by: NURSE PRACTITIONER

## 2022-01-19 PROCEDURE — 3008F PR BODY MASS INDEX (BMI) DOCUMENTED: ICD-10-PCS | Mod: CPTII,S$GLB,, | Performed by: NURSE PRACTITIONER

## 2022-01-19 PROCEDURE — 1159F MED LIST DOCD IN RCRD: CPT | Mod: CPTII,S$GLB,, | Performed by: NURSE PRACTITIONER

## 2022-01-19 PROCEDURE — 1160F PR REVIEW ALL MEDS BY PRESCRIBER/CLIN PHARMACIST DOCUMENTED: ICD-10-PCS | Mod: CPTII,S$GLB,, | Performed by: NURSE PRACTITIONER

## 2022-01-19 PROCEDURE — 1160F RVW MEDS BY RX/DR IN RCRD: CPT | Mod: CPTII,S$GLB,, | Performed by: NURSE PRACTITIONER

## 2022-01-19 PROCEDURE — 93005 EKG 12-LEAD: ICD-10-PCS | Mod: S$GLB,,, | Performed by: NURSE PRACTITIONER

## 2022-01-19 PROCEDURE — 93010 EKG 12-LEAD: ICD-10-PCS | Mod: S$GLB,,, | Performed by: INTERNAL MEDICINE

## 2022-01-19 PROCEDURE — 99215 OFFICE O/P EST HI 40 MIN: CPT | Mod: S$GLB,,, | Performed by: NURSE PRACTITIONER

## 2022-01-19 PROCEDURE — 99215 PR OFFICE/OUTPT VISIT, EST, LEVL V, 40-54 MIN: ICD-10-PCS | Mod: S$GLB,,, | Performed by: NURSE PRACTITIONER

## 2022-01-19 PROCEDURE — 99999 PR PBB SHADOW E&M-EST. PATIENT-LVL V: ICD-10-PCS | Mod: PBBFAC,,, | Performed by: NURSE PRACTITIONER

## 2022-01-19 PROCEDURE — 99999 PR PBB SHADOW E&M-EST. PATIENT-LVL V: CPT | Mod: PBBFAC,,, | Performed by: NURSE PRACTITIONER

## 2022-01-19 PROCEDURE — 71046 XR CHEST PA AND LATERAL: ICD-10-PCS | Mod: 26,,, | Performed by: RADIOLOGY

## 2022-01-19 PROCEDURE — 3008F BODY MASS INDEX DOCD: CPT | Mod: CPTII,S$GLB,, | Performed by: NURSE PRACTITIONER

## 2022-01-19 PROCEDURE — 71046 X-RAY EXAM CHEST 2 VIEWS: CPT | Mod: 26,,, | Performed by: RADIOLOGY

## 2022-01-19 PROCEDURE — 3079F PR MOST RECENT DIASTOLIC BLOOD PRESSURE 80-89 MM HG: ICD-10-PCS | Mod: CPTII,S$GLB,, | Performed by: NURSE PRACTITIONER

## 2022-01-19 PROCEDURE — 93010 ELECTROCARDIOGRAM REPORT: CPT | Mod: S$GLB,,, | Performed by: INTERNAL MEDICINE

## 2022-01-19 PROCEDURE — 3079F DIAST BP 80-89 MM HG: CPT | Mod: CPTII,S$GLB,, | Performed by: NURSE PRACTITIONER

## 2022-01-19 PROCEDURE — 3074F SYST BP LT 130 MM HG: CPT | Mod: CPTII,S$GLB,, | Performed by: NURSE PRACTITIONER

## 2022-01-19 PROCEDURE — 71046 X-RAY EXAM CHEST 2 VIEWS: CPT | Mod: TC,FY

## 2022-01-19 PROCEDURE — 3074F PR MOST RECENT SYSTOLIC BLOOD PRESSURE < 130 MM HG: ICD-10-PCS | Mod: CPTII,S$GLB,, | Performed by: NURSE PRACTITIONER

## 2022-01-19 RX ORDER — INFLUENZA VIRUS VACCINE 15; 15; 15; 15 UG/.5ML; UG/.5ML; UG/.5ML; UG/.5ML
SUSPENSION INTRAMUSCULAR
Status: ON HOLD | COMMUNITY
Start: 2021-10-13 | End: 2022-02-08 | Stop reason: HOSPADM
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
## 2022-01-19 NOTE — PATIENT INSTRUCTIONS
If you are due for any health screening(s) below please notify me so we can arrange them to be ordered and scheduled to maintain your health.     Health Maintenance   Topic Date Due    Hepatitis C Screening  Never done    TETANUS VACCINE  05/01/2026    Lipid Panel  Completed     - EKG NSR  - check chest x-ray   - stat d-dimer   - cardiology referral   - will call with d-dimer and chest x-ray results

## 2022-01-20 NOTE — TELEPHONE ENCOUNTER
I spoke with the patient and explained that her chest x-ray and d-dimer were both normal. She verbalized understanding of above.

## 2022-01-25 ENCOUNTER — OFFICE VISIT (OUTPATIENT)
Dept: CARDIOLOGY | Facility: CLINIC | Age: 37
End: 2022-01-25
Payer: COMMERCIAL

## 2022-01-25 VITALS
OXYGEN SATURATION: 98 % | DIASTOLIC BLOOD PRESSURE: 60 MMHG | HEART RATE: 103 BPM | WEIGHT: 279 LBS | SYSTOLIC BLOOD PRESSURE: 122 MMHG | HEIGHT: 67 IN | BODY MASS INDEX: 43.79 KG/M2

## 2022-01-25 DIAGNOSIS — R00.0 TACHYCARDIA: ICD-10-CM

## 2022-01-25 DIAGNOSIS — E87.6 HYPOKALEMIA: ICD-10-CM

## 2022-01-25 DIAGNOSIS — F31.81 BIPOLAR 2 DISORDER, MAJOR DEPRESSIVE EPISODE: ICD-10-CM

## 2022-01-25 DIAGNOSIS — R06.02 SOB (SHORTNESS OF BREATH): ICD-10-CM

## 2022-01-25 DIAGNOSIS — U07.1 COVID: Primary | ICD-10-CM

## 2022-01-25 PROCEDURE — 3074F PR MOST RECENT SYSTOLIC BLOOD PRESSURE < 130 MM HG: ICD-10-PCS | Mod: CPTII,S$GLB,, | Performed by: SPECIALIST

## 2022-01-25 PROCEDURE — 99214 OFFICE O/P EST MOD 30 MIN: CPT | Mod: S$GLB,,, | Performed by: SPECIALIST

## 2022-01-25 PROCEDURE — 1159F PR MEDICATION LIST DOCUMENTED IN MEDICAL RECORD: ICD-10-PCS | Mod: CPTII,S$GLB,, | Performed by: SPECIALIST

## 2022-01-25 PROCEDURE — 1159F MED LIST DOCD IN RCRD: CPT | Mod: CPTII,S$GLB,, | Performed by: SPECIALIST

## 2022-01-25 PROCEDURE — 1160F PR REVIEW ALL MEDS BY PRESCRIBER/CLIN PHARMACIST DOCUMENTED: ICD-10-PCS | Mod: CPTII,S$GLB,, | Performed by: SPECIALIST

## 2022-01-25 PROCEDURE — 3074F SYST BP LT 130 MM HG: CPT | Mod: CPTII,S$GLB,, | Performed by: SPECIALIST

## 2022-01-25 PROCEDURE — 3078F PR MOST RECENT DIASTOLIC BLOOD PRESSURE < 80 MM HG: ICD-10-PCS | Mod: CPTII,S$GLB,, | Performed by: SPECIALIST

## 2022-01-25 PROCEDURE — 3008F BODY MASS INDEX DOCD: CPT | Mod: CPTII,S$GLB,, | Performed by: SPECIALIST

## 2022-01-25 PROCEDURE — 3008F PR BODY MASS INDEX (BMI) DOCUMENTED: ICD-10-PCS | Mod: CPTII,S$GLB,, | Performed by: SPECIALIST

## 2022-01-25 PROCEDURE — 99214 PR OFFICE/OUTPT VISIT, EST, LEVL IV, 30-39 MIN: ICD-10-PCS | Mod: S$GLB,,, | Performed by: SPECIALIST

## 2022-01-25 PROCEDURE — 3078F DIAST BP <80 MM HG: CPT | Mod: CPTII,S$GLB,, | Performed by: SPECIALIST

## 2022-01-25 PROCEDURE — 1160F RVW MEDS BY RX/DR IN RCRD: CPT | Mod: CPTII,S$GLB,, | Performed by: SPECIALIST

## 2022-01-25 RX ORDER — METOPROLOL SUCCINATE 25 MG/1
25 TABLET, EXTENDED RELEASE ORAL DAILY
Qty: 30 TABLET | Refills: 11 | Status: SHIPPED | OUTPATIENT
Start: 2022-01-25 | End: 2022-01-25 | Stop reason: ALTCHOICE

## 2022-01-25 RX ORDER — DILTIAZEM HYDROCHLORIDE 180 MG/1
180 CAPSULE, COATED, EXTENDED RELEASE ORAL DAILY
Qty: 30 CAPSULE | Refills: 11 | Status: SHIPPED | OUTPATIENT
Start: 2022-01-25 | End: 2022-08-02 | Stop reason: CLARIF
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
## 2022-02-01 ENCOUNTER — TELEPHONE (OUTPATIENT)
Dept: SPORTS MEDICINE | Facility: CLINIC | Age: 37
End: 2022-02-01
Payer: COMMERCIAL

## 2022-02-01 NOTE — TELEPHONE ENCOUNTER
After review of patient's chart with Stefany Mcrae PA-C, patient is not a surgical candidate at this time due to BMI of 44 and other health concerns. Recommended patient be seen by non-operative sports medicine physician for nutrition counseling, weight management or physical therapy/aquatic therapy and other conservative treatments. Patient also noted this is a workers comp case. Will send message to non-operative sports medicine team and workers comp to ensure proper steps have been taken.     Ashlyn Dasilva   Clinical Assistant to Dr. Andrea Velasquez      ----- Message from Ashlyn Dasilva sent at 2/1/2022 10:30 AM CST -----  Regarding: FW: Call Julita  Have Dr. Velasquez review chart, note patient is BMI of 43. Consideration of HTO    Ashlyn Dasilva   Clinical Assistant to Dr. Andrea Velasquez   ----- Message -----  From: Teddy Mayo  Sent: 2/1/2022  10:22 AM CST  To: Ron RODRÍGUEZ Staff  Subject: Call Julita                                         Who Called: pt         What is the reason for the call: pt is upset calling stating that she has been trying to get an appointment since October and hasn't been able to get schedule. Advised no referral in Epic from doctor. States has been sent over again today. No next avail appointments. Please contact to assist.          Can patient be contacted on [employer]: n/a         Call back number: [phone]

## 2022-02-06 ENCOUNTER — HOSPITAL ENCOUNTER (OUTPATIENT)
Facility: HOSPITAL | Age: 37
Discharge: HOME | End: 2022-02-08
Attending: EMERGENCY MEDICINE
Payer: COMMERCIAL

## 2022-02-06 DIAGNOSIS — R07.89 ATYPICAL CHEST PAIN: Primary | ICD-10-CM

## 2022-02-06 DIAGNOSIS — R07.9 CHEST PAIN: ICD-10-CM

## 2022-02-06 LAB
BASOPHILS # BLD AUTO: 0.05 K/UL (ref 0–0.2)
BASOPHILS NFR BLD: 0.3 % (ref 0–1.9)
BNP SERPL-MCNC: 35 PG/ML (ref 0–99)
DIFFERENTIAL METHOD: ABNORMAL
EOSINOPHIL # BLD AUTO: 0.3 K/UL (ref 0–0.5)
EOSINOPHIL NFR BLD: 1.8 % (ref 0–8)
ERYTHROCYTE [DISTWIDTH] IN BLOOD BY AUTOMATED COUNT: 14 % (ref 11.5–14.5)
HCT VFR BLD AUTO: 36.8 % (ref 37–48.5)
HGB BLD-MCNC: 11.7 G/DL (ref 12–16)
IMM GRANULOCYTES # BLD AUTO: 0.06 K/UL (ref 0–0.04)
IMM GRANULOCYTES NFR BLD AUTO: 0.4 % (ref 0–0.5)
LYMPHOCYTES # BLD AUTO: 2.8 K/UL (ref 1–4.8)
LYMPHOCYTES NFR BLD: 18.2 % (ref 18–48)
MCH RBC QN AUTO: 27.3 PG (ref 27–31)
MCHC RBC AUTO-ENTMCNC: 31.8 G/DL (ref 32–36)
MCV RBC AUTO: 86 FL (ref 82–98)
MONOCYTES # BLD AUTO: 0.7 K/UL (ref 0.3–1)
MONOCYTES NFR BLD: 4.5 % (ref 4–15)
NEUTROPHILS # BLD AUTO: 11.6 K/UL (ref 1.8–7.7)
NEUTROPHILS NFR BLD: 74.8 % (ref 38–73)
NRBC BLD-RTO: 0 /100 WBC
PLATELET # BLD AUTO: 370 K/UL (ref 150–450)
PMV BLD AUTO: 9.9 FL (ref 9.2–12.9)
RBC # BLD AUTO: 4.28 M/UL (ref 4–5.4)
TROPONIN I SERPL DL<=0.01 NG/ML-MCNC: <0.03 NG/ML
WBC # BLD AUTO: 15.48 K/UL (ref 3.9–12.7)

## 2022-02-06 PROCEDURE — 85025 COMPLETE CBC W/AUTO DIFF WBC: CPT | Performed by: EMERGENCY MEDICINE

## 2022-02-06 PROCEDURE — 93005 ELECTROCARDIOGRAM TRACING: CPT | Performed by: SPECIALIST

## 2022-02-06 PROCEDURE — 83880 ASSAY OF NATRIURETIC PEPTIDE: CPT | Performed by: EMERGENCY MEDICINE

## 2022-02-06 PROCEDURE — 99285 EMERGENCY DEPT VISIT HI MDM: CPT | Mod: 25

## 2022-02-06 PROCEDURE — 93010 EKG 12-LEAD: ICD-10-PCS | Mod: ,,, | Performed by: SPECIALIST

## 2022-02-06 PROCEDURE — 96375 TX/PRO/DX INJ NEW DRUG ADDON: CPT

## 2022-02-06 PROCEDURE — 93010 ELECTROCARDIOGRAM REPORT: CPT | Mod: ,,, | Performed by: SPECIALIST

## 2022-02-06 PROCEDURE — 80053 COMPREHEN METABOLIC PANEL: CPT | Performed by: EMERGENCY MEDICINE

## 2022-02-06 PROCEDURE — 84484 ASSAY OF TROPONIN QUANT: CPT | Performed by: EMERGENCY MEDICINE

## 2022-02-07 ENCOUNTER — HOSPITAL ENCOUNTER (OUTPATIENT)
Dept: RADIOLOGY | Facility: HOSPITAL | Age: 37
Discharge: HOME | End: 2022-02-07
Payer: COMMERCIAL

## 2022-02-07 ENCOUNTER — CLINICAL SUPPORT (OUTPATIENT)
Dept: CARDIOLOGY | Facility: HOSPITAL | Age: 37
End: 2022-02-07
Payer: COMMERCIAL

## 2022-02-07 LAB
ALBUMIN SERPL BCP-MCNC: 3.4 G/DL (ref 3.5–5.2)
ALP SERPL-CCNC: 71 U/L (ref 55–135)
ALT SERPL W/O P-5'-P-CCNC: 13 U/L (ref 10–44)
AMPHET+METHAMPHET UR QL: NEGATIVE
ANION GAP SERPL CALC-SCNC: 10 MMOL/L (ref 8–16)
ANION GAP SERPL CALC-SCNC: 12 MMOL/L (ref 8–16)
AST SERPL-CCNC: 22 U/L (ref 10–40)
BACTERIA #/AREA URNS HPF: ABNORMAL /HPF
BARBITURATES UR QL SCN>200 NG/ML: NEGATIVE
BASOPHILS # BLD AUTO: 0.03 K/UL (ref 0–0.2)
BASOPHILS NFR BLD: 0.3 % (ref 0–1.9)
BENZODIAZ UR QL SCN>200 NG/ML: NEGATIVE
BILIRUB SERPL-MCNC: 0.7 MG/DL (ref 0.1–1)
BILIRUB UR QL STRIP: NEGATIVE
BUN SERPL-MCNC: 10 MG/DL (ref 6–20)
BUN SERPL-MCNC: 9 MG/DL (ref 6–20)
BZE UR QL SCN: NEGATIVE
CALCIUM SERPL-MCNC: 9.4 MG/DL (ref 8.7–10.5)
CALCIUM SERPL-MCNC: 9.5 MG/DL (ref 8.7–10.5)
CANNABINOIDS UR QL SCN: NEGATIVE
CHLORIDE SERPL-SCNC: 101 MMOL/L (ref 95–110)
CHLORIDE SERPL-SCNC: 101 MMOL/L (ref 95–110)
CHOLEST SERPL-MCNC: 174 MG/DL (ref 120–199)
CHOLEST/HDLC SERPL: 3.8 {RATIO} (ref 2–5)
CLARITY UR: ABNORMAL
CO2 SERPL-SCNC: 24 MMOL/L (ref 23–29)
CO2 SERPL-SCNC: 24 MMOL/L (ref 23–29)
COLOR UR: YELLOW
CREAT SERPL-MCNC: 0.7 MG/DL (ref 0.5–1.4)
CREAT SERPL-MCNC: 0.7 MG/DL (ref 0.5–1.4)
CREAT UR-MCNC: 102 MG/DL (ref 15–325)
D DIMER PPP IA.FEU-MCNC: 0.29 UG/ML FEU
DIFFERENTIAL METHOD: ABNORMAL
EOSINOPHIL # BLD AUTO: 0.2 K/UL (ref 0–0.5)
EOSINOPHIL NFR BLD: 1.9 % (ref 0–8)
ERYTHROCYTE [DISTWIDTH] IN BLOOD BY AUTOMATED COUNT: 14 % (ref 11.5–14.5)
EST. GFR  (AFRICAN AMERICAN): >60 ML/MIN/1.73 M^2
EST. GFR  (AFRICAN AMERICAN): >60 ML/MIN/1.73 M^2
EST. GFR  (NON AFRICAN AMERICAN): >60 ML/MIN/1.73 M^2
EST. GFR  (NON AFRICAN AMERICAN): >60 ML/MIN/1.73 M^2
ESTIMATED AVG GLUCOSE: 111 MG/DL (ref 68–131)
GLUCOSE SERPL-MCNC: 104 MG/DL (ref 70–110)
GLUCOSE SERPL-MCNC: 159 MG/DL (ref 70–110)
GLUCOSE UR QL STRIP: NEGATIVE
HBA1C MFR BLD: 5.5 % (ref 4.5–6.2)
HCT VFR BLD AUTO: 37 % (ref 37–48.5)
HDLC SERPL-MCNC: 46 MG/DL (ref 40–75)
HDLC SERPL: 26.4 % (ref 20–50)
HGB BLD-MCNC: 11.2 G/DL (ref 12–16)
HGB UR QL STRIP: ABNORMAL
HYALINE CASTS #/AREA URNS LPF: 4 /LPF
IMM GRANULOCYTES # BLD AUTO: 0.05 K/UL (ref 0–0.04)
IMM GRANULOCYTES NFR BLD AUTO: 0.5 % (ref 0–0.5)
KETONES UR QL STRIP: NEGATIVE
LDLC SERPL CALC-MCNC: 105.2 MG/DL (ref 63–159)
LEUKOCYTE ESTERASE UR QL STRIP: ABNORMAL
LYMPHOCYTES # BLD AUTO: 2.4 K/UL (ref 1–4.8)
LYMPHOCYTES NFR BLD: 21.7 % (ref 18–48)
MAGNESIUM SERPL-MCNC: 2 MG/DL (ref 1.6–2.6)
MCH RBC QN AUTO: 26.7 PG (ref 27–31)
MCHC RBC AUTO-ENTMCNC: 30.3 G/DL (ref 32–36)
MCV RBC AUTO: 88 FL (ref 82–98)
MICROSCOPIC COMMENT: ABNORMAL
MONOCYTES # BLD AUTO: 0.6 K/UL (ref 0.3–1)
MONOCYTES NFR BLD: 5.1 % (ref 4–15)
NEUTROPHILS # BLD AUTO: 7.6 K/UL (ref 1.8–7.7)
NEUTROPHILS NFR BLD: 70.5 % (ref 38–73)
NITRITE UR QL STRIP: NEGATIVE
NONHDLC SERPL-MCNC: 128 MG/DL
NRBC BLD-RTO: 0 /100 WBC
OPIATES UR QL SCN: NEGATIVE
PCP UR QL SCN>25 NG/ML: NEGATIVE
PH UR STRIP: 6 [PH] (ref 5–8)
PLATELET # BLD AUTO: 345 K/UL (ref 150–450)
PMV BLD AUTO: 10.4 FL (ref 9.2–12.9)
POTASSIUM SERPL-SCNC: 3.5 MMOL/L (ref 3.5–5.1)
POTASSIUM SERPL-SCNC: 3.5 MMOL/L (ref 3.5–5.1)
PROT SERPL-MCNC: 6.5 G/DL (ref 6–8.4)
PROT UR QL STRIP: NEGATIVE
RBC # BLD AUTO: 4.19 M/UL (ref 4–5.4)
RBC #/AREA URNS HPF: 2 /HPF (ref 0–4)
SARS-COV-2 RDRP RESP QL NAA+PROBE: NEGATIVE
SODIUM SERPL-SCNC: 135 MMOL/L (ref 136–145)
SODIUM SERPL-SCNC: 137 MMOL/L (ref 136–145)
SP GR UR STRIP: 1.01 (ref 1–1.03)
SQUAMOUS #/AREA URNS HPF: 8 /HPF
TOXICOLOGY INFORMATION: NORMAL
TRIGL SERPL-MCNC: 114 MG/DL (ref 30–150)
TROPONIN I SERPL DL<=0.01 NG/ML-MCNC: <0.03 NG/ML
TROPONIN I SERPL DL<=0.01 NG/ML-MCNC: <0.03 NG/ML
TSH SERPL DL<=0.005 MIU/L-ACNC: 2.47 UIU/ML (ref 0.34–5.6)
URN SPEC COLLECT METH UR: ABNORMAL
UROBILINOGEN UR STRIP-ACNC: NEGATIVE EU/DL
WBC # BLD AUTO: 10.81 K/UL (ref 3.9–12.7)
WBC #/AREA URNS HPF: 6 /HPF (ref 0–5)

## 2022-02-07 PROCEDURE — 87077 CULTURE AEROBIC IDENTIFY: CPT | Performed by: EMERGENCY MEDICINE

## 2022-02-07 PROCEDURE — A9502 TC99M TETROFOSMIN: HCPCS

## 2022-02-07 PROCEDURE — 81001 URINALYSIS AUTO W/SCOPE: CPT | Mod: 59 | Performed by: EMERGENCY MEDICINE

## 2022-02-07 PROCEDURE — 80048 BASIC METABOLIC PNL TOTAL CA: CPT | Performed by: NURSE PRACTITIONER

## 2022-02-07 PROCEDURE — 84443 ASSAY THYROID STIM HORMONE: CPT | Performed by: NURSE PRACTITIONER

## 2022-02-07 PROCEDURE — 25000242 PHARM REV CODE 250 ALT 637 W/ HCPCS: Performed by: NURSE PRACTITIONER

## 2022-02-07 PROCEDURE — 93016 CV STRESS TEST SUPVJ ONLY: CPT | Mod: ,,, | Performed by: INTERNAL MEDICINE

## 2022-02-07 PROCEDURE — G0378 HOSPITAL OBSERVATION PER HR: HCPCS

## 2022-02-07 PROCEDURE — 96375 TX/PRO/DX INJ NEW DRUG ADDON: CPT | Mod: 59

## 2022-02-07 PROCEDURE — 83735 ASSAY OF MAGNESIUM: CPT | Performed by: NURSE PRACTITIONER

## 2022-02-07 PROCEDURE — 25000003 PHARM REV CODE 250: Performed by: EMERGENCY MEDICINE

## 2022-02-07 PROCEDURE — 85025 COMPLETE CBC W/AUTO DIFF WBC: CPT | Performed by: NURSE PRACTITIONER

## 2022-02-07 PROCEDURE — 36415 COLL VENOUS BLD VENIPUNCTURE: CPT | Performed by: NURSE PRACTITIONER

## 2022-02-07 PROCEDURE — 87186 SC STD MICRODIL/AGAR DIL: CPT | Performed by: EMERGENCY MEDICINE

## 2022-02-07 PROCEDURE — 93017 CV STRESS TEST TRACING ONLY: CPT

## 2022-02-07 PROCEDURE — 93018 CV STRESS TEST I&R ONLY: CPT | Mod: ,,, | Performed by: INTERNAL MEDICINE

## 2022-02-07 PROCEDURE — 84484 ASSAY OF TROPONIN QUANT: CPT | Mod: 91 | Performed by: NURSE PRACTITIONER

## 2022-02-07 PROCEDURE — 96365 THER/PROPH/DIAG IV INF INIT: CPT | Mod: 59

## 2022-02-07 PROCEDURE — 80061 LIPID PANEL: CPT | Performed by: NURSE PRACTITIONER

## 2022-02-07 PROCEDURE — 63600175 PHARM REV CODE 636 W HCPCS: Performed by: NURSE PRACTITIONER

## 2022-02-07 PROCEDURE — 99900031 HC PATIENT EDUCATION (STAT)

## 2022-02-07 PROCEDURE — 93018 NUCLEAR STRESS TEST (CUPID ONLY): ICD-10-PCS | Mod: ,,, | Performed by: INTERNAL MEDICINE

## 2022-02-07 PROCEDURE — 63600175 PHARM REV CODE 636 W HCPCS: Performed by: EMERGENCY MEDICINE

## 2022-02-07 PROCEDURE — 96361 HYDRATE IV INFUSION ADD-ON: CPT

## 2022-02-07 PROCEDURE — 94761 N-INVAS EAR/PLS OXIMETRY MLT: CPT

## 2022-02-07 PROCEDURE — 99900035 HC TECH TIME PER 15 MIN (STAT)

## 2022-02-07 PROCEDURE — 83036 HEMOGLOBIN GLYCOSYLATED A1C: CPT | Performed by: NURSE PRACTITIONER

## 2022-02-07 PROCEDURE — 85379 FIBRIN DEGRADATION QUANT: CPT | Performed by: EMERGENCY MEDICINE

## 2022-02-07 PROCEDURE — 93016 NUCLEAR STRESS TEST (CUPID ONLY): ICD-10-PCS | Mod: ,,, | Performed by: INTERNAL MEDICINE

## 2022-02-07 PROCEDURE — 87086 URINE CULTURE/COLONY COUNT: CPT | Performed by: EMERGENCY MEDICINE

## 2022-02-07 PROCEDURE — 80307 DRUG TEST PRSMV CHEM ANLYZR: CPT | Performed by: EMERGENCY MEDICINE

## 2022-02-07 PROCEDURE — U0002 COVID-19 LAB TEST NON-CDC: HCPCS | Performed by: EMERGENCY MEDICINE

## 2022-02-07 PROCEDURE — 25000003 PHARM REV CODE 250: Performed by: NURSE PRACTITIONER

## 2022-02-07 PROCEDURE — 84484 ASSAY OF TROPONIN QUANT: CPT | Performed by: EMERGENCY MEDICINE

## 2022-02-07 RX ORDER — DILTIAZEM HYDROCHLORIDE 180 MG/1
180 CAPSULE, COATED, EXTENDED RELEASE ORAL NIGHTLY
Status: DISCONTINUED | OUTPATIENT
Start: 2022-02-07 | End: 2022-02-08 | Stop reason: HOSPADM

## 2022-02-07 RX ORDER — REGADENOSON 0.08 MG/ML
0.4 INJECTION, SOLUTION INTRAVENOUS
Status: COMPLETED | OUTPATIENT
Start: 2022-02-07 | End: 2022-02-07

## 2022-02-07 RX ORDER — ASPIRIN 325 MG
325 TABLET ORAL DAILY
Status: DISCONTINUED | OUTPATIENT
Start: 2022-02-07 | End: 2022-02-08 | Stop reason: HOSPADM

## 2022-02-07 RX ORDER — KETOROLAC TROMETHAMINE 30 MG/ML
15 INJECTION, SOLUTION INTRAMUSCULAR; INTRAVENOUS
Status: COMPLETED | OUTPATIENT
Start: 2022-02-07 | End: 2022-02-07

## 2022-02-07 RX ORDER — PROMETHAZINE HYDROCHLORIDE 25 MG/1
25 TABLET ORAL EVERY 6 HOURS PRN
Status: DISCONTINUED | OUTPATIENT
Start: 2022-02-07 | End: 2022-02-08 | Stop reason: HOSPADM

## 2022-02-07 RX ORDER — NITROGLYCERIN 0.4 MG/1
0.4 TABLET SUBLINGUAL EVERY 5 MIN PRN
Status: DISCONTINUED | OUTPATIENT
Start: 2022-02-07 | End: 2022-02-08 | Stop reason: HOSPADM

## 2022-02-07 RX ORDER — SODIUM CHLORIDE 0.9 % (FLUSH) 0.9 %
10 SYRINGE (ML) INJECTION
Status: DISCONTINUED | OUTPATIENT
Start: 2022-02-07 | End: 2022-02-08 | Stop reason: HOSPADM

## 2022-02-07 RX ORDER — LOPERAMIDE HYDROCHLORIDE 2 MG/1
2 CAPSULE ORAL
Status: DISCONTINUED | OUTPATIENT
Start: 2022-02-07 | End: 2022-02-08 | Stop reason: HOSPADM

## 2022-02-07 RX ORDER — METHOCARBAMOL 500 MG/1
1000 TABLET, FILM COATED ORAL
Status: COMPLETED | OUTPATIENT
Start: 2022-02-07 | End: 2022-02-07

## 2022-02-07 RX ORDER — AMOXICILLIN 250 MG
1 CAPSULE ORAL 2 TIMES DAILY PRN
Status: DISCONTINUED | OUTPATIENT
Start: 2022-02-07 | End: 2022-02-08 | Stop reason: HOSPADM

## 2022-02-07 RX ORDER — BUPROPION HYDROCHLORIDE 150 MG/1
300 TABLET ORAL NIGHTLY
Status: DISCONTINUED | OUTPATIENT
Start: 2022-02-07 | End: 2022-02-08 | Stop reason: HOSPADM

## 2022-02-07 RX ORDER — ACETAMINOPHEN 325 MG/1
650 TABLET ORAL EVERY 6 HOURS PRN
Status: DISCONTINUED | OUTPATIENT
Start: 2022-02-07 | End: 2022-02-08 | Stop reason: HOSPADM

## 2022-02-07 RX ORDER — ONDANSETRON 4 MG/1
8 TABLET, ORALLY DISINTEGRATING ORAL EVERY 8 HOURS PRN
Status: DISCONTINUED | OUTPATIENT
Start: 2022-02-07 | End: 2022-02-08 | Stop reason: HOSPADM

## 2022-02-07 RX ORDER — LORAZEPAM 0.5 MG/1
0.5 TABLET ORAL EVERY 6 HOURS PRN
Status: DISCONTINUED | OUTPATIENT
Start: 2022-02-07 | End: 2022-02-08 | Stop reason: HOSPADM

## 2022-02-07 RX ORDER — ALBUTEROL SULFATE 0.83 MG/ML
1.25 SOLUTION RESPIRATORY (INHALATION) EVERY 6 HOURS PRN
Status: DISCONTINUED | OUTPATIENT
Start: 2022-02-07 | End: 2022-02-08 | Stop reason: HOSPADM

## 2022-02-07 RX ORDER — SODIUM CHLORIDE 9 MG/ML
INJECTION, SOLUTION INTRAVENOUS CONTINUOUS
Status: DISCONTINUED | OUTPATIENT
Start: 2022-02-07 | End: 2022-02-07

## 2022-02-07 RX ADMIN — KETOROLAC TROMETHAMINE 15 MG: 30 INJECTION, SOLUTION INTRAMUSCULAR at 12:02

## 2022-02-07 RX ADMIN — NITROGLYCERIN 0.4 MG: 0.4 TABLET SUBLINGUAL at 10:02

## 2022-02-07 RX ADMIN — REGADENOSON 0.4 MG: 0.08 INJECTION, SOLUTION INTRAVENOUS at 09:02

## 2022-02-07 RX ADMIN — ASPIRIN 325 MG ORAL TABLET 325 MG: 325 PILL ORAL at 10:02

## 2022-02-07 RX ADMIN — METHOCARBAMOL TABLETS 1000 MG: 500 TABLET, COATED ORAL at 12:02

## 2022-02-07 RX ADMIN — CEFTRIAXONE 1 G: 1 INJECTION, SOLUTION INTRAVENOUS at 06:02

## 2022-02-07 RX ADMIN — BUPROPION HYDROCHLORIDE 300 MG: 150 TABLET, FILM COATED, EXTENDED RELEASE ORAL at 08:02

## 2022-02-07 RX ADMIN — POTASSIUM BICARBONATE 10 MEQ: 391 TABLET, EFFERVESCENT ORAL at 10:02

## 2022-02-07 RX ADMIN — SODIUM CHLORIDE: 0.9 INJECTION, SOLUTION INTRAVENOUS at 10:02

## 2022-02-07 RX ADMIN — LORAZEPAM 0.5 MG: 0.5 TABLET ORAL at 08:02

## 2022-02-07 RX ADMIN — NITROGLYCERIN 0.4 MG: 0.4 TABLET SUBLINGUAL at 05:02

## 2022-02-07 NOTE — ASSESSMENT & PLAN NOTE
Mostly atypical  Tachycardia/SOB/Chest pain all started after she had COVID recently   If Stress test is normal , can go home and follow up with Cardiology MD as an OP

## 2022-02-07 NOTE — PROGRESS NOTES
[facility]  Progress Note    Patient Name: Sonia Goldberg  MRN: [id number]  Patient Class: OP- Observation   Admission Date: 2/6/2022  Length of Stay: 0 days  Attending Physician: Vinny Dill MD  Primary Care Provider: Terell Reyes MD        Subjective:     Principal Problem:Chest pain        HPI:  No notes on file    Overview/Hospital Course:  02/07  Pt will have 2 day stress test  Tachycardia/SOB/Chest pain all started after she had COVID recently       Interval History:     Review of Systems   Constitutional: Negative for activity change and appetite change.   HENT: Negative for congestion and dental problem.    Eyes: Negative for discharge and itching.   Respiratory: Negative for shortness of breath.    Cardiovascular: Positive for chest pain.   Gastrointestinal: Negative for abdominal distention and abdominal pain.   Endocrine: Negative for cold intolerance.   Genitourinary: Negative for difficulty urinating and dysuria.   Musculoskeletal: Negative for arthralgias and back pain.   Skin: Negative for color change.   Neurological: Negative for dizziness and facial asymmetry.   Hematological: Negative for adenopathy.   Psychiatric/Behavioral: Negative for agitation and behavioral problems.     Objective:     Vital Signs (Most Recent):  Temp: 98.7 °F (37.1 °C) (02/07/22 1524)  Pulse: 86 (02/07/22 1524)  Resp: 19 (02/07/22 1524)  BP: 120/87 (02/07/22 1524)  SpO2: 95 % (02/07/22 1524) Vital Signs (24h Range):  Temp:  [98.6 °F (37 °C)-98.7 °F (37.1 °C)] 98.7 °F (37.1 °C)  Pulse:  [70-97] 86  Resp:  [14-19] 19  SpO2:  [95 %-98 %] 95 %  BP: (105-131)/(58-87) 120/87     Weight: 131 kg (288 lb 12.8 oz)  Body mass index is 43.4 kg/m².    Intake/Output Summary (Last 24 hours) at 2/7/2022 1650  Last data filed at 2/7/2022 0730  Gross per 24 hour   Intake 50 ml   Output --   Net 50 ml      Physical Exam  Vitals and nursing note reviewed.   Constitutional:       General: She is not in  acute distress.  HENT:      Head: Atraumatic.      Right Ear: External ear normal.      Left Ear: External ear normal.      Nose: Nose normal.      Mouth/Throat:      Mouth: Mucous membranes are moist.   Eyes:      General: No scleral icterus.     Extraocular Movements: EOM normal.   Cardiovascular:      Rate and Rhythm: Normal rate.   Pulmonary:      Effort: Pulmonary effort is normal.   Abdominal:      General: There is no distension.   Musculoskeletal:         General: No edema. Normal range of motion.      Cervical back: Normal range of motion.   Skin:     General: Skin is warm.   Neurological:      Mental Status: She is alert and oriented to person, place, and time.   Psychiatric:         Mood and Affect: Mood and affect normal.         Behavior: Behavior normal.         Significant Labs:   All pertinent labs within the past 24 hours have been reviewed.  CBC:   Recent Labs   Lab 02/06/22 2251 02/07/22  1031   WBC 15.48* 10.81   HGB 11.7* 11.2*   HCT 36.8* 37.0    345     CMP:   Recent Labs   Lab 02/06/22 2251 02/07/22  1031   * 137   K 3.5 3.5    101   CO2 24 24   * 104   BUN 10 9   CREATININE 0.7 0.7   CALCIUM 9.5 9.4   PROT 6.5  --    ALBUMIN 3.4*  --    BILITOT 0.7  --    ALKPHOS 71  --    AST 22  --    ALT 13  --    ANIONGAP 10 12   EGFRNONAA >60.0 >60.0       Significant Imaging: I have reviewed all pertinent imaging results/findings within the past 24 hours.      Assessment/Plan:      * Chest pain  Mostly atypical  Tachycardia/SOB/Chest pain all started after she had COVID recently   If Stress test is normal , can go home and follow up with Cardiology MD as an OP       UTI (urinary tract infection)  Follow up UC results  Meanwhile continue iv rocephin      Paroxysmal SVT (supraventricular tachycardia)  Continue cardizem      Morbid obesity  Need therapeutic life style measures       Chronic anxiety  Stable       VTE Risk Mitigation (From admission, onward)         Ordered      IP VTE HIGH RISK PATIENT  Once         02/07/22 0728     Place sequential compression device  Until discontinued         02/07/22 0728                Discharge Planning   [place]: 2/8/2022     Code Status: Full Code   Is the patient medically ready for discharge?: No    Reason for patient still in hospital (select all that apply): Treatment                     Vinny Dill MD  Department of Hospital Medicine   [facility]

## 2022-02-07 NOTE — ED NOTES
Adult Physical Assessment  LOC: Sonia Goldberg, 36 y.o. female verified via two identifiers.  The patient is awake, alert, oriented and speaking appropriately at this time.  APPEARANCE: Patient resting comfortably and appears to be in no acute distress at this time. Patient is clean and well groomed, patient's clothing is properly fastened.  SKIN:The skin is warm and dry, color consistent with ethnicity, patient has normal skin turgor and moist mucus membranes, skin intact, no breakdown or brusing noted.  MUSCULOSKELETAL: Patient moving all extremities well, no obvious swelling or deformities noted.  RESPIRATORY: Airway is open and patent, respirations are spontaneous, patient has a normal effort and rate, no accessory muscle use noted.  CARDIAC: Patient has a normal rate and rhythm, no periphreal edema noted in any extremity, capillary refill < 3 seconds in all extremities  ABDOMEN: Soft and non tender to palpation, no abdominal distention noted. Bowel sounds present in all four quadrants.  NEUROLOGIC: Eyes open spontaneously, behavior appropriate to situation, follows commands, facial expression symmetrical, bilateral hand grasp equal and even, purposeful motor response noted, normal sensation in all extremities when touched with a finger.
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
## 2022-02-07 NOTE — HOSPITAL COURSE
Patient admitted with atypical chest pain  Per Pts Cardiology Office note:Tachycardia/SOB/Chest pain all started after she had COVID recently   This Time pt had 2 day stress test which was normal  Patient was treated with rocephin iv for mild UTI  Later pt was discharged to home
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
## 2022-02-07 NOTE — SUBJECTIVE & OBJECTIVE
Interval History:     Review of Systems   Constitutional: Negative for activity change and appetite change.   HENT: Negative for congestion and dental problem.    Eyes: Negative for discharge and itching.   Respiratory: Negative for shortness of breath.    Cardiovascular: Positive for chest pain.   Gastrointestinal: Negative for abdominal distention and abdominal pain.   Endocrine: Negative for cold intolerance.   Genitourinary: Negative for difficulty urinating and dysuria.   Musculoskeletal: Negative for arthralgias and back pain.   Skin: Negative for color change.   Neurological: Negative for dizziness and facial asymmetry.   Hematological: Negative for adenopathy.   Psychiatric/Behavioral: Negative for agitation and behavioral problems.     Objective:     Vital Signs (Most Recent):  Temp: 98.7 °F (37.1 °C) (02/07/22 1524)  Pulse: 86 (02/07/22 1524)  Resp: 19 (02/07/22 1524)  BP: 120/87 (02/07/22 1524)  SpO2: 95 % (02/07/22 1524) Vital Signs (24h Range):  Temp:  [98.6 °F (37 °C)-98.7 °F (37.1 °C)] 98.7 °F (37.1 °C)  Pulse:  [70-97] 86  Resp:  [14-19] 19  SpO2:  [95 %-98 %] 95 %  BP: (105-131)/(58-87) 120/87     Weight: 131 kg (288 lb 12.8 oz)  Body mass index is 43.4 kg/m².    Intake/Output Summary (Last 24 hours) at 2/7/2022 1650  Last data filed at 2/7/2022 0730  Gross per 24 hour   Intake 50 ml   Output --   Net 50 ml      Physical Exam  Vitals and nursing note reviewed.   Constitutional:       General: She is not in acute distress.  HENT:      Head: Atraumatic.      Right Ear: External ear normal.      Left Ear: External ear normal.      Nose: Nose normal.      Mouth/Throat:      Mouth: Mucous membranes are moist.   Eyes:      General: No scleral icterus.     Extraocular Movements: EOM normal.   Cardiovascular:      Rate and Rhythm: Normal rate.   Pulmonary:      Effort: Pulmonary effort is normal.   Abdominal:      General: There is no distension.   Musculoskeletal:         General: No edema. Normal range of  motion.      Cervical back: Normal range of motion.   Skin:     General: Skin is warm.   Neurological:      Mental Status: She is alert and oriented to person, place, and time.   Psychiatric:         Mood and Affect: Mood and affect normal.         Behavior: Behavior normal.         Significant Labs:   All pertinent labs within the past 24 hours have been reviewed.  CBC:   Recent Labs   Lab 02/06/22 2251 02/07/22  1031   WBC 15.48* 10.81   HGB 11.7* 11.2*   HCT 36.8* 37.0    345     CMP:   Recent Labs   Lab 02/06/22 2251 02/07/22  1031   * 137   K 3.5 3.5    101   CO2 24 24   * 104   BUN 10 9   CREATININE 0.7 0.7   CALCIUM 9.5 9.4   PROT 6.5  --    ALBUMIN 3.4*  --    BILITOT 0.7  --    ALKPHOS 71  --    AST 22  --    ALT 13  --    ANIONGAP 10 12   EGFRNONAA >60.0 >60.0       Significant Imaging: I have reviewed all pertinent imaging results/findings within the past 24 hours.

## 2022-02-08 ENCOUNTER — HOSPITAL ENCOUNTER (OUTPATIENT)
Dept: RADIOLOGY | Facility: HOSPITAL | Age: 37
Discharge: HOME | End: 2022-02-08
Payer: COMMERCIAL

## 2022-02-08 VITALS
HEIGHT: 68 IN | HEART RATE: 89 BPM | DIASTOLIC BLOOD PRESSURE: 88 MMHG | TEMPERATURE: 98 F | RESPIRATION RATE: 19 BRPM | SYSTOLIC BLOOD PRESSURE: 133 MMHG | BODY MASS INDEX: 43.77 KG/M2 | OXYGEN SATURATION: 97 % | WEIGHT: 288.81 LBS

## 2022-02-08 PROBLEM — I47.10 PAROXYSMAL SVT (SUPRAVENTRICULAR TACHYCARDIA): Status: RESOLVED | Noted: 2020-09-16 | Resolved: 2022-02-08

## 2022-02-08 PROBLEM — N39.0 UTI (URINARY TRACT INFECTION): Status: RESOLVED | Noted: 2021-09-30 | Resolved: 2022-02-08

## 2022-02-08 LAB
CV PHARM DOSE: 0.4 MG
CV STRESS BASE HR: 74 BPM
DIASTOLIC BLOOD PRESSURE: 86 MMHG
OHS CV CPX 1 MINUTE RECOVERY HEART RATE: 98 BPM
OHS CV CPX 85 PERCENT MAX PREDICTED HEART RATE MALE: 147
OHS CV CPX MAX PREDICTED HEART RATE: 173
OHS CV CPX PATIENT IS FEMALE: 1
OHS CV CPX PATIENT IS MALE: 0
OHS CV CPX PEAK DIASTOLIC BLOOD PRESSURE: 81 MMHG
OHS CV CPX PEAK HEAR RATE: 105 BPM
OHS CV CPX PEAK RATE PRESSURE PRODUCT: NORMAL
OHS CV CPX PEAK SYSTOLIC BLOOD PRESSURE: 148 MMHG
OHS CV CPX PERCENT MAX PREDICTED HEART RATE ACHIEVED: 61
OHS CV CPX RATE PRESSURE PRODUCT PRESENTING: NORMAL
SYSTOLIC BLOOD PRESSURE: 138 MMHG

## 2022-02-08 PROCEDURE — 96366 THER/PROPH/DIAG IV INF ADDON: CPT

## 2022-02-08 PROCEDURE — 99900035 HC TECH TIME PER 15 MIN (STAT)

## 2022-02-08 PROCEDURE — G0378 HOSPITAL OBSERVATION PER HR: HCPCS

## 2022-02-08 PROCEDURE — 99900031 HC PATIENT EDUCATION (STAT)

## 2022-02-08 PROCEDURE — 25000003 PHARM REV CODE 250: Performed by: NURSE PRACTITIONER

## 2022-02-08 PROCEDURE — 94761 N-INVAS EAR/PLS OXIMETRY MLT: CPT

## 2022-02-08 PROCEDURE — 63600175 PHARM REV CODE 636 W HCPCS: Performed by: NURSE PRACTITIONER

## 2022-02-08 RX ADMIN — CEFTRIAXONE SODIUM 1 G: 1 INJECTION, POWDER, FOR SOLUTION INTRAMUSCULAR; INTRAVENOUS at 06:02

## 2022-02-08 RX ADMIN — ASPIRIN 325 MG ORAL TABLET 325 MG: 325 PILL ORAL at 08:02

## 2022-02-08 NOTE — DISCHARGE SUMMARY
[facility] Medicine  Discharge Summary      Patient Name: Sonia Goldberg  MRN: [id number]  Patient Class: OP- Observation  Admission Date: 2/6/2022  Hospital Length of Stay: 0 days  Discharge Date and Time:  02/08/2022 5:44 PM  Attending Physician: No att. providers found   Discharging Provider: Vinny Dill MD  Primary Care Provider: Terell Reyes MD      HPI:   No notes on file    * No surgery found *      Hospital Course:   Patient admitted with atypical chest pain  Per Pts Cardiology Office note:Tachycardia/SOB/Chest pain all started after she had COVID recently   This Time pt had 2 day stress test which was normal  Patient was treated with rocephin iv for mild UTI  Later pt was discharged to home        Goals of Care Treatment Preferences:  Code Status: Full Code    Living Will: Yes              Consults:   Consults (From admission, onward)        Status Ordering Provider     Inpatient consult to Social Work/Case Management  Once        Provider:  (Not yet assigned)    VINNY Barrios          No new Assessment & Plan notes have been filed under this hospital service since the last note was generated.  Service: Hospital Medicine    Final Active Diagnoses:    Diagnosis Date Noted POA    Morbid obesity [E66.01] 08/17/2016 Yes    Chronic anxiety [F41.9] 12/19/2014 Yes      Problems Resolved During this Admission:    Diagnosis Date Noted Date Resolved POA    PRINCIPAL PROBLEM:  Chest pain [R07.9] 12/19/2014 02/08/2022 Unknown    UTI (urinary tract infection) [N39.0] 09/30/2021 02/08/2022 Unknown    Paroxysmal SVT (supraventricular tachycardia) [I47.1] 09/16/2020 02/08/2022 Yes       Discharged Condition: good    Disposition: Home or Self Care    Follow Up:   Follow-up Information     Terell Reyes MD In 1 week.    Specialty: Family Medicine  Contact information:  [address] [place] [id number] [phone]                       Patient Instructions:   No  discharge procedures on file.    Significant Diagnostic Studies: Labs:   CMP   Recent Labs   Lab 02/06/22 2251 02/07/22  1031   * 137   K 3.5 3.5    101   CO2 24 24   * 104   BUN 10 9   CREATININE 0.7 0.7   CALCIUM 9.5 9.4   PROT 6.5  --    ALBUMIN 3.4*  --    BILITOT 0.7  --    ALKPHOS 71  --    AST 22  --    ALT 13  --    ANIONGAP 10 12   ESTGFRAFRICA >60.0 >60.0   EGFRNONAA >60.0 >60.0    and CBC   Recent Labs   Lab 02/06/22 2251 02/06/22 2251 02/07/22  1031   WBC 15.48*  --  10.81   HGB 11.7*  --  11.2*   HCT 36.8*   < > 37.0     --  345    < > = values in this interval not displayed.       Pending Diagnostic Studies:     None         Medications:  Reconciled Home Medications:      Medication List      CONTINUE taking these medications    * albuterol 90 mcg/actuation inhaler  Commonly known as: PROVENTIL/VENTOLIN HFA  Inhale 2 puffs into the lungs every 6 (six) hours as needed for Wheezing.     * albuterol 1.25 mg/3 mL Nebu  Commonly known as: ACCUNEB  Take 3 mLs (1.25 mg total) by nebulization every 6 (six) hours as needed. Rescue     buPROPion 300 MG 24 hr tablet  Commonly known as: WELLBUTRIN XL  TAKE 1 TABLET BY MOUTH EVERY DAY     diltiaZEM 180 MG 24 hr capsule  Commonly known as: CARDIZEM CD  Take 1 capsule (180 mg total) by mouth once daily.     EPINEPHrine 0.3 mg/0.3 mL Atin  Commonly known as: EPIPEN  Inject 0.3 mLs (0.3 mg total) into the muscle as needed (anaphylaxis).     LATUDA 80 mg Tab tablet  Generic drug: lurasidone  Take 1 tablet (80 mg total) by mouth every evening. With food.     potassium bicarbonate disintegrating tablet  Take 1 tablet (10 mEq total) by mouth once daily.     promethazine 25 MG tablet  Commonly known as: PHENERGAN  Take 1 tablet (25 mg total) by mouth every 6 (six) hours as needed for Nausea.     pulse oximeter device  Commonly known as: pulse oximeter  by Apply Externally route 2 (two) times a day. Use twice daily at 8 AM and 3 PM and record the  value in MyChart as directed.         * This list has 2 medication(s) that are the same as other medications prescribed for you. Read the directions carefully, and ask your doctor or other care provider to review them with you.            STOP taking these medications    ADVAIR -21 mcg/actuation Hfaa inhaler  Generic drug: fluticasone-salmeterol 230-21 mcg/dose     busPIRone 5 MG Tab  Commonly known as: BUSPAR     diazePAM 10 MG Tab  Commonly known as: VALIUM     FLULAVAL QUAD 3049-2969 (PF) 60 mcg (15 mcg x 4)/0.5 mL Syrg  Generic drug: flu vacc [id number] 6mos up(PF)     LORazepam 0.5 MG tablet  Commonly known as: ATIVAN     ondansetron 4 MG Tbdl  Commonly known as: ZOFRAN-ODT     VITAMIN B-6 25 MG Tab  Generic drug: pyridoxine (vitamin B6)            Indwelling Lines/Drains at time of discharge:   Lines/Drains/Airways     None               Physical Exam  Cardiovascular:      Rate and Rhythm: Normal rate.   Neurological:      Mental Status: She is alert and oriented to person, place, and time.       Time spent on the discharge of patient: 25  minutes         Vinny Dill MD  Department of Hospital Medicine  [facility]

## 2022-02-08 NOTE — CARE UPDATE
02/08/22 0842   Patient Assessment/Suction   Level of Consciousness (AVPU) alert   Respiratory Effort Normal;Unlabored   Expansion/Accessory Muscles/Retractions no use of accessory muscles;no retractions   All Lung Fields Breath Sounds equal bilaterally;clear   Rhythm/Pattern, Respiratory unlabored;pattern regular;depth regular;chest wiggle adequate   Cough Frequency infrequent   Cough Type good   PRE-TX-O2   O2 Device (Oxygen Therapy) room air   SpO2 98 %   Pulse Oximetry Type Intermittent   $ Pulse Oximetry - Multiple Charge Pulse Oximetry - Multiple   Pulse 75   Resp 18   Aerosol Therapy   $ Aerosol Therapy Charges PRN treatment not required   Education   $ Education Bronchodilator;15 min   Respiratory Evaluation   $ Care Plan Tech Time 15 min

## 2022-02-08 NOTE — PLAN OF CARE
02/07/22 2056   Patient Assessment/Suction   Level of Consciousness (AVPU) alert   Respiratory Effort Normal;Unlabored   Expansion/Accessory Muscles/Retractions no use of accessory muscles   All Lung Fields Breath Sounds Anterior:;diminished   Rhythm/Pattern, Respiratory unlabored   Cough Frequency infrequent   PRE-TX-O2   O2 Device (Oxygen Therapy) room air   SpO2 97 %   Pulse Oximetry Type Intermittent   $ Pulse Oximetry - Multiple Charge Pulse Oximetry - Multiple   Pulse 88   Resp 20   Inhaler   $ Inhaler Charges PRN treatment not required   Education   $ Education DME Oxygen;15 min   Respiratory Evaluation   $ Care Plan Tech Time 15 min

## 2022-02-08 NOTE — PLAN OF CARE
02/08/22 1107   Final Note   Assessment Type Final Discharge Note   Anticipated Discharge Disposition Home   Post-Acute Status   Discharge Delays None known at this time   Orders reviewed - pt discharging home this date with no further case management needs noted.

## 2022-02-08 NOTE — PLAN OF CARE
[facility]  Initial Discharge Assessment       Primary Care Provider: Terell Reyes MD    Admission Diagnosis: Chest pain [R07.9]    Admission Date: 2/6/2022  Expected Discharge Date: 2/8/2022     Assessment completed at bedside with pt. Pt lives with gillian and her 2 children, and plans to return home at discharge. She is requesting a financial assistance application, which this  provided along with instructions on where to send completed packet. No further needs addressed at this time.    Payor: [employer] / Plan: [employer] CHOICE / Product Type: Commercial /     Extended Emergency Contact Information  Primary Emergency Contact: Loco Solorzano  Mobile Phone: [phone]  Relation: Significant other  Preferred language: English   needed? No  Secondary Emergency Contact: Shakir Goldberg  Address: [address] [place] [address] [facility]  Home Phone: [phone]  Mobile Phone: [phone]  Relation: Other  Preferred language: English    Discharge Plan A: (P) Home with family  Discharge Plan B: (P) Home with family      [id number] [place] - [address]  [address]  [facility] [address]  Phone: [phone] Fax: [phone]    [id number] [place] - [address]  [address]  [facility] [address]  Phone: [phone] Fax: [phone]      Initial Assessment (most recent)     Adult Discharge Assessment - 02/08/22 1034        Discharge Assessment    Assessment Type Discharge Planning Assessment (P)      Confirmed/corrected address, phone number and insurance Yes (P)      Confirmed Demographics Correct on Facesheet (P)      Source of Information patient (P)      When was your last doctors appointment? -- (P)    2 weeks ago    Communicated [place] with patient/caregiver Yes (P)      Reason For Admission chest pain (P)      Lives With significant other;child(meenakshi), dependent;child(meenakshi), adult (P)     2 children    Do you expect to return to your current living situation? Yes (P)      Do you have help at home or someone to help you manage your care at home? Yes (P)      Who are your caregiver(s) and their phone number(s)? Ed Solorzano [phone] (P)      Prior to hospitilization cognitive status: Alert/Oriented (P)      Current cognitive status: Alert/Oriented (P)      Walking or Climbing Stairs Difficulty other (see comments) (P)    reports postural instability on one leg    Dressing/Bathing Difficulty none (P)      Equipment Currently Used at Home nebulizer;CPAP (P)    States CPAP will be delivered today    Readmission within 30 days? No (P)      Patient currently being followed by outpatient case management? No (P)      Do you currently have service(s) that help you manage your care at home? No (P)      Do you take prescription medications? Yes (P)      Do you have prescription coverage? Yes (P)      Coverage [facility] (P)      Do you have any problems affording any of your prescribed medications? No (P)      Is the patient taking medications as prescribed? yes (P)      Who is going to help you get home at discharge? self (P)      How do you get to doctors appointments? car, drives self (P)      Are you on dialysis? No (P)      Do you take coumadin? No (P)      Discharge Plan A Home with family (P)      Discharge Plan B Home with family (P)      DME Needed Upon Discharge  none (P)      Discharge Plan discussed with: Patient (P)

## 2022-02-09 LAB — BACTERIA UR CULT: ABNORMAL

## 2022-02-21 ENCOUNTER — PATIENT MESSAGE (OUTPATIENT)
Dept: PULMONOLOGY | Facility: CLINIC | Age: 37
End: 2022-02-21
Payer: COMMERCIAL

## 2022-02-24 ENCOUNTER — PATIENT MESSAGE (OUTPATIENT)
Dept: PSYCHIATRY | Facility: CLINIC | Age: 37
End: 2022-02-24
Payer: COMMERCIAL

## 2022-02-25 ENCOUNTER — OFFICE VISIT (OUTPATIENT)
Dept: PSYCHIATRY | Facility: CLINIC | Age: 37
End: 2022-02-25
Payer: COMMERCIAL

## 2022-02-25 DIAGNOSIS — F31.81 BIPOLAR II DISORDER: Primary | ICD-10-CM

## 2022-02-25 DIAGNOSIS — F43.10 PTSD (POST-TRAUMATIC STRESS DISORDER): ICD-10-CM

## 2022-02-25 DIAGNOSIS — F41.1 GAD (GENERALIZED ANXIETY DISORDER): ICD-10-CM

## 2022-02-25 PROCEDURE — 3044F PR MOST RECENT HEMOGLOBIN A1C LEVEL <7.0%: ICD-10-PCS | Mod: CPTII,95,, | Performed by: PHYSICIAN ASSISTANT

## 2022-02-25 PROCEDURE — 99214 PR OFFICE/OUTPT VISIT, EST, LEVL IV, 30-39 MIN: ICD-10-PCS | Mod: 95,,, | Performed by: PHYSICIAN ASSISTANT

## 2022-02-25 PROCEDURE — 1159F MED LIST DOCD IN RCRD: CPT | Mod: CPTII,95,, | Performed by: PHYSICIAN ASSISTANT

## 2022-02-25 PROCEDURE — 1160F PR REVIEW ALL MEDS BY PRESCRIBER/CLIN PHARMACIST DOCUMENTED: ICD-10-PCS | Mod: CPTII,95,, | Performed by: PHYSICIAN ASSISTANT

## 2022-02-25 PROCEDURE — 1160F RVW MEDS BY RX/DR IN RCRD: CPT | Mod: CPTII,95,, | Performed by: PHYSICIAN ASSISTANT

## 2022-02-25 PROCEDURE — 3044F HG A1C LEVEL LT 7.0%: CPT | Mod: CPTII,95,, | Performed by: PHYSICIAN ASSISTANT

## 2022-02-25 PROCEDURE — 1159F PR MEDICATION LIST DOCUMENTED IN MEDICAL RECORD: ICD-10-PCS | Mod: CPTII,95,, | Performed by: PHYSICIAN ASSISTANT

## 2022-02-25 PROCEDURE — 99214 OFFICE O/P EST MOD 30 MIN: CPT | Mod: 95,,, | Performed by: PHYSICIAN ASSISTANT

## 2022-02-25 RX ORDER — LORAZEPAM 0.5 MG/1
0.5 TABLET ORAL EVERY 6 HOURS PRN
Qty: 15 TABLET | Refills: 0 | Status: SHIPPED | OUTPATIENT
Start: 2022-02-25 | End: 2022-07-25 | Stop reason: SDUPTHER

## 2022-02-25 RX ORDER — BUPROPION HYDROCHLORIDE 150 MG/1
150 TABLET ORAL DAILY
Qty: 30 TABLET | Refills: 0 | Status: SHIPPED | OUTPATIENT
Start: 2022-02-25 | End: 2022-03-24

## 2022-02-25 RX ORDER — CARIPRAZINE 1.5 MG/1
1.5 CAPSULE, GELATIN COATED ORAL DAILY
Qty: 30 CAPSULE | Refills: 0 | Status: SHIPPED | OUTPATIENT
Start: 2022-02-25 | End: 2022-03-29

## 2022-02-25 NOTE — PATIENT INSTRUCTIONS
Please go to emergency department if feeling as though you are a harm to yourself or others or if you are in crisis. Please call the clinic to report any worsening of symptoms or problems associated with medication.    Side effects of benzodiazepines includes sedation, fatigue, depression, dizziness, slurred speech, weakness, forgetfulness, confusion, nervousness, dry mouth. Life threatening side effects include respiratory depression which can result in death especially when taken with other medications such as opioids (this is a [place] boxed warning) and liver/kidney dysfunction. Stopping this medication abruptly can cause withdrawal seizures that have the potential to result in death. These medications are not indicated or recommended for long term usage due to risks not outweighing benefits for the medication. Benzodiazepines are habit forming. Do not use alcohol while taking this medication. Patient verbalized understanding of these risks.
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
## 2022-03-15 ENCOUNTER — PATIENT MESSAGE (OUTPATIENT)
Dept: OBSTETRICS AND GYNECOLOGY | Facility: CLINIC | Age: 37
End: 2022-03-15
Payer: COMMERCIAL

## 2022-03-15 ENCOUNTER — TELEPHONE (OUTPATIENT)
Dept: ORTHOPEDICS | Facility: CLINIC | Age: 37
End: 2022-03-15
Payer: COMMERCIAL

## 2022-03-15 NOTE — TELEPHONE ENCOUNTER
----- Message from Eliane Beltran sent at 3/11/2022 12:30 PM CST -----  Please call her about Dr Baker talking with Dr Velasquez-her phone#-[phone]

## 2022-03-15 NOTE — TELEPHONE ENCOUNTER
I personally spoke to Dr. Rinku Velasquez about this patient's condition.  He says that he would not be interested in any surgical intervention for her until her BMI is under 40.  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  I communicated all this to her in great detail.  I expressed to her in great detail that the key here is to get her BMI under 40.  She may find that her knees do not bother her nearly as much if she does that.  I recommend she get with her primary care doctor for a weight loss program.  The Paleo diet is excellent for people better active and wished to lose weight.  There are many other diet options that I am not an expert in.  At this time I do not have any further surgical interventions for her.  She is essentially MMI as far as I am concerned.

## 2022-04-04 ENCOUNTER — OFFICE VISIT (OUTPATIENT)
Dept: ORTHOPEDICS | Facility: CLINIC | Age: 37
End: 2022-04-04
Payer: OTHER MISCELLANEOUS

## 2022-04-04 VITALS — WEIGHT: 280 LBS | BODY MASS INDEX: 43.95 KG/M2 | HEIGHT: 67 IN

## 2022-04-04 DIAGNOSIS — S83.241A ACUTE MEDIAL MENISCUS TEAR OF RIGHT KNEE, INITIAL ENCOUNTER: Primary | ICD-10-CM

## 2022-04-04 PROBLEM — F32.9 MAJOR DEPRESSIVE DISORDER, SINGLE EPISODE, UNSPECIFIED: Status: ACTIVE | Noted: 2021-09-03

## 2022-04-04 PROBLEM — J45.909 UNSPECIFIED ASTHMA, UNCOMPLICATED: Status: ACTIVE | Noted: 2021-09-03

## 2022-04-04 PROBLEM — K58.9 IRRITABLE BOWEL SYNDROME WITHOUT DIARRHEA: Status: ACTIVE | Noted: 2021-09-03

## 2022-04-04 PROBLEM — F31.9 BIPOLAR DISORDER, UNSPECIFIED: Status: ACTIVE | Noted: 2021-09-03

## 2022-04-04 PROCEDURE — 99213 PR OFFICE/OUTPT VISIT, EST, LEVL III, 20-29 MIN: ICD-10-PCS | Mod: S$GLB,,, | Performed by: PHYSICIAN ASSISTANT

## 2022-04-04 PROCEDURE — 99213 OFFICE O/P EST LOW 20 MIN: CPT | Mod: S$GLB,,, | Performed by: PHYSICIAN ASSISTANT

## 2022-04-04 RX ORDER — HYDROCODONE BITARTRATE AND ACETAMINOPHEN 5; 325 MG/1; MG/1
1 TABLET ORAL EVERY 8 HOURS PRN
Qty: 21 TABLET | Refills: 0 | Status: SHIPPED | OUTPATIENT
Start: 2022-04-04 | End: 2022-09-05
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
## 2022-04-05 ENCOUNTER — OFFICE VISIT (OUTPATIENT)
Dept: DERMATOLOGY | Facility: CLINIC | Age: 37
End: 2022-04-05
Payer: COMMERCIAL

## 2022-04-05 DIAGNOSIS — D48.5 NEOPLASM OF UNCERTAIN BEHAVIOR OF SKIN: Primary | ICD-10-CM

## 2022-04-05 PROCEDURE — 88305 TISSUE EXAM BY PATHOLOGIST: CPT | Performed by: PATHOLOGY

## 2022-04-05 PROCEDURE — 1159F MED LIST DOCD IN RCRD: CPT | Mod: CPTII,S$GLB,, | Performed by: STUDENT IN AN ORGANIZED HEALTH CARE EDUCATION/TRAINING PROGRAM

## 2022-04-05 PROCEDURE — 1160F RVW MEDS BY RX/DR IN RCRD: CPT | Mod: CPTII,S$GLB,, | Performed by: STUDENT IN AN ORGANIZED HEALTH CARE EDUCATION/TRAINING PROGRAM

## 2022-04-05 PROCEDURE — 99203 OFFICE O/P NEW LOW 30 MIN: CPT | Mod: 25,S$GLB,, | Performed by: STUDENT IN AN ORGANIZED HEALTH CARE EDUCATION/TRAINING PROGRAM

## 2022-04-05 PROCEDURE — 99999 PR PBB SHADOW E&M-EST. PATIENT-LVL III: ICD-10-PCS | Mod: PBBFAC,,, | Performed by: STUDENT IN AN ORGANIZED HEALTH CARE EDUCATION/TRAINING PROGRAM

## 2022-04-05 PROCEDURE — 3044F HG A1C LEVEL LT 7.0%: CPT | Mod: CPTII,S$GLB,, | Performed by: STUDENT IN AN ORGANIZED HEALTH CARE EDUCATION/TRAINING PROGRAM

## 2022-04-05 PROCEDURE — 1159F PR MEDICATION LIST DOCUMENTED IN MEDICAL RECORD: ICD-10-PCS | Mod: CPTII,S$GLB,, | Performed by: STUDENT IN AN ORGANIZED HEALTH CARE EDUCATION/TRAINING PROGRAM

## 2022-04-05 PROCEDURE — 3044F PR MOST RECENT HEMOGLOBIN A1C LEVEL <7.0%: ICD-10-PCS | Mod: CPTII,S$GLB,, | Performed by: STUDENT IN AN ORGANIZED HEALTH CARE EDUCATION/TRAINING PROGRAM

## 2022-04-05 PROCEDURE — 99203 PR OFFICE/OUTPT VISIT, NEW, LEVL III, 30-44 MIN: ICD-10-PCS | Mod: 25,S$GLB,, | Performed by: STUDENT IN AN ORGANIZED HEALTH CARE EDUCATION/TRAINING PROGRAM

## 2022-04-05 PROCEDURE — 99999 PR PBB SHADOW E&M-EST. PATIENT-LVL III: CPT | Mod: PBBFAC,,, | Performed by: STUDENT IN AN ORGANIZED HEALTH CARE EDUCATION/TRAINING PROGRAM

## 2022-04-05 PROCEDURE — 1160F PR REVIEW ALL MEDS BY PRESCRIBER/CLIN PHARMACIST DOCUMENTED: ICD-10-PCS | Mod: CPTII,S$GLB,, | Performed by: STUDENT IN AN ORGANIZED HEALTH CARE EDUCATION/TRAINING PROGRAM

## 2022-04-05 PROCEDURE — 88305 TISSUE EXAM BY PATHOLOGIST: ICD-10-PCS | Mod: 26,,, | Performed by: PATHOLOGY

## 2022-04-05 PROCEDURE — 88305 TISSUE EXAM BY PATHOLOGIST: CPT | Mod: 26,,, | Performed by: PATHOLOGY
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
## 2022-04-13 LAB
FINAL PATHOLOGIC DIAGNOSIS: NORMAL
GROSS: NORMAL
Lab: NORMAL
MICROSCOPIC EXAM: NORMAL

## 2022-04-18 ENCOUNTER — OFFICE VISIT (OUTPATIENT)
Dept: PULMONOLOGY | Facility: CLINIC | Age: 37
End: 2022-04-18
Payer: COMMERCIAL

## 2022-04-18 ENCOUNTER — TELEPHONE (OUTPATIENT)
Dept: PULMONOLOGY | Facility: CLINIC | Age: 37
End: 2022-04-18

## 2022-04-18 VITALS
WEIGHT: 282.88 LBS | DIASTOLIC BLOOD PRESSURE: 88 MMHG | BODY MASS INDEX: 44.4 KG/M2 | HEART RATE: 93 BPM | SYSTOLIC BLOOD PRESSURE: 134 MMHG | OXYGEN SATURATION: 97 % | HEIGHT: 67 IN

## 2022-04-18 DIAGNOSIS — G47.33 OBSTRUCTIVE SLEEP APNEA: Primary | ICD-10-CM

## 2022-04-18 DIAGNOSIS — J45.20 MILD INTERMITTENT REACTIVE AIRWAY DISEASE WITHOUT COMPLICATION: ICD-10-CM

## 2022-04-18 DIAGNOSIS — F51.3 SLEEP WALKING: ICD-10-CM

## 2022-04-18 PROCEDURE — 3075F PR MOST RECENT SYSTOLIC BLOOD PRESS GE 130-139MM HG: ICD-10-PCS | Mod: CPTII,S$GLB,, | Performed by: NURSE PRACTITIONER

## 2022-04-18 PROCEDURE — 1159F PR MEDICATION LIST DOCUMENTED IN MEDICAL RECORD: ICD-10-PCS | Mod: CPTII,S$GLB,, | Performed by: NURSE PRACTITIONER

## 2022-04-18 PROCEDURE — 3044F HG A1C LEVEL LT 7.0%: CPT | Mod: CPTII,S$GLB,, | Performed by: NURSE PRACTITIONER

## 2022-04-18 PROCEDURE — 3008F PR BODY MASS INDEX (BMI) DOCUMENTED: ICD-10-PCS | Mod: CPTII,S$GLB,, | Performed by: NURSE PRACTITIONER

## 2022-04-18 PROCEDURE — 3079F DIAST BP 80-89 MM HG: CPT | Mod: CPTII,S$GLB,, | Performed by: NURSE PRACTITIONER

## 2022-04-18 PROCEDURE — 99213 OFFICE O/P EST LOW 20 MIN: CPT | Mod: S$GLB,,, | Performed by: NURSE PRACTITIONER

## 2022-04-18 PROCEDURE — 3079F PR MOST RECENT DIASTOLIC BLOOD PRESSURE 80-89 MM HG: ICD-10-PCS | Mod: CPTII,S$GLB,, | Performed by: NURSE PRACTITIONER

## 2022-04-18 PROCEDURE — 3075F SYST BP GE 130 - 139MM HG: CPT | Mod: CPTII,S$GLB,, | Performed by: NURSE PRACTITIONER

## 2022-04-18 PROCEDURE — 99999 PR PBB SHADOW E&M-EST. PATIENT-LVL IV: ICD-10-PCS | Mod: PBBFAC,,, | Performed by: NURSE PRACTITIONER

## 2022-04-18 PROCEDURE — 3044F PR MOST RECENT HEMOGLOBIN A1C LEVEL <7.0%: ICD-10-PCS | Mod: CPTII,S$GLB,, | Performed by: NURSE PRACTITIONER

## 2022-04-18 PROCEDURE — 99999 PR PBB SHADOW E&M-EST. PATIENT-LVL IV: CPT | Mod: PBBFAC,,, | Performed by: NURSE PRACTITIONER

## 2022-04-18 PROCEDURE — 3008F BODY MASS INDEX DOCD: CPT | Mod: CPTII,S$GLB,, | Performed by: NURSE PRACTITIONER

## 2022-04-18 PROCEDURE — 1159F MED LIST DOCD IN RCRD: CPT | Mod: CPTII,S$GLB,, | Performed by: NURSE PRACTITIONER

## 2022-04-18 PROCEDURE — 99213 PR OFFICE/OUTPT VISIT, EST, LEVL III, 20-29 MIN: ICD-10-PCS | Mod: S$GLB,,, | Performed by: NURSE PRACTITIONER

## 2022-04-18 RX ORDER — PREDNISONE 20 MG/1
TABLET ORAL
Qty: 14 TABLET | Refills: 0 | Status: SHIPPED | OUTPATIENT
Start: 2022-04-18 | End: 2022-06-28

## 2022-04-18 NOTE — PATIENT INSTRUCTIONS
Will repeat compliance report in a month or so - as this report will not show fully needed compliance. Continue CPAP nightly, for at least four hours per night. Will discuss sleep walking with MD - may need referral to sleep specialist.    Lung function test normal from January 2022 - can continue to use Albuterol rescue inhaler as needed, despite no clinical significance noted on lung function test.    Hypersensitivity pneumonitis s/t chemical exposure in the past (2014), reactive airway disease?    Prednisone to be taken as needed for wheezing, shortness of breath.     Breztri sample given in office today, to be used as needed for shortness of breath, wheezing. Can do two puffs twice per day. Let me know if you experience benefit.    Continue current medication regiment. Keep follow up appointment as scheduled. Please call the office if you have any questions or concerns.
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
## 2022-04-26 ENCOUNTER — HOSPITAL ENCOUNTER (OUTPATIENT)
Dept: RADIOLOGY | Facility: HOSPITAL | Age: 37
Discharge: HOME | End: 2022-04-26
Attending: PHYSICIAN ASSISTANT
Payer: OTHER MISCELLANEOUS

## 2022-04-26 DIAGNOSIS — S83.241A ACUTE MEDIAL MENISCUS TEAR OF RIGHT KNEE, INITIAL ENCOUNTER: ICD-10-CM

## 2022-04-26 PROCEDURE — 73721 MRI JNT OF LWR EXTRE W/O DYE: CPT | Mod: TC,RT

## 2022-05-30 ENCOUNTER — OFFICE VISIT (OUTPATIENT)
Dept: FAMILY MEDICINE | Facility: CLINIC | Age: 37
End: 2022-05-30
Payer: COMMERCIAL

## 2022-05-30 VITALS
HEART RATE: 101 BPM | OXYGEN SATURATION: 96 % | HEIGHT: 67 IN | SYSTOLIC BLOOD PRESSURE: 128 MMHG | RESPIRATION RATE: 17 BRPM | WEIGHT: 277.31 LBS | BODY MASS INDEX: 43.53 KG/M2 | TEMPERATURE: 98 F | DIASTOLIC BLOOD PRESSURE: 78 MMHG

## 2022-05-30 DIAGNOSIS — E28.2 PCOS (POLYCYSTIC OVARIAN SYNDROME): ICD-10-CM

## 2022-05-30 DIAGNOSIS — G43.119 INTRACTABLE MIGRAINE WITH AURA WITHOUT STATUS MIGRAINOSUS: Primary | ICD-10-CM

## 2022-05-30 PROCEDURE — 99213 OFFICE O/P EST LOW 20 MIN: CPT | Mod: S$GLB,,, | Performed by: FAMILY MEDICINE

## 2022-05-30 PROCEDURE — 3074F PR MOST RECENT SYSTOLIC BLOOD PRESSURE < 130 MM HG: ICD-10-PCS | Mod: CPTII,S$GLB,, | Performed by: FAMILY MEDICINE

## 2022-05-30 PROCEDURE — 1159F PR MEDICATION LIST DOCUMENTED IN MEDICAL RECORD: ICD-10-PCS | Mod: CPTII,S$GLB,, | Performed by: FAMILY MEDICINE

## 2022-05-30 PROCEDURE — 1159F MED LIST DOCD IN RCRD: CPT | Mod: CPTII,S$GLB,, | Performed by: FAMILY MEDICINE

## 2022-05-30 PROCEDURE — 3074F SYST BP LT 130 MM HG: CPT | Mod: CPTII,S$GLB,, | Performed by: FAMILY MEDICINE

## 2022-05-30 PROCEDURE — 99999 PR PBB SHADOW E&M-EST. PATIENT-LVL IV: CPT | Mod: PBBFAC,,, | Performed by: FAMILY MEDICINE

## 2022-05-30 PROCEDURE — 3008F BODY MASS INDEX DOCD: CPT | Mod: CPTII,S$GLB,, | Performed by: FAMILY MEDICINE

## 2022-05-30 PROCEDURE — 3078F DIAST BP <80 MM HG: CPT | Mod: CPTII,S$GLB,, | Performed by: FAMILY MEDICINE

## 2022-05-30 PROCEDURE — 3044F PR MOST RECENT HEMOGLOBIN A1C LEVEL <7.0%: ICD-10-PCS | Mod: CPTII,S$GLB,, | Performed by: FAMILY MEDICINE

## 2022-05-30 PROCEDURE — 3044F HG A1C LEVEL LT 7.0%: CPT | Mod: CPTII,S$GLB,, | Performed by: FAMILY MEDICINE

## 2022-05-30 PROCEDURE — 3078F PR MOST RECENT DIASTOLIC BLOOD PRESSURE < 80 MM HG: ICD-10-PCS | Mod: CPTII,S$GLB,, | Performed by: FAMILY MEDICINE

## 2022-05-30 PROCEDURE — 99213 PR OFFICE/OUTPT VISIT, EST, LEVL III, 20-29 MIN: ICD-10-PCS | Mod: S$GLB,,, | Performed by: FAMILY MEDICINE

## 2022-05-30 PROCEDURE — 3008F PR BODY MASS INDEX (BMI) DOCUMENTED: ICD-10-PCS | Mod: CPTII,S$GLB,, | Performed by: FAMILY MEDICINE

## 2022-05-30 PROCEDURE — 99999 PR PBB SHADOW E&M-EST. PATIENT-LVL IV: ICD-10-PCS | Mod: PBBFAC,,, | Performed by: FAMILY MEDICINE

## 2022-05-30 RX ORDER — SUMATRIPTAN SUCCINATE 100 MG/1
100 TABLET ORAL DAILY PRN
Qty: 30 TABLET | Refills: 0 | Status: SHIPPED | OUTPATIENT
Start: 2022-05-30 | End: 2022-06-28

## 2022-05-30 RX ORDER — PHENTERMINE HYDROCHLORIDE 37.5 MG/1
37.5 TABLET ORAL
Qty: 30 TABLET | Refills: 2 | Status: SHIPPED | OUTPATIENT
Start: 2022-05-30 | End: 2022-08-28

## 2022-05-30 RX ORDER — METFORMIN HYDROCHLORIDE 500 MG/1
500 TABLET, EXTENDED RELEASE ORAL
Qty: 90 TABLET | Refills: 3 | Status: SHIPPED | OUTPATIENT
Start: 2022-05-30 | End: 2022-09-05

## 2022-05-30 NOTE — PATIENT INSTRUCTIONS
Rosendo Scott,     If you are due for any health screening(s) below please notify me so we can arrange them to be ordered and scheduled to maintain your health. Most healthy patients complete it. Don't lose out on improving your health.     Health Maintenance   Topic Date Due    Hepatitis C Screening  Never done    TETANUS VACCINE  05/01/2026    Lipid Panel  Completed
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
## 2022-06-04 ENCOUNTER — HOSPITAL ENCOUNTER (EMERGENCY)
Facility: HOSPITAL | Age: 37
Discharge: HOME | End: 2022-06-05
Attending: EMERGENCY MEDICINE
Payer: COMMERCIAL

## 2022-06-04 ENCOUNTER — PATIENT MESSAGE (OUTPATIENT)
Dept: FAMILY MEDICINE | Facility: CLINIC | Age: 37
End: 2022-06-04
Payer: COMMERCIAL

## 2022-06-04 DIAGNOSIS — R51.9 NONINTRACTABLE HEADACHE, UNSPECIFIED CHRONICITY PATTERN, UNSPECIFIED HEADACHE TYPE: Primary | ICD-10-CM

## 2022-06-04 DIAGNOSIS — R51.9 GENERALIZED HEADACHES: Primary | ICD-10-CM

## 2022-06-04 PROCEDURE — 63600175 PHARM REV CODE 636 W HCPCS: Performed by: EMERGENCY MEDICINE

## 2022-06-04 PROCEDURE — 96375 TX/PRO/DX INJ NEW DRUG ADDON: CPT

## 2022-06-04 PROCEDURE — 99284 EMERGENCY DEPT VISIT MOD MDM: CPT | Mod: 25

## 2022-06-04 PROCEDURE — 96361 HYDRATE IV INFUSION ADD-ON: CPT

## 2022-06-04 PROCEDURE — 25000003 PHARM REV CODE 250: Performed by: EMERGENCY MEDICINE

## 2022-06-04 PROCEDURE — 96374 THER/PROPH/DIAG INJ IV PUSH: CPT

## 2022-06-04 RX ORDER — PROCHLORPERAZINE EDISYLATE 5 MG/ML
10 INJECTION INTRAMUSCULAR; INTRAVENOUS
Status: COMPLETED | OUTPATIENT
Start: 2022-06-04 | End: 2022-06-04

## 2022-06-04 RX ORDER — KETOROLAC TROMETHAMINE 30 MG/ML
15 INJECTION, SOLUTION INTRAMUSCULAR; INTRAVENOUS
Status: COMPLETED | OUTPATIENT
Start: 2022-06-04 | End: 2022-06-04

## 2022-06-04 RX ORDER — DIPHENHYDRAMINE HYDROCHLORIDE 50 MG/ML
25 INJECTION INTRAMUSCULAR; INTRAVENOUS
Status: COMPLETED | OUTPATIENT
Start: 2022-06-04 | End: 2022-06-04

## 2022-06-04 RX ADMIN — KETOROLAC TROMETHAMINE 15 MG: 30 INJECTION, SOLUTION INTRAMUSCULAR at 10:06

## 2022-06-04 RX ADMIN — DIPHENHYDRAMINE HYDROCHLORIDE 25 MG: 50 INJECTION, SOLUTION INTRAMUSCULAR; INTRAVENOUS at 10:06

## 2022-06-04 RX ADMIN — PROCHLORPERAZINE EDISYLATE 10 MG: 5 INJECTION INTRAMUSCULAR; INTRAVENOUS at 10:06

## 2022-06-04 RX ADMIN — SODIUM CHLORIDE 1000 ML: 0.9 INJECTION, SOLUTION INTRAVENOUS at 10:06

## 2022-06-05 VITALS
RESPIRATION RATE: 17 BRPM | TEMPERATURE: 98 F | WEIGHT: 277 LBS | HEIGHT: 67 IN | DIASTOLIC BLOOD PRESSURE: 50 MMHG | BODY MASS INDEX: 43.47 KG/M2 | OXYGEN SATURATION: 96 % | SYSTOLIC BLOOD PRESSURE: 108 MMHG | HEART RATE: 70 BPM

## 2022-06-05 NOTE — ED NOTES
Assumed care:  Sonia Goldberg is awake, alert and oriented x 3, skin warm and dry, in NAD with family at bedside.  Patient with history of migraines CO HA x 2 weeks with N&V, light and sound sensitivity.  States that she has been taking excedrine and imitrex.    Patient identifiers for Sonia Goldberg checked and correct.  LOC:  Sonia Goldberg is awake, alert, and aware of environment with an appropriate affect. She is oriented x 3 and speaking appropriately.  APPEARANCE:  She is resting comfortably and in no acute distress. She is clean and well groomed, patient's clothing is properly fastened.  SKIN:  The skin is warm and dry. She has normal skin turgor and moist mucus membranes. Skin is intact; no bruising or breakdown noted.  MUSCULOSKELETAL:  She is moving all extremities well, no obvious deformities noted. Pulses intact.   RESPIRATORY:  Airway is open and patent. Respirations are spontaneous and non-labored with normal effort and rate.  CARDIAC:  She has a normal rate and rhythm. No peripheral edema noted. Capillary refill < 3 seconds.  ABDOMEN:  No distention noted.  Soft and non-tender upon palpation.  N&V  NEUROLOGICAL:  PERRL. Facial expression is symmetrical. Hand grasps are equal bilaterally. Normal sensation in all extremities when touched with finger.  Migraine  Allergies reported:    Review of patient's allergies indicates:   Allergen Reactions    Contrast media Anaphylaxis    Iodine and iodide containing products Anaphylaxis    Levaquin [levofloxacin] Anaphylaxis    Levofloxacin in d5w Anaphylaxis    Sulfa (sulfonamide antibiotics) Anaphylaxis and Hives    Iodinated contrast media Hives    Magnesium      Pt reporting she is allergic to magnesium citrate oral drink.     Morphine Hives    Adhesive Rash    Nut [tree nut] Hives     OTHER NOTES:
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
## 2022-06-06 ENCOUNTER — PATIENT MESSAGE (OUTPATIENT)
Dept: FAMILY MEDICINE | Facility: CLINIC | Age: 37
End: 2022-06-06
Payer: COMMERCIAL

## 2022-06-06 NOTE — TELEPHONE ENCOUNTER
Please see attached portal message from patient. Writer contacted patient by phone for notification that Dr. Reyes is on vacation this week. Offer an appointment with a provider in clinic for follow up in Dr. Reyes's absence. Patient declined. States she prefers to wait until Dr. Reyes returns since he has been handling this matter. Please advise. Thank you.

## 2022-06-08 ENCOUNTER — HOSPITAL ENCOUNTER (OUTPATIENT)
Dept: RADIOLOGY | Facility: HOSPITAL | Age: 37
Discharge: HOME | End: 2022-06-08
Attending: SPECIALIST
Payer: COMMERCIAL

## 2022-06-08 DIAGNOSIS — Z12.31 VISIT FOR SCREENING MAMMOGRAM: ICD-10-CM

## 2022-06-08 PROCEDURE — 77067 MAMMO DIGITAL SCREENING BILAT WITH TOMO: ICD-10-PCS | Mod: 26,,, | Performed by: RADIOLOGY

## 2022-06-08 PROCEDURE — 77063 BREAST TOMOSYNTHESIS BI: CPT | Mod: 26,,, | Performed by: RADIOLOGY

## 2022-06-08 PROCEDURE — 77063 BREAST TOMOSYNTHESIS BI: CPT | Mod: TC

## 2022-06-08 PROCEDURE — 77063 MAMMO DIGITAL SCREENING BILAT WITH TOMO: ICD-10-PCS | Mod: 26,,, | Performed by: RADIOLOGY

## 2022-06-08 PROCEDURE — 77067 SCR MAMMO BI INCL CAD: CPT | Mod: 26,,, | Performed by: RADIOLOGY

## 2022-06-10 ENCOUNTER — TELEPHONE (OUTPATIENT)
Dept: FAMILY MEDICINE | Facility: CLINIC | Age: 37
End: 2022-06-10
Payer: COMMERCIAL

## 2022-06-10 NOTE — TELEPHONE ENCOUNTER
Insurance has denied coverage for Ozempic.  Must have a dx of diabetes and hx of sub optimal response or intolerance to metformin.  Patient has been informed.

## 2022-06-28 ENCOUNTER — TELEPHONE (OUTPATIENT)
Dept: NEUROLOGY | Facility: CLINIC | Age: 37
End: 2022-06-28

## 2022-06-28 ENCOUNTER — OFFICE VISIT (OUTPATIENT)
Dept: NEUROLOGY | Facility: CLINIC | Age: 37
End: 2022-06-28
Payer: COMMERCIAL

## 2022-06-28 VITALS
DIASTOLIC BLOOD PRESSURE: 90 MMHG | HEART RATE: 92 BPM | BODY MASS INDEX: 44.7 KG/M2 | WEIGHT: 284.81 LBS | HEIGHT: 67 IN | SYSTOLIC BLOOD PRESSURE: 154 MMHG | RESPIRATION RATE: 17 BRPM

## 2022-06-28 DIAGNOSIS — G43.011 INTRACTABLE MIGRAINE WITHOUT AURA AND WITH STATUS MIGRAINOSUS: Primary | ICD-10-CM

## 2022-06-28 DIAGNOSIS — H93.A3 PULSATILE TINNITUS OF BOTH EARS: ICD-10-CM

## 2022-06-28 DIAGNOSIS — H53.8 BLURRED VISION: ICD-10-CM

## 2022-06-28 DIAGNOSIS — R51.9 WORSENING HEADACHES: ICD-10-CM

## 2022-06-28 DIAGNOSIS — F41.9 ANXIETY: ICD-10-CM

## 2022-06-28 DIAGNOSIS — E66.01 MORBID OBESITY: ICD-10-CM

## 2022-06-28 DIAGNOSIS — R51.0 POSITIONAL HEADACHE: ICD-10-CM

## 2022-06-28 PROCEDURE — 3008F BODY MASS INDEX DOCD: CPT | Mod: CPTII,S$GLB,, | Performed by: NURSE PRACTITIONER

## 2022-06-28 PROCEDURE — 1159F MED LIST DOCD IN RCRD: CPT | Mod: CPTII,S$GLB,, | Performed by: NURSE PRACTITIONER

## 2022-06-28 PROCEDURE — 1160F RVW MEDS BY RX/DR IN RCRD: CPT | Mod: CPTII,S$GLB,, | Performed by: NURSE PRACTITIONER

## 2022-06-28 PROCEDURE — 99215 PR OFFICE/OUTPT VISIT, EST, LEVL V, 40-54 MIN: ICD-10-PCS | Mod: FS,S$GLB,, | Performed by: NURSE PRACTITIONER

## 2022-06-28 PROCEDURE — 3044F PR MOST RECENT HEMOGLOBIN A1C LEVEL <7.0%: ICD-10-PCS | Mod: CPTII,S$GLB,, | Performed by: NURSE PRACTITIONER

## 2022-06-28 PROCEDURE — 3080F PR MOST RECENT DIASTOLIC BLOOD PRESSURE >= 90 MM HG: ICD-10-PCS | Mod: CPTII,S$GLB,, | Performed by: NURSE PRACTITIONER

## 2022-06-28 PROCEDURE — 99999 PR PBB SHADOW E&M-EST. PATIENT-LVL IV: ICD-10-PCS | Mod: PBBFAC,,, | Performed by: NURSE PRACTITIONER

## 2022-06-28 PROCEDURE — 1160F PR REVIEW ALL MEDS BY PRESCRIBER/CLIN PHARMACIST DOCUMENTED: ICD-10-PCS | Mod: CPTII,S$GLB,, | Performed by: NURSE PRACTITIONER

## 2022-06-28 PROCEDURE — 3077F PR MOST RECENT SYSTOLIC BLOOD PRESSURE >= 140 MM HG: ICD-10-PCS | Mod: CPTII,S$GLB,, | Performed by: NURSE PRACTITIONER

## 2022-06-28 PROCEDURE — 1159F PR MEDICATION LIST DOCUMENTED IN MEDICAL RECORD: ICD-10-PCS | Mod: CPTII,S$GLB,, | Performed by: NURSE PRACTITIONER

## 2022-06-28 PROCEDURE — 3077F SYST BP >= 140 MM HG: CPT | Mod: CPTII,S$GLB,, | Performed by: NURSE PRACTITIONER

## 2022-06-28 PROCEDURE — 99215 OFFICE O/P EST HI 40 MIN: CPT | Mod: FS,S$GLB,, | Performed by: NURSE PRACTITIONER

## 2022-06-28 PROCEDURE — 3008F PR BODY MASS INDEX (BMI) DOCUMENTED: ICD-10-PCS | Mod: CPTII,S$GLB,, | Performed by: NURSE PRACTITIONER

## 2022-06-28 PROCEDURE — 3080F DIAST BP >= 90 MM HG: CPT | Mod: CPTII,S$GLB,, | Performed by: NURSE PRACTITIONER

## 2022-06-28 PROCEDURE — 3044F HG A1C LEVEL LT 7.0%: CPT | Mod: CPTII,S$GLB,, | Performed by: NURSE PRACTITIONER

## 2022-06-28 PROCEDURE — 99999 PR PBB SHADOW E&M-EST. PATIENT-LVL IV: CPT | Mod: PBBFAC,,, | Performed by: NURSE PRACTITIONER

## 2022-06-28 RX ORDER — RIZATRIPTAN BENZOATE 10 MG/1
TABLET ORAL
Qty: 10 TABLET | Refills: 11 | Status: SHIPPED | OUTPATIENT
Start: 2022-06-28 | End: 2022-07-25

## 2022-06-28 RX ORDER — PREDNISONE 10 MG/1
TABLET ORAL
Qty: 20 TABLET | Refills: 0 | Status: SHIPPED | OUTPATIENT
Start: 2022-06-28 | End: 2022-07-25

## 2022-06-28 NOTE — TELEPHONE ENCOUNTER
Left voicemail message at ext [phone] MRI main campus.  Pt needing to get scheduled for IV sedation with MRI/MRV.  Left patients MRN and my extension here in headache clinic.

## 2022-06-28 NOTE — PATIENT INSTRUCTIONS
Please call our clinic at [phone] or send a message on the [facility] portal if there are any changes to the plan described below, for example,if you are not contacted for the requested tests, referral(s) within one week, if you are unable to receive the medications prescribed, or if you feel you need to change the treatment course for any reason.     TESTING:  -- MRI, MRV    REFERRALS:  -- none    PREVENTION (use daily regardless of headache):  -- continue current medications  -- start magnesium in ONE of the following preparations -               1. Magnesium oxide 800mg daily (the most common over the counter kind, may causes loose stools)              2. Magnesium citrate 400-500mg daily (harder to find, but more neutral on the bowels)              3. Magnesium glycinate 400mg daily (hardest to find, look online, but most bowel-neutral, best absorbed)     AS-NEEDED TREATMENT (use total no more than 10 days per month unless otherwise stated):  -- START 8-day prednisone course tomorrow morning   -- START rizatriptan with next migraine attack. Can repeat two hours later if needed.

## 2022-06-28 NOTE — ASSESSMENT & PLAN NOTE
On Wellbutrin and Ozempic. Medication options need to be weight neutral or side effect of weight loss.

## 2022-06-28 NOTE — ASSESSMENT & PLAN NOTE
"Severe. Cannot do MRI without sedation per her report. Offered valium and patient states "that's not enough". Offered open MRI and again patient states she cannot do that as she will still have part of MRI machine above face. She requests IV sedation.  "

## 2022-07-18 DIAGNOSIS — H53.8 BLURRED VISION: ICD-10-CM

## 2022-07-18 DIAGNOSIS — H93.A3 PULSATILE TINNITUS OF BOTH EARS: Primary | ICD-10-CM

## 2022-07-18 DIAGNOSIS — R51.9 WORSENING HEADACHES: ICD-10-CM

## 2022-07-18 DIAGNOSIS — R51.0 POSITIONAL HEADACHE: ICD-10-CM

## 2022-07-19 ENCOUNTER — PATIENT MESSAGE (OUTPATIENT)
Dept: NEUROLOGY | Facility: CLINIC | Age: 37
End: 2022-07-19
Payer: COMMERCIAL

## 2022-07-25 ENCOUNTER — OFFICE VISIT (OUTPATIENT)
Dept: PSYCHIATRY | Facility: CLINIC | Age: 37
End: 2022-07-25
Payer: COMMERCIAL

## 2022-07-25 VITALS
HEIGHT: 67 IN | BODY MASS INDEX: 43.84 KG/M2 | DIASTOLIC BLOOD PRESSURE: 90 MMHG | HEART RATE: 81 BPM | WEIGHT: 279.31 LBS | SYSTOLIC BLOOD PRESSURE: 125 MMHG

## 2022-07-25 DIAGNOSIS — F31.81 BIPOLAR II DISORDER: Primary | ICD-10-CM

## 2022-07-25 DIAGNOSIS — F41.1 GAD (GENERALIZED ANXIETY DISORDER): ICD-10-CM

## 2022-07-25 DIAGNOSIS — F43.10 PTSD (POST-TRAUMATIC STRESS DISORDER): ICD-10-CM

## 2022-07-25 PROCEDURE — 3080F PR MOST RECENT DIASTOLIC BLOOD PRESSURE >= 90 MM HG: ICD-10-PCS | Mod: CPTII,S$GLB,, | Performed by: PHYSICIAN ASSISTANT

## 2022-07-25 PROCEDURE — 99999 PR PBB SHADOW E&M-EST. PATIENT-LVL III: CPT | Mod: PBBFAC,,, | Performed by: PHYSICIAN ASSISTANT

## 2022-07-25 PROCEDURE — 1160F PR REVIEW ALL MEDS BY PRESCRIBER/CLIN PHARMACIST DOCUMENTED: ICD-10-PCS | Mod: CPTII,S$GLB,, | Performed by: PHYSICIAN ASSISTANT

## 2022-07-25 PROCEDURE — 3044F HG A1C LEVEL LT 7.0%: CPT | Mod: CPTII,S$GLB,, | Performed by: PHYSICIAN ASSISTANT

## 2022-07-25 PROCEDURE — 1160F RVW MEDS BY RX/DR IN RCRD: CPT | Mod: CPTII,S$GLB,, | Performed by: PHYSICIAN ASSISTANT

## 2022-07-25 PROCEDURE — 99214 OFFICE O/P EST MOD 30 MIN: CPT | Mod: S$GLB,,, | Performed by: PHYSICIAN ASSISTANT

## 2022-07-25 PROCEDURE — 1159F MED LIST DOCD IN RCRD: CPT | Mod: CPTII,S$GLB,, | Performed by: PHYSICIAN ASSISTANT

## 2022-07-25 PROCEDURE — 3074F PR MOST RECENT SYSTOLIC BLOOD PRESSURE < 130 MM HG: ICD-10-PCS | Mod: CPTII,S$GLB,, | Performed by: PHYSICIAN ASSISTANT

## 2022-07-25 PROCEDURE — 3008F PR BODY MASS INDEX (BMI) DOCUMENTED: ICD-10-PCS | Mod: CPTII,S$GLB,, | Performed by: PHYSICIAN ASSISTANT

## 2022-07-25 PROCEDURE — 99214 PR OFFICE/OUTPT VISIT, EST, LEVL IV, 30-39 MIN: ICD-10-PCS | Mod: S$GLB,,, | Performed by: PHYSICIAN ASSISTANT

## 2022-07-25 PROCEDURE — 3074F SYST BP LT 130 MM HG: CPT | Mod: CPTII,S$GLB,, | Performed by: PHYSICIAN ASSISTANT

## 2022-07-25 PROCEDURE — 3008F BODY MASS INDEX DOCD: CPT | Mod: CPTII,S$GLB,, | Performed by: PHYSICIAN ASSISTANT

## 2022-07-25 PROCEDURE — 3044F PR MOST RECENT HEMOGLOBIN A1C LEVEL <7.0%: ICD-10-PCS | Mod: CPTII,S$GLB,, | Performed by: PHYSICIAN ASSISTANT

## 2022-07-25 PROCEDURE — 99999 PR PBB SHADOW E&M-EST. PATIENT-LVL III: ICD-10-PCS | Mod: PBBFAC,,, | Performed by: PHYSICIAN ASSISTANT

## 2022-07-25 PROCEDURE — 3080F DIAST BP >= 90 MM HG: CPT | Mod: CPTII,S$GLB,, | Performed by: PHYSICIAN ASSISTANT

## 2022-07-25 PROCEDURE — 1159F PR MEDICATION LIST DOCUMENTED IN MEDICAL RECORD: ICD-10-PCS | Mod: CPTII,S$GLB,, | Performed by: PHYSICIAN ASSISTANT

## 2022-07-25 RX ORDER — BUPROPION HYDROCHLORIDE 150 MG/1
150 TABLET ORAL DAILY
Qty: 90 TABLET | Refills: 0 | Status: SHIPPED | OUTPATIENT
Start: 2022-07-25 | End: 2022-09-05 | Stop reason: SDUPTHER

## 2022-07-25 RX ORDER — CARIPRAZINE 3 MG/1
3 CAPSULE, GELATIN COATED ORAL DAILY
Qty: 30 CAPSULE | Refills: 1 | Status: SHIPPED | OUTPATIENT
Start: 2022-07-25 | End: 2022-09-05 | Stop reason: SDUPTHER

## 2022-07-25 RX ORDER — LORAZEPAM 1 MG/1
1 TABLET ORAL EVERY 6 HOURS PRN
Qty: 15 TABLET | Refills: 0 | Status: SHIPPED | OUTPATIENT
Start: 2022-07-25 | End: 2023-10-17
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
## 2022-07-25 NOTE — PATIENT INSTRUCTIONS
Increase Vraylar to 3mg for bipolar depression    Continue wellbutrin    Utilize ativan 1mg as needed for sleep/anxiety, #15 tablets    Please go to emergency department if feeling as though you are a harm to yourself or others or if you are in crisis. Please call the clinic to report any worsening of symptoms or problems associated with medication.

## 2022-08-02 ENCOUNTER — PATIENT MESSAGE (OUTPATIENT)
Dept: NEUROLOGY | Facility: CLINIC | Age: 37
End: 2022-08-02
Payer: COMMERCIAL

## 2022-08-02 ENCOUNTER — HOSPITAL ENCOUNTER (EMERGENCY)
Facility: HOSPITAL | Age: 37
Discharge: HOME | End: 2022-08-02
Attending: EMERGENCY MEDICINE
Payer: COMMERCIAL

## 2022-08-02 VITALS
SYSTOLIC BLOOD PRESSURE: 127 MMHG | DIASTOLIC BLOOD PRESSURE: 69 MMHG | WEIGHT: 270 LBS | TEMPERATURE: 99 F | RESPIRATION RATE: 18 BRPM | OXYGEN SATURATION: 96 % | HEIGHT: 67 IN | BODY MASS INDEX: 42.38 KG/M2 | HEART RATE: 79 BPM

## 2022-08-02 DIAGNOSIS — R51.9 HEADACHE: ICD-10-CM

## 2022-08-02 DIAGNOSIS — G43.009 MIGRAINE WITHOUT AURA AND WITHOUT STATUS MIGRAINOSUS, NOT INTRACTABLE: Primary | ICD-10-CM

## 2022-08-02 LAB
ALBUMIN SERPL BCP-MCNC: 3.2 G/DL (ref 3.5–5.2)
ALP SERPL-CCNC: 74 U/L (ref 55–135)
ALT SERPL W/O P-5'-P-CCNC: 13 U/L (ref 10–44)
ANION GAP SERPL CALC-SCNC: 10 MMOL/L (ref 8–16)
AST SERPL-CCNC: 9 U/L (ref 10–40)
B-HCG UR QL: NEGATIVE
BASOPHILS # BLD AUTO: 0.04 K/UL (ref 0–0.2)
BASOPHILS NFR BLD: 0.4 % (ref 0–1.9)
BILIRUB SERPL-MCNC: 0.1 MG/DL (ref 0.1–1)
BUN SERPL-MCNC: 9 MG/DL (ref 6–20)
CALCIUM SERPL-MCNC: 10.2 MG/DL (ref 8.7–10.5)
CHLORIDE SERPL-SCNC: 105 MMOL/L (ref 95–110)
CO2 SERPL-SCNC: 24 MMOL/L (ref 23–29)
CREAT SERPL-MCNC: 0.8 MG/DL (ref 0.5–1.4)
DIFFERENTIAL METHOD: ABNORMAL
EOSINOPHIL # BLD AUTO: 0.1 K/UL (ref 0–0.5)
EOSINOPHIL NFR BLD: 0.9 % (ref 0–8)
ERYTHROCYTE [DISTWIDTH] IN BLOOD BY AUTOMATED COUNT: 14.6 % (ref 11.5–14.5)
EST. GFR  (NO RACE VARIABLE): >60 ML/MIN/1.73 M^2
GLUCOSE SERPL-MCNC: 108 MG/DL (ref 70–110)
HCT VFR BLD AUTO: 37.1 % (ref 37–48.5)
HGB BLD-MCNC: 11.8 G/DL (ref 12–16)
IMM GRANULOCYTES # BLD AUTO: 0.02 K/UL (ref 0–0.04)
IMM GRANULOCYTES NFR BLD AUTO: 0.2 % (ref 0–0.5)
LYMPHOCYTES # BLD AUTO: 3 K/UL (ref 1–4.8)
LYMPHOCYTES NFR BLD: 28.3 % (ref 18–48)
MCH RBC QN AUTO: 26.2 PG (ref 27–31)
MCHC RBC AUTO-ENTMCNC: 31.8 G/DL (ref 32–36)
MCV RBC AUTO: 82 FL (ref 82–98)
MONOCYTES # BLD AUTO: 0.6 K/UL (ref 0.3–1)
MONOCYTES NFR BLD: 5.5 % (ref 4–15)
NEUTROPHILS # BLD AUTO: 6.9 K/UL (ref 1.8–7.7)
NEUTROPHILS NFR BLD: 64.7 % (ref 38–73)
NRBC BLD-RTO: 0 /100 WBC
PLATELET # BLD AUTO: 360 K/UL (ref 150–450)
PMV BLD AUTO: 10.4 FL (ref 9.2–12.9)
POTASSIUM SERPL-SCNC: 3.4 MMOL/L (ref 3.5–5.1)
PROT SERPL-MCNC: 6.5 G/DL (ref 6–8.4)
RBC # BLD AUTO: 4.5 M/UL (ref 4–5.4)
SODIUM SERPL-SCNC: 139 MMOL/L (ref 136–145)
WBC # BLD AUTO: 10.65 K/UL (ref 3.9–12.7)

## 2022-08-02 PROCEDURE — 99285 EMERGENCY DEPT VISIT HI MDM: CPT | Mod: 25

## 2022-08-02 PROCEDURE — 85025 COMPLETE CBC W/AUTO DIFF WBC: CPT | Performed by: EMERGENCY MEDICINE

## 2022-08-02 PROCEDURE — 96374 THER/PROPH/DIAG INJ IV PUSH: CPT

## 2022-08-02 PROCEDURE — 25000003 PHARM REV CODE 250: Performed by: EMERGENCY MEDICINE

## 2022-08-02 PROCEDURE — 63600175 PHARM REV CODE 636 W HCPCS: Performed by: EMERGENCY MEDICINE

## 2022-08-02 PROCEDURE — 96361 HYDRATE IV INFUSION ADD-ON: CPT

## 2022-08-02 PROCEDURE — 80053 COMPREHEN METABOLIC PANEL: CPT | Performed by: EMERGENCY MEDICINE

## 2022-08-02 PROCEDURE — 96375 TX/PRO/DX INJ NEW DRUG ADDON: CPT

## 2022-08-02 PROCEDURE — 81025 URINE PREGNANCY TEST: CPT | Performed by: EMERGENCY MEDICINE

## 2022-08-02 RX ORDER — SODIUM CHLORIDE 9 MG/ML
INJECTION, SOLUTION INTRAVENOUS
Status: COMPLETED | OUTPATIENT
Start: 2022-08-02 | End: 2022-08-02

## 2022-08-02 RX ORDER — METOCLOPRAMIDE 10 MG/1
10 TABLET ORAL EVERY 6 HOURS
Qty: 30 TABLET | Refills: 0 | Status: SHIPPED | OUTPATIENT
Start: 2022-08-02 | End: 2022-09-05

## 2022-08-02 RX ORDER — ACETAMINOPHEN 325 MG/1
650 TABLET ORAL
Status: COMPLETED | OUTPATIENT
Start: 2022-08-02 | End: 2022-08-02

## 2022-08-02 RX ORDER — BUTORPHANOL TARTRATE 1 MG/ML
1 INJECTION INTRAMUSCULAR; INTRAVENOUS
Status: COMPLETED | OUTPATIENT
Start: 2022-08-02 | End: 2022-08-02

## 2022-08-02 RX ORDER — DIPHENHYDRAMINE HYDROCHLORIDE 50 MG/ML
25 INJECTION INTRAMUSCULAR; INTRAVENOUS
Status: COMPLETED | OUTPATIENT
Start: 2022-08-02 | End: 2022-08-02

## 2022-08-02 RX ORDER — BUTALBITAL, ACETAMINOPHEN AND CAFFEINE 50; 325; 40 MG/1; MG/1; MG/1
1 TABLET ORAL EVERY 4 HOURS PRN
Qty: 15 TABLET | Refills: 0 | Status: SHIPPED | OUTPATIENT
Start: 2022-08-02 | End: 2022-09-01

## 2022-08-02 RX ORDER — POTASSIUM CHLORIDE 20 MEQ/1
40 TABLET, EXTENDED RELEASE ORAL
Status: COMPLETED | OUTPATIENT
Start: 2022-08-02 | End: 2022-08-02

## 2022-08-02 RX ORDER — DEXAMETHASONE SODIUM PHOSPHATE 4 MG/ML
8 INJECTION, SOLUTION INTRA-ARTICULAR; INTRALESIONAL; INTRAMUSCULAR; INTRAVENOUS; SOFT TISSUE
Status: COMPLETED | OUTPATIENT
Start: 2022-08-02 | End: 2022-08-02

## 2022-08-02 RX ORDER — SUMATRIPTAN SUCCINATE 100 MG/1
TABLET ORAL
Status: ON HOLD | COMMUNITY
Start: 2022-07-29 | End: 2022-08-03

## 2022-08-02 RX ORDER — BUTALBITAL, ACETAMINOPHEN AND CAFFEINE 50; 325; 40 MG/1; MG/1; MG/1
1 TABLET ORAL
Status: COMPLETED | OUTPATIENT
Start: 2022-08-02 | End: 2022-08-02

## 2022-08-02 RX ORDER — DROPERIDOL 2.5 MG/ML
2.5 INJECTION, SOLUTION INTRAMUSCULAR; INTRAVENOUS
Status: COMPLETED | OUTPATIENT
Start: 2022-08-02 | End: 2022-08-02

## 2022-08-02 RX ORDER — KETOROLAC TROMETHAMINE 30 MG/ML
30 INJECTION, SOLUTION INTRAMUSCULAR; INTRAVENOUS
Status: COMPLETED | OUTPATIENT
Start: 2022-08-02 | End: 2022-08-02

## 2022-08-02 RX ADMIN — POTASSIUM CHLORIDE 40 MEQ: 1500 TABLET, EXTENDED RELEASE ORAL at 09:08

## 2022-08-02 RX ADMIN — SODIUM CHLORIDE: 0.9 INJECTION, SOLUTION INTRAVENOUS at 08:08

## 2022-08-02 RX ADMIN — BUTORPHANOL TARTRATE 1 MG: 1 INJECTION, SOLUTION INTRAMUSCULAR; INTRAVENOUS at 09:08

## 2022-08-02 RX ADMIN — ACETAMINOPHEN 650 MG: 325 TABLET ORAL at 08:08

## 2022-08-02 RX ADMIN — DROPERIDOL 2.5 MG: 2.5 INJECTION, SOLUTION INTRAMUSCULAR; INTRAVENOUS at 08:08

## 2022-08-02 RX ADMIN — DEXAMETHASONE SODIUM PHOSPHATE 8 MG: 4 INJECTION, SOLUTION INTRA-ARTICULAR; INTRALESIONAL; INTRAMUSCULAR; INTRAVENOUS; SOFT TISSUE at 08:08

## 2022-08-02 RX ADMIN — DIPHENHYDRAMINE HYDROCHLORIDE 25 MG: 50 INJECTION, SOLUTION INTRAMUSCULAR; INTRAVENOUS at 08:08

## 2022-08-02 RX ADMIN — KETOROLAC TROMETHAMINE 30 MG: 30 INJECTION, SOLUTION INTRAMUSCULAR; INTRAVENOUS at 08:08

## 2022-08-02 RX ADMIN — BUTALBITAL, ACETAMINOPHEN, AND CAFFEINE 1 TABLET: 50; 325; 40 TABLET ORAL at 08:08

## 2022-08-02 NOTE — Clinical Note
"Sonia Bellhanie" Ashlyn was seen and treated in our emergency department on 8/2/2022.  She may return to work on 08/04/2022.       If you have any questions or concerns, please don't hesitate to call.      Ines King RN    "

## 2022-08-02 NOTE — PRE-PROCEDURE INSTRUCTIONS
PreOp/MRI Instructions given:   - Verbal medication information (what to hold and what to take)   - NPO guidelines [id number] CLEARS ONLY  - Arrival place directions given; time to be given the day before procedure by the   Surgeon's Office 1100 - [place]  - Bathing with antibacterial soap   - Don't wear any jewelry or bring any valuables AM of surgery   - No makeup or moisturizer to face   - No perfume/cologne, powder, lotions or aftershave   Pt. verbalized understanding.   Pt denies any h/o Anesthesia/Sedation complications or side effects.

## 2022-08-02 NOTE — FIRST PROVIDER EVALUATION
" Emergency Department TeleTriage Encounter Note      CHIEF COMPLAINT    Chief Complaint   Patient presents with    Headache     H/A x "1-2 months ago"; worse today       VITAL SIGNS   Initial Vitals [08/02/22 1856]   BP Pulse Resp Temp SpO2   (!) 178/106 83 18 98.2 °F (36.8 °C) 98 %      MAP       --            ALLERGIES    Review of patient's allergies indicates:   Allergen Reactions    Contrast media Anaphylaxis    Iodine and iodide containing products Anaphylaxis    Levaquin [levofloxacin] Anaphylaxis    Levofloxacin in d5w Anaphylaxis    Sulfa (sulfonamide antibiotics) Anaphylaxis and Hives    Iodinated contrast media Hives    Magnesium      Pt reporting she is allergic to magnesium citrate oral drink.     Morphine Hives    Adhesive Rash    Compazine [prochlorperazine] Anxiety     Restless legs    Nut [tree nut] Hives       PROVIDER TRIAGE NOTE  TeleTriage Note: Sonia Goldberg, a nontoxic/well appearing, 36 y.o. female, presented to the ED with c/o migraine for the past couple of months. Worse today. Now has tingling to her left side that began today. She is having difficulty speaking. She has taken excedrin migraine for her headache. Advised to come to the ED per neurology due to onset of new symptoms.     Providers in ED notified to evaluate the patient for stroke code.     All ED beds are full at present; patient notified of this status.  Patient seen and medically screened by Nurse Practitioner via teletriage. Orders initiated at triage to expedite care.  Patient is stable to return to the waiting room and will be placed in an ED bed when available.  Care will be transferred to an alternate provider when patient has been placed in an Exam Room from the [facility] for physical exam, additional orders, and disposition.  6:58 PM Ximena Parks DNP, FNP-C        ORDERS  Labs Reviewed - No data to display    ED Orders (720h ago, onward)    None            Virtual Visit Note: The provider triage " portion of this emergency department evaluation and documentation was performed via [employer], a HIPAA-compliant telemedicine application, in concert with a tele-presenter in the room. A face to face patient evaluation with one of my colleagues will occur once the patient is placed in an emergency department room.      DISCLAIMER: This note was prepared with [employer] voice recognition transcription software. Garbled syntax, mangled pronouns, and other bizarre constructions may be attributed to that software system.

## 2022-08-03 ENCOUNTER — ANESTHESIA EVENT (OUTPATIENT)
Dept: ENDOSCOPY | Facility: HOSPITAL | Age: 37
End: 2022-08-03
Payer: COMMERCIAL

## 2022-08-03 ENCOUNTER — HOSPITAL ENCOUNTER (OUTPATIENT)
Dept: RADIOLOGY | Facility: HOSPITAL | Age: 37
Discharge: HOME | End: 2022-08-03
Attending: NURSE PRACTITIONER
Payer: COMMERCIAL

## 2022-08-03 ENCOUNTER — TELEPHONE (OUTPATIENT)
Dept: OPTOMETRY | Facility: CLINIC | Age: 37
End: 2022-08-03
Payer: COMMERCIAL

## 2022-08-03 ENCOUNTER — ANESTHESIA (OUTPATIENT)
Dept: ENDOSCOPY | Facility: HOSPITAL | Age: 37
End: 2022-08-03
Payer: COMMERCIAL

## 2022-08-03 ENCOUNTER — TELEPHONE (OUTPATIENT)
Dept: NEUROLOGY | Facility: CLINIC | Age: 37
End: 2022-08-03
Payer: COMMERCIAL

## 2022-08-03 ENCOUNTER — PATIENT MESSAGE (OUTPATIENT)
Dept: NEUROLOGY | Facility: CLINIC | Age: 37
End: 2022-08-03
Payer: COMMERCIAL

## 2022-08-03 ENCOUNTER — HOSPITAL ENCOUNTER (OUTPATIENT)
Facility: HOSPITAL | Age: 37
Discharge: HOME | End: 2022-08-03
Attending: PSYCHIATRY & NEUROLOGY | Admitting: ANESTHESIOLOGY
Payer: COMMERCIAL

## 2022-08-03 VITALS
DIASTOLIC BLOOD PRESSURE: 76 MMHG | HEIGHT: 67 IN | HEART RATE: 73 BPM | OXYGEN SATURATION: 99 % | RESPIRATION RATE: 18 BRPM | WEIGHT: 270 LBS | TEMPERATURE: 98 F | SYSTOLIC BLOOD PRESSURE: 139 MMHG | BODY MASS INDEX: 42.38 KG/M2

## 2022-08-03 DIAGNOSIS — R51.9 WORSENING HEADACHES: ICD-10-CM

## 2022-08-03 DIAGNOSIS — H53.8 BLURRED VISION: Primary | ICD-10-CM

## 2022-08-03 DIAGNOSIS — R51.9 FREQUENT HEADACHES: ICD-10-CM

## 2022-08-03 DIAGNOSIS — R51.0 POSITIONAL HEADACHE: ICD-10-CM

## 2022-08-03 DIAGNOSIS — H93.A3 PULSATILE TINNITUS OF BOTH EARS: ICD-10-CM

## 2022-08-03 DIAGNOSIS — H47.10 OPTIC NERVE SWELLING: ICD-10-CM

## 2022-08-03 DIAGNOSIS — H53.8 BLURRED VISION: ICD-10-CM

## 2022-08-03 LAB
B-HCG UR QL: NEGATIVE
CTP QC/QA: YES

## 2022-08-03 PROCEDURE — 70544 MR ANGIOGRAPHY HEAD W/O DYE: CPT | Mod: 26,59,GC, | Performed by: RADIOLOGY

## 2022-08-03 PROCEDURE — 81025 URINE PREGNANCY TEST: CPT | Performed by: PSYCHIATRY & NEUROLOGY

## 2022-08-03 PROCEDURE — 70544 MR ANGIOGRAPHY HEAD W/O DYE: CPT | Mod: TC

## 2022-08-03 PROCEDURE — D9220A PRA ANESTHESIA: Mod: CRNA,,, | Performed by: NURSE ANESTHETIST, CERTIFIED REGISTERED

## 2022-08-03 PROCEDURE — D9220A PRA ANESTHESIA: ICD-10-PCS | Mod: ANES,,, | Performed by: ANESTHESIOLOGY

## 2022-08-03 PROCEDURE — 70551 MRI BRAIN STEM W/O DYE: CPT | Mod: 26,,, | Performed by: RADIOLOGY

## 2022-08-03 PROCEDURE — D9220A PRA ANESTHESIA: ICD-10-PCS | Mod: CRNA,,, | Performed by: NURSE ANESTHETIST, CERTIFIED REGISTERED

## 2022-08-03 PROCEDURE — 37000008 HC ANESTHESIA 1ST 15 MINUTES

## 2022-08-03 PROCEDURE — 63600175 PHARM REV CODE 636 W HCPCS: Performed by: NURSE ANESTHETIST, CERTIFIED REGISTERED

## 2022-08-03 PROCEDURE — 25000003 PHARM REV CODE 250: Performed by: PSYCHIATRY & NEUROLOGY

## 2022-08-03 PROCEDURE — 70551 MRI BRAIN STEM W/O DYE: CPT | Mod: TC

## 2022-08-03 PROCEDURE — 70544 MRV BRAIN WITHOUT CONTRAST: ICD-10-PCS | Mod: 26,59,GC, | Performed by: RADIOLOGY

## 2022-08-03 PROCEDURE — 71000044 HC DOSC ROUTINE RECOVERY FIRST HOUR

## 2022-08-03 PROCEDURE — 70551 MRI BRAIN WITHOUT CONTRAST: ICD-10-PCS | Mod: 26,,, | Performed by: RADIOLOGY

## 2022-08-03 PROCEDURE — 25000003 PHARM REV CODE 250: Performed by: NURSE ANESTHETIST, CERTIFIED REGISTERED

## 2022-08-03 PROCEDURE — D9220A PRA ANESTHESIA: Mod: ANES,,, | Performed by: ANESTHESIOLOGY

## 2022-08-03 PROCEDURE — 37000009 HC ANESTHESIA EA ADD 15 MINS

## 2022-08-03 RX ORDER — SODIUM CHLORIDE 0.9 % (FLUSH) 0.9 %
10 SYRINGE (ML) INJECTION
Status: DISCONTINUED | OUTPATIENT
Start: 2022-08-03 | End: 2022-08-03 | Stop reason: HOSPADM

## 2022-08-03 RX ORDER — SODIUM CHLORIDE 9 MG/ML
INJECTION, SOLUTION INTRAVENOUS CONTINUOUS
Status: DISCONTINUED | OUTPATIENT
Start: 2022-08-03 | End: 2022-08-03 | Stop reason: HOSPADM

## 2022-08-03 RX ORDER — DEXMEDETOMIDINE HYDROCHLORIDE 100 UG/ML
INJECTION, SOLUTION INTRAVENOUS
Status: DISCONTINUED | OUTPATIENT
Start: 2022-08-03 | End: 2022-08-03

## 2022-08-03 RX ORDER — LIDOCAINE HYDROCHLORIDE 20 MG/ML
INJECTION INTRAVENOUS
Status: DISCONTINUED | OUTPATIENT
Start: 2022-08-03 | End: 2022-08-03

## 2022-08-03 RX ORDER — LIDOCAINE HYDROCHLORIDE 10 MG/ML
1 INJECTION, SOLUTION EPIDURAL; INFILTRATION; INTRACAUDAL; PERINEURAL ONCE
Status: DISCONTINUED | OUTPATIENT
Start: 2022-08-03 | End: 2022-08-03 | Stop reason: HOSPADM

## 2022-08-03 RX ORDER — MIDAZOLAM HYDROCHLORIDE 1 MG/ML
INJECTION, SOLUTION INTRAMUSCULAR; INTRAVENOUS
Status: DISCONTINUED | OUTPATIENT
Start: 2022-08-03 | End: 2022-08-03

## 2022-08-03 RX ORDER — LIDOCAINE HYDROCHLORIDE 10 MG/ML
1 INJECTION, SOLUTION EPIDURAL; INFILTRATION; INTRACAUDAL; PERINEURAL ONCE
Status: DISCONTINUED | OUTPATIENT
Start: 2022-08-03 | End: 2022-08-03

## 2022-08-03 RX ORDER — PROPOFOL 10 MG/ML
VIAL (ML) INTRAVENOUS CONTINUOUS PRN
Status: DISCONTINUED | OUTPATIENT
Start: 2022-08-03 | End: 2022-08-03

## 2022-08-03 RX ORDER — SODIUM CHLORIDE 9 MG/ML
INJECTION, SOLUTION INTRAVENOUS CONTINUOUS
Status: DISCONTINUED | OUTPATIENT
Start: 2022-08-03 | End: 2022-08-03

## 2022-08-03 RX ADMIN — DEXMEDETOMIDINE HYDROCHLORIDE 12 MCG: 100 INJECTION, SOLUTION, CONCENTRATE INTRAVENOUS at 01:08

## 2022-08-03 RX ADMIN — MIDAZOLAM HYDROCHLORIDE 2 MG: 1 INJECTION, SOLUTION INTRAMUSCULAR; INTRAVENOUS at 01:08

## 2022-08-03 RX ADMIN — SODIUM CHLORIDE: 0.9 INJECTION, SOLUTION INTRAVENOUS at 12:08

## 2022-08-03 RX ADMIN — SODIUM CHLORIDE: 0.9 INJECTION, SOLUTION INTRAVENOUS at 01:08

## 2022-08-03 RX ADMIN — LIDOCAINE HYDROCHLORIDE 100 MG: 20 INJECTION, SOLUTION INTRAVENOUS at 01:08

## 2022-08-03 RX ADMIN — PROPOFOL 150 MCG/KG/MIN: 10 INJECTION, EMULSION INTRAVENOUS at 01:08

## 2022-08-03 NOTE — ED NOTES
"Sonia Goldberg presents to the ED with c/o right parietal headache that has been ongoing for the past few months. Patient reports that her head has been hurting "Constantly for the past few months and is worse today." Patient reports tingling to left side, but states that her sensation is the same to both sides.     GHADA WATSONS  Verified patient's name and date of birth.     "

## 2022-08-03 NOTE — ANESTHESIA PREPROCEDURE EVALUATION
08/03/2022  Sonia Goldberg is a 36 y.o., female with an onset of right-sided HA. Patient has had persistent migraine for 2 months which is worse today. She c/o nausea, difficulty finding her words, left sided tingling. The patient denies vision changes, weakness, or any other symptoms at this time. Denies HTN. PMHx of migraine. No pertinent PSHx.    CC: Headaches    ALL: See MAR    MEDS: See MAR    Pre-op Assessment    I have reviewed the Patient Summary Reports.     I have reviewed the Nursing Notes. I have reviewed the NPO Status.   I have reviewed the Medications.     Review of Systems  Anesthesia Hx:  No previous Anesthesia  Neg history of prior surgery. Denies Family Hx of Anesthesia complications.   Denies Personal Hx of Anesthesia complications.   Social:  Non-Smoker, No Alcohol Use    Hematology/Oncology:  Hematology Normal   Oncology Normal     EENT/Dental:EENT/Dental Normal   Cardiovascular:   Exercise tolerance: good Hypertension, well controlled    Pulmonary:   Asthma asymptomatic Shortness of breath Sleep Apnea, CPAP    Education provided regarding risk of obstructive sleep apnea  Medical reason for not educating patient about risks of obstructive sleep apnea   Renal/:  Renal/ Normal     Hepatic/GI:   GERD, well controlled    Musculoskeletal:  Musculoskeletal Normal    Neurological:   Headaches    Endocrine:  Endocrine Normal    Dermatological:  Skin Normal    Psych:   Psychiatric History anxiety depression          Physical Exam  General: Well nourished, Cooperative, Alert and Oriented    Airway:  Mallampati: I / I  Mouth Opening: Normal  TM Distance: Normal  Tongue: Normal  Neck ROM: Normal ROM    Dental:  Intact    Chest/Lungs:  Clear to auscultation, Normal Respiratory Rate    Heart:  Rate: Normal  Rhythm: Regular Rhythm  Sounds: Normal        Anesthesia Plan  Type of Anesthesia,  risks & benefits discussed:    Anesthesia Type: Gen Natural Airway  Intra-op Monitoring Plan: Standard ASA Monitors  Post Op Pain Control Plan: multimodal analgesia  Induction:  IV  Informed Consent: Informed consent signed with the Patient and all parties understand the risks and agree with anesthesia plan.  All questions answered. Patient consented to blood products? No  ASA Score: 1  Day of Surgery Review of History & Physical: H&P completed by Anesthesiologist.    Ready For Surgery From Anesthesia Perspective.     .

## 2022-08-03 NOTE — TELEPHONE ENCOUNTER
Agree with ER evaluation which ruled out stroke and bleed. Agree with needing MRI today. And to keep follow up with me on 8/12
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
## 2022-08-03 NOTE — PLAN OF CARE
Patient tolerated anesthesia with no complaints of pain or nausea. Discharge material given and explained to patient and significant other. Both verbalized understanding. Patient taken to ride via wheelchair in stable condition with no complaints.

## 2022-08-03 NOTE — TELEPHONE ENCOUNTER
----- Message from Magda Bustos MA sent at 8/3/2022  4:26 PM CDT -----  Contact: LEON BAKER [[id number]]  Type: Needs Medical Advice    Who Called:LEON BAKER [[id number]]  Best Call Back Number: [phone]  Inquiry/Question: Please call LEON BAKER [[id number]] regarding concerns with new pt appt    Thank you~

## 2022-08-03 NOTE — TRANSFER OF CARE
"Anesthesia Transfer of Care Note    Patient: Sonia Goldberg    Procedure(s) Performed: Procedure(s) (LRB):  MRI (Magnetic Resonance Imagine) (N/A)    Patient location: [facility]    Anesthesia Type: general    Transport from OR: Transported from OR on 6-10 L/min O2 by face mask with adequate spontaneous ventilation    Post pain: adequate analgesia    Post assessment: no apparent anesthetic complications    Post vital signs: stable    Level of consciousness: sedated    Nausea/Vomiting: no nausea/vomiting    Complications: none    Transfer of care protocol was followed      Last vitals:   Visit Vitals  BP (!) 140/82 (BP Location: Left arm)   Pulse 78   Temp 36.6 °C (97.9 °F) (Temporal)   Resp 18   Ht 5' 7" (1.702 m)   Wt 122.5 kg (270 lb)   LMP 07/18/2022 (Exact Date)   SpO2 97%   Breastfeeding No   BMI 42.29 kg/m²     "

## 2022-08-04 ENCOUNTER — PATIENT MESSAGE (OUTPATIENT)
Dept: NEUROLOGY | Facility: CLINIC | Age: 37
End: 2022-08-04
Payer: COMMERCIAL

## 2022-08-04 ENCOUNTER — PATIENT MESSAGE (OUTPATIENT)
Dept: FAMILY MEDICINE | Facility: CLINIC | Age: 37
End: 2022-08-04
Payer: COMMERCIAL

## 2022-08-04 ENCOUNTER — TELEPHONE (OUTPATIENT)
Dept: OPTOMETRY | Facility: CLINIC | Age: 37
End: 2022-08-04
Payer: COMMERCIAL

## 2022-08-04 NOTE — ANESTHESIA POSTPROCEDURE EVALUATION
Anesthesia Post Evaluation    Patient: Sonia Goldberg    Procedure(s) Performed: Procedure(s) (LRB):  MRI (Magnetic Resonance Imagine) (N/A)    Final Anesthesia Type: general      Patient location during evaluation: PACU  Patient participation: Yes- Able to Participate  Level of consciousness: awake and alert, awake and oriented  Post-procedure vital signs: reviewed and stable  Pain management: adequate  Airway patency: patent    PONV status at discharge: No PONV  Anesthetic complications: no      Cardiovascular status: blood pressure returned to baseline, stable and hemodynamically stable  Respiratory status: unassisted, spontaneous ventilation and room air  Hydration status: euvolemic  Follow-up not needed.          Vitals Value Taken Time   /76 08/03/22 1454   Temp 36.6 °C (97.9 °F) 08/03/22 1418   Pulse 76 08/03/22 1455   Resp 18 08/03/22 1451   SpO2 100 % 08/03/22 1455   Vitals shown include unvalidated device data.      No case tracking events are documented in the log.      Pain/Kevin Score: Kevin Score: 10 (8/3/2022  2:58 PM)

## 2022-08-05 ENCOUNTER — TELEPHONE (OUTPATIENT)
Dept: NEUROLOGY | Facility: CLINIC | Age: 37
End: 2022-08-05
Payer: COMMERCIAL

## 2022-08-05 DIAGNOSIS — G43.011 INTRACTABLE MIGRAINE WITHOUT AURA AND WITH STATUS MIGRAINOSUS: Primary | ICD-10-CM

## 2022-08-05 RX ORDER — RIMEGEPANT SULFATE 75 MG/75MG
75 TABLET, ORALLY DISINTEGRATING ORAL DAILY PRN
Qty: 8 TABLET | Refills: 11 | Status: ON HOLD | OUTPATIENT
Start: 2022-08-05 | End: 2022-08-11 | Stop reason: ALTCHOICE

## 2022-08-05 NOTE — PROGRESS NOTES
She may benefit from an LP done in left lateral decubitus position with the goal to remove fluid to 17 to 20 cm of water. The fastest way to get this would going to the ER.     Will try to get Nurtec covered for acute.

## 2022-08-05 NOTE — TELEPHONE ENCOUNTER
Spoke with Ms Sonia, Migraine with stroke like symptoms, Tuesday went to ER discharged with medications, Reglan and Alisson, diagnosed with Migraine. Wednesday MRI, Results produced a Neurophthalmology referral. (appt later this month) Thursday started with Aphasia again and no relieve with migraine.  Fioricet last pm and this morning.  Informed her I would give Valerie Yang NP this information for recommendations.

## 2022-08-05 NOTE — ANESTHESIA RELEASE NOTE
Post Anesthetic Evaluation    Patient: Sonia Goldberg    Procedure(s) Performed: Procedure(s) (LRB):  Anesthesia Discharge Summary    Admit Date: 8/3/2022    Discharge Date and Time: 8/3/2022  3:07 PM    Attending Physician:  No att. providers found    Discharge Provider:  Ish Bunn MD    Active Problems:   Patient Active Problem List   Diagnosis    Diarrhea    Dyspnea    Vaginal candidiasis    Status asthmaticus    Shortness of breath    Chronic anxiety    Anxiety    Morbid obesity    Class 2 obesity in adult    Obstructive sleep apnea    Asthma    Chest discomfort    Orthostatic hypotension    Near syncope    Syncope and collapse    Hypertension    Tachycardia    Anemia    GI bleed    GERD (gastroesophageal reflux disease)    Iron deficiency anemia due to chronic blood loss    Iron deficiency anemia    Calculus of gallbladder with acute cholecystitis without obstruction    Acute calculous cholecystitis    Subluxation of patella, right, sequela    Gross hematuria    Nausea and vomiting    Dizziness    Bipolar disorder, unspecified    Irritable bowel syndrome without diarrhea    Major depressive disorder, single episode, unspecified    Unspecified asthma, uncomplicated    Intractable migraine without aura and with status migrainosus        Discharged Condition: good    Reason for Admission: <principal problem not specified>    Hospital Course: Patient tolerate procedure and anesthesia well. Test performed without complication.    Consults: none    Significant Diagnostic Studies: None    Treatments/Procedures: Procedure(s) (LRB): anesthesia for exam    Disposition: Home or Self Care    Patient Instructions:   Discharge Medication List as of 8/3/2022  2:36 PM      CONTINUE these medications which have NOT CHANGED    Details   buPROPion (WELLBUTRIN XL) 150 MG TB24 tablet Take 1 tablet (150 mg total) by mouth once daily., Starting Mon 7/25/2022, Normal       butalbital-acetaminophen-caffeine -40 mg (FIORICET, ESGIC) -40 mg per tablet Take 1 tablet by mouth every 4 (four) hours as needed for Headaches., Starting Tue 8/2/2022, Until Thu 9/1/2022 at 2359, Print      cariprazine (VRAYLAR) 3 mg Cap Take 1 capsule (3 mg total) by mouth once daily at 6am., Starting Mon 7/25/2022, Normal      metFORMIN (GLUCOPHAGE-XR) 500 MG ER 24hr tablet Take 1 tablet (500 mg total) by mouth daily with breakfast., Starting Mon 5/30/2022, Until Tue 5/30/2023, Normal      phentermine (ADIPEX-P) 37.5 mg tablet Take 1 tablet (37.5 mg total) by mouth before breakfast., Starting Mon 5/30/2022, Until Sun 8/28/2022, Normal      albuterol (PROVENTIL/VENTOLIN HFA) 90 mcg/actuation inhaler Inhale 2 puffs into the lungs every 6 (six) hours as needed for Wheezing., Starting Thu 5/7/2020, Normal      EPINEPHrine (EPIPEN) 0.3 mg/0.3 mL AtIn Inject 0.3 mLs (0.3 mg total) into the muscle as needed (anaphylaxis)., Starting Fri 10/1/2021, Until Sat 10/1/2022 at 2359, Print      HYDROcodone-acetaminophen (NORCO) 5-325 mg per tablet Take 1 tablet by mouth every 8 (eight) hours as needed for Pain., Starting Mon 4/4/2022, Normal      LORazepam (ATIVAN) 1 MG tablet Take 1 tablet (1 mg total) by mouth every 6 (six) hours as needed for Anxiety., Starting Mon 7/25/2022, Until Wed 8/24/2022 at 2359, Normal      metoclopramide HCl (REGLAN) 10 MG tablet Take 1 tablet (10 mg total) by mouth every 6 (six) hours., Starting Tue 8/2/2022, Print      pulse oximeter (PULSE OXIMETER) device by Apply Externally route 2 (two) times a day. Use twice daily at 8 AM and 3 PM and record the [facility] as directed., Starting Sun 1/9/2022, Normal               Discharge Procedure Orders (must include Diet, Follow-up, Activity)  No discharge procedures on file.     Discharge instructions - Please return to clinic (contact pediatrician etc..) if:  1) Persistent cough.  2) Respiratory difficulty (including: noisy  "breathing, nasal flaring, "barky" cough or wheezing).  3) Persistent pain not responsive to prescribed medications (if any).  4) Change in current mental status (age appropriate).  5) Repeating or recurrent episodes of vomiting.  6) Inability to tolerate oral fluids.      MRI (Magnetic Resonance Imagine) (N/A)    Anesthesia type: general    Patient location: PACU    Post pain: Adequate analgesia    Post assessment: no apparent anesthetic complications, tolerated procedure well and no evidence of recall    Last Vitals:   Vitals:    08/03/22 1451   BP: 139/76   Pulse: 73   Resp: 18   Temp:        Post vital signs: stable    Level of consciousness: awake    Complications: none    Follow-up Needed: <ANEFOLLOWUP>Anesthesia Release from PACU Note    Patient: Sonia Goldberg    Procedure(s) Performed: Procedure(s) (LRB):  MRI (Magnetic Resonance Imagine) (N/A)    Anesthesia type: general    Post pain: Adequate analgesia    Post assessment: no apparent anesthetic complications, tolerated procedure well and no evidence of recall    Last Vitals:   Visit Vitals  /76   Pulse 73   Temp 36.6 °C (97.9 °F) (Temporal)   Resp 18   Ht 5' 7" (1.702 m)   Wt 122.5 kg (270 lb)   LMP 07/18/2022 (Exact Date)   SpO2 99%   Breastfeeding No   BMI 42.29 kg/m²       Post vital signs: stable    Level of consciousness: awake, alert  and oriented    Nausea/Vomiting: no nausea/no vomiting    Complications: none    Airway Patency: patent    Respiratory: unassisted, spontaneous ventilation, room air    Cardiovascular: stable and blood pressure at baseline    Hydration: euvolemic  "

## 2022-08-05 NOTE — TELEPHONE ENCOUNTER
----- Message from Bijan Avila sent at 8/5/2022 10:04 AM CDT -----  Regarding: headache advise  Type: Needs Medical Advice    Who Called:  Kaylah    Symptoms (please be specific):  migrane that is feeling like stroke symptoms had MRI on past wednesday    How long has patient had these symptoms:  since Tuesday    Pharmacy name and phone #:    [id number] - [place] - [address]  [address] [place] [address]  Phone: [phone] Fax: [phone]    Best Call Back Number: [phone]     Additional Information: still ongoing med not helping symptoms worsening.

## 2022-08-05 NOTE — TELEPHONE ENCOUNTER
----- Message from Bijan Avila sent at 8/5/2022 10:04 AM CDT -----  Regarding: headache advise  Type: Needs Medical Advice    Who Called:  Kaylah    Symptoms (please be specific):  migrane that is feeling like stroke symptoms had MRI on past wednesday    How long has patient had these symptoms:  since Tuesday    Pharmacy name and phone #:    [id number] - [place] - [address]  [address] [address]  Phone: [phone] Fax: [phone]    Best Call Back Number: [phone]     Additional Information: still ongoing med not helping symptoms worsening.

## 2022-08-05 NOTE — TELEPHONE ENCOUNTER
Spoke with patient.  Informed her MKD,NP has prescribed Nurtec, to try to get relief of the symptoms.  Asked if patient has every taken Rizatriptan (Maxalt) pt reported no she has not.  The last course of steroids to not help with relieving the pain.  Advised her if she continues with symptoms to go to ED, Valerei Yang has written a note in you records you may benefit from a LP to get pressure reading and remove some fluid. Pt questions answered and verbalized understanding.

## 2022-08-06 ENCOUNTER — HOSPITAL ENCOUNTER (INPATIENT)
Facility: HOSPITAL | Age: 37
LOS: 9 days | Discharge: HOME HEALTH | DRG: 103 | End: 2022-08-17
Attending: EMERGENCY MEDICINE | Admitting: INTERNAL MEDICINE
Payer: COMMERCIAL

## 2022-08-06 DIAGNOSIS — G93.2 IIH (IDIOPATHIC INTRACRANIAL HYPERTENSION): Primary | ICD-10-CM

## 2022-08-06 DIAGNOSIS — G43.411 INTRACTABLE HEMIPLEGIC MIGRAINE WITH STATUS MIGRAINOSUS: ICD-10-CM

## 2022-08-06 DIAGNOSIS — R07.9 CHEST PAIN: ICD-10-CM

## 2022-08-06 DIAGNOSIS — E66.01 MORBID OBESITY: ICD-10-CM

## 2022-08-06 DIAGNOSIS — E53.8 FOLATE DEFICIENCY: ICD-10-CM

## 2022-08-06 DIAGNOSIS — F31.9 BIPOLAR AFFECTIVE DISORDER, REMISSION STATUS UNSPECIFIED: ICD-10-CM

## 2022-08-06 DIAGNOSIS — E28.2 PCOS (POLYCYSTIC OVARIAN SYNDROME): ICD-10-CM

## 2022-08-06 DIAGNOSIS — I63.9 STROKE: ICD-10-CM

## 2022-08-06 DIAGNOSIS — R47.1 DYSARTHRIA: ICD-10-CM

## 2022-08-06 DIAGNOSIS — D50.0 IRON DEFICIENCY ANEMIA DUE TO CHRONIC BLOOD LOSS: ICD-10-CM

## 2022-08-06 DIAGNOSIS — R51.9 ACUTE NONINTRACTABLE HEADACHE, UNSPECIFIED HEADACHE TYPE: ICD-10-CM

## 2022-08-06 DIAGNOSIS — G47.33 OBSTRUCTIVE SLEEP APNEA: ICD-10-CM

## 2022-08-06 DIAGNOSIS — R53.1 LEFT-SIDED WEAKNESS: ICD-10-CM

## 2022-08-06 DIAGNOSIS — G43.011 INTRACTABLE MIGRAINE WITHOUT AURA AND WITH STATUS MIGRAINOSUS: ICD-10-CM

## 2022-08-06 PROBLEM — G89.29 CHRONIC HEADACHES: Status: ACTIVE | Noted: 2022-08-06

## 2022-08-06 LAB
ALBUMIN SERPL BCP-MCNC: 3.3 G/DL (ref 3.5–5.2)
ALP SERPL-CCNC: 76 U/L (ref 55–135)
ALT SERPL W/O P-5'-P-CCNC: 14 U/L (ref 10–44)
ANION GAP SERPL CALC-SCNC: 9 MMOL/L (ref 8–16)
AST SERPL-CCNC: 12 U/L (ref 10–40)
BASOPHILS # BLD AUTO: 0.04 K/UL (ref 0–0.2)
BASOPHILS NFR BLD: 0.3 % (ref 0–1.9)
BILIRUB SERPL-MCNC: 0.2 MG/DL (ref 0.1–1)
BUN SERPL-MCNC: 12 MG/DL (ref 6–20)
CALCIUM SERPL-MCNC: 10.4 MG/DL (ref 8.7–10.5)
CHLORIDE SERPL-SCNC: 105 MMOL/L (ref 95–110)
CHOLEST SERPL-MCNC: 243 MG/DL (ref 120–199)
CHOLEST/HDLC SERPL: 5.7 {RATIO} (ref 2–5)
CO2 SERPL-SCNC: 22 MMOL/L (ref 23–29)
CREAT SERPL-MCNC: 0.7 MG/DL (ref 0.5–1.4)
CREAT SERPL-MCNC: 0.8 MG/DL (ref 0.5–1.4)
DIFFERENTIAL METHOD: ABNORMAL
EOSINOPHIL # BLD AUTO: 0.2 K/UL (ref 0–0.5)
EOSINOPHIL NFR BLD: 1.3 % (ref 0–8)
ERYTHROCYTE [DISTWIDTH] IN BLOOD BY AUTOMATED COUNT: 14.6 % (ref 11.5–14.5)
EST. GFR  (NO RACE VARIABLE): >60 ML/MIN/1.73 M^2
GLUCOSE SERPL-MCNC: 109 MG/DL (ref 70–110)
GLUCOSE SERPL-MCNC: 110 MG/DL (ref 70–110)
HCT VFR BLD AUTO: 40.4 % (ref 37–48.5)
HDLC SERPL-MCNC: 43 MG/DL (ref 40–75)
HDLC SERPL: 17.7 % (ref 20–50)
HGB BLD-MCNC: 12.6 G/DL (ref 12–16)
IMM GRANULOCYTES # BLD AUTO: 0.06 K/UL (ref 0–0.04)
IMM GRANULOCYTES NFR BLD AUTO: 0.5 % (ref 0–0.5)
INR PPP: 0.9 (ref 0.8–1.2)
LDLC SERPL CALC-MCNC: 136.2 MG/DL (ref 63–159)
LYMPHOCYTES # BLD AUTO: 3.4 K/UL (ref 1–4.8)
LYMPHOCYTES NFR BLD: 27.1 % (ref 18–48)
MCH RBC QN AUTO: 25.7 PG (ref 27–31)
MCHC RBC AUTO-ENTMCNC: 31.2 G/DL (ref 32–36)
MCV RBC AUTO: 82 FL (ref 82–98)
MONOCYTES # BLD AUTO: 0.8 K/UL (ref 0.3–1)
MONOCYTES NFR BLD: 6.5 % (ref 4–15)
NEUTROPHILS # BLD AUTO: 8.1 K/UL (ref 1.8–7.7)
NEUTROPHILS NFR BLD: 64.3 % (ref 38–73)
NONHDLC SERPL-MCNC: 200 MG/DL
NRBC BLD-RTO: 0 /100 WBC
PLATELET # BLD AUTO: 381 K/UL (ref 150–450)
PMV BLD AUTO: 10 FL (ref 9.2–12.9)
POCT GLUCOSE: 109 MG/DL (ref 70–110)
POTASSIUM SERPL-SCNC: 3.8 MMOL/L (ref 3.5–5.1)
PROT SERPL-MCNC: 6.7 G/DL (ref 6–8.4)
PROTHROMBIN TIME: 9.8 SEC (ref 9–12.5)
RBC # BLD AUTO: 4.9 M/UL (ref 4–5.4)
SAMPLE: NORMAL
SODIUM SERPL-SCNC: 136 MMOL/L (ref 136–145)
TRIGL SERPL-MCNC: 319 MG/DL (ref 30–150)
TSH SERPL DL<=0.005 MIU/L-ACNC: 1.21 UIU/ML (ref 0.4–4)
WBC # BLD AUTO: 12.51 K/UL (ref 3.9–12.7)

## 2022-08-06 PROCEDURE — 93005 ELECTROCARDIOGRAM TRACING: CPT

## 2022-08-06 PROCEDURE — 99220 PR INITIAL OBSERVATION CARE,LEVL III: ICD-10-PCS | Mod: ,,,

## 2022-08-06 PROCEDURE — 99900035 HC TECH TIME PER 15 MIN (STAT)

## 2022-08-06 PROCEDURE — 99291 CRITICAL CARE FIRST HOUR: CPT | Mod: 25

## 2022-08-06 PROCEDURE — 63600175 PHARM REV CODE 636 W HCPCS: Performed by: EMERGENCY MEDICINE

## 2022-08-06 PROCEDURE — 84443 ASSAY THYROID STIM HORMONE: CPT | Performed by: EMERGENCY MEDICINE

## 2022-08-06 PROCEDURE — 96372 THER/PROPH/DIAG INJ SC/IM: CPT

## 2022-08-06 PROCEDURE — 63600175 PHARM REV CODE 636 W HCPCS

## 2022-08-06 PROCEDURE — 80061 LIPID PANEL: CPT | Performed by: EMERGENCY MEDICINE

## 2022-08-06 PROCEDURE — 99223 PR INITIAL HOSPITAL CARE,LEVL III: ICD-10-PCS | Mod: ,,, | Performed by: STUDENT IN AN ORGANIZED HEALTH CARE EDUCATION/TRAINING PROGRAM

## 2022-08-06 PROCEDURE — 25000003 PHARM REV CODE 250: Performed by: EMERGENCY MEDICINE

## 2022-08-06 PROCEDURE — 85610 PROTHROMBIN TIME: CPT

## 2022-08-06 PROCEDURE — 85610 PROTHROMBIN TIME: CPT | Performed by: EMERGENCY MEDICINE

## 2022-08-06 PROCEDURE — 96361 HYDRATE IV INFUSION ADD-ON: CPT

## 2022-08-06 PROCEDURE — 25000003 PHARM REV CODE 250

## 2022-08-06 PROCEDURE — 96375 TX/PRO/DX INJ NEW DRUG ADDON: CPT

## 2022-08-06 PROCEDURE — 93010 ELECTROCARDIOGRAM REPORT: CPT | Mod: ,,, | Performed by: INTERNAL MEDICINE

## 2022-08-06 PROCEDURE — 99291 PR CRITICAL CARE, E/M 30-74 MINUTES: ICD-10-PCS | Mod: ,,, | Performed by: EMERGENCY MEDICINE

## 2022-08-06 PROCEDURE — 85025 COMPLETE CBC W/AUTO DIFF WBC: CPT | Performed by: EMERGENCY MEDICINE

## 2022-08-06 PROCEDURE — 82962 GLUCOSE BLOOD TEST: CPT

## 2022-08-06 PROCEDURE — 99220 PR INITIAL OBSERVATION CARE,LEVL III: CPT | Mod: ,,,

## 2022-08-06 PROCEDURE — 80053 COMPREHEN METABOLIC PANEL: CPT | Performed by: EMERGENCY MEDICINE

## 2022-08-06 PROCEDURE — 93010 EKG 12-LEAD: ICD-10-PCS | Mod: ,,, | Performed by: INTERNAL MEDICINE

## 2022-08-06 PROCEDURE — 99223 1ST HOSP IP/OBS HIGH 75: CPT | Mod: ,,, | Performed by: STUDENT IN AN ORGANIZED HEALTH CARE EDUCATION/TRAINING PROGRAM

## 2022-08-06 PROCEDURE — 99291 CRITICAL CARE FIRST HOUR: CPT | Mod: ,,, | Performed by: EMERGENCY MEDICINE

## 2022-08-06 PROCEDURE — 96374 THER/PROPH/DIAG INJ IV PUSH: CPT

## 2022-08-06 PROCEDURE — 82565 ASSAY OF CREATININE: CPT

## 2022-08-06 PROCEDURE — G0378 HOSPITAL OBSERVATION PER HR: HCPCS

## 2022-08-06 RX ORDER — IBUPROFEN 200 MG
16 TABLET ORAL
Status: DISCONTINUED | OUTPATIENT
Start: 2022-08-06 | End: 2022-08-17 | Stop reason: HOSPADM

## 2022-08-06 RX ORDER — IPRATROPIUM BROMIDE AND ALBUTEROL SULFATE 2.5; .5 MG/3ML; MG/3ML
3 SOLUTION RESPIRATORY (INHALATION) EVERY 4 HOURS PRN
Status: DISCONTINUED | OUTPATIENT
Start: 2022-08-06 | End: 2022-08-17 | Stop reason: HOSPADM

## 2022-08-06 RX ORDER — BUPROPION HYDROCHLORIDE 150 MG/1
150 TABLET ORAL NIGHTLY
Status: DISCONTINUED | OUTPATIENT
Start: 2022-08-06 | End: 2022-08-17 | Stop reason: HOSPADM

## 2022-08-06 RX ORDER — LORAZEPAM 1 MG/1
1 TABLET ORAL EVERY 6 HOURS PRN
Status: DISCONTINUED | OUTPATIENT
Start: 2022-08-07 | End: 2022-08-17 | Stop reason: HOSPADM

## 2022-08-06 RX ORDER — KETOROLAC TROMETHAMINE 30 MG/ML
15 INJECTION, SOLUTION INTRAMUSCULAR; INTRAVENOUS
Status: COMPLETED | OUTPATIENT
Start: 2022-08-06 | End: 2022-08-06

## 2022-08-06 RX ORDER — SIMETHICONE 80 MG
1 TABLET,CHEWABLE ORAL 4 TIMES DAILY PRN
Status: DISCONTINUED | OUTPATIENT
Start: 2022-08-06 | End: 2022-08-09

## 2022-08-06 RX ORDER — METOCLOPRAMIDE HYDROCHLORIDE 5 MG/ML
10 INJECTION INTRAMUSCULAR; INTRAVENOUS
Status: COMPLETED | OUTPATIENT
Start: 2022-08-06 | End: 2022-08-06

## 2022-08-06 RX ORDER — BISACODYL 10 MG
10 SUPPOSITORY, RECTAL RECTAL DAILY PRN
Status: DISCONTINUED | OUTPATIENT
Start: 2022-08-06 | End: 2022-08-17 | Stop reason: HOSPADM

## 2022-08-06 RX ORDER — ONDANSETRON 8 MG/1
8 TABLET, ORALLY DISINTEGRATING ORAL EVERY 8 HOURS PRN
Status: DISCONTINUED | OUTPATIENT
Start: 2022-08-06 | End: 2022-08-11

## 2022-08-06 RX ORDER — INSULIN ASPART 100 [IU]/ML
0-5 INJECTION, SOLUTION INTRAVENOUS; SUBCUTANEOUS
Status: DISCONTINUED | OUTPATIENT
Start: 2022-08-06 | End: 2022-08-17 | Stop reason: HOSPADM

## 2022-08-06 RX ORDER — ACETAMINOPHEN 500 MG
1000 TABLET ORAL EVERY 8 HOURS
Status: DISCONTINUED | OUTPATIENT
Start: 2022-08-06 | End: 2022-08-17 | Stop reason: HOSPADM

## 2022-08-06 RX ORDER — SODIUM CHLORIDE 9 MG/ML
1000 INJECTION, SOLUTION INTRAVENOUS
Status: COMPLETED | OUTPATIENT
Start: 2022-08-06 | End: 2022-08-06

## 2022-08-06 RX ORDER — MIDAZOLAM HYDROCHLORIDE 1 MG/ML
2 INJECTION INTRAMUSCULAR; INTRAVENOUS ONCE
Status: COMPLETED | OUTPATIENT
Start: 2022-08-06 | End: 2022-08-06

## 2022-08-06 RX ORDER — SODIUM CHLORIDE 0.9 % (FLUSH) 0.9 %
5 SYRINGE (ML) INJECTION
Status: DISCONTINUED | OUTPATIENT
Start: 2022-08-06 | End: 2022-08-17 | Stop reason: HOSPADM

## 2022-08-06 RX ORDER — IBUPROFEN 200 MG
24 TABLET ORAL
Status: DISCONTINUED | OUTPATIENT
Start: 2022-08-06 | End: 2022-08-17 | Stop reason: HOSPADM

## 2022-08-06 RX ORDER — ACETAMINOPHEN 500 MG
1000 TABLET ORAL EVERY 8 HOURS PRN
Status: DISCONTINUED | OUTPATIENT
Start: 2022-08-06 | End: 2022-08-06

## 2022-08-06 RX ORDER — ACETAMINOPHEN 325 MG/1
650 TABLET ORAL EVERY 4 HOURS PRN
Status: DISCONTINUED | OUTPATIENT
Start: 2022-08-06 | End: 2022-08-06

## 2022-08-06 RX ORDER — ENOXAPARIN SODIUM 100 MG/ML
40 INJECTION SUBCUTANEOUS EVERY 12 HOURS
Status: DISCONTINUED | OUTPATIENT
Start: 2022-08-06 | End: 2022-08-14

## 2022-08-06 RX ORDER — GLUCAGON 1 MG
1 KIT INJECTION
Status: DISCONTINUED | OUTPATIENT
Start: 2022-08-06 | End: 2022-08-17 | Stop reason: HOSPADM

## 2022-08-06 RX ORDER — TALC
6 POWDER (GRAM) TOPICAL NIGHTLY PRN
Status: DISCONTINUED | OUTPATIENT
Start: 2022-08-06 | End: 2022-08-08

## 2022-08-06 RX ORDER — PROCHLORPERAZINE EDISYLATE 5 MG/ML
5 INJECTION INTRAMUSCULAR; INTRAVENOUS EVERY 6 HOURS PRN
Status: DISCONTINUED | OUTPATIENT
Start: 2022-08-06 | End: 2022-08-11

## 2022-08-06 RX ORDER — NALOXONE HCL 0.4 MG/ML
0.02 VIAL (ML) INJECTION
Status: DISCONTINUED | OUTPATIENT
Start: 2022-08-06 | End: 2022-08-17 | Stop reason: HOSPADM

## 2022-08-06 RX ORDER — BUTALBITAL, ACETAMINOPHEN AND CAFFEINE 50; 325; 40 MG/1; MG/1; MG/1
1 TABLET ORAL EVERY 4 HOURS PRN
Status: DISCONTINUED | OUTPATIENT
Start: 2022-08-06 | End: 2022-08-07

## 2022-08-06 RX ADMIN — SODIUM CHLORIDE 1000 ML: 0.9 INJECTION, SOLUTION INTRAVENOUS at 08:08

## 2022-08-06 RX ADMIN — KETOROLAC TROMETHAMINE 15 MG: 30 INJECTION, SOLUTION INTRAMUSCULAR; INTRAVENOUS at 08:08

## 2022-08-06 RX ADMIN — ENOXAPARIN SODIUM 40 MG: 100 INJECTION SUBCUTANEOUS at 11:08

## 2022-08-06 RX ADMIN — ACETAMINOPHEN 1000 MG: 500 TABLET ORAL at 11:08

## 2022-08-06 RX ADMIN — METOCLOPRAMIDE 10 MG: 5 INJECTION, SOLUTION INTRAMUSCULAR; INTRAVENOUS at 08:08

## 2022-08-06 RX ADMIN — BUPROPION HYDROCHLORIDE 150 MG: 150 TABLET, FILM COATED, EXTENDED RELEASE ORAL at 11:08

## 2022-08-06 RX ADMIN — MIDAZOLAM 2 MG: 1 INJECTION INTRAMUSCULAR; INTRAVENOUS at 06:08
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
## 2022-08-06 NOTE — ED NOTES
Pt reports HA x two months, hx of migraine HAs. Pt reports expressive aphasia, LUE numbness and tingling. LKN 1530. Recent ICH. Pt and her partner report LUE tingling and expressive aphasia are typical symptoms that accompany her migraine headaches. She did not take any of her prescribed medications today.
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
## 2022-08-07 PROBLEM — E87.6 HYPOKALEMIA: Status: ACTIVE | Noted: 2022-08-07

## 2022-08-07 PROBLEM — G43.919 INTRACTABLE MIGRAINE: Status: ACTIVE | Noted: 2022-08-06

## 2022-08-07 PROBLEM — E28.2 PCOS (POLYCYSTIC OVARIAN SYNDROME): Status: ACTIVE | Noted: 2022-08-07

## 2022-08-07 LAB
ALBUMIN SERPL BCP-MCNC: 3.1 G/DL (ref 3.5–5.2)
ALP SERPL-CCNC: 74 U/L (ref 55–135)
ALT SERPL W/O P-5'-P-CCNC: 13 U/L (ref 10–44)
ANION GAP SERPL CALC-SCNC: 12 MMOL/L (ref 8–16)
ANION GAP SERPL CALC-SCNC: 7 MMOL/L (ref 8–16)
AST SERPL-CCNC: 8 U/L (ref 10–40)
BASOPHILS # BLD AUTO: 0.02 K/UL (ref 0–0.2)
BASOPHILS # BLD AUTO: 0.04 K/UL (ref 0–0.2)
BASOPHILS NFR BLD: 0.2 % (ref 0–1.9)
BASOPHILS NFR BLD: 0.4 % (ref 0–1.9)
BILIRUB SERPL-MCNC: 0.1 MG/DL (ref 0.1–1)
BUN SERPL-MCNC: 11 MG/DL (ref 6–20)
BUN SERPL-MCNC: 12 MG/DL (ref 6–20)
CALCIUM SERPL-MCNC: 9.6 MG/DL (ref 8.7–10.5)
CALCIUM SERPL-MCNC: 9.9 MG/DL (ref 8.7–10.5)
CHLORIDE SERPL-SCNC: 105 MMOL/L (ref 95–110)
CHLORIDE SERPL-SCNC: 106 MMOL/L (ref 95–110)
CK SERPL-CCNC: 9 U/L (ref 20–180)
CO2 SERPL-SCNC: 20 MMOL/L (ref 23–29)
CO2 SERPL-SCNC: 24 MMOL/L (ref 23–29)
CREAT SERPL-MCNC: 0.7 MG/DL (ref 0.5–1.4)
CREAT SERPL-MCNC: 0.9 MG/DL (ref 0.5–1.4)
DIFFERENTIAL METHOD: ABNORMAL
DIFFERENTIAL METHOD: ABNORMAL
EOSINOPHIL # BLD AUTO: 0 K/UL (ref 0–0.5)
EOSINOPHIL # BLD AUTO: 0.2 K/UL (ref 0–0.5)
EOSINOPHIL NFR BLD: 0.1 % (ref 0–8)
EOSINOPHIL NFR BLD: 1.8 % (ref 0–8)
ERYTHROCYTE [DISTWIDTH] IN BLOOD BY AUTOMATED COUNT: 14.3 % (ref 11.5–14.5)
ERYTHROCYTE [DISTWIDTH] IN BLOOD BY AUTOMATED COUNT: 14.5 % (ref 11.5–14.5)
EST. GFR  (NO RACE VARIABLE): >60 ML/MIN/1.73 M^2
EST. GFR  (NO RACE VARIABLE): >60 ML/MIN/1.73 M^2
GLUCOSE SERPL-MCNC: 122 MG/DL (ref 70–110)
GLUCOSE SERPL-MCNC: 122 MG/DL (ref 70–110)
GLUCOSE SERPL-MCNC: 196 MG/DL (ref 70–110)
HCT VFR BLD AUTO: 37.1 % (ref 37–48.5)
HCT VFR BLD AUTO: 38.8 % (ref 37–48.5)
HGB BLD-MCNC: 11.2 G/DL (ref 12–16)
HGB BLD-MCNC: 12 G/DL (ref 12–16)
IMM GRANULOCYTES # BLD AUTO: 0.04 K/UL (ref 0–0.04)
IMM GRANULOCYTES # BLD AUTO: 0.07 K/UL (ref 0–0.04)
IMM GRANULOCYTES NFR BLD AUTO: 0.4 % (ref 0–0.5)
IMM GRANULOCYTES NFR BLD AUTO: 0.6 % (ref 0–0.5)
LYMPHOCYTES # BLD AUTO: 1 K/UL (ref 1–4.8)
LYMPHOCYTES # BLD AUTO: 3.1 K/UL (ref 1–4.8)
LYMPHOCYTES NFR BLD: 29.5 % (ref 18–48)
LYMPHOCYTES NFR BLD: 9.5 % (ref 18–48)
MCH RBC QN AUTO: 25.3 PG (ref 27–31)
MCH RBC QN AUTO: 25.8 PG (ref 27–31)
MCHC RBC AUTO-ENTMCNC: 30.2 G/DL (ref 32–36)
MCHC RBC AUTO-ENTMCNC: 30.9 G/DL (ref 32–36)
MCV RBC AUTO: 83 FL (ref 82–98)
MCV RBC AUTO: 84 FL (ref 82–98)
MONOCYTES # BLD AUTO: 0.1 K/UL (ref 0.3–1)
MONOCYTES # BLD AUTO: 0.6 K/UL (ref 0.3–1)
MONOCYTES NFR BLD: 0.7 % (ref 4–15)
MONOCYTES NFR BLD: 6 % (ref 4–15)
NEUTROPHILS # BLD AUTO: 6.4 K/UL (ref 1.8–7.7)
NEUTROPHILS # BLD AUTO: 9.6 K/UL (ref 1.8–7.7)
NEUTROPHILS NFR BLD: 61.9 % (ref 38–73)
NEUTROPHILS NFR BLD: 88.9 % (ref 38–73)
NRBC BLD-RTO: 0 /100 WBC
NRBC BLD-RTO: 0 /100 WBC
PLATELET # BLD AUTO: 335 K/UL (ref 150–450)
PLATELET # BLD AUTO: 424 K/UL (ref 150–450)
PMV BLD AUTO: 10.4 FL (ref 9.2–12.9)
PMV BLD AUTO: 10.6 FL (ref 9.2–12.9)
POCT GLUCOSE: 76 MG/DL (ref 70–110)
POTASSIUM SERPL-SCNC: 3.3 MMOL/L (ref 3.5–5.1)
POTASSIUM SERPL-SCNC: 3.9 MMOL/L (ref 3.5–5.1)
PROT SERPL-MCNC: 6.7 G/DL (ref 6–8.4)
RBC # BLD AUTO: 4.42 M/UL (ref 4–5.4)
RBC # BLD AUTO: 4.66 M/UL (ref 4–5.4)
SODIUM SERPL-SCNC: 136 MMOL/L (ref 136–145)
SODIUM SERPL-SCNC: 138 MMOL/L (ref 136–145)
WBC # BLD AUTO: 10.37 K/UL (ref 3.9–12.7)
WBC # BLD AUTO: 10.8 K/UL (ref 3.9–12.7)

## 2022-08-07 PROCEDURE — 36415 COLL VENOUS BLD VENIPUNCTURE: CPT | Performed by: NURSE PRACTITIONER

## 2022-08-07 PROCEDURE — 85025 COMPLETE CBC W/AUTO DIFF WBC: CPT

## 2022-08-07 PROCEDURE — 25000003 PHARM REV CODE 250

## 2022-08-07 PROCEDURE — G0378 HOSPITAL OBSERVATION PER HR: HCPCS

## 2022-08-07 PROCEDURE — 63600175 PHARM REV CODE 636 W HCPCS

## 2022-08-07 PROCEDURE — 25000003 PHARM REV CODE 250: Performed by: INTERNAL MEDICINE

## 2022-08-07 PROCEDURE — 80053 COMPREHEN METABOLIC PANEL: CPT | Performed by: NURSE PRACTITIONER

## 2022-08-07 PROCEDURE — 82550 ASSAY OF CK (CPK): CPT | Performed by: NURSE PRACTITIONER

## 2022-08-07 PROCEDURE — 63600175 PHARM REV CODE 636 W HCPCS: Performed by: NURSE PRACTITIONER

## 2022-08-07 PROCEDURE — 87389 HIV-1 AG W/HIV-1&-2 AB AG IA: CPT | Performed by: PHYSICIAN ASSISTANT

## 2022-08-07 PROCEDURE — 86803 HEPATITIS C AB TEST: CPT | Performed by: PHYSICIAN ASSISTANT

## 2022-08-07 PROCEDURE — 96372 THER/PROPH/DIAG INJ SC/IM: CPT

## 2022-08-07 PROCEDURE — 99233 SBSQ HOSP IP/OBS HIGH 50: CPT | Mod: ,,, | Performed by: PSYCHIATRY & NEUROLOGY

## 2022-08-07 PROCEDURE — 85025 COMPLETE CBC W/AUTO DIFF WBC: CPT | Mod: 91 | Performed by: NURSE PRACTITIONER

## 2022-08-07 PROCEDURE — 99226 PR SUBSEQUENT OBSERVATION CARE,LEVEL III: CPT | Mod: ,,,

## 2022-08-07 PROCEDURE — 94761 N-INVAS EAR/PLS OXIMETRY MLT: CPT

## 2022-08-07 PROCEDURE — 99226 PR SUBSEQUENT OBSERVATION CARE,LEVEL III: ICD-10-PCS | Mod: ,,,

## 2022-08-07 PROCEDURE — 80048 BASIC METABOLIC PNL TOTAL CA: CPT

## 2022-08-07 PROCEDURE — 99233 PR SUBSEQUENT HOSPITAL CARE,LEVL III: ICD-10-PCS | Mod: ,,, | Performed by: PSYCHIATRY & NEUROLOGY

## 2022-08-07 RX ORDER — VERAPAMIL HYDROCHLORIDE 120 MG/1
120 TABLET, FILM COATED, EXTENDED RELEASE ORAL DAILY
Status: DISCONTINUED | OUTPATIENT
Start: 2022-08-07 | End: 2022-08-17 | Stop reason: HOSPADM

## 2022-08-07 RX ORDER — KETOROLAC TROMETHAMINE 15 MG/ML
15 INJECTION, SOLUTION INTRAMUSCULAR; INTRAVENOUS ONCE
Status: COMPLETED | OUTPATIENT
Start: 2022-08-07 | End: 2022-08-07

## 2022-08-07 RX ORDER — DIPHENHYDRAMINE HCL 50 MG
50 CAPSULE ORAL ONCE
Status: COMPLETED | OUTPATIENT
Start: 2022-08-07 | End: 2022-08-07

## 2022-08-07 RX ORDER — POTASSIUM CHLORIDE 20 MEQ/1
40 TABLET, EXTENDED RELEASE ORAL ONCE
Status: DISCONTINUED | OUTPATIENT
Start: 2022-08-07 | End: 2022-08-07

## 2022-08-07 RX ORDER — ACETAMINOPHEN 500 MG
1000 TABLET ORAL 2 TIMES DAILY PRN
COMMUNITY
End: 2022-09-05

## 2022-08-07 RX ORDER — DIPHENHYDRAMINE HYDROCHLORIDE 50 MG/ML
25 INJECTION INTRAMUSCULAR; INTRAVENOUS ONCE
Status: COMPLETED | OUTPATIENT
Start: 2022-08-07 | End: 2022-08-07

## 2022-08-07 RX ORDER — METOCLOPRAMIDE HYDROCHLORIDE 5 MG/ML
10 INJECTION INTRAMUSCULAR; INTRAVENOUS ONCE
Status: COMPLETED | OUTPATIENT
Start: 2022-08-07 | End: 2022-08-07

## 2022-08-07 RX ORDER — MAGNESIUM SULFATE HEPTAHYDRATE 40 MG/ML
2 INJECTION, SOLUTION INTRAVENOUS 2 TIMES DAILY
Status: COMPLETED | OUTPATIENT
Start: 2022-08-07 | End: 2022-08-10

## 2022-08-07 RX ADMIN — DIPHENHYDRAMINE HYDROCHLORIDE 25 MG: 50 INJECTION, SOLUTION INTRAMUSCULAR; INTRAVENOUS at 10:08

## 2022-08-07 RX ADMIN — VERAPAMIL HYDROCHLORIDE 120 MG: 120 TABLET, FILM COATED, EXTENDED RELEASE ORAL at 02:08

## 2022-08-07 RX ADMIN — KETOROLAC TROMETHAMINE 15 MG: 15 INJECTION, SOLUTION INTRAMUSCULAR; INTRAVENOUS at 10:08

## 2022-08-07 RX ADMIN — BUPROPION HYDROCHLORIDE 150 MG: 150 TABLET, FILM COATED, EXTENDED RELEASE ORAL at 10:08

## 2022-08-07 RX ADMIN — ACETAMINOPHEN 1000 MG: 500 TABLET ORAL at 02:08

## 2022-08-07 RX ADMIN — ENOXAPARIN SODIUM 40 MG: 100 INJECTION SUBCUTANEOUS at 10:08

## 2022-08-07 RX ADMIN — KETOROLAC TROMETHAMINE 15 MG: 15 INJECTION, SOLUTION INTRAMUSCULAR; INTRAVENOUS at 07:08

## 2022-08-07 RX ADMIN — MAGNESIUM SULFATE HEPTAHYDRATE 2 G: 40 INJECTION, SOLUTION INTRAVENOUS at 02:08

## 2022-08-07 RX ADMIN — MAGNESIUM SULFATE HEPTAHYDRATE 2 G: 40 INJECTION, SOLUTION INTRAVENOUS at 11:08

## 2022-08-07 RX ADMIN — DIPHENHYDRAMINE HYDROCHLORIDE 50 MG: 50 CAPSULE ORAL at 05:08

## 2022-08-07 RX ADMIN — DEXTROSE 1000 MG: 50 INJECTION, SOLUTION INTRAVENOUS at 10:08

## 2022-08-07 RX ADMIN — POTASSIUM BICARBONATE 25 MEQ: 25 TABLET, EFFERVESCENT ORAL at 02:08

## 2022-08-07 RX ADMIN — ACETAMINOPHEN 1000 MG: 500 TABLET ORAL at 10:08

## 2022-08-07 RX ADMIN — LORAZEPAM 1 MG: 1 TABLET ORAL at 07:08

## 2022-08-07 RX ADMIN — ENOXAPARIN SODIUM 40 MG: 100 INJECTION SUBCUTANEOUS at 08:08

## 2022-08-07 RX ADMIN — ACETAMINOPHEN 1000 MG: 500 TABLET ORAL at 05:08

## 2022-08-07 RX ADMIN — METHYLPREDNISOLONE SODIUM SUCCINATE 1000 MG: 1 INJECTION, POWDER, LYOPHILIZED, FOR SOLUTION INTRAMUSCULAR; INTRAVENOUS at 04:08

## 2022-08-07 RX ADMIN — DEXTROSE 1000 MG: 50 INJECTION, SOLUTION INTRAVENOUS at 05:08

## 2022-08-07 RX ADMIN — POTASSIUM BICARBONATE 25 MEQ: 25 TABLET, EFFERVESCENT ORAL at 03:08

## 2022-08-07 RX ADMIN — METOCLOPRAMIDE 10 MG: 5 INJECTION, SOLUTION INTRAMUSCULAR; INTRAVENOUS at 10:08
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
## 2022-08-07 NOTE — PHARMACY MED REC
"Admission Medication History     The home medication history was taken by Yeimi Duran.    You may go to "Admission" then "Reconcile Home Medications" tabs to review and/or act upon these items.      The home medication list has been updated by the Pharmacy department.    Please read ALL comments highlighted in yellow.    Please address this information as you see fit.     Feel free to contact us if you have any questions or require assistance.          Medications listed below were obtained from: Patient/family    Medication Sig    acetaminophen (TYLENOL) 500 MG tablet   Take 1,000 mg by mouth 2 (two) times daily as needed (knee pain).    albuterol (PROVENTIL/VENTOLIN HFA) 90 mcg/actuation inhaler   Inhale 2 puffs into the lungs every 6 (six) hours as needed for Wheezing.    aspirin-acetaminophen-caffeine 250-250-65 mg (EXCEDRIN MIGRAINE) 250-250-65 mg per tablet   Take 2 tablets by mouth daily as needed (headache).    buPROPion (WELLBUTRIN XL) 150 MG TB24 tablet   Take 150 mg by mouth nightly.    butalbital-acetaminophen-caffeine -40 mg (FIORICET, ESGIC) -40 mg per tablet   Take 1 tablet by mouth every 4 (four) hours as needed for Headaches.    cariprazine (VRAYLAR) 3 mg Cap   Take 3 mg by mouth nightly.    EPINEPHrine (EPIPEN) 0.3 mg/0.3 mL AtIn   Inject 0.3 mLs (0.3 mg total) into the muscle as needed (anaphylaxis).    HYDROcodone-acetaminophen (NORCO) 5-325 mg per tablet   Take 1 tablet by mouth every 8 (eight) hours as needed for Pain.    LORazepam (ATIVAN) 1 MG tablet   Take 1 tablet (1 mg total) by mouth every 6 (six) hours as needed for Anxiety.    metFORMIN (GLUCOPHAGE-XR) 500 MG ER 24hr tablet   Take 1 tablet (500 mg total) by mouth daily with breakfast.    metoclopramide HCl (REGLAN) 10 MG tablet   Take 1 tablet (10 mg total) by mouth every 6 (six) hours. (Patient not taking: Reported on 8/7/2022)    phentermine (ADIPEX-P) 37.5 mg tablet     Take 1 tablet (37.5 mg total) by " mouth before breakfast. (Patient not taking: Reported on 8/7/2022)    pulse oximeter (PULSE OXIMETER) device     by Apply Externally route 2 (two) times a day. Use twice daily at 8 AM and 3 PM and record the value in MyChart as directed.    rimegepant (NURTEC) 75 mg odt Take 1 tablet (75 mg total) by mouth daily as needed. Place ODT tablet on the tongue; alternatively the ODT tablet may be placed under the tongue (Patient not taking: Reported on 8/7/2022)           Yeimi Duran  [id number]                    .
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
## 2022-08-07 NOTE — ASSESSMENT & PLAN NOTE
- stroke code activated upon arrive due to left-sided weakness and aphasia  - CTH and MRI brain negative for acute infarct  - vascular neurology evaluated patient in the ED and have a low suspicion for CVA at this time, suspect alternate etiology such as hemiplegic vs. complex migraines  - neurology consulted, appreciate assistance

## 2022-08-07 NOTE — ASSESSMENT & PLAN NOTE
- follows with psychiatry outpatient  - patient reports no recent manic episodes and that her bipolar is stable  - continue wellbutrin, vraylar, and ativan prn

## 2022-08-07 NOTE — HPI
Sonia Goldberg is a 36 y.o. female with a past medical history of migraines, PCOS, bipolar disorder, IBS, COVID-19, and KERI presenting in the ED with aphasia and left-sided weakness starting at 1530. Patient mentions having similar episode earlier this month presented to outside hospital obtained laboratory testing which was unremarkable. She was discharged after symptoms improved with migraine cocktail. She describes the headache as pulsing and it is located in the right frontal/parietal region. States the only medication that occasionally helps is Fioricet. Associated symptoms include blurry vision, photophobia, and nausea. Patient denies any vomiting, diarrhea, fevers, chills, or urinary symptoms.     ED: hypertensive, otherwise vital signs stable. Stroke code activated, vascular neurology evaluated patient at bedside. CTH and MRI brain negative for acute infarct ,showing partially empty sella, slight prominent fluid signal along optic nerve sheaths bilaterally. Patient was scheduled to have outpatient follow up with ophthalmology as outpatient. They have strong suspicion for alternate etiology of symptoms such as hemiplegic or complex migraines. Recommend symptomatic relief and general neurology consultation. Total cholesterol 243 with triglycerides of 319. Otherwise intake labs largely unremarkable. Given 1L NS, Toradol, Reglan, and versed.

## 2022-08-07 NOTE — ASSESSMENT & PLAN NOTE
Body mass index is 42.3 kg/m². Morbid obesity complicates all aspects of disease management from diagnostic modalities to treatment. Weight loss encouraged and health benefits explained to patient.
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
## 2022-08-07 NOTE — ASSESSMENT & PLAN NOTE
35 yo female with PMHx of R frontal hemorrhage, headaches associated with left paresthesias, and KERI presents with LSW. Patient and fiancee report symptom onset was 330pm. Patient states for the past 2 months she has been having persistent headaches associated with left sided paresthesias (tingling sensation). Fiancee states headaches are associated with speech difficulty. However, the LSW is new today which prompted visit to the ED. Of note, she recently had MRI Brain 8/3 (performed under sedation due to severe claustrophobia) for persistent headaches. MRI at the time negative for acute infarct, small right frontal hemorrhage. She denies any nausea, vomiting, fever, diarrhea. No other complaints at this time.     -Stroke code activated on arrival for LSW +aphasia.  -Exam significant for aphasia, left hemiparesis, and sensation difference on the left.   -CTH only as patient with anaphylactic allergy to contrast. CTH negative for acute infarct.  -MRI Brain was recommended. However, patient initially declining exam due to claustrophobia. After several minutes, she agreed to obtaining scan. 2mg versed administered to assist with anxiety for MRI Brain IP.  -MRI Brain Ischemic negative for acute infarct or LVO.  -Strong suspicion for alternate etiology of patient's symptoms. DDx to include hemiplegic migraines, complex migraines.  -Recommend treating headache and monitor symptoms. Consider general neurology consultation.  -There is no indication at this time for further diagnostics from a stroke perspective.  -Vascular neurology will sign off at this time. Thank you for your consult. Please do not hesitate to contact us for any questions or concerns.  -Case discussed with stroke fellow and staff.
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
## 2022-08-07 NOTE — PROGRESS NOTES
Pharmacist Dose Adjustment Note    Sonia Goldberg is a 36 y.o. female being treated with enoxaparin 40 mg every 24 hours for VTE ppx    Patient Data:    Vital Signs (Most Recent):  Temp: 98.5 °F (36.9 °C) (08/06/22 1721)  Pulse: 77 (08/06/22 2202)  Resp: 14 (08/06/22 2202)  BP: 130/88 (08/06/22 2202)  SpO2: 96 % (08/06/22 2202) Vital Signs (72h Range):  Temp:  [98.5 °F (36.9 °C)]   Pulse:  [77-95]   Resp:  [14-18]   BP: (130-185)/()   SpO2:  [95 %-100 %]      Recent Labs   Lab 08/02/22  1924 08/06/22 1752   CREATININE 0.8 0.8     Serum creatinine: 0.8 mg/dL 08/06/22 1752  Estimated creatinine clearance: 129.4 mL/min    Adjusted to enoxaparin 40 mg every 12 hours, based on BMI > 40 kg/m2 per pharmacy anticoagulation protocol.     Pharmacist's Name: Yovany Roblero  Pharmacist's Extension: [phone]
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
## 2022-08-07 NOTE — RESPIRATORY THERAPY
[facility] RESPIRATORY THERAPY PROACTIVE NOTE           Time of visit: 1520     Code Status: Full Code   : 1985  Bed: 7098/7098 A:   MRN: [id number]  Time spent at the bedside: < 15 min    SITUATION    Evaluated patient for: length of stay    BACKGROUND    Why is the patient in the hospital?: Left-sided weakness    Patient has a past medical history of Asthma, COVID-19, Heart palpitations, Herniated disc, Hypertension, IBS (irritable bowel syndrome), Insomnia, Lower back pain, Migraine headache, Obstructive sleep apnea, Palpitations, PCOS (polycystic ovarian syndrome), and Sleep apnea.    24 Hours Vitals Range:  Temp:  [98 °F (36.7 °C)-98.8 °F (37.1 °C)]   Pulse:  [70-95]   Resp:  [14-19]   BP: (122-185)/()   SpO2:  [95 %-100 %]     Labs:    Recent Labs     22  1752 22  0326    136   K 3.8 3.3*    105   CO2 22* 24   CREATININE 0.8 0.7    122*        No results for input(s): PH, PCO2, PO2, HCO3, POCSATURATED, BE in the last 72 hours.    ASSESSMENT/INTERVENTIONS    Upon arrival in room patient eating meal with family member with no complaints or questions for Respiratory Therapy.    Last VS   Temp: 98.4 °F (36.9 °C) (1537)  Pulse: 75 (1537)  Resp: 16 (1537)  BP: 122/57 (1537)  SpO2: 95 % (1537)    Level of Consciousness: Level of Consciousness (AVPU): alert  Respiratory Effort:   Expansion/Accessory Muscle Usage:    All Lung Field Breath Sounds:    O2 Device/Concentration:  , Oxygen Concentration (%): 97, O2 Device (Oxygen Therapy): room air  NIPPV: No Surgical airway: No  ETCO2 monitored:    Ambu at bedside:      Active Orders   Respiratory Care    CPAP QHS     Frequency: QHS     Number of Occurrences: Until Specified     Order Questions:      Expiratory pressure: per respiratory    Inhalation Treatment Q4H PRN     Frequency: Q4H PRN     Number of Occurrences: Until Specified    Oxygen Continuous     Frequency: Continuous     Number of  Occurrences: Until Specified     Order Comments: FiO2 30%, Stroke       Order Questions:      Device type: Low flow      Device: Nasal Cannula (1- 5 Liters)      LPM: 3      Titrate O2 per Oxygen Titration Protocol: Yes      Notify MD of: Inability to achieve desired SpO2    Oxygen PRN     Frequency: PRN     Number of Occurrences: Until Specified     Order Questions:      Device type: Low flow      Device: Nasal Cannula (1- 5 Liters)      LPM: 1-5      Titrate O2 per Oxygen Titration Protocol: Yes      To maintain SpO2 goal of: >= 90%      Notify MD of: Inability to achieve desired SpO2; Sudden change in patient status and requires 20% increase in FiO2; Patient requires >60% FiO2    Pulse Oximetry Continuous     Frequency: Continuous     Number of Occurrences: Until Specified       RECOMMENDATIONS    We recommend: RRT Recs: Continue POC per primary team.    FOLLOW-UP    Please call back the Rapid Response RT, Rogelio Dominguez, RRT at x [phone] for any questions or concerns.
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
## 2022-08-07 NOTE — ASSESSMENT & PLAN NOTE
- past medical history signfiicant for migraines, presenting with persistent headache for about ~2 months  - recently started on phentermine in May 2022 for weight loss - concern for adverse effect  - follows with neurology outpatient  - given 1L NS, toradol, reglan, and versed in the ED and denies any improvement of symptoms  - MRI brain and CTH without acute findings  - neurology consulted, suspect hemiparesis 2/2 severe migraine, follow up on recs   --> start migraine cocktail: 1g IV solumedrol daily x3 days, 2g IV magnesium BID,1g  IV depakote BID, PO verapamil 120 daily   - low dose SSI while on steroids   - neuro anticipates improvement in paresis with migraine management
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
## 2022-08-07 NOTE — NURSING
Patient called the nurse to the room. Pt said, she noticed her left arm is swollen and wasn't like this before she got to the unit. No redness or pain to the left arm. KLEBER Murdock made aware. Awaiting new orders.

## 2022-08-07 NOTE — NURSING
Patient arrived on unit via stretcher with patient transport, belongings at the bedside. Pt is AX4, on room air, able to slide over from stretcher to bed. Complaint of a headache on a scale 9 out of 10. Will administer Tylenol. Bed wheels locked, siderails up X2, call light in reach. Will continue with assessment.

## 2022-08-07 NOTE — ASSESSMENT & PLAN NOTE
- stroke code activated upon arrive due to left-sided weakness and aphasia  - CTH and MRI brain negative for acute infarct  - vascular neurology evaluated patient in the ED and have a low suspicion for CVA at this time, suspect alternate etiology such as hemiplegic vs. complex migraines  - neurology consulted, appreciate assistance --> see migraine

## 2022-08-07 NOTE — HOSPITAL COURSE
Sonia Goldberg was placed in  observation for intractable migraine, left-sided paresis, and dysarthria.  Neurology evaluated patient and recommend beginning IV migraine cocktail: depakote, solumedrol, magnesium, plus oral verapamil. Minimal to no improvement after 48 hours. Neurology performed occipital nerve block without sympotm relief. Diamox 250 mg BID and topamax 25 mg added with no relief. Patient with leukemoid reaction in setting of steroid use from -8/10, infectious work up negative but patient did have elevated lactic 5.9> 3.9> 2.5, resolved with IVF. No concern for infection. Patient completed steroids, and rest of medications (IV magnesium, IV Depacon, oral Verapamil, and Topamax) continued. LP attempted at bedside to evaluate for IIH on 8/10 unable to perform due to pain and patient anxiety. Neurology noted that patient's left-sided weakness has been shown to be variable and nonphysiologic, actually improving after a failed lumbar puncture attempt, with her being able to hold her left upper extremity antigravity at bedside. On  patient with visual hallucination of her  grandmother and new right sided weakness which is distractible. Neurology also notes that speech pattern is not congruent with neurologic aphasia. Given these findings there is high concern for functional disorder. IR scheduled LP with fluoroscopy , patient unwilling to have it done with IV ativan, now for plan with sedation with anaesthesia. Need to coordinate radiology and Anesthesia to get LP under fluoroscopy and scheduled for  for possible idiopathic intracranial hypertension as cause of headache. Plan for psychiatry consult after LP if LP unrevealing. Patient  had LP done on  with radiology and anesthesia for sedation. Patient had an opening pressure highly elevated at 58 and 25 cc of CSF removed and closing pressure 24. CSF cell count and studies sent as per Neuro recs. High opening pressure  consistent with suspected idiopathic intracranial hypertension. Patient reported after fluid removed no real change in headache or blurry vision. Diamox increased from 250 mg to 500 mg po BID and Topamax increased from 25 to 50 mg po daily as per Neurology recs on 8/16 after LP results. No need for Psych evaluation as appears truly has IIH. Neurosurgery evaluated patient prior to discharge for future treatments such as  shunt and scheduled outpatient follow-up to discuss on 8/30/2022. Patient has an Opthalmology appointment already set for 8/19 as outpatient and she needs to keep. Neurology on discharge recommended Diamox 750 mg po BID and Topamax 50 mg po nightly to treat IIH and General Neurology to schedule outpatient follow-up on discharge. Left sided weakness mostly resolved at time of discharge. Headache and blurry vision not resolved on discharge despite LP and removal of large volume of CSF. Patient discharged with Home Health PT/OT and rolling walker as recommended by PT/OT on discharge.

## 2022-08-07 NOTE — ASSESSMENT & PLAN NOTE
- past medical history signfiicant for migraines, presenting with persistent headache for about ~2 months  - recently started on phentermine in May 2022 for weight loss - concern for adverse effect  - follows with neurology outpatient  - given 1L NS, toradol, reglan, and versed in the ED and denies any improvement of symptoms  - MRI brain and CTH without acute findings  - continue symptomatic relief - prn forcicet and scheduled 1g tylenol  - appreciate neuro recs

## 2022-08-07 NOTE — NURSING
Patient is now c/o pain to her left hand. Pt said, it feels like a stabbing sharp pain, rate of a 5. Pt's left hand is elevated. Also,  Her headache is worsen. KLEBER Murdock notified.

## 2022-08-07 NOTE — PROGRESS NOTES
[facility] - Emergency Dept  [facility]  Progress Note    Patient Name: Sonia Goldberg  MRN: [id number]  Patient Class: OP- Observation   Admission Date: 8/6/2022  Length of Stay: 0 days  Attending Physician: Jalen Ponce MD  Primary Care Provider: Terell Reyes MD (Inactive)        Subjective:     Principal Problem:Left-sided weakness        HPI:  Sonia Goldberg is a 36 y.o. female with a past medical history of migraines, PCOS, bipolar disorder, IBS, COVID-19, and KERI presenting in the ED with aphasia and left-sided weakness starting at 1530. Patient mentions having similar episode earlier this month presented to outside hospital obtained laboratory testing which was unremarkable. She was discharged after symptoms improved with migraine cocktail. She describes the headache as pulsing and it is located in the right frontal/parietal region. States the only medication that occasionally helps is Fioricet. Associated symptoms include blurry vision, photophobia, and nausea. Patient denies any vomiting, diarrhea, fevers, chills, or urinary symptoms.     ED: hypertensive, otherwise vital signs stable. Stroke code activated, vascular neurology evaluated patient at bedside. CTH and MRI brain negative for acute infarct. They have strong suspicion for alternate etiology of symptoms such as hemiplegic or complex migraines. Recommend symptomatic relief and general neurology consultation. Total cholesterol 243 with triglycerides of 319. Otherwise intake labs largely unremarkable. Given 1L NS, Toradol, Reglan, and versed.       Overview/Hospital Course:  Sonia Goldberg was placed in  observation for left-sided paresis in setting of acute intractable migraine. Neurology evaluated patient and recommend beginning IV migraine cocktail: depakote, solumedrol, magnesium, plus oral verapamil. Assess improvement in strength with migraine management. Close BG monitoring and insulin adjustments while on  steroids. Patient is scheduled to f/u with her neurologist next week and follows with an opthalmology clinic.       Interval History: NAEON. AFVSS. Patient still c/o severe migraine. Strength minimally improved on the L side. Neurology recs for migraine cocktail. BG monitoring.    Review of Systems   Constitutional:  Positive for activity change and appetite change (decreased). Negative for chills, diaphoresis and fever.   HENT:  Negative for congestion, sore throat and trouble swallowing.    Eyes:  Negative for photophobia and visual disturbance.   Respiratory:  Negative for cough, chest tightness and shortness of breath.    Cardiovascular:  Negative for chest pain, palpitations and leg swelling.   Gastrointestinal:  Positive for nausea. Negative for abdominal pain, constipation, diarrhea and vomiting.   Genitourinary:  Negative for difficulty urinating, dysuria, frequency and hematuria.   Musculoskeletal:  Negative for back pain, gait problem and neck pain.   Skin:  Negative for rash and wound.   Neurological:  Positive for speech difficulty (slow with occasionally slurred speech), weakness (left upper and lower extremity), numbness and headaches. Negative for dizziness, syncope, facial asymmetry and light-headedness.   Psychiatric/Behavioral:  Negative for agitation and confusion. The patient is not nervous/anxious.    Objective:     Vital Signs (Most Recent):  Temp: 98.5 °F (36.9 °C) (08/07/22 0819)  Pulse: 70 (08/07/22 0819)  Resp: 18 (08/07/22 0819)  BP: (!) 151/68 (08/07/22 0819)  SpO2: 96 % (08/07/22 0819)   Vital Signs (24h Range):  Temp:  [98.5 °F (36.9 °C)] 98.5 °F (36.9 °C)  Pulse:  [70-95] 70  Resp:  [14-18] 18  SpO2:  [95 %-100 %] 96 %  BP: (130-185)/() 151/68     Weight: 122.5 kg (270 lb 1 oz)  Body mass index is 42.3 kg/m².  No intake or output data in the 24 hours ending 08/07/22 1213   Physical Exam  Vitals and nursing note reviewed.   Constitutional:       General: She is not in acute  distress.     Appearance: She is not toxic-appearing or diaphoretic.   HENT:      Head: Normocephalic and atraumatic.      Right Ear: External ear normal.      Left Ear: External ear normal.      Nose: Nose normal.      Mouth/Throat:      Mouth: Mucous membranes are moist.   Eyes:      General: No scleral icterus.        Right eye: No discharge.         Left eye: No discharge.      Extraocular Movements: Extraocular movements intact.      Conjunctiva/sclera: Conjunctivae normal.   Cardiovascular:      Rate and Rhythm: Normal rate.   Pulmonary:      Effort: Pulmonary effort is normal. No respiratory distress.   Abdominal:      General: Abdomen is flat. There is no distension.   Musculoskeletal:      Cervical back: Normal range of motion.      Right lower leg: No edema.      Left lower leg: No edema.   Skin:     General: Skin is warm and dry.      Findings: No rash.   Neurological:      Mental Status: She is oriented to person, place, and time. She is lethargic.      Sensory: Sensory deficit present.      Motor: Weakness present.      Coordination: Coordination normal.      Comments: Profound weakness and impaired sensation in left upper and lower extremity, minimally improved overnight. Weak squeeze on L hand, unable to lift LUE or LLE. No facial asymmetry. AAOx4   Psychiatric:         Mood and Affect: Mood is depressed. Mood is not anxious. Affect is not tearful.         Speech: Speech is delayed.         Behavior: Behavior normal.       Significant Labs: All pertinent labs within the past 24 hours have been reviewed.  BMP:   Recent Labs   Lab 08/07/22  0326   *      K 3.3*      CO2 24   BUN 12   CREATININE 0.7   CALCIUM 9.6     CBC:   Recent Labs   Lab 08/06/22  1752 08/07/22  0326   WBC 12.51 10.37   HGB 12.6 11.2*   HCT 40.4 37.1    335       Significant Imaging: I have reviewed all pertinent imaging results/findings within the past 24 hours.      Assessment/Plan:      * Left-sided  weakness  - stroke code activated upon arrive due to left-sided weakness and aphasia  - CTH and MRI brain negative for acute infarct  - vascular neurology evaluated patient in the ED and have a low suspicion for CVA at this time, suspect alternate etiology such as hemiplegic vs. complex migraines  - neurology consulted, appreciate assistance --> see migraine    PCOS (polycystic ovarian syndrome)  - hold metformin  - monitor BG in setting of steroid use       Hypokalemia  Lab Results   Component Value Date    K 3.3 (L) 08/07/2022     - replacing, daily bmp       Intractable migraine  - past medical history signfiicant for migraines, presenting with persistent headache for about ~2 months  - recently started on phentermine in May 2022 for weight loss - concern for adverse effect  - follows with neurology outpatient  - given 1L NS, toradol, reglan, and versed in the ED and denies any improvement of symptoms  - MRI brain and CTH without acute findings  - neurology consulted, suspect hemiparesis 2/2 severe migraine, follow up on recs   --> start migraine cocktail: 1g IV solumedrol daily x3 days, 2g IV magnesium BID,1g  IV depakote BID, PO verapamil 120 daily   - low dose SSI while on steroids   - neuro anticipates improvement in paresis with migraine management         Bipolar disorder, unspecified  - follows with psychiatry outpatient  - patient reports no recent manic episodes and that her bipolar is stable  - continue wellbutrin, vraylar, and ativan prn      Obstructive sleep apnea  - cpap qhs      Morbid obesity  Body mass index is 42.3 kg/m². Morbid obesity complicates all aspects of disease management from diagnostic modalities to treatment. Weight loss encouraged and health benefits explained to patient.           VTE Risk Mitigation (From admission, onward)         Ordered     enoxaparin injection 40 mg  Every 12 hours         08/06/22 2207                Discharge Planning   [place]:      Code Status: Full Code   Is  the patient medically ready for discharge?: No    Reason for patient still in hospital (select all that apply): Patient trending condition, Laboratory test, Treatment and Consult recommendations                     Collette Smith PA-C  Department of Hospital Medicine   [facility] - Emergency Dept

## 2022-08-07 NOTE — ED NOTES
Hospitalist Rachelle Lim PA-C notified that Cariprazine is not stocked by hospital pharmacy and patient does not have home medication on person.

## 2022-08-07 NOTE — SUBJECTIVE & OBJECTIVE
Interval History: NAEON. AFVSS. Patient still c/o severe migraine. Strength minimally improved on the L side. Neurology recs for migraine cocktail. BG monitoring.    Review of Systems   Constitutional:  Positive for activity change and appetite change (decreased). Negative for chills, diaphoresis and fever.   HENT:  Negative for congestion, sore throat and trouble swallowing.    Eyes:  Negative for photophobia and visual disturbance.   Respiratory:  Negative for cough, chest tightness and shortness of breath.    Cardiovascular:  Negative for chest pain, palpitations and leg swelling.   Gastrointestinal:  Positive for nausea. Negative for abdominal pain, constipation, diarrhea and vomiting.   Genitourinary:  Negative for difficulty urinating, dysuria, frequency and hematuria.   Musculoskeletal:  Negative for back pain, gait problem and neck pain.   Skin:  Negative for rash and wound.   Neurological:  Positive for speech difficulty (slow with occasionally slurred speech), weakness (left upper and lower extremity), numbness and headaches. Negative for dizziness, syncope, facial asymmetry and light-headedness.   Psychiatric/Behavioral:  Negative for agitation and confusion. The patient is not nervous/anxious.    Objective:     Vital Signs (Most Recent):  Temp: 98.5 °F (36.9 °C) (08/07/22 0819)  Pulse: 70 (08/07/22 0819)  Resp: 18 (08/07/22 0819)  BP: (!) 151/68 (08/07/22 0819)  SpO2: 96 % (08/07/22 0819)   Vital Signs (24h Range):  Temp:  [98.5 °F (36.9 °C)] 98.5 °F (36.9 °C)  Pulse:  [70-95] 70  Resp:  [14-18] 18  SpO2:  [95 %-100 %] 96 %  BP: (130-185)/() 151/68     Weight: 122.5 kg (270 lb 1 oz)  Body mass index is 42.3 kg/m².  No intake or output data in the 24 hours ending 08/07/22 1213   Physical Exam  Vitals and nursing note reviewed.   Constitutional:       General: She is not in acute distress.     Appearance: She is not toxic-appearing or diaphoretic.   HENT:      Head: Normocephalic and atraumatic.       Right Ear: External ear normal.      Left Ear: External ear normal.      Nose: Nose normal.      Mouth/Throat:      Mouth: Mucous membranes are moist.   Eyes:      General: No scleral icterus.        Right eye: No discharge.         Left eye: No discharge.      Extraocular Movements: Extraocular movements intact.      Conjunctiva/sclera: Conjunctivae normal.   Cardiovascular:      Rate and Rhythm: Normal rate.   Pulmonary:      Effort: Pulmonary effort is normal. No respiratory distress.   Abdominal:      General: Abdomen is flat. There is no distension.   Musculoskeletal:      Cervical back: Normal range of motion.      Right lower leg: No edema.      Left lower leg: No edema.   Skin:     General: Skin is warm and dry.      Findings: No rash.   Neurological:      Mental Status: She is oriented to person, place, and time. She is lethargic.      Sensory: Sensory deficit present.      Motor: Weakness present.      Coordination: Coordination normal.      Comments: Profound weakness and impaired sensation in left upper and lower extremity, minimally improved overnight. Weak squeeze on L hand, unable to lift LUE or LLE. No facial asymmetry. AAOx4   Psychiatric:         Mood and Affect: Mood is depressed. Mood is not anxious. Affect is not tearful.         Speech: Speech is delayed.         Behavior: Behavior normal.       Significant Labs: All pertinent labs within the past 24 hours have been reviewed.  BMP:   Recent Labs   Lab 08/07/22  0326   *      K 3.3*      CO2 24   BUN 12   CREATININE 0.7   CALCIUM 9.6     CBC:   Recent Labs   Lab 08/06/22  1752 08/07/22  0326   WBC 12.51 10.37   HGB 12.6 11.2*   HCT 40.4 37.1    335       Significant Imaging: I have reviewed all pertinent imaging results/findings within the past 24 hours.

## 2022-08-08 LAB
ANION GAP SERPL CALC-SCNC: 12 MMOL/L (ref 8–16)
BASOPHILS # BLD AUTO: 0.05 K/UL (ref 0–0.2)
BASOPHILS NFR BLD: 0.4 % (ref 0–1.9)
BUN SERPL-MCNC: 10 MG/DL (ref 6–20)
CALCIUM SERPL-MCNC: 9.7 MG/DL (ref 8.7–10.5)
CHLORIDE SERPL-SCNC: 108 MMOL/L (ref 95–110)
CO2 SERPL-SCNC: 17 MMOL/L (ref 23–29)
CREAT SERPL-MCNC: 0.7 MG/DL (ref 0.5–1.4)
DIFFERENTIAL METHOD: ABNORMAL
EOSINOPHIL # BLD AUTO: 0 K/UL (ref 0–0.5)
EOSINOPHIL NFR BLD: 0.1 % (ref 0–8)
ERYTHROCYTE [DISTWIDTH] IN BLOOD BY AUTOMATED COUNT: 14.7 % (ref 11.5–14.5)
EST. GFR  (NO RACE VARIABLE): >60 ML/MIN/1.73 M^2
GLUCOSE SERPL-MCNC: 169 MG/DL (ref 70–110)
HCT VFR BLD AUTO: 38.5 % (ref 37–48.5)
HGB BLD-MCNC: 12.2 G/DL (ref 12–16)
IMM GRANULOCYTES # BLD AUTO: 0.34 K/UL (ref 0–0.04)
IMM GRANULOCYTES NFR BLD AUTO: 2.4 % (ref 0–0.5)
LYMPHOCYTES # BLD AUTO: 1.3 K/UL (ref 1–4.8)
LYMPHOCYTES NFR BLD: 9.3 % (ref 18–48)
MCH RBC QN AUTO: 26.1 PG (ref 27–31)
MCHC RBC AUTO-ENTMCNC: 31.7 G/DL (ref 32–36)
MCV RBC AUTO: 82 FL (ref 82–98)
MONOCYTES # BLD AUTO: 0.3 K/UL (ref 0.3–1)
MONOCYTES NFR BLD: 1.9 % (ref 4–15)
NEUTROPHILS # BLD AUTO: 12 K/UL (ref 1.8–7.7)
NEUTROPHILS NFR BLD: 85.9 % (ref 38–73)
NRBC BLD-RTO: 0 /100 WBC
PLATELET # BLD AUTO: 299 K/UL (ref 150–450)
PLATELET BLD QL SMEAR: ABNORMAL
PMV BLD AUTO: 10.9 FL (ref 9.2–12.9)
POCT GLUCOSE: 145 MG/DL (ref 70–110)
POCT GLUCOSE: 171 MG/DL (ref 70–110)
POCT GLUCOSE: 185 MG/DL (ref 70–110)
POCT GLUCOSE: 194 MG/DL (ref 70–110)
POTASSIUM SERPL-SCNC: 5 MMOL/L (ref 3.5–5.1)
RBC # BLD AUTO: 4.68 M/UL (ref 4–5.4)
SODIUM SERPL-SCNC: 137 MMOL/L (ref 136–145)
WBC # BLD AUTO: 13.94 K/UL (ref 3.9–12.7)

## 2022-08-08 PROCEDURE — 96372 THER/PROPH/DIAG INJ SC/IM: CPT

## 2022-08-08 PROCEDURE — 25000003 PHARM REV CODE 250: Performed by: INTERNAL MEDICINE

## 2022-08-08 PROCEDURE — 63600175 PHARM REV CODE 636 W HCPCS

## 2022-08-08 PROCEDURE — 25000003 PHARM REV CODE 250: Performed by: NURSE PRACTITIONER

## 2022-08-08 PROCEDURE — 99226 PR SUBSEQUENT OBSERVATION CARE,LEVEL III: CPT | Mod: ,,,

## 2022-08-08 PROCEDURE — 25000003 PHARM REV CODE 250

## 2022-08-08 PROCEDURE — 85025 COMPLETE CBC W/AUTO DIFF WBC: CPT

## 2022-08-08 PROCEDURE — 11000001 HC ACUTE MED/SURG PRIVATE ROOM

## 2022-08-08 PROCEDURE — 36415 COLL VENOUS BLD VENIPUNCTURE: CPT

## 2022-08-08 PROCEDURE — 99233 SBSQ HOSP IP/OBS HIGH 50: CPT | Mod: ,,, | Performed by: PSYCHIATRY & NEUROLOGY

## 2022-08-08 PROCEDURE — 99233 PR SUBSEQUENT HOSPITAL CARE,LEVL III: ICD-10-PCS | Mod: ,,, | Performed by: PSYCHIATRY & NEUROLOGY

## 2022-08-08 PROCEDURE — 99226 PR SUBSEQUENT OBSERVATION CARE,LEVEL III: ICD-10-PCS | Mod: ,,,

## 2022-08-08 PROCEDURE — 80048 BASIC METABOLIC PNL TOTAL CA: CPT

## 2022-08-08 RX ORDER — DIPHENHYDRAMINE HCL 50 MG
50 CAPSULE ORAL NIGHTLY PRN
Status: DISCONTINUED | OUTPATIENT
Start: 2022-08-08 | End: 2022-08-17 | Stop reason: HOSPADM

## 2022-08-08 RX ORDER — KETOROLAC TROMETHAMINE 15 MG/ML
15 INJECTION, SOLUTION INTRAMUSCULAR; INTRAVENOUS EVERY 6 HOURS PRN
Status: COMPLETED | OUTPATIENT
Start: 2022-08-08 | End: 2022-08-10

## 2022-08-08 RX ADMIN — METHYLPREDNISOLONE SODIUM SUCCINATE 1000 MG: 1 INJECTION, POWDER, LYOPHILIZED, FOR SOLUTION INTRAMUSCULAR; INTRAVENOUS at 09:08

## 2022-08-08 RX ADMIN — DEXTROSE 1000 MG: 50 INJECTION, SOLUTION INTRAVENOUS at 10:08

## 2022-08-08 RX ADMIN — VERAPAMIL HYDROCHLORIDE 120 MG: 120 TABLET, FILM COATED, EXTENDED RELEASE ORAL at 06:08

## 2022-08-08 RX ADMIN — SODIUM CHLORIDE 500 ML: 0.9 INJECTION, SOLUTION INTRAVENOUS at 12:08

## 2022-08-08 RX ADMIN — LORAZEPAM 1 MG: 1 TABLET ORAL at 08:08

## 2022-08-08 RX ADMIN — BUPROPION HYDROCHLORIDE 150 MG: 150 TABLET, FILM COATED, EXTENDED RELEASE ORAL at 08:08

## 2022-08-08 RX ADMIN — ONDANSETRON 8 MG: 8 TABLET, ORALLY DISINTEGRATING ORAL at 03:08

## 2022-08-08 RX ADMIN — ENOXAPARIN SODIUM 40 MG: 100 INJECTION SUBCUTANEOUS at 08:08

## 2022-08-08 RX ADMIN — MAGNESIUM SULFATE HEPTAHYDRATE 2 G: 40 INJECTION, SOLUTION INTRAVENOUS at 08:08

## 2022-08-08 RX ADMIN — ACETAMINOPHEN 1000 MG: 500 TABLET ORAL at 10:08

## 2022-08-08 RX ADMIN — DEXTROSE 1000 MG: 50 INJECTION, SOLUTION INTRAVENOUS at 09:08

## 2022-08-08 RX ADMIN — DIPHENHYDRAMINE HYDROCHLORIDE 50 MG: 50 CAPSULE ORAL at 08:08

## 2022-08-08 RX ADMIN — ACETAMINOPHEN 1000 MG: 500 TABLET ORAL at 06:08

## 2022-08-08 RX ADMIN — KETOROLAC TROMETHAMINE 15 MG: 15 INJECTION, SOLUTION INTRAMUSCULAR; INTRAVENOUS at 12:08

## 2022-08-08 RX ADMIN — ACETAMINOPHEN 1000 MG: 500 TABLET ORAL at 03:08

## 2022-08-08 RX ADMIN — ENOXAPARIN SODIUM 40 MG: 100 INJECTION SUBCUTANEOUS at 09:08

## 2022-08-08 RX ADMIN — MAGNESIUM SULFATE HEPTAHYDRATE 2 G: 40 INJECTION, SOLUTION INTRAVENOUS at 11:08

## 2022-08-08 NOTE — PROGRESS NOTES
[facility] - Telemetry [facility] ([address] Medicine  Progress Note    Patient Name: Sonia Goldberg  MRN: [id number]  Patient Class: OP- Observation   Admission Date: 8/6/2022  Length of Stay: 0 days  Attending Physician: Jalen Ponce MD  Primary Care Provider: Terell Reyes MD (Inactive)        Subjective:     Principal Problem:Left-sided weakness        HPI:  Sonia Goldberg is a 36 y.o. female with a past medical history of migraines, PCOS, bipolar disorder, IBS, COVID-19, and KERI presenting in the ED with aphasia and left-sided weakness starting at 1530. Patient mentions having similar episode earlier this month presented to outside hospital obtained laboratory testing which was unremarkable. She was discharged after symptoms improved with migraine cocktail. She describes the headache as pulsing and it is located in the right frontal/parietal region. States the only medication that occasionally helps is Fioricet. Associated symptoms include blurry vision, photophobia, and nausea. Patient denies any vomiting, diarrhea, fevers, chills, or urinary symptoms.     ED: hypertensive, otherwise vital signs stable. Stroke code activated, vascular neurology evaluated patient at bedside. CTH and MRI brain negative for acute infarct. They have strong suspicion for alternate etiology of symptoms such as hemiplegic or complex migraines. Recommend symptomatic relief and general neurology consultation. Total cholesterol 243 with triglycerides of 319. Otherwise intake labs largely unremarkable. Given 1L NS, Toradol, Reglan, and versed.       Overview/Hospital Course:  Sonia Goldberg was placed in  observation for left-sided paresis in setting of acute intractable migraine. Neurology evaluated patient and recommend beginning IV migraine cocktail: depakote, solumedrol, magnesium, plus oral verapamil. Assess improvement in strength with migraine management. Close BG monitoring and insulin  adjustments while on steroids. Patient is scheduled to f/u with her neurologist next week and follows with an opthalmology clinic.       Interval History: NAEON. AFVSS. Patient reporting minimal to no improvement in migraine intensity. Also c/o swelling in LUE, associated with pain in the L hand. LUE doppler US negative for DVT. Follow up on neurology recs, continue migraine cocktail. Instructed patient and RN to elevate L arm and hand.     Review of Systems   Constitutional:  Positive for activity change and appetite change (decreased). Negative for chills, diaphoresis and fever.   HENT:  Negative for congestion, sore throat and trouble swallowing.    Eyes:  Negative for photophobia and visual disturbance.   Respiratory:  Negative for cough, chest tightness and shortness of breath.    Cardiovascular:  Negative for chest pain, palpitations and leg swelling.   Gastrointestinal:  Positive for nausea. Negative for abdominal pain, constipation, diarrhea and vomiting.   Genitourinary:  Negative for difficulty urinating, dysuria, frequency and hematuria.   Musculoskeletal:  Negative for back pain, gait problem and neck pain.        L hand and arm swelling, L hand pain   Skin:  Negative for rash and wound.   Neurological:  Positive for speech difficulty (slow with occasionally slurred speech), weakness (left upper and lower extremity), numbness and headaches. Negative for dizziness, syncope, facial asymmetry and light-headedness.   Psychiatric/Behavioral:  Negative for agitation and confusion. The patient is not nervous/anxious.    Objective:     Vital Signs (Most Recent):  Temp: 97.6 °F (36.4 °C) (08/08/22 0804)  Pulse: 87 (08/08/22 0804)  Resp: 18 (08/08/22 0804)  BP: (!) 116/56 (08/08/22 0804)  SpO2: 95 % (08/08/22 0804)   Vital Signs (24h Range):  Temp:  [97.6 °F (36.4 °C)-98.8 °F (37.1 °C)] 97.6 °F (36.4 °C)  Pulse:  [75-89] 87  Resp:  [16-19] 18  SpO2:  [95 %-100 %] 95 %  BP: (116-167)/(56-90) 116/56     Weight:  126.6 kg (279 lb)  Body mass index is 43.7 kg/m².  No intake or output data in the 24 hours ending 08/08/22 1109   Physical Exam  Vitals and nursing note reviewed.   Constitutional:       General: She is not in acute distress.     Appearance: She is obese. She is not toxic-appearing or diaphoretic.      Comments: Appears to be uncomfortable   HENT:      Head: Normocephalic and atraumatic.      Right Ear: External ear normal.      Left Ear: External ear normal.      Nose: Nose normal.      Mouth/Throat:      Mouth: Mucous membranes are moist.   Eyes:      General: No scleral icterus.        Right eye: No discharge.         Left eye: No discharge.      Extraocular Movements: Extraocular movements intact.      Conjunctiva/sclera: Conjunctivae normal.   Cardiovascular:      Rate and Rhythm: Normal rate.   Pulmonary:      Effort: Pulmonary effort is normal. No respiratory distress.   Abdominal:      General: Abdomen is flat. There is no distension.   Musculoskeletal:         General: Swelling present.      Cervical back: Normal range of motion.      Right lower leg: No edema.      Left lower leg: No edema.      Comments: L hand swelling, pitting +1. Tender on palpation of LUE. No erythema, rash, injury, or warmth. Minimal ROM in LUE and LLE 2/2 weakness.    Skin:     General: Skin is warm and dry.      Findings: No rash.   Neurological:      Mental Status: She is alert and oriented to person, place, and time.      Sensory: Sensory deficit present.      Motor: Weakness present.      Coordination: Coordination normal.      Comments: Profound weakness and impaired sensation in left upper and lower extremity, mild improvement overnight in LLE. Able to hold LLE against gravity for a few seconds. Weak squeeze on L hand, unable to lift LUE or hold against gravity. Sensation decreased in LLE and LUE. No facial asymmetry. No slurring of speech. AAOx4.    Psychiatric:         Mood and Affect: Mood is depressed. Mood is not  anxious. Affect is not tearful.         Speech: Speech is delayed.         Behavior: Behavior normal.       Significant Labs: All pertinent labs within the past 24 hours have been reviewed.  BMP:   Recent Labs   Lab 08/08/22  0345   *      K 5.0      CO2 17*   BUN 10   CREATININE 0.7   CALCIUM 9.7     CBC:   Recent Labs   Lab 08/07/22  0326 08/07/22  2150 08/08/22  0345   WBC 10.37 10.80 13.94*   HGB 11.2* 12.0 12.2   HCT 37.1 38.8 38.5    424 299       Significant Imaging: I have reviewed all pertinent imaging results/findings within the past 24 hours.      Assessment/Plan:      * Left-sided weakness  - stroke code activated upon arrive due to left-sided weakness and aphasia  - CTH and MRI brain negative for acute infarct  - vascular neurology evaluated patient in the ED and have a low suspicion for CVA at this time, suspect alternate etiology such as hemiplegic vs. complex migraines  - neurology consulted, appreciate assistance --> see migraine  - L hand swelling likely 2/2 limited mobility of L arm. Doppler US neg for DVT.     PCOS (polycystic ovarian syndrome)  - hold metformin  - monitor BG in setting of steroid use -- Start low dose SSI       Hypokalemia  Lab Results   Component Value Date    K 3.3 (L) 08/07/2022     - replacing, daily bmp       Intractable migraine  - past medical history signfiicant for migraines, presenting with persistent headache for about ~2 months  - recently started on phentermine in May 2022 for weight loss - concern for adverse effect  - follows with neurology outpatient  - given 1L NS, toradol, reglan, and versed in the ED and denies any improvement of symptoms  - MRI brain and CTH without acute findings (see neurology assessment below)  - neurology consulted, suspect hemiparesis 2/2 severe migraine, follow recs   --> CTH & MRI negative for acute infarct, however with partially empty sella, slight prominent fluid signal along optic nerve sheaths  bilaterally. Incidental finding of a small focus of susceptibility in R frontal lobe suggestive of remote hemorrhage.   --> Etiology considered to be complex migraines in conjunction with possible IIH process. Symptoms not typical of IIH (visual changes intermittent and associated with headaches) and more typical of a migranous process.  Recommendations:  -- magnesium 2g IV BID  -- solumedrol 1g IV QD x3 days  -- Depacon (IV valproate) 1g  IV BID   -- verapamil 120mg po QD  -- neurology will follow, please call with questions or concerns  - low dose SSI while on steroids     Bipolar disorder, unspecified  - follows with psychiatry outpatient  - patient reports no recent manic episodes and that her bipolar is stable  - continue wellbutrin, vraylar, and ativan prn      Obstructive sleep apnea  - cpap qhs      Morbid obesity  Body mass index is 42.3 kg/m². Morbid obesity complicates all aspects of disease management from diagnostic modalities to treatment. Weight loss encouraged and health benefits explained to patient.           VTE Risk Mitigation (From admission, onward)         Ordered     enoxaparin injection 40 mg  Every 12 hours         08/06/22 2207                Discharge Planning   FLORNECE: 8/10/2022     Code Status: Full Code   Is the patient medically ready for discharge?: No    Reason for patient still in hospital (select all that apply): Patient trending condition, Laboratory test, Treatment, Imaging and Consult recommendations                     Collette Smith PA-C  Department of Hospital Medicine   [facility] - Telemetry Stepdown ([place]

## 2022-08-08 NOTE — NURSING
Patient said, her head still hurt but she know that it will get worst before it gets better by tomorrow after the nerve block. Pt is talking on the phone, VSS. No distress noted.

## 2022-08-08 NOTE — ASSESSMENT & PLAN NOTE
- stroke code activated upon arrive due to left-sided weakness and aphasia  - CTH and MRI brain negative for acute infarct  - vascular neurology evaluated patient in the ED and have a low suspicion for CVA at this time, suspect alternate etiology such as hemiplegic vs. complex migraines  - neurology consulted, appreciate assistance --> see migraine  - L hand swelling likely 2/2 limited mobility of L arm. Doppler US neg for DVT.

## 2022-08-08 NOTE — SUBJECTIVE & OBJECTIVE
Interval History: NAEON. AFVSS. Patient reporting minimal to no improvement in migraine intensity. Also c/o swelling in LUE, associated with pain in the L hand. LUE doppler US negative for DVT. Follow up on neurology recs, continue migraine cocktail. Instructed patient and RN to elevate L arm and hand.     Review of Systems   Constitutional:  Positive for activity change and appetite change (decreased). Negative for chills, diaphoresis and fever.   HENT:  Negative for congestion, sore throat and trouble swallowing.    Eyes:  Negative for photophobia and visual disturbance.   Respiratory:  Negative for cough, chest tightness and shortness of breath.    Cardiovascular:  Negative for chest pain, palpitations and leg swelling.   Gastrointestinal:  Positive for nausea. Negative for abdominal pain, constipation, diarrhea and vomiting.   Genitourinary:  Negative for difficulty urinating, dysuria, frequency and hematuria.   Musculoskeletal:  Negative for back pain, gait problem and neck pain.        L hand and arm swelling, L hand pain   Skin:  Negative for rash and wound.   Neurological:  Positive for speech difficulty (slow with occasionally slurred speech), weakness (left upper and lower extremity), numbness and headaches. Negative for dizziness, syncope, facial asymmetry and light-headedness.   Psychiatric/Behavioral:  Negative for agitation and confusion. The patient is not nervous/anxious.    Objective:     Vital Signs (Most Recent):  Temp: 97.6 °F (36.4 °C) (08/08/22 0804)  Pulse: 87 (08/08/22 0804)  Resp: 18 (08/08/22 0804)  BP: (!) 116/56 (08/08/22 0804)  SpO2: 95 % (08/08/22 0804)   Vital Signs (24h Range):  Temp:  [97.6 °F (36.4 °C)-98.8 °F (37.1 °C)] 97.6 °F (36.4 °C)  Pulse:  [75-89] 87  Resp:  [16-19] 18  SpO2:  [95 %-100 %] 95 %  BP: (116-167)/(56-90) 116/56     Weight: 126.6 kg (279 lb)  Body mass index is 43.7 kg/m².  No intake or output data in the 24 hours ending 08/08/22 1109   Physical Exam  Vitals and  nursing note reviewed.   Constitutional:       General: She is not in acute distress.     Appearance: She is obese. She is not toxic-appearing or diaphoretic.      Comments: Appears to be uncomfortable   HENT:      Head: Normocephalic and atraumatic.      Right Ear: External ear normal.      Left Ear: External ear normal.      Nose: Nose normal.      Mouth/Throat:      Mouth: Mucous membranes are moist.   Eyes:      General: No scleral icterus.        Right eye: No discharge.         Left eye: No discharge.      Extraocular Movements: Extraocular movements intact.      Conjunctiva/sclera: Conjunctivae normal.   Cardiovascular:      Rate and Rhythm: Normal rate.   Pulmonary:      Effort: Pulmonary effort is normal. No respiratory distress.   Abdominal:      General: Abdomen is flat. There is no distension.   Musculoskeletal:         General: Swelling present.      Cervical back: Normal range of motion.      Right lower leg: No edema.      Left lower leg: No edema.      Comments: L hand swelling, pitting +1. Tender on palpation of LUE. No erythema, rash, injury, or warmth. Minimal ROM in LUE and LLE 2/2 weakness.    Skin:     General: Skin is warm and dry.      Findings: No rash.   Neurological:      Mental Status: She is alert and oriented to person, place, and time.      Sensory: Sensory deficit present.      Motor: Weakness present.      Coordination: Coordination normal.      Comments: Profound weakness and impaired sensation in left upper and lower extremity, mild improvement overnight in LLE. Able to hold LLE against gravity for a few seconds. Weak squeeze on L hand, unable to lift LUE or hold against gravity. Sensation decreased in LLE and LUE. No facial asymmetry. No slurring of speech. AAOx4.    Psychiatric:         Mood and Affect: Mood is depressed. Mood is not anxious. Affect is not tearful.         Speech: Speech is delayed.         Behavior: Behavior normal.       Significant Labs: All pertinent labs  within the past 24 hours have been reviewed.  BMP:   Recent Labs   Lab 08/08/22  0345   *      K 5.0      CO2 17*   BUN 10   CREATININE 0.7   CALCIUM 9.7     CBC:   Recent Labs   Lab 08/07/22  0326 08/07/22  2150 08/08/22  0345   WBC 10.37 10.80 13.94*   HGB 11.2* 12.0 12.2   HCT 37.1 38.8 38.5    424 299       Significant Imaging: I have reviewed all pertinent imaging results/findings within the past 24 hours.
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
## 2022-08-08 NOTE — SUBJECTIVE & OBJECTIVE
Subjective:     Interval History: Started on HA cocktail last night. HA pain to 9/10, with some aphasia, got benadryl, ativan, reglan, toradol and was able to get some sleep. 8/10 headache this morning. Aphasia resolved.    Current Neurological Medications: Mag 2g IV BID, Solumedrol 1g QD, Depacon 1g BID, Verapamil 120mg QD    Current Facility-Administered Medications   Medication Dose Route Frequency Provider Last Rate Last Admin    acetaminophen tablet 1,000 mg  1,000 mg Oral Q8H KLEBER Dewitt-C   1,000 mg at 08/08/22 0625    albuterol-ipratropium 2.5 mg-0.5 mg/3 mL nebulizer solution 3 mL  3 mL Nebulization Q4H PRN Rachelle Lim PA-C        bisacodyL suppository 10 mg  10 mg Rectal Daily PRN Rachelle Lim PA-C        buPROPion TB24 tablet 150 mg  150 mg Oral Nightly KLEBER Dewitt-C   150 mg at 08/07/22 2221    cariprazine Cap 3 mg  3 mg Oral Nightly Rachelle Lim PA-C        dextrose 10% bolus 125 mL  12.5 g Intravenous PRN KLEBER Dewitt-CHEPE        dextrose 10% bolus 250 mL  25 g Intravenous PRN Rachelle Lim PA-C        enoxaparin injection 40 mg  40 mg Subcutaneous Q12H KLEBER Dewitt-C   40 mg at 08/08/22 0909    glucagon (human recombinant) injection 1 mg  1 mg Intramuscular PRN Rachelle Lim PA-C        glucose chewable tablet 16 g  16 g Oral PRN Rachelle Lim PA-C        glucose chewable tablet 24 g  24 g Oral PRN Rachelle Lim PA-C        insulin aspart U-100 pen 0-5 Units  0-5 Units Subcutaneous QID (AC + HS) PRN Rachelle Lim PA-C        ketorolac injection 15 mg  15 mg Intravenous Q6H PRN Collette Smiht PA-C        LORazepam tablet 1 mg  1 mg Oral Q6H PRN TODD DewittC   1 mg at 08/07/22 1915    magnesium sulfate 2g in water 50mL IVPB (premix)  2 g Intravenous BID Collette Smith PA-C   Stopped at 08/08/22 0142    melatonin tablet 6 mg  6 mg Oral Nightly PRN Rachelle Lim PA-C        methylPREDNISolone sodium succinate (SOLU-MEDROL) 1,000  mg in dextrose 5 % 100 mL IVPB  1,000 mg Intravenous Daily Collette Smith PA-C   Stopped at 08/08/22 0937    naloxone 0.4 mg/mL injection 0.02 mg  0.02 mg Intravenous PRN Rachelle Lim PA-C        ondansetron disintegrating tablet 8 mg  8 mg Oral Q8H PRN Rachelle Lim PA-C        prochlorperazine injection Soln 5 mg  5 mg Intravenous Q6H PRN Rachelle Lim PA-C        simethicone chewable tablet 80 mg  1 tablet Oral QID PRN Rachelle Lim PA-C        sodium chloride 0.9% flush 5 mL  5 mL Intravenous PRN Rachelle Lim PA-C        valproate (DEPACON) 1,000 mg in dextrose 5 % 100 mL IVPB  1,000 mg Intravenous BID Jalen Ponce MD   Stopped at 08/08/22 1008    verapamiL CR tablet 120 mg  120 mg Oral Daily Collette Smith PA-C   120 mg at 08/08/22 0625       Review of Systems   Constitutional:  Negative for chills and fever.   HENT:  Positive for tinnitus (pulsing sound in R ear). Negative for congestion and sinus pressure.    Eyes:  Positive for photophobia and visual disturbance (blurred vision).   Respiratory:  Negative for cough and shortness of breath.    Cardiovascular:  Negative for chest pain and leg swelling.   Gastrointestinal:  Negative for abdominal pain and constipation.   Genitourinary:  Negative for dysuria and frequency.   Musculoskeletal:  Positive for neck pain. Negative for back pain.   Skin:  Negative for color change and rash.   Neurological:  Positive for speech difficulty, weakness, numbness and headaches. Negative for dizziness, tremors, seizures, syncope, facial asymmetry and light-headedness.   Psychiatric/Behavioral:  Negative for behavioral problems and confusion.    Objective:     Vital Signs (Most Recent):  Temp: 97.6 °F (36.4 °C) (08/08/22 0804)  Pulse: 87 (08/08/22 0804)  Resp: 18 (08/08/22 0804)  BP: (!) 116/56 (08/08/22 0804)  SpO2: 95 % (08/08/22 0804)   Vital Signs (24h Range):  Temp:  [97.6 °F (36.4 °C)-98.8 °F (37.1 °C)] 97.6 °F (36.4 °C)  Pulse:  [75-89]  87  Resp:  [16-19] 18  SpO2:  [95 %-100 %] 95 %  BP: (116-167)/(56-90) 116/56     Weight: 126.6 kg (279 lb)  Body mass index is 43.7 kg/m².      NEUROLOGICAL EXAMINATION:     MENTAL STATUS   Oriented to person, place, and time.   Speech: speech is normal   Level of consciousness: alert    CRANIAL NERVES   Cranial nerves II through XII intact.     MOTOR EXAM   Muscle bulk: normal  Overall muscle tone: normal    Strength   Right deltoid: 5/5  Left deltoid: 5/5  Right biceps: 5/5  Left biceps: 2/5  Right triceps: 5/5  Left triceps: 2/5  Right wrist flexion: 5/5  Left wrist flexion: 2/5  Right wrist extension: 5/5  Left wrist extension: 2/5  Right interossei: 5/5  Left interossei: 2/5  Right quadriceps: 5/5  Left quadriceps: 3/5  Right hamstrin/5  Left hamstrin/5  Right gastroc: 5/5  Left gastroc: 2/5    REFLEXES     Reflexes   Right brachioradialis: 2+  Left brachioradialis: 2+  Right biceps: 2+  Left biceps: 2+  Right triceps: 2+  Left triceps: 2+  Right patellar: 2+  Left patellar: 2+  Right achilles: 2+  Left achilles: 2+  Right plantar: normal  Left plantar: normal    SENSORY EXAM   Right arm light touch: normal  Left arm light touch: decreased from elbow  Right leg light touch: normal  Left leg light touch: decreased from knee    Significant Labs: CBC:   Recent Labs   Lab 22  0326 22  2150 22  0345   WBC 10.37 10.80 13.94*   HGB 11.2* 12.0 12.2   HCT 37.1 38.8 38.5    424 299     CMP:   Recent Labs   Lab 22  1752 22  0326 22  2150 22  0345    122* 196* 169*    136 138 137   K 3.8 3.3* 3.9 5.0    105 106 108   CO2 22* 24 20* 17*   BUN 12 12 11 10   CREATININE 0.8 0.7 0.9 0.7   CALCIUM 10.4 9.6 9.9 9.7   PROT 6.7  --  6.7  --    ALBUMIN 3.3*  --  3.1*  --    BILITOT 0.2  --  0.1  --    ALKPHOS 76  --  74  --    AST 12  --  8*  --    ALT 14  --  13  --    ANIONGAP 9 7* 12 12     All pertinent lab results from the past 24 hours have been  reviewed.    Significant Imaging: I have reviewed all pertinent imaging results/findings within the past 24 hours.

## 2022-08-08 NOTE — ASSESSMENT & PLAN NOTE
- past medical history signfiicant for migraines, presenting with persistent headache for about ~2 months  - recently started on phentermine in May 2022 for weight loss - concern for adverse effect  - follows with neurology outpatient  - given 1L NS, toradol, reglan, and versed in the ED and denies any improvement of symptoms  - MRI brain and CTH without acute findings (see neurology assessment below)  - neurology consulted, suspect hemiparesis 2/2 severe migraine, follow recs   --> CTH & MRI negative for acute infarct, however with partially empty sella, slight prominent fluid signal along optic nerve sheaths bilaterally. Incidental finding of a small focus of susceptibility in R frontal lobe suggestive of remote hemorrhage.   --> Etiology considered to be complex migraines in conjunction with possible IIH process. Symptoms not typical of IIH (visual changes intermittent and associated with headaches) and more typical of a migranous process.  Recommendations:  -- magnesium 2g IV BID  -- solumedrol 1g IV QD x3 days  -- Depacon (IV valproate) 1g  IV BID   -- verapamil 120mg po QD  -- neurology will follow, please call with questions or concerns  - low dose SSI while on steroids

## 2022-08-09 ENCOUNTER — TELEPHONE (OUTPATIENT)
Dept: PHARMACY | Facility: CLINIC | Age: 37
End: 2022-08-09
Payer: COMMERCIAL

## 2022-08-09 DIAGNOSIS — G43.011 INTRACTABLE MIGRAINE WITHOUT AURA AND WITH STATUS MIGRAINOSUS: Primary | ICD-10-CM

## 2022-08-09 PROBLEM — D72.829 LEUKOCYTOSIS: Status: ACTIVE | Noted: 2022-08-09

## 2022-08-09 LAB
ANION GAP SERPL CALC-SCNC: 7 MMOL/L (ref 8–16)
ANISOCYTOSIS BLD QL SMEAR: SLIGHT
BASOPHILS # BLD AUTO: 0.03 K/UL (ref 0–0.2)
BASOPHILS NFR BLD: 0.1 % (ref 0–1.9)
BUN SERPL-MCNC: 14 MG/DL (ref 6–20)
CALCIUM SERPL-MCNC: 9.2 MG/DL (ref 8.7–10.5)
CHLORIDE SERPL-SCNC: 111 MMOL/L (ref 95–110)
CO2 SERPL-SCNC: 23 MMOL/L (ref 23–29)
CREAT SERPL-MCNC: 0.7 MG/DL (ref 0.5–1.4)
DIFFERENTIAL METHOD: ABNORMAL
EOSINOPHIL # BLD AUTO: 0 K/UL (ref 0–0.5)
EOSINOPHIL NFR BLD: 0 % (ref 0–8)
ERYTHROCYTE [DISTWIDTH] IN BLOOD BY AUTOMATED COUNT: 14.9 % (ref 11.5–14.5)
EST. GFR  (NO RACE VARIABLE): >60 ML/MIN/1.73 M^2
GLUCOSE SERPL-MCNC: 160 MG/DL (ref 70–110)
HCT VFR BLD AUTO: 37.4 % (ref 37–48.5)
HCV AB SERPL QL IA: NEGATIVE
HGB BLD-MCNC: 11.3 G/DL (ref 12–16)
HIV 1+2 AB+HIV1 P24 AG SERPL QL IA: NEGATIVE
HYPOCHROMIA BLD QL SMEAR: ABNORMAL
IMM GRANULOCYTES # BLD AUTO: 0.3 K/UL (ref 0–0.04)
IMM GRANULOCYTES NFR BLD AUTO: 1.2 % (ref 0–0.5)
LACTATE SERPL-SCNC: 3.8 MMOL/L (ref 0.5–2.2)
LACTATE SERPL-SCNC: 5.4 MMOL/L (ref 0.5–2.2)
LYMPHOCYTES # BLD AUTO: 1.2 K/UL (ref 1–4.8)
LYMPHOCYTES NFR BLD: 4.8 % (ref 18–48)
MCH RBC QN AUTO: 25.5 PG (ref 27–31)
MCHC RBC AUTO-ENTMCNC: 30.2 G/DL (ref 32–36)
MCV RBC AUTO: 84 FL (ref 82–98)
MONOCYTES # BLD AUTO: 0.6 K/UL (ref 0.3–1)
MONOCYTES NFR BLD: 2.4 % (ref 4–15)
NEUTROPHILS # BLD AUTO: 23 K/UL (ref 1.8–7.7)
NEUTROPHILS NFR BLD: 91.5 % (ref 38–73)
NRBC BLD-RTO: 0 /100 WBC
OVALOCYTES BLD QL SMEAR: ABNORMAL
PLATELET # BLD AUTO: 463 K/UL (ref 150–450)
PMV BLD AUTO: 10.8 FL (ref 9.2–12.9)
POCT GLUCOSE: 133 MG/DL (ref 70–110)
POCT GLUCOSE: 151 MG/DL (ref 70–110)
POCT GLUCOSE: 170 MG/DL (ref 70–110)
POCT GLUCOSE: 176 MG/DL (ref 70–110)
POCT GLUCOSE: 181 MG/DL (ref 70–110)
POIKILOCYTOSIS BLD QL SMEAR: SLIGHT
POLYCHROMASIA BLD QL SMEAR: ABNORMAL
POTASSIUM SERPL-SCNC: 4.3 MMOL/L (ref 3.5–5.1)
RBC # BLD AUTO: 4.43 M/UL (ref 4–5.4)
SODIUM SERPL-SCNC: 141 MMOL/L (ref 136–145)
SPHEROCYTES BLD QL SMEAR: ABNORMAL
WBC # BLD AUTO: 25.15 K/UL (ref 3.9–12.7)

## 2022-08-09 PROCEDURE — 25000003 PHARM REV CODE 250: Performed by: NURSE PRACTITIONER

## 2022-08-09 PROCEDURE — 94761 N-INVAS EAR/PLS OXIMETRY MLT: CPT

## 2022-08-09 PROCEDURE — 36415 COLL VENOUS BLD VENIPUNCTURE: CPT

## 2022-08-09 PROCEDURE — 25000003 PHARM REV CODE 250

## 2022-08-09 PROCEDURE — 36415 COLL VENOUS BLD VENIPUNCTURE: CPT | Performed by: NURSE PRACTITIONER

## 2022-08-09 PROCEDURE — 63600175 PHARM REV CODE 636 W HCPCS

## 2022-08-09 PROCEDURE — 83605 ASSAY OF LACTIC ACID: CPT | Mod: 91

## 2022-08-09 PROCEDURE — 80048 BASIC METABOLIC PNL TOTAL CA: CPT

## 2022-08-09 PROCEDURE — 83605 ASSAY OF LACTIC ACID: CPT | Mod: 91 | Performed by: NURSE PRACTITIONER

## 2022-08-09 PROCEDURE — 25000003 PHARM REV CODE 250: Performed by: INTERNAL MEDICINE

## 2022-08-09 PROCEDURE — 99233 SBSQ HOSP IP/OBS HIGH 50: CPT | Mod: ,,, | Performed by: PSYCHIATRY & NEUROLOGY

## 2022-08-09 PROCEDURE — 99233 PR SUBSEQUENT HOSPITAL CARE,LEVL III: ICD-10-PCS | Mod: ,,, | Performed by: PSYCHIATRY & NEUROLOGY

## 2022-08-09 PROCEDURE — 11000001 HC ACUTE MED/SURG PRIVATE ROOM

## 2022-08-09 PROCEDURE — 99233 PR SUBSEQUENT HOSPITAL CARE,LEVL III: ICD-10-PCS | Mod: ,,,

## 2022-08-09 PROCEDURE — 99900035 HC TECH TIME PER 15 MIN (STAT)

## 2022-08-09 PROCEDURE — 87040 BLOOD CULTURE FOR BACTERIA: CPT | Mod: 59

## 2022-08-09 PROCEDURE — 85025 COMPLETE CBC W/AUTO DIFF WBC: CPT

## 2022-08-09 PROCEDURE — 99233 SBSQ HOSP IP/OBS HIGH 50: CPT | Mod: ,,,

## 2022-08-09 RX ORDER — SIMETHICONE 80 MG
1 TABLET,CHEWABLE ORAL 4 TIMES DAILY PRN
Status: DISCONTINUED | OUTPATIENT
Start: 2022-08-09 | End: 2022-08-17 | Stop reason: HOSPADM

## 2022-08-09 RX ORDER — BACLOFEN 10 MG/1
10 TABLET ORAL 3 TIMES DAILY PRN
Status: DISCONTINUED | OUTPATIENT
Start: 2022-08-09 | End: 2022-08-17 | Stop reason: HOSPADM

## 2022-08-09 RX ORDER — SODIUM CHLORIDE 9 MG/ML
INJECTION, SOLUTION INTRAVENOUS CONTINUOUS
Status: DISCONTINUED | OUTPATIENT
Start: 2022-08-09 | End: 2022-08-16

## 2022-08-09 RX ORDER — FAMOTIDINE 20 MG/1
20 TABLET, FILM COATED ORAL 2 TIMES DAILY
Status: DISCONTINUED | OUTPATIENT
Start: 2022-08-09 | End: 2022-08-17 | Stop reason: HOSPADM

## 2022-08-09 RX ORDER — ACETAZOLAMIDE 250 MG/1
250 TABLET ORAL 2 TIMES DAILY
Status: DISCONTINUED | OUTPATIENT
Start: 2022-08-09 | End: 2022-08-16

## 2022-08-09 RX ADMIN — BUPROPION HYDROCHLORIDE 150 MG: 150 TABLET, FILM COATED, EXTENDED RELEASE ORAL at 10:08

## 2022-08-09 RX ADMIN — ACETAZOLAMIDE 250 MG: 250 TABLET ORAL at 10:08

## 2022-08-09 RX ADMIN — FAMOTIDINE 20 MG: 20 TABLET ORAL at 11:08

## 2022-08-09 RX ADMIN — SODIUM CHLORIDE 500 ML: 0.9 INJECTION, SOLUTION INTRAVENOUS at 09:08

## 2022-08-09 RX ADMIN — SODIUM CHLORIDE, SODIUM LACTATE, POTASSIUM CHLORIDE, AND CALCIUM CHLORIDE 1000 ML: .6; .31; .03; .02 INJECTION, SOLUTION INTRAVENOUS at 06:08

## 2022-08-09 RX ADMIN — ACETAMINOPHEN 1000 MG: 500 TABLET ORAL at 10:08

## 2022-08-09 RX ADMIN — DIPHENHYDRAMINE HYDROCHLORIDE 50 MG: 50 CAPSULE ORAL at 10:08

## 2022-08-09 RX ADMIN — ENOXAPARIN SODIUM 40 MG: 100 INJECTION SUBCUTANEOUS at 10:08

## 2022-08-09 RX ADMIN — LORAZEPAM 1 MG: 1 TABLET ORAL at 10:08

## 2022-08-09 RX ADMIN — VERAPAMIL HYDROCHLORIDE 120 MG: 120 TABLET, FILM COATED, EXTENDED RELEASE ORAL at 08:08

## 2022-08-09 RX ADMIN — ACETAZOLAMIDE 250 MG: 250 TABLET ORAL at 12:08

## 2022-08-09 RX ADMIN — BACLOFEN 10 MG: 10 TABLET ORAL at 10:08

## 2022-08-09 RX ADMIN — MAGNESIUM SULFATE HEPTAHYDRATE 2 G: 40 INJECTION, SOLUTION INTRAVENOUS at 10:08

## 2022-08-09 RX ADMIN — DEXTROSE 1000 MG: 50 INJECTION, SOLUTION INTRAVENOUS at 08:08

## 2022-08-09 RX ADMIN — METHYLPREDNISOLONE SODIUM SUCCINATE 1000 MG: 1 INJECTION, POWDER, LYOPHILIZED, FOR SOLUTION INTRAMUSCULAR; INTRAVENOUS at 09:08

## 2022-08-09 RX ADMIN — KETOROLAC TROMETHAMINE 15 MG: 15 INJECTION, SOLUTION INTRAMUSCULAR; INTRAVENOUS at 03:08

## 2022-08-09 RX ADMIN — SODIUM CHLORIDE: 0.9 INJECTION, SOLUTION INTRAVENOUS at 09:08

## 2022-08-09 RX ADMIN — ACETAMINOPHEN 1000 MG: 500 TABLET ORAL at 03:08

## 2022-08-09 RX ADMIN — ACETAMINOPHEN 1000 MG: 500 TABLET ORAL at 08:08

## 2022-08-09 NOTE — ASSESSMENT & PLAN NOTE
- past medical history signfiicant for migraines, presenting with persistent headache for about ~2 months  - recently started on phentermine in May 2022 for weight loss - concern for adverse effect  - follows with neurology outpatient  - given 1L NS, toradol, reglan, and versed in the ED and denies any improvement of symptoms  - MRI brain and CTH without acute findings (see neurology assessment below)  - neurology consulted, suspect hemiparesis 2/2 severe migraine, follow recs   --> CTH & MRI negative for acute infarct, however with partially empty sella, slight prominent fluid signal along optic nerve sheaths bilaterally. Incidental finding of a small focus of susceptibility in R frontal lobe suggestive of remote hemorrhage.   --> Etiology considered to be complex migraines in conjunction with possible IIH process. Symptoms not typical of IIH (visual changes intermittent and associated with headaches) and more typical of a migranous process.  Recommendations:  -- magnesium 2g IV BID  -- solumedrol 1g IV QD x3 days  -- Depacon (IV valproate) 1g  IV BID   -- verapamil 120mg po QD  -- neurology will follow, please call with questions or concerns  - neurology performed occipital nerve block without symptom relief  - neuro planning for LP tomorrow am out of suspicion of IIH contributing to HA. NPO midnight out of precaution.  - low dose SSI while on steroids

## 2022-08-09 NOTE — SUBJECTIVE & OBJECTIVE
Subjective:     Interval History: CHAUHAN remains at a 8/10. Worsened last night. Was able to sleep some with benadryl and ativan    Current Facility-Administered Medications   Medication Dose Route Frequency Provider Last Rate Last Admin    acetaminophen tablet 1,000 mg  1,000 mg Oral Q8H KLEBER Dewitt-C   1,000 mg at 08/09/22 1524    acetaZOLAMIDE tablet 250 mg  250 mg Oral BID Bandar Romero MD   250 mg at 08/09/22 1249    albuterol-ipratropium 2.5 mg-0.5 mg/3 mL nebulizer solution 3 mL  3 mL Nebulization Q4H PRN Rachelle Lim PA-C        baclofen tablet 10 mg  10 mg Oral TID PRN Bandar Romero MD        bisacodyL suppository 10 mg  10 mg Rectal Daily PRN Rachelle Lim PA-C        buPROPion TB24 tablet 150 mg  150 mg Oral Nightly KLEBER Dewitt-C   150 mg at 08/08/22 2033    cariprazine Cap 3 mg  3 mg Oral Nightly KLEBER Dewitt-C   3 mg at 08/08/22 2100    dextrose 10% bolus 125 mL  12.5 g Intravenous PRN KLEBER Dewitt-CHEPE        dextrose 10% bolus 250 mL  25 g Intravenous PRN Rachelle Lim PA-C        diphenhydrAMINE capsule 50 mg  50 mg Oral Nightly PRN KLEBER Medel-C   50 mg at 08/08/22 2034    enoxaparin injection 40 mg  40 mg Subcutaneous Q12H KLEBER Dewitt-C   40 mg at 08/09/22 1027    glucagon (human recombinant) injection 1 mg  1 mg Intramuscular PRN Rachelle Lim PA-C        glucose chewable tablet 16 g  16 g Oral PRN Rachelle Lim PA-C        glucose chewable tablet 24 g  24 g Oral PRN Rachelle Lim PA-C        insulin aspart U-100 pen 0-5 Units  0-5 Units Subcutaneous QID (AC + HS) PRN Rachelle Lim PA-C        ketorolac injection 15 mg  15 mg Intravenous Q6H PRN KLEBER Medel-C   15 mg at 08/09/22 1554    LORazepam tablet 1 mg  1 mg Oral Q6H PRN Rachelle Lim PA-C   1 mg at 08/08/22 2034    magnesium sulfate 2g in water 50mL IVPB (premix)  2 g Intravenous BID Collette Smith PA-C   Stopped at 08/09/22 1228    naloxone 0.4 mg/mL  injection 0.02 mg  0.02 mg Intravenous PRN Rachelle Lim PA-C        ondansetron disintegrating tablet 8 mg  8 mg Oral Q8H PRN Rachelle Lim PA-C   8 mg at 08/08/22 1519    prochlorperazine injection Soln 5 mg  5 mg Intravenous Q6H PRN Rachelle Lim PA-C        simethicone chewable tablet 80 mg  1 tablet Oral QID PRN Rachelle Lmi PA-C        sodium chloride 0.9% flush 5 mL  5 mL Intravenous PRN Rachelle Lim PA-C        valproate (DEPACON) 1,000 mg in dextrose 5 % 100 mL IVPB  1,000 mg Intravenous BID Jalen Ponce MD   Stopped at 08/09/22 0909    verapamiL CR tablet 120 mg  120 mg Oral Daily Collette Smith PA-C   120 mg at 08/09/22 0804       Review of Systems   Constitutional:  Negative for chills and fever.   HENT:  Positive for tinnitus (pulsing sound in R ear). Negative for congestion and sinus pressure.    Eyes:  Positive for photophobia and visual disturbance (blurred vision).   Respiratory:  Negative for cough and shortness of breath.    Cardiovascular:  Negative for chest pain and leg swelling.   Gastrointestinal:  Negative for abdominal pain and constipation.   Genitourinary:  Negative for dysuria and frequency.   Musculoskeletal:  Positive for neck pain. Negative for back pain.   Skin:  Negative for color change and rash.   Neurological:  Positive for speech difficulty, weakness, numbness and headaches. Negative for dizziness, tremors, seizures, syncope, facial asymmetry and light-headedness.   Psychiatric/Behavioral:  Negative for behavioral problems and confusion.    Objective:     Vital Signs (Most Recent):  Temp: 98.4 °F (36.9 °C) (08/09/22 1605)  Pulse: 88 (08/09/22 1605)  Resp: 18 (08/09/22 1605)  BP: 139/80 (08/09/22 1605)  SpO2: 96 % (08/09/22 1605) Vital Signs (24h Range):  Temp:  [97.9 °F (36.6 °C)-98.4 °F (36.9 °C)] 98.4 °F (36.9 °C)  Pulse:  [78-92] 88  Resp:  [12-18] 18  SpO2:  [95 %-97 %] 96 %  BP: (111-143)/(58-80) 139/80     Weight: 126.6 kg (279 lb)  Body mass index  is 43.7 kg/m².      NEUROLOGICAL EXAMINATION:     MENTAL STATUS   Oriented to person, place, and time.   Speech: speech is normal   Level of consciousness: alert    CRANIAL NERVES   Cranial nerves II through XII intact.     MOTOR EXAM   Muscle bulk: normal  Overall muscle tone: normal    Strength   Right deltoid: 5/5  Left deltoid: 5/5  Right biceps: 5/5  Left biceps: 2/5  Right triceps: 5/5  Left triceps: 2/5  Right wrist flexion: 5/5  Left wrist flexion: 2/5  Right wrist extension: 5/5  Left wrist extension: 2/5  Right interossei: 5/5  Left interossei: 2/5  Right quadriceps: 5/5  Left quadriceps: 3/5  Right hamstrin/5  Left hamstrin/5  Right gastroc: 5/5  Left gastroc: 2/5    REFLEXES     Reflexes   Right brachioradialis: 2+  Left brachioradialis: 2+  Right biceps: 2+  Left biceps: 2+  Right triceps: 2+  Left triceps: 2+  Right patellar: 2+  Left patellar: 2+  Right achilles: 2+  Left achilles: 2+  Right plantar: normal  Left plantar: normal    SENSORY EXAM   Right arm light touch: normal  Left arm light touch: decreased from elbow  Right leg light touch: normal  Left leg light touch: decreased from knee    Significant Labs: CBC:   Recent Labs   Lab 22  0343   WBC 10.80 13.94* 25.15*   HGB 12.0 12.2 11.3*   HCT 38.8 38.5 37.4    299 463*     CMP:   Recent Labs   Lab 225 22  0343   * 169* 160*    137 141   K 3.9 5.0 4.3    108 111*   CO2 20* 17* 23   BUN 11 10 14   CREATININE 0.9 0.7 0.7   CALCIUM 9.9 9.7 9.2   PROT 6.7  --   --    ALBUMIN 3.1*  --   --    BILITOT 0.1  --   --    ALKPHOS 74  --   --    AST 8*  --   --    ALT 13  --   --    ANIONGAP 12 12 7*     All pertinent lab results from the past 24 hours have been reviewed.    Significant Imaging:  none   Unknown
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
## 2022-08-09 NOTE — PROGRESS NOTES
Tristen Read - Telemetry [facility] ([address] Medicine  Progress Note    Patient Name: Sonia Goldberg  MRN: [id number]  Patient Class: IP- Inpatient   Admission Date: 8/6/2022  Length of Stay: 1 days  Attending Physician: Jalen Ponce MD  Primary Care Provider: Terell Reyes MD (Inactive)        Subjective:     Principal Problem:Left-sided weakness        HPI:  Sonia Goldberg is a 36 y.o. female with a past medical history of migraines, PCOS, bipolar disorder, IBS, COVID-19, and KERI presenting in the ED with aphasia and left-sided weakness starting at 1530. Patient mentions having similar episode earlier this month presented to outside hospital obtained laboratory testing which was unremarkable. She was discharged after symptoms improved with migraine cocktail. She describes the headache as pulsing and it is located in the right frontal/parietal region. States the only medication that occasionally helps is Fioricet. Associated symptoms include blurry vision, photophobia, and nausea. Patient denies any vomiting, diarrhea, fevers, chills, or urinary symptoms.     ED: hypertensive, otherwise vital signs stable. Stroke code activated, vascular neurology evaluated patient at bedside. CTH and MRI brain negative for acute infarct. They have strong suspicion for alternate etiology of symptoms such as hemiplegic or complex migraines. Recommend symptomatic relief and general neurology consultation. Total cholesterol 243 with triglycerides of 319. Otherwise intake labs largely unremarkable. Given 1L NS, Toradol, Reglan, and versed.       Overview/Hospital Course:  Sonia Goldberg was placed in  observation for left-sided paresis in setting of acute intractable migraine. Neurology evaluated patient and recommend beginning IV migraine cocktail: depakote, solumedrol, magnesium, plus oral verapamil. Minimal to no improvement after 48 hours. Neurology performed occipital nerve block without sympotm  relief. Planning for LP for IIH 8/9. Assess improvement in strength with migraine management, weigh need for therapy consult. Increasing leukocytosis 11K -> 14K -> 25K, steroid-induced vs concern for sepsis given left shift. HR 92 and BP low normal with no evidence of infectious source -> lactic and UA ordered. Close BG monitoring and insulin adjustments while on steroids. Patient is scheduled to f/u with her neurologist next week and follows with an opthalmology clinic.       Interval History: NAEON. AFVSS. Patient without improvement after 2 days of migraine cocktail and s/p occipital nerve block. Neurology planning for LP tomorrow. NPO midnight in case of need for anesthesia. Check UA and lactic as leukocytosis worsening with left shift and HR >90.     Review of Systems   Constitutional:  Positive for activity change and appetite change (decreased). Negative for chills, diaphoresis and fever.   HENT:  Negative for congestion, sore throat and trouble swallowing.    Eyes:  Negative for photophobia and visual disturbance.   Respiratory:  Negative for cough, chest tightness and shortness of breath.    Cardiovascular:  Negative for chest pain, palpitations and leg swelling.   Gastrointestinal:  Positive for nausea. Negative for abdominal pain, constipation, diarrhea and vomiting.   Genitourinary:  Negative for difficulty urinating, dysuria, frequency and hematuria.   Musculoskeletal:  Negative for back pain, gait problem and neck pain.        L hand and arm swelling, L hand pain   Skin:  Negative for rash and wound.   Neurological:  Positive for speech difficulty (slow with occasionally slurred speech), weakness (left upper and lower extremity), numbness and headaches. Negative for dizziness, syncope, facial asymmetry and light-headedness.   Psychiatric/Behavioral:  Negative for agitation and confusion. The patient is not nervous/anxious.    Objective:     Vital Signs (Most Recent):  Temp: 98 °F (36.7 °C) (08/09/22  1214)  Pulse: 92 (08/09/22 1214)  Resp: 18 (08/09/22 1214)  BP: (!) 111/58 (08/09/22 1214)  SpO2: 96 % (08/09/22 1214)   Vital Signs (24h Range):  Temp:  [97.9 °F (36.6 °C)-98.3 °F (36.8 °C)] 98 °F (36.7 °C)  Pulse:  [78-92] 92  Resp:  [12-19] 18  SpO2:  [94 %-97 %] 96 %  BP: (111-143)/(58-68) 111/58     Weight: 126.6 kg (279 lb)  Body mass index is 43.7 kg/m².    Intake/Output Summary (Last 24 hours) at 8/9/2022 1247  Last data filed at 8/9/2022 0500  Gross per 24 hour   Intake --   Output 850 ml   Net -850 ml      Physical Exam  Vitals and nursing note reviewed.   Constitutional:       General: She is not in acute distress.     Appearance: She is obese. She is not toxic-appearing or diaphoretic.   HENT:      Head: Normocephalic and atraumatic.      Right Ear: External ear normal.      Left Ear: External ear normal.      Nose: Nose normal.      Mouth/Throat:      Mouth: Mucous membranes are moist.   Eyes:      General: No scleral icterus.        Right eye: No discharge.         Left eye: No discharge.      Extraocular Movements: Extraocular movements intact.      Conjunctiva/sclera: Conjunctivae normal.   Cardiovascular:      Rate and Rhythm: Normal rate.   Pulmonary:      Effort: Pulmonary effort is normal. No respiratory distress.   Abdominal:      General: Abdomen is flat. There is no distension.   Musculoskeletal:         General: Swelling present.      Cervical back: Normal range of motion.      Right lower leg: No edema.      Left lower leg: No edema.      Comments: L hand swelling, pitting +1. Tender on palpation of LUE. No erythema, rash, injury, or warmth. Minimal ROM in LUE and LLE 2/2 weakness.    Skin:     General: Skin is warm and dry.      Findings: No rash.   Neurological:      Mental Status: She is alert and oriented to person, place, and time.      Sensory: Sensory deficit present.      Motor: Weakness present.      Coordination: Coordination normal.      Comments: Profound weakness and impaired  sensation in left upper and lower extremity, mild improvement overnight in LLE. Able to hold LLE against gravity for a few seconds. Weak squeeze on L hand, unable to lift LUE or hold against gravity. Sensation decreased in LLE and LUE. No facial asymmetry. No slurring of speech. AAOx4.    Psychiatric:         Mood and Affect: Mood normal. Mood is not anxious or depressed. Affect is not tearful.         Speech: Speech normal. Speech is not delayed.         Behavior: Behavior normal.       Significant Labs: All pertinent labs within the past 24 hours have been reviewed.  BMP:   Recent Labs   Lab 08/09/22  0343   *      K 4.3   *   CO2 23   BUN 14   CREATININE 0.7   CALCIUM 9.2     CBC:   Recent Labs   Lab 08/07/22  2150 08/08/22  0345 08/09/22  0343   WBC 10.80 13.94* 25.15*   HGB 12.0 12.2 11.3*   HCT 38.8 38.5 37.4    299 463*       Significant Imaging: I have reviewed all pertinent imaging results/findings within the past 24 hours.      Assessment/Plan:      * Left-sided weakness  - stroke code activated upon arrive due to left-sided weakness and aphasia  - CTH and MRI brain negative for acute infarct  - vascular neurology evaluated patient in the ED and have a low suspicion for CVA at this time, suspect alternate etiology such as hemiplegic vs. complex migraines  - neurology consulted, appreciate assistance --> see migraine  - L hand swelling likely 2/2 limited mobility of L arm. Doppler US neg for DVT.     Leukocytosis  - worsening leukocytosis 12.5K ---> 25K, with left shift   - 2/4 SIRS with WBC 25K and HR 92.  - steroid induced vs developing sepsis   - no infectious source, check UA and lactic.      PCOS (polycystic ovarian syndrome)  - hold metformin  - monitor BG in setting of steroid use -- Start low dose SSI       Hypokalemia  Lab Results   Component Value Date    K 4.3 08/09/2022     - replacing, daily bmp -> stable      Intractable migraine  - past medical history signfiicant  for migraines, presenting with persistent headache for about ~2 months  - recently started on phentermine in May 2022 for weight loss - concern for adverse effect  - follows with neurology outpatient  - given 1L NS, toradol, reglan, and versed in the ED and denies any improvement of symptoms  - MRI brain and CTH without acute findings (see neurology assessment below)  - neurology consulted, suspect hemiparesis 2/2 severe migraine, follow recs   --> CTH & MRI negative for acute infarct, however with partially empty sella, slight prominent fluid signal along optic nerve sheaths bilaterally. Incidental finding of a small focus of susceptibility in R frontal lobe suggestive of remote hemorrhage.   --> Etiology considered to be complex migraines in conjunction with possible IIH process. Symptoms not typical of IIH (visual changes intermittent and associated with headaches) and more typical of a migranous process.  Recommendations:  -- magnesium 2g IV BID  -- solumedrol 1g IV QD x3 days  -- Depacon (IV valproate) 1g  IV BID   -- verapamil 120mg po QD  -- neurology will follow, please call with questions or concerns  - neurology performed occipital nerve block without symptom relief  - neuro planning for LP tomorrow am out of suspicion of IIH contributing to HA. NPO midnight out of precaution.  - low dose SSI while on steroids     Bipolar disorder, unspecified  - follows with psychiatry outpatient  - patient reports no recent manic episodes and that her bipolar is stable  - continue wellbutrin, vraylar, and ativan prn      Obstructive sleep apnea  - cpap qhs      Morbid obesity  Body mass index is 42.3 kg/m². Morbid obesity complicates all aspects of disease management from diagnostic modalities to treatment. Weight loss encouraged and health benefits explained to patient.           VTE Risk Mitigation (From admission, onward)         Ordered     enoxaparin injection 40 mg  Every 12 hours         08/06/22 6497                 Discharge Planning   [place]: 8/10/2022     Code Status: Full Code   Is the patient medically ready for discharge?: No    Reason for patient still in hospital (select all that apply): Patient new problem, Patient trending condition, Laboratory test, Treatment and Consult recommendations                     Collette Smith PA-C  Department of Hospital Medicine   [facility] - Telemetry Stepdown ([place]-7)
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
## 2022-08-09 NOTE — ASSESSMENT & PLAN NOTE
- worsening leukocytosis 12.5K ---> 25K, with left shift   - 2/4 SIRS with WBC 25K and HR 92.  - steroid induced vs developing sepsis   - no infectious source, check UA and lactic.

## 2022-08-09 NOTE — PLAN OF CARE
Problem: Adult Inpatient Plan of Care  Goal: Plan of Care Review  8/9/2022 1657 by Yodit Andrade RN  Outcome: Ongoing, Progressing  8/9/2022 1656 by Yodit Andrade RN  Outcome: Ongoing, Progressing  8/9/2022 1450 by Yodit Andrade RN  Outcome: Ongoing, Progressing  8/9/2022 1450 by Yodit Andrade RN  Outcome: Ongoing, Progressing  Goal: Patient-Specific Goal (Individualized)  8/9/2022 1657 by Yodit Andrade, JARVIS  Outcome: Ongoing, Progressing  8/9/2022 1656 by Yodit Andrade RN  Outcome: Ongoing, Progressing  8/9/2022 1450 by Yodit Andrade RN  Outcome: Ongoing, Progressing  8/9/2022 1450 by Yodit Andrade RN  Outcome: Ongoing, Progressing  Goal: Absence of Hospital-Acquired Illness or Injury  8/9/2022 1657 by Yodit Andrade RN  Outcome: Ongoing, Progressing  8/9/2022 1656 by Yodit Andrade, RN  Outcome: Ongoing, Progressing  8/9/2022 1450 by Yodit Andrade, JARVIS  Outcome: Ongoing, Progressing  8/9/2022 1450 by Yodit Andrade RN  Outcome: Ongoing, Progressing  Goal: Optimal Comfort and Wellbeing  8/9/2022 1657 by Yodit Andrade RN  Outcome: Ongoing, Progressing  8/9/2022 1656 by Yodit Andrade RN  Outcome: Ongoing, Progressing  8/9/2022 1450 by Yodit Andrade RN  Outcome: Ongoing, Progressing  8/9/2022 1450 by Yodit Andrade, RN  Outcome: Ongoing, Progressing  Goal: Readiness for Transition of Care  8/9/2022 1657 by Yodit Andrade, JARVIS  Outcome: Ongoing, Progressing  8/9/2022 1656 by Yodit Andrade, JARVIS  Outcome: Ongoing, Progressing  8/9/2022 1450 by Yodit Andrade, RN  Outcome: Ongoing, Progressing  8/9/2022 1450 by Yodit Andrade, RN  Outcome: Ongoing, Progressing     Problem: Bariatric Environmental Safety  Goal: Safety Maintained with Care  8/9/2022 1657 by Yodit Andrade, JARVIS  Outcome: Ongoing, Progressing  8/9/2022 1656 by Yodit Andrade, RN  Outcome: Ongoing, Progressing  8/9/2022 1450 by Yodit Andrade, RN  Outcome: Ongoing, Progressing  8/9/2022 1450 by Yodit Andrade, RN  Outcome: Ongoing, Progressing     Problem:  Infection  Goal: Absence of Infection Signs and Symptoms  8/9/2022 1657 by Yodit Andrade RN  Outcome: Ongoing, Progressing  8/9/2022 1656 by Yodit Andrade RN  Outcome: Ongoing, Progressing  8/9/2022 1450 by Yodit Andrade RN  Outcome: Ongoing, Progressing  8/9/2022 1450 by Yodit Andrade RN  Outcome: Ongoing, Progressing     Problem: Skin Injury Risk Increased  Goal: Skin Health and Integrity  8/9/2022 1657 by Yodit Andrade RN  Outcome: Ongoing, Progressing  8/9/2022 1656 by Yodit Andrade RN  Outcome: Ongoing, Progressing  8/9/2022 1450 by Yodit Andrade RN  Outcome: Ongoing, Progressing  8/9/2022 1450 by Yodit Andrade RN  Outcome: Ongoing, Progressing

## 2022-08-09 NOTE — SUBJECTIVE & OBJECTIVE
Interval History: NAEON. AFVSS. Patient without improvement after 2 days of migraine cocktail and s/p occipital nerve block. Neurology planning for LP tomorrow. NPO midnight in case of need for anesthesia. Check UA and lactic as leukocytosis worsening with left shift and HR >90.     Review of Systems   Constitutional:  Positive for activity change and appetite change (decreased). Negative for chills, diaphoresis and fever.   HENT:  Negative for congestion, sore throat and trouble swallowing.    Eyes:  Negative for photophobia and visual disturbance.   Respiratory:  Negative for cough, chest tightness and shortness of breath.    Cardiovascular:  Negative for chest pain, palpitations and leg swelling.   Gastrointestinal:  Positive for nausea. Negative for abdominal pain, constipation, diarrhea and vomiting.   Genitourinary:  Negative for difficulty urinating, dysuria, frequency and hematuria.   Musculoskeletal:  Negative for back pain, gait problem and neck pain.        L hand and arm swelling, L hand pain   Skin:  Negative for rash and wound.   Neurological:  Positive for speech difficulty (slow with occasionally slurred speech), weakness (left upper and lower extremity), numbness and headaches. Negative for dizziness, syncope, facial asymmetry and light-headedness.   Psychiatric/Behavioral:  Negative for agitation and confusion. The patient is not nervous/anxious.    Objective:     Vital Signs (Most Recent):  Temp: 98 °F (36.7 °C) (08/09/22 1214)  Pulse: 92 (08/09/22 1214)  Resp: 18 (08/09/22 1214)  BP: (!) 111/58 (08/09/22 1214)  SpO2: 96 % (08/09/22 1214)   Vital Signs (24h Range):  Temp:  [97.9 °F (36.6 °C)-98.3 °F (36.8 °C)] 98 °F (36.7 °C)  Pulse:  [78-92] 92  Resp:  [12-19] 18  SpO2:  [94 %-97 %] 96 %  BP: (111-143)/(58-68) 111/58     Weight: 126.6 kg (279 lb)  Body mass index is 43.7 kg/m².    Intake/Output Summary (Last 24 hours) at 8/9/2022 1247  Last data filed at 8/9/2022 0500  Gross per 24 hour   Intake  --   Output 850 ml   Net -850 ml      Physical Exam  Vitals and nursing note reviewed.   Constitutional:       General: She is not in acute distress.     Appearance: She is obese. She is not toxic-appearing or diaphoretic.   HENT:      Head: Normocephalic and atraumatic.      Right Ear: External ear normal.      Left Ear: External ear normal.      Nose: Nose normal.      Mouth/Throat:      Mouth: Mucous membranes are moist.   Eyes:      General: No scleral icterus.        Right eye: No discharge.         Left eye: No discharge.      Extraocular Movements: Extraocular movements intact.      Conjunctiva/sclera: Conjunctivae normal.   Cardiovascular:      Rate and Rhythm: Normal rate.   Pulmonary:      Effort: Pulmonary effort is normal. No respiratory distress.   Abdominal:      General: Abdomen is flat. There is no distension.   Musculoskeletal:         General: Swelling present.      Cervical back: Normal range of motion.      Right lower leg: No edema.      Left lower leg: No edema.      Comments: L hand swelling, pitting +1. Tender on palpation of LUE. No erythema, rash, injury, or warmth. Minimal ROM in LUE and LLE 2/2 weakness.    Skin:     General: Skin is warm and dry.      Findings: No rash.   Neurological:      Mental Status: She is alert and oriented to person, place, and time.      Sensory: Sensory deficit present.      Motor: Weakness present.      Coordination: Coordination normal.      Comments: Profound weakness and impaired sensation in left upper and lower extremity, mild improvement overnight in LLE. Able to hold LLE against gravity for a few seconds. Weak squeeze on L hand, unable to lift LUE or hold against gravity. Sensation decreased in LLE and LUE. No facial asymmetry. No slurring of speech. AAOx4.    Psychiatric:         Mood and Affect: Mood normal. Mood is not anxious or depressed. Affect is not tearful.         Speech: Speech normal. Speech is not delayed.         Behavior: Behavior normal.        Significant Labs: All pertinent labs within the past 24 hours have been reviewed.  BMP:   Recent Labs   Lab 08/09/22  0343   *      K 4.3   *   CO2 23   BUN 14   CREATININE 0.7   CALCIUM 9.2     CBC:   Recent Labs   Lab 08/07/22  2150 08/08/22  0345 08/09/22  0343   WBC 10.80 13.94* 25.15*   HGB 12.0 12.2 11.3*   HCT 38.8 38.5 37.4    299 463*       Significant Imaging: I have reviewed all pertinent imaging results/findings within the past 24 hours.

## 2022-08-09 NOTE — ASSESSMENT & PLAN NOTE
- stroke code activated upon arrive due to left-sided weakness and aphasia in setting of migraine  - CTH and MRI brain negative for acute infarct  - vascular neurology evaluated patient in the ED and have a low suspicion for CVA at this time, suspect alternate etiology such as hemiplegic vs. complex migraines  - neurology consulted, appreciate assistance --> see migraine  - L hand swelling likely 2/2 limited mobility of L arm. Doppler US neg for DVT. CPK 9. -> educated on elevation  - Will consult psychiatry for concern for functional neurologic disorder once neurologic work up complete  - b12, folate low, replacing, b1 pending

## 2022-08-10 PROBLEM — E87.20 LACTIC ACIDOSIS: Status: ACTIVE | Noted: 2022-08-10

## 2022-08-10 LAB
ALBUMIN SERPL BCP-MCNC: 2.8 G/DL (ref 3.5–5.2)
ALP SERPL-CCNC: 66 U/L (ref 55–135)
ALT SERPL W/O P-5'-P-CCNC: 10 U/L (ref 10–44)
AMORPH CRY UR QL COMP ASSIST: ABNORMAL
AMPHET+METHAMPHET UR QL: NEGATIVE
ANION GAP SERPL CALC-SCNC: 6 MMOL/L (ref 8–16)
AST SERPL-CCNC: 6 U/L (ref 10–40)
BACTERIA #/AREA URNS AUTO: ABNORMAL /HPF
BARBITURATES UR QL SCN>200 NG/ML: ABNORMAL
BASOPHILS # BLD AUTO: 0.02 K/UL (ref 0–0.2)
BASOPHILS NFR BLD: 0.1 % (ref 0–1.9)
BENZODIAZ UR QL SCN>200 NG/ML: NEGATIVE
BILIRUB SERPL-MCNC: <0.1 MG/DL (ref 0.1–1)
BILIRUB UR QL STRIP: NEGATIVE
BUN SERPL-MCNC: 21 MG/DL (ref 6–20)
BZE UR QL SCN: NEGATIVE
CALCIUM SERPL-MCNC: 9.5 MG/DL (ref 8.7–10.5)
CANNABINOIDS UR QL SCN: NEGATIVE
CHLORIDE SERPL-SCNC: 112 MMOL/L (ref 95–110)
CLARITY UR REFRACT.AUTO: ABNORMAL
CO2 SERPL-SCNC: 23 MMOL/L (ref 23–29)
COLOR UR AUTO: YELLOW
CREAT SERPL-MCNC: 0.8 MG/DL (ref 0.5–1.4)
CREAT UR-MCNC: 97 MG/DL (ref 15–325)
DIFFERENTIAL METHOD: ABNORMAL
EOSINOPHIL # BLD AUTO: 0 K/UL (ref 0–0.5)
EOSINOPHIL NFR BLD: 0 % (ref 0–8)
ERYTHROCYTE [DISTWIDTH] IN BLOOD BY AUTOMATED COUNT: 15.3 % (ref 11.5–14.5)
EST. GFR  (NO RACE VARIABLE): >60 ML/MIN/1.73 M^2
ETHANOL UR-MCNC: <10 MG/DL
FOLATE SERPL-MCNC: 2.2 NG/ML (ref 4–24)
GLUCOSE SERPL-MCNC: 147 MG/DL (ref 70–110)
GLUCOSE UR QL STRIP: ABNORMAL
HCT VFR BLD AUTO: 34.7 % (ref 37–48.5)
HGB BLD-MCNC: 10.5 G/DL (ref 12–16)
HGB UR QL STRIP: ABNORMAL
IMM GRANULOCYTES # BLD AUTO: 0.4 K/UL (ref 0–0.04)
IMM GRANULOCYTES NFR BLD AUTO: 2 % (ref 0–0.5)
KETONES UR QL STRIP: ABNORMAL
LACTATE SERPL-SCNC: 2.5 MMOL/L (ref 0.5–2.2)
LACTATE SERPL-SCNC: 3.9 MMOL/L (ref 0.5–2.2)
LEUKOCYTE ESTERASE UR QL STRIP: NEGATIVE
LYMPHOCYTES # BLD AUTO: 0.9 K/UL (ref 1–4.8)
LYMPHOCYTES NFR BLD: 4.6 % (ref 18–48)
MCH RBC QN AUTO: 26 PG (ref 27–31)
MCHC RBC AUTO-ENTMCNC: 30.3 G/DL (ref 32–36)
MCV RBC AUTO: 86 FL (ref 82–98)
METHADONE UR QL SCN>300 NG/ML: NEGATIVE
MICROSCOPIC COMMENT: ABNORMAL
MONOCYTES # BLD AUTO: 0.9 K/UL (ref 0.3–1)
MONOCYTES NFR BLD: 4.1 % (ref 4–15)
NEUTROPHILS # BLD AUTO: 18.3 K/UL (ref 1.8–7.7)
NEUTROPHILS NFR BLD: 89.2 % (ref 38–73)
NITRITE UR QL STRIP: NEGATIVE
NRBC BLD-RTO: 0 /100 WBC
OPIATES UR QL SCN: NEGATIVE
PCP UR QL SCN>25 NG/ML: NEGATIVE
PH UR STRIP: 7 [PH] (ref 5–8)
PLATELET # BLD AUTO: 390 K/UL (ref 150–450)
PMV BLD AUTO: 10.9 FL (ref 9.2–12.9)
POCT GLUCOSE: 112 MG/DL (ref 70–110)
POCT GLUCOSE: 132 MG/DL (ref 70–110)
POCT GLUCOSE: 136 MG/DL (ref 70–110)
POCT GLUCOSE: 88 MG/DL (ref 70–110)
POTASSIUM SERPL-SCNC: 4.2 MMOL/L (ref 3.5–5.1)
PROCALCITONIN SERPL IA-MCNC: <0.02 NG/ML
PROT SERPL-MCNC: 5.6 G/DL (ref 6–8.4)
PROT UR QL STRIP: ABNORMAL
RBC # BLD AUTO: 4.04 M/UL (ref 4–5.4)
RBC #/AREA URNS AUTO: >100 /HPF (ref 0–4)
SODIUM SERPL-SCNC: 141 MMOL/L (ref 136–145)
SP GR UR STRIP: 1.02 (ref 1–1.03)
SQUAMOUS #/AREA URNS AUTO: 2 /HPF
TOXICOLOGY INFORMATION: ABNORMAL
URN SPEC COLLECT METH UR: ABNORMAL
VIT B12 SERPL-MCNC: 336 PG/ML (ref 210–950)
WBC # BLD AUTO: 20.49 K/UL (ref 3.9–12.7)
YEAST UR QL AUTO: ABNORMAL

## 2022-08-10 PROCEDURE — 84425 ASSAY OF VITAMIN B-1: CPT | Performed by: NURSE PRACTITIONER

## 2022-08-10 PROCEDURE — 25000003 PHARM REV CODE 250: Performed by: PHYSICIAN ASSISTANT

## 2022-08-10 PROCEDURE — 94761 N-INVAS EAR/PLS OXIMETRY MLT: CPT

## 2022-08-10 PROCEDURE — 80053 COMPREHEN METABOLIC PANEL: CPT | Performed by: NURSE PRACTITIONER

## 2022-08-10 PROCEDURE — 25000003 PHARM REV CODE 250

## 2022-08-10 PROCEDURE — 84145 PROCALCITONIN (PCT): CPT | Performed by: NURSE PRACTITIONER

## 2022-08-10 PROCEDURE — 99233 SBSQ HOSP IP/OBS HIGH 50: CPT | Mod: ,,, | Performed by: PHYSICIAN ASSISTANT

## 2022-08-10 PROCEDURE — 82607 VITAMIN B-12: CPT | Performed by: NURSE PRACTITIONER

## 2022-08-10 PROCEDURE — 36415 COLL VENOUS BLD VENIPUNCTURE: CPT | Performed by: NURSE PRACTITIONER

## 2022-08-10 PROCEDURE — 63600175 PHARM REV CODE 636 W HCPCS

## 2022-08-10 PROCEDURE — 83605 ASSAY OF LACTIC ACID: CPT | Performed by: NURSE PRACTITIONER

## 2022-08-10 PROCEDURE — 99900035 HC TECH TIME PER 15 MIN (STAT)

## 2022-08-10 PROCEDURE — 11000001 HC ACUTE MED/SURG PRIVATE ROOM

## 2022-08-10 PROCEDURE — 99233 PR SUBSEQUENT HOSPITAL CARE,LEVL III: ICD-10-PCS | Mod: ,,, | Performed by: PHYSICIAN ASSISTANT

## 2022-08-10 PROCEDURE — 82746 ASSAY OF FOLIC ACID SERUM: CPT | Performed by: NURSE PRACTITIONER

## 2022-08-10 PROCEDURE — 25000003 PHARM REV CODE 250: Performed by: NURSE PRACTITIONER

## 2022-08-10 PROCEDURE — 63600175 PHARM REV CODE 636 W HCPCS: Performed by: PHYSICIAN ASSISTANT

## 2022-08-10 PROCEDURE — 80307 DRUG TEST PRSMV CHEM ANLYZR: CPT

## 2022-08-10 PROCEDURE — 85025 COMPLETE CBC W/AUTO DIFF WBC: CPT | Performed by: NURSE PRACTITIONER

## 2022-08-10 PROCEDURE — 81001 URINALYSIS AUTO W/SCOPE: CPT

## 2022-08-10 RX ORDER — MAGNESIUM SULFATE HEPTAHYDRATE 40 MG/ML
2 INJECTION, SOLUTION INTRAVENOUS 2 TIMES DAILY
Status: DISCONTINUED | OUTPATIENT
Start: 2022-08-10 | End: 2022-08-17

## 2022-08-10 RX ORDER — FOLIC ACID 1 MG/1
1 TABLET ORAL DAILY
Status: DISCONTINUED | OUTPATIENT
Start: 2022-08-10 | End: 2022-08-17 | Stop reason: HOSPADM

## 2022-08-10 RX ORDER — CYANOCOBALAMIN 1000 UG/ML
1000 INJECTION, SOLUTION INTRAMUSCULAR; SUBCUTANEOUS DAILY
Status: DISCONTINUED | OUTPATIENT
Start: 2022-08-10 | End: 2022-08-17

## 2022-08-10 RX ADMIN — ACETAMINOPHEN 1000 MG: 500 TABLET ORAL at 09:08

## 2022-08-10 RX ADMIN — ACETAZOLAMIDE 250 MG: 250 TABLET ORAL at 09:08

## 2022-08-10 RX ADMIN — BACLOFEN 10 MG: 10 TABLET ORAL at 09:08

## 2022-08-10 RX ADMIN — BACLOFEN 10 MG: 10 TABLET ORAL at 12:08

## 2022-08-10 RX ADMIN — ENOXAPARIN SODIUM 40 MG: 100 INJECTION SUBCUTANEOUS at 12:08

## 2022-08-10 RX ADMIN — VERAPAMIL HYDROCHLORIDE 120 MG: 120 TABLET, FILM COATED, EXTENDED RELEASE ORAL at 06:08

## 2022-08-10 RX ADMIN — BUPROPION HYDROCHLORIDE 150 MG: 150 TABLET, FILM COATED, EXTENDED RELEASE ORAL at 09:08

## 2022-08-10 RX ADMIN — LORAZEPAM 1 MG: 1 TABLET ORAL at 09:08

## 2022-08-10 RX ADMIN — FAMOTIDINE 20 MG: 20 TABLET ORAL at 09:08

## 2022-08-10 RX ADMIN — ENOXAPARIN SODIUM 40 MG: 100 INJECTION SUBCUTANEOUS at 09:08

## 2022-08-10 RX ADMIN — KETOROLAC TROMETHAMINE 15 MG: 15 INJECTION, SOLUTION INTRAMUSCULAR; INTRAVENOUS at 12:08

## 2022-08-10 RX ADMIN — SODIUM CHLORIDE: 0.9 INJECTION, SOLUTION INTRAVENOUS at 03:08

## 2022-08-10 RX ADMIN — MAGNESIUM SULFATE HEPTAHYDRATE 2 G: 40 INJECTION, SOLUTION INTRAVENOUS at 09:08

## 2022-08-10 RX ADMIN — CYANOCOBALAMIN 1000 MCG: 1000 INJECTION INTRAMUSCULAR; SUBCUTANEOUS at 09:08

## 2022-08-10 RX ADMIN — DEXTROSE 1000 MG: 50 INJECTION, SOLUTION INTRAVENOUS at 12:08

## 2022-08-10 RX ADMIN — MAGNESIUM SULFATE HEPTAHYDRATE 2 G: 40 INJECTION, SOLUTION INTRAVENOUS at 12:08

## 2022-08-10 RX ADMIN — DIPHENHYDRAMINE HYDROCHLORIDE 50 MG: 50 CAPSULE ORAL at 09:08

## 2022-08-10 RX ADMIN — ACETAMINOPHEN 1000 MG: 500 TABLET ORAL at 04:08

## 2022-08-10 RX ADMIN — FOLIC ACID 1 MG: 1 TABLET ORAL at 09:08

## 2022-08-10 NOTE — ASSESSMENT & PLAN NOTE
- Hgb 12>11>10, baseline ~ 11   - MCV 86  - b12 336, MMA pending, started on b12 injections  - Folate 2.2, started on daily folic acid  - iron panel in am
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
## 2022-08-10 NOTE — ASSESSMENT & PLAN NOTE
- past medical history signfiicant for migraines, presenting with persistent headache for about ~2 months  - recently started on phentermine in May 2022 for weight loss - concern for adverse effect  - follows with neurology outpatient  - given 1L NS, toradol, reglan, and versed in the ED and denies any improvement of symptoms  - MRI brain and CTH without acute findings (see neurology assessment below)  - neurology consulted, suspect hemiparesis 2/2 severe migraine, follow recs   --> CTH & MRI negative for acute infarct, however with partially empty sella, slight prominent fluid signal along optic nerve sheaths bilaterally. Incidental finding of a small focus of susceptibility in R frontal lobe suggestive of remote hemorrhage.   --> Etiology considered to be complex migraines in conjunction with possible IIH process. Symptoms not typical of IIH (visual changes intermittent and associated with headaches) and more typical of a migranous process.  Recommendations:  -- magnesium 2g IV BID  -- solumedrol 1g IV QD x3 days  -- Depacon (IV valproate) 1g  IV BID   -- verapamil 120mg po QD  - neurology performed occipital nerve block without symptom relief  - Neuro planning LP today, suspicion of IIH contributing to HA. NPO midnight out of precaution.  - Psychiatry consulted for concern for functional neurologic disorder   - low dose SSI while on steroids

## 2022-08-10 NOTE — SUBJECTIVE & OBJECTIVE
Interval History: Patient seen at bedside. Continues to have dysarthria and L sided weakness. HA 9/10, plan for LP today. Psychiatry consulted for concern for functional neurologic disorder. Lactic down trended, infectious work up unremarkable.     Review of Systems   Constitutional:  Positive for activity change and appetite change (decreased). Negative for chills, diaphoresis and fever.   HENT:  Positive for tinnitus (pulsing sound in R ear). Negative for congestion, sinus pressure, sore throat and trouble swallowing.    Eyes:  Positive for photophobia and visual disturbance (blurred vision).   Respiratory:  Negative for cough, chest tightness and shortness of breath.    Cardiovascular:  Negative for chest pain and leg swelling.   Gastrointestinal:  Positive for nausea. Negative for abdominal pain, constipation, diarrhea and vomiting.   Genitourinary:  Negative for difficulty urinating, dysuria, frequency and hematuria.   Musculoskeletal:  Positive for neck pain. Negative for back pain and gait problem.        L hand and arm swelling, L hand pain   Skin:  Negative for color change, rash and wound.   Neurological:  Positive for speech difficulty (slowed speech), weakness, numbness and headaches. Negative for dizziness, tremors, seizures, syncope, facial asymmetry and light-headedness.   Psychiatric/Behavioral:  Negative for agitation, behavioral problems and confusion. The patient is not nervous/anxious.    Objective:     Vital Signs (Most Recent):  Temp: 98.7 °F (37.1 °C) (08/10/22 1100)  Pulse: 61 (08/10/22 1100)  Resp: 18 (08/10/22 1100)  BP: (!) 145/74 (08/10/22 1100)  SpO2: 96 % (08/10/22 1100)   Vital Signs (24h Range):  Temp:  [97.8 °F (36.6 °C)-98.7 °F (37.1 °C)] 98.7 °F (37.1 °C)  Pulse:  [55-88] 61  Resp:  [12-22] 18  SpO2:  [94 %-96 %] 96 %  BP: (130-167)/(70-80) 145/74     Weight: 126.6 kg (279 lb)  Body mass index is 43.7 kg/m².    Intake/Output Summary (Last 24 hours) at 8/10/2022 1306  Last data  filed at 8/10/2022 0900  Gross per 24 hour   Intake --   Output 3750 ml   Net -3750 ml      Physical Exam  Vitals and nursing note reviewed.   Constitutional:       General: She is not in acute distress.     Appearance: She is obese.   HENT:      Head: Normocephalic and atraumatic.      Right Ear: External ear normal.      Left Ear: External ear normal.      Nose: Nose normal.      Mouth/Throat:      Mouth: Mucous membranes are moist.   Eyes:      General: No scleral icterus.        Right eye: No discharge.         Left eye: No discharge.      Extraocular Movements: Extraocular movements intact.      Conjunctiva/sclera: Conjunctivae normal.   Cardiovascular:      Rate and Rhythm: Normal rate and regular rhythm.      Pulses: Normal pulses.      Heart sounds: Normal heart sounds.   Pulmonary:      Effort: Pulmonary effort is normal. No respiratory distress.      Breath sounds: Normal breath sounds. No rales.   Abdominal:      General: Abdomen is flat. Bowel sounds are normal. There is no distension.      Palpations: Abdomen is soft.   Musculoskeletal:         General: Swelling present.      Cervical back: Normal range of motion.      Right lower leg: No edema.      Left lower leg: No edema.      Comments: L hand swelling, pitting +1. Tender on palpation of LUE. No erythema, rash, injury, or warmth. Minimal ROM in LUE and LLE 2/2 weakness.    Skin:     General: Skin is warm and dry.      Findings: No rash.   Neurological:      Mental Status: She is alert and oriented to person, place, and time.      Sensory: Sensory deficit present.      Motor: Weakness present.      Coordination: Coordination normal.      Comments: Weakness and impaired sensation in left upper and lower extremity, mild improvement overnight in LLE.  No facial asymmetry. Dysarthria. AAOx4.    Psychiatric:         Mood and Affect: Mood normal. Mood is not anxious or depressed. Affect is not tearful.         Speech: Speech normal. Speech is not delayed.          Behavior: Behavior normal.       Significant Labs: All pertinent labs within the past 24 hours have been reviewed.  CBC:   Recent Labs   Lab 08/09/22  0343 08/10/22  0337   WBC 25.15* 20.49*   HGB 11.3* 10.5*   HCT 37.4 34.7*   * 390     CMP:   Recent Labs   Lab 08/09/22  0343 08/10/22  0337    141   K 4.3 4.2   * 112*   CO2 23 23   * 147*   BUN 14 21*   CREATININE 0.7 0.8   CALCIUM 9.2 9.5   PROT  --  5.6*   ALBUMIN  --  2.8*   BILITOT  --  <0.1*   ALKPHOS  --  66   AST  --  6*   ALT  --  10   ANIONGAP 7* 6*     Magnesium: No results for input(s): MG in the last 48 hours.  POCT Glucose:   Recent Labs   Lab 08/09/22  2016 08/10/22  0723 08/10/22  1213   POCTGLUCOSE 151* 132* 112*       Significant Imaging: I have reviewed all pertinent imaging results/findings within the past 24 hours.

## 2022-08-10 NOTE — NURSING
"Pt's spouse voiced concerns that pt is getting worse. He states that he asked pt "how many quarters were in a dollar and she responded three". States pt was able to answer what her name is, where she is, and who the president is. KLEBER Ferguson made aware of above via secure chat.   "

## 2022-08-10 NOTE — ASSESSMENT & PLAN NOTE
Body mass index is 43.7 kg/m². Morbid obesity complicates all aspects of disease management from diagnostic modalities to treatment. Weight loss encouraged and health benefits explained to patient.

## 2022-08-10 NOTE — SIGNIFICANT EVENT
LP attempted by neurology team, however, was unsuccessful due to patient anxiety and pain threshold.

## 2022-08-10 NOTE — PLAN OF CARE
"   08/10/22 0949   Discharge Planning   Assessment Type Discharge Planning Brief Assessment   Resource/Environmental Concerns none   Support Systems Spouse/significant other   Equipment Currently Used at Home none   Current Living Arrangements home/apartment/condo   Patient/Family Anticipates Transition to home   Patient/Family Anticipated Services at Transition none   DME Needed Upon Discharge  none   Discharge Plan A Home with family   Discharge Plan B Home with family     CM met with pt and  to complete initial brief discharge planning assessment. Pt is . Pt is IADLs and moblity. Pt does not have any DME or utilize in home services. Pt does have a service dog in training for "support and anxiety." Pt plans to return home at discharge and  will provide transportation. CM will continue to follow for any discharge needs.  "
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
## 2022-08-10 NOTE — PROGRESS NOTES
[facility] - Telemetry [facility] ([address] Medicine  Progress Note    Patient Name: Sonia Goldberg  MRN: [id number]  Patient Class: IP- Inpatient   Admission Date: 8/6/2022  Length of Stay: 2 days  Attending Physician: Jalen Ponce MD  Primary Care Provider: Terell Reyes MD (Inactive)        Subjective:     Principal Problem:Left-sided weakness        HPI:  Sonia Goldberg is a 36 y.o. female with a past medical history of migraines, PCOS, bipolar disorder, IBS, COVID-19, and KERI presenting in the ED with aphasia and left-sided weakness starting at 1530. Patient mentions having similar episode earlier this month presented to outside hospital obtained laboratory testing which was unremarkable. She was discharged after symptoms improved with migraine cocktail. She describes the headache as pulsing and it is located in the right frontal/parietal region. States the only medication that occasionally helps is Fioricet. Associated symptoms include blurry vision, photophobia, and nausea. Patient denies any vomiting, diarrhea, fevers, chills, or urinary symptoms.     ED: hypertensive, otherwise vital signs stable. Stroke code activated, vascular neurology evaluated patient at bedside. CTH and MRI brain negative for acute infarct. They have strong suspicion for alternate etiology of symptoms such as hemiplegic or complex migraines. Recommend symptomatic relief and general neurology consultation. Total cholesterol 243 with triglycerides of 319. Otherwise intake labs largely unremarkable. Given 1L NS, Toradol, Reglan, and versed.       Overview/Hospital Course:  Sonia Goldberg was placed in  observation for left-sided paresis in setting of acute intractable migraine. Neurology evaluated patient and recommend beginning IV migraine cocktail: depakote, solumedrol, magnesium, plus oral verapamil. Minimal to no improvement after 48 hours. Neurology performed occipital nerve block without sympotm  relief. Planning for LP for IIH 8/9. Assess improvement in strength with migraine management, weigh need for therapy consult. Increasing leukocytosis 11K -> 14K -> 25K, steroid-induced vs concern for sepsis given left shift. HR 92 and BP low normal with no evidence of infectious source -> lactic 5.9> 3.9> 2.5 s/p IVF and UA negative for infection. Close BG monitoring and insulin adjustments while on steroids. Patient is scheduled to f/u with her neurologist next week and follows with an opthalmology clinic.       Interval History: Patient seen at bedside. Continues to have dysarthria and L sided weakness. HA 9/10, plan for LP today. Psychiatry consulted for concern for functional neurologic disorder. Lactic down trended, infectious work up unremarkable.     Review of Systems   Constitutional:  Positive for activity change and appetite change (decreased). Negative for chills, diaphoresis and fever.   HENT:  Positive for tinnitus (pulsing sound in R ear). Negative for congestion, sinus pressure, sore throat and trouble swallowing.    Eyes:  Positive for photophobia and visual disturbance (blurred vision).   Respiratory:  Negative for cough, chest tightness and shortness of breath.    Cardiovascular:  Negative for chest pain and leg swelling.   Gastrointestinal:  Positive for nausea. Negative for abdominal pain, constipation, diarrhea and vomiting.   Genitourinary:  Negative for difficulty urinating, dysuria, frequency and hematuria.   Musculoskeletal:  Positive for neck pain. Negative for back pain and gait problem.        L hand and arm swelling, L hand pain   Skin:  Negative for color change, rash and wound.   Neurological:  Positive for speech difficulty (slowed speech), weakness, numbness and headaches. Negative for dizziness, tremors, seizures, syncope, facial asymmetry and light-headedness.   Psychiatric/Behavioral:  Negative for agitation, behavioral problems and confusion. The patient is not nervous/anxious.     Objective:     Vital Signs (Most Recent):  Temp: 98.7 °F (37.1 °C) (08/10/22 1100)  Pulse: 61 (08/10/22 1100)  Resp: 18 (08/10/22 1100)  BP: (!) 145/74 (08/10/22 1100)  SpO2: 96 % (08/10/22 1100)   Vital Signs (24h Range):  Temp:  [97.8 °F (36.6 °C)-98.7 °F (37.1 °C)] 98.7 °F (37.1 °C)  Pulse:  [55-88] 61  Resp:  [12-22] 18  SpO2:  [94 %-96 %] 96 %  BP: (130-167)/(70-80) 145/74     Weight: 126.6 kg (279 lb)  Body mass index is 43.7 kg/m².    Intake/Output Summary (Last 24 hours) at 8/10/2022 1306  Last data filed at 8/10/2022 0900  Gross per 24 hour   Intake --   Output 3750 ml   Net -3750 ml      Physical Exam  Vitals and nursing note reviewed.   Constitutional:       General: She is not in acute distress.     Appearance: She is obese.   HENT:      Head: Normocephalic and atraumatic.      Right Ear: External ear normal.      Left Ear: External ear normal.      Nose: Nose normal.      Mouth/Throat:      Mouth: Mucous membranes are moist.   Eyes:      General: No scleral icterus.        Right eye: No discharge.         Left eye: No discharge.      Extraocular Movements: Extraocular movements intact.      Conjunctiva/sclera: Conjunctivae normal.   Cardiovascular:      Rate and Rhythm: Normal rate and regular rhythm.      Pulses: Normal pulses.      Heart sounds: Normal heart sounds.   Pulmonary:      Effort: Pulmonary effort is normal. No respiratory distress.      Breath sounds: Normal breath sounds. No rales.   Abdominal:      General: Abdomen is flat. Bowel sounds are normal. There is no distension.      Palpations: Abdomen is soft.   Musculoskeletal:         General: Swelling present.      Cervical back: Normal range of motion.      Right lower leg: No edema.      Left lower leg: No edema.      Comments: L hand swelling, pitting +1. Tender on palpation of LUE. No erythema, rash, injury, or warmth. Minimal ROM in LUE and LLE 2/2 weakness.    Skin:     General: Skin is warm and dry.      Findings: No rash.    Neurological:      Mental Status: She is alert and oriented to person, place, and time.      Sensory: Sensory deficit present.      Motor: Weakness present.      Coordination: Coordination normal.      Comments: Weakness and impaired sensation in left upper and lower extremity, mild improvement overnight in LLE.  No facial asymmetry. Dysarthria. AAOx4.    Psychiatric:         Mood and Affect: Mood normal. Mood is not anxious or depressed. Affect is not tearful.         Speech: Speech normal. Speech is not delayed.         Behavior: Behavior normal.       Significant Labs: All pertinent labs within the past 24 hours have been reviewed.  CBC:   Recent Labs   Lab 08/09/22  0343 08/10/22  0337   WBC 25.15* 20.49*   HGB 11.3* 10.5*   HCT 37.4 34.7*   * 390     CMP:   Recent Labs   Lab 08/09/22  0343 08/10/22  0337    141   K 4.3 4.2   * 112*   CO2 23 23   * 147*   BUN 14 21*   CREATININE 0.7 0.8   CALCIUM 9.2 9.5   PROT  --  5.6*   ALBUMIN  --  2.8*   BILITOT  --  <0.1*   ALKPHOS  --  66   AST  --  6*   ALT  --  10   ANIONGAP 7* 6*     Magnesium: No results for input(s): MG in the last 48 hours.  POCT Glucose:   Recent Labs   Lab 08/09/22  2016 08/10/22  0723 08/10/22  1213   POCTGLUCOSE 151* 132* 112*       Significant Imaging: I have reviewed all pertinent imaging results/findings within the past 24 hours.      Assessment/Plan:      * Left-sided weakness  - stroke code activated upon arrive due to left-sided weakness and aphasia in setting of migraine  - CTH and MRI brain negative for acute infarct  - vascular neurology evaluated patient in the ED and have a low suspicion for CVA at this time, suspect alternate etiology such as hemiplegic vs. complex migraines  - neurology consulted, appreciate assistance --> see migraine  - L hand swelling likely 2/2 limited mobility of L arm. Doppler US neg for DVT. CPK 9. -> educated on elevation  - Will consult psychiatry for concern for functional  neurologic disorder once neurologic work up complete  - b12, folate low, replacing, b1 pending    Intractable migraine  - past medical history signfiicant for migraines, presenting with persistent headache for about ~2 months  - recently started on phentermine in May 2022 for weight loss - concern for adverse effect  - follows with neurology outpatient  - given 1L NS, toradol, reglan, and versed in the ED and denies any improvement of symptoms  - MRI brain and CTH without acute findings (see neurology assessment below)  - neurology consulted, suspect hemiparesis 2/2 severe migraine, follow recs   --> CTH & MRI negative for acute infarct, however with partially empty sella, slight prominent fluid signal along optic nerve sheaths bilaterally. Incidental finding of a small focus of susceptibility in R frontal lobe suggestive of remote hemorrhage.   --> Etiology considered to be complex migraines in conjunction with possible IIH process. Symptoms not typical of IIH (visual changes intermittent and associated with headaches) and more typical of a migranous process.  Recommendations:  -- magnesium 2g IV BID  -- solumedrol 1g IV QD x3 days  -- Depacon (IV valproate) 1g  IV BID   -- verapamil 120mg po QD  - neurology performed occipital nerve block without symptom relief  - Neuro planning LP today, suspicion of IIH contributing to HA. NPO midnight out of precaution.  - Psychiatry consulted for concern for functional neurologic disorder   - low dose SSI while on steroids     Leukocytosis  Lactic acidosis  - worsening leukocytosis 12.5K ---> 25K--> 20.49  - 2/4 SIRS with WBC 25K and HR 92, now resolved; afebrile  - suspect leukemoid reaction in setting of steroids vs developing sepsis   - Infectious work up with CXR, blood cultures, UA unremarkable  - procal negative  - lactic 5.4> 3.9> 2.5 with IVF  - monitor with daily labs, low threshold for broad spectrum antibiotics if becomes septic, febrile    PCOS (polycystic ovarian  syndrome)  - hold metformin  - monitor BG in setting of steroid use -- Start low dose SSI       Hypokalemia  Lab Results   Component Value Date    K 4.3 08/09/2022     - replacing, daily bmp -> stable      Bipolar disorder, unspecified  - follows with psychiatry outpatient  - patient reports no recent manic episodes and that her bipolar is stable  - continue wellbutrin, vraylar, and ativan prn      Anemia  - Hgb 12>11>10, baseline ~ 11   - MCV 86  - b12 336, MMA pending, started on b12 injections  - Folate 2.2, started on daily folic acid  - iron panel in am    Obstructive sleep apnea  - cpap qhs      Morbid obesity  Body mass index is 43.7 kg/m². Morbid obesity complicates all aspects of disease management from diagnostic modalities to treatment. Weight loss encouraged and health benefits explained to patient.           VTE Risk Mitigation (From admission, onward)         Ordered     enoxaparin injection 40 mg  Every 12 hours         08/06/22 2207                Discharge Planning   [place]: 8/11/2022     Code Status: Full Code   Is the patient medically ready for discharge?: No    Reason for patient still in hospital (select all that apply): Patient trending condition, Laboratory test, Treatment, Imaging and Consult recommendations  Discharge Plan A: Home with family   Discharge Delays: None known at this time              Phyllis Lepe PA-C  Department of Hospital Medicine   [facility] - Telemetry Stepdown ([place]

## 2022-08-10 NOTE — PLAN OF CARE
Problem: Infection  Goal: Absence of Infection Signs and Symptoms  Outcome: Ongoing, Progressing     Problem: Bariatric Environmental Safety  Goal: Safety Maintained with Care  Outcome: Ongoing, Progressing

## 2022-08-10 NOTE — SUBJECTIVE & OBJECTIVE
Subjective:     Interval History: Persistent headache today, rated 9/10. Started having slowed speech yesterday afternoon. Speech is consistent with prior episodes but has persisted.      Current Facility-Administered Medications   Medication Dose Route Frequency Provider Last Rate Last Admin    0.9%  NaCl infusion   Intravenous Continuous Licha Wilson NP 75 mL/hr at 08/09/22 2145 New Bag at 08/09/22 2145    acetaminophen tablet 1,000 mg  1,000 mg Oral Q8H Rachelle Lim PA-C   1,000 mg at 08/09/22 2243    acetaZOLAMIDE tablet 250 mg  250 mg Oral BID Bandar Romero MD   250 mg at 08/10/22 0948    albuterol-ipratropium 2.5 mg-0.5 mg/3 mL nebulizer solution 3 mL  3 mL Nebulization Q4H PRN Rachelle Lim PA-C        baclofen tablet 10 mg  10 mg Oral TID PRN Bandar Romero MD   10 mg at 08/09/22 2245    bisacodyL suppository 10 mg  10 mg Rectal Daily PRN Rachelle Lim PA-C        buPROPion TB24 tablet 150 mg  150 mg Oral Nightly Rachelle Lim PA-C   150 mg at 08/09/22 2245    cariprazine Cap 3 mg  3 mg Oral Nightly Rachelle Lim PA-C   3 mg at 08/08/22 2100    cyanocobalamin injection 1,000 mcg  1,000 mcg Subcutaneous Daily Phyllis Lepe PA-C   1,000 mcg at 08/10/22 0948    dextrose 10% bolus 125 mL  12.5 g Intravenous PRN Rachelle Lim PA-C        dextrose 10% bolus 250 mL  25 g Intravenous PRN Rachelle Lim PA-C        diphenhydrAMINE capsule 50 mg  50 mg Oral Nightly PRN Collette Smith PA-C   50 mg at 08/09/22 2257    enoxaparin injection 40 mg  40 mg Subcutaneous Q12H Rachelle Lim PA-C   40 mg at 08/10/22 0948    famotidine tablet 20 mg  20 mg Oral BID Licha Wilson NP   20 mg at 08/10/22 0948    folic acid tablet 1 mg  1 mg Oral Daily Phyllis Lepe PA-C   1 mg at 08/10/22 0948    glucagon (human recombinant) injection 1 mg  1 mg Intramuscular PRN Rachelle Lim PA-C        glucose chewable tablet 16 g  16 g Oral PRN Rachelle Lim PA-C        glucose chewable  tablet 24 g  24 g Oral PRN Rachelle Lim PA-C        insulin aspart U-100 pen 0-5 Units  0-5 Units Subcutaneous QID (AC + HS) PRN Rachelle Lim PA-C        ketorolac injection 15 mg  15 mg Intravenous Q6H PRN Collette Smith PA-C   15 mg at 08/09/22 1554    LORazepam tablet 1 mg  1 mg Oral Q6H PRN Rachelle Lim PA-C   1 mg at 08/09/22 2245    magnesium sulfate 2g in water 50mL IVPB (premix)  2 g Intravenous BID Phyllis Lepe PA-C 25 mL/hr at 08/10/22 0948 2 g at 08/10/22 0948    naloxone 0.4 mg/mL injection 0.02 mg  0.02 mg Intravenous PRN Rachelle Lim PA-C        ondansetron disintegrating tablet 8 mg  8 mg Oral Q8H PRN Rachelle Lim PA-C   8 mg at 08/08/22 1519    prochlorperazine injection Soln 5 mg  5 mg Intravenous Q6H PRN Rachelle Lim PA-C        simethicone chewable tablet 80 mg  1 tablet Oral QID PRN Licha Wilson, NP        sodium chloride 0.9% flush 5 mL  5 mL Intravenous PRN Rachelle Lim PA-C        valproate (DEPACON) 1,000 mg in dextrose 5 % 100 mL IVPB  1,000 mg Intravenous BID Phyllis Lepe PA-C        verapamiL CR tablet 120 mg  120 mg Oral Daily Collette Smith PA-C   120 mg at 08/10/22 0627       Review of Systems   Constitutional:  Negative for chills and fever.   HENT:  Positive for tinnitus (pulsing sound in R ear). Negative for congestion and sinus pressure.    Eyes:  Positive for photophobia and visual disturbance (blurred vision).   Respiratory:  Negative for cough and shortness of breath.    Cardiovascular:  Negative for chest pain and leg swelling.   Gastrointestinal:  Negative for abdominal pain and constipation.   Genitourinary:  Negative for dysuria and frequency.   Musculoskeletal:  Positive for neck pain. Negative for back pain.   Skin:  Negative for color change and rash.   Neurological:  Positive for speech difficulty (slowed speech), weakness, numbness and headaches. Negative for dizziness, tremors, seizures, syncope, facial asymmetry and  light-headedness.   Psychiatric/Behavioral:  Negative for behavioral problems and confusion.    Objective:     Vital Signs (Most Recent):  Temp: 98.7 °F (37.1 °C) (08/10/22 1100)  Pulse: 61 (08/10/22 1100)  Resp: 18 (08/10/22 1100)  BP: (!) 145/74 (08/10/22 1100)  SpO2: 96 % (08/10/22 1100)   Vital Signs (24h Range):  Temp:  [97.8 °F (36.6 °C)-98.7 °F (37.1 °C)] 98.7 °F (37.1 °C)  Pulse:  [55-92] 61  Resp:  [12-22] 18  SpO2:  [94 %-96 %] 96 %  BP: (111-167)/(58-80) 145/74     Weight: 126.6 kg (279 lb)  Body mass index is 43.7 kg/m².    Physical Exam  Vitals reviewed.   Constitutional:       General: She is not in acute distress.     Appearance: Normal appearance.   HENT:      Head: Normocephalic and atraumatic.      Nose: Nose normal.      Mouth/Throat:      Mouth: Mucous membranes are moist.      Pharynx: Oropharynx is clear.   Eyes:      Extraocular Movements: Extraocular movements intact.      Conjunctiva/sclera: Conjunctivae normal.      Pupils: Pupils are equal, round, and reactive to light.   Musculoskeletal:      Cervical back: Normal range of motion.   Neurological:      Mental Status: She is alert and oriented to person, place, and time.      Cranial Nerves: Cranial nerves 2-12 are intact.       NEUROLOGICAL EXAMINATION:     MENTAL STATUS   Oriented to person, place, and time.   Speech: (Slowed speech)    CRANIAL NERVES   Cranial nerves II through XII intact.     CN III, IV, VI   Pupils are equal, round, and reactive to light.    MOTOR EXAM        Strength 2/5 in LUE/LLE.     SENSORY EXAM        Decreased sensation in LUE/LLE     Significant Labs: CBC:   Recent Labs   Lab 08/09/22 0343 08/10/22  0337   WBC 25.15* 20.49*   HGB 11.3* 10.5*   HCT 37.4 34.7*   * 390     CMP:   Recent Labs   Lab 08/09/22  0343 08/10/22  0337   * 147*    141   K 4.3 4.2   * 112*   CO2 23 23   BUN 14 21*   CREATININE 0.7 0.8   CALCIUM 9.2 9.5   PROT  --  5.6*   ALBUMIN  --  2.8*   BILITOT  --  <0.1*    ALKPHOS  --  66   AST  --  6*   ALT  --  10   ANIONGAP 7* 6*     All pertinent lab results from the past 24 hours have been reviewed.    Significant Imaging: I have reviewed all pertinent imaging results/findings within the past 24 hours.

## 2022-08-11 ENCOUNTER — TELEPHONE (OUTPATIENT)
Dept: PHARMACY | Facility: CLINIC | Age: 37
End: 2022-08-11
Payer: COMMERCIAL

## 2022-08-11 PROBLEM — D64.9 ANEMIA: Status: RESOLVED | Noted: 2020-10-16 | Resolved: 2022-08-11

## 2022-08-11 PROBLEM — R47.1 DYSARTHRIA: Status: ACTIVE | Noted: 2022-08-11

## 2022-08-11 LAB
BASOPHILS # BLD AUTO: 0.01 K/UL (ref 0–0.2)
BASOPHILS NFR BLD: 0.1 % (ref 0–1.9)
DIFFERENTIAL METHOD: ABNORMAL
EOSINOPHIL # BLD AUTO: 0 K/UL (ref 0–0.5)
EOSINOPHIL NFR BLD: 0.1 % (ref 0–8)
ERYTHROCYTE [DISTWIDTH] IN BLOOD BY AUTOMATED COUNT: 15.7 % (ref 11.5–14.5)
FERRITIN SERPL-MCNC: 10 NG/ML (ref 20–300)
HCT VFR BLD AUTO: 32.9 % (ref 37–48.5)
HGB BLD-MCNC: 10.1 G/DL (ref 12–16)
IMM GRANULOCYTES # BLD AUTO: 0.08 K/UL (ref 0–0.04)
IMM GRANULOCYTES NFR BLD AUTO: 0.8 % (ref 0–0.5)
IRON SERPL-MCNC: 72 UG/DL (ref 30–160)
LYMPHOCYTES # BLD AUTO: 2.5 K/UL (ref 1–4.8)
LYMPHOCYTES NFR BLD: 25.3 % (ref 18–48)
MCH RBC QN AUTO: 25.6 PG (ref 27–31)
MCHC RBC AUTO-ENTMCNC: 30.7 G/DL (ref 32–36)
MCV RBC AUTO: 83 FL (ref 82–98)
MONOCYTES # BLD AUTO: 0.9 K/UL (ref 0.3–1)
MONOCYTES NFR BLD: 8.8 % (ref 4–15)
NEUTROPHILS # BLD AUTO: 6.4 K/UL (ref 1.8–7.7)
NEUTROPHILS NFR BLD: 64.9 % (ref 38–73)
NRBC BLD-RTO: 0 /100 WBC
PLATELET # BLD AUTO: 312 K/UL (ref 150–450)
PMV BLD AUTO: 10.2 FL (ref 9.2–12.9)
POCT GLUCOSE: 79 MG/DL (ref 70–110)
POCT GLUCOSE: 87 MG/DL (ref 70–110)
POCT GLUCOSE: 89 MG/DL (ref 70–110)
RBC # BLD AUTO: 3.95 M/UL (ref 4–5.4)
SATURATED IRON: 15 % (ref 20–50)
TOTAL IRON BINDING CAPACITY: 465 UG/DL (ref 250–450)
TRANSFERRIN SERPL-MCNC: 314 MG/DL (ref 200–375)
WBC # BLD AUTO: 9.78 K/UL (ref 3.9–12.7)

## 2022-08-11 PROCEDURE — 82728 ASSAY OF FERRITIN: CPT | Performed by: PHYSICIAN ASSISTANT

## 2022-08-11 PROCEDURE — 85025 COMPLETE CBC W/AUTO DIFF WBC: CPT | Performed by: PHYSICIAN ASSISTANT

## 2022-08-11 PROCEDURE — 36415 COLL VENOUS BLD VENIPUNCTURE: CPT | Performed by: PHYSICIAN ASSISTANT

## 2022-08-11 PROCEDURE — 25000003 PHARM REV CODE 250

## 2022-08-11 PROCEDURE — 63600175 PHARM REV CODE 636 W HCPCS

## 2022-08-11 PROCEDURE — 84466 ASSAY OF TRANSFERRIN: CPT | Performed by: PHYSICIAN ASSISTANT

## 2022-08-11 PROCEDURE — 25000003 PHARM REV CODE 250: Performed by: PHYSICIAN ASSISTANT

## 2022-08-11 PROCEDURE — 99233 SBSQ HOSP IP/OBS HIGH 50: CPT | Mod: ,,, | Performed by: PSYCHIATRY & NEUROLOGY

## 2022-08-11 PROCEDURE — 99900035 HC TECH TIME PER 15 MIN (STAT)

## 2022-08-11 PROCEDURE — 63600175 PHARM REV CODE 636 W HCPCS: Performed by: PHYSICIAN ASSISTANT

## 2022-08-11 PROCEDURE — 83921 ORGANIC ACID SINGLE QUANT: CPT | Performed by: PHYSICIAN ASSISTANT

## 2022-08-11 PROCEDURE — 99233 PR SUBSEQUENT HOSPITAL CARE,LEVL III: ICD-10-PCS | Mod: ,,, | Performed by: PHYSICIAN ASSISTANT

## 2022-08-11 PROCEDURE — 25000003 PHARM REV CODE 250: Performed by: INTERNAL MEDICINE

## 2022-08-11 PROCEDURE — 94761 N-INVAS EAR/PLS OXIMETRY MLT: CPT

## 2022-08-11 PROCEDURE — 25000003 PHARM REV CODE 250: Performed by: NURSE PRACTITIONER

## 2022-08-11 PROCEDURE — 11000001 HC ACUTE MED/SURG PRIVATE ROOM

## 2022-08-11 PROCEDURE — 99233 SBSQ HOSP IP/OBS HIGH 50: CPT | Mod: ,,, | Performed by: PHYSICIAN ASSISTANT

## 2022-08-11 PROCEDURE — 99233 PR SUBSEQUENT HOSPITAL CARE,LEVL III: ICD-10-PCS | Mod: ,,, | Performed by: PSYCHIATRY & NEUROLOGY

## 2022-08-11 RX ORDER — ONDANSETRON 8 MG/1
8 TABLET, ORALLY DISINTEGRATING ORAL EVERY 8 HOURS PRN
Status: DISCONTINUED | OUTPATIENT
Start: 2022-08-11 | End: 2022-08-17 | Stop reason: HOSPADM

## 2022-08-11 RX ORDER — LANOLIN ALCOHOL/MO/W.PET/CERES
1 CREAM (GRAM) TOPICAL DAILY
Status: DISCONTINUED | OUTPATIENT
Start: 2022-08-11 | End: 2022-08-17 | Stop reason: HOSPADM

## 2022-08-11 RX ORDER — UBROGEPANT 100 MG/1
TABLET ORAL
Qty: 10 TABLET | Refills: 2 | Status: ON HOLD | OUTPATIENT
Start: 2022-08-11 | End: 2022-09-19

## 2022-08-11 RX ORDER — TOPIRAMATE 25 MG/1
25 TABLET ORAL DAILY
Status: DISCONTINUED | OUTPATIENT
Start: 2022-08-11 | End: 2022-08-13

## 2022-08-11 RX ADMIN — CARIPRAZINE 3 MG: 1.5 CAPSULE, GELATIN COATED ORAL at 08:08

## 2022-08-11 RX ADMIN — ACETAMINOPHEN 1000 MG: 500 TABLET ORAL at 05:08

## 2022-08-11 RX ADMIN — FAMOTIDINE 20 MG: 20 TABLET ORAL at 08:08

## 2022-08-11 RX ADMIN — BUPROPION HYDROCHLORIDE 150 MG: 150 TABLET, FILM COATED, EXTENDED RELEASE ORAL at 08:08

## 2022-08-11 RX ADMIN — LORAZEPAM 1 MG: 1 TABLET ORAL at 09:08

## 2022-08-11 RX ADMIN — VERAPAMIL HYDROCHLORIDE 120 MG: 120 TABLET, FILM COATED, EXTENDED RELEASE ORAL at 05:08

## 2022-08-11 RX ADMIN — DEXTROSE 1000 MG: 50 INJECTION, SOLUTION INTRAVENOUS at 09:08

## 2022-08-11 RX ADMIN — ACETAZOLAMIDE 250 MG: 250 TABLET ORAL at 01:08

## 2022-08-11 RX ADMIN — MAGNESIUM SULFATE HEPTAHYDRATE 2 G: 40 INJECTION, SOLUTION INTRAVENOUS at 09:08

## 2022-08-11 RX ADMIN — FOLIC ACID 1 MG: 1 TABLET ORAL at 01:08

## 2022-08-11 RX ADMIN — DEXTROSE 1000 MG: 50 INJECTION, SOLUTION INTRAVENOUS at 10:08

## 2022-08-11 RX ADMIN — DIPHENHYDRAMINE HYDROCHLORIDE 50 MG: 50 CAPSULE ORAL at 09:08

## 2022-08-11 RX ADMIN — ENOXAPARIN SODIUM 40 MG: 100 INJECTION SUBCUTANEOUS at 10:08

## 2022-08-11 RX ADMIN — FERROUS SULFATE TAB 325 MG (65 MG ELEMENTAL FE) 1 EACH: 325 (65 FE) TAB at 02:08

## 2022-08-11 RX ADMIN — ACETAZOLAMIDE 250 MG: 250 TABLET ORAL at 08:08

## 2022-08-11 RX ADMIN — MAGNESIUM SULFATE HEPTAHYDRATE 2 G: 40 INJECTION, SOLUTION INTRAVENOUS at 08:08

## 2022-08-11 RX ADMIN — SODIUM CHLORIDE: 0.9 INJECTION, SOLUTION INTRAVENOUS at 05:08

## 2022-08-11 RX ADMIN — FAMOTIDINE 20 MG: 20 TABLET ORAL at 01:08

## 2022-08-11 RX ADMIN — TOPIRAMATE 25 MG: 25 TABLET, FILM COATED ORAL at 01:08

## 2022-08-11 RX ADMIN — ENOXAPARIN SODIUM 40 MG: 100 INJECTION SUBCUTANEOUS at 09:08

## 2022-08-11 RX ADMIN — CYANOCOBALAMIN 1000 MCG: 1000 INJECTION INTRAMUSCULAR; SUBCUTANEOUS at 09:08

## 2022-08-11 RX ADMIN — ACETAMINOPHEN 1000 MG: 500 TABLET ORAL at 09:08

## 2022-08-11 NOTE — PLAN OF CARE
Problem: Adult Inpatient Plan of Care  Goal: Plan of Care Review  Outcome: Ongoing, Progressing     Problem: Adult Inpatient Plan of Care  Goal: Absence of Hospital-Acquired Illness or Injury  Outcome: Ongoing, Progressing     Problem: Bariatric Environmental Safety  Goal: Safety Maintained with Care  Outcome: Ongoing, Progressing     Problem: Fall Injury Risk  Goal: Absence of Fall and Fall-Related Injury  Outcome: Ongoing, Progressing   Pt is A, A, O x 4 . Stable V/S . No C/O pain during the day . Pt remain of falls and injuries . Family remain at bedside . Pt is on continuous IFV . NPO midnight for LP tomorrow .

## 2022-08-11 NOTE — ASSESSMENT & PLAN NOTE
- past medical history signficant for migraines, presenting with persistent headache for about ~2 months, now with left sided weakness, dysarthria. Concern for complex migraine vs IIH vs functional disorder  - recently started on phentermine in May 2022 for weight loss - concern for adverse effect  - follows with neurology outpatient  - given 1L NS, toradol, reglan, and versed in the ED and denies any improvement of symptoms  - MRI brain and CTH without acute findings (see neurology assessment below)  - neurology consulted, suspect hemiparesis 2/2 severe migraine, follow recs   --> CTH & MRI negative for acute infarct, however with partially empty sella, slight prominent fluid signal along optic nerve sheaths bilaterally. Incidental finding of a small focus of susceptibility in R frontal lobe suggestive of remote hemorrhage.   --> Etiology considered to be complex migraines in conjunction with possible IIH process. Symptoms not typical of IIH (visual changes intermittent and associated with headaches) and more typical of a migranous process.  Recommendations:  -- magnesium 2g IV BID  -- solumedrol 1g IV QD x3 days, completed  -- Depacon (IV valproate) 1g  IV BID   -- verapamil 120mg po QD  -- diamox 250 mg BID  - neurology performed occipital nerve block without symptom relief  - Bedside LP unsuccessful 8/10 as suspicion of IIH contributing to HA  - IR consulted for LP with flouro, scheduled 8/12  - patient with visual hallucination on evening of 8/10, no further episodes  - will consult psychiatry consulted for concern for functional neurologic disorder if above work up unremarkable  - low dose SSI while on steroids

## 2022-08-11 NOTE — PLAN OF CARE
Problem: Adult Inpatient Plan of Care  Goal: Plan of Care Review  Outcome: Ongoing, Progressing  Goal: Patient-Specific Goal (Individualized)  Outcome: Ongoing, Progressing     Problem: Bariatric Environmental Safety  Goal: Safety Maintained with Care  Outcome: Ongoing, Progressing     Problem: Fall Injury Risk  Goal: Absence of Fall and Fall-Related Injury  Outcome: Ongoing, Progressing

## 2022-08-11 NOTE — SUBJECTIVE & OBJECTIVE
Subjective:     Interval History: Persistent headache, rated 9/10. Started having slowed speech on 8/9 which has not improved.     Current Facility-Administered Medications   Medication Dose Route Frequency Provider Last Rate Last Admin    0.9%  NaCl infusion   Intravenous Continuous Licha Wilson NP 75 mL/hr at 08/11/22 0543 New Bag at 08/11/22 0543    acetaminophen tablet 1,000 mg  1,000 mg Oral Q8H Rachelle Lim PA-C   1,000 mg at 08/11/22 0537    acetaZOLAMIDE tablet 250 mg  250 mg Oral BID Bandar Romero MD   250 mg at 08/11/22 1314    albuterol-ipratropium 2.5 mg-0.5 mg/3 mL nebulizer solution 3 mL  3 mL Nebulization Q4H PRN Rachelle Lim PA-C        baclofen tablet 10 mg  10 mg Oral TID PRN Bandar Romero MD   10 mg at 08/10/22 2142    bisacodyL suppository 10 mg  10 mg Rectal Daily PRN Rachelle Lim PA-C        buPROPion TB24 tablet 150 mg  150 mg Oral Nightly Rachelle Lim PA-C   150 mg at 08/10/22 2143    cariprazine Cap 3 mg  3 mg Oral QHS Annelise Lopez MD        cyanocobalamin injection 1,000 mcg  1,000 mcg Subcutaneous Daily Phyllis Lepe PA-C   1,000 mcg at 08/11/22 0903    dextrose 10% bolus 125 mL  12.5 g Intravenous PRN Rachelle Lim PA-C        dextrose 10% bolus 250 mL  25 g Intravenous PRN Rachelle Lim PA-C        diphenhydrAMINE capsule 50 mg  50 mg Oral Nightly PRN Collette Smith PA-C   50 mg at 08/10/22 2142    enoxaparin injection 40 mg  40 mg Subcutaneous Q12H Rachelle Lim PA-C   40 mg at 08/11/22 0903    famotidine tablet 20 mg  20 mg Oral BID Licha Wilson NP   20 mg at 08/11/22 1314    ferrous sulfate tablet 1 each  1 tablet Oral Daily Phyllis Lepe PA-C   1 each at 08/11/22 1453    folic acid tablet 1 mg  1 mg Oral Daily Phyllis Lepe PA-C   1 mg at 08/11/22 1314    glucagon (human recombinant) injection 1 mg  1 mg Intramuscular PRN Rachelle Lim PA-C        glucose chewable tablet 16 g  16 g Oral PRN Rachelle RUTLEDGE  MAKI Lim        glucose chewable tablet 24 g  24 g Oral PRN Rachelle Lim PA-C        insulin aspart U-100 pen 0-5 Units  0-5 Units Subcutaneous QID (AC + HS) PRN Rachelle Lim PA-C        LORazepam tablet 1 mg  1 mg Oral Q6H PRN Rachelle Lim PA-C   1 mg at 08/10/22 2141    magnesium sulfate 2g in water 50mL IVPB (premix)  2 g Intravenous BID Phyllis Lepe PA-C   Stopped at 08/11/22 1105    naloxone 0.4 mg/mL injection 0.02 mg  0.02 mg Intravenous PRN Rachelle Lim PA-C        ondansetron disintegrating tablet 8 mg  8 mg Oral Q8H PRN Phyllis Lepe PA-C        promethazine (PHENERGAN) 12.5 mg in dextrose 5 % 50 mL IVPB  12.5 mg Intravenous Q6H PRN Phyllis Lepe PA-C        simethicone chewable tablet 80 mg  1 tablet Oral QID PRN Licha Wilson, RANDALL        sodium chloride 0.9% flush 5 mL  5 mL Intravenous PRN Rachelle Lim PA-C        topiramate tablet 25 mg  25 mg Oral Daily Bandar Romero MD   25 mg at 08/11/22 1314    valproate (DEPACON) 1,000 mg in dextrose 5 % 100 mL IVPB  1,000 mg Intravenous BID Phyllis Lepe PA-C   Stopped at 08/11/22 1000    verapamiL CR tablet 120 mg  120 mg Oral Daily Collette Smith PA-C   120 mg at 08/11/22 0537       Review of Systems   Constitutional:  Negative for chills and fever.   HENT:  Positive for tinnitus (pulsing sound in R ear). Negative for congestion and sinus pressure.    Eyes:  Positive for photophobia and visual disturbance (blurred vision).   Respiratory:  Negative for cough and shortness of breath.    Cardiovascular:  Negative for chest pain and leg swelling.   Gastrointestinal:  Negative for abdominal pain and constipation.   Genitourinary:  Negative for dysuria and frequency.   Musculoskeletal:  Positive for neck pain. Negative for back pain.   Skin:  Negative for color change and rash.   Neurological:  Positive for speech difficulty (slowed speech), weakness, numbness and headaches. Negative for dizziness, tremors,  seizures, syncope, facial asymmetry and light-headedness.   Psychiatric/Behavioral:  Negative for behavioral problems and confusion.    Objective:     Vital Signs (Most Recent):  Temp: 97.7 °F (36.5 °C) (08/11/22 1516)  Pulse: 81 (08/11/22 1516)  Resp: 18 (08/11/22 1516)  BP: 138/72 (08/11/22 1516)  SpO2: 95 % (08/11/22 1516)   Vital Signs (24h Range):  Temp:  [96.4 °F (35.8 °C)-97.9 °F (36.6 °C)] 97.7 °F (36.5 °C)  Pulse:  [70-84] 81  Resp:  [16-18] 18  SpO2:  [95 %-97 %] 95 %  BP: (136-148)/(65-72) 138/72     Weight: 126.6 kg (279 lb)  Body mass index is 43.7 kg/m².    Physical Exam  Vitals reviewed.   Constitutional:       General: She is not in acute distress.     Appearance: Normal appearance.   HENT:      Head: Normocephalic and atraumatic.      Nose: Nose normal.      Mouth/Throat:      Mouth: Mucous membranes are moist.      Pharynx: Oropharynx is clear.   Eyes:      Extraocular Movements: Extraocular movements intact.      Conjunctiva/sclera: Conjunctivae normal.      Pupils: Pupils are equal, round, and reactive to light.   Musculoskeletal:      Cervical back: Normal range of motion.   Neurological:      Mental Status: She is alert and oriented to person, place, and time.      Cranial Nerves: Cranial nerves 2-12 are intact.       NEUROLOGICAL EXAMINATION:     MENTAL STATUS   Oriented to person, place, and time.   Speech: (Slowed speech)    CRANIAL NERVES   Cranial nerves II through XII intact.     CN III, IV, VI   Pupils are equal, round, and reactive to light.    MOTOR EXAM   Muscle bulk: normal  Overall muscle tone: normal       Patient moving both legs while lying in bed     SENSORY EXAM        Decreased sensation in LUE/LLE     Significant Labs: CBC:   Recent Labs   Lab 08/10/22  0337 08/11/22  0829   WBC 20.49* 9.78   HGB 10.5* 10.1*   HCT 34.7* 32.9*    312       CMP:   Recent Labs   Lab 08/10/22  0337   *      K 4.2   *   CO2 23   BUN 21*   CREATININE 0.8   CALCIUM 9.5    PROT 5.6*   ALBUMIN 2.8*   BILITOT <0.1*   ALKPHOS 66   AST 6*   ALT 10   ANIONGAP 6*       All pertinent lab results from the past 24 hours have been reviewed.    Significant Imaging: I have reviewed all pertinent imaging results/findings within the past 24 hours.
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
## 2022-08-11 NOTE — ASSESSMENT & PLAN NOTE
Dysarthria  - stroke code activated upon arrive due to left-sided weakness and aphasia in setting of migraine  - CTH and MRI brain negative for acute infarct  - vascular neurology evaluated patient in the ED and have a low suspicion for CVA at this time, suspect alternate etiology such as hemiplegic vs. complex migraines  - neurology consulted, appreciate assistance --> see migraine  - L hand swelling likely 2/2 limited mobility of L arm. Doppler US neg for DVT. CPK 9. -> educated on elevation  - Will consult psychiatry for concern for functional neurologic disorder once neurologic work up complete  - b12, folate low, replacing, b1 pending

## 2022-08-11 NOTE — ASSESSMENT & PLAN NOTE
Resolved  Lactic acidosis  - worsening leukocytosis 12.5K ---> 25K--> 20.49 --> 9.78  - 2/4 SIRS with WBC 25K and HR 92, now resolved; afebrile  - suspect leukemoid reaction in setting of steroids vs developing sepsis   - Infectious work up with CXR, blood cultures, UA unremarkable  - procal negative  - lactic 5.4> 3.9> 2.5 with IVF  - monitor with daily labs, low threshold for broad spectrum antibiotics if becomes septic, febrile  - resolved with completion of steroids

## 2022-08-11 NOTE — SUBJECTIVE & OBJECTIVE
Interval History: Patient seen at bedside. Yesterday evening patient with reported visual hallucinations of her  grandma, no hallucinations today. HA 9/10. Dysarthria persistent, and reports right side feels like a ton of bricks. Leukocytosis resolved. NPO midnight. LP in am.     Review of Systems   Constitutional:  Positive for activity change and appetite change (decreased). Negative for chills, diaphoresis and fever.   HENT:  Positive for tinnitus (pulsing sound in R ear). Negative for congestion, sinus pressure, sore throat and trouble swallowing.    Eyes:  Positive for photophobia and visual disturbance (blurred vision).   Respiratory:  Negative for cough, chest tightness and shortness of breath.    Cardiovascular:  Negative for chest pain and leg swelling.   Gastrointestinal:  Positive for nausea. Negative for abdominal pain, constipation, diarrhea and vomiting.   Genitourinary:  Negative for difficulty urinating, dysuria, frequency and hematuria.   Musculoskeletal:  Positive for neck pain. Negative for back pain and gait problem.   Skin:  Negative for color change, rash and wound.   Neurological:  Positive for speech difficulty (slowed speech), weakness, numbness and headaches. Negative for dizziness, tremors, seizures, syncope, facial asymmetry and light-headedness.   Psychiatric/Behavioral:  Negative for agitation, behavioral problems and confusion. The patient is not nervous/anxious.    Objective:     Vital Signs (Most Recent):  Temp: 96.8 °F (36 °C) (22 1111)  Pulse: 75 (22 1111)  Resp: 18 (22 1111)  BP: 137/69 (22 1111)  SpO2: 97 % (22 1111) Vital Signs (24h Range):  Temp:  [96.4 °F (35.8 °C)-98.1 °F (36.7 °C)] 96.8 °F (36 °C)  Pulse:  [64-84] 75  Resp:  [16-18] 18  SpO2:  [95 %-97 %] 97 %  BP: (136-149)/(65-83) 137/69     Weight: 126.6 kg (279 lb)  Body mass index is 43.7 kg/m².    Intake/Output Summary (Last 24 hours) at 2022 1307  Last data filed at 2022  1100  Gross per 24 hour   Intake 0 ml   Output 2000 ml   Net -2000 ml      Physical Exam  Vitals and nursing note reviewed.   Constitutional:       General: She is not in acute distress.     Appearance: She is obese.   HENT:      Head: Normocephalic and atraumatic.      Right Ear: External ear normal.      Left Ear: External ear normal.      Nose: Nose normal.      Mouth/Throat:      Mouth: Mucous membranes are moist.   Eyes:      General: No scleral icterus.        Right eye: No discharge.         Left eye: No discharge.      Extraocular Movements: Extraocular movements intact.      Conjunctiva/sclera: Conjunctivae normal.   Cardiovascular:      Rate and Rhythm: Normal rate and regular rhythm.      Pulses: Normal pulses.      Heart sounds: Normal heart sounds.   Pulmonary:      Effort: Pulmonary effort is normal. No respiratory distress.      Breath sounds: Normal breath sounds. No rales.   Abdominal:      General: Abdomen is flat. Bowel sounds are normal. There is no distension.      Palpations: Abdomen is soft.   Musculoskeletal:         General: Swelling present.      Cervical back: Normal range of motion.      Right lower leg: No edema.      Left lower leg: No edema.      Comments: Minimal ROM in LUE and LLE 2/2 weakness. Decreased sensation L hand   Skin:     General: Skin is warm and dry.      Findings: No rash.   Neurological:      Mental Status: She is alert and oriented to person, place, and time.      Sensory: Sensory deficit present.      Motor: Weakness present.      Coordination: Coordination normal.      Comments: Weakness and impaired sensation in left upper and lower extremity, mild improvement overnight in LLE.  No facial asymmetry. Dysarthria. AAOx4.    Psychiatric:         Mood and Affect: Mood normal. Mood is not anxious or depressed. Affect is not tearful.         Speech: Speech normal. Speech is not delayed.         Behavior: Behavior normal.       Significant Labs: All pertinent labs within  the past 24 hours have been reviewed.  CBC:   Recent Labs   Lab 08/10/22  0337 08/11/22  0829   WBC 20.49* 9.78   HGB 10.5* 10.1*   HCT 34.7* 32.9*    312     CMP:   Recent Labs   Lab 08/10/22  0337      K 4.2   *   CO2 23   *   BUN 21*   CREATININE 0.8   CALCIUM 9.5   PROT 5.6*   ALBUMIN 2.8*   BILITOT <0.1*   ALKPHOS 66   AST 6*   ALT 10   ANIONGAP 6*       Significant Imaging: I have reviewed all pertinent imaging results/findings within the past 24 hours.

## 2022-08-11 NOTE — ASSESSMENT & PLAN NOTE
- Hgb 12>11>10, baseline ~ 11   - microcytic anemia noted  - anemia panel with iron deficiency, as well as low folate and B12, MMA pending  - started on folic acid 1 mg daily, b12 injections, ferrous sulfate
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
## 2022-08-11 NOTE — PROGRESS NOTES
[facility] - Telemetry [facility] ([address] Medicine  Progress Note    Patient Name: Sonia Goldberg  MRN: [id number]  Patient Class: IP- Inpatient   Admission Date: 8/6/2022  Length of Stay: 3 days  Attending Physician: Annelise Lopez MD  Primary Care Provider: Terell Reyes MD (Inactive)        Subjective:     Principal Problem:Left-sided weakness        HPI:  Sonia Goldberg is a 36 y.o. female with a past medical history of migraines, PCOS, bipolar disorder, IBS, COVID-19, and KERI presenting in the ED with aphasia and left-sided weakness starting at 1530. Patient mentions having similar episode earlier this month presented to outside hospital obtained laboratory testing which was unremarkable. She was discharged after symptoms improved with migraine cocktail. She describes the headache as pulsing and it is located in the right frontal/parietal region. States the only medication that occasionally helps is Fioricet. Associated symptoms include blurry vision, photophobia, and nausea. Patient denies any vomiting, diarrhea, fevers, chills, or urinary symptoms.     ED: hypertensive, otherwise vital signs stable. Stroke code activated, vascular neurology evaluated patient at bedside. CTH and MRI brain negative for acute infarct. They have strong suspicion for alternate etiology of symptoms such as hemiplegic or complex migraines. Recommend symptomatic relief and general neurology consultation. Total cholesterol 243 with triglycerides of 319. Otherwise intake labs largely unremarkable. Given 1L NS, Toradol, Reglan, and versed.       Overview/Hospital Course:  Sonia Goldberg was placed in  observation for left-sided paresis, dysarthria in setting of acute intractable migraine. Neurology evaluated patient and recommend beginning IV migraine cocktail: depakote, solumedrol, magnesium, plus oral verapamil. Minimal to no improvement after 48 hours. Neurology performed occipital nerve block  without sympotm relief. LP attempted at bedside for IIH 8/10, IR to perform . Patient with leukemoid reaction in setting of steroid use from -8/10, infectious work up negative but patient did have elevated lactic 5.9> 3.9> 2.5, which improved with IVF.    Patient is scheduled to f/u with her neurologist next week and follows with an opthalmology clinic.       Interval History: Patient seen at bedside. Yesterday evening patient with reported visual hallucinations of her  grandma, no hallucinations today. HA 9/10. Dysarthria persistent, and reports right side feels like a ton of bricks. Leukocytosis resolved. NPO midnight. LP in am.     Review of Systems   Constitutional:  Positive for activity change and appetite change (decreased). Negative for chills, diaphoresis and fever.   HENT:  Positive for tinnitus (pulsing sound in R ear). Negative for congestion, sinus pressure, sore throat and trouble swallowing.    Eyes:  Positive for photophobia and visual disturbance (blurred vision).   Respiratory:  Negative for cough, chest tightness and shortness of breath.    Cardiovascular:  Negative for chest pain and leg swelling.   Gastrointestinal:  Positive for nausea. Negative for abdominal pain, constipation, diarrhea and vomiting.   Genitourinary:  Negative for difficulty urinating, dysuria, frequency and hematuria.   Musculoskeletal:  Positive for neck pain. Negative for back pain and gait problem.   Skin:  Negative for color change, rash and wound.   Neurological:  Positive for speech difficulty (slowed speech), weakness, numbness and headaches. Negative for dizziness, tremors, seizures, syncope, facial asymmetry and light-headedness.   Psychiatric/Behavioral:  Negative for agitation, behavioral problems and confusion. The patient is not nervous/anxious.    Objective:     Vital Signs (Most Recent):  Temp: 96.8 °F (36 °C) (22 1111)  Pulse: 75 (22 1111)  Resp: 18 (22 1111)  BP: 137/69  (08/11/22 1111)  SpO2: 97 % (08/11/22 1111) Vital Signs (24h Range):  Temp:  [96.4 °F (35.8 °C)-98.1 °F (36.7 °C)] 96.8 °F (36 °C)  Pulse:  [64-84] 75  Resp:  [16-18] 18  SpO2:  [95 %-97 %] 97 %  BP: (136-149)/(65-83) 137/69     Weight: 126.6 kg (279 lb)  Body mass index is 43.7 kg/m².    Intake/Output Summary (Last 24 hours) at 8/11/2022 1307  Last data filed at 8/11/2022 1100  Gross per 24 hour   Intake 0 ml   Output 2000 ml   Net -2000 ml      Physical Exam  Vitals and nursing note reviewed.   Constitutional:       General: She is not in acute distress.     Appearance: She is obese.   HENT:      Head: Normocephalic and atraumatic.      Right Ear: External ear normal.      Left Ear: External ear normal.      Nose: Nose normal.      Mouth/Throat:      Mouth: Mucous membranes are moist.   Eyes:      General: No scleral icterus.        Right eye: No discharge.         Left eye: No discharge.      Extraocular Movements: Extraocular movements intact.      Conjunctiva/sclera: Conjunctivae normal.   Cardiovascular:      Rate and Rhythm: Normal rate and regular rhythm.      Pulses: Normal pulses.      Heart sounds: Normal heart sounds.   Pulmonary:      Effort: Pulmonary effort is normal. No respiratory distress.      Breath sounds: Normal breath sounds. No rales.   Abdominal:      General: Abdomen is flat. Bowel sounds are normal. There is no distension.      Palpations: Abdomen is soft.   Musculoskeletal:         General: Swelling present.      Cervical back: Normal range of motion.      Right lower leg: No edema.      Left lower leg: No edema.      Comments: Minimal ROM in LUE and LLE 2/2 weakness. Decreased sensation L hand   Skin:     General: Skin is warm and dry.      Findings: No rash.   Neurological:      Mental Status: She is alert and oriented to person, place, and time.      Sensory: Sensory deficit present.      Motor: Weakness present.      Coordination: Coordination normal.      Comments: Weakness and  impaired sensation in left upper and lower extremity, mild improvement overnight in LLE.  No facial asymmetry. Dysarthria. AAOx4.    Psychiatric:         Mood and Affect: Mood normal. Mood is not anxious or depressed. Affect is not tearful.         Speech: Speech normal. Speech is not delayed.         Behavior: Behavior normal.       Significant Labs: All pertinent labs within the past 24 hours have been reviewed.  CBC:   Recent Labs   Lab 08/10/22  0337 08/11/22  0829   WBC 20.49* 9.78   HGB 10.5* 10.1*   HCT 34.7* 32.9*    312     CMP:   Recent Labs   Lab 08/10/22  0337      K 4.2   *   CO2 23   *   BUN 21*   CREATININE 0.8   CALCIUM 9.5   PROT 5.6*   ALBUMIN 2.8*   BILITOT <0.1*   ALKPHOS 66   AST 6*   ALT 10   ANIONGAP 6*       Significant Imaging: I have reviewed all pertinent imaging results/findings within the past 24 hours.      Assessment/Plan:      * Left-sided weakness  Dysarthria  - stroke code activated upon arrive due to left-sided weakness and aphasia in setting of migraine  - CTH and MRI brain negative for acute infarct  - vascular neurology evaluated patient in the ED and have a low suspicion for CVA at this time, suspect alternate etiology such as hemiplegic vs. complex migraines  - neurology consulted, appreciate assistance --> see migraine  - L hand swelling likely 2/2 limited mobility of L arm. Doppler US neg for DVT. CPK 9. -> educated on elevation  - Will consult psychiatry for concern for functional neurologic disorder once neurologic work up complete  - b12, folate low, replacing, b1 pending    Intractable migraine  - past medical history signficant for migraines, presenting with persistent headache for about ~2 months, now with left sided weakness, dysarthria. Concern for complex migraine vs IIH vs functional disorder  - recently started on phentermine in May 2022 for weight loss - concern for adverse effect  - follows with neurology outpatient  - given 1L NS,  toradol, reglan, and versed in the ED and denies any improvement of symptoms  - MRI brain and CTH without acute findings (see neurology assessment below)  - neurology consulted, suspect hemiparesis 2/2 severe migraine, follow recs   --> CTH & MRI negative for acute infarct, however with partially empty sella, slight prominent fluid signal along optic nerve sheaths bilaterally. Incidental finding of a small focus of susceptibility in R frontal lobe suggestive of remote hemorrhage.   --> Etiology considered to be complex migraines in conjunction with possible IIH process. Symptoms not typical of IIH (visual changes intermittent and associated with headaches) and more typical of a migranous process.  Recommendations:  -- magnesium 2g IV BID  -- solumedrol 1g IV QD x3 days, completed  -- Depacon (IV valproate) 1g  IV BID   -- verapamil 120mg po QD  -- diamox 250 mg BID  - neurology performed occipital nerve block without symptom relief  - Bedside LP unsuccessful 8/10 as suspicion of IIH contributing to HA  - IR consulted for LP with flouro, scheduled 8/12  - patient with visual hallucination on evening of 8/10, no further episodes  - will consult psychiatry consulted for concern for functional neurologic disorder if above work up unremarkable  - low dose SSI while on steroids     Leukocytosis  Resolved  Lactic acidosis  - worsening leukocytosis 12.5K ---> 25K--> 20.49 --> 9.78  - 2/4 SIRS with WBC 25K and HR 92, now resolved; afebrile  - suspect leukemoid reaction in setting of steroids vs developing sepsis   - Infectious work up with CXR, blood cultures, UA unremarkable  - procal negative  - lactic 5.4> 3.9> 2.5 with IVF  - monitor with daily labs, low threshold for broad spectrum antibiotics if becomes septic, febrile  - resolved with completion of steroids    PCOS (polycystic ovarian syndrome)  - hold metformin  - monitor BG in setting of steroid use -- Start low dose SSI       Hypokalemia  Lab Results   Component  Value Date    K 4.3 08/09/2022     - replacing, daily bmp -> stable      Bipolar disorder, unspecified  - follows with psychiatry outpatient  - patient reports no recent manic episodes and that her bipolar is stable  - continue wellbutrin, vraylar, and ativan prn      Iron deficiency anemia due to chronic blood loss  - Hgb 12>11>10, baseline ~ 11   - microcytic anemia noted  - anemia panel with iron deficiency, as well as low folate and B12, MMA pending  - started on folic acid 1 mg daily, b12 injections, ferrous sulfate     Obstructive sleep apnea  - cpap qhs      Morbid obesity  Body mass index is 43.7 kg/m². Morbid obesity complicates all aspects of disease management from diagnostic modalities to treatment. Weight loss encouraged and health benefits explained to patient.           VTE Risk Mitigation (From admission, onward)         Ordered     enoxaparin injection 40 mg  Every 12 hours         08/06/22 2207                Discharge Planning   [place]: 8/15/2022     Code Status: Full Code   Is the patient medically ready for discharge?: No    Reason for patient still in hospital (select all that apply): Patient trending condition, Laboratory test, Treatment, Imaging and Consult recommendations  Discharge Plan A: Home with family   Discharge Delays: None known at this time              Phyllis Lepe PA-C  Department of Hospital Medicine   [facility] - Telemetry Stepdown ([place]

## 2022-08-12 ENCOUNTER — TELEPHONE (OUTPATIENT)
Dept: FAMILY MEDICINE | Facility: CLINIC | Age: 37
End: 2022-08-12
Payer: COMMERCIAL

## 2022-08-12 LAB
ALBUMIN SERPL BCP-MCNC: 2.6 G/DL (ref 3.5–5.2)
ALP SERPL-CCNC: 53 U/L (ref 55–135)
ALT SERPL W/O P-5'-P-CCNC: 19 U/L (ref 10–44)
ANION GAP SERPL CALC-SCNC: 8 MMOL/L (ref 8–16)
AST SERPL-CCNC: 7 U/L (ref 10–40)
BASOPHILS # BLD AUTO: 0.01 K/UL (ref 0–0.2)
BASOPHILS NFR BLD: 0.1 % (ref 0–1.9)
BILIRUB SERPL-MCNC: 0.2 MG/DL (ref 0.1–1)
BUN SERPL-MCNC: 15 MG/DL (ref 6–20)
CALCIUM SERPL-MCNC: 9 MG/DL (ref 8.7–10.5)
CHLORIDE SERPL-SCNC: 109 MMOL/L (ref 95–110)
CO2 SERPL-SCNC: 21 MMOL/L (ref 23–29)
CREAT SERPL-MCNC: 0.8 MG/DL (ref 0.5–1.4)
DIFFERENTIAL METHOD: ABNORMAL
EOSINOPHIL # BLD AUTO: 0.2 K/UL (ref 0–0.5)
EOSINOPHIL NFR BLD: 1.4 % (ref 0–8)
ERYTHROCYTE [DISTWIDTH] IN BLOOD BY AUTOMATED COUNT: 15.5 % (ref 11.5–14.5)
EST. GFR  (NO RACE VARIABLE): >60 ML/MIN/1.73 M^2
GLUCOSE SERPL-MCNC: 80 MG/DL (ref 70–110)
HCT VFR BLD AUTO: 35.8 % (ref 37–48.5)
HGB BLD-MCNC: 11.3 G/DL (ref 12–16)
IMM GRANULOCYTES # BLD AUTO: 0.08 K/UL (ref 0–0.04)
IMM GRANULOCYTES NFR BLD AUTO: 0.7 % (ref 0–0.5)
LYMPHOCYTES # BLD AUTO: 3.3 K/UL (ref 1–4.8)
LYMPHOCYTES NFR BLD: 30.4 % (ref 18–48)
MCH RBC QN AUTO: 26 PG (ref 27–31)
MCHC RBC AUTO-ENTMCNC: 31.6 G/DL (ref 32–36)
MCV RBC AUTO: 83 FL (ref 82–98)
MONOCYTES # BLD AUTO: 0.8 K/UL (ref 0.3–1)
MONOCYTES NFR BLD: 7.4 % (ref 4–15)
NEUTROPHILS # BLD AUTO: 6.5 K/UL (ref 1.8–7.7)
NEUTROPHILS NFR BLD: 60 % (ref 38–73)
NRBC BLD-RTO: 0 /100 WBC
PLATELET # BLD AUTO: 269 K/UL (ref 150–450)
PMV BLD AUTO: 11.7 FL (ref 9.2–12.9)
POCT GLUCOSE: 118 MG/DL (ref 70–110)
POCT GLUCOSE: 131 MG/DL (ref 70–110)
POCT GLUCOSE: 78 MG/DL (ref 70–110)
POCT GLUCOSE: 84 MG/DL (ref 70–110)
POCT GLUCOSE: 94 MG/DL (ref 70–110)
POTASSIUM SERPL-SCNC: 3.6 MMOL/L (ref 3.5–5.1)
PROT SERPL-MCNC: 5.3 G/DL (ref 6–8.4)
RBC # BLD AUTO: 4.34 M/UL (ref 4–5.4)
SODIUM SERPL-SCNC: 138 MMOL/L (ref 136–145)
WBC # BLD AUTO: 10.87 K/UL (ref 3.9–12.7)

## 2022-08-12 PROCEDURE — 25000003 PHARM REV CODE 250: Performed by: PHYSICIAN ASSISTANT

## 2022-08-12 PROCEDURE — 85025 COMPLETE CBC W/AUTO DIFF WBC: CPT | Performed by: PHYSICIAN ASSISTANT

## 2022-08-12 PROCEDURE — 99233 SBSQ HOSP IP/OBS HIGH 50: CPT | Mod: ,,, | Performed by: PHYSICIAN ASSISTANT

## 2022-08-12 PROCEDURE — 80053 COMPREHEN METABOLIC PANEL: CPT | Performed by: NURSE PRACTITIONER

## 2022-08-12 PROCEDURE — 25000003 PHARM REV CODE 250

## 2022-08-12 PROCEDURE — 99233 PR SUBSEQUENT HOSPITAL CARE,LEVL III: ICD-10-PCS | Mod: ,,, | Performed by: PHYSICIAN ASSISTANT

## 2022-08-12 PROCEDURE — 99900035 HC TECH TIME PER 15 MIN (STAT)

## 2022-08-12 PROCEDURE — 25000003 PHARM REV CODE 250: Performed by: NURSE PRACTITIONER

## 2022-08-12 PROCEDURE — 63600175 PHARM REV CODE 636 W HCPCS

## 2022-08-12 PROCEDURE — 25000003 PHARM REV CODE 250: Performed by: INTERNAL MEDICINE

## 2022-08-12 PROCEDURE — 11000001 HC ACUTE MED/SURG PRIVATE ROOM

## 2022-08-12 PROCEDURE — 36415 COLL VENOUS BLD VENIPUNCTURE: CPT | Performed by: PHYSICIAN ASSISTANT

## 2022-08-12 PROCEDURE — 99232 SBSQ HOSP IP/OBS MODERATE 35: CPT | Mod: ,,, | Performed by: PSYCHIATRY & NEUROLOGY

## 2022-08-12 PROCEDURE — 99232 PR SUBSEQUENT HOSPITAL CARE,LEVL II: ICD-10-PCS | Mod: ,,, | Performed by: PSYCHIATRY & NEUROLOGY

## 2022-08-12 PROCEDURE — 63600175 PHARM REV CODE 636 W HCPCS: Performed by: PHYSICIAN ASSISTANT

## 2022-08-12 PROCEDURE — 94761 N-INVAS EAR/PLS OXIMETRY MLT: CPT

## 2022-08-12 RX ADMIN — FERROUS SULFATE TAB 325 MG (65 MG ELEMENTAL FE) 1 EACH: 325 (65 FE) TAB at 09:08

## 2022-08-12 RX ADMIN — ENOXAPARIN SODIUM 40 MG: 100 INJECTION SUBCUTANEOUS at 08:08

## 2022-08-12 RX ADMIN — DIPHENHYDRAMINE HYDROCHLORIDE 50 MG: 50 CAPSULE ORAL at 08:08

## 2022-08-12 RX ADMIN — LORAZEPAM 1 MG: 1 TABLET ORAL at 08:08

## 2022-08-12 RX ADMIN — DEXTROSE 1000 MG: 50 INJECTION, SOLUTION INTRAVENOUS at 01:08

## 2022-08-12 RX ADMIN — CYANOCOBALAMIN 1000 MCG: 1000 INJECTION INTRAMUSCULAR; SUBCUTANEOUS at 09:08

## 2022-08-12 RX ADMIN — ACETAMINOPHEN 1000 MG: 500 TABLET ORAL at 01:08

## 2022-08-12 RX ADMIN — ACETAZOLAMIDE 250 MG: 250 TABLET ORAL at 09:08

## 2022-08-12 RX ADMIN — DEXTROSE 1000 MG: 50 INJECTION, SOLUTION INTRAVENOUS at 10:08

## 2022-08-12 RX ADMIN — ENOXAPARIN SODIUM 40 MG: 100 INJECTION SUBCUTANEOUS at 09:08

## 2022-08-12 RX ADMIN — FAMOTIDINE 20 MG: 20 TABLET ORAL at 09:08

## 2022-08-12 RX ADMIN — ACETAZOLAMIDE 250 MG: 250 TABLET ORAL at 08:08

## 2022-08-12 RX ADMIN — BUPROPION HYDROCHLORIDE 150 MG: 150 TABLET, FILM COATED, EXTENDED RELEASE ORAL at 08:08

## 2022-08-12 RX ADMIN — FAMOTIDINE 20 MG: 20 TABLET ORAL at 08:08

## 2022-08-12 RX ADMIN — ACETAMINOPHEN 1000 MG: 500 TABLET ORAL at 10:08

## 2022-08-12 RX ADMIN — ACETAMINOPHEN 1000 MG: 500 TABLET ORAL at 06:08

## 2022-08-12 RX ADMIN — MAGNESIUM SULFATE HEPTAHYDRATE 2 G: 40 INJECTION, SOLUTION INTRAVENOUS at 08:08

## 2022-08-12 RX ADMIN — MAGNESIUM SULFATE HEPTAHYDRATE 2 G: 40 INJECTION, SOLUTION INTRAVENOUS at 09:08

## 2022-08-12 RX ADMIN — CARIPRAZINE 3 MG: 1.5 CAPSULE, GELATIN COATED ORAL at 08:08

## 2022-08-12 RX ADMIN — TOPIRAMATE 25 MG: 25 TABLET, FILM COATED ORAL at 09:08

## 2022-08-12 RX ADMIN — FOLIC ACID 1 MG: 1 TABLET ORAL at 09:08

## 2022-08-12 RX ADMIN — VERAPAMIL HYDROCHLORIDE 120 MG: 120 TABLET, FILM COATED, EXTENDED RELEASE ORAL at 06:08

## 2022-08-12 NOTE — TELEPHONE ENCOUNTER
Call placed to patient to assist with scheduling hospital follow up visit. No answer received. Left message requesting return call to office.

## 2022-08-12 NOTE — CARE UPDATE
[facility] unable to schedule PCP hospital follow up visit. A message was sent to the provider requesting an appointment on the patients behalf. I added this info to the AVS for the patient as a reminder

## 2022-08-12 NOTE — NURSING
Hand off report given to NIDA Byers RN. Patient laying supine eyes closed easily arouse verbal stimuli, NADN, no needs or concerns at this time, patient and spouse updated. PIV CDI IVF running at 75ml/hr. purwick in place, brief on and clean and dry. MD is aware patient and spouse are wishing to speak to them. Patient is requesting to hold off on oral medication admin and eating until clarification on LP procedure.

## 2022-08-12 NOTE — ASSESSMENT & PLAN NOTE
- past medical history signficant for migraines, presenting with persistent headache for about ~2 months, now with left sided weakness, dysarthria. Concern for complex migraine vs IIH vs functional disorder  - recently started on phentermine in May 2022 for weight loss - concern for adverse effect  - follows with neurology outpatient  - given 1L NS, toradol, reglan, and versed in the ED and denies any improvement of symptoms  - MRI brain and CTH without acute findings (see neurology assessment below)  - neurology consulted, suspect multifactorial nature with migraine, possible IIH, and possible functional disorder  Recommendations:  -- Steroids completed. Continue magnesium 2g IV BID, Depacon 1g  IV BID, verapamil 120mg po QD  -- diamox 250mg BID, topomax 25mg qd  -- agree with psychiatry consult  -- LP tomorrow with IR  - neurology performed occipital nerve block without symptom relief  - Bedside LP unsuccessful 8/10 as suspicion of IIH contributing to HA  - IR consulted for LP with flouro, scheduled 8/12, patient refused IV ativan, will require sedation, case request placed  - will consult psychiatry consulted for concern for functional neurologic disorder if above work up unremarkable  - PT/OT

## 2022-08-12 NOTE — SUBJECTIVE & OBJECTIVE
Subjective:     Interval History: Headache still persisting today. Was expected to get LP, but did not occur because she could not get general sedation. Able to move L leg more today. Speech more normal today.     Current Facility-Administered Medications   Medication Dose Route Frequency Provider Last Rate Last Admin    0.9%  NaCl infusion   Intravenous Continuous Licha Wilson NP 75 mL/hr at 08/11/22 0543 New Bag at 08/11/22 0543    acetaminophen tablet 1,000 mg  1,000 mg Oral Q8H Rachelle Lim PA-C   1,000 mg at 08/12/22 0619    acetaZOLAMIDE tablet 250 mg  250 mg Oral BID Bandar Romero MD   250 mg at 08/12/22 0946    albuterol-ipratropium 2.5 mg-0.5 mg/3 mL nebulizer solution 3 mL  3 mL Nebulization Q4H PRN Rachelle Lim PA-C        baclofen tablet 10 mg  10 mg Oral TID PRN Bandar Romero MD   10 mg at 08/10/22 2142    bisacodyL suppository 10 mg  10 mg Rectal Daily PRN Rachelle Lim PA-C        buPROPion TB24 tablet 150 mg  150 mg Oral Nightly Rachelle Lim PA-C   150 mg at 08/11/22 2058    cariprazine Cap 3 mg  3 mg Oral QHS Annelise Lopez MD   3 mg at 08/11/22 2057    cyanocobalamin injection 1,000 mcg  1,000 mcg Subcutaneous Daily Phyllis Lepe PA-C   1,000 mcg at 08/12/22 0946    dextrose 10% bolus 125 mL  12.5 g Intravenous PRN Rachelle Lim PA-C        dextrose 10% bolus 250 mL  25 g Intravenous PRN Rachelle Lim PA-C        diphenhydrAMINE capsule 50 mg  50 mg Oral Nightly PRN Clolette Smith PA-C   50 mg at 08/11/22 2106    enoxaparin injection 40 mg  40 mg Subcutaneous Q12H Rachelle Lim PA-C   40 mg at 08/12/22 0946    famotidine tablet 20 mg  20 mg Oral BID Licha Wilson NP   20 mg at 08/12/22 0946    ferrous sulfate tablet 1 each  1 tablet Oral Daily Phyllis Lepe PA-C   1 each at 08/12/22 0946    folic acid tablet 1 mg  1 mg Oral Daily Phyllis Lepe PA-C   1 mg at 08/12/22 0946    glucagon (human recombinant) injection 1 mg  1 mg  Intramuscular PRN Rachelle Lim PA-C        glucose chewable tablet 16 g  16 g Oral PRN KLEBER Dewitt-CHEPE        glucose chewable tablet 24 g  24 g Oral PRN Rachelle Lim PA-C        insulin aspart U-100 pen 0-5 Units  0-5 Units Subcutaneous QID (AC + HS) PRN Rachelle Lim PA-C        LORazepam tablet 1 mg  1 mg Oral Q6H PRN Rachelle Lim PA-C   1 mg at 08/11/22 2106    magnesium sulfate 2g in water 50mL IVPB (premix)  2 g Intravenous BID Phyllis Lepe PA-C 25 mL/hr at 08/12/22 0949 2 g at 08/12/22 0949    naloxone 0.4 mg/mL injection 0.02 mg  0.02 mg Intravenous PRN Rachelle Lim PA-C        ondansetron disintegrating tablet 8 mg  8 mg Oral Q8H PRN Phyllis Lepe PA-C        promethazine (PHENERGAN) 12.5 mg in dextrose 5 % 50 mL IVPB  12.5 mg Intravenous Q6H PRN Phyllis Lepe PA-C        simethicone chewable tablet 80 mg  1 tablet Oral QID PRN Licha Wilson, RANDALL        sodium chloride 0.9% flush 5 mL  5 mL Intravenous PRN Rachelle Lim PA-C        topiramate tablet 25 mg  25 mg Oral Daily Bandar Romero MD   25 mg at 08/12/22 0946    valproate (DEPACON) 1,000 mg in dextrose 5 % 100 mL IVPB  1,000 mg Intravenous BID Phyllis Lepe PA-C   Stopped at 08/11/22 2344    verapamiL CR tablet 120 mg  120 mg Oral Daily TODD MedelC   120 mg at 08/12/22 0619       Review of Systems   Constitutional:  Negative for chills and fever.   HENT:  Positive for tinnitus (pulsing sound in R ear). Negative for congestion and sinus pressure.    Eyes:  Positive for photophobia and visual disturbance (blurred vision).   Respiratory:  Negative for cough and shortness of breath.    Cardiovascular:  Negative for chest pain and leg swelling.   Gastrointestinal:  Negative for abdominal pain and constipation.   Genitourinary:  Negative for dysuria and frequency.   Musculoskeletal:  Positive for neck pain. Negative for back pain.   Skin:  Negative for color change and rash.    Neurological:  Positive for speech difficulty (slowed speech), weakness, numbness and headaches. Negative for dizziness, tremors, seizures, syncope, facial asymmetry and light-headedness.   Psychiatric/Behavioral:  Negative for behavioral problems and confusion.    Objective:     Vital Signs (Most Recent):  Temp: 97.7 °F (36.5 °C) (08/12/22 1144)  Pulse: 70 (08/12/22 1144)  Resp: 18 (08/12/22 1144)  BP: 131/77 (08/12/22 1144)  SpO2: 97 % (08/12/22 1144)   Vital Signs (24h Range):  Temp:  [97 °F (36.1 °C)-98 °F (36.7 °C)] 97.7 °F (36.5 °C)  Pulse:  [68-76] 70  Resp:  [18] 18  SpO2:  [96 %-98 %] 97 %  BP: (113-131)/(57-77) 131/77     Weight: 126.6 kg (279 lb)  Body mass index is 43.7 kg/m².    Physical Exam  Vitals reviewed.   Constitutional:       General: She is not in acute distress.     Appearance: Normal appearance.   HENT:      Head: Normocephalic and atraumatic.      Nose: Nose normal.      Mouth/Throat:      Mouth: Mucous membranes are moist.      Pharynx: Oropharynx is clear.   Eyes:      Extraocular Movements: Extraocular movements intact.      Conjunctiva/sclera: Conjunctivae normal.      Pupils: Pupils are equal, round, and reactive to light.   Musculoskeletal:      Cervical back: Normal range of motion.   Neurological:      Mental Status: She is alert and oriented to person, place, and time.      Cranial Nerves: Cranial nerves 2-12 are intact.   Psychiatric:         Speech: Speech normal.       NEUROLOGICAL EXAMINATION:     MENTAL STATUS   Oriented to person, place, and time.   Speech: speech is normal     CRANIAL NERVES   Cranial nerves II through XII intact.     CN III, IV, VI   Pupils are equal, round, and reactive to light.    CN V   Left facial sensation deficit: decreased sensation on L face V1-V3.    MOTOR EXAM   Muscle bulk: normal  Overall muscle tone: normal  Right arm tone: normal  Left arm tone: normal  Right leg tone: normal  Left leg tone: normal       Strength 5/5 RUE/RLE; 3/5 LLE, 2/5  LUE     SENSORY EXAM        Decreased sensation to touch in LUE/LLE     Significant Labs: CBC:   Recent Labs   Lab 08/11/22  0829 08/12/22 0427   WBC 9.78 10.87   HGB 10.1* 11.3*   HCT 32.9* 35.8*    269       CMP:   Recent Labs   Lab 08/12/22 0427   GLU 80      K 3.6      CO2 21*   BUN 15   CREATININE 0.8   CALCIUM 9.0   PROT 5.3*   ALBUMIN 2.6*   BILITOT 0.2   ALKPHOS 53*   AST 7*   ALT 19   ANIONGAP 8       All pertinent lab results from the past 24 hours have been reviewed.    Significant Imaging: I have reviewed all pertinent imaging results/findings within the past 24 hours.

## 2022-08-12 NOTE — PROGRESS NOTES
[facility] - Telemetry [facility] ([address] Medicine  Progress Note    Patient Name: Sonia Goldberg  MRN: [id number]  Patient Class: IP- Inpatient   Admission Date: 8/6/2022  Length of Stay: 4 days  Attending Physician: Annelise Lopez MD  Primary Care Provider: Terell Reyes MD        Subjective:     Principal Problem:Left-sided weakness        HPI:  Sonia Goldberg is a 36 y.o. female with a past medical history of migraines, PCOS, bipolar disorder, IBS, COVID-19, and KERI presenting in the ED with aphasia and left-sided weakness starting at 1530. Patient mentions having similar episode earlier this month presented to outside hospital obtained laboratory testing which was unremarkable. She was discharged after symptoms improved with migraine cocktail. She describes the headache as pulsing and it is located in the right frontal/parietal region. States the only medication that occasionally helps is Fioricet. Associated symptoms include blurry vision, photophobia, and nausea. Patient denies any vomiting, diarrhea, fevers, chills, or urinary symptoms.     ED: hypertensive, otherwise vital signs stable. Stroke code activated, vascular neurology evaluated patient at bedside. CTH and MRI brain negative for acute infarct. They have strong suspicion for alternate etiology of symptoms such as hemiplegic or complex migraines. Recommend symptomatic relief and general neurology consultation. Total cholesterol 243 with triglycerides of 319. Otherwise intake labs largely unremarkable. Given 1L NS, Toradol, Reglan, and versed.       Overview/Hospital Course:  Sonia Goldberg was placed in  observation for left-sided paresis, dysarthria in setting of acute intractable migraine. Neurology evaluated patient and recommend beginning IV migraine cocktail: depakote, solumedrol, magnesium, plus oral verapamil. Minimal to no improvement after 48 hours. Neurology performed occipital nerve block without  sympotm relief. LP attempted at bedside for IIH 8/10, IR to perform 8/12. Patient with leukemoid reaction in setting of steroid use from 8/8-8/10, infectious work up negative but patient did have elevated lactic 5.9> 3.9> 2.5, which improved with IVF.    Patient is scheduled to f/u with her neurologist next week and follows with an opthalmology clinic.       Interval History: Patient seen at bedside. Patient not agreeable to IV ativan and will need sedation with anaesthesia for LP. Case request placed. Discussed possibility of functional disorder with patient and family. Still having 8/10 headache.  PT/OT consulted.     Review of Systems   Constitutional:  Positive for activity change and appetite change (decreased). Negative for chills, diaphoresis and fever.   HENT:  Positive for tinnitus. Negative for congestion, sore throat and trouble swallowing.    Eyes:  Positive for photophobia and visual disturbance (blurred vision).   Respiratory:  Negative for cough, chest tightness and shortness of breath.    Cardiovascular:  Negative for chest pain and leg swelling.   Gastrointestinal:  Positive for nausea. Negative for abdominal pain, constipation, diarrhea and vomiting.   Genitourinary:  Negative for difficulty urinating, dysuria, frequency and hematuria.   Musculoskeletal:  Positive for neck pain. Negative for back pain and gait problem.   Skin:  Negative for color change, rash and wound.   Neurological:  Positive for speech difficulty (slowed speech), weakness, numbness and headaches. Negative for tremors, seizures, syncope and light-headedness.   Psychiatric/Behavioral:  Negative for agitation and behavioral problems. The patient is not nervous/anxious.    Objective:     Vital Signs (Most Recent):  Temp: 97.7 °F (36.5 °C) (08/12/22 1144)  Pulse: 70 (08/12/22 1144)  Resp: 18 (08/12/22 1144)  BP: 131/77 (08/12/22 1144)  SpO2: 97 % (08/12/22 1144) Vital Signs (24h Range):  Temp:  [97 °F (36.1 °C)-98 °F (36.7 °C)] 97.7  °F (36.5 °C)  Pulse:  [68-76] 70  Resp:  [18] 18  SpO2:  [96 %-98 %] 97 %  BP: (113-131)/(57-77) 131/77     Weight: 126.6 kg (279 lb)  Body mass index is 43.7 kg/m².    Intake/Output Summary (Last 24 hours) at 8/12/2022 1619  Last data filed at 8/12/2022 1300  Gross per 24 hour   Intake --   Output 2800 ml   Net -2800 ml      Physical Exam  Vitals and nursing note reviewed.   Constitutional:       General: She is not in acute distress.     Appearance: She is obese.   HENT:      Head: Normocephalic and atraumatic.      Right Ear: External ear normal.      Left Ear: External ear normal.      Nose: Nose normal.      Mouth/Throat:      Mouth: Mucous membranes are moist.   Eyes:      General: No scleral icterus.        Right eye: No discharge.         Left eye: No discharge.      Extraocular Movements: Extraocular movements intact.      Conjunctiva/sclera: Conjunctivae normal.   Cardiovascular:      Rate and Rhythm: Normal rate and regular rhythm.      Pulses: Normal pulses.      Heart sounds: Normal heart sounds.   Pulmonary:      Effort: Pulmonary effort is normal. No respiratory distress.      Breath sounds: Normal breath sounds. No rales.   Abdominal:      General: Abdomen is flat. Bowel sounds are normal. There is no distension.      Palpations: Abdomen is soft.   Musculoskeletal:      Cervical back: Normal range of motion.      Right lower leg: No edema.      Left lower leg: No edema.      Comments: Minimal, variable ROM in LUE and LLE 2/2 weakness. Decreased sensation L hand   Skin:     General: Skin is warm and dry.      Findings: No rash.   Neurological:      Mental Status: She is alert and oriented to person, place, and time.      Sensory: Sensory deficit present.      Motor: Weakness present.      Coordination: Coordination normal.      Comments: Variable weakness and impaired sensation in left upper and lower extremity, No facial asymmetry. Somewhat distractible Dysarthria. AAOx4.    Psychiatric:         Mood  "and Affect: Mood normal. Mood is not anxious or depressed. Affect is not tearful.         Speech: Speech normal. Speech is not delayed.         Behavior: Behavior normal.       Significant Labs: All pertinent labs within the past 24 hours have been reviewed.  CBC:   Recent Labs   Lab 08/11/22  0829 08/12/22 0427   WBC 9.78 10.87   HGB 10.1* 11.3*   HCT 32.9* 35.8*    269     CMP:   Recent Labs   Lab 08/12/22 0427      K 3.6      CO2 21*   GLU 80   BUN 15   CREATININE 0.8   CALCIUM 9.0   PROT 5.3*   ALBUMIN 2.6*   BILITOT 0.2   ALKPHOS 53*   AST 7*   ALT 19   ANIONGAP 8       Significant Imaging: I have reviewed all pertinent imaging results/findings within the past 24 hours.      Assessment/Plan:      * Left-sided weakness  Dysarthria  - stroke code activated upon arrive due to left-sided weakness and aphasia in setting of migraine  - CTH and MRI brain negative for acute infarct  - vascular neurology evaluated patient in the ED and have a low suspicion for CVA at this time  - neurology consulted, appreciate assistance,   - LP pending for IIH as could be contributing to symptoms, also concern for functional disorder: "patient's speech pattern is not congruent with a neurologic aphasia and the patient's left-sided weakness has been shown to be variable and nonphysiologic, actually improving after a failed lumbar puncture attempt, with her being able to hold her left upper extremity antigravity at bedside"   - Will consult psychiatry for concern for functional neurologic disorder once neurologic work up complete  - b12, folate low, replacing, b1 pending  - PT/OT consulted    Intractable migraine  - past medical history signficant for migraines, presenting with persistent headache for about ~2 months, now with left sided weakness, dysarthria. Concern for complex migraine vs IIH vs functional disorder  - recently started on phentermine in May 2022 for weight loss - concern for adverse effect  - " follows with neurology outpatient  - given 1L NS, toradol, reglan, and versed in the ED and denies any improvement of symptoms  - MRI brain and CTH without acute findings (see neurology assessment below)  - neurology consulted, suspect multifactorial nature with migraine, possible IIH, and possible functional disorder  Recommendations:  -- Steroids completed. Continue magnesium 2g IV BID, Depacon 1g  IV BID, verapamil 120mg po QD  -- diamox 250mg BID, topomax 25mg qd  -- agree with psychiatry consult  -- LP tomorrow with IR  - neurology performed occipital nerve block without symptom relief  - Bedside LP unsuccessful 8/10 as suspicion of IIH contributing to HA  - IR consulted for LP with flouro, scheduled 8/12, patient refused IV ativan, will require sedation, case request placed  - will consult psychiatry consulted for concern for functional neurologic disorder if above work up unremarkable  - PT/OT    Leukocytosis  Resolved  Lactic acidosis  - worsening leukocytosis 12.5K ---> 25K--> 20.49 --> 9.78  - 2/4 SIRS with WBC 25K and HR 92, now resolved; afebrile  - suspect leukemoid reaction in setting of steroids vs developing sepsis   - Infectious work up with CXR, blood cultures, UA unremarkable  - procal negative  - lactic 5.4> 3.9> 2.5 with IVF  - monitor with daily labs, low threshold for broad spectrum antibiotics if becomes septic, febrile  - resolved with completion of steroids    PCOS (polycystic ovarian syndrome)  - hold metformin  - monitor BG in setting of steroid use -- Start low dose SSI       Hypokalemia  Lab Results   Component Value Date    K 4.3 08/09/2022     - replacing, daily bmp -> stable      Bipolar disorder, unspecified  - follows with psychiatry outpatient  - patient reports no recent manic episodes and that her bipolar is stable  - continue wellbutrin, vraylar, and ativan prn      Iron deficiency anemia due to chronic blood loss  - Hgb 12>11>10, baseline ~ 11   - microcytic anemia noted  -  anemia panel with iron deficiency, as well as low folate and B12, MMA pending  - started on folic acid 1 mg daily, b12 injections, ferrous sulfate     Obstructive sleep apnea  - cpap qhs      Morbid obesity  Body mass index is 43.7 kg/m². Morbid obesity complicates all aspects of disease management from diagnostic modalities to treatment. Weight loss encouraged and health benefits explained to patient.           VTE Risk Mitigation (From admission, onward)         Ordered     enoxaparin injection 40 mg  Every 12 hours         08/06/22 2207                Discharge Planning   [place]: 8/15/2022     Code Status: Full Code   Is the patient medically ready for discharge?: No    Reason for patient still in hospital (select all that apply): Patient trending condition, Treatment, Imaging and Consult recommendations  Discharge Plan A: Home with family   Discharge Delays: None known at this time              Phyllis Lepe PA-C  Department of Hospital Medicine   [facility] - Telemetry Stepdown ([place])

## 2022-08-12 NOTE — SUBJECTIVE & OBJECTIVE
Interval History: Patient seen at bedside. Patient not agreeable to IV ativan and will need sedation with anaesthesia for LP. Case request placed. Discussed possibility of functional disorder with patient and family. Still having 8/10 headache.  PT/OT consulted.     Review of Systems   Constitutional:  Positive for activity change and appetite change (decreased). Negative for chills, diaphoresis and fever.   HENT:  Positive for tinnitus. Negative for congestion, sore throat and trouble swallowing.    Eyes:  Positive for photophobia and visual disturbance (blurred vision).   Respiratory:  Negative for cough, chest tightness and shortness of breath.    Cardiovascular:  Negative for chest pain and leg swelling.   Gastrointestinal:  Positive for nausea. Negative for abdominal pain, constipation, diarrhea and vomiting.   Genitourinary:  Negative for difficulty urinating, dysuria, frequency and hematuria.   Musculoskeletal:  Positive for neck pain. Negative for back pain and gait problem.   Skin:  Negative for color change, rash and wound.   Neurological:  Positive for speech difficulty (slowed speech), weakness, numbness and headaches. Negative for tremors, seizures, syncope and light-headedness.   Psychiatric/Behavioral:  Negative for agitation and behavioral problems. The patient is not nervous/anxious.    Objective:     Vital Signs (Most Recent):  Temp: 97.7 °F (36.5 °C) (08/12/22 1144)  Pulse: 70 (08/12/22 1144)  Resp: 18 (08/12/22 1144)  BP: 131/77 (08/12/22 1144)  SpO2: 97 % (08/12/22 1144) Vital Signs (24h Range):  Temp:  [97 °F (36.1 °C)-98 °F (36.7 °C)] 97.7 °F (36.5 °C)  Pulse:  [68-76] 70  Resp:  [18] 18  SpO2:  [96 %-98 %] 97 %  BP: (113-131)/(57-77) 131/77     Weight: 126.6 kg (279 lb)  Body mass index is 43.7 kg/m².    Intake/Output Summary (Last 24 hours) at 8/12/2022 8498  Last data filed at 8/12/2022 1300  Gross per 24 hour   Intake --   Output 2800 ml   Net -2800 ml      Physical Exam  Vitals and  nursing note reviewed.   Constitutional:       General: She is not in acute distress.     Appearance: She is obese.   HENT:      Head: Normocephalic and atraumatic.      Right Ear: External ear normal.      Left Ear: External ear normal.      Nose: Nose normal.      Mouth/Throat:      Mouth: Mucous membranes are moist.   Eyes:      General: No scleral icterus.        Right eye: No discharge.         Left eye: No discharge.      Extraocular Movements: Extraocular movements intact.      Conjunctiva/sclera: Conjunctivae normal.   Cardiovascular:      Rate and Rhythm: Normal rate and regular rhythm.      Pulses: Normal pulses.      Heart sounds: Normal heart sounds.   Pulmonary:      Effort: Pulmonary effort is normal. No respiratory distress.      Breath sounds: Normal breath sounds. No rales.   Abdominal:      General: Abdomen is flat. Bowel sounds are normal. There is no distension.      Palpations: Abdomen is soft.   Musculoskeletal:      Cervical back: Normal range of motion.      Right lower leg: No edema.      Left lower leg: No edema.      Comments: Minimal, variable ROM in LUE and LLE 2/2 weakness. Decreased sensation L hand   Skin:     General: Skin is warm and dry.      Findings: No rash.   Neurological:      Mental Status: She is alert and oriented to person, place, and time.      Sensory: Sensory deficit present.      Motor: Weakness present.      Coordination: Coordination normal.      Comments: Variable weakness and impaired sensation in left upper and lower extremity, No facial asymmetry. Somewhat distractible Dysarthria. AAOx4.    Psychiatric:         Mood and Affect: Mood normal. Mood is not anxious or depressed. Affect is not tearful.         Speech: Speech normal. Speech is not delayed.         Behavior: Behavior normal.       Significant Labs: All pertinent labs within the past 24 hours have been reviewed.  CBC:   Recent Labs   Lab 08/11/22  0829 08/12/22  0427   WBC 9.78 10.87   HGB 10.1* 11.3*    HCT 32.9* 35.8*    269     CMP:   Recent Labs   Lab 08/12/22  0427      K 3.6      CO2 21*   GLU 80   BUN 15   CREATININE 0.8   CALCIUM 9.0   PROT 5.3*   ALBUMIN 2.6*   BILITOT 0.2   ALKPHOS 53*   AST 7*   ALT 19   ANIONGAP 8       Significant Imaging: I have reviewed all pertinent imaging results/findings within the past 24 hours.

## 2022-08-12 NOTE — ASSESSMENT & PLAN NOTE
"Dysarthria  - stroke code activated upon arrive due to left-sided weakness and aphasia in setting of migraine  - CTH and MRI brain negative for acute infarct  - vascular neurology evaluated patient in the ED and have a low suspicion for CVA at this time  - neurology consulted, appreciate assistance,   - LP pending for IIH as could be contributing to symptoms, also concern for functional disorder: "patient's speech pattern is not congruent with a neurologic aphasia and the patient's left-sided weakness has been shown to be variable and nonphysiologic, actually improving after a failed lumbar puncture attempt, with her being able to hold her left upper extremity antigravity at bedside"   - Will consult psychiatry for concern for functional neurologic disorder once neurologic work up complete  - b12, folate low, replacing, b1 pending  - PT/OT consulted  "

## 2022-08-12 NOTE — PROGRESS NOTES
[facility] - Telemetry Stepdown ([place] [id number])  Neurology  Progress Note    Patient Name: Sonia Goldberg  MRN: [id number]  Admission Date: 8/6/2022  Hospital Length of Stay: 4 days  Code Status: Full Code   Attending Provider: Annelise Lopez MD  Primary Care Physician: Terell Reyes MD   Principal Problem:Left-sided weakness    HPI:   Neurology was consulted for evaluation of headaches with associated aphasia and L hemiparesis in Ms. Goldberg, a pleasant 36-year-old with recent history of migraine-like headaches, followed by Valerie Yang NP at [facility]. She has a h/o PCOS, bipolar, IBS, KERI. Patient reports that her headaches began on and round 5/30. Recent h/o prior to headaches include a COVID-19 infection 01/2022, and two minor injuries to the head (hit on corner of bed post, resulting in nausea) in the weeks prior to her headaches starting. She denies new medication changes prior to 5/30. Patient notes she was told she had migraines approximately 20 years ago during a pregnancy and has not had any attacks since. She has thus far tried sumatriptan, steroids, without much benefit and pain has led to 2 ER visits. Her headache is R-frontal/parietal, sharp and pressure-like, non-pulsating, non-radiating, photosensitive & phonosensitive, associated with blurry vision, pulsatile sound in R ear, worsened when bending over (patient generally does not lie flat, not known if it's worsened while flat), and occasionally improved with Fioricet.    Pt had a similar episode earlier this month, presenting to OSH, discharged after improvement on migraine cocktail. In the ED, stroke code activated, pt evaluated by vascular neurology, with CTH & MRI negative for acute infarct. Labs largely unremarkable. Given 1L NS, Toradol, Reglan, and versed.         Subjective:     Interval History: Headache still persisting today. Was expected to get LP, but did not occur because she could not get general sedation. Able to  move L leg more today. Speech more normal today.     Current Facility-Administered Medications   Medication Dose Route Frequency Provider Last Rate Last Admin    0.9%  NaCl infusion   Intravenous Continuous Licha Wilson NP 75 mL/hr at 08/11/22 0543 New Bag at 08/11/22 0543    acetaminophen tablet 1,000 mg  1,000 mg Oral Q8H Rachelle Lim PA-C   1,000 mg at 08/12/22 0619    acetaZOLAMIDE tablet 250 mg  250 mg Oral BID Bandar Romero MD   250 mg at 08/12/22 0946    albuterol-ipratropium 2.5 mg-0.5 mg/3 mL nebulizer solution 3 mL  3 mL Nebulization Q4H PRN Rachelle Lim PA-C        baclofen tablet 10 mg  10 mg Oral TID PRN Bandar Romero MD   10 mg at 08/10/22 2142    bisacodyL suppository 10 mg  10 mg Rectal Daily PRN Rachelle Lim PA-C        buPROPion TB24 tablet 150 mg  150 mg Oral Nightly Rachelle Lim PA-C   150 mg at 08/11/22 2058    cariprazine Cap 3 mg  3 mg Oral QHS Annelise Lopez MD   3 mg at 08/11/22 2057    cyanocobalamin injection 1,000 mcg  1,000 mcg Subcutaneous Daily Phyllis Lepe PA-C   1,000 mcg at 08/12/22 0946    dextrose 10% bolus 125 mL  12.5 g Intravenous PRN Rachelle Lim PA-C        dextrose 10% bolus 250 mL  25 g Intravenous PRN Rachelle Lim PA-C        diphenhydrAMINE capsule 50 mg  50 mg Oral Nightly PRN Collette Smith PA-C   50 mg at 08/11/22 2106    enoxaparin injection 40 mg  40 mg Subcutaneous Q12H Rachelle Lim PA-C   40 mg at 08/12/22 0946    famotidine tablet 20 mg  20 mg Oral BID Licha Wilson NP   20 mg at 08/12/22 0946    ferrous sulfate tablet 1 each  1 tablet Oral Daily Phyllis Lepe PA-C   1 each at 08/12/22 0946    folic acid tablet 1 mg  1 mg Oral Daily Phyllis Lepe PA-C   1 mg at 08/12/22 0946    glucagon (human recombinant) injection 1 mg  1 mg Intramuscular PRN Rachelle Lim PA-C        glucose chewable tablet 16 g  16 g Oral PRN Rachelle Lim PA-C        glucose chewable tablet  24 g  24 g Oral PRN Rachelle Lim PA-C        insulin aspart U-100 pen 0-5 Units  0-5 Units Subcutaneous QID (AC + HS) PRN aRchelle Lim PA-C        LORazepam tablet 1 mg  1 mg Oral Q6H PRN Rachelle Lim PA-C   1 mg at 08/11/22 2106    magnesium sulfate 2g in water 50mL IVPB (premix)  2 g Intravenous BID Phyllis Lepe PA-C 25 mL/hr at 08/12/22 0949 2 g at 08/12/22 0949    naloxone 0.4 mg/mL injection 0.02 mg  0.02 mg Intravenous PRN Rachelle Lim PA-C        ondansetron disintegrating tablet 8 mg  8 mg Oral Q8H PRN Phyllis Lepe PA-C        promethazine (PHENERGAN) 12.5 mg in dextrose 5 % 50 mL IVPB  12.5 mg Intravenous Q6H PRN Phyllis Lepe PA-C        simethicone chewable tablet 80 mg  1 tablet Oral QID PRN Licha Wilson, NP        sodium chloride 0.9% flush 5 mL  5 mL Intravenous PRN Rachelle Lim PA-C        topiramate tablet 25 mg  25 mg Oral Daily Bandar Romero MD   25 mg at 08/12/22 0946    valproate (DEPACON) 1,000 mg in dextrose 5 % 100 mL IVPB  1,000 mg Intravenous BID Phyllis Lepe PA-C   Stopped at 08/11/22 2344    verapamiL CR tablet 120 mg  120 mg Oral Daily Collette Smith PA-C   120 mg at 08/12/22 0619       Review of Systems   Constitutional:  Negative for chills and fever.   HENT:  Positive for tinnitus (pulsing sound in R ear). Negative for congestion and sinus pressure.    Eyes:  Positive for photophobia and visual disturbance (blurred vision).   Respiratory:  Negative for cough and shortness of breath.    Cardiovascular:  Negative for chest pain and leg swelling.   Gastrointestinal:  Negative for abdominal pain and constipation.   Genitourinary:  Negative for dysuria and frequency.   Musculoskeletal:  Positive for neck pain. Negative for back pain.   Skin:  Negative for color change and rash.   Neurological:  Positive for speech difficulty (slowed speech), weakness, numbness and headaches. Negative for dizziness, tremors, seizures,  syncope, facial asymmetry and light-headedness.   Psychiatric/Behavioral:  Negative for behavioral problems and confusion.    Objective:     Vital Signs (Most Recent):  Temp: 97.7 °F (36.5 °C) (08/12/22 1144)  Pulse: 70 (08/12/22 1144)  Resp: 18 (08/12/22 1144)  BP: 131/77 (08/12/22 1144)  SpO2: 97 % (08/12/22 1144)   Vital Signs (24h Range):  Temp:  [97 °F (36.1 °C)-98 °F (36.7 °C)] 97.7 °F (36.5 °C)  Pulse:  [68-76] 70  Resp:  [18] 18  SpO2:  [96 %-98 %] 97 %  BP: (113-131)/(57-77) 131/77     Weight: 126.6 kg (279 lb)  Body mass index is 43.7 kg/m².    Physical Exam  Vitals reviewed.   Constitutional:       General: She is not in acute distress.     Appearance: Normal appearance.   HENT:      Head: Normocephalic and atraumatic.      Nose: Nose normal.      Mouth/Throat:      Mouth: Mucous membranes are moist.      Pharynx: Oropharynx is clear.   Eyes:      Extraocular Movements: Extraocular movements intact.      Conjunctiva/sclera: Conjunctivae normal.      Pupils: Pupils are equal, round, and reactive to light.   Musculoskeletal:      Cervical back: Normal range of motion.   Neurological:      Mental Status: She is alert and oriented to person, place, and time.      Cranial Nerves: Cranial nerves 2-12 are intact.   Psychiatric:         Speech: Speech normal.       NEUROLOGICAL EXAMINATION:     MENTAL STATUS   Oriented to person, place, and time.   Speech: speech is normal     CRANIAL NERVES   Cranial nerves II through XII intact.     CN III, IV, VI   Pupils are equal, round, and reactive to light.    CN V   Left facial sensation deficit: decreased sensation on L face V1-V3.    MOTOR EXAM   Muscle bulk: normal  Overall muscle tone: normal  Right arm tone: normal  Left arm tone: normal  Right leg tone: normal  Left leg tone: normal       Strength 5/5 RUE/RLE; 3/5 LLE, 2/5 LUE     SENSORY EXAM        Decreased sensation to touch in LUE/LLE     Significant Labs: CBC:   Recent Labs   Lab 08/11/22  0829 08/12/22  1567    WBC 9.78 10.87   HGB 10.1* 11.3*   HCT 32.9* 35.8*    269       CMP:   Recent Labs   Lab 08/12/22  0427   GLU 80      K 3.6      CO2 21*   BUN 15   CREATININE 0.8   CALCIUM 9.0   PROT 5.3*   ALBUMIN 2.6*   BILITOT 0.2   ALKPHOS 53*   AST 7*   ALT 19   ANIONGAP 8       All pertinent lab results from the past 24 hours have been reviewed.    Significant Imaging: I have reviewed all pertinent imaging results/findings within the past 24 hours.    Assessment and Plan:     Intractable migraine  Neurology was consulted for evaluation of headaches with associated aphasia and L hemiparesis a 36-year-old F with recent history of migraine-like headaches, followed at [facility]. Headaches started approx 5/30. Possible provoking factors include COVID-19 infection 01/2022, & x2 minor injuries to the head (hit on corner of bed post, resulting in nausea) in the weeks prior to 5/30. Was given prednisone 10mg x8 days and rizitriptan on 6/28 with some improvement in HA. Seen in ED on 8/2 and was given fioricet and reglan with short term relief. HA persisted and she presented to [facility] [place] [place] ED on 8/6. CTH & MRI negative for acute infarct. MRI showing partially empty sella, slight prominent fluid signal along optic nerve sheaths bilaterally. Was scheduled to follow up MRI results with ophthalmology as outpatient.    Today is day 6 of treatment with IV headache meds. Patient notes headache is still present. She is able to move both legs while lying in bed. Speech remains slowed but not dysarthric.    Recommendations:  -- Steroids completed. Continue magnesium 2g IV BID, Depacon 1g  IV BID, verapamil 120mg po QD  -- Diamox 250mg BID, Topamax 25mg qd, can increase to 50mg on discharge  -- LP with sedation next week  -- Consider psychiatry consult prior to LP as LP is now delayed  -- Neurology will follow peripherally, please call with questions or concerns      Bandar Romero MD  Neurology  [facility]  [facility]

## 2022-08-12 NOTE — PLAN OF CARE
POC reviewed with patient and family at bedside. All questions and concerns reviewed. Pt VSS and pt denies any further needs. Pt resting in bed in NAD at this time. Bed locked in lowest position. Call bell within reach. Will continue to monitor.       Problem: Adult Inpatient Plan of Care  Goal: Plan of Care Review  Outcome: Ongoing, Progressing  Goal: Patient-Specific Goal (Individualized)  Outcome: Ongoing, Progressing  Goal: Absence of Hospital-Acquired Illness or Injury  Outcome: Ongoing, Progressing  Goal: Optimal Comfort and Wellbeing  Outcome: Ongoing, Progressing  Goal: Readiness for Transition of Care  Outcome: Ongoing, Progressing     Problem: Bariatric Environmental Safety  Goal: Safety Maintained with Care  Outcome: Ongoing, Progressing     Problem: Infection  Goal: Absence of Infection Signs and Symptoms  Outcome: Ongoing, Progressing     Problem: Skin Injury Risk Increased  Goal: Skin Health and Integrity  Outcome: Ongoing, Progressing     Problem: Fall Injury Risk  Goal: Absence of Fall and Fall-Related Injury  Outcome: Ongoing, Progressing

## 2022-08-12 NOTE — TELEPHONE ENCOUNTER
----- Message from Brenda Saeed, Patient Care Assistant sent at 8/12/2022  2:08 PM CDT -----  Regarding: appointment  Contact: emily with   Type:  Sooner Appointment Request    Caller is requesting a sooner appointment.  Caller declined first available appointment listed below.  Caller will not accept being placed on the waitlist and is requesting a message be sent to doctor.    Name of Caller:  emily with   When is the first available appointment?  8/30/22  Symptoms:  [facility] f/u   Best Call Back Number:  [phone] (home)     Additional Information:  please call emily with  to advise. Thanks!

## 2022-08-12 NOTE — ASSESSMENT & PLAN NOTE
Neurology was consulted for evaluation of headaches with associated aphasia and L hemiparesis a 36-year-old F with recent history of migraine-like headaches, followed at [facility]. Headaches started approx 5/30. Possible provoking factors include COVID-19 infection 01/2022, & x2 minor injuries to the head (hit on corner of bed post, resulting in nausea) in the weeks prior to 5/30. Was given prednisone 10mg x8 days and rizitriptan on 6/28 with some improvement in HA. Seen in ED on 8/2 and was given fioricet and reglan with short term relief. HA persisted and she presented to [facility] ED on 8/6. CTH & MRI negative for acute infarct. MRI showing partially empty sella, slight prominent fluid signal along optic nerve sheaths bilaterally. Was scheduled to follow up MRI results with ophthalmology as outpatient.    Today is day 6 of treatment with IV headache meds. Patient notes headache is still present. She is able to move both legs while lying in bed. Speech remains slowed but not dysarthric.    Recommendations:  -- Steroids completed. Continue magnesium 2g IV BID, Depacon 1g  IV BID, verapamil 120mg po QD  -- Diamox 250mg BID, Topamax 25mg qd, can increase to 50mg on discharge  -- LP with sedation next week  -- Psychiatry consult would be useful prior to LP as LP is now delayed for at least 2 more days  -- Neurology will follow peripherally, please call with questions or concerns

## 2022-08-13 LAB
BASOPHILS # BLD AUTO: 0.03 K/UL (ref 0–0.2)
BASOPHILS NFR BLD: 0.3 % (ref 0–1.9)
DIFFERENTIAL METHOD: ABNORMAL
EOSINOPHIL # BLD AUTO: 0.3 K/UL (ref 0–0.5)
EOSINOPHIL NFR BLD: 2.7 % (ref 0–8)
ERYTHROCYTE [DISTWIDTH] IN BLOOD BY AUTOMATED COUNT: 15.2 % (ref 11.5–14.5)
HCT VFR BLD AUTO: 33.9 % (ref 37–48.5)
HGB BLD-MCNC: 10.5 G/DL (ref 12–16)
IMM GRANULOCYTES # BLD AUTO: 0.13 K/UL (ref 0–0.04)
IMM GRANULOCYTES NFR BLD AUTO: 1.2 % (ref 0–0.5)
LYMPHOCYTES # BLD AUTO: 2.9 K/UL (ref 1–4.8)
LYMPHOCYTES NFR BLD: 28 % (ref 18–48)
MCH RBC QN AUTO: 25.5 PG (ref 27–31)
MCHC RBC AUTO-ENTMCNC: 31 G/DL (ref 32–36)
MCV RBC AUTO: 83 FL (ref 82–98)
MONOCYTES # BLD AUTO: 0.7 K/UL (ref 0.3–1)
MONOCYTES NFR BLD: 6.5 % (ref 4–15)
NEUTROPHILS # BLD AUTO: 6.4 K/UL (ref 1.8–7.7)
NEUTROPHILS NFR BLD: 61.3 % (ref 38–73)
NRBC BLD-RTO: 0 /100 WBC
PLATELET # BLD AUTO: 329 K/UL (ref 150–450)
PMV BLD AUTO: 9.9 FL (ref 9.2–12.9)
POCT GLUCOSE: 101 MG/DL (ref 70–110)
POCT GLUCOSE: 103 MG/DL (ref 70–110)
POCT GLUCOSE: 127 MG/DL (ref 70–110)
POCT GLUCOSE: 95 MG/DL (ref 70–110)
RBC # BLD AUTO: 4.11 M/UL (ref 4–5.4)
WBC # BLD AUTO: 10.44 K/UL (ref 3.9–12.7)

## 2022-08-13 PROCEDURE — 63600175 PHARM REV CODE 636 W HCPCS: Performed by: PHYSICIAN ASSISTANT

## 2022-08-13 PROCEDURE — 99233 PR SUBSEQUENT HOSPITAL CARE,LEVL III: ICD-10-PCS | Mod: ,,, | Performed by: PHYSICIAN ASSISTANT

## 2022-08-13 PROCEDURE — 97165 OT EVAL LOW COMPLEX 30 MIN: CPT

## 2022-08-13 PROCEDURE — 63600175 PHARM REV CODE 636 W HCPCS

## 2022-08-13 PROCEDURE — 99900035 HC TECH TIME PER 15 MIN (STAT)

## 2022-08-13 PROCEDURE — 97112 NEUROMUSCULAR REEDUCATION: CPT

## 2022-08-13 PROCEDURE — 85025 COMPLETE CBC W/AUTO DIFF WBC: CPT | Performed by: INTERNAL MEDICINE

## 2022-08-13 PROCEDURE — 97530 THERAPEUTIC ACTIVITIES: CPT

## 2022-08-13 PROCEDURE — 25000003 PHARM REV CODE 250: Performed by: PHYSICIAN ASSISTANT

## 2022-08-13 PROCEDURE — 94761 N-INVAS EAR/PLS OXIMETRY MLT: CPT

## 2022-08-13 PROCEDURE — 36415 COLL VENOUS BLD VENIPUNCTURE: CPT | Performed by: INTERNAL MEDICINE

## 2022-08-13 PROCEDURE — 25000003 PHARM REV CODE 250

## 2022-08-13 PROCEDURE — 25000003 PHARM REV CODE 250: Performed by: NURSE PRACTITIONER

## 2022-08-13 PROCEDURE — 11000001 HC ACUTE MED/SURG PRIVATE ROOM

## 2022-08-13 PROCEDURE — 25000003 PHARM REV CODE 250: Performed by: INTERNAL MEDICINE

## 2022-08-13 PROCEDURE — 97162 PT EVAL MOD COMPLEX 30 MIN: CPT

## 2022-08-13 PROCEDURE — 99233 SBSQ HOSP IP/OBS HIGH 50: CPT | Mod: ,,, | Performed by: PHYSICIAN ASSISTANT

## 2022-08-13 RX ORDER — TOPIRAMATE 25 MG/1
50 TABLET ORAL DAILY
Status: DISCONTINUED | OUTPATIENT
Start: 2022-08-14 | End: 2022-08-13

## 2022-08-13 RX ORDER — MECLIZINE HCL 12.5 MG 12.5 MG/1
25 TABLET ORAL 3 TIMES DAILY PRN
Status: DISCONTINUED | OUTPATIENT
Start: 2022-08-13 | End: 2022-08-17 | Stop reason: HOSPADM

## 2022-08-13 RX ORDER — KETOROLAC TROMETHAMINE 15 MG/ML
15 INJECTION, SOLUTION INTRAMUSCULAR; INTRAVENOUS ONCE
Status: COMPLETED | OUTPATIENT
Start: 2022-08-13 | End: 2022-08-13

## 2022-08-13 RX ORDER — TOPIRAMATE 25 MG/1
25 TABLET ORAL DAILY
Status: DISCONTINUED | OUTPATIENT
Start: 2022-08-14 | End: 2022-08-16

## 2022-08-13 RX ADMIN — FOLIC ACID 1 MG: 1 TABLET ORAL at 08:08

## 2022-08-13 RX ADMIN — MECLIZINE HYDROCHLORIDE 25 MG: 12.5 TABLET ORAL at 02:08

## 2022-08-13 RX ADMIN — ENOXAPARIN SODIUM 40 MG: 100 INJECTION SUBCUTANEOUS at 08:08

## 2022-08-13 RX ADMIN — MAGNESIUM SULFATE HEPTAHYDRATE 2 G: 40 INJECTION, SOLUTION INTRAVENOUS at 08:08

## 2022-08-13 RX ADMIN — ACETAZOLAMIDE 250 MG: 250 TABLET ORAL at 08:08

## 2022-08-13 RX ADMIN — CARIPRAZINE 3 MG: 1.5 CAPSULE, GELATIN COATED ORAL at 08:08

## 2022-08-13 RX ADMIN — ACETAMINOPHEN 1000 MG: 500 TABLET ORAL at 07:08

## 2022-08-13 RX ADMIN — FERROUS SULFATE TAB 325 MG (65 MG ELEMENTAL FE) 1 EACH: 325 (65 FE) TAB at 08:08

## 2022-08-13 RX ADMIN — PROMETHAZINE HYDROCHLORIDE 12.5 MG: 25 INJECTION INTRAMUSCULAR; INTRAVENOUS at 09:08

## 2022-08-13 RX ADMIN — KETOROLAC TROMETHAMINE 15 MG: 15 INJECTION, SOLUTION INTRAMUSCULAR; INTRAVENOUS at 10:08

## 2022-08-13 RX ADMIN — CYANOCOBALAMIN 1000 MCG: 1000 INJECTION INTRAMUSCULAR; SUBCUTANEOUS at 08:08

## 2022-08-13 RX ADMIN — DIPHENHYDRAMINE HYDROCHLORIDE 50 MG: 50 CAPSULE ORAL at 10:08

## 2022-08-13 RX ADMIN — VERAPAMIL HYDROCHLORIDE 120 MG: 120 TABLET, FILM COATED, EXTENDED RELEASE ORAL at 06:08

## 2022-08-13 RX ADMIN — FAMOTIDINE 20 MG: 20 TABLET ORAL at 08:08

## 2022-08-13 RX ADMIN — TOPIRAMATE 25 MG: 25 TABLET, FILM COATED ORAL at 08:08

## 2022-08-13 RX ADMIN — BUPROPION HYDROCHLORIDE 150 MG: 150 TABLET, FILM COATED, EXTENDED RELEASE ORAL at 08:08

## 2022-08-13 RX ADMIN — ACETAMINOPHEN 1000 MG: 500 TABLET ORAL at 02:08

## 2022-08-13 RX ADMIN — DEXTROSE 1000 MG: 50 INJECTION, SOLUTION INTRAVENOUS at 03:08

## 2022-08-13 RX ADMIN — MAGNESIUM SULFATE HEPTAHYDRATE 2 G: 40 INJECTION, SOLUTION INTRAVENOUS at 09:08

## 2022-08-13 NOTE — ASSESSMENT & PLAN NOTE
- past medical history signficant for migraines, presenting with persistent headache for about ~2 months, now with left sided weakness, dysarthria. Concern for complex migraine vs IIH vs functional disorder  - recently started on phentermine in May 2022 for weight loss - concern for adverse effect  - follows with neurology outpatient  - given 1L NS, toradol, reglan, and versed in the ED and denies any improvement of symptoms  - MRI brain and CTH without acute findings (see neurology assessment below)  - neurology consulted, suspect multifactorial nature with migraine, possible IIH, and possible functional disorder  Recommendations:  -- Steroids completed. Continue magnesium 2g IV BID, Depacon 1g  IV BID, verapamil 120mg po QD  -- diamox 250mg BID, topomax 25mg qd  -- agree with psychiatry consult  -- LP   - neurology performed occipital nerve block without symptom relief  - Bedside LP unsuccessful 8/10 as suspicion of IIH contributing to HA  - IR consulted for LP with flouro, scheduled 8/12, patient refused IV ativan, will require sedation, case request placed  - will consult psychiatry consulted for concern for functional neurologic disorder if above work up unremarkable  - PT/OT-

## 2022-08-13 NOTE — PROGRESS NOTES
[facility]risten Read - Telemetry [facility] ([address] Medicine  Progress Note    Patient Name: Sonia Goldberg  MRN: [id number]  Patient Class: IP- Inpatient   Admission Date: 8/6/2022  Length of Stay: 5 days  Attending Physician: Annelise Lopez MD  Primary Care Provider: Terell Reyes MD        Subjective:     Principal Problem:Left-sided weakness        HPI:  Sonia Goldberg is a 36 y.o. female with a past medical history of migraines, PCOS, bipolar disorder, IBS, COVID-19, and KERI presenting in the ED with aphasia and left-sided weakness starting at 1530. Patient mentions having similar episode earlier this month presented to outside hospital obtained laboratory testing which was unremarkable. She was discharged after symptoms improved with migraine cocktail. She describes the headache as pulsing and it is located in the right frontal/parietal region. States the only medication that occasionally helps is Fioricet. Associated symptoms include blurry vision, photophobia, and nausea. Patient denies any vomiting, diarrhea, fevers, chills, or urinary symptoms.     ED: hypertensive, otherwise vital signs stable. Stroke code activated, vascular neurology evaluated patient at bedside. CTH and MRI brain negative for acute infarct ,showing partially empty sella, slight prominent fluid signal along optic nerve sheaths bilaterally. Patient was scheduled to have outpatient follow up with ophthalmology as outpatient. They have strong suspicion for alternate etiology of symptoms such as hemiplegic or complex migraines. Recommend symptomatic relief and general neurology consultation. Total cholesterol 243 with triglycerides of 319. Otherwise intake labs largely unremarkable. Given 1L NS, Toradol, Reglan, and versed.       Overview/Hospital Course:  Sonia Goldberg was placed in  observation for intractable migraine, left-sided paresis, and dysarthria.  Neurology evaluated patient and recommend beginning IV  migraine cocktail: depakote, solumedrol, magnesium, plus oral verapamil. Minimal to no improvement after 48 hours. Neurology performed occipital nerve block without sympotm relief. Diamox 250 mg BID and topamax 25 mg added with no relief. Patient with leukemoid reaction in setting of steroid use from -8/10, infectious work up negative but patient did have elevated lactic 5.9> 3.9> 2.5, resolved with IVF. No concern for infection. Patient completed steroids, and rest of medications (IV magnesium, IV depacon, oral verapamil, and topamax) continued. LP attempted at bedside to evaluate for IIH on 8/10 unable to perform due to pain and patient anxiety. Neurology noted that patient's left-sided weakness has been shown to be variable and nonphysiologic, actually improving after a failed lumbar puncture attempt, with her being able to hold her left upper extremity antigravity at bedside. On  patient with visual hallucination of her  grandmother and new right sided weakness which is distractible. Neurology also notes that speech pattern is not congruent with neurologic aphasia. Given these findings there is high concern for functional disorder. IR scheduled LP with fluoroscopy , patient unwilling to have it done with IV ativan, now for plan with sedation with anaesthesia. Plan for psychiatry consult after LP.        Patient is scheduled to f/u with her neurologist next week and follows with an opthalmology clinic.       Interval History: Patient seen at [facility], HA 8/10. PT/OT evaluated patient, recommending . Rate of speech does seem improved today. Noted to have vertigo with left sided nystagmus, will trial meclizine. MPU ordered placed  for LP with sedation.     Review of Systems   Constitutional:  Positive for activity change and appetite change (decreased). Negative for chills, diaphoresis and fever.   HENT:  Positive for tinnitus. Negative for congestion, sore throat and trouble swallowing.     Eyes:  Positive for photophobia and visual disturbance (blurred vision).   Respiratory:  Negative for cough, chest tightness and shortness of breath.    Cardiovascular:  Negative for chest pain and leg swelling.   Gastrointestinal:  Positive for nausea. Negative for abdominal pain, constipation, diarrhea and vomiting.   Genitourinary:  Negative for difficulty urinating, dysuria, frequency and hematuria.   Musculoskeletal:  Positive for neck pain. Negative for back pain and gait problem.   Skin:  Negative for color change, rash and wound.   Neurological:  Positive for speech difficulty (slowed speech), weakness, numbness and headaches. Negative for tremors, seizures, syncope and light-headedness.   Psychiatric/Behavioral:  Negative for agitation and behavioral problems. The patient is not nervous/anxious.    Objective:     Vital Signs (Most Recent):  Temp: 97.8 °F (36.6 °C) (08/13/22 1150)  Pulse: 83 (08/13/22 1150)  Resp: 18 (08/13/22 1150)  BP: 132/78 (08/13/22 1150)  SpO2: 96 % (08/13/22 1150) Vital Signs (24h Range):  Temp:  [97.7 °F (36.5 °C)-98.2 °F (36.8 °C)] 97.8 °F (36.6 °C)  Pulse:  [77-89] 83  Resp:  [18] 18  SpO2:  [96 %-99 %] 96 %  BP: (119-133)/(64-78) 132/78     Weight: 126.6 kg (279 lb)  Body mass index is 43.7 kg/m².    Intake/Output Summary (Last 24 hours) at 8/13/2022 1259  Last data filed at 8/13/2022 0500  Gross per 24 hour   Intake --   Output 2900 ml   Net -2900 ml      Physical Exam  Vitals and nursing note reviewed.   Constitutional:       General: She is not in acute distress.     Appearance: She is obese.   HENT:      Head: Normocephalic and atraumatic.      Right Ear: External ear normal.      Left Ear: External ear normal.      Nose: Nose normal.      Mouth/Throat:      Mouth: Mucous membranes are moist.   Eyes:      General: No scleral icterus.        Right eye: No discharge.         Left eye: No discharge.      Extraocular Movements: Extraocular movements intact.       Conjunctiva/sclera: Conjunctivae normal.   Cardiovascular:      Rate and Rhythm: Normal rate and regular rhythm.      Pulses: Normal pulses.      Heart sounds: Normal heart sounds.   Pulmonary:      Effort: Pulmonary effort is normal. No respiratory distress.      Breath sounds: Normal breath sounds. No rales.   Abdominal:      General: Abdomen is flat. Bowel sounds are normal. There is no distension.      Palpations: Abdomen is soft.   Musculoskeletal:      Cervical back: Normal range of motion.      Right lower leg: No edema.      Left lower leg: No edema.      Comments: Minimal, variable ROM in LUE and LLE 2/2 weakness. Decreased sensation L hand   Skin:     General: Skin is warm and dry.      Findings: No rash.   Neurological:      Mental Status: She is alert and oriented to person, place, and time.      Sensory: Sensory deficit present.      Motor: Weakness present.      Coordination: Coordination normal.      Comments: Variable weakness and impaired sensation in left upper and lower extremity, No facial asymmetry. Rate of speech improved today.  AAOx4.    Psychiatric:         Mood and Affect: Mood normal. Mood is not anxious or depressed. Affect is not tearful.         Speech: Speech normal. Speech is not delayed.         Behavior: Behavior normal.       Significant Labs: All pertinent labs within the past 24 hours have been reviewed.  CBC:   Recent Labs   Lab 08/12/22  0427 08/13/22  0814   WBC 10.87 10.44   HGB 11.3* 10.5*   HCT 35.8* 33.9*    329     CMP:   Recent Labs   Lab 08/12/22  0427      K 3.6      CO2 21*   GLU 80   BUN 15   CREATININE 0.8   CALCIUM 9.0   PROT 5.3*   ALBUMIN 2.6*   BILITOT 0.2   ALKPHOS 53*   AST 7*   ALT 19   ANIONGAP 8       Significant Imaging: I have reviewed all pertinent imaging results/findings within the past 24 hours.      Assessment/Plan:      * Left-sided weakness  Dysarthria  - stroke code activated upon arrive due to left-sided weakness and aphasia in  "setting of migraine  - CTH and MRI brain negative for acute infarct  - vascular neurology evaluated patient in the ED and have a low suspicion for CVA at this time  - neurology consulted, appreciate assistance,   - LP pending for IIH as could be contributing to symptoms, also concern for functional disorder: "patient's speech pattern is not congruent with a neurologic aphasia and the patient's left-sided weakness has been shown to be variable and nonphysiologic, actually improving after a failed lumbar puncture attempt, with her being able to hold her left upper extremity antigravity at bedside"   - LP will need to be done under anasthesia  - Will consult psychiatry for concern for functional neurologic disorder once neurologic work up complete  - PT/OT consulted- HH    Intractable migraine  - past medical history signficant for migraines, presenting with persistent headache for about ~2 months, now with left sided weakness, dysarthria. Concern for complex migraine vs IIH vs functional disorder  - recently started on phentermine in May 2022 for weight loss - concern for adverse effect  - follows with neurology outpatient  - given 1L NS, toradol, reglan, and versed in the ED and denies any improvement of symptoms  - MRI brain and CTH without acute findings (see neurology assessment below)  - neurology consulted, suspect multifactorial nature with migraine, possible IIH, and possible functional disorder  Recommendations:  -- Steroids completed. Continue magnesium 2g IV BID, Depacon 1g  IV BID, verapamil 120mg po QD  -- diamox 250mg BID, topomax 25mg qd  -- agree with psychiatry consult  -- LP   - neurology performed occipital nerve block without symptom relief  - Bedside LP unsuccessful 8/10 as suspicion of IIH contributing to HA  - IR consulted for LP with nakulo, scheduled 8/12, patient refused IV ativan, will require sedation, case request placed  - will consult psychiatry consulted for concern for functional " neurologic disorder if above work up unremarkable  - PT/OT- HH    Leukocytosis  Resolved  Lactic acidosis  - worsening leukocytosis 12.5K ---> 25K--> 20.49 --> 9.78  - 2/4 SIRS with WBC 25K and HR 92, now resolved; afebrile  - suspect leukemoid reaction in setting of steroids vs developing sepsis   - Infectious work up with CXR, blood cultures, UA unremarkable  - procal negative  - lactic 5.4> 3.9> 2.5 with IVF  - monitor with daily labs, low threshold for broad spectrum antibiotics if becomes septic, febrile  - resolved with completion of steroids    PCOS (polycystic ovarian syndrome)  - hold metformin  - monitor BG in setting of steroid use -- Start low dose SSI       Hypokalemia  Lab Results   Component Value Date    K 4.3 08/09/2022     - replacing, daily bmp -> stable      Bipolar disorder, unspecified  - follows with psychiatry outpatient  - patient reports no recent manic episodes and that her bipolar is stable  - continue wellbutrin, vraylar, and ativan prn      Iron deficiency anemia due to chronic blood loss  - Hgb 12>11>10, baseline ~ 11   - microcytic anemia noted  - anemia panel with iron deficiency, as well as low folate and B12, MMA pending  - started on folic acid 1 mg daily, b12 injections, ferrous sulfate     Obstructive sleep apnea  - cpap qhs      Morbid obesity  Body mass index is 43.7 kg/m². Morbid obesity complicates all aspects of disease management from diagnostic modalities to treatment. Weight loss encouraged and health benefits explained to patient.           VTE Risk Mitigation (From admission, onward)         Ordered     enoxaparin injection 40 mg  Every 12 hours         08/06/22 2207                Discharge Planning   [place]: 8/15/2022     Code Status: Full Code   Is the patient medically ready for discharge?: No    Reason for patient still in hospital (select all that apply): Patient trending condition, Laboratory test, Treatment, Imaging and Consult recommendations  Discharge Plan A:  Home with family   Discharge Delays: None known at this time              Phyllis Lepe PA-C  Department of Hospital Medicine   [facility] - Telemetry Stepdown ([place]-[facility])

## 2022-08-13 NOTE — PLAN OF CARE
Problem: Occupational Therapy  Goal: Occupational Therapy Goal  Description: Goals to be met by: 8/27/2022    Patient will increase functional independence with ADLs by performing:    Feeding with [place] using adaptive technique PRN.  UE Dressing with Modified [place] using adaptive technique PRN.  LE Dressing with [place] using adaptive technique PRN.  Grooming while standing at sink with [place] using adaptive technique PRN.  Toileting from toilet with [place]bs for hygiene and clothing management.   Toilet transfer to toilet with [place]bs.  Step transfer with [place].    Outcome: Ongoing, Progressing     Evaluation complete; goals and POC established. Recommending Home Health OT upon discharge to return to [facility].

## 2022-08-13 NOTE — PLAN OF CARE
Discharge Recommendation: HHPT.    Evaluation complete today. PT goals appropriate.    Patient is safe to perform bed <> chair transfer with CGA and HHA with nursing staff.    Please continue Progressive Mobility Protocol as appropriate.    Yodit Cobb PT, DPT  2022  Pager: [phone]      Problem: Physical Therapy  Goal: Physical Therapy Goal  Description: Goals to be met by: 2022     Patient will increase functional independence with mobility by performin. Sit to stand transfer with Modified [place]  2. Bed to chair transfer with Supervision using LRAD  3. Gait  x 50 feet with Stand-by Assistance using LRAD.   4. Stand for 5 minutes with Supervision using LRAD while performing dynamic balance activity to prepare for functional tasks in the home  5. Lower extremity exercise program x30 reps per handout, with independence    Outcome: Ongoing, Progressing

## 2022-08-13 NOTE — SUBJECTIVE & OBJECTIVE
Interval History: Patient seen at [facility], HA 8/10. PT/OT evaluated patient, recommending . Rate of speech does seem improved today. Noted to have vertigo with left sided nystagmus, will trial meclizine. MPU ordered placed 8/12 for LP with sedation.     Review of Systems   Constitutional:  Positive for activity change and appetite change (decreased). Negative for chills, diaphoresis and fever.   HENT:  Positive for tinnitus. Negative for congestion, sore throat and trouble swallowing.    Eyes:  Positive for photophobia and visual disturbance (blurred vision).   Respiratory:  Negative for cough, chest tightness and shortness of breath.    Cardiovascular:  Negative for chest pain and leg swelling.   Gastrointestinal:  Positive for nausea. Negative for abdominal pain, constipation, diarrhea and vomiting.   Genitourinary:  Negative for difficulty urinating, dysuria, frequency and hematuria.   Musculoskeletal:  Positive for neck pain. Negative for back pain and gait problem.   Skin:  Negative for color change, rash and wound.   Neurological:  Positive for speech difficulty (slowed speech), weakness, numbness and headaches. Negative for tremors, seizures, syncope and light-headedness.   Psychiatric/Behavioral:  Negative for agitation and behavioral problems. The patient is not nervous/anxious.    Objective:     Vital Signs (Most Recent):  Temp: 97.8 °F (36.6 °C) (08/13/22 1150)  Pulse: 83 (08/13/22 1150)  Resp: 18 (08/13/22 1150)  BP: 132/78 (08/13/22 1150)  SpO2: 96 % (08/13/22 1150) Vital Signs (24h Range):  Temp:  [97.7 °F (36.5 °C)-98.2 °F (36.8 °C)] 97.8 °F (36.6 °C)  Pulse:  [77-89] 83  Resp:  [18] 18  SpO2:  [96 %-99 %] 96 %  BP: (119-133)/(64-78) 132/78     Weight: 126.6 kg (279 lb)  Body mass index is 43.7 kg/m².    Intake/Output Summary (Last 24 hours) at 8/13/2022 1259  Last data filed at 8/13/2022 0500  Gross per 24 hour   Intake --   Output 2900 ml   Net -2900 ml      Physical Exam  Vitals and nursing note  reviewed.   Constitutional:       General: She is not in acute distress.     Appearance: She is obese.   HENT:      Head: Normocephalic and atraumatic.      Right Ear: External ear normal.      Left Ear: External ear normal.      Nose: Nose normal.      Mouth/Throat:      Mouth: Mucous membranes are moist.   Eyes:      General: No scleral icterus.        Right eye: No discharge.         Left eye: No discharge.      Extraocular Movements: Extraocular movements intact.      Conjunctiva/sclera: Conjunctivae normal.   Cardiovascular:      Rate and Rhythm: Normal rate and regular rhythm.      Pulses: Normal pulses.      Heart sounds: Normal heart sounds.   Pulmonary:      Effort: Pulmonary effort is normal. No respiratory distress.      Breath sounds: Normal breath sounds. No rales.   Abdominal:      General: Abdomen is flat. Bowel sounds are normal. There is no distension.      Palpations: Abdomen is soft.   Musculoskeletal:      Cervical back: Normal range of motion.      Right lower leg: No edema.      Left lower leg: No edema.      Comments: Minimal, variable ROM in LUE and LLE 2/2 weakness. Decreased sensation L hand   Skin:     General: Skin is warm and dry.      Findings: No rash.   Neurological:      Mental Status: She is alert and oriented to person, place, and time.      Sensory: Sensory deficit present.      Motor: Weakness present.      Coordination: Coordination normal.      Comments: Variable weakness and impaired sensation in left upper and lower extremity, No facial asymmetry. Rate of speech improved today.  AAOx4.    Psychiatric:         Mood and Affect: Mood normal. Mood is not anxious or depressed. Affect is not tearful.         Speech: Speech normal. Speech is not delayed.         Behavior: Behavior normal.       Significant Labs: All pertinent labs within the past 24 hours have been reviewed.  CBC:   Recent Labs   Lab 08/12/22  0427 08/13/22  0814   WBC 10.87 10.44   HGB 11.3* 10.5*   HCT 35.8* 33.9*     329     CMP:   Recent Labs   Lab 08/12/22  0427      K 3.6      CO2 21*   GLU 80   BUN 15   CREATININE 0.8   CALCIUM 9.0   PROT 5.3*   ALBUMIN 2.6*   BILITOT 0.2   ALKPHOS 53*   AST 7*   ALT 19   ANIONGAP 8       Significant Imaging: I have reviewed all pertinent imaging results/findings within the past 24 hours.

## 2022-08-13 NOTE — PLAN OF CARE
08/13/2022      Sonia Goldberg  [address] [address]          Hospital Medicine Dept.  [facility] [address] [place] [address] [phone] after hours  [phone] fax Sonia Goldberg is currently hospitalized at the [facility] since 8/6/2022.  Please excuse the patient from duties.               __________________________  Phyllis Lepe PA-C  08/13/2022

## 2022-08-13 NOTE — ASSESSMENT & PLAN NOTE
"Dysarthria  - stroke code activated upon arrive due to left-sided weakness and aphasia in setting of migraine  - CTH and MRI brain negative for acute infarct  - vascular neurology evaluated patient in the ED and have a low suspicion for CVA at this time  - neurology consulted, appreciate assistance,   - LP pending for IIH as could be contributing to symptoms, also concern for functional disorder: "patient's speech pattern is not congruent with a neurologic aphasia and the patient's left-sided weakness has been shown to be variable and nonphysiologic, actually improving after a failed lumbar puncture attempt, with her being able to hold her left upper extremity antigravity at bedside"   - LP will need to be done under anasthesia  - Will consult psychiatry for concern for functional neurologic disorder once neurologic work up complete  - PT/OT consulted-   "
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
## 2022-08-14 PROBLEM — E53.8 FOLATE DEFICIENCY: Status: ACTIVE | Noted: 2022-08-14

## 2022-08-14 PROBLEM — R55 SYNCOPE AND COLLAPSE: Status: RESOLVED | Noted: 2020-09-16 | Resolved: 2022-08-14

## 2022-08-14 PROBLEM — E87.20 LACTIC ACIDOSIS: Status: RESOLVED | Noted: 2022-08-10 | Resolved: 2022-08-14

## 2022-08-14 PROBLEM — R55 NEAR SYNCOPE: Status: RESOLVED | Noted: 2020-09-16 | Resolved: 2022-08-14

## 2022-08-14 PROBLEM — G43.011 INTRACTABLE MIGRAINE WITHOUT AURA AND WITH STATUS MIGRAINOSUS: Status: RESOLVED | Noted: 2022-06-28 | Resolved: 2022-08-14

## 2022-08-14 PROBLEM — R31.0 GROSS HEMATURIA: Status: RESOLVED | Noted: 2021-10-27 | Resolved: 2022-08-14

## 2022-08-14 PROBLEM — R07.89 CHEST DISCOMFORT: Status: RESOLVED | Noted: 2020-09-16 | Resolved: 2022-08-14

## 2022-08-14 PROBLEM — K80.00 ACUTE CALCULOUS CHOLECYSTITIS: Status: RESOLVED | Noted: 2020-11-27 | Resolved: 2022-08-14

## 2022-08-14 PROBLEM — S83.001S: Status: RESOLVED | Noted: 2021-05-13 | Resolved: 2022-08-14

## 2022-08-14 PROBLEM — R11.2 NAUSEA AND VOMITING: Status: RESOLVED | Noted: 2021-11-19 | Resolved: 2022-08-14

## 2022-08-14 PROBLEM — D72.829 LEUKOCYTOSIS: Status: RESOLVED | Noted: 2022-08-09 | Resolved: 2022-08-14

## 2022-08-14 PROBLEM — E87.6 HYPOKALEMIA: Status: RESOLVED | Noted: 2022-08-07 | Resolved: 2022-08-14

## 2022-08-14 PROBLEM — R00.0 TACHYCARDIA: Status: RESOLVED | Noted: 2020-10-14 | Resolved: 2022-08-14

## 2022-08-14 PROBLEM — K92.2 GI BLEED: Status: RESOLVED | Noted: 2020-10-25 | Resolved: 2022-08-14

## 2022-08-14 PROBLEM — K80.00 CALCULUS OF GALLBLADDER WITH ACUTE CHOLECYSTITIS WITHOUT OBSTRUCTION: Status: RESOLVED | Noted: 2020-11-26 | Resolved: 2022-08-14

## 2022-08-14 PROBLEM — R42 DIZZINESS: Status: RESOLVED | Noted: 2021-11-19 | Resolved: 2022-08-14

## 2022-08-14 PROBLEM — I95.1 ORTHOSTATIC HYPOTENSION: Status: RESOLVED | Noted: 2020-09-16 | Resolved: 2022-08-14

## 2022-08-14 LAB
ALBUMIN SERPL BCP-MCNC: 2.8 G/DL (ref 3.5–5.2)
ALP SERPL-CCNC: 67 U/L (ref 55–135)
ALT SERPL W/O P-5'-P-CCNC: 17 U/L (ref 10–44)
ANION GAP SERPL CALC-SCNC: 11 MMOL/L (ref 8–16)
AST SERPL-CCNC: 10 U/L (ref 10–40)
BACTERIA BLD CULT: NORMAL
BACTERIA BLD CULT: NORMAL
BASOPHILS # BLD AUTO: 0.03 K/UL (ref 0–0.2)
BASOPHILS NFR BLD: 0.3 % (ref 0–1.9)
BILIRUB SERPL-MCNC: 0.1 MG/DL (ref 0.1–1)
BUN SERPL-MCNC: 15 MG/DL (ref 6–20)
CALCIUM SERPL-MCNC: 10 MG/DL (ref 8.7–10.5)
CHLORIDE SERPL-SCNC: 108 MMOL/L (ref 95–110)
CO2 SERPL-SCNC: 20 MMOL/L (ref 23–29)
CREAT SERPL-MCNC: 0.8 MG/DL (ref 0.5–1.4)
DIFFERENTIAL METHOD: ABNORMAL
EOSINOPHIL # BLD AUTO: 0.4 K/UL (ref 0–0.5)
EOSINOPHIL NFR BLD: 3.1 % (ref 0–8)
ERYTHROCYTE [DISTWIDTH] IN BLOOD BY AUTOMATED COUNT: 15.1 % (ref 11.5–14.5)
EST. GFR  (NO RACE VARIABLE): >60 ML/MIN/1.73 M^2
GLUCOSE SERPL-MCNC: 116 MG/DL (ref 70–110)
HCT VFR BLD AUTO: 36.4 % (ref 37–48.5)
HGB BLD-MCNC: 11 G/DL (ref 12–16)
IMM GRANULOCYTES # BLD AUTO: 0.23 K/UL (ref 0–0.04)
IMM GRANULOCYTES NFR BLD AUTO: 2.1 % (ref 0–0.5)
LYMPHOCYTES # BLD AUTO: 3.2 K/UL (ref 1–4.8)
LYMPHOCYTES NFR BLD: 29.1 % (ref 18–48)
MCH RBC QN AUTO: 25.9 PG (ref 27–31)
MCHC RBC AUTO-ENTMCNC: 30.2 G/DL (ref 32–36)
MCV RBC AUTO: 86 FL (ref 82–98)
MONOCYTES # BLD AUTO: 0.6 K/UL (ref 0.3–1)
MONOCYTES NFR BLD: 5.7 % (ref 4–15)
NEUTROPHILS # BLD AUTO: 6.7 K/UL (ref 1.8–7.7)
NEUTROPHILS NFR BLD: 59.7 % (ref 38–73)
NRBC BLD-RTO: 0 /100 WBC
PLATELET # BLD AUTO: 360 K/UL (ref 150–450)
PMV BLD AUTO: 10.3 FL (ref 9.2–12.9)
POCT GLUCOSE: 86 MG/DL (ref 70–110)
POCT GLUCOSE: 93 MG/DL (ref 70–110)
POCT GLUCOSE: 99 MG/DL (ref 70–110)
POTASSIUM SERPL-SCNC: 3.7 MMOL/L (ref 3.5–5.1)
PROT SERPL-MCNC: 5.8 G/DL (ref 6–8.4)
RBC # BLD AUTO: 4.25 M/UL (ref 4–5.4)
SODIUM SERPL-SCNC: 139 MMOL/L (ref 136–145)
WBC # BLD AUTO: 11.15 K/UL (ref 3.9–12.7)

## 2022-08-14 PROCEDURE — 63600175 PHARM REV CODE 636 W HCPCS

## 2022-08-14 PROCEDURE — 25000003 PHARM REV CODE 250: Performed by: PHYSICIAN ASSISTANT

## 2022-08-14 PROCEDURE — 80053 COMPREHEN METABOLIC PANEL: CPT | Performed by: NURSE PRACTITIONER

## 2022-08-14 PROCEDURE — 63600175 PHARM REV CODE 636 W HCPCS: Performed by: PHYSICIAN ASSISTANT

## 2022-08-14 PROCEDURE — 11000001 HC ACUTE MED/SURG PRIVATE ROOM

## 2022-08-14 PROCEDURE — 25000003 PHARM REV CODE 250

## 2022-08-14 PROCEDURE — 99232 PR SUBSEQUENT HOSPITAL CARE,LEVL II: ICD-10-PCS | Mod: ,,, | Performed by: INTERNAL MEDICINE

## 2022-08-14 PROCEDURE — 85025 COMPLETE CBC W/AUTO DIFF WBC: CPT | Performed by: PHYSICIAN ASSISTANT

## 2022-08-14 PROCEDURE — 99232 SBSQ HOSP IP/OBS MODERATE 35: CPT | Mod: ,,, | Performed by: INTERNAL MEDICINE

## 2022-08-14 PROCEDURE — 25000003 PHARM REV CODE 250: Performed by: NURSE PRACTITIONER

## 2022-08-14 PROCEDURE — 36415 COLL VENOUS BLD VENIPUNCTURE: CPT | Performed by: NURSE PRACTITIONER

## 2022-08-14 PROCEDURE — 25000003 PHARM REV CODE 250: Performed by: INTERNAL MEDICINE

## 2022-08-14 RX ORDER — HYDRALAZINE HYDROCHLORIDE 25 MG/1
25 TABLET, FILM COATED ORAL EVERY 8 HOURS PRN
Status: DISCONTINUED | OUTPATIENT
Start: 2022-08-14 | End: 2022-08-17 | Stop reason: HOSPADM

## 2022-08-14 RX ADMIN — ACETAZOLAMIDE 250 MG: 250 TABLET ORAL at 09:08

## 2022-08-14 RX ADMIN — VERAPAMIL HYDROCHLORIDE 120 MG: 120 TABLET, FILM COATED, EXTENDED RELEASE ORAL at 10:08

## 2022-08-14 RX ADMIN — ACETAZOLAMIDE 250 MG: 250 TABLET ORAL at 10:08

## 2022-08-14 RX ADMIN — TOPIRAMATE 25 MG: 25 TABLET, FILM COATED ORAL at 10:08

## 2022-08-14 RX ADMIN — ACETAMINOPHEN 1000 MG: 500 TABLET ORAL at 10:08

## 2022-08-14 RX ADMIN — DIPHENHYDRAMINE HYDROCHLORIDE 50 MG: 50 CAPSULE ORAL at 09:08

## 2022-08-14 RX ADMIN — ACETAMINOPHEN 1000 MG: 500 TABLET ORAL at 02:08

## 2022-08-14 RX ADMIN — SODIUM CHLORIDE: 0.9 INJECTION, SOLUTION INTRAVENOUS at 06:08

## 2022-08-14 RX ADMIN — ENOXAPARIN SODIUM 40 MG: 100 INJECTION SUBCUTANEOUS at 10:08

## 2022-08-14 RX ADMIN — MAGNESIUM SULFATE HEPTAHYDRATE 2 G: 40 INJECTION, SOLUTION INTRAVENOUS at 09:08

## 2022-08-14 RX ADMIN — CARIPRAZINE 3 MG: 1.5 CAPSULE, GELATIN COATED ORAL at 09:08

## 2022-08-14 RX ADMIN — DEXTROSE 1000 MG: 50 INJECTION, SOLUTION INTRAVENOUS at 04:08

## 2022-08-14 RX ADMIN — ACETAMINOPHEN 1000 MG: 500 TABLET ORAL at 09:08

## 2022-08-14 RX ADMIN — DEXTROSE 1000 MG: 50 INJECTION, SOLUTION INTRAVENOUS at 03:08

## 2022-08-14 RX ADMIN — CYANOCOBALAMIN 1000 MCG: 1000 INJECTION INTRAMUSCULAR; SUBCUTANEOUS at 10:08

## 2022-08-14 RX ADMIN — FERROUS SULFATE TAB 325 MG (65 MG ELEMENTAL FE) 1 EACH: 325 (65 FE) TAB at 10:08

## 2022-08-14 RX ADMIN — LORAZEPAM 1 MG: 1 TABLET ORAL at 09:08

## 2022-08-14 RX ADMIN — FAMOTIDINE 20 MG: 20 TABLET ORAL at 10:08

## 2022-08-14 RX ADMIN — FOLIC ACID 1 MG: 1 TABLET ORAL at 10:08

## 2022-08-14 RX ADMIN — FAMOTIDINE 20 MG: 20 TABLET ORAL at 09:08

## 2022-08-14 RX ADMIN — MAGNESIUM SULFATE HEPTAHYDRATE 2 G: 40 INJECTION, SOLUTION INTRAVENOUS at 10:08

## 2022-08-14 RX ADMIN — BUPROPION HYDROCHLORIDE 150 MG: 150 TABLET, FILM COATED, EXTENDED RELEASE ORAL at 09:08

## 2022-08-14 NOTE — ASSESSMENT & PLAN NOTE
- Chronic and controlled at present. Follows with Psychiatry as outpatient.  - Patient reports no recent manic episodes and that her bipolar is stable.  - Continue Wellbutrin, Vraylar, and Ativan prn to treat.

## 2022-08-14 NOTE — ASSESSMENT & PLAN NOTE
"Dysarthria  - Stroke code activated in ED due to left-sided weakness and aphasia in setting of migraine.  - CTH and MRI brain negative for acute infarction on admit.   - Vascular Neurology evaluated patient in the ED and have a low suspicion for CVA at this time.  - General neurology consulted for intractable headache and continues to follow and at this point feel may be conversion syndrome causing neurologic changes as neurologic exam non-physiologic in nature and fluctuates frequently.   - Patient reports left leg weakness improved since admit but left arm subjectively still feels weak according to patient.   - Patient's speech is fluent and when dysarthric according to neurology is nonphysiologic and not consistent with neurologic dysarthria.   - LP pending to rule out possible idiopathic intracranial hypertension as could be contributing to symptoms, also concern for functional disorder: "patient's speech pattern is not congruent with a neurologic aphasia and the patient's left-sided weakness has been shown to be variable and nonphysiologic, actually improving after a failed lumbar puncture attempt, with her being able to hold her left upper extremity antigravity at bedside"   - LP will need to be done under Anesthesia with Radiology and will try to coordinate to get it done on 8/15 so NPO after midnight tonight.   - Will consult psychiatry for concern for functional neurologic disorder once neurologic work up complete.  - PT/OT consulted on this admit and recommending  PT/OT on hospital discharge.   "

## 2022-08-14 NOTE — ASSESSMENT & PLAN NOTE
Folate deficiency  - Patient with chronic anemia on admit noted. Anemia is chronic and controlled. No signs of active bleeding.   - Anemia work-up done and showed iron 72, TIBC elevated at 465 and sat iron low at 15 and ferritin low at 10 consistent with iron deficiency anemia and started on ferrous sulfate tablet 1 po daily to treat DARRION.   - Folate level also low at 2.2 consistent with folate deficiency and likely also contributing to anemia and patient started on Folic acid 1 mg p daily to treat.   - Vitamin B12 level 336 and in low normal range so concern for possible deficiency so methylmalonic acid level sent and results pending but started on daily replacement therapy with Vitamin B12 1000 mcg subcutaneous daily for now.

## 2022-08-14 NOTE — PLAN OF CARE
Problem: Adult Inpatient Plan of Care  Goal: Plan of Care Review  Outcome: Ongoing, Progressing  Goal: Patient-Specific Goal (Individualized)  Outcome: Ongoing, Progressing  Goal: Absence of Hospital-Acquired Illness or Injury  Outcome: Ongoing, Progressing  Goal: Optimal Comfort and Wellbeing  Outcome: Ongoing, Progressing  Goal: Readiness for Transition of Care  Outcome: Ongoing, Progressing     Problem: Bariatric Environmental Safety  Goal: Safety Maintained with Care  Outcome: Ongoing, Progressing     Problem: Infection  Goal: Absence of Infection Signs and Symptoms  Outcome: Ongoing, Progressing     Problem: Skin Injury Risk Increased  Goal: Skin Health and Integrity  Outcome: Ongoing, Progressing     Problem: Fall Injury Risk  Goal: Absence of Fall and Fall-Related Injury  Outcome: Ongoing, Progressing

## 2022-08-14 NOTE — ASSESSMENT & PLAN NOTE
- Patient with known history of migraines presented to hospital with persistent headache for about ~2 months, now with left sided weakness, dysarthria. Concern for complex migraine vs  Idiopathic intracranial hypertension vs functional disorder.  - Recently started on phentermine in May 2022 for weight loss - concern for adverse effect. Medication on hold her in hospital and migraine persists so unlikely.   - Follows with neurology outpatient  - Given 1L NS, Toradol, Reglan, and Versed in the ED and denies any improvement of symptoms.  - MRI brain and CTH without acute findings.  - General Neurology consulted, suspect multifactorial nature with migraine, possible IIH, and possible functional disorder.   Recommendations:  -- Steroids completed. Continue Magnesium 2g IV BID, Depacon 1g  IV BID, Verapamil 120mg po daily.  -- Continue Diamox 250 mg po BID, Topomax 25 mg po daily.  -- Psychiatry consult once neurologic work-up complete.   -- LP to rule out IIH.   - Neurology performed occipital nerve block without symptom relief.  - Bedside LP unsuccessful 8/10 as suspicion of IIH contributing to HA.  - IR consulted for LP with kostas, scheduled 8/12, patient refused IV ativan, will require sedation with Anesthesia so will try to reschedule with Radiology and Anesthesia for 8/15.   - Will consult psychiatry consulted for concern for functional neurologic disorder if above work up unremarkable.

## 2022-08-14 NOTE — SUBJECTIVE & OBJECTIVE
Interval History: Patient transferred from Observation team to Hospital Medicine Team K. Patient admitted with intractable migraine type headache with fluctuating neurologic symptoms. Attempting to get an LP with sedation with combined radiology/anesthesia as concern for possible idiopathic intracranial hypertension and need LP to determine opening pressure and if elevated might benefit from therapeutic fluid removal. Patient very fluent with me even though there has been intermittent reports of fluctuating aphasia that Neurology feels is not organic but functional. Patient states her left leg weakness is much better but still reporting left arm weakness. Patient reporting 9/10 headache this am but appeared very comfortable lying in bed and  sleeping on the couch in room. Patient states she needs to be knocked out for LP so trying to coordinate LP with Anesthesia and Radiology.     Review of Systems   Constitutional:  Positive for activity change and appetite change (decreased). Negative for chills and fever.   HENT:  Positive for tinnitus.    Eyes:  Positive for photophobia and visual disturbance (blurred vision).   Respiratory:  Negative for cough and shortness of breath.    Cardiovascular:  Negative for chest pain.   Gastrointestinal:  Positive for nausea. Negative for abdominal pain and vomiting.   Genitourinary:  Negative for difficulty urinating, dysuria, frequency and hematuria.   Musculoskeletal:  Positive for neck pain. Negative for back pain and gait problem.   Neurological:  Positive for speech difficulty (slowed speech), weakness, numbness and headaches. Negative for light-headedness.   Psychiatric/Behavioral:  Negative for agitation and behavioral problems. The patient is not nervous/anxious.    Objective:     Vital Signs (Most Recent):  Temp: 98.1 °F (36.7 °C) (08/14/22 1056)  Pulse: 94 (08/14/22 1056)  Resp: 18 (08/14/22 1056)  BP: 146/73 (08/14/22 1056)  SpO2: 96 % (08/14/22 1056) on room  air   Vital Signs (24h Range):  Temp:  [97.3 °F (36.3 °C)-98.3 °F (36.8 °C)] 98.1 °F (36.7 °C)  Pulse:  [81-94] 94  Resp:  [18] 18  SpO2:  [96 %-97 %] 96 %  BP: (144-172)/(69-95) 146/73     Weight: 126.6 kg (279 lb)  Body mass index is 43.7 kg/m².    Intake/Output Summary (Last 24 hours) at 8/14/2022 1353  Last data filed at 8/14/2022 0640  Gross per 24 hour   Intake 6049.93 ml   Output 3300 ml   Net 2749.93 ml      Physical Exam  Vitals and nursing note reviewed.   Constitutional:       General: She is not in acute distress.     Appearance: She is obese.   Eyes:      General: No scleral icterus.  Cardiovascular:      Rate and Rhythm: Normal rate and regular rhythm.      Heart sounds: Normal heart sounds.   Pulmonary:      Effort: Pulmonary effort is normal. No respiratory distress.      Breath sounds: Normal breath sounds. No wheezing or rales.   Abdominal:      General: Abdomen is flat. Bowel sounds are normal. There is no distension.      Palpations: Abdomen is soft.      Tenderness: There is no abdominal tenderness.   Musculoskeletal:      Right lower leg: No edema.      Left lower leg: No edema.      Comments: Minimal, variable ROM in LUE and LLE 2/2 weakness. Decreased sensation L hand   Skin:     General: Skin is warm and dry.   Neurological:      Mental Status: She is alert and oriented to person, place, and time.      Sensory: Sensory deficit present.      Motor: Weakness present.      Coordination: Coordination normal.      Comments: Variable weakness and impaired sensation in left upper and lower extremity, No facial asymmetry. Rate of speech normal.    Psychiatric:         Mood and Affect: Mood normal. Mood is not anxious or depressed. Affect is not tearful.         Speech: Speech normal. Speech is not delayed.         Behavior: Behavior normal.       Significant Labs: CBC:   Recent Labs   Lab 08/13/22  0814 08/14/22  0150   WBC 10.44 11.15   HGB 10.5* 11.0*   HCT 33.9* 36.4*    360     CMP:    Recent Labs   Lab 08/14/22  0150      K 3.7      CO2 20*   *   BUN 15   CREATININE 0.8   CALCIUM 10.0   PROT 5.8*   ALBUMIN 2.8*   BILITOT 0.1   ALKPHOS 67   AST 10   ALT 17   ANIONGAP 11       Significant Imaging: I have reviewed all pertinent imaging results/findings within the past 24 hours.

## 2022-08-14 NOTE — ASSESSMENT & PLAN NOTE
Patient on Metformin at home to treat and holding in hospital but plan to resume on hospital discharge.

## 2022-08-14 NOTE — PROGRESS NOTES
[facility] - Telemetry [facility] ([address] Medicine  Progress Note    Patient Name: Sonia Goldberg  MRN: [id number]  Patient Class: IP- Inpatient   Admission Date: 8/6/2022  Length of Stay: 6 days  Attending Physician: Jayshree Marks MD  Primary Care Provider: Terell Reyes MD        Subjective:     Principal Problem:Left-sided weakness        HPI:  Sonia Goldberg is a 36 y.o. female with a past medical history of migraines, PCOS, bipolar disorder, IBS, COVID-19, and KERI presenting in the ED with aphasia and left-sided weakness starting at 1530. Patient mentions having similar episode earlier this month presented to outside hospital obtained laboratory testing which was unremarkable. She was discharged after symptoms improved with migraine cocktail. She describes the headache as pulsing and it is located in the right frontal/parietal region. States the only medication that occasionally helps is Fioricet. Associated symptoms include blurry vision, photophobia, and nausea. Patient denies any vomiting, diarrhea, fevers, chills, or urinary symptoms.     ED: hypertensive, otherwise vital signs stable. Stroke code activated, vascular neurology evaluated patient at bedside. CTH and MRI brain negative for acute infarct ,showing partially empty sella, slight prominent fluid signal along optic nerve sheaths bilaterally. Patient was scheduled to have outpatient follow up with ophthalmology as outpatient. They have strong suspicion for alternate etiology of symptoms such as hemiplegic or complex migraines. Recommend symptomatic relief and general neurology consultation. Total cholesterol 243 with triglycerides of 319. Otherwise intake labs largely unremarkable. Given 1L NS, Toradol, Reglan, and versed.       Overview/Hospital Course:  Sonia Goldberg was placed in  observation for intractable migraine, left-sided paresis, and dysarthria.  Neurology evaluated patient and recommend beginning  IV migraine cocktail: depakote, solumedrol, magnesium, plus oral verapamil. Minimal to no improvement after 48 hours. Neurology performed occipital nerve block without sympotm relief. Diamox 250 mg BID and topamax 25 mg added with no relief. Patient with leukemoid reaction in setting of steroid use from -8/10, infectious work up negative but patient did have elevated lactic 5.9> 3.9> 2.5, resolved with IVF. No concern for infection. Patient completed steroids, and rest of medications (IV magnesium, IV depacon, oral verapamil, and topamax) continued. LP attempted at bedside to evaluate for IIH on 8/10 unable to perform due to pain and patient anxiety. Neurology noted that patient's left-sided weakness has been shown to be variable and nonphysiologic, actually improving after a failed lumbar puncture attempt, with her being able to hold her left upper extremity antigravity at bedside. On  patient with visual hallucination of her  grandmother and new right sided weakness which is distractible. Neurology also notes that speech pattern is not congruent with neurologic aphasia. Given these findings there is high concern for functional disorder. IR scheduled LP with fluoroscopy , patient unwilling to have it done with IV ativan, now for plan with sedation with anaesthesia. Mekhi to coordinate radiology and Anesthesia to get LP under fluoroscopy for 8/15 due to concern for possible idiopathic intracranial hypertension as cause of headache. Plan for psychiatry consult after LP. Patient is scheduled to f/u with her neurologist next week and follows with an opthalmology clinic.       Interval History: Patient transferred from Observation team to Hospital Medicine Team K. Patient admitted with intractable migraine type headache with fluctuating neurologic symptoms. Attempting to get an LP with sedation with combined radiology/anesthesia as concern for possible idiopathic intracranial hypertension and need LP to  determine opening pressure and if elevated might benefit from therapeutic fluid removal. Patient very fluent with me even though there has been intermittent reports of fluctuating aphasia that Neurology feels is not organic but functional. Patient states her left leg weakness is much better but still reporting left arm weakness. Patient reporting 9/10 headache this am but appeared very comfortable lying in bed and  sleeping on the couch in room. Patient states she needs to be knocked out for LP so trying to coordinate LP with Anesthesia and Radiology.     Review of Systems   Constitutional:  Positive for activity change and appetite change (decreased). Negative for chills and fever.   HENT:  Positive for tinnitus.    Eyes:  Positive for photophobia and visual disturbance (blurred vision).   Respiratory:  Negative for cough and shortness of breath.    Cardiovascular:  Negative for chest pain.   Gastrointestinal:  Positive for nausea. Negative for abdominal pain and vomiting.   Genitourinary:  Negative for difficulty urinating, dysuria, frequency and hematuria.   Musculoskeletal:  Positive for neck pain. Negative for back pain and gait problem.   Neurological:  Positive for speech difficulty (slowed speech), weakness, numbness and headaches. Negative for light-headedness.   Psychiatric/Behavioral:  Negative for agitation and behavioral problems. The patient is not nervous/anxious.    Objective:     Vital Signs (Most Recent):  Temp: 98.1 °F (36.7 °C) (08/14/22 1056)  Pulse: 94 (08/14/22 1056)  Resp: 18 (08/14/22 1056)  BP: 146/73 (08/14/22 1056)  SpO2: 96 % (08/14/22 1056) on room air   Vital Signs (24h Range):  Temp:  [97.3 °F (36.3 °C)-98.3 °F (36.8 °C)] 98.1 °F (36.7 °C)  Pulse:  [81-94] 94  Resp:  [18] 18  SpO2:  [96 %-97 %] 96 %  BP: (144-172)/(69-95) 146/73     Weight: 126.6 kg (279 lb)  Body mass index is 43.7 kg/m².    Intake/Output Summary (Last 24 hours) at 8/14/2022 1797  Last data filed at 8/14/2022  0640  Gross per 24 hour   Intake 6049.93 ml   Output 3300 ml   Net 2749.93 ml      Physical Exam  Vitals and nursing note reviewed.   Constitutional:       General: She is not in acute distress.     Appearance: She is obese.   Eyes:      General: No scleral icterus.  Cardiovascular:      Rate and Rhythm: Normal rate and regular rhythm.      Heart sounds: Normal heart sounds.   Pulmonary:      Effort: Pulmonary effort is normal. No respiratory distress.      Breath sounds: Normal breath sounds. No wheezing or rales.   Abdominal:      General: Abdomen is flat. Bowel sounds are normal. There is no distension.      Palpations: Abdomen is soft.      Tenderness: There is no abdominal tenderness.   Musculoskeletal:      Right lower leg: No edema.      Left lower leg: No edema.      Comments: Minimal, variable ROM in LUE and LLE 2/2 weakness. Decreased sensation L hand   Skin:     General: Skin is warm and dry.   Neurological:      Mental Status: She is alert and oriented to person, place, and time.      Sensory: Sensory deficit present.      Motor: Weakness present.      Coordination: Coordination normal.      Comments: Variable weakness and impaired sensation in left upper and lower extremity, No facial asymmetry. Rate of speech normal.    Psychiatric:         Mood and Affect: Mood normal. Mood is not anxious or depressed. Affect is not tearful.         Speech: Speech normal. Speech is not delayed.         Behavior: Behavior normal.       Significant Labs: CBC:   Recent Labs   Lab 08/13/22  0814 08/14/22  0150   WBC 10.44 11.15   HGB 10.5* 11.0*   HCT 33.9* 36.4*    360     CMP:   Recent Labs   Lab 08/14/22  0150      K 3.7      CO2 20*   *   BUN 15   CREATININE 0.8   CALCIUM 10.0   PROT 5.8*   ALBUMIN 2.8*   BILITOT 0.1   ALKPHOS 67   AST 10   ALT 17   ANIONGAP 11       Significant Imaging: I have reviewed all pertinent imaging results/findings within the past 24 hours.      Assessment/Plan:     "  * Left-sided weakness  Dysarthria  - Stroke code activated in ED due to left-sided weakness and aphasia in setting of migraine.  - CTH and MRI brain negative for acute infarction on admit.   - Vascular Neurology evaluated patient in the ED and have a low suspicion for CVA at this time.  - General neurology consulted for intractable headache and continues to follow and at this point feel may be conversion syndrome causing neurologic changes as neurologic exam non-physiologic in nature and fluctuates frequently.   - Patient reports left leg weakness improved since admit but left arm subjectively still feels weak according to patient.   - Patient's speech is fluent and when dysarthric according to neurology is nonphysiologic and not consistent with neurologic dysarthria.   - LP pending to rule out possible idiopathic intracranial hypertension as could be contributing to symptoms, also concern for functional disorder: "patient's speech pattern is not congruent with a neurologic aphasia and the patient's left-sided weakness has been shown to be variable and nonphysiologic, actually improving after a failed lumbar puncture attempt, with her being able to hold her left upper extremity antigravity at bedside"   - LP will need to be done under Anesthesia with Radiology and will try to coordinate to get it done on 8/15 so NPO after midnight tonight.   - Will consult psychiatry for concern for functional neurologic disorder once neurologic work up complete.  - PT/OT consulted on this admit and recommending  PT/OT on hospital discharge.     Intractable migraine  - Patient with known history of migraines presented to hospital with persistent headache for about ~2 months, now with left sided weakness, dysarthria. Concern for complex migraine vs  Idiopathic intracranial hypertension vs functional disorder.  - Recently started on phentermine in May 2022 for weight loss - concern for adverse effect. Medication on hold her in " hospital and migraine persists so unlikely.   - Follows with neurology outpatient  - Given 1L NS, Toradol, Reglan, and Versed in the ED and denies any improvement of symptoms.  - MRI brain and CTH without acute findings.  - General Neurology consulted, suspect multifactorial nature with migraine, possible IIH, and possible functional disorder.   Recommendations:  -- Steroids completed. Continue Magnesium 2g IV BID, Depacon 1g  IV BID, Verapamil 120mg po daily.  -- Continue Diamox 250 mg po BID, Topomax 25 mg po daily.  -- Psychiatry consult once neurologic work-up complete.   -- LP to rule out IIH.   - Neurology performed occipital nerve block without symptom relief.  - Bedside LP unsuccessful 8/10 as suspicion of IIH contributing to HA.  - IR consulted for LP with nakulo, scheduled 8/12, patient refused IV ativan, will require sedation with Anesthesia so will try to reschedule with Radiology and Anesthesia for 8/15.   - Will consult psychiatry consulted for concern for functional neurologic disorder if above work up unremarkable.      PCOS (polycystic ovarian syndrome)  Patient on Metformin at home to treat and holding in hospital but plan to resume on hospital discharge.         Obstructive sleep apnea  Chronic and controlled. Continue nightly CPAP to treat.       Morbid obesity  Body mass index is 43.7 kg/m². Morbid obesity complicates all aspects of disease management from diagnostic modalities to treatment. Weight loss encouraged and health benefits explained to patient.         Iron deficiency anemia due to chronic blood loss  Folate deficiency  - Patient with chronic anemia on admit noted. Anemia is chronic and controlled. No signs of active bleeding.   - Anemia work-up done and showed iron 72, TIBC elevated at 465 and sat iron low at 15 and ferritin low at 10 consistent with iron deficiency anemia and started on ferrous sulfate tablet 1 po daily to treat DARRION.   - Folate level also low at 2.2 consistent with  folate deficiency and likely also contributing to anemia and patient started on Folic acid 1 mg p daily to treat.   - Vitamin B12 level 336 and in low normal range so concern for possible deficiency so methylmalonic acid level sent and results pending but started on daily replacement therapy with Vitamin B12 1000 mcg subcutaneous daily for now.     Bipolar disorder, unspecified  - Chronic and controlled at present. Follows with Psychiatry as outpatient.  - Patient reports no recent manic episodes and that her bipolar is stable.  - Continue Wellbutrin, Vraylar, and Ativan prn to treat.       VTE Risk Mitigation (From admission, onward)         Ordered     enoxaparin injection 40 mg  Every 12 hours         08/06/22 2207                Discharge Planning   [place]: 8/15/2022     Code Status: Full Code   Is the patient medically ready for discharge?: No    Reason for patient still in hospital (select all that apply): Patient trending condition  Discharge Plan A: Home with family with HH PT/OT. Needs LP as part of her diagnostic work-up and Psych consult prior to hospital discharge.   Discharge Delays: None known at this time      Jayshree Marks MD  Department of Hospital Medicine   [facility] - Telemetry Stepdown ([place]

## 2022-08-14 NOTE — CONSULTS
Thank you for your consult to [facility]. We have reviewed the patient chart. This patient does not meet criteria for virtual [facility] medicine service at this time due to Patient is unlikely to participate well with the telemedicine platform due to disinterest in virtual service.. Will hand back to In-house service. Please reconsult if patient agreeable to virtual service.     Sarah Alex MD

## 2022-08-14 NOTE — PLAN OF CARE
POC reviewed with patient. All questions and concerns reviewed. Pt VSS and pt denies any further needs. Pt resting in bed in NAD at this time. Bed locked in lowest position. Call bell within reach. Will continue to monitor.       Problem: Adult Inpatient Plan of Care  Goal: Patient-Specific Goal (Individualized)  Outcome: Ongoing, Progressing     Problem: Adult Inpatient Plan of Care  Goal: Plan of Care Review  Outcome: Ongoing, Progressing     Problem: Infection  Goal: Absence of Infection Signs and Symptoms  Outcome: Ongoing, Progressing     Problem: Skin Injury Risk Increased  Goal: Skin Health and Integrity  Outcome: Ongoing, Progressing     Problem: Fall Injury Risk  Goal: Absence of Fall and Fall-Related Injury  Outcome: Ongoing, Progressing

## 2022-08-15 ENCOUNTER — ANESTHESIA EVENT (OUTPATIENT)
Dept: ENDOSCOPY | Facility: HOSPITAL | Age: 37
DRG: 103 | End: 2022-08-15
Payer: COMMERCIAL

## 2022-08-15 LAB
BACTERIA #/AREA URNS AUTO: ABNORMAL /HPF
BILIRUB UR QL STRIP: NEGATIVE
CLARITY UR REFRACT.AUTO: ABNORMAL
COLOR UR AUTO: ABNORMAL
GLUCOSE UR QL STRIP: NEGATIVE
HGB UR QL STRIP: ABNORMAL
HYALINE CASTS UR QL AUTO: 0 /LPF
KETONES UR QL STRIP: NEGATIVE
LEUKOCYTE ESTERASE UR QL STRIP: ABNORMAL
MICROSCOPIC COMMENT: ABNORMAL
NITRITE UR QL STRIP: NEGATIVE
PH UR STRIP: 8 [PH] (ref 5–8)
POCT GLUCOSE: 81 MG/DL (ref 70–110)
POCT GLUCOSE: 83 MG/DL (ref 70–110)
POCT GLUCOSE: 87 MG/DL (ref 70–110)
PROT UR QL STRIP: ABNORMAL
RBC #/AREA URNS AUTO: >100 /HPF (ref 0–4)
SARS-COV-2 RNA RESP QL NAA+PROBE: NOT DETECTED
SP GR UR STRIP: 1.01 (ref 1–1.03)
URN SPEC COLLECT METH UR: ABNORMAL
VIT B1 BLD-MCNC: 83 UG/L (ref 38–122)
WBC #/AREA URNS AUTO: 0 /HPF (ref 0–5)

## 2022-08-15 PROCEDURE — 97530 THERAPEUTIC ACTIVITIES: CPT

## 2022-08-15 PROCEDURE — 25000003 PHARM REV CODE 250: Performed by: PHYSICIAN ASSISTANT

## 2022-08-15 PROCEDURE — 25000003 PHARM REV CODE 250

## 2022-08-15 PROCEDURE — 25000003 PHARM REV CODE 250: Performed by: INTERNAL MEDICINE

## 2022-08-15 PROCEDURE — 97535 SELF CARE MNGMENT TRAINING: CPT

## 2022-08-15 PROCEDURE — U0003 INFECTIOUS AGENT DETECTION BY NUCLEIC ACID (DNA OR RNA); SEVERE ACUTE RESPIRATORY SYNDROME CORONAVIRUS 2 (SARS-COV-2) (CORONAVIRUS DISEASE [COVID-19]), AMPLIFIED PROBE TECHNIQUE, MAKING USE OF HIGH THROUGHPUT TECHNOLOGIES AS DESCRIBED BY CMS-2020-01-R: HCPCS | Performed by: INTERNAL MEDICINE

## 2022-08-15 PROCEDURE — U0005 INFEC AGEN DETEC AMPLI PROBE: HCPCS | Performed by: INTERNAL MEDICINE

## 2022-08-15 PROCEDURE — 63600175 PHARM REV CODE 636 W HCPCS: Performed by: PHYSICIAN ASSISTANT

## 2022-08-15 PROCEDURE — 99233 PR SUBSEQUENT HOSPITAL CARE,LEVL III: ICD-10-PCS | Mod: ,,, | Performed by: INTERNAL MEDICINE

## 2022-08-15 PROCEDURE — 11000001 HC ACUTE MED/SURG PRIVATE ROOM

## 2022-08-15 PROCEDURE — 81001 URINALYSIS AUTO W/SCOPE: CPT | Performed by: INTERNAL MEDICINE

## 2022-08-15 PROCEDURE — 99900035 HC TECH TIME PER 15 MIN (STAT)

## 2022-08-15 PROCEDURE — 99233 SBSQ HOSP IP/OBS HIGH 50: CPT | Mod: ,,, | Performed by: INTERNAL MEDICINE

## 2022-08-15 PROCEDURE — 25000003 PHARM REV CODE 250: Performed by: NURSE PRACTITIONER

## 2022-08-15 RX ADMIN — ACETAMINOPHEN 1000 MG: 500 TABLET ORAL at 03:08

## 2022-08-15 RX ADMIN — MAGNESIUM SULFATE HEPTAHYDRATE 2 G: 40 INJECTION, SOLUTION INTRAVENOUS at 08:08

## 2022-08-15 RX ADMIN — DIPHENHYDRAMINE HYDROCHLORIDE 50 MG: 50 CAPSULE ORAL at 08:08

## 2022-08-15 RX ADMIN — ONDANSETRON 8 MG: 8 TABLET, ORALLY DISINTEGRATING ORAL at 04:08

## 2022-08-15 RX ADMIN — FAMOTIDINE 20 MG: 20 TABLET ORAL at 08:08

## 2022-08-15 RX ADMIN — VERAPAMIL HYDROCHLORIDE 120 MG: 120 TABLET, FILM COATED, EXTENDED RELEASE ORAL at 06:08

## 2022-08-15 RX ADMIN — CYANOCOBALAMIN 1000 MCG: 1000 INJECTION INTRAMUSCULAR; SUBCUTANEOUS at 08:08

## 2022-08-15 RX ADMIN — FERROUS SULFATE TAB 325 MG (65 MG ELEMENTAL FE) 1 EACH: 325 (65 FE) TAB at 08:08

## 2022-08-15 RX ADMIN — ACETAZOLAMIDE 250 MG: 250 TABLET ORAL at 08:08

## 2022-08-15 RX ADMIN — LORAZEPAM 1 MG: 1 TABLET ORAL at 08:08

## 2022-08-15 RX ADMIN — TOPIRAMATE 25 MG: 25 TABLET, FILM COATED ORAL at 08:08

## 2022-08-15 RX ADMIN — FOLIC ACID 1 MG: 1 TABLET ORAL at 08:08

## 2022-08-15 RX ADMIN — ACETAMINOPHEN 1000 MG: 500 TABLET ORAL at 06:08

## 2022-08-15 RX ADMIN — HYDRALAZINE HYDROCHLORIDE 25 MG: 50 TABLET ORAL at 12:08

## 2022-08-15 RX ADMIN — MAGNESIUM SULFATE HEPTAHYDRATE 2 G: 40 INJECTION, SOLUTION INTRAVENOUS at 09:08

## 2022-08-15 RX ADMIN — DEXTROSE 1000 MG: 50 INJECTION, SOLUTION INTRAVENOUS at 04:08

## 2022-08-15 RX ADMIN — BUPROPION HYDROCHLORIDE 150 MG: 150 TABLET, FILM COATED, EXTENDED RELEASE ORAL at 08:08

## 2022-08-15 RX ADMIN — CARIPRAZINE 3 MG: 1.5 CAPSULE, GELATIN COATED ORAL at 08:08

## 2022-08-15 RX ADMIN — ACETAMINOPHEN 1000 MG: 500 TABLET ORAL at 10:08

## 2022-08-15 NOTE — PT/OT/SLP PROGRESS
"Occupational Therapy   Treatment    Name: Sonia Goldberg  MRN: [id number]  Admitting Diagnosis:  Left-sided weakness       Recommendations:     Discharge Recommendations: home health OT  Discharge Equipment Recommendations:  none  Barriers to discharge:  None    Assessment:     Sonia Goldberg is a 36 y.o. female with a medical diagnosis of Left-sided weakness.  She presents with the following performance deficits affecting function:  weakness, impaired endurance, impaired self care skills, impaired functional mobility, impaired sensation, gait instability, impaired balance, decreased upper extremity function, decreased lower extremity function, decreased safety awareness.     Pt agreeable to therapy and tolerated the session well. She performed bed mobility with CGA- Min A and transferred to the [facility] with CGA and HHA. Pt is still limited by decreased muscle activation in the LUE, but demonstrates trace movements. Pt would benefit from continued skilled acute OT services in order to maximize independence and safety with ADLs and functional mobility to ensure safe return to PLOF in the least restrictive environment. OT recommending HHOT once pt is medically appropriate for d/c.     Rehab Prognosis:  Good; patient would benefit from acute skilled OT services to address these deficits and reach maximum level of function.       Plan:     Patient to be seen 3 x/week to address the above listed problems via self-care/home management, therapeutic activities, therapeutic exercises, neuromuscular re-education  · Plan of Care Expires: 09/12/22  · Plan of Care Reviewed with: patient, spouse    Subjective     "My left leg is moving better, its just this left arm"     Pain/Comfort:  · Pain Rating 1: other (see comments) (none reported)    Objective:     Communicated with: RN prior to session.  Patient found HOB elevated with peripheral IV, PureWick, telemetry upon OT entry to room.    General Precautions: Standard, fall "   Orthopedic Precautions:N/A   Braces: N/A  Respiratory Status: Room air     Occupational Performance:     Bed Mobility:    · Patient completed Rolling/Turning to Right with contact guard assistance  · Patient completed Scooting/Bridging with contact guard assistance  · Patient completed Supine to Sit with minimum assistance     Functional Mobility/Transfers:  · Patient completed Sit <> Stand Transfer with contact guard assistance  with  hand-held assist   · Patient completed Toilet Transfer Step Transfer technique with contact guard assistance with  hand-held assist to bedside commode     Activities of Daily Living:  · Lower Body Dressing: maximal assistance : to doff brief for toileting    · Feeding: independence: To eat lunch using madyson technique     [facility] 6 Click ADL: 18    Treatment & Education:   Therapist provided facilitation and instruction of proper body mechanics and fall prevention strategies during tasks listed above.   Instructed patient to sit in bedside chair daily to increase OOB/activity tolerance.   Instructed patient to use call light to have nursing staff assist with needs/transfers.   Discussed OT POC and answered all questions within OT scope of practice.   Whiteboard updated       Patient left on bedside commode  with all lines intact, call button in reach, RN notified and   presentEducation:      GOALS:   Multidisciplinary Problems     Occupational Therapy Goals        Problem: Occupational Therapy    Goal Priority Disciplines Outcome Interventions   Occupational Therapy Goal     OT, PT/OT Ongoing, Progressing    Description: Goals to be met by: 8/27/2022    Patient will increase functional independence with ADLs by performing:    Feeding with [place] using adaptive technique PRN.  UE Dressing with Modified [place] using adaptive technique PRN.  LE Dressing with [place] using adaptive technique PRN.  Grooming while standing at sink with [place] using adaptive  technique PRN.  Toileting from toilet with [place] for hygiene and clothing management.   Toilet transfer to toilet with [place].  Step transfer with [place].                     Time Tracking:     OT Date of Treatment: 08/15/22  OT Start Time: 1531  OT Stop Time: 1554  OT Total Time (min): 23 min    Billable Minutes:Therapeutic Activity 10   Self Care 13    OT/NUNU: OT          8/15/2022

## 2022-08-15 NOTE — NURSING
Patient up in chair with  at the bedside. Pt is aware to be NPO after midnight for procedure tomorrow. VSS. No sign of distress.

## 2022-08-15 NOTE — ASSESSMENT & PLAN NOTE
"Dysarthria  - Stroke code activated in ED due to left-sided weakness and aphasia in setting of migraine.  - CTH and MRI brain negative for acute infarction on admit.   - Vascular Neurology evaluated patient in the ED and have a low suspicion for CVA at this time.  - General neurology consulted for intractable headache and continues to follow and at this point feel may be conversion syndrome causing neurologic changes as neurologic exam non-physiologic in nature and fluctuates frequently.   - Patient reports left leg weakness improved since admit but left arm subjectively still feels weak according to patient.   - Patient's speech is fluent and when dysarthric according to neurology is nonphysiologic and not consistent with neurologic dysarthria.   - LP pending to rule out possible idiopathic intracranial hypertension as could be contributing to symptoms, also concern for functional disorder: "patient's speech pattern is not congruent with a neurologic aphasia and the patient's left-sided weakness has been shown to be variable and nonphysiologic, actually improving after a failed lumbar puncture attempt, with her being able to hold her left upper extremity antigravity at bedside"   - LP will need to be done under Anesthesia with Radiology and plan is for 8/16 and she needs an opening pressure due to concern for IIH as per Neurology. Neurology ordered other CSF studies that they wanted.   - Will consult psychiatry for concern for functional neurologic disorder once neurologic work up complete.  - PT/OT consulted on this admit and recommending  PT/OT on hospital discharge.   "

## 2022-08-15 NOTE — PROGRESS NOTES
[facility] - Telemetry [facility] ([address] Medicine  Progress Note    Patient Name: Sonia Goldberg  MRN: [id number]  Patient Class: IP- Inpatient   Admission Date: 8/6/2022  Length of Stay: 7 days  Attending Physician: Jayshree Marks MD  Primary Care Provider: Terell Reyes MD        Subjective:     Principal Problem:Left-sided weakness        HPI:  Sonia oGldberg is a 36 y.o. female with a past medical history of migraines, PCOS, bipolar disorder, IBS, COVID-19, and KERI presenting in the ED with aphasia and left-sided weakness starting at 1530. Patient mentions having similar episode earlier this month presented to outside hospital obtained laboratory testing which was unremarkable. She was discharged after symptoms improved with migraine cocktail. She describes the headache as pulsing and it is located in the right frontal/parietal region. States the only medication that occasionally helps is Fioricet. Associated symptoms include blurry vision, photophobia, and nausea. Patient denies any vomiting, diarrhea, fevers, chills, or urinary symptoms.     ED: hypertensive, otherwise vital signs stable. Stroke code activated, vascular neurology evaluated patient at bedside. CTH and MRI brain negative for acute infarct ,showing partially empty sella, slight prominent fluid signal along optic nerve sheaths bilaterally. Patient was scheduled to have outpatient follow up with ophthalmology as outpatient. They have strong suspicion for alternate etiology of symptoms such as hemiplegic or complex migraines. Recommend symptomatic relief and general neurology consultation. Total cholesterol 243 with triglycerides of 319. Otherwise intake labs largely unremarkable. Given 1L NS, Toradol, Reglan, and versed.       Overview/Hospital Course:  Sonia Goldberg was placed in  observation for intractable migraine, left-sided paresis, and dysarthria.  Neurology evaluated patient and recommend beginning  IV migraine cocktail: depakote, solumedrol, magnesium, plus oral verapamil. Minimal to no improvement after 48 hours. Neurology performed occipital nerve block without sympotm relief. Diamox 250 mg BID and topamax 25 mg added with no relief. Patient with leukemoid reaction in setting of steroid use from -8/10, infectious work up negative but patient did have elevated lactic 5.9> 3.9> 2.5, resolved with IVF. No concern for infection. Patient completed steroids, and rest of medications (IV magnesium, IV depacon, oral verapamil, and topamax) continued. LP attempted at bedside to evaluate for IIH on 8/10 unable to perform due to pain and patient anxiety. Neurology noted that patient's left-sided weakness has been shown to be variable and nonphysiologic, actually improving after a failed lumbar puncture attempt, with her being able to hold her left upper extremity antigravity at bedside. On  patient with visual hallucination of her  grandmother and new right sided weakness which is distractible. Neurology also notes that speech pattern is not congruent with neurologic aphasia. Given these findings there is high concern for functional disorder. IR scheduled LP with fluoroscopy , patient unwilling to have it done with IV ativan, now for plan with sedation with anaesthesia. Need to coordinate radiology and Anesthesia to get LP under fluoroscopy and scheduled for  for possible idiopathic intracranial hypertension as cause of headache. Plan for psychiatry consult after LP. Patient is scheduled to f/u with her neurologist next week and follows with an opthalmology clinic.       Interval History: Patient states she feels like her headache and blurry vision are worse today. Discussed with Anesthesia and Radiology and plan is for patient have LP tomorrow,  with radiology and anesthesia to assist in sedation of patient. Patient needs an opening pressure due to concern for IIH and both radiology and  anochlesia aware of need of getting an opening pressure. Patient states she feels like she has a UTI as she is peeing a lot. Patient is currently on her menstrual cycle and pure wick in place and nursing aware of need to get a urine sample to send to lab. COVID test done in anticipation of tomorrow' procedure. Patient's  at bedside. Patient state left leg weakness much improved but still feels left arm remains weak.  asking about [facility] paperwork and I directed him to medical information department on the first floor to bring any paperwork to them as that department will fill it out. Patient states her speech is fine and neck pain is better.     Review of Systems   Constitutional:  Positive for appetite change (decreased). Negative for chills and fever.   HENT:  Negative for tinnitus.    Eyes:  Positive for visual disturbance (blurred vision). Negative for photophobia.   Respiratory:  Negative for cough and shortness of breath.    Cardiovascular:  Negative for chest pain.   Gastrointestinal:  Negative for abdominal pain, nausea and vomiting.   Genitourinary:  Negative for difficulty urinating, dysuria, frequency and hematuria.   Musculoskeletal:  Positive for neck pain. Negative for back pain and gait problem.   Neurological:  Positive for weakness, numbness and headaches. Negative for speech difficulty and light-headedness.   Psychiatric/Behavioral:  Negative for agitation and behavioral problems. The patient is not nervous/anxious.    Objective:     Vital Signs (Most Recent):  Temp: 98.1 °F (36.7 °C) (08/15/22 1614)  Pulse: 86 (08/15/22 1614)  Resp: 16 (08/15/22 1614)  BP: 113/83 (08/15/22 1614)  SpO2: 96 % (08/15/22 1614) on room air Vital Signs (24h Range):  Temp:  [96.3 °F (35.7 °C)-98.3 °F (36.8 °C)] 98.1 °F (36.7 °C)  Pulse:  [74-86] 86  Resp:  [16-18] 16  SpO2:  [94 %-97 %] 96 %  BP: (113-165)/(77-94) 113/83     Weight: 126.6 kg (279 lb)  Body mass index is 43.7 kg/m².  No intake or output data  in the 24 hours ending 08/15/22 1723   Physical Exam  Vitals and nursing note reviewed.   Constitutional:       General: She is not in acute distress.     Appearance: She is obese.   Eyes:      General: No scleral icterus.  Cardiovascular:      Rate and Rhythm: Normal rate and regular rhythm.      Heart sounds: Normal heart sounds.   Pulmonary:      Effort: Pulmonary effort is normal. No respiratory distress.      Breath sounds: Normal breath sounds. No wheezing or rales.   Abdominal:      General: Abdomen is flat. Bowel sounds are normal. There is no distension.      Palpations: Abdomen is soft.      Tenderness: There is no abdominal tenderness.   Musculoskeletal:      Right lower leg: No edema.      Left lower leg: No edema.      Comments: Minimal, variable ROM in LUE and LLE 2/2 weakness. Decreased sensation L hand   Skin:     General: Skin is warm and dry.   Neurological:      Mental Status: She is alert and oriented to person, place, and time.      Sensory: Sensory deficit present.      Motor: Weakness present.      Coordination: Coordination normal.      Comments: Variable weakness and impaired sensation in left upper and lower extremity, No facial asymmetry. Rate of speech normal.    Psychiatric:         Mood and Affect: Mood normal. Mood is not anxious or depressed. Affect is not tearful.         Speech: Speech normal. Speech is not delayed.         Behavior: Behavior normal.       Significant Labs: CBC:   Recent Labs   Lab 08/14/22  0150   WBC 11.15   HGB 11.0*   HCT 36.4*        CMP:   Recent Labs   Lab 08/14/22  0150      K 3.7      CO2 20*   *   BUN 15   CREATININE 0.8   CALCIUM 10.0   PROT 5.8*   ALBUMIN 2.8*   BILITOT 0.1   ALKPHOS 67   AST 10   ALT 17   ANIONGAP 11       Significant Imaging: I have reviewed all pertinent imaging results/findings within the past 24 hours.      Assessment/Plan:      * Left-sided weakness  Dysarthria  - Stroke code activated in ED due to  "left-sided weakness and aphasia in setting of migraine.  - CTH and MRI brain negative for acute infarction on admit.   - Vascular Neurology evaluated patient in the ED and have a low suspicion for CVA at this time.  - General neurology consulted for intractable headache and continues to follow and at this point feel may be conversion syndrome causing neurologic changes as neurologic exam non-physiologic in nature and fluctuates frequently.   - Patient reports left leg weakness improved since admit but left arm subjectively still feels weak according to patient.   - Patient's speech is fluent and when dysarthric according to neurology is nonphysiologic and not consistent with neurologic dysarthria.   - LP pending to rule out possible idiopathic intracranial hypertension as could be contributing to symptoms, also concern for functional disorder: "patient's speech pattern is not congruent with a neurologic aphasia and the patient's left-sided weakness has been shown to be variable and nonphysiologic, actually improving after a failed lumbar puncture attempt, with her being able to hold her left upper extremity antigravity at bedside"   - LP will need to be done under Anesthesia with Radiology and plan is for 8/16 and she needs an opening pressure due to concern for IIH as per Neurology. Neurology ordered other CSF studies that they wanted.   - Will consult psychiatry for concern for functional neurologic disorder once neurologic work up complete.  - PT/OT consulted on this admit and recommending  PT/OT on hospital discharge.     Intractable migraine  - No improvement and reports worsening on 8/15. Patient to have LP on 8/16 with Radiology and Anesthesia to look for possible idiopathic intracranial hypertension as cause of headache and needs an opening pressure.   - Patient with known history of migraines presented to hospital with persistent headache for about ~2 months, now with left sided weakness, dysarthria. " Concern for complex migraine vs  Idiopathic intracranial hypertension vs functional disorder.  - Recently started on phentermine in May 2022 for weight loss - concern for adverse effect. Medication on hold her in hospital and migraine persists so unlikely.   - Follows with neurology outpatient  - Given 1L NS, Toradol, Reglan, and Versed in the ED and denies any improvement of symptoms.  - MRI brain and CTH without acute findings.  - General Neurology consulted, suspect multifactorial nature with migraine, possible IIH, and possible functional disorder.   Recommendations:  -- Steroids completed. Continue Magnesium 2g IV BID, Depacon 1g  IV BID, Verapamil 120mg po daily.  -- Continue Diamox 250 mg po BID, Topomax 25 mg po daily.  -- Psychiatry consult once neurologic work-up complete.   -- LP to rule out IIH.   - Neurology performed occipital nerve block without symptom relief.  - Bedside LP unsuccessful 8/10 as suspicion of IIH contributing to HA.  - IR consulted for LP with flouro, scheduled 8/12, patient refused IV ativan, will require sedation with Anesthesia so will try to reschedule with Radiology and Anesthesia for 8/15.   - Will consult psychiatry consulted for concern for functional neurologic disorder if above work up unremarkable.      PCOS (polycystic ovarian syndrome)  Patient on Metformin at home to treat and holding in hospital but plan to resume on hospital discharge.         Obstructive sleep apnea  Chronic and controlled. Continue nightly CPAP to treat.       Morbid obesity  Body mass index is 43.7 kg/m². Morbid obesity complicates all aspects of disease management from diagnostic modalities to treatment. Weight loss encouraged and health benefits explained to patient.         Iron deficiency anemia due to chronic blood loss  Folate deficiency  - Patient with chronic anemia on admit noted. Anemia is chronic and controlled. No signs of active bleeding.   - Anemia work-up done and showed iron 72, TIBC  elevated at 465 and sat iron low at 15 and ferritin low at 10 consistent with iron deficiency anemia and started on ferrous sulfate tablet 1 po daily to treat DARRION.   - Folate level also low at 2.2 consistent with folate deficiency and likely also contributing to anemia and patient started on Folic acid 1 mg p daily to treat.   - Vitamin B12 level 336 and in low normal range so concern for possible deficiency so methylmalonic acid level sent and results pending but started on daily replacement therapy with Vitamin B12 1000 mcg subcutaneous daily for now.     Bipolar disorder, unspecified  - Chronic and controlled at present. Follows with Psychiatry as outpatient.  - Patient reports no recent manic episodes and that her bipolar is stable.  - Continue Wellbutrin, Vraylar, and Ativan prn to treat.       VTE Risk Mitigation (From admission, onward)    None          Discharge Planning   [place]: 8/16/2022     Code Status: Full Code   Is the patient medically ready for discharge?: No    Reason for patient still in hospital (select all that apply): Patient trending condition  Discharge Plan A: Home with family   Discharge Delays: None known at this time        Jayshree Marks MD  Department of Hospital Medicine   [facility] - Telemetry Stepdown ([place]-)

## 2022-08-15 NOTE — PLAN OF CARE
Problem: Adult Inpatient Plan of Care  Goal: Plan of Care Review  8/14/2022 1944 by Bernarda Kaufman RN  Outcome: Ongoing, Progressing  8/14/2022 1944 by Bernarda Kaufman RN  Outcome: Ongoing, Progressing     Problem: Fall Injury Risk  Goal: Absence of Fall and Fall-Related Injury  Outcome: Ongoing, Progressing

## 2022-08-15 NOTE — ASSESSMENT & PLAN NOTE
- No improvement and reports worsening on 8/15. Patient to have LP on 8/16 with Radiology and Anesthesia to look for possible idiopathic intracranial hypertension as cause of headache and needs an opening pressure.   - Patient with known history of migraines presented to hospital with persistent headache for about ~2 months, now with left sided weakness, dysarthria. Concern for complex migraine vs  Idiopathic intracranial hypertension vs functional disorder.  - Recently started on phentermine in May 2022 for weight loss - concern for adverse effect. Medication on hold her in hospital and migraine persists so unlikely.   - Follows with neurology outpatient  - Given 1L NS, Toradol, Reglan, and Versed in the ED and denies any improvement of symptoms.  - MRI brain and CTH without acute findings.  - General Neurology consulted, suspect multifactorial nature with migraine, possible IIH, and possible functional disorder.   Recommendations:  -- Steroids completed. Continue Magnesium 2g IV BID, Depacon 1g  IV BID, Verapamil 120mg po daily.  -- Continue Diamox 250 mg po BID, Topomax 25 mg po daily.  -- Psychiatry consult once neurologic work-up complete.   -- LP to rule out IIH.   - Neurology performed occipital nerve block without symptom relief.  - Bedside LP unsuccessful 8/10 as suspicion of IIH contributing to HA.  - IR consulted for LP with flouro, scheduled 8/12, patient refused IV ativan, will require sedation with Anesthesia so will try to reschedule with Radiology and Anesthesia for 8/15.   - Will consult psychiatry consulted for concern for functional neurologic disorder if above work up unremarkable.

## 2022-08-15 NOTE — SUBJECTIVE & OBJECTIVE
Interval History: Patient states she feels like her headache and blurry vision are worse today. Discussed with Anesthesia and Radiology and plan is for patient have LP tomorrow, 8/16 with radiology and anesthesia to assist in sedation of patient. Patient needs an opening pressure due to concern for IIH and both radiology and anochlesia aware of need of getting an opening pressure. Patient states she feels like she has a UTI as she is peeing a lot. Patient is currently on her menstrual cycle and pure wick in place and nursing aware of need to get a urine sample to send to lab. COVID test done in anticipation of tomorrow' procedure. Patient's  at bedside. Patient state left leg weakness much improved but still feels left arm remains weak.  asking about [employer] paperwork and I directed him to medical information department on the first floor to bring any paperwork to them as that department will fill it out. Patient states her speech is fine and neck pain is better.     Review of Systems   Constitutional:  Positive for appetite change (decreased). Negative for chills and fever.   HENT:  Negative for tinnitus.    Eyes:  Positive for visual disturbance (blurred vision). Negative for photophobia.   Respiratory:  Negative for cough and shortness of breath.    Cardiovascular:  Negative for chest pain.   Gastrointestinal:  Negative for abdominal pain, nausea and vomiting.   Genitourinary:  Negative for difficulty urinating, dysuria, frequency and hematuria.   Musculoskeletal:  Positive for neck pain. Negative for back pain and gait problem.   Neurological:  Positive for weakness, numbness and headaches. Negative for speech difficulty and light-headedness.   Psychiatric/Behavioral:  Negative for agitation and behavioral problems. The patient is not nervous/anxious.    Objective:     Vital Signs (Most Recent):  Temp: 98.1 °F (36.7 °C) (08/15/22 1614)  Pulse: 86 (08/15/22 1614)  Resp: 16 (08/15/22 1614)  BP: 113/83  (08/15/22 1614)  SpO2: 96 % (08/15/22 1614) on room air Vital Signs (24h Range):  Temp:  [96.3 °F (35.7 °C)-98.3 °F (36.8 °C)] 98.1 °F (36.7 °C)  Pulse:  [74-86] 86  Resp:  [16-18] 16  SpO2:  [94 %-97 %] 96 %  BP: (113-165)/(77-94) 113/83     Weight: 126.6 kg (279 lb)  Body mass index is 43.7 kg/m².  No intake or output data in the 24 hours ending 08/15/22 1723   Physical Exam  Vitals and nursing note reviewed.   Constitutional:       General: She is not in acute distress.     Appearance: She is obese.   Eyes:      General: No scleral icterus.  Cardiovascular:      Rate and Rhythm: Normal rate and regular rhythm.      Heart sounds: Normal heart sounds.   Pulmonary:      Effort: Pulmonary effort is normal. No respiratory distress.      Breath sounds: Normal breath sounds. No wheezing or rales.   Abdominal:      General: Abdomen is flat. Bowel sounds are normal. There is no distension.      Palpations: Abdomen is soft.      Tenderness: There is no abdominal tenderness.   Musculoskeletal:      Right lower leg: No edema.      Left lower leg: No edema.      Comments: Minimal, variable ROM in LUE and LLE 2/2 weakness. Decreased sensation L hand   Skin:     General: Skin is warm and dry.   Neurological:      Mental Status: She is alert and oriented to person, place, and time.      Sensory: Sensory deficit present.      Motor: Weakness present.      Coordination: Coordination normal.      Comments: Variable weakness and impaired sensation in left upper and lower extremity, No facial asymmetry. Rate of speech normal.    Psychiatric:         Mood and Affect: Mood normal. Mood is not anxious or depressed. Affect is not tearful.         Speech: Speech normal. Speech is not delayed.         Behavior: Behavior normal.       Significant Labs: CBC:   Recent Labs   Lab 08/14/22  0150   WBC 11.15   HGB 11.0*   HCT 36.4*        CMP:   Recent Labs   Lab 08/14/22  0150      K 3.7      CO2 20*   *   BUN 15    CREATININE 0.8   CALCIUM 10.0   PROT 5.8*   ALBUMIN 2.8*   BILITOT 0.1   ALKPHOS 67   AST 10   ALT 17   ANIONGAP 11       Significant Imaging: I have reviewed all pertinent imaging results/findings within the past 24 hours.

## 2022-08-15 NOTE — ANESTHESIA PREPROCEDURE EVALUATION
[facility]  Anesthesia Pre-Operative Evaluation         Patient Name: Sonia Goldberg  YOB: 1985  MRN: [id number]    SUBJECTIVE:     Pre-operative evaluation for Procedure(s) (LRB):  Lumbar Puncture (N/A)     08/15/2022    Sonia Goldberg is a 36 y.o. female w/ a significant PMHx of GERD, Asthma, KERI, Obesity (BMI 43.7), HTN, COVID-19, Anemia, migraines, PCOS, bipolar disorder, IBS, and current everyday tobacco use. She was placed in  observation for intractable migraine, left-sided paresis, and dysarthria and evaluated by neurology who performed occipital nerve block w/o relief. LP attempted at bedside to evaluate for IIH on 8/10 unable to perform due to pain and patient anxiety.    Patient now presents for the above procedure for concern of possible idiopathic intracranial hypertension and need LP to determine opening pressure. If elevated patient might benefit from therapeutic fluid removal      LDA: None documented.       Peripheral IV - Single Lumen 08/13/22 2032 22 G Left;Posterior Forearm (Active)   Site Assessment Clean;Dry;Intact 08/14/22 2100   Extremity Assessment Distal to IV No abnormal discoloration;No redness;No swelling;No warmth 08/14/22 2100   Line Status Saline locked 08/14/22 2100   Dressing Status Clean;Dry;Intact 08/14/22 2100   Dressing Intervention Integrity maintained 08/14/22 2100   Reason Not Rotated Not due 08/13/22 2032   Number of days: 1       Female External Urinary Catheter 08/06/22 2056 (Active)   Skin no redness;no breakdown;reddened 08/14/22 2100   Tolerance no signs/symptoms of discomfort 08/14/22 2100   Suction Continuous suction at 70 mmHg 08/14/22 2100   Date of last wick change 08/14/22 08/14/22 0430   Time of last wick change 0408 08/14/22 0430   Output (mL) 650 mL 08/13/22 2352   Number of days: 8       Prev airway (11/27/20): Performed by: Petar Lipscomb CRNA  Authorized by: Irineo Benavidez MD      Intubation:      Induction:  Intravenous    Intubated:  Postinduction    Attempts:  1    Attempted By:  CRNA    Method of Intubation:  Direct    Laryngeal View Grade: Grade IIA - cords partially seen      Difficult Airway Encountered?: No      Complications:  None    Airway Device:  Oral endotracheal tube    Airway Device Size:  7.0    Style/Cuff Inflation:  Cuffed    Tube secured:  22    Secured at:  The lips    Placement Verified By:  Capnometry    Complicating Factors:  None    Findings Post-Intubation:  BS equal bilateral       Drips: None documented.   sodium chloride 0.9% 75 mL/hr at 08/14/22 0640       Patient Active Problem List   Diagnosis    Chronic anxiety    Morbid obesity    Obstructive sleep apnea    Asthma    Hypertension    GERD (gastroesophageal reflux disease)    Iron deficiency anemia due to chronic blood loss    Iron deficiency anemia    Bipolar disorder, unspecified    Irritable bowel syndrome without diarrhea    Major depressive disorder, single episode, unspecified    Unspecified asthma, uncomplicated    Left-sided weakness    Intractable migraine    PCOS (polycystic ovarian syndrome)    Dysarthria    Folate deficiency       Review of patient's allergies indicates:   Allergen Reactions    Contrast media Anaphylaxis    Iodine and iodide containing products Anaphylaxis    Levaquin [levofloxacin] Anaphylaxis    Levofloxacin in d5w Anaphylaxis    Sulfa (sulfonamide antibiotics) Anaphylaxis and Hives    Iodinated contrast media Hives    Magnesium      Pt reporting she is allergic to magnesium citrate oral drink.     Morphine Hives    Adhesive Rash    Compazine [prochlorperazine] Anxiety     Restless legs    Nut [tree nut] Hives       Current Inpatient Medications:   acetaminophen  1,000 mg Oral Q8H    acetaZOLAMIDE  250 mg Oral BID    buPROPion  150 mg Oral Nightly    cariprazine  3 mg Oral QHS    cyanocobalamin  1,000 mcg Subcutaneous Daily    famotidine  20 mg Oral BID     ferrous sulfate  1 tablet Oral Daily    folic acid  1 mg Oral Daily    magnesium sulfate IVPB  2 g Intravenous BID    topiramate  25 mg Oral Daily    valproate sodium (DEPACON) IVPB  1,000 mg Intravenous Q12H    verapamiL  120 mg Oral Daily       No current facility-administered medications on file prior to encounter.     Current Outpatient Medications on File Prior to Encounter   Medication Sig Dispense Refill    acetaminophen (TYLENOL) 500 MG tablet Take 1,000 mg by mouth 2 (two) times daily as needed (knee pain).      albuterol (PROVENTIL/VENTOLIN HFA) 90 mcg/actuation inhaler Inhale 2 puffs into the lungs every 6 (six) hours as needed for Wheezing. 18 g 11    aspirin-acetaminophen-caffeine 250-250-65 mg (EXCEDRIN MIGRAINE) 250-250-65 mg per tablet Take 2 tablets by mouth daily as needed (headache).      buPROPion (WELLBUTRIN XL) 150 MG TB24 tablet Take 1 tablet (150 mg total) by mouth once daily. (Patient taking differently: Take 150 mg by mouth nightly.) 90 tablet 0    butalbital-acetaminophen-caffeine -40 mg (FIORICET, ESGIC) -40 mg per tablet Take 1 tablet by mouth every 4 (four) hours as needed for Headaches. 15 tablet 0    cariprazine (VRAYLAR) 3 mg Cap Take 1 capsule (3 mg total) by mouth once daily at 6am. (Patient taking differently: Take 3 mg by mouth nightly.) 30 capsule 1    EPINEPHrine (EPIPEN) 0.3 mg/0.3 mL AtIn Inject 0.3 mLs (0.3 mg total) into the muscle as needed (anaphylaxis). 1 each 1    HYDROcodone-acetaminophen (NORCO) 5-325 mg per tablet Take 1 tablet by mouth every 8 (eight) hours as needed for Pain. 21 tablet 0    LORazepam (ATIVAN) 1 MG tablet Take 1 tablet (1 mg total) by mouth every 6 (six) hours as needed for Anxiety. 15 tablet 0    metFORMIN (GLUCOPHAGE-XR) 500 MG ER 24hr tablet Take 1 tablet (500 mg total) by mouth daily with breakfast. 90 tablet 3    metoclopramide HCl (REGLAN) 10 MG tablet Take 1 tablet (10 mg total) by mouth every 6 (six) hours.  (Patient not taking: Reported on 2022) 30 tablet 0    phentermine (ADIPEX-P) 37.5 mg tablet Take 1 tablet (37.5 mg total) by mouth before breakfast. (Patient not taking: Reported on 2022) 30 tablet 2    pulse oximeter (PULSE OXIMETER) device by Apply Externally route 2 (two) times a day. Use twice daily at 8 AM and 3 PM and record the value in MyChart as directed. 1 each 0       Past Surgical History:   Procedure Laterality Date    ABDOMINAL SURGERY           SECTION, CLASSIC       SECTION, LOW TRANSVERSE      COLONOSCOPY N/A 10/27/2020    Procedure: COLONOSCOPY;  Surgeon: Patito Vergara MD;  Location: [facility];  Service: Endoscopy;  Laterality: N/A;    CYSTOSCOPY N/A 10/27/2021    Procedure: CYSTOSCOPY;  Surgeon: Oh Velasquez Jr., MD;  Location: [facility] OR;  Service: Urology;  Laterality: N/A;    DILATION AND CURETTAGE OF UTERUS      DILATION AND CURETTAGE OF UTERUS      perferated uterus during procedure    endometrioma  2013    removed on right lower quadrant of uterus    epidural steriod injections  2005    x3    ESOPHAGOGASTRODUODENOSCOPY N/A 10/26/2020    Procedure: EGD (ESOPHAGOGASTRODUODENOSCOPY);  Surgeon: Enrike Garcia MD;  Location: [facility];  Service: Endoscopy;  Laterality: N/A;    INTRALUMINAL GASTROINTESTINAL TRACT IMAGING VIA CAPSULE N/A 2020    Procedure: IMAGING PROCEDURE, GI TRACT, INTRALUMINAL, VIA CAPSULE;  Surgeon: Patiot Vergara MD;  Location: [facility];  Service: Endoscopy;  Laterality: N/A;    KNEE ARTHROSCOPY W/ MENISCECTOMY Right 2021    Procedure: ARTHROSCOPY, KNEE, WITH MENISCECTOMY;  Surgeon: López Baker MD;  Location: [facility] OR;  Service: Orthopedics;  Laterality: Right;    LAPAROSCOPIC CHOLECYSTECTOMY N/A 2020    Procedure: CHOLECYSTECTOMY, LAPAROSCOPIC;  Surgeon: Chente Campbell III, MD;  Location: [facility];  Service: General;  Laterality: N/A;    MAGNETIC RESONANCE IMAGING N/A 8/3/2022     Procedure: MRI (Magnetic Resonance Imagine);  Surgeon: Sanjana Surgeon;  Location[facility];  Service: Anesthesiology;  Laterality: N/A;    TONSILLECTOMY      as a child    TUBAL LIGATION  2008       Social History     Socioeconomic History    Marital status: Significant Other   Tobacco Use    Smoking status: Current Every Day Smoker     Years: 6.00     Types: Vaping with nicotine, Vaping w/o nicotine    Smokeless tobacco: Never Used   Substance and Sexual Activity    Alcohol use: Yes     Comment: socially, occasionally    Drug use: No    Sexual activity: Yes     Partners: Male     Birth control/protection: See Surgical Hx   Social History Narrative    ** Merged History Encounter **            OBJECTIVE:     Vital Signs Range (Last 24H):  Temp:  [35.7 °C (96.3 °F)-37.3 °C (99.2 °F)]   Pulse:  [74-94]   Resp:  [16-18]   BP: (139-146)/(73-93)   SpO2:  [94 %-97 %]       Significant Labs:  Lab Results   Component Value Date    WBC 11.15 08/14/2022    HGB 11.0 (L) 08/14/2022    HCT 36.4 (L) 08/14/2022     08/14/2022    CHOL 243 (H) 08/06/2022    TRIG 319 (H) 08/06/2022    HDL 43 08/06/2022    ALT 17 08/14/2022    AST 10 08/14/2022     08/14/2022    K 3.7 08/14/2022     08/14/2022    CREATININE 0.8 08/14/2022    BUN 15 08/14/2022    CO2 20 (L) 08/14/2022    TSH 1.208 08/06/2022    INR 0.9 08/06/2022    HGBA1C 5.5 02/07/2022       Diagnostic Studies: No relevant studies.    EKG:   Results for orders placed or performed during the hospital encounter of 08/06/22   ECG 12 lead    Collection Time: 08/06/22  6:32 PM    Narrative    Test Reason : I63.9,    Vent. Rate : 097 BPM     Atrial Rate : 097 BPM     P-R Int : 144 ms          QRS Dur : 082 ms      QT Int : 348 ms       P-R-T Axes : 033 047 -01 degrees     QTc Int : 441 ms    Normal sinus rhythm  Low anterior forces  Abnormal ECG  When compared with ECG of 06-FEB-2022 22:46,  Questionable change in initial forces of Inferior leads  ST no longer  elevated in Inferior leads  Nonspecific T wave abnormality now evident in Anterior leads  Confirmed by Dane KOLB MD ([id number]) on 8/7/2022 10:19:55 AM    Referred By: [email or web address]   SELF           Confirmed By:Dane KOLB MD       2D ECHO:  TTE:  Results for orders placed or performed during the hospital encounter of 02/03/22   Echo   Result Value Ref Range    Left Atrium Major Axis 3.30 cm    LA size 3.60 cm    AORTIC VALVE CUSP SEPERATION 1.80 cm    AV mean gradient 5 mmHg    Ao VTI 27.90 cm    Ao peak patricio 1.42 m/s    AV peak gradient 8 mmHg    Ao root annulus 2.70 cm    IVRT 100.00 ms    IVS 1.08 0.6 - 1.1 cm    LVIDd 4.32 3.5 - 6.0 cm    LVIDs 2.21 2.1 - 4.0 cm    LVOT diameter 2.20 cm    LVOT peak VTI 21.90 cm    LVOT peak patricio 0.99 m/s    Posterior Wall 0.85 0.6 - 1.1 cm    E wave deceleration time 142.00 ms    MV Peak A Patricio 0.52 m/s    MV Peak E Patricio 0.74 m/s    PV mean gradient 3.00 mmHg    PV PEAK VELOCITY 1.17 m/s    RVDD 2.79 cm    Triscuspid Valve Regurgitation Peak Gradient 22 mmHg    BSA 2.45 m2    TDI SEPTAL 0.06 m/s    LV LATERAL E/E' RATIO 7.40 m/s    LV SEPTAL E/E' RATIO 12.33 m/s    TDI LATERAL 0.10 m/s    FS 49 28 - 44 %    LV mass 136.65 g    Left Ventricle Relative Wall Thickness 0.39 cm    AV valve area 2.98 cm2    AV Velocity Ratio 0.70     AV index (prosthetic) 0.78     E/A ratio 1.42     Mean e' 0.08 m/s    LVOT area 3.8 cm2    LVOT stroke volume 83.21 cm3    E/E' ratio 9.25 m/s    TR Max Patricio 2.36 m/s    LV Mass Index 59 g/m2    Right Atrial Pressure (from IVC) 3 mmHg    EF 65 %    TV rest pulmonary artery pressure 25 mmHg    Narrative    · Normal left ventricular diastolic function.  · The left ventricle is normal in size with normal systolic function.  · The estimated ejection fraction is 65%.  · Normal right ventricular size with normal right ventricular systolic   function.  · Normal central venous pressure (3 mmHg).  · The estimated PA systolic pressure is 25 mmHg.          JAZMIN:  No results  found for this or any previous visit.    ASSESSMENT/PLAN:           Pre-op Assessment    I have reviewed the Patient Summary Reports.     I have reviewed the Nursing Notes. I have reviewed the NPO Status.   I have reviewed the Medications.     Review of Systems  Anesthesia Hx:  Denies Hx of Anesthetic complications  History of prior surgery of interest to airway management or planning: Previous anesthesia: General, MAC Denies Family Hx of Anesthesia complications.   Denies Personal Hx of Anesthesia complications.   Social:  Smoker    Hematology/Oncology:  Hematology Normal   Oncology Normal     EENT/Dental:EENT/Dental Normal   Cardiovascular:   Hypertension    Pulmonary:   Asthma Sleep Apnea    Renal/:  Renal/ Normal     Hepatic/GI:   GERD    Musculoskeletal:  Musculoskeletal Normal    Neurological:   Headaches    Endocrine:  Obesity / BMI > 30  Psych:   Psychiatric History          Physical Exam  General: Well nourished, Cooperative, Alert and Oriented    Airway:  Mallampati: II   Mouth Opening: Normal  TM Distance: Normal  Tongue: Normal  Neck ROM: Normal ROM    Dental:  Intact        Anesthesia Plan  Type of Anesthesia, risks & benefits discussed:    Anesthesia Type: Gen Natural Airway, MAC  Intra-op Monitoring Plan: Standard ASA Monitors  Post Op Pain Control Plan: multimodal analgesia and IV/PO Opioids PRN  Induction:  IV  Informed Consent: Informed consent signed with the Patient and all parties understand the risks and agree with anesthesia plan.  All questions answered.   ASA Score: 3  Day of Surgery Review of History & Physical: H&P Update referred to the surgeon/provider.    Ready For Surgery From Anesthesia Perspective.     .

## 2022-08-16 ENCOUNTER — ANESTHESIA (OUTPATIENT)
Dept: ENDOSCOPY | Facility: HOSPITAL | Age: 37
DRG: 103 | End: 2022-08-16
Payer: COMMERCIAL

## 2022-08-16 PROBLEM — G93.2 IIH (IDIOPATHIC INTRACRANIAL HYPERTENSION): Status: ACTIVE | Noted: 2022-08-06

## 2022-08-16 LAB
ALBUMIN SERPL BCP-MCNC: 2.8 G/DL (ref 3.5–5.2)
ALP SERPL-CCNC: 73 U/L (ref 55–135)
ALT SERPL W/O P-5'-P-CCNC: 12 U/L (ref 10–44)
ANION GAP SERPL CALC-SCNC: 7 MMOL/L (ref 8–16)
AST SERPL-CCNC: <5 U/L (ref 10–40)
B-HCG UR QL: NEGATIVE
BILIRUB SERPL-MCNC: 0.1 MG/DL (ref 0.1–1)
BUN SERPL-MCNC: 16 MG/DL (ref 6–20)
CALCIUM SERPL-MCNC: 10.4 MG/DL (ref 8.7–10.5)
CHLORIDE SERPL-SCNC: 111 MMOL/L (ref 95–110)
CLARITY CSF: CLEAR
CLARITY CSF: CLEAR
CO2 SERPL-SCNC: 24 MMOL/L (ref 23–29)
COLOR CSF: COLORLESS
COLOR CSF: COLORLESS
CREAT SERPL-MCNC: 0.8 MG/DL (ref 0.5–1.4)
CTP QC/QA: YES
EST. GFR  (NO RACE VARIABLE): >60 ML/MIN/1.73 M^2
GLUCOSE CSF-MCNC: 56 MG/DL (ref 40–70)
GLUCOSE SERPL-MCNC: 87 MG/DL (ref 70–110)
LYMPHOCYTES NFR CSF MANUAL: 100 % (ref 40–80)
LYMPHOCYTES NFR CSF MANUAL: 89 % (ref 40–80)
METHYLMALONATE SERPL-SCNC: 0.17 UMOL/L
MONOS+MACROS NFR CSF MANUAL: 11 % (ref 15–45)
POTASSIUM SERPL-SCNC: 3.9 MMOL/L (ref 3.5–5.1)
PROT CSF-MCNC: 36 MG/DL (ref 15–40)
PROT SERPL-MCNC: 5.6 G/DL (ref 6–8.4)
RBC # CSF: 0 /CU MM
RBC # CSF: 0 /CU MM
SODIUM SERPL-SCNC: 142 MMOL/L (ref 136–145)
SPECIMEN VOL CSF: 6 ML
SPECIMEN VOL CSF: 6 ML
WBC # CSF: 1 /CU MM (ref 0–5)
WBC # CSF: 1 /CU MM (ref 0–5)

## 2022-08-16 PROCEDURE — 87205 SMEAR GRAM STAIN: CPT

## 2022-08-16 PROCEDURE — 87070 CULTURE OTHR SPECIMN AEROBIC: CPT

## 2022-08-16 PROCEDURE — 99900035 HC TECH TIME PER 15 MIN (STAT)

## 2022-08-16 PROCEDURE — 25000003 PHARM REV CODE 250: Performed by: INTERNAL MEDICINE

## 2022-08-16 PROCEDURE — 84157 ASSAY OF PROTEIN OTHER: CPT

## 2022-08-16 PROCEDURE — 25000003 PHARM REV CODE 250: Performed by: NURSE ANESTHETIST, CERTIFIED REGISTERED

## 2022-08-16 PROCEDURE — 86403 PARTICLE AGGLUT ANTBDY SCRN: CPT

## 2022-08-16 PROCEDURE — 36415 COLL VENOUS BLD VENIPUNCTURE: CPT | Performed by: NURSE PRACTITIONER

## 2022-08-16 PROCEDURE — 63600175 PHARM REV CODE 636 W HCPCS: Performed by: ANESTHESIOLOGY

## 2022-08-16 PROCEDURE — 81025 URINE PREGNANCY TEST: CPT | Performed by: ANESTHESIOLOGY

## 2022-08-16 PROCEDURE — 25000003 PHARM REV CODE 250: Performed by: NURSE PRACTITIONER

## 2022-08-16 PROCEDURE — 71000033 HC RECOVERY, INTIAL HOUR

## 2022-08-16 PROCEDURE — 11000001 HC ACUTE MED/SURG PRIVATE ROOM

## 2022-08-16 PROCEDURE — D9220A PRA ANESTHESIA: Mod: CRNA,,, | Performed by: NURSE ANESTHETIST, CERTIFIED REGISTERED

## 2022-08-16 PROCEDURE — 99233 PR SUBSEQUENT HOSPITAL CARE,LEVL III: ICD-10-PCS | Mod: ,,, | Performed by: INTERNAL MEDICINE

## 2022-08-16 PROCEDURE — 63600175 PHARM REV CODE 636 W HCPCS: Performed by: NURSE ANESTHETIST, CERTIFIED REGISTERED

## 2022-08-16 PROCEDURE — 37000008 HC ANESTHESIA 1ST 15 MINUTES

## 2022-08-16 PROCEDURE — 37000009 HC ANESTHESIA EA ADD 15 MINS

## 2022-08-16 PROCEDURE — 87798 DETECT AGENT NOS DNA AMP: CPT

## 2022-08-16 PROCEDURE — 25000003 PHARM REV CODE 250

## 2022-08-16 PROCEDURE — 63600175 PHARM REV CODE 636 W HCPCS: Performed by: PHYSICIAN ASSISTANT

## 2022-08-16 PROCEDURE — 86652 ENCEPHALTIS EAST EQNE ANBDY: CPT | Mod: 91

## 2022-08-16 PROCEDURE — D9220A PRA ANESTHESIA: ICD-10-PCS | Mod: CRNA,,, | Performed by: NURSE ANESTHETIST, CERTIFIED REGISTERED

## 2022-08-16 PROCEDURE — 99000 SPECIMEN HANDLING OFFICE-LAB: CPT

## 2022-08-16 PROCEDURE — 25000003 PHARM REV CODE 250: Performed by: PHYSICIAN ASSISTANT

## 2022-08-16 PROCEDURE — D9220A PRA ANESTHESIA: ICD-10-PCS | Mod: ANES,,, | Performed by: ANESTHESIOLOGY

## 2022-08-16 PROCEDURE — 99233 SBSQ HOSP IP/OBS HIGH 50: CPT | Mod: ,,, | Performed by: INTERNAL MEDICINE

## 2022-08-16 PROCEDURE — 89051 BODY FLUID CELL COUNT: CPT | Mod: 91

## 2022-08-16 PROCEDURE — D9220A PRA ANESTHESIA: Mod: ANES,,, | Performed by: ANESTHESIOLOGY

## 2022-08-16 PROCEDURE — 82945 GLUCOSE OTHER FLUID: CPT

## 2022-08-16 PROCEDURE — 80053 COMPREHEN METABOLIC PANEL: CPT | Performed by: NURSE PRACTITIONER

## 2022-08-16 PROCEDURE — 87498 ENTEROVIRUS PROBE&REVRS TRNS: CPT

## 2022-08-16 RX ORDER — SODIUM CHLORIDE 0.9 % (FLUSH) 0.9 %
3 SYRINGE (ML) INJECTION EVERY 30 MIN PRN
Status: DISCONTINUED | OUTPATIENT
Start: 2022-08-16 | End: 2022-08-16 | Stop reason: HOSPADM

## 2022-08-16 RX ORDER — ONDANSETRON 2 MG/ML
4 INJECTION INTRAMUSCULAR; INTRAVENOUS DAILY PRN
Status: DISCONTINUED | OUTPATIENT
Start: 2022-08-16 | End: 2022-08-16 | Stop reason: HOSPADM

## 2022-08-16 RX ORDER — PROPOFOL 10 MG/ML
VIAL (ML) INTRAVENOUS CONTINUOUS PRN
Status: DISCONTINUED | OUTPATIENT
Start: 2022-08-16 | End: 2022-08-16

## 2022-08-16 RX ORDER — FENTANYL CITRATE 50 UG/ML
25 INJECTION, SOLUTION INTRAMUSCULAR; INTRAVENOUS EVERY 5 MIN PRN
Status: DISCONTINUED | OUTPATIENT
Start: 2022-08-16 | End: 2022-08-16 | Stop reason: HOSPADM

## 2022-08-16 RX ORDER — PROPOFOL 10 MG/ML
VIAL (ML) INTRAVENOUS
Status: DISCONTINUED | OUTPATIENT
Start: 2022-08-16 | End: 2022-08-16

## 2022-08-16 RX ORDER — LIDOCAINE HYDROCHLORIDE 20 MG/ML
INJECTION INTRAVENOUS
Status: DISCONTINUED | OUTPATIENT
Start: 2022-08-16 | End: 2022-08-16

## 2022-08-16 RX ORDER — MIDAZOLAM HYDROCHLORIDE 1 MG/ML
INJECTION, SOLUTION INTRAMUSCULAR; INTRAVENOUS
Status: DISCONTINUED | OUTPATIENT
Start: 2022-08-16 | End: 2022-08-16

## 2022-08-16 RX ORDER — ACETAZOLAMIDE 250 MG/1
500 TABLET ORAL 2 TIMES DAILY
Status: DISCONTINUED | OUTPATIENT
Start: 2022-08-16 | End: 2022-08-17 | Stop reason: HOSPADM

## 2022-08-16 RX ORDER — FENTANYL CITRATE 50 UG/ML
INJECTION, SOLUTION INTRAMUSCULAR; INTRAVENOUS
Status: DISCONTINUED | OUTPATIENT
Start: 2022-08-16 | End: 2022-08-16

## 2022-08-16 RX ORDER — TOPIRAMATE 25 MG/1
50 TABLET ORAL DAILY
Status: COMPLETED | OUTPATIENT
Start: 2022-08-17 | End: 2022-08-17

## 2022-08-16 RX ADMIN — MAGNESIUM SULFATE HEPTAHYDRATE 2 G: 40 INJECTION, SOLUTION INTRAVENOUS at 09:08

## 2022-08-16 RX ADMIN — TOPIRAMATE 25 MG: 25 TABLET, FILM COATED ORAL at 08:08

## 2022-08-16 RX ADMIN — ACETAZOLAMIDE 500 MG: 250 TABLET ORAL at 09:08

## 2022-08-16 RX ADMIN — FENTANYL CITRATE 50 MCG: 50 INJECTION, SOLUTION INTRAMUSCULAR; INTRAVENOUS at 12:08

## 2022-08-16 RX ADMIN — SODIUM CHLORIDE: 0.9 INJECTION, SOLUTION INTRAVENOUS at 07:08

## 2022-08-16 RX ADMIN — FOLIC ACID 1 MG: 1 TABLET ORAL at 08:08

## 2022-08-16 RX ADMIN — DEXTROSE 1000 MG: 50 INJECTION, SOLUTION INTRAVENOUS at 03:08

## 2022-08-16 RX ADMIN — MAGNESIUM SULFATE HEPTAHYDRATE 2 G: 40 INJECTION, SOLUTION INTRAVENOUS at 08:08

## 2022-08-16 RX ADMIN — ACETAMINOPHEN 1000 MG: 500 TABLET ORAL at 03:08

## 2022-08-16 RX ADMIN — LORAZEPAM 1 MG: 1 TABLET ORAL at 09:08

## 2022-08-16 RX ADMIN — BUPROPION HYDROCHLORIDE 150 MG: 150 TABLET, FILM COATED, EXTENDED RELEASE ORAL at 09:08

## 2022-08-16 RX ADMIN — FERROUS SULFATE TAB 325 MG (65 MG ELEMENTAL FE) 1 EACH: 325 (65 FE) TAB at 08:08

## 2022-08-16 RX ADMIN — FENTANYL CITRATE 25 MCG: 50 INJECTION INTRAMUSCULAR; INTRAVENOUS at 01:08

## 2022-08-16 RX ADMIN — CYANOCOBALAMIN 1000 MCG: 1000 INJECTION INTRAMUSCULAR; SUBCUTANEOUS at 08:08

## 2022-08-16 RX ADMIN — MIDAZOLAM HYDROCHLORIDE 2 MG: 1 INJECTION, SOLUTION INTRAMUSCULAR; INTRAVENOUS at 11:08

## 2022-08-16 RX ADMIN — VERAPAMIL HYDROCHLORIDE 120 MG: 120 TABLET, FILM COATED, EXTENDED RELEASE ORAL at 06:08

## 2022-08-16 RX ADMIN — PROPOFOL 50 MG: 10 INJECTION, EMULSION INTRAVENOUS at 12:08

## 2022-08-16 RX ADMIN — LIDOCAINE HYDROCHLORIDE 50 MG: 20 INJECTION, SOLUTION INTRAVENOUS at 12:08

## 2022-08-16 RX ADMIN — Medication 100 MCG/KG/MIN: at 12:08

## 2022-08-16 RX ADMIN — FAMOTIDINE 20 MG: 20 TABLET ORAL at 08:08

## 2022-08-16 RX ADMIN — ACETAMINOPHEN 1000 MG: 500 TABLET ORAL at 09:08

## 2022-08-16 RX ADMIN — CARIPRAZINE 3 MG: 1.5 CAPSULE, GELATIN COATED ORAL at 09:08

## 2022-08-16 RX ADMIN — FAMOTIDINE 20 MG: 20 TABLET ORAL at 09:08

## 2022-08-16 RX ADMIN — ACETAZOLAMIDE 250 MG: 250 TABLET ORAL at 08:08

## 2022-08-16 RX ADMIN — DIPHENHYDRAMINE HYDROCHLORIDE 50 MG: 50 CAPSULE ORAL at 09:08

## 2022-08-16 NOTE — PROCEDURES
Radiology Post-Procedure Note    Pre Op Diagnosis: IIH, migraine    Post Op Diagnosis: Same    Procedure: lumbar puncture    Procedure performed by: Stuart Parsons MD    Supervising attending physician: Bandar Bowers MD    Written Informed Consent Obtained: Yes    Specimen Removed: 25 mL CSF    Opening pressure: 58 cm H2O    Closing pressure: 24 cm H2O    Estimated Blood Loss: Minimal    Findings: Following written informed consent and sterile prep and drape, a 22 gauge spinal needle was inserted at L2 - L3 intralaminar space under fluoroscopic surveillance.  25 mL clear CSF removed and sent to the lab for further analysis.  There were no complications.    Patient tolerated procedure well.    Stuart Parsons MD PGY-2  Department of Radiology  [facility]
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
## 2022-08-16 NOTE — PROGRESS NOTES
Patient transported via hospital bed to [facility] room 6. Safety maintained, bed locked and in low position, side rails up x 2, call bell within reach.

## 2022-08-16 NOTE — PT/OT/SLP PROGRESS
Physical Therapy      Patient Name:  Sonia Goldberg   MRN:  [id number]    Patient not seen today secondary to Off the floor for procedure/surgery. Pt DANNY for lumbar puncture, PT unable to return following BR.  Will follow-up when appropriate.
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
## 2022-08-16 NOTE — ANESTHESIA POSTPROCEDURE EVALUATION
Anesthesia Post Evaluation    Patient: Sonia Goldberg    Procedure(s) Performed: Procedure(s) (LRB):  Lumbar Puncture (N/A)    Final Anesthesia Type: general      Patient location during evaluation: PACU  Patient participation: Yes- Able to Participate  Level of consciousness: awake and alert  Post-procedure vital signs: reviewed and stable  Pain management: adequate  Airway patency: patent    PONV status at discharge: No PONV  Anesthetic complications: no      Cardiovascular status: blood pressure returned to baseline  Respiratory status: unassisted, room air and spontaneous ventilation  Hydration status: euvolemic  Follow-up not needed.          Vitals Value Taken Time   /84 08/16/22 1346   Temp 37.2 °C (98.9 °F) 08/16/22 1345   Pulse 78 08/16/22 1407   Resp 12 08/16/22 1401   SpO2 99 % 08/16/22 1407   Vitals shown include unvalidated device data.      No case tracking events are documented in the log.      Pain/Kevin Score: Pain Rating Prior to Med Admin: 10 (8/16/2022  1:21 PM)  Kevin Score: 10 (8/16/2022  1:45 PM)

## 2022-08-16 NOTE — ASSESSMENT & PLAN NOTE
Neurology was consulted for evaluation of headaches with associated aphasia and L hemiparesis a 36-year-old F with recent history of migraine-like headaches, followed at [facility]. Headaches started approx 5/30. Possible provoking factors include COVID-19 infection 01/2022, & x2 minor injuries to the head (hit on corner of bed post, resulting in nausea) in the weeks prior to 5/30. Was given prednisone 10mg x8 days and rizitriptan on 6/28 with some improvement in HA. Seen in ED on 8/2 and was given fioricet and reglan with short term relief. HA persisted and she presented to [facility] ED on 8/6. CTH & MRI negative for acute infarct. MRI showing partially empty sella, slight prominent fluid signal along optic nerve sheaths bilaterally. Was scheduled to follow up MRI results with ophthalmology as outpatient.    Patient had LP compelted on 8/16 with significantly elevated OP of 58 and CP of 24. Total 25cc CSF removed. Patient still with unchanged HA and visual symptoms following LP. Given increased ICP and concern for IIH on MRI - patient would benefit from increased regimen of Diamox and Topiramte. Patient would benefit from eval by neuro-optho and neurosurgery for shunt placement given poor control with current regimen of Diamox 250 BID and topiramate 25 daily.     Recommendations:  Increase diamox to 500 BID  Increase topiramate to 50 daily  Recommend consulting neuro-optho; appreciate assistance  Recommend consulting neurosurgery for evaluation for shunt. Appreciate assistance  Will continue to follow patient  Please contact with questions or concerns

## 2022-08-16 NOTE — ASSESSMENT & PLAN NOTE
"Dysarthria  - Stroke code activated in ED due to left-sided weakness and aphasia in setting of migraine.  - CTH and MRI brain negative for acute infarction on admit.   - Vascular Neurology evaluated patient in the ED and have a low suspicion for CVA at this time.  - General neurology consulted for intractable headache and continues to follow and at this point feel may be conversion syndrome causing neurologic changes as neurologic exam non-physiologic in nature and fluctuates frequently.   - Patient reports left leg weakness improved since admit but left arm subjectively still feels weak according to patient.   - Patient's speech is fluent and when dysarthric according to neurology is nonphysiologic and not consistent with neurologic dysarthria.   - LP pending to rule out possible idiopathic intracranial hypertension as could be contributing to symptoms, also concern for functional disorder: "patient's speech pattern is not congruent with a neurologic aphasia and the patient's left-sided weakness has been shown to be variable and nonphysiologic, actually improving after a failed lumbar puncture attempt, with her being able to hold her left upper extremity antigravity at bedside"   - LP done on 8/176 and showed opening pressure pf 58 and highly concerning for IIH. No change in left arm weakness after LP and 25 cc of fluid removed.   - PT/OT consulted on this admit and recommending  PT/OT on hospital discharge.   "

## 2022-08-16 NOTE — PLAN OF CARE
Pt. AAOx4, VSS, no falls or injuries during shift, safety maintained. Call light in reach, bed locked and in lowest position, side rails up x 2. No acute events, instructed to call for assistance, verbalized understanding.  at bedside, will continue to monitor.    Problem: Adult Inpatient Plan of Care  Goal: Plan of Care Review  Outcome: Ongoing, Progressing     Problem: Adult Inpatient Plan of Care  Goal: Absence of Hospital-Acquired Illness or Injury  Outcome: Ongoing, Progressing     Problem: Adult Inpatient Plan of Care  Goal: Optimal Comfort and Wellbeing  Outcome: Ongoing, Progressing     Problem: Bariatric Environmental Safety  Goal: Safety Maintained with Care  Outcome: Ongoing, Progressing
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
## 2022-08-16 NOTE — ASSESSMENT & PLAN NOTE
- Patient had LP done on 8/16 combined with radiology and anesthesia. LP showed opening pressure of 58 and 25 cc of clear CSF removed and closing pressure 24. Findings very concerning for likely IIH. Discussed with Neurology team on 8/16 and Diamox increased from 250 to 500 mg po BID and Topamax increased from 25 to 50 mg po daily.Neurology recommending Neurosurgery consult to discuss possible future  shunt. Neurology also recommending Neuro Opthalmology consult and patient has appointment set as outpatient on 8/19.    - No improvement and reports worsening on 8/15. Patient to have LP on 8/16 with Radiology and Anesthesia to look for possible idiopathic intracranial hypertension as cause of headache and needs an opening pressure.   - Patient with known history of migraines presented to hospital with persistent headache for about ~2 months, now with left sided weakness, dysarthria. Concern for complex migraine vs  Idiopathic intracranial hypertension vs functional disorder.  - Recently started on phentermine in May 2022 for weight loss - concern for adverse effect. Medication on hold her in hospital and migraine persists so unlikely.   - Follows with neurology outpatient  - Given 1L NS, Toradol, Reglan, and Versed in the ED and denies any improvement of symptoms.  - MRI brain and CTH without acute findings.  - General Neurology consulted, suspect multifactorial nature with migraine, possible IIH, and possible functional disorder.   Recommendations:  -- Steroids completed. Continue Magnesium 2g IV BID, Depacon 1g  IV BID, Verapamil 120mg po daily.  -- Continue Diamox 250 mg po BID, Topomax 25 mg po daily.  -- LP to rule out IIH.   - Neurology performed occipital nerve block without symptom relief.  - Bedside LP unsuccessful 8/10 as suspicion of IIH contributing to HA.  - IR consulted for LP with kostas, scheduled 8/12, patient refused IV ativan, will require sedation with Anesthesia so will try to reschedule with  Radiology and Anesthesia.

## 2022-08-16 NOTE — SUBJECTIVE & OBJECTIVE
Interval History: Patient went for LP today with radiology and anesthesia as required sedation to get LP done by radiology. Patient had opening pressure of 58 and 25 cc of clear CSF removed and closing pressure 24. CSF prelim studies showed 1 WBC and normal glucose and protein. CSF viral studies sent as per Neurology recs. High opening pressure consistent with idiopathic intracranial hypertension. Discussed with patient and her  at bedside LP and prelim CSF results and concern for likely IIH. I had secure chat with Neurology consult team with Dr. Antelmo Rojas and he recommended to increase Diamox from 250 to 500 mg po BID for IIH and increase Topamax from 25 to 50 mg po daily and to have Neurosurgery evaluate patient in hospital for consideration of  shunt in near future. Neurology also recommended Opthalmology consult but she already has an appointment set for 8/19 so Neurology okay with outpatient follow-up with Opthalmology. Patient still complaining of severe 10/10 headache after LP and continued blurry vision and left arm weakness and no real improvement with those symptoms after LP. Neurology aware. Patient asking about hospital discharge and I told her once we have a plan for her IIH then hopefully can discharge in next 1-2 days.     Review of Systems   Constitutional:  Positive for appetite change (decreased). Negative for chills and fever.   HENT:  Negative for tinnitus.    Eyes:  Positive for visual disturbance (blurred vision). Negative for photophobia.   Respiratory:  Negative for cough and shortness of breath.    Cardiovascular:  Negative for chest pain.   Gastrointestinal:  Negative for abdominal pain, nausea and vomiting.   Genitourinary:  Negative for difficulty urinating, dysuria, frequency and hematuria.   Musculoskeletal:  Positive for neck pain. Negative for back pain and gait problem.   Neurological:  Positive for weakness, numbness and headaches. Negative for speech difficulty and  light-headedness.   Psychiatric/Behavioral:  Negative for agitation and behavioral problems. The patient is not nervous/anxious.    Objective:     Vital Signs (Most Recent):  Temp: 97.9 °F (36.6 °C) (08/16/22 1545)  Pulse: 83 (08/16/22 1545)  Resp: 19 (08/16/22 1545)  BP: 136/72 (08/16/22 1545)  SpO2: 95 % (08/16/22 1545) on room air Vital Signs (24h Range):  Temp:  [97.8 °F (36.6 °C)-99 °F (37.2 °C)] 97.9 °F (36.6 °C)  Pulse:  [71-88] 83  Resp:  [11-19] 19  SpO2:  [95 %-99 %] 95 %  BP: (134-152)/(70-89) 136/72     Weight: 126.6 kg (279 lb)  Body mass index is 43.7 kg/m².    Intake/Output Summary (Last 24 hours) at 8/16/2022 1716  Last data filed at 8/16/2022 1253  Gross per 24 hour   Intake 700 ml   Output --   Net 700 ml      Physical Exam  Vitals and nursing note reviewed.   Constitutional:       General: She is not in acute distress.     Appearance: She is obese.   Eyes:      General: No scleral icterus.  Cardiovascular:      Rate and Rhythm: Normal rate and regular rhythm.      Heart sounds: Normal heart sounds.   Pulmonary:      Effort: Pulmonary effort is normal. No respiratory distress.      Breath sounds: Normal breath sounds. No wheezing or rales.   Abdominal:      General: Abdomen is flat. Bowel sounds are normal. There is no distension.      Palpations: Abdomen is soft.      Tenderness: There is no abdominal tenderness.   Musculoskeletal:      Right lower leg: No edema.      Left lower leg: No edema.      Comments: Minimal, variable ROM in LUE and LLE 2/2 weakness. Decreased sensation L hand   Skin:     General: Skin is warm and dry.   Neurological:      Mental Status: She is alert and oriented to person, place, and time.      Sensory: Sensory deficit present.      Motor: Weakness present.      Coordination: Coordination normal.      Comments: Variable weakness and impaired sensation in left upper and lower extremity, No facial asymmetry. Rate of speech normal.    Psychiatric:         Mood and Affect:  Mood normal. Mood is not anxious or depressed. Affect is not tearful.         Speech: Speech normal. Speech is not delayed.         Behavior: Behavior normal.       Significant Labs: CBC: No results for input(s): WBC, HGB, HCT, PLT in the last 48 hours.  CMP:   Recent Labs   Lab 08/16/22  0336      K 3.9   *   CO2 24   GLU 87   BUN 16   CREATININE 0.8   CALCIUM 10.4   PROT 5.6*   ALBUMIN 2.8*   BILITOT 0.1   ALKPHOS 73   AST <5*   ALT 12   ANIONGAP 7*     Results for orders placed or performed during the hospital encounter of 08/06/22   Glucose, CSF   Result Value Ref Range    Glucose, CSF 56 40 - 70 mg/dL     Results for orders placed or performed during the hospital encounter of 08/06/22   Protein, CSF   Result Value Ref Range    Protein, CSF 36 15 - 40 mg/dL     Results for orders placed or performed during the hospital encounter of 08/06/22   CSF cell count with differential   Result Value Ref Range    Heme Aliquot 6.0 mL    Appearance, CSF Clear Clear    Color, CSF Colorless Colorless    WBC, CSF 1 0 - 5 /cu mm    RBC, CSF 0 0 /cu mm    Lymphs, CSF 89 (H) 40 - 80 %    Mono/Macrophage, CSF 11 (L) 15 - 45 %       Significant Imaging: I have reviewed all pertinent imaging results/findings within the past 24 hours.

## 2022-08-16 NOTE — PROGRESS NOTES
Tristen Read - Telemetry [facility] ([address] Medicine  Progress Note    Patient Name: Sonia Goldberg  MRN: [id number]  Patient Class: IP- Inpatient   Admission Date: 8/6/2022  Length of Stay: 8 days  Attending Physician: Jayshree Marks MD  Primary Care Provider: Terell Reyes MD (Inactive)        Subjective:     Principal Problem:Left-sided weakness        HPI:  Sonia Goldberg is a 36 y.o. female with a past medical history of migraines, PCOS, bipolar disorder, IBS, COVID-19, and KERI presenting in the ED with aphasia and left-sided weakness starting at 1530. Patient mentions having similar episode earlier this month presented to outside hospital obtained laboratory testing which was unremarkable. She was discharged after symptoms improved with migraine cocktail. She describes the headache as pulsing and it is located in the right frontal/parietal region. States the only medication that occasionally helps is Fioricet. Associated symptoms include blurry vision, photophobia, and nausea. Patient denies any vomiting, diarrhea, fevers, chills, or urinary symptoms.     ED: hypertensive, otherwise vital signs stable. Stroke code activated, vascular neurology evaluated patient at bedside. CTH and MRI brain negative for acute infarct ,showing partially empty sella, slight prominent fluid signal along optic nerve sheaths bilaterally. Patient was scheduled to have outpatient follow up with ophthalmology as outpatient. They have strong suspicion for alternate etiology of symptoms such as hemiplegic or complex migraines. Recommend symptomatic relief and general neurology consultation. Total cholesterol 243 with triglycerides of 319. Otherwise intake labs largely unremarkable. Given 1L NS, Toradol, Reglan, and versed.       Overview/Hospital Course:  Sonia Goldberg was placed in  observation for intractable migraine, left-sided paresis, and dysarthria.  Neurology evaluated patient and recommend  beginning IV migraine cocktail: depakote, solumedrol, magnesium, plus oral verapamil. Minimal to no improvement after 48 hours. Neurology performed occipital nerve block without sympotm relief. Diamox 250 mg BID and topamax 25 mg added with no relief. Patient with leukemoid reaction in setting of steroid use from -8/10, infectious work up negative but patient did have elevated lactic 5.9> 3.9> 2.5, resolved with IVF. No concern for infection. Patient completed steroids, and rest of medications (IV magnesium, IV Depacon, oral Verapamil, and Topamax) continued. LP attempted at bedside to evaluate for IIH on 8/10 unable to perform due to pain and patient anxiety. Neurology noted that patient's left-sided weakness has been shown to be variable and nonphysiologic, actually improving after a failed lumbar puncture attempt, with her being able to hold her left upper extremity antigravity at bedside. On  patient with visual hallucination of her  grandmother and new right sided weakness which is distractible. Neurology also notes that speech pattern is not congruent with neurologic aphasia. Given these findings there is high concern for functional disorder. IR scheduled LP with fluoroscopy , patient unwilling to have it done with IV ativan, now for plan with sedation with anaesthesia. Need to coordinate radiology and Anesthesia to get LP under fluoroscopy and scheduled for  for possible idiopathic intracranial hypertension as cause of headache. Plan for psychiatry consult after LP if LP unrevealing. Patient  had LP done on  with radiology and anesthesia for sedation. Patient had an opening pressure highly elevated at 58 and 25 cc of CSF removed and closing pressure 24. CSF cell count and studies sent as per Neuro recs. High opening pressure consistent with suspected idiopathic intracranial hypertension. Patient reported after fluid removed no real change in headache or blurry vision. Diamox  increased from 250 mg to 500 mg po BID and Topamax increased from 25 to 50 mg po daily as per Neurology recs on 8/16 after LP results. No need for Psych evaluation as appears truly has IIH. Neurosurgery to evaluate patient in hospital for future treatments. Patient has an Opthalmology appointment already set for 9/19 as outpatient and she needs to keep.       Interval History: Patient went for LP today with radiology and anesthesia as required sedation to get LP done by radiology. Patient had opening pressure of 58 and 25 cc of clear CSF removed and closing pressure 24. CSF prelim studies showed 1 WBC and normal glucose and protein. CSF viral studies sent as per Neurology recs. High opening pressure consistent with idiopathic intracranial hypertension. Discussed with patient and her  at bedside LP and prelim CSF results and concern for likely IIH. I had secure chat with Neurology consult team with Dr. Antelmo Rojas and he recommended to increase Diamox from 250 to 500 mg po BID for IIH and increase Topamax from 25 to 50 mg po daily and to have Neurosurgery evaluate patient in hospital for consideration of  shunt in near future. Neurology also recommended Opthalmology consult but she already has an appointment set for 8/19 so Neurology okay with outpatient follow-up with Opthalmology. Patient still complaining of severe 10/10 headache after LP and continued blurry vision and left arm weakness and no real improvement with those symptoms after LP. Neurology aware. Patient asking about hospital discharge and I told her once we have a plan for her IIH then hopefully can discharge in next 1-2 days.     Review of Systems   Constitutional:  Positive for appetite change (decreased). Negative for chills and fever.   HENT:  Negative for tinnitus.    Eyes:  Positive for visual disturbance (blurred vision). Negative for photophobia.   Respiratory:  Negative for cough and shortness of breath.    Cardiovascular:   Negative for chest pain.   Gastrointestinal:  Negative for abdominal pain, nausea and vomiting.   Genitourinary:  Negative for difficulty urinating, dysuria, frequency and hematuria.   Musculoskeletal:  Positive for neck pain. Negative for back pain and gait problem.   Neurological:  Positive for weakness, numbness and headaches. Negative for speech difficulty and light-headedness.   Psychiatric/Behavioral:  Negative for agitation and behavioral problems. The patient is not nervous/anxious.    Objective:     Vital Signs (Most Recent):  Temp: 97.9 °F (36.6 °C) (08/16/22 1545)  Pulse: 83 (08/16/22 1545)  Resp: 19 (08/16/22 1545)  BP: 136/72 (08/16/22 1545)  SpO2: 95 % (08/16/22 1545) on room air Vital Signs (24h Range):  Temp:  [97.8 °F (36.6 °C)-99 °F (37.2 °C)] 97.9 °F (36.6 °C)  Pulse:  [71-88] 83  Resp:  [11-19] 19  SpO2:  [95 %-99 %] 95 %  BP: (134-152)/(70-89) 136/72     Weight: 126.6 kg (279 lb)  Body mass index is 43.7 kg/m².    Intake/Output Summary (Last 24 hours) at 8/16/2022 1716  Last data filed at 8/16/2022 1253  Gross per 24 hour   Intake 700 ml   Output --   Net 700 ml      Physical Exam  Vitals and nursing note reviewed.   Constitutional:       General: She is not in acute distress.     Appearance: She is obese.   Eyes:      General: No scleral icterus.  Cardiovascular:      Rate and Rhythm: Normal rate and regular rhythm.      Heart sounds: Normal heart sounds.   Pulmonary:      Effort: Pulmonary effort is normal. No respiratory distress.      Breath sounds: Normal breath sounds. No wheezing or rales.   Abdominal:      General: Abdomen is flat. Bowel sounds are normal. There is no distension.      Palpations: Abdomen is soft.      Tenderness: There is no abdominal tenderness.   Musculoskeletal:      Right lower leg: No edema.      Left lower leg: No edema.      Comments: Minimal, variable ROM in LUE and LLE 2/2 weakness. Decreased sensation L hand   Skin:     General: Skin is warm and dry.    Neurological:      Mental Status: She is alert and oriented to person, place, and time.      Sensory: Sensory deficit present.      Motor: Weakness present.      Coordination: Coordination normal.      Comments: Variable weakness and impaired sensation in left upper and lower extremity, No facial asymmetry. Rate of speech normal.    Psychiatric:         Mood and Affect: Mood normal. Mood is not anxious or depressed. Affect is not tearful.         Speech: Speech normal. Speech is not delayed.         Behavior: Behavior normal.       Significant Labs: CBC: No results for input(s): WBC, HGB, HCT, PLT in the last 48 hours.  CMP:   Recent Labs   Lab 08/16/22  0336      K 3.9   *   CO2 24   GLU 87   BUN 16   CREATININE 0.8   CALCIUM 10.4   PROT 5.6*   ALBUMIN 2.8*   BILITOT 0.1   ALKPHOS 73   AST <5*   ALT 12   ANIONGAP 7*     Results for orders placed or performed during the hospital encounter of 08/06/22   Glucose, CSF   Result Value Ref Range    Glucose, CSF 56 40 - 70 mg/dL     Results for orders placed or performed during the hospital encounter of 08/06/22   Protein, CSF   Result Value Ref Range    Protein, CSF 36 15 - 40 mg/dL     Results for orders placed or performed during the hospital encounter of 08/06/22   CSF cell count with differential   Result Value Ref Range    Heme Aliquot 6.0 mL    Appearance, CSF Clear Clear    Color, CSF Colorless Colorless    WBC, CSF 1 0 - 5 /cu mm    RBC, CSF 0 0 /cu mm    Lymphs, CSF 89 (H) 40 - 80 %    Mono/Macrophage, CSF 11 (L) 15 - 45 %       Significant Imaging: I have reviewed all pertinent imaging results/findings within the past 24 hours.      Assessment/Plan:      * Left-sided weakness  Dysarthria  - Stroke code activated in ED due to left-sided weakness and aphasia in setting of migraine.  - CTH and MRI brain negative for acute infarction on admit.   - Vascular Neurology evaluated patient in the ED and have a low suspicion for CVA at this time.  - General  "neurology consulted for intractable headache and continues to follow and at this point feel may be conversion syndrome causing neurologic changes as neurologic exam non-physiologic in nature and fluctuates frequently.   - Patient reports left leg weakness improved since admit but left arm subjectively still feels weak according to patient.   - Patient's speech is fluent and when dysarthric according to neurology is nonphysiologic and not consistent with neurologic dysarthria.   - LP pending to rule out possible idiopathic intracranial hypertension as could be contributing to symptoms, also concern for functional disorder: "patient's speech pattern is not congruent with a neurologic aphasia and the patient's left-sided weakness has been shown to be variable and nonphysiologic, actually improving after a failed lumbar puncture attempt, with her being able to hold her left upper extremity antigravity at bedside"   - LP done on 8/176 and showed opening pressure pf 58 and highly concerning for IIH. No change in left arm weakness after LP and 25 cc of fluid removed.   - PT/OT consulted on this admit and recommending  PT/OT on hospital discharge.     IIH (idiopathic intracranial hypertension)  - Patient had LP done on 8/16 combined with radiology and anesthesia. LP showed opening pressure of 58 and 25 cc of clear CSF removed and closing pressure 24. Findings very concerning for likely IIH. Discussed with Neurology team on 8/16 and Diamox increased from 250 to 500 mg po BID and Topamax increased from 25 to 50 mg po daily.Neurology recommending Neurosurgery consult to discuss possible future  shunt. Neurology also recommending Neuro Opthalmology consult and patient has appointment set as outpatient on 8/19.    - No improvement and reports worsening on 8/15. Patient to have LP on 8/16 with Radiology and Anesthesia to look for possible idiopathic intracranial hypertension as cause of headache and needs an opening pressure. "   - Patient with known history of migraines presented to hospital with persistent headache for about ~2 months, now with left sided weakness, dysarthria. Concern for complex migraine vs  Idiopathic intracranial hypertension vs functional disorder.  - Recently started on phentermine in May 2022 for weight loss - concern for adverse effect. Medication on hold her in hospital and migraine persists so unlikely.   - Follows with neurology outpatient  - Given 1L NS, Toradol, Reglan, and Versed in the ED and denies any improvement of symptoms.  - MRI brain and CTH without acute findings.  - General Neurology consulted, suspect multifactorial nature with migraine, possible IIH, and possible functional disorder.   Recommendations:  -- Steroids completed. Continue Magnesium 2g IV BID, Depacon 1g  IV BID, Verapamil 120mg po daily.  -- Continue Diamox 250 mg po BID, Topomax 25 mg po daily.  -- LP to rule out IIH.   - Neurology performed occipital nerve block without symptom relief.  - Bedside LP unsuccessful 8/10 as suspicion of IIH contributing to HA.  - IR consulted for LP with nakulo, scheduled 8/12, patient refused IV ativan, will require sedation with Anesthesia so will try to reschedule with Radiology and Anesthesia.        PCOS (polycystic ovarian syndrome)  Patient on Metformin at home to treat and holding in hospital but plan to resume on hospital discharge.         Obstructive sleep apnea  Chronic and controlled. Continue nightly CPAP to treat.       Morbid obesity  Body mass index is 43.7 kg/m². Morbid obesity complicates all aspects of disease management from diagnostic modalities to treatment. Weight loss encouraged and health benefits explained to patient.         Iron deficiency anemia due to chronic blood loss  Folate deficiency  - Patient with chronic anemia on admit noted. Anemia is chronic and controlled. No signs of active bleeding.   - Anemia work-up done and showed iron 72, TIBC elevated at 465 and sat  iron low at 15 and ferritin low at 10 consistent with iron deficiency anemia and started on ferrous sulfate tablet 1 po daily to treat DARRION.   - Folate level also low at 2.2 consistent with folate deficiency and likely also contributing to anemia and patient started on Folic acid 1 mg p daily to treat.   - Vitamin B12 level 336 and in low normal range so concern for possible deficiency so methylmalonic acid level sent and results pending but started on daily replacement therapy with Vitamin B12 1000 mcg subcutaneous daily for now.     Bipolar disorder, unspecified  - Chronic and controlled at present. Follows with Psychiatry as outpatient.  - Patient reports no recent manic episodes and that her bipolar is stable.  - Continue Wellbutrin, Vraylar, and Ativan prn to treat.       VTE Risk Mitigation (From admission, onward)    None          Discharge Planning   [place]: 8/16/2022     Code Status: Full Code   Is the patient medically ready for discharge?: No    Reason for patient still in hospital (select all that apply): Patient new problem and Patient trending condition  Discharge Plan A: Home with family with HH PT/OT and close outpatient follow-up with Neurology, Neurosurgery and Neuro Opthalmology.   Discharge Delays: None known at this time      Jayshree Marks MD  Department of Hospital Medicine   [facility] - Telemetry Stepdown ([place]

## 2022-08-16 NOTE — MEDICAL/APP STUDENT
[facility] Medicine Student   Progress Note  [facility]    Admit Date: 8/6/2022  Hospital Day: 8  08/16/2022  12:59 PM    SUBJECTIVE:   Ms. Sonia Goldberg is a 36 y.o. female with a relevant medical history of migraine, PCOS, obesity, KERI  who was admitted for for Left-sided weakness and intractable migraine     Interval history:   NAEON. Patient says headache is unchanged since yesterday (8/10). Remarks that she notices it is postural in nature, worsened with bending over and that she experiences a head rush with head movement. Also says she experiences slurred speech for minutes after bending over. Also reports vision is blurrier. Denies diploplia. EOM intact.  Reports weakness is somewhat improved in lower extremities and RUE. Described progress with PT/OT. Still reports decreased sensation on left side.   Discussed urinalysis results with patient as she c/o possible uti yesterday. Denies dysuria today.  O/E patient appears comfortable. AOX4 Denies CP, SOB, n/v/d.   Patient is NPO awaiting LP scheduled for this afternoon.    Review of Systems   Constitutional: Negative for chills and fever.   HENT: Positive for tinnitus.    Eyes: Positive for blurred vision. Negative for double vision.   Respiratory: Negative for cough, hemoptysis and shortness of breath.    Cardiovascular: Negative for chest pain, palpitations and leg swelling.   Gastrointestinal: Negative for abdominal pain, diarrhea, nausea and vomiting.   Genitourinary: Positive for urgency.   Musculoskeletal: Negative for back pain and neck pain.   Skin: Negative for rash.   Neurological: Positive for sensory change, weakness and headaches.   Psychiatric/Behavioral: Negative for substance abuse and suicidal ideas.       Please refer to the H&P for past medical, family, and social history.    OBJECTIVE:     Vital Signs Recent:  Temp: 98.3 °F (36.8 °C) (08/16/22 1106)  Pulse: 75 (08/16/22 1106)  Resp: 15 (08/16/22 1106)  BP: 138/76 (08/16/22  1106)  SpO2: 97 % (08/16/22 1106)  Oxygen Documentation:       Oxygen Concentration (%): 97        O2 Device (Oxygen Therapy): room air         Vital Signs Range (Last 24H):  Temp:  [97.9 °F (36.6 °C)-98.7 °F (37.1 °C)]   Pulse:  [71-86]   Resp:  [15-18]   BP: (113-152)/(70-83)   SpO2:  [95 %-98 %]   Oxygen Concentration (%): 97 (08/11/22 2302)    I & O (Last 24H):    Intake/Output Summary (Last 24 hours) at 8/16/2022 1259  Last data filed at 8/16/2022 1253  Gross per 24 hour   Intake 700 ml   Output --   Net 700 ml        Physical Exam:  Physical Exam  Constitutional:       General: She is not in acute distress.  HENT:      Head: Normocephalic and atraumatic.      Mouth/Throat:      Mouth: Mucous membranes are moist.   Eyes:      Extraocular Movements: Extraocular movements intact.      Pupils: Pupils are equal, round, and reactive to light.   Cardiovascular:      Rate and Rhythm: Normal rate and regular rhythm.   Pulmonary:      Breath sounds: No wheezing or rales.   Abdominal:      General: Bowel sounds are normal. There is no distension.      Palpations: Abdomen is soft.      Tenderness: There is no abdominal tenderness.   Musculoskeletal:      Right lower leg: No edema.      Left lower leg: No edema.   Skin:     General: Skin is warm and dry.   Neurological:      Mental Status: She is alert and oriented to person, place, and time.      Cranial Nerves: No dysarthria.      Sensory: Sensory deficit present.      Motor: Weakness present.      Comments: Speech perhaps slightly slow but not dysarthric   Patient          Labs:   Recent Labs   Lab 08/14/22  0150 08/16/22  0336    142   K 3.7 3.9    111*   CO2 20* 24   BUN 15 16   CREATININE 0.8 0.8   * 87   CALCIUM 10.0 10.4     Recent Labs   Lab 08/12/22  0427 08/14/22  0150 08/16/22  0336   ALKPHOS 53* 67 73   ALT 19 17 12   AST 7* 10 <5*   ALBUMIN 2.6* 2.8* 2.8*   PROT 5.3* 5.8* 5.6*   BILITOT 0.2 0.1 0.1     Recent Labs   Lab 08/12/22  0427  08/13/22  0814 08/14/22  0150   WBC 10.87 10.44 11.15   HGB 11.3* 10.5* 11.0*   HCT 35.8* 33.9* 36.4*    329 360       Diagnostic Results:  Awaiting results from LP 8/16    Scheduled Meds:   acetaminophen  1,000 mg Oral Q8H    acetaZOLAMIDE  250 mg Oral BID    buPROPion  150 mg Oral Nightly    cariprazine  3 mg Oral QHS    cyanocobalamin  1,000 mcg Subcutaneous Daily    famotidine  20 mg Oral BID    ferrous sulfate  1 tablet Oral Daily    folic acid  1 mg Oral Daily    magnesium sulfate IVPB  2 g Intravenous BID    topiramate  25 mg Oral Daily    valproate sodium (DEPACON) IVPB  1,000 mg Intravenous Q12H    verapamiL  120 mg Oral Daily     Continuous Infusions:   sodium chloride 0.9% 75 mL/hr at 08/16/22 0736     PRN Meds:albuterol-ipratropium, baclofen, bisacodyL, dextrose 10%, dextrose 10%, diphenhydrAMINE, fentaNYL, glucagon (human recombinant), glucose, glucose, hydrALAZINE, insulin aspart U-100, LORazepam, meclizine, naloxone, ondansetron, ondansetron, promethazine (PHENERGAN) IVPB, simethicone, sodium chloride 0.9%, sodium chloride 0.9%    ASSESSMENT/PLAN:   Ms. Sonia Goldberg is a 36 y.o. female with a relevant medical history of migraine, PCOS, obesity, KERI who is being followed up for Left-sided weakness.    Active Hospital Problems    Diagnosis  POA    *Left-sided weakness [R53.1]  Yes    Folate deficiency [E53.8]  Yes    Dysarthria [R47.1]  Yes    PCOS (polycystic ovarian syndrome) [E28.2]  Yes    Intractable migraine [G43.919]  Yes    Bipolar disorder, unspecified [F31.9]  Yes    Iron deficiency anemia due to chronic blood loss [D50.0]  Yes    Morbid obesity [E66.01]  Yes    Obstructive sleep apnea [G47.33]  Yes      Resolved Hospital Problems    Diagnosis Date Resolved POA    Lactic acidosis [E87.2] 08/14/2022 Yes    Leukocytosis [D72.829] 08/14/2022 No    Hypokalemia [E87.6] 08/14/2022 No    Anemia [D64.9] 08/11/2022 Yes          Intractable Migraine + visual  disturbance:  -NPO for LP scheduled today 8/16. Bedside LPs not feasible due to patient anxiety  -migraine + pulsatile tinnitus + vision changes suspicious for IIH, await LP results for OP  -consider also functional disorder if LP unremarkable-- consider psych consult  -neuro following, appreciate recs  -Continue mag, depacon, verapamil for headache  -diamox, topomax for potential IIH  -consider ophthamology consult pending LP    Left sided weakness:  Patient noted some improvement in strength bilaterally in lower extremities and RUE, progress with PT/OT  -previously worked up by [facility] neuro: stroke code activated in ED, CTH and MRI unremarkable  -followed by gen neuro  -consider functional disorder or mixed picture given inconsistent pattern and fluctuation of deficits  -not dysarthric o/e  -LP today 8/16 to determine if IIH is contributing to symptoms    Obesity:  Patient's BMI is 43.7. Patient did note weight loss while in hospital  Patient counseling on lifestyle modifications and weight loss, particularly considering role of obesity in potential IIH    PCOS: Patient was taking metformin for mgmt but it is not available on hospital formulary. Will resume upon d/c    KERI: well-controlled wirh CPAP. No acute issues    BPD: controlled in outpatient psychiatry. Patient not demonstrating any signs of mushtaq or depression  Continue home meds--buproprion, vraylar, lorazepam prn    Iron deficiency anemia, folate deficiency:  -chronic anemia noted during admission, no suspicion for internal bleeding (H&H 11 and 36)  -iron workup aligns with iron deficiency anemia  -patient also has low folate   -Continue ferrous sulfate QD  -continue folic acid 1 mg QD    Discharge planning:   [place]: 8/18/2022     Code Status: Full Code   Is the patient medically ready for discharge?: No    Reason for patient still in hospital (select all that apply): Patient trending condition, awaiting LP  Discharge Plan A: Home with family with  PT/OT.  Needs LP as part of her diagnostic work-up and Psych consult prior to hospital discharge.   Discharge Delays: None known at this time

## 2022-08-16 NOTE — NURSING TRANSFER
Nursing Transfer Note      8/16/2022     Reason patient is being transferred: post op    Transfer To: [id number]    Transfer via bed    Transfer with 02 2 liters NC    Transported by PCT X 2    Medicines sent: None    Any special needs or follow-up needed: None    Chart send with patient: Yes    Notified: spouse    Patient reassessed at: 8/16/2022 1345    Upon arrival to floor: patient oriented to room, call bell in reach and bed in lowest position

## 2022-08-16 NOTE — TRANSFER OF CARE
"Anesthesia Transfer of Care Note    Patient: Sonia Goldberg    Procedure(s) Performed: Procedure(s) (LRB):  Lumbar Puncture (N/A)    Patient location: PACU    Anesthesia Type: general    Transport from OR: Transported from OR on room air with adequate spontaneous ventilation    Post pain: adequate analgesia    Post assessment: no apparent anesthetic complications and tolerated procedure well    Post vital signs: stable    Level of consciousness: awake, alert and oriented    Nausea/Vomiting: no nausea/vomiting    Complications: none    Transfer of care protocol was followed      Last vitals:   Visit Vitals  /81 (BP Location: Left arm, Patient Position: Lying)   Pulse 88   Temp 37.2 °C (99 °F) (Temporal)   Resp 18   Ht 5' 7" (1.702 m)   Wt 126.6 kg (279 lb)   LMP 07/18/2022 (Exact Date)   SpO2 97%   Breastfeeding No   BMI 43.70 kg/m²     "

## 2022-08-17 ENCOUNTER — TELEPHONE (OUTPATIENT)
Dept: NEUROSURGERY | Facility: CLINIC | Age: 37
End: 2022-08-17
Payer: COMMERCIAL

## 2022-08-17 ENCOUNTER — TELEPHONE (OUTPATIENT)
Dept: NEUROLOGY | Facility: CLINIC | Age: 37
End: 2022-08-17
Payer: COMMERCIAL

## 2022-08-17 VITALS
HEIGHT: 67 IN | BODY MASS INDEX: 43.79 KG/M2 | DIASTOLIC BLOOD PRESSURE: 88 MMHG | SYSTOLIC BLOOD PRESSURE: 133 MMHG | TEMPERATURE: 98 F | HEART RATE: 80 BPM | WEIGHT: 279 LBS | RESPIRATION RATE: 18 BRPM | OXYGEN SATURATION: 97 %

## 2022-08-17 LAB — CRYPTOC AG CSF QL LA: NEGATIVE

## 2022-08-17 PROCEDURE — 99239 HOSP IP/OBS DSCHRG MGMT >30: CPT | Mod: ,,, | Performed by: INTERNAL MEDICINE

## 2022-08-17 PROCEDURE — 25000003 PHARM REV CODE 250: Performed by: PHYSICIAN ASSISTANT

## 2022-08-17 PROCEDURE — 63600175 PHARM REV CODE 636 W HCPCS: Performed by: PHYSICIAN ASSISTANT

## 2022-08-17 PROCEDURE — 94640 AIRWAY INHALATION TREATMENT: CPT

## 2022-08-17 PROCEDURE — 94761 N-INVAS EAR/PLS OXIMETRY MLT: CPT

## 2022-08-17 PROCEDURE — 99239 PR HOSPITAL DISCHARGE DAY,>30 MIN: ICD-10-PCS | Mod: ,,, | Performed by: INTERNAL MEDICINE

## 2022-08-17 PROCEDURE — 25000003 PHARM REV CODE 250: Performed by: INTERNAL MEDICINE

## 2022-08-17 PROCEDURE — 97116 GAIT TRAINING THERAPY: CPT

## 2022-08-17 PROCEDURE — 25000242 PHARM REV CODE 250 ALT 637 W/ HCPCS

## 2022-08-17 PROCEDURE — 25000003 PHARM REV CODE 250: Performed by: NURSE PRACTITIONER

## 2022-08-17 PROCEDURE — 25000003 PHARM REV CODE 250

## 2022-08-17 PROCEDURE — 99223 1ST HOSP IP/OBS HIGH 75: CPT | Mod: ,,, | Performed by: PHYSICIAN ASSISTANT

## 2022-08-17 PROCEDURE — 99900035 HC TECH TIME PER 15 MIN (STAT)

## 2022-08-17 PROCEDURE — 99223 PR INITIAL HOSPITAL CARE,LEVL III: ICD-10-PCS | Mod: ,,, | Performed by: PHYSICIAN ASSISTANT

## 2022-08-17 RX ORDER — ACETAZOLAMIDE 250 MG/1
750 TABLET ORAL 2 TIMES DAILY
Qty: 180 TABLET | Refills: 11 | Status: SHIPPED | OUTPATIENT
Start: 2022-08-17 | End: 2022-08-21

## 2022-08-17 RX ORDER — FERROUS SULFATE 325(65) MG
325 TABLET, DELAYED RELEASE (ENTERIC COATED) ORAL DAILY
Qty: 30 TABLET | Refills: 11 | Status: SHIPPED | OUTPATIENT
Start: 2022-08-17 | End: 2022-08-30

## 2022-08-17 RX ORDER — TOPIRAMATE 50 MG/1
50 TABLET, FILM COATED ORAL NIGHTLY
Qty: 30 TABLET | Refills: 11 | Status: SHIPPED | OUTPATIENT
Start: 2022-08-17 | End: 2022-08-21

## 2022-08-17 RX ORDER — VERAPAMIL HYDROCHLORIDE 120 MG/1
120 TABLET, FILM COATED, EXTENDED RELEASE ORAL DAILY
Qty: 30 TABLET | Refills: 11 | Status: SHIPPED | OUTPATIENT
Start: 2022-08-18 | End: 2022-09-01

## 2022-08-17 RX ORDER — TOPIRAMATE 25 MG/1
25 TABLET ORAL ONCE
Status: COMPLETED | OUTPATIENT
Start: 2022-08-17 | End: 2022-08-17

## 2022-08-17 RX ORDER — FOLIC ACID 1 MG/1
1 TABLET ORAL DAILY
Qty: 30 TABLET | Refills: 11 | Status: SHIPPED | OUTPATIENT
Start: 2022-08-18 | End: 2022-09-27

## 2022-08-17 RX ADMIN — FAMOTIDINE 20 MG: 20 TABLET ORAL at 09:08

## 2022-08-17 RX ADMIN — VERAPAMIL HYDROCHLORIDE 120 MG: 120 TABLET, FILM COATED, EXTENDED RELEASE ORAL at 05:08

## 2022-08-17 RX ADMIN — ACETAMINOPHEN 1000 MG: 500 TABLET ORAL at 05:08

## 2022-08-17 RX ADMIN — ACETAMINOPHEN 1000 MG: 500 TABLET ORAL at 02:08

## 2022-08-17 RX ADMIN — CYANOCOBALAMIN 1000 MCG: 1000 INJECTION INTRAMUSCULAR; SUBCUTANEOUS at 09:08

## 2022-08-17 RX ADMIN — MAGNESIUM SULFATE HEPTAHYDRATE 2 G: 40 INJECTION, SOLUTION INTRAVENOUS at 09:08

## 2022-08-17 RX ADMIN — IPRATROPIUM BROMIDE AND ALBUTEROL SULFATE 3 ML: 2.5; .5 SOLUTION RESPIRATORY (INHALATION) at 09:08

## 2022-08-17 RX ADMIN — FERROUS SULFATE TAB 325 MG (65 MG ELEMENTAL FE) 1 EACH: 325 (65 FE) TAB at 09:08

## 2022-08-17 RX ADMIN — TOPIRAMATE 50 MG: 25 TABLET, FILM COATED ORAL at 09:08

## 2022-08-17 RX ADMIN — ACETAZOLAMIDE 500 MG: 250 TABLET ORAL at 09:08

## 2022-08-17 RX ADMIN — FOLIC ACID 1 MG: 1 TABLET ORAL at 09:08

## 2022-08-17 RX ADMIN — TOPIRAMATE 25 MG: 25 TABLET, FILM COATED ORAL at 10:08

## 2022-08-17 NOTE — PLAN OF CARE
08/17/22 1401   Post-Acute Status   Post-Acute Authorization Home Health   Home Health Status Referrals Sent     Pt is expected to discharge home with home health. SW sent referrals via [facility] [place] and is waiting on an accepting agency.    [facility] will continue to follow up.    Lavonne Albrecht LMSW  [facility]  Ext. [phone]

## 2022-08-17 NOTE — MEDICAL/APP STUDENT
"[facility] Medicine Student   Progress Note  [facility] HOSP MED K    Admit Date: 8/6/2022  Hospital Day: 9  08/17/2022  11:04 AM    SUBJECTIVE:   Ms. Sonia Goldberg is a 36 y.o. female with a relevant medical history of chronic migraine, obesity, KERI, PCOS, and BPD who is being observed for Left-sided weakness and intractable migraine.     Interval history: NAEON  S/P lumbar puncture. Patient remarked that she experienced some relief from headache last night but said it worsened again upon sitting up this morning (6/10). Also stated that her blurry vision is persistent, and it feels like "someone is squeezing her eyeballs." Denies diploplia, tremor, syncope, LOC.   Patient otherwise feels well. Denies SOB, CP, N/V/D. Normal appetite.  Patient c/o "hives" this morning with depacon IV. Depacon and mag d/c today as not indicated for IIH.  Patient reported strength has improved and was able to lift LUE against gravity (4-/5) and was successfully ambulating to shower.  Patient asked questions about IIH and said neurosurgery had spoken with her. Expressed interest in eventual shunt placement. We also discussed weight loss and connection between IIH and obesity.  Patient is stable and ready for D/C today pending neurosurg consult    Review of Systems   Constitutional: Negative for chills and fever.   HENT: Positive for tinnitus.    Eyes: Positive for blurred vision. Negative for double vision and photophobia.   Respiratory: Negative for cough and hemoptysis.    Cardiovascular: Negative for chest pain, palpitations and leg swelling.   Gastrointestinal: Negative for abdominal pain, nausea and vomiting.   Genitourinary: Positive for frequency.   Musculoskeletal: Negative for myalgias.   Neurological: Positive for weakness and headaches. Negative for tremors and loss of consciousness.   Psychiatric/Behavioral: Negative for suicidal ideas.         OBJECTIVE:     Vital Signs Recent:  Temp: 97.9 °F (36.6 °C) (08/17/22 0753)  Pulse: " 80 (08/17/22 0917)  Resp: 18 (08/17/22 0917)  BP: (!) 143/87 (08/17/22 0753)  SpO2: 97 % (08/17/22 0917)  Oxygen Documentation:    Flow (L/min): 2  Oxygen Concentration (%): 97        O2 Device (Oxygen Therapy): room air         Vital Signs Range (Last 24H):  Temp:  [97 °F (36.1 °C)-99 °F (37.2 °C)]   Pulse:  [75-88]   Resp:  [11-19]   BP: (134-145)/(72-89)   SpO2:  [95 %-99 %]   Oxygen Concentration (%): 97 (08/11/22 2302)    I & O (Last 24H):    Intake/Output Summary (Last 24 hours) at 8/17/2022 1104  Last data filed at 8/17/2022 0400  Gross per 24 hour   Intake 703 ml   Output --   Net 703 ml        Physical Exam:  Physical Exam  Constitutional:       General: She is not in acute distress.     Appearance: She is obese. She is not toxic-appearing.   HENT:      Head: Normocephalic and atraumatic.      Mouth/Throat:      Mouth: Mucous membranes are moist.   Eyes:      Extraocular Movements: Extraocular movements intact.      Pupils: Pupils are equal, round, and reactive to light.   Cardiovascular:      Rate and Rhythm: Normal rate and regular rhythm.      Heart sounds: No murmur heard.    No gallop.   Pulmonary:      Effort: Pulmonary effort is normal. No respiratory distress.      Breath sounds: Normal breath sounds.   Abdominal:      General: Abdomen is flat. There is no distension.      Palpations: Abdomen is soft.      Tenderness: There is no abdominal tenderness.   Musculoskeletal:      Right lower leg: No edema.      Left lower leg: No edema.   Skin:     General: Skin is warm and dry.   Neurological:      Mental Status: She is alert and oriented to person, place, and time.      Cranial Nerves: No cranial nerve deficit or dysarthria.      Motor: Weakness present.      Comments: 4-/5 in LUE, improved from yesterday.  4+ bilateral LLE  Speech fluid, non-dysarthric    Psychiatric:         Thought Content: Thought content normal.         Judgment: Judgment normal.         Labs:   Recent Labs   Lab 08/16/22  8663       K 3.9   *   CO2 24   BUN 16   CREATININE 0.8   GLU 87   CALCIUM 10.4     Recent Labs   Lab 08/12/22  0427 08/14/22  0150 08/16/22  0336   ALKPHOS 53* 67 73   ALT 19 17 12   AST 7* 10 <5*   ALBUMIN 2.6* 2.8* 2.8*   PROT 5.3* 5.8* 5.6*   BILITOT 0.2 0.1 0.1     Recent Labs   Lab 08/12/22  0427 08/13/22  0814 08/14/22  0150   WBC 10.87 10.44 11.15   HGB 11.3* 10.5* 11.0*   HCT 35.8* 33.9* 36.4*    329 360       Diagnostic Results:  Lumbar puncture from 8/16 with a significantly elevated opening pressure of 58, CP of 24, with 25cc of CSF removed. --consistent with IIH. CSF analysis otherwise normal with glucose, protein, WBCs WNL, CSF cultures pending.    Scheduled Meds:   acetaminophen  1,000 mg Oral Q8H    acetaZOLAMIDE  500 mg Oral BID    buPROPion  150 mg Oral Nightly    cariprazine  3 mg Oral QHS    famotidine  20 mg Oral BID    ferrous sulfate  1 tablet Oral Daily    folic acid  1 mg Oral Daily    verapamiL  120 mg Oral Daily     Continuous Infusions:  PRN Meds:albuterol-ipratropium, baclofen, bisacodyL, dextrose 10%, dextrose 10%, diphenhydrAMINE, glucagon (human recombinant), glucose, glucose, hydrALAZINE, insulin aspart U-100, LORazepam, meclizine, naloxone, ondansetron, promethazine (PHENERGAN) IVPB, simethicone, sodium chloride 0.9%    ASSESSMENT/PLAN:   Ms. Sonia Goldberg is a 36 y.o. female with a relevant medical history of chronic migraine, obesity, KERI, PCOS, and BPD was admitted for Left-sided weakness + intractable migraine.    Active Hospital Problems    Diagnosis  POA    *Left-sided weakness [R53.1]  Yes    Folate deficiency [E53.8]  Yes    Dysarthria [R47.1]  Yes    PCOS (polycystic ovarian syndrome) [E28.2]  Yes    IIH (idiopathic intracranial hypertension) [G93.2]  Yes    Bipolar disorder, unspecified [F31.9]  Yes    Iron deficiency anemia due to chronic blood loss [D50.0]  Yes    Morbid obesity [E66.01]  Yes    Obstructive sleep apnea [G47.33]  Yes       Resolved Hospital Problems    Diagnosis Date Resolved POA    Lactic acidosis [E87.2] 08/14/2022 Yes    Leukocytosis [D72.829] 08/14/2022 No    Hypokalemia [E87.6] 08/14/2022 No    Anemia [D64.9] 08/11/2022 Yes       IIH:  -LP with opening pressure of 58, optic disc swelling + headache + pulsatile tinnitus + vision changes, highly suspicious of IIH  -increased diamox to 500 BID and topamax 50 qd for IIH treatment per recs from general neurology  -neurosurgery consulting patient regarding shunt--appreciate recs  -Patient has neuro-ophthal appointment outpatient on Friday  -Continue verapamil for headache and HTN control  -Depacon and Mg d/c due to hives reaction and because they are not indicated in IIH    Left sided weakness:  -previously worked up by [facility] neuro: stroke code activated in ED, CTH and MRI unremarkable  -S/P LP on 8/16  -O/E 8/17: not dysarthric, patient noted some improvement in strength from yesterday s/p LP  - able to ambulate to bathroom, 4+/5 in LUE, says sensation is also improved    Obesity:  -Patient's BMI is 43.7.   -Patient did note weight loss while in hospital  -Patient counseled on lifestyle modifications and weight loss, particularly considering role of obesity in potential IIH     Iron deficiency anemia, folate deficiency:  -chronic anemia noted during admission, no suspicion for acute bleeding (most recent H&H 11.1 & 36.4, VSS, pt asymptomatic)   -iron workup aligns with iron deficiency anemia (TIBC: 465, ferritin 10)  -patient also has low folate (2.2)  -Continue ferrous sulfate QD  -continue folic acid 1 mg QD    PCOS:  -Patient was taking metformin for mgmt but it is not available on hospital formulary. Will resume upon d/c     KERI: well-controlled wirh CPAP. No acute issues     BPD:   -controlled in outpatient psychiatry. Patient not demonstrating any signs of mushtaq or depression  -Continue home meds--buproprion, vraylar, lorazepam prn       Discharge planning:    [place]: 8/17/2022    Code Status: Full Code   Is the patient medically ready for discharge?: Yes  Discharge Plan A: Home with family with HH PT/OT.   Discharge Delays: None known at this time

## 2022-08-17 NOTE — PLAN OF CARE
Hospital Follow Up Note  JAQUAN Aguirre Child 1942 9/19/2017 8:32 AM  S   Patient pulled NGT out yesterday. Tolerated breakfast this morning. Sitting in chair. Still with some confusion although improving from admission. Ammonia is improved      O.    Vitals:    09/19/17 0740   BP: (!) 161/95   Pulse: 62   Resp:    Temp: 98.8 °F (37.1 °C)   SpO2: 95%            Awake, alert, confused          Heart  RRR         Lungs CTAB          ABD S/NT/ND/+BS         Ext No LLE     A. 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      Cirrhosis, acutely decompensated  NGT   Cholestasis without Jaundice  Hepatic encephalopathy  Ammonia normal 9-19-17. Was 143 on admit   Lactulose   Actigall      Ascites - trace only, too little to tap   Venous insufficiency  Hypoalbuminemia  Elevated alk phos  Pancytopenia     Anemia, micro - AR by labs   Thrombocytopenia  Elevated INR      Infection? - possible acalculous cholecystitis on U/S  Cipro/flagyl 9-15-17     AFP 2.2      Tumor? - imaging without mass   GI bleeding? - no signs of hemorrhage  Thrombosis? - no, dopplers normal  Alcoholic hepatitis? - no signs  Med Noncompliance? - probable  Diet Noncompliance? - no  Sedatives? - no      Hypertension   Hypothyroidism   Hypercalcemia   Acute kidney injury, nephrology following. Stopped PPI added pepcid     Right pleural effusion, pulmonology following likely hepatic hydrothorax   Pneumonia         P.  Continue lactulose   Continue Actigall for PBC    Continue Cipro and Flagyl for possible acalculous cholecystitis per US   Begin oral iron supplementation    Follow       A&P discussed with Dr Leydi Gloria, CNP [facility]    HOME HEALTH ORDERS  FACE TO FACE ENCOUNTER    Patient Name: Sonia Goldberg   YOB: 1985     PCP: Terell Reyes MD (Inactive)   PCP Address: [address] [place] [address] [place] [address]   PCP Phone Number: [phone]   PCP Fax: [phone]     Encounter Date: 08/17/2022     Discharging Team: [facility] HOSP MED K    Admit to Home Health    Diagnoses:  Active Hospital Problems    Diagnosis  POA    *Left-sided weakness [R53.1]  Yes     Priority: 1 - High    IIH (idiopathic intracranial hypertension) [G93.2]  Yes     Priority: 2     PCOS (polycystic ovarian syndrome) [E28.2]  Yes     Priority: 3     Obstructive sleep apnea [G47.33]  Yes     Priority: 4     Morbid obesity [E66.01]  Yes     Priority: 5     Iron deficiency anemia due to chronic blood loss [D50.0]  Yes     Priority: 6     Bipolar disorder, unspecified [F31.9]  Yes     Priority: 7     Folate deficiency [E53.8]  Yes    Dysarthria [R47.1]  Yes      Resolved Hospital Problems    Diagnosis Date Resolved POA    Lactic acidosis [E87.2] 08/14/2022 Yes    Leukocytosis [D72.829] 08/14/2022 No    Hypokalemia [E87.6] 08/14/2022 No    Anemia [D64.9] 08/11/2022 Yes        Future Appointments   Date Time Provider Department Center   8/19/2022  8:45 AM [facility]   8/19/2022  9:30 AM [facility]   8/23/2022  2:00 PM Brittany Templeton PA-C [facility] OPSYCH O at [facility]        My clinical findings that support the need for the home health skilled services and home bound status are the following:  Weakness/numbness causing balance and gait disturbance due to Weakness/Debility making it taxing to leave home.  Requiring assistive device to leave home due to unsteady gait caused by  Weakness/Debility.    Allergies:   Review of patient's allergies indicates:   Allergen Reactions    Contrast media Anaphylaxis    Iodine and iodide containing products Anaphylaxis    Levaquin  [levofloxacin] Anaphylaxis    Levofloxacin in d5w Anaphylaxis    Sulfa (sulfonamide antibiotics) Anaphylaxis and Hives    Iodinated contrast media Hives    Magnesium      Pt reporting she is allergic to magnesium citrate oral drink.     Morphine Hives    Adhesive Rash    Compazine [prochlorperazine] Anxiety     Restless legs    Depacon [valproate sodium] Hives     Pt experienced hives at IV site upon 8th day of depacon administration.  Hives resolved with stopping medication in 1.5 hours.    Nut [tree nut] Hives        Diet: regular diet    Activities: activity as tolerated    Nursing:   SN to complete comprehensive assessment including routine vital signs. Instruct on disease process and s/s of complications to report to MD. Review/verify medication list sent home with the patient at time of discharge  and instruct patient/caregiver as needed. Frequency may be adjusted depending on start of care date.    Notify MD if SBP > 160 or < 90; DBP > 90 or < 50; HR > 120 or < 50; Temp > 101    Ok to schedule additional visits based on staff availability and patient request on consecutive days within the home health episode.     When multiple disciplines ordered:     Start of Care occurs on Sunday - Wednesday schedule remaining discipline evaluations as ordered on separate consecutive days following the start of care.     Thursday SOC -schedule subsequent evaluations Friday and Monday the following week.      Friday - Saturday SOC - schedule subsequent discipline evaluations on consecutive days starting Monday of the following week.     For all post-discharge communication and subsequent orders please contact patient's primary care physician. If unable to reach primary care physician or do not receive response within 30 minutes, please contact [facility] for clinical staff order clarification.    CONSULTS:    Physical Therapy to evaluate and treat. Evaluate for home safety and equipment needs;  Establish/upgrade home exercise program. Perform / instruct on therapeutic exercises, gait training, transfer training, and Range of Motion.  Occupational Therapy to evaluate and treat. Evaluate home environment for safety and equipment needs. Perform/Instruct on transfers, ADL training, ROM, and therapeutic exercises.      Medications: Review discharge medications with patient and family and provide education.         Medication List      START taking these medications    acetaZOLAMIDE 250 MG tablet  Commonly known as: DIAMOX  Take 3 tablets (750 mg total) by mouth 2 (two) times daily.     ferrous sulfate 325 (65 FE) MG EC tablet  Take 1 tablet (325 mg total) by mouth once daily.     folic acid 1 MG tablet  Commonly known as: FOLVITE  Take 1 tablet (1 mg total) by mouth once daily.  Start taking on: August 18, 2022     topiramate 50 MG tablet  Commonly known as: TOPAMAX  Take 1 tablet (50 mg total) by mouth every evening.     UBRELVY 100 mg tablet  Generic drug: ubrogepant  Take 1 tablet by mouth once as needed for  migraine. May repeat in 2 hours if needed. Max 2 tablets per day.     verapamiL 120 MG CR tablet  Commonly known as: CALAN-SR  Take 1 tablet (120 mg total) by mouth once daily at 6am.  Start taking on: August 18, 2022        CHANGE how you take these medications    buPROPion 150 MG TB24 tablet  Commonly known as: WELLBUTRIN XL  Take 1 tablet (150 mg total) by mouth once daily.  What changed: when to take this     VRAYLAR 3 mg Cap  Generic drug: cariprazine  Take 1 capsule (3 mg total) by mouth once daily at 6am.  What changed: when to take this        CONTINUE taking these medications    acetaminophen 500 MG tablet  Commonly known as: TYLENOL  Take 1,000 mg by mouth 2 (two) times daily as needed (knee pain).     albuterol 90 mcg/actuation inhaler  Commonly known as: PROVENTIL/VENTOLIN HFA  Inhale 2 puffs into the lungs every 6 (six) hours as needed for Wheezing.     aspirin-acetaminophen-caffeine  250-250-65 mg 250-250-65 mg per tablet  Commonly known as: EXCEDRIN MIGRAINE  Take 2 tablets by mouth daily as needed (headache).     butalbital-acetaminophen-caffeine -40 mg -40 mg per tablet  Commonly known as: FIORICET, ESGIC  Take 1 tablet by mouth every 4 (four) hours as needed for Headaches.     EPINEPHrine 0.3 mg/0.3 mL Atin  Commonly known as: EPIPEN  Inject 0.3 mLs (0.3 mg total) into the muscle as needed (anaphylaxis).     HYDROcodone-acetaminophen 5-325 mg per tablet  Commonly known as: NORCO  Take 1 tablet by mouth every 8 (eight) hours as needed for Pain.     LORazepam 1 MG tablet  Commonly known as: ATIVAN  Take 1 tablet (1 mg total) by mouth every 6 (six) hours as needed for Anxiety.     metFORMIN 500 MG ER 24hr tablet  Commonly known as: GLUCOPHAGE-XR  Take 1 tablet (500 mg total) by mouth daily with breakfast.     metoclopramide HCl 10 MG tablet  Commonly known as: REGLAN  Take 1 tablet (10 mg total) by mouth every 6 (six) hours.     phentermine 37.5 mg tablet  Commonly known as: ADIPEX-P  Take 1 tablet (37.5 mg total) by mouth before breakfast.     pulse oximeter device  Commonly known as: pulse oximeter  by Apply Externally route 2 (two) times a day. Use twice daily at 8 AM and 3 PM and record the value in MyChart as directed.              I certify that this patient is confined to her home and needs physical therapy and occupational therapy.    _________________________________  Jayshree Marks MD  08/17/2022

## 2022-08-17 NOTE — HPI
37 yo female with a history of migraines, PCOS, bipolar disorder, IBS, and KERI who was admitted to  for aphasia and left-sided weakness and intractable migraine. NSGY consulted for IIH and consideration of shunt placement. She has undergone an extensive workup for her symptoms including LP yesterday which showed elevated OP of 58, 25cc of CSF was drained and her CP was 24. Pt reports that her HA got better last night about 1 hour after the LP. She also notes improvement in the strength on the left side. She states the HA has returned this am but her weakness has not worsened. The HA are worse with turning her head or looking down and are constant.  She also complains of intermittent slurred speech and expressive aphasia that has worsened over the past 2 weeks, as well as progressively worsening vision. Neurology noted that speech pattern is not congruent with neurologic aphasia. She notes significant weight gain over the past year and was started on metformin and weight loss medication. Neurology is following for HA management and her diamox was increased from 250mg to 500mg BID. MRI/MRV is without venous sinus thrombosis, hydrocephalus or other acute findings. She is scheduled for Ophthalmology 8/19 for evaluation.

## 2022-08-17 NOTE — RESPIRATORY THERAPY
RAPID RESPONSE RESPIRATORY THERAPY PROACTIVE NOTE           Time of visit: 1412     Code Status: Full Code   : 1985  Bed: 7098/7098 A:   MRN: [id number]  Time spent at the bedside: < 15 min    SITUATION    Evaluated patient for: CPAP compliance    BACKGROUND    Why is the patient in the hospital?: Left-sided weakness    Patient has a past medical history of Acute calculous cholecystitis, Asthma, Calculus of gallbladder with acute cholecystitis without obstruction, Chronic anxiety, COVID-19, GERD (gastroesophageal reflux disease), GI bleed, Heart palpitations, Herniated disc, Hypertension, IBS (irritable bowel syndrome), Insomnia, Intractable migraine without aura and with status migrainosus, Irritable bowel syndrome without diarrhea, Lower back pain, Migraine headache, Obstructive sleep apnea, Palpitations, PCOS (polycystic ovarian syndrome), and Sleep apnea.    24 Hours Vitals Range:  Temp:  [97 °F (36.1 °C)-98.7 °F (37.1 °C)]   Pulse:  [77-84]   Resp:  [18]   BP: (133-143)/(75-88)   SpO2:  [96 %-98 %]     Labs:    Recent Labs     22  0336      K 3.9   *   CO2 24   CREATININE 0.8   GLU 87        No results for input(s): PH, PCO2, PO2, HCO3, POCSATURATED, BE in the last 72 hours.    ASSESSMENT/INTERVENTIONS    Upon arrival in room pt sleeping in bed with no respiratory needs at this time.    Last VS   Temp: 98 °F (36.7 °C) ( 1123)  Pulse: 80 ( 1123)  Resp: 18 (1123)  BP: 133/88 (1123)  SpO2: 97 % (1123)    Level of Consciousness: Level of Consciousness (AVPU): alert  Respiratory Effort: Respiratory Effort: Unlabored Expansion/Accessory Muscle Usage: Expansion/Accessory Muscles/Retractions: expansion symmetric, no retractions, no use of accessory muscles  All Lung Field Breath Sounds: All Lung Fields Breath Sounds: Anterior:, Lateral:, clear, equal bilaterally  O2 Device/Concentration: N.C.  NIPPV: No Surgical airway: No  ETCO2 monitored:    Ambu at bedside: Ambu bag  with the patient?: Yes, Adult Ambu    Active Orders   Respiratory Care    CPAP QHS     Frequency: QHS     Number of Occurrences: Until Specified     Order Questions:      Expiratory pressure: per respiratory    Inhalation Treatment Q4H PRN     Frequency: Q4H PRN     Number of Occurrences: Until Specified    Oxygen Continuous     Frequency: Continuous     Number of Occurrences: Until Specified     Order Comments: Discontinue when Sp02 is greater than or equal to 95% of equal to Preop Sp02       Order Questions:      Device type: Low flow      Device: Simple Face Mask      Titrate O2 per Oxygen Titration Protocol: Yes      Notify MD of: Inability to achieve desired SpO2    Oxygen PRN     Frequency: PRN     Number of Occurrences: Until Specified     Order Questions:      Device type: Low flow      Device: Nasal Cannula (1- 5 Liters)      LPM: 1-5      Titrate O2 per Oxygen Titration Protocol: Yes      To maintain SpO2 goal of: >= 90%      Notify MD of: Inability to achieve desired SpO2; Sudden change in patient status and requires 20% increase in FiO2; Patient requires >60% FiO2    Pulse Oximetry Continuous     Frequency: Continuous     Number of Occurrences: Until Specified       RECOMMENDATIONS    We recommend: RRT Recs: Continue POC per primary team.    FOLLOW-UP    Please call back the Rapid Response [facility] RRT at x [phone] for any questions or concerns.
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
## 2022-08-17 NOTE — ASSESSMENT & PLAN NOTE
35 yo female admitted with intractable migraine and left sided weakness. NSGY was consulted for IIH and consideration of shunt placement. LP was performed last night with OP58, CP24. Pt notes improvement in her symptoms following LP    -Pt is currently neurologically stable  -No acute NSGY intervention while inpatient  -Imaging and diagnostics reviewed. MRV without venous sinus thrombosis, MRI without LVO  -CSF cx NG, cryptococcal neg   -Continue Diamox   -Recommend stealth imaging and Ophthalmology consult. Pt scheduled for eye exam 8/19.  -OK for DC from NSGY perspective. Will schedule close follow up in clinic after Ophtho appt to discuss her options  -Discussed with Dr. Yoon
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
## 2022-08-17 NOTE — TELEPHONE ENCOUNTER
Spoke to pt and confirmed time and date for follow up appointment.      ----- Message from Brian Honeycutt MA sent at 8/17/2022  2:35 PM CDT -----    ----- Message -----  From: Rekha Pabol PA-C  Sent: 8/17/2022   1:43 PM CDT  To: Samy MO Staff    Please schedule this patient with me, Karrie or shana for discussion of shunt placement for IIH. Thanks!    Gelacio

## 2022-08-17 NOTE — TELEPHONE ENCOUNTER
No she does not have any openings. The slots are not scheduled has a hold placed on them for other patients

## 2022-08-17 NOTE — SUBJECTIVE & OBJECTIVE
Subjective:     Interval History: Pt reports that 2h after LP she experienced an improvement in her HA at a 6/10.  However, this morning, pt reports that her HA returned to an 8/10 supine and 10/10 when sitting up.  Pt also c/o continued blurry vision.  Po and sleep are good.  Pt reports resolution of her L sided weakness.      Pt reports that she experienced minor hives at IV site when depacon infusing this morning.  It was stopped.  Hives resolved spontaneously within 1.5 hours.      Current Neurological Medications: topamax 50qd, diamox 500mg BID, Magnesium sulfate 2g BID and depacon 1g BID.    Current Facility-Administered Medications   Medication Dose Route Frequency Provider Last Rate Last Admin    acetaminophen tablet 1,000 mg  1,000 mg Oral Q8H Rachelle Lim PA-C   1,000 mg at 08/17/22 0519    acetaZOLAMIDE tablet 500 mg  500 mg Oral BID Jayshree Marks MD   500 mg at 08/17/22 0937    albuterol-ipratropium 2.5 mg-0.5 mg/3 mL nebulizer solution 3 mL  3 mL Nebulization Q4H PRN Rachelle Lim PA-C   3 mL at 08/17/22 0917    baclofen tablet 10 mg  10 mg Oral TID PRN Bandar Romero MD   10 mg at 08/10/22 2142    bisacodyL suppository 10 mg  10 mg Rectal Daily PRN Rachelle Lim PA-C        buPROPion TB24 tablet 150 mg  150 mg Oral Nightly Rachelle Lim PA-C   150 mg at 08/16/22 2150    cariprazine Cap 3 mg  3 mg Oral QHS Annelise Lopez MD   3 mg at 08/16/22 2149    dextrose 10% bolus 125 mL  12.5 g Intravenous PRN Rachelle Lim PA-C        dextrose 10% bolus 250 mL  25 g Intravenous PRN Rachelle Lim PA-C        diphenhydrAMINE capsule 50 mg  50 mg Oral Nightly PRN Collette Smith PA-C   50 mg at 08/16/22 2149    famotidine tablet 20 mg  20 mg Oral BID Licha Wilson NP   20 mg at 08/17/22 0937    ferrous sulfate tablet 1 each  1 tablet Oral Daily Phyllis Lepe PA-C   1 each at 08/17/22 0937    folic acid tablet 1 mg  1 mg Oral Daily Phyllis Lepe PA-C   1 mg at  08/17/22 0937    glucagon (human recombinant) injection 1 mg  1 mg Intramuscular PRN Rachelle Lim PA-C        glucose chewable tablet 16 g  16 g Oral PRN KLEBER Dweitt-CHEPE        glucose chewable tablet 24 g  24 g Oral PRN KLEBER Dewitt-C        hydrALAZINE tablet 25 mg  25 mg Oral Q8H PRN KLEBER Kaminski-C   25 mg at 08/15/22 1240    insulin aspart U-100 pen 0-5 Units  0-5 Units Subcutaneous QID (AC + HS) PRN Rachelle Lim PA-C        LORazepam tablet 1 mg  1 mg Oral Q6H PRN Rachelle Lim PA-C   1 mg at 08/16/22 2149    meclizine tablet 25 mg  25 mg Oral TID PRN KLEBER Rogers-C   25 mg at 08/13/22 1419    naloxone 0.4 mg/mL injection 0.02 mg  0.02 mg Intravenous PRN Rachelle Lim PA-C        ondansetron disintegrating tablet 8 mg  8 mg Oral Q8H PRN KLEBER Rogers-C   8 mg at 08/15/22 1634    promethazine (PHENERGAN) 12.5 mg in dextrose 5 % 50 mL IVPB  12.5 mg Intravenous Q6H PRN Phyllis Lepe PA-C   Stopped at 08/13/22 2210    simethicone chewable tablet 80 mg  1 tablet Oral QID PRN Licha Wilson, NP        sodium chloride 0.9% flush 5 mL  5 mL Intravenous PRN Rachelle Lim PA-C        verapamiL CR tablet 120 mg  120 mg Oral Daily KLEBER Medel-C   120 mg at 08/17/22 0519       Review of Systems   Constitutional:  Positive for activity change. Negative for appetite change.   HENT:  Negative for congestion and hearing loss.    Eyes:  Positive for photophobia and visual disturbance. Negative for pain.   Respiratory:  Negative for cough and shortness of breath.    Cardiovascular:  Negative for chest pain and leg swelling.   Gastrointestinal:  Negative for nausea and vomiting.   Endocrine: Negative for cold intolerance and heat intolerance.   Genitourinary:  Negative for hematuria and pelvic pain.   Musculoskeletal:  Negative for back pain and joint swelling.   Skin:  Negative for color change and pallor.   Allergic/Immunologic: Negative for  environmental allergies and food allergies.   Neurological:  Positive for headaches. Negative for weakness.   Hematological:  Negative for adenopathy. Does not bruise/bleed easily.   Psychiatric/Behavioral:  Negative for agitation and behavioral problems.    Objective:     Vital Signs (Most Recent):  Temp: 98 °F (36.7 °C) (08/17/22 1123)  Pulse: 80 (08/17/22 1123)  Resp: 18 (08/17/22 1123)  BP: 133/88 (08/17/22 1123)  SpO2: 97 % (08/17/22 1123) Vital Signs (24h Range):  Temp:  [97 °F (36.1 °C)-99 °F (37.2 °C)] 98 °F (36.7 °C)  Pulse:  [77-88] 80  Resp:  [11-19] 18  SpO2:  [95 %-99 %] 97 %  BP: (133-145)/(72-89) 133/88     Weight: 126.6 kg (279 lb)  Body mass index is 43.7 kg/m².    Physical Exam    Physical Exam  Vitals and nursing note reviewed.   Constitutional:       General: She is not in acute distress.     Appearance: She is not toxic-appearing or diaphoretic.   HENT:      Head: Normocephalic and atraumatic.      Mouth/Throat:      Mouth: Mucous membranes are moist.   Eyes:      General: No scleral icterus.     Conjunctiva/sclera: Conjunctivae normal.   Cardiovascular:      Rate and Rhythm: Normal rate.   Pulmonary:      Effort: Pulmonary effort is normal. No respiratory distress.   Abdominal:      General: Abdomen is flat. There is no distension.   Musculoskeletal:      Cervical back: Normal range of motion.      Right lower leg: No edema.      Left lower leg: No edema.   Skin:     General: Skin is warm and dry.      Findings: No rash.   Neurological:      Mental Status: She is oriented to person, place, and time.   Psychiatric:         Mood and Affect: Mood is dysphoric/sad. Affect mood congruent         Speech: Speech normal.         Behavior: Behavior normal.     Neurological Exam:    MENTAL STATUS  Level of consciousness: alert  Orientation: oriented to person, place, and time  Attention normal. Concentration normal.  Speech: nl rate volume, tone     CRANIAL NERVES  CN II: Visual fields full, Visual  acuity 20/20 bilaterally with corrective glasses  CN III, IV, VI: PERRL, EOMI  CN V: Facial sensation intact  CN VII: Facial expression symmetric and full  CN VIII: Hearing intact  CN IX, X: Phonation normal  CN XI: Shoulder shrug   CN XII: Tongue midline with normal movements, no atrophy     MOTOR EXAM  Muscle bulk: normal  Muscle tone: normal     Strength - Upper Extremities    Arm abduction Elbow flexion Elbow extension Wrist flexion Wrist extension Finger abduction   Right 5/5 5/5 5/5 5/5 5/5 5/5   Left 5/5 5/5 5/5 5/5 5/5 5/5      Strength - Lower Extremities    Hip flexion Knee flexion Knee extension Dorsiflexion Plantarflexion   Right 5/5 5/5 5/5 5/5 5/5   Left 5/5 5/5 5/5 5/5 5/5         REFLEXES    Biceps Triceps Brachioradialis Patellar Achilles   Right +2 +2 +2 +1 +2   Left +2 +2 +2 +1 +2      Toes down bilaterally     SENSORY EXAM  Light touch: reduced in LUE & LLL  Vibration: reduced in LUE & LLL       Significant Labs: All pertinent lab results from the past 24 hours have been reviewed.    Significant Imaging: I have reviewed and interpreted all pertinent imaging results/findings within the past 24 hours.

## 2022-08-17 NOTE — TELEPHONE ENCOUNTER
----- Message from Antelmo Rojas MD sent at 8/17/2022 12:43 PM CDT -----  Hello    Can we have this patient follow up with [facility] on 9/2?  Please let me know if there is any problem with this or if I can help in any way    Thanks  Antelmo
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
## 2022-08-17 NOTE — PT/OT/SLP PROGRESS
"Physical Therapy  Treatment    Patient Name:  Sonia Goldberg   MRN:  [id number]    Recommendations:     Discharge Recommendations:  home health PT   Discharge Equipment Recommendations: walker, rolling   Barriers to discharge: decreased functional mobility, fall risk and decreased caregiver support    Assessment:     Sonia Goldberg is a 36 y.o. female admitted with a medical diagnosis of Left-sided weakness.  Pt demonstrates the below listed impairments with decreased tolerance to functional mobility, pain and gait instability being the most limiting.  Pt demonstrates improved tolerance to out of bed mobility and is willing to ambulate out in the hallway.  Pt requires skilled PT due to patient's status as: a fall risk, patient has increased pain and pt has no 24/7 assist to allow safe d/c home.     Impairments and functional limitations:  weakness, impaired endurance, impaired self care skills, impaired functional mobility, gait instability, impaired balance, decreased lower extremity function, decreased upper extremity function, pain.  These deficits affect their roles and responsibilities in which they were able to complete prior to admit.  Rehab Prognosis:   Good ; patient would benefit from acute skilled PT services 3 x/week to address these deficits, improve quality of life, focus on recovery of impairments, provide patient/caregiver education, reduce fall risk, and reach maximum level of function.  Pt is highly  motivated to participated in skilled PT.    Recent Surgery:   Procedure(s) (LRB):  Lumbar Puncture (N/A) 1 Day Post-Op    Plan:     During this hospitalization, patient to be seen 3 x/week to address the identified rehab impairments via gait training, therapeutic activities, therapeutic exercises, neuromuscular re-education and progress toward the following goals:    · Plan of Care Expires:  09/12/22    Subjective     Chief Complaint: "I never thought I'd be using a walker in my " "40's"  Patient/Family Comments/Goals: Return home  Pain/Comfort:  · Pain Rating 1: 10/10  · Location - Orientation 1: generalized  · Location 1:  (headache)  · Pain Addressed 1: Reposition, Distraction  · Pain Rating Post-Intervention 1: other (see comments) (not rated)    Objective:     Communicated with RN prior to session.  Patient found HOB elevated with telemetry upon PT entry to room.     General Precautions: Standard, fall   Orthopedic Precautions:N/A   Braces: N/A  Oxygen Device:      Functional Mobility:  · Bed Mobility:  Rolling Right: supervision  · Scooting: supervision  · Supine to Sit: supervision   · Sit to supine: supervision   · Head of bed position: HOB elevated    · Transfers:  Sit to Stand: stand by assistance with rolling walker with cues for hand placement    · Gait: Patient ambulated 35' and 60' with No AD and rolling walker and contact guard assistance. Patient demonstrates occasional unsteady gait, decreased step length, wide base of support, decreased arm swing, flexed posture and decreased andres. All lines remained intact throughout ambulation trial, gait belt utilized, mask donned for out of room ambulation.   · Pt ambulates with no AD for 35', sates she has had several falls recently due to her R knee pain  · Then ambulate with walker, less pain present, patient agreeable to RW usage in future.     · Balance:   Position Score Time   Static Sitting GOOD-: Takes MODERATE challenges but inconstantly  n/a   Dynamic Sitting GOOD-: Maintains balance through MODERATE excursions of active trunk motion, but inconstantly  n/a   Static Standing FAIR+: Takes MINIMAL challenges n/a   Dynamic Standing FAIR-: Maintains without assist but is inconsistent n/a     AM-PAC 6 CLICK MOBILITY  Turning over in bed (including adjusting bedclothes, sheets and blankets)?: 3  Sitting down on and standing up from a chair with arms (e.g., wheelchair, bedside commode, etc.): 3  Moving from lying on back to sitting " on the side of the bed?: 3  Moving to and from a bed to a chair (including a wheelchair)?: 3  Need to walk in hospital room?: 3  Climbing 3-5 steps with a railing?: 3  Basic Mobility Total Score: 18     Therapeutic Activities:  Patient educated on role of acute care PT and PT POC, safety while in hospital including calling nurse for mobility, call light usage, benefits of out of bed mobility, walker management, breathing technique, fall risk, assistive device use, bed mobility , transfers, gait technique, positioning, posture, risks of prolonged bed rest, possible discharge disposition  and benefits of continued PT by explanation and demonstration.    Patient demonstrates good understanding of education provided this day.   Whiteboard updated    Therapeutic Exercises:  n/a    Patient left with transport with all lines intact, call button in reach, RN notified and family present.    GOALS:   Multidisciplinary Problems     Physical Therapy Goals        Problem: Physical Therapy    Goal Priority Disciplines Outcome Goal Variances Interventions   Physical Therapy Goal     PT, PT/OT Ongoing, Progressing     Description: Goals to be met by: 2022     Patient will increase functional independence with mobility by performin. Sit to stand transfer with Modified Luther  2. Bed to chair transfer with Supervision using LRAD  3. Gait  x 50 feet with Stand-by Assistance using LRAD.   4. Stand for 5 minutes with Supervision using LRAD while performing dynamic balance activity to prepare for functional tasks in the home  5. Lower extremity exercise program x30 reps per handout, with independence                     Time Tracking:     PT Received On: 22  PT Start Time: 1325     PT Stop Time: 1343  PT Total Time (min): 18 min     Billable Minutes: Gait Training 18    Treatment Type: Treatment  PT/PTA: PT     PTA Visit Number: 0     2022

## 2022-08-17 NOTE — PLAN OF CARE
08/17/22 1413   Post-Acute Status   Post-Acute Authorization Home Health   Home Health Status Set-up Complete/Auth obtained     SW received notification that pt has been accepted with [facility].    Lavonne Albrecht LMSW  [facility]  Ext. [phone]

## 2022-08-18 ENCOUNTER — NURSE TRIAGE (OUTPATIENT)
Dept: ADMINISTRATIVE | Facility: CLINIC | Age: 37
End: 2022-08-18
Payer: COMMERCIAL

## 2022-08-18 ENCOUNTER — PATIENT OUTREACH (OUTPATIENT)
Dept: ADMINISTRATIVE | Facility: CLINIC | Age: 37
End: 2022-08-18
Payer: COMMERCIAL

## 2022-08-18 LAB
EEEV IGG TITR CSF IF: NORMAL {TITER}
EEEV IGM TITR CSF IF: NORMAL {TITER}
LACV IGG CSF QL IF: NORMAL
LACV IGM CSF QL IF: NORMAL
SLEV IGG TITR CSF IF: NORMAL {TITER}
SLEV IGM TITR CSF IF: NORMAL {TITER}
WEEV IGG TITR CSF IF: NORMAL {TITER}
WEEV IGM TITR CSF IF: NORMAL {TITER}
WNV RNA CSF QL NAA+PROBE: NEGATIVE

## 2022-08-18 NOTE — ASSESSMENT & PLAN NOTE
- Chronic and controlled at present. Follows with Psychiatry as outpatient.  - Patient reports no recent manic episodes and that her bipolar is stable.  - Continue Wellbutrin, Vraylar, and Ativan prn to treat on discharge.

## 2022-08-18 NOTE — ASSESSMENT & PLAN NOTE
Folate deficiency  - Patient with chronic anemia on admit noted. Anemia is chronic and controlled. No signs of active bleeding.   - Anemia work-up done and showed iron 72, TIBC elevated at 465 and sat iron low at 15 and ferritin low at 10 consistent with iron deficiency anemia and started on ferrous sulfate tablet 1 po daily to treat DARRION and patient to continue on discharge.   - Folate level also low at 2.2 consistent with folate deficiency and likely also contributing to anemia and patient started on Folic acid 1 mg p daily to treat and to continue on discharge.   - Vitamin B12 level 336 and in low normal range so concern for possible deficiency so methylmalonic acid level sent and level came back as normal so no signs to indicate Vitamin B12 deficiency.

## 2022-08-18 NOTE — PROGRESS NOTES
C3 nurse attempted to contact Sonia Goldberg for a TCC post hospital discharge follow up call. The patient is unable to conduct the call @ this time. The patient requested a callback. OOC INFO PROVIDED FOR WORSENING HA AND/OR OTHER RED FLAG S/S.    The patient does not have a scheduled HOSFU appointment within 5-7 days post hospital discharge date 8/17/22.

## 2022-08-18 NOTE — PROGRESS NOTES
During TCC callback patient reports that she is still without relief of headache & initially reported as worsening. Pt now audibly with dysphasia which is notable change in condition from earlier contact call. TCC RN requests to spkw .  agreeable to triage d/t pt change in condition. Later pt reports HA to be 8/10 but states is about the same as this morning?  reports dyphasia began a couple hours ago. Again provided pt/ OOC info for changes/deterioration in health concerns, requested  put OOC number in his cell phone. Pt transferred to triage Annmarie for further asst.

## 2022-08-18 NOTE — ASSESSMENT & PLAN NOTE
- Patient had LP done on 8/16 combined with radiology and anesthesia. LP showed opening pressure of 58 and 25 cc of clear CSF removed and closing pressure 24. Findings very concerning for IIH. CSF studies sent to look for any infectious etiology and pending on hospital discharge. Discussed with Neurology team on 8/16 and Diamox increased from 250 to 500 mg po BID and Topamax increased from 25 to 50 mg po daily. On discharge rec per Neurology was discharge on Diamox 750 mg po BID + Topamax 50 mg po nightly to treat IIH.   - Neurology recommended Neurosurgery consult to discuss possible future  shunt and patient seen by Neurosurgery in hospital and set up follow-up appointment as outpatient on 8/30/2022. Patient to follow-up with Neuro Ophthalmology as outpatient on 8/19.    - No improvement and reports worsening on 8/15. Patient to have LP on 8/16 with Radiology and Anesthesia to look for possible idiopathic intracranial hypertension as cause of headache and needs an opening pressure.   - Patient with known history of migraines presented to hospital with persistent headache for about ~2 months, now with left sided weakness, dysarthria. Concern for complex migraine vs  Idiopathic intracranial hypertension vs functional disorder.  - Recently started on phentermine in May 2022 for weight loss - concern for adverse effect. Medication on hold her in hospital and migraine persists so unlikely.   - Follows with neurology outpatient  - Given 1L NS, Toradol, Reglan, and Versed in the ED and denies any improvement of symptoms.  - MRI brain and CTH without acute findings.  - General Neurology consulted, suspect multifactorial nature with migraine, possible IIH, and possible functional disorder.   Recommendations:  -- Steroids completed. Continue Magnesium 2g IV BID, Depacon 1g  IV BID, Verapamil 120mg po daily.  -- Continue Diamox 250 mg po BID, Topomax 25 mg po daily.  -- LP to rule out IIH.   - Neurology performed occipital  nerve block without symptom relief.  - Bedside LP unsuccessful 8/10 as suspicion of IIH contributing to HA.  - IR consulted for LP with kostas, scheduled 8/12, patient refused IV ativan, will require sedation with Anesthesia so will try to reschedule with Radiology and Anesthesia.

## 2022-08-18 NOTE — TELEPHONE ENCOUNTER
Sonia Adan's  calling currently has dysphagia, unable to speak and slurred speech with onset at 1500 today and headache rated 8/10 present all day. Diagnosed with Intracranial HTN 2 days ago. Advised per triage protocol to call  now. V/u.     Reason for Disposition   Sounds like a life-threatening emergency to the triager    Additional Information   Negative: SEVERE difficulty swallowing (e.g., drooling or spitting) and started suddenly after taking a medicine or allergic foods   Negative: Wheezing, stridor, hoarseness, or difficulty breathing   Negative: Swollen tongue and sudden onset    Protocols used: SWALLOWING DIFFICULTY-A-OH

## 2022-08-18 NOTE — PLAN OF CARE
[facility] - Telemetry Stepdown ([facility])  Discharge Final Note    Primary Care Provider: Terell Reyes MD (Inactive)    Expected Discharge Date: 8/17/2022    Patient discharged to home via personal transportation.     Patient's bedside nurse and patient/family notified of the above.      Final Discharge Note (most recent)       Final Note - 08/18/22 0950          Final Note    Assessment Type Final Discharge Note (P)      Anticipated Discharge Disposition Home-Health Care Svc (P)         Post-Acute Status    Post-Acute Authorization Home Health (P)      Home Health Status Set-up Complete/Auth obtained (P)                      Important Message from Medicare             Contact Info       Terell Reyes MD   Specialty: Family Medicine   Relationship: PCP - General    [address] [place] [id number]   Phone: [phone]       Next Steps: Follow up    Instructions: The nurse will contact you to schedule a hospital follow up visit. However, if you do not hear from them within 48 hours of discharge please contact the office to schedule your visit            Future Appointments   Date Time Provider Department Center   8/19/2022  8:45 AM PERIMETRY, [facility]   8/19/2022  9:30 AM Rober Forrest MD [facility]   8/23/2022  2:00 PM KLEBER Ness-C [facility] at [facility]   8/30/2022 10:00 AM KLEBER Lo [facility] NEUROS8 [facility]       SW scheduled post-discharge follow-up appointment and information added to AVS.     [facility]    Lavonne Albrecht LMSW  [facility]  Ext. [phone]

## 2022-08-18 NOTE — DISCHARGE SUMMARY
Tristen Read - Telemetry [facility] ([place] [address])  [facility] Medicine  Discharge Summary      Patient Name: Sonia Goldberg  MRN: [id number]  Patient Class: IP- Inpatient  Admission Date: 8/6/2022  Hospital Length of Stay: 9 days  Discharge Date and Time: 8/17/2022  4:48 PM  Attending Physician: Jayshree Marks MD   Discharging Provider: Jayshree Marks MD  Primary Care Provider: Terell Reyes MD (Inactive)  [facility] Medicine Team: [facility]  Jayshree Marks MD    HPI:   Sonia Goldberg is a 36 y.o. female with a past medical history of migraines, PCOS, bipolar disorder, IBS, COVID-19, and KERI presenting in the ED with aphasia and left-sided weakness starting at 1530. Patient mentions having similar episode earlier this month presented to outside hospital obtained laboratory testing which was unremarkable. She was discharged after symptoms improved with migraine cocktail. She describes the headache as pulsing and it is located in the right frontal/parietal region. States the only medication that occasionally helps is Fioricet. Associated symptoms include blurry vision, photophobia, and nausea. Patient denies any vomiting, diarrhea, fevers, chills, or urinary symptoms.     ED: hypertensive, otherwise vital signs stable. Stroke code activated, vascular neurology evaluated patient at bedside. CTH and MRI brain negative for acute infarct ,showing partially empty sella, slight prominent fluid signal along optic nerve sheaths bilaterally. Patient was scheduled to have outpatient follow up with ophthalmology as outpatient. They have strong suspicion for alternate etiology of symptoms such as hemiplegic or complex migraines. Recommend symptomatic relief and general neurology consultation. Total cholesterol 243 with triglycerides of 319. Otherwise intake labs largely unremarkable. Given 1L NS, Toradol, Reglan, and versed.       8/16/2022  Procedure(s) (LRB):  Lumbar Puncture (N/A)    Surgeon(s):  Sanjana  Surgeon      Hospital Course:   Sonia Goldberg was placed in  observation for intractable migraine, left-sided paresis, and dysarthria.  Neurology evaluated patient and recommend beginning IV migraine cocktail: depakote, solumedrol, magnesium, plus oral verapamil. Minimal to no improvement after 48 hours. Neurology performed occipital nerve block without sympotm relief. Diamox 250 mg BID and topamax 25 mg added with no relief. Patient with leukemoid reaction in setting of steroid use from -8/10, infectious work up negative but patient did have elevated lactic 5.9> 3.9> 2.5, resolved with IVF. No concern for infection. Patient completed steroids, and rest of medications (IV magnesium, IV Depacon, oral Verapamil, and Topamax) continued. LP attempted at bedside to evaluate for IIH on 8/10 unable to perform due to pain and patient anxiety. Neurology noted that patient's left-sided weakness has been shown to be variable and nonphysiologic, actually improving after a failed lumbar puncture attempt, with her being able to hold her left upper extremity antigravity at bedside. On  patient with visual hallucination of her  grandmother and new right sided weakness which is distractible. Neurology also notes that speech pattern is not congruent with neurologic aphasia. Given these findings there is high concern for functional disorder. IR scheduled LP with fluoroscopy , patient unwilling to have it done with IV ativan, now for plan with sedation with anaesthesia. Need to coordinate radiology and Anesthesia to get LP under fluoroscopy and scheduled for  for possible idiopathic intracranial hypertension as cause of headache. Plan for psychiatry consult after LP if LP unrevealing. Patient  had LP done on  with radiology and anesthesia for sedation. Patient had an opening pressure highly elevated at 58 and 25 cc of CSF removed and closing pressure 24. CSF cell count and studies sent as per Neuro  recs. High opening pressure consistent with suspected idiopathic intracranial hypertension. Patient reported after fluid removed no real change in headache or blurry vision. Diamox increased from 250 mg to 500 mg po BID and Topamax increased from 25 to 50 mg po daily as per Neurology recs on 8/16 after LP results. No need for Psych evaluation as appears truly has IIH. Neurosurgery evaluated patient prior to discharge for future treatments such as  shunt and scheduled outpatient follow-up to discuss on 8/30/2022. Patient has an Opthalmology appointment already set for 8/19 as outpatient and she needs to keep. Neurology on discharge recommended Diamox 750 mg po BID and Topamax 50 mg po nightly to treat IIH and General Neurology to schedule outpatient follow-up on discharge. Left sided weakness mostly resolved at time of discharge. Headache and blurry vision not resolved on discharge despite LP and removal of large volume of CSF. Patient discharged with Home Health PT/OT and rolling walker as recommended by PT/OT on discharge.         Goals of Care Treatment Preferences:  Code Status: Full Code    Living Will: Yes              Consults:   Consults (From admission, onward)        Status Ordering Provider     Inpatient consult to Neurosurgery  Once        Provider:  (Not yet assigned)    Completed CHADD TAMEZ     Inpatient virtual consult to Hospital Medicine  Once        Provider:  (Not yet assigned)    Completed KHUSHBU WARD     Inpatient consult to Neurology  Once        Provider:  (Not yet assigned)    Completed SG PATEL JR     Inpatient consult to Vascular (Stroke) Neurology  Once        Provider:  (Not yet assigned)    Completed SG PATEL JR          * Left-sided weakness  Dysarthria  - Stroke code activated in ED due to left-sided weakness and aphasia in setting of migraine.  - CTH and MRI brain negative for acute infarction on admit.   - Vascular Neurology evaluated patient in  "the ED and have a low suspicion for CVA at this time.  - General neurology consulted for intractable headache and continues to follow and at this point feel may be conversion syndrome causing neurologic changes as neurologic exam non-physiologic in nature and fluctuates frequently.   - Patient reports left leg weakness improved since admit and left arm much improved prior to hospital discharge after large volume LP done.   - Patient's speech is fluent and when dysarthric according to neurology is nonphysiologic and not consistent with neurologic dysarthria.   - LP pending to rule out possible idiopathic intracranial hypertension as could be contributing to symptoms, also concern for functional disorder: "patient's speech pattern is not congruent with a neurologic aphasia and the patient's left-sided weakness has been shown to be variable and nonphysiologic, actually improving after a failed lumbar puncture attempt, with her being able to hold her left upper extremity antigravity at bedside"   - LP done on 8/16 and showed opening pressure pf 58 and highly concerning for IIH. Improvement in left arm weakness after LP and 25 cc of fluid removed. No change in headache or blurry vision after LP.   - PT/OT consulted on this admit and recommending  PT/OT on hospital discharge and arrangements made on discharge with rolling walker.      IIH (idiopathic intracranial hypertension)  - Patient had LP done on 8/16 combined with radiology and anesthesia. LP showed opening pressure of 58 and 25 cc of clear CSF removed and closing pressure 24. Findings very concerning for IIH. CSF studies sent to look for any infectious etiology and pending on hospital discharge. Discussed with Neurology team on 8/16 and Diamox increased from 250 to 500 mg po BID and Topamax increased from 25 to 50 mg po daily. On discharge rec per Neurology was discharge on Diamox 750 mg po BID + Topamax 50 mg po nightly to treat IIH.   - Neurology recommended " Neurosurgery consult to discuss possible future  shunt and patient seen by Neurosurgery in hospital and set up follow-up appointment as outpatient on 8/30/2022. Patient to follow-up with Neuro Ophthalmology as outpatient on 8/19.    - No improvement and reports worsening on 8/15. Patient to have LP on 8/16 with Radiology and Anesthesia to look for possible idiopathic intracranial hypertension as cause of headache and needs an opening pressure.   - Patient with known history of migraines presented to hospital with persistent headache for about ~2 months, now with left sided weakness, dysarthria. Concern for complex migraine vs  Idiopathic intracranial hypertension vs functional disorder.  - Recently started on phentermine in May 2022 for weight loss - concern for adverse effect. Medication on hold her in hospital and migraine persists so unlikely.   - Follows with neurology outpatient  - Given 1L NS, Toradol, Reglan, and Versed in the ED and denies any improvement of symptoms.  - MRI brain and CTH without acute findings.  - General Neurology consulted, suspect multifactorial nature with migraine, possible IIH, and possible functional disorder.   Recommendations:  -- Steroids completed. Continue Magnesium 2g IV BID, Depacon 1g  IV BID, Verapamil 120mg po daily.  -- Continue Diamox 250 mg po BID, Topomax 25 mg po daily.  -- LP to rule out IIH.   - Neurology performed occipital nerve block without symptom relief.  - Bedside LP unsuccessful 8/10 as suspicion of IIH contributing to HA.  - IR consulted for LP with kostas, scheduled 8/12, patient refused IV ativan, will require sedation with Anesthesia so will try to reschedule with Radiology and Anesthesia.        PCOS (polycystic ovarian syndrome)  Patient on Metformin at home to treat and holding in hospital but plan to resume on hospital discharge.         Obstructive sleep apnea  Chronic and controlled. Continue nightly CPAP to treat on discharge.       Morbid  obesity  Body mass index is 43.7 kg/m². Morbid obesity complicates all aspects of disease management from diagnostic modalities to treatment. Weight loss encouraged and health benefits explained to patient.         Iron deficiency anemia due to chronic blood loss  Folate deficiency  - Patient with chronic anemia on admit noted. Anemia is chronic and controlled. No signs of active bleeding.   - Anemia work-up done and showed iron 72, TIBC elevated at 465 and sat iron low at 15 and ferritin low at 10 consistent with iron deficiency anemia and started on ferrous sulfate tablet 1 po daily to treat DARRION and patient to continue on discharge.   - Folate level also low at 2.2 consistent with folate deficiency and likely also contributing to anemia and patient started on Folic acid 1 mg p daily to treat and to continue on discharge.   - Vitamin B12 level 336 and in low normal range so concern for possible deficiency so methylmalonic acid level sent and level came back as normal so no signs to indicate Vitamin B12 deficiency.    Bipolar disorder, unspecified  - Chronic and controlled at present. Follows with Psychiatry as outpatient.  - Patient reports no recent manic episodes and that her bipolar is stable.  - Continue Wellbutrin, Vraylar, and Ativan prn to treat on discharge.     Final Active Diagnoses:    Diagnosis Date Noted POA    PRINCIPAL PROBLEM:  Left-sided weakness [R53.1] 08/06/2022 Yes    IIH (idiopathic intracranial hypertension) [G93.2] 08/06/2022 Yes    PCOS (polycystic ovarian syndrome) [E28.2] 08/07/2022 Yes    Obstructive sleep apnea [G47.33] 08/17/2016 Yes    Morbid obesity [E66.01] 08/17/2016 Yes    Iron deficiency anemia due to chronic blood loss [D50.0] 11/04/2020 Yes    Bipolar disorder, unspecified [F31.9] 09/03/2021 Yes    Folate deficiency [E53.8] 08/14/2022 Yes    Dysarthria [R47.1] 08/11/2022 Yes      Problems Resolved During this Admission:    Diagnosis Date Noted Date Resolved POA     "Lactic acidosis [E87.2] 08/10/2022 08/14/2022 Yes    Leukocytosis [D72.829] 08/09/2022 08/14/2022 No    Hypokalemia [E87.6] 08/07/2022 08/14/2022 No    Anemia [D64.9] 10/16/2020 08/11/2022 Yes       Discharged Condition: good    Disposition: Home-Health Care Svc    Follow Up:  Future Appointments   Date Time Provider Department Center   8/19/2022  8:45 AM PERIMETRY, HUMPH [facility] [address]   8/19/2022  9:30 AM Rober Forrest MD [facility] [address]   8/23/2022  2:00 PM KLEBER Ness-CHEPE [facility] OPS[facility] O at [facility]   8/30/2022 10:00 AM KLEBER Lo [facility] NEUROS[facility] [address] [facility]        Follow-up Information     Terell Reyes MD Follow up.    Specialty: Family Medicine  Why: The nurse will contact you to schedule a hospital follow up visit. However, if you do not hear from them within 48 hours of discharge please contact the office to schedule your visit  Contact information:  [address] [facility] [id number] [phone]                       Patient Instructions:      WALKER FOR HOME USE     Order Specific Question Answer Comments   Type of Walker: Adult (5'4"-6'6")    With wheels? Yes    Height: 5' 7" (1.702 m)    Weight: 126.6 kg (279 lb)    Length of need (1-99 months): 99    Does patient have medical equipment at home? none    Please check all that apply: Patient's condition impairs ambulation.    Please check all that apply: Patient is unable to safely ambulate without equipment.    Please check all that apply: Walker will be used for gait training.      Ambulatory referral/consult to Neurology   Standing Status: Future   Referral Priority: Routine Referral Type: Consultation   Referral Reason: Specialty Services Required   Requested Specialty: Neurology   Number of Visits Requested: 1     Diet Adult Regular     Notify your health care provider if you experience any of the following:  temperature >100.4     Notify your health care provider if you experience any of the following:  " increased confusion or weakness     Notify your health care provider if you experience any of the following:  persistent dizziness, light-headedness, or visual disturbances     Notify your health care provider if you experience any of the following:  worsening rash     Notify your health care provider if you experience any of the following:  severe persistent headache     Notify your health care provider if you experience any of the following:  difficulty breathing or increased cough     Notify your health care provider if you experience any of the following:  redness, tenderness, or signs of infection (pain, swelling, redness, odor or green/yellow discharge around incision site)     Notify your health care provider if you experience any of the following:  severe uncontrolled pain     Notify your health care provider if you experience any of the following:  persistent nausea and vomiting or diarrhea     Activity as tolerated       Significant Diagnostic Studies:   Results for orders placed or performed during the hospital encounter of 08/06/22   Glucose, CSF   Result Value Ref Range    Glucose, CSF 56 40 - 70 mg/dL     Results for orders placed or performed during the hospital encounter of 08/06/22   Protein, CSF   Result Value Ref Range    Protein, CSF 36 15 - 40 mg/dL     Results for orders placed or performed during the hospital encounter of 08/06/22   CSF cell count with differential   Result Value Ref Range    Heme Aliquot 6.0 mL    Appearance, CSF Clear Clear    Color, CSF Colorless Colorless    WBC, CSF 1 0 - 5 /cu mm    RBC, CSF 0 0 /cu mm    Lymphs, CSF 89 (H) 40 - 80 %    Mono/Macrophage, CSF 11 (L) 15 - 45 %     Crypto Ag, CSF   Date Value Ref Range Status   08/16/2022 Negative Negative Final     CSF CULTURE   Date Value Ref Range Status   08/16/2022 No Growth to date  Preliminary       Pending Diagnostic Studies:     Procedure Component Value Units Date/Time    Arbovirus Antibody Panel, CSF [[id number]]  Collected: 08/16/22 1236    Order Status: Sent Lab Status: In process Updated: 08/16/22 1316    Specimen: CSF (Spinal Fluid) from Cerebrospinal Fluid     Enterovirus RNA by PCR [facility]; Cerebrospinal Fluid [[id number]] Collected: 08/16/22 1236    Order Status: Sent Lab Status: In process Updated: 08/16/22 1316    Specimen: CSF (Spinal Fluid)     Freeze and Hold,  [[id number]] Collected: 08/16/22 1236    Order Status: Sent Lab Status: No result     Specimen: CSF (Spinal Fluid) from Cerebrospinal Fluid     West Nile Virus by PCR, CSF [[id number]] Collected: 08/16/22 1236    Order Status: Sent Lab Status: In process Updated: 08/16/22 1316    Specimen: CSF (Spinal Fluid) from Cerebrospinal Fluid          Medications:  Reconciled Home Medications:      Medication List      START taking these medications    acetaZOLAMIDE 250 MG tablet  Commonly known as: DIAMOX  Take 3 tablets (750 mg total) by mouth 2 (two) times daily.     ferrous sulfate 325 (65 FE) MG EC tablet  Take 1 tablet (325 mg total) by mouth once daily.     folic acid 1 MG tablet  Commonly known as: FOLVITE  Take 1 tablet (1 mg total) by mouth once daily.     topiramate 50 MG tablet  Commonly known as: TOPAMAX  Take 1 tablet (50 mg total) by mouth every evening.     UBRELVY 100 mg tablet  Generic drug: ubrogepant  Take 1 tablet by mouth once as needed for  migraine. May repeat in 2 hours if needed. Max 2 tablets per day.     verapamiL 120 MG CR tablet  Commonly known as: CALAN-SR  Take 1 tablet (120 mg total) by mouth once daily at 6am.        CHANGE how you take these medications    buPROPion 150 MG TB24 tablet  Commonly known as: WELLBUTRIN XL  Take 1 tablet (150 mg total) by mouth once daily.  What changed: when to take this     VRAYLAR 3 mg Cap  Generic drug: cariprazine  Take 1 capsule (3 mg total) by mouth once daily at 6am.  What changed: when to take this        CONTINUE taking these medications    acetaminophen 500 MG tablet  Commonly known as:  TYLENOL  Take 1,000 mg by mouth 2 (two) times daily as needed (knee pain).     albuterol 90 mcg/actuation inhaler  Commonly known as: PROVENTIL/VENTOLIN HFA  Inhale 2 puffs into the lungs every 6 (six) hours as needed for Wheezing.     aspirin-acetaminophen-caffeine 250-250-65 mg 250-250-65 mg per tablet  Commonly known as: EXCEDRIN MIGRAINE  Take 2 tablets by mouth daily as needed (headache).     butalbital-acetaminophen-caffeine -40 mg -40 mg per tablet  Commonly known as: FIORICET, ESGIC  Take 1 tablet by mouth every 4 (four) hours as needed for Headaches.     EPINEPHrine 0.3 mg/0.3 mL Atin  Commonly known as: EPIPEN  Inject 0.3 mLs (0.3 mg total) into the muscle as needed (anaphylaxis).     HYDROcodone-acetaminophen 5-325 mg per tablet  Commonly known as: NORCO  Take 1 tablet by mouth every 8 (eight) hours as needed for Pain.     LORazepam 1 MG tablet  Commonly known as: ATIVAN  Take 1 tablet (1 mg total) by mouth every 6 (six) hours as needed for Anxiety.     metFORMIN 500 MG ER 24hr tablet  Commonly known as: GLUCOPHAGE-XR  Take 1 tablet (500 mg total) by mouth daily with breakfast.     metoclopramide HCl 10 MG tablet  Commonly known as: REGLAN  Take 1 tablet (10 mg total) by mouth every 6 (six) hours.     phentermine 37.5 mg tablet  Commonly known as: ADIPEX-P  Take 1 tablet (37.5 mg total) by mouth before breakfast.     pulse oximeter device  Commonly known as: pulse oximeter  by Apply Externally route 2 (two) times a day. Use twice daily at 8 AM and 3 PM and record the value in [employer] [facility] [employer] as directed.            Indwelling Lines/Drains at time of discharge: None      Time spent on the discharge of patient: 32 minutes         Jayshree Marks MD  Department of Hospital Medicine  [facility] - Telemetry Stepdown ([place]-[facility])

## 2022-08-18 NOTE — ASSESSMENT & PLAN NOTE
"Dysarthria  - Stroke code activated in ED due to left-sided weakness and aphasia in setting of migraine.  - CTH and MRI brain negative for acute infarction on admit.   - Vascular Neurology evaluated patient in the ED and have a low suspicion for CVA at this time.  - General neurology consulted for intractable headache and continues to follow and at this point feel may be conversion syndrome causing neurologic changes as neurologic exam non-physiologic in nature and fluctuates frequently.   - Patient reports left leg weakness improved since admit and left arm much improved prior to hospital discharge after large volume LP done.   - Patient's speech is fluent and when dysarthric according to neurology is nonphysiologic and not consistent with neurologic dysarthria.   - LP pending to rule out possible idiopathic intracranial hypertension as could be contributing to symptoms, also concern for functional disorder: "patient's speech pattern is not congruent with a neurologic aphasia and the patient's left-sided weakness has been shown to be variable and nonphysiologic, actually improving after a failed lumbar puncture attempt, with her being able to hold her left upper extremity antigravity at bedside"   - LP done on 8/16 and showed opening pressure pf 58 and highly concerning for IIH. Improvement in left arm weakness after LP and 25 cc of fluid removed. No change in headache or blurry vision after LP.   - PT/OT consulted on this admit and recommending  PT/OT on hospital discharge and arrangements made on discharge with rolling walker.    "

## 2022-08-19 ENCOUNTER — CLINICAL SUPPORT (OUTPATIENT)
Dept: OPHTHALMOLOGY | Facility: CLINIC | Age: 37
End: 2022-08-19
Payer: COMMERCIAL

## 2022-08-19 ENCOUNTER — OFFICE VISIT (OUTPATIENT)
Dept: OPHTHALMOLOGY | Facility: CLINIC | Age: 37
End: 2022-08-19
Payer: COMMERCIAL

## 2022-08-19 DIAGNOSIS — H47.10 OPTIC NERVE SWELLING: ICD-10-CM

## 2022-08-19 DIAGNOSIS — H47.11 PAPILLEDEMA ASSOCIATED WITH INCREASED INTRACRANIAL PRESSURE: Primary | ICD-10-CM

## 2022-08-19 DIAGNOSIS — H53.8 BLURRED VISION: ICD-10-CM

## 2022-08-19 DIAGNOSIS — G93.2 IIH (IDIOPATHIC INTRACRANIAL HYPERTENSION): ICD-10-CM

## 2022-08-19 LAB
EV RNA SPEC QL NAA+PROBE: NEGATIVE
SPECIMEN SOURCE: NORMAL

## 2022-08-19 PROCEDURE — 99999 PR PBB SHADOW E&M-EST. PATIENT-LVL III: ICD-10-PCS | Mod: PBBFAC,,, | Performed by: OPHTHALMOLOGY

## 2022-08-19 PROCEDURE — 1159F MED LIST DOCD IN RCRD: CPT | Mod: CPTII,S$GLB,, | Performed by: OPHTHALMOLOGY

## 2022-08-19 PROCEDURE — G0180 PR HOME HEALTH MD CERTIFICATION: ICD-10-PCS | Mod: ,,, | Performed by: NEUROLOGICAL SURGERY

## 2022-08-19 PROCEDURE — 3044F HG A1C LEVEL LT 7.0%: CPT | Mod: CPTII,S$GLB,, | Performed by: OPHTHALMOLOGY

## 2022-08-19 PROCEDURE — 99999 PR PBB SHADOW E&M-EST. PATIENT-LVL III: CPT | Mod: PBBFAC,,, | Performed by: OPHTHALMOLOGY

## 2022-08-19 PROCEDURE — 1111F DSCHRG MED/CURRENT MED MERGE: CPT | Mod: CPTII,S$GLB,, | Performed by: OPHTHALMOLOGY

## 2022-08-19 PROCEDURE — 92133 POSTERIOR SEGMENT OCT OPTIC NERVE(OCULAR COHERENCE TOMOGRAPHY) - OU - BOTH EYES: ICD-10-PCS | Mod: S$GLB,,, | Performed by: OPHTHALMOLOGY

## 2022-08-19 PROCEDURE — G0180 MD CERTIFICATION HHA PATIENT: HCPCS | Mod: ,,, | Performed by: NEUROLOGICAL SURGERY

## 2022-08-19 PROCEDURE — 1111F PR DISCHARGE MEDS RECONCILED W/ CURRENT OUTPATIENT MED LIST: ICD-10-PCS | Mod: CPTII,S$GLB,, | Performed by: OPHTHALMOLOGY

## 2022-08-19 PROCEDURE — 92133 CPTRZD OPH DX IMG PST SGM ON: CPT | Mod: S$GLB,,, | Performed by: OPHTHALMOLOGY

## 2022-08-19 PROCEDURE — 1159F PR MEDICATION LIST DOCUMENTED IN MEDICAL RECORD: ICD-10-PCS | Mod: CPTII,S$GLB,, | Performed by: OPHTHALMOLOGY

## 2022-08-19 PROCEDURE — 99203 OFFICE O/P NEW LOW 30 MIN: CPT | Mod: S$GLB,,, | Performed by: OPHTHALMOLOGY

## 2022-08-19 PROCEDURE — 3044F PR MOST RECENT HEMOGLOBIN A1C LEVEL <7.0%: ICD-10-PCS | Mod: CPTII,S$GLB,, | Performed by: OPHTHALMOLOGY

## 2022-08-19 PROCEDURE — 92083 HUMPHREY VISUAL FIELD - OU - BOTH EYES: ICD-10-PCS | Mod: S$GLB,,, | Performed by: OPHTHALMOLOGY

## 2022-08-19 PROCEDURE — 99203 PR OFFICE/OUTPT VISIT, NEW, LEVL III, 30-44 MIN: ICD-10-PCS | Mod: S$GLB,,, | Performed by: OPHTHALMOLOGY

## 2022-08-19 PROCEDURE — 92083 EXTENDED VISUAL FIELD XM: CPT | Mod: S$GLB,,, | Performed by: OPHTHALMOLOGY

## 2022-08-19 PROCEDURE — 1160F PR REVIEW ALL MEDS BY PRESCRIBER/CLIN PHARMACIST DOCUMENTED: ICD-10-PCS | Mod: CPTII,S$GLB,, | Performed by: OPHTHALMOLOGY

## 2022-08-19 PROCEDURE — 1160F RVW MEDS BY RX/DR IN RCRD: CPT | Mod: CPTII,S$GLB,, | Performed by: OPHTHALMOLOGY

## 2022-08-19 NOTE — LETTER
Tristen [facility] - [address]  [address]  [facility] [address]  Phone: [phone]  Fax: [phone]   August 19, 2022    Valerie Yang, RANDALL  [address] [place] [address]    Patient: Sonia Goldberg   MR Number: [id number]   YOB: 1985   Date of Visit: 8/19/2022       Dear Dr. Yang:    Thank you for referring Sonia Goldberg to me for evaluation. Here is my assessment and plan of care:    Assessment/Plan     For exam results, see Encounter Report.    IIH (idiopathic intracranial hypertension)  -     Stone Visual Field - OU - Extended - Both Eyes      Both optic nerves appear normal and without edema by slit lamp evaluation and ocular coherence tomography. Visual field testing was full. I found n o evidence of optic nerve damage on either side. I will hold on follow up pending decision by neurosurgery on the need for a  shunt.          Below you will find my full exam findings. If you have questions, please do not hesitate to call me. I look forward to following Ms. Sonia Goldberg along with you.    Sincerely,          Rober Forrest MD       CC  No Recipients             Base Eye Exam     Visual Acuity (Snellen - Linear)       Right Left    Dist cc 20/50 20/50    Dist ph cc NI NI    Correction: Glasses          Tonometry (Applanation, 10:06 AM)       Right Left    Pressure 17 17          Pupils       Dark Light Shape React APD    Right 4 2 Round Brisk None    Left 4 2 Round Brisk None          Visual Fields    See HVF report           Extraocular Movement       Right Left     Full, Ortho Full, Ortho          Neuro/Psych     Oriented x3: Yes    Mood/Affect: Normal          Dilation     Both eyes: 1% Mydriacyl, 2.5% Phenylephrine @ 10:07 AM            Additional Tests     Color       Right Left    Ishihara 12/12 12/12            Slit Lamp and Fundus Exam     External Exam       Right Left    External Normal Normal          Slit Lamp Exam       Right Left     Lids/Lashes Normal Normal    Conjunctiva/Sclera White and quiet White and quiet    Cornea Clear Clear    Anterior Chamber Deep and quiet Deep and quiet    Iris Round and reactive Round and reactive    Lens Clear Clear    Vitreous Normal Normal          Fundus Exam       Right Left    Disc No edema. Normal appearance. No edema. Normal appearance.    C/D Ratio 0.4 0.4    Macula Normal Normal    Vessels Normal Normal    Periphery Normal Normal

## 2022-08-19 NOTE — PROGRESS NOTES
HPI     Referred by Dr.Mary Abigail Yang NP  Recently dx w/IIH.    Pt states OS>OD vision blurry at dist x few months.  Constant headache due to IIH  Slurr speech.  Eye pain--3 on pain scale.  Review HVF    I have personally interviewed the patient, reviewed the history and   examined the patient and agree with the technician's exam.     Last edited by Rober Forrest MD on 8/19/2022  9:50 AM. (History)            Assessment /Plan     For exam results, see Encounter Report.    IIH (idiopathic intracranial hypertension)  -     Stone Visual Field - OU - Extended - Both Eyes      Both optic nerves appear normal and without edema by slit lamp evaluation and ocular coherence tomography. Visual field testing was full. I found n o evidence of optic nerve damage on either side. I will hold on follow up pending decision by neurosurgery on the need for a  shunt.

## 2022-08-19 NOTE — PROGRESS NOTES
24-2  SF DONE OU     REL & FIX =  GOOD OU      COOP=     GOOD     PATIENT DENIES ANY ALLERGIES TO LATEX OR ADHESIVES AT THIS TIME    JTHOMAS    NO MRX     READ PATIENT      -3.00 + .75 X 66   OD     -2.75 + .75 X 68   OS

## 2022-08-19 NOTE — PATIENT INSTRUCTIONS
No papilledema or optic nerve damage found. Hold on follow up depending on neurosurgery treatment plan.a

## 2022-08-21 ENCOUNTER — NURSE TRIAGE (OUTPATIENT)
Dept: ADMINISTRATIVE | Facility: CLINIC | Age: 37
End: 2022-08-21
Payer: COMMERCIAL

## 2022-08-21 LAB
BACTERIA CSF CULT: NO GROWTH
GRAM STN SPEC: NORMAL

## 2022-08-21 RX ORDER — ACETAZOLAMIDE 500 MG/1
1000 CAPSULE, EXTENDED RELEASE ORAL 2 TIMES DAILY
Qty: 120 CAPSULE | Refills: 11 | Status: SHIPPED | OUTPATIENT
Start: 2022-08-21 | End: 2022-08-22

## 2022-08-21 RX ORDER — TOPIRAMATE 50 MG/1
50 TABLET, FILM COATED ORAL 2 TIMES DAILY
Qty: 60 TABLET | Refills: 11 | Status: SHIPPED | OUTPATIENT
Start: 2022-08-21 | End: 2022-11-25

## 2022-08-21 NOTE — TELEPHONE ENCOUNTER
Secure chat from Dr. Rodriges:  Roselia the patient does not need to go to the ED but I can call the patient and increase the medications she is taking as outpatient      Unable to reach pt to update.     Reason for Disposition   Message left on identified voice mail    Protocols used: NO CONTACT OR DUPLICATE CONTACT CALL-A-AH

## 2022-08-21 NOTE — PROGRESS NOTES
"Patient called complaining of flashes of white light in L eye when going from standing to sitting position.  She also endorsed some increased weakness of L side compared to at time of discharge - but states that it is not as bad as at time of initial hospitalization.  Patient stated that she feels like "walking around on a cruise ship during high tide".     Patient recently seen by neurooptho who completed eye exam on 8/19 without papilledema or significant findings  Per note. Additionally had been evaluated by neurosurgery while inpatient without current recommendations for procedure at that time while inpatient.   Given recent eye exam findings and patients current symptoms - the patient was given the option of of going to ED for evaluation or increasing her home regimen of Topirate and Diamox.   Patient opted to increase medications while at home  Will increase to Diamox 1g BID and Topiramate 50 BID.  Ordered prescriptions to home pharamacy.  Patient understanding of and agreeable with this plan and will  medications  Patient told to call with any worsening symptoms including worsening visual acutiy, diplopia, blurred vision or worsening HA, or weakness.   Will call patient on Monday to follow up symptoms and help arrange outpatient follow up  Spoke with Dr Mcguire, attending, on call regarding this plan      "

## 2022-08-21 NOTE — TELEPHONE ENCOUNTER
[facility] of [facility], saw Dr. DEVIN Forrest 8/19, trying to reach doctor that saw her in the hospital for specific symptoms (Dr. Marks). Informed caller hospitalist do not do follow-up care..     Went in for left-sided weakness and unsteady gait. Still having severe HA, no change since admission/discharge from hospital. Flashes of light are new. Also recurrent issues with aphasia and speech slurring but that's a daily occurrence since discharge. Flashes of light started to left eye around 1100 this morning only occurs with change of position from sitting to standing. Advised per protocol. Go to ED for eval. VU but wants to discuss with provider, was told this was one of the symptoms that can happen with the IIH but unsure if its normal and expected.     LVM with Dr. Marilia Velasquez, neurology on call for [place]. Updated pt. Again encouraged ED    S/w Dr. Marilia Velasquez, go to ED for eval. Best if someone could drive her to [facility] ED for opthamology/neurosurgery.     Updated pt. Agreed she will have someone bring her to [facility] ED    Reason for Disposition   [1] Blurred vision or visual changes AND [2] present now AND [3] sudden onset or new (e.g., minutes, hours, days)  (Exception: seeing floaters / black specks OR previously diagnosed migraine headaches with same symptoms)    Additional Information   Negative: Complete loss of vision in 1 or both eyes   Negative: Severe eye pain    Protocols used: VISION LOSS OR CHANGE-A-

## 2022-08-22 ENCOUNTER — TELEPHONE (OUTPATIENT)
Dept: NEUROLOGY | Facility: HOSPITAL | Age: 37
End: 2022-08-22
Payer: COMMERCIAL

## 2022-08-22 RX ORDER — ACETAZOLAMIDE 500 MG/1
1000 CAPSULE, EXTENDED RELEASE ORAL 2 TIMES DAILY
Qty: 120 CAPSULE | Refills: 11 | Status: SHIPPED | OUTPATIENT
Start: 2022-08-22 | End: 2022-09-05 | Stop reason: SDUPTHER

## 2022-08-22 NOTE — TELEPHONE ENCOUNTER
Called patient and scheduled Virtual visit since she is not able to drive. Date/Time confirmed. Verbalized understanding.

## 2022-08-22 NOTE — TELEPHONE ENCOUNTER
"Spoke with patient on phone  She stated that she had not been able to pik up her Diamox from the pharmacy "due to a problem". She had also not changed the frequency of her topiramate as instructed to 50 BID.    Instructed patient to take 4 pills of 250mg BID of Diamox  Called pharmacy who stated they would try to fill 1g BID prescription after patient had run out of her current medication    Patient voiced understanding that she was to start taking Diamox 1g BID and Topiramate 50 BID    Patient with neurology appointment on 8/31 scheduled  Patient to call back should she have any worsening symptoms including with vision, weakness, worsened headache severity.        "

## 2022-08-23 ENCOUNTER — OFFICE VISIT (OUTPATIENT)
Dept: PSYCHIATRY | Facility: CLINIC | Age: 37
End: 2022-08-23
Payer: COMMERCIAL

## 2022-08-23 DIAGNOSIS — F41.1 GAD (GENERALIZED ANXIETY DISORDER): Primary | ICD-10-CM

## 2022-08-23 DIAGNOSIS — F31.81 BIPOLAR II DISORDER: ICD-10-CM

## 2022-08-23 DIAGNOSIS — F43.10 PTSD (POST-TRAUMATIC STRESS DISORDER): ICD-10-CM

## 2022-08-23 PROCEDURE — 1160F RVW MEDS BY RX/DR IN RCRD: CPT | Mod: CPTII,95,, | Performed by: PHYSICIAN ASSISTANT

## 2022-08-23 PROCEDURE — 3044F PR MOST RECENT HEMOGLOBIN A1C LEVEL <7.0%: ICD-10-PCS | Mod: CPTII,95,, | Performed by: PHYSICIAN ASSISTANT

## 2022-08-23 PROCEDURE — 3044F HG A1C LEVEL LT 7.0%: CPT | Mod: CPTII,95,, | Performed by: PHYSICIAN ASSISTANT

## 2022-08-23 PROCEDURE — 1160F PR REVIEW ALL MEDS BY PRESCRIBER/CLIN PHARMACIST DOCUMENTED: ICD-10-PCS | Mod: CPTII,95,, | Performed by: PHYSICIAN ASSISTANT

## 2022-08-23 PROCEDURE — 99214 OFFICE O/P EST MOD 30 MIN: CPT | Mod: 95,,, | Performed by: PHYSICIAN ASSISTANT

## 2022-08-23 PROCEDURE — 99214 PR OFFICE/OUTPT VISIT, EST, LEVL IV, 30-39 MIN: ICD-10-PCS | Mod: 95,,, | Performed by: PHYSICIAN ASSISTANT

## 2022-08-23 PROCEDURE — 1111F PR DISCHARGE MEDS RECONCILED W/ CURRENT OUTPATIENT MED LIST: ICD-10-PCS | Mod: CPTII,95,, | Performed by: PHYSICIAN ASSISTANT

## 2022-08-23 PROCEDURE — 1111F DSCHRG MED/CURRENT MED MERGE: CPT | Mod: CPTII,95,, | Performed by: PHYSICIAN ASSISTANT

## 2022-08-23 PROCEDURE — 1159F MED LIST DOCD IN RCRD: CPT | Mod: CPTII,95,, | Performed by: PHYSICIAN ASSISTANT

## 2022-08-23 PROCEDURE — 1159F PR MEDICATION LIST DOCUMENTED IN MEDICAL RECORD: ICD-10-PCS | Mod: CPTII,95,, | Performed by: PHYSICIAN ASSISTANT
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
## 2022-08-25 ENCOUNTER — TELEPHONE (OUTPATIENT)
Dept: NEUROSURGERY | Facility: CLINIC | Age: 37
End: 2022-08-25
Payer: COMMERCIAL

## 2022-08-25 NOTE — TELEPHONE ENCOUNTER
Spoke with KLEBER Arana about patients symptoms and BP. Rekha recommends contacting PCP for BP control and bring patient to the ER if noticing any stroke like symptoms. Called home health nurse and informed her of the recommendations. Nurse verbalized understanding and agreement.     ----- Message from Hina Tipton sent at 8/25/2022  1:47 PM CDT -----  Contact: @ [phone]  Home Health nurse called in to let the doctor know the patient blood pressure is 170/92 pulse 92 she is complaining of nausea , seeing white spots, dizzy, decreased in balance and a headache at a 9 please call and adv @ [phone]

## 2022-08-26 ENCOUNTER — PES CALL (OUTPATIENT)
Dept: ADMINISTRATIVE | Facility: CLINIC | Age: 37
End: 2022-08-26
Payer: COMMERCIAL

## 2022-08-28 ENCOUNTER — DOCUMENT SCAN (OUTPATIENT)
Dept: HOME HEALTH SERVICES | Facility: HOSPITAL | Age: 37
End: 2022-08-28
Payer: COMMERCIAL

## 2022-08-30 ENCOUNTER — PATIENT MESSAGE (OUTPATIENT)
Dept: NEUROLOGY | Facility: CLINIC | Age: 37
End: 2022-08-30
Payer: COMMERCIAL

## 2022-08-30 ENCOUNTER — OFFICE VISIT (OUTPATIENT)
Dept: NEUROSURGERY | Facility: CLINIC | Age: 37
End: 2022-08-30
Payer: COMMERCIAL

## 2022-08-30 VITALS
SYSTOLIC BLOOD PRESSURE: 154 MMHG | DIASTOLIC BLOOD PRESSURE: 93 MMHG | HEART RATE: 83 BPM | HEIGHT: 67 IN | BODY MASS INDEX: 43.79 KG/M2 | WEIGHT: 279 LBS

## 2022-08-30 DIAGNOSIS — G93.2 IIH (IDIOPATHIC INTRACRANIAL HYPERTENSION): Primary | Chronic | ICD-10-CM

## 2022-08-30 PROCEDURE — 99213 PR OFFICE/OUTPT VISIT, EST, LEVL III, 20-29 MIN: ICD-10-PCS | Mod: S$GLB,,, | Performed by: PHYSICIAN ASSISTANT

## 2022-08-30 PROCEDURE — 3044F HG A1C LEVEL LT 7.0%: CPT | Mod: CPTII,S$GLB,, | Performed by: PHYSICIAN ASSISTANT

## 2022-08-30 PROCEDURE — 99999 PR PBB SHADOW E&M-EST. PATIENT-LVL IV: ICD-10-PCS | Mod: PBBFAC,,, | Performed by: PHYSICIAN ASSISTANT

## 2022-08-30 PROCEDURE — 3008F PR BODY MASS INDEX (BMI) DOCUMENTED: ICD-10-PCS | Mod: CPTII,S$GLB,, | Performed by: PHYSICIAN ASSISTANT

## 2022-08-30 PROCEDURE — 99999 PR PBB SHADOW E&M-EST. PATIENT-LVL IV: CPT | Mod: PBBFAC,,, | Performed by: PHYSICIAN ASSISTANT

## 2022-08-30 PROCEDURE — 1111F PR DISCHARGE MEDS RECONCILED W/ CURRENT OUTPATIENT MED LIST: ICD-10-PCS | Mod: CPTII,S$GLB,, | Performed by: PHYSICIAN ASSISTANT

## 2022-08-30 PROCEDURE — 3080F DIAST BP >= 90 MM HG: CPT | Mod: CPTII,S$GLB,, | Performed by: PHYSICIAN ASSISTANT

## 2022-08-30 PROCEDURE — 3077F PR MOST RECENT SYSTOLIC BLOOD PRESSURE >= 140 MM HG: ICD-10-PCS | Mod: CPTII,S$GLB,, | Performed by: PHYSICIAN ASSISTANT

## 2022-08-30 PROCEDURE — 3008F BODY MASS INDEX DOCD: CPT | Mod: CPTII,S$GLB,, | Performed by: PHYSICIAN ASSISTANT

## 2022-08-30 PROCEDURE — 99213 OFFICE O/P EST LOW 20 MIN: CPT | Mod: S$GLB,,, | Performed by: PHYSICIAN ASSISTANT

## 2022-08-30 PROCEDURE — 3077F SYST BP >= 140 MM HG: CPT | Mod: CPTII,S$GLB,, | Performed by: PHYSICIAN ASSISTANT

## 2022-08-30 PROCEDURE — 1111F DSCHRG MED/CURRENT MED MERGE: CPT | Mod: CPTII,S$GLB,, | Performed by: PHYSICIAN ASSISTANT

## 2022-08-30 PROCEDURE — 3044F PR MOST RECENT HEMOGLOBIN A1C LEVEL <7.0%: ICD-10-PCS | Mod: CPTII,S$GLB,, | Performed by: PHYSICIAN ASSISTANT

## 2022-08-30 PROCEDURE — 3080F PR MOST RECENT DIASTOLIC BLOOD PRESSURE >= 90 MM HG: ICD-10-PCS | Mod: CPTII,S$GLB,, | Performed by: PHYSICIAN ASSISTANT

## 2022-08-30 RX ORDER — FERROUS SULFATE 325(65) MG
TABLET ORAL DAILY
COMMUNITY
Start: 2022-08-17 | End: 2022-09-27

## 2022-08-30 RX ORDER — COVID-19 MOLECULAR TEST ASSAY
KIT MISCELLANEOUS
COMMUNITY
Start: 2022-06-03 | End: 2022-09-27

## 2022-08-30 RX ORDER — PHENTERMINE HYDROCHLORIDE 37.5 MG/1
37.5 CAPSULE ORAL EVERY MORNING
COMMUNITY
End: 2022-08-31
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
## 2022-08-31 ENCOUNTER — PATIENT MESSAGE (OUTPATIENT)
Dept: NEUROSURGERY | Facility: CLINIC | Age: 37
End: 2022-08-31
Payer: COMMERCIAL

## 2022-08-31 ENCOUNTER — TELEPHONE (OUTPATIENT)
Dept: NEUROLOGY | Facility: CLINIC | Age: 37
End: 2022-08-31
Payer: COMMERCIAL

## 2022-08-31 ENCOUNTER — OFFICE VISIT (OUTPATIENT)
Dept: NEUROLOGY | Facility: CLINIC | Age: 37
End: 2022-08-31
Payer: COMMERCIAL

## 2022-08-31 DIAGNOSIS — I10 HYPERTENSION, UNSPECIFIED TYPE: ICD-10-CM

## 2022-08-31 DIAGNOSIS — G43.011 INTRACTABLE MIGRAINE WITHOUT AURA AND WITH STATUS MIGRAINOSUS: ICD-10-CM

## 2022-08-31 DIAGNOSIS — G93.2 IIH (IDIOPATHIC INTRACRANIAL HYPERTENSION): Primary | ICD-10-CM

## 2022-08-31 PROCEDURE — 99214 OFFICE O/P EST MOD 30 MIN: CPT | Mod: 95,,, | Performed by: NURSE PRACTITIONER

## 2022-08-31 PROCEDURE — 1111F DSCHRG MED/CURRENT MED MERGE: CPT | Mod: CPTII,95,, | Performed by: NURSE PRACTITIONER

## 2022-08-31 PROCEDURE — 1111F PR DISCHARGE MEDS RECONCILED W/ CURRENT OUTPATIENT MED LIST: ICD-10-PCS | Mod: CPTII,95,, | Performed by: NURSE PRACTITIONER

## 2022-08-31 PROCEDURE — 1159F PR MEDICATION LIST DOCUMENTED IN MEDICAL RECORD: ICD-10-PCS | Mod: CPTII,95,, | Performed by: NURSE PRACTITIONER

## 2022-08-31 PROCEDURE — 3044F HG A1C LEVEL LT 7.0%: CPT | Mod: CPTII,95,, | Performed by: NURSE PRACTITIONER

## 2022-08-31 PROCEDURE — 1159F MED LIST DOCD IN RCRD: CPT | Mod: CPTII,95,, | Performed by: NURSE PRACTITIONER

## 2022-08-31 PROCEDURE — 1160F PR REVIEW ALL MEDS BY PRESCRIBER/CLIN PHARMACIST DOCUMENTED: ICD-10-PCS | Mod: CPTII,95,, | Performed by: NURSE PRACTITIONER

## 2022-08-31 PROCEDURE — 1160F RVW MEDS BY RX/DR IN RCRD: CPT | Mod: CPTII,95,, | Performed by: NURSE PRACTITIONER

## 2022-08-31 PROCEDURE — 99214 PR OFFICE/OUTPT VISIT, EST, LEVL IV, 30-39 MIN: ICD-10-PCS | Mod: 95,,, | Performed by: NURSE PRACTITIONER

## 2022-08-31 PROCEDURE — 3044F PR MOST RECENT HEMOGLOBIN A1C LEVEL <7.0%: ICD-10-PCS | Mod: CPTII,95,, | Performed by: NURSE PRACTITIONER

## 2022-08-31 NOTE — TELEPHONE ENCOUNTER
Patient stated that she would like to see her to discuss BP. Informed that will keep appointment and advised to sign in about 10 minutes before appointment. Verbalized understanding.

## 2022-08-31 NOTE — TELEPHONE ENCOUNTER
Called patient and left voice mail to call clinic to let us know if need to still needs to be seen since just saw Neurosurgery yesterday.
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
## 2022-08-31 NOTE — LETTER
August 31, 2022      [facility]  [address]  [place] [address]  Phone: [phone]  Fax: [phone]       Patient: Sonia Goldberg   YOB: 1985  Date of Visit: 08/31/2022    To Whom It May Concern:    Ness Goldberg  was at [facility] on 08/31/2022. I am currently the headache provider for Sonia. She was admitted to the hospital on 8/6/22 and discharged 8/17/22. She is being followed by neurosurgery with a pending treatment plan. She is currently followed by home health for multiple medical issues.     Sincerely,    HUA Parson

## 2022-08-31 NOTE — ASSESSMENT & PLAN NOTE
Currently on verapamil. She reports home health readings have been high. Discussed with patient to have HH reach out to primary care to discuss. She states her PCP is no longer working with [facility]. Recommended she establish with new PCP.

## 2022-09-01 ENCOUNTER — PATIENT MESSAGE (OUTPATIENT)
Dept: NEUROSURGERY | Facility: CLINIC | Age: 37
End: 2022-09-01
Payer: COMMERCIAL

## 2022-09-01 ENCOUNTER — PATIENT MESSAGE (OUTPATIENT)
Dept: FAMILY MEDICINE | Facility: CLINIC | Age: 37
End: 2022-09-01

## 2022-09-01 ENCOUNTER — CLINICAL SUPPORT (OUTPATIENT)
Dept: FAMILY MEDICINE | Facility: CLINIC | Age: 37
End: 2022-09-01
Payer: COMMERCIAL

## 2022-09-01 ENCOUNTER — TELEPHONE (OUTPATIENT)
Dept: FAMILY MEDICINE | Facility: CLINIC | Age: 37
End: 2022-09-01

## 2022-09-01 ENCOUNTER — OFFICE VISIT (OUTPATIENT)
Dept: FAMILY MEDICINE | Facility: CLINIC | Age: 37
End: 2022-09-01
Payer: COMMERCIAL

## 2022-09-01 ENCOUNTER — TELEPHONE (OUTPATIENT)
Dept: GASTROENTEROLOGY | Facility: CLINIC | Age: 37
End: 2022-09-01
Payer: COMMERCIAL

## 2022-09-01 ENCOUNTER — TELEPHONE (OUTPATIENT)
Dept: HEMATOLOGY/ONCOLOGY | Facility: CLINIC | Age: 37
End: 2022-09-01
Payer: COMMERCIAL

## 2022-09-01 VITALS — OXYGEN SATURATION: 97 % | DIASTOLIC BLOOD PRESSURE: 90 MMHG | SYSTOLIC BLOOD PRESSURE: 152 MMHG | HEART RATE: 78 BPM

## 2022-09-01 DIAGNOSIS — E78.2 MIXED HYPERLIPIDEMIA: ICD-10-CM

## 2022-09-01 DIAGNOSIS — F31.9 BIPOLAR AFFECTIVE DISORDER, REMISSION STATUS UNSPECIFIED: ICD-10-CM

## 2022-09-01 DIAGNOSIS — D50.9 IRON DEFICIENCY ANEMIA, UNSPECIFIED IRON DEFICIENCY ANEMIA TYPE: ICD-10-CM

## 2022-09-01 DIAGNOSIS — I10 PRIMARY HYPERTENSION: Primary | ICD-10-CM

## 2022-09-01 DIAGNOSIS — E28.2 PCOS (POLYCYSTIC OVARIAN SYNDROME): ICD-10-CM

## 2022-09-01 DIAGNOSIS — Z01.30 BP CHECK: Primary | ICD-10-CM

## 2022-09-01 DIAGNOSIS — G93.2 IIH (IDIOPATHIC INTRACRANIAL HYPERTENSION): ICD-10-CM

## 2022-09-01 PROCEDURE — 99999 PR PBB SHADOW E&M-EST. PATIENT-LVL II: CPT | Mod: PBBFAC,,,

## 2022-09-01 PROCEDURE — 99214 OFFICE O/P EST MOD 30 MIN: CPT | Mod: 95,,, | Performed by: STUDENT IN AN ORGANIZED HEALTH CARE EDUCATION/TRAINING PROGRAM

## 2022-09-01 PROCEDURE — 99999 PR PBB SHADOW E&M-EST. PATIENT-LVL II: ICD-10-PCS | Mod: PBBFAC,,,

## 2022-09-01 PROCEDURE — 99214 PR OFFICE/OUTPT VISIT, EST, LEVL IV, 30-39 MIN: ICD-10-PCS | Mod: 95,,, | Performed by: STUDENT IN AN ORGANIZED HEALTH CARE EDUCATION/TRAINING PROGRAM

## 2022-09-01 PROCEDURE — 3044F PR MOST RECENT HEMOGLOBIN A1C LEVEL <7.0%: ICD-10-PCS | Mod: CPTII,95,, | Performed by: STUDENT IN AN ORGANIZED HEALTH CARE EDUCATION/TRAINING PROGRAM

## 2022-09-01 PROCEDURE — 1111F DSCHRG MED/CURRENT MED MERGE: CPT | Mod: CPTII,95,, | Performed by: STUDENT IN AN ORGANIZED HEALTH CARE EDUCATION/TRAINING PROGRAM

## 2022-09-01 PROCEDURE — 3044F HG A1C LEVEL LT 7.0%: CPT | Mod: CPTII,95,, | Performed by: STUDENT IN AN ORGANIZED HEALTH CARE EDUCATION/TRAINING PROGRAM

## 2022-09-01 PROCEDURE — 1111F PR DISCHARGE MEDS RECONCILED W/ CURRENT OUTPATIENT MED LIST: ICD-10-PCS | Mod: CPTII,95,, | Performed by: STUDENT IN AN ORGANIZED HEALTH CARE EDUCATION/TRAINING PROGRAM

## 2022-09-01 RX ORDER — CANDESARTAN 4 MG/1
4 TABLET ORAL DAILY
Qty: 30 TABLET | Refills: 2 | Status: SHIPPED | OUTPATIENT
Start: 2022-09-01 | End: 2022-11-25

## 2022-09-01 NOTE — PROGRESS NOTES
Sonia Goldberg 36 y.o. female is here today for Blood Pressure check.   History of HTN yes.    Review of patient's allergies indicates:   Allergen Reactions    Contrast media Anaphylaxis    Iodine and iodide containing products Anaphylaxis    Levaquin [levofloxacin] Anaphylaxis    Levofloxacin in d5w Anaphylaxis    Sulfa (sulfonamide antibiotics) Anaphylaxis and Hives    Iodinated contrast media Hives    Magnesium      Pt reporting she is allergic to magnesium citrate oral drink.     Morphine Hives    Adhesive Rash    Compazine [prochlorperazine] Anxiety     Restless legs    Depacon [valproate sodium] Hives     Pt experienced hives at IV site upon 8th day of depacon administration.  Hives resolved with stopping medication in 1.5 hours.    Nut [tree nut] Hives     Creatinine   Date Value Ref Range Status   08/16/2022 0.8 0.5 - 1.4 mg/dL Final   08/15/2013 0.9 0.5 - 1.4 mg/dL Final     Sodium   Date Value Ref Range Status   08/16/2022 142 136 - 145 mmol/L Final     Potassium   Date Value Ref Range Status   08/16/2022 3.9 3.5 - 5.1 mmol/L Final   ]  Patient denies taking blood pressure medications on a regular basis at the same time of the day. Pt hasn't picked up bp medication from pharmacy.    Current Outpatient Medications:     acetaminophen (TYLENOL) 500 MG tablet, Take 1,000 mg by mouth 2 (two) times daily as needed (knee pain)., Disp: , Rfl:     acetaZOLAMIDE (DIAMOX) 500 mg CpSR, Take 2 capsules (1,000 mg total) by mouth 2 (two) times daily., Disp: 120 capsule, Rfl: 11    albuterol (PROVENTIL/VENTOLIN HFA) 90 mcg/actuation inhaler, Inhale 2 puffs into the lungs every 6 (six) hours as needed for Wheezing., Disp: 18 g, Rfl: 11    aspirin-acetaminophen-caffeine 250-250-65 mg (EXCEDRIN MIGRAINE) 250-250-65 mg per tablet, Take 2 tablets by mouth daily as needed (headache)., Disp: , Rfl:     BINAXNOW COVID-19 AG SELF TEST Kit, TEST AS DIRECTED, Disp: , Rfl:     buPROPion (WELLBUTRIN XL) 150 MG TB24 tablet, Take  1 tablet (150 mg total) by mouth once daily. (Patient taking differently: Take 150 mg by mouth nightly.), Disp: 90 tablet, Rfl: 0    butalbital-acetaminophen-caffeine -40 mg (FIORICET, ESGIC) -40 mg per tablet, Take 1 tablet by mouth every 4 (four) hours as needed for Headaches., Disp: 15 tablet, Rfl: 0    candesartan (ATACAND) 4 MG tablet, Take 1 tablet (4 mg total) by mouth once daily., Disp: 30 tablet, Rfl: 2    cariprazine (VRAYLAR) 3 mg Cap, Take 1 capsule (3 mg total) by mouth once daily at 6am. (Patient taking differently: Take 3 mg by mouth nightly.), Disp: 30 capsule, Rfl: 1    EPINEPHrine (EPIPEN) 0.3 mg/0.3 mL AtIn, Inject 0.3 mLs (0.3 mg total) into the muscle as needed (anaphylaxis)., Disp: 1 each, Rfl: 1    ferrous sulfate (FEOSOL) 325 mg (65 mg iron) Tab tablet, Take by mouth once daily., Disp: , Rfl:     folic acid (FOLVITE) 1 MG tablet, Take 1 tablet (1 mg total) by mouth once daily., Disp: 30 tablet, Rfl: 11    HYDROcodone-acetaminophen (NORCO) 5-325 mg per tablet, Take 1 tablet by mouth every 8 (eight) hours as needed for Pain., Disp: 21 tablet, Rfl: 0    LORazepam (ATIVAN) 1 MG tablet, Take 1 tablet (1 mg total) by mouth every 6 (six) hours as needed for Anxiety., Disp: 15 tablet, Rfl: 0    metFORMIN (GLUCOPHAGE-XR) 500 MG ER 24hr tablet, Take 1 tablet (500 mg total) by mouth daily with breakfast., Disp: 90 tablet, Rfl: 3    metoclopramide HCl (REGLAN) 10 MG tablet, Take 1 tablet (10 mg total) by mouth every 6 (six) hours., Disp: 30 tablet, Rfl: 0    pulse oximeter (PULSE OXIMETER) device, by Apply Externally route 2 (two) times a day. Use twice daily at 8 AM and 3 PM and record the value in [facility] as directed., Disp: 1 each, Rfl: 0    sars-cov-2, covid-19, (MODERNA COVID-19) 100 mcg/0.5 ml injection, , Disp: , Rfl:     topiramate (TOPAMAX) 50 MG tablet, Take 1 tablet (50 mg total) by mouth 2 (two) times daily., Disp: 60 tablet, Rfl: 11    ubrogepant (UBRELVY) 100 mg tablet, Take 1  tablet by mouth once as needed for  migraine. May repeat in 2 hours if needed. Max 2 tablets per day., Disp: 10 tablet, Rfl: 2  Does patient have record of home blood pressure readings no.   Patient is symptomatic.   Complains of dizziness, nausea, blurry vision, left sided weakness and migraine due to Idiopathic intercranial hypertension.    BP: (!) 152/90 , Pulse: 78 .    Blood pressure reading after 15 minutes was 146/90, Pulse 78.  Dr. Guerrero notified.

## 2022-09-01 NOTE — PROGRESS NOTES
[facility] Virtual Primary Care Note    Subjective:    Chief Complaint:   Chief Complaint   Patient presents with    Hypertension        274}    The HPI and pertinent ROS is included in the Diagnostic Impression Remarks section at the end of the note. Please see below for further details.     Sonia is a pleasant intelligent patient who is here for evaluation.     The following portions of the patient's history were reviewed and updated as appropriate: allergies, current medications, past family history, past medical history, past social history, past surgical history and problem list.    She  has a past medical history of Acute calculous cholecystitis (2020), Asthma (), Calculus of gallbladder with acute cholecystitis without obstruction (2020), Chronic anxiety (2014), COVID-19, GERD (gastroesophageal reflux disease) (10/25/2020), GI bleed (10/25/2020), Heart palpitations, Herniated disc, Hypertension, IBS (irritable bowel syndrome) (), Insomnia (), Intractable migraine without aura and with status migrainosus (2022), Irritable bowel syndrome without diarrhea (9/3/2021), Lower back pain (), Migraine headache (), Obstructive sleep apnea, Palpitations (), PCOS (polycystic ovarian syndrome) (2022), and Sleep apnea ().  She  has a past surgical history that includes Dilation and curettage of uterus (); epidural steriod injections ();  section, classic;  section, low transverse (); Tubal ligation (); Dilation and curettage of uterus; endometrioma (); Abdominal surgery; Esophagogastroduodenoscopy (N/A, 10/26/2020); Colonoscopy (N/A, 10/27/2020); Intraluminal gastrointestinal tract imaging via capsule (N/A, 2020); Laparoscopic cholecystectomy (N/A, 2020); Tonsillectomy; Knee arthroscopy w/ meniscectomy (Right, 2021); Cystoscopy (N/A, 10/27/2021); and Magnetic resonance imaging (N/A, 8/3/2022).  She  reports that she  "has been smoking vaping with nicotine and vaping w/o nicotine. She has never used smokeless tobacco. She reports current alcohol use. She reports that she does not use drugs.  She family history includes Arthritis in her father; Asthma in her mother; Breast cancer (age of onset: 40) in her maternal aunt; Cancer in her father, maternal grandfather, maternal grandmother, and mother; Diabetes in her maternal grandmother and paternal grandfather; Heart disease in her father and mother; Hyperlipidemia in her father and mother; Hypertension in her father.    Review of patient's allergies indicates:   Allergen Reactions    Contrast media Anaphylaxis    Iodine and iodide containing products Anaphylaxis    Levaquin [levofloxacin] Anaphylaxis    Levofloxacin in d5w Anaphylaxis    Sulfa (sulfonamide antibiotics) Anaphylaxis and Hives    Iodinated contrast media Hives    Magnesium      Pt reporting she is allergic to magnesium citrate oral drink.     Morphine Hives    Adhesive Rash    Compazine [prochlorperazine] Anxiety     Restless legs    Depacon [valproate sodium] Hives     Pt experienced hives at IV site upon 8th day of depacon administration.  Hives resolved with stopping medication in 1.5 hours.    Nut [tree nut] Hives       Physical Examination  There were no vitals taken for this visit.   274}  Wt Readings from Last 3 Encounters:   08/30/22 126.6 kg (279 lb)   08/07/22 126.6 kg (279 lb)   08/03/22 122.5 kg (270 lb)     BP Readings from Last 3 Encounters:   08/30/22 (!) 154/93   08/17/22 133/88   08/03/22 139/76     Estimated body mass index is 43.7 kg/m² as calculated from the following:    Height as of 8/30/22: 5' 7" (1.702 m).    Weight as of 8/30/22: 126.6 kg (279 lb).     CONSTITUTIONAL: No apparent distress. Does not appear acutely ill or septic. Appears adequately hydrated.  PULM: Breathing unlabored.  PSYCHIATRIC: Alert and conversant and grossly oriented. Mood is grossly neutral. Affect appropriate. Judgment " and insight grossly intact.  Integument: normal coloration and turgor, no rashes, no suspicious skin lesions noted.    Data reviewed 274}  Previous medical records reviewed and summarized in HPI.     Laboratory  274}  I have reviewed old labs below:  Lab Results   Component Value Date    WBC 11.15 08/14/2022    HGB 11.0 (L) 08/14/2022    HCT 36.4 (L) 08/14/2022    MCV 86 08/14/2022     08/14/2022     08/16/2022    K 3.9 08/16/2022     (H) 08/16/2022    CALCIUM 10.4 08/16/2022    PHOS 2.1 (L) 11/28/2020    CO2 24 08/16/2022    GLU 87 08/16/2022    BUN 16 08/16/2022    CREATININE 0.8 08/16/2022    ANIONGAP 7 (L) 08/16/2022    ESTGFRAFRICA >60.0 02/07/2022    EGFRNONAA >60.0 02/07/2022    PROT 5.6 (L) 08/16/2022    ALBUMIN 2.8 (L) 08/16/2022    BILITOT 0.1 08/16/2022    ALKPHOS 73 08/16/2022    ALT 12 08/16/2022    AST <5 (L) 08/16/2022    INR 0.9 08/06/2022    CHOL 243 (H) 08/06/2022    TRIG 319 (H) 08/06/2022    HDL 43 08/06/2022    LDLCALC 136.2 08/06/2022    TSH 1.208 08/06/2022    HGBA1C 5.5 02/07/2022     Lab reviewed by me: Particular labs of significance that I will monitor, workup, or treat to improve are mentioned below in diagnostic impression remarks.  :[id number]}274}  Imaging/EKG: I have reviewed the pertinent results/findings and my personal findings are noted below in diagnostic impression remarks.  274}    CC:   Chief Complaint   Patient presents with    Hypertension          274}  Assessment/Plan  Sonia Goldberg is a 36 y.o. female who presents with:    1. Primary hypertension    2. IIH (idiopathic intracranial hypertension)    3. Iron deficiency anemia, unspecified iron deficiency anemia type    4. Bipolar affective disorder, remission status unspecified    5. PCOS (polycystic ovarian syndrome)    6. Mixed hyperlipidemia        Diagnostic Impression Remarks + HPI     The patient location is:  Patient Home [place]  The chief complaint leading to consultation is:   Chief Complaint  "  Patient presents with    Hypertension       Total time spent with patient: 20 minutes     Visit type: Virtual visit with synchronous audio and video  Each patient to whom he or she provides medical services by telemedicine is:  (1) informed of the relationship between the physician and patient and the respective role of any other health care provider with respect to management of the patient; and (2) notified that he or she may decline to receive medical services by telemedicine and may withdraw from such care at any time.    This service was not originating from a related E/M service provided within the previous 7 days nor will  to an E/M service or procedure within the next 24 hours or my soonest available appointment.  Prevailing standard of care was able to be met in this synchronous audio and video visit    Documentation entered by me for this encounter may have been done in part using speech-recognition technology. Although I have made an effort to ensure accuracy, "sound like" errors may exist and should be interpreted in context.     HTN-needs improvement has had multiple elevated blood pressure readings at home she has been averaging in the mid 130 systolic and over 80 diastolics sometimes in the 90s.  Is not taking diltiazem anymore she reported she had some side effects from this and knows to strong.  Will start low-dose can start and increases tolerated recommend low-salt diet as well.  Will follow blood pressure log  Iron deficiency anemia-needs improvement is taking or iron had a small leak causing blood loss and her small bowels no blood or black in her stool currently is taking iron without repletion will put a referral for Hematology for infusion recommend to follow-up with GI as well    Bipolar-stable continue current medications follow-up with Psychiatry    Hyperlipidemia need improvement recommend healthy diet stay active will monitor closely    IIH-stable continue current medications " follow-up with neurology no headache currently      This is the extent of the patient's complaints at this present time. She denies chest pain upon exertion, dyspnea, nausea, vomiting, diaphoresis, and syncope. No pleuritic chest pain, unilateral leg swelling, calf tenderness, or calf pain.     Sonia will return to clinic in a few months for further workup and reassessment or sooner as needed. She was instructed to call the clinic or go to the emergency department if her symptoms do not improve, worsens, or if new symptoms develop. As we discussed that symptoms could worsen over the next 24 hours she was advised that if any increased swelling, pain, or numbness arise to go immediately to the ED. Patient knows to call any time if an emergency arises. Shared decision making occurred and she verbalized understanding in agreement with this plan.      274}    Sonia will return to clinic in a few months for further workup and reassessment or sooner as needed. She was instructed to call the clinic or go to the emergency department if her symptoms do not improve, worsens, or if new symptoms develop. As we discussed that symptoms could worsen over the next 24 hours she was advised that if any increased swelling, pain, or numbness arise to go immediately to the ED. Patient knows to call any time if an emergency arises. Shared decision making occurred and she verbalized understanding in agreement with this plan.     She was counseled about the importance of cancer screening.     Medication Monitoring    In today's visit, monitoring for drug toxicity was accomplished. Proper use of medications was also discussed.     I discussed imaging findings, diagnosis, possibilities, treatment options, medications, risks, and benefits. She had many questions regarding the options and long-term effects. All questions were answered. She expressed understanding after counseling regarding the diagnosis and recommendations. She was  capable and demonstrated competence with understanding of these options. Shared decision making was performed resulting in her choosing the current treatment plan. Patient handout was given with instructions and recommendations. Advised the patient that if they become pregnant to alert us immediately to assess for medication changes.     I also discussed the importance of close follow up to discuss labs, change or modify her medications if needed, monitor side effects, and further evaluation of medical problems.     Additional workup planned: see labs ordered below.    See below for labs and meds ordered with associated diagnosis    1. IIH (idiopathic intracranial hypertension)    2. Primary hypertension  - candesartan (ATACAND) 4 MG tablet; Take 1 tablet (4 mg total) by mouth once daily.  Dispense: 30 tablet; Refill: 2    3. Iron deficiency anemia, unspecified iron deficiency anemia type  - Ambulatory referral/consult to Gastroenterology; Future  - Ambulatory referral/consult to Hematology / Oncology; Future    4. Bipolar affective disorder, remission status unspecified    5. PCOS (polycystic ovarian syndrome)    6. Mixed hyperlipidemia     Medication List with Changes/Refills   New Medications    CANDESARTAN (ATACAND) 4 MG TABLET    Take 1 tablet (4 mg total) by mouth once daily.   Current Medications    ACETAMINOPHEN (TYLENOL) 500 MG TABLET    Take 1,000 mg by mouth 2 (two) times daily as needed (knee pain).    ACETAZOLAMIDE (DIAMOX) 500 MG CPSR    Take 2 capsules (1,000 mg total) by mouth 2 (two) times daily.    ALBUTEROL (PROVENTIL/VENTOLIN HFA) 90 MCG/ACTUATION INHALER    Inhale 2 puffs into the lungs every 6 (six) hours as needed for Wheezing.    ASPIRIN-ACETAMINOPHEN-CAFFEINE 250-250-65 MG (EXCEDRIN MIGRAINE) 250-250-65 MG PER TABLET    Take 2 tablets by mouth daily as needed (headache).    BINAXNOW COVID-19 AG SELF TEST KIT    TEST AS DIRECTED    BUPROPION (WELLBUTRIN XL) 150 MG TB24 TABLET    Take 1 tablet  "(150 mg total) by mouth once daily.    BUTALBITAL-ACETAMINOPHEN-CAFFEINE -40 MG (FIORICET, ESGIC) -40 MG PER TABLET    Take 1 tablet by mouth every 4 (four) hours as needed for Headaches.    CARIPRAZINE (VRAYLAR) 3 MG CAP    Take 1 capsule (3 mg total) by mouth once daily at 6am.    EPINEPHRINE (EPIPEN) 0.3 MG/0.3 ML ATIN    Inject 0.3 mLs (0.3 mg total) into the muscle as needed (anaphylaxis).    FERROUS SULFATE (FEOSOL) 325 MG (65 MG IRON) TAB TABLET    Take by mouth once daily.    FOLIC ACID (FOLVITE) 1 MG TABLET    Take 1 tablet (1 mg total) by mouth once daily.    HYDROCODONE-ACETAMINOPHEN (NORCO) 5-325 MG PER TABLET    Take 1 tablet by mouth every 8 (eight) hours as needed for Pain.    LORAZEPAM (ATIVAN) 1 MG TABLET    Take 1 tablet (1 mg total) by mouth every 6 (six) hours as needed for Anxiety.    METFORMIN (GLUCOPHAGE-XR) 500 MG ER 24HR TABLET    Take 1 tablet (500 mg total) by mouth daily with breakfast.    METOCLOPRAMIDE HCL (REGLAN) 10 MG TABLET    Take 1 tablet (10 mg total) by mouth every 6 (six) hours.    PULSE OXIMETER (PULSE OXIMETER) DEVICE    by Apply Externally route 2 (two) times a day. Use twice daily at 8 AM and 3 PM and record the value in [facility] as directed.    SARS-COV-2, COVID-19, (MODERNA COVID-19) 100 MCG/0.5 ML INJECTION        TOPIRAMATE (TOPAMAX) 50 MG TABLET    Take 1 tablet (50 mg total) by mouth 2 (two) times daily.    UBROGEPANT (UBRELVY) 100 MG TABLET    Take 1 tablet by mouth once as needed for  migraine. May repeat in 2 hours if needed. Max 2 tablets per day.   Discontinued Medications    VERAPAMIL (CALAN-SR) 120 MG CR TABLET    Take 1 tablet (120 mg total) by mouth once daily at 6am.     Modified Medications    No medications on file       Dorian Guerrero MD  09/01/2022     Documentation entered by me for this encounter may have been done in part using speech-recognition technology. Although I have made an effort to ensure accuracy, "sound like" errors may exist " and should be interpreted in context.

## 2022-09-01 NOTE — TELEPHONE ENCOUNTER
----- Message from Jailyn Chavez LPN sent at 9/1/2022  8:29 AM CDT -----  Contact: Patient  Please contact pt with an appt. Ok to see Ynes   ----- Message -----  From: Brittany Thakkar  Sent: 9/1/2022   8:22 AM CDT  To: [facility] Caputo Staff    Type:   Apoointment Request    Name of Caller:Patient     When is the first available appointment? 10/4    Symptoms:  Would the patient rather a call back or a response via [email or web address]?  Call     Best Call Back Number: [phone] (home)      Additional Information: Patient has a referral and needs to schedule before 10/4

## 2022-09-01 NOTE — NURSING
Oncology Navigation   Intake  Cancer Type: Benign hem (DARRION)  Internal / External Referral: Internal (Workqueue)  Referral Source: Dr. Dorian Guerrero  Date of Referral: 09/01/22  Initial Nurse Navigator Contact: 09/01/22  Referral to Initial Contact Timeline (days): 0  Date Worked: 09/01/22  First Appointment Available: 09/02/22 (declined asked for specific date)  Appointment Date: 09/09/22 (Dr. Barriga)  First Available Date vs. Scheduled Date (days): 7     Treatment                              Acuity      Follow Up  No follow-ups on file.

## 2022-09-01 NOTE — TELEPHONE ENCOUNTER
Pt came in for a bp check. Please see notes from visit. Pt hasnt picked up her medication from the pharmacy.    Does patient have record of home blood pressure readings no.   Patient is symptomatic.   Complains of dizziness, nausea, blurry vision, left sided weakness and migraine due to Idiopathic intercranial hypertension.    BP: (!) 152/90 , Pulse: 78 .    Blood pressure reading after 15 minutes was 146/90, Pulse 78.  Dr. Guerrero notified.

## 2022-09-02 ENCOUNTER — PATIENT MESSAGE (OUTPATIENT)
Dept: NEUROLOGY | Facility: CLINIC | Age: 37
End: 2022-09-02
Payer: COMMERCIAL

## 2022-09-02 DIAGNOSIS — G93.2 PSEUDOTUMOR CEREBRI: Primary | ICD-10-CM

## 2022-09-02 DIAGNOSIS — R11.0 NAUSEA: Primary | ICD-10-CM

## 2022-09-02 RX ORDER — ONDANSETRON 4 MG/1
4 TABLET, ORALLY DISINTEGRATING ORAL EVERY 6 HOURS PRN
Qty: 30 TABLET | Refills: 11 | Status: ON HOLD | OUTPATIENT
Start: 2022-09-02 | End: 2023-06-26 | Stop reason: HOSPADM

## 2022-09-02 NOTE — TELEPHONE ENCOUNTER
Informed pt message from provider. Pt verbalized understanding.  Nurse visit scheduled with Ms. Carty.

## 2022-09-04 ENCOUNTER — HOSPITAL ENCOUNTER (OUTPATIENT)
Facility: HOSPITAL | Age: 37
Discharge: HOME | End: 2022-09-05
Attending: EMERGENCY MEDICINE | Admitting: STUDENT IN AN ORGANIZED HEALTH CARE EDUCATION/TRAINING PROGRAM
Payer: COMMERCIAL

## 2022-09-04 DIAGNOSIS — R20.0 NUMBNESS: ICD-10-CM

## 2022-09-04 DIAGNOSIS — R07.9 CHEST PAIN: ICD-10-CM

## 2022-09-04 DIAGNOSIS — F31.81 BIPOLAR II DISORDER: ICD-10-CM

## 2022-09-04 DIAGNOSIS — R51.9 NONINTRACTABLE HEADACHE, UNSPECIFIED CHRONICITY PATTERN, UNSPECIFIED HEADACHE TYPE: Primary | ICD-10-CM

## 2022-09-04 PROBLEM — R47.1 DYSARTHRIA: Chronic | Status: ACTIVE | Noted: 2022-08-11

## 2022-09-04 PROBLEM — R53.1 LEFT-SIDED WEAKNESS: Chronic | Status: ACTIVE | Noted: 2022-08-06

## 2022-09-04 PROBLEM — E28.2 PCOS (POLYCYSTIC OVARIAN SYNDROME): Chronic | Status: ACTIVE | Noted: 2022-08-07

## 2022-09-04 PROBLEM — J45.909 ASTHMA: Chronic | Status: ACTIVE | Noted: 2017-01-18

## 2022-09-04 PROBLEM — K21.9 GERD (GASTROESOPHAGEAL REFLUX DISEASE): Chronic | Status: ACTIVE | Noted: 2020-10-25

## 2022-09-04 PROBLEM — E78.2 MIXED HYPERLIPIDEMIA: Chronic | Status: ACTIVE | Noted: 2022-09-01

## 2022-09-04 PROBLEM — G93.2 IIH (IDIOPATHIC INTRACRANIAL HYPERTENSION): Chronic | Status: ACTIVE | Noted: 2022-08-06

## 2022-09-04 PROBLEM — I10 HYPERTENSION: Chronic | Status: ACTIVE | Noted: 2020-10-14

## 2022-09-04 PROBLEM — F31.9 BIPOLAR DISORDER, UNSPECIFIED: Chronic | Status: ACTIVE | Noted: 2021-09-03

## 2022-09-04 PROBLEM — K58.9 IRRITABLE BOWEL SYNDROME WITHOUT DIARRHEA: Chronic | Status: ACTIVE | Noted: 2021-09-03

## 2022-09-04 LAB
ALBUMIN SERPL BCP-MCNC: 3.4 G/DL (ref 3.5–5.2)
ALP SERPL-CCNC: 100 U/L (ref 55–135)
ALT SERPL W/O P-5'-P-CCNC: 14 U/L (ref 10–44)
ANION GAP SERPL CALC-SCNC: 1 MMOL/L (ref 8–16)
AST SERPL-CCNC: 16 U/L (ref 10–40)
BASOPHILS # BLD AUTO: 0.05 K/UL (ref 0–0.2)
BASOPHILS NFR BLD: 0.5 % (ref 0–1.9)
BILIRUB SERPL-MCNC: 0.3 MG/DL (ref 0.1–1)
BUN SERPL-MCNC: 12 MG/DL (ref 6–20)
CALCIUM SERPL-MCNC: 10.8 MG/DL (ref 8.7–10.5)
CHLORIDE SERPL-SCNC: 113 MMOL/L (ref 95–110)
CO2 SERPL-SCNC: 16 MMOL/L (ref 23–29)
CREAT SERPL-MCNC: 0.9 MG/DL (ref 0.5–1.4)
DIFFERENTIAL METHOD: ABNORMAL
EOSINOPHIL # BLD AUTO: 0.4 K/UL (ref 0–0.5)
EOSINOPHIL NFR BLD: 3.8 % (ref 0–8)
ERYTHROCYTE [DISTWIDTH] IN BLOOD BY AUTOMATED COUNT: 17.6 % (ref 11.5–14.5)
EST. GFR  (NO RACE VARIABLE): >60 ML/MIN/1.73 M^2
GLUCOSE SERPL-MCNC: 81 MG/DL (ref 70–110)
HCT VFR BLD AUTO: 35.5 % (ref 37–48.5)
HGB BLD-MCNC: 11.2 G/DL (ref 12–16)
IMM GRANULOCYTES # BLD AUTO: 0.03 K/UL (ref 0–0.04)
IMM GRANULOCYTES NFR BLD AUTO: 0.3 % (ref 0–0.5)
LYMPHOCYTES # BLD AUTO: 2.9 K/UL (ref 1–4.8)
LYMPHOCYTES NFR BLD: 27.5 % (ref 18–48)
MAGNESIUM SERPL-MCNC: 2.2 MG/DL (ref 1.6–2.6)
MCH RBC QN AUTO: 26.7 PG (ref 27–31)
MCHC RBC AUTO-ENTMCNC: 31.5 G/DL (ref 32–36)
MCV RBC AUTO: 85 FL (ref 82–98)
MONOCYTES # BLD AUTO: 0.7 K/UL (ref 0.3–1)
MONOCYTES NFR BLD: 6.8 % (ref 4–15)
NEUTROPHILS # BLD AUTO: 6.3 K/UL (ref 1.8–7.7)
NEUTROPHILS NFR BLD: 61.1 % (ref 38–73)
NRBC BLD-RTO: 0 /100 WBC
PLATELET # BLD AUTO: 381 K/UL (ref 150–450)
PMV BLD AUTO: 9.9 FL (ref 9.2–12.9)
POTASSIUM SERPL-SCNC: 4.2 MMOL/L (ref 3.5–5.1)
PROT SERPL-MCNC: 6.7 G/DL (ref 6–8.4)
RBC # BLD AUTO: 4.2 M/UL (ref 4–5.4)
SODIUM SERPL-SCNC: 130 MMOL/L (ref 136–145)
TSH SERPL DL<=0.005 MIU/L-ACNC: 2.12 UIU/ML (ref 0.4–4)
WBC # BLD AUTO: 10.37 K/UL (ref 3.9–12.7)

## 2022-09-04 PROCEDURE — 85025 COMPLETE CBC W/AUTO DIFF WBC: CPT | Performed by: EMERGENCY MEDICINE

## 2022-09-04 PROCEDURE — 99285 EMERGENCY DEPT VISIT HI MDM: CPT | Mod: 25

## 2022-09-04 PROCEDURE — 99285 EMERGENCY DEPT VISIT HI MDM: CPT | Mod: ,,, | Performed by: EMERGENCY MEDICINE

## 2022-09-04 PROCEDURE — 84443 ASSAY THYROID STIM HORMONE: CPT | Performed by: EMERGENCY MEDICINE

## 2022-09-04 PROCEDURE — 80053 COMPREHEN METABOLIC PANEL: CPT | Performed by: EMERGENCY MEDICINE

## 2022-09-04 PROCEDURE — 96374 THER/PROPH/DIAG INJ IV PUSH: CPT

## 2022-09-04 PROCEDURE — 93010 EKG 12-LEAD: ICD-10-PCS | Mod: ,,, | Performed by: INTERNAL MEDICINE

## 2022-09-04 PROCEDURE — 93005 ELECTROCARDIOGRAM TRACING: CPT

## 2022-09-04 PROCEDURE — 99285 PR EMERGENCY DEPT VISIT,LEVEL V: ICD-10-PCS | Mod: ,,, | Performed by: EMERGENCY MEDICINE

## 2022-09-04 PROCEDURE — 83735 ASSAY OF MAGNESIUM: CPT | Performed by: EMERGENCY MEDICINE

## 2022-09-04 PROCEDURE — 25000003 PHARM REV CODE 250: Performed by: EMERGENCY MEDICINE

## 2022-09-04 PROCEDURE — 93010 ELECTROCARDIOGRAM REPORT: CPT | Mod: ,,, | Performed by: INTERNAL MEDICINE

## 2022-09-04 PROCEDURE — 63600175 PHARM REV CODE 636 W HCPCS: Performed by: EMERGENCY MEDICINE

## 2022-09-04 PROCEDURE — 96361 HYDRATE IV INFUSION ADD-ON: CPT

## 2022-09-04 RX ORDER — DROPERIDOL 2.5 MG/ML
2.5 INJECTION, SOLUTION INTRAMUSCULAR; INTRAVENOUS
Status: COMPLETED | OUTPATIENT
Start: 2022-09-04 | End: 2022-09-04

## 2022-09-04 RX ADMIN — DROPERIDOL 2.5 MG: 2.5 INJECTION, SOLUTION INTRAMUSCULAR; INTRAVENOUS at 11:09

## 2022-09-04 RX ADMIN — SODIUM CHLORIDE 1000 ML: 0.9 INJECTION, SOLUTION INTRAVENOUS at 11:09

## 2022-09-05 VITALS
DIASTOLIC BLOOD PRESSURE: 76 MMHG | BODY MASS INDEX: 43.71 KG/M2 | WEIGHT: 279.13 LBS | HEART RATE: 81 BPM | SYSTOLIC BLOOD PRESSURE: 138 MMHG | RESPIRATION RATE: 16 BRPM | OXYGEN SATURATION: 97 % | TEMPERATURE: 98 F

## 2022-09-05 PROBLEM — E78.2 MIXED HYPERLIPIDEMIA: Chronic | Status: RESOLVED | Noted: 2022-09-01 | Resolved: 2022-09-05

## 2022-09-05 PROBLEM — K21.9 GERD (GASTROESOPHAGEAL REFLUX DISEASE): Chronic | Status: RESOLVED | Noted: 2020-10-25 | Resolved: 2022-09-05

## 2022-09-05 PROBLEM — R51.9 NONINTRACTABLE HEADACHE: Status: ACTIVE | Noted: 2022-09-05

## 2022-09-05 PROBLEM — R29.818 TRANSIENT NEUROLOGICAL SYMPTOMS: Status: ACTIVE | Noted: 2022-09-05

## 2022-09-05 PROBLEM — J45.909 ASTHMA: Chronic | Status: RESOLVED | Noted: 2017-01-18 | Resolved: 2022-09-05

## 2022-09-05 PROCEDURE — 99214 OFFICE O/P EST MOD 30 MIN: CPT | Mod: ,,, | Performed by: PSYCHIATRY & NEUROLOGY

## 2022-09-05 PROCEDURE — 99236 HOSP IP/OBS SAME DATE HI 85: CPT | Mod: ,,, | Performed by: PHYSICIAN ASSISTANT

## 2022-09-05 PROCEDURE — 96372 THER/PROPH/DIAG INJ SC/IM: CPT | Mod: 59 | Performed by: PHYSICIAN ASSISTANT

## 2022-09-05 PROCEDURE — 99236 PR OBSERV/HOSP SAME DATE,LEVL V: ICD-10-PCS | Mod: ,,, | Performed by: PHYSICIAN ASSISTANT

## 2022-09-05 PROCEDURE — 25000003 PHARM REV CODE 250: Performed by: PHYSICIAN ASSISTANT

## 2022-09-05 PROCEDURE — 99214 PR OFFICE/OUTPT VISIT, EST, LEVL IV, 30-39 MIN: ICD-10-PCS | Mod: ,,, | Performed by: PSYCHIATRY & NEUROLOGY

## 2022-09-05 PROCEDURE — 96361 HYDRATE IV INFUSION ADD-ON: CPT

## 2022-09-05 PROCEDURE — 96376 TX/PRO/DX INJ SAME DRUG ADON: CPT

## 2022-09-05 PROCEDURE — 63600175 PHARM REV CODE 636 W HCPCS: Performed by: EMERGENCY MEDICINE

## 2022-09-05 PROCEDURE — A4216 STERILE WATER/SALINE, 10 ML: HCPCS | Performed by: PHYSICIAN ASSISTANT

## 2022-09-05 PROCEDURE — G0378 HOSPITAL OBSERVATION PER HR: HCPCS

## 2022-09-05 PROCEDURE — 63600175 PHARM REV CODE 636 W HCPCS: Performed by: PHYSICIAN ASSISTANT

## 2022-09-05 RX ORDER — LOSARTAN POTASSIUM 25 MG/1
25 TABLET ORAL DAILY
Refills: 2 | Status: DISCONTINUED | OUTPATIENT
Start: 2022-09-05 | End: 2022-09-05 | Stop reason: HOSPADM

## 2022-09-05 RX ORDER — ACETAZOLAMIDE 500 MG/1
1000 CAPSULE, EXTENDED RELEASE ORAL 2 TIMES DAILY
Status: DISCONTINUED | OUTPATIENT
Start: 2022-09-05 | End: 2022-09-05 | Stop reason: HOSPADM

## 2022-09-05 RX ORDER — BUPROPION HYDROCHLORIDE 150 MG/1
150 TABLET ORAL NIGHTLY
Qty: 30 TABLET | Refills: 0 | OUTPATIENT
Start: 2022-09-05 | End: 2022-09-08 | Stop reason: SDUPTHER

## 2022-09-05 RX ORDER — TOPIRAMATE 25 MG/1
50 TABLET ORAL 2 TIMES DAILY
Status: DISCONTINUED | OUTPATIENT
Start: 2022-09-05 | End: 2022-09-05 | Stop reason: HOSPADM

## 2022-09-05 RX ORDER — SIMETHICONE 80 MG
1 TABLET,CHEWABLE ORAL 4 TIMES DAILY PRN
Status: DISCONTINUED | OUTPATIENT
Start: 2022-09-05 | End: 2022-09-05 | Stop reason: HOSPADM

## 2022-09-05 RX ORDER — ACETAMINOPHEN 325 MG/1
650 TABLET ORAL EVERY 4 HOURS PRN
Status: DISCONTINUED | OUTPATIENT
Start: 2022-09-05 | End: 2022-09-05

## 2022-09-05 RX ORDER — GLUCAGON 1 MG
1 KIT INJECTION
Status: DISCONTINUED | OUTPATIENT
Start: 2022-09-05 | End: 2022-09-05 | Stop reason: HOSPADM

## 2022-09-05 RX ORDER — IPRATROPIUM BROMIDE AND ALBUTEROL SULFATE 2.5; .5 MG/3ML; MG/3ML
3 SOLUTION RESPIRATORY (INHALATION) EVERY 6 HOURS PRN
Status: DISCONTINUED | OUTPATIENT
Start: 2022-09-05 | End: 2022-09-05 | Stop reason: HOSPADM

## 2022-09-05 RX ORDER — IBUPROFEN 200 MG
16 TABLET ORAL
Status: DISCONTINUED | OUTPATIENT
Start: 2022-09-05 | End: 2022-09-05 | Stop reason: HOSPADM

## 2022-09-05 RX ORDER — ACETAZOLAMIDE 500 MG/1
2000 CAPSULE, EXTENDED RELEASE ORAL 2 TIMES DAILY
Qty: 240 CAPSULE | Refills: 11 | Status: ON HOLD | OUTPATIENT
Start: 2022-09-05 | End: 2022-09-20 | Stop reason: HOSPADM

## 2022-09-05 RX ORDER — SODIUM CHLORIDE 0.9 % (FLUSH) 0.9 %
10 SYRINGE (ML) INJECTION EVERY 8 HOURS
Status: DISCONTINUED | OUTPATIENT
Start: 2022-09-05 | End: 2022-09-05 | Stop reason: HOSPADM

## 2022-09-05 RX ORDER — HYDRALAZINE HYDROCHLORIDE 25 MG/1
25 TABLET, FILM COATED ORAL EVERY 8 HOURS PRN
Status: DISCONTINUED | OUTPATIENT
Start: 2022-09-05 | End: 2022-09-05 | Stop reason: HOSPADM

## 2022-09-05 RX ORDER — CARIPRAZINE 3 MG/1
3 CAPSULE, GELATIN COATED ORAL NIGHTLY
Qty: 30 CAPSULE | Refills: 0 | Status: SHIPPED | OUTPATIENT
Start: 2022-09-05 | End: 2022-09-08 | Stop reason: SDUPTHER

## 2022-09-05 RX ORDER — NALOXONE HCL 0.4 MG/ML
0.02 VIAL (ML) INJECTION
Status: DISCONTINUED | OUTPATIENT
Start: 2022-09-05 | End: 2022-09-05 | Stop reason: HOSPADM

## 2022-09-05 RX ORDER — IBUPROFEN 200 MG
24 TABLET ORAL
Status: DISCONTINUED | OUTPATIENT
Start: 2022-09-05 | End: 2022-09-05 | Stop reason: HOSPADM

## 2022-09-05 RX ORDER — FAMOTIDINE, CALCIUM CARBONATE, AND MAGNESIUM HYDROXIDE 10; 800; 165 MG/1; MG/1; MG/1
1 TABLET, CHEWABLE ORAL DAILY PRN
COMMUNITY
End: 2022-11-25

## 2022-09-05 RX ORDER — ONDANSETRON 8 MG/1
8 TABLET, ORALLY DISINTEGRATING ORAL EVERY 8 HOURS PRN
Status: DISCONTINUED | OUTPATIENT
Start: 2022-09-05 | End: 2022-09-05 | Stop reason: HOSPADM

## 2022-09-05 RX ORDER — BUPROPION HYDROCHLORIDE 150 MG/1
150 TABLET ORAL NIGHTLY
Status: DISCONTINUED | OUTPATIENT
Start: 2022-09-05 | End: 2022-09-05 | Stop reason: HOSPADM

## 2022-09-05 RX ORDER — POLYETHYLENE GLYCOL 3350 17 G/17G
17 POWDER, FOR SOLUTION ORAL DAILY
Status: DISCONTINUED | OUTPATIENT
Start: 2022-09-05 | End: 2022-09-05 | Stop reason: HOSPADM

## 2022-09-05 RX ORDER — ACETAMINOPHEN 500 MG
1000 TABLET ORAL 3 TIMES DAILY
Status: DISCONTINUED | OUTPATIENT
Start: 2022-09-05 | End: 2022-09-05 | Stop reason: HOSPADM

## 2022-09-05 RX ORDER — HEPARIN SODIUM 5000 [USP'U]/ML
5000 INJECTION, SOLUTION INTRAVENOUS; SUBCUTANEOUS EVERY 8 HOURS
Status: DISCONTINUED | OUTPATIENT
Start: 2022-09-05 | End: 2022-09-05 | Stop reason: HOSPADM

## 2022-09-05 RX ORDER — TALC
6 POWDER (GRAM) TOPICAL NIGHTLY PRN
Status: DISCONTINUED | OUTPATIENT
Start: 2022-09-05 | End: 2022-09-05 | Stop reason: HOSPADM

## 2022-09-05 RX ORDER — DROPERIDOL 2.5 MG/ML
2.5 INJECTION, SOLUTION INTRAMUSCULAR; INTRAVENOUS
Status: COMPLETED | OUTPATIENT
Start: 2022-09-05 | End: 2022-09-05

## 2022-09-05 RX ORDER — FOLIC ACID 1 MG/1
1 TABLET ORAL DAILY
Status: DISCONTINUED | OUTPATIENT
Start: 2022-09-05 | End: 2022-09-05 | Stop reason: HOSPADM

## 2022-09-05 RX ADMIN — Medication 10 ML: at 06:09

## 2022-09-05 RX ADMIN — ACETAZOLAMIDE 1000 MG: 500 CAPSULE ORAL at 09:09

## 2022-09-05 RX ADMIN — FOLIC ACID 1 MG: 1 TABLET ORAL at 09:09

## 2022-09-05 RX ADMIN — DROPERIDOL 2.5 MG: 2.5 INJECTION, SOLUTION INTRAMUSCULAR; INTRAVENOUS at 12:09

## 2022-09-05 RX ADMIN — HEPARIN SODIUM 5000 UNITS: 5000 INJECTION INTRAVENOUS; SUBCUTANEOUS at 06:09

## 2022-09-05 RX ADMIN — ACETAMINOPHEN 1000 MG: 500 TABLET ORAL at 09:09

## 2022-09-05 RX ADMIN — BUPROPION HYDROCHLORIDE 150 MG: 150 TABLET, FILM COATED, EXTENDED RELEASE ORAL at 02:09

## 2022-09-05 RX ADMIN — Medication 10 ML: at 02:09

## 2022-09-05 RX ADMIN — ONDANSETRON 8 MG: 8 TABLET, ORALLY DISINTEGRATING ORAL at 05:09

## 2022-09-05 RX ADMIN — POLYETHYLENE GLYCOL 3350 17 G: 17 POWDER, FOR SOLUTION ORAL at 09:09

## 2022-09-05 RX ADMIN — TOPIRAMATE 50 MG: 25 TABLET, FILM COATED ORAL at 09:09

## 2022-09-05 RX ADMIN — LOSARTAN POTASSIUM 25 MG: 25 TABLET, FILM COATED ORAL at 09:09

## 2022-09-05 RX ADMIN — ACETAMINOPHEN 1000 MG: 500 TABLET ORAL at 02:09

## 2022-09-05 NOTE — HPI
Ms. Goldberg is a 36 y.o. female with history of IIH, KERI, GERD, hypertension, bipolar disorder, IBS, PCOS, hyperlipidemia, asthma, left-sided weakness thought to be from functional disorder, migraine syndrome, presenting with complaint of worsening headache. States that she has a baseline 8/10 headache at all times. This evening, it became more severe 10/10. Pt attempted sitting in a dark room in silence but was w/o relief. HA is associated nausea, perioral and tongue numbness that began to spread down to the L side of chest. No vomiting or visual changes. Pt reports baseline L sided weakness and dysarthria which are unchanged. She states that she is taking all her medications include Diamox as prescribed. She has not taken any medications specifically for pain. Pt denies f/c, lightheadedness, dizziness, chest pain, SOB, dysuria.    In ED, Pt AFVSS. CBC w/ stable anemia. CBC w/ Na 130, HCO3 16, AG 1. CT head showed no evidence of acute intracranial pathology. Pt given droperidol IV and NS bolus.
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
## 2022-09-05 NOTE — PLAN OF CARE
09/05/2022      Sonia Goldberg  [address] [address]          Hospital Medicine Dept.  [facility] [address] [address] [phone] after hours  [phone] fax Sonia Goldberg has been hospitalized at the [facility] since 9/4/2022.  Please excuse the patient from duties.  Patient may return on 9/7/2022.  No restrictions.     Please contact me if you have any questions.                  __________________________  Lopez Diaz MD  09/05/2022
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
## 2022-09-05 NOTE — ASSESSMENT & PLAN NOTE
IIH  Pt presenting with complaint of worsening headache. States that she has a baseline 8/10 headache at all times. This evening, it became more severe 10/10. Pt attempted sitting in a dark room in silence but was w/o relief. HA is associated nausea, perioral and tongue numbness that began to spread down to the L side of chest. No vomiting or visual changes. Recently LP done on 8/17 and showed opening pressure pf 58 and highly concerning for IIH. Followed OP by NSGY, Pt scheduled for  shunt placement 9/19.    -past medical history signficant for migraines, presenting with persistent headache for about ~2 months, now with left sided weakness, dysarthria. Concern for complex migraine vs IIH vs functional disorder  -follows with neurology outpatient  -given 1L NS and droperidol 2.5 mg IV in the ED and w/ improvement of symptoms 10/10 now 6/10  -CTH without acute findings   -neurology consulted, appreciate recs  -tylenol 1 g tid  -continue home diamox1 g BID, topomax 50 mg BID, recently titrated up by OP neurologist  -neurochecks  -continue to monitor

## 2022-09-05 NOTE — ASSESSMENT & PLAN NOTE
"oSnia Goldberg is a 36 y.o. female with IIH and migraines, presenting with 10/10 headache with perioral numbness. She reports a headache in the right occipital area with a sense of "herniation". She describes photophobia and phonophobia and pain behind the right eye and radiating posteriorly. This was typical of her migraines, but the "herniation" pain was new. Along with the LSW and perioral hypoesthesias. Patient presents after acute episode of headache with numbness and paresthesias in left face, hands and feet. She also notes changes in her sense of taste, word retrieval issues, and headache.    Good response to droperidol in ED.  CTH in ED negative for acute findings.  Some of these symptoms are side effects of Topamax and diamox. Patient educated and understood. Will likely decrease doses after shunt placement.  There is no indication that there is ICP.     --Continue to treat headaches symptomatically  --Follow up with CMP for mild electrolyte irregularities in a couple days. These are likely due to increased diamox and poor oral intake.   --Encourage PO intake.  --Continue therapies, will likely decrease doses after  shunt placement.  --Procedure planned with [facility] for  shunt placement, Dr. Yoon 9/19 @7am  "

## 2022-09-05 NOTE — ASSESSMENT & PLAN NOTE
"Sonia Goldberg is a 36 y.o. female with IIH and migraines, presenting with 10/10 headache with perioral numbness. She reports a headache in the right occipital area with a sense of " herniation". She describes photophobia and phonophobia and pain behind the right eye and radiating posteriorly. This was typical of her migraines, but the "herniation" pain was new. Along with the LSW and perioral hypoesthesias.     Patient reports good response to droperidol in ED.  CTH in ED negative for acute findings.    --Continue to treat headaches symptomatically  --Follow up CMP for mild electrolyte irregularities in a couple days. These are likely due to increased diamox and poor oral intake.   --Encourage PO intake.       "
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
## 2022-09-05 NOTE — DISCHARGE INSTRUCTIONS
You were admitted for migraine headache complications. Neurology saw you and recommended continuing your current medications through your upcoming surgery appointment.    Follow up with primary care requested for repeat labs.
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
## 2022-09-05 NOTE — PHARMACY MED REC
"Admission Medication History     The home medication history was taken by Cal Beltran.    You may go to "Admission" then "Reconcile Home Medications" tabs to review and/or act upon these items.     The home medication list has been updated by the Pharmacy department.   Please read ALL comments highlighted in yellow.   Please address this information as you see fit.    Feel free to contact us if you have any questions or require assistance.      The medications listed below were removed from the home medication list. Please reorder if appropriate:  Patient reports no longer taking the following medication(s):  ACETAMINOPHEN 500 MG TAB  ASPIRIN-ACETAMINOPHEN-CAFFEINE 250-250-65 MG TAB  METFORMIN  MG TAB  METOCLOPRAMIDE 10 MG TAB    Medications listed below were obtained from: Patient    Current Outpatient Medications on File Prior to Encounter   Medication Sig    acetaZOLAMIDE (DIAMOX) 500 mg CpSR   Take 2,000 mg by mouth 2 (two) times daily.    buPROPion (WELLBUTRIN XL) 150 MG TB24 tablet   Take 1 tablet (150 mg total) by mouth once daily. (Patient taking differently: Take 150 mg by mouth nightly.)    candesartan (ATACAND) 4 MG tablet   Take 1 tablet (4 mg total) by mouth once daily.    cariprazine (VRAYLAR) 3 mg Cap   Take 3 mg by mouth nightly.    diphenhydrAMINE (BENADRYL) 50 MG tablet   Take 50 mg by mouth nightly as needed for Insomnia.    famotidine-calcium carbonate-magnesium hydroxide (PEPCID COMPLETE) -165 mg   Take 1 tablet by mouth daily as needed (Acid reflux).    ferrous sulfate (FEOSOL) 325 mg (65 mg iron) Tab tablet   Take by mouth once daily.    folic acid (FOLVITE) 1 MG tablet   Take 1 tablet (1 mg total) by mouth once daily.    LORazepam (ATIVAN) 1 MG tablet   Take 1 tablet (1 mg total) by mouth every 6 (six) hours as needed for Anxiety.    ondansetron (ZOFRAN-ODT) 4 MG TbDL   Take 1 tablet (4 mg total) by mouth every 6 (six) hours as needed (nausea).    topiramate (TOPAMAX) 50 MG tablet   " Take 1 tablet (50 mg total) by mouth 2 (two) times daily.    albuterol (PROVENTIL/VENTOLIN HFA) 90 mcg/actuation inhaler   Inhale 2 puffs into the lungs every 6 (six) hours as needed for Wheezing.    BINAXNOW COVID-19 AG SELF TEST Kit   TEST AS DIRECTED    EPINEPHrine (EPIPEN) 0.3 mg/0.3 mL AtIn Inject 1 each into the muscle as needed (anaphylaxis). Has vial not pen      pulse oximeter (PULSE OXIMETER) device by Apply Externally route 2 (two) times a day. Use twice daily at 8 AM and 3 PM and record the value in [email or web address] as directed.    sars-cov-2, covid-19, (MODERNA COVID-19) 100 mcg/0.5 ml injection       ubrogepant (UBRELVY) 100 mg tablet   Take 1 tablet by mouth once as needed for  migraine. May repeat in 2 hours if needed. Max 2 tablets per day.       Potential issues to be addressed PRIOR TO DISCHARGE  Patient reported not taking the following medications: (NORCO). These medications remain on the home medication list. Please address accordingly.     Cal Beltran CPhT   [phone]                  .

## 2022-09-05 NOTE — ASSESSMENT & PLAN NOTE
Dysarthria  Numbness    -Perioral, tongue, L side chest numbness, suspect functional disorder at this time  -CTH was negative for acute intracranial pathology  -continue to monitor

## 2022-09-05 NOTE — ASSESSMENT & PLAN NOTE
History of IIH with planned  shunt placement on 9/19/22 with NSGY. Being treated medically with Diamox and topomax. Compliance noted per patient. She also notes poor diet when her  is working and not preparing meals lately.   Fundoscopy without signs of retinal edema.    Patient education provided about medication effects. Patient understood and reassured. Patient desires to discharge home once headache improves. Safe to discharge home from IIH perspective.    --Continue home meds  --Symptoms are likely side effects from Diamox and topomax, continue medications without changes at this time.

## 2022-09-05 NOTE — ASSESSMENT & PLAN NOTE
Patient has recurrent depression which is moderate and is currently controlled. Will Continue anti-depressant medications. We will not consult psychiatry at this time. Patient does not display psychosis at this time. Continue to monitor closely and adjust plan of care as needed.  -bupropion 150 mg qhs

## 2022-09-05 NOTE — HPI
Sonia Goldberg is a 36 y.o. female with history of IIH, KERI, GERD, hypertension, bipolar disorder, IBS, PCOS, hyperlipidemia, asthma, FND, migraine syndrome, presenting with complaint of worsening headache and perioral numbness. States that she has a baseline 8/10 headache at all times. Yesterday evening, it became more severe 10/10. Pt attempted sitting in a dark room in silence but was w/o relief. HA is associated nausea, perioral and tongue numbness that began to spread down midline neck to the L side of shoulder and down left arm. No vomiting or visual changes. Pt reports baseline L sided weakness, paresthesias in her hands and feet (L>R), dysarthria, and word finding difficulties which are unchanged from prior admission. She states that she is taking all her medications include Diamox as prescribed. She has not taken any medications specifically for pain. Pt denies f/c, lightheadedness, dizziness, chest pain, SOB, dysuria.

## 2022-09-06 ENCOUNTER — TELEPHONE (OUTPATIENT)
Dept: FAMILY MEDICINE | Facility: CLINIC | Age: 37
End: 2022-09-06
Payer: COMMERCIAL

## 2022-09-06 ENCOUNTER — TELEPHONE (OUTPATIENT)
Dept: PREADMISSION TESTING | Facility: HOSPITAL | Age: 37
End: 2022-09-06
Payer: COMMERCIAL

## 2022-09-06 ENCOUNTER — PATIENT MESSAGE (OUTPATIENT)
Dept: NEUROSURGERY | Facility: CLINIC | Age: 37
End: 2022-09-06
Payer: COMMERCIAL

## 2022-09-06 ENCOUNTER — PATIENT MESSAGE (OUTPATIENT)
Dept: FAMILY MEDICINE | Facility: CLINIC | Age: 37
End: 2022-09-06

## 2022-09-06 ENCOUNTER — EXTERNAL HOME HEALTH (OUTPATIENT)
Dept: HOME HEALTH SERVICES | Facility: HOSPITAL | Age: 37
End: 2022-09-06
Payer: COMMERCIAL

## 2022-09-06 ENCOUNTER — OFFICE VISIT (OUTPATIENT)
Dept: FAMILY MEDICINE | Facility: CLINIC | Age: 37
End: 2022-09-06
Payer: COMMERCIAL

## 2022-09-06 VITALS
BODY MASS INDEX: 43.22 KG/M2 | HEIGHT: 67 IN | TEMPERATURE: 98 F | DIASTOLIC BLOOD PRESSURE: 82 MMHG | WEIGHT: 275.38 LBS | HEART RATE: 89 BPM | OXYGEN SATURATION: 99 % | SYSTOLIC BLOOD PRESSURE: 128 MMHG

## 2022-09-06 DIAGNOSIS — E87.1 HYPONATREMIA: ICD-10-CM

## 2022-09-06 DIAGNOSIS — I10 PRIMARY HYPERTENSION: ICD-10-CM

## 2022-09-06 DIAGNOSIS — F31.9 BIPOLAR AFFECTIVE DISORDER, REMISSION STATUS UNSPECIFIED: ICD-10-CM

## 2022-09-06 DIAGNOSIS — Z01.818 PRE-OP EXAM: Primary | ICD-10-CM

## 2022-09-06 DIAGNOSIS — D50.9 IRON DEFICIENCY ANEMIA, UNSPECIFIED IRON DEFICIENCY ANEMIA TYPE: ICD-10-CM

## 2022-09-06 DIAGNOSIS — E28.2 PCOS (POLYCYSTIC OVARIAN SYNDROME): ICD-10-CM

## 2022-09-06 DIAGNOSIS — G43.119 INTRACTABLE MIGRAINE WITH AURA WITHOUT STATUS MIGRAINOSUS: ICD-10-CM

## 2022-09-06 DIAGNOSIS — G93.2 IIH (IDIOPATHIC INTRACRANIAL HYPERTENSION): ICD-10-CM

## 2022-09-06 DIAGNOSIS — Z01.818 PREOPERATIVE TESTING: Primary | ICD-10-CM

## 2022-09-06 PROCEDURE — 1159F MED LIST DOCD IN RCRD: CPT | Mod: CPTII,S$GLB,, | Performed by: NURSE PRACTITIONER

## 2022-09-06 PROCEDURE — 1111F PR DISCHARGE MEDS RECONCILED W/ CURRENT OUTPATIENT MED LIST: ICD-10-PCS | Mod: CPTII,S$GLB,, | Performed by: NURSE PRACTITIONER

## 2022-09-06 PROCEDURE — 99999 PR PBB SHADOW E&M-EST. PATIENT-LVL V: ICD-10-PCS | Mod: PBBFAC,,, | Performed by: NURSE PRACTITIONER

## 2022-09-06 PROCEDURE — 99214 OFFICE O/P EST MOD 30 MIN: CPT | Mod: S$GLB,,, | Performed by: NURSE PRACTITIONER

## 2022-09-06 PROCEDURE — 3044F HG A1C LEVEL LT 7.0%: CPT | Mod: CPTII,S$GLB,, | Performed by: NURSE PRACTITIONER

## 2022-09-06 PROCEDURE — 3079F PR MOST RECENT DIASTOLIC BLOOD PRESSURE 80-89 MM HG: ICD-10-PCS | Mod: CPTII,S$GLB,, | Performed by: NURSE PRACTITIONER

## 2022-09-06 PROCEDURE — 3074F PR MOST RECENT SYSTOLIC BLOOD PRESSURE < 130 MM HG: ICD-10-PCS | Mod: CPTII,S$GLB,, | Performed by: NURSE PRACTITIONER

## 2022-09-06 PROCEDURE — 3044F PR MOST RECENT HEMOGLOBIN A1C LEVEL <7.0%: ICD-10-PCS | Mod: CPTII,S$GLB,, | Performed by: NURSE PRACTITIONER

## 2022-09-06 PROCEDURE — 3008F BODY MASS INDEX DOCD: CPT | Mod: CPTII,S$GLB,, | Performed by: NURSE PRACTITIONER

## 2022-09-06 PROCEDURE — 1111F DSCHRG MED/CURRENT MED MERGE: CPT | Mod: CPTII,S$GLB,, | Performed by: NURSE PRACTITIONER

## 2022-09-06 PROCEDURE — 4010F PR ACE/ARB THEARPY RXD/TAKEN: ICD-10-PCS | Mod: CPTII,S$GLB,, | Performed by: NURSE PRACTITIONER

## 2022-09-06 PROCEDURE — 3074F SYST BP LT 130 MM HG: CPT | Mod: CPTII,S$GLB,, | Performed by: NURSE PRACTITIONER

## 2022-09-06 PROCEDURE — 99214 PR OFFICE/OUTPT VISIT, EST, LEVL IV, 30-39 MIN: ICD-10-PCS | Mod: S$GLB,,, | Performed by: NURSE PRACTITIONER

## 2022-09-06 PROCEDURE — 3008F PR BODY MASS INDEX (BMI) DOCUMENTED: ICD-10-PCS | Mod: CPTII,S$GLB,, | Performed by: NURSE PRACTITIONER

## 2022-09-06 PROCEDURE — 4010F ACE/ARB THERAPY RXD/TAKEN: CPT | Mod: CPTII,S$GLB,, | Performed by: NURSE PRACTITIONER

## 2022-09-06 PROCEDURE — 1159F PR MEDICATION LIST DOCUMENTED IN MEDICAL RECORD: ICD-10-PCS | Mod: CPTII,S$GLB,, | Performed by: NURSE PRACTITIONER

## 2022-09-06 PROCEDURE — 99999 PR PBB SHADOW E&M-EST. PATIENT-LVL V: CPT | Mod: PBBFAC,,, | Performed by: NURSE PRACTITIONER

## 2022-09-06 PROCEDURE — 3079F DIAST BP 80-89 MM HG: CPT | Mod: CPTII,S$GLB,, | Performed by: NURSE PRACTITIONER

## 2022-09-06 NOTE — TELEPHONE ENCOUNTER
----- Message from Mojgan Galvan RN sent at 9/6/2022  9:45 AM CDT -----  Patient is scheduled for  shunt insertion by Dr. Yoon on 9/19.( Approximately 120 minutes of general anesthesia) She will need medical clearance.She was last  seen by  you on 9/1. May clear per chart . Ir not, please schedule a preop clearance appt.  Thanks!

## 2022-09-06 NOTE — PATIENT INSTRUCTIONS
Rosendo Scott,     If you are due for any health screening(s) below please notify me so we can arrange them to be ordered and scheduled to maintain your health. Most healthy patients complete it. Don't lose out on improving your health.     Tests to Keep You Healthy    Cervical Cancer Screening: Met on 4/22/2021  Last Blood Pressure <= 139/89 (9/6/2022): NO  Tobacco Cessation: NO

## 2022-09-06 NOTE — PRE-PROCEDURE INSTRUCTIONS
Patient stated has not had any problem with anesthesia in the past. Will need medical clearance from your PCP,Dr.Stephen Guerrero. She will call to see if can be cleared from last appt or make a new appt.Will need poc appt and labs.  Our  will call to set up these appts.She said she does not smoke cigarettes, but does vape non-nicotine. She said she has been decreasing the vaping.  Preop instructions given. Hold aspirin, aspirin containing products, nsaids(aleve, advil, motrin, ibuprofen, naprosyn, naproxen, voltaren, diclofenac), vitamins and supplements one week prior to surgery.     May take Tylenol.  Your surgery has been scheduled for:_Monday 9/19/2022_________________________________________     You should report to:  ____[facility], located on the [address] side of the first floor of the           [facility] ([phone])  __x__The Second [facility] Center, located on the [facility] side of the            Second floor of the [facility] ([phone])  ____3rd Floor [facility] located on the [facility] side of the [facility] [phone]  Please Note   Tell your doctor if you take Aspirin, products containing Aspirin, herbal medications  or blood thinners, such as Coumadin, Ticlid, or Plavix.  (Consult your provider regarding holding or stopping before surgery).  Arrange for someone to drive you home following surgery.  You will not be allowed to leave the surgical facility alone or drive yourself home following sedation and anesthesia.  Before Surgery  Stop taking all vitamins/ herbal medications 14days prior to surgery  No Motrin/Advil (Ibuprofen) 7 day before surgery  No Aleve (Naproxen) 7 days before surgery  Stop Taking Asprin, products containing Asprin _7____days before surgery  Stop taking blood thinners_______days before surgery  No Goody's/BC  Powder 7 days before surgery  Refrain from drinking alcoholic beverages  for 24hours before and after surgery  Stop or limit smoking __7_______days before surgery  You may take Tylenol for pain  Night before Surgery  Nothing to eat or drink after midnight.  Take a shower or bath (shower is recommended).  Bathe with Hibiclens soap or an antibacterial soap from the neck down.  If not supplied by your surgeon, hibiclens soap will need to be purchased over the counter in pharmacy.  Rinse soap off thoroughly.  Shampoo your hair with your regular shampoo  The Day of Surgery  NOTHING TO  DRINK 2 hours before arrival time. If you are told to take medication on the morning of surgery, it may be taken with a sip of water.   Take another bath or shower with hibiclens or any antibacterial soap, to reduce the chance of infection.  Take heart and blood pressure medications with a small sip of water, as advised by the perioperative team.  Do not take fluid pills  You may brush your teeth and rinse your mouth, but do not swall any additional water.   Do not apply perfumes, powder, body lotions or deodorant on the day of surgery.  Nail polish should be removed.  Do not wear makeup or moisturizer  Wear comfortable clothes, such as a button front shirt and loose fitting pants.  Leave all jewelry, including body piercings, and valuables at home.    Bring any devices you will neeed after surgery such as crutches or canes.  If you have sleep apnea, please bring your CPAP machine  In the event that your physical condition changes including the onset of a cold or respiratory illness, or if you have to delay or cancel your surgery, please notify your surgeon.    Medication instructions given:  Take if needed:Epipen,Ativan,Zofran, Ubrelvy     Take if needed the night before surgery:Benadryl     Take if needed and bring with you to surgery: Albuterol     Take in the pm before surgery:Diamox, Bupropion, Vraylar, Topamax, Pepcid     Take in the am of surgery: Topamax     Hold in am of surgery: Diamox, Candesartan,  Ferrous sulfate, Folic acid    Directions to the surgery center given. Verbalizes understanding. Sent preop and med instructions to [facility] portal.
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
## 2022-09-06 NOTE — TELEPHONE ENCOUNTER
----- Message from Mojgan Galvan RN sent at 9/6/2022  9:45 AM CDT -----  Surgery 9/19  Please schedule poc appt and labs.  Thanks!

## 2022-09-06 NOTE — ANESTHESIA PAT ROS NOTE
09/06/2022  Sonia Goldberg is a 36 y.o., female.      Pre-op Assessment          Review of Systems       Anesthesia Assessment: Preoperative EQUATION    Planned Procedure: Procedure(s) (LRB):  INSERTION, SHUNT, VENTRICULOPERITONEAL, ENDOSCOPIC (N/A)  Requested Anesthesia Type:General  Surgeon: Fran Yoon MD  Service: Neurosurgery  Known or anticipated Date of Surgery:9/19/2022    Surgeon notes: reviewed    Electronic QUestionnaire Assessment completed via nurse interview with patient.        Triage considerations:     Previous anesthesia records:MAC and No problems  8/16/2022 Lumbar Puncture   Airway:  Mallampati: II   Mouth Opening: Normal  TM Distance: Normal  Tongue: Normal  Neck ROM: Normal ROM    Last PCP note: within 1 month , within [facility]   Subspecialty notes: Cardiology: General, Dermatology, Neurology, Neurosurgery, Ortho, Psychiatry, Pulmonary, Urology, OPHTHALMOLOGY, SLEEP MEDICINE    Other important co-morbidities:PER Epic:  GERD, HLD, HTN, Morbid Obesity, KERI, and PSEUDOTUMOR CEREBRI, ASTHMA, IRON DEF ANEMIA, IBS, MIGRAINES,OSTEOARTHRITIS     ( SHE VAPES- NON NICOTINE)  Tests already available:  Available tests,  within 1 month , within 3 months , within [facility] .   9/4/2022 CT HEAD W/O CONTRAST, EKG, CBC, MG, TSH, CMP, 8/15/2022 UA, MICRO UA, 8/6/2022 PT/INR          Instructions given. (See in Nurse's note)    Optimization:  Anesthesia Preop Clinic Assessment  Indicated    Medical Opinion Indicated           Plan:    Testing:  PTT and T&S   Pre-anesthesia  visit       Visit focus: concerns in complex and/or prolonged anesthesia, COMORBIDITIES     Consultation:Patient's PCP for re-evaluation     Patient  has previously scheduled Medical Appointment:9/8 PSYC, 9/9 GI, &HEM/ONC     Navigation: Tests Scheduled. TBD             Consults scheduled.TBD             Results will be tracked by  Preop Clinic.  9/15 Labs resulted and noted by Dr. Alexia Johnson.  Patient seen by Nabila Barrios NP on 9/6 in primary care.  9/16 Was sent to ER for low Potassium on 9/14. Given potassium and rechecked potasium level and was discharged from the ER  9/16  She is not medically cleared for surgery . See recommendations:  Marti Marino LPN  You 22 minutes ago (2:36 PM)    MM  Per pt's PCP: She needs potassium corrected and in person preop visit thanks.        MD Marti Bosch LPN 1 hour ago (1:09 PM)      She needs potassium corrected and in person preop visit thanks    9/16 JARVIS Weinberg RN  Caller: Unspecified (Today,  3:03 PM)  DR Yoon will proceed we will get a u/a and BMP the morning of the procedure. I will place the orders   Mojgan Galvan RN BSN

## 2022-09-07 ENCOUNTER — PATIENT MESSAGE (OUTPATIENT)
Dept: FAMILY MEDICINE | Facility: CLINIC | Age: 37
End: 2022-09-07
Payer: COMMERCIAL

## 2022-09-07 ENCOUNTER — TELEPHONE (OUTPATIENT)
Dept: HOME HEALTH SERVICES | Facility: CLINIC | Age: 37
End: 2022-09-07
Payer: COMMERCIAL

## 2022-09-07 ENCOUNTER — OFFICE VISIT (OUTPATIENT)
Dept: NEUROSURGERY | Facility: CLINIC | Age: 37
End: 2022-09-07
Payer: COMMERCIAL

## 2022-09-07 DIAGNOSIS — G93.2 PSEUDOTUMOR: Primary | ICD-10-CM

## 2022-09-07 DIAGNOSIS — Z98.2 S/P VP SHUNT: Primary | ICD-10-CM

## 2022-09-07 PROCEDURE — 99214 OFFICE O/P EST MOD 30 MIN: CPT | Mod: 95,,, | Performed by: NEUROLOGICAL SURGERY

## 2022-09-07 PROCEDURE — 4010F ACE/ARB THERAPY RXD/TAKEN: CPT | Mod: CPTII,95,, | Performed by: NEUROLOGICAL SURGERY

## 2022-09-07 PROCEDURE — 3044F HG A1C LEVEL LT 7.0%: CPT | Mod: CPTII,95,, | Performed by: NEUROLOGICAL SURGERY

## 2022-09-07 PROCEDURE — 1111F PR DISCHARGE MEDS RECONCILED W/ CURRENT OUTPATIENT MED LIST: ICD-10-PCS | Mod: CPTII,95,, | Performed by: NEUROLOGICAL SURGERY

## 2022-09-07 PROCEDURE — 4010F PR ACE/ARB THEARPY RXD/TAKEN: ICD-10-PCS | Mod: CPTII,95,, | Performed by: NEUROLOGICAL SURGERY

## 2022-09-07 PROCEDURE — 99214 PR OFFICE/OUTPT VISIT, EST, LEVL IV, 30-39 MIN: ICD-10-PCS | Mod: 95,,, | Performed by: NEUROLOGICAL SURGERY

## 2022-09-07 PROCEDURE — 1111F DSCHRG MED/CURRENT MED MERGE: CPT | Mod: CPTII,95,, | Performed by: NEUROLOGICAL SURGERY

## 2022-09-07 PROCEDURE — 3044F PR MOST RECENT HEMOGLOBIN A1C LEVEL <7.0%: ICD-10-PCS | Mod: CPTII,95,, | Performed by: NEUROLOGICAL SURGERY

## 2022-09-07 NOTE — TELEPHONE ENCOUNTER
[facility] for 9[facility]/22 stating she found a PCP and has numerous appointments scheduled with specialists. She does not want to reschedule at this time.
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
## 2022-09-08 ENCOUNTER — TELEPHONE (OUTPATIENT)
Dept: FAMILY MEDICINE | Facility: CLINIC | Age: 37
End: 2022-09-08
Payer: COMMERCIAL

## 2022-09-08 ENCOUNTER — OFFICE VISIT (OUTPATIENT)
Dept: PSYCHIATRY | Facility: CLINIC | Age: 37
End: 2022-09-08
Payer: COMMERCIAL

## 2022-09-08 DIAGNOSIS — F43.10 PTSD (POST-TRAUMATIC STRESS DISORDER): ICD-10-CM

## 2022-09-08 DIAGNOSIS — F31.81 BIPOLAR II DISORDER: ICD-10-CM

## 2022-09-08 DIAGNOSIS — F41.1 GAD (GENERALIZED ANXIETY DISORDER): Primary | ICD-10-CM

## 2022-09-08 PROCEDURE — 99214 OFFICE O/P EST MOD 30 MIN: CPT | Mod: 95,,, | Performed by: PHYSICIAN ASSISTANT

## 2022-09-08 PROCEDURE — 1160F PR REVIEW ALL MEDS BY PRESCRIBER/CLIN PHARMACIST DOCUMENTED: ICD-10-PCS | Mod: CPTII,95,, | Performed by: PHYSICIAN ASSISTANT

## 2022-09-08 PROCEDURE — 1159F MED LIST DOCD IN RCRD: CPT | Mod: CPTII,95,, | Performed by: PHYSICIAN ASSISTANT

## 2022-09-08 PROCEDURE — 1160F RVW MEDS BY RX/DR IN RCRD: CPT | Mod: CPTII,95,, | Performed by: PHYSICIAN ASSISTANT

## 2022-09-08 PROCEDURE — 3044F PR MOST RECENT HEMOGLOBIN A1C LEVEL <7.0%: ICD-10-PCS | Mod: CPTII,95,, | Performed by: PHYSICIAN ASSISTANT

## 2022-09-08 PROCEDURE — 1111F PR DISCHARGE MEDS RECONCILED W/ CURRENT OUTPATIENT MED LIST: ICD-10-PCS | Mod: CPTII,95,, | Performed by: PHYSICIAN ASSISTANT

## 2022-09-08 PROCEDURE — 1159F PR MEDICATION LIST DOCUMENTED IN MEDICAL RECORD: ICD-10-PCS | Mod: CPTII,95,, | Performed by: PHYSICIAN ASSISTANT

## 2022-09-08 PROCEDURE — 99214 PR OFFICE/OUTPT VISIT, EST, LEVL IV, 30-39 MIN: ICD-10-PCS | Mod: 95,,, | Performed by: PHYSICIAN ASSISTANT

## 2022-09-08 PROCEDURE — 4010F PR ACE/ARB THEARPY RXD/TAKEN: ICD-10-PCS | Mod: CPTII,95,, | Performed by: PHYSICIAN ASSISTANT

## 2022-09-08 PROCEDURE — 3044F HG A1C LEVEL LT 7.0%: CPT | Mod: CPTII,95,, | Performed by: PHYSICIAN ASSISTANT

## 2022-09-08 PROCEDURE — 1111F DSCHRG MED/CURRENT MED MERGE: CPT | Mod: CPTII,95,, | Performed by: PHYSICIAN ASSISTANT

## 2022-09-08 PROCEDURE — 4010F ACE/ARB THERAPY RXD/TAKEN: CPT | Mod: CPTII,95,, | Performed by: PHYSICIAN ASSISTANT

## 2022-09-08 RX ORDER — BUPROPION HYDROCHLORIDE 150 MG/1
150 TABLET ORAL NIGHTLY
Qty: 30 TABLET | Refills: 1 | Status: SHIPPED | OUTPATIENT
Start: 2022-09-08 | End: 2022-10-11 | Stop reason: SDUPTHER

## 2022-09-08 RX ORDER — CARIPRAZINE 3 MG/1
3 CAPSULE, GELATIN COATED ORAL NIGHTLY
Qty: 30 CAPSULE | Refills: 1 | Status: SHIPPED | OUTPATIENT
Start: 2022-09-08 | End: 2022-10-11 | Stop reason: SDUPTHER

## 2022-09-08 NOTE — TELEPHONE ENCOUNTER
I spoke with the patient and explained that I consulted with the neurologist that she saw and they recommended stopping the Diamox since her sodium was low.  Patient verbalized understanding of above.

## 2022-09-08 NOTE — HOSPITAL COURSE
Neurology consulted and recommended continuing current IIH medications for migraines.  Neurology states that patient's presentation symptoms are likely due to side effects of current medications.  No medications made by Neurology.  Patient discharge to continue symptomatic management until outpatient follow-up.

## 2022-09-08 NOTE — DISCHARGE SUMMARY
[facility] Emergency Dept  Hospital Medicine  Discharge Summary      Patient Name: Sonia Goldberg  MRN: [id number]  Patient Class: OP- Observation  Admission Date: 9/4/2022  Hospital Length of Stay: 1 days  Discharge Date and Time: 9/5/2022  7:00 PM  Attending Physician: No att. providers found   Discharging Provider: Lopez Diaz MD  Primary Care Provider: Dorian Guerrero MD  [facility] Medicine Team: [facility] R Lopez Diaz MD    HPI:   Ms. Goldberg is a 36 y.o. female with history of IIH, KERI, GERD, hypertension, bipolar disorder, IBS, PCOS, hyperlipidemia, asthma, left-sided weakness thought to be from functional disorder, migraine syndrome, presenting with complaint of worsening headache. States that she has a baseline 8/10 headache at all times. This evening, it became more severe 10/10. Pt attempted sitting in a dark room in silence but was w/o relief. HA is associated nausea, perioral and tongue numbness that began to spread down to the L side of chest. No vomiting or visual changes. Pt reports baseline L sided weakness and dysarthria which are unchanged. She states that she is taking all her medications include Diamox as prescribed. She has not taken any medications specifically for pain. Pt denies f/c, lightheadedness, dizziness, chest pain, SOB, dysuria.    In ED, Pt AFVSS. CBC w/ stable anemia. CBC w/ Na 130, HCO3 16, AG 1. CT head showed no evidence of acute intracranial pathology. Pt given droperidol IV and NS bolus.      * No surgery found *      Hospital Course:   Neurology consulted and recommended continuing current IIH medications for migraines.  Neurology states that patient's presentation symptoms are likely due to side effects of current medications.  No medications made by Neurology.  Patient discharge to continue symptomatic management until outpatient follow-up.       Goals of Care Treatment Preferences:  Code Status: Full Code    Living Will: Yes              Consults:    Consults (From admission, onward)        Status Ordering Provider     Inpatient consult to Neurology  Once        Provider:  (Not yet assigned)    VENTURA Gonzalez new Assessment & Plan notes have been filed under this hospital service since the last note was generated.  Service: Hospital Medicine    Final Active Diagnoses:    Diagnosis Date Noted POA    PRINCIPAL PROBLEM:  Nonintractable headache [R51.9] 09/05/2022 Yes    Transient neurological symptoms [R29.818] 09/05/2022 Yes    Folate deficiency [E53.8] 08/14/2022 Yes    Dysarthria [R47.1] 08/11/2022 Yes     Chronic    IIH (idiopathic intracranial hypertension) [G93.2] 08/06/2022 Yes     Chronic    Left-sided weakness [R53.1] 08/06/2022 Yes     Chronic    Bipolar disorder, unspecified [F31.9] 09/03/2021 Yes     Chronic    Irritable bowel syndrome without diarrhea [K58.9] 09/03/2021 Yes     Chronic    Major depressive disorder, single episode, unspecified [F32.9] 09/03/2021 Yes    Iron deficiency anemia [D50.9] 11/20/2020 Yes    Hypertension [I10] 10/14/2020 Yes     Chronic    Morbid obesity [E66.01] 08/17/2016 Yes     Chronic    Obstructive sleep apnea [G47.33] 08/17/2016 Yes     Chronic    Chronic anxiety [F41.9] 12/19/2014 Yes     Chronic      Problems Resolved During this Admission:    Diagnosis Date Noted Date Resolved POA    Mixed hyperlipidemia [E78.2] 09/01/2022 09/05/2022 Yes     Chronic    GERD (gastroesophageal reflux disease) [K21.9] 10/25/2020 09/05/2022 Yes     Chronic    Asthma [J45.909] 01/18/2017 09/05/2022 Yes     Chronic       Discharged Condition: good    Disposition: Home or Self Care    Follow Up:   Follow-up Information     Schedule an appointment as soon as possible for a visit  with Your primary care doctor.                     Patient Instructions:      Ambulatory referral/consult to Internal Medicine   Standing Status: Future   Referral Priority: Routine Referral Type: Consultation    Referral Reason: Specialty Services Required   Requested Specialty: Internal Medicine   Number of Visits Requested: 1     Diet Adult Regular     Notify your health care provider if you experience any of the following:  increased confusion or weakness     Notify your health care provider if you experience any of the following:  persistent dizziness, light-headedness, or visual disturbances     Notify your health care provider if you experience any of the following:  worsening rash     Notify your health care provider if you experience any of the following:  severe persistent headache     Notify your health care provider if you experience any of the following:  difficulty breathing or increased cough     Notify your health care provider if you experience any of the following:  redness, tenderness, or signs of infection (pain, swelling, redness, odor or green/yellow discharge around incision site)     Notify your health care provider if you experience any of the following:  severe uncontrolled pain     Notify your health care provider if you experience any of the following:  persistent nausea and vomiting or diarrhea     Notify your health care provider if you experience any of the following:  temperature >100.4     Activity as tolerated       Significant Diagnostic Studies: Labs: All labs within the past 24 hours have been reviewed    Pending Diagnostic Studies:     None         Medications:  Reconciled Home Medications:      Medication List      CONTINUE taking these medications    acetaZOLAMIDE 500 mg Cpsr  Commonly known as: DIAMOX  Take 4 capsules (2,000 mg total) by mouth 2 (two) times daily.     albuterol 90 mcg/actuation inhaler  Commonly known as: PROVENTIL/VENTOLIN HFA  Inhale 2 puffs into the lungs every 6 (six) hours as needed for Wheezing.     BINAXNOW COVID-19 AG SELF TEST Kit  Generic drug: COVID-19 antigen test  TEST AS DIRECTED     buPROPion 150 MG TB24 tablet  Commonly known as: WELLBUTRIN XL  Take  1 tablet (150 mg total) by mouth nightly.     candesartan 4 MG tablet  Commonly known as: ATACAND  Take 1 tablet (4 mg total) by mouth once daily.     diphenhydrAMINE 50 MG tablet  Commonly known as: BENADRYL  Take 50 mg by mouth nightly as needed for Insomnia.     ferrous sulfate 325 mg (65 mg iron) Tab tablet  Commonly known as: FEOSOL  Take by mouth once daily.     folic acid 1 MG tablet  Commonly known as: FOLVITE  Take 1 tablet (1 mg total) by mouth once daily.     LORazepam 1 MG tablet  Commonly known as: ATIVAN  Take 1 tablet (1 mg total) by mouth every 6 (six) hours as needed for Anxiety.     ondansetron 4 MG Tbdl  Commonly known as: ZOFRAN-ODT  Take 1 tablet (4 mg total) by mouth every 6 (six) hours as needed (nausea).     PEPCID COMPLETE -165 mg  Generic drug: famotidine-calcium carbonate-magnesium hydroxide  Take 1 tablet by mouth daily as needed (Acid reflux).     pulse oximeter device  Commonly known as: pulse oximeter  by Apply Externally route 2 (two) times a day. Use twice daily at 8 AM and 3 PM and [facility].     sars-cov-2 (covid-19) 100 mcg/0.5 ml injection  Commonly known as: MODERNA COVID-19     topiramate 50 MG tablet  Commonly known as: TOPAMAX  Take 1 tablet (50 mg total) by mouth 2 (two) times daily.     UBRELVY 100 mg tablet  Generic drug: ubrogepant  Take 1 tablet by mouth once as needed for  migraine. May repeat in 2 hours if needed. Max 2 tablets per day.     VRAYLAR 3 mg Cap  Generic drug: cariprazine  Take 1 capsule (3 mg total) by mouth nightly.        STOP taking these medications    HYDROcodone-acetaminophen 5-325 mg per tablet  Commonly known as: NORCO        ASK your doctor about these medications    EPINEPHrine 0.3 mg/0.3 mL Atin  Commonly known as: EPIPEN  Inject 0.3 mLs (0.3 mg total) into the muscle as needed (anaphylaxis).            Indwelling Lines/Drains at time of discharge:   Lines/Drains/Airways     None                 Time spent on  the discharge of patient: 35 minutes         Lopez Diaz MD  Department of Hospital Medicine  [facility] - Emergency Dept
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
## 2022-09-09 ENCOUNTER — OFFICE VISIT (OUTPATIENT)
Dept: GASTROENTEROLOGY | Facility: CLINIC | Age: 37
End: 2022-09-09
Payer: COMMERCIAL

## 2022-09-09 ENCOUNTER — OFFICE VISIT (OUTPATIENT)
Dept: HEMATOLOGY/ONCOLOGY | Facility: CLINIC | Age: 37
End: 2022-09-09
Payer: COMMERCIAL

## 2022-09-09 VITALS
SYSTOLIC BLOOD PRESSURE: 173 MMHG | BODY MASS INDEX: 42.98 KG/M2 | DIASTOLIC BLOOD PRESSURE: 97 MMHG | HEART RATE: 83 BPM | WEIGHT: 273.81 LBS | HEIGHT: 67 IN

## 2022-09-09 VITALS
HEIGHT: 67 IN | SYSTOLIC BLOOD PRESSURE: 137 MMHG | RESPIRATION RATE: 12 BRPM | WEIGHT: 273 LBS | HEART RATE: 82 BPM | OXYGEN SATURATION: 98 % | BODY MASS INDEX: 42.85 KG/M2 | TEMPERATURE: 97 F | DIASTOLIC BLOOD PRESSURE: 89 MMHG

## 2022-09-09 DIAGNOSIS — R11.0 NAUSEA: ICD-10-CM

## 2022-09-09 DIAGNOSIS — K59.00 CONSTIPATION, UNSPECIFIED CONSTIPATION TYPE: ICD-10-CM

## 2022-09-09 DIAGNOSIS — Z87.898 HISTORY OF PALPITATIONS: ICD-10-CM

## 2022-09-09 DIAGNOSIS — K21.9 GASTROESOPHAGEAL REFLUX DISEASE, UNSPECIFIED WHETHER ESOPHAGITIS PRESENT: ICD-10-CM

## 2022-09-09 DIAGNOSIS — K64.4 EXTERNAL HEMORRHOIDS: ICD-10-CM

## 2022-09-09 DIAGNOSIS — R41.0 CONFUSION: ICD-10-CM

## 2022-09-09 DIAGNOSIS — D50.9 IRON DEFICIENCY ANEMIA, UNSPECIFIED IRON DEFICIENCY ANEMIA TYPE: Primary | ICD-10-CM

## 2022-09-09 DIAGNOSIS — D50.9 IRON DEFICIENCY ANEMIA, UNSPECIFIED IRON DEFICIENCY ANEMIA TYPE: ICD-10-CM

## 2022-09-09 DIAGNOSIS — K44.9 HIATAL HERNIA: ICD-10-CM

## 2022-09-09 DIAGNOSIS — G93.2 IIH (IDIOPATHIC INTRACRANIAL HYPERTENSION): ICD-10-CM

## 2022-09-09 DIAGNOSIS — E87.1 HYPONATREMIA: ICD-10-CM

## 2022-09-09 DIAGNOSIS — E83.52 SERUM CALCIUM ELEVATED: Primary | ICD-10-CM

## 2022-09-09 DIAGNOSIS — R10.9 ABDOMINAL CRAMPING: ICD-10-CM

## 2022-09-09 DIAGNOSIS — R53.83 FATIGUE, UNSPECIFIED TYPE: ICD-10-CM

## 2022-09-09 DIAGNOSIS — R63.0 DECREASED APPETITE: ICD-10-CM

## 2022-09-09 DIAGNOSIS — R42 LIGHT HEADEDNESS: ICD-10-CM

## 2022-09-09 PROCEDURE — 3075F SYST BP GE 130 - 139MM HG: CPT | Mod: CPTII,S$GLB,, | Performed by: INTERNAL MEDICINE

## 2022-09-09 PROCEDURE — 99214 OFFICE O/P EST MOD 30 MIN: CPT | Mod: S$GLB,,,

## 2022-09-09 PROCEDURE — 3044F HG A1C LEVEL LT 7.0%: CPT | Mod: CPTII,S$GLB,,

## 2022-09-09 PROCEDURE — 99999 PR PBB SHADOW E&M-EST. PATIENT-LVL V: CPT | Mod: PBBFAC,,, | Performed by: INTERNAL MEDICINE

## 2022-09-09 PROCEDURE — 99999 PR PBB SHADOW E&M-EST. PATIENT-LVL V: ICD-10-PCS | Mod: PBBFAC,,,

## 2022-09-09 PROCEDURE — 1159F PR MEDICATION LIST DOCUMENTED IN MEDICAL RECORD: ICD-10-PCS | Mod: CPTII,S$GLB,, | Performed by: INTERNAL MEDICINE

## 2022-09-09 PROCEDURE — 99205 PR OFFICE/OUTPT VISIT, NEW, LEVL V, 60-74 MIN: ICD-10-PCS | Mod: S$GLB,,, | Performed by: INTERNAL MEDICINE

## 2022-09-09 PROCEDURE — 3008F PR BODY MASS INDEX (BMI) DOCUMENTED: ICD-10-PCS | Mod: CPTII,S$GLB,, | Performed by: INTERNAL MEDICINE

## 2022-09-09 PROCEDURE — 4010F ACE/ARB THERAPY RXD/TAKEN: CPT | Mod: CPTII,S$GLB,, | Performed by: INTERNAL MEDICINE

## 2022-09-09 PROCEDURE — 4010F PR ACE/ARB THEARPY RXD/TAKEN: ICD-10-PCS | Mod: CPTII,S$GLB,,

## 2022-09-09 PROCEDURE — 4010F ACE/ARB THERAPY RXD/TAKEN: CPT | Mod: CPTII,S$GLB,,

## 2022-09-09 PROCEDURE — 3080F PR MOST RECENT DIASTOLIC BLOOD PRESSURE >= 90 MM HG: ICD-10-PCS | Mod: CPTII,S$GLB,,

## 2022-09-09 PROCEDURE — 1111F PR DISCHARGE MEDS RECONCILED W/ CURRENT OUTPATIENT MED LIST: ICD-10-PCS | Mod: CPTII,S$GLB,, | Performed by: INTERNAL MEDICINE

## 2022-09-09 PROCEDURE — 3079F DIAST BP 80-89 MM HG: CPT | Mod: CPTII,S$GLB,, | Performed by: INTERNAL MEDICINE

## 2022-09-09 PROCEDURE — 1111F PR DISCHARGE MEDS RECONCILED W/ CURRENT OUTPATIENT MED LIST: ICD-10-PCS | Mod: CPTII,S$GLB,,

## 2022-09-09 PROCEDURE — 3008F BODY MASS INDEX DOCD: CPT | Mod: CPTII,S$GLB,,

## 2022-09-09 PROCEDURE — 4010F PR ACE/ARB THEARPY RXD/TAKEN: ICD-10-PCS | Mod: CPTII,S$GLB,, | Performed by: INTERNAL MEDICINE

## 2022-09-09 PROCEDURE — 1159F MED LIST DOCD IN RCRD: CPT | Mod: CPTII,S$GLB,, | Performed by: INTERNAL MEDICINE

## 2022-09-09 PROCEDURE — 3075F PR MOST RECENT SYSTOLIC BLOOD PRESS GE 130-139MM HG: ICD-10-PCS | Mod: CPTII,S$GLB,, | Performed by: INTERNAL MEDICINE

## 2022-09-09 PROCEDURE — 99999 PR PBB SHADOW E&M-EST. PATIENT-LVL V: CPT | Mod: PBBFAC,,,

## 2022-09-09 PROCEDURE — 99214 PR OFFICE/OUTPT VISIT, EST, LEVL IV, 30-39 MIN: ICD-10-PCS | Mod: S$GLB,,,

## 2022-09-09 PROCEDURE — 1159F PR MEDICATION LIST DOCUMENTED IN MEDICAL RECORD: ICD-10-PCS | Mod: CPTII,S$GLB,,

## 2022-09-09 PROCEDURE — 1160F PR REVIEW ALL MEDS BY PRESCRIBER/CLIN PHARMACIST DOCUMENTED: ICD-10-PCS | Mod: CPTII,S$GLB,,

## 2022-09-09 PROCEDURE — 3044F HG A1C LEVEL LT 7.0%: CPT | Mod: CPTII,S$GLB,, | Performed by: INTERNAL MEDICINE

## 2022-09-09 PROCEDURE — 3077F SYST BP >= 140 MM HG: CPT | Mod: CPTII,S$GLB,,

## 2022-09-09 PROCEDURE — 3079F PR MOST RECENT DIASTOLIC BLOOD PRESSURE 80-89 MM HG: ICD-10-PCS | Mod: CPTII,S$GLB,, | Performed by: INTERNAL MEDICINE

## 2022-09-09 PROCEDURE — 1111F DSCHRG MED/CURRENT MED MERGE: CPT | Mod: CPTII,S$GLB,, | Performed by: INTERNAL MEDICINE

## 2022-09-09 PROCEDURE — 3008F PR BODY MASS INDEX (BMI) DOCUMENTED: ICD-10-PCS | Mod: CPTII,S$GLB,,

## 2022-09-09 PROCEDURE — 3080F DIAST BP >= 90 MM HG: CPT | Mod: CPTII,S$GLB,,

## 2022-09-09 PROCEDURE — 3077F PR MOST RECENT SYSTOLIC BLOOD PRESSURE >= 140 MM HG: ICD-10-PCS | Mod: CPTII,S$GLB,,

## 2022-09-09 PROCEDURE — 3044F PR MOST RECENT HEMOGLOBIN A1C LEVEL <7.0%: ICD-10-PCS | Mod: CPTII,S$GLB,, | Performed by: INTERNAL MEDICINE

## 2022-09-09 PROCEDURE — 1159F MED LIST DOCD IN RCRD: CPT | Mod: CPTII,S$GLB,,

## 2022-09-09 PROCEDURE — 1160F RVW MEDS BY RX/DR IN RCRD: CPT | Mod: CPTII,S$GLB,,

## 2022-09-09 PROCEDURE — 99205 OFFICE O/P NEW HI 60 MIN: CPT | Mod: S$GLB,,, | Performed by: INTERNAL MEDICINE

## 2022-09-09 PROCEDURE — 3044F PR MOST RECENT HEMOGLOBIN A1C LEVEL <7.0%: ICD-10-PCS | Mod: CPTII,S$GLB,,

## 2022-09-09 PROCEDURE — 1111F DSCHRG MED/CURRENT MED MERGE: CPT | Mod: CPTII,S$GLB,,

## 2022-09-09 PROCEDURE — 3008F BODY MASS INDEX DOCD: CPT | Mod: CPTII,S$GLB,, | Performed by: INTERNAL MEDICINE

## 2022-09-09 PROCEDURE — 99999 PR PBB SHADOW E&M-EST. PATIENT-LVL V: ICD-10-PCS | Mod: PBBFAC,,, | Performed by: INTERNAL MEDICINE
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
## 2022-09-11 ENCOUNTER — HOSPITAL ENCOUNTER (OUTPATIENT)
Dept: RADIOLOGY | Facility: HOSPITAL | Age: 37
Discharge: HOME | End: 2022-09-11
Attending: NEUROLOGICAL SURGERY
Payer: COMMERCIAL

## 2022-09-11 DIAGNOSIS — G93.2 PSEUDOTUMOR: ICD-10-CM

## 2022-09-11 PROCEDURE — 70450 CT HEAD/BRAIN W/O DYE: CPT | Mod: 26,,, | Performed by: RADIOLOGY

## 2022-09-11 PROCEDURE — 70450 CT HEAD/BRAIN W/O DYE: CPT | Mod: TC

## 2022-09-11 PROCEDURE — 70450 CT HEAD WITHOUT CONTRAST: ICD-10-PCS | Mod: 26,,, | Performed by: RADIOLOGY

## 2022-09-12 ENCOUNTER — PATIENT MESSAGE (OUTPATIENT)
Dept: FAMILY MEDICINE | Facility: CLINIC | Age: 37
End: 2022-09-12
Payer: COMMERCIAL

## 2022-09-12 ENCOUNTER — LAB VISIT (OUTPATIENT)
Dept: LAB | Facility: HOSPITAL | Age: 37
End: 2022-09-12
Attending: INTERNAL MEDICINE
Payer: COMMERCIAL

## 2022-09-12 DIAGNOSIS — D50.9 IRON DEFICIENCY ANEMIA, UNSPECIFIED IRON DEFICIENCY ANEMIA TYPE: ICD-10-CM

## 2022-09-12 DIAGNOSIS — E87.1 HYPONATREMIA: ICD-10-CM

## 2022-09-12 DIAGNOSIS — E83.52 SERUM CALCIUM ELEVATED: ICD-10-CM

## 2022-09-12 LAB
ALBUMIN SERPL BCP-MCNC: 3.3 G/DL (ref 3.5–5.2)
ALP SERPL-CCNC: 79 U/L (ref 55–135)
ALT SERPL W/O P-5'-P-CCNC: 15 U/L (ref 10–44)
ANION GAP SERPL CALC-SCNC: 10 MMOL/L (ref 8–16)
AST SERPL-CCNC: 11 U/L (ref 10–40)
BASOPHILS # BLD AUTO: 0.03 K/UL (ref 0–0.2)
BASOPHILS NFR BLD: 0.3 % (ref 0–1.9)
BILIRUB SERPL-MCNC: 0.2 MG/DL (ref 0.1–1)
BUN SERPL-MCNC: 7 MG/DL (ref 6–20)
CALCIUM SERPL-MCNC: 10.2 MG/DL (ref 8.7–10.5)
CHLORIDE SERPL-SCNC: 107 MMOL/L (ref 95–110)
CO2 SERPL-SCNC: 25 MMOL/L (ref 23–29)
CREAT SERPL-MCNC: 0.9 MG/DL (ref 0.5–1.4)
DIFFERENTIAL METHOD: ABNORMAL
EOSINOPHIL # BLD AUTO: 0.1 K/UL (ref 0–0.5)
EOSINOPHIL NFR BLD: 1 % (ref 0–8)
ERYTHROCYTE [DISTWIDTH] IN BLOOD BY AUTOMATED COUNT: 17.1 % (ref 11.5–14.5)
EST. GFR  (NO RACE VARIABLE): >60 ML/MIN/1.73 M^2
FERRITIN SERPL-MCNC: 24 NG/ML (ref 20–300)
GLUCOSE SERPL-MCNC: 113 MG/DL (ref 70–110)
HCT VFR BLD AUTO: 35.8 % (ref 37–48.5)
HGB BLD-MCNC: 11.6 G/DL (ref 12–16)
IMM GRANULOCYTES # BLD AUTO: 0.03 K/UL (ref 0–0.04)
IMM GRANULOCYTES NFR BLD AUTO: 0.3 % (ref 0–0.5)
LDH SERPL L TO P-CCNC: 117 U/L (ref 110–260)
LYMPHOCYTES # BLD AUTO: 2.2 K/UL (ref 1–4.8)
LYMPHOCYTES NFR BLD: 25.6 % (ref 18–48)
MCH RBC QN AUTO: 27.1 PG (ref 27–31)
MCHC RBC AUTO-ENTMCNC: 32.4 G/DL (ref 32–36)
MCV RBC AUTO: 84 FL (ref 82–98)
MONOCYTES # BLD AUTO: 0.5 K/UL (ref 0.3–1)
MONOCYTES NFR BLD: 5.6 % (ref 4–15)
NEUTROPHILS # BLD AUTO: 5.8 K/UL (ref 1.8–7.7)
NEUTROPHILS NFR BLD: 67.2 % (ref 38–73)
NRBC BLD-RTO: 0 /100 WBC
PATH REV BLD -IMP: NORMAL
PLATELET # BLD AUTO: 298 K/UL (ref 150–450)
PMV BLD AUTO: 10.3 FL (ref 9.2–12.9)
POTASSIUM SERPL-SCNC: 2.9 MMOL/L (ref 3.5–5.1)
PROT SERPL-MCNC: 6.1 G/DL (ref 6–8.4)
PTH-INTACT SERPL-MCNC: 131.2 PG/ML (ref 9–77)
RBC # BLD AUTO: 4.28 M/UL (ref 4–5.4)
RETICS/RBC NFR AUTO: 1.8 % (ref 0.5–2.5)
SODIUM SERPL-SCNC: 142 MMOL/L (ref 136–145)
VIT B12 SERPL-MCNC: 459 PG/ML (ref 210–950)
WBC # BLD AUTO: 8.71 K/UL (ref 3.9–12.7)

## 2022-09-12 PROCEDURE — 82232 ASSAY OF BETA-2 PROTEIN: CPT | Performed by: INTERNAL MEDICINE

## 2022-09-12 PROCEDURE — 84165 PROTEIN E-PHORESIS SERUM: CPT | Performed by: INTERNAL MEDICINE

## 2022-09-12 PROCEDURE — 84165 PATHOLOGIST INTERPRETATION SPE: ICD-10-PCS | Mod: 26,,, | Performed by: PATHOLOGY

## 2022-09-12 PROCEDURE — 82607 VITAMIN B-12: CPT | Performed by: INTERNAL MEDICINE

## 2022-09-12 PROCEDURE — 85025 COMPLETE CBC W/AUTO DIFF WBC: CPT | Performed by: INTERNAL MEDICINE

## 2022-09-12 PROCEDURE — 85060 BLOOD SMEAR INTERPRETATION: CPT | Mod: ,,, | Performed by: STUDENT IN AN ORGANIZED HEALTH CARE EDUCATION/TRAINING PROGRAM

## 2022-09-12 PROCEDURE — 85060 PATHOLOGIST REVIEW: ICD-10-PCS | Mod: ,,, | Performed by: STUDENT IN AN ORGANIZED HEALTH CARE EDUCATION/TRAINING PROGRAM

## 2022-09-12 PROCEDURE — 83615 LACTATE (LD) (LDH) ENZYME: CPT | Performed by: INTERNAL MEDICINE

## 2022-09-12 PROCEDURE — 86334 PATHOLOGIST INTERPRETATION IFE: ICD-10-PCS | Mod: 26,,, | Performed by: PATHOLOGY

## 2022-09-12 PROCEDURE — 80053 COMPREHEN METABOLIC PANEL: CPT | Performed by: INTERNAL MEDICINE

## 2022-09-12 PROCEDURE — 84466 ASSAY OF TRANSFERRIN: CPT | Performed by: INTERNAL MEDICINE

## 2022-09-12 PROCEDURE — 84165 PROTEIN E-PHORESIS SERUM: CPT | Mod: 26,,, | Performed by: PATHOLOGY

## 2022-09-12 PROCEDURE — 83970 ASSAY OF PARATHORMONE: CPT | Performed by: INTERNAL MEDICINE

## 2022-09-12 PROCEDURE — 86334 IMMUNOFIX E-PHORESIS SERUM: CPT | Performed by: INTERNAL MEDICINE

## 2022-09-12 PROCEDURE — 83520 IMMUNOASSAY QUANT NOS NONAB: CPT | Performed by: INTERNAL MEDICINE

## 2022-09-12 PROCEDURE — 36415 COLL VENOUS BLD VENIPUNCTURE: CPT | Performed by: INTERNAL MEDICINE

## 2022-09-12 PROCEDURE — 86334 IMMUNOFIX E-PHORESIS SERUM: CPT | Mod: 26,,, | Performed by: PATHOLOGY

## 2022-09-12 PROCEDURE — 82728 ASSAY OF FERRITIN: CPT | Performed by: INTERNAL MEDICINE

## 2022-09-12 PROCEDURE — 85045 AUTOMATED RETICULOCYTE COUNT: CPT | Performed by: INTERNAL MEDICINE

## 2022-09-13 ENCOUNTER — CLINICAL SUPPORT (OUTPATIENT)
Dept: FAMILY MEDICINE | Facility: CLINIC | Age: 37
End: 2022-09-13
Payer: COMMERCIAL

## 2022-09-13 ENCOUNTER — PATIENT MESSAGE (OUTPATIENT)
Dept: FAMILY MEDICINE | Facility: CLINIC | Age: 37
End: 2022-09-13

## 2022-09-13 ENCOUNTER — PATIENT MESSAGE (OUTPATIENT)
Dept: SURGERY | Facility: HOSPITAL | Age: 37
End: 2022-09-13
Payer: COMMERCIAL

## 2022-09-13 ENCOUNTER — OFFICE VISIT (OUTPATIENT)
Dept: FAMILY MEDICINE | Facility: CLINIC | Age: 37
End: 2022-09-13
Payer: COMMERCIAL

## 2022-09-13 VITALS
DIASTOLIC BLOOD PRESSURE: 92 MMHG | HEART RATE: 93 BPM | SYSTOLIC BLOOD PRESSURE: 138 MMHG | RESPIRATION RATE: 17 BRPM | OXYGEN SATURATION: 97 % | TEMPERATURE: 98 F

## 2022-09-13 DIAGNOSIS — N39.0 URINARY TRACT INFECTION WITHOUT HEMATURIA, SITE UNSPECIFIED: Primary | ICD-10-CM

## 2022-09-13 DIAGNOSIS — E78.2 MIXED HYPERLIPIDEMIA: ICD-10-CM

## 2022-09-13 DIAGNOSIS — R79.9 ABNORMAL FINDING OF BLOOD CHEMISTRY, UNSPECIFIED: ICD-10-CM

## 2022-09-13 DIAGNOSIS — G43.119 INTRACTABLE MIGRAINE WITH AURA WITHOUT STATUS MIGRAINOSUS: ICD-10-CM

## 2022-09-13 DIAGNOSIS — I10 PRIMARY HYPERTENSION: ICD-10-CM

## 2022-09-13 DIAGNOSIS — E87.6 HYPOKALEMIA: ICD-10-CM

## 2022-09-13 LAB
ALBUMIN SERPL ELPH-MCNC: 3.47 G/DL (ref 3.35–5.55)
ALPHA1 GLOB SERPL ELPH-MCNC: 0.34 G/DL (ref 0.17–0.41)
ALPHA2 GLOB SERPL ELPH-MCNC: 0.76 G/DL (ref 0.43–0.99)
B-GLOBULIN SERPL ELPH-MCNC: 0.76 G/DL (ref 0.5–1.1)
B2 MICROGLOB SERPL-MCNC: 2.5 UG/ML (ref 0–2.5)
GAMMA GLOB SERPL ELPH-MCNC: 0.57 G/DL (ref 0.67–1.58)
INTERPRETATION SERPL IFE-IMP: NORMAL
IRON SERPL-MCNC: 26 UG/DL (ref 30–160)
KAPPA LC SER QL IA: 1.17 MG/DL (ref 0.33–1.94)
KAPPA LC/LAMBDA SER IA: 1.54 (ref 0.26–1.65)
LAMBDA LC SER QL IA: 0.76 MG/DL (ref 0.57–2.63)
PATH REV BLD -IMP: NORMAL
PROT SERPL-MCNC: 5.9 G/DL (ref 6–8.4)
SATURATED IRON: 6 % (ref 20–50)
TOTAL IRON BINDING CAPACITY: 441 UG/DL (ref 250–450)
TRANSFERRIN SERPL-MCNC: 298 MG/DL (ref 200–375)

## 2022-09-13 PROCEDURE — 3044F HG A1C LEVEL LT 7.0%: CPT | Mod: CPTII,95,, | Performed by: STUDENT IN AN ORGANIZED HEALTH CARE EDUCATION/TRAINING PROGRAM

## 2022-09-13 PROCEDURE — 99214 OFFICE O/P EST MOD 30 MIN: CPT | Mod: 95,,, | Performed by: STUDENT IN AN ORGANIZED HEALTH CARE EDUCATION/TRAINING PROGRAM

## 2022-09-13 PROCEDURE — 96372 PR INJECTION,THERAP/PROPH/DIAG2ST, IM OR SUBCUT: ICD-10-PCS | Mod: S$GLB,,, | Performed by: STUDENT IN AN ORGANIZED HEALTH CARE EDUCATION/TRAINING PROGRAM

## 2022-09-13 PROCEDURE — 4010F PR ACE/ARB THEARPY RXD/TAKEN: ICD-10-PCS | Mod: CPTII,95,, | Performed by: STUDENT IN AN ORGANIZED HEALTH CARE EDUCATION/TRAINING PROGRAM

## 2022-09-13 PROCEDURE — 3044F PR MOST RECENT HEMOGLOBIN A1C LEVEL <7.0%: ICD-10-PCS | Mod: CPTII,95,, | Performed by: STUDENT IN AN ORGANIZED HEALTH CARE EDUCATION/TRAINING PROGRAM

## 2022-09-13 PROCEDURE — 99999 PR PBB SHADOW E&M-EST. PATIENT-LVL III: CPT | Mod: PBBFAC,,,

## 2022-09-13 PROCEDURE — 99999 PR PBB SHADOW E&M-EST. PATIENT-LVL III: ICD-10-PCS | Mod: PBBFAC,,,

## 2022-09-13 PROCEDURE — 81001 URINALYSIS AUTO W/SCOPE: CPT | Performed by: STUDENT IN AN ORGANIZED HEALTH CARE EDUCATION/TRAINING PROGRAM

## 2022-09-13 PROCEDURE — 96372 THER/PROPH/DIAG INJ SC/IM: CPT | Mod: S$GLB,,, | Performed by: STUDENT IN AN ORGANIZED HEALTH CARE EDUCATION/TRAINING PROGRAM

## 2022-09-13 PROCEDURE — 1111F DSCHRG MED/CURRENT MED MERGE: CPT | Mod: CPTII,95,, | Performed by: STUDENT IN AN ORGANIZED HEALTH CARE EDUCATION/TRAINING PROGRAM

## 2022-09-13 PROCEDURE — 1111F PR DISCHARGE MEDS RECONCILED W/ CURRENT OUTPATIENT MED LIST: ICD-10-PCS | Mod: CPTII,95,, | Performed by: STUDENT IN AN ORGANIZED HEALTH CARE EDUCATION/TRAINING PROGRAM

## 2022-09-13 PROCEDURE — 87086 URINE CULTURE/COLONY COUNT: CPT | Performed by: STUDENT IN AN ORGANIZED HEALTH CARE EDUCATION/TRAINING PROGRAM

## 2022-09-13 PROCEDURE — 99214 PR OFFICE/OUTPT VISIT, EST, LEVL IV, 30-39 MIN: ICD-10-PCS | Mod: 95,,, | Performed by: STUDENT IN AN ORGANIZED HEALTH CARE EDUCATION/TRAINING PROGRAM

## 2022-09-13 PROCEDURE — 4010F ACE/ARB THERAPY RXD/TAKEN: CPT | Mod: CPTII,95,, | Performed by: STUDENT IN AN ORGANIZED HEALTH CARE EDUCATION/TRAINING PROGRAM

## 2022-09-13 RX ORDER — CEFTRIAXONE 1 G/1
1 INJECTION, POWDER, FOR SOLUTION INTRAMUSCULAR; INTRAVENOUS
Status: COMPLETED | OUTPATIENT
Start: 2022-09-13 | End: 2022-09-13

## 2022-09-13 RX ORDER — NITROFURANTOIN 25; 75 MG/1; MG/1
100 CAPSULE ORAL 2 TIMES DAILY
Qty: 10 CAPSULE | Refills: 0 | Status: SHIPPED | OUTPATIENT
Start: 2022-09-13 | End: 2022-09-19

## 2022-09-13 RX ADMIN — CEFTRIAXONE 1 G: 1 INJECTION, POWDER, FOR SOLUTION INTRAMUSCULAR; INTRAVENOUS at 03:09

## 2022-09-13 NOTE — PROGRESS NOTES
Patient verified by name and . Patient received 1g Rocephin in right Ventrogluteal. Patient tolerated injection well. Patient advised to wait in clinic for 15 minutes in case of adverse reactions. Patient demonstrated understanding.   Clean catch urine sample obtained for ordered UA.

## 2022-09-13 NOTE — PROGRESS NOTES
[facility] Note    Subjective:    Chief Complaint:   Chief Complaint   Patient presents with    Urinary Tract Infection      274}    The HPI and pertinent ROS is included in the Diagnostic Impression Remarks section at the end of the note. Please see below for further details.     Sonia is a pleasant intelligent patient who is here for evaluation.     The following portions of the patient's history were reviewed and updated as appropriate: allergies, current medications, past family history, past medical history, past social history, past surgical history and problem list.    She  has a past medical history of Acute calculous cholecystitis (2020), Asthma (), Calculus of gallbladder with acute cholecystitis without obstruction (2020), Chronic anxiety (2014), COVID-19, GERD (gastroesophageal reflux disease) (10/25/2020), GI bleed (10/25/2020), Heart palpitations, Herniated disc, Hypertension, IBS (irritable bowel syndrome) (), Insomnia (), Intractable migraine without aura and with status migrainosus (2022), Irritable bowel syndrome without diarrhea (9/3/2021), Lower back pain (), Migraine headache (), Obstructive sleep apnea, Palpitations (), PCOS (polycystic ovarian syndrome) (2022), and Sleep apnea ().  She  has a past surgical history that includes Dilation and curettage of uterus (); epidural steriod injections ();  section, classic;  section, low transverse (); Tubal ligation (); Dilation and curettage of uterus; endometrioma (); Abdominal surgery; Esophagogastroduodenoscopy (N/A, 10/26/2020); Colonoscopy (N/A, 10/27/2020); Intraluminal gastrointestinal tract imaging via capsule (N/A, 2020); Laparoscopic cholecystectomy (N/A, 2020); Tonsillectomy; Knee arthroscopy w/ meniscectomy (Right, 2021); Cystoscopy (N/A, 10/27/2021); Magnetic resonance imaging (N/A, 2022);  "Cholecystectomy; and Upper gastrointestinal endoscopy.  She  reports that she has been smoking vaping w/o nicotine. She has never used smokeless tobacco. She reports that she does not currently use alcohol. She reports that she does not use drugs.  She family history includes Arthritis in her father; Asthma in her mother; Breast cancer (age of onset: 40) in her maternal aunt; Cancer in her father, maternal grandfather, maternal grandmother, and mother; Colon cancer in her maternal grandfather; Diabetes in her maternal grandmother and paternal grandfather; Heart disease in her father and mother; Hyperlipidemia in her father and mother; Hypertension in her father.    Review of patient's allergies indicates:   Allergen Reactions    Contrast media Anaphylaxis    Iodine and iodide containing products Anaphylaxis    Levaquin [levofloxacin] Anaphylaxis    Levofloxacin in d5w Anaphylaxis    Sulfa (sulfonamide antibiotics) Anaphylaxis and Hives    Iodinated contrast media Hives    Iodine     Magnesium      Pt reporting she is allergic to magnesium citrate oral drink.     Morphine Hives    Tree nuts     Adhesive Rash    Compazine [prochlorperazine] Anxiety     Restless legs    Depacon [valproate sodium] Hives     Pt experienced hives at IV site upon 8th day of depacon administration.  Hives resolved with stopping medication in 1.5 hours.    Nut [tree nut] Hives       Physical Examination  LMP 08/14/2022    274}  Wt Readings from Last 3 Encounters:   09/09/22 123.8 kg (273 lb)   09/09/22 124.2 kg (273 lb 13 oz)   09/06/22 124.9 kg (275 lb 5.7 oz)     BP Readings from Last 3 Encounters:   09/09/22 137/89   09/09/22 (!) 173/97   09/06/22 128/82     Estimated body mass index is 42.76 kg/m² as calculated from the following:    Height as of 9/9/22: 5' 7" (1.702 m).    Weight as of 9/9/22: 123.8 kg (273 lb).     CONSTITUTIONAL: No apparent distress. Does not appear acutely ill or septic. Appears adequately hydrated.  PULM: Breathing " unlabored.  PSYCHIATRIC: Alert and conversant and grossly oriented. Mood is grossly neutral. Affect appropriate. Judgment and insight grossly intact.  Integument: normal coloration and turgor, no rashes, no suspicious skin lesions noted.    Data reviewed 274}  Previous medical records reviewed and summarized in HPI.     Laboratory  274}  I have reviewed old labs below:  Lab Results   Component Value Date    WBC 8.71 09/12/2022    HGB 11.6 (L) 09/12/2022    HCT 35.8 (L) 09/12/2022    MCV 84 09/12/2022     09/12/2022     09/12/2022    K 2.9 (L) 09/12/2022     09/12/2022    CALCIUM 10.2 09/12/2022    PHOS 2.1 (L) 11/28/2020    CO2 25 09/12/2022     (H) 09/12/2022    BUN 7 09/12/2022    CREATININE 0.9 09/12/2022    ANIONGAP 10 09/12/2022    ESTGFRAFRICA >60.0 02/07/2022    EGFRNONAA >60.0 02/07/2022    PROT 6.1 09/12/2022    ALBUMIN 3.3 (L) 09/12/2022    BILITOT 0.2 09/12/2022    ALKPHOS 79 09/12/2022    ALT 15 09/12/2022    AST 11 09/12/2022    INR 0.9 08/06/2022    CHOL 243 (H) 08/06/2022    TRIG 319 (H) 08/06/2022    HDL 43 08/06/2022    LDLCALC 136.2 08/06/2022    TSH 2.120 09/04/2022    HGBA1C 5.5 02/07/2022     Lab reviewed by me: Particular labs of significance that I will monitor, workup, or treat to improve are mentioned below in diagnostic impression remarks.  :[id number]}274}  Imaging/EKG: I have reviewed the pertinent results/findings and my personal findings are noted below in diagnostic impression remarks.  274}    CC:   Chief Complaint   Patient presents with    Urinary Tract Infection        274}  Assessment/Plan  Sonia Goldberg is a 36 y.o. female who presents with:    1. Urinary tract infection without hematuria, site unspecified    2. Primary hypertension    3. Intractable migraine with aura without status migrainosus    4. Mixed hyperlipidemia    5. Hypokalemia    6. Abnormal finding of blood chemistry, unspecified        Diagnostic Impression Remarks + HPI     The patient  "location is:  [facility] [place]  The chief complaint leading to consultation is:   Chief Complaint   Patient presents with    Urinary Tract Infection     Total time spent with patient: 20 minutes     Visit type: Virtual visit with synchronous audio and video  Each patient to whom he or she provides medical services by telemedicine is:  (1) informed of the relationship between the physician and patient and the respective role of any other health care provider with respect to management of the patient; and (2) notified that he or she may decline to receive medical services by telemedicine and may withdraw from such care at any time.    This service was not originating from a related E/M service provided within the previous 7 days nor will  to an E/M service or procedure within the next 24 hours or my soonest available appointment.  Prevailing standard of care was able to be met in this synchronous audio and video visit    Documentation entered by me for this encounter may have been done in part using speech-recognition technology. Although I have made an effort to ensure accuracy, "sound like" errors may exist and should be interpreted in context.     UTI-she reports she has some some dysuria and frequency for the past few days no fevers no nausea vomiting is allergic to multiple antibiotics no blood feeling no cough okay overall no diarrhea.  Patient reported she did have some pain over flank but this only happens at night have been constantly does not feel like she is worsening but she gets urinary tract infections frequently.  Went over that she is allergic to multiple first-line medications such as Bactrim fluoroquinolones and is resistant to other ones with past cultures.  Recommended come in clinic to check for CVA tenderness but she does not seem to fit the full clinical picture of pyelonephritis but will give a shot of Rocephin regardless will watch symptoms very closely and will send Macrobid and " went over with the patient that this Macrobid does not reach the kidneys but based on her symptoms she does not meet classic pyelonephritis will wash should very cautiously and if she worsens then she needs to go to the hospital for IV antibiotics and she agreed after shared decision making  Hypertension well controlled continue current meds    Hypokalemia-control uncertain reports no nausea vomiting or diarrhea reports she feels fine no palpitations will recheck told staff to get today    Migraines-stable continue current meds      This is the extent of the patient's complaints at this present time. She denies chest pain upon exertion, dyspnea, nausea, vomiting, diaphoresis, and syncope. No pleuritic chest pain, unilateral leg swelling, calf tenderness, or calf pain.     Sonia will return to clinic in a few months for further workup and reassessment or sooner as needed. She was instructed to call the clinic or go to the emergency department if her symptoms do not improve, worsens, or if new symptoms develop. As we discussed that symptoms could worsen over the next 24 hours she was advised that if any increased swelling, pain, or numbness arise to go immediately to the ED. Patient knows to call any time if an emergency arises. Shared decision making occurred and she verbalized understanding in agreement with this plan.      274}    Sonia will return to clinic in a few months for further workup and reassessment or sooner as needed. She was instructed to call the clinic or go to the emergency department if her symptoms do not improve, worsens, or if new symptoms develop. As we discussed that symptoms could worsen over the next 24 hours she was advised that if any increased swelling, pain, or numbness arise to go immediately to the ED. Patient knows to call any time if an emergency arises. Shared decision making occurred and she verbalized understanding in agreement with this plan.     She was counseled about  the importance of cancer screening.     Medication Monitoring    In today's visit, monitoring for drug toxicity was accomplished. Proper use of medications was also discussed.     I discussed imaging findings, diagnosis, possibilities, treatment options, medications, risks, and benefits. She had many questions regarding the options and long-term effects. All questions were answered. She expressed understanding after counseling regarding the diagnosis and recommendations. She was capable and demonstrated competence with understanding of these options. Shared decision making was performed resulting in her choosing the current treatment plan. Patient handout was given with instructions and recommendations. Advised the patient that if they become pregnant to alert us immediately to assess for medication changes.     I also discussed the importance of close follow up to discuss labs, change or modify her medications if needed, monitor side effects, and further evaluation of medical problems.     Additional workup planned: see labs ordered below.    See below for labs and meds ordered with associated diagnosis    1. Urinary tract infection without hematuria, site unspecified  - CULTURE, URINE  - Urinalysis  - cefTRIAXone injection 1 g  - nitrofurantoin, macrocrystal-monohydrate, (MACROBID) 100 MG capsule; Take 1 capsule (100 mg total) by mouth 2 (two) times daily. for 5 days  Dispense: 10 capsule; Refill: 0  - CBC Auto Differential; Future    2. Primary hypertension    3. Intractable migraine with aura without status migrainosus    4. Mixed hyperlipidemia    5. Hypokalemia  - BASIC METABOLIC PANEL; Future    6. Abnormal finding of blood chemistry, unspecified  - CBC Auto Differential; Future     Medication List with Changes/Refills   New Medications    NITROFURANTOIN, MACROCRYSTAL-MONOHYDRATE, (MACROBID) 100 MG CAPSULE    Take 1 capsule (100 mg total) by mouth 2 (two) times daily. for 5 days   Current Medications     ACETAZOLAMIDE (DIAMOX) 500 MG CPSR    Take 4 capsules (2,000 mg total) by mouth 2 (two) times daily.    ALBUTEROL (PROVENTIL/VENTOLIN HFA) 90 MCG/ACTUATION INHALER    Inhale 2 puffs into the lungs every 6 (six) hours as needed for Wheezing.    BINAXNOW COVID-19 AG SELF TEST KIT    TEST AS DIRECTED    BUPROPION (WELLBUTRIN XL) 150 MG TB24 TABLET    Take 1 tablet (150 mg total) by mouth nightly.    CANDESARTAN (ATACAND) 4 MG TABLET    Take 1 tablet (4 mg total) by mouth once daily.    CARIPRAZINE (VRAYLAR) 3 MG CAP    Take 1 capsule (3 mg total) by mouth nightly.    DIPHENHYDRAMINE (BENADRYL) 50 MG TABLET    Take 50 mg by mouth nightly as needed for Insomnia.    EPINEPHRINE (EPIPEN) 0.3 MG/0.3 ML ATIN    Inject 0.3 mLs (0.3 mg total) into the muscle as needed (anaphylaxis).    FAMOTIDINE-CALCIUM CARBONATE-MAGNESIUM HYDROXIDE (PEPCID COMPLETE) -165 MG    Take 1 tablet by mouth daily as needed (Acid reflux).    FERROUS SULFATE (FEOSOL) 325 MG (65 MG IRON) TAB TABLET    Take by mouth once daily.    FOLIC ACID (FOLVITE) 1 MG TABLET    Take 1 tablet (1 mg total) by mouth once daily.    LORAZEPAM (ATIVAN) 1 MG TABLET    Take 1 tablet (1 mg total) by mouth every 6 (six) hours as needed for Anxiety.    ONDANSETRON (ZOFRAN-ODT) 4 MG TBDL    Take 1 tablet (4 mg total) by mouth every 6 (six) hours as needed (nausea).    PULSE OXIMETER (PULSE OXIMETER) DEVICE    by Apply Externally route 2 (two) times a day. Use twice daily at 8 AM and 3 PM and [facility] as directed.    SARS-COV-2, COVID-19, (MODERNA COVID-19) 100 MCG/0.5 ML INJECTION        TOPIRAMATE (TOPAMAX) 50 MG TABLET    Take 1 tablet (50 mg total) by mouth 2 (two) times daily.    UBROGEPANT (UBRELVY) 100 MG TABLET    Take 1 tablet by mouth once as needed for  migraine. May repeat in 2 hours if needed. Max 2 tablets per day.     Modified Medications    No medications on file       Dorian Guerrero MD  09/13/2022     Documentation entered by me for  "this encounter may have been done in part using speech-recognition technology. Although I have made an effort to ensure accuracy, "sound like" errors may exist and should be interpreted in context.    Tests to Keep You Healthy    Cervical Cancer Screening: Met on 4/22/2021  Last Blood Pressure <= 139/89 (9/6/2022): Yes  Tobacco Cessation: NO         "

## 2022-09-14 ENCOUNTER — HOSPITAL ENCOUNTER (EMERGENCY)
Facility: HOSPITAL | Age: 37
Discharge: HOME | End: 2022-09-14
Attending: EMERGENCY MEDICINE
Payer: COMMERCIAL

## 2022-09-14 ENCOUNTER — ANESTHESIA EVENT (OUTPATIENT)
Dept: SURGERY | Facility: HOSPITAL | Age: 37
End: 2022-09-14
Payer: COMMERCIAL

## 2022-09-14 ENCOUNTER — TELEPHONE (OUTPATIENT)
Dept: FAMILY MEDICINE | Facility: CLINIC | Age: 37
End: 2022-09-14
Payer: COMMERCIAL

## 2022-09-14 ENCOUNTER — HOSPITAL ENCOUNTER (OUTPATIENT)
Dept: PREADMISSION TESTING | Facility: HOSPITAL | Age: 37
Discharge: HOME | End: 2022-09-14
Attending: NEUROLOGICAL SURGERY
Payer: COMMERCIAL

## 2022-09-14 VITALS
WEIGHT: 273.19 LBS | BODY MASS INDEX: 42.88 KG/M2 | OXYGEN SATURATION: 98 % | DIASTOLIC BLOOD PRESSURE: 77 MMHG | HEART RATE: 71 BPM | SYSTOLIC BLOOD PRESSURE: 132 MMHG | HEIGHT: 67 IN | TEMPERATURE: 99 F

## 2022-09-14 VITALS
DIASTOLIC BLOOD PRESSURE: 81 MMHG | HEIGHT: 67 IN | OXYGEN SATURATION: 99 % | RESPIRATION RATE: 17 BRPM | HEART RATE: 71 BPM | SYSTOLIC BLOOD PRESSURE: 124 MMHG | BODY MASS INDEX: 42.85 KG/M2 | WEIGHT: 273 LBS | TEMPERATURE: 98 F

## 2022-09-14 DIAGNOSIS — E87.6 HYPOKALEMIA: ICD-10-CM

## 2022-09-14 DIAGNOSIS — N12 PYELONEPHRITIS: Primary | ICD-10-CM

## 2022-09-14 LAB
ALBUMIN SERPL BCP-MCNC: 3.7 G/DL (ref 3.5–5.2)
ALP SERPL-CCNC: 88 U/L (ref 55–135)
ALT SERPL W/O P-5'-P-CCNC: 15 U/L (ref 10–44)
ANION GAP SERPL CALC-SCNC: 11 MMOL/L (ref 8–16)
AST SERPL-CCNC: 11 U/L (ref 10–40)
BACTERIA #/AREA URNS AUTO: ABNORMAL /HPF
BASOPHILS # BLD AUTO: 0.03 K/UL (ref 0–0.2)
BASOPHILS NFR BLD: 0.3 % (ref 0–1.9)
BILIRUB SERPL-MCNC: 0.2 MG/DL (ref 0.1–1)
BILIRUB UR QL STRIP: NEGATIVE
BUN SERPL-MCNC: 10 MG/DL (ref 6–20)
CALCIUM SERPL-MCNC: 10.6 MG/DL (ref 8.7–10.5)
CHLORIDE SERPL-SCNC: 105 MMOL/L (ref 95–110)
CLARITY UR REFRACT.AUTO: ABNORMAL
CO2 SERPL-SCNC: 25 MMOL/L (ref 23–29)
COLOR UR AUTO: YELLOW
CREAT SERPL-MCNC: 0.8 MG/DL (ref 0.5–1.4)
DIFFERENTIAL METHOD: ABNORMAL
EOSINOPHIL # BLD AUTO: 0.1 K/UL (ref 0–0.5)
EOSINOPHIL NFR BLD: 1.3 % (ref 0–8)
ERYTHROCYTE [DISTWIDTH] IN BLOOD BY AUTOMATED COUNT: 17 % (ref 11.5–14.5)
EST. GFR  (NO RACE VARIABLE): >60 ML/MIN/1.73 M^2
GLUCOSE SERPL-MCNC: 78 MG/DL (ref 70–110)
GLUCOSE UR QL STRIP: NEGATIVE
HCT VFR BLD AUTO: 39.7 % (ref 37–48.5)
HGB BLD-MCNC: 12.4 G/DL (ref 12–16)
HGB UR QL STRIP: ABNORMAL
HYALINE CASTS UR QL AUTO: 0 /LPF
IMM GRANULOCYTES # BLD AUTO: 0.03 K/UL (ref 0–0.04)
IMM GRANULOCYTES NFR BLD AUTO: 0.3 % (ref 0–0.5)
KETONES UR QL STRIP: ABNORMAL
LEUKOCYTE ESTERASE UR QL STRIP: ABNORMAL
LYMPHOCYTES # BLD AUTO: 2.5 K/UL (ref 1–4.8)
LYMPHOCYTES NFR BLD: 28.8 % (ref 18–48)
MAGNESIUM SERPL-MCNC: 2.1 MG/DL (ref 1.6–2.6)
MCH RBC QN AUTO: 26.6 PG (ref 27–31)
MCHC RBC AUTO-ENTMCNC: 31.2 G/DL (ref 32–36)
MCV RBC AUTO: 85 FL (ref 82–98)
MICROSCOPIC COMMENT: ABNORMAL
MONOCYTES # BLD AUTO: 0.5 K/UL (ref 0.3–1)
MONOCYTES NFR BLD: 5.9 % (ref 4–15)
NEUTROPHILS # BLD AUTO: 5.5 K/UL (ref 1.8–7.7)
NEUTROPHILS NFR BLD: 63.4 % (ref 38–73)
NITRITE UR QL STRIP: NEGATIVE
NRBC BLD-RTO: 0 /100 WBC
PATHOLOGIST INTERPRETATION IFE: NORMAL
PATHOLOGIST INTERPRETATION SPE: NORMAL
PH UR STRIP: 6 [PH] (ref 5–8)
PLATELET # BLD AUTO: 305 K/UL (ref 150–450)
PMV BLD AUTO: 10.8 FL (ref 9.2–12.9)
POTASSIUM SERPL-SCNC: 3 MMOL/L (ref 3.5–5.1)
PROT SERPL-MCNC: 7 G/DL (ref 6–8.4)
PROT UR QL STRIP: ABNORMAL
RBC # BLD AUTO: 4.67 M/UL (ref 4–5.4)
RBC #/AREA URNS AUTO: 27 /HPF (ref 0–4)
SODIUM SERPL-SCNC: 141 MMOL/L (ref 136–145)
SP GR UR STRIP: 1.02 (ref 1–1.03)
SQUAMOUS #/AREA URNS AUTO: 52 /HPF
URN SPEC COLLECT METH UR: ABNORMAL
WBC # BLD AUTO: 8.75 K/UL (ref 3.9–12.7)
WBC #/AREA URNS AUTO: 97 /HPF (ref 0–5)
WBC CLUMPS UR QL AUTO: ABNORMAL

## 2022-09-14 PROCEDURE — 99284 EMERGENCY DEPT VISIT MOD MDM: CPT | Mod: 25

## 2022-09-14 PROCEDURE — 80053 COMPREHEN METABOLIC PANEL: CPT | Mod: 91 | Performed by: PHYSICIAN ASSISTANT

## 2022-09-14 PROCEDURE — 63600175 PHARM REV CODE 636 W HCPCS: Performed by: EMERGENCY MEDICINE

## 2022-09-14 PROCEDURE — 96365 THER/PROPH/DIAG IV INF INIT: CPT

## 2022-09-14 PROCEDURE — 25000003 PHARM REV CODE 250: Performed by: EMERGENCY MEDICINE

## 2022-09-14 PROCEDURE — 36415 COLL VENOUS BLD VENIPUNCTURE: CPT | Performed by: EMERGENCY MEDICINE

## 2022-09-14 PROCEDURE — 83735 ASSAY OF MAGNESIUM: CPT | Performed by: EMERGENCY MEDICINE

## 2022-09-14 PROCEDURE — 63600175 PHARM REV CODE 636 W HCPCS: Performed by: PHYSICIAN ASSISTANT

## 2022-09-14 PROCEDURE — 85025 COMPLETE CBC W/AUTO DIFF WBC: CPT | Mod: 91 | Performed by: PHYSICIAN ASSISTANT

## 2022-09-14 PROCEDURE — 96368 THER/DIAG CONCURRENT INF: CPT

## 2022-09-14 PROCEDURE — 96366 THER/PROPH/DIAG IV INF ADDON: CPT

## 2022-09-14 PROCEDURE — 87040 BLOOD CULTURE FOR BACTERIA: CPT | Mod: 59 | Performed by: PHYSICIAN ASSISTANT

## 2022-09-14 RX ORDER — POTASSIUM CHLORIDE 7.45 MG/ML
10 INJECTION INTRAVENOUS
Status: COMPLETED | OUTPATIENT
Start: 2022-09-14 | End: 2022-09-14

## 2022-09-14 RX ORDER — CEFPODOXIME PROXETIL 100 MG/1
100 TABLET, FILM COATED ORAL EVERY 12 HOURS
Qty: 20 TABLET | Refills: 0 | Status: ON HOLD | OUTPATIENT
Start: 2022-09-14 | End: 2022-09-20 | Stop reason: HOSPADM

## 2022-09-14 RX ORDER — MAGNESIUM SULFATE HEPTAHYDRATE 40 MG/ML
2 INJECTION, SOLUTION INTRAVENOUS
Status: COMPLETED | OUTPATIENT
Start: 2022-09-14 | End: 2022-09-14

## 2022-09-14 RX ORDER — POTASSIUM CHLORIDE 20 MEQ/1
40 TABLET, EXTENDED RELEASE ORAL
Status: COMPLETED | OUTPATIENT
Start: 2022-09-14 | End: 2022-09-14

## 2022-09-14 RX ORDER — POTASSIUM CHLORIDE 14.9 MG/ML
20 INJECTION INTRAVENOUS
Status: DISCONTINUED | OUTPATIENT
Start: 2022-09-14 | End: 2022-09-14

## 2022-09-14 RX ADMIN — POTASSIUM CHLORIDE 10 MEQ: 7.46 INJECTION, SOLUTION INTRAVENOUS at 07:09

## 2022-09-14 RX ADMIN — POTASSIUM CHLORIDE 40 MEQ: 1500 TABLET, EXTENDED RELEASE ORAL at 06:09

## 2022-09-14 RX ADMIN — MAGNESIUM SULFATE HEPTAHYDRATE 2 G: 2 INJECTION, SOLUTION INTRAVENOUS at 06:09

## 2022-09-14 RX ADMIN — CEFTRIAXONE 1 G: 1 INJECTION, SOLUTION INTRAVENOUS at 06:09

## 2022-09-14 NOTE — FIRST PROVIDER EVALUATION
Emergency Department TeleTriage Encounter Note      CHIEF COMPLAINT    Chief Complaint   Patient presents with    Abnormal Lab     Sent by PCP for Potassium of 2.8       VITAL SIGNS   Initial Vitals [09/14/22 1544]   BP Pulse Resp Temp SpO2   (!) 145/88 70 17 98.4 °F (36.9 °C) 100 %      MAP       --            ALLERGIES    Review of patient's allergies indicates:   Allergen Reactions    Contrast media Anaphylaxis    Iodine and iodide containing products Anaphylaxis    Levaquin [levofloxacin] Anaphylaxis    Levofloxacin in d5w Anaphylaxis    Sulfa (sulfonamide antibiotics) Anaphylaxis and Hives    Iodinated contrast media Hives    Iodine     Magnesium      Pt reporting she is allergic to magnesium citrate oral drink.     Morphine Hives    Tree nuts     Adhesive Rash    Compazine [prochlorperazine] Anxiety     Restless legs    Depacon [valproate sodium] Hives     Pt experienced hives at IV site upon 8th day of depacon administration.  Hives resolved with stopping medication in 1.5 hours.    Nut [tree nut] Hives       PROVIDER TRIAGE NOTE  Patient was advised by PCP to come to the ED due to hypokalemia secondary to chronic diarrhea from IBS. She also reports having a UTI and recent shunt placement.       ORDERS  Labs Reviewed - No data to display    ED Orders (720h ago, onward)      None              Virtual Visit Note: The provider triage portion of this emergency department evaluation and documentation was performed via [email or web address], a HIPAA-compliant telemedicine application, in concert with a tele-presenter in the room. A face to face patient evaluation with one of my colleagues will occur once the patient is placed in an emergency department room.      DISCLAIMER: This note was prepared with [employer] voice recognition transcription software. Garbled syntax, mangled pronouns, and other bizarre constructions may be attributed to that software system.
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
## 2022-09-14 NOTE — ANESTHESIA PREPROCEDURE EVALUATION
[facility]  Anesthesia Pre-Operative Evaluation         Patient Name: Sonia Goldberg  YOB: 1985  MRN: [id number]    SUBJECTIVE:     Pre-operative evaluation for Procedure(s) (LRB):  INSERTION, SHUNT, VENTRICULOPERITONEAL, ENDOSCOPIC (N/A)     09/14/2022    Sonia Goldberg is a 36 y.o. female w/ a significant PMHx of IIH, KERI, GERD, hypertension, bipolar disorder, IBS, PCOS, hyperlipidemia, asthma (no inhaler use in years), left-sided weakness thought to be from functional disorder, and migraine syndrome.    Patient now presents for the above procedure(s).      LDA: None documented.    Prev airway:   Intubation  Performed by: Rommel Gray CRNA  Authorized by: Josh Trejo Jr., MD      Intubation:     Induction:  Intravenous    Intubated:  Postinduction    Mask Ventilation:  N/a    Attempts:  1    Attempted By:  CRNA    Method of Intubation:  Blind intubation    Difficult Airway Encountered?: No      Complications:  None    Airway Device:  Supraglottic airway/LMA    Airway Device Size:  5.0    Style/Cuff Inflation:  Uncuffed    Secured at:  The lips    Placement Verified By:  Capnometry    Complicating Factors:  None    Findings Post-Intubation:  BS equal bilateral    Intubation  Performed by: Petar Lipscomb CRNA  Authorized by: Irineo Benavidez MD      Intubation:     Induction:  Intravenous    Intubated:  Postinduction    Attempts:  1    Attempted By:  CRNA    Method of Intubation:  Direct    Laryngeal View Grade: Grade IIA - cords partially seen      Difficult Airway Encountered?: No      Complications:  None    Airway Device:  Oral endotracheal tube    Airway Device Size:  7.0    Style/Cuff Inflation:  Cuffed    Tube secured:  22    Secured at:  The lips    Placement Verified By:  Capnometry    Complicating Factors:  None    Findings Post-Intubation:  BS equal bilateral    Drips: None documented.    Patient Active Problem List   Diagnosis     Chronic anxiety    Morbid obesity    Obstructive sleep apnea    Hypertension    Iron deficiency anemia due to chronic blood loss    Iron deficiency anemia    Bipolar disorder, unspecified    Irritable bowel syndrome without diarrhea    Major depressive disorder, single episode, unspecified    Unspecified asthma, uncomplicated    Left-sided weakness    IIH (idiopathic intracranial hypertension)    PCOS (polycystic ovarian syndrome)    Dysarthria    Folate deficiency    Nonintractable headache    Transient neurological symptoms       Review of patient's allergies indicates:   Allergen Reactions    Contrast media Anaphylaxis    Iodine and iodide containing products Anaphylaxis    Levaquin [levofloxacin] Anaphylaxis    Levofloxacin in d5w Anaphylaxis    Sulfa (sulfonamide antibiotics) Anaphylaxis and Hives    Iodinated contrast media Hives    Iodine     Magnesium      Pt reporting she is allergic to magnesium citrate oral drink.     Morphine Hives    Tree nuts     Adhesive Rash    Compazine [prochlorperazine] Anxiety     Restless legs    Depacon [valproate sodium] Hives     Pt experienced hives at IV site upon 8th day of depacon administration.  Hives resolved with stopping medication in 1.5 hours.    Nut [tree nut] Hives       Current Outpatient Medications:    Current Outpatient Medications:     acetaZOLAMIDE (DIAMOX) 500 mg CpSR, Take 4 capsules (2,000 mg total) by mouth 2 (two) times daily. (Patient not taking: No sig reported), Disp: 240 capsule, Rfl: 11    albuterol (PROVENTIL/VENTOLIN HFA) 90 mcg/actuation inhaler, Inhale 2 puffs into the lungs every 6 (six) hours as needed for Wheezing., Disp: 18 g, Rfl: 11    BINAXNOW COVID-19 AG SELF TEST Kit, TEST AS DIRECTED, Disp: , Rfl:     buPROPion (WELLBUTRIN XL) 150 MG TB24 tablet, Take 1 tablet (150 mg total) by mouth nightly., Disp: 30 tablet, Rfl: 1    candesartan (ATACAND) 4 MG tablet, Take 1 tablet (4 mg total) by mouth once  daily., Disp: 30 tablet, Rfl: 2    cariprazine (VRAYLAR) 3 mg Cap, Take 1 capsule (3 mg total) by mouth nightly., Disp: 30 capsule, Rfl: 1    diphenhydrAMINE (BENADRYL) 50 MG tablet, Take 50 mg by mouth nightly as needed for Insomnia., Disp: , Rfl:     EPINEPHrine (EPIPEN) 0.3 mg/0.3 mL AtIn, Inject 0.3 mLs (0.3 mg total) into the muscle as needed (anaphylaxis). (Patient taking differently: Inject 1 each into the muscle as needed (anaphylaxis). Has vial), Disp: 1 each, Rfl: 1    famotidine-calcium carbonate-magnesium hydroxide (PEPCID COMPLETE) -165 mg, Take 1 tablet by mouth daily as needed (Acid reflux)., Disp: , Rfl:     ferrous sulfate (FEOSOL) 325 mg (65 mg iron) Tab tablet, Take by mouth once daily., Disp: , Rfl:     folic acid (FOLVITE) 1 MG tablet, Take 1 tablet (1 mg total) by mouth once daily., Disp: 30 tablet, Rfl: 11    LORazepam (ATIVAN) 1 MG tablet, Take 1 tablet (1 mg total) by mouth every 6 (six) hours as needed for Anxiety., Disp: 15 tablet, Rfl: 0    nitrofurantoin, macrocrystal-monohydrate, (MACROBID) 100 MG capsule, Take 1 capsule (100 mg total) by mouth 2 (two) times daily. for 5 days, Disp: 10 capsule, Rfl: 0    ondansetron (ZOFRAN-ODT) 4 MG TbDL, Take 1 tablet (4 mg total) by mouth every 6 (six) hours as needed (nausea)., Disp: 30 tablet, Rfl: 11    pulse oximeter (PULSE OXIMETER) device, by Apply Externally route 2 (two) times a day. Use twice daily at 8 AM and 3 PM and [facility]., Disp: 1 each, Rfl: 0    sars-cov-2, covid-19, (MODERNA COVID-19) 100 mcg/0.5 ml injection, , Disp: , Rfl:     topiramate (TOPAMAX) 50 MG tablet, Take 1 tablet (50 mg total) by mouth 2 (two) times daily., Disp: 60 tablet, Rfl: 11    ubrogepant (UBRELVY) 100 mg tablet, Take 1 tablet by mouth once as needed for  migraine. May repeat in 2 hours if needed. Max 2 tablets per day., Disp: 10 tablet, Rfl: 2    Past Surgical History:   Procedure Laterality Date    ABDOMINAL  SURGERY           SECTION, CLASSIC       SECTION, LOW TRANSVERSE      CHOLECYSTECTOMY      COLONOSCOPY N/A 10/27/2020    Procedure: COLONOSCOPY;  Surgeon: Patito Vergara MD;  Location: [facility] ENDO;  Service: Endoscopy;  Laterality: N/A;    CYSTOSCOPY N/A 10/27/2021    Procedure: CYSTOSCOPY;  Surgeon: Oh Velasquez Jr., MD;  Location: [facility];  Service: Urology;  Laterality: N/A;    DILATION AND CURETTAGE OF UTERUS      DILATION AND CURETTAGE OF UTERUS      perferated uterus during procedure    endometrioma  2013    removed on right lower quadrant of uterus    epidural steriod injections  2005    x3    ESOPHAGOGASTRODUODENOSCOPY N/A 10/26/2020    Procedure: EGD (ESOPHAGOGASTRODUODENOSCOPY);  Surgeon: Enrike Garcia MD;  Location: [facility] ENDO;  Service: Endoscopy;  Laterality: N/A;    INTRALUMINAL GASTROINTESTINAL TRACT IMAGING VIA CAPSULE N/A 2020    Procedure: IMAGING PROCEDURE, GI TRACT, INTRALUMINAL, VIA CAPSULE;  Surgeon: Patito Vergara MD;  Location: [facility] ENDO;  Service: Endoscopy;  Laterality: N/A;    KNEE ARTHROSCOPY W/ MENISCECTOMY Right 2021    Procedure: ARTHROSCOPY, KNEE, WITH MENISCECTOMY;  Surgeon: López Baker MD;  Location: [facility];  Service: Orthopedics;  Laterality: Right;    LAPAROSCOPIC CHOLECYSTECTOMY N/A 2020    Procedure: CHOLECYSTECTOMY, LAPAROSCOPIC;  Surgeon: Chente Campbell III, MD;  Location: [facility];  Service: General;  Laterality: N/A;    MAGNETIC RESONANCE IMAGING N/A 2022    Procedure: MRI (Magnetic Resonance Imagine);  Surgeon: Sanjana Surgeon;  Location: [facility];  Service: Anesthesiology;  Laterality: N/A;    TONSILLECTOMY      as a child    TUBAL LIGATION      UPPER GASTROINTESTINAL ENDOSCOPY         Social History     Socioeconomic History    Marital status:    Tobacco Use    Smoking status: Some Days     Types: Vaping w/o nicotine    Smokeless tobacco: Never   Substance and Sexual  Activity    Alcohol use: Not Currently     Comment: socially, occasionally    Drug use: No    Sexual activity: Yes     Partners: Male     Birth control/protection: See Surgical Hx   Social History Narrative    ** Merged History Encounter **          Social Determinants of Health     Financial Resource Strain: Unknown    Difficulty of Paying Living Expenses: Patient refused   Food Insecurity: Unknown    Worried About Running Out of Food in the Last Year: Patient refused    Ran Out of Food in the Last Year: Patient refused   Transportation Needs: Unknown    Lack of Transportation (Medical): Patient refused    Lack of Transportation (Non-Medical): Patient refused   Physical Activity: Inactive    Days of Exercise per Week: 0 days    Minutes of Exercise per Session: 0 min   Stress: Stress Concern Present    Feeling of Stress : Rather much   Social Connections: Unknown    Frequency of Communication with Friends and Family: Three times a week    Frequency of Social Gatherings with Friends and Family: Once a week    Active Member of Clubs or Organizations: No    Attends Club or Organization Meetings: Never    Marital Status:    Housing Stability: Unknown    Unable to Pay for Housing in the Last Year: Patient refused    Number of Places Lived in the Last Year: 1    Unstable Housing in the Last Year: Patient refused       OBJECTIVE:     Vital Signs Range (Last 24H):  Temp:  [36.8 °C (98.2 °F)-37.1 °C (98.7 °F)]   Pulse:  [71-93]   Resp:  [17]   BP: (132-138)/(77-92)   SpO2:  [97 %-98 %]       Significant Labs:  Lab Results   Component Value Date    WBC 8.71 09/12/2022    HGB 11.6 (L) 09/12/2022    HCT 35.8 (L) 09/12/2022     09/12/2022    CHOL 243 (H) 08/06/2022    TRIG 319 (H) 08/06/2022    HDL 43 08/06/2022    ALT 15 09/12/2022    AST 11 09/12/2022     09/12/2022    K 2.9 (L) 09/12/2022     09/12/2022    CREATININE 0.9 09/12/2022    BUN 7 09/12/2022    CO2 25 09/12/2022    TSH  2.120 09/04/2022    INR 0.9 08/06/2022    HGBA1C 5.5 02/07/2022       Diagnostic Studies: No relevant studies.    EKG:   Results for orders placed or performed during the hospital encounter of 09/04/22   EKG 12-lead    Collection Time: 09/04/22 11:01 PM    Narrative    Test Reason :     Vent. Rate : 076 BPM     Atrial Rate : 076 BPM     P-R Int : 164 ms          QRS Dur : 080 ms      QT Int : 346 ms       P-R-T Axes : 031 039 007 degrees     QTc Int : 389 ms    Normal sinus rhythm  Anterior infarct (cited on or before 04-SEP-2022)  Abnormal ECG  When compared with ECG of 06-AUG-2022 18:32,  QT has shortened  Confirmed by Hallie Hewitt MD ([id number]) on 9/5/2022 9:56:01 AM    Referred By: [email or web address]   SELF           Confirmed By:Hallie Hewitt MD       2D ECHO:  TTE:  Results for orders placed or performed during the hospital encounter of 02/03/22   Echo   Result Value Ref Range    Left Atrium Major Axis 3.30 cm    LA size 3.60 cm    AORTIC VALVE CUSP SEPERATION 1.80 cm    AV mean gradient 5 mmHg    Ao VTI 27.90 cm    Ao peak patricio 1.42 m/s    AV peak gradient 8 mmHg    Ao root annulus 2.70 cm    IVRT 100.00 ms    IVS 1.08 0.6 - 1.1 cm    LVIDd 4.32 3.5 - 6.0 cm    LVIDs 2.21 2.1 - 4.0 cm    LVOT diameter 2.20 cm    LVOT peak VTI 21.90 cm    LVOT peak patricio 0.99 m/s    Posterior Wall 0.85 0.6 - 1.1 cm    E wave deceleration time 142.00 ms    MV Peak A Patricio 0.52 m/s    MV Peak E Patricio 0.74 m/s    PV mean gradient 3.00 mmHg    PV PEAK VELOCITY 1.17 m/s    RVDD 2.79 cm    Triscuspid Valve Regurgitation Peak Gradient 22 mmHg    BSA 2.45 m2    TDI SEPTAL 0.06 m/s    LV LATERAL E/E' RATIO 7.40 m/s    LV SEPTAL E/E' RATIO 12.33 m/s    TDI LATERAL 0.10 m/s    FS 49 28 - 44 %    LV mass 136.65 g    Left Ventricle Relative Wall Thickness 0.39 cm    AV valve area 2.98 cm2    AV Velocity Ratio 0.70     AV index (prosthetic) 0.78     E/A ratio 1.42     Mean e' 0.08 m/s    LVOT area 3.8 cm2    LVOT stroke volume 83.21 cm3    E/E' ratio  9.25 m/s    TR Max Patricio 2.36 m/s    LV Mass Index 59 g/m2    Right Atrial Pressure (from IVC) 3 mmHg    EF 65 %    TV rest pulmonary artery pressure 25 mmHg    Narrative    · Normal left ventricular diastolic function.  · The left ventricle is normal in size with normal systolic function.  · The estimated ejection fraction is 65%.  · Normal right ventricular size with normal right ventricular systolic   function.  · Normal central venous pressure (3 mmHg).  · The estimated PA systolic pressure is 25 mmHg.          JAZMIN:  No results found for this or any previous visit.    ASSESSMENT/PLAN:         Pre-op Assessment    I have reviewed the Patient Summary Reports.     I have reviewed the Nursing Notes. I have reviewed the NPO Status.   I have reviewed the Medications.     Review of Systems  Anesthesia Hx:  Denies Hx of Anesthetic complications  History of prior surgery of interest to airway management or planning: Previous anesthesia: General, MAC Denies Family Hx of Anesthesia complications.   Denies Personal Hx of Anesthesia complications.   Social:  Smoker    Hematology/Oncology:  Hematology Normal   Oncology Normal     EENT/Dental:EENT/Dental Normal   Cardiovascular:   Hypertension    Pulmonary:   Asthma Sleep Apnea    Renal/:  Renal/ Normal     Hepatic/GI:   GERD    Musculoskeletal:  Musculoskeletal Normal    Neurological:   Headaches    Endocrine:  Obesity / BMI > 30  Psych:   Psychiatric History          Physical Exam  General: Well nourished, Cooperative, Alert and Oriented    Airway:  Mallampati: III / II  Mouth Opening: Normal  TM Distance: Normal  Tongue: Normal  Neck ROM: Normal ROM    Dental:  Intact        Anesthesia Plan  Type of Anesthesia, risks & benefits discussed:    Anesthesia Type: Gen ETT, Gen Supraglottic Airway  Intra-op Monitoring Plan: Standard ASA Monitors  Post Op Pain Control Plan: multimodal analgesia and IV/PO Opioids PRN  Induction:  IV  Airway Plan: Direct, Post-Induction  Informed  Consent: Informed consent signed with the Patient and all parties understand the risks and agree with anesthesia plan.  All questions answered.   ASA Score: 3  Day of Surgery Review of History & Physical: H&P Update referred to the surgeon/provider.    Ready For Surgery From Anesthesia Perspective.     .

## 2022-09-14 NOTE — TELEPHONE ENCOUNTER
I spoke with the patient and told her that her potassium today was 2.8 which was way too low.  She has chronic diarrhea due to irritable bowel syndrome.  I discussed this with Dr. Guerrero and was recommended that she go to the emergency room for further evaluation with potassium replacement.  Patient verbalized understanding of above.  I spoke with the emergency room triage nurse at [facility] and gave them a report that she is on her way.

## 2022-09-15 ENCOUNTER — PATIENT MESSAGE (OUTPATIENT)
Dept: FAMILY MEDICINE | Facility: CLINIC | Age: 37
End: 2022-09-15
Payer: COMMERCIAL

## 2022-09-15 ENCOUNTER — PATIENT MESSAGE (OUTPATIENT)
Dept: NEUROSURGERY | Facility: CLINIC | Age: 37
End: 2022-09-15
Payer: COMMERCIAL

## 2022-09-15 LAB — BACTERIA UR CULT: NO GROWTH

## 2022-09-16 ENCOUNTER — TELEPHONE (OUTPATIENT)
Dept: FAMILY MEDICINE | Facility: CLINIC | Age: 37
End: 2022-09-16
Payer: COMMERCIAL

## 2022-09-16 ENCOUNTER — PATIENT MESSAGE (OUTPATIENT)
Dept: NEUROSURGERY | Facility: CLINIC | Age: 37
End: 2022-09-16
Payer: COMMERCIAL

## 2022-09-16 ENCOUNTER — TELEPHONE (OUTPATIENT)
Dept: PREADMISSION TESTING | Facility: HOSPITAL | Age: 37
End: 2022-09-16
Payer: COMMERCIAL

## 2022-09-16 ENCOUNTER — TELEPHONE (OUTPATIENT)
Dept: FAMILY MEDICINE | Facility: CLINIC | Age: 37
End: 2022-09-16

## 2022-09-16 ENCOUNTER — LAB VISIT (OUTPATIENT)
Dept: LAB | Facility: HOSPITAL | Age: 37
End: 2022-09-16
Attending: STUDENT IN AN ORGANIZED HEALTH CARE EDUCATION/TRAINING PROGRAM
Payer: COMMERCIAL

## 2022-09-16 DIAGNOSIS — E87.6 HYPOKALEMIA: ICD-10-CM

## 2022-09-16 DIAGNOSIS — Z01.818 PREOP TESTING: Primary | ICD-10-CM

## 2022-09-16 LAB
ANION GAP SERPL CALC-SCNC: 10 MMOL/L (ref 8–16)
BUN SERPL-MCNC: 9 MG/DL (ref 6–20)
CALCIUM SERPL-MCNC: 10.1 MG/DL (ref 8.7–10.5)
CHLORIDE SERPL-SCNC: 105 MMOL/L (ref 95–110)
CO2 SERPL-SCNC: 24 MMOL/L (ref 23–29)
CREAT SERPL-MCNC: 0.8 MG/DL (ref 0.5–1.4)
EST. GFR  (NO RACE VARIABLE): >60 ML/MIN/1.73 M^2
GLUCOSE SERPL-MCNC: 85 MG/DL (ref 70–110)
POTASSIUM SERPL-SCNC: 3.5 MMOL/L (ref 3.5–5.1)
SODIUM SERPL-SCNC: 139 MMOL/L (ref 136–145)

## 2022-09-16 PROCEDURE — 36415 COLL VENOUS BLD VENIPUNCTURE: CPT | Performed by: STUDENT IN AN ORGANIZED HEALTH CARE EDUCATION/TRAINING PROGRAM

## 2022-09-16 PROCEDURE — 80048 BASIC METABOLIC PNL TOTAL CA: CPT | Performed by: STUDENT IN AN ORGANIZED HEALTH CARE EDUCATION/TRAINING PROGRAM

## 2022-09-16 NOTE — TELEPHONE ENCOUNTER
----- Message from Mojgan Galvan RN sent at 9/16/2022  7:29 AM CDT -----  Surgery Monday 9/19  Is she cleared for surgery?  Thanks!

## 2022-09-16 NOTE — TELEPHONE ENCOUNTER
----- Message from Kimberly Merida RN sent at 9/16/2022  3:18 PM CDT -----  DR Yoon will proceed we will get a u/a and BMP the morning of the procedure. I will place the orders  ----- Message -----  From: Mojgan Galvan RN  Sent: 9/16/2022   3:04 PM CDT  To: Fran Yoon MD, Mojgan Galvan RN, #    Patient is not cleared for surgery on 9/19.   Marti Marino LPN  You 22 minutes ago (2:36 PM)     MM  Per pt's PCP: She needs potassium corrected and in person preop visit thanks.       MD Marti Bosch LPN 1 hour ago (1:09 PM)     She needs potassium corrected and in person preop visit thanks     Looks like will need to reschedule surgery on 9/19.  Thanks!

## 2022-09-16 NOTE — TELEPHONE ENCOUNTER
----- Message from Mojgan Galvan RN sent at 9/16/2022  2:14 PM CDT -----  Second request:  Surgery Monday 9/19   Is she cleared for surgery?   Thanks!

## 2022-09-16 NOTE — TELEPHONE ENCOUNTER
Attempted to return RN call in regards to pt clearance. Per PCP, pt is not cleared. Pt currently has UTI, is on abx and needs to have Potassium labs repeated. Unable to reach RN, left VM.

## 2022-09-16 NOTE — TELEPHONE ENCOUNTER
Called and spoke with pt. Informed pt that Dr. Dunlap states he cannot clear her for surgery and needs repeat labs for potassium and another day or so for her infection to clear up. Pt states this is very frustrating because Im in so much pain and have had to wait for this appt for 3 weeks. Pt did schedule labs for 9/16 at 6:30pm.

## 2022-09-16 NOTE — PRE-PROCEDURE INSTRUCTIONS
In response to my note if cleared for surgery on 9/19?    Marti Marino LPN  You 22 minutes ago (2:36 PM)     MM  Per pt's PCP: She needs potassium corrected and in person preop visit thanks.       MD Marti Bosch LPN 1 hour ago (1:09 PM)     She needs potassium corrected and in person preop visit thanks

## 2022-09-16 NOTE — TELEPHONE ENCOUNTER
----- Message from Philip Yoo sent at 9/16/2022  2:25 PM CDT -----  Type: Needs Medical Advice  Who Called:  [facility]/ [facility]-- Anesthesiology    Best Call Back Number: [phone]  Additional Information: Caller states that she has been waiting for a callback regarding the patient's surgical clearance.    Please call STAT-- Caller is leaving at 4:00 pm today and the patient's surgery os 09/19/22

## 2022-09-18 ENCOUNTER — DOCUMENT SCAN (OUTPATIENT)
Dept: HOME HEALTH SERVICES | Facility: HOSPITAL | Age: 37
End: 2022-09-18
Payer: COMMERCIAL

## 2022-09-19 ENCOUNTER — ANESTHESIA (OUTPATIENT)
Dept: SURGERY | Facility: HOSPITAL | Age: 37
End: 2022-09-19
Payer: COMMERCIAL

## 2022-09-19 ENCOUNTER — HOSPITAL ENCOUNTER (OUTPATIENT)
Facility: HOSPITAL | Age: 37
LOS: 1 days | Discharge: HOME | End: 2022-09-20
Attending: NEUROLOGICAL SURGERY | Admitting: NEUROLOGICAL SURGERY
Payer: COMMERCIAL

## 2022-09-19 DIAGNOSIS — G93.2 IIH (IDIOPATHIC INTRACRANIAL HYPERTENSION): Primary | Chronic | ICD-10-CM

## 2022-09-19 LAB
ANION GAP SERPL CALC-SCNC: 6 MMOL/L (ref 8–16)
B-HCG UR QL: NEGATIVE
BACTERIA #/AREA URNS AUTO: ABNORMAL /HPF
BILIRUB UR QL STRIP: NEGATIVE
BUN SERPL-MCNC: 11 MG/DL (ref 6–20)
CALCIUM SERPL-MCNC: 9.9 MG/DL (ref 8.7–10.5)
CHLORIDE SERPL-SCNC: 103 MMOL/L (ref 95–110)
CLARITY UR REFRACT.AUTO: CLEAR
CO2 SERPL-SCNC: 26 MMOL/L (ref 23–29)
COLOR UR AUTO: YELLOW
CREAT SERPL-MCNC: 0.8 MG/DL (ref 0.5–1.4)
CTP QC/QA: YES
EST. GFR  (NO RACE VARIABLE): >60 ML/MIN/1.73 M^2
GLUCOSE SERPL-MCNC: 78 MG/DL (ref 70–110)
GLUCOSE UR QL STRIP: NEGATIVE
HGB UR QL STRIP: ABNORMAL
HYALINE CASTS UR QL AUTO: 2 /LPF
KETONES UR QL STRIP: NEGATIVE
LEUKOCYTE ESTERASE UR QL STRIP: ABNORMAL
MICROSCOPIC COMMENT: ABNORMAL
NITRITE UR QL STRIP: NEGATIVE
PH UR STRIP: 6 [PH] (ref 5–8)
POTASSIUM SERPL-SCNC: 4.5 MMOL/L (ref 3.5–5.1)
PROT UR QL STRIP: ABNORMAL
RBC #/AREA URNS AUTO: 3 /HPF (ref 0–4)
SODIUM SERPL-SCNC: 135 MMOL/L (ref 136–145)
SP GR UR STRIP: 1.02 (ref 1–1.03)
SQUAMOUS #/AREA URNS AUTO: 3 /HPF
URN SPEC COLLECT METH UR: ABNORMAL
WBC #/AREA URNS AUTO: 8 /HPF (ref 0–5)

## 2022-09-19 PROCEDURE — 61781 SCAN PROC CRANIAL INTRA: CPT | Mod: ,,, | Performed by: NEUROLOGICAL SURGERY

## 2022-09-19 PROCEDURE — D9220A PRA ANESTHESIA: Mod: CRNA,,, | Performed by: NURSE ANESTHETIST, CERTIFIED REGISTERED

## 2022-09-19 PROCEDURE — 27800903 OPTIME MED/SURG SUP & DEVICES OTHER IMPLANTS: Performed by: NEUROLOGICAL SURGERY

## 2022-09-19 PROCEDURE — 94761 N-INVAS EAR/PLS OXIMETRY MLT: CPT

## 2022-09-19 PROCEDURE — 25000003 PHARM REV CODE 250: Performed by: STUDENT IN AN ORGANIZED HEALTH CARE EDUCATION/TRAINING PROGRAM

## 2022-09-19 PROCEDURE — 27201423 OPTIME MED/SURG SUP & DEVICES STERILE SUPPLY: Performed by: NEUROLOGICAL SURGERY

## 2022-09-19 PROCEDURE — 63600175 PHARM REV CODE 636 W HCPCS: Performed by: NURSE ANESTHETIST, CERTIFIED REGISTERED

## 2022-09-19 PROCEDURE — 61781 PR STEREOTACTIC COMP ASSIST PROC,CRANIAL,INTRADURAL: ICD-10-PCS | Mod: ,,, | Performed by: NEUROLOGICAL SURGERY

## 2022-09-19 PROCEDURE — 80048 BASIC METABOLIC PNL TOTAL CA: CPT | Performed by: STUDENT IN AN ORGANIZED HEALTH CARE EDUCATION/TRAINING PROGRAM

## 2022-09-19 PROCEDURE — 62160 PR NEUROENDOSCOP,PLACE/REPLACE VENTCATH: ICD-10-PCS | Mod: ,,, | Performed by: NEUROLOGICAL SURGERY

## 2022-09-19 PROCEDURE — D9220A PRA ANESTHESIA: Mod: ANES,,, | Performed by: ANESTHESIOLOGY

## 2022-09-19 PROCEDURE — 81001 URINALYSIS AUTO W/SCOPE: CPT | Performed by: STUDENT IN AN ORGANIZED HEALTH CARE EDUCATION/TRAINING PROGRAM

## 2022-09-19 PROCEDURE — 62160 NEUROENDOSCOPY ADD-ON: CPT | Mod: ,,, | Performed by: NEUROLOGICAL SURGERY

## 2022-09-19 PROCEDURE — 36000711: Performed by: NEUROLOGICAL SURGERY

## 2022-09-19 PROCEDURE — 71000016 HC POSTOP RECOV ADDL HR: Performed by: NEUROLOGICAL SURGERY

## 2022-09-19 PROCEDURE — 62223 PR CREATE SHUNT:VENTRIC-PERITONEAL: ICD-10-PCS | Mod: 22,62,, | Performed by: NEUROLOGICAL SURGERY

## 2022-09-19 PROCEDURE — 63600175 PHARM REV CODE 636 W HCPCS: Performed by: NEUROLOGICAL SURGERY

## 2022-09-19 PROCEDURE — 36000710: Performed by: NEUROLOGICAL SURGERY

## 2022-09-19 PROCEDURE — 63600175 PHARM REV CODE 636 W HCPCS: Performed by: STUDENT IN AN ORGANIZED HEALTH CARE EDUCATION/TRAINING PROGRAM

## 2022-09-19 PROCEDURE — 25000003 PHARM REV CODE 250: Performed by: SURGERY

## 2022-09-19 PROCEDURE — D9220A PRA ANESTHESIA: ICD-10-PCS | Mod: ANES,,, | Performed by: ANESTHESIOLOGY

## 2022-09-19 PROCEDURE — 37000009 HC ANESTHESIA EA ADD 15 MINS: Performed by: NEUROLOGICAL SURGERY

## 2022-09-19 PROCEDURE — 63600175 PHARM REV CODE 636 W HCPCS: Performed by: ANESTHESIOLOGY

## 2022-09-19 PROCEDURE — 62223 ESTABLISH BRAIN CAVITY SHUNT: CPT | Mod: 22,62,, | Performed by: SURGERY

## 2022-09-19 PROCEDURE — 62223 PR CREATE SHUNT:VENTRIC-PERITONEAL: ICD-10-PCS | Mod: 22,62,, | Performed by: SURGERY

## 2022-09-19 PROCEDURE — 62223 ESTABLISH BRAIN CAVITY SHUNT: CPT | Mod: 22,62,, | Performed by: NEUROLOGICAL SURGERY

## 2022-09-19 PROCEDURE — D9220A PRA ANESTHESIA: ICD-10-PCS | Mod: CRNA,,, | Performed by: NURSE ANESTHETIST, CERTIFIED REGISTERED

## 2022-09-19 PROCEDURE — C1729 CATH, DRAINAGE: HCPCS | Performed by: NEUROLOGICAL SURGERY

## 2022-09-19 PROCEDURE — 37000008 HC ANESTHESIA 1ST 15 MINUTES: Performed by: NEUROLOGICAL SURGERY

## 2022-09-19 PROCEDURE — 71000015 HC POSTOP RECOV 1ST HR: Performed by: NEUROLOGICAL SURGERY

## 2022-09-19 PROCEDURE — 25000003 PHARM REV CODE 250: Performed by: NURSE ANESTHETIST, CERTIFIED REGISTERED

## 2022-09-19 PROCEDURE — 25000003 PHARM REV CODE 250: Performed by: NEUROLOGICAL SURGERY

## 2022-09-19 PROCEDURE — 81025 URINE PREGNANCY TEST: CPT | Performed by: NEUROLOGICAL SURGERY

## 2022-09-19 PROCEDURE — 71000033 HC RECOVERY, INTIAL HOUR: Performed by: NEUROLOGICAL SURGERY

## 2022-09-19 DEVICE — CATH VENTRICULAR INNERVISION: Type: IMPLANTABLE DEVICE | Site: CRANIAL | Status: FUNCTIONAL

## 2022-09-19 DEVICE — SHUNT PROGRAMMABLE CHPU: Type: IMPLANTABLE DEVICE | Site: CRANIAL | Status: FUNCTIONAL

## 2022-09-19 DEVICE — KIT CATH WITH BACTISEAL: Type: IMPLANTABLE DEVICE | Site: CRANIAL | Status: FUNCTIONAL

## 2022-09-19 RX ORDER — TOPIRAMATE 25 MG/1
50 TABLET ORAL 2 TIMES DAILY
Status: DISCONTINUED | OUTPATIENT
Start: 2022-09-19 | End: 2022-09-20 | Stop reason: HOSPADM

## 2022-09-19 RX ORDER — CLINDAMYCIN PHOSPHATE 900 MG/50ML
INJECTION, SOLUTION INTRAVENOUS
Status: DISCONTINUED | OUTPATIENT
Start: 2022-09-19 | End: 2022-09-19

## 2022-09-19 RX ORDER — LIDOCAINE HYDROCHLORIDE 20 MG/ML
INJECTION, SOLUTION EPIDURAL; INFILTRATION; INTRACAUDAL; PERINEURAL
Status: DISCONTINUED | OUTPATIENT
Start: 2022-09-19 | End: 2022-09-19

## 2022-09-19 RX ORDER — NEOSTIGMINE METHYLSULFATE 0.5 MG/ML
INJECTION, SOLUTION INTRAVENOUS
Status: DISCONTINUED | OUTPATIENT
Start: 2022-09-19 | End: 2022-09-19

## 2022-09-19 RX ORDER — DEXAMETHASONE SODIUM PHOSPHATE 4 MG/ML
INJECTION, SOLUTION INTRA-ARTICULAR; INTRALESIONAL; INTRAMUSCULAR; INTRAVENOUS; SOFT TISSUE
Status: DISCONTINUED | OUTPATIENT
Start: 2022-09-19 | End: 2022-09-19

## 2022-09-19 RX ORDER — DIPHENHYDRAMINE HCL 25 MG
25 CAPSULE ORAL EVERY 6 HOURS PRN
Status: DISCONTINUED | OUTPATIENT
Start: 2022-09-19 | End: 2022-09-20 | Stop reason: HOSPADM

## 2022-09-19 RX ORDER — BUPROPION HYDROCHLORIDE 150 MG/1
150 TABLET ORAL NIGHTLY
Status: DISCONTINUED | OUTPATIENT
Start: 2022-09-19 | End: 2022-09-20 | Stop reason: HOSPADM

## 2022-09-19 RX ORDER — MIDAZOLAM HYDROCHLORIDE 1 MG/ML
INJECTION, SOLUTION INTRAMUSCULAR; INTRAVENOUS
Status: DISCONTINUED | OUTPATIENT
Start: 2022-09-19 | End: 2022-09-19

## 2022-09-19 RX ORDER — HALOPERIDOL 5 MG/ML
0.5 INJECTION INTRAMUSCULAR EVERY 10 MIN PRN
Status: DISCONTINUED | OUTPATIENT
Start: 2022-09-19 | End: 2022-09-19 | Stop reason: HOSPADM

## 2022-09-19 RX ORDER — LIDOCAINE HCL/EPINEPHRINE/PF 2%-1:200K
VIAL (ML) INJECTION
Status: DISCONTINUED | OUTPATIENT
Start: 2022-09-19 | End: 2022-09-19 | Stop reason: HOSPADM

## 2022-09-19 RX ORDER — HYDROCODONE BITARTRATE AND ACETAMINOPHEN 5; 325 MG/1; MG/1
1 TABLET ORAL EVERY 4 HOURS PRN
Status: DISCONTINUED | OUTPATIENT
Start: 2022-09-19 | End: 2022-09-20 | Stop reason: HOSPADM

## 2022-09-19 RX ORDER — SODIUM CHLORIDE 9 MG/ML
INJECTION, SOLUTION INTRAVENOUS CONTINUOUS
Status: DISCONTINUED | OUTPATIENT
Start: 2022-09-19 | End: 2022-09-20 | Stop reason: HOSPADM

## 2022-09-19 RX ORDER — BUPIVACAINE HYDROCHLORIDE 2.5 MG/ML
INJECTION, SOLUTION EPIDURAL; INFILTRATION; INTRACAUDAL
Status: DISCONTINUED | OUTPATIENT
Start: 2022-09-19 | End: 2022-09-19 | Stop reason: HOSPADM

## 2022-09-19 RX ORDER — BACITRACIN ZINC 500 UNIT/G
OINTMENT (GRAM) TOPICAL
Status: DISCONTINUED | OUTPATIENT
Start: 2022-09-19 | End: 2022-09-19 | Stop reason: HOSPADM

## 2022-09-19 RX ORDER — PROPOFOL 10 MG/ML
VIAL (ML) INTRAVENOUS
Status: DISCONTINUED | OUTPATIENT
Start: 2022-09-19 | End: 2022-09-19

## 2022-09-19 RX ORDER — ACETAMINOPHEN 10 MG/ML
INJECTION, SOLUTION INTRAVENOUS
Status: DISCONTINUED | OUTPATIENT
Start: 2022-09-19 | End: 2022-09-19

## 2022-09-19 RX ORDER — ALBUTEROL SULFATE 90 UG/1
2 AEROSOL, METERED RESPIRATORY (INHALATION) EVERY 6 HOURS PRN
Status: DISCONTINUED | OUTPATIENT
Start: 2022-09-19 | End: 2022-09-20 | Stop reason: HOSPADM

## 2022-09-19 RX ORDER — ONDANSETRON 8 MG/1
8 TABLET, ORALLY DISINTEGRATING ORAL EVERY 8 HOURS PRN
Status: DISCONTINUED | OUTPATIENT
Start: 2022-09-19 | End: 2022-09-20 | Stop reason: HOSPADM

## 2022-09-19 RX ORDER — LIDOCAINE HYDROCHLORIDE 10 MG/ML
1 INJECTION, SOLUTION EPIDURAL; INFILTRATION; INTRACAUDAL; PERINEURAL ONCE AS NEEDED
Status: COMPLETED | OUTPATIENT
Start: 2022-09-19 | End: 2022-09-19

## 2022-09-19 RX ORDER — ONDANSETRON 2 MG/ML
INJECTION INTRAMUSCULAR; INTRAVENOUS
Status: DISCONTINUED | OUTPATIENT
Start: 2022-09-19 | End: 2022-09-19

## 2022-09-19 RX ORDER — FENTANYL CITRATE 50 UG/ML
INJECTION, SOLUTION INTRAMUSCULAR; INTRAVENOUS
Status: DISCONTINUED | OUTPATIENT
Start: 2022-09-19 | End: 2022-09-19

## 2022-09-19 RX ORDER — FENTANYL CITRATE 50 UG/ML
25 INJECTION, SOLUTION INTRAMUSCULAR; INTRAVENOUS EVERY 5 MIN PRN
Status: COMPLETED | OUTPATIENT
Start: 2022-09-19 | End: 2022-09-19

## 2022-09-19 RX ORDER — ACETAMINOPHEN 325 MG/1
650 TABLET ORAL EVERY 4 HOURS PRN
Status: DISCONTINUED | OUTPATIENT
Start: 2022-09-19 | End: 2022-09-20 | Stop reason: HOSPADM

## 2022-09-19 RX ORDER — ROCURONIUM BROMIDE 10 MG/ML
INJECTION, SOLUTION INTRAVENOUS
Status: DISCONTINUED | OUTPATIENT
Start: 2022-09-19 | End: 2022-09-19

## 2022-09-19 RX ORDER — ONDANSETRON 2 MG/ML
4 INJECTION INTRAMUSCULAR; INTRAVENOUS DAILY PRN
Status: DISCONTINUED | OUTPATIENT
Start: 2022-09-19 | End: 2022-09-19 | Stop reason: HOSPADM

## 2022-09-19 RX ORDER — KETOROLAC TROMETHAMINE 30 MG/ML
15 INJECTION, SOLUTION INTRAMUSCULAR; INTRAVENOUS EVERY 8 HOURS PRN
Status: DISCONTINUED | OUTPATIENT
Start: 2022-09-19 | End: 2022-09-19 | Stop reason: HOSPADM

## 2022-09-19 RX ADMIN — FENTANYL CITRATE 25 MCG: 50 INJECTION, SOLUTION INTRAMUSCULAR; INTRAVENOUS at 01:09

## 2022-09-19 RX ADMIN — SODIUM CHLORIDE: 0.9 INJECTION, SOLUTION INTRAVENOUS at 08:09

## 2022-09-19 RX ADMIN — LIDOCAINE HYDROCHLORIDE 50 MG: 20 INJECTION, SOLUTION EPIDURAL; INFILTRATION; INTRACAUDAL; PERINEURAL at 11:09

## 2022-09-19 RX ADMIN — HYDROCODONE BITARTRATE AND ACETAMINOPHEN 1 TABLET: 5; 325 TABLET ORAL at 05:09

## 2022-09-19 RX ADMIN — MIDAZOLAM 2 MG: 1 INJECTION INTRAMUSCULAR; INTRAVENOUS at 10:09

## 2022-09-19 RX ADMIN — GLYCOPYRROLATE 0.6 MG: 0.2 INJECTION INTRAMUSCULAR; INTRAVENOUS at 12:09

## 2022-09-19 RX ADMIN — NEOSTIGMINE METHYLSULFATE 5 MG: 0.5 INJECTION, SOLUTION INTRAVENOUS at 12:09

## 2022-09-19 RX ADMIN — TACROLIMUS 900 MG: 0.5 CAPSULE ORAL at 11:09

## 2022-09-19 RX ADMIN — FENTANYL CITRATE 50 MCG: 50 INJECTION, SOLUTION INTRAMUSCULAR; INTRAVENOUS at 12:09

## 2022-09-19 RX ADMIN — TOPIRAMATE 50 MG: 25 TABLET, FILM COATED ORAL at 08:09

## 2022-09-19 RX ADMIN — ROCURONIUM BROMIDE 50 MG: 10 SOLUTION INTRAVENOUS at 11:09

## 2022-09-19 RX ADMIN — LIDOCAINE HYDROCHLORIDE 1 MG: 10 INJECTION, SOLUTION EPIDURAL; INFILTRATION; INTRACAUDAL; PERINEURAL at 08:09

## 2022-09-19 RX ADMIN — ACETAMINOPHEN 1000 MG: 10 INJECTION INTRAVENOUS at 11:09

## 2022-09-19 RX ADMIN — ONDANSETRON 4 MG: 2 INJECTION INTRAMUSCULAR; INTRAVENOUS at 01:09

## 2022-09-19 RX ADMIN — HYDROCODONE BITARTRATE AND ACETAMINOPHEN 1 TABLET: 5; 325 TABLET ORAL at 01:09

## 2022-09-19 RX ADMIN — PROPOFOL 50 MG: 10 INJECTION, EMULSION INTRAVENOUS at 12:09

## 2022-09-19 RX ADMIN — ONDANSETRON 4 MG: 2 INJECTION INTRAMUSCULAR; INTRAVENOUS at 11:09

## 2022-09-19 RX ADMIN — HALOPERIDOL LACTATE 0.5 MG: 5 INJECTION, SOLUTION INTRAMUSCULAR at 02:09

## 2022-09-19 RX ADMIN — SODIUM CHLORIDE: 9 INJECTION, SOLUTION INTRAVENOUS at 10:09

## 2022-09-19 RX ADMIN — DEXAMETHASONE SODIUM PHOSPHATE 4 MG: 4 INJECTION INTRA-ARTICULAR; INTRALESIONAL; INTRAMUSCULAR; INTRAVENOUS; SOFT TISSUE at 11:09

## 2022-09-19 RX ADMIN — HYDROCODONE BITARTRATE AND ACETAMINOPHEN 1 TABLET: 5; 325 TABLET ORAL at 09:09

## 2022-09-19 RX ADMIN — BUPROPION HYDROCHLORIDE 150 MG: 150 TABLET, FILM COATED, EXTENDED RELEASE ORAL at 08:09

## 2022-09-19 RX ADMIN — KETOROLAC TROMETHAMINE 15 MG: 30 INJECTION, SOLUTION INTRAMUSCULAR; INTRAVENOUS at 01:09

## 2022-09-19 RX ADMIN — FENTANYL CITRATE 100 MCG: 50 INJECTION, SOLUTION INTRAMUSCULAR; INTRAVENOUS at 11:09

## 2022-09-19 RX ADMIN — ROCURONIUM BROMIDE 20 MG: 10 SOLUTION INTRAVENOUS at 12:09

## 2022-09-19 RX ADMIN — FENTANYL CITRATE 25 MCG: 50 INJECTION, SOLUTION INTRAMUSCULAR; INTRAVENOUS at 03:09

## 2022-09-19 RX ADMIN — PROPOFOL 150 MG: 10 INJECTION, EMULSION INTRAVENOUS at 11:09

## 2022-09-19 NOTE — ANESTHESIA PROCEDURE NOTES
Intubation    Date/Time: 9/19/2022 11:06 AM  Performed by: Patito Moran CRNA  Authorized by: Srikanth Thompson MD     Intubation:     Induction:  Intravenous    Intubated:  Postinduction    Mask Ventilation:  Easy mask    Attempts:  1    Attempted By:  CRNA    Method of Intubation:  Video laryngoscopy    Blade:  Kurtz 3    Laryngeal View Grade: Grade IIA - cords partially seen      Difficult Airway Encountered?: No      Complications:  None    Airway Device:  Oral endotracheal tube    Airway Device Size:  7.0    Style/Cuff Inflation:  Cuffed (inflated to minimal occlusive pressure)    Tube secured:  21    Secured at:  The lips    Placement Verified By:  Capnometry    Complicating Factors:  None, narrow palate and small mouth    Findings Post-Intubation:  BS equal bilateral and atraumatic/condition of teeth unchanged

## 2022-09-19 NOTE — PROGRESS NOTES
Emelia FIGUEROA notified results of urine available, pt concerned case may be cancelled since blood in urine, states has been treated for uti.  [facility] will check with Dr. Yoon and call back.

## 2022-09-19 NOTE — OP NOTE
[facility] - Surgery ([facility])  Neurosurgery  Operative Note    OP Note      Date of Procedure: 9/19/2022       Pre-Operative Diagnosis: Pseudotumor cerebri [G93.2]    Post-Operative Diagnosis: Post-Op Diagnosis Codes:     * Pseudotumor cerebri [G93.2]    Anesthesia: General    Procedures performed:1.  Ventriculoperitoneal shunt placement 2.  Neuro endoscopic technique 3. Use of neuro navigation     Surgeon: Fran Yoon MD    Assistant:: Carina Victoria MD    General surgeon: Bandar Oneal MD    Indication for Procedure:  36 year-old female with intractable pseudotumor cerebri that had failed medical therapy    Operative Note:  Patient undergone a neuro navigation CT scan.  Patient was anesthetized intubated by anesthesia.  Patient placed in supine position horseshoe.  Position was somewhat of a challenge due the patient's having a BMI over 42.  However patient's head was turned to the left.  The registration system was registered using facial registration.  Once were happy with that we then picked an entry site.  The head neck chest abdomen pelvis was shaved prepped and draped sterile fashion.  We picked a ideal target in the occipital horn navigation system.  We a simple the valve system with the back table we used a right angle Hakim valve programmed at a setting of 170.  We took out all the air bubble in the distal system.  We made a upside-down U shaped incision over the right parieto-occipital area and then made a pocket for the valve.  We then used the  to make the bur hole and then has the distal tubing into the right upper quadrant.  This was little bit challenging to the patient's body habitus attempts but were finally able to get it out through a small stab incision.  We then coagulated opened the dura.  We used the navigation system introduced a ventricular catheter into the occipital horn once were there was treated out from the neuro pen endoscope and used that to navigate way from choroid  plexus into the frontal horn.  Once we were good visualization ependymal wall and weighs choroid plexus we cut the catheter length.  We hooked it up to the reservoir and then tested distal flow good distal flow.  We then injected intrathecal vancomycin gentamicin.  General surgery scrubbed in placed distal tubing into the abdomen laparoscopically.  Once this was done we irrigated closed the wound in layers.  A sterile dressing put in place.  Patient was extubated brought to the recovery room without any problems or complication.    EBL:  Minimal  Specimen Sent:  None    Case wanted 22 modifier due to patient's body habitus 42 and patient's ventricle making this much more challenging than normal ventriculoperitoneal shunt and took longer.

## 2022-09-19 NOTE — BRIEF OP NOTE
[facility] - Surgery ([facility])  Brief Operative Note    SUMMARY     Surgery Date: 9/19/2022     Surgeon(s) and Role:  Panel 1:     * Fran Yoon MD - Primary     * Carina Victoria MD - Resident - Assisting  Panel 2:     * Bandar Oneal Jr., MD - Primary        Pre-op Diagnosis:  Pseudotumor cerebri [G93.2]    Post-op Diagnosis:  Post-Op Diagnosis Codes:     * Pseudotumor cerebri [G93.2]    Procedure(s) (LRB):  INSERTION, SHUNT, VENTRICULOPERITONEAL, ENDOSCOPIC (Right)  INSERTION, SHUNT, VENTRICULOPERITONEAL, ENDOSCOPIC (N/A)    Anesthesia: General    Operative Findings: left parietal  shunt    Estimated Blood Loss: * No values recorded between 9/19/2022 12:01 PM and 9/19/2022 12:52 PM *    Estimated Blood Loss has been documented.         Specimens:   Specimen (24h ago, onward)      None            [id number]

## 2022-09-19 NOTE — INTERVAL H&P NOTE
The patient has been examined and the H&P has been reviewed:    I concur with the findings and changes have been noted since the H&P was written: Pt with recent hypokalemia and UTI, Hypokalemia resolved to 4.5 this morning. UTI treated with abx and UA improved, and patient now without symptoms    Surgery risks, benefits and alternative options discussed and understood by patient/family.    Proceed with surgery, recs to follow         There are no hospital problems to display for this patient.

## 2022-09-19 NOTE — OP NOTE
DATE OF PROCEDURE: 09/19/2022    SERVICE: General Surgery.     Surgeon(s) and Role:  Panel 1:     * Fran Yoon MD - Primary     * Carina Victoria MD - Resident - Assisting  Panel 2:     * Bandar Oneal Jr., MD - Primary     PREOPERATIVE DIAGNOSIS: Hydrocephalus     POSTOPERATIVE DIAGNOSIS: Same.     PROCEDURE:  shunt. I performed the abdominal portion.     ANESTHESIA: General endotracheal.     DESCRIPTION OF PROCEDURE: The patient was in the Operating Room and I had the   ventricular portion of the  shunt performed by neurosurgery. At this time, I  was called into the Operating Room to perform the abdominal portion. At this   time, we made a small 5 mm incision through the supraumbilical after   infiltrating it with local anesthetic. Using a 5 mm Optiview trocar,   intra-abdominal access was obtained under direct visualization without   difficulty and pneumoperitoneum was obtained. At this time, the catheter, which  had been tunneled by the Neurosurgical team into the right upper quadrant, was   under direct visualization, bluntly passed into the abdominal cavity. A small   stab incision was made in the right upper quadrant after infiltrating with local   anesthetic and a suture passer was passed into the abdominal cavity. The   catheter was grasped with a suture passer and pulled into the abdominal cavity   until the full length of the catheter was in the abdominal cavity. At this   time, I inspected the area and no abnormalities were noted. The suture passer   was removed. The air was evacuated from the abdominal cavity and the 5 mm port   was removed. The skin of the 5 mm port site was closed with 4-0 monocryl suture  in a subcuticular fashion. The insertion area from the catheter was also   closed with 4-0 monocryl suture in a subcuticular fashion. Mastisol and   Steri-Strips were placed over the catheter insertion site and the supraumbilical  port site and the stab incision in the right upper  quadrant. Once these were   placed, the patient was turned back over to the Neurosurgical team for   completion of the surgery.     COMPLICATIONS FOR OUR PORTION: None.     BLOOD LOSS: Minimal.     FLUIDS: Per Anesthesia.     BLOOD GIVEN: None.     DRAINS: None.     SPECIMENS: Per Neurosurgery.

## 2022-09-19 NOTE — NURSING TRANSFER
Nursing Transfer Note      9/19/2022     Reason patient is being transferred: post op    Transfer To: 956, report called to bedside RN at the time of this note    Transfer via stretcher    Transported by PCT    Medicines sent: na    Any special needs or follow-up needed: routine    Chart send with patient: Yes    Notified: spouse    Patient reassessed at: 1800 on 9/19

## 2022-09-19 NOTE — TRANSFER OF CARE
"Anesthesia Transfer of Care Note    Patient: Sonia Goldberg    Procedure(s) Performed: Procedure(s) (LRB):  INSERTION, SHUNT, VENTRICULOPERITONEAL, ENDOSCOPIC (Right)  INSERTION, SHUNT, VENTRICULOPERITONEAL, ENDOSCOPIC (N/A)    Patient location: PACU    Anesthesia Type: general    Transport from OR: Transported from OR on 6-10 L/min O2 by face mask with adequate spontaneous ventilation    Post pain: adequate analgesia    Post assessment: no apparent anesthetic complications and tolerated procedure well    Post vital signs: stable    Level of consciousness: awake and alert    Nausea/Vomiting: no nausea/vomiting    Complications: none    Transfer of care protocol was followed      Last vitals:   Visit Vitals  BP (!) 177/79   Pulse 85   Temp 36.4 °C (97.5 °F) (Temporal)   Resp 18   Ht 5' 7" (1.702 m)   Wt 122 kg (269 lb)   LMP 09/08/2022 (Exact Date)   SpO2 100%   Breastfeeding No   BMI 42.13 kg/m²     "

## 2022-09-20 ENCOUNTER — PATIENT MESSAGE (OUTPATIENT)
Dept: NEUROSURGERY | Facility: CLINIC | Age: 37
End: 2022-09-20
Payer: COMMERCIAL

## 2022-09-20 VITALS
SYSTOLIC BLOOD PRESSURE: 121 MMHG | DIASTOLIC BLOOD PRESSURE: 67 MMHG | TEMPERATURE: 98 F | OXYGEN SATURATION: 97 % | RESPIRATION RATE: 18 BRPM | HEART RATE: 77 BPM | BODY MASS INDEX: 42.22 KG/M2 | HEIGHT: 67 IN | WEIGHT: 269 LBS

## 2022-09-20 LAB
ANION GAP SERPL CALC-SCNC: 8 MMOL/L (ref 8–16)
BACTERIA BLD CULT: NORMAL
BACTERIA BLD CULT: NORMAL
BUN SERPL-MCNC: 11 MG/DL (ref 6–20)
CALCIUM SERPL-MCNC: 10.1 MG/DL (ref 8.7–10.5)
CHLORIDE SERPL-SCNC: 107 MMOL/L (ref 95–110)
CO2 SERPL-SCNC: 23 MMOL/L (ref 23–29)
CREAT SERPL-MCNC: 0.7 MG/DL (ref 0.5–1.4)
EST. GFR  (NO RACE VARIABLE): >60 ML/MIN/1.73 M^2
GLUCOSE SERPL-MCNC: 94 MG/DL (ref 70–110)
MAGNESIUM SERPL-MCNC: 2.1 MG/DL (ref 1.6–2.6)
PHOSPHATE SERPL-MCNC: 2 MG/DL (ref 2.7–4.5)
POTASSIUM SERPL-SCNC: 4.3 MMOL/L (ref 3.5–5.1)
SODIUM SERPL-SCNC: 138 MMOL/L (ref 136–145)

## 2022-09-20 PROCEDURE — 97535 SELF CARE MNGMENT TRAINING: CPT

## 2022-09-20 PROCEDURE — 84100 ASSAY OF PHOSPHORUS: CPT | Performed by: STUDENT IN AN ORGANIZED HEALTH CARE EDUCATION/TRAINING PROGRAM

## 2022-09-20 PROCEDURE — 94761 N-INVAS EAR/PLS OXIMETRY MLT: CPT

## 2022-09-20 PROCEDURE — 97161 PT EVAL LOW COMPLEX 20 MIN: CPT

## 2022-09-20 PROCEDURE — 25000003 PHARM REV CODE 250: Performed by: STUDENT IN AN ORGANIZED HEALTH CARE EDUCATION/TRAINING PROGRAM

## 2022-09-20 PROCEDURE — 83735 ASSAY OF MAGNESIUM: CPT | Performed by: STUDENT IN AN ORGANIZED HEALTH CARE EDUCATION/TRAINING PROGRAM

## 2022-09-20 PROCEDURE — 97166 OT EVAL MOD COMPLEX 45 MIN: CPT

## 2022-09-20 PROCEDURE — 36415 COLL VENOUS BLD VENIPUNCTURE: CPT | Performed by: STUDENT IN AN ORGANIZED HEALTH CARE EDUCATION/TRAINING PROGRAM

## 2022-09-20 PROCEDURE — 97116 GAIT TRAINING THERAPY: CPT

## 2022-09-20 PROCEDURE — 80048 BASIC METABOLIC PNL TOTAL CA: CPT | Performed by: STUDENT IN AN ORGANIZED HEALTH CARE EDUCATION/TRAINING PROGRAM

## 2022-09-20 PROCEDURE — 94799 UNLISTED PULMONARY SVC/PX: CPT

## 2022-09-20 PROCEDURE — 99900035 HC TECH TIME PER 15 MIN (STAT)

## 2022-09-20 RX ORDER — FAMOTIDINE 20 MG/1
20 TABLET, FILM COATED ORAL DAILY PRN
Status: DISCONTINUED | OUTPATIENT
Start: 2022-09-20 | End: 2022-09-20 | Stop reason: HOSPADM

## 2022-09-20 RX ORDER — METHOCARBAMOL 500 MG/1
500 TABLET, FILM COATED ORAL 4 TIMES DAILY PRN
Qty: 40 TABLET | Refills: 0 | Status: SHIPPED | OUTPATIENT
Start: 2022-09-20 | End: 2022-09-30

## 2022-09-20 RX ORDER — HYDROCODONE BITARTRATE AND ACETAMINOPHEN 10; 325 MG/1; MG/1
1 TABLET ORAL EVERY 6 HOURS PRN
Status: DISCONTINUED | OUTPATIENT
Start: 2022-09-20 | End: 2022-09-20 | Stop reason: HOSPADM

## 2022-09-20 RX ORDER — HYDROCODONE BITARTRATE AND ACETAMINOPHEN 5; 325 MG/1; MG/1
1 TABLET ORAL EVERY 6 HOURS PRN
Qty: 60 TABLET | Refills: 0 | Status: SHIPPED | OUTPATIENT
Start: 2022-09-20 | End: 2023-01-18

## 2022-09-20 RX ORDER — CEPHALEXIN 500 MG/1
500 CAPSULE ORAL EVERY 6 HOURS
Qty: 20 CAPSULE | Refills: 0 | Status: SHIPPED | OUTPATIENT
Start: 2022-09-20 | End: 2022-09-25

## 2022-09-20 RX ORDER — METHOCARBAMOL 500 MG/1
500 TABLET, FILM COATED ORAL 4 TIMES DAILY PRN
Status: DISCONTINUED | OUTPATIENT
Start: 2022-09-20 | End: 2022-09-20 | Stop reason: HOSPADM

## 2022-09-20 RX ADMIN — HYDROCODONE BITARTRATE AND ACETAMINOPHEN 1 TABLET: 5; 325 TABLET ORAL at 09:09

## 2022-09-20 RX ADMIN — HYDROCODONE BITARTRATE AND ACETAMINOPHEN 1 TABLET: 5; 325 TABLET ORAL at 03:09

## 2022-09-20 RX ADMIN — ACETAMINOPHEN 650 MG: 325 TABLET ORAL at 05:09

## 2022-09-20 RX ADMIN — METHOCARBAMOL 500 MG: 500 TABLET ORAL at 06:09

## 2022-09-20 RX ADMIN — TOPIRAMATE 50 MG: 25 TABLET, FILM COATED ORAL at 09:09
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
## 2022-09-20 NOTE — PLAN OF CARE
[facility] - Neurosurgery (Hospital)  Discharge Assessment    Primary Care Provider: Dorian Guerrero MD     Discharge Assessment (most recent)       BRIEF DISCHARGE ASSESSMENT - 09/20/22 1343          Discharge Planning    Assessment Type Discharge Planning Brief Assessment     Resource/Environmental Concerns none     Support Systems Spouse/significant other     Equipment Currently Used at Home walker, rolling     Current Living Arrangements home/apartment/condo     Patient/Family Anticipates Transition to home with family     Patient/Family Anticipated Services at Transition none     DME Needed Upon Discharge  none     Discharge Plan A Home with family     Discharge Plan B Home with family                   ALEX Benson LMSW  [facility]  [id number]

## 2022-09-20 NOTE — HOSPITAL COURSE
9/20: POD1 VPS. Headaches and vision changes resolved. Patient reports RLQ pain this AM, now resolved. XR abdomen unremarkable. CTH and XRSS with expected post op changes. Neuro exam stable. Voiding spontaneously and tolerating diet. Patient stable for discharge home. Discharge instructions discussed with patient. She voiced understanding. Work excuse provided. She will follow up in 2 weeks for incision check.     Exam day of discharge:  General: well developed, well nourished, no distress.   Head: normocephalic, atraumatic  Neurologic: Alert and oriented. Thought content appropriate.  GCS: Motor: 6/Verbal: 5/Eyes: 4 GCS Total: 15  Mental Status: Awake, Alert, Oriented x 4  Language: No aphasia  Speech: No dysarthria  Cranial nerves: face symmetric, tongue midline, CN II-XII grossly intact.   Eyes: pupils equal, round, reactive to light with accommodation, EOMI.   Pulmonary: normal respirations, no signs of respiratory distress  Abdomen: soft, non-distended, not tender to palpation  Skin: Skin is warm, dry and intact.  Sensory: intact to light touch throughout    Motor Strength:Moves all extremities spontaneously with good tone.  Full strength upper and lower extremities. No abnormal movements seen.      Cerebellar:   Finger-to-nose: intact bilaterally   Pronator drift: absent bilaterally    Cranial incision with dissolvable sutures in place. No erythema, edema, or drainage noted.  Abdominal incisions with dermabond in place. No erythema, edema, or drainage noted.

## 2022-09-20 NOTE — PLAN OF CARE
Problem: Occupational Therapy  Goal: Occupational Therapy Goal  Description: Goals to be met by: 10/4/22     Patient will increase functional independence with ADLs by performing:    UE Dressing with [place].  LE Dressing with Supervision.  Grooming while standing at sink with Supervision.  Toileting from toilet with Supervision for hygiene and clothing management.   Toilet transfer to toilet with Supervision.    Outcome: Ongoing, Progressing

## 2022-09-20 NOTE — PLAN OF CARE
Problem: Physical Therapy  Goal: Physical Therapy Goal  Description: PT goals until 9/27/22    1. Pt supine to sit with mod independent-not met  2. Pt sit to supine with mod independent-not met  3. Pt sit to stand with no AD with mod independent-not met  4. Pt to perform gait 200ft with no AD with mod independent.-not met  5. Pt to go up/down curb step with no AD with mod independent.-not met  6. Pt to perform B LE exs in sitting or supine x 15 reps to strengthen B LE to improve functional mobility.-not met    Outcome: Ongoing, Progressing   Pt's goals set and pt will benefit from skilled PT services to work towards improved functional mobility including: bed mobility, transfers, up/down step, and gait.   9/20/2022
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
## 2022-09-20 NOTE — NURSING
Reviewed and gave patient discharge paperwork reviewed new medications  and  upcoming  appointments. Patient and her spouse acknowledged understanding of all. Iv removed. Patient discharged home.  took patient down in wheelchair headed for home.

## 2022-09-20 NOTE — DISCHARGE SUMMARY
[facility] - Neurosurgery ([facility])  Neurosurgery  Discharge Summary      Patient Name: Sonia Goldberg  MRN: [id number]  Admission Date: 9/19/2022  Hospital Length of Stay: 1 days  Discharge Date and Time: 9/20/22  Attending Physician: Fran Yoon MD   Discharging Provider: Liya Paz PA-C  Primary Care Provider: Dorian Guerrero MD    HPI:   36 year-old female with intractable pseudotumor cerebri that had failed medical therapy      Procedure(s) (LRB):  INSERTION, SHUNT, VENTRICULOPERITONEAL, ENDOSCOPIC (Right)  INSERTION, SHUNT, VENTRICULOPERITONEAL, ENDOSCOPIC (N/A)     Hospital Course: 9/20: POD1 VPS. Headaches and vision changes resolved. Patient reports RLQ pain this AM, now resolved. XR abdomen unremarkable. CTH and XRSS with expected post op changes. Neuro exam stable. Voiding spontaneously and tolerating diet. Patient stable for discharge home. Discharge instructions discussed with patient. She voiced understanding. Work excuse provided. She will follow up in 2 weeks for incision check.     Exam day of discharge:  General: well developed, well nourished, no distress.   Head: normocephalic, atraumatic  Neurologic: Alert and oriented. Thought content appropriate.  GCS: Motor: 6/Verbal: 5/Eyes: 4 GCS Total: 15  Mental Status: Awake, Alert, Oriented x 4  Language: No aphasia  Speech: No dysarthria  Cranial nerves: face symmetric, tongue midline, CN II-XII grossly intact.   Eyes: pupils equal, round, reactive to light with accommodation, EOMI.   Pulmonary: normal respirations, no signs of respiratory distress  Abdomen: soft, non-distended, not tender to palpation  Skin: Skin is warm, dry and intact.  Sensory: intact to light touch throughout    Motor Strength:Moves all extremities spontaneously with good tone.  Full strength upper and lower extremities. No abnormal movements seen.      Cerebellar:   Finger-to-nose: intact bilaterally   Pronator drift: absent bilaterally    Cranial incision with  dissolvable sutures in place. No erythema, edema, or drainage noted.  Abdominal incisions with dermabond in place. No erythema, edema, or drainage noted.            Goals of Care Treatment Preferences:  Code Status: Full Code    Living Will: Yes              Consults:     Significant Diagnostic Studies: XR abdomen, CTH, XRSS    Pending Diagnostic Studies:     Procedure Component Value Units Date/Time    CBC auto differential [[id number]] Collected: 09/20/22 0531    Order Status: Sent Lab Status: In process Updated: 09/20/22 0531    Specimen: Blood     X-Ray Shunt Series [[id number]] Resulted: 09/19/22 1750    Order Status: Sent Lab Status: In process Updated: 09/19/22 1752        There are no hospital problems to display for this patient.     Discharged Condition: good     Disposition: Home or Self Care    Follow Up:    Patient Instructions:      Notify your health care provider if you experience any of the following:  temperature >100.4     Notify your health care provider if you experience any of the following:  persistent nausea and vomiting or diarrhea     Notify your health care provider if you experience any of the following:  severe uncontrolled pain     Notify your health care provider if you experience any of the following:  redness, tenderness, or signs of infection (pain, swelling, redness, odor or green/yellow discharge around incision site)     Notify your health care provider if you experience any of the following:  difficulty breathing or increased cough     Notify your health care provider if you experience any of the following:  severe persistent headache     Notify your health care provider if you experience any of the following:  worsening rash     Notify your health care provider if you experience any of the following:  persistent dizziness, light-headedness, or visual disturbances     Notify your health care provider if you experience any of the following:  increased confusion or weakness      Activity as tolerated     Medications:  Reconciled Home Medications:      Medication List      START taking these medications    cephALEXin 500 MG capsule  Commonly known as: KEFLEX  Take 1 capsule (500 mg total) by mouth every 6 (six) hours. for 5 days     HYDROcodone-acetaminophen 5-325 mg per tablet  Commonly known as: NORCO  Take 1 tablet by mouth every 6 (six) hours as needed for Pain.     methocarbamoL 500 MG Tab  Commonly known as: ROBAXIN  Take 1 tablet (500 mg total) by mouth 4 (four) times daily as needed.        CHANGE how you take these medications    EPINEPHrine 0.3 mg/0.3 mL Atin  Commonly known as: EPIPEN  Inject 0.3 mLs (0.3 mg total) into the muscle as needed (anaphylaxis).  What changed: additional instructions        CONTINUE taking these medications    albuterol 90 mcg/actuation inhaler  Commonly known as: PROVENTIL/VENTOLIN HFA  Inhale 2 puffs into the lungs every 6 (six) hours as needed for Wheezing.     BINAXNOW COVID-19 AG SELF TEST Kit  Generic drug: COVID-19 antigen test  TEST AS DIRECTED     buPROPion 150 MG TB24 tablet  Commonly known as: WELLBUTRIN XL  Take 1 tablet (150 mg total) by mouth nightly.     candesartan 4 MG tablet  Commonly known as: ATACAND  Take 1 tablet (4 mg total) by mouth once daily.     diphenhydrAMINE 50 MG tablet  Commonly known as: BENADRYL  Take 50 mg by mouth nightly as needed for Insomnia.     ferrous sulfate 325 mg (65 mg iron) Tab tablet  Commonly known as: FEOSOL  Take by mouth once daily.     folic acid 1 MG tablet  Commonly known as: FOLVITE  Take 1 tablet (1 mg total) by mouth once daily.     LORazepam 1 MG tablet  Commonly known as: ATIVAN  Take 1 tablet (1 mg total) by mouth every 6 (six) hours as needed for Anxiety.     ondansetron 4 MG Tbdl  Commonly known as: ZOFRAN-ODT  Take 1 tablet (4 mg total) by mouth every 6 (six) hours as needed (nausea).     PEPCID COMPLETE -165 mg  Generic drug: famotidine-calcium carbonate-magnesium hydroxide  Take  1 tablet by mouth daily as needed (Acid reflux).     pulse oximeter device  Commonly known as: pulse oximeter  by Apply Externally route 2 (two) times a day. Use twice daily at 8 AM and 3 PM and record the value in [employer]hart as directed.     sars-cov-2 (covid-19) 100 mcg/0.5 ml injection  Commonly known as: MODERNA COVID-19     topiramate 50 MG tablet  Commonly known as: TOPAMAX  Take 1 tablet (50 mg total) by mouth 2 (two) times daily.     VRAYLAR 3 mg Cap  Generic drug: cariprazine  Take 1 capsule (3 mg total) by mouth nightly.        STOP taking these medications    acetaZOLAMIDE 500 mg Cpsr  Commonly known as: DIAMOX     cefpodoxime 100 MG tablet  Commonly known as: VANTIN        ASK your doctor about these medications    nitrofurantoin (macrocrystal-monohydrate) 100 MG capsule  Commonly known as: MACROBID  Take 1 capsule (100 mg total) by mouth 2 (two) times daily. for 5 days  Ask about: Should I take this medication?            Liya Paz PA-C  Neurosurgery  [facility] ([facility])

## 2022-09-20 NOTE — DISCHARGE INSTRUCTIONS
Neurosurgery Patient Information      -No driving until cleared in your post-operative appointment. No driving while on narcotics.   -Do not take any OTC products containing acetaminophen at the same time as you take your narcotic pain medication. Medications that may contain acetaminophen include but are not limited to: Excedrin and other headache medications, arthritis medications, cold and sinus medications, etc. Please review the list of active ingredients in any OTC medication prior to taking it.  -Do not take any Aspirin or Aspirin-containing products for 2 weeks after surgery.  -Do not take any Aleve, Naprosyn, Naproxen, Ibuprofen, Advil or any other nonsteroidal anti-inflammatory drug (NSAID) for 2 weeks after surgery.  -Do not take any herbal supplements for 2 weeks after surgery.   -Do not consume any alcoholic beverages until released by your neurosurgeon  -Do not perform any excessive bending over or leaning forward as this is a fall hazard.  -Do not lift anything heavier than a gallon of milk until cleared in post-operative visit.     Contact the Neurosurgery Office immediately if:  If you begin to notice any neurologic changes such as:           -Sudden onset of lethargy or sleepiness           -Sudden confusion, trouble speaking, or understanding            -Sudden trouble seeing in one or both eyes            -Sudden trouble walking, dizziness, loss of coordination            -Sudden severe headache with no known cause            -Sudden onset of numbness or weakness       Wound Care:  Keep your incision open to air. You may shower on the 2nd day after your surgery. Please shower with baby shampoo, but do not take a bath. Keep the incision clean and dry at all times. Please cover the incision with saran wrap or other occlusive dressing while showering and REMOVE once you have completed taking your shower. Do not allow the force of water to hit the incision. If the incision gets damp, gently pat it  dry. Do not rub or scrub the incision. You cannot take a bath/swim/submerge the incision until 8 weeks after surgery.    The incision does not need to be cleaned with any water, soap, alcohol, peroxide, or other substance.    Please apply bacitracin ointment to incisions twice daily to your head incision ONLY. You have dissolvable suture in place. It does not need to be removed.     You have skin glue over your abdominal incision. Please do not pick at it or try to remove it. It will dissolve on its own. Do not apply any ointments or solutions to your abdominal incision.       You now have an implanted device in place. It is imperative that any infection (such as a urinary tract infection) be treated immediately so that it cannot get into your bloodstream. If an infection ends up in your blood, it may seed the device, thus requiring us to remove it. Call the Neurosurgery office or go to the Emergency Room for any signs of infection including: increased redness, drainage, pain or fever (temperature greater than or equal to 101.4).         Miscellaneous:  -You have been discharged home on antibiotics and it is very important that you complete the course of antibiotics as instructed.   -Follow up  in 2 weeks for a wound check. Appointment will be mailed to you.      Neurosurgery Office: [phone]

## 2022-09-21 ENCOUNTER — PATIENT MESSAGE (OUTPATIENT)
Dept: NEUROSURGERY | Facility: CLINIC | Age: 37
End: 2022-09-21
Payer: COMMERCIAL

## 2022-09-23 ENCOUNTER — PATIENT MESSAGE (OUTPATIENT)
Dept: FAMILY MEDICINE | Facility: CLINIC | Age: 37
End: 2022-09-23
Payer: COMMERCIAL

## 2022-09-23 ENCOUNTER — OFFICE VISIT (OUTPATIENT)
Dept: HEMATOLOGY/ONCOLOGY | Facility: CLINIC | Age: 37
End: 2022-09-23
Payer: COMMERCIAL

## 2022-09-23 VITALS
OXYGEN SATURATION: 96 % | TEMPERATURE: 96 F | HEIGHT: 67 IN | WEIGHT: 280.63 LBS | RESPIRATION RATE: 12 BRPM | DIASTOLIC BLOOD PRESSURE: 71 MMHG | BODY MASS INDEX: 44.04 KG/M2 | HEART RATE: 86 BPM | SYSTOLIC BLOOD PRESSURE: 138 MMHG

## 2022-09-23 DIAGNOSIS — R79.89 ELEVATED PTHRP LEVEL: Primary | ICD-10-CM

## 2022-09-23 PROCEDURE — 99999 PR PBB SHADOW E&M-EST. PATIENT-LVL IV: ICD-10-PCS | Mod: PBBFAC,,, | Performed by: INTERNAL MEDICINE

## 2022-09-23 PROCEDURE — 4010F ACE/ARB THERAPY RXD/TAKEN: CPT | Mod: CPTII,S$GLB,, | Performed by: INTERNAL MEDICINE

## 2022-09-23 PROCEDURE — 3008F BODY MASS INDEX DOCD: CPT | Mod: CPTII,S$GLB,, | Performed by: INTERNAL MEDICINE

## 2022-09-23 PROCEDURE — 3044F PR MOST RECENT HEMOGLOBIN A1C LEVEL <7.0%: ICD-10-PCS | Mod: CPTII,S$GLB,, | Performed by: INTERNAL MEDICINE

## 2022-09-23 PROCEDURE — 3044F HG A1C LEVEL LT 7.0%: CPT | Mod: CPTII,S$GLB,, | Performed by: INTERNAL MEDICINE

## 2022-09-23 PROCEDURE — 99999 PR PBB SHADOW E&M-EST. PATIENT-LVL IV: CPT | Mod: PBBFAC,,, | Performed by: INTERNAL MEDICINE

## 2022-09-23 PROCEDURE — 3075F SYST BP GE 130 - 139MM HG: CPT | Mod: CPTII,S$GLB,, | Performed by: INTERNAL MEDICINE

## 2022-09-23 PROCEDURE — 4010F PR ACE/ARB THEARPY RXD/TAKEN: ICD-10-PCS | Mod: CPTII,S$GLB,, | Performed by: INTERNAL MEDICINE

## 2022-09-23 PROCEDURE — 1111F DSCHRG MED/CURRENT MED MERGE: CPT | Mod: CPTII,S$GLB,, | Performed by: INTERNAL MEDICINE

## 2022-09-23 PROCEDURE — 1159F MED LIST DOCD IN RCRD: CPT | Mod: CPTII,S$GLB,, | Performed by: INTERNAL MEDICINE

## 2022-09-23 PROCEDURE — 1159F PR MEDICATION LIST DOCUMENTED IN MEDICAL RECORD: ICD-10-PCS | Mod: CPTII,S$GLB,, | Performed by: INTERNAL MEDICINE

## 2022-09-23 PROCEDURE — 3078F DIAST BP <80 MM HG: CPT | Mod: CPTII,S$GLB,, | Performed by: INTERNAL MEDICINE

## 2022-09-23 PROCEDURE — 1111F PR DISCHARGE MEDS RECONCILED W/ CURRENT OUTPATIENT MED LIST: ICD-10-PCS | Mod: CPTII,S$GLB,, | Performed by: INTERNAL MEDICINE

## 2022-09-23 PROCEDURE — 99214 PR OFFICE/OUTPT VISIT, EST, LEVL IV, 30-39 MIN: ICD-10-PCS | Mod: S$GLB,,, | Performed by: INTERNAL MEDICINE

## 2022-09-23 PROCEDURE — 99214 OFFICE O/P EST MOD 30 MIN: CPT | Mod: S$GLB,,, | Performed by: INTERNAL MEDICINE

## 2022-09-23 PROCEDURE — 3075F PR MOST RECENT SYSTOLIC BLOOD PRESS GE 130-139MM HG: ICD-10-PCS | Mod: CPTII,S$GLB,, | Performed by: INTERNAL MEDICINE

## 2022-09-23 PROCEDURE — 3078F PR MOST RECENT DIASTOLIC BLOOD PRESSURE < 80 MM HG: ICD-10-PCS | Mod: CPTII,S$GLB,, | Performed by: INTERNAL MEDICINE

## 2022-09-23 PROCEDURE — 3008F PR BODY MASS INDEX (BMI) DOCUMENTED: ICD-10-PCS | Mod: CPTII,S$GLB,, | Performed by: INTERNAL MEDICINE
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
## 2022-09-24 ENCOUNTER — HOSPITAL ENCOUNTER (EMERGENCY)
Facility: HOSPITAL | Age: 37
Discharge: HOME | End: 2022-09-24
Attending: EMERGENCY MEDICINE
Payer: COMMERCIAL

## 2022-09-24 VITALS
TEMPERATURE: 99 F | SYSTOLIC BLOOD PRESSURE: 144 MMHG | OXYGEN SATURATION: 95 % | HEART RATE: 72 BPM | DIASTOLIC BLOOD PRESSURE: 72 MMHG | WEIGHT: 280 LBS | BODY MASS INDEX: 43.85 KG/M2 | RESPIRATION RATE: 18 BRPM

## 2022-09-24 DIAGNOSIS — R30.0 DYSURIA: Primary | ICD-10-CM

## 2022-09-24 DIAGNOSIS — R53.83 FATIGUE, UNSPECIFIED TYPE: ICD-10-CM

## 2022-09-24 LAB
ALBUMIN SERPL BCP-MCNC: 3.1 G/DL (ref 3.5–5.2)
ALP SERPL-CCNC: 67 U/L (ref 55–135)
ALT SERPL W/O P-5'-P-CCNC: 12 U/L (ref 10–44)
ANION GAP SERPL CALC-SCNC: 9 MMOL/L (ref 8–16)
AST SERPL-CCNC: 8 U/L (ref 10–40)
BASOPHILS # BLD AUTO: 0.04 K/UL (ref 0–0.2)
BASOPHILS NFR BLD: 0.4 % (ref 0–1.9)
BILIRUB SERPL-MCNC: 0.2 MG/DL (ref 0.1–1)
BILIRUB UR QL STRIP: NEGATIVE
BUN SERPL-MCNC: 7 MG/DL (ref 6–20)
CALCIUM SERPL-MCNC: 9.8 MG/DL (ref 8.7–10.5)
CHLORIDE SERPL-SCNC: 106 MMOL/L (ref 95–110)
CLARITY UR: CLEAR
CO2 SERPL-SCNC: 24 MMOL/L (ref 23–29)
COLOR UR: YELLOW
CREAT SERPL-MCNC: 0.7 MG/DL (ref 0.5–1.4)
DIFFERENTIAL METHOD: ABNORMAL
EOSINOPHIL # BLD AUTO: 0.2 K/UL (ref 0–0.5)
EOSINOPHIL NFR BLD: 1.9 % (ref 0–8)
ERYTHROCYTE [DISTWIDTH] IN BLOOD BY AUTOMATED COUNT: 16.6 % (ref 11.5–14.5)
EST. GFR  (NO RACE VARIABLE): >60 ML/MIN/1.73 M^2
GLUCOSE SERPL-MCNC: 113 MG/DL (ref 70–110)
GLUCOSE UR QL STRIP: NEGATIVE
HCT VFR BLD AUTO: 36.3 % (ref 37–48.5)
HGB BLD-MCNC: 11.3 G/DL (ref 12–16)
HGB UR QL STRIP: NEGATIVE
IMM GRANULOCYTES # BLD AUTO: 0.04 K/UL (ref 0–0.04)
IMM GRANULOCYTES NFR BLD AUTO: 0.4 % (ref 0–0.5)
INFLUENZA A, MOLECULAR: NEGATIVE
INFLUENZA B, MOLECULAR: NEGATIVE
KETONES UR QL STRIP: ABNORMAL
LEUKOCYTE ESTERASE UR QL STRIP: ABNORMAL
LYMPHOCYTES # BLD AUTO: 2.4 K/UL (ref 1–4.8)
LYMPHOCYTES NFR BLD: 27.3 % (ref 18–48)
MCH RBC QN AUTO: 26.8 PG (ref 27–31)
MCHC RBC AUTO-ENTMCNC: 31.1 G/DL (ref 32–36)
MCV RBC AUTO: 86 FL (ref 82–98)
MICROSCOPIC COMMENT: NORMAL
MONOCYTES # BLD AUTO: 0.5 K/UL (ref 0.3–1)
MONOCYTES NFR BLD: 5.6 % (ref 4–15)
NEUTROPHILS # BLD AUTO: 5.7 K/UL (ref 1.8–7.7)
NEUTROPHILS NFR BLD: 64.4 % (ref 38–73)
NITRITE UR QL STRIP: NEGATIVE
NRBC BLD-RTO: 0 /100 WBC
PH UR STRIP: 7 [PH] (ref 5–8)
PLATELET # BLD AUTO: 401 K/UL (ref 150–450)
PMV BLD AUTO: 10.1 FL (ref 9.2–12.9)
POTASSIUM SERPL-SCNC: 3.5 MMOL/L (ref 3.5–5.1)
PROT SERPL-MCNC: 6.1 G/DL (ref 6–8.4)
PROT UR QL STRIP: NEGATIVE
RBC # BLD AUTO: 4.22 M/UL (ref 4–5.4)
SARS-COV-2 RDRP RESP QL NAA+PROBE: NEGATIVE
SODIUM SERPL-SCNC: 139 MMOL/L (ref 136–145)
SP GR UR STRIP: 1.02 (ref 1–1.03)
SPECIMEN SOURCE: NORMAL
URN SPEC COLLECT METH UR: ABNORMAL
UROBILINOGEN UR STRIP-ACNC: NEGATIVE EU/DL
WBC # BLD AUTO: 8.9 K/UL (ref 3.9–12.7)
WBC #/AREA URNS HPF: 1 /HPF (ref 0–5)

## 2022-09-24 PROCEDURE — 87502 INFLUENZA DNA AMP PROBE: CPT | Performed by: EMERGENCY MEDICINE

## 2022-09-24 PROCEDURE — U0002 COVID-19 LAB TEST NON-CDC: HCPCS | Performed by: EMERGENCY MEDICINE

## 2022-09-24 PROCEDURE — 81000 URINALYSIS NONAUTO W/SCOPE: CPT | Performed by: EMERGENCY MEDICINE

## 2022-09-24 PROCEDURE — 36415 COLL VENOUS BLD VENIPUNCTURE: CPT | Performed by: EMERGENCY MEDICINE

## 2022-09-24 PROCEDURE — 87040 BLOOD CULTURE FOR BACTERIA: CPT | Mod: 59 | Performed by: EMERGENCY MEDICINE

## 2022-09-24 PROCEDURE — 80053 COMPREHEN METABOLIC PANEL: CPT | Performed by: EMERGENCY MEDICINE

## 2022-09-24 PROCEDURE — 85025 COMPLETE CBC W/AUTO DIFF WBC: CPT | Performed by: EMERGENCY MEDICINE

## 2022-09-24 PROCEDURE — 99283 EMERGENCY DEPT VISIT LOW MDM: CPT

## 2022-09-24 RX ORDER — PHENAZOPYRIDINE HYDROCHLORIDE 200 MG/1
200 TABLET, FILM COATED ORAL 3 TIMES DAILY
Qty: 30 TABLET | Refills: 0 | Status: SHIPPED | OUTPATIENT
Start: 2022-09-24 | End: 2022-10-04
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
## 2022-09-24 NOTE — DISCHARGE INSTRUCTIONS
If you develop fevers headache abdominal pain worsening symptoms or any concerns return emergency room.

## 2022-09-26 ENCOUNTER — NURSE TRIAGE (OUTPATIENT)
Dept: ADMINISTRATIVE | Facility: CLINIC | Age: 37
End: 2022-09-26
Payer: COMMERCIAL

## 2022-09-26 ENCOUNTER — TELEPHONE (OUTPATIENT)
Dept: FAMILY MEDICINE | Facility: CLINIC | Age: 37
End: 2022-09-26
Payer: COMMERCIAL

## 2022-09-26 ENCOUNTER — TELEPHONE (OUTPATIENT)
Dept: NEUROSURGERY | Facility: CLINIC | Age: 37
End: 2022-09-26
Payer: COMMERCIAL

## 2022-09-26 NOTE — TELEPHONE ENCOUNTER
----- Message from Dorian Guerrero MD sent at 9/23/2022  3:23 PM CDT -----  Regarding: endo ref  Hematology wanted her to see endocrinology for her PTH and please put in verbal order for endocrinology referral thanks

## 2022-09-26 NOTE — TELEPHONE ENCOUNTER
----- Message from Jyoti Franco sent at 9/26/2022 12:10 PM CDT -----  Regarding: requesting call back  Contact: Astrid [phone]  CallerAstrid from [employer] is requesting a call from someone in Dr. Yoon office in regards to patient. Caller states had  on last Monday and woke with sharp headaches and a blood pressure reading of 160/98. Please contact Astrid at [phone]

## 2022-09-26 NOTE — TELEPHONE ENCOUNTER
Spoke to the  nurse- advised to stop the pain medication and start taking tylenol for the headaches. The nurse will make an extra visit this week to keep a close eye on her. If her BP stays high , she will need to see her PCP

## 2022-09-27 ENCOUNTER — OFFICE VISIT (OUTPATIENT)
Dept: FAMILY MEDICINE | Facility: CLINIC | Age: 37
End: 2022-09-27
Payer: COMMERCIAL

## 2022-09-27 ENCOUNTER — HOSPITAL ENCOUNTER (EMERGENCY)
Facility: HOSPITAL | Age: 37
Discharge: HOME | End: 2022-09-28
Attending: EMERGENCY MEDICINE
Payer: COMMERCIAL

## 2022-09-27 ENCOUNTER — LAB VISIT (OUTPATIENT)
Dept: LAB | Facility: HOSPITAL | Age: 37
End: 2022-09-27
Attending: STUDENT IN AN ORGANIZED HEALTH CARE EDUCATION/TRAINING PROGRAM
Payer: COMMERCIAL

## 2022-09-27 VITALS
TEMPERATURE: 99 F | OXYGEN SATURATION: 98 % | SYSTOLIC BLOOD PRESSURE: 140 MMHG | DIASTOLIC BLOOD PRESSURE: 73 MMHG | RESPIRATION RATE: 16 BRPM | HEART RATE: 90 BPM | HEART RATE: 94 BPM | BODY MASS INDEX: 41.04 KG/M2 | SYSTOLIC BLOOD PRESSURE: 140 MMHG | OXYGEN SATURATION: 96 % | DIASTOLIC BLOOD PRESSURE: 80 MMHG | HEIGHT: 67 IN | WEIGHT: 262 LBS | RESPIRATION RATE: 16 BRPM | BODY MASS INDEX: 41.35 KG/M2 | TEMPERATURE: 98 F | WEIGHT: 263.44 LBS

## 2022-09-27 DIAGNOSIS — R51.9 NONINTRACTABLE HEADACHE, UNSPECIFIED CHRONICITY PATTERN, UNSPECIFIED HEADACHE TYPE: ICD-10-CM

## 2022-09-27 DIAGNOSIS — I10 PRIMARY HYPERTENSION: ICD-10-CM

## 2022-09-27 DIAGNOSIS — R19.7 DIARRHEA, UNSPECIFIED TYPE: ICD-10-CM

## 2022-09-27 DIAGNOSIS — F31.9 BIPOLAR AFFECTIVE DISORDER, REMISSION STATUS UNSPECIFIED: Chronic | ICD-10-CM

## 2022-09-27 DIAGNOSIS — E78.2 MIXED HYPERLIPIDEMIA: ICD-10-CM

## 2022-09-27 DIAGNOSIS — R19.7 DIARRHEA, UNSPECIFIED TYPE: Primary | ICD-10-CM

## 2022-09-27 DIAGNOSIS — G93.2 IIH (IDIOPATHIC INTRACRANIAL HYPERTENSION): Chronic | ICD-10-CM

## 2022-09-27 DIAGNOSIS — Z98.2 S/P VP SHUNT: ICD-10-CM

## 2022-09-27 DIAGNOSIS — N39.0 URINARY TRACT INFECTION WITHOUT HEMATURIA, SITE UNSPECIFIED: ICD-10-CM

## 2022-09-27 DIAGNOSIS — R82.998 LEUKOCYTES IN URINE: ICD-10-CM

## 2022-09-27 DIAGNOSIS — R50.9 FEVER, UNSPECIFIED FEVER CAUSE: Primary | ICD-10-CM

## 2022-09-27 LAB
ABO + RH BLD: NORMAL
ALBUMIN SERPL BCP-MCNC: 3.4 G/DL (ref 3.5–5.2)
ALP SERPL-CCNC: 82 U/L (ref 55–135)
ALT SERPL W/O P-5'-P-CCNC: 10 U/L (ref 10–44)
ANION GAP SERPL CALC-SCNC: 9 MMOL/L (ref 8–16)
AST SERPL-CCNC: 9 U/L (ref 10–40)
B-HCG UR QL: NEGATIVE
BACTERIA #/AREA URNS AUTO: ABNORMAL /HPF
BASOPHILS # BLD AUTO: 0.03 K/UL (ref 0–0.2)
BASOPHILS NFR BLD: 0.3 % (ref 0–1.9)
BILIRUB SERPL-MCNC: 0.2 MG/DL (ref 0.1–1)
BILIRUB SERPL-MCNC: ABNORMAL MG/DL
BILIRUB UR QL STRIP: NEGATIVE
BLD GP AB SCN CELLS X3 SERPL QL: NORMAL
BLOOD URINE, POC: 50
BUN SERPL-MCNC: 7 MG/DL (ref 6–20)
CALCIUM SERPL-MCNC: 10.1 MG/DL (ref 8.7–10.5)
CHLORIDE SERPL-SCNC: 108 MMOL/L (ref 95–110)
CLARITY UR REFRACT.AUTO: ABNORMAL
CLARITY, POC UA: ABNORMAL
CO2 SERPL-SCNC: 21 MMOL/L (ref 23–29)
COLOR UR AUTO: YELLOW
COLOR, POC UA: ABNORMAL
CREAT SERPL-MCNC: 0.8 MG/DL (ref 0.5–1.4)
CRP SERPL-MCNC: 27.3 MG/L (ref 0–8.2)
CTP QC/QA: YES
DIFFERENTIAL METHOD: ABNORMAL
EOSINOPHIL # BLD AUTO: 0.1 K/UL (ref 0–0.5)
EOSINOPHIL NFR BLD: 0.8 % (ref 0–8)
ERYTHROCYTE [DISTWIDTH] IN BLOOD BY AUTOMATED COUNT: 16.1 % (ref 11.5–14.5)
ERYTHROCYTE [SEDIMENTATION RATE] IN BLOOD BY PHOTOMETRIC METHOD: 42 MM/HR (ref 0–36)
EST. GFR  (NO RACE VARIABLE): >60 ML/MIN/1.73 M^2
GLUCOSE SERPL-MCNC: 94 MG/DL (ref 70–110)
GLUCOSE UR QL STRIP: NEGATIVE
GLUCOSE UR QL STRIP: NORMAL
HCT VFR BLD AUTO: 37.9 % (ref 37–48.5)
HGB BLD-MCNC: 11.6 G/DL (ref 12–16)
HGB UR QL STRIP: ABNORMAL
IMM GRANULOCYTES # BLD AUTO: 0.04 K/UL (ref 0–0.04)
IMM GRANULOCYTES NFR BLD AUTO: 0.3 % (ref 0–0.5)
INFLUENZA A, MOLECULAR: NEGATIVE
INFLUENZA B, MOLECULAR: NEGATIVE
INR PPP: 0.9 (ref 0.8–1.2)
KETONES UR QL STRIP: ABNORMAL
KETONES UR QL STRIP: NEGATIVE
LEUKOCYTE ESTERASE UR QL STRIP: ABNORMAL
LEUKOCYTE ESTERASE URINE, POC: ABNORMAL
LYMPHOCYTES # BLD AUTO: 2.1 K/UL (ref 1–4.8)
LYMPHOCYTES NFR BLD: 17.8 % (ref 18–48)
MCH RBC QN AUTO: 26.4 PG (ref 27–31)
MCHC RBC AUTO-ENTMCNC: 30.6 G/DL (ref 32–36)
MCV RBC AUTO: 86 FL (ref 82–98)
MICROSCOPIC COMMENT: ABNORMAL
MONOCYTES # BLD AUTO: 0.8 K/UL (ref 0.3–1)
MONOCYTES NFR BLD: 6.8 % (ref 4–15)
NEUTROPHILS # BLD AUTO: 8.8 K/UL (ref 1.8–7.7)
NEUTROPHILS NFR BLD: 74 % (ref 38–73)
NITRITE UR QL STRIP: NEGATIVE
NITRITE, POC UA: ABNORMAL
NRBC BLD-RTO: 0 /100 WBC
PH UR STRIP: 5 [PH] (ref 5–8)
PH, POC UA: 5
PLATELET # BLD AUTO: 434 K/UL (ref 150–450)
PMV BLD AUTO: 10.4 FL (ref 9.2–12.9)
POTASSIUM SERPL-SCNC: 3.8 MMOL/L (ref 3.5–5.1)
PROCALCITONIN SERPL IA-MCNC: 0.06 NG/ML
PROT SERPL-MCNC: 6.7 G/DL (ref 6–8.4)
PROT UR QL STRIP: NEGATIVE
PROTEIN, POC: 30
PROTHROMBIN TIME: 9.8 SEC (ref 9–12.5)
RBC # BLD AUTO: 4.4 M/UL (ref 4–5.4)
RBC #/AREA URNS AUTO: 5 /HPF (ref 0–4)
SARS-COV-2 RDRP RESP QL NAA+PROBE: NEGATIVE
SODIUM SERPL-SCNC: 138 MMOL/L (ref 136–145)
SP GR UR STRIP: 1.01 (ref 1–1.03)
SPECIFIC GRAVITY, POC UA: 1.02
SPECIMEN SOURCE: NORMAL
SQUAMOUS #/AREA URNS AUTO: 7 /HPF
URN SPEC COLLECT METH UR: ABNORMAL
UROBILINOGEN, POC UA: 1
WBC # BLD AUTO: 11.89 K/UL (ref 3.9–12.7)
WBC #/AREA URNS AUTO: 19 /HPF (ref 0–5)

## 2022-09-27 PROCEDURE — 3077F PR MOST RECENT SYSTOLIC BLOOD PRESSURE >= 140 MM HG: ICD-10-PCS | Mod: CPTII,S$GLB,, | Performed by: STUDENT IN AN ORGANIZED HEALTH CARE EDUCATION/TRAINING PROGRAM

## 2022-09-27 PROCEDURE — 1159F PR MEDICATION LIST DOCUMENTED IN MEDICAL RECORD: ICD-10-PCS | Mod: CPTII,S$GLB,, | Performed by: STUDENT IN AN ORGANIZED HEALTH CARE EDUCATION/TRAINING PROGRAM

## 2022-09-27 PROCEDURE — 4010F ACE/ARB THERAPY RXD/TAKEN: CPT | Mod: CPTII,S$GLB,, | Performed by: STUDENT IN AN ORGANIZED HEALTH CARE EDUCATION/TRAINING PROGRAM

## 2022-09-27 PROCEDURE — 81002 URINALYSIS NONAUTO W/O SCOPE: CPT | Mod: S$GLB,,, | Performed by: STUDENT IN AN ORGANIZED HEALTH CARE EDUCATION/TRAINING PROGRAM

## 2022-09-27 PROCEDURE — 25000003 PHARM REV CODE 250: Performed by: PHYSICIAN ASSISTANT

## 2022-09-27 PROCEDURE — 3044F HG A1C LEVEL LT 7.0%: CPT | Mod: CPTII,S$GLB,, | Performed by: STUDENT IN AN ORGANIZED HEALTH CARE EDUCATION/TRAINING PROGRAM

## 2022-09-27 PROCEDURE — 87045 FECES CULTURE AEROBIC BACT: CPT | Performed by: STUDENT IN AN ORGANIZED HEALTH CARE EDUCATION/TRAINING PROGRAM

## 2022-09-27 PROCEDURE — 87502 INFLUENZA DNA AMP PROBE: CPT | Performed by: EMERGENCY MEDICINE

## 2022-09-27 PROCEDURE — 99215 PR OFFICE/OUTPT VISIT, EST, LEVL V, 40-54 MIN: ICD-10-PCS | Mod: S$GLB,,, | Performed by: STUDENT IN AN ORGANIZED HEALTH CARE EDUCATION/TRAINING PROGRAM

## 2022-09-27 PROCEDURE — 85652 RBC SED RATE AUTOMATED: CPT | Performed by: EMERGENCY MEDICINE

## 2022-09-27 PROCEDURE — 87040 BLOOD CULTURE FOR BACTERIA: CPT | Performed by: EMERGENCY MEDICINE

## 2022-09-27 PROCEDURE — 96360 HYDRATION IV INFUSION INIT: CPT

## 2022-09-27 PROCEDURE — 99285 EMERGENCY DEPT VISIT HI MDM: CPT | Mod: 25

## 2022-09-27 PROCEDURE — 86901 BLOOD TYPING SEROLOGIC RH(D): CPT | Performed by: EMERGENCY MEDICINE

## 2022-09-27 PROCEDURE — U0002 COVID-19 LAB TEST NON-CDC: HCPCS | Performed by: EMERGENCY MEDICINE

## 2022-09-27 PROCEDURE — 87086 URINE CULTURE/COLONY COUNT: CPT | Performed by: EMERGENCY MEDICINE

## 2022-09-27 PROCEDURE — 99285 PR EMERGENCY DEPT VISIT,LEVEL V: ICD-10-PCS | Mod: CS,,, | Performed by: EMERGENCY MEDICINE

## 2022-09-27 PROCEDURE — 87427 SHIGA-LIKE TOXIN AG IA: CPT | Mod: 59 | Performed by: STUDENT IN AN ORGANIZED HEALTH CARE EDUCATION/TRAINING PROGRAM

## 2022-09-27 PROCEDURE — 85610 PROTHROMBIN TIME: CPT | Performed by: EMERGENCY MEDICINE

## 2022-09-27 PROCEDURE — 84145 PROCALCITONIN (PCT): CPT | Performed by: EMERGENCY MEDICINE

## 2022-09-27 PROCEDURE — 3077F SYST BP >= 140 MM HG: CPT | Mod: CPTII,S$GLB,, | Performed by: STUDENT IN AN ORGANIZED HEALTH CARE EDUCATION/TRAINING PROGRAM

## 2022-09-27 PROCEDURE — 87046 STOOL CULTR AEROBIC BACT EA: CPT | Performed by: STUDENT IN AN ORGANIZED HEALTH CARE EDUCATION/TRAINING PROGRAM

## 2022-09-27 PROCEDURE — 81025 URINE PREGNANCY TEST: CPT | Performed by: EMERGENCY MEDICINE

## 2022-09-27 PROCEDURE — 3044F PR MOST RECENT HEMOGLOBIN A1C LEVEL <7.0%: ICD-10-PCS | Mod: CPTII,S$GLB,, | Performed by: STUDENT IN AN ORGANIZED HEALTH CARE EDUCATION/TRAINING PROGRAM

## 2022-09-27 PROCEDURE — 99999 PR PBB SHADOW E&M-EST. PATIENT-LVL IV: CPT | Mod: PBBFAC,,, | Performed by: STUDENT IN AN ORGANIZED HEALTH CARE EDUCATION/TRAINING PROGRAM

## 2022-09-27 PROCEDURE — 81001 URINALYSIS AUTO W/SCOPE: CPT | Mod: 91 | Performed by: STUDENT IN AN ORGANIZED HEALTH CARE EDUCATION/TRAINING PROGRAM

## 2022-09-27 PROCEDURE — 81002 POCT URINE DIPSTICK WITHOUT MICROSCOPE: ICD-10-PCS | Mod: S$GLB,,, | Performed by: STUDENT IN AN ORGANIZED HEALTH CARE EDUCATION/TRAINING PROGRAM

## 2022-09-27 PROCEDURE — 1111F PR DISCHARGE MEDS RECONCILED W/ CURRENT OUTPATIENT MED LIST: ICD-10-PCS | Mod: CPTII,S$GLB,, | Performed by: STUDENT IN AN ORGANIZED HEALTH CARE EDUCATION/TRAINING PROGRAM

## 2022-09-27 PROCEDURE — 3079F DIAST BP 80-89 MM HG: CPT | Mod: CPTII,S$GLB,, | Performed by: STUDENT IN AN ORGANIZED HEALTH CARE EDUCATION/TRAINING PROGRAM

## 2022-09-27 PROCEDURE — 99215 OFFICE O/P EST HI 40 MIN: CPT | Mod: S$GLB,,, | Performed by: STUDENT IN AN ORGANIZED HEALTH CARE EDUCATION/TRAINING PROGRAM

## 2022-09-27 PROCEDURE — 86140 C-REACTIVE PROTEIN: CPT | Performed by: EMERGENCY MEDICINE

## 2022-09-27 PROCEDURE — 3008F PR BODY MASS INDEX (BMI) DOCUMENTED: ICD-10-PCS | Mod: CPTII,S$GLB,, | Performed by: STUDENT IN AN ORGANIZED HEALTH CARE EDUCATION/TRAINING PROGRAM

## 2022-09-27 PROCEDURE — 99999 PR PBB SHADOW E&M-EST. PATIENT-LVL IV: ICD-10-PCS | Mod: PBBFAC,,, | Performed by: STUDENT IN AN ORGANIZED HEALTH CARE EDUCATION/TRAINING PROGRAM

## 2022-09-27 PROCEDURE — 3079F PR MOST RECENT DIASTOLIC BLOOD PRESSURE 80-89 MM HG: ICD-10-PCS | Mod: CPTII,S$GLB,, | Performed by: STUDENT IN AN ORGANIZED HEALTH CARE EDUCATION/TRAINING PROGRAM

## 2022-09-27 PROCEDURE — 80053 COMPREHEN METABOLIC PANEL: CPT | Performed by: EMERGENCY MEDICINE

## 2022-09-27 PROCEDURE — 1111F DSCHRG MED/CURRENT MED MERGE: CPT | Mod: CPTII,S$GLB,, | Performed by: STUDENT IN AN ORGANIZED HEALTH CARE EDUCATION/TRAINING PROGRAM

## 2022-09-27 PROCEDURE — 4010F PR ACE/ARB THEARPY RXD/TAKEN: ICD-10-PCS | Mod: CPTII,S$GLB,, | Performed by: STUDENT IN AN ORGANIZED HEALTH CARE EDUCATION/TRAINING PROGRAM

## 2022-09-27 PROCEDURE — 87086 URINE CULTURE/COLONY COUNT: CPT | Mod: 59 | Performed by: STUDENT IN AN ORGANIZED HEALTH CARE EDUCATION/TRAINING PROGRAM

## 2022-09-27 PROCEDURE — 1159F MED LIST DOCD IN RCRD: CPT | Mod: CPTII,S$GLB,, | Performed by: STUDENT IN AN ORGANIZED HEALTH CARE EDUCATION/TRAINING PROGRAM

## 2022-09-27 PROCEDURE — 99285 EMERGENCY DEPT VISIT HI MDM: CPT | Mod: CS,,, | Performed by: EMERGENCY MEDICINE

## 2022-09-27 PROCEDURE — 3008F BODY MASS INDEX DOCD: CPT | Mod: CPTII,S$GLB,, | Performed by: STUDENT IN AN ORGANIZED HEALTH CARE EDUCATION/TRAINING PROGRAM

## 2022-09-27 PROCEDURE — 81001 URINALYSIS AUTO W/SCOPE: CPT | Performed by: EMERGENCY MEDICINE

## 2022-09-27 PROCEDURE — 85025 COMPLETE CBC W/AUTO DIFF WBC: CPT | Performed by: EMERGENCY MEDICINE

## 2022-09-27 RX ADMIN — SODIUM CHLORIDE 1000 ML: 0.9 INJECTION, SOLUTION INTRAVENOUS at 10:09

## 2022-09-27 NOTE — TELEPHONE ENCOUNTER
Called and spoke with pt. Pt states her symptoms started on 9/24. Symptoms are fever 102.0- 100.0, body aches, chills, diarrhea (watery), nauseated and lethargic, headache, sweaty and clammy. Pt states she went to the ER on 9/24 and they were unable to find any infection. Pt shunt was placed on 9/19. Pt scheduled to see Dr. Guerrero on 9/27/22.

## 2022-09-27 NOTE — PROGRESS NOTES
[facility] Note    Subjective:    The HPI and pertinent ROS is included in the Diagnostic Impression Remarks section at the end of the note. Please see below for further details. Chief complaint is at end of note.     Sonia is a pleasant intelligent patient who is here for evaluation.     Modified Medications    No medications on file       Data reviewed 274}  Previous medical records reviewed and summarized in plan section at end of note.      If you are due for any health screening(s) below please notify me so we can arrange them to be ordered and scheduled to maintain your health. Most healthy patients at your age complete them, but you are free to accept or refuse.      Tests to Keep You Healthy    Cervical Cancer Screening: Met on 4/22/2021  Last Blood Pressure <= 139/89 (9/27/2022): NO  Tobacco Cessation: Yes      The following portions of the patient's history were reviewed and updated as appropriate: allergies, current medications, past family history, past medical history, past social history, past surgical history and problem list.    She  has a past medical history of Acute calculous cholecystitis (11/27/2020), Asthma (2014), Calculus of gallbladder with acute cholecystitis without obstruction (11/26/2020), Chronic anxiety (12/19/2014), COVID-19, GERD (gastroesophageal reflux disease) (10/25/2020), GI bleed (10/25/2020), Heart palpitations, Herniated disc, Hypertension, IBS (irritable bowel syndrome) (2015), Idiopathic intracranial hypertension, Insomnia (2018), Intractable migraine without aura and with status migrainosus (06/28/2022), Irritable bowel syndrome without diarrhea (09/03/2021), Lower back pain (2005), Migraine headache (2002), Obstructive sleep apnea, Palpitations (2015), PCOS (polycystic ovarian syndrome) (05/2022), and Sleep apnea (2006).  She  has a past surgical history that includes Dilation and curettage of uterus (2003); epidural steriod injections (2005);   section, classic;  section, low transverse (); Tubal ligation (); Dilation and curettage of uterus; endometrioma (); Abdominal surgery; Esophagogastroduodenoscopy (N/A, 10/26/2020); Colonoscopy (N/A, 10/27/2020); Intraluminal gastrointestinal tract imaging via capsule (N/A, 2020); Laparoscopic cholecystectomy (N/A, 2020); Tonsillectomy; Knee arthroscopy w/ meniscectomy (Right, 2021); Cystoscopy (N/A, 10/27/2021); Magnetic resonance imaging (N/A, 2022); Cholecystectomy; Upper gastrointestinal endoscopy; Endoscopic insertion of ventriculoperitoneal shunt (Right, 2022); and Endoscopic insertion of ventriculoperitoneal shunt (N/A, 2022).    She  reports that she quit smoking 9 days ago. Her smoking use included vaping w/o nicotine. She has been exposed to tobacco smoke. She has quit using smokeless tobacco. She reports that she does not currently use alcohol. She reports that she does not use drugs.  She family history includes Arthritis in her father; Asthma in her mother; Breast cancer (age of onset: 40) in her maternal aunt; Cancer in her father, maternal grandfather, maternal grandmother, and mother; Colon cancer in her maternal grandfather; Diabetes in her maternal grandmother and paternal grandfather; Heart disease in her father and mother; Hyperlipidemia in her father and mother; Hypertension in her father.    Review of patient's allergies indicates:   Allergen Reactions    Contrast media Anaphylaxis    Iodine and iodide containing products Anaphylaxis    Levaquin [levofloxacin] Anaphylaxis    Levofloxacin in d5w Anaphylaxis    Sulfa (sulfonamide antibiotics) Anaphylaxis and Hives    Iodinated contrast media Hives    Iodine     Magnesium      Pt reporting she is allergic to magnesium citrate oral drink.     Morphine Hives    Tree nuts     Adhesive Rash    Compazine [prochlorperazine] Anxiety     Restless legs    Depacon [valproate sodium] Hives     Pt  "experienced hives at IV site upon 8th day of depacon administration.  Hives resolved with stopping medication in 1.5 hours.    Nut [tree nut] Hives       Tobacco Use: Medium Risk    Smoking Tobacco Use: Former    Smokeless Tobacco Use: Former    Passive Exposure: Current     Physical Examination  General appearance: alert, cooperative, no distress  Neck: no thyromegaly, no neck stiffness  Lungs: clear to auscultation, no wheezes, rales or rhonchi, symmetric air entry  Heart: normal rate, regular rhythm, normal S1, S2, no murmurs, rubs, clicks or gallops  Abdomen: soft, , nondistended, no rigidity, rebound, or guarding. Inision in place no drainage some mild tenderness around healing no cva tenderness incision. No rlq tenderness.neg psoas and obtuartor sign.   Back: no point tenderness over spine  Extremities: peripheral pulses normal, no unilateral leg swelling or calf tenderness   Neurological:alert, oriented, normal speech, no new focal findings or movement disorder noted from baseline no neck stiffness  Brudzinski neg      BP Readings from Last 3 Encounters:   09/27/22 (!) 140/80   09/24/22 (!) 144/72   09/23/22 138/71     Wt Readings from Last 3 Encounters:   09/27/22 119.5 kg (263 lb 7.2 oz)   09/24/22 127 kg (280 lb)   09/23/22 127.3 kg (280 lb 10.3 oz)     BP (!) 140/80 (BP Location: Right arm, Patient Position: Sitting, BP Method: Large (Manual))   Pulse 94   Temp 98.6 °F (37 °C) (Oral)   Resp 16   Ht 5' 7" (1.702 m)   Wt 119.5 kg (263 lb 7.2 oz)   LMP 09/08/2022 (Exact Date)   SpO2 96%   BMI 41.26 kg/m²    274}  Laboratory: I have reviewed old labs below:    274}    Lab Results   Component Value Date    WBC 8.90 09/24/2022    HGB 11.3 (L) 09/24/2022    HCT 36.3 (L) 09/24/2022    MCV 86 09/24/2022     09/24/2022     09/24/2022    K 3.5 09/24/2022     09/24/2022    CALCIUM 9.8 09/24/2022    PHOS 2.0 (L) 09/20/2022    CO2 24 09/24/2022     (H) 09/24/2022    BUN 7 09/24/2022    " "CREATININE 0.7 09/24/2022    ANIONGAP 9 09/24/2022    PROT 6.1 09/24/2022    ALBUMIN 3.1 (L) 09/24/2022    BILITOT 0.2 09/24/2022    ALKPHOS 67 09/24/2022    ALT 12 09/24/2022    AST 8 (L) 09/24/2022    INR 0.9 08/06/2022    CHOL 243 (H) 08/06/2022    TRIG 319 (H) 08/06/2022    HDL 43 08/06/2022    LDLCALC 136.2 08/06/2022    TSH 2.120 09/04/2022    HGBA1C 5.5 02/07/2022     Lab reviewed by me: Particular labs of significance that I will monitor, workup, or treat to improve are mentioned below in diagnostic impression remarks.    Imaging/EKG: I have reviewed the pertinent results and my findings are noted in remarks.  274}    CC:   Chief Complaint   Patient presents with    Fever        274}    Assessment/Plan  Sonia Goldberg is a 36 y.o. female who presents to clinic with:  1. Diarrhea, unspecified type    2. Nonintractable headache, unspecified chronicity pattern, unspecified headache type    3. Primary hypertension    4. Mixed hyperlipidemia    5. IIH (idiopathic intracranial hypertension)    6. Bipolar affective disorder, remission status unspecified       274}  Diagnostic Impression Remarks + HPI     Documentation entered by me for this encounter may have been done in part using speech-recognition technology. Although I have made an effort to ensure accuracy, "sound like" errors may exist and should be interpreted in context.     Fever-patient reports that for the past 2 days she has had a fever high supple 100.4 she also developed a headache yesterday slow gradual no neck stiffness and she  also felt nauseous no vomiting.  She had a recent procedure with shunt placement and she reports she has some tenderness around the incision site of her wound no drainage and she has developed some diarrhea.  Blood in diarrhea.  she recently got antibiotics for a questionable UTI having some dysuria and no flank pain and no dysuria or frequency currently.  She might have C diff after recent antibiotics use since " started today after antibiotics with cephalosporin will check for C diff would recommend to get a CBC looked for white count.  Does not have neck stiffness appreciated and she had negative  Brudzinski and kernig sign but these are nonspecific and have a low sensitivity and she had a recent procedure with shot this complicated picture and thus went over that it would be reasonable for her to go to the emergency room to get some imaging of the head along with consideration for lumbar puncture if indicated.  Would benefit from get some blood work as well possible cultures but will workup diarrhea will in the meantime with culture.  Recommend she go to the ER for further evaluation due to fever that is not improving with recent procedure and has multiple risk factors and she agreed after show decision making.      Headache-Headache is worse with lying down.   She denies unilateral weakness, slurred speech, facial droop, and new onset numbness. She does not endorse seizures, confusion, morning vomiting, or weight loss. No jaw claudication, vision changes, temporal tenderness, or eye pain. She denies sudden onset, maximum intensity of pain at onset, and head trauma.  Would recommend head imaging to the hospital complicated picture with she recent shunt placement fever must rule out infection with getting some blood work and imaging further evaluation    Hypertension needs improvement mildly elevated today likely due to pain monitor log at home     Idiopathic intracranial hypertension-control uncertain had a recent shunt placement not sure if there is any abnormalities with the surgery consider head imaging that would recommend further follow-up with surgeon     Diarrhea-patient reports diarrhea for the past 2 days also has fevers had recent antibiotics rule out C diff no travel reports a week ago her  was sick had diarrhea is much better now recommend he get blood work for a dehydration will follow up  cultures.    UTI-patient reports no dysuria or frequency did have some leukocytes present and did receive an antibiotic for her possible UTI she has allergic to Bactrim and fluoroquinolones thus we are very limited and if her UTI is the cause of her fever she would need IV antibiotics if she is septic recommend to be evaluated the of the ER.  Got a culture.  No flank pain          Altogether 41minutes of total time spent on the encounter, which includes face to face time and non-face to face time preparing to see the patient (eg, review of tests), Obtaining and/or reviewing separately obtained history, Documenting clinical information in the electronic or other health record, Independently interpreting results (not separately reported) and communicating results to the patient/family/caregiver, or Care coordination (not separately reported). I also counseled her on common and the most usual side effect of prescribed medications. Risk and benefits of the medication were also discussed. I reviewed previous notes to better coordinate patient's care. She test results and lifestyle changes were also discussed. All questions were answered, and patient voiced understanding.     This is the extent of this pleasant patient's concerns at this present time. She did not feel chest pain upon exertion, dyspnea, nausea, vomiting, diaphoresis, or syncope. No pleuritic chest pain, unilateral leg swelling, calf tenderness, or calf pain. Negative for unintentional weight loss night sweats and fevers. Sonia will return to clinic in a few months for further workup and reassessment or sooner as needed. She was instructed to call the clinic or go to the emergency department if her symptoms do not improve, worsens, or if new symptoms develop. As we discussed that symptoms could worsen over the next 24 hours she was advised that if any increased swelling, pain, or numbness arise to go immediately to the ED. Patient knows to call any time  if an emergency arises. Shared decision making occurred and she verbalized understanding in agreement with this plan. I discussed imaging findings, diagnosis, possibilities, treatment options, medications, risks, and benefits. She had many questions regarding the options and long-term effects. All questions were answered. She expressed understanding after counseling regarding the diagnosis and recommendations. She was capable and demonstrated competence with understanding of these options. Shared decision making was performed resulting in her choosing the current treatment plan. Patient handout was given with instructions and recommendations. Advised the patient that if they become pregnant to alert us immediately to assess for medication changes. I also discussed the importance of close follow up to discuss labs, change or modify her medications if needed, monitor side effects, and further evaluation of medical problems.     Additional workup planned: see labs ordered below.    See below for labs and meds ordered with associated diagnosis      1. Diarrhea, unspecified type  - Clostridium difficile EIA; Future    2. Nonintractable headache, unspecified chronicity pattern, unspecified headache type    3. Primary hypertension    4. Mixed hyperlipidemia    5. IIH (idiopathic intracranial hypertension)    6. Bipolar affective disorder, remission status unspecified    Dorian Guerrero MD   274}    If you are due for any health screening(s) below please notify me so we can arrange them to be ordered and scheduled to maintain your health. Most healthy patients at your age complete them, but you are free to accept or refuse.   Tests to Keep You Healthy    Cervical Cancer Screening: Met on 4/22/2021  Last Blood Pressure <= 139/89 (9/27/2022): NO  Tobacco Cessation: Yes        [facility] Note    Subjective:    The HPI and pertinent ROS is included in the Diagnostic Impression Remarks section at the end of the  note. Please see below for further details. Chief complaint is at end of note.     Snoia is a pleasant intelligent patient who is here for evaluation.     Modified Medications    No medications on file       Data reviewed 274}  Previous medical records reviewed and summarized in plan section at end of note.      If you are due for any health screening(s) below please notify me so we can arrange them to be ordered and scheduled to maintain your health. Most healthy patients at your age complete them, but you are free to accept or refuse.      Tests to Keep You Healthy    Cervical Cancer Screening: Met on 2021  Last Blood Pressure <= 139/89 (2022): NO  Tobacco Cessation: Yes      The following portions of the patient's history were reviewed and updated as appropriate: allergies, current medications, past family history, past medical history, past social history, past surgical history and problem list.    She  has a past medical history of Acute calculous cholecystitis (2020), Asthma (), Calculus of gallbladder with acute cholecystitis without obstruction (2020), Chronic anxiety (2014), COVID-19, GERD (gastroesophageal reflux disease) (10/25/2020), GI bleed (10/25/2020), Heart palpitations, Herniated disc, Hypertension, IBS (irritable bowel syndrome) (), Idiopathic intracranial hypertension, Insomnia (), Intractable migraine without aura and with status migrainosus (2022), Irritable bowel syndrome without diarrhea (2021), Lower back pain (), Migraine headache (), Obstructive sleep apnea, Palpitations (), PCOS (polycystic ovarian syndrome) (2022), and Sleep apnea ().  She  has a past surgical history that includes Dilation and curettage of uterus (); epidural steriod injections ();  section, classic;  section, low transverse (); Tubal ligation (); Dilation and curettage of uterus; endometrioma (); Abdominal  surgery; Esophagogastroduodenoscopy (N/A, 10/26/2020); Colonoscopy (N/A, 10/27/2020); Intraluminal gastrointestinal tract imaging via capsule (N/A, 11/20/2020); Laparoscopic cholecystectomy (N/A, 11/27/2020); Tonsillectomy; Knee arthroscopy w/ meniscectomy (Right, 05/26/2021); Cystoscopy (N/A, 10/27/2021); Magnetic resonance imaging (N/A, 08/03/2022); Cholecystectomy; Upper gastrointestinal endoscopy; Endoscopic insertion of ventriculoperitoneal shunt (Right, 9/19/2022); and Endoscopic insertion of ventriculoperitoneal shunt (N/A, 9/19/2022).    She  reports that she quit smoking 9 days ago. Her smoking use included vaping w/o nicotine. She has been exposed to tobacco smoke. She has quit using smokeless tobacco. She reports that she does not currently use alcohol. She reports that she does not use drugs.  She family history includes Arthritis in her father; Asthma in her mother; Breast cancer (age of onset: 40) in her maternal aunt; Cancer in her father, maternal grandfather, maternal grandmother, and mother; Colon cancer in her maternal grandfather; Diabetes in her maternal grandmother and paternal grandfather; Heart disease in her father and mother; Hyperlipidemia in her father and mother; Hypertension in her father.    Review of patient's allergies indicates:   Allergen Reactions    Contrast media Anaphylaxis    Iodine and iodide containing products Anaphylaxis    Levaquin [levofloxacin] Anaphylaxis    Levofloxacin in d5w Anaphylaxis    Sulfa (sulfonamide antibiotics) Anaphylaxis and Hives    Iodinated contrast media Hives    Iodine     Magnesium      Pt reporting she is allergic to magnesium citrate oral drink.     Morphine Hives    Tree nuts     Adhesive Rash    Compazine [prochlorperazine] Anxiety     Restless legs    Depacon [valproate sodium] Hives     Pt experienced hives at IV site upon 8th day of depacon administration.  Hives resolved with stopping medication in 1.5 hours.    Nut [tree nut] Hives  "      Tobacco Use: Medium Risk    Smoking Tobacco Use: Former    Smokeless Tobacco Use: Former    Passive Exposure: Current     Physical Examination  General appearance: alert, cooperative, no distress  Neck: no thyromegaly, no neck stiffness  Lungs: clear to auscultation, no wheezes, rales or rhonchi, symmetric air entry  Heart: normal rate, regular rhythm, normal S1, S2, no murmurs, rubs, clicks or gallops  Abdomen: soft, nontender, nondistended, no rigidity, rebound, or guarding.   Back: no point tenderness over spine  Extremities: peripheral pulses normal, no unilateral leg swelling or calf tenderness   Neurological:alert, oriented, normal speech, no new focal findings or movement disorder noted from baseline    BP Readings from Last 3 Encounters:   09/27/22 (!) 140/80   09/24/22 (!) 144/72   09/23/22 138/71     Wt Readings from Last 3 Encounters:   09/27/22 119.5 kg (263 lb 7.2 oz)   09/24/22 127 kg (280 lb)   09/23/22 127.3 kg (280 lb 10.3 oz)     BP (!) 140/80 (BP Location: Right arm, Patient Position: Sitting, BP Method: Large (Manual))   Pulse 94   Temp 98.6 °F (37 °C) (Oral)   Resp 16   Ht 5' 7" (1.702 m)   Wt 119.5 kg (263 lb 7.2 oz)   LMP 09/08/2022 (Exact Date)   SpO2 96%   BMI 41.26 kg/m²    274}  Laboratory: I have reviewed old labs below:    274}    Lab Results   Component Value Date    WBC 8.90 09/24/2022    HGB 11.3 (L) 09/24/2022    HCT 36.3 (L) 09/24/2022    MCV 86 09/24/2022     09/24/2022     09/24/2022    K 3.5 09/24/2022     09/24/2022    CALCIUM 9.8 09/24/2022    PHOS 2.0 (L) 09/20/2022    CO2 24 09/24/2022     (H) 09/24/2022    BUN 7 09/24/2022    CREATININE 0.7 09/24/2022    ANIONGAP 9 09/24/2022    PROT 6.1 09/24/2022    ALBUMIN 3.1 (L) 09/24/2022    BILITOT 0.2 09/24/2022    ALKPHOS 67 09/24/2022    ALT 12 09/24/2022    AST 8 (L) 09/24/2022    INR 0.9 08/06/2022    CHOL 243 (H) 08/06/2022    TRIG 319 (H) 08/06/2022    HDL 43 08/06/2022    LDLCALC 136.2 " "08/06/2022    TSH 2.120 09/04/2022    HGBA1C 5.5 02/07/2022     Lab reviewed by me: Particular labs of significance that I will monitor, workup, or treat to improve are mentioned below in diagnostic impression remarks.    Imaging/EKG: I have reviewed the pertinent results and my findings are noted in remarks.  274}    CC:   Chief Complaint   Patient presents with    Fever        274}    Assessment/Plan  Sonia Goldberg is a 36 y.o. female who presents to clinic with:  1. Diarrhea, unspecified type    2. Nonintractable headache, unspecified chronicity pattern, unspecified headache type    3. Primary hypertension    4. Mixed hyperlipidemia    5. IIH (idiopathic intracranial hypertension)    6. Bipolar affective disorder, remission status unspecified       274}  Diagnostic Impression Remarks + HPI     Documentation entered by me for this encounter may have been done in part using speech-recognition technology. Although I have made an effort to ensure accuracy, "sound like" errors may exist and should be interpreted in context.         This is the extent of this pleasant patient's concerns at this present time. She did not feel chest pain upon exertion, dyspnea, nausea, vomiting, diaphoresis, or syncope. No pleuritic chest pain, unilateral leg swelling, calf tenderness, or calf pain. Negative for unintentional weight loss night sweats and fevers. Sonia will return to clinic in a few months for further workup and reassessment or sooner as needed. She was instructed to call the clinic or go to the emergency department if her symptoms do not improve, worsens, or if new symptoms develop. As we discussed that symptoms could worsen over the next 24 hours she was advised that if any increased swelling, pain, or numbness arise to go immediately to the ED. Patient knows to call any time if an emergency arises. Shared decision making occurred and she verbalized understanding in agreement with this plan. I discussed " imaging findings, diagnosis, possibilities, treatment options, medications, risks, and benefits. She had many questions regarding the options and long-term effects. All questions were answered. She expressed understanding after counseling regarding the diagnosis and recommendations. She was capable and demonstrated competence with understanding of these options. Shared decision making was performed resulting in her choosing the current treatment plan. Patient handout was given with instructions and recommendations. Advised the patient that if they become pregnant to alert us immediately to assess for medication changes. I also discussed the importance of close follow up to discuss labs, change or modify her medications if needed, monitor side effects, and further evaluation of medical problems.     Additional workup planned: see labs ordered below.    See below for labs and meds ordered with associated diagnosis      1. Diarrhea, unspecified type  - Clostridium difficile EIA; Future    2. Nonintractable headache, unspecified chronicity pattern, unspecified headache type    3. Primary hypertension    4. Mixed hyperlipidemia    5. IIH (idiopathic intracranial hypertension)    6. Bipolar affective disorder, remission status unspecified    Dorian Guerrero MD   274}    If you are due for any health screening(s) below please notify me so we can arrange them to be ordered and scheduled to maintain your health. Most healthy patients at your age complete them, but you are free to accept or refuse.   Tests to Keep You Healthy    Cervical Cancer Screening: Met on 4/22/2021  Last Blood Pressure <= 139/89 (9/27/2022): NO  Tobacco Cessation: Yes

## 2022-09-27 NOTE — TELEPHONE ENCOUNTER
Had a V-P shunt on Monday. Started running a fever on 9/24 and went to ED and no source of infection was noted.  Temp  45 min ago 100.5 and diarrhea today. Chills and bodyaches. Have a headache and 150/100 blood pressure. Pt sounds like she is having difficulty breathing. Pt states that she has been tachypnea for the past 30 minutes.  Home health nurse called MD earlier about diarrhea, headache and blood pressure and was advised to monitor. Now she has a fever and rapid breathing.  Reason for Disposition   Patient sounds very sick or weak to the triager  (Exception: mild weakness and hasn't taken fever medicine)    Additional Information   Negative: Shock suspected (e.g., cold/pale/clammy skin, too weak to stand, low BP, rapid pulse)   Negative: Difficult to awaken or acting confused (e.g., disoriented, slurred speech)   Negative: [1] Difficulty breathing AND [2] bluish lips, tongue or face   Negative: New-onset rash with multiple purple (or blood-colored) spots or dots   Negative: Sounds like a life-threatening emergency to the triager   Negative: Fever in a cancer patient who is currently (or recently) receiving chemotherapy or radiation therapy, or cancer patient who has metastatic or end-stage cancer and is receiving palliative care   Negative: Pregnant   Negative: Postpartum (from 0 to 6 weeks after delivery)   Negative: Fever onset within 24 hours of receiving vaccine   Negative: [1] Fever AND [2] within 14 days of COVID-19 Exposure   Negative: Other symptom is present, see that guideline (e.g., symptoms of cough, runny nose, sore throat, earache, abdominal pain, diarrhea, vomiting)   Negative: [1] Headache AND [2] stiff neck (can't touch chin to chest)   Negative: Difficulty breathing   Negative: IV Drug Use (IVDU)   Negative: [1] Drinking very little AND [2] dehydration suspected (e.g., no urine > 12 hours, very dry mouth, very lightheaded)   Negative: Widespread rash and cause unknown    Protocols used:  Fever-A-AH

## 2022-09-27 NOTE — Clinical Note
"Sonia"Dinorah Goldberg was seen and treated in our emergency department on 9/27/2022.  She may return to work on 09/29/2022.       If you have any questions or concerns, please don't hesitate to call.      Brittany Oleary MD"

## 2022-09-28 PROBLEM — Z98.2 S/P VP SHUNT: Status: ACTIVE | Noted: 2022-09-28

## 2022-09-28 LAB
BACTERIA #/AREA URNS AUTO: ABNORMAL /HPF
BACTERIA UR CULT: NORMAL
HYALINE CASTS UR QL AUTO: 24 /LPF
MICROSCOPIC COMMENT: ABNORMAL
SQUAMOUS #/AREA URNS AUTO: 41 /HPF
WBC #/AREA URNS AUTO: >100 /HPF (ref 0–5)
YEAST UR QL AUTO: ABNORMAL

## 2022-09-28 PROCEDURE — 99024 POSTOP FOLLOW-UP VISIT: CPT | Mod: ,,, | Performed by: NEUROLOGICAL SURGERY

## 2022-09-28 PROCEDURE — 99024 PR POST-OP FOLLOW-UP VISIT: ICD-10-PCS | Mod: ,,, | Performed by: NEUROLOGICAL SURGERY

## 2022-09-28 RX ORDER — NITROFURANTOIN 25; 75 MG/1; MG/1
100 CAPSULE ORAL 2 TIMES DAILY
Qty: 10 CAPSULE | Refills: 0 | Status: SHIPPED | OUTPATIENT
Start: 2022-09-28 | End: 2022-10-03
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
## 2022-09-28 NOTE — HPI
"Patient is a 36F with PMH of pseudotumor cerebri (s/p recent  shunt placement with Dr. Yoon on 9/19/22) who presents with 3 days of diarrhea and fever. She states that she had a low grade fever starting Saturday and that it was 102.8 yesterday when she measured it at home. NSGY was consulted to eval for shunt infection.    Patient continues to have a UTI, which she started treatment for a few weeks ago. It seemed to have improved after treatment, but her UA today is still positive. Her ESR 42 and CRP 27.3 are slightly elevated, but procal is WNL. She has a normal WBC. She has been afebrile since being in the ED and without diarrhea. Patient reports that her prior pseudotumor symptoms before VPS was placed included intense headaches, blurry vision, "brain fog", and left sided weakness. Her symptoms now do not seem to be related to her pseudotumor. She does report some headache and nausea for a day, but the headache is less intense and in a different area than her usual headaches. She denies vision changes and reports that her pseudotumor symptoms have improved since the shunt was placed.  "

## 2022-09-28 NOTE — ASSESSMENT & PLAN NOTE
Patient is a 36F with PMH of pseudotumor cerebri (s/p recent  shunt placement with Dr. Yoon on 9/19/22) who presents with 3 days of diarrhea and fever, with a continued UTI for several weeks that has responded only partially to treatment. Nina reports her pseduotumor symptoms have improved since shunt was placed and denies visual changes. NSGY was consulted to eval for shunt infection.    --Patient seen at bedside by NSGY  --All labs and diagnostics reviewed   --CTH 9/27: no acute intracranial process, vents stable, R parietal VPS in place   --Shunt series 9/27: stable hakim setting, no discontinuity  --ESR 42, CRP 27.3, procal WNL  --Flu/covid negative, positive UA  --No acute indication for shunt tap as there is low suspicion for shunt infection  --Neurosurgery will sign off, please call with any questions  --Rest of care/UTI treatment per ED team
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
## 2022-09-28 NOTE — FIRST PROVIDER EVALUATION
Medical screening examination initiated.  I have conducted a focused provider triage encounter, findings are as follows:    Brief history of present illness:    Presenting with fever and headache. Had  shunt placed 9/19. Completed course of antibiotics     Vitals:    09/27/22 1937   BP: (!) 172/95   Pulse: 102   Resp: 14   Temp: 98.3 °F (36.8 °C)   TempSrc: Oral   SpO2: 100%   Weight: 118.8 kg (262 lb)       Pertinent physical exam:  GCS 15, right parietal scalp with surgical wound     Brief workup plan:  labs, CT xr ordered     Preliminary workup initiated; this workup will be continued and followed by the physician or advanced practice provider that is assigned to the patient when roomed.

## 2022-09-28 NOTE — PROVIDER PROGRESS NOTES - EMERGENCY DEPT.
Encounter Date: 9/27/2022    ED Physician Progress Notes            I assumed care of this patient at change of shift from Dr. Baez. Briefly, this is a 36 y.o. female who underwent VPS placement 9/19/22 with Dr. Yoon here with fever, Tm 102.8 yesterday.      shunt series reassuring.     Dr. Baez discussed pt with NSG, will follow up recommendations.     NSG saw and evaluated patient, do not think shunt is infected or that symptoms related to shunt. She does have UTI on labs, has chronic frequency which is difficult to interpret. Will tx with macrobid and dc home.     Workup notable for:     Labs Reviewed   CBC W/ AUTO DIFFERENTIAL - Abnormal; Notable for the following components:       Result Value    Hemoglobin 11.6 (*)     MCH 26.4 (*)     MCHC 30.6 (*)     RDW 16.1 (*)     Gran # (ANC) 8.8 (*)     Gran % 74.0 (*)     Lymph % 17.8 (*)     All other components within normal limits   COMPREHENSIVE METABOLIC PANEL - Abnormal; Notable for the following components:    CO2 21 (*)     Albumin 3.4 (*)     AST 9 (*)     All other components within normal limits   SEDIMENTATION RATE - Abnormal; Notable for the following components:    Sed Rate 42 (*)     All other components within normal limits   C-REACTIVE PROTEIN - Abnormal; Notable for the following components:    CRP 27.3 (*)     All other components within normal limits   URINALYSIS, REFLEX TO URINE CULTURE - Abnormal; Notable for the following components:    Appearance, UA Hazy (*)     Occult Blood UA Trace (*)     Leukocytes, UA 3+ (*)     All other components within normal limits    Narrative:     Specimen Source->Urine   URINALYSIS MICROSCOPIC - Abnormal; Notable for the following components:    RBC, UA 5 (*)     WBC, UA 19 (*)     Bacteria Few (*)     All other components within normal limits    Narrative:     Specimen Source->Urine   INFLUENZA A & B BY MOLECULAR   CULTURE, BLOOD   CULTURE, BLOOD   CULTURE, URINE   PROTIME-INR   PROCALCITONIN    SARS-COV-2 RNA AMPLIFICATION, QUAL   POCT URINE PREGNANCY   TYPE & SCREEN     X-Ray Shunt Series   Final Result      Stable appearance of right parietal approach ventriculostomy shunt catheter.  No evidence of catheter discontinuity.         Electronically signed by: Bin Jacobs MD   Date:    09/27/2022   Time:    22:26      CT Head Without Contrast   Final Result      No acute intracranial abnormality.      Right parietal coursing ventricular catheter with tip ending in the left lateral ventricle with unchanged decompression of the ventricular system.      Questionable small amount of surrounding hypodensity along the course of the ventricular catheter which could relate to edema.  Attention on follow-up suggested.      Electronically signed by resident: Gareth Rivera   Date:    09/27/2022   Time:    20:24      Electronically signed by: Bin Jacobs MD   Date:    09/27/2022   Time:    21:33          Medications   sodium chloride 0.9% 1,000 mL with mvi, (ADULT) no.4 with vit K 3,300 unit- 150 mcg/10 mL 10 mL, thiamine 100 mg, folic acid 1 mg infusion (has no administration in time range)   sodium chloride 0.9% bolus 1,000 mL (0 mLs Intravenous Stopped 9/27/22 2341)         Final diagnoses:  [R50.9] Fever, unspecified fever cause (Primary)  [Z98.2] S/P  shunt  [N39.0] Urinary tract infection without hematuria, site unspecified

## 2022-09-29 ENCOUNTER — PATIENT MESSAGE (OUTPATIENT)
Dept: ADMINISTRATIVE | Facility: OTHER | Age: 37
End: 2022-09-29
Payer: COMMERCIAL

## 2022-09-29 LAB
BACTERIA BLD CULT: NORMAL
BACTERIA BLD CULT: NORMAL
E COLI SXT1 STL QL IA: NEGATIVE
E COLI SXT2 STL QL IA: NEGATIVE

## 2022-10-01 LAB — BACTERIA STL CULT: NORMAL

## 2022-10-02 LAB
BACTERIA BLD CULT: NORMAL
BACTERIA BLD CULT: NORMAL

## 2022-10-03 ENCOUNTER — PATIENT MESSAGE (OUTPATIENT)
Dept: FAMILY MEDICINE | Facility: CLINIC | Age: 37
End: 2022-10-03
Payer: COMMERCIAL

## 2022-10-03 ENCOUNTER — CLINICAL SUPPORT (OUTPATIENT)
Dept: NEUROSURGERY | Facility: CLINIC | Age: 37
End: 2022-10-03
Payer: COMMERCIAL

## 2022-10-03 ENCOUNTER — PATIENT MESSAGE (OUTPATIENT)
Dept: NEUROSURGERY | Facility: CLINIC | Age: 37
End: 2022-10-03

## 2022-10-03 DIAGNOSIS — N39.0 URINARY TRACT INFECTION WITHOUT HEMATURIA, SITE UNSPECIFIED: Primary | ICD-10-CM

## 2022-10-03 RX ORDER — GRANULES FOR ORAL 3 G/1
3 POWDER ORAL ONCE
Qty: 3 G | Refills: 0 | Status: SHIPPED | OUTPATIENT
Start: 2022-10-03 | End: 2022-10-03

## 2022-10-03 NOTE — TELEPHONE ENCOUNTER
Called and spoke with pt. Informed pt of message from provider and of medication sent to pharmacy. Pt verbalized understanding. Pt states she has a urologist shes seen and will call them to schedule an appt.

## 2022-10-03 NOTE — PROGRESS NOTES
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 Patient states her preop headaches and pressure are gone. Still struggling with a UTI, advised to see her pCP for continued treatment.       Right post auricular incision assessed, dissolvable sutures used for closure, no redness, swelling, or drainage, edges well approximated.     Patient was instructed as follows:   Discontinue Bacitracin after tonight.  May shower normally but pat dry after shower.  Do not submerge wound in bath tub or go swimming until released by the physician  Keep incision clean, dry and open to air as much as possible.  Patient encouraged to walk as much as possible but advised to walk with family member or friend and rest as necessary.  No lifting >10lbs.      All questions were answered. Patient will follow up with Dr Shell 11/01/2022 . Patient was encouraged to call clinic with any future concerns prior to follow up appt. If any worsening symptoms, patient should report to ED.       Kimberly Merida RN, BSN  Neurosurgery Nurse Navigator

## 2022-10-04 NOTE — HPI
37 yo female with PMHx of R frontal hemorrhage, headaches associated with left paresthesias, and KERI presents with LSW. Patient and fiancee report symptom onset was 330pm. Patient states for the past 2 months she has been having persistent headaches associated with left sided paresthesias (tingling sensation). Fiancee states headaches are associated with speech difficulty. However, the LSW is new today which prompted visit to the ED. Of note, she recently had MRI Brain 8/3 (performed under sedation due to severe claustrophobia) for persistent headaches. MRI at the time negative for acute infarct, small right frontal hemorrhage. She denies any nausea, vomiting, fever, diarrhea. No other complaints at this time.      [facility]  Adrien Mustafa MD PhD, Naye Acevedo MD, Hallie Bedoya MD, Carter Brown MD, Beni Marie MD  and providing coverage with Jennifer Urias MD and Jovany Dey MD  Providing Infectious Disease Consultations at [facility], [facility]'s      Office# [phone] to schedule follow up appointments  Answering Service for urgent calls or New Consults [phone]    Infectious Diseases Progress Note:    DORIS MCBRIDE is a 43y year old Female patient    COVID-19 Patient  Reports rash on R lateral breast area is itchy  Denies any pain, burning or tingling sensations to area  States she had nausea and diarrhea after remdesivir last night  On RA, saturating well, ambulating in room to bathroom  Notes reviewed    Allergies: barium sulfate (Other; Rash; Diarrhea; Nausea)  Ceftin (Rash; Diarrhea; Nausea)  gadolinium (Hypotension)  IV Contrast (Anaphylaxis)  Reglan (Hives)  Zofran (Hives)    ANTIBIOTICS/RELEVANT:  Antimicrobials   Immunologic:   Other Meds: calamine/zinc oxide Lotion 1 Application(s) Topical three times a day PRN  chlorhexidine 2% Cloths 1 Application(s) Topical daily  dicyclomine 10 milliGRAM(s) Oral three times a day before meals  diltiazem    milliGRAM(s) Oral daily  ferrous    sulfate 325 milliGRAM(s) Oral daily  hydrocortisone 1% Cream 1 Application(s) Topical two times a day  lactated ringers. 1000 milliLiter(s) IV Continuous <Continuous>  LORazepam     Tablet 0.5 milliGRAM(s) Oral every 3 hours PRN  montelukast 10 milliGRAM(s) Oral at bedtime  mupirocin 2% Ointment 1 Application(s) Topical two times a day    Objective:  Vital Signs Last 24 Hrs  T(F): 97.9 (04 Oct 2022 06:42), Max: 98.5 (03 Oct 2022 19:40)  HR: 77 (04 Oct 2022 06:42) (69 - 92)  BP: 123/83 (04 Oct 2022 06:42) (123/83 - 134/89)  RR: 18 (04 Oct 2022 06:42) (18 - 20)  SpO2: 98% (04 Oct 2022 06:42) (98% - 100%)  PHYSICAL EXAM:  HEENT: NC/AT, anicteric, supple, nontoxic appearing  Respiratory: no acc muscle use, breathing comfortably  Cardiovascular: S1 S2 present, normal rate  Gastrointestinal: normal appearing, nondistended  Extremities: no edema, no cyanosis  Skin: R lateral breast area with small round flat erythematous lesions in circular pattern,   no vesicles, not following any specific dermatomal regions, no TTP, no other lesions noted    LABS:    WBC trend:  4.84 10-01 @ 07:30  7.40  @ 05:45    10-04    137  |  103  |  6<L>  ----------------------------<  91  3.7   |  23  |  0.53    Ca    9.0      04 Oct 2022 00:17  Phos  3.6     10-04  Mg     2.1     10-04    TPro  6.3  /  Alb  4.1  /  TBili  0.2  /  DBili  <0.1  /  AST  30  /  ALT  20  /  AlkPhos  70  10-03    Creatinine, Serum: 0.53 mg/dL (10-04-22 @ 00:17)  Creatinine, Serum: 0.60 mg/dL (10-03-22 @ 08:34)  Creatinine, Serum: 0.59 mg/dL (10-03-22 @ 08:34)  Creatinine, Serum: 0.61 mg/dL (10-02-22 @ 08:09)  Creatinine, Serum: 0.60 mg/dL (10-02-22 @ 08:09)  Creatinine, Serum: 0.66 mg/dL (10-01-22 @ 19:53)  Creatinine, Serum: 0.81 mg/dL (10-01-22 @ 07:26)  Creatinine, Serum: 0.60 mg/dL (22 @ 15:57)  Creatinine, Serum: 0.60 mg/dL (22 @ 07:36)  Creatinine, Serum: 0.63 mg/dL (22 @ 05:45)    PT/INR - ( 03 Oct 2022 08:34 )   PT: 12.6 sec;   INR: 1.09 ratio      Urinalysis Basic - ( 02 Oct 2022 11:20 )  Color: LIGHT BROWN / Appearance: Slightly Turbid / S.006 / pH: x  Gluc: x / Ketone: Negative  / Bili: Negative / Urobili: Negative   Blood: x / Protein: Trace / Nitrite: Negative   Leuk Esterase: Negative / RBC: 1091 /hpf / WBC 3 /HPF   Sq Epi: x / Non Sq Epi: 0 /hpf / Bacteria: Negative    Auto Neutrophil #: 5.96 K/uL (22 @ 05:45)  Auto Lymphocyte #: 0.36 K/uL (22 @ 05:45)    D-Dimer Assay, Quantitative: 185 ng/mL DDU (10-01-22 @ 19:53)    Prothrombin Time, Plasma: 12.6 sec (10-03-22 @ 08:34)  Prothrombin Time, Plasma: 12.0 sec (10-01-22 @ 19:53)    C-Reactive Protein, Serum: <3 mg/L (10-03-22 @ 08:34)  C-Reactive Protein, Serum: <3 mg/L (10-02-22 @ 08:09)  C-Reactive Protein, Serum: <3 mg/L (10-01-22 @ 19:53)    MICROBIOLOGY: reviewed  Culture - Blood (collected 22 @ 05:58)  Source: .Blood Blood-Peripheral  Preliminary Report (10-01-22 @ 14:01):    No growth to date.    Culture - Blood (collected 22 @ 05:12)  Source: .Blood Blood-Peripheral  Preliminary Report (10-01-22 @ 14:01):    No growth to date.    RADIOLOGY & ADDITIONAL STUDIES: reviewed

## 2022-10-06 ENCOUNTER — OFFICE VISIT (OUTPATIENT)
Dept: UROLOGY | Facility: CLINIC | Age: 37
End: 2022-10-06
Payer: COMMERCIAL

## 2022-10-06 VITALS
BODY MASS INDEX: 41.04 KG/M2 | DIASTOLIC BLOOD PRESSURE: 82 MMHG | HEART RATE: 98 BPM | HEIGHT: 67 IN | SYSTOLIC BLOOD PRESSURE: 140 MMHG

## 2022-10-06 DIAGNOSIS — R31.0 GROSS HEMATURIA: ICD-10-CM

## 2022-10-06 DIAGNOSIS — N39.0 RECURRENT UTI (URINARY TRACT INFECTION): Primary | ICD-10-CM

## 2022-10-06 LAB
BILIRUBIN, UA POC OHS: NEGATIVE
BLOOD, UA POC OHS: ABNORMAL
CLARITY, UA POC OHS: CLEAR
COLOR, UA POC OHS: YELLOW
GLUCOSE, UA POC OHS: NEGATIVE
KETONES, UA POC OHS: NEGATIVE
LEUKOCYTES, UA POC OHS: ABNORMAL
MICROSCOPIC COMMENT: NORMAL
NITRITE, UA POC OHS: NEGATIVE
PH, UA POC OHS: 6.5
PROTEIN, UA POC OHS: 30
SPECIFIC GRAVITY, UA POC OHS: 1.01
UROBILINOGEN, UA POC OHS: 0.2

## 2022-10-06 PROCEDURE — 1159F MED LIST DOCD IN RCRD: CPT | Mod: CPTII,S$GLB,, | Performed by: UROLOGY

## 2022-10-06 PROCEDURE — 1111F DSCHRG MED/CURRENT MED MERGE: CPT | Mod: CPTII,S$GLB,, | Performed by: UROLOGY

## 2022-10-06 PROCEDURE — 3044F PR MOST RECENT HEMOGLOBIN A1C LEVEL <7.0%: ICD-10-PCS | Mod: CPTII,S$GLB,, | Performed by: UROLOGY

## 2022-10-06 PROCEDURE — 99214 OFFICE O/P EST MOD 30 MIN: CPT | Mod: 25,S$GLB,, | Performed by: UROLOGY

## 2022-10-06 PROCEDURE — 4010F ACE/ARB THERAPY RXD/TAKEN: CPT | Mod: CPTII,S$GLB,, | Performed by: UROLOGY

## 2022-10-06 PROCEDURE — 3077F PR MOST RECENT SYSTOLIC BLOOD PRESSURE >= 140 MM HG: ICD-10-PCS | Mod: CPTII,S$GLB,, | Performed by: UROLOGY

## 2022-10-06 PROCEDURE — 3079F PR MOST RECENT DIASTOLIC BLOOD PRESSURE 80-89 MM HG: ICD-10-PCS | Mod: CPTII,S$GLB,, | Performed by: UROLOGY

## 2022-10-06 PROCEDURE — 81000 URINALYSIS NONAUTO W/SCOPE: CPT | Performed by: UROLOGY

## 2022-10-06 PROCEDURE — 51701 PR INSERTION OF NON-INDWELLING BLADDER CATHETERIZATION FOR RESIDUAL UR: ICD-10-PCS | Mod: S$GLB,,, | Performed by: UROLOGY

## 2022-10-06 PROCEDURE — 81003 URINALYSIS AUTO W/O SCOPE: CPT | Mod: QW,S$GLB,, | Performed by: UROLOGY

## 2022-10-06 PROCEDURE — 99999 PR PBB SHADOW E&M-EST. PATIENT-LVL III: ICD-10-PCS | Mod: PBBFAC,,, | Performed by: UROLOGY

## 2022-10-06 PROCEDURE — 1160F PR REVIEW ALL MEDS BY PRESCRIBER/CLIN PHARMACIST DOCUMENTED: ICD-10-PCS | Mod: CPTII,S$GLB,, | Performed by: UROLOGY

## 2022-10-06 PROCEDURE — 51701 INSERT BLADDER CATHETER: CPT | Mod: S$GLB,,, | Performed by: UROLOGY

## 2022-10-06 PROCEDURE — 87086 URINE CULTURE/COLONY COUNT: CPT | Performed by: UROLOGY

## 2022-10-06 PROCEDURE — 99999 PR PBB SHADOW E&M-EST. PATIENT-LVL III: CPT | Mod: PBBFAC,,, | Performed by: UROLOGY

## 2022-10-06 PROCEDURE — 81003 POCT URINALYSIS(INSTRUMENT): ICD-10-PCS | Mod: QW,S$GLB,, | Performed by: UROLOGY

## 2022-10-06 PROCEDURE — 3044F HG A1C LEVEL LT 7.0%: CPT | Mod: CPTII,S$GLB,, | Performed by: UROLOGY

## 2022-10-06 PROCEDURE — 3079F DIAST BP 80-89 MM HG: CPT | Mod: CPTII,S$GLB,, | Performed by: UROLOGY

## 2022-10-06 PROCEDURE — 1160F RVW MEDS BY RX/DR IN RCRD: CPT | Mod: CPTII,S$GLB,, | Performed by: UROLOGY

## 2022-10-06 PROCEDURE — 3077F SYST BP >= 140 MM HG: CPT | Mod: CPTII,S$GLB,, | Performed by: UROLOGY

## 2022-10-06 PROCEDURE — 3008F BODY MASS INDEX DOCD: CPT | Mod: CPTII,S$GLB,, | Performed by: UROLOGY

## 2022-10-06 PROCEDURE — 3008F PR BODY MASS INDEX (BMI) DOCUMENTED: ICD-10-PCS | Mod: CPTII,S$GLB,, | Performed by: UROLOGY

## 2022-10-06 PROCEDURE — 1111F PR DISCHARGE MEDS RECONCILED W/ CURRENT OUTPATIENT MED LIST: ICD-10-PCS | Mod: CPTII,S$GLB,, | Performed by: UROLOGY

## 2022-10-06 PROCEDURE — 1159F PR MEDICATION LIST DOCUMENTED IN MEDICAL RECORD: ICD-10-PCS | Mod: CPTII,S$GLB,, | Performed by: UROLOGY

## 2022-10-06 PROCEDURE — 4010F PR ACE/ARB THEARPY RXD/TAKEN: ICD-10-PCS | Mod: CPTII,S$GLB,, | Performed by: UROLOGY

## 2022-10-06 PROCEDURE — 99214 PR OFFICE/OUTPT VISIT, EST, LEVL IV, 30-39 MIN: ICD-10-PCS | Mod: 25,S$GLB,, | Performed by: UROLOGY

## 2022-10-06 RX ORDER — GRANULES FOR ORAL 3 G/1
POWDER ORAL
COMMUNITY
Start: 2022-10-03 | End: 2022-11-25
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
## 2022-10-06 NOTE — PROGRESS NOTES
Patient here today to have in and out catheter placed for ua and ucx.  2 patient identifiers verified.  ?  Patient draped and prepped in sterile fashion.   Cleaned patient urethra opening using Hibiclens.  10 Fr catheter placed into the bladder with no difficulty.   30 ml of urine collected and sent to lab for ua and urine culture.  Catheter removed and patient given obstetrical towelette to clean.  ?  Patient left the office in satisfactory condition.

## 2022-10-07 LAB — BACTERIA UR CULT: NO GROWTH

## 2022-10-11 ENCOUNTER — OFFICE VISIT (OUTPATIENT)
Dept: PSYCHIATRY | Facility: CLINIC | Age: 37
End: 2022-10-11
Payer: COMMERCIAL

## 2022-10-11 DIAGNOSIS — F43.10 PTSD (POST-TRAUMATIC STRESS DISORDER): ICD-10-CM

## 2022-10-11 DIAGNOSIS — F31.81 BIPOLAR II DISORDER: Primary | ICD-10-CM

## 2022-10-11 DIAGNOSIS — F41.1 GAD (GENERALIZED ANXIETY DISORDER): ICD-10-CM

## 2022-10-11 PROCEDURE — 1160F RVW MEDS BY RX/DR IN RCRD: CPT | Mod: CPTII,95,, | Performed by: PHYSICIAN ASSISTANT

## 2022-10-11 PROCEDURE — 4010F ACE/ARB THERAPY RXD/TAKEN: CPT | Mod: CPTII,95,, | Performed by: PHYSICIAN ASSISTANT

## 2022-10-11 PROCEDURE — 1111F DSCHRG MED/CURRENT MED MERGE: CPT | Mod: CPTII,95,, | Performed by: PHYSICIAN ASSISTANT

## 2022-10-11 PROCEDURE — 1160F PR REVIEW ALL MEDS BY PRESCRIBER/CLIN PHARMACIST DOCUMENTED: ICD-10-PCS | Mod: CPTII,95,, | Performed by: PHYSICIAN ASSISTANT

## 2022-10-11 PROCEDURE — 99214 PR OFFICE/OUTPT VISIT, EST, LEVL IV, 30-39 MIN: ICD-10-PCS | Mod: 95,,, | Performed by: PHYSICIAN ASSISTANT

## 2022-10-11 PROCEDURE — 1159F PR MEDICATION LIST DOCUMENTED IN MEDICAL RECORD: ICD-10-PCS | Mod: CPTII,95,, | Performed by: PHYSICIAN ASSISTANT

## 2022-10-11 PROCEDURE — 99999 PR PBB SHADOW E&M-EST. PATIENT-LVL III: ICD-10-PCS | Mod: PBBFAC,,, | Performed by: PHYSICIAN ASSISTANT

## 2022-10-11 PROCEDURE — 3044F PR MOST RECENT HEMOGLOBIN A1C LEVEL <7.0%: ICD-10-PCS | Mod: CPTII,95,, | Performed by: PHYSICIAN ASSISTANT

## 2022-10-11 PROCEDURE — 1159F MED LIST DOCD IN RCRD: CPT | Mod: CPTII,95,, | Performed by: PHYSICIAN ASSISTANT

## 2022-10-11 PROCEDURE — 99999 PR PBB SHADOW E&M-EST. PATIENT-LVL III: CPT | Mod: PBBFAC,,, | Performed by: PHYSICIAN ASSISTANT

## 2022-10-11 PROCEDURE — 4010F PR ACE/ARB THEARPY RXD/TAKEN: ICD-10-PCS | Mod: CPTII,95,, | Performed by: PHYSICIAN ASSISTANT

## 2022-10-11 PROCEDURE — 1111F PR DISCHARGE MEDS RECONCILED W/ CURRENT OUTPATIENT MED LIST: ICD-10-PCS | Mod: CPTII,95,, | Performed by: PHYSICIAN ASSISTANT

## 2022-10-11 PROCEDURE — 3044F HG A1C LEVEL LT 7.0%: CPT | Mod: CPTII,95,, | Performed by: PHYSICIAN ASSISTANT

## 2022-10-11 PROCEDURE — 99214 OFFICE O/P EST MOD 30 MIN: CPT | Mod: 95,,, | Performed by: PHYSICIAN ASSISTANT

## 2022-10-11 RX ORDER — CARIPRAZINE 3 MG/1
3 CAPSULE, GELATIN COATED ORAL NIGHTLY
Qty: 90 CAPSULE | Refills: 0 | Status: SHIPPED | OUTPATIENT
Start: 2022-10-11 | End: 2023-01-10

## 2022-10-11 RX ORDER — BUPROPION HYDROCHLORIDE 150 MG/1
150 TABLET ORAL NIGHTLY
Qty: 90 TABLET | Refills: 0 | Status: SHIPPED | OUTPATIENT
Start: 2022-10-11 | End: 2023-01-10
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
## 2022-10-17 ENCOUNTER — HOSPITAL ENCOUNTER (OUTPATIENT)
Dept: RADIOLOGY | Facility: HOSPITAL | Age: 37
Discharge: HOME | End: 2022-10-17
Attending: NEUROLOGICAL SURGERY
Payer: COMMERCIAL

## 2022-10-17 ENCOUNTER — PATIENT MESSAGE (OUTPATIENT)
Dept: NEUROSURGERY | Facility: CLINIC | Age: 37
End: 2022-10-17
Payer: COMMERCIAL

## 2022-10-17 DIAGNOSIS — Z98.2 S/P VP SHUNT: ICD-10-CM

## 2022-10-17 PROCEDURE — 72020 X-RAY EXAM OF SPINE 1 VIEW: CPT | Mod: 26,,, | Performed by: RADIOLOGY

## 2022-10-17 PROCEDURE — 74018 RADEX ABDOMEN 1 VIEW: CPT | Mod: TC,FY

## 2022-10-17 PROCEDURE — 70450 CT HEAD/BRAIN W/O DYE: CPT | Mod: 26,,, | Performed by: RADIOLOGY

## 2022-10-17 PROCEDURE — 74018 XR SHUNT SERIES: ICD-10-PCS | Mod: 26,,, | Performed by: RADIOLOGY

## 2022-10-17 PROCEDURE — 71045 XR SHUNT SERIES: ICD-10-PCS | Mod: 26,,, | Performed by: RADIOLOGY

## 2022-10-17 PROCEDURE — 70450 CT HEAD WITHOUT CONTRAST: ICD-10-PCS | Mod: 26,,, | Performed by: RADIOLOGY

## 2022-10-17 PROCEDURE — 70250 XR SHUNT SERIES: ICD-10-PCS | Mod: 26,,, | Performed by: RADIOLOGY

## 2022-10-17 PROCEDURE — 70450 CT HEAD/BRAIN W/O DYE: CPT | Mod: TC

## 2022-10-17 PROCEDURE — 72020 XR SHUNT SERIES: ICD-10-PCS | Mod: 26,,, | Performed by: RADIOLOGY

## 2022-10-17 PROCEDURE — 71045 X-RAY EXAM CHEST 1 VIEW: CPT | Mod: 26,,, | Performed by: RADIOLOGY

## 2022-10-17 PROCEDURE — 71045 X-RAY EXAM CHEST 1 VIEW: CPT | Mod: TC,FY

## 2022-10-17 PROCEDURE — 74018 RADEX ABDOMEN 1 VIEW: CPT | Mod: 26,,, | Performed by: RADIOLOGY

## 2022-10-17 PROCEDURE — 70250 X-RAY EXAM OF SKULL: CPT | Mod: 26,,, | Performed by: RADIOLOGY

## 2022-10-18 ENCOUNTER — HOSPITAL ENCOUNTER (OUTPATIENT)
Dept: RADIOLOGY | Facility: HOSPITAL | Age: 37
Discharge: HOME | End: 2022-10-18
Attending: PHYSICIAN ASSISTANT
Payer: COMMERCIAL

## 2022-10-18 ENCOUNTER — OFFICE VISIT (OUTPATIENT)
Dept: NEUROSURGERY | Facility: CLINIC | Age: 37
End: 2022-10-18
Payer: COMMERCIAL

## 2022-10-18 VITALS
BODY MASS INDEX: 40.81 KG/M2 | HEIGHT: 67 IN | HEART RATE: 79 BPM | DIASTOLIC BLOOD PRESSURE: 88 MMHG | SYSTOLIC BLOOD PRESSURE: 138 MMHG | WEIGHT: 260 LBS

## 2022-10-18 DIAGNOSIS — G93.2 IIH (IDIOPATHIC INTRACRANIAL HYPERTENSION): ICD-10-CM

## 2022-10-18 DIAGNOSIS — G93.2 IIH (IDIOPATHIC INTRACRANIAL HYPERTENSION): Primary | Chronic | ICD-10-CM

## 2022-10-18 PROCEDURE — 70250 XR SKULL SHUNT PLACEMENT 1 VIEW: ICD-10-PCS | Mod: 26,76,, | Performed by: RADIOLOGY

## 2022-10-18 PROCEDURE — 70250 X-RAY EXAM OF SKULL: CPT | Mod: TC

## 2022-10-18 PROCEDURE — 99024 POSTOP FOLLOW-UP VISIT: CPT | Mod: S$GLB,,, | Performed by: PHYSICIAN ASSISTANT

## 2022-10-18 PROCEDURE — 70250 XR SKULL SHUNT PLACEMENT 1 VIEW: ICD-10-PCS | Mod: 26,,, | Performed by: RADIOLOGY

## 2022-10-18 PROCEDURE — 4010F ACE/ARB THERAPY RXD/TAKEN: CPT | Mod: CPTII,S$GLB,, | Performed by: PHYSICIAN ASSISTANT

## 2022-10-18 PROCEDURE — 3044F HG A1C LEVEL LT 7.0%: CPT | Mod: CPTII,S$GLB,, | Performed by: PHYSICIAN ASSISTANT

## 2022-10-18 PROCEDURE — 99024 PR POST-OP FOLLOW-UP VISIT: ICD-10-PCS | Mod: S$GLB,,, | Performed by: PHYSICIAN ASSISTANT

## 2022-10-18 PROCEDURE — 99999 PR PBB SHADOW E&M-EST. PATIENT-LVL III: ICD-10-PCS | Mod: PBBFAC,,, | Performed by: PHYSICIAN ASSISTANT

## 2022-10-18 PROCEDURE — 3079F DIAST BP 80-89 MM HG: CPT | Mod: CPTII,S$GLB,, | Performed by: PHYSICIAN ASSISTANT

## 2022-10-18 PROCEDURE — 3079F PR MOST RECENT DIASTOLIC BLOOD PRESSURE 80-89 MM HG: ICD-10-PCS | Mod: CPTII,S$GLB,, | Performed by: PHYSICIAN ASSISTANT

## 2022-10-18 PROCEDURE — 1159F PR MEDICATION LIST DOCUMENTED IN MEDICAL RECORD: ICD-10-PCS | Mod: CPTII,S$GLB,, | Performed by: PHYSICIAN ASSISTANT

## 2022-10-18 PROCEDURE — 70250 X-RAY EXAM OF SKULL: CPT | Mod: 26,76,, | Performed by: RADIOLOGY

## 2022-10-18 PROCEDURE — 3075F PR MOST RECENT SYSTOLIC BLOOD PRESS GE 130-139MM HG: ICD-10-PCS | Mod: CPTII,S$GLB,, | Performed by: PHYSICIAN ASSISTANT

## 2022-10-18 PROCEDURE — 99999 PR PBB SHADOW E&M-EST. PATIENT-LVL III: CPT | Mod: PBBFAC,,, | Performed by: PHYSICIAN ASSISTANT

## 2022-10-18 PROCEDURE — 1159F MED LIST DOCD IN RCRD: CPT | Mod: CPTII,S$GLB,, | Performed by: PHYSICIAN ASSISTANT

## 2022-10-18 PROCEDURE — 4010F PR ACE/ARB THEARPY RXD/TAKEN: ICD-10-PCS | Mod: CPTII,S$GLB,, | Performed by: PHYSICIAN ASSISTANT

## 2022-10-18 PROCEDURE — 3044F PR MOST RECENT HEMOGLOBIN A1C LEVEL <7.0%: ICD-10-PCS | Mod: CPTII,S$GLB,, | Performed by: PHYSICIAN ASSISTANT

## 2022-10-18 PROCEDURE — 3075F SYST BP GE 130 - 139MM HG: CPT | Mod: CPTII,S$GLB,, | Performed by: PHYSICIAN ASSISTANT

## 2022-10-18 PROCEDURE — 70250 X-RAY EXAM OF SKULL: CPT | Mod: 26,,, | Performed by: RADIOLOGY
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
## 2022-10-31 ENCOUNTER — PATIENT MESSAGE (OUTPATIENT)
Dept: NEUROSURGERY | Facility: CLINIC | Age: 37
End: 2022-10-31
Payer: COMMERCIAL

## 2022-10-31 ENCOUNTER — HOSPITAL ENCOUNTER (EMERGENCY)
Facility: HOSPITAL | Age: 37
Discharge: HOME | End: 2022-10-31
Attending: EMERGENCY MEDICINE
Payer: COMMERCIAL

## 2022-10-31 VITALS
SYSTOLIC BLOOD PRESSURE: 107 MMHG | HEART RATE: 90 BPM | TEMPERATURE: 98 F | RESPIRATION RATE: 22 BRPM | DIASTOLIC BLOOD PRESSURE: 77 MMHG | OXYGEN SATURATION: 95 %

## 2022-10-31 DIAGNOSIS — R53.1 LEFT-SIDED WEAKNESS: Primary | ICD-10-CM

## 2022-10-31 DIAGNOSIS — Z98.2 S/P VP SHUNT: Primary | ICD-10-CM

## 2022-10-31 DIAGNOSIS — R20.2 PARESTHESIAS: ICD-10-CM

## 2022-10-31 PROBLEM — F44.7 FUNCTIONAL NEUROLOGICAL SYMPTOM DISORDER WITH MIXED SYMPTOMS: Chronic | Status: ACTIVE | Noted: 2022-10-31

## 2022-10-31 LAB
ALBUMIN SERPL BCP-MCNC: 3.6 G/DL (ref 3.5–5.2)
ALP SERPL-CCNC: 92 U/L (ref 55–135)
ALT SERPL W/O P-5'-P-CCNC: 20 U/L (ref 10–44)
ANION GAP SERPL CALC-SCNC: 12 MMOL/L (ref 8–16)
AST SERPL-CCNC: 13 U/L (ref 10–40)
BASOPHILS # BLD AUTO: 0.02 K/UL (ref 0–0.2)
BASOPHILS NFR BLD: 0.2 % (ref 0–1.9)
BILIRUB SERPL-MCNC: 0.4 MG/DL (ref 0.1–1)
BUN SERPL-MCNC: 9 MG/DL (ref 6–20)
CALCIUM SERPL-MCNC: 9.7 MG/DL (ref 8.7–10.5)
CHLORIDE SERPL-SCNC: 104 MMOL/L (ref 95–110)
CO2 SERPL-SCNC: 22 MMOL/L (ref 23–29)
CREAT SERPL-MCNC: 0.6 MG/DL (ref 0.5–1.4)
CREAT SERPL-MCNC: 0.7 MG/DL (ref 0.5–1.4)
DIFFERENTIAL METHOD: ABNORMAL
EOSINOPHIL # BLD AUTO: 0.1 K/UL (ref 0–0.5)
EOSINOPHIL NFR BLD: 1.4 % (ref 0–8)
ERYTHROCYTE [DISTWIDTH] IN BLOOD BY AUTOMATED COUNT: 14.9 % (ref 11.5–14.5)
EST. GFR  (NO RACE VARIABLE): >60 ML/MIN/1.73 M^2
GLUCOSE SERPL-MCNC: 113 MG/DL (ref 70–110)
HCT VFR BLD AUTO: 39.6 % (ref 37–48.5)
HGB BLD-MCNC: 12 G/DL (ref 12–16)
IMM GRANULOCYTES # BLD AUTO: 0.04 K/UL (ref 0–0.04)
IMM GRANULOCYTES NFR BLD AUTO: 0.5 % (ref 0–0.5)
INR PPP: 0.9 (ref 0.8–1.2)
LYMPHOCYTES # BLD AUTO: 2.3 K/UL (ref 1–4.8)
LYMPHOCYTES NFR BLD: 26.4 % (ref 18–48)
MCH RBC QN AUTO: 25.5 PG (ref 27–31)
MCHC RBC AUTO-ENTMCNC: 30.3 G/DL (ref 32–36)
MCV RBC AUTO: 84 FL (ref 82–98)
MONOCYTES # BLD AUTO: 0.3 K/UL (ref 0.3–1)
MONOCYTES NFR BLD: 3.8 % (ref 4–15)
NEUTROPHILS # BLD AUTO: 5.9 K/UL (ref 1.8–7.7)
NEUTROPHILS NFR BLD: 67.7 % (ref 38–73)
NRBC BLD-RTO: 0 /100 WBC
PLATELET # BLD AUTO: 328 K/UL (ref 150–450)
PLATELET BLD QL SMEAR: ABNORMAL
PMV BLD AUTO: ABNORMAL FL (ref 9.2–12.9)
POC PTINR: 1.1 (ref 0.9–1.2)
POC PTWBT: 12.9 SEC (ref 9.7–14.3)
POTASSIUM SERPL-SCNC: 3.3 MMOL/L (ref 3.5–5.1)
PROT SERPL-MCNC: 6.9 G/DL (ref 6–8.4)
PROTHROMBIN TIME: 9.8 SEC (ref 9–12.5)
RBC # BLD AUTO: 4.7 M/UL (ref 4–5.4)
SAMPLE: NORMAL
SAMPLE: NORMAL
SODIUM SERPL-SCNC: 138 MMOL/L (ref 136–145)
TSH SERPL DL<=0.005 MIU/L-ACNC: 1.84 UIU/ML (ref 0.4–4)
WBC # BLD AUTO: 8.64 K/UL (ref 3.9–12.7)

## 2022-10-31 PROCEDURE — 82565 ASSAY OF CREATININE: CPT | Mod: 59

## 2022-10-31 PROCEDURE — 99284 EMERGENCY DEPT VISIT MOD MDM: CPT | Mod: ,,, | Performed by: STUDENT IN AN ORGANIZED HEALTH CARE EDUCATION/TRAINING PROGRAM

## 2022-10-31 PROCEDURE — 99024 PR POST-OP FOLLOW-UP VISIT: ICD-10-PCS | Mod: ,,, | Performed by: PHYSICIAN ASSISTANT

## 2022-10-31 PROCEDURE — 99285 EMERGENCY DEPT VISIT HI MDM: CPT | Mod: ,,, | Performed by: EMERGENCY MEDICINE

## 2022-10-31 PROCEDURE — 99900035 HC TECH TIME PER 15 MIN (STAT)

## 2022-10-31 PROCEDURE — 85025 COMPLETE CBC W/AUTO DIFF WBC: CPT | Performed by: EMERGENCY MEDICINE

## 2022-10-31 PROCEDURE — 93010 ELECTROCARDIOGRAM REPORT: CPT | Mod: ,,, | Performed by: INTERNAL MEDICINE

## 2022-10-31 PROCEDURE — 85610 PROTHROMBIN TIME: CPT | Performed by: EMERGENCY MEDICINE

## 2022-10-31 PROCEDURE — 96374 THER/PROPH/DIAG INJ IV PUSH: CPT

## 2022-10-31 PROCEDURE — 99284 PR EMERGENCY DEPT VISIT,LEVEL IV: ICD-10-PCS | Mod: ,,, | Performed by: STUDENT IN AN ORGANIZED HEALTH CARE EDUCATION/TRAINING PROGRAM

## 2022-10-31 PROCEDURE — 84443 ASSAY THYROID STIM HORMONE: CPT | Performed by: EMERGENCY MEDICINE

## 2022-10-31 PROCEDURE — 80053 COMPREHEN METABOLIC PANEL: CPT | Performed by: EMERGENCY MEDICINE

## 2022-10-31 PROCEDURE — 99024 POSTOP FOLLOW-UP VISIT: CPT | Mod: ,,, | Performed by: PHYSICIAN ASSISTANT

## 2022-10-31 PROCEDURE — 99285 EMERGENCY DEPT VISIT HI MDM: CPT | Mod: 25

## 2022-10-31 PROCEDURE — 93010 EKG 12-LEAD: ICD-10-PCS | Mod: ,,, | Performed by: INTERNAL MEDICINE

## 2022-10-31 PROCEDURE — 85610 PROTHROMBIN TIME: CPT

## 2022-10-31 PROCEDURE — 93005 ELECTROCARDIOGRAM TRACING: CPT

## 2022-10-31 PROCEDURE — 82565 ASSAY OF CREATININE: CPT

## 2022-10-31 PROCEDURE — 82962 GLUCOSE BLOOD TEST: CPT

## 2022-10-31 PROCEDURE — 99285 PR EMERGENCY DEPT VISIT,LEVEL V: ICD-10-PCS | Mod: ,,, | Performed by: EMERGENCY MEDICINE

## 2022-10-31 PROCEDURE — 63600175 PHARM REV CODE 636 W HCPCS: Performed by: EMERGENCY MEDICINE

## 2022-10-31 RX ORDER — LORAZEPAM 2 MG/ML
1 INJECTION INTRAMUSCULAR
Status: COMPLETED | OUTPATIENT
Start: 2022-10-31 | End: 2022-10-31

## 2022-10-31 RX ADMIN — LORAZEPAM 1 MG: 2 INJECTION INTRAMUSCULAR; INTRAVENOUS at 01:10

## 2022-10-31 NOTE — HPI
Patient is a 36-year-old female with a past medical history of IIH s/p recent  shunt, FND, KERI, migraines, bipolar disorder, PCOS, hyperlipidemia, hypertension, and asthma who presented to the ED with a complaint of left-sided numbness and weakness.  A stroke code was called.  Patient reported she went to bed around 1:00 a.m. last night, and was having mild difficulty walking due to some weakness in her left lower extremity, but did not notice any additional symptoms.  She message neuro surgery, because she was scheduled to have an adjustment of her  shunt tomorrow, and neurosurgery told her if she felt like she needed to come to the emergency department she could.  She has been recently admitted several times for similar symptoms, and since that time has had intermittent baseline left-sided weakness without a known physiologic cause.  Additionally she has previously presented with slowed speech, numbness of her left upper and lower extremities and of her face, and word-finding difficulties.  She also states she currently has a headache.  MRI is negative for acute event.

## 2022-10-31 NOTE — ASSESSMENT & PLAN NOTE
36-year-old female with a past medical history of IIH s/p recent  shunt, FND, KERI, migraines, bipolar disorder, PCOS, hyperlipidemia, hypertension, and asthma who presented to the ED with a complaint of left-sided numbness and weakness.  A stroke code was called.  MRI is negative for acute event.  She has been recently admitted several times for similar symptoms, and since that time has had intermittent baseline left-sided weakness without a known physiologic cause.  Additionally she has previously presented with slowed speech, numbness of her left upper and lower extremities and of her face, and word-finding difficulties, with multiple negative imaging studies and signs of functional symptoms.    Recommendations  -No further workup from a vascular neurology perspective. At this time due to similar symptoms to previous events and negative imaging, most likely cause is functional disorder v complex migraine. Patient ok to discharge with follow up in neurosurgery clinic tomorrow as scheduled.  -Vascular neurology will sign off at this time, please call with any additional questions or concerns.
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
## 2022-10-31 NOTE — HPI
Sonia Goldberg is a 36 y.o. female with a past medical history for IIH s/p VPS (Hakim) on 9/19 by Dr. Yoon who presents to the ED after experiencing left sided heaviness, numbness, and tingling. This began after waking up today at 11 AM with a bad headache. Patient has a history of left sided weakness prior to VPS that previously had resolved after shunt placement.     She denies vision changes or facial droop. Denies fevers, chills, lethargy, nausea, or vomiting. Patient afebrile in ED with slightly elevated BP at 189/84. CTH 10/31 without hydrocephalus, new hemorrhage, mass effect, or MLS. MRI brain 10/31 ordered to r/o stroke without evidence of acute or subacute stroke, hemorrhage, aneurysm or other acute intracranial processes.     Neurosurgery consulted in setting of possible shunt malfunction.

## 2022-10-31 NOTE — ASSESSMENT & PLAN NOTE
-Patient with multiple varying neurologic symptoms, with extensive workup, without known physiologic cause. Multiple instances of visualized functional symptoms, by various medical providers, over the course of multiple hospitalizations.
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
## 2022-10-31 NOTE — ASSESSMENT & PLAN NOTE
36 y.o. female w PMHx for IIH s/p VPS (Hakim) on 9/19 by Dr. Yoon who presents to the ED after experiencing left sided heaviness, numbness, and tingling. This began after waking up today at 11 AM with a bad headache. Patient has a history of left sided weakness prior to shunt placement.     --Patient neuro stable  --All pertinent labs and diagnostics personally reviewed.  --No acute neurosurgical intervention at this time. Low suspicion for shunt malfunction.  --F/u shunt series to assess catheter continuity. S/p MRI shunt re-adjusted to 140. Will confirm on xrays.   --CTH 10/31: without hydrocephalus, new hemorrhage, mass effect, or MLS.  --MRI brain 10/31: ordered to r/o stroke without evidence of aneurysm or acute intracranial processes.

## 2022-10-31 NOTE — ED TRIAGE NOTES
Pt states around 11AM she started to have left sided numbness, tingling and weakness in the lower and upper extremities. Pt does have drift in left arm and left leg. Pt has hx of hypertension and reports having a shunt. Pt denies pain or vision changes. Pt AAOx4.
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
## 2022-10-31 NOTE — PROVIDER PROGRESS NOTES - EMERGENCY DEPT.
Encounter Date: 10/31/2022    ED Physician Progress Notes        ED Course: I, Ashly Flores MD have assumed care of this patient from Dr. Lee. Patient is a 36-year-old past medical history of IH status post  shunt, migraines, hypertension, IBS presenting with left-sided weakness this numbness and tingling of left upper extremity.    At the time of signout plan was pending MRI of brain.    MRI unremarkable spoke with vascular Neurology no likelihood of stroke at this time.  Spoke with Neurosurgery who has seen patient  shunt adjusted pending  shunt series continue to reassess.   4:37 PM    Shunt series unremarkable.  Plan for patient to be discharged home with follow-up as outpatient.  Discussed findings and laboratory testing and imaging with patient patient and significant other at bedside has no further questions safe for plan discharge home at this time.  5:47 PM      Medications given in the ED:    Medications   LORazepam injection 1 mg (1 mg Intravenous Given 10/31/22 1339)       Disposition: d/c home     Impression: HA , left sided weakness, parethesias.

## 2022-10-31 NOTE — ASSESSMENT & PLAN NOTE
Sonia Goldberg is a 36 y.o. female with PMH of IIH and prior symptoms of headaches as well as LSW, s/p VPS placement on 9/19/22 by Dr. Yoon with resolution of headaches and LSW. Recently seen in clinic on 10/17 with return of headaches, Hakim valve dialed down from 170-->140, and subsequent resolution of headaches. Now presents to the ED with new onset headache and one day of LSW.     Imaging reviewed.    -CTH shows stable ventricular shunt catheter placement and configuration of ventricular system. No evidence of hydrocephalus or SDH.   -MRI brain ischemic protocol negative for acute infarct, major branch stenosis, or other acute process.    Plan:  - VPS setting decreased from 140-->120  - XRSS to ensure continuity of shunt and confirm correct valve setting  - Once XR completed and reviewed, okay for discharge from NSGY standpoint  - Will arrange close f/u in clinic in about 2 weeks  - Pt and s/o encouraged to contact our clinic with any questions/concerns or worsening of condition    D/w attending staff Dr. Yoon and ED physician
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
## 2022-11-01 ENCOUNTER — TELEPHONE (OUTPATIENT)
Dept: NEUROSURGERY | Facility: CLINIC | Age: 37
End: 2022-11-01
Payer: COMMERCIAL

## 2022-11-01 ENCOUNTER — PATIENT MESSAGE (OUTPATIENT)
Dept: NEUROSURGERY | Facility: CLINIC | Age: 37
End: 2022-11-01
Payer: COMMERCIAL

## 2022-11-01 LAB — POCT GLUCOSE: 133 MG/DL (ref 70–110)

## 2022-11-01 NOTE — TELEPHONE ENCOUNTER
Spoke to patient and confirmed time and date for f/u with Dorian. She knows to reach out if she has new or worsening symptoms in the meantime.            ----- Message from Brian Honeycutt MA sent at 10/31/2022  4:54 PM CDT -----    ----- Message -----  From: China Saucedo PA-C  Sent: 10/31/2022   4:43 PM CDT  To: Karrie Sneed    Pt was seen in the ED and shunt re-adjusted. Please re-schedule her f/u appt to be seen in about 2 weeks with Dorian or Rekha or me. We don't need to do a CTH before unless she calls and is having new/worse symptoms.    Thanks,  Diana

## 2022-11-15 ENCOUNTER — OFFICE VISIT (OUTPATIENT)
Dept: NEUROSURGERY | Facility: CLINIC | Age: 37
End: 2022-11-15
Payer: COMMERCIAL

## 2022-11-15 DIAGNOSIS — G93.2 BENIGN INTRACRANIAL HYPERTENSION: Primary | ICD-10-CM

## 2022-11-15 PROCEDURE — 3044F HG A1C LEVEL LT 7.0%: CPT | Mod: CPTII,S$GLB,, | Performed by: PHYSICIAN ASSISTANT

## 2022-11-15 PROCEDURE — 4010F ACE/ARB THERAPY RXD/TAKEN: CPT | Mod: CPTII,S$GLB,, | Performed by: PHYSICIAN ASSISTANT

## 2022-11-15 PROCEDURE — 4010F PR ACE/ARB THEARPY RXD/TAKEN: ICD-10-PCS | Mod: CPTII,S$GLB,, | Performed by: PHYSICIAN ASSISTANT

## 2022-11-15 PROCEDURE — 1159F PR MEDICATION LIST DOCUMENTED IN MEDICAL RECORD: ICD-10-PCS | Mod: CPTII,S$GLB,, | Performed by: PHYSICIAN ASSISTANT

## 2022-11-15 PROCEDURE — 99999 PR PBB SHADOW E&M-EST. PATIENT-LVL III: CPT | Mod: PBBFAC,,, | Performed by: PHYSICIAN ASSISTANT

## 2022-11-15 PROCEDURE — 99024 PR POST-OP FOLLOW-UP VISIT: ICD-10-PCS | Mod: S$GLB,,, | Performed by: PHYSICIAN ASSISTANT

## 2022-11-15 PROCEDURE — 1159F MED LIST DOCD IN RCRD: CPT | Mod: CPTII,S$GLB,, | Performed by: PHYSICIAN ASSISTANT

## 2022-11-15 PROCEDURE — 99024 POSTOP FOLLOW-UP VISIT: CPT | Mod: S$GLB,,, | Performed by: PHYSICIAN ASSISTANT

## 2022-11-15 PROCEDURE — 99999 PR PBB SHADOW E&M-EST. PATIENT-LVL III: ICD-10-PCS | Mod: PBBFAC,,, | Performed by: PHYSICIAN ASSISTANT

## 2022-11-15 PROCEDURE — 3044F PR MOST RECENT HEMOGLOBIN A1C LEVEL <7.0%: ICD-10-PCS | Mod: CPTII,S$GLB,, | Performed by: PHYSICIAN ASSISTANT

## 2022-11-15 RX ORDER — ACETAMINOPHEN 500 MG
1000 TABLET ORAL EVERY 6 HOURS PRN
COMMUNITY

## 2022-11-16 ENCOUNTER — HOSPITAL ENCOUNTER (OUTPATIENT)
Dept: RADIOLOGY | Facility: HOSPITAL | Age: 37
Discharge: HOME | End: 2022-11-16
Attending: PHYSICIAN ASSISTANT
Payer: COMMERCIAL

## 2022-11-16 ENCOUNTER — OFFICE VISIT (OUTPATIENT)
Dept: NEUROSURGERY | Facility: CLINIC | Age: 37
End: 2022-11-16
Payer: COMMERCIAL

## 2022-11-16 ENCOUNTER — HOSPITAL ENCOUNTER (OUTPATIENT)
Dept: RADIOLOGY | Facility: HOSPITAL | Age: 37
Discharge: HOME | End: 2022-11-16
Attending: NEUROLOGICAL SURGERY
Payer: COMMERCIAL

## 2022-11-16 DIAGNOSIS — G93.2 PSEUDOTUMOR: Primary | ICD-10-CM

## 2022-11-16 DIAGNOSIS — Z98.2 S/P VP SHUNT: Primary | ICD-10-CM

## 2022-11-16 DIAGNOSIS — Z98.2 S/P VP SHUNT: ICD-10-CM

## 2022-11-16 DIAGNOSIS — G93.2 BENIGN INTRACRANIAL HYPERTENSION: ICD-10-CM

## 2022-11-16 PROCEDURE — 99024 POSTOP FOLLOW-UP VISIT: CPT | Mod: 95,,, | Performed by: NEUROLOGICAL SURGERY

## 2022-11-16 PROCEDURE — 4010F PR ACE/ARB THEARPY RXD/TAKEN: ICD-10-PCS | Mod: CPTII,95,, | Performed by: NEUROLOGICAL SURGERY

## 2022-11-16 PROCEDURE — 70450 CT HEAD/BRAIN W/O DYE: CPT | Mod: TC

## 2022-11-16 PROCEDURE — 3044F HG A1C LEVEL LT 7.0%: CPT | Mod: CPTII,95,, | Performed by: NEUROLOGICAL SURGERY

## 2022-11-16 PROCEDURE — 3044F PR MOST RECENT HEMOGLOBIN A1C LEVEL <7.0%: ICD-10-PCS | Mod: CPTII,95,, | Performed by: NEUROLOGICAL SURGERY

## 2022-11-16 PROCEDURE — 99024 PR POST-OP FOLLOW-UP VISIT: ICD-10-PCS | Mod: 95,,, | Performed by: NEUROLOGICAL SURGERY

## 2022-11-16 PROCEDURE — 4010F ACE/ARB THERAPY RXD/TAKEN: CPT | Mod: CPTII,95,, | Performed by: NEUROLOGICAL SURGERY

## 2022-11-16 PROCEDURE — 70250 XR SKULL SHUNT PLACEMENT 1 VIEW: ICD-10-PCS | Mod: 26,,, | Performed by: RADIOLOGY

## 2022-11-16 PROCEDURE — 70450 CT HEAD/BRAIN W/O DYE: CPT | Mod: 26,,, | Performed by: RADIOLOGY

## 2022-11-16 PROCEDURE — 70250 X-RAY EXAM OF SKULL: CPT | Mod: 26,,, | Performed by: RADIOLOGY

## 2022-11-16 PROCEDURE — 70250 X-RAY EXAM OF SKULL: CPT | Mod: TC

## 2022-11-16 PROCEDURE — 70450 CT HEAD WITHOUT CONTRAST: ICD-10-PCS | Mod: 26,,, | Performed by: RADIOLOGY
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
## 2022-11-16 NOTE — PROGRESS NOTES
Patient comes back to see us for follow-up after her most recent evaluation on 11/15/2022.  Patient is status post  shunt placement for pseudotumor cerebri.  Patient had 2 week improvement of all her symptoms had some resumption of symptoms.  She was seen evaluated and had her valve down down for which she had temporary relief and that and then resumption of symptoms.  No focal deficits identified.  Repeat CT scan shows catheter is in good position.  Ventricles are slightly smaller.  At this stage I think we can try to down the shunt down 1 more time to a setting of 100.  We will set her up to have 1 of our physician down down to 100 if that does not improve then we would have to consider a shunt tap or a  shunt exploration.

## 2022-11-17 ENCOUNTER — TELEPHONE (OUTPATIENT)
Dept: NEUROSURGERY | Facility: CLINIC | Age: 37
End: 2022-11-17
Payer: COMMERCIAL

## 2022-11-17 NOTE — TELEPHONE ENCOUNTER
Spoke to patient and confirmed time and date for appointment with Diana to see how patient is doing after shunt was adjusted to 100 11/16/22. She knows to reach out with questions or concerns in the meantime.

## 2022-11-25 ENCOUNTER — OFFICE VISIT (OUTPATIENT)
Dept: ENDOCRINOLOGY | Facility: CLINIC | Age: 37
End: 2022-11-25
Payer: COMMERCIAL

## 2022-11-25 VITALS
BODY MASS INDEX: 45.08 KG/M2 | HEIGHT: 67 IN | SYSTOLIC BLOOD PRESSURE: 128 MMHG | DIASTOLIC BLOOD PRESSURE: 82 MMHG | WEIGHT: 287.25 LBS | TEMPERATURE: 98 F | HEART RATE: 86 BPM | OXYGEN SATURATION: 99 %

## 2022-11-25 DIAGNOSIS — E28.2 PCOS (POLYCYSTIC OVARIAN SYNDROME): ICD-10-CM

## 2022-11-25 DIAGNOSIS — R63.5 WEIGHT GAIN: ICD-10-CM

## 2022-11-25 DIAGNOSIS — E55.9 HYPOVITAMINOSIS D: ICD-10-CM

## 2022-11-25 DIAGNOSIS — E21.3 HYPERPARATHYROIDISM: Primary | ICD-10-CM

## 2022-11-25 PROCEDURE — 3008F BODY MASS INDEX DOCD: CPT | Mod: CPTII,S$GLB,, | Performed by: PHYSICIAN ASSISTANT

## 2022-11-25 PROCEDURE — 99999 PR PBB SHADOW E&M-EST. PATIENT-LVL III: CPT | Mod: PBBFAC,,, | Performed by: PHYSICIAN ASSISTANT

## 2022-11-25 PROCEDURE — 3074F SYST BP LT 130 MM HG: CPT | Mod: CPTII,S$GLB,, | Performed by: PHYSICIAN ASSISTANT

## 2022-11-25 PROCEDURE — 1159F MED LIST DOCD IN RCRD: CPT | Mod: CPTII,S$GLB,, | Performed by: PHYSICIAN ASSISTANT

## 2022-11-25 PROCEDURE — 4010F PR ACE/ARB THEARPY RXD/TAKEN: ICD-10-PCS | Mod: CPTII,S$GLB,, | Performed by: PHYSICIAN ASSISTANT

## 2022-11-25 PROCEDURE — 99999 PR PBB SHADOW E&M-EST. PATIENT-LVL III: ICD-10-PCS | Mod: PBBFAC,,, | Performed by: PHYSICIAN ASSISTANT

## 2022-11-25 PROCEDURE — 1160F RVW MEDS BY RX/DR IN RCRD: CPT | Mod: CPTII,S$GLB,, | Performed by: PHYSICIAN ASSISTANT

## 2022-11-25 PROCEDURE — 3079F PR MOST RECENT DIASTOLIC BLOOD PRESSURE 80-89 MM HG: ICD-10-PCS | Mod: CPTII,S$GLB,, | Performed by: PHYSICIAN ASSISTANT

## 2022-11-25 PROCEDURE — 3044F HG A1C LEVEL LT 7.0%: CPT | Mod: CPTII,S$GLB,, | Performed by: PHYSICIAN ASSISTANT

## 2022-11-25 PROCEDURE — 99213 PR OFFICE/OUTPT VISIT, EST, LEVL III, 20-29 MIN: ICD-10-PCS | Mod: S$GLB,,, | Performed by: PHYSICIAN ASSISTANT

## 2022-11-25 PROCEDURE — 1160F PR REVIEW ALL MEDS BY PRESCRIBER/CLIN PHARMACIST DOCUMENTED: ICD-10-PCS | Mod: CPTII,S$GLB,, | Performed by: PHYSICIAN ASSISTANT

## 2022-11-25 PROCEDURE — 99213 OFFICE O/P EST LOW 20 MIN: CPT | Mod: S$GLB,,, | Performed by: PHYSICIAN ASSISTANT

## 2022-11-25 PROCEDURE — 4010F ACE/ARB THERAPY RXD/TAKEN: CPT | Mod: CPTII,S$GLB,, | Performed by: PHYSICIAN ASSISTANT

## 2022-11-25 PROCEDURE — 3074F PR MOST RECENT SYSTOLIC BLOOD PRESSURE < 130 MM HG: ICD-10-PCS | Mod: CPTII,S$GLB,, | Performed by: PHYSICIAN ASSISTANT

## 2022-11-25 PROCEDURE — 1159F PR MEDICATION LIST DOCUMENTED IN MEDICAL RECORD: ICD-10-PCS | Mod: CPTII,S$GLB,, | Performed by: PHYSICIAN ASSISTANT

## 2022-11-25 PROCEDURE — 3044F PR MOST RECENT HEMOGLOBIN A1C LEVEL <7.0%: ICD-10-PCS | Mod: CPTII,S$GLB,, | Performed by: PHYSICIAN ASSISTANT

## 2022-11-25 PROCEDURE — 3079F DIAST BP 80-89 MM HG: CPT | Mod: CPTII,S$GLB,, | Performed by: PHYSICIAN ASSISTANT

## 2022-11-25 PROCEDURE — 3008F PR BODY MASS INDEX (BMI) DOCUMENTED: ICD-10-PCS | Mod: CPTII,S$GLB,, | Performed by: PHYSICIAN ASSISTANT

## 2022-11-25 NOTE — PROGRESS NOTES
"CC: Hyperparathyroidism    HPI: Sonia Solorzano is a 36 y.o. female here for hyperparathyroidism along with pending conditions listed in the Visit Diagnosis. New to endocrine. Diagnosed in 9/22 on lab work. +FHx of DM in her mother, mgm and pgf. Pt has IIH and is s/p VPS on 9/19.  No fhx of HPT.  She had a renal stone ~16 years ago. No fractures. Pt states she needs a knee replacement due to work injury. + nausea/vomiting (IIH), abdominal pain (int). Left sided weakness. +irritable. Takes tums and drinks a lot of milk.      PCOS   Pt concerned w/ wt. Reports gaining wt even though she has been eating less due to nausea.  + facial hair   + acne  Metformin 500 mg qd-hasn't taken since august (was taking for wt loss).  No muscle weakness or easy bruising.  Cycles are regular. Sometimes goes ~3-4 mths w/out period.     PMHx, PSHx: reviewed in epic.    Social Hx: no ETOH/tobacco use. She quit vaping ~2 mths ago. 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 He has two children 20 and 15 yo.    Wt Readings from Last 10 Encounters:   11/25/22 130.3 kg (287 lb 4.2 oz)   10/18/22 117.9 kg (260 lb)   09/27/22 118.8 kg (262 lb)   09/27/22 119.5 kg (263 lb 7.2 oz)   09/24/22 127 kg (280 lb)   09/23/22 127.3 kg (280 lb 10.3 oz)   09/19/22 122 kg (269 lb)   09/14/22 123.8 kg (273 lb)   09/14/22 123.9 kg (273 lb 3.2 oz)   09/09/22 123.8 kg (273 lb)      ROS:   Constitutional: + wt gain 17 lbs since 10/22.  Eyes: No recent visual changes  Cardiovascular: Denies current anginal symptoms  Respiratory: Denies current respiratory difficulty  Gastrointestinal: Denies recent bowel disturbances  GenitoUrinary - No dysuria  Skin: No new skin rash  Neurologic: + HA,   Musculoskeletal: + joint pain  Endocrine: no polyphagia, polydipsia or polyuria  Remainder ROS negative     /82 (BP Location: Left arm, Patient Position: Sitting, BP Method: Large (Manual))   Pulse 86   Temp 98.3 °F (36.8 °C) (Oral)   Ht 5' 7" (1.702 m)   Wt " 130.3 kg (287 lb 4.2 oz)   SpO2 99%   BMI 44.99 kg/m²      Personally reviewed labs below:    Lab Results   Component Value Date    TSH 1.837 10/31/2022        Chemistry        Component Value Date/Time     10/31/2022 1355    K 3.3 (L) 10/31/2022 1355     10/31/2022 1355    CO2 22 (L) 10/31/2022 1355    BUN 9 10/31/2022 1355    CREATININE 0.7 10/31/2022 1355    CREATININE 0.9 08/15/2013 2233     (H) 10/31/2022 1355        Component Value Date/Time    CALCIUM 9.7 10/31/2022 1355    CALCIUM 9.7 08/15/2013 2233    ALKPHOS 92 10/31/2022 1355    AST 13 10/31/2022 1355    ALT 20 10/31/2022 1355    BILITOT 0.4 10/31/2022 1355    ESTGFRAFRICA >60.0 02/07/2022 1031    EGFRNONAA >60.0 02/07/2022 1031           Lab Results   Component Value Date    HGBA1C 5.5 02/07/2022    HGBA1C 5.2 09/17/2020        PE:  GENERAL: middle aged female, well developed, well nourished  NECK: Supple neck, normal thyroid. No bruit  LYMPHATIC: No cervical or supraclavicular lymphadenopathy  CARDIOVASCULAR: Normal heart sounds, no pedal edema  RESPIRATORY: Normal effort, clear to auscultation  MUSC: 2+ DTR UE/LE  NEURO: steady gait, CN ll-Xll grossly intact  PSYCH: normal mood and affect      Assessment/Plan:   1. Hypovitaminosis D  Vitamin D    Vitamin D      2. Hyperparathyroidism  Renal Function Panel    PTH, Intact    Calcium, Ionized    PTH, Intact    Renal Function Panel    Calcium, Ionized      3. PCOS (polycystic ovarian syndrome)  Testosterone Panel    17-Hydroxyprogesterone    Insulin, Random      4. Weight gain  ACTH    Cortisol         Hyperparathyroidism-repeat labs. PTH slightly elevated. Ca is wnl. Check vd.    PCOS-check testosterone/insulin. If elevated, will restart Metformin. 500 mg qd.  Wt gain-check acth/cortisol.  Hypovitaminosis d-check vd.    Fasting labs  F/u in 6 mths -pth,ica, vd, rp

## 2022-11-28 ENCOUNTER — TELEPHONE (OUTPATIENT)
Dept: NEUROSURGERY | Facility: CLINIC | Age: 37
End: 2022-11-28
Payer: COMMERCIAL

## 2022-11-28 ENCOUNTER — PATIENT MESSAGE (OUTPATIENT)
Dept: NEUROSURGERY | Facility: CLINIC | Age: 37
End: 2022-11-28
Payer: COMMERCIAL

## 2022-11-28 ENCOUNTER — TELEPHONE (OUTPATIENT)
Dept: NEUROSURGERY | Facility: CLINIC | Age: 37
End: 2022-11-28

## 2022-11-28 ENCOUNTER — PATIENT MESSAGE (OUTPATIENT)
Dept: NEUROSURGERY | Facility: CLINIC | Age: 37
End: 2022-11-28

## 2022-11-28 ENCOUNTER — HOSPITAL ENCOUNTER (OUTPATIENT)
Dept: RADIOLOGY | Facility: HOSPITAL | Age: 37
Discharge: HOME | End: 2022-11-28
Attending: PHYSICIAN ASSISTANT
Payer: COMMERCIAL

## 2022-11-28 ENCOUNTER — OFFICE VISIT (OUTPATIENT)
Dept: NEUROSURGERY | Facility: CLINIC | Age: 37
End: 2022-11-28
Payer: COMMERCIAL

## 2022-11-28 VITALS — HEART RATE: 80 BPM | DIASTOLIC BLOOD PRESSURE: 82 MMHG | SYSTOLIC BLOOD PRESSURE: 158 MMHG | TEMPERATURE: 99 F

## 2022-11-28 DIAGNOSIS — M79.662 PAIN OF LEFT CALF: ICD-10-CM

## 2022-11-28 DIAGNOSIS — G93.2 IIH (IDIOPATHIC INTRACRANIAL HYPERTENSION): Primary | Chronic | ICD-10-CM

## 2022-11-28 DIAGNOSIS — Z98.2 S/P VP SHUNT: ICD-10-CM

## 2022-11-28 DIAGNOSIS — Z98.2 S/P VP SHUNT: Primary | ICD-10-CM

## 2022-11-28 LAB
CLARITY CSF: CLEAR
COLOR CSF: COLORLESS
GLUCOSE CSF-MCNC: 73 MG/DL (ref 40–70)
LYMPHOCYTES NFR CSF MANUAL: 79 % (ref 40–80)
MONOS+MACROS NFR CSF MANUAL: 19 % (ref 15–45)
NEUTROPHILS NFR CSF MANUAL: 2 % (ref 0–6)
PROT CSF-MCNC: 39 MG/DL (ref 15–40)
RBC # CSF: 0 /CU MM
SPECIMEN VOL CSF: 6 ML
WBC # CSF: 3 /CU MM (ref 0–5)

## 2022-11-28 PROCEDURE — 3044F PR MOST RECENT HEMOGLOBIN A1C LEVEL <7.0%: ICD-10-PCS | Mod: CPTII,S$GLB,, | Performed by: PHYSICIAN ASSISTANT

## 2022-11-28 PROCEDURE — 61070 PR BRAIN SHUNT TUBE/RESERV INJECTN: ICD-10-PCS | Mod: 58,S$GLB,, | Performed by: PHYSICIAN ASSISTANT

## 2022-11-28 PROCEDURE — 87205 SMEAR GRAM STAIN: CPT | Performed by: PHYSICIAN ASSISTANT

## 2022-11-28 PROCEDURE — 70250 X-RAY EXAM OF SKULL: CPT | Mod: TC

## 2022-11-28 PROCEDURE — 99999 PR PBB SHADOW E&M-EST. PATIENT-LVL III: CPT | Mod: PBBFAC,,, | Performed by: PHYSICIAN ASSISTANT

## 2022-11-28 PROCEDURE — 84157 ASSAY OF PROTEIN OTHER: CPT | Performed by: PHYSICIAN ASSISTANT

## 2022-11-28 PROCEDURE — 99999 PR PBB SHADOW E&M-EST. PATIENT-LVL III: ICD-10-PCS | Mod: PBBFAC,,, | Performed by: PHYSICIAN ASSISTANT

## 2022-11-28 PROCEDURE — 4010F ACE/ARB THERAPY RXD/TAKEN: CPT | Mod: CPTII,S$GLB,, | Performed by: PHYSICIAN ASSISTANT

## 2022-11-28 PROCEDURE — 3079F PR MOST RECENT DIASTOLIC BLOOD PRESSURE 80-89 MM HG: ICD-10-PCS | Mod: CPTII,S$GLB,, | Performed by: PHYSICIAN ASSISTANT

## 2022-11-28 PROCEDURE — 99024 POSTOP FOLLOW-UP VISIT: CPT | Mod: S$GLB,,, | Performed by: PHYSICIAN ASSISTANT

## 2022-11-28 PROCEDURE — 62252 CSF SHUNT REPROGRAM: CPT | Mod: S$GLB,,, | Performed by: PHYSICIAN ASSISTANT

## 2022-11-28 PROCEDURE — 70250 XR SKULL SHUNT PLACEMENT 1 VIEW: ICD-10-PCS | Mod: 26,,, | Performed by: RADIOLOGY

## 2022-11-28 PROCEDURE — 4010F PR ACE/ARB THEARPY RXD/TAKEN: ICD-10-PCS | Mod: CPTII,S$GLB,, | Performed by: PHYSICIAN ASSISTANT

## 2022-11-28 PROCEDURE — 62252 PR REPROGRAMMING,PROGRAMMABLE CSF SHUNT: ICD-10-PCS | Mod: S$GLB,,, | Performed by: PHYSICIAN ASSISTANT

## 2022-11-28 PROCEDURE — 3079F DIAST BP 80-89 MM HG: CPT | Mod: CPTII,S$GLB,, | Performed by: PHYSICIAN ASSISTANT

## 2022-11-28 PROCEDURE — 3077F PR MOST RECENT SYSTOLIC BLOOD PRESSURE >= 140 MM HG: ICD-10-PCS | Mod: CPTII,S$GLB,, | Performed by: PHYSICIAN ASSISTANT

## 2022-11-28 PROCEDURE — 61070 BRAIN CANAL SHUNT PROCEDURE: CPT | Mod: 58,S$GLB,, | Performed by: PHYSICIAN ASSISTANT

## 2022-11-28 PROCEDURE — 82945 GLUCOSE OTHER FLUID: CPT | Performed by: PHYSICIAN ASSISTANT

## 2022-11-28 PROCEDURE — 89051 BODY FLUID CELL COUNT: CPT | Performed by: PHYSICIAN ASSISTANT

## 2022-11-28 PROCEDURE — 1159F PR MEDICATION LIST DOCUMENTED IN MEDICAL RECORD: ICD-10-PCS | Mod: CPTII,S$GLB,, | Performed by: PHYSICIAN ASSISTANT

## 2022-11-28 PROCEDURE — 87070 CULTURE OTHR SPECIMN AEROBIC: CPT | Performed by: PHYSICIAN ASSISTANT

## 2022-11-28 PROCEDURE — 70250 X-RAY EXAM OF SKULL: CPT | Mod: 26,,, | Performed by: RADIOLOGY

## 2022-11-28 PROCEDURE — 99024 PR POST-OP FOLLOW-UP VISIT: ICD-10-PCS | Mod: S$GLB,,, | Performed by: PHYSICIAN ASSISTANT

## 2022-11-28 PROCEDURE — 1159F MED LIST DOCD IN RCRD: CPT | Mod: CPTII,S$GLB,, | Performed by: PHYSICIAN ASSISTANT

## 2022-11-28 PROCEDURE — 3044F HG A1C LEVEL LT 7.0%: CPT | Mod: CPTII,S$GLB,, | Performed by: PHYSICIAN ASSISTANT

## 2022-11-28 PROCEDURE — 3077F SYST BP >= 140 MM HG: CPT | Mod: CPTII,S$GLB,, | Performed by: PHYSICIAN ASSISTANT
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
## 2022-11-28 NOTE — TELEPHONE ENCOUNTER
Spoke w pt and scheduled f/u appt and imaging for 12/27. Pt confirmed time and date worked with their schedule.

## 2022-11-29 ENCOUNTER — PATIENT MESSAGE (OUTPATIENT)
Dept: ENDOCRINOLOGY | Facility: CLINIC | Age: 37
End: 2022-11-29
Payer: COMMERCIAL

## 2022-11-29 ENCOUNTER — HOSPITAL ENCOUNTER (OUTPATIENT)
Dept: RADIOLOGY | Facility: HOSPITAL | Age: 37
Discharge: HOME | End: 2022-11-29
Attending: PHYSICIAN ASSISTANT
Payer: COMMERCIAL

## 2022-11-29 DIAGNOSIS — M79.662 PAIN OF LEFT CALF: ICD-10-CM

## 2022-11-29 PROCEDURE — 93971 EXTREMITY STUDY: CPT | Mod: TC,PO,LT

## 2022-11-29 PROCEDURE — 93971 US LOWER EXTREMITY VEINS LEFT: ICD-10-PCS | Mod: 26,LT,, | Performed by: RADIOLOGY

## 2022-11-29 PROCEDURE — 93971 EXTREMITY STUDY: CPT | Mod: 26,LT,, | Performed by: RADIOLOGY

## 2022-12-03 LAB
BACTERIA CSF CULT: NO GROWTH
GRAM STN SPEC: NORMAL

## 2022-12-23 ENCOUNTER — PATIENT MESSAGE (OUTPATIENT)
Dept: PSYCHIATRY | Facility: CLINIC | Age: 37
End: 2022-12-23
Payer: COMMERCIAL

## 2022-12-23 DIAGNOSIS — F31.81 BIPOLAR II DISORDER: Primary | ICD-10-CM

## 2022-12-23 RX ORDER — QUETIAPINE FUMARATE 25 MG/1
25 TABLET, FILM COATED ORAL NIGHTLY PRN
Qty: 30 TABLET | Refills: 0 | Status: SHIPPED | OUTPATIENT
Start: 2022-12-23 | End: 2023-01-10 | Stop reason: SDUPTHER

## 2022-12-23 NOTE — TELEPHONE ENCOUNTER
Spoke to patient via telephone.  Patient reports symptoms of mushtaq including cleaning house excessively for the past 4 days despite being clean, poor to no sleep, and significantly decreased appetite.  Reports previously taking Depakote during manic phase, however has since had an allergy warning placed while hospitalized due to hives.  Discussed using Seroquel short term low dose for symptoms of mushtaq.  Discussed risks versus benefits of Seroquel.  Patient agreeable to current treatment plan.  Denies further concerns.

## 2022-12-27 ENCOUNTER — HOSPITAL ENCOUNTER (OUTPATIENT)
Dept: RADIOLOGY | Facility: HOSPITAL | Age: 37
Discharge: HOME | End: 2022-12-27
Attending: PHYSICIAN ASSISTANT
Payer: COMMERCIAL

## 2022-12-27 ENCOUNTER — OFFICE VISIT (OUTPATIENT)
Dept: NEUROSURGERY | Facility: CLINIC | Age: 37
End: 2022-12-27
Payer: COMMERCIAL

## 2022-12-27 VITALS
SYSTOLIC BLOOD PRESSURE: 133 MMHG | DIASTOLIC BLOOD PRESSURE: 86 MMHG | WEIGHT: 287.25 LBS | HEIGHT: 67 IN | BODY MASS INDEX: 45.08 KG/M2 | HEART RATE: 92 BPM

## 2022-12-27 DIAGNOSIS — Z98.2 S/P VP SHUNT: ICD-10-CM

## 2022-12-27 DIAGNOSIS — G93.2 IIH (IDIOPATHIC INTRACRANIAL HYPERTENSION): Chronic | ICD-10-CM

## 2022-12-27 DIAGNOSIS — G93.2 IIH (IDIOPATHIC INTRACRANIAL HYPERTENSION): Primary | Chronic | ICD-10-CM

## 2022-12-27 PROCEDURE — 3079F DIAST BP 80-89 MM HG: CPT | Mod: CPTII,S$GLB,, | Performed by: PHYSICIAN ASSISTANT

## 2022-12-27 PROCEDURE — 3079F PR MOST RECENT DIASTOLIC BLOOD PRESSURE 80-89 MM HG: ICD-10-PCS | Mod: CPTII,S$GLB,, | Performed by: PHYSICIAN ASSISTANT

## 2022-12-27 PROCEDURE — 3044F HG A1C LEVEL LT 7.0%: CPT | Mod: CPTII,S$GLB,, | Performed by: PHYSICIAN ASSISTANT

## 2022-12-27 PROCEDURE — 99999 PR PBB SHADOW E&M-EST. PATIENT-LVL III: ICD-10-PCS | Mod: PBBFAC,,, | Performed by: PHYSICIAN ASSISTANT

## 2022-12-27 PROCEDURE — 1159F PR MEDICATION LIST DOCUMENTED IN MEDICAL RECORD: ICD-10-PCS | Mod: CPTII,S$GLB,, | Performed by: PHYSICIAN ASSISTANT

## 2022-12-27 PROCEDURE — 70450 CT HEAD/BRAIN W/O DYE: CPT | Mod: TC

## 2022-12-27 PROCEDURE — 3075F SYST BP GE 130 - 139MM HG: CPT | Mod: CPTII,S$GLB,, | Performed by: PHYSICIAN ASSISTANT

## 2022-12-27 PROCEDURE — 99215 PR OFFICE/OUTPT VISIT, EST, LEVL V, 40-54 MIN: ICD-10-PCS | Mod: S$GLB,,, | Performed by: PHYSICIAN ASSISTANT

## 2022-12-27 PROCEDURE — 3044F PR MOST RECENT HEMOGLOBIN A1C LEVEL <7.0%: ICD-10-PCS | Mod: CPTII,S$GLB,, | Performed by: PHYSICIAN ASSISTANT

## 2022-12-27 PROCEDURE — 99215 OFFICE O/P EST HI 40 MIN: CPT | Mod: S$GLB,,, | Performed by: PHYSICIAN ASSISTANT

## 2022-12-27 PROCEDURE — 74176 CT ABD & PELVIS W/O CONTRAST: CPT | Mod: TC

## 2022-12-27 PROCEDURE — 99999 PR PBB SHADOW E&M-EST. PATIENT-LVL III: CPT | Mod: PBBFAC,,, | Performed by: PHYSICIAN ASSISTANT

## 2022-12-27 PROCEDURE — 4010F PR ACE/ARB THEARPY RXD/TAKEN: ICD-10-PCS | Mod: CPTII,S$GLB,, | Performed by: PHYSICIAN ASSISTANT

## 2022-12-27 PROCEDURE — 3008F BODY MASS INDEX DOCD: CPT | Mod: CPTII,S$GLB,, | Performed by: PHYSICIAN ASSISTANT

## 2022-12-27 PROCEDURE — 4010F ACE/ARB THERAPY RXD/TAKEN: CPT | Mod: CPTII,S$GLB,, | Performed by: PHYSICIAN ASSISTANT

## 2022-12-27 PROCEDURE — 1159F MED LIST DOCD IN RCRD: CPT | Mod: CPTII,S$GLB,, | Performed by: PHYSICIAN ASSISTANT

## 2022-12-27 PROCEDURE — 3008F PR BODY MASS INDEX (BMI) DOCUMENTED: ICD-10-PCS | Mod: CPTII,S$GLB,, | Performed by: PHYSICIAN ASSISTANT

## 2022-12-27 PROCEDURE — 3075F PR MOST RECENT SYSTOLIC BLOOD PRESS GE 130-139MM HG: ICD-10-PCS | Mod: CPTII,S$GLB,, | Performed by: PHYSICIAN ASSISTANT

## 2022-12-27 NOTE — TELEPHONE ENCOUNTER
Spoke with Sonia on the phone regarding mushtaq. She is sleeping better with Seroquel. Declines medication adjustment and we have upcoming visit on 1/10. No other concerns/complaints today.
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
## 2022-12-29 ENCOUNTER — TELEPHONE (OUTPATIENT)
Dept: OPHTHALMOLOGY | Facility: CLINIC | Age: 37
End: 2022-12-29
Payer: COMMERCIAL

## 2022-12-29 NOTE — TELEPHONE ENCOUNTER
----- Message from Cheryle Quintana sent at 12/29/2022 11:56 AM CST -----  Sent not to Sheryl Rodas pt seen in August     Cheryle Quintana Cheryle Quintana [facility] Ophthalmology Clinical Support   Cc: NIDIA Richter Staff   Caller: Unspecified (Today, 11:29 AM)   Type: Sooner Apoointment Request     Ms. Solorzano contacted [facility] team to be scheduled an Ophthalmology appointment from the Ambulatory referral/consult to Ophthalmology referral that was placed by China Saucedo PA-C. Unfortunately there were no available appointments that populated for the [facility] where Ms. Solorzano has requested to be scheduled at. She requested for a message be sent to doctor. Caller is requesting a sooner appointment.  Caller declined first available appointment listed below.  Caller will not accept being placed on the waitlist and is requesting a message be sent to doctor.     Name of Caller: Sonia Solorzano   When is the first available appointment? No available future appointments   Symptoms: Neuro Ophthalmology. IIH, eval HVF and for papilledema   Would the patient rather a call back or a response via [facility]? [facility] message   Best Call Back Number: [phone]   Additional Information:

## 2023-01-04 ENCOUNTER — PATIENT MESSAGE (OUTPATIENT)
Dept: NEUROSURGERY | Facility: CLINIC | Age: 38
End: 2023-01-04
Payer: COMMERCIAL

## 2023-01-04 DIAGNOSIS — Z98.2 S/P VP SHUNT: Primary | ICD-10-CM

## 2023-01-04 DIAGNOSIS — G93.2 IIH (IDIOPATHIC INTRACRANIAL HYPERTENSION): ICD-10-CM

## 2023-01-04 RX ORDER — GABAPENTIN 100 MG/1
300 CAPSULE ORAL 3 TIMES DAILY
Qty: 270 CAPSULE | Refills: 11 | Status: ON HOLD | OUTPATIENT
Start: 2023-01-04 | End: 2023-06-26 | Stop reason: HOSPADM

## 2023-01-05 ENCOUNTER — HOSPITAL ENCOUNTER (OUTPATIENT)
Dept: RADIOLOGY | Facility: HOSPITAL | Age: 38
Discharge: HOME | End: 2023-01-05
Attending: PHYSICIAN ASSISTANT
Payer: COMMERCIAL

## 2023-01-05 DIAGNOSIS — Z98.2 S/P VP SHUNT: ICD-10-CM

## 2023-01-05 DIAGNOSIS — G93.2 IIH (IDIOPATHIC INTRACRANIAL HYPERTENSION): ICD-10-CM

## 2023-01-05 PROCEDURE — 74018 RADEX ABDOMEN 1 VIEW: CPT | Mod: 26,,, | Performed by: RADIOLOGY

## 2023-01-05 PROCEDURE — 72020 XR SHUNT SERIES: ICD-10-PCS | Mod: 26,,, | Performed by: RADIOLOGY

## 2023-01-05 PROCEDURE — 70250 XR SHUNT SERIES: ICD-10-PCS | Mod: 26,,, | Performed by: RADIOLOGY

## 2023-01-05 PROCEDURE — 71045 X-RAY EXAM CHEST 1 VIEW: CPT | Mod: 26,,, | Performed by: RADIOLOGY

## 2023-01-05 PROCEDURE — 74018 RADEX ABDOMEN 1 VIEW: CPT | Mod: TC

## 2023-01-05 PROCEDURE — 71045 XR SHUNT SERIES: ICD-10-PCS | Mod: 26,,, | Performed by: RADIOLOGY

## 2023-01-05 PROCEDURE — 74018 XR SHUNT SERIES: ICD-10-PCS | Mod: 26,,, | Performed by: RADIOLOGY

## 2023-01-05 PROCEDURE — 70250 X-RAY EXAM OF SKULL: CPT | Mod: 26,,, | Performed by: RADIOLOGY

## 2023-01-05 PROCEDURE — 72020 X-RAY EXAM OF SPINE 1 VIEW: CPT | Mod: 26,,, | Performed by: RADIOLOGY

## 2023-01-08 ENCOUNTER — HOSPITAL ENCOUNTER (EMERGENCY)
Facility: HOSPITAL | Age: 38
Discharge: HOME | End: 2023-01-08
Attending: EMERGENCY MEDICINE
Payer: COMMERCIAL

## 2023-01-08 VITALS
HEIGHT: 67 IN | TEMPERATURE: 99 F | WEIGHT: 280 LBS | HEART RATE: 78 BPM | SYSTOLIC BLOOD PRESSURE: 114 MMHG | BODY MASS INDEX: 43.95 KG/M2 | OXYGEN SATURATION: 98 % | RESPIRATION RATE: 16 BRPM | DIASTOLIC BLOOD PRESSURE: 57 MMHG

## 2023-01-08 DIAGNOSIS — R10.9 ABDOMINAL PAIN, UNSPECIFIED ABDOMINAL LOCATION: Primary | ICD-10-CM

## 2023-01-08 LAB
ALBUMIN SERPL BCP-MCNC: 3.3 G/DL (ref 3.5–5.2)
ALP SERPL-CCNC: 74 U/L (ref 55–135)
ALT SERPL W/O P-5'-P-CCNC: 15 U/L (ref 10–44)
ANION GAP SERPL CALC-SCNC: 7 MMOL/L (ref 8–16)
AST SERPL-CCNC: 11 U/L (ref 10–40)
B-HCG UR QL: NEGATIVE
BASOPHILS # BLD AUTO: 0.04 K/UL (ref 0–0.2)
BASOPHILS NFR BLD: 0.4 % (ref 0–1.9)
BILIRUB SERPL-MCNC: 0.2 MG/DL (ref 0.1–1)
BUN SERPL-MCNC: 8 MG/DL (ref 6–20)
CALCIUM SERPL-MCNC: 9.6 MG/DL (ref 8.7–10.5)
CHLORIDE SERPL-SCNC: 106 MMOL/L (ref 95–110)
CO2 SERPL-SCNC: 26 MMOL/L (ref 23–29)
CREAT SERPL-MCNC: 0.9 MG/DL (ref 0.5–1.4)
DIFFERENTIAL METHOD: ABNORMAL
EOSINOPHIL # BLD AUTO: 0.2 K/UL (ref 0–0.5)
EOSINOPHIL NFR BLD: 1.7 % (ref 0–8)
ERYTHROCYTE [DISTWIDTH] IN BLOOD BY AUTOMATED COUNT: 14.9 % (ref 11.5–14.5)
EST. GFR  (NO RACE VARIABLE): >60 ML/MIN/1.73 M^2
GLUCOSE SERPL-MCNC: 97 MG/DL (ref 70–110)
HCT VFR BLD AUTO: 36 % (ref 37–48.5)
HGB BLD-MCNC: 10.6 G/DL (ref 12–16)
IMM GRANULOCYTES # BLD AUTO: 0.06 K/UL (ref 0–0.04)
IMM GRANULOCYTES NFR BLD AUTO: 0.7 % (ref 0–0.5)
LIPASE SERPL-CCNC: 19 U/L (ref 4–60)
LYMPHOCYTES # BLD AUTO: 2.4 K/UL (ref 1–4.8)
LYMPHOCYTES NFR BLD: 26.7 % (ref 18–48)
MCH RBC QN AUTO: 23.8 PG (ref 27–31)
MCHC RBC AUTO-ENTMCNC: 29.4 G/DL (ref 32–36)
MCV RBC AUTO: 81 FL (ref 82–98)
MONOCYTES # BLD AUTO: 0.5 K/UL (ref 0.3–1)
MONOCYTES NFR BLD: 6 % (ref 4–15)
NEUTROPHILS # BLD AUTO: 5.9 K/UL (ref 1.8–7.7)
NEUTROPHILS NFR BLD: 64.5 % (ref 38–73)
NRBC BLD-RTO: 0 /100 WBC
PLATELET # BLD AUTO: 388 K/UL (ref 150–450)
PMV BLD AUTO: 10.3 FL (ref 9.2–12.9)
POTASSIUM SERPL-SCNC: 3.6 MMOL/L (ref 3.5–5.1)
PROT SERPL-MCNC: 6.2 G/DL (ref 6–8.4)
RBC # BLD AUTO: 4.45 M/UL (ref 4–5.4)
SODIUM SERPL-SCNC: 139 MMOL/L (ref 136–145)
WBC # BLD AUTO: 9.06 K/UL (ref 3.9–12.7)

## 2023-01-08 PROCEDURE — 85025 COMPLETE CBC W/AUTO DIFF WBC: CPT | Performed by: EMERGENCY MEDICINE

## 2023-01-08 PROCEDURE — 99284 EMERGENCY DEPT VISIT MOD MDM: CPT | Mod: 25

## 2023-01-08 PROCEDURE — 80053 COMPREHEN METABOLIC PANEL: CPT | Performed by: EMERGENCY MEDICINE

## 2023-01-08 PROCEDURE — 81025 URINE PREGNANCY TEST: CPT | Performed by: EMERGENCY MEDICINE

## 2023-01-08 PROCEDURE — 36415 COLL VENOUS BLD VENIPUNCTURE: CPT | Performed by: EMERGENCY MEDICINE

## 2023-01-08 PROCEDURE — 83690 ASSAY OF LIPASE: CPT | Performed by: EMERGENCY MEDICINE

## 2023-01-08 NOTE — ED NOTES
"Patient to ER4- states was cleaning car, leaned over center console and felt a "tearing pain".  States had  shunt placed last year at [facility]  "
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
## 2023-01-10 ENCOUNTER — PATIENT MESSAGE (OUTPATIENT)
Dept: GASTROENTEROLOGY | Facility: CLINIC | Age: 38
End: 2023-01-10
Payer: COMMERCIAL

## 2023-01-10 ENCOUNTER — OFFICE VISIT (OUTPATIENT)
Dept: PSYCHIATRY | Facility: CLINIC | Age: 38
End: 2023-01-10
Payer: COMMERCIAL

## 2023-01-10 DIAGNOSIS — R11.0 NAUSEA: ICD-10-CM

## 2023-01-10 DIAGNOSIS — F31.81 BIPOLAR II DISORDER: ICD-10-CM

## 2023-01-10 DIAGNOSIS — F43.10 PTSD (POST-TRAUMATIC STRESS DISORDER): ICD-10-CM

## 2023-01-10 DIAGNOSIS — F41.1 GAD (GENERALIZED ANXIETY DISORDER): Primary | ICD-10-CM

## 2023-01-10 DIAGNOSIS — K44.9 HIATAL HERNIA: ICD-10-CM

## 2023-01-10 DIAGNOSIS — R10.9 ABDOMINAL CRAMPING: ICD-10-CM

## 2023-01-10 DIAGNOSIS — K59.00 CONSTIPATION, UNSPECIFIED CONSTIPATION TYPE: ICD-10-CM

## 2023-01-10 DIAGNOSIS — K21.9 GASTROESOPHAGEAL REFLUX DISEASE, UNSPECIFIED WHETHER ESOPHAGITIS PRESENT: Primary | ICD-10-CM

## 2023-01-10 PROCEDURE — 99214 OFFICE O/P EST MOD 30 MIN: CPT | Mod: 95,,, | Performed by: PHYSICIAN ASSISTANT

## 2023-01-10 PROCEDURE — 1159F MED LIST DOCD IN RCRD: CPT | Mod: CPTII,95,, | Performed by: PHYSICIAN ASSISTANT

## 2023-01-10 PROCEDURE — 99214 PR OFFICE/OUTPT VISIT, EST, LEVL IV, 30-39 MIN: ICD-10-PCS | Mod: 95,,, | Performed by: PHYSICIAN ASSISTANT

## 2023-01-10 PROCEDURE — 1160F RVW MEDS BY RX/DR IN RCRD: CPT | Mod: CPTII,95,, | Performed by: PHYSICIAN ASSISTANT

## 2023-01-10 PROCEDURE — 1159F PR MEDICATION LIST DOCUMENTED IN MEDICAL RECORD: ICD-10-PCS | Mod: CPTII,95,, | Performed by: PHYSICIAN ASSISTANT

## 2023-01-10 PROCEDURE — 1160F PR REVIEW ALL MEDS BY PRESCRIBER/CLIN PHARMACIST DOCUMENTED: ICD-10-PCS | Mod: CPTII,95,, | Performed by: PHYSICIAN ASSISTANT

## 2023-01-10 RX ORDER — CARIPRAZINE 4.5 MG/1
4.5 CAPSULE, GELATIN COATED ORAL DAILY
Qty: 30 CAPSULE | Refills: 1 | Status: SHIPPED | OUTPATIENT
Start: 2023-01-10 | End: 2023-02-14 | Stop reason: SDUPTHER

## 2023-01-10 RX ORDER — QUETIAPINE FUMARATE 25 MG/1
25 TABLET, FILM COATED ORAL NIGHTLY PRN
Qty: 30 TABLET | Refills: 0 | Status: ON HOLD | OUTPATIENT
Start: 2023-01-10 | End: 2023-06-26 | Stop reason: HOSPADM

## 2023-01-10 NOTE — PATIENT INSTRUCTIONS
Trial discontinuation of wellbutrin. Can be added in back at any time.    Increase Vraylar to 4.5mg for mood.     Continue Seroquel 25mg as needed for sleep.
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
## 2023-01-12 ENCOUNTER — OFFICE VISIT (OUTPATIENT)
Dept: ORTHOPEDICS | Facility: CLINIC | Age: 38
End: 2023-01-12
Payer: OTHER MISCELLANEOUS

## 2023-01-12 VITALS — BODY MASS INDEX: 43.95 KG/M2 | HEIGHT: 67 IN | WEIGHT: 280 LBS

## 2023-01-12 DIAGNOSIS — Z98.890 HISTORY OF ARTHROSCOPY OF RIGHT KNEE: ICD-10-CM

## 2023-01-12 DIAGNOSIS — M17.11 PRIMARY OSTEOARTHRITIS OF RIGHT KNEE: Primary | ICD-10-CM

## 2023-01-12 PROCEDURE — 99213 PR OFFICE/OUTPT VISIT, EST, LEVL III, 20-29 MIN: ICD-10-PCS | Mod: S$GLB,,, | Performed by: ORTHOPAEDIC SURGERY

## 2023-01-12 PROCEDURE — 99213 OFFICE O/P EST LOW 20 MIN: CPT | Mod: S$GLB,,, | Performed by: ORTHOPAEDIC SURGERY

## 2023-01-12 RX ORDER — SUMATRIPTAN SUCCINATE 100 MG/1
TABLET ORAL
COMMUNITY
Start: 2022-11-13 | End: 2023-01-18

## 2023-01-12 RX ORDER — ACETAZOLAMIDE 250 MG/1
750 TABLET ORAL 2 TIMES DAILY
COMMUNITY
Start: 2022-09-09 | End: 2023-01-18

## 2023-01-12 RX ORDER — VERAPAMIL HYDROCHLORIDE 120 MG/1
120 TABLET, FILM COATED, EXTENDED RELEASE ORAL EVERY MORNING
Status: ON HOLD | COMMUNITY
Start: 2022-09-13 | End: 2023-06-26 | Stop reason: HOSPADM

## 2023-01-12 RX ORDER — METFORMIN HYDROCHLORIDE 500 MG/1
500 TABLET, EXTENDED RELEASE ORAL
Status: ON HOLD | COMMUNITY
Start: 2022-12-19 | End: 2023-06-26 | Stop reason: HOSPADM
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
## 2023-01-12 NOTE — LETTER
January 12, 2023      Pending sale to [facility]  [address]  [place] [address]  Phone: [phone]  Fax: [phone]       Patient: Sonia Solorzano   YOB: 1985  Date of Visit: 01/12/2023    To Whom It May Concern:    Ness Solorzano was seen 01/12/2023. She may work at a restricted duty only, no heavy lifting, squatting, bending. She may work at a desk level. These restrictions are permanent.      Sincerely,    López Baker MD

## 2023-01-18 ENCOUNTER — LAB VISIT (OUTPATIENT)
Dept: LAB | Facility: HOSPITAL | Age: 38
End: 2023-01-18
Attending: STUDENT IN AN ORGANIZED HEALTH CARE EDUCATION/TRAINING PROGRAM
Payer: COMMERCIAL

## 2023-01-18 ENCOUNTER — OFFICE VISIT (OUTPATIENT)
Dept: FAMILY MEDICINE | Facility: CLINIC | Age: 38
End: 2023-01-18
Payer: COMMERCIAL

## 2023-01-18 VITALS
TEMPERATURE: 98 F | HEIGHT: 67 IN | HEART RATE: 88 BPM | RESPIRATION RATE: 16 BRPM | SYSTOLIC BLOOD PRESSURE: 138 MMHG | WEIGHT: 293 LBS | OXYGEN SATURATION: 99 % | DIASTOLIC BLOOD PRESSURE: 84 MMHG | BODY MASS INDEX: 45.99 KG/M2

## 2023-01-18 DIAGNOSIS — D50.9 MICROCYTIC ANEMIA: ICD-10-CM

## 2023-01-18 DIAGNOSIS — E53.8 FOLIC ACID DEFICIENCY: ICD-10-CM

## 2023-01-18 DIAGNOSIS — Z00.00 HEALTHCARE MAINTENANCE: Primary | ICD-10-CM

## 2023-01-18 DIAGNOSIS — E78.2 MIXED HYPERLIPIDEMIA: ICD-10-CM

## 2023-01-18 DIAGNOSIS — F31.9 BIPOLAR AFFECTIVE DISORDER, REMISSION STATUS UNSPECIFIED: ICD-10-CM

## 2023-01-18 DIAGNOSIS — R79.9 ABNORMAL FINDING OF BLOOD CHEMISTRY, UNSPECIFIED: ICD-10-CM

## 2023-01-18 DIAGNOSIS — E66.01 MORBID OBESITY: ICD-10-CM

## 2023-01-18 DIAGNOSIS — E21.3 HYPERPARATHYROIDISM: ICD-10-CM

## 2023-01-18 DIAGNOSIS — G93.2 IIH (IDIOPATHIC INTRACRANIAL HYPERTENSION): ICD-10-CM

## 2023-01-18 LAB
BASOPHILS # BLD AUTO: 0.04 K/UL (ref 0–0.2)
BASOPHILS NFR BLD: 0.4 % (ref 0–1.9)
CHOLEST SERPL-MCNC: 227 MG/DL (ref 120–199)
CHOLEST/HDLC SERPL: 4.9 {RATIO} (ref 2–5)
DIFFERENTIAL METHOD: ABNORMAL
EOSINOPHIL # BLD AUTO: 0.2 K/UL (ref 0–0.5)
EOSINOPHIL NFR BLD: 1.4 % (ref 0–8)
ERYTHROCYTE [DISTWIDTH] IN BLOOD BY AUTOMATED COUNT: 15.9 % (ref 11.5–14.5)
FERRITIN SERPL-MCNC: 9 NG/ML (ref 20–300)
FOLATE SERPL-MCNC: 4.2 NG/ML (ref 4–24)
HCT VFR BLD AUTO: 36.9 % (ref 37–48.5)
HDLC SERPL-MCNC: 46 MG/DL (ref 40–75)
HDLC SERPL: 20.3 % (ref 20–50)
HGB BLD-MCNC: 10.9 G/DL (ref 12–16)
IMM GRANULOCYTES # BLD AUTO: 0.07 K/UL (ref 0–0.04)
IMM GRANULOCYTES NFR BLD AUTO: 0.7 % (ref 0–0.5)
LDLC SERPL CALC-MCNC: 145.6 MG/DL (ref 63–159)
LYMPHOCYTES # BLD AUTO: 2.5 K/UL (ref 1–4.8)
LYMPHOCYTES NFR BLD: 23.8 % (ref 18–48)
MCH RBC QN AUTO: 24 PG (ref 27–31)
MCHC RBC AUTO-ENTMCNC: 29.5 G/DL (ref 32–36)
MCV RBC AUTO: 81 FL (ref 82–98)
MONOCYTES # BLD AUTO: 0.7 K/UL (ref 0.3–1)
MONOCYTES NFR BLD: 6.9 % (ref 4–15)
NEUTROPHILS # BLD AUTO: 7 K/UL (ref 1.8–7.7)
NEUTROPHILS NFR BLD: 66.8 % (ref 38–73)
NONHDLC SERPL-MCNC: 181 MG/DL
NRBC BLD-RTO: 0 /100 WBC
PLATELET # BLD AUTO: 411 K/UL (ref 150–450)
PMV BLD AUTO: 9.5 FL (ref 9.2–12.9)
RBC # BLD AUTO: 4.54 M/UL (ref 4–5.4)
TRIGL SERPL-MCNC: 177 MG/DL (ref 30–150)
VIT B12 SERPL-MCNC: 305 PG/ML (ref 210–950)
WBC # BLD AUTO: 10.4 K/UL (ref 3.9–12.7)

## 2023-01-18 PROCEDURE — 82607 VITAMIN B-12: CPT | Performed by: STUDENT IN AN ORGANIZED HEALTH CARE EDUCATION/TRAINING PROGRAM

## 2023-01-18 PROCEDURE — 99214 PR OFFICE/OUTPT VISIT, EST, LEVL IV, 30-39 MIN: ICD-10-PCS | Mod: S$GLB,,, | Performed by: STUDENT IN AN ORGANIZED HEALTH CARE EDUCATION/TRAINING PROGRAM

## 2023-01-18 PROCEDURE — 85025 COMPLETE CBC W/AUTO DIFF WBC: CPT | Performed by: STUDENT IN AN ORGANIZED HEALTH CARE EDUCATION/TRAINING PROGRAM

## 2023-01-18 PROCEDURE — 3075F SYST BP GE 130 - 139MM HG: CPT | Mod: CPTII,S$GLB,, | Performed by: STUDENT IN AN ORGANIZED HEALTH CARE EDUCATION/TRAINING PROGRAM

## 2023-01-18 PROCEDURE — 82728 ASSAY OF FERRITIN: CPT | Performed by: STUDENT IN AN ORGANIZED HEALTH CARE EDUCATION/TRAINING PROGRAM

## 2023-01-18 PROCEDURE — 84466 ASSAY OF TRANSFERRIN: CPT | Performed by: STUDENT IN AN ORGANIZED HEALTH CARE EDUCATION/TRAINING PROGRAM

## 2023-01-18 PROCEDURE — 3079F PR MOST RECENT DIASTOLIC BLOOD PRESSURE 80-89 MM HG: ICD-10-PCS | Mod: CPTII,S$GLB,, | Performed by: STUDENT IN AN ORGANIZED HEALTH CARE EDUCATION/TRAINING PROGRAM

## 2023-01-18 PROCEDURE — 82746 ASSAY OF FOLIC ACID SERUM: CPT | Performed by: STUDENT IN AN ORGANIZED HEALTH CARE EDUCATION/TRAINING PROGRAM

## 2023-01-18 PROCEDURE — 1159F PR MEDICATION LIST DOCUMENTED IN MEDICAL RECORD: ICD-10-PCS | Mod: CPTII,S$GLB,, | Performed by: STUDENT IN AN ORGANIZED HEALTH CARE EDUCATION/TRAINING PROGRAM

## 2023-01-18 PROCEDURE — 36415 COLL VENOUS BLD VENIPUNCTURE: CPT | Performed by: STUDENT IN AN ORGANIZED HEALTH CARE EDUCATION/TRAINING PROGRAM

## 2023-01-18 PROCEDURE — 3079F DIAST BP 80-89 MM HG: CPT | Mod: CPTII,S$GLB,, | Performed by: STUDENT IN AN ORGANIZED HEALTH CARE EDUCATION/TRAINING PROGRAM

## 2023-01-18 PROCEDURE — 99214 OFFICE O/P EST MOD 30 MIN: CPT | Mod: S$GLB,,, | Performed by: STUDENT IN AN ORGANIZED HEALTH CARE EDUCATION/TRAINING PROGRAM

## 2023-01-18 PROCEDURE — 1159F MED LIST DOCD IN RCRD: CPT | Mod: CPTII,S$GLB,, | Performed by: STUDENT IN AN ORGANIZED HEALTH CARE EDUCATION/TRAINING PROGRAM

## 2023-01-18 PROCEDURE — 99999 PR PBB SHADOW E&M-EST. PATIENT-LVL V: ICD-10-PCS | Mod: PBBFAC,,, | Performed by: STUDENT IN AN ORGANIZED HEALTH CARE EDUCATION/TRAINING PROGRAM

## 2023-01-18 PROCEDURE — 3008F PR BODY MASS INDEX (BMI) DOCUMENTED: ICD-10-PCS | Mod: CPTII,S$GLB,, | Performed by: STUDENT IN AN ORGANIZED HEALTH CARE EDUCATION/TRAINING PROGRAM

## 2023-01-18 PROCEDURE — 80061 LIPID PANEL: CPT | Performed by: STUDENT IN AN ORGANIZED HEALTH CARE EDUCATION/TRAINING PROGRAM

## 2023-01-18 PROCEDURE — 3075F PR MOST RECENT SYSTOLIC BLOOD PRESS GE 130-139MM HG: ICD-10-PCS | Mod: CPTII,S$GLB,, | Performed by: STUDENT IN AN ORGANIZED HEALTH CARE EDUCATION/TRAINING PROGRAM

## 2023-01-18 PROCEDURE — 3008F BODY MASS INDEX DOCD: CPT | Mod: CPTII,S$GLB,, | Performed by: STUDENT IN AN ORGANIZED HEALTH CARE EDUCATION/TRAINING PROGRAM

## 2023-01-18 PROCEDURE — 99999 PR PBB SHADOW E&M-EST. PATIENT-LVL V: CPT | Mod: PBBFAC,,, | Performed by: STUDENT IN AN ORGANIZED HEALTH CARE EDUCATION/TRAINING PROGRAM

## 2023-01-18 NOTE — PROGRESS NOTES
[facility] Note    Subjective:    The HPI and pertinent ROS is included in the Diagnostic Impression Remarks section at the end of the note. Please see below for further details. Chief complaint is at end of note.     Sonia is a pleasant intelligent patient who is here for evaluation.     Modified Medications    No medications on file       Data reviewed 274}  Previous medical records reviewed and summarized in plan section at end of note.      If you are due for any health screening(s) below please notify me so we can arrange them to be ordered and scheduled. Most healthy patients at your age complete them, but you are free to accept or refuse. If you can't do it, I'll definitely understand. If you can, I'd certainly appreciate it!     All of your core healthy metrics are met.      The following portions of the patient's history were reviewed and updated as appropriate: allergies, current medications, past family history, past medical history, past social history, past surgical history and problem list.    She  has a past medical history of Acute calculous cholecystitis (2020), Asthma (), Calculus of gallbladder with acute cholecystitis without obstruction (2020), Chronic anxiety (2014), COVID-19, GERD (gastroesophageal reflux disease) (10/25/2020), GI bleed (10/25/2020), Heart palpitations, Herniated disc, Hypertension, IBS (irritable bowel syndrome) (), Idiopathic intracranial hypertension, Insomnia (), Intractable migraine without aura and with status migrainosus (2022), Irritable bowel syndrome without diarrhea (2021), Lower back pain (), Migraine headache (), Obstructive sleep apnea, Palpitations (), PCOS (polycystic ovarian syndrome) (2022), and Sleep apnea ().  She  has a past surgical history that includes Dilation and curettage of uterus (); epidural steriod injections ();  section, classic;  section,  low transverse (2008); Tubal ligation (2008); Dilation and curettage of uterus; endometrioma (2013); Abdominal surgery; Esophagogastroduodenoscopy (N/A, 10/26/2020); Colonoscopy (N/A, 10/27/2020); Intraluminal gastrointestinal tract imaging via capsule (N/A, 11/20/2020); Laparoscopic cholecystectomy (N/A, 11/27/2020); Tonsillectomy; Knee arthroscopy w/ meniscectomy (Right, 05/26/2021); Cystoscopy (N/A, 10/27/2021); Magnetic resonance imaging (N/A, 08/03/2022); Cholecystectomy; Upper gastrointestinal endoscopy; Endoscopic insertion of ventriculoperitoneal shunt (Right, 9/19/2022); and Endoscopic insertion of ventriculoperitoneal shunt (N/A, 9/19/2022).    She  reports that she quit smoking about 4 months ago. Her smoking use included vaping w/o nicotine. She has been exposed to tobacco smoke. She has quit using smokeless tobacco. She reports that she does not currently use alcohol. She reports that she does not use drugs.  She family history includes Arthritis in her father; Asthma in her mother; Breast cancer (age of onset: 40) in her maternal aunt; Cancer in her father, maternal grandfather, maternal grandmother, and mother; Colon cancer in her maternal grandfather; Diabetes in her maternal grandmother and paternal grandfather; Heart disease in her father and mother; Hyperlipidemia in her father and mother; Hypertension in her father.    Review of patient's allergies indicates:   Allergen Reactions    Contrast media Anaphylaxis    Iodine and iodide containing products Anaphylaxis    Levaquin [levofloxacin] Anaphylaxis    Levofloxacin in d5w Anaphylaxis    Sulfa (sulfonamide antibiotics) Anaphylaxis and Hives    Iodinated contrast media Hives    Iodine     Magnesium      Pt reporting she is allergic to magnesium citrate oral drink.     Morphine Hives    Tree nuts     Adhesive Rash    Compazine [prochlorperazine] Anxiety     Restless legs    Depacon [valproate sodium] Hives     Pt experienced hives at IV site upon  "8th day of depacon administration.  Hives resolved with stopping medication in 1.5 hours.    Nut [tree nut] Hives       Tobacco Use: Medium Risk    Smoking Tobacco Use: Former    Smokeless Tobacco Use: Former    Passive Exposure: Current     Physical Examination  General appearance: alert, cooperative, no distress  Neck: no thyromegaly, no neck stiffness  Lungs: clear to auscultation, no wheezes, rales or rhonchi, symmetric air entry  Heart: normal rate, regular rhythm, normal S1, S2, no murmurs, rubs, clicks or gallops  Abdomen: soft, nontender, nondistended, no rigidity, rebound, or guarding.   Back: no point tenderness over spine  Extremities: peripheral pulses normal, no unilateral leg swelling or calf tenderness   Neurological:alert, oriented, normal speech, no new focal findings or movement disorder noted from baseline    BP Readings from Last 3 Encounters:   01/18/23 138/84   01/08/23 (!) 114/57   12/27/22 133/86     Wt Readings from Last 3 Encounters:   01/18/23 134 kg (295 lb 6.7 oz)   01/12/23 127 kg (280 lb)   01/08/23 127 kg (280 lb)     /84 (BP Location: Right leg, Patient Position: Sitting, BP Method: Large (Manual))   Pulse 88   Temp 98.3 °F (36.8 °C) (Oral)   Resp 16   Ht 5' 7" (1.702 m)   Wt 134 kg (295 lb 6.7 oz)   LMP 01/04/2023 (Approximate)   SpO2 99%   BMI 46.27 kg/m²    274}  Laboratory: I have reviewed old labs below:    274}    Lab Results   Component Value Date    WBC 9.06 01/08/2023    HGB 10.6 (L) 01/08/2023    HCT 36.0 (L) 01/08/2023    MCV 81 (L) 01/08/2023     01/08/2023     01/08/2023    K 3.6 01/08/2023     01/08/2023    CALCIUM 9.6 01/08/2023    PHOS 2.1 (L) 11/28/2022    CO2 26 01/08/2023    GLU 97 01/08/2023    BUN 8 01/08/2023    CREATININE 0.9 01/08/2023    ANIONGAP 7 (L) 01/08/2023    PROT 6.2 01/08/2023    ALBUMIN 3.3 (L) 01/08/2023    BILITOT 0.2 01/08/2023    ALKPHOS 74 01/08/2023    ALT 15 01/08/2023    AST 11 01/08/2023    INR 0.9 " "10/31/2022    CHOL 243 (H) 08/06/2022    TRIG 319 (H) 08/06/2022    HDL 43 08/06/2022    LDLCALC 136.2 08/06/2022    TSH 1.837 10/31/2022    HGBA1C 5.5 02/07/2022     Lab reviewed by me: Particular labs of significance that I will monitor, workup, or treat to improve are mentioned below in diagnostic impression remarks.    Imaging/EKG: I have reviewed the pertinent results and my findings are noted in remarks.  274}    CC:   Chief Complaint   Patient presents with    Weight Loss        274}    Assessment/Plan  Sonia Solorzano is a 37 y.o. female who presents to clinic with:  1. Healthcare maintenance    2. Morbid obesity    3. Bipolar affective disorder, remission status unspecified    4. IIH (idiopathic intracranial hypertension)    5. Mixed hyperlipidemia    6. Hyperparathyroidism       274}  Diagnostic Impression Remarks + HPI     Documentation entered by me for this encounter may have been done in part using speech-recognition technology. Although I have made an effort to ensure accuracy, "sound like" errors may exist and should be interpreted in context.     Morbid obesity-needs improvement has had difficulty with weight loss since she is less active due to her knee and joint pain.  Does have multiple comorbidities such as sleep apnea would benefit from weight loss surgery which can help with her headaches along with sleep apnea and overall cardiovascular health.  Will put in referral.  Has failed first-line behavioral therapies.  Microcytic anemia-will check iron no blood or black in stool no abdominal pain currently has been low in the past might benefit from infusions has scopes schedule     Bipolar disorder-stable continue current meds     Hyperlipidemia-recommend work on diet stay active monitor     Hyperparathyroidism-stable monitor lytes vit d levels     This is the extent of this pleasant patient's concerns at this present time. She did not feel chest pain upon exertion, dyspnea, nausea, vomiting, " diaphoresis, or syncope. No pleuritic chest pain, unilateral leg swelling, calf tenderness, or calf pain. Negative for unintentional weight loss night sweats and fevers. Sonia will return to clinic in a few months for further workup and reassessment or sooner as needed. She was instructed to call the clinic or go to the emergency department or urgent care immediately if her symptoms do not improve, worsens, or if any new symptoms develop. As we discussed that symptoms could worsen over the next 24 hours she was advised that if any increased swelling, pain, or numbness arise to go immediately to the ED. Patient knows to call any time if an emergency arises. Shared decision making occurred and she verbalized understanding in agreement with this plan. I discussed imaging findings, diagnosis, possibilities, treatment options, medications, risks, and benefits. She had many questions regarding the options and long-term effects. All questions were answered. She expressed understanding after counseling regarding the diagnosis and recommendations. She was capable and demonstrated competence with understanding of these options. Shared decision making was performed resulting in her choosing the current treatment plan. Patient handout was given with instructions and recommendations. Advised the patient that if they become pregnant to alert us immediately to assess for medication changes. I also discussed the importance of close follow up to discuss labs, change or modify her medications if needed, monitor side effects, and further evaluation of medical problems.     Additional workup planned: see labs ordered below.    See below for labs and meds ordered with associated diagnosis      1. Healthcare maintenance    2. Morbid obesity    3. Bipolar affective disorder, remission status unspecified    4. IIH (idiopathic intracranial hypertension)    5. Mixed hyperlipidemia    6. Hyperparathyroidism      Dorian Guerrero MD    274}    If you are due for any health screening(s) below please notify me so we can arrange them to be ordered and scheduled. Most healthy patients at your age complete them, but you are free to accept or refuse.     If you can't do it, I'll definitely understand. If you can, I'd certainly appreciate it!   All of your core healthy metrics are met.

## 2023-01-18 NOTE — LETTER
January 18, 2023      [facility]  [address]  [place] [address]  Phone: [phone]  Fax: [phone]       Patient: Sonia Solorzano   YOB: 1985  Date of Visit: 01/18/2023    To Whom It May Concern:    Ness Solorzano  was at [facility] on 01/18/2023. The patient may return to work/school on 1/18/23 with no restrictions. If you have any questions or concerns, or if I can be of further assistance, please do not hesitate to contact me.    Sincerely,    Alessia Hartmann LPN

## 2023-01-19 ENCOUNTER — PATIENT MESSAGE (OUTPATIENT)
Dept: FAMILY MEDICINE | Facility: CLINIC | Age: 38
End: 2023-01-19
Payer: COMMERCIAL

## 2023-01-19 ENCOUNTER — PATIENT MESSAGE (OUTPATIENT)
Dept: BARIATRICS | Facility: CLINIC | Age: 38
End: 2023-01-19
Payer: COMMERCIAL

## 2023-01-19 ENCOUNTER — PATIENT MESSAGE (OUTPATIENT)
Dept: HEMATOLOGY/ONCOLOGY | Facility: CLINIC | Age: 38
End: 2023-01-19
Payer: COMMERCIAL

## 2023-01-19 DIAGNOSIS — D50.9 IRON DEFICIENCY ANEMIA, UNSPECIFIED IRON DEFICIENCY ANEMIA TYPE: Primary | ICD-10-CM

## 2023-01-19 LAB
IRON SERPL-MCNC: 26 UG/DL (ref 30–160)
SATURATED IRON: 4 % (ref 20–50)
TOTAL IRON BINDING CAPACITY: 583 UG/DL (ref 250–450)
TRANSFERRIN SERPL-MCNC: 394 MG/DL (ref 200–375)

## 2023-01-25 ENCOUNTER — OFFICE VISIT (OUTPATIENT)
Dept: HEMATOLOGY/ONCOLOGY | Facility: CLINIC | Age: 38
End: 2023-01-25
Payer: COMMERCIAL

## 2023-01-25 VITALS
BODY MASS INDEX: 45.99 KG/M2 | HEIGHT: 67 IN | WEIGHT: 293 LBS | SYSTOLIC BLOOD PRESSURE: 167 MMHG | TEMPERATURE: 97 F | HEART RATE: 80 BPM | RESPIRATION RATE: 12 BRPM | OXYGEN SATURATION: 100 % | DIASTOLIC BLOOD PRESSURE: 87 MMHG

## 2023-01-25 DIAGNOSIS — R79.89 ELEVATED PTHRP LEVEL: Primary | ICD-10-CM

## 2023-01-25 DIAGNOSIS — D50.9 IRON DEFICIENCY ANEMIA, UNSPECIFIED IRON DEFICIENCY ANEMIA TYPE: ICD-10-CM

## 2023-01-25 PROCEDURE — 99214 PR OFFICE/OUTPT VISIT, EST, LEVL IV, 30-39 MIN: ICD-10-PCS | Mod: S$GLB,,, | Performed by: INTERNAL MEDICINE

## 2023-01-25 PROCEDURE — 99999 PR PBB SHADOW E&M-EST. PATIENT-LVL III: ICD-10-PCS | Mod: PBBFAC,,, | Performed by: INTERNAL MEDICINE

## 2023-01-25 PROCEDURE — 3079F PR MOST RECENT DIASTOLIC BLOOD PRESSURE 80-89 MM HG: ICD-10-PCS | Mod: CPTII,S$GLB,, | Performed by: INTERNAL MEDICINE

## 2023-01-25 PROCEDURE — 3008F PR BODY MASS INDEX (BMI) DOCUMENTED: ICD-10-PCS | Mod: CPTII,S$GLB,, | Performed by: INTERNAL MEDICINE

## 2023-01-25 PROCEDURE — 1159F MED LIST DOCD IN RCRD: CPT | Mod: CPTII,S$GLB,, | Performed by: INTERNAL MEDICINE

## 2023-01-25 PROCEDURE — 99999 PR PBB SHADOW E&M-EST. PATIENT-LVL III: CPT | Mod: PBBFAC,,, | Performed by: INTERNAL MEDICINE

## 2023-01-25 PROCEDURE — 1159F PR MEDICATION LIST DOCUMENTED IN MEDICAL RECORD: ICD-10-PCS | Mod: CPTII,S$GLB,, | Performed by: INTERNAL MEDICINE

## 2023-01-25 PROCEDURE — 3008F BODY MASS INDEX DOCD: CPT | Mod: CPTII,S$GLB,, | Performed by: INTERNAL MEDICINE

## 2023-01-25 PROCEDURE — 3077F SYST BP >= 140 MM HG: CPT | Mod: CPTII,S$GLB,, | Performed by: INTERNAL MEDICINE

## 2023-01-25 PROCEDURE — 3077F PR MOST RECENT SYSTOLIC BLOOD PRESSURE >= 140 MM HG: ICD-10-PCS | Mod: CPTII,S$GLB,, | Performed by: INTERNAL MEDICINE

## 2023-01-25 PROCEDURE — 3079F DIAST BP 80-89 MM HG: CPT | Mod: CPTII,S$GLB,, | Performed by: INTERNAL MEDICINE

## 2023-01-25 PROCEDURE — 99214 OFFICE O/P EST MOD 30 MIN: CPT | Mod: S$GLB,,, | Performed by: INTERNAL MEDICINE
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
## 2023-01-27 ENCOUNTER — PATIENT MESSAGE (OUTPATIENT)
Dept: HEMATOLOGY/ONCOLOGY | Facility: CLINIC | Age: 38
End: 2023-01-27
Payer: COMMERCIAL

## 2023-01-27 RX ORDER — DIPHENHYDRAMINE HCL 25 MG
25 CAPSULE ORAL
Start: 2023-01-27

## 2023-01-27 RX ORDER — ACETAMINOPHEN 325 MG/1
650 TABLET ORAL
Start: 2023-01-27

## 2023-01-27 RX ORDER — SODIUM CHLORIDE 9 MG/ML
INJECTION, SOLUTION INTRAVENOUS CONTINUOUS
OUTPATIENT
Start: 2023-01-27

## 2023-01-27 RX ORDER — SODIUM CHLORIDE 0.9 % (FLUSH) 0.9 %
10 SYRINGE (ML) INJECTION
OUTPATIENT
Start: 2023-01-27

## 2023-01-27 RX ORDER — HEPARIN 100 UNIT/ML
5 SYRINGE INTRAVENOUS
OUTPATIENT
Start: 2023-01-27

## 2023-02-02 ENCOUNTER — PATIENT MESSAGE (OUTPATIENT)
Dept: HEMATOLOGY/ONCOLOGY | Facility: CLINIC | Age: 38
End: 2023-02-02
Payer: COMMERCIAL

## 2023-02-14 ENCOUNTER — OFFICE VISIT (OUTPATIENT)
Dept: PSYCHIATRY | Facility: CLINIC | Age: 38
End: 2023-02-14
Payer: COMMERCIAL

## 2023-02-14 DIAGNOSIS — F43.10 PTSD (POST-TRAUMATIC STRESS DISORDER): ICD-10-CM

## 2023-02-14 DIAGNOSIS — F31.81 BIPOLAR II DISORDER: Primary | ICD-10-CM

## 2023-02-14 DIAGNOSIS — F41.1 GAD (GENERALIZED ANXIETY DISORDER): ICD-10-CM

## 2023-02-14 PROCEDURE — 1160F RVW MEDS BY RX/DR IN RCRD: CPT | Mod: CPTII,95,, | Performed by: PHYSICIAN ASSISTANT

## 2023-02-14 PROCEDURE — 1160F PR REVIEW ALL MEDS BY PRESCRIBER/CLIN PHARMACIST DOCUMENTED: ICD-10-PCS | Mod: CPTII,95,, | Performed by: PHYSICIAN ASSISTANT

## 2023-02-14 PROCEDURE — 99214 OFFICE O/P EST MOD 30 MIN: CPT | Mod: 95,,, | Performed by: PHYSICIAN ASSISTANT

## 2023-02-14 PROCEDURE — 1159F MED LIST DOCD IN RCRD: CPT | Mod: CPTII,95,, | Performed by: PHYSICIAN ASSISTANT

## 2023-02-14 PROCEDURE — 99214 PR OFFICE/OUTPT VISIT, EST, LEVL IV, 30-39 MIN: ICD-10-PCS | Mod: 95,,, | Performed by: PHYSICIAN ASSISTANT

## 2023-02-14 PROCEDURE — 1159F PR MEDICATION LIST DOCUMENTED IN MEDICAL RECORD: ICD-10-PCS | Mod: CPTII,95,, | Performed by: PHYSICIAN ASSISTANT

## 2023-02-14 RX ORDER — SODIUM CHLORIDE 0.9 % (FLUSH) 0.9 %
10 SYRINGE (ML) INJECTION
Status: CANCELLED | OUTPATIENT
Start: 2023-02-14

## 2023-02-14 RX ORDER — HEPARIN 100 UNIT/ML
500 SYRINGE INTRAVENOUS
Status: CANCELLED | OUTPATIENT
Start: 2023-02-14

## 2023-02-14 RX ORDER — CARIPRAZINE 4.5 MG/1
4.5 CAPSULE, GELATIN COATED ORAL DAILY
Qty: 30 CAPSULE | Refills: 2 | Status: ON HOLD | OUTPATIENT
Start: 2023-02-14 | End: 2023-06-26 | Stop reason: HOSPADM
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
## 2023-03-01 ENCOUNTER — ANESTHESIA EVENT (OUTPATIENT)
Dept: ENDOSCOPY | Facility: HOSPITAL | Age: 38
End: 2023-03-01
Payer: COMMERCIAL

## 2023-03-02 ENCOUNTER — ANESTHESIA (OUTPATIENT)
Dept: ENDOSCOPY | Facility: HOSPITAL | Age: 38
End: 2023-03-02
Payer: COMMERCIAL

## 2023-03-02 ENCOUNTER — HOSPITAL ENCOUNTER (OUTPATIENT)
Facility: HOSPITAL | Age: 38
Discharge: HOME | End: 2023-03-02
Attending: INTERNAL MEDICINE | Admitting: INTERNAL MEDICINE
Payer: COMMERCIAL

## 2023-03-02 DIAGNOSIS — K21.9 GERD (GASTROESOPHAGEAL REFLUX DISEASE): ICD-10-CM

## 2023-03-02 LAB
B-HCG UR QL: NEGATIVE
CTP QC/QA: YES

## 2023-03-02 PROCEDURE — 81025 URINE PREGNANCY TEST: CPT | Performed by: INTERNAL MEDICINE

## 2023-03-02 PROCEDURE — 88305 TISSUE EXAM BY PATHOLOGIST: CPT | Mod: 26,,, | Performed by: PATHOLOGY

## 2023-03-02 PROCEDURE — 25000003 PHARM REV CODE 250: Performed by: INTERNAL MEDICINE

## 2023-03-02 PROCEDURE — 27201012 HC FORCEPS, HOT/COLD, DISP: Performed by: INTERNAL MEDICINE

## 2023-03-02 PROCEDURE — 63600175 PHARM REV CODE 636 W HCPCS: Performed by: NURSE ANESTHETIST, CERTIFIED REGISTERED

## 2023-03-02 PROCEDURE — D9220A PRA ANESTHESIA: ICD-10-PCS | Mod: CRNA,,, | Performed by: NURSE ANESTHETIST, CERTIFIED REGISTERED

## 2023-03-02 PROCEDURE — 43239 EGD BIOPSY SINGLE/MULTIPLE: CPT | Performed by: INTERNAL MEDICINE

## 2023-03-02 PROCEDURE — D9220A PRA ANESTHESIA: Mod: CRNA,,, | Performed by: NURSE ANESTHETIST, CERTIFIED REGISTERED

## 2023-03-02 PROCEDURE — D9220A PRA ANESTHESIA: ICD-10-PCS | Mod: ANES,,, | Performed by: ANESTHESIOLOGY

## 2023-03-02 PROCEDURE — 25000003 PHARM REV CODE 250: Performed by: NURSE ANESTHETIST, CERTIFIED REGISTERED

## 2023-03-02 PROCEDURE — 88305 TISSUE EXAM BY PATHOLOGIST: ICD-10-PCS | Mod: 26,,, | Performed by: PATHOLOGY

## 2023-03-02 PROCEDURE — 88305 TISSUE EXAM BY PATHOLOGIST: CPT | Mod: 59 | Performed by: PATHOLOGY

## 2023-03-02 PROCEDURE — D9220A PRA ANESTHESIA: Mod: ANES,,, | Performed by: ANESTHESIOLOGY

## 2023-03-02 PROCEDURE — 43239 PR EGD, FLEX, W/BIOPSY, SGL/MULTI: ICD-10-PCS | Mod: ,,, | Performed by: INTERNAL MEDICINE

## 2023-03-02 PROCEDURE — 43239 EGD BIOPSY SINGLE/MULTIPLE: CPT | Mod: ,,, | Performed by: INTERNAL MEDICINE

## 2023-03-02 PROCEDURE — 37000008 HC ANESTHESIA 1ST 15 MINUTES: Performed by: INTERNAL MEDICINE

## 2023-03-02 RX ORDER — SODIUM CHLORIDE 9 MG/ML
INJECTION, SOLUTION INTRAVENOUS CONTINUOUS
Status: DISCONTINUED | OUTPATIENT
Start: 2023-03-02 | End: 2023-03-02 | Stop reason: HOSPADM

## 2023-03-02 RX ORDER — LIDOCAINE HCL/PF 100 MG/5ML
SYRINGE (ML) INTRAVENOUS
Status: DISCONTINUED | OUTPATIENT
Start: 2023-03-02 | End: 2023-03-02

## 2023-03-02 RX ORDER — PROPOFOL 10 MG/ML
VIAL (ML) INTRAVENOUS
Status: DISCONTINUED | OUTPATIENT
Start: 2023-03-02 | End: 2023-03-02

## 2023-03-02 RX ORDER — ONDANSETRON 2 MG/ML
INJECTION INTRAMUSCULAR; INTRAVENOUS
Status: DISCONTINUED | OUTPATIENT
Start: 2023-03-02 | End: 2023-03-02

## 2023-03-02 RX ADMIN — PROPOFOL 30 MG: 10 INJECTION, EMULSION INTRAVENOUS at 08:03

## 2023-03-02 RX ADMIN — SODIUM CHLORIDE: 9 INJECTION, SOLUTION INTRAVENOUS at 07:03

## 2023-03-02 RX ADMIN — PROPOFOL 50 MG: 10 INJECTION, EMULSION INTRAVENOUS at 08:03

## 2023-03-02 RX ADMIN — GLYCOPYRROLATE 0.1 MG: 0.2 INJECTION, SOLUTION INTRAMUSCULAR; INTRAVITREAL at 08:03

## 2023-03-02 RX ADMIN — ONDANSETRON 4 MG: 2 INJECTION INTRAMUSCULAR; INTRAVENOUS at 08:03

## 2023-03-02 RX ADMIN — PROPOFOL 150 MG: 10 INJECTION, EMULSION INTRAVENOUS at 08:03

## 2023-03-02 RX ADMIN — LIDOCAINE HYDROCHLORIDE 100 MG: 20 INJECTION INTRAVENOUS at 08:03

## 2023-03-02 NOTE — ANESTHESIA PREPROCEDURE EVALUATION
03/02/2023  Sonia Solorzano is a 37 y.o., female.      Pre-op Assessment    I have reviewed the Patient Summary Reports.     I have reviewed the Nursing Notes. I have reviewed the NPO Status.   I have reviewed the Medications.     Review of Systems  Anesthesia Hx:  No problems with previous Anesthesia    Social:  Former Smoker    Cardiovascular:   Hypertension, well controlled    Pulmonary:   Asthma mild Sleep Apnea    Renal/:  Renal/ Normal     Hepatic/GI:   GERD IBS   Neurological:   Headaches Idiopathic Intracranial HTN w/ shunt   Endocrine:  Endocrine Normal PCOS Obesity / BMI > 30, Morbid Obesity / BMI > 40  Psych:   Psychiatric History anxiety          Physical Exam  General: Well nourished, Cooperative, Alert and Oriented    Airway:  Mallampati: II   Mouth Opening: Normal  TM Distance: Normal  Neck ROM: Normal ROM        Anesthesia Plan  Type of Anesthesia, risks & benefits discussed:    Anesthesia Type: Gen ETT, Gen Supraglottic Airway, Gen Natural Airway, MAC  Intra-op Monitoring Plan: Standard ASA Monitors  Post Op Pain Control Plan: multimodal analgesia  Induction:  IV  Airway Plan: Direct, Video and Fiberoptic, Post-Induction  Informed Consent: Informed consent signed with the Patient and all parties understand the risks and agree with anesthesia plan.  All questions answered.   ASA Score: 3    Ready For Surgery From Anesthesia Perspective.     .
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
## 2023-03-02 NOTE — PLAN OF CARE
Vss, heri po fluids, denies pain, ambulates easily. IV removed, catheter intact. Discharge instructions provided and states understanding. States ready to go home.  Discharged from facility with family per wheelchair.

## 2023-03-02 NOTE — [employer]OVATION PATIENT INSTRUCTIONS
Discharge Summary/Instructions after an Endoscopic Procedure  Patient Name: Sonia Solorzano  Patient MRN: [id number]  Patient YOB: 1985 Thursday, March 2, 2023  Patito Vergara MD  Dear patient,  As a result of recent federal legislation (The Federal Cures Act), you may   receive lab or pathology results from your procedure in your [facility]   account before your physician is able to contact you. Your physician or   their representative will relay the results to you with their   recommendations at their soonest availability.  Thank you,  RESTRICTIONS:  During your procedure today, you received medications for sedation.  These   medications may affect your judgment, balance and coordination.  Therefore,   for 24 hours, you have the following restrictions:   - DO NOT drive a car, operate machinery, make legal/financial decisions,   sign important papers or drink alcohol.    ACTIVITY:  Today: no heavy lifting, straining or running due to procedural   sedation/anesthesia.  The following day: return to full activity including work.  DIET:  Eat and drink normally unless instructed otherwise.     TREATMENT FOR COMMON SIDE EFFECTS:  - Mild abdominal pain, nausea, belching, bloating or excessive gas:  rest,   eat lightly and use a heating pad.  - Sore Throat: treat with throat lozenges and/or gargle with warm salt   water.  - Because air was used during the procedure, expelling large amounts of air   from your rectum or belching is normal.  - If a bowel prep was taken, you may not have a bowel movement for 1-3 days.    This is normal.  SYMPTOMS TO WATCH FOR AND REPORT TO YOUR PHYSICIAN:  1. Abdominal pain or bloating, other than gas cramps.  2. Chest pain.  3. Back pain.  4. Signs of infection such as: chills or fever occurring within 24 hours   after the procedure.  5. Rectal bleeding, which would show as bright red, maroon, or black stools.   (A tablespoon of blood from the rectum is not serious,  especially if   hemorrhoids are present.)  6. Vomiting.  7. Weakness or dizziness.  GO DIRECTLY TO THE NEAREST EMERGENCY ROOM IF YOU HAVE ANY OF THE FOLLOWING:      Difficulty breathing              Chills and/or fever over 101 F   Persistent vomiting and/or vomiting blood   Severe abdominal pain   Severe chest pain   Black, tarry stools   Bleeding- more than one tablespoon   Any other symptom or condition that you feel may need urgent attention  Your doctor recommends these additional instructions:  If any biopsies were taken, your doctors clinic will contact you in 1 to 2   weeks with any results.  - Await pathology results.   - Discharge patient to home (with spouse).   - Patient has a contact number available for emergencies.  The signs and   symptoms of potential delayed complications were discussed with the   patient.  Return to normal activities tomorrow.  Written discharge   instructions were provided to the patient.   - Resume previous diet.   - Continue present medications.   -Agree with IV iron as scheduled by hematology  For questions, problems or results please call your physician - Patito Vergara MD at Work:  [phone].  [facility], EMERGENCY ROOM PHONE NUMBER: [phone]  IF A COMPLICATION OR EMERGENCY SITUATION ARISES AND YOU ARE UNABLE TO REACH   YOUR PHYSICIAN - GO DIRECTLY TO THE EMERGENCY ROOM.  Patito Vergara MD  3/2/2023 8:53:04 AM  This report has been verified and signed electronically.  Dear patient,  As a result of recent federal legislation (The Federal Cures Act), you may   receive lab or pathology results from your procedure in your [facility]   account before your physician is able to contact you. Your physician or   their representative will relay the results to you with their   recommendations at their soonest availability.  Thank you,  PR[employer]ATION

## 2023-03-02 NOTE — ANESTHESIA POSTPROCEDURE EVALUATION
Anesthesia Post Evaluation    Patient: Sonia Solorzano    Procedure(s) Performed: Procedure(s) (LRB):  EGD (ESOPHAGOGASTRODUODENOSCOPY) (N/A)    Final Anesthesia Type: general      Patient location during evaluation: PACU  Patient participation: Yes- Able to Participate  Level of consciousness: awake and alert and oriented  Post-procedure vital signs: reviewed and stable  Pain management: adequate  Airway patency: patent    PONV status at discharge: No PONV  Anesthetic complications: no      Cardiovascular status: blood pressure returned to baseline and stable  Respiratory status: unassisted and spontaneous ventilation  Hydration status: euvolemic  Follow-up not needed.          Vitals Value Taken Time   /79 03/02/23 0914   Temp 36.4 °C (97.5 °F) 03/02/23 0900   Pulse 76 03/02/23 0914   Resp 16 03/02/23 0914   SpO2 97 % 03/02/23 0914         Event Time   Out of Recovery 09:27:08         Pain/Kevin Score: Kevin Score: 10 (3/2/2023  9:15 AM)         Pt c/o dizziness and nausea X 2 months that is worsening.  Pt recently diagnosed with ear infection.

## 2023-03-02 NOTE — TRANSFER OF CARE
"Anesthesia Transfer of Care Note    Patient: Sonia Solorzano    Procedure(s) Performed: Procedure(s) (LRB):  EGD (ESOPHAGOGASTRODUODENOSCOPY) (N/A)    Patient location: PACU    Anesthesia Type: general    Transport from OR: Transported from OR on room air with adequate spontaneous ventilation    Post pain: adequate analgesia    Post assessment: no apparent anesthetic complications and tolerated procedure well    Post vital signs: stable    Level of consciousness: sedated    Nausea/Vomiting: no nausea/vomiting    Complications: none    Transfer of care protocol was followed      Last vitals:   Visit Vitals  /83 (BP Location: Left arm, Patient Position: Lying)   Pulse 87   Temp 36.8 °C (98.2 °F) (Skin)   Resp 18   Ht 5' 7" (1.702 m)   Wt 133.8 kg (295 lb)   LMP 01/26/2023   SpO2 96%   Breastfeeding No   BMI 46.20 kg/m²     "

## 2023-03-03 VITALS
WEIGHT: 293 LBS | RESPIRATION RATE: 16 BRPM | SYSTOLIC BLOOD PRESSURE: 182 MMHG | DIASTOLIC BLOOD PRESSURE: 79 MMHG | TEMPERATURE: 98 F | BODY MASS INDEX: 45.99 KG/M2 | HEIGHT: 67 IN | HEART RATE: 76 BPM | OXYGEN SATURATION: 97 %

## 2023-03-06 LAB
FINAL PATHOLOGIC DIAGNOSIS: NORMAL
GROSS: NORMAL
Lab: NORMAL

## 2023-03-07 ENCOUNTER — PATIENT MESSAGE (OUTPATIENT)
Dept: HEMATOLOGY/ONCOLOGY | Facility: CLINIC | Age: 38
End: 2023-03-07
Payer: COMMERCIAL

## 2023-03-20 ENCOUNTER — HOSPITAL ENCOUNTER (OUTPATIENT)
Dept: RADIOLOGY | Facility: HOSPITAL | Age: 38
Discharge: HOME | End: 2023-03-20
Attending: PHYSICIAN ASSISTANT
Payer: COMMERCIAL

## 2023-03-20 ENCOUNTER — OFFICE VISIT (OUTPATIENT)
Dept: NEUROSURGERY | Facility: CLINIC | Age: 38
End: 2023-03-20
Payer: COMMERCIAL

## 2023-03-20 ENCOUNTER — TELEPHONE (OUTPATIENT)
Dept: NEUROSURGERY | Facility: CLINIC | Age: 38
End: 2023-03-20
Payer: COMMERCIAL

## 2023-03-20 DIAGNOSIS — G93.2 IIH (IDIOPATHIC INTRACRANIAL HYPERTENSION): ICD-10-CM

## 2023-03-20 DIAGNOSIS — R41.3 OTHER AMNESIA: ICD-10-CM

## 2023-03-20 DIAGNOSIS — G93.2 IIH (IDIOPATHIC INTRACRANIAL HYPERTENSION): Chronic | ICD-10-CM

## 2023-03-20 DIAGNOSIS — R51.9 HEADACHE, CHRONIC DAILY: Primary | ICD-10-CM

## 2023-03-20 PROCEDURE — 99215 OFFICE O/P EST HI 40 MIN: CPT | Mod: S$GLB,,, | Performed by: PHYSICIAN ASSISTANT

## 2023-03-20 PROCEDURE — 1159F MED LIST DOCD IN RCRD: CPT | Mod: CPTII,S$GLB,, | Performed by: PHYSICIAN ASSISTANT

## 2023-03-20 PROCEDURE — 99999 PR PBB SHADOW E&M-EST. PATIENT-LVL III: ICD-10-PCS | Mod: PBBFAC,,, | Performed by: PHYSICIAN ASSISTANT

## 2023-03-20 PROCEDURE — 70250 X-RAY EXAM OF SKULL: CPT | Mod: 26,,, | Performed by: RADIOLOGY

## 2023-03-20 PROCEDURE — 99999 PR PBB SHADOW E&M-EST. PATIENT-LVL III: CPT | Mod: PBBFAC,,, | Performed by: PHYSICIAN ASSISTANT

## 2023-03-20 PROCEDURE — 70250 X-RAY EXAM OF SKULL: CPT | Mod: TC

## 2023-03-20 PROCEDURE — 1159F PR MEDICATION LIST DOCUMENTED IN MEDICAL RECORD: ICD-10-PCS | Mod: CPTII,S$GLB,, | Performed by: PHYSICIAN ASSISTANT

## 2023-03-20 PROCEDURE — 99215 PR OFFICE/OUTPT VISIT, EST, LEVL V, 40-54 MIN: ICD-10-PCS | Mod: S$GLB,,, | Performed by: PHYSICIAN ASSISTANT

## 2023-03-20 PROCEDURE — 70250 XR SKULL SHUNT PLACEMENT 1 VIEW: ICD-10-PCS | Mod: 26,,, | Performed by: RADIOLOGY

## 2023-03-20 RX ORDER — PHENTERMINE HYDROCHLORIDE 37.5 MG/1
37.5 TABLET ORAL EVERY MORNING
Status: ON HOLD | COMMUNITY
Start: 2022-11-13 | End: 2023-06-26 | Stop reason: HOSPADM

## 2023-03-20 RX ORDER — BUPROPION HYDROCHLORIDE 150 MG/1
150 TABLET ORAL NIGHTLY
Status: ON HOLD | COMMUNITY
Start: 2023-01-19 | End: 2023-06-26 | Stop reason: HOSPADM
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
## 2023-03-27 ENCOUNTER — INFUSION (OUTPATIENT)
Dept: INFUSION THERAPY | Facility: HOSPITAL | Age: 38
End: 2023-03-27
Attending: INTERNAL MEDICINE
Payer: COMMERCIAL

## 2023-03-27 VITALS
DIASTOLIC BLOOD PRESSURE: 81 MMHG | WEIGHT: 293 LBS | HEART RATE: 84 BPM | TEMPERATURE: 98 F | OXYGEN SATURATION: 99 % | SYSTOLIC BLOOD PRESSURE: 137 MMHG | RESPIRATION RATE: 15 BRPM | BODY MASS INDEX: 47.32 KG/M2

## 2023-03-27 DIAGNOSIS — D50.0 IRON DEFICIENCY ANEMIA DUE TO CHRONIC BLOOD LOSS: Primary | ICD-10-CM

## 2023-03-27 PROCEDURE — 25000003 PHARM REV CODE 250: Performed by: NURSE PRACTITIONER

## 2023-03-27 PROCEDURE — 63600175 PHARM REV CODE 636 W HCPCS: Performed by: NURSE PRACTITIONER

## 2023-03-27 PROCEDURE — 96365 THER/PROPH/DIAG IV INF INIT: CPT

## 2023-03-27 RX ORDER — SODIUM CHLORIDE 0.9 % (FLUSH) 0.9 %
10 SYRINGE (ML) INJECTION
Status: CANCELLED | OUTPATIENT
Start: 2023-04-03

## 2023-03-27 RX ORDER — SODIUM CHLORIDE 0.9 % (FLUSH) 0.9 %
10 SYRINGE (ML) INJECTION
Status: DISCONTINUED | OUTPATIENT
Start: 2023-03-27 | End: 2023-03-27 | Stop reason: HOSPADM

## 2023-03-27 RX ORDER — HEPARIN 100 UNIT/ML
500 SYRINGE INTRAVENOUS
Status: CANCELLED | OUTPATIENT
Start: 2023-04-03

## 2023-03-27 RX ADMIN — IRON SUCROSE 100 MG: 20 INJECTION, SOLUTION INTRAVENOUS at 02:03

## 2023-03-27 NOTE — PLAN OF CARE
Problem: Anemia  Goal: Anemia Symptom Improvement  Outcome: Ongoing, Progressing  Intervention: Monitor and Manage Anemia  Flowsheets (Taken 3/27/2023 8696)  Oral Nutrition Promotion: rest periods promoted  Safety Promotion/Fall Prevention: medications reviewed  Fatigue Management:   fatigue-related activity identified   frequent rest breaks encouraged   paced activity encouraged

## 2023-04-03 ENCOUNTER — PATIENT MESSAGE (OUTPATIENT)
Dept: NEUROSURGERY | Facility: CLINIC | Age: 38
End: 2023-04-03
Payer: COMMERCIAL

## 2023-04-06 ENCOUNTER — DOCUMENTATION ONLY (OUTPATIENT)
Dept: INFUSION THERAPY | Facility: HOSPITAL | Age: 38
End: 2023-04-06

## 2023-04-06 NOTE — NURSING
Ok to proceed with Venofer infusions without pre-meds .. per Secure chat message ABELARDO Castaneda NP/ CatherineRN

## 2023-04-10 ENCOUNTER — EXTERNAL HOME HEALTH (OUTPATIENT)
Dept: HOME HEALTH SERVICES | Facility: HOSPITAL | Age: 38
End: 2023-04-10
Payer: COMMERCIAL

## 2023-04-10 ENCOUNTER — INFUSION (OUTPATIENT)
Dept: INFUSION THERAPY | Facility: HOSPITAL | Age: 38
End: 2023-04-10
Attending: INTERNAL MEDICINE
Payer: COMMERCIAL

## 2023-04-10 VITALS
OXYGEN SATURATION: 96 % | RESPIRATION RATE: 18 BRPM | HEIGHT: 67 IN | TEMPERATURE: 98 F | WEIGHT: 293 LBS | DIASTOLIC BLOOD PRESSURE: 72 MMHG | HEART RATE: 85 BPM | BODY MASS INDEX: 45.99 KG/M2 | SYSTOLIC BLOOD PRESSURE: 115 MMHG

## 2023-04-10 DIAGNOSIS — D50.0 IRON DEFICIENCY ANEMIA DUE TO CHRONIC BLOOD LOSS: Primary | ICD-10-CM

## 2023-04-10 PROCEDURE — 25000003 PHARM REV CODE 250: Performed by: NURSE PRACTITIONER

## 2023-04-10 PROCEDURE — 63600175 PHARM REV CODE 636 W HCPCS: Performed by: NURSE PRACTITIONER

## 2023-04-10 PROCEDURE — 96365 THER/PROPH/DIAG IV INF INIT: CPT

## 2023-04-10 RX ORDER — HEPARIN 100 UNIT/ML
500 SYRINGE INTRAVENOUS
Status: CANCELLED | OUTPATIENT
Start: 2023-04-17

## 2023-04-10 RX ORDER — SODIUM CHLORIDE 0.9 % (FLUSH) 0.9 %
10 SYRINGE (ML) INJECTION
Status: CANCELLED | OUTPATIENT
Start: 2023-04-17

## 2023-04-10 RX ORDER — HEPARIN 100 UNIT/ML
500 SYRINGE INTRAVENOUS
Status: DISCONTINUED | OUTPATIENT
Start: 2023-04-10 | End: 2023-04-10 | Stop reason: HOSPADM

## 2023-04-10 RX ORDER — SODIUM CHLORIDE 0.9 % (FLUSH) 0.9 %
10 SYRINGE (ML) INJECTION
Status: DISCONTINUED | OUTPATIENT
Start: 2023-04-10 | End: 2023-04-10 | Stop reason: HOSPADM

## 2023-04-10 RX ADMIN — IRON SUCROSE 100 MG: 20 INJECTION, SOLUTION INTRAVENOUS at 02:04

## 2023-04-10 RX ADMIN — SODIUM CHLORIDE: 0.9 INJECTION, SOLUTION INTRAVENOUS at 02:04

## 2023-04-10 NOTE — PLAN OF CARE
Problem: Fatigue  Goal: Improved Activity Tolerance  Intervention: Promote Improved Energy  Flowsheets (Taken 4/10/2023 2473)  Fatigue Management: fatigue-related activity identified  Activity Management: Ambulated -L4

## 2023-04-13 ENCOUNTER — OFFICE VISIT (OUTPATIENT)
Dept: ORTHOPEDICS | Facility: CLINIC | Age: 38
End: 2023-04-13
Payer: OTHER MISCELLANEOUS

## 2023-04-13 VITALS — BODY MASS INDEX: 45.99 KG/M2 | HEIGHT: 67 IN | WEIGHT: 293 LBS

## 2023-04-13 DIAGNOSIS — S83.241A ACUTE MEDIAL MENISCUS TEAR OF RIGHT KNEE, INITIAL ENCOUNTER: ICD-10-CM

## 2023-04-13 DIAGNOSIS — M17.11 PRIMARY OSTEOARTHRITIS OF RIGHT KNEE: Primary | ICD-10-CM

## 2023-04-13 PROCEDURE — 99213 PR OFFICE/OUTPT VISIT, EST, LEVL III, 20-29 MIN: ICD-10-PCS | Mod: S$GLB,,, | Performed by: PHYSICIAN ASSISTANT

## 2023-04-13 PROCEDURE — 99213 OFFICE O/P EST LOW 20 MIN: CPT | Mod: S$GLB,,, | Performed by: PHYSICIAN ASSISTANT
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
## 2023-04-17 ENCOUNTER — INFUSION (OUTPATIENT)
Dept: INFUSION THERAPY | Facility: HOSPITAL | Age: 38
End: 2023-04-17
Attending: INTERNAL MEDICINE
Payer: COMMERCIAL

## 2023-04-17 VITALS
SYSTOLIC BLOOD PRESSURE: 117 MMHG | DIASTOLIC BLOOD PRESSURE: 76 MMHG | WEIGHT: 293 LBS | TEMPERATURE: 98 F | HEART RATE: 81 BPM | OXYGEN SATURATION: 97 % | RESPIRATION RATE: 17 BRPM | BODY MASS INDEX: 47.61 KG/M2

## 2023-04-17 DIAGNOSIS — D50.0 IRON DEFICIENCY ANEMIA DUE TO CHRONIC BLOOD LOSS: Primary | ICD-10-CM

## 2023-04-17 PROCEDURE — 63600175 PHARM REV CODE 636 W HCPCS: Performed by: NURSE PRACTITIONER

## 2023-04-17 PROCEDURE — 96365 THER/PROPH/DIAG IV INF INIT: CPT

## 2023-04-17 PROCEDURE — 25000003 PHARM REV CODE 250: Performed by: NURSE PRACTITIONER

## 2023-04-17 RX ORDER — SODIUM CHLORIDE 0.9 % (FLUSH) 0.9 %
10 SYRINGE (ML) INJECTION
Status: CANCELLED | OUTPATIENT
Start: 2023-04-24

## 2023-04-17 RX ORDER — HEPARIN 100 UNIT/ML
500 SYRINGE INTRAVENOUS
Status: CANCELLED | OUTPATIENT
Start: 2023-04-24

## 2023-04-17 RX ORDER — SODIUM CHLORIDE 0.9 % (FLUSH) 0.9 %
10 SYRINGE (ML) INJECTION
Status: DISCONTINUED | OUTPATIENT
Start: 2023-04-17 | End: 2023-04-17 | Stop reason: HOSPADM

## 2023-04-17 RX ADMIN — SODIUM CHLORIDE: 0.9 INJECTION, SOLUTION INTRAVENOUS at 02:04

## 2023-04-17 RX ADMIN — IRON SUCROSE 100 MG: 20 INJECTION, SOLUTION INTRAVENOUS at 02:04

## 2023-04-17 NOTE — PLAN OF CARE
Problem: Anemia  Goal: Anemia Symptom Improvement  Outcome: Ongoing, Progressing  Intervention: Monitor and Manage Anemia  Flowsheets (Taken 4/17/2023 1434)  Oral Nutrition Promotion: rest periods promoted  Fatigue Management:   frequent rest breaks encouraged   fatigue-related activity identified   paced activity encouraged

## 2023-04-24 ENCOUNTER — INFUSION (OUTPATIENT)
Dept: INFUSION THERAPY | Facility: HOSPITAL | Age: 38
End: 2023-04-24
Attending: INTERNAL MEDICINE
Payer: COMMERCIAL

## 2023-04-24 VITALS
WEIGHT: 293 LBS | SYSTOLIC BLOOD PRESSURE: 121 MMHG | RESPIRATION RATE: 17 BRPM | HEART RATE: 85 BPM | TEMPERATURE: 98 F | DIASTOLIC BLOOD PRESSURE: 78 MMHG | HEIGHT: 67 IN | BODY MASS INDEX: 45.99 KG/M2 | OXYGEN SATURATION: 98 %

## 2023-04-24 DIAGNOSIS — D50.0 IRON DEFICIENCY ANEMIA DUE TO CHRONIC BLOOD LOSS: Primary | ICD-10-CM

## 2023-04-24 PROCEDURE — 96365 THER/PROPH/DIAG IV INF INIT: CPT

## 2023-04-24 PROCEDURE — 25000003 PHARM REV CODE 250: Performed by: NURSE PRACTITIONER

## 2023-04-24 PROCEDURE — 63600175 PHARM REV CODE 636 W HCPCS: Performed by: NURSE PRACTITIONER

## 2023-04-24 RX ORDER — SODIUM CHLORIDE 0.9 % (FLUSH) 0.9 %
10 SYRINGE (ML) INJECTION
Status: CANCELLED | OUTPATIENT
Start: 2023-05-01

## 2023-04-24 RX ORDER — HEPARIN 100 UNIT/ML
500 SYRINGE INTRAVENOUS
Status: CANCELLED | OUTPATIENT
Start: 2023-05-01

## 2023-04-24 RX ORDER — SODIUM CHLORIDE 0.9 % (FLUSH) 0.9 %
10 SYRINGE (ML) INJECTION
Status: DISCONTINUED | OUTPATIENT
Start: 2023-04-24 | End: 2023-04-24 | Stop reason: HOSPADM

## 2023-04-24 RX ORDER — HEPARIN 100 UNIT/ML
500 SYRINGE INTRAVENOUS
Status: DISCONTINUED | OUTPATIENT
Start: 2023-04-24 | End: 2023-04-24 | Stop reason: HOSPADM

## 2023-04-24 RX ADMIN — IRON SUCROSE 100 MG: 20 INJECTION, SOLUTION INTRAVENOUS at 02:04

## 2023-04-24 RX ADMIN — SODIUM CHLORIDE: 0.9 INJECTION, SOLUTION INTRAVENOUS at 02:04

## 2023-04-24 NOTE — PLAN OF CARE
Problem: Fatigue  Goal: Improved Activity Tolerance  Intervention: Promote Improved Energy  Flowsheets (Taken 4/24/2023 3799)  Fatigue Management: fatigue-related activity identified  Activity Management: Ambulated -L4

## 2023-04-25 ENCOUNTER — OFFICE VISIT (OUTPATIENT)
Dept: NEUROSURGERY | Facility: CLINIC | Age: 38
End: 2023-04-25
Payer: COMMERCIAL

## 2023-04-25 ENCOUNTER — HOSPITAL ENCOUNTER (OUTPATIENT)
Dept: RADIOLOGY | Facility: HOSPITAL | Age: 38
Discharge: HOME | End: 2023-04-25
Attending: PHYSICIAN ASSISTANT
Payer: COMMERCIAL

## 2023-04-25 ENCOUNTER — PATIENT MESSAGE (OUTPATIENT)
Dept: NEUROSURGERY | Facility: CLINIC | Age: 38
End: 2023-04-25

## 2023-04-25 DIAGNOSIS — R29.818 TRANSIENT NEUROLOGICAL SYMPTOMS: ICD-10-CM

## 2023-04-25 DIAGNOSIS — G93.2 IIH (IDIOPATHIC INTRACRANIAL HYPERTENSION): Primary | Chronic | ICD-10-CM

## 2023-04-25 DIAGNOSIS — R41.3 OTHER AMNESIA: ICD-10-CM

## 2023-04-25 DIAGNOSIS — G93.2 IIH (IDIOPATHIC INTRACRANIAL HYPERTENSION): ICD-10-CM

## 2023-04-25 DIAGNOSIS — Z98.2 S/P VP SHUNT: ICD-10-CM

## 2023-04-25 PROCEDURE — 70450 CT HEAD/BRAIN W/O DYE: CPT | Mod: 26,,, | Performed by: RADIOLOGY

## 2023-04-25 PROCEDURE — 70450 CT HEAD/BRAIN W/O DYE: CPT | Mod: TC

## 2023-04-25 PROCEDURE — 70450 CT HEAD WITHOUT CONTRAST: ICD-10-PCS | Mod: 26,,, | Performed by: RADIOLOGY

## 2023-04-25 PROCEDURE — 99215 OFFICE O/P EST HI 40 MIN: CPT | Mod: 95,,, | Performed by: PHYSICIAN ASSISTANT

## 2023-04-25 PROCEDURE — 99215 PR OFFICE/OUTPT VISIT, EST, LEVL V, 40-54 MIN: ICD-10-PCS | Mod: 95,,, | Performed by: PHYSICIAN ASSISTANT
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
## 2023-05-01 ENCOUNTER — INFUSION (OUTPATIENT)
Dept: INFUSION THERAPY | Facility: HOSPITAL | Age: 38
End: 2023-05-01
Attending: INTERNAL MEDICINE
Payer: COMMERCIAL

## 2023-05-01 VITALS
DIASTOLIC BLOOD PRESSURE: 89 MMHG | WEIGHT: 293 LBS | BODY MASS INDEX: 45.99 KG/M2 | RESPIRATION RATE: 16 BRPM | TEMPERATURE: 98 F | OXYGEN SATURATION: 100 % | SYSTOLIC BLOOD PRESSURE: 128 MMHG | HEIGHT: 67 IN | HEART RATE: 88 BPM

## 2023-05-01 DIAGNOSIS — D50.0 IRON DEFICIENCY ANEMIA DUE TO CHRONIC BLOOD LOSS: Primary | ICD-10-CM

## 2023-05-01 PROCEDURE — 96365 THER/PROPH/DIAG IV INF INIT: CPT

## 2023-05-01 PROCEDURE — A4216 STERILE WATER/SALINE, 10 ML: HCPCS | Performed by: NURSE PRACTITIONER

## 2023-05-01 PROCEDURE — 25000003 PHARM REV CODE 250: Performed by: NURSE PRACTITIONER

## 2023-05-01 PROCEDURE — 63600175 PHARM REV CODE 636 W HCPCS: Performed by: NURSE PRACTITIONER

## 2023-05-01 RX ORDER — SODIUM CHLORIDE 0.9 % (FLUSH) 0.9 %
10 SYRINGE (ML) INJECTION
Status: DISCONTINUED | OUTPATIENT
Start: 2023-05-01 | End: 2023-05-01 | Stop reason: HOSPADM

## 2023-05-01 RX ORDER — SODIUM CHLORIDE 0.9 % (FLUSH) 0.9 %
10 SYRINGE (ML) INJECTION
OUTPATIENT
Start: 2023-05-08

## 2023-05-01 RX ORDER — HEPARIN 100 UNIT/ML
500 SYRINGE INTRAVENOUS
OUTPATIENT
Start: 2023-05-08

## 2023-05-01 RX ADMIN — IRON SUCROSE 100 MG: 20 INJECTION, SOLUTION INTRAVENOUS at 02:05

## 2023-05-01 RX ADMIN — SODIUM CHLORIDE: 0.9 INJECTION, SOLUTION INTRAVENOUS at 02:05

## 2023-05-01 RX ADMIN — SODIUM CHLORIDE, PRESERVATIVE FREE 10 ML: 5 INJECTION INTRAVENOUS at 03:05

## 2023-05-01 NOTE — PLAN OF CARE
Problem: Anemia  Goal: Anemia Symptom Improvement  Outcome: Ongoing, Progressing  Intervention: Monitor and Manage Anemia  Flowsheets (Taken 5/1/2023 8184)  Oral Nutrition Promotion: rest periods promoted  Safety Promotion/Fall Prevention: medications reviewed  Fatigue Management: frequent rest breaks encouraged

## 2023-05-09 ENCOUNTER — OFFICE VISIT (OUTPATIENT)
Dept: FAMILY MEDICINE | Facility: CLINIC | Age: 38
End: 2023-05-09
Payer: COMMERCIAL

## 2023-05-09 ENCOUNTER — HOSPITAL ENCOUNTER (OUTPATIENT)
Dept: RADIOLOGY | Facility: HOSPITAL | Age: 38
Discharge: HOME | End: 2023-05-09
Attending: STUDENT IN AN ORGANIZED HEALTH CARE EDUCATION/TRAINING PROGRAM
Payer: COMMERCIAL

## 2023-05-09 ENCOUNTER — TELEPHONE (OUTPATIENT)
Dept: NEUROSURGERY | Facility: CLINIC | Age: 38
End: 2023-05-09
Payer: COMMERCIAL

## 2023-05-09 VITALS
RESPIRATION RATE: 18 BRPM | DIASTOLIC BLOOD PRESSURE: 86 MMHG | BODY MASS INDEX: 45.99 KG/M2 | SYSTOLIC BLOOD PRESSURE: 130 MMHG | HEART RATE: 97 BPM | OXYGEN SATURATION: 97 % | HEIGHT: 67 IN | WEIGHT: 293 LBS

## 2023-05-09 DIAGNOSIS — R55 VASO-VAGAL REACTION: ICD-10-CM

## 2023-05-09 DIAGNOSIS — R19.7 DIARRHEA, UNSPECIFIED TYPE: Primary | ICD-10-CM

## 2023-05-09 DIAGNOSIS — R19.7 DIARRHEA, UNSPECIFIED TYPE: ICD-10-CM

## 2023-05-09 PROCEDURE — 1159F MED LIST DOCD IN RCRD: CPT | Mod: CPTII,S$GLB,, | Performed by: STUDENT IN AN ORGANIZED HEALTH CARE EDUCATION/TRAINING PROGRAM

## 2023-05-09 PROCEDURE — 1160F PR REVIEW ALL MEDS BY PRESCRIBER/CLIN PHARMACIST DOCUMENTED: ICD-10-PCS | Mod: CPTII,S$GLB,, | Performed by: STUDENT IN AN ORGANIZED HEALTH CARE EDUCATION/TRAINING PROGRAM

## 2023-05-09 PROCEDURE — 74019 RADEX ABDOMEN 2 VIEWS: CPT | Mod: 26,,, | Performed by: RADIOLOGY

## 2023-05-09 PROCEDURE — 3075F PR MOST RECENT SYSTOLIC BLOOD PRESS GE 130-139MM HG: ICD-10-PCS | Mod: CPTII,S$GLB,, | Performed by: STUDENT IN AN ORGANIZED HEALTH CARE EDUCATION/TRAINING PROGRAM

## 2023-05-09 PROCEDURE — 74019 XR ABDOMEN FLAT AND ERECT: ICD-10-PCS | Mod: 26,,, | Performed by: RADIOLOGY

## 2023-05-09 PROCEDURE — 99999 PR PBB SHADOW E&M-EST. PATIENT-LVL V: CPT | Mod: PBBFAC,,, | Performed by: STUDENT IN AN ORGANIZED HEALTH CARE EDUCATION/TRAINING PROGRAM

## 2023-05-09 PROCEDURE — 3079F DIAST BP 80-89 MM HG: CPT | Mod: CPTII,S$GLB,, | Performed by: STUDENT IN AN ORGANIZED HEALTH CARE EDUCATION/TRAINING PROGRAM

## 2023-05-09 PROCEDURE — 74019 RADEX ABDOMEN 2 VIEWS: CPT | Mod: TC,FY

## 2023-05-09 PROCEDURE — 99999 PR PBB SHADOW E&M-EST. PATIENT-LVL V: ICD-10-PCS | Mod: PBBFAC,,, | Performed by: STUDENT IN AN ORGANIZED HEALTH CARE EDUCATION/TRAINING PROGRAM

## 2023-05-09 PROCEDURE — 3008F PR BODY MASS INDEX (BMI) DOCUMENTED: ICD-10-PCS | Mod: CPTII,S$GLB,, | Performed by: STUDENT IN AN ORGANIZED HEALTH CARE EDUCATION/TRAINING PROGRAM

## 2023-05-09 PROCEDURE — 3079F PR MOST RECENT DIASTOLIC BLOOD PRESSURE 80-89 MM HG: ICD-10-PCS | Mod: CPTII,S$GLB,, | Performed by: STUDENT IN AN ORGANIZED HEALTH CARE EDUCATION/TRAINING PROGRAM

## 2023-05-09 PROCEDURE — 99213 PR OFFICE/OUTPT VISIT, EST, LEVL III, 20-29 MIN: ICD-10-PCS | Mod: S$GLB,,, | Performed by: STUDENT IN AN ORGANIZED HEALTH CARE EDUCATION/TRAINING PROGRAM

## 2023-05-09 PROCEDURE — 99213 OFFICE O/P EST LOW 20 MIN: CPT | Mod: S$GLB,,, | Performed by: STUDENT IN AN ORGANIZED HEALTH CARE EDUCATION/TRAINING PROGRAM

## 2023-05-09 PROCEDURE — 3008F BODY MASS INDEX DOCD: CPT | Mod: CPTII,S$GLB,, | Performed by: STUDENT IN AN ORGANIZED HEALTH CARE EDUCATION/TRAINING PROGRAM

## 2023-05-09 PROCEDURE — 1159F PR MEDICATION LIST DOCUMENTED IN MEDICAL RECORD: ICD-10-PCS | Mod: CPTII,S$GLB,, | Performed by: STUDENT IN AN ORGANIZED HEALTH CARE EDUCATION/TRAINING PROGRAM

## 2023-05-09 PROCEDURE — 3075F SYST BP GE 130 - 139MM HG: CPT | Mod: CPTII,S$GLB,, | Performed by: STUDENT IN AN ORGANIZED HEALTH CARE EDUCATION/TRAINING PROGRAM

## 2023-05-09 PROCEDURE — 1160F RVW MEDS BY RX/DR IN RCRD: CPT | Mod: CPTII,S$GLB,, | Performed by: STUDENT IN AN ORGANIZED HEALTH CARE EDUCATION/TRAINING PROGRAM

## 2023-05-09 RX ORDER — CARIPRAZINE 3 MG/1
3 CAPSULE, GELATIN COATED ORAL
Status: ON HOLD | COMMUNITY
Start: 2023-01-19 | End: 2023-06-26 | Stop reason: HOSPADM

## 2023-05-09 NOTE — PROGRESS NOTES
"[facility] MEDICINE CLINIC NOTE    Patient Name: Sonia Solorzano  YOB: 1985    PRESENTING HISTORY     History of Present Illness:  Ms. Sonia Solorzano is a 37 y.o. female here with diarrhea.     Onset this AM at work.   [place] like had to go pee.   Stood up, took 2 steps and had sig diarrhea.   Felt hot, clammy.   Was told she looked pale.   Lightheaded.     Before left to go back home had diarrhea again. Didn't feel anything.     Has soiled 3-4 pairs of clothes.     Yesterday felt fine.     No known fever.   No abdominal pain.   Gets heat waves like she is sitting in an oven.   No N/V .       Hasn't been taking metformin.     Already taking imodium without relief.       PMHx sig for  shunt for IIH, recently seen by NSGY.   Also with chronic bowel complaints/IBS. This is different.     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       ROS      OBJECTIVE:   Vital Signs:  Vitals:    05/09/23 1513   BP: 130/86   Pulse: 97   Resp: 18   SpO2: 97%   Weight: 133.5 kg (294 lb 5 oz)   Height: 5' 7" (1.702 m)         Physical Exam  Vitals and nursing note reviewed.   Constitutional:       Appearance: She is obese. She is not ill-appearing, toxic-appearing or diaphoretic.   Cardiovascular:      Rate and Rhythm: Normal rate and regular rhythm.      Heart sounds: No murmur heard.  Pulmonary:      Effort: Pulmonary effort is normal. No respiratory distress.      Breath sounds: Normal breath sounds.   Abdominal:      General: There is no distension.      Palpations: Abdomen is soft.      Tenderness: There is no abdominal tenderness. There is no right CVA tenderness, left CVA tenderness, guarding or rebound.      Comments: Hyperactive bowel sounds   Musculoskeletal:      Right lower leg: No edema.      Left lower leg: No edema.   Skin:     Comments: Normal skin turgor       ASSESSMENT & PLAN:     Diarrhea, unspecified type  -     CBC Auto Differential; Future; Expected date: " 05/09/2023  -     Comprehensive Metabolic Panel; Future; Expected date: 05/09/2023  -     Clostridium difficile EIA; Future; Expected date: 05/09/2023  -     Gastrointestinal Pathogens Panel, PCR; Future; Expected date: 05/09/2023  -     WBC, Stool; Future; Expected date: 05/09/2023  -     Pancreatic elastase, fecal; Future; Expected date: 05/09/2023  -     TSH; Future; Expected date: 05/09/2023  -     Magnesium; Future; Expected date: 05/09/2023  -     X-Ray Abdomen Flat And Erect; Future; Expected date: 05/09/2023    Vaso-vagal reaction      I suspect she is having diarrhea with vagally mediated clammy/diaphoretic spells.     She does not appear grossly hypovolemic today.     Stool studies. Supportive care.   Hold on lomotil until sure no infection.          Aj Grimaldo MD   Internal Medicine

## 2023-05-09 NOTE — TELEPHONE ENCOUNTER
----- Message from Aure Alexander sent at 5/9/2023  8:40 AM CDT -----  Regarding: urgent for pt Concerned about shunt draining into abdomen/ lightheaded  Contact: @[phone]  Pt is concerned, had diarrhea without warning, is sweating profusely and is lightheaded. Pt needs advise does not know if they need to get to urgent care/ ER. @[phone]     Or if she needs so see regular providers, not sure if shunt related.

## 2023-05-10 ENCOUNTER — LAB VISIT (OUTPATIENT)
Dept: LAB | Facility: HOSPITAL | Age: 38
End: 2023-05-10
Attending: STUDENT IN AN ORGANIZED HEALTH CARE EDUCATION/TRAINING PROGRAM
Payer: COMMERCIAL

## 2023-05-10 DIAGNOSIS — R19.7 DIARRHEA, UNSPECIFIED TYPE: ICD-10-CM

## 2023-05-10 PROCEDURE — 82653 EL-1 FECAL QUANTITATIVE: CPT | Performed by: STUDENT IN AN ORGANIZED HEALTH CARE EDUCATION/TRAINING PROGRAM

## 2023-05-10 PROCEDURE — 89055 LEUKOCYTE ASSESSMENT FECAL: CPT | Performed by: STUDENT IN AN ORGANIZED HEALTH CARE EDUCATION/TRAINING PROGRAM

## 2023-05-10 PROCEDURE — 87507 IADNA-DNA/RNA PROBE TQ 12-25: CPT | Performed by: STUDENT IN AN ORGANIZED HEALTH CARE EDUCATION/TRAINING PROGRAM

## 2023-05-11 LAB — WBC #/AREA STL HPF: NORMAL /[HPF]

## 2023-05-16 ENCOUNTER — PATIENT MESSAGE (OUTPATIENT)
Dept: FAMILY MEDICINE | Facility: CLINIC | Age: 38
End: 2023-05-16
Payer: COMMERCIAL

## 2023-05-16 LAB — ELASTASE 1, FECAL: >500 MCG/G

## 2023-05-17 ENCOUNTER — TELEPHONE (OUTPATIENT)
Dept: PSYCHIATRY | Facility: CLINIC | Age: 38
End: 2023-05-17
Payer: COMMERCIAL

## 2023-05-17 ENCOUNTER — LAB VISIT (OUTPATIENT)
Dept: LAB | Facility: HOSPITAL | Age: 38
End: 2023-05-17
Attending: STUDENT IN AN ORGANIZED HEALTH CARE EDUCATION/TRAINING PROGRAM
Payer: COMMERCIAL

## 2023-05-17 DIAGNOSIS — R79.89 ELEVATED PTHRP LEVEL: ICD-10-CM

## 2023-05-17 DIAGNOSIS — D50.9 IRON DEFICIENCY ANEMIA, UNSPECIFIED IRON DEFICIENCY ANEMIA TYPE: ICD-10-CM

## 2023-05-17 LAB
CAMPY SP DNA.DIARRHEA STL QL NAA+PROBE: NOT DETECTED
CRYPTOSP DNA SPEC QL NAA+PROBE: NOT DETECTED
E COLI O157H7 DNA SPEC QL NAA+PROBE: NOT DETECTED
E HISTOLYT DNA SPEC QL NAA+PROBE: NOT DETECTED
FERRITIN SERPL-MCNC: 47 NG/ML (ref 20–300)
G LAMBLIA DNA SPEC QL NAA+PROBE: NOT DETECTED
GPP - ADENOVIRUS 40/41: NOT DETECTED
GPP - CLOSTRIDIUM DIFFICILE TOXIN A/B: NOT DETECTED
GPP - ENTEROTOXIGENIC E COLI (ETEC): NOT DETECTED
GPP - SHIGELLA: NOT DETECTED
IRON SERPL-MCNC: 35 UG/DL (ref 30–160)
LACTATE PLASV-SCNC: NOT DETECTED MMOL/L
NOROVIRUS RNA STL QL NAA+PROBE: NOT DETECTED
PTH-INTACT SERPL-MCNC: 111.9 PG/ML (ref 9–77)
RV DSRNA STL QL NAA+PROBE: NOT DETECTED
SALMONELLA DNA SPEC QL NAA+PROBE: NOT DETECTED
SATURATED IRON: 8 % (ref 20–50)
TOTAL IRON BINDING CAPACITY: 459 UG/DL (ref 250–450)
TRANSFERRIN SERPL-MCNC: 310 MG/DL (ref 200–375)
V CHOLERAE DNA SPEC QL NAA+PROBE: NOT DETECTED
Y ENTERO RECN STL QL NAA+PROBE: NOT DETECTED

## 2023-05-17 PROCEDURE — 83970 ASSAY OF PARATHORMONE: CPT | Performed by: INTERNAL MEDICINE

## 2023-05-17 PROCEDURE — 82728 ASSAY OF FERRITIN: CPT | Performed by: INTERNAL MEDICINE

## 2023-05-17 PROCEDURE — 82397 CHEMILUMINESCENT ASSAY: CPT | Performed by: INTERNAL MEDICINE

## 2023-05-17 PROCEDURE — 36415 COLL VENOUS BLD VENIPUNCTURE: CPT | Performed by: INTERNAL MEDICINE

## 2023-05-17 PROCEDURE — 84466 ASSAY OF TRANSFERRIN: CPT | Performed by: INTERNAL MEDICINE

## 2023-05-17 NOTE — TELEPHONE ENCOUNTER
Spoke with pt regarding missed appt. She is at work in a meeting and forgot about appt. She cannot complete a virtual appt. She will call the office for rescheduling options.

## 2023-05-18 ENCOUNTER — OFFICE VISIT (OUTPATIENT)
Dept: HEMATOLOGY/ONCOLOGY | Facility: CLINIC | Age: 38
End: 2023-05-18
Payer: COMMERCIAL

## 2023-05-18 DIAGNOSIS — D50.9 IRON DEFICIENCY ANEMIA, UNSPECIFIED IRON DEFICIENCY ANEMIA TYPE: Primary | ICD-10-CM

## 2023-05-18 DIAGNOSIS — R79.89 ELEVATED PTHRP LEVEL: ICD-10-CM

## 2023-05-18 PROCEDURE — 99214 OFFICE O/P EST MOD 30 MIN: CPT | Mod: 95,,, | Performed by: INTERNAL MEDICINE

## 2023-05-18 PROCEDURE — 99214 PR OFFICE/OUTPT VISIT, EST, LEVL IV, 30-39 MIN: ICD-10-PCS | Mod: 95,,, | Performed by: INTERNAL MEDICINE

## 2023-05-18 NOTE — TELEPHONE ENCOUNTER
Call placed to patient for notification. Offered appointment today, patient states unable to perform an appointment today due to work and other scheduled appointments. Requesting to know if virtual can be performed on a different day as she can not take anymore time off work. Will send follow up message to Dr. Grimaldo for notification.          MD Dillan Santana Staff 1 hour ago (7:07 AM)       Can she come in to see me in UC slot today?

## 2023-05-23 LAB — PTH RELATED PROT SERPL-SCNC: 0.4 PMOL/L

## 2023-05-25 ENCOUNTER — OFFICE VISIT (OUTPATIENT)
Dept: ENDOCRINOLOGY | Facility: CLINIC | Age: 38
End: 2023-05-25
Payer: COMMERCIAL

## 2023-05-25 ENCOUNTER — LAB VISIT (OUTPATIENT)
Dept: LAB | Facility: HOSPITAL | Age: 38
End: 2023-05-25
Attending: PHYSICIAN ASSISTANT
Payer: COMMERCIAL

## 2023-05-25 VITALS
OXYGEN SATURATION: 96 % | BODY MASS INDEX: 45.99 KG/M2 | SYSTOLIC BLOOD PRESSURE: 120 MMHG | TEMPERATURE: 99 F | HEIGHT: 67 IN | WEIGHT: 293 LBS | HEART RATE: 85 BPM | DIASTOLIC BLOOD PRESSURE: 84 MMHG

## 2023-05-25 DIAGNOSIS — E21.3 HYPERPARATHYROIDISM: Primary | ICD-10-CM

## 2023-05-25 DIAGNOSIS — E21.3 HYPERPARATHYROIDISM: ICD-10-CM

## 2023-05-25 DIAGNOSIS — E55.9 HYPOVITAMINOSIS D: ICD-10-CM

## 2023-05-25 DIAGNOSIS — R63.5 WEIGHT GAIN: ICD-10-CM

## 2023-05-25 DIAGNOSIS — E28.2 PCOS (POLYCYSTIC OVARIAN SYNDROME): Chronic | ICD-10-CM

## 2023-05-25 LAB
25(OH)D3+25(OH)D2 SERPL-MCNC: 22 NG/ML (ref 30–96)
ALBUMIN SERPL BCP-MCNC: 3.4 G/DL (ref 3.5–5.2)
ANION GAP SERPL CALC-SCNC: 10 MMOL/L (ref 8–16)
BUN SERPL-MCNC: 10 MG/DL (ref 6–20)
CA-I BLDV-SCNC: 1.44 MMOL/L (ref 1.06–1.42)
CALCIUM SERPL-MCNC: 10.5 MG/DL (ref 8.7–10.5)
CHLORIDE SERPL-SCNC: 103 MMOL/L (ref 95–110)
CO2 SERPL-SCNC: 27 MMOL/L (ref 23–29)
CREAT SERPL-MCNC: 0.8 MG/DL (ref 0.5–1.4)
EST. GFR  (NO RACE VARIABLE): >60 ML/MIN/1.73 M^2
GLUCOSE SERPL-MCNC: 87 MG/DL (ref 70–110)
PHOSPHATE SERPL-MCNC: 2.6 MG/DL (ref 2.7–4.5)
POTASSIUM SERPL-SCNC: 4.1 MMOL/L (ref 3.5–5.1)
PTH-INTACT SERPL-MCNC: 153.8 PG/ML (ref 9–77)
SODIUM SERPL-SCNC: 140 MMOL/L (ref 136–145)

## 2023-05-25 PROCEDURE — 99213 OFFICE O/P EST LOW 20 MIN: CPT | Mod: S$GLB,,, | Performed by: PHYSICIAN ASSISTANT

## 2023-05-25 PROCEDURE — 80069 RENAL FUNCTION PANEL: CPT | Performed by: PHYSICIAN ASSISTANT

## 2023-05-25 PROCEDURE — 1160F PR REVIEW ALL MEDS BY PRESCRIBER/CLIN PHARMACIST DOCUMENTED: ICD-10-PCS | Mod: CPTII,S$GLB,, | Performed by: PHYSICIAN ASSISTANT

## 2023-05-25 PROCEDURE — 99999 PR PBB SHADOW E&M-EST. PATIENT-LVL IV: CPT | Mod: PBBFAC,,, | Performed by: PHYSICIAN ASSISTANT

## 2023-05-25 PROCEDURE — 3008F BODY MASS INDEX DOCD: CPT | Mod: CPTII,S$GLB,, | Performed by: PHYSICIAN ASSISTANT

## 2023-05-25 PROCEDURE — 82306 VITAMIN D 25 HYDROXY: CPT | Performed by: PHYSICIAN ASSISTANT

## 2023-05-25 PROCEDURE — 1160F RVW MEDS BY RX/DR IN RCRD: CPT | Mod: CPTII,S$GLB,, | Performed by: PHYSICIAN ASSISTANT

## 2023-05-25 PROCEDURE — 3079F DIAST BP 80-89 MM HG: CPT | Mod: CPTII,S$GLB,, | Performed by: PHYSICIAN ASSISTANT

## 2023-05-25 PROCEDURE — 82330 ASSAY OF CALCIUM: CPT | Performed by: PHYSICIAN ASSISTANT

## 2023-05-25 PROCEDURE — 1159F MED LIST DOCD IN RCRD: CPT | Mod: CPTII,S$GLB,, | Performed by: PHYSICIAN ASSISTANT

## 2023-05-25 PROCEDURE — 1159F PR MEDICATION LIST DOCUMENTED IN MEDICAL RECORD: ICD-10-PCS | Mod: CPTII,S$GLB,, | Performed by: PHYSICIAN ASSISTANT

## 2023-05-25 PROCEDURE — 99999 PR PBB SHADOW E&M-EST. PATIENT-LVL IV: ICD-10-PCS | Mod: PBBFAC,,, | Performed by: PHYSICIAN ASSISTANT

## 2023-05-25 PROCEDURE — 3074F PR MOST RECENT SYSTOLIC BLOOD PRESSURE < 130 MM HG: ICD-10-PCS | Mod: CPTII,S$GLB,, | Performed by: PHYSICIAN ASSISTANT

## 2023-05-25 PROCEDURE — 36415 COLL VENOUS BLD VENIPUNCTURE: CPT | Performed by: PHYSICIAN ASSISTANT

## 2023-05-25 PROCEDURE — 83970 ASSAY OF PARATHORMONE: CPT | Performed by: PHYSICIAN ASSISTANT

## 2023-05-25 PROCEDURE — 99213 PR OFFICE/OUTPT VISIT, EST, LEVL III, 20-29 MIN: ICD-10-PCS | Mod: S$GLB,,, | Performed by: PHYSICIAN ASSISTANT

## 2023-05-25 PROCEDURE — 3008F PR BODY MASS INDEX (BMI) DOCUMENTED: ICD-10-PCS | Mod: CPTII,S$GLB,, | Performed by: PHYSICIAN ASSISTANT

## 2023-05-25 PROCEDURE — 3079F PR MOST RECENT DIASTOLIC BLOOD PRESSURE 80-89 MM HG: ICD-10-PCS | Mod: CPTII,S$GLB,, | Performed by: PHYSICIAN ASSISTANT

## 2023-05-25 PROCEDURE — 3074F SYST BP LT 130 MM HG: CPT | Mod: CPTII,S$GLB,, | Performed by: PHYSICIAN ASSISTANT

## 2023-05-25 RX ORDER — DEXAMETHASONE 1 MG/1
TABLET ORAL
Qty: 1 TABLET | Refills: 0 | Status: ON HOLD | OUTPATIENT
Start: 2023-05-25 | End: 2023-06-26 | Stop reason: HOSPADM

## 2023-05-25 NOTE — PROGRESS NOTES
CC: Hyperparathyroidism    HPI: Sonia Solorzano is a 37 y.o. female here for hyperparathyroidism along with pending conditions listed in the Visit Diagnosis. Diagnosed in 9/22 on lab work. +FHx of DM in her mother, mgm and pgf. Pt has IIH and is s/p VPS on 9/19.  No fhx of HPT.  She had a renal stone ~16 years ago. No fractures. Pt states she needs a knee replacement due to work injury. + nausea/vomiting (IIH), abdominal pain (int). Left sided weakness. +irritable. Takes tums and drinks a lot of milk. CT 1/23 shows a 2 mm nonobstructive renal stone in the lower left kidney.    PCOS   Pt concerned w/ wt. Reports gaining wt even though she has been eating less due to nausea.  + acne  No facial hair or voice changes.  Reports pelvic u/s a few years ago.  Metformin 500 mg qd  No muscle weakness or easy bruising.  Her cycles occur every other month. Last period was in 1/23. Reports spotting this week. Her mother went through menopause at 40.    PMHx, PSHx: reviewed in epic.    Social Hx: no ETOH/tobacco use. She quit vaping ~2 mths ago. 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 He has two children 20 and 15 yo. Has a tubal ligation.    Wt Readings from Last 15 Encounters:   05/25/23 (!) 137.1 kg (302 lb 5.8 oz)   05/09/23 133.5 kg (294 lb 5 oz)   05/01/23 (!) 137.3 kg (302 lb 12.8 oz)   04/24/23 (!) 138 kg (304 lb 4.8 oz)   04/17/23 (!) 137.9 kg (304 lb)   04/13/23 (!) 137 kg (302 lb)   04/10/23 (!) 137.3 kg (302 lb 12.8 oz)   03/27/23 (!) 137 kg (302 lb 1.6 oz)   03/02/23 133.8 kg (295 lb)   01/25/23 135.6 kg (298 lb 15.1 oz)   01/18/23 134 kg (295 lb 6.7 oz)   01/12/23 127 kg (280 lb)   01/08/23 127 kg (280 lb)   12/27/22 130.3 kg (287 lb 4.2 oz)   11/25/22 130.3 kg (287 lb 4.2 oz)      ROS:   Constitutional: + wt gain 15 lbs since 11/22.  Eyes: No recent visual changes  Cardiovascular: Denies current anginal symptoms  Respiratory: Denies current respiratory difficulty  Gastrointestinal: Denies recent bowel  "disturbances  GenitoUrinary - No dysuria  Skin: No new skin rash  Neurologic: + HA,   Musculoskeletal: + joint pain  Endocrine: no polyphagia, polydipsia or polyuria  Remainder ROS negative     /84 (BP Location: Left arm, Patient Position: Sitting, BP Method: Large (Manual))   Pulse 85   Temp 98.5 °F (36.9 °C) (Oral)   Ht 5' 7" (1.702 m)   Wt (!) 137.1 kg (302 lb 5.8 oz)   SpO2 96%   BMI 47.36 kg/m²      Personally reviewed labs below:    Lab Results   Component Value Date    TSH 2.669 05/09/2023        Chemistry        Component Value Date/Time     05/25/2023 0819    K 4.1 05/25/2023 0819     05/25/2023 0819    CO2 27 05/25/2023 0819    BUN 10 05/25/2023 0819    CREATININE 0.8 05/25/2023 0819    CREATININE 0.9 08/15/2013 2233    GLU 87 05/25/2023 0819        Component Value Date/Time    CALCIUM 10.5 05/25/2023 0819    CALCIUM 9.7 08/15/2013 2233    ALKPHOS 84 05/09/2023 1617    AST 11 05/09/2023 1617    ALT 19 05/09/2023 1617    BILITOT 0.2 05/09/2023 1617    ESTGFRAFRICA >60.0 02/07/2022 1031    EGFRNONAA >60.0 02/07/2022 1031           Lab Results   Component Value Date    HGBA1C 5.5 02/07/2022    HGBA1C 5.2 09/17/2020        PE:  GENERAL: middle aged female, well developed, well nourished  NECK: Supple neck, No bruit  NEURO: steady gait, CN ll-Xll grossly intact  PSYCH: normal mood and affect    Assessment/Plan:   1. Hyperparathyroidism  Calcium, Ionized    PTH, Intact    Comprehensive Metabolic Panel      2. PCOS (polycystic ovarian syndrome)  Hemoglobin A1C    Insulin, Random    Insulin, Random      3. Hypovitaminosis D  Vitamin D      4. Weight gain  Cortisol    ACTH         Hyperparathyroidism-repeat labs. PTH slightly elevated. Ca is wnl. Check vd.    PCOS-testosterone was normal. Check insulin. Continue Metformin. Obtain previous u/s.  Wt gain-dex suppression test.  Hypovitaminosis d-check vd.    Fasting insulin  Obtain pelvic u/s  F/u in 6 mths -pth,ica, vd, rp, insulin      "

## 2023-05-27 ENCOUNTER — LAB VISIT (OUTPATIENT)
Dept: LAB | Facility: HOSPITAL | Age: 38
End: 2023-05-27
Attending: PHYSICIAN ASSISTANT
Payer: COMMERCIAL

## 2023-05-27 DIAGNOSIS — E28.2 PCOS (POLYCYSTIC OVARIAN SYNDROME): Chronic | ICD-10-CM

## 2023-05-27 LAB
INSULIN COLLECTION INTERVAL: NORMAL
INSULIN SERPL-ACNC: 18.8 UU/ML

## 2023-05-27 PROCEDURE — 83525 ASSAY OF INSULIN: CPT | Performed by: PHYSICIAN ASSISTANT

## 2023-05-27 PROCEDURE — 36415 COLL VENOUS BLD VENIPUNCTURE: CPT | Performed by: PHYSICIAN ASSISTANT

## 2023-06-01 ENCOUNTER — PATIENT MESSAGE (OUTPATIENT)
Dept: ENDOCRINOLOGY | Facility: CLINIC | Age: 38
End: 2023-06-01
Payer: COMMERCIAL

## 2023-06-01 ENCOUNTER — PATIENT MESSAGE (OUTPATIENT)
Dept: NEUROSURGERY | Facility: CLINIC | Age: 38
End: 2023-06-01
Payer: COMMERCIAL

## 2023-06-03 ENCOUNTER — LAB VISIT (OUTPATIENT)
Dept: LAB | Facility: HOSPITAL | Age: 38
End: 2023-06-03
Attending: PHYSICIAN ASSISTANT
Payer: COMMERCIAL

## 2023-06-03 DIAGNOSIS — R63.5 WEIGHT GAIN: ICD-10-CM

## 2023-06-03 LAB — CORTIS SERPL-MCNC: <1 UG/DL

## 2023-06-03 PROCEDURE — 82533 TOTAL CORTISOL: CPT | Performed by: PHYSICIAN ASSISTANT

## 2023-06-03 PROCEDURE — 82024 ASSAY OF ACTH: CPT | Performed by: PHYSICIAN ASSISTANT

## 2023-06-03 PROCEDURE — 36415 COLL VENOUS BLD VENIPUNCTURE: CPT | Performed by: PHYSICIAN ASSISTANT

## 2023-06-05 ENCOUNTER — PATIENT MESSAGE (OUTPATIENT)
Dept: ENDOCRINOLOGY | Facility: CLINIC | Age: 38
End: 2023-06-05
Payer: COMMERCIAL

## 2023-06-05 RX ORDER — DULAGLUTIDE 0.75 MG/.5ML
0.75 INJECTION, SOLUTION SUBCUTANEOUS
Qty: 4 PEN | Refills: 11 | Status: ON HOLD | OUTPATIENT
Start: 2023-06-05 | End: 2023-06-26 | Stop reason: HOSPADM

## 2023-06-06 LAB — ACTH PLAS-MCNC: <5 PG/ML (ref 0–46)

## 2023-06-21 ENCOUNTER — PATIENT MESSAGE (OUTPATIENT)
Dept: NEUROSURGERY | Facility: CLINIC | Age: 38
End: 2023-06-21
Payer: COMMERCIAL

## 2023-06-25 ENCOUNTER — HOSPITAL ENCOUNTER (OUTPATIENT)
Facility: HOSPITAL | Age: 38
Discharge: HOME | End: 2023-06-26
Attending: STUDENT IN AN ORGANIZED HEALTH CARE EDUCATION/TRAINING PROGRAM | Admitting: STUDENT IN AN ORGANIZED HEALTH CARE EDUCATION/TRAINING PROGRAM
Payer: COMMERCIAL

## 2023-06-25 DIAGNOSIS — F41.1 GAD (GENERALIZED ANXIETY DISORDER): ICD-10-CM

## 2023-06-25 DIAGNOSIS — R51.9 NONINTRACTABLE HEADACHE, UNSPECIFIED CHRONICITY PATTERN, UNSPECIFIED HEADACHE TYPE: ICD-10-CM

## 2023-06-25 DIAGNOSIS — Z98.2 VP (VENTRICULOPERITONEAL) SHUNT STATUS: ICD-10-CM

## 2023-06-25 DIAGNOSIS — I63.9 STROKE: ICD-10-CM

## 2023-06-25 DIAGNOSIS — R29.90 STROKE-LIKE SYMPTOMS: ICD-10-CM

## 2023-06-25 DIAGNOSIS — G43.109 COMPLICATED MIGRAINE: Primary | ICD-10-CM

## 2023-06-25 DIAGNOSIS — R47.81 SLURRED SPEECH: ICD-10-CM

## 2023-06-25 LAB
ALBUMIN SERPL BCP-MCNC: 3.4 G/DL (ref 3.5–5.2)
ALP SERPL-CCNC: 84 U/L (ref 55–135)
ALT SERPL W/O P-5'-P-CCNC: 15 U/L (ref 10–44)
ANION GAP SERPL CALC-SCNC: 13 MMOL/L (ref 8–16)
AST SERPL-CCNC: 9 U/L (ref 10–40)
BASOPHILS # BLD AUTO: 0.03 K/UL (ref 0–0.2)
BASOPHILS NFR BLD: 0.3 % (ref 0–1.9)
BILIRUB SERPL-MCNC: 0.2 MG/DL (ref 0.1–1)
BUN SERPL-MCNC: 11 MG/DL (ref 6–20)
CALCIUM SERPL-MCNC: 9.8 MG/DL (ref 8.7–10.5)
CHLORIDE SERPL-SCNC: 105 MMOL/L (ref 95–110)
CHOLEST SERPL-MCNC: 246 MG/DL (ref 120–199)
CHOLEST/HDLC SERPL: 4.9 {RATIO} (ref 2–5)
CO2 SERPL-SCNC: 20 MMOL/L (ref 23–29)
CREAT SERPL-MCNC: 0.8 MG/DL (ref 0.5–1.4)
DIFFERENTIAL METHOD: ABNORMAL
EOSINOPHIL # BLD AUTO: 0.1 K/UL (ref 0–0.5)
EOSINOPHIL NFR BLD: 1.2 % (ref 0–8)
ERYTHROCYTE [DISTWIDTH] IN BLOOD BY AUTOMATED COUNT: 15.9 % (ref 11.5–14.5)
EST. GFR  (NO RACE VARIABLE): >60 ML/MIN/1.73 M^2
GLUCOSE SERPL-MCNC: 120 MG/DL (ref 70–110)
HCT VFR BLD AUTO: 40.3 % (ref 37–48.5)
HDLC SERPL-MCNC: 50 MG/DL (ref 40–75)
HDLC SERPL: 20.3 % (ref 20–50)
HGB BLD-MCNC: 12.8 G/DL (ref 12–16)
IMM GRANULOCYTES # BLD AUTO: 0.03 K/UL (ref 0–0.04)
IMM GRANULOCYTES NFR BLD AUTO: 0.3 % (ref 0–0.5)
INR PPP: 0.9 (ref 0.8–1.2)
LDLC SERPL CALC-MCNC: 144.2 MG/DL (ref 63–159)
LYMPHOCYTES # BLD AUTO: 2.6 K/UL (ref 1–4.8)
LYMPHOCYTES NFR BLD: 23 % (ref 18–48)
MCH RBC QN AUTO: 27.4 PG (ref 27–31)
MCHC RBC AUTO-ENTMCNC: 31.8 G/DL (ref 32–36)
MCV RBC AUTO: 86 FL (ref 82–98)
MONOCYTES # BLD AUTO: 0.5 K/UL (ref 0.3–1)
MONOCYTES NFR BLD: 4.3 % (ref 4–15)
NEUTROPHILS # BLD AUTO: 8 K/UL (ref 1.8–7.7)
NEUTROPHILS NFR BLD: 70.9 % (ref 38–73)
NONHDLC SERPL-MCNC: 196 MG/DL
NRBC BLD-RTO: 0 /100 WBC
PLATELET # BLD AUTO: 314 K/UL (ref 150–450)
PMV BLD AUTO: 9.4 FL (ref 9.2–12.9)
POC PTINR: 1 (ref 0.9–1.2)
POCT GLUCOSE: 116 MG/DL (ref 70–110)
POTASSIUM SERPL-SCNC: 3.3 MMOL/L (ref 3.5–5.1)
PROT SERPL-MCNC: 6.7 G/DL (ref 6–8.4)
PROTHROMBIN TIME: 10.2 SEC (ref 9–12.5)
RBC # BLD AUTO: 4.68 M/UL (ref 4–5.4)
SAMPLE: NORMAL
SODIUM SERPL-SCNC: 138 MMOL/L (ref 136–145)
TRIGL SERPL-MCNC: 259 MG/DL (ref 30–150)
TSH SERPL DL<=0.005 MIU/L-ACNC: 2.66 UIU/ML (ref 0.4–4)
WBC # BLD AUTO: 11.19 K/UL (ref 3.9–12.7)

## 2023-06-25 PROCEDURE — G0378 HOSPITAL OBSERVATION PER HR: HCPCS

## 2023-06-25 PROCEDURE — 80061 LIPID PANEL: CPT | Performed by: STUDENT IN AN ORGANIZED HEALTH CARE EDUCATION/TRAINING PROGRAM

## 2023-06-25 PROCEDURE — 99448 NTRPROF PH1/NTRNET/EHR 21-30: CPT | Mod: ,,, | Performed by: PSYCHIATRY & NEUROLOGY

## 2023-06-25 PROCEDURE — 99285 EMERGENCY DEPT VISIT HI MDM: CPT | Mod: 25

## 2023-06-25 PROCEDURE — 36415 COLL VENOUS BLD VENIPUNCTURE: CPT | Performed by: NURSE PRACTITIONER

## 2023-06-25 PROCEDURE — 85025 COMPLETE CBC W/AUTO DIFF WBC: CPT | Performed by: STUDENT IN AN ORGANIZED HEALTH CARE EDUCATION/TRAINING PROGRAM

## 2023-06-25 PROCEDURE — 93010 ELECTROCARDIOGRAM REPORT: CPT | Mod: ,,, | Performed by: SPECIALIST

## 2023-06-25 PROCEDURE — 82962 GLUCOSE BLOOD TEST: CPT

## 2023-06-25 PROCEDURE — 83036 HEMOGLOBIN GLYCOSYLATED A1C: CPT | Performed by: NURSE PRACTITIONER

## 2023-06-25 PROCEDURE — 85610 PROTHROMBIN TIME: CPT

## 2023-06-25 PROCEDURE — 99448 PR INTERPROF, PHONE/INTERNET/EHR, CONSULT, 21-30 MINS: ICD-10-PCS | Mod: ,,, | Performed by: PSYCHIATRY & NEUROLOGY

## 2023-06-25 PROCEDURE — 63600175 PHARM REV CODE 636 W HCPCS: Performed by: NURSE PRACTITIONER

## 2023-06-25 PROCEDURE — 93010 EKG 12-LEAD: ICD-10-PCS | Mod: ,,, | Performed by: SPECIALIST

## 2023-06-25 PROCEDURE — 36415 COLL VENOUS BLD VENIPUNCTURE: CPT | Performed by: STUDENT IN AN ORGANIZED HEALTH CARE EDUCATION/TRAINING PROGRAM

## 2023-06-25 PROCEDURE — 96375 TX/PRO/DX INJ NEW DRUG ADDON: CPT

## 2023-06-25 PROCEDURE — 85610 PROTHROMBIN TIME: CPT | Performed by: STUDENT IN AN ORGANIZED HEALTH CARE EDUCATION/TRAINING PROGRAM

## 2023-06-25 PROCEDURE — 93005 ELECTROCARDIOGRAM TRACING: CPT

## 2023-06-25 PROCEDURE — 94761 N-INVAS EAR/PLS OXIMETRY MLT: CPT

## 2023-06-25 PROCEDURE — 80053 COMPREHEN METABOLIC PANEL: CPT | Performed by: STUDENT IN AN ORGANIZED HEALTH CARE EDUCATION/TRAINING PROGRAM

## 2023-06-25 PROCEDURE — 96374 THER/PROPH/DIAG INJ IV PUSH: CPT

## 2023-06-25 PROCEDURE — 84443 ASSAY THYROID STIM HORMONE: CPT | Performed by: STUDENT IN AN ORGANIZED HEALTH CARE EDUCATION/TRAINING PROGRAM

## 2023-06-25 PROCEDURE — 36416 COLLJ CAPILLARY BLOOD SPEC: CPT

## 2023-06-25 PROCEDURE — 99900035 HC TECH TIME PER 15 MIN (STAT)

## 2023-06-25 PROCEDURE — 63600175 PHARM REV CODE 636 W HCPCS: Performed by: STUDENT IN AN ORGANIZED HEALTH CARE EDUCATION/TRAINING PROGRAM

## 2023-06-25 PROCEDURE — 96372 THER/PROPH/DIAG INJ SC/IM: CPT | Mod: 59 | Performed by: NURSE PRACTITIONER

## 2023-06-25 RX ORDER — ATORVASTATIN CALCIUM 40 MG/1
40 TABLET, FILM COATED ORAL DAILY
Status: DISCONTINUED | OUTPATIENT
Start: 2023-06-26 | End: 2023-06-26 | Stop reason: HOSPADM

## 2023-06-25 RX ORDER — FENTANYL CITRATE 50 UG/ML
50 INJECTION, SOLUTION INTRAMUSCULAR; INTRAVENOUS
Status: COMPLETED | OUTPATIENT
Start: 2023-06-25 | End: 2023-06-25

## 2023-06-25 RX ORDER — ONDANSETRON 2 MG/ML
4 INJECTION INTRAMUSCULAR; INTRAVENOUS EVERY 12 HOURS PRN
Status: DISCONTINUED | OUTPATIENT
Start: 2023-06-25 | End: 2023-06-26 | Stop reason: HOSPADM

## 2023-06-25 RX ORDER — LABETALOL HYDROCHLORIDE 5 MG/ML
10 INJECTION, SOLUTION INTRAVENOUS
Status: DISCONTINUED | OUTPATIENT
Start: 2023-06-25 | End: 2023-06-26 | Stop reason: HOSPADM

## 2023-06-25 RX ORDER — ASPIRIN 81 MG/1
81 TABLET ORAL DAILY
Status: DISCONTINUED | OUTPATIENT
Start: 2023-06-26 | End: 2023-06-26 | Stop reason: HOSPADM

## 2023-06-25 RX ORDER — SODIUM CHLORIDE 0.9 % (FLUSH) 0.9 %
10 SYRINGE (ML) INJECTION
Status: DISCONTINUED | OUTPATIENT
Start: 2023-06-25 | End: 2023-06-26 | Stop reason: HOSPADM

## 2023-06-25 RX ORDER — ENOXAPARIN SODIUM 100 MG/ML
40 INJECTION SUBCUTANEOUS EVERY 24 HOURS
Status: DISCONTINUED | OUTPATIENT
Start: 2023-06-25 | End: 2023-06-26

## 2023-06-25 RX ADMIN — ENOXAPARIN SODIUM 40 MG: 40 INJECTION SUBCUTANEOUS at 11:06

## 2023-06-25 RX ADMIN — FENTANYL CITRATE 50 MCG: 50 INJECTION, SOLUTION INTRAMUSCULAR; INTRAVENOUS at 08:06

## 2023-06-26 ENCOUNTER — PATIENT MESSAGE (OUTPATIENT)
Dept: NEUROSURGERY | Facility: CLINIC | Age: 38
End: 2023-06-26
Payer: COMMERCIAL

## 2023-06-26 VITALS
TEMPERATURE: 98 F | HEART RATE: 83 BPM | WEIGHT: 293 LBS | OXYGEN SATURATION: 97 % | SYSTOLIC BLOOD PRESSURE: 135 MMHG | DIASTOLIC BLOOD PRESSURE: 77 MMHG | RESPIRATION RATE: 16 BRPM | HEIGHT: 67 IN | BODY MASS INDEX: 45.99 KG/M2

## 2023-06-26 PROBLEM — E53.8 FOLATE DEFICIENCY: Status: RESOLVED | Noted: 2022-08-14 | Resolved: 2023-06-26

## 2023-06-26 PROBLEM — R47.1 DYSARTHRIA: Chronic | Status: RESOLVED | Noted: 2022-08-11 | Resolved: 2023-06-26

## 2023-06-26 PROBLEM — E78.5 HLD (HYPERLIPIDEMIA): Status: ACTIVE | Noted: 2023-06-26

## 2023-06-26 PROBLEM — R29.90 STROKE-LIKE SYMPTOMS: Status: RESOLVED | Noted: 2023-06-25 | Resolved: 2023-06-26

## 2023-06-26 LAB
ALBUMIN SERPL BCP-MCNC: 3.2 G/DL (ref 3.5–5.2)
ALP SERPL-CCNC: 84 U/L (ref 55–135)
ALT SERPL W/O P-5'-P-CCNC: 13 U/L (ref 10–44)
ANION GAP SERPL CALC-SCNC: 10 MMOL/L (ref 8–16)
APTT PPP: 29.8 SEC (ref 21–32)
AST SERPL-CCNC: 9 U/L (ref 10–40)
BASOPHILS # BLD AUTO: 0.03 K/UL (ref 0–0.2)
BASOPHILS NFR BLD: 0.3 % (ref 0–1.9)
BILIRUB SERPL-MCNC: 0.2 MG/DL (ref 0.1–1)
BUN SERPL-MCNC: 13 MG/DL (ref 6–20)
CALCIUM SERPL-MCNC: 10 MG/DL (ref 8.7–10.5)
CHLORIDE SERPL-SCNC: 103 MMOL/L (ref 95–110)
CO2 SERPL-SCNC: 27 MMOL/L (ref 23–29)
CREAT SERPL-MCNC: 0.8 MG/DL (ref 0.5–1.4)
DIFFERENTIAL METHOD: ABNORMAL
EOSINOPHIL # BLD AUTO: 0.1 K/UL (ref 0–0.5)
EOSINOPHIL NFR BLD: 1.6 % (ref 0–8)
ERYTHROCYTE [DISTWIDTH] IN BLOOD BY AUTOMATED COUNT: 15.9 % (ref 11.5–14.5)
EST. GFR  (NO RACE VARIABLE): >60 ML/MIN/1.73 M^2
ESTIMATED AVG GLUCOSE: 97 MG/DL (ref 68–131)
GLUCOSE SERPL-MCNC: 90 MG/DL (ref 70–110)
HBA1C MFR BLD: 5 % (ref 4–5.6)
HCT VFR BLD AUTO: 37.7 % (ref 37–48.5)
HGB BLD-MCNC: 11.8 G/DL (ref 12–16)
IMM GRANULOCYTES # BLD AUTO: 0.03 K/UL (ref 0–0.04)
IMM GRANULOCYTES NFR BLD AUTO: 0.3 % (ref 0–0.5)
LYMPHOCYTES # BLD AUTO: 2.6 K/UL (ref 1–4.8)
LYMPHOCYTES NFR BLD: 29.1 % (ref 18–48)
MAGNESIUM SERPL-MCNC: 2.1 MG/DL (ref 1.6–2.6)
MCH RBC QN AUTO: 27.4 PG (ref 27–31)
MCHC RBC AUTO-ENTMCNC: 31.3 G/DL (ref 32–36)
MCV RBC AUTO: 88 FL (ref 82–98)
MONOCYTES # BLD AUTO: 0.6 K/UL (ref 0.3–1)
MONOCYTES NFR BLD: 6.3 % (ref 4–15)
NEUTROPHILS # BLD AUTO: 5.5 K/UL (ref 1.8–7.7)
NEUTROPHILS NFR BLD: 62.4 % (ref 38–73)
NRBC BLD-RTO: 0 /100 WBC
PHOSPHATE SERPL-MCNC: 2.9 MG/DL (ref 2.7–4.5)
PLATELET # BLD AUTO: 299 K/UL (ref 150–450)
PMV BLD AUTO: 10.2 FL (ref 9.2–12.9)
POTASSIUM SERPL-SCNC: 3.8 MMOL/L (ref 3.5–5.1)
PROT SERPL-MCNC: 6.3 G/DL (ref 6–8.4)
RBC # BLD AUTO: 4.31 M/UL (ref 4–5.4)
SODIUM SERPL-SCNC: 140 MMOL/L (ref 136–145)
TROPONIN I SERPL DL<=0.01 NG/ML-MCNC: <0.006 NG/ML (ref 0–0.03)
WBC # BLD AUTO: 8.83 K/UL (ref 3.9–12.7)

## 2023-06-26 PROCEDURE — 36415 COLL VENOUS BLD VENIPUNCTURE: CPT | Performed by: NURSE PRACTITIONER

## 2023-06-26 PROCEDURE — 25000003 PHARM REV CODE 250: Performed by: INTERNAL MEDICINE

## 2023-06-26 PROCEDURE — 84484 ASSAY OF TROPONIN QUANT: CPT | Performed by: NURSE PRACTITIONER

## 2023-06-26 PROCEDURE — 63600175 PHARM REV CODE 636 W HCPCS: Performed by: INTERNAL MEDICINE

## 2023-06-26 PROCEDURE — 80053 COMPREHEN METABOLIC PANEL: CPT | Performed by: NURSE PRACTITIONER

## 2023-06-26 PROCEDURE — 97165 OT EVAL LOW COMPLEX 30 MIN: CPT

## 2023-06-26 PROCEDURE — 97161 PT EVAL LOW COMPLEX 20 MIN: CPT

## 2023-06-26 PROCEDURE — 63600175 PHARM REV CODE 636 W HCPCS: Performed by: STUDENT IN AN ORGANIZED HEALTH CARE EDUCATION/TRAINING PROGRAM

## 2023-06-26 PROCEDURE — G0378 HOSPITAL OBSERVATION PER HR: HCPCS

## 2023-06-26 PROCEDURE — 85025 COMPLETE CBC W/AUTO DIFF WBC: CPT | Performed by: NURSE PRACTITIONER

## 2023-06-26 PROCEDURE — 96372 THER/PROPH/DIAG INJ SC/IM: CPT | Performed by: STUDENT IN AN ORGANIZED HEALTH CARE EDUCATION/TRAINING PROGRAM

## 2023-06-26 PROCEDURE — 25000003 PHARM REV CODE 250: Performed by: STUDENT IN AN ORGANIZED HEALTH CARE EDUCATION/TRAINING PROGRAM

## 2023-06-26 PROCEDURE — 96376 TX/PRO/DX INJ SAME DRUG ADON: CPT

## 2023-06-26 PROCEDURE — 85730 THROMBOPLASTIN TIME PARTIAL: CPT | Performed by: NURSE PRACTITIONER

## 2023-06-26 PROCEDURE — 94761 N-INVAS EAR/PLS OXIMETRY MLT: CPT

## 2023-06-26 PROCEDURE — 83735 ASSAY OF MAGNESIUM: CPT | Performed by: NURSE PRACTITIONER

## 2023-06-26 PROCEDURE — 25000003 PHARM REV CODE 250: Performed by: NURSE PRACTITIONER

## 2023-06-26 PROCEDURE — 84100 ASSAY OF PHOSPHORUS: CPT | Performed by: NURSE PRACTITIONER

## 2023-06-26 RX ORDER — KETOROLAC TROMETHAMINE 30 MG/ML
15 INJECTION, SOLUTION INTRAMUSCULAR; INTRAVENOUS EVERY 8 HOURS
Status: DISCONTINUED | OUTPATIENT
Start: 2023-06-26 | End: 2023-06-26 | Stop reason: HOSPADM

## 2023-06-26 RX ORDER — LORAZEPAM 1 MG/1
1 TABLET ORAL EVERY 6 HOURS PRN
Status: DISCONTINUED | OUTPATIENT
Start: 2023-06-26 | End: 2023-06-26 | Stop reason: HOSPADM

## 2023-06-26 RX ORDER — ATORVASTATIN CALCIUM 40 MG/1
40 TABLET, FILM COATED ORAL DAILY
Qty: 90 TABLET | Refills: 3 | Status: SHIPPED | OUTPATIENT
Start: 2023-06-27 | End: 2023-09-14 | Stop reason: SDUPTHER

## 2023-06-26 RX ORDER — ENOXAPARIN SODIUM 100 MG/ML
40 INJECTION SUBCUTANEOUS EVERY 12 HOURS
Status: DISCONTINUED | OUTPATIENT
Start: 2023-06-26 | End: 2023-06-26 | Stop reason: HOSPADM

## 2023-06-26 RX ORDER — METOCLOPRAMIDE HYDROCHLORIDE 5 MG/ML
10 INJECTION INTRAMUSCULAR; INTRAVENOUS EVERY 8 HOURS
Status: DISCONTINUED | OUTPATIENT
Start: 2023-06-26 | End: 2023-06-26 | Stop reason: HOSPADM

## 2023-06-26 RX ORDER — VERAPAMIL HYDROCHLORIDE 120 MG/1
120 TABLET, FILM COATED, EXTENDED RELEASE ORAL DAILY
Status: DISCONTINUED | OUTPATIENT
Start: 2023-06-26 | End: 2023-06-26 | Stop reason: HOSPADM

## 2023-06-26 RX ORDER — LORAZEPAM 1 MG/1
1 TABLET ORAL ONCE
Status: COMPLETED | OUTPATIENT
Start: 2023-06-26 | End: 2023-06-26

## 2023-06-26 RX ORDER — BUPROPION HYDROCHLORIDE 150 MG/1
150 TABLET ORAL DAILY
Status: DISCONTINUED | OUTPATIENT
Start: 2023-06-26 | End: 2023-06-26 | Stop reason: HOSPADM

## 2023-06-26 RX ORDER — AMITRIPTYLINE HYDROCHLORIDE 25 MG/1
25 TABLET, FILM COATED ORAL NIGHTLY
Qty: 30 TABLET | Refills: 2 | Status: SHIPPED | OUTPATIENT
Start: 2023-06-26 | End: 2023-09-14

## 2023-06-26 RX ORDER — DIPHENHYDRAMINE HCL 25 MG
25 CAPSULE ORAL EVERY 8 HOURS
Status: DISCONTINUED | OUTPATIENT
Start: 2023-06-26 | End: 2023-06-26 | Stop reason: HOSPADM

## 2023-06-26 RX ORDER — ASPIRIN 81 MG/1
81 TABLET ORAL DAILY
Qty: 90 TABLET | Refills: 3 | Status: SHIPPED | OUTPATIENT
Start: 2023-06-27 | End: 2024-01-25

## 2023-06-26 RX ORDER — ACETAMINOPHEN 325 MG/1
650 TABLET ORAL EVERY 6 HOURS PRN
Status: DISCONTINUED | OUTPATIENT
Start: 2023-06-26 | End: 2023-06-26 | Stop reason: HOSPADM

## 2023-06-26 RX ADMIN — LORAZEPAM 1 MG: 1 TABLET ORAL at 11:06

## 2023-06-26 RX ADMIN — BUPROPION HYDROCHLORIDE 150 MG: 150 TABLET, FILM COATED, EXTENDED RELEASE ORAL at 10:06

## 2023-06-26 RX ADMIN — ATORVASTATIN CALCIUM 40 MG: 40 TABLET, FILM COATED ORAL at 08:06

## 2023-06-26 RX ADMIN — KETOROLAC TROMETHAMINE 15 MG: 30 INJECTION, SOLUTION INTRAMUSCULAR; INTRAVENOUS at 10:06

## 2023-06-26 RX ADMIN — DIPHENHYDRAMINE HYDROCHLORIDE 25 MG: 25 CAPSULE ORAL at 10:06

## 2023-06-26 RX ADMIN — METOCLOPRAMIDE 10 MG: 5 INJECTION, SOLUTION INTRAMUSCULAR; INTRAVENOUS at 10:06

## 2023-06-26 RX ADMIN — VALPROATE SODIUM 500 MG: 100 INJECTION, SOLUTION INTRAVENOUS at 10:06

## 2023-06-26 RX ADMIN — ENOXAPARIN SODIUM 40 MG: 40 INJECTION SUBCUTANEOUS at 11:06

## 2023-06-26 RX ADMIN — ACETAMINOPHEN 650 MG: 325 TABLET ORAL at 08:06

## 2023-06-26 NOTE — NURSING
Pt and pt's  state that pt was told by MD whom pt follows for  Shunt, told them not to take aspirin or blood thinners due to  shunt. Education provided regarding Stroke given including use of aspirin for suspected strokes. Pt requests aspirin be held until she can further talk with hospitalist and neurologist today before taking aspirin. Med held, will follow-up with MD's during rounding regarding safety concerns expressed about aspirin use.
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
## 2023-06-26 NOTE — SUBJECTIVE & OBJECTIVE
"Interval History: see "Hospital Course"    Review of Systems   Neurological:  Positive for speech difficulty and headaches.   Objective:     Vital Signs (Most Recent):  Temp: 98.3 °F (36.8 °C) (06/26/23 0746)  Pulse: 82 (06/26/23 0746)  Resp: 18 (06/26/23 0746)  BP: 136/83 (06/26/23 0746)  SpO2: 95 % (06/26/23 0746) Vital Signs (24h Range):  Temp:  [97 °F (36.1 °C)-98.3 °F (36.8 °C)] 98.3 °F (36.8 °C)  Pulse:  [81-98] 82  Resp:  [16-18] 18  SpO2:  [95 %-97 %] 95 %  BP: (136-179)/() 136/83     Weight: (!) 137.1 kg (302 lb 4 oz)  Body mass index is 47.34 kg/m².    Intake/Output Summary (Last 24 hours) at 6/26/2023 0907  Last data filed at 6/26/2023 0800  Gross per 24 hour   Intake 440 ml   Output --   Net 440 ml         Physical Exam  Vitals and nursing note reviewed.   Constitutional:       Appearance: She is obese.   HENT:      Head: Normocephalic and atraumatic.      Right Ear: External ear normal.      Nose: Nose normal.      Mouth/Throat:      Mouth: Mucous membranes are moist.      Pharynx: Oropharynx is clear.   Eyes:      Extraocular Movements: Extraocular movements intact.      Conjunctiva/sclera: Conjunctivae normal.   Cardiovascular:      Rate and Rhythm: Normal rate and regular rhythm.      Pulses: Normal pulses.      Heart sounds: Normal heart sounds.   Pulmonary:      Effort: Pulmonary effort is normal.      Breath sounds: Normal breath sounds.   Abdominal:      General: Bowel sounds are normal.      Palpations: Abdomen is soft.   Musculoskeletal:         General: Normal range of motion.      Cervical back: Normal range of motion and neck supple.   Skin:     General: Skin is warm and dry.   Neurological:      Mental Status: She is alert. Mental status is at baseline.   Psychiatric:         Mood and Affect: Mood normal.         Behavior: Behavior normal.           Significant Labs: All pertinent labs within the past 24 hours have been reviewed.    Significant Imaging: I have reviewed all pertinent " imaging results/findings within the past 24 hours.

## 2023-06-26 NOTE — ED NOTES
Call placed to [facility] for consult to Telemedicine - Stroke for Sonia Solorzano.     Cardiology

## 2023-06-26 NOTE — ASSESSMENT & PLAN NOTE
Chronic, controlled with diet.  Latest blood pressure and vitals reviewed-     Temp:  [97 °F (36.1 °C)-98.3 °F (36.8 °C)]   Pulse:  [81-98]   Resp:  [16-18]   BP: (136-179)/()   SpO2:  [95 %-97 %] .   Home meds for hypertension were reviewed and noted below.   Hypertension Medications             verapamiL (CALAN-SR) 120 MG CR tablet Take 120 mg by mouth every morning.          While in the hospital, will manage blood pressure as follows; Continue home antihypertensive regimen    Will utilize p.r.n. blood pressure medication only if patient's blood pressure greater than 220/110 and she develops symptoms such as worsening chest pain or shortness of breath.
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
## 2023-06-26 NOTE — PLAN OF CARE
Problem: Adjustment to Illness (Stroke, Ischemic/Transient Ischemic Attack)  Goal: Optimal Coping  Outcome: Ongoing, Progressing     Problem: Cognitive Impairment (Stroke, Ischemic/Transient Ischemic Attack)  Goal: Optimal Cognitive Function  Outcome: Ongoing, Progressing     Problem: Communication Impairment (Stroke, Ischemic/Transient Ischemic Attack)  Goal: Improved Communication Skills  Outcome: Ongoing, Progressing     Problem: Fall Injury Risk  Goal: Absence of Fall and Fall-Related Injury  Outcome: Ongoing, Progressing     Problem: Adult Inpatient Plan of Care  Goal: Plan of Care Review  Outcome: Ongoing, Progressing  Goal: Patient-Specific Goal (Individualized)  Outcome: Ongoing, Progressing  Goal: Absence of Hospital-Acquired Illness or Injury  Outcome: Ongoing, Progressing  Goal: Optimal Comfort and Wellbeing  Outcome: Ongoing, Progressing  Goal: Readiness for Transition of Care  Outcome: Ongoing, Progressing   Plan of care reviewed with patient,verbalized understanding.  SR on telemetry. Neuro checks Q4 hrs as per orders. Awaiting MRI today.  Remains free from falls, injury. Instructed to call for assistance as needed ,verbalized understanding. Bed in low & locked position. Call light in reach, bed alarm on .

## 2023-06-26 NOTE — ASSESSMENT & PLAN NOTE
Headache with slurred speech. Possible CVA. Complicated migraine also considered.  -ASA and statin  -MRI brain  -PT/OT/SLP  -Neurology consulted  -Neurologic checks

## 2023-06-26 NOTE — DISCHARGE SUMMARY
[facility] Medicine  Discharge Summary      Patient Name: Sonia Solorzano  MRN: [id number]  VICKY: [id number]  Patient Class: OP- Observation  Admission Date: 6/25/2023  Hospital Length of Stay: 0 days  Discharge Date and Time: No discharge date for patient encounter.  Attending Physician: Emigdio Cui MD   Discharging Provider: Emigdio Cui MD  Primary Care Provider: Dorian Guerrero MD    Primary Care Team: Networked reference to record PCT     HPI:   Sonia Solorzano is a 37-year-old female with a history of IIH s/p  shunt, hyperparathyroidism and GERD who presented to the ED with headache for the past 2 days and slurred speech that started at 3:00 p.m. today.  Patient reports experiencing similar episodes in the past and states she has left-sided weakness that is chronic.  A code stroke was called in the ED and CT head showed no acute abnormality.  Telestroke was consulted and did not recommend acute intervention due to patient out of therapeutic window and it is unclear whether not this is a CVA.  Patient hypertensive upon arrival to the ED with 's.  Patient referred to Hospital Medicine and seen in the ED with  at bedside.  Patient complaining of slowed speech and persistent headache.  Patient denies chest pain, shortness of breath, nausea, vomiting, fever and chills.  Patient will be admitted to Hospital Medicine for further evaluationand management.      * No surgery found *      Hospital Course:   Sonia Solorzano is a 37 year old female with a past medical history of IIH s/p  shunt, obesity, DIMAS, and KERI who presented with a headache associated with slurred speech. She was stroke activated in the ED, yet CT of the head was unremarkable and she did not receive any thrombolytic therapy. An MRI of the brain was ordered, yet unable to be performed. Repeat CT head was unremarkable. Neurology was consulted. She has been started on an aspirin and  statin. She was also given Toradol, Benadryl, Reglan and valproic acid for the headache with resolution of her symptoms. She was discharged 6/26/2023 and will follow up with her PCP and with Neurology in the outpatient setting.       Goals of Care Treatment Preferences:  Code Status: Full Code    Living Will: Yes              Consults:   Consults (From admission, onward)        Status Ordering Provider     Inpatient consult to Social Work/Case Management  Once        Provider:  (Not yet assigned)    Completed SONIA PACHECO     Inpatient consult to Neurology  Once        Provider:  Isidoro Downing MD    Completed SHAGGY RAPP     Inpatient consult to Registered Dietitian/Nutritionist  Once        Provider:  (Not yet assigned)    Completed SHAGGY RAPP     IP consult to case management/social work  Once        Provider:  (Not yet assigned)    Completed SHAGGY RAPP     Consult to Telemedicine - Acute Stroke  Once        Provider:  (Not yet assigned)    Completed KYAW PONCE JR.          Neuro  S/P  shunt  Stable.      Nonintractable headache  -PRN analgesics  -Neurology consulted; follow up in clinic      IIH (idiopathic intracranial hypertension)  Chronic. Stable.    Cardiac/Vascular  HLD (hyperlipidemia)  -Continue ASA and statin      Hypertension  Chronic, controlled with diet.  Latest blood pressure and vitals reviewed-     Temp:  [97 °F (36.1 °C)-98.3 °F (36.8 °C)]   Pulse:  [81-98]   Resp:  [12-18]   BP: (128-179)/()   SpO2:  [95 %-97 %] .   Home meds for hypertension were reviewed and noted below.   Hypertension Medications             verapamiL (CALAN-SR) 120 MG CR tablet Take 120 mg by mouth every morning.          While in the hospital, will manage blood pressure as follows; Continue home antihypertensive regimen    Will utilize p.r.n. blood pressure medication only if patient's blood pressure greater than 220/110 and she develops symptoms such as worsening chest pain or  shortness of breath.        Endocrine  Hyperparathyroidism  Stable.      Morbid obesity  Body mass index is 47.34 kg/m². Morbid obesity complicates all aspects of disease management from diagnostic modalities to treatment.         Other  Left-sided weakness  Chronic. Stable.  -PT/OT  -Fall precautions      Bipolar disorder, unspecified  -Continue Wellbutrin      Obstructive sleep apnea  Patient does not use CPAP.        Final Active Diagnoses:    Diagnosis Date Noted POA    HLD (hyperlipidemia) [E78.5] 06/26/2023 Yes    Hyperparathyroidism [E21.3] 01/18/2023 Yes    S/P  shunt [Z98.2] 09/28/2022 Not Applicable    Nonintractable headache [R51.9] 09/05/2022 Yes    IIH (idiopathic intracranial hypertension) [G93.2] 08/06/2022 Yes     Chronic    Left-sided weakness [R53.1] 08/06/2022 Yes     Chronic    Bipolar disorder, unspecified [F31.9] 09/03/2021 Yes     Chronic    Hypertension [I10] 10/14/2020 Yes     Chronic    Obstructive sleep apnea [G47.33] 08/17/2016 Yes     Chronic    Morbid obesity [E66.01] 08/17/2016 Yes     Chronic      Problems Resolved During this Admission:    Diagnosis Date Noted Date Resolved POA    PRINCIPAL PROBLEM:  Stroke-like symptoms [R29.90] 06/25/2023 06/26/2023 Yes    Dysarthria [R47.1] 08/11/2022 06/26/2023 Yes     Chronic       Discharged Condition: stable    Disposition: Home or Self Care    Follow Up:   Follow-up Information     Dorian Guerrero MD Follow up in 2 week(s).    Specialty: Family Medicine  Contact information:  [address] [place] [address]  [phone]             Isidoro Downing MD Follow up in 2 week(s).    Specialty: Neurology  Contact information:  [address]  [place] [address]  [phone]                       Patient Instructions:      Diet Cardiac     Notify your health care provider if you experience any of the following:  increased confusion or weakness     Notify your health care provider if you experience any of the following:   persistent dizziness, light-headedness, or visual disturbances     Notify your health care provider if you experience any of the following:  severe persistent headache     Notify your health care provider if you experience any of the following:  severe uncontrolled pain     Notify your health care provider if you experience any of the following:  persistent nausea and vomiting or diarrhea     Notify your health care provider if you experience any of the following:  temperature >100.4     Notify your health care provider if you experience any of the following:  difficulty breathing or increased cough     Activity as tolerated       Significant Diagnostic Studies: Labs: All labs within the past 24 hours have been reviewed    Pending Diagnostic Studies:     None         Medications:  Reconciled Home Medications:      Medication List      START taking these medications    aspirin 81 MG EC tablet  Commonly known as: ECOTRIN  Take 1 tablet (81 mg total) by mouth once daily.  Start taking on: June 27, 2023     atorvastatin 40 MG tablet  Commonly known as: LIPITOR  Take 1 tablet (40 mg total) by mouth once daily.  Start taking on: June 27, 2023        CONTINUE taking these medications    acetaminophen 500 MG tablet  Commonly known as: TYLENOL  Take 500 mg by mouth every 6 (six) hours as needed for Pain.     LORazepam 1 MG tablet  Commonly known as: ATIVAN  Take 1 tablet (1 mg total) by mouth every 6 (six) hours as needed for Anxiety.        STOP taking these medications    albuterol 90 mcg/actuation inhaler  Commonly known as: PROVENTIL/VENTOLIN HFA     buPROPion 150 MG TB24 tablet  Commonly known as: WELLBUTRIN XL     dexAMETHasone 1 MG Tab  Commonly known as: DECADRON     EPINEPHrine 0.3 mg/0.3 mL Atin  Commonly known as: EPIPEN     gabapentin 100 MG capsule  Commonly known as: NEURONTIN     metFORMIN 500 MG ER 24hr tablet  Commonly known as: GLUCOPHAGE-XR     ondansetron 4 MG Tbdl  Commonly known as: ZOFRAN-ODT      phentermine 37.5 mg tablet  Commonly known as: ADIPEX-P     QUEtiapine 25 MG Tab  Commonly known as: SEROQUEL     TRULICITY 0.75 mg/0.5 mL pen injector  Generic drug: dulaglutide     verapamiL 120 MG CR tablet  Commonly known as: CALAN-SR     VRAYLAR 3 mg Cap  Generic drug: cariprazine     VRAYLAR 4.5 mg Cap  Generic drug: cariprazine            Indwelling Lines/Drains at time of discharge:   Lines/Drains/Airways     None                 Time spent on the discharge of patient: 33 minutes         Emigdio Cui MD  Department of Hospital Medicine  [facility]

## 2023-06-26 NOTE — CONSULTS
SW met with Pt and spouse at bedside to provide information on Advance Directives.  Pt unable to discuss at this time due to having trouble staying awake after medication given.  SW provided packet to  and answered questions.  Encouraged him to contact Case management with further needs or questions.

## 2023-06-26 NOTE — CONSULTS
Food & Nutrition  Education    Diet Education:  Cardiac Education  Time Spent: RD Remote  Learners: Patient       Nutrition Education provided with handouts:   + Clinical Reference attachments to d/c documents  Cardiac Diet education    Comments:  Patient is on a regular diet with 100% po intake x 1 meal.  Patient unable to be reached via phone.  Possibly in procedure (MRI) at this time.  Onsite RD to follow up with diet education questions at next visit.  SLP notes reviewed.  SLP unable to assess patient at this time.  RD to continue to monitor and make recommendations in consistent with SLP recommendations.  Labs reviewed.  Allergies:  Iodine, magnesium, tree nuts, nuts.  LBM:6/25/2023.  I/O since admit: +440mls  BMP  Lab Results   Component Value Date     06/26/2023    K 3.8 06/26/2023     06/26/2023    CO2 27 06/26/2023    BUN 13 06/26/2023    CREATININE 0.8 06/26/2023    CALCIUM 10.0 06/26/2023    ANIONGAP 10 06/26/2023    EGFRNORACEVR >60 06/26/2023     Lab Results   Component Value Date    HGBA1C 5.5 02/07/2022     Scheduled Meds:   aspirin  81 mg Oral Daily    atorvastatin  40 mg Oral Daily    buPROPion  150 mg Oral Daily    diphenhydrAMINE  25 mg Oral Q8H    enoxparin  40 mg Subcutaneous Q12H (prophylaxis, 0900/2100)    ketorolac  15 mg Intravenous Q8H    metoclopramide HCl  10 mg Intravenous Q8H    valproate sodium (DEPACON) IVPB  500 mg Intravenous Q8H    verapamiL  120 mg Oral Daily     Continuous Infusions:  PRN Meds:.acetaminophen, labetaloL, LORazepam, ondansetron, sodium chloride 0.9%        All questions and concerns answered. Dietitian's contact information provided.       Follow-Up: yes     Please Re-consult as needed        Thanks!  Raiza Castellano, MS, RDN, LDN

## 2023-06-26 NOTE — ASSESSMENT & PLAN NOTE
Slurred speech noted    Antithrombotics for secondary stroke prevention: Antiplatelets: Aspirin: 81 mg daily    Statins for secondary stroke prevention and hyperlipidemia, if present:   Statins: Atorvastatin- 40 mg daily    Aggressive risk factor modification: Obesity     Rehab efforts: The patient has been evaluated by a stroke team provider and the therapy needs have been fully considered based off the presenting complaints and exam findings. The following therapy evaluations are needed: PT evaluate and treat, OT evaluate and treat, SLP evaluate and treat    Diagnostics ordered/pending: CT scan of head without contrast to asses brain parenchyma, HgbA1C to assess blood glucose levels, Lipid Profile to assess cholesterol levels, MRI head without contrast to assess brain parenchyma    VTE prophylaxis: Enoxaparin 40 mg SQ every 24 hours    BP parameters: Infarct: No intervention, SBP <220    -consult neurology, recommendations appreciated

## 2023-06-26 NOTE — HPI
Sonia Solorzano is a 37-year-old female with a history of IIH s/p  shunt, hyperparathyroidism and GERD who presented to the ED with headache for the past 2 days and slurred speech that started at 3:00 p.m. today.  Patient reports experiencing similar episodes in the past and states she has left-sided weakness that is chronic.  A code stroke was called in the ED and CT head showed no acute abnormality.  Telestroke was consulted and did not recommend acute intervention due to patient out of therapeutic window and it is unclear whether not this is a CVA.  Patient hypertensive upon arrival to the ED with 's.  Patient referred to Hospital Medicine and seen in the ED with  at bedside.  Patient complaining of slowed speech and persistent headache.  Patient denies chest pain, shortness of breath, nausea, vomiting, fever and chills.  Patient will be admitted to Hospital Medicine for further evaluationand management.

## 2023-06-26 NOTE — PT/OT/SLP PROGRESS
Speech Language Pathology      Sonia Solorzano  MRN: [id number]    Patient not seen today secondary to  (PT and Neuro at bedside. MRI tech waiting to transport pt for MRI, however, but needs medication. Will follow up in AM).

## 2023-06-26 NOTE — ASSESSMENT & PLAN NOTE
Chronic, controlled with diet.  Latest blood pressure and vitals reviewed-     Temp:  [98.2 °F (36.8 °C)]   Pulse:  [91-98]   Resp:  [16-18]   BP: (147-179)/()   SpO2:  [95 %-97 %] .   Home meds for hypertension were reviewed and noted below.   Hypertension Medications             verapamiL (CALAN-SR) 120 MG CR tablet Take 120 mg by mouth every morning.          While in the hospital, will manage blood pressure as follows; Continue home antihypertensive regimen    Will utilize p.r.n. blood pressure medication only if patient's blood pressure greater than 220/110 and she develops symptoms such as worsening chest pain or shortness of breath.       Health Maintenance Summary     Topic Due On Due Status Completed On    Colorectal Cancer Screening - Cologuard  Oct 18, 2020 Not Due Oct 18, 2017    Immunization - Pneumococcal Apr 9, 2019 Not Due Apr 9, 2018    Medicare Wellness Visit Apr 9, 2019 Not Due Apr 9, 2018    IMMUNIZATION - DTaP/Tdap/Td Dec 23, 1966 Overdue     Immunization-Influenza Sep 1, 2018 Not Due     Depression Screening Mar 26, 2019 Not Due Mar 26, 2018    Hepatitis C Screening  Completed Apr 18, 2018          Patient is due for topics as listed above, he wishes to decline at this time .  Refuses tdap

## 2023-06-26 NOTE — HOSPITAL COURSE
Sonia Solorzano is a 37 year old female with a past medical history of IIH s/p  shunt, obesity, DIMAS, and KERI who presented with a headache associated with slurred speech. She was stroke activated in the ED, yet CT of the head was unremarkable and she did not receive any thrombolytic therapy. An MRI of the brain was ordered, yet unable to be performed. Repeat CT head was unremarkable. Neurology was consulted. She has been started on an aspirin and statin. She was also given Toradol, Benadryl, Reglan and valproic acid for the headache with resolution of her symptoms. She was discharged 6/26/2023 and will follow up with her PCP and with Neurology in the outpatient setting.

## 2023-06-26 NOTE — ASSESSMENT & PLAN NOTE
Problem noted    -Neurology consulted for stroke like symptoms  -PRN pain medication for headaches

## 2023-06-26 NOTE — NURSING
Nurses Note -- 4 Eyes      6/26/2023   2:17 AM      Skin assessed during: Admit      [x] No Altered Skin Integrity Present    [x]Prevention Measures Documented      [] Yes- Altered Skin Integrity Present or Discovered   [] LDA Added if Not in Epic (Describe Wound)   [] New Altered Skin Integrity was Present on Admit and Documented in LDA   [] Wound Image Taken    Wound Care Consulted? No    Attending Nurse:  Amara Gregorio LPN     Second RN/Staff Member:  Sybil Cobos RN
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
## 2023-06-26 NOTE — PROGRESS NOTES
"[facility] Hospital Medicine  Progress Note    Patient Name: Sonia Solorzano  MRN: [id number]  Patient Class: OP- Observation   Admission Date: 6/25/2023  Length of Stay: 0 days  Attending Physician: Emigdio Cui MD  Primary Care Provider: Dorian Guerrero MD        Subjective:     Principal Problem:Stroke-like symptoms        HPI:  Sonia Solorzano is a 37-year-old female with a history of IIH s/p  shunt, hyperparathyroidism and GERD who presented to the ED with headache for the past 2 days and slurred speech that started at 3:00 p.m. today.  Patient reports experiencing similar episodes in the past and states she has left-sided weakness that is chronic.  A code stroke was called in the ED and CT head showed no acute abnormality.  Telestroke was consulted and did not recommend acute intervention due to patient out of therapeutic window and it is unclear whether not this is a CVA.  Patient hypertensive upon arrival to the ED with 's.  Patient referred to Hospital Medicine and seen in the ED with  at bedside.  Patient complaining of slowed speech and persistent headache.  Patient denies chest pain, shortness of breath, nausea, vomiting, fever and chills.  Patient will be admitted to Hospital Medicine for further evaluationand management.      Overview/Hospital Course:  Sonia Solorzano is a 37 year old female with a past medical history of IIH s/p  shunt, obesity, DIMAS, and KERI who presented with a headache associated with slurred speech. She was stroke activated in the ED, yet CT of the head was unremarkable and she did not receive any thrombolytic therapy. An MRI of the brain has been ordered and Neurology has been consulted. She has been started on an aspirin and statin.      Interval History: see "Hospital Course"    Review of Systems   Neurological:  Positive for speech difficulty and headaches.   Objective:     Vital Signs (Most Recent):  Temp: 98.3 °F (36.8 °C) " (06/26/23 0746)  Pulse: 82 (06/26/23 0746)  Resp: 18 (06/26/23 0746)  BP: 136/83 (06/26/23 0746)  SpO2: 95 % (06/26/23 0746) Vital Signs (24h Range):  Temp:  [97 °F (36.1 °C)-98.3 °F (36.8 °C)] 98.3 °F (36.8 °C)  Pulse:  [81-98] 82  Resp:  [16-18] 18  SpO2:  [95 %-97 %] 95 %  BP: (136-179)/() 136/83     Weight: (!) 137.1 kg (302 lb 4 oz)  Body mass index is 47.34 kg/m².    Intake/Output Summary (Last 24 hours) at 6/26/2023 0907  Last data filed at 6/26/2023 0800  Gross per 24 hour   Intake 440 ml   Output --   Net 440 ml         Physical Exam  Vitals and nursing note reviewed.   Constitutional:       Appearance: She is obese.   HENT:      Head: Normocephalic and atraumatic.      Right Ear: External ear normal.      Nose: Nose normal.      Mouth/Throat:      Mouth: Mucous membranes are moist.      Pharynx: Oropharynx is clear.   Eyes:      Extraocular Movements: Extraocular movements intact.      Conjunctiva/sclera: Conjunctivae normal.   Cardiovascular:      Rate and Rhythm: Normal rate and regular rhythm.      Pulses: Normal pulses.      Heart sounds: Normal heart sounds.   Pulmonary:      Effort: Pulmonary effort is normal.      Breath sounds: Normal breath sounds.   Abdominal:      General: Bowel sounds are normal.      Palpations: Abdomen is soft.   Musculoskeletal:         General: Normal range of motion.      Cervical back: Normal range of motion and neck supple.   Skin:     General: Skin is warm and dry.   Neurological:      Mental Status: She is alert. Mental status is at baseline.   Psychiatric:         Mood and Affect: Mood normal.         Behavior: Behavior normal.           Significant Labs: All pertinent labs within the past 24 hours have been reviewed.    Significant Imaging: I have reviewed all pertinent imaging results/findings within the past 24 hours.      Assessment/Plan:      * Stroke-like symptoms  Headache with slurred speech. Possible CVA. Complicated migraine also considered.  -ASA and  statin  -MRI brain  -PT/OT/SLP  -Neurology consulted  -Neurologic checks      HLD (hyperlipidemia)  -Continue ASA and statin      Hyperparathyroidism  Stable.      S/P  shunt  Stable.      Nonintractable headache  -PRN analgesics  -MRI brain  -Neurology consulted      Dysarthria  -Follow up MRI brain  -Neurology consulted      IIH (idiopathic intracranial hypertension)  Chronic. Stable.  -Follow up MRI brain    Left-sided weakness  Chronic. Stable.  -PT/OT  -Fall precautions      Bipolar disorder, unspecified  -Continue Wellbutrin      Hypertension  Chronic, controlled with diet.  Latest blood pressure and vitals reviewed-     Temp:  [97 °F (36.1 °C)-98.3 °F (36.8 °C)]   Pulse:  [81-98]   Resp:  [16-18]   BP: (136-179)/()   SpO2:  [95 %-97 %] .   Home meds for hypertension were reviewed and noted below.   Hypertension Medications             verapamiL (CALAN-SR) 120 MG CR tablet Take 120 mg by mouth every morning.          While in the hospital, will manage blood pressure as follows; Continue home antihypertensive regimen    Will utilize p.r.n. blood pressure medication only if patient's blood pressure greater than 220/110 and she develops symptoms such as worsening chest pain or shortness of breath.        Obstructive sleep apnea  Patient does not use CPAP.      Morbid obesity  Body mass index is 47.34 kg/m². Morbid obesity complicates all aspects of disease management from diagnostic modalities to treatment.           VTE Risk Mitigation (From admission, onward)         Ordered     enoxaparin injection 40 mg  Daily         06/25/23 2109     IP VTE HIGH RISK PATIENT  Once         06/25/23 2109     Place sequential compression device  Until discontinued         06/25/23 2109                Discharge Planning   [place]: 6/27/2023    Code Status: Full Code   Is the patient medically ready for discharge?:     Reason for patient still in hospital (select all that apply): Patient trending condition, Imaging and  Consult recommendations                     Emigdio Cui MD  Department of Hospital Medicine   [facility]

## 2023-06-26 NOTE — RESPIRATORY THERAPY
Cap sample   Latest Reference Range & Units 06/25/23 19:40   Sample  unknown   POC PTINR 0.9 - 1.2  1.0

## 2023-06-26 NOTE — NURSING
Awake alert and oriented x4. Follows simple commands. Answers with delayed, over exenterated mouthing of words. Questions answered and encouraged. Denies any complaints. 20g to rt lateral lower arm. Moves all extremities.
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
## 2023-06-26 NOTE — NURSING
Spoke with Dr Downing and Dr Cui who cleared pt to dc. Pt states headache has improved and verbalizes readiness to dc. Dc instructions papers given and follow-up explained to pt and pt , understanding verbalized. Tele removed/returned to MT room. IV removed,cathter intact. Vitals remain WNL. Neuro checks remain WNL. Ambulated off unit without issues, with  at time of discharge.

## 2023-06-26 NOTE — ASSESSMENT & PLAN NOTE
Chronic, controlled with diet.  Latest blood pressure and vitals reviewed-     Temp:  [97 °F (36.1 °C)-98.3 °F (36.8 °C)]   Pulse:  [81-98]   Resp:  [12-18]   BP: (128-179)/()   SpO2:  [95 %-97 %] .   Home meds for hypertension were reviewed and noted below.   Hypertension Medications             verapamiL (CALAN-SR) 120 MG CR tablet Take 120 mg by mouth every morning.          While in the hospital, will manage blood pressure as follows; Continue home antihypertensive regimen    Will utilize p.r.n. blood pressure medication only if patient's blood pressure greater than 220/110 and she develops symptoms such as worsening chest pain or shortness of breath.

## 2023-06-26 NOTE — CONSULTS
Telestroke converted to telephone conversation due to multiple simultaneous consults.    Presenting with slurred speech that began at around 3 pm.  Has had similar episodes in the past.    Personally reviwed head ct: has shunt in place, no acute vyhei5tti.    Discussed w Dr. Garcia. No acute interventions since outside of lytic window and without signs or symptoms of LVO.    Unsure if etiology is cerebrovascular since she lacks risk factors and does not have typical associated symptoms like weakness or facial droop.    I spent approximately 22 minutes on this encounter, over half was spent discussing case and reviewing images, as well as coordinating care.        Giancarlo Laura MD  Vascular and Interventional Neurology  , Department of Neurology  Section Head, Vascular Neurology  Departments of Neurology, Neurosurgery and Radiology  [facility] [place], [place]

## 2023-06-26 NOTE — PLAN OF CARE
[facility]  Initial Discharge Assessment       Primary Care Provider: Dorian Guerrero MD    Admission Diagnosis: Slurred speech [R47.81]  Stroke [I63.9]  Stroke-like symptoms [R29.90]   (ventriculoperitoneal) shunt status [Z98.2]  Nonintractable headache, unspecified chronicity pattern, unspecified headache type [R51.9]    Admission Date: 6/25/2023  Expected Discharge Date:     DC assessment completed with pt and pt's spouse at bedside. Verified info on facesheet as correct. PCP Cesar. Pharmacy [facility]. DME cpap. Denies hd/dm/hh. Pt does not take coumadin. Pt without recent admission. Family to provide ride home. CM to follow for discharge needs.      Transition of Care Barriers: None    Payor: [employer] / Plan: [employer] CHOICE / Product Type: Commercial /     Extended Emergency Contact Information  Primary Emergency Contact: Loco Solorzano  Mobile Phone: [phone]  Relation: Significant other  Preferred language: English   needed? No  Secondary Emergency Contact: Shakir Goldberg  Address: [address], [place] [address] [place] [address]  Home Phone: [phone]  Mobile Phone: [phone]  Relation: Other  Preferred language: English    Discharge Plan A: Home with family  Discharge Plan B: Home      [id number] [place], [place] - [address]  [address]  [place] [address]  Phone: [phone] Fax: [phone]    [id number] [place], [place] - [address]  [address]  [place] [address]  Phone: [phone] Fax: [phone]      Initial Assessment (most recent)       Adult Discharge Assessment - 06/26/23 1137          Discharge Assessment    Assessment Type Discharge Planning Assessment     Confirmed/corrected address, phone number and insurance Yes     Confirmed Demographics Correct on Facesheet     Source of Information patient;family     People in Home sibling(s);spouse     Do you expect to  return to your current living situation? Yes     Prior to hospitilization cognitive status: Unable to Assess     Current cognitive status: Alert/Oriented     Equipment Currently Used at Home CPAP     Readmission within 30 days? No     Patient currently being followed by outpatient case management? No     Do you currently have service(s) that help you manage your care at home? No     Do you take prescription medications? Yes     Do you have prescription coverage? Yes     Coverage [facility]     Do you have any problems affording any of your prescribed medications? No     Is the patient taking medications as prescribed? yes     How do you get to doctors appointments? family or friend will provide     Are you on dialysis? No     Do you take coumadin? No     Discharge Plan A Home with family     Discharge Plan B Home     DME Needed Upon Discharge  none     Discharge Plan discussed with: Spouse/sig other;Patient     Transition of Care Barriers None
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
## 2023-06-26 NOTE — ASSESSMENT & PLAN NOTE
Body mass index is 47.34 kg/m². Morbid obesity complicates all aspects of disease management from diagnostic modalities to treatment.

## 2023-06-27 NOTE — PLAN OF CARE
06/27/23 0723   Final Note   Assessment Type Final Discharge Note   Anticipated Discharge Disposition Home

## 2023-07-14 NOTE — ASSESSMENT & PLAN NOTE
"Daily progress note    Primary care physician  Dr. PARIKH    Chief complaint  Doing better with no new complaints and tolerating diet.    History of present illness  84-year-old white male with history of COPD dementia hypertension congestive heart failure and gastroesophageal reflux disease brought to the emergency room by the family with respiratory distress shortness of breath and found to be hypoxic.  Patient evaluated in ER and work-up revealed acute hypoxic respiratory failure with COPD exhibition and aspiration pneumonitis admitted for management.  Patient is demented unable to give detailed history most of the history obtained from the chart family nursing staff and old record.  Patient is DNR per his wishes.     REVIEW OF SYSTEMS  Unremarkable except generalized weakness     PHYSICAL EXAM  Blood pressure 127/65, pulse 75, temperature 97.2 °F (36.2 °C), temperature source Oral, resp. rate 16, height 175.3 cm (69\"), weight 70.4 kg (155 lb 3.3 oz), SpO2 93 %.    GENERAL: Awake and alert in mild distress and pleasantly confused  HENT: NCAT: nares patent: Neck supple  EYES: no scleral icterus  CV: regular rhythm, tachycardic   RESPIRATORY: Moderate respiratory distress with rhonchi in all lung fields  ABDOMEN: soft, NTND: Bowel sounds positive  MUSCULOSKELETAL: no deformity  NEURO: alert with nonfocal neuro exam  PSYCH:  calm, cooperative  SKIN: warm, dry     LAB RESULTS  Lab Results (last 24 hours)       Procedure Component Value Units Date/Time    Tissue Pathology Exam [[id number]] Collected: 07/13/23 1502    Specimen: Tissue from Stomach, Tissue from GE Junction Updated: 07/14/23 0959     Case Report --     Surgical Pathology Report                         Case: [id number]                                  Authorizing Provider:  Jimmy Amor MD    Collected:           07/13/2023 03:02 PM          Ordering Location:     [facility]  Received:            07/13/2023 03:52 PM                   " Lactic acidosis  - worsening leukocytosis 12.5K ---> 25K--> 20.49  - 2/4 SIRS with WBC 25K and HR 92, now resolved; afebrile  - suspect leukemoid reaction in setting of steroids vs developing sepsis   - Infectious work up with CXR, blood cultures, UA unremarkable  - procal negative  - lactic 5.4> 3.9> 2.5 with IVF  - monitor with daily labs, low threshold for broad spectrum antibiotics if becomes septic, febrile                 ENDO SUITES                                                                  Pathologist:           Dragan Cameron MD                                                         Specimens:   1) - Stomach, GASTRIC POLYPS BX                                                                     2) - GE Junction, GE JUNCTION INFLAMMATION BX                                               Final Diagnosis --     1. Gastric Polyps Biopsy:   A. Polypoid gastric mucosa with features of developing fundic gland polyp.   B. No H. pylori-like organisms identified by routine staining. .   C. No glandular dysplasia nor malignancy identified.    2. GE Junction Biopsy: Squamous and glandular mucosa with    A. Mild chronic inflammation without significant eosinophilia.   B. No intestinal metaplasia identified by routine staining.   C. No dysplasia nor malignancy identified.    jat/pkm        Gross Description --     1. Stomach.  The specimen is received in formalin labeled with the patient's name and further designated 'gastric polyps biopsy' are multiple small fragments of gray-tan tissue. The specimen is submitted for embedding as received.    2. GE Junction.  The specimen is received in formalin labeled with the patient's name and further designated 'ge junction inflammation biopsy' is a small fragment of gray-tan tissue. The specimen is submitted for embedding as received.          Basic Metabolic Panel [[id number]]  (Normal) Collected: 07/14/23 0627    Specimen: Blood Updated: 07/14/23 0709     Glucose 74 mg/dL      BUN 21 mg/dL      Creatinine 1.03 mg/dL      Sodium 141 mmol/L      Potassium 4.1 mmol/L      Chloride 106 mmol/L      CO2 24.1 mmol/L      Calcium 8.7 mg/dL      BUN/Creatinine Ratio 20.4     Anion Gap 10.9 mmol/L      eGFR 70.7 mL/min/1.73     Narrative:      GFR Normal >60  Chronic Kidney Disease <60  Kidney Failure <15    The GFR formula is only valid for adults with stable renal function between ages 18  and 70.    CBC & Differential [[id number]]  (Abnormal) Collected: 07/14/23 0627    Specimen: Blood Updated: 07/14/23 0704    Narrative:      The following orders were created for panel order CBC & Differential.  Procedure                               Abnormality         Status                     ---------                               -----------         ------                     CBC Auto Differential[[id number]]        Abnormal            Final result                 Please view results for these tests on the individual orders.    CBC Auto Differential [[id number]]  (Abnormal) Collected: 07/14/23 0627    Specimen: Blood Updated: 07/14/23 0704     WBC 10.22 10*3/mm3      RBC 4.24 10*6/mm3      Hemoglobin 12.5 g/dL      Hematocrit 37.9 %      MCV 89.4 fL      MCH 29.5 pg      MCHC 33.0 g/dL      RDW 12.4 %      RDW-SD 40.3 fl      MPV 10.1 fL      Platelets 269 10*3/mm3      Neutrophil % 72.6 %      Lymphocyte % 13.5 %      Monocyte % 8.3 %      Eosinophil % 4.4 %      Basophil % 0.7 %      Immature Grans % 0.5 %      Neutrophils, Absolute 7.42 10*3/mm3      Lymphocytes, Absolute 1.38 10*3/mm3      Monocytes, Absolute 0.85 10*3/mm3      Eosinophils, Absolute 0.45 10*3/mm3      Basophils, Absolute 0.07 10*3/mm3      Immature Grans, Absolute 0.05 10*3/mm3      nRBC 0.0 /100 WBC     Blood Culture - Blood, Arm, Right [[id number]]  (Normal) Collected: 07/09/23 2249    Specimen: Blood from Arm, Right Updated: 07/13/23 2300     Blood Culture No growth at 4 days    Blood Culture - Blood, Arm, Right [[id number]]  (Normal) Collected: 07/09/23 2156    Specimen: Blood from Arm, Right Updated: 07/13/23 2203     Blood Culture No growth at 4 days    Narrative:      Less than seven (7) mL's of blood was collected.  Insufficient quantity may yield false negative results.          Imaging Results (Last 24 Hours)       ** No results found for the last 24 hours. **          ECG 12 Lead Dyspnea: Patient Communication    [facility] 7[facility]9/2023  [facility]         Author: -- Service: -- Author Type: --   Filed: Date of Service: Creation Time:   Status: (Other)   HEART RATE= 79  bpm  RR Interval= 759  ms  [place] Interval=   ms  P Horizontal Axis=   deg  P Front Axis=   deg  QRSD Interval= 137  ms  QT Interval= 421  ms  QRS Axis= -100  deg  T Wave Axis= 18  deg  - ABNORMAL ECG -  Rhythm analysis indeterminate due to severe baseline artifact          Current Facility-Administered Medications:     aspirin EC tablet 81 mg, 81 mg, Oral, Daily, Alirio Mendez MD, 81 mg at 07/14/23 0942    budesonide-formoterol (SYMBICORT) 160-4.5 MCG/ACT inhaler 2 puff, 2 puff, Inhalation, BID - RT, Alirio Mendez MD, 2 puff at 07/14/23 0711    donepezil (ARICEPT) tablet 10 mg, 10 mg, Oral, Daily, Alirio Mendez MD, 10 mg at 07/14/23 0942    furosemide (LASIX) tablet 20 mg, 20 mg, Oral, Daily, Alirio Mendez MD, 20 mg at 07/14/23 0942    gabapentin (NEURONTIN) capsule 100 mg, 100 mg, Oral, TID, Alirio Mendez MD, 100 mg at 07/14/23 0942    guaiFENesin (MUCINEX) 12 hr tablet 600 mg, 600 mg, Oral, Q12H, Alirio Mendez MD, 600 mg at 07/14/23 0942    hydrALAZINE (APRESOLINE) injection 10 mg, 10 mg, Intravenous, Q4H PRN, Alirio Mendez MD    ipratropium-albuterol (DUO-NEB) nebulizer solution 3 mL, 3 mL, Nebulization, 4x Daily - RT, Nilam Walker, MAKAYLA, 3 mL at 07/14/23 1153    lactated ringers infusion, 30 mL/hr, Intravenous, Continuous PRN, Jimmy Amor MD, Last Rate: 75 mL/hr at 07/13/23 1501, New Bag at 07/13/23 1501    latanoprost (XALATAN) 0.005 % ophthalmic solution 1 drop, 1 drop, Both Eyes, Nightly, Alirio Mendez MD, 1 drop at 07/13/23 2203    losartan (COZAAR) tablet 100 mg, 100 mg, Oral, Daily, Alirio Mendez MD, 100 mg at 07/14/23 0942    memantine (NAMENDA) tablet 10 mg, 10 mg, Oral, BID, Alirio Mendez MD, 10 mg at 07/14/23 0942    metoprolol succinate XL (TOPROL-XL) 24 hr tablet 50 mg, 50 mg, Oral, Daily, Alirio eMndez MD, 50 mg at 07/14/23  0942    pantoprazole (PROTONIX) EC tablet 40 mg, 40 mg, Oral, BID AC, Rodríguez Mendez MD, 40 mg at 07/14/23 0600    perflutren (DEFINITY) 8.476 mg in Sodium Chloride (PF) 0.9 % 10 mL injection, 2 mL, Intravenous, Once in imaging, Rodríguez Mendez MD    piperacillin-tazobactam (ZOSYN) 3.375 g in iso-osmotic dextrose 50 ml (premix), 3.375 g, Intravenous, Q8H, Rodríguez Mendez MD, 3.375 g at 07/14/23 0559    predniSONE (DELTASONE) tablet 20 mg, 20 mg, Oral, Daily With Breakfast, Rodríguez Mendez MD, 20 mg at 07/14/23 0942    primidone (MYSOLINE) tablet 50 mg, 50 mg, Oral, Nightly, Rodríguez Mendez MD, 50 mg at 07/13/23 2159    [COMPLETED] Insert Peripheral IV, , , Once **AND** sodium chloride 0.9 % flush 10 mL, 10 mL, Intravenous, PRN, Carmine Bourne MD    sodium chloride 0.9 % infusion, 30 mL/hr, Intravenous, Continuous PRN, Jimmy Amor MD, Last Rate: 30 mL/hr at 07/13/23 1445, 30 mL/hr at 07/13/23 1445    sodium chloride 7 % nebulizer solution nebulizer solution 4 mL, 4 mL, Nebulization, BID - RT, Nilam Walker, APRN, 4 mL at 07/14/23 0711     ASSESSMENT  Acute hypoxic hypercapnic respiratory failure  Acute exacerbation of COPD  Esophageal stenosis with esophagitis and hiatal hernia  Aspiration pneumonitis   Dysphagia with esophageal dysmotility  Severe dementia  Hypertension  Osteoarthritis  Gastroesophageal reflux disease    PLAN  CPM  Supplemental oxygen nebulizer  Continue IV antibiotics for 1 more day  Taper off steroids  GI soft diet  GI and pulmonary to follow patient  Adjust home medications  Stress ulcer DVT prophylaxis  Supportive care  PT OT  DNR  Discussed with family and nursing staff  Discharge planning    RODRÍGUEZ MENDEZ MD    Copied text in this note has been reviewed and is accurate as of 07/14/23

## 2023-07-26 ENCOUNTER — LAB VISIT (OUTPATIENT)
Dept: LAB | Facility: HOSPITAL | Age: 38
End: 2023-07-26
Attending: INTERNAL MEDICINE
Payer: COMMERCIAL

## 2023-07-26 DIAGNOSIS — D50.9 IRON DEFICIENCY ANEMIA, UNSPECIFIED IRON DEFICIENCY ANEMIA TYPE: ICD-10-CM

## 2023-07-26 DIAGNOSIS — R79.89 ELEVATED PTHRP LEVEL: ICD-10-CM

## 2023-07-26 LAB
BASOPHILS # BLD AUTO: 0.04 K/UL (ref 0–0.2)
BASOPHILS NFR BLD: 0.4 % (ref 0–1.9)
DIFFERENTIAL METHOD: ABNORMAL
EOSINOPHIL # BLD AUTO: 0.2 K/UL (ref 0–0.5)
EOSINOPHIL NFR BLD: 1.6 % (ref 0–8)
ERYTHROCYTE [DISTWIDTH] IN BLOOD BY AUTOMATED COUNT: 13.9 % (ref 11.5–14.5)
FERRITIN SERPL-MCNC: 39 NG/ML (ref 20–300)
HCT VFR BLD AUTO: 40.4 % (ref 37–48.5)
HGB BLD-MCNC: 12.4 G/DL (ref 12–16)
IMM GRANULOCYTES # BLD AUTO: 0.04 K/UL (ref 0–0.04)
IMM GRANULOCYTES NFR BLD AUTO: 0.4 % (ref 0–0.5)
LYMPHOCYTES # BLD AUTO: 2.5 K/UL (ref 1–4.8)
LYMPHOCYTES NFR BLD: 26.4 % (ref 18–48)
MCH RBC QN AUTO: 26.8 PG (ref 27–31)
MCHC RBC AUTO-ENTMCNC: 30.7 G/DL (ref 32–36)
MCV RBC AUTO: 87 FL (ref 82–98)
MONOCYTES # BLD AUTO: 0.6 K/UL (ref 0.3–1)
MONOCYTES NFR BLD: 5.8 % (ref 4–15)
NEUTROPHILS # BLD AUTO: 6.2 K/UL (ref 1.8–7.7)
NEUTROPHILS NFR BLD: 65.4 % (ref 38–73)
NRBC BLD-RTO: 0 /100 WBC
PLATELET # BLD AUTO: 344 K/UL (ref 150–450)
PMV BLD AUTO: 9.9 FL (ref 9.2–12.9)
RBC # BLD AUTO: 4.62 M/UL (ref 4–5.4)
WBC # BLD AUTO: 9.43 K/UL (ref 3.9–12.7)

## 2023-07-26 PROCEDURE — 85025 COMPLETE CBC W/AUTO DIFF WBC: CPT | Performed by: INTERNAL MEDICINE

## 2023-07-26 PROCEDURE — 84466 ASSAY OF TRANSFERRIN: CPT | Performed by: INTERNAL MEDICINE

## 2023-07-26 PROCEDURE — 36415 COLL VENOUS BLD VENIPUNCTURE: CPT | Performed by: INTERNAL MEDICINE

## 2023-07-26 PROCEDURE — 82728 ASSAY OF FERRITIN: CPT | Performed by: INTERNAL MEDICINE

## 2023-07-27 ENCOUNTER — PATIENT MESSAGE (OUTPATIENT)
Dept: HEMATOLOGY/ONCOLOGY | Facility: CLINIC | Age: 38
End: 2023-07-27

## 2023-07-27 ENCOUNTER — OFFICE VISIT (OUTPATIENT)
Dept: HEMATOLOGY/ONCOLOGY | Facility: CLINIC | Age: 38
End: 2023-07-27
Payer: COMMERCIAL

## 2023-07-27 DIAGNOSIS — D50.9 IRON DEFICIENCY ANEMIA, UNSPECIFIED IRON DEFICIENCY ANEMIA TYPE: Primary | ICD-10-CM

## 2023-07-27 LAB
IRON SERPL-MCNC: 31 UG/DL (ref 30–160)
SATURATED IRON: 6 % (ref 20–50)
TOTAL IRON BINDING CAPACITY: 487 UG/DL (ref 250–450)
TRANSFERRIN SERPL-MCNC: 348 MG/DL (ref 200–375)

## 2023-07-27 PROCEDURE — 99213 PR OFFICE/OUTPT VISIT, EST, LEVL III, 20-29 MIN: ICD-10-PCS | Mod: 95,,, | Performed by: INTERNAL MEDICINE

## 2023-07-27 PROCEDURE — 3044F HG A1C LEVEL LT 7.0%: CPT | Mod: CPTII,95,, | Performed by: INTERNAL MEDICINE

## 2023-07-27 PROCEDURE — 3044F PR MOST RECENT HEMOGLOBIN A1C LEVEL <7.0%: ICD-10-PCS | Mod: CPTII,95,, | Performed by: INTERNAL MEDICINE

## 2023-07-27 PROCEDURE — 99213 OFFICE O/P EST LOW 20 MIN: CPT | Mod: 95,,, | Performed by: INTERNAL MEDICINE
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
## 2023-08-15 ENCOUNTER — OFFICE VISIT (OUTPATIENT)
Dept: ORTHOPEDICS | Facility: CLINIC | Age: 38
End: 2023-08-15
Payer: COMMERCIAL

## 2023-08-15 VITALS
DIASTOLIC BLOOD PRESSURE: 78 MMHG | SYSTOLIC BLOOD PRESSURE: 132 MMHG | BODY MASS INDEX: 45.99 KG/M2 | HEIGHT: 67 IN | WEIGHT: 293 LBS

## 2023-08-15 DIAGNOSIS — S83.242A ACUTE MEDIAL MENISCUS TEAR OF LEFT KNEE, INITIAL ENCOUNTER: Primary | ICD-10-CM

## 2023-08-15 PROCEDURE — 1160F RVW MEDS BY RX/DR IN RCRD: CPT | Mod: CPTII,S$GLB,, | Performed by: ORTHOPAEDIC SURGERY

## 2023-08-15 PROCEDURE — 3008F PR BODY MASS INDEX (BMI) DOCUMENTED: ICD-10-PCS | Mod: CPTII,S$GLB,, | Performed by: ORTHOPAEDIC SURGERY

## 2023-08-15 PROCEDURE — 3075F SYST BP GE 130 - 139MM HG: CPT | Mod: CPTII,S$GLB,, | Performed by: ORTHOPAEDIC SURGERY

## 2023-08-15 PROCEDURE — 3008F BODY MASS INDEX DOCD: CPT | Mod: CPTII,S$GLB,, | Performed by: ORTHOPAEDIC SURGERY

## 2023-08-15 PROCEDURE — 3075F PR MOST RECENT SYSTOLIC BLOOD PRESS GE 130-139MM HG: ICD-10-PCS | Mod: CPTII,S$GLB,, | Performed by: ORTHOPAEDIC SURGERY

## 2023-08-15 PROCEDURE — 1159F PR MEDICATION LIST DOCUMENTED IN MEDICAL RECORD: ICD-10-PCS | Mod: CPTII,S$GLB,, | Performed by: ORTHOPAEDIC SURGERY

## 2023-08-15 PROCEDURE — 3044F HG A1C LEVEL LT 7.0%: CPT | Mod: CPTII,S$GLB,, | Performed by: ORTHOPAEDIC SURGERY

## 2023-08-15 PROCEDURE — 3078F DIAST BP <80 MM HG: CPT | Mod: CPTII,S$GLB,, | Performed by: ORTHOPAEDIC SURGERY

## 2023-08-15 PROCEDURE — 20610 LARGE JOINT ASPIRATION/INJECTION: L KNEE: ICD-10-PCS | Mod: LT,S$GLB,, | Performed by: ORTHOPAEDIC SURGERY

## 2023-08-15 PROCEDURE — 20610 DRAIN/INJ JOINT/BURSA W/O US: CPT | Mod: LT,S$GLB,, | Performed by: ORTHOPAEDIC SURGERY

## 2023-08-15 PROCEDURE — 1160F PR REVIEW ALL MEDS BY PRESCRIBER/CLIN PHARMACIST DOCUMENTED: ICD-10-PCS | Mod: CPTII,S$GLB,, | Performed by: ORTHOPAEDIC SURGERY

## 2023-08-15 PROCEDURE — 1159F MED LIST DOCD IN RCRD: CPT | Mod: CPTII,S$GLB,, | Performed by: ORTHOPAEDIC SURGERY

## 2023-08-15 PROCEDURE — 3044F PR MOST RECENT HEMOGLOBIN A1C LEVEL <7.0%: ICD-10-PCS | Mod: CPTII,S$GLB,, | Performed by: ORTHOPAEDIC SURGERY

## 2023-08-15 PROCEDURE — 99213 PR OFFICE/OUTPT VISIT, EST, LEVL III, 20-29 MIN: ICD-10-PCS | Mod: 25,S$GLB,, | Performed by: ORTHOPAEDIC SURGERY

## 2023-08-15 PROCEDURE — 99213 OFFICE O/P EST LOW 20 MIN: CPT | Mod: 25,S$GLB,, | Performed by: ORTHOPAEDIC SURGERY

## 2023-08-15 PROCEDURE — 3078F PR MOST RECENT DIASTOLIC BLOOD PRESSURE < 80 MM HG: ICD-10-PCS | Mod: CPTII,S$GLB,, | Performed by: ORTHOPAEDIC SURGERY

## 2023-08-15 RX ORDER — METHYLPREDNISOLONE ACETATE 40 MG/ML
40 INJECTION, SUSPENSION INTRA-ARTICULAR; INTRALESIONAL; INTRAMUSCULAR; SOFT TISSUE
Status: DISCONTINUED | OUTPATIENT
Start: 2023-08-15 | End: 2023-08-15 | Stop reason: HOSPADM

## 2023-08-15 RX ADMIN — METHYLPREDNISOLONE ACETATE 40 MG: 40 INJECTION, SUSPENSION INTRA-ARTICULAR; INTRALESIONAL; INTRAMUSCULAR; SOFT TISSUE at 12:08
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
## 2023-08-15 NOTE — PROCEDURES
Large Joint Aspiration/Injection: L knee    Date/Time: 8/15/2023 12:30 PM    Performed by: López Baker MD  Authorized by: López Baker MD    Consent Done?:  Yes (Verbal)  Indications:  Pain  Site marked: the procedure site was marked    Timeout: prior to procedure the correct patient, procedure, and site was verified    Prep: patient was prepped and draped in usual sterile fashion      Local anesthesia used?: Yes    Local anesthetic:  Lidocaine 1% without epinephrine  Ultrasonic Guidance for needle placement?: No    Location:  Knee  Site:  L knee  Medications:  40 mg methylPREDNISolone acetate 40 mg/mL  Patient tolerance:  Patient tolerated the procedure well with no immediate complications

## 2023-08-23 ENCOUNTER — PATIENT MESSAGE (OUTPATIENT)
Dept: FAMILY MEDICINE | Facility: CLINIC | Age: 38
End: 2023-08-23
Payer: COMMERCIAL

## 2023-09-14 ENCOUNTER — OFFICE VISIT (OUTPATIENT)
Dept: FAMILY MEDICINE | Facility: CLINIC | Age: 38
End: 2023-09-14
Payer: COMMERCIAL

## 2023-09-14 DIAGNOSIS — R63.5 WEIGHT GAIN: ICD-10-CM

## 2023-09-14 DIAGNOSIS — Z00.00 HEALTHCARE MAINTENANCE: Primary | ICD-10-CM

## 2023-09-14 DIAGNOSIS — E61.1 IRON DEFICIENCY: ICD-10-CM

## 2023-09-14 DIAGNOSIS — E28.2 PCOS (POLYCYSTIC OVARIAN SYNDROME): Chronic | ICD-10-CM

## 2023-09-14 DIAGNOSIS — E87.6 DIURETIC-INDUCED HYPOKALEMIA: ICD-10-CM

## 2023-09-14 DIAGNOSIS — T50.2X5A DIURETIC-INDUCED HYPOKALEMIA: ICD-10-CM

## 2023-09-14 DIAGNOSIS — R79.9 ABNORMAL FINDING OF BLOOD CHEMISTRY, UNSPECIFIED: ICD-10-CM

## 2023-09-14 DIAGNOSIS — R60.9 EDEMA, UNSPECIFIED TYPE: ICD-10-CM

## 2023-09-14 DIAGNOSIS — E78.5 HYPERLIPIDEMIA, UNSPECIFIED HYPERLIPIDEMIA TYPE: ICD-10-CM

## 2023-09-14 PROCEDURE — 3044F HG A1C LEVEL LT 7.0%: CPT | Mod: CPTII,95,, | Performed by: STUDENT IN AN ORGANIZED HEALTH CARE EDUCATION/TRAINING PROGRAM

## 2023-09-14 PROCEDURE — 99214 PR OFFICE/OUTPT VISIT, EST, LEVL IV, 30-39 MIN: ICD-10-PCS | Mod: 95,,, | Performed by: STUDENT IN AN ORGANIZED HEALTH CARE EDUCATION/TRAINING PROGRAM

## 2023-09-14 PROCEDURE — 99214 OFFICE O/P EST MOD 30 MIN: CPT | Mod: 95,,, | Performed by: STUDENT IN AN ORGANIZED HEALTH CARE EDUCATION/TRAINING PROGRAM

## 2023-09-14 PROCEDURE — 3044F PR MOST RECENT HEMOGLOBIN A1C LEVEL <7.0%: ICD-10-PCS | Mod: CPTII,95,, | Performed by: STUDENT IN AN ORGANIZED HEALTH CARE EDUCATION/TRAINING PROGRAM

## 2023-09-14 RX ORDER — ATORVASTATIN CALCIUM 40 MG/1
40 TABLET, FILM COATED ORAL DAILY
Qty: 30 TABLET | Refills: 11 | Status: SHIPPED | OUTPATIENT
Start: 2023-09-14 | End: 2024-09-13

## 2023-09-14 RX ORDER — FUROSEMIDE 20 MG/1
20 TABLET ORAL DAILY
Qty: 30 TABLET | Refills: 6 | Status: SHIPPED | OUTPATIENT
Start: 2023-09-14 | End: 2023-09-22

## 2023-09-14 RX ORDER — POTASSIUM CHLORIDE 750 MG/1
10 CAPSULE, EXTENDED RELEASE ORAL DAILY
Qty: 30 CAPSULE | Refills: 11 | Status: SHIPPED | OUTPATIENT
Start: 2023-09-14 | End: 2023-12-19

## 2023-09-14 NOTE — PROGRESS NOTES
[facility]    Subjective:    Chief Complaint:   Chief Complaint   Patient presents with    Leg Swelling      274}    The HPI and pertinent ROS is included in the Diagnostic Impression Remarks section at the end of the note. Please see below for further details.     Sonia is a pleasant intelligent patient who is here for evaluation.     The following portions of the patient's history were reviewed and updated as appropriate: allergies, current medications, past family history, past medical history, past social history, past surgical history and problem list.    She  has a past medical history of Acute calculous cholecystitis (2020), Asthma (), Calculus of gallbladder with acute cholecystitis without obstruction (2020), Chronic anxiety (2014), COVID-19, GERD (gastroesophageal reflux disease) (10/25/2020), GI bleed (10/25/2020), Heart palpitations, Herniated disc, Hypertension, IBS (irritable bowel syndrome) (), Idiopathic intracranial hypertension, Insomnia (), Intractable migraine without aura and with status migrainosus (2022), Irritable bowel syndrome without diarrhea (2021), Lower back pain (), Migraine headache (), Obstructive sleep apnea, Palpitations (), PCOS (polycystic ovarian syndrome) (2022), and Sleep apnea ().  She  has a past surgical history that includes Dilation and curettage of uterus (); epidural steriod injections ();  section, classic;  section, low transverse (); Tubal ligation (); Dilation and curettage of uterus; endometrioma (); Abdominal surgery; Esophagogastroduodenoscopy (N/A, 10/26/2020); Colonoscopy (N/A, 10/27/2020); Intraluminal gastrointestinal tract imaging via capsule (N/A, 2020); Laparoscopic cholecystectomy (N/A, 2020); Tonsillectomy; Knee arthroscopy w/ meniscectomy (Right, 2021); Cystoscopy (N/A, 10/27/2021); Magnetic resonance imaging  (N/A, 08/03/2022); Cholecystectomy; Upper gastrointestinal endoscopy; Endoscopic insertion of ventriculoperitoneal shunt (Right, 9/19/2022); Endoscopic insertion of ventriculoperitoneal shunt (N/A, 9/19/2022); and Esophagogastroduodenoscopy (N/A, 3/2/2023).  She  reports that she has quit smoking. Her smoking use included vaping with nicotine. She has been exposed to tobacco smoke. She has quit using smokeless tobacco. She reports that she does not currently use alcohol. She reports that she does not use drugs.  She family history includes Arthritis in her father; Asthma in her mother; Breast cancer (age of onset: 40) in her maternal aunt; Cancer in her father, maternal grandfather, maternal grandmother, and mother; Colon cancer in her maternal grandfather; Diabetes in her maternal grandmother and paternal grandfather; Heart disease in her father and mother; Hyperlipidemia in her father and mother; Hypertension in her father.    Review of patient's allergies indicates:   Allergen Reactions    Contrast media Anaphylaxis    Iodine and iodide containing products Anaphylaxis    Levaquin [levofloxacin] Anaphylaxis    Levofloxacin in d5w Anaphylaxis    Sulfa (sulfonamide antibiotics) Anaphylaxis and Hives    Iodinated contrast media Hives    Iodine     Magnesium      Pt reporting she is allergic to magnesium citrate oral drink.     Morphine Hives    Tree nuts     Adhesive Rash    Compazine [prochlorperazine] Anxiety     Restless legs    Depacon [valproate sodium] Hives     Pt experienced hives at IV site upon 8th day of depacon administration.  Hives resolved with stopping medication in 1.5 hours.    Nut [tree nut] Hives       Physical Examination  There were no vitals taken for this visit.   274}  Wt Readings from Last 3 Encounters:   08/15/23 (!) 137 kg (302 lb)   06/25/23 (!) 137.1 kg (302 lb 4 oz)   05/25/23 (!) 137.1 kg (302 lb 5.8 oz)     BP Readings from Last 3 Encounters:   08/15/23 132/78   06/26/23 135/77  "  05/25/23 120/84     Estimated body mass index is 47.3 kg/m² as calculated from the following:    Height as of 8/15/23: 5' 7" (1.702 m).    Weight as of 8/15/23: 137 kg (302 lb).     CONSTITUTIONAL: No apparent distress. Does not appear acutely ill or septic. Appears adequately hydrated.  PULM: Breathing unlabored.  PSYCHIATRIC: Alert and conversant and grossly oriented. Mood is grossly neutral. Affect appropriate. Judgment and insight grossly intact.  Integument: normal coloration and turgor, no rashes, no suspicious skin lesions noted.    Data reviewed 274}  Previous medical records reviewed and summarized in HPI.     Laboratory  274}  I have reviewed old labs below:  Lab Results   Component Value Date    WBC 9.43 07/26/2023    HGB 12.4 07/26/2023    HCT 40.4 07/26/2023    MCV 87 07/26/2023     07/26/2023     06/26/2023    K 3.8 06/26/2023     06/26/2023    CALCIUM 10.0 06/26/2023    PHOS 2.9 06/26/2023    CO2 27 06/26/2023    GLU 90 06/26/2023    BUN 13 06/26/2023    CREATININE 0.8 06/26/2023    ANIONGAP 10 06/26/2023    ESTGFRAFRICA >105 04/07/2022    EGFRNONAA >60.0 02/07/2022    PROT 6.3 06/26/2023    ALBUMIN 3.2 (L) 06/26/2023    BILITOT 0.2 06/26/2023    ALKPHOS 84 06/26/2023    ALT 13 06/26/2023    AST 9 (L) 06/26/2023    INR 0.9 06/25/2023    CHOL 246 (H) 06/25/2023    TRIG 259 (H) 06/25/2023    HDL 50 06/25/2023    LDLCALC 144.2 06/25/2023    TSH 2.658 06/25/2023    HGBA1C 5.0 06/25/2023     Lab reviewed by me: Particular labs of significance that I will monitor, workup, or treat to improve are mentioned below in diagnostic impression remarks.  :[id number]}274}  Imaging/EKG: I have reviewed the pertinent results/findings and my personal findings are noted below in diagnostic impression remarks.  274}    CC:   Chief Complaint   Patient presents with    Leg Swelling        274}  Assessment/Plan  Sonia Solorzano is a 37 y.o. female who presents with:    1. Healthcare maintenance    2. " "Hyperlipidemia, unspecified hyperlipidemia type    3. PCOS (polycystic ovarian syndrome)    4. Iron deficiency    5. Edema, unspecified type    6. Weight gain    7. Abnormal finding of blood chemistry, unspecified    8. Diuretic-induced hypokalemia        Diagnostic Impression Remarks + HPI     The patient location is:  Patient Home [place]  The chief complaint leading to consultation is:   Chief Complaint   Patient presents with    Leg Swelling     Total time spent with patient: 20 minutes     Visit type: Virtual visit with synchronous audio and video  Each patient to whom he or she provides medical services by telemedicine is:  (1) informed of the relationship between the physician and patient and the respective role of any other health care provider with respect to management of the patient; and (2) notified that he or she may decline to receive medical services by telemedicine and may withdraw from such care at any time.    This service was not originating from a related E/M service provided within the previous 7 days nor will  to an E/M service or procedure within the next 24 hours or my soonest available appointment.  Prevailing standard of care was able to be met in this synchronous audio and video visit    Documentation entered by me for this encounter may have been done in part using speech-recognition technology. Although I have made an effort to ensure accuracy, "sound like" errors may exist and should be interpreted in context.     HLD needs improvement lipitor was over 300 dollars at [facility] will use good rx start atorvastatin   Edema-romi leg swelling been present for the past few weeks patient does have some PND unclear if she has orthopnea will get echocardiogram and BNP start Lasix will also send in some electrolytes as well follow-up on this no leg pain   Obesity- Patient reports having some weight gain likely due to fluid retention will Lasix recheck blood work   Iron-deficiency-recommend " continue iron monitor      This is the extent of the patient's complaints at this present time. She denies chest pain upon exertion, , nausea, vomiting, diaphoresis, and syncope. No pleuritic chest pain,  Denies unintentional wt loss sweats and fevers.     Sonia will return to clinic in a few months for further workup and reassessment or sooner as needed. She was instructed to call the clinic or go to the emergency department if her symptoms do not improve, worsens, or if new symptoms develop. As we discussed that symptoms could worsen over the next 24 hours she was advised that if any increased swelling, pain, or numbness arise to go immediately to the ED. Patient knows to call any time if an emergency arises. Shared decision making occurred and she verbalized understanding in agreement with this plan.      274}      She was counseled about the importance of cancer screening.     Medication Monitoring    In today's visit, monitoring for drug toxicity was accomplished. Proper use of medications was also discussed.     I discussed imaging findings, diagnosis, possibilities, treatment options, medications, risks, and benefits. She had many questions regarding the options and long-term effects. All questions were answered. She expressed understanding after counseling regarding the diagnosis and recommendations. She was capable and demonstrated competence with understanding of these options. Shared decision making was performed resulting in her choosing the current treatment plan. Patient handout was given with instructions and recommendations. Advised the patient that if they become pregnant to alert us immediately to assess for medication changes.     I also discussed the importance of close follow up to discuss labs, change or modify her medications if needed, monitor side effects, and further evaluation of medical problems.     Additional workup planned: see labs ordered below.    See below for labs and meds  ordered with associated diagnosis    1. Healthcare maintenance    2. Hyperlipidemia, unspecified hyperlipidemia type    3. PCOS (polycystic ovarian syndrome)    4. Iron deficiency    5. Edema, unspecified type  - NT-Pro Natriuretic Peptide; Future  - Echo; Future  - furosemide (LASIX) 20 MG tablet; Take 1 tablet (20 mg total) by mouth once daily.  Dispense: 30 tablet; Refill: 6    6. Weight gain  - Basic Metabolic Panel; Future  - Magnesium; Future    7. Abnormal finding of blood chemistry, unspecified  - Basic Metabolic Panel; Future  - Magnesium; Future    8. Diuretic-induced hypokalemia  - potassium chloride (MICRO-K) 10 MEQ CpSR; Take 1 capsule (10 mEq total) by mouth once daily.  Dispense: 30 capsule; Refill: 11       Medication List with Changes/Refills   New Medications    FUROSEMIDE (LASIX) 20 MG TABLET    Take 1 tablet (20 mg total) by mouth once daily.    POTASSIUM CHLORIDE (MICRO-K) 10 MEQ CPSR    Take 1 capsule (10 mEq total) by mouth once daily.   Current Medications    ACETAMINOPHEN (TYLENOL) 500 MG TABLET    Take 500 mg by mouth every 6 (six) hours as needed for Pain.    ASPIRIN (ECOTRIN) 81 MG EC TABLET    Take 1 tablet (81 mg total) by mouth once daily.    LORAZEPAM (ATIVAN) 1 MG TABLET    Take 1 tablet (1 mg total) by mouth every 6 (six) hours as needed for Anxiety.   Changed and/or Refilled Medications    Modified Medication Previous Medication    ATORVASTATIN (LIPITOR) 40 MG TABLET atorvastatin (LIPITOR) 40 MG tablet       Take 1 tablet (40 mg total) by mouth once daily.    Take 1 tablet (40 mg total) by mouth once daily.   Discontinued Medications    AMITRIPTYLINE (ELAVIL) 25 MG TABLET    Take 1 tablet (25 mg total) by mouth every evening.     Modified Medications    Modified Medication Previous Medication    ATORVASTATIN (LIPITOR) 40 MG TABLET atorvastatin (LIPITOR) 40 MG tablet       Take 1 tablet (40 mg total) by mouth once daily.    Take 1 tablet (40 mg total) by mouth once daily.  "      Dorian Guerrero MD  09/14/2023     Documentation entered by me for this encounter may have been done in part using speech-recognition technology. Although I have made an effort to ensure accuracy, "sound like" errors may exist and should be interpreted in context.    If you are due for any health screening(s) below please notify me so we can arrange them to be ordered and scheduled to maintain your health. Most healthy patients at your age complete them, but you are free to accept or refuse.   All of your core healthy metrics are met.       "

## 2023-09-22 ENCOUNTER — OFFICE VISIT (OUTPATIENT)
Dept: FAMILY MEDICINE | Facility: CLINIC | Age: 38
End: 2023-09-22
Payer: COMMERCIAL

## 2023-09-22 ENCOUNTER — PATIENT MESSAGE (OUTPATIENT)
Dept: FAMILY MEDICINE | Facility: CLINIC | Age: 38
End: 2023-09-22

## 2023-09-22 ENCOUNTER — LAB VISIT (OUTPATIENT)
Dept: LAB | Facility: HOSPITAL | Age: 38
End: 2023-09-22
Attending: STUDENT IN AN ORGANIZED HEALTH CARE EDUCATION/TRAINING PROGRAM
Payer: COMMERCIAL

## 2023-09-22 ENCOUNTER — HOSPITAL ENCOUNTER (OUTPATIENT)
Dept: RADIOLOGY | Facility: HOSPITAL | Age: 38
Discharge: HOME | End: 2023-09-22
Attending: STUDENT IN AN ORGANIZED HEALTH CARE EDUCATION/TRAINING PROGRAM
Payer: COMMERCIAL

## 2023-09-22 VITALS
SYSTOLIC BLOOD PRESSURE: 132 MMHG | BODY MASS INDEX: 45.99 KG/M2 | HEART RATE: 73 BPM | HEIGHT: 67 IN | DIASTOLIC BLOOD PRESSURE: 70 MMHG | OXYGEN SATURATION: 98 % | TEMPERATURE: 98 F | RESPIRATION RATE: 18 BRPM | WEIGHT: 293 LBS

## 2023-09-22 DIAGNOSIS — E21.3 HYPERPARATHYROIDISM: Primary | ICD-10-CM

## 2023-09-22 DIAGNOSIS — R79.9 ABNORMAL FINDING OF BLOOD CHEMISTRY, UNSPECIFIED: ICD-10-CM

## 2023-09-22 DIAGNOSIS — R60.9 SWELLING: Primary | ICD-10-CM

## 2023-09-22 DIAGNOSIS — R60.9 SWELLING: ICD-10-CM

## 2023-09-22 DIAGNOSIS — R60.9 EDEMA, UNSPECIFIED TYPE: ICD-10-CM

## 2023-09-22 DIAGNOSIS — G93.2 IIH (IDIOPATHIC INTRACRANIAL HYPERTENSION): Chronic | ICD-10-CM

## 2023-09-22 DIAGNOSIS — G47.33 OBSTRUCTIVE SLEEP APNEA: Chronic | ICD-10-CM

## 2023-09-22 DIAGNOSIS — E61.1 IRON DEFICIENCY: ICD-10-CM

## 2023-09-22 DIAGNOSIS — E61.1 IRON DEFICIENCY: Primary | ICD-10-CM

## 2023-09-22 DIAGNOSIS — E83.52 HYPERCALCEMIA: ICD-10-CM

## 2023-09-22 DIAGNOSIS — R63.5 WEIGHT GAIN: ICD-10-CM

## 2023-09-22 LAB
ALBUMIN SERPL BCP-MCNC: 3.2 G/DL (ref 3.5–5.2)
ALP SERPL-CCNC: 91 U/L (ref 55–135)
ALT SERPL W/O P-5'-P-CCNC: 13 U/L (ref 10–44)
ANION GAP SERPL CALC-SCNC: 8 MMOL/L (ref 8–16)
ANION GAP SERPL CALC-SCNC: 8 MMOL/L (ref 8–16)
AST SERPL-CCNC: 13 U/L (ref 10–40)
BASOPHILS # BLD AUTO: 0.03 K/UL (ref 0–0.2)
BASOPHILS NFR BLD: 0.4 % (ref 0–1.9)
BILIRUB SERPL-MCNC: 0.2 MG/DL (ref 0.1–1)
BNP SERPL-MCNC: <10 PG/ML (ref 0–99)
BUN SERPL-MCNC: 10 MG/DL (ref 6–20)
BUN SERPL-MCNC: 10 MG/DL (ref 6–20)
CALCIUM SERPL-MCNC: 11.7 MG/DL (ref 8.7–10.5)
CALCIUM SERPL-MCNC: 11.7 MG/DL (ref 8.7–10.5)
CHLORIDE SERPL-SCNC: 104 MMOL/L (ref 95–110)
CHLORIDE SERPL-SCNC: 104 MMOL/L (ref 95–110)
CO2 SERPL-SCNC: 28 MMOL/L (ref 23–29)
CO2 SERPL-SCNC: 28 MMOL/L (ref 23–29)
CREAT SERPL-MCNC: 0.8 MG/DL (ref 0.5–1.4)
CREAT SERPL-MCNC: 0.8 MG/DL (ref 0.5–1.4)
DIFFERENTIAL METHOD: ABNORMAL
EOSINOPHIL # BLD AUTO: 0.1 K/UL (ref 0–0.5)
EOSINOPHIL NFR BLD: 1.9 % (ref 0–8)
ERYTHROCYTE [DISTWIDTH] IN BLOOD BY AUTOMATED COUNT: 14.2 % (ref 11.5–14.5)
EST. GFR  (NO RACE VARIABLE): >60 ML/MIN/1.73 M^2
EST. GFR  (NO RACE VARIABLE): >60 ML/MIN/1.73 M^2
FERRITIN SERPL-MCNC: 19 NG/ML (ref 20–300)
GLUCOSE SERPL-MCNC: 85 MG/DL (ref 70–110)
GLUCOSE SERPL-MCNC: 85 MG/DL (ref 70–110)
HCT VFR BLD AUTO: 39.3 % (ref 37–48.5)
HGB BLD-MCNC: 11.5 G/DL (ref 12–16)
IMM GRANULOCYTES # BLD AUTO: 0.03 K/UL (ref 0–0.04)
IMM GRANULOCYTES NFR BLD AUTO: 0.4 % (ref 0–0.5)
IRON SERPL-MCNC: 30 UG/DL (ref 30–160)
LYMPHOCYTES # BLD AUTO: 2.1 K/UL (ref 1–4.8)
LYMPHOCYTES NFR BLD: 27.8 % (ref 18–48)
MAGNESIUM SERPL-MCNC: 2 MG/DL (ref 1.6–2.6)
MCH RBC QN AUTO: 26.3 PG (ref 27–31)
MCHC RBC AUTO-ENTMCNC: 29.3 G/DL (ref 32–36)
MCV RBC AUTO: 90 FL (ref 82–98)
MONOCYTES # BLD AUTO: 0.5 K/UL (ref 0.3–1)
MONOCYTES NFR BLD: 7.3 % (ref 4–15)
NEUTROPHILS # BLD AUTO: 4.6 K/UL (ref 1.8–7.7)
NEUTROPHILS NFR BLD: 62.2 % (ref 38–73)
NRBC BLD-RTO: 0 /100 WBC
PLATELET # BLD AUTO: 369 K/UL (ref 150–450)
PMV BLD AUTO: 10.4 FL (ref 9.2–12.9)
POTASSIUM SERPL-SCNC: 4.3 MMOL/L (ref 3.5–5.1)
POTASSIUM SERPL-SCNC: 4.3 MMOL/L (ref 3.5–5.1)
PROT SERPL-MCNC: 6.5 G/DL (ref 6–8.4)
RBC # BLD AUTO: 4.37 M/UL (ref 4–5.4)
SATURATED IRON: 6 % (ref 20–50)
SODIUM SERPL-SCNC: 140 MMOL/L (ref 136–145)
SODIUM SERPL-SCNC: 140 MMOL/L (ref 136–145)
TOTAL IRON BINDING CAPACITY: 530 UG/DL (ref 250–450)
TRANSFERRIN SERPL-MCNC: 358 MG/DL (ref 200–375)
WBC # BLD AUTO: 7.41 K/UL (ref 3.9–12.7)

## 2023-09-22 PROCEDURE — 83880 ASSAY OF NATRIURETIC PEPTIDE: CPT | Mod: 91 | Performed by: STUDENT IN AN ORGANIZED HEALTH CARE EDUCATION/TRAINING PROGRAM

## 2023-09-22 PROCEDURE — 3078F PR MOST RECENT DIASTOLIC BLOOD PRESSURE < 80 MM HG: ICD-10-PCS | Mod: CPTII,S$GLB,, | Performed by: STUDENT IN AN ORGANIZED HEALTH CARE EDUCATION/TRAINING PROGRAM

## 2023-09-22 PROCEDURE — 80053 COMPREHEN METABOLIC PANEL: CPT | Performed by: STUDENT IN AN ORGANIZED HEALTH CARE EDUCATION/TRAINING PROGRAM

## 2023-09-22 PROCEDURE — 3075F SYST BP GE 130 - 139MM HG: CPT | Mod: CPTII,S$GLB,, | Performed by: STUDENT IN AN ORGANIZED HEALTH CARE EDUCATION/TRAINING PROGRAM

## 2023-09-22 PROCEDURE — 3075F PR MOST RECENT SYSTOLIC BLOOD PRESS GE 130-139MM HG: ICD-10-PCS | Mod: CPTII,S$GLB,, | Performed by: STUDENT IN AN ORGANIZED HEALTH CARE EDUCATION/TRAINING PROGRAM

## 2023-09-22 PROCEDURE — 83540 ASSAY OF IRON: CPT | Performed by: STUDENT IN AN ORGANIZED HEALTH CARE EDUCATION/TRAINING PROGRAM

## 2023-09-22 PROCEDURE — 99999 PR PBB SHADOW E&M-EST. PATIENT-LVL IV: CPT | Mod: PBBFAC,,, | Performed by: STUDENT IN AN ORGANIZED HEALTH CARE EDUCATION/TRAINING PROGRAM

## 2023-09-22 PROCEDURE — 99214 PR OFFICE/OUTPT VISIT, EST, LEVL IV, 30-39 MIN: ICD-10-PCS | Mod: S$GLB,,, | Performed by: STUDENT IN AN ORGANIZED HEALTH CARE EDUCATION/TRAINING PROGRAM

## 2023-09-22 PROCEDURE — 1159F MED LIST DOCD IN RCRD: CPT | Mod: CPTII,S$GLB,, | Performed by: STUDENT IN AN ORGANIZED HEALTH CARE EDUCATION/TRAINING PROGRAM

## 2023-09-22 PROCEDURE — 83880 ASSAY OF NATRIURETIC PEPTIDE: CPT | Performed by: STUDENT IN AN ORGANIZED HEALTH CARE EDUCATION/TRAINING PROGRAM

## 2023-09-22 PROCEDURE — 84425 ASSAY OF VITAMIN B-1: CPT | Performed by: STUDENT IN AN ORGANIZED HEALTH CARE EDUCATION/TRAINING PROGRAM

## 2023-09-22 PROCEDURE — 93970 EXTREMITY STUDY: CPT | Mod: TC

## 2023-09-22 PROCEDURE — 84466 ASSAY OF TRANSFERRIN: CPT | Performed by: STUDENT IN AN ORGANIZED HEALTH CARE EDUCATION/TRAINING PROGRAM

## 2023-09-22 PROCEDURE — 36415 COLL VENOUS BLD VENIPUNCTURE: CPT | Mod: PO | Performed by: STUDENT IN AN ORGANIZED HEALTH CARE EDUCATION/TRAINING PROGRAM

## 2023-09-22 PROCEDURE — 3008F BODY MASS INDEX DOCD: CPT | Mod: CPTII,S$GLB,, | Performed by: STUDENT IN AN ORGANIZED HEALTH CARE EDUCATION/TRAINING PROGRAM

## 2023-09-22 PROCEDURE — 3044F HG A1C LEVEL LT 7.0%: CPT | Mod: CPTII,S$GLB,, | Performed by: STUDENT IN AN ORGANIZED HEALTH CARE EDUCATION/TRAINING PROGRAM

## 2023-09-22 PROCEDURE — 3044F PR MOST RECENT HEMOGLOBIN A1C LEVEL <7.0%: ICD-10-PCS | Mod: CPTII,S$GLB,, | Performed by: STUDENT IN AN ORGANIZED HEALTH CARE EDUCATION/TRAINING PROGRAM

## 2023-09-22 PROCEDURE — 93970 US LOWER EXTREMITY VEINS BILATERAL: ICD-10-PCS | Mod: 26,,, | Performed by: RADIOLOGY

## 2023-09-22 PROCEDURE — 93970 EXTREMITY STUDY: CPT | Mod: 26,,, | Performed by: RADIOLOGY

## 2023-09-22 PROCEDURE — 99214 OFFICE O/P EST MOD 30 MIN: CPT | Mod: S$GLB,,, | Performed by: STUDENT IN AN ORGANIZED HEALTH CARE EDUCATION/TRAINING PROGRAM

## 2023-09-22 PROCEDURE — 3008F PR BODY MASS INDEX (BMI) DOCUMENTED: ICD-10-PCS | Mod: CPTII,S$GLB,, | Performed by: STUDENT IN AN ORGANIZED HEALTH CARE EDUCATION/TRAINING PROGRAM

## 2023-09-22 PROCEDURE — 83735 ASSAY OF MAGNESIUM: CPT | Performed by: STUDENT IN AN ORGANIZED HEALTH CARE EDUCATION/TRAINING PROGRAM

## 2023-09-22 PROCEDURE — 1159F PR MEDICATION LIST DOCUMENTED IN MEDICAL RECORD: ICD-10-PCS | Mod: CPTII,S$GLB,, | Performed by: STUDENT IN AN ORGANIZED HEALTH CARE EDUCATION/TRAINING PROGRAM

## 2023-09-22 PROCEDURE — 85025 COMPLETE CBC W/AUTO DIFF WBC: CPT | Performed by: STUDENT IN AN ORGANIZED HEALTH CARE EDUCATION/TRAINING PROGRAM

## 2023-09-22 PROCEDURE — 99999 PR PBB SHADOW E&M-EST. PATIENT-LVL IV: ICD-10-PCS | Mod: PBBFAC,,, | Performed by: STUDENT IN AN ORGANIZED HEALTH CARE EDUCATION/TRAINING PROGRAM

## 2023-09-22 PROCEDURE — 3078F DIAST BP <80 MM HG: CPT | Mod: CPTII,S$GLB,, | Performed by: STUDENT IN AN ORGANIZED HEALTH CARE EDUCATION/TRAINING PROGRAM

## 2023-09-22 PROCEDURE — 82728 ASSAY OF FERRITIN: CPT | Performed by: STUDENT IN AN ORGANIZED HEALTH CARE EDUCATION/TRAINING PROGRAM

## 2023-09-22 RX ORDER — TORSEMIDE 20 MG/1
20 TABLET ORAL DAILY
Qty: 30 TABLET | Refills: 6 | Status: SHIPPED | OUTPATIENT
Start: 2023-09-22 | End: 2023-12-19

## 2023-09-22 RX ORDER — FERROUS SULFATE 325(65) MG
325 TABLET, DELAYED RELEASE (ENTERIC COATED) ORAL DAILY
Qty: 90 TABLET | Refills: 3 | Status: SHIPPED | OUTPATIENT
Start: 2023-09-22 | End: 2023-12-21

## 2023-09-22 NOTE — PROGRESS NOTES
[facility]    Subjective:    The HPI and pertinent ROS is included in the Diagnostic Impression Remarks section at the end of the note. Please see below for further details. Chief complaint is at end of note.     Sonia is a pleasant intelligent patient who is here for evaluation.     Modified Medications    No medications on file       Data reviewed 274}  Previous medical records reviewed and summarized in plan section at end of note.      If you are due for any health screening(s) below please notify me so we can arrange them to be ordered and scheduled. Most healthy patients at your age complete them, but you are free to accept or refuse. If you can't do it, I'll definitely understand. If you can, I'd certainly appreciate it!     All of your core healthy metrics are met.      The following portions of the patient's history were reviewed and updated as appropriate: allergies, current medications, past family history, past medical history, past social history, past surgical history and problem list.    She  has a past medical history of Acute calculous cholecystitis (2020), Asthma (), Calculus of gallbladder with acute cholecystitis without obstruction (2020), Chronic anxiety (2014), COVID-19, GERD (gastroesophageal reflux disease) (10/25/2020), GI bleed (10/25/2020), Heart palpitations, Herniated disc, Hypertension, IBS (irritable bowel syndrome) (), Idiopathic intracranial hypertension, Insomnia (), Intractable migraine without aura and with status migrainosus (2022), Irritable bowel syndrome without diarrhea (2021), Lower back pain (), Migraine headache (), Obstructive sleep apnea, Palpitations (), PCOS (polycystic ovarian syndrome) (2022), and Sleep apnea ().  She  has a past surgical history that includes Dilation and curettage of uterus (); epidural steriod injections ();  section, classic;  section,  low transverse (2008); Tubal ligation (2008); Dilation and curettage of uterus; endometrioma (2013); Abdominal surgery; Esophagogastroduodenoscopy (N/A, 10/26/2020); Colonoscopy (N/A, 10/27/2020); Intraluminal gastrointestinal tract imaging via capsule (N/A, 11/20/2020); Laparoscopic cholecystectomy (N/A, 11/27/2020); Tonsillectomy; Knee arthroscopy w/ meniscectomy (Right, 05/26/2021); Cystoscopy (N/A, 10/27/2021); Magnetic resonance imaging (N/A, 08/03/2022); Cholecystectomy; Upper gastrointestinal endoscopy; Endoscopic insertion of ventriculoperitoneal shunt (Right, 9/19/2022); Endoscopic insertion of ventriculoperitoneal shunt (N/A, 9/19/2022); and Esophagogastroduodenoscopy (N/A, 3/2/2023).    She  reports that she has been smoking vaping with nicotine. She has been exposed to tobacco smoke. She has quit using smokeless tobacco. She reports that she does not currently use alcohol. She reports that she does not use drugs.  She family history includes Arthritis in her father; Asthma in her mother; Breast cancer (age of onset: 40) in her maternal aunt; Cancer in her father, maternal grandfather, maternal grandmother, and mother; Colon cancer in her maternal grandfather; Diabetes in her maternal grandmother and paternal grandfather; Heart disease in her father and mother; Hyperlipidemia in her father and mother; Hypertension in her father.    Review of patient's allergies indicates:   Allergen Reactions    Contrast media Anaphylaxis    Iodine and iodide containing products Anaphylaxis    Levaquin [levofloxacin] Anaphylaxis    Levofloxacin in d5w Anaphylaxis    Sulfa (sulfonamide antibiotics) Anaphylaxis and Hives    Iodinated contrast media Hives    Iodine     Magnesium      Pt reporting she is allergic to magnesium citrate oral drink.     Morphine Hives    Tree nuts     Adhesive Rash    Compazine [prochlorperazine] Anxiety     Restless legs    Depacon [valproate sodium] Hives     Pt experienced hives at IV site  "upon 8th day of depacon administration.  Hives resolved with stopping medication in 1.5 hours.    Nut [tree nut] Hives       Tobacco Use: High Risk (9/22/2023)    Patient History     Smoking Tobacco Use: Every Day     Smokeless Tobacco Use: Former     Passive Exposure: Current     Physical Examination  General appearance: alert, cooperative, no distress  Neck: no thyromegaly, no neck stiffness  Lungs: clear to auscultation, no wheezes, rales or rhonchi, symmetric air entry  Heart: normal rate, regular rhythm, normal S1, S2, no murmurs, rubs, clicks or gallops  Abdomen: soft, nontender, nondistended, no rigidity, rebound, or guarding.   Back: no point tenderness over spine  Extremities: peripheral pulses normal, romi leg edema   Neurological:alert, oriented, normal speech, no new focal findings or movement disorder noted from baseline    BP Readings from Last 3 Encounters:   09/22/23 132/70   08/15/23 132/78   06/26/23 135/77     Wt Readings from Last 3 Encounters:   09/22/23 (!) 137 kg (302 lb 0.5 oz)   08/15/23 (!) 137 kg (302 lb)   06/25/23 (!) 137.1 kg (302 lb 4 oz)     /70 (BP Location: Right arm, Patient Position: Sitting, BP Method: Large (Manual))   Pulse 73   Temp 98.2 °F (36.8 °C) (Oral)   Resp 18   Ht 5' 7" (1.702 m)   Wt (!) 137 kg (302 lb 0.5 oz)   LMP 09/19/2023 (Exact Date)   SpO2 98%   BMI 47.30 kg/m²    274}  Laboratory: I have reviewed old labs below:    274}    Lab Results   Component Value Date    WBC 9.43 07/26/2023    HGB 12.4 07/26/2023    HCT 40.4 07/26/2023    MCV 87 07/26/2023     07/26/2023     06/26/2023    K 3.8 06/26/2023     06/26/2023    CALCIUM 10.0 06/26/2023    PHOS 2.9 06/26/2023    CO2 27 06/26/2023    GLU 90 06/26/2023    BUN 13 06/26/2023    CREATININE 0.8 06/26/2023    EGFRNORACEVR >60 06/26/2023    ANIONGAP 10 06/26/2023    PROT 6.3 06/26/2023    ALBUMIN 3.2 (L) 06/26/2023    BILITOT 0.2 06/26/2023    ALKPHOS 84 06/26/2023    ALT 13 06/26/2023 " "   AST 9 (L) 06/26/2023    INR 0.9 06/25/2023    CHOL 246 (H) 06/25/2023    TRIG 259 (H) 06/25/2023    HDL 50 06/25/2023    LDLCALC 144.2 06/25/2023    TSH 2.658 06/25/2023    HGBA1C 5.0 06/25/2023      Lab reviewed by me: Particular labs of significance that I will monitor, workup, or treat to improve are mentioned below in diagnostic impression remarks.    Imaging/EKG: I have reviewed the pertinent results and my findings are noted in remarks.  274}    CC:   Chief Complaint   Patient presents with    Leg Swelling        274}    Assessment/Plan  Sonia Solorzano is a 37 y.o. female who presents to clinic with:  1. Swelling    2. IIH (idiopathic intracranial hypertension)    3. Abnormal finding of blood chemistry, unspecified    4. Iron deficiency    5. Obstructive sleep apnea       274}  Diagnostic Impression Remarks + HPI     Documentation entered by me for this encounter may have been done in part using speech-recognition technology. Although I have made an effort to ensure accuracy, "sound like" errors may exist and should be interpreted in context.     Swelling-patient reports that her bilateral leg swelling has worsened she reports a low-salt diet has been taking Lasix but have has not found much of an effect reports that she is had some weight gain.  Patient does not notice having orthopnea or PND and is compliant with her CPAP at night.  Lasix has a low by availability will try torsemide also will get echocardiogram and will check on BNP as well check kidney function blood work   Iron def recheck continue iron  Sleep apnea-continue CPAP monitor  Idiopathic intracranial hypertension-stable no recent headaches monitor will do diuretic    This is the extent of this pleasant patient's concerns at this present time. She did not feel chest pain upon exertion, dyspnea, nausea, vomiting, diaphoresis, or syncope. No pleuritic chest pain, . Negative for unintentional weight loss night sweats, hematuria, and fevers. " Sonia will return to clinic in a few months for further workup and reassessment or sooner as needed. She was instructed to call the clinic or go to the emergency department or urgent care immediately if her symptoms do not improve, worsens, or if any new symptoms develop. As we discussed that symptoms could worsen over the next 24 hours she was advised that if any increased swelling, pain, or numbness arise to go immediately to the ED. Patient knows to call any time if an emergency arises. Shared decision making occurred and she verbalized understanding in agreement with this plan. I discussed imaging findings, diagnosis, possibilities, treatment options, medications, risks, and benefits. She had many questions regarding the options and long-term effects. All questions were answered. She expressed understanding after counseling regarding the diagnosis and recommendations. She was capable and demonstrated competence with understanding of these options. Shared decision making was performed resulting in her choosing the current treatment plan. Patient handout was given with instructions and recommendations. Advised the patient that if they become pregnant to alert us immediately to assess for medication changes. Having a healthy weight can decrease the risk of 13 cancers and is an important goal. I also discussed the importance of close follow up to discuss labs, change or modify her medications if needed, monitor side effects, and further evaluation of medical problems.     Additional workup planned: see labs ordered below.    See below for labs and meds ordered with associated diagnosis      1. Swelling  - BNP; Future  - US Lower Extremity Veins Bilateral; Future  - torsemide (DEMADEX) 20 MG Tab; Take 1 tablet (20 mg total) by mouth once daily.  Dispense: 30 tablet; Refill: 6    2. IIH (idiopathic intracranial hypertension)    3. Abnormal finding of blood chemistry, unspecified  - COMPREHENSIVE METABOLIC PANEL;  Future  - CBC Auto Differential; Future  - VITAMIN B1; Future  - Magnesium; Future  - Ferritin; Future  - Iron and TIBC; Future    4. Iron deficiency    5. Obstructive sleep apnea      Dorian Guerrero MD   274}    If you are due for any health screening(s) below please notify me so we can arrange them to be ordered and scheduled. Most healthy patients at your age complete them, but you are free to accept or refuse.     If you can't do it, I'll definitely understand. If you can, I'd certainly appreciate it!   All of your core healthy metrics are met.

## 2023-09-26 LAB — NT-PROBNP SERPL IA-MCNC: <36 PG/ML

## 2023-09-28 LAB — VIT B1 BLD-MCNC: 57 UG/L (ref 38–122)

## 2023-10-03 ENCOUNTER — HOSPITAL ENCOUNTER (OUTPATIENT)
Dept: RADIOLOGY | Facility: HOSPITAL | Age: 38
Discharge: HOME | End: 2023-10-03
Attending: STUDENT IN AN ORGANIZED HEALTH CARE EDUCATION/TRAINING PROGRAM
Payer: COMMERCIAL

## 2023-10-03 DIAGNOSIS — E21.3 HYPERPARATHYROIDISM: ICD-10-CM

## 2023-10-03 PROCEDURE — A9500 TC99M SESTAMIBI: HCPCS

## 2023-10-03 PROCEDURE — 78071 PARATHYRD PLANAR W/WO SUBTRJ: CPT | Mod: TC

## 2023-10-17 ENCOUNTER — OFFICE VISIT (OUTPATIENT)
Dept: NEUROLOGY | Facility: CLINIC | Age: 38
End: 2023-10-17
Payer: COMMERCIAL

## 2023-10-17 VITALS
WEIGHT: 293 LBS | BODY MASS INDEX: 45.99 KG/M2 | HEART RATE: 96 BPM | TEMPERATURE: 97 F | DIASTOLIC BLOOD PRESSURE: 104 MMHG | SYSTOLIC BLOOD PRESSURE: 166 MMHG | HEIGHT: 67 IN | RESPIRATION RATE: 17 BRPM

## 2023-10-17 DIAGNOSIS — R11.0 NAUSEA: ICD-10-CM

## 2023-10-17 DIAGNOSIS — I10 PRIMARY HYPERTENSION: Chronic | ICD-10-CM

## 2023-10-17 DIAGNOSIS — G93.2 IIH (IDIOPATHIC INTRACRANIAL HYPERTENSION): Chronic | ICD-10-CM

## 2023-10-17 DIAGNOSIS — G43.011 INTRACTABLE MIGRAINE WITHOUT AURA AND WITH STATUS MIGRAINOSUS: Primary | ICD-10-CM

## 2023-10-17 DIAGNOSIS — G43.901 STATUS MIGRAINOSUS: ICD-10-CM

## 2023-10-17 PROCEDURE — 3077F PR MOST RECENT SYSTOLIC BLOOD PRESSURE >= 140 MM HG: ICD-10-PCS | Mod: CPTII,S$GLB,, | Performed by: NURSE PRACTITIONER

## 2023-10-17 PROCEDURE — 96372 PR INJECTION,THERAP/PROPH/DIAG2ST, IM OR SUBCUT: ICD-10-PCS | Mod: S$GLB,,, | Performed by: NURSE PRACTITIONER

## 2023-10-17 PROCEDURE — 99214 PR OFFICE/OUTPT VISIT, EST, LEVL IV, 30-39 MIN: ICD-10-PCS | Mod: 25,S$GLB,, | Performed by: NURSE PRACTITIONER

## 2023-10-17 PROCEDURE — 3077F SYST BP >= 140 MM HG: CPT | Mod: CPTII,S$GLB,, | Performed by: NURSE PRACTITIONER

## 2023-10-17 PROCEDURE — 99999 PR PBB SHADOW E&M-EST. PATIENT-LVL V: ICD-10-PCS | Mod: PBBFAC,,, | Performed by: NURSE PRACTITIONER

## 2023-10-17 PROCEDURE — 3008F PR BODY MASS INDEX (BMI) DOCUMENTED: ICD-10-PCS | Mod: CPTII,S$GLB,, | Performed by: NURSE PRACTITIONER

## 2023-10-17 PROCEDURE — 3044F PR MOST RECENT HEMOGLOBIN A1C LEVEL <7.0%: ICD-10-PCS | Mod: CPTII,S$GLB,, | Performed by: NURSE PRACTITIONER

## 2023-10-17 PROCEDURE — 96372 THER/PROPH/DIAG INJ SC/IM: CPT | Mod: S$GLB,,, | Performed by: NURSE PRACTITIONER

## 2023-10-17 PROCEDURE — 1159F PR MEDICATION LIST DOCUMENTED IN MEDICAL RECORD: ICD-10-PCS | Mod: CPTII,S$GLB,, | Performed by: NURSE PRACTITIONER

## 2023-10-17 PROCEDURE — 1159F MED LIST DOCD IN RCRD: CPT | Mod: CPTII,S$GLB,, | Performed by: NURSE PRACTITIONER

## 2023-10-17 PROCEDURE — 3080F DIAST BP >= 90 MM HG: CPT | Mod: CPTII,S$GLB,, | Performed by: NURSE PRACTITIONER

## 2023-10-17 PROCEDURE — 3008F BODY MASS INDEX DOCD: CPT | Mod: CPTII,S$GLB,, | Performed by: NURSE PRACTITIONER

## 2023-10-17 PROCEDURE — 3080F PR MOST RECENT DIASTOLIC BLOOD PRESSURE >= 90 MM HG: ICD-10-PCS | Mod: CPTII,S$GLB,, | Performed by: NURSE PRACTITIONER

## 2023-10-17 PROCEDURE — 99214 OFFICE O/P EST MOD 30 MIN: CPT | Mod: 25,S$GLB,, | Performed by: NURSE PRACTITIONER

## 2023-10-17 PROCEDURE — 99999 PR PBB SHADOW E&M-EST. PATIENT-LVL V: CPT | Mod: PBBFAC,,, | Performed by: NURSE PRACTITIONER

## 2023-10-17 PROCEDURE — 3044F HG A1C LEVEL LT 7.0%: CPT | Mod: CPTII,S$GLB,, | Performed by: NURSE PRACTITIONER

## 2023-10-17 RX ORDER — RIMEGEPANT SULFATE 75 MG/75MG
75 TABLET, ORALLY DISINTEGRATING ORAL DAILY PRN
Qty: 16 TABLET | Refills: 11 | Status: SHIPPED | OUTPATIENT
Start: 2023-10-17 | End: 2023-10-26 | Stop reason: SDUPTHER

## 2023-10-17 RX ORDER — CELECOXIB 200 MG/1
200 CAPSULE ORAL 2 TIMES DAILY
Qty: 28 CAPSULE | Refills: 0 | Status: SHIPPED | OUTPATIENT
Start: 2023-10-17 | End: 2024-01-09

## 2023-10-17 RX ORDER — KETOROLAC TROMETHAMINE 30 MG/ML
30 INJECTION, SOLUTION INTRAMUSCULAR; INTRAVENOUS
Status: COMPLETED | OUTPATIENT
Start: 2023-10-17 | End: 2023-10-17

## 2023-10-17 RX ORDER — ONDANSETRON 2 MG/ML
4 INJECTION INTRAMUSCULAR; INTRAVENOUS
Status: COMPLETED | OUTPATIENT
Start: 2023-10-17 | End: 2023-10-17

## 2023-10-17 RX ADMIN — KETOROLAC TROMETHAMINE 30 MG: 30 INJECTION, SOLUTION INTRAMUSCULAR; INTRAVENOUS at 03:10

## 2023-10-17 RX ADMIN — ONDANSETRON 4 MG: 2 INJECTION INTRAMUSCULAR; INTRAVENOUS at 03:10

## 2023-10-17 NOTE — PATIENT INSTRUCTIONS
Please call our clinic at [phone] or send a message on the [employer] portal if there are any changes to the plan described below, for example,if you are not contacted for the requested tests, referral(s) within one week, if you are unable to receive the medications prescribed, or if you feel you need to change the treatment course for any reason.      TESTING:  -- none     REFERRALS:  -- none     PREVENTION (use daily regardless of headache):  -- start magnesium in ONE of the following preparations -               1. Magnesium oxide 800mg daily (the most common over the counter kind, may causes loose stools)              2. Magnesium citrate 400-500mg daily (harder to find, but more neutral on the bowels)              3. Magnesium glycinate 400mg daily (hardest to find, look online, but most bowel-neutral, best absorbed)      AS-NEEDED TREATMENT (use total no more than 10 days per month unless otherwise stated):  -- START two week Celebrex course tomorrow morning if headache still present. One capsule twice daily for 2 weeks  -- START Nurtec with next migraine. This dissolves under the tongue and is only taken once in 24 hour time frame.
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
## 2023-10-20 ENCOUNTER — TELEPHONE (OUTPATIENT)
Dept: NEUROLOGY | Facility: CLINIC | Age: 38
End: 2023-10-20
Payer: COMMERCIAL

## 2023-10-20 NOTE — TELEPHONE ENCOUNTER
NURTEC 75 MG TABLETS prescription has been APPROVED FROM 10/19/2023 TO 10/19/2024 with copayment of $0.       [facility] [place]

## 2023-10-26 ENCOUNTER — PATIENT MESSAGE (OUTPATIENT)
Dept: NEUROLOGY | Facility: CLINIC | Age: 38
End: 2023-10-26
Payer: COMMERCIAL

## 2023-10-26 ENCOUNTER — HOSPITAL ENCOUNTER (OUTPATIENT)
Dept: CARDIOLOGY | Facility: HOSPITAL | Age: 38
Discharge: HOME | End: 2023-10-26
Attending: STUDENT IN AN ORGANIZED HEALTH CARE EDUCATION/TRAINING PROGRAM
Payer: COMMERCIAL

## 2023-10-26 VITALS — BODY MASS INDEX: 45.99 KG/M2 | HEIGHT: 67 IN | WEIGHT: 293 LBS

## 2023-10-26 DIAGNOSIS — G43.011 INTRACTABLE MIGRAINE WITHOUT AURA AND WITH STATUS MIGRAINOSUS: ICD-10-CM

## 2023-10-26 DIAGNOSIS — R60.9 EDEMA, UNSPECIFIED TYPE: ICD-10-CM

## 2023-10-26 PROCEDURE — 93306 TTE W/DOPPLER COMPLETE: CPT

## 2023-10-26 PROCEDURE — 93306 ECHO (CUPID ONLY): ICD-10-PCS | Mod: 26,,, | Performed by: GENERAL PRACTICE

## 2023-10-26 PROCEDURE — 93306 TTE W/DOPPLER COMPLETE: CPT | Mod: 26,,, | Performed by: GENERAL PRACTICE

## 2023-10-26 RX ORDER — RIMEGEPANT SULFATE 75 MG/75MG
75 TABLET, ORALLY DISINTEGRATING ORAL DAILY PRN
Qty: 8 TABLET | Refills: 11 | Status: SHIPPED | OUTPATIENT
Start: 2023-10-26

## 2023-10-30 LAB
AORTIC ROOT ANNULUS: 2.8 CM
AORTIC VALVE CUSP SEPERATION: 2 CM
AV INDEX (PROSTH): 0.83
AV MEAN GRADIENT: 5 MMHG
AV PEAK GRADIENT: 10 MMHG
AV VALVE AREA BY VELOCITY RATIO: 1.97 CM²
AV VALVE AREA: 2.62 CM²
AV VELOCITY RATIO: 0.63
BSA FOR ECHO PROCEDURE: 2.57 M2
CV ECHO LV RWT: 0.47 CM
DOP CALC AO PEAK VEL: 1.59 M/S
DOP CALC AO VTI: 30.3 CM
DOP CALC LVOT AREA: 3.1 CM2
DOP CALC LVOT DIAMETER: 2 CM
DOP CALC LVOT PEAK VEL: 1 M/S
DOP CALC LVOT STROKE VOLUME: 79.44 CM3
DOP CALCLVOT PEAK VEL VTI: 25.3 CM
E WAVE DECELERATION TIME: 185 MSEC
E/A RATIO: 1.38
E/E' RATIO: 5 M/S
ECHO LV POSTERIOR WALL: 1.04 CM (ref 0.6–1.1)
FRACTIONAL SHORTENING: 38 % (ref 28–44)
INTERVENTRICULAR SEPTUM: 1.16 CM (ref 0.6–1.1)
IVRT: 95 MSEC
LEFT ATRIUM SIZE: 3.9 CM
LEFT INTERNAL DIMENSION IN SYSTOLE: 2.72 CM (ref 2.1–4)
LEFT VENTRICLE DIASTOLIC VOLUME INDEX: 36.3 ML/M2
LEFT VENTRICLE DIASTOLIC VOLUME: 88.2 ML
LEFT VENTRICLE MASS INDEX: 70 G/M2
LEFT VENTRICLE SYSTOLIC VOLUME INDEX: 11.3 ML/M2
LEFT VENTRICLE SYSTOLIC VOLUME: 27.5 ML
LEFT VENTRICULAR INTERNAL DIMENSION IN DIASTOLE: 4.41 CM (ref 3.5–6)
LEFT VENTRICULAR MASS: 169.53 G
LV LATERAL E/E' RATIO: 4.44 M/S
LV SEPTAL E/E' RATIO: 5.71 M/S
LVOT MG: 2 MMHG
LVOT MV: 0.69 CM/S
MV PEAK A VEL: 0.58 M/S
MV PEAK E VEL: 0.8 M/S
MV STENOSIS PRESSURE HALF TIME: 46 MS
MV VALVE AREA P 1/2 METHOD: 4.78 CM2
PISA TR MAX VEL: 2.11 M/S
RA PRESSURE ESTIMATED: 3 MMHG
RIGHT VENTRICULAR END-DIASTOLIC DIMENSION: 2.85 CM
RV TB RVSP: 5 MMHG
TDI LATERAL: 0.18 M/S
TDI SEPTAL: 0.14 M/S
TDI: 0.16 M/S
TR MAX PG: 18 MMHG
TV REST PULMONARY ARTERY PRESSURE: 21 MMHG
Z-SCORE OF LEFT VENTRICULAR DIMENSION IN END DIASTOLE: -9.77
Z-SCORE OF LEFT VENTRICULAR DIMENSION IN END SYSTOLE: -7.46

## 2023-11-16 ENCOUNTER — TELEPHONE (OUTPATIENT)
Dept: ENDOCRINOLOGY | Facility: CLINIC | Age: 38
End: 2023-11-16
Payer: COMMERCIAL

## 2023-11-24 ENCOUNTER — ON-DEMAND VIRTUAL (OUTPATIENT)
Dept: URGENT CARE | Facility: CLINIC | Age: 38
End: 2023-11-24
Payer: COMMERCIAL

## 2023-11-24 ENCOUNTER — HOSPITAL ENCOUNTER (EMERGENCY)
Facility: HOSPITAL | Age: 38
Discharge: HOME | End: 2023-11-24
Attending: EMERGENCY MEDICINE
Payer: COMMERCIAL

## 2023-11-24 VITALS
RESPIRATION RATE: 18 BRPM | WEIGHT: 287 LBS | SYSTOLIC BLOOD PRESSURE: 138 MMHG | DIASTOLIC BLOOD PRESSURE: 101 MMHG | BODY MASS INDEX: 44.95 KG/M2 | TEMPERATURE: 98 F | HEART RATE: 94 BPM | OXYGEN SATURATION: 97 %

## 2023-11-24 DIAGNOSIS — R51.9 NONINTRACTABLE HEADACHE, UNSPECIFIED CHRONICITY PATTERN, UNSPECIFIED HEADACHE TYPE: Primary | ICD-10-CM

## 2023-11-24 DIAGNOSIS — R03.0 ELEVATED BLOOD PRESSURE READING: Primary | ICD-10-CM

## 2023-11-24 LAB
B-HCG UR QL: NEGATIVE
CTP QC/QA: YES

## 2023-11-24 PROCEDURE — 99213 OFFICE O/P EST LOW 20 MIN: CPT | Mod: 95,,, | Performed by: NURSE PRACTITIONER

## 2023-11-24 PROCEDURE — 99213 PR OFFICE/OUTPT VISIT, EST, LEVL III, 20-29 MIN: ICD-10-PCS | Mod: 95,,, | Performed by: NURSE PRACTITIONER

## 2023-11-24 PROCEDURE — 81025 URINE PREGNANCY TEST: CPT | Performed by: NURSE PRACTITIONER

## 2023-11-24 PROCEDURE — 99284 EMERGENCY DEPT VISIT MOD MDM: CPT | Mod: 25

## 2023-11-24 NOTE — FIRST PROVIDER EVALUATION
Emergency Department TeleTriage Encounter Note      CHIEF COMPLAINT    Chief Complaint   Patient presents with    Headache     Pt seen at urgent care for headache and htn, doctor at urgent care told her to come to the er for a ct scan.         VITAL SIGNS   Initial Vitals [11/24/23 1645]   BP Pulse Resp Temp SpO2   (!) 185/84 106 17 98.4 °F (36.9 °C) 100 %      MAP       --            ALLERGIES    Review of patient's allergies indicates:   Allergen Reactions    Contrast media Anaphylaxis    Iodine and iodide containing products Anaphylaxis    Levaquin [levofloxacin] Anaphylaxis    Levofloxacin in d5w Anaphylaxis    Sulfa (sulfonamide antibiotics) Anaphylaxis and Hives    Iodinated contrast media Hives    Iodine     Magnesium      Pt reporting she is allergic to magnesium citrate oral drink.     Morphine Hives    Tree nuts     Adhesive Rash    Compazine [prochlorperazine] Anxiety     Restless legs    Depacon [valproate sodium] Hives     Pt experienced hives at IV site upon 8th day of depacon administration.  Hives resolved with stopping medication in 1.5 hours.    Nut [tree nut] Hives       PROVIDER TRIAGE NOTE  TeleTriage Note: Sonia Solorzano, a nontoxic/well appearing, 37 y.o. female, presented to the ED with c/o elevated blood pressure with headaches for the past couple of days. Hx of idiopathic intracranial hypertension with shunt placement. Followed by Dr. Yoon for shunt.    All ED beds are full at present; patient notified of this status.  Patient seen and medically screened by Nurse Practitioner via teletriage. Orders initiated at triage to expedite care.  Patient is stable to return to the waiting room and will be placed in an ED bed when available.  Care will be transferred to an alternate provider when patient has been placed in an Exam Room from the [facility] for physical exam, additional orders, and disposition.  4:51 PM Ximena Parks DNP, FNP-C        ORDERS  Labs Reviewed   POCT URINE PREGNANCY        ED Orders (720h ago, onward)      Start Ordered     Status Ordering Provider    11/24/23 1655 11/24/23 1654  POCT urine pregnancy  Once         Ordered KAREN BOCANEGRA    11/24/23 1654 11/24/23 1653  X-Ray Shunt Series  1 time imaging         Ordered KAREN BOCANEGRA              Virtual Visit Note: The provider triage portion of this emergency department evaluation and documentation was performed via [employer], a HIPAA-compliant telemedicine application, in concert with a tele-presenter in the room. A face to face patient evaluation with one of my colleagues will occur once the patient is placed in an emergency department room.      DISCLAIMER: This note was prepared with [employer] voice recognition transcription software. Garbled syntax, mangled pronouns, and other bizarre constructions may be attributed to that software system.

## 2023-11-24 NOTE — ED NOTES
Pt identifiers checked and accurate with Sonia Solorzano     Pt presents to ED with complaints of headache with hypertension. Pt reports hx complex migraines with previous hospitalization. Pt denies all other complaints at this time.
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
## 2023-12-06 ENCOUNTER — OFFICE VISIT (OUTPATIENT)
Dept: FAMILY MEDICINE | Facility: CLINIC | Age: 38
End: 2023-12-06
Payer: COMMERCIAL

## 2023-12-06 VITALS
BODY MASS INDEX: 45.99 KG/M2 | SYSTOLIC BLOOD PRESSURE: 138 MMHG | DIASTOLIC BLOOD PRESSURE: 100 MMHG | HEIGHT: 67 IN | HEART RATE: 81 BPM | WEIGHT: 293 LBS | TEMPERATURE: 98 F | OXYGEN SATURATION: 93 %

## 2023-12-06 DIAGNOSIS — H65.01 RIGHT ACUTE SEROUS OTITIS MEDIA, RECURRENCE NOT SPECIFIED: ICD-10-CM

## 2023-12-06 DIAGNOSIS — J06.9 VIRAL URI WITH COUGH: Primary | ICD-10-CM

## 2023-12-06 DIAGNOSIS — I10 ESSENTIAL HYPERTENSION: ICD-10-CM

## 2023-12-06 DIAGNOSIS — Z12.31 OTHER SCREENING MAMMOGRAM: ICD-10-CM

## 2023-12-06 PROCEDURE — 3075F SYST BP GE 130 - 139MM HG: CPT | Mod: CPTII,S$GLB,, | Performed by: FAMILY MEDICINE

## 2023-12-06 PROCEDURE — 3044F PR MOST RECENT HEMOGLOBIN A1C LEVEL <7.0%: ICD-10-PCS | Mod: CPTII,S$GLB,, | Performed by: FAMILY MEDICINE

## 2023-12-06 PROCEDURE — 3008F BODY MASS INDEX DOCD: CPT | Mod: CPTII,S$GLB,, | Performed by: FAMILY MEDICINE

## 2023-12-06 PROCEDURE — 99214 PR OFFICE/OUTPT VISIT, EST, LEVL IV, 30-39 MIN: ICD-10-PCS | Mod: S$GLB,,, | Performed by: FAMILY MEDICINE

## 2023-12-06 PROCEDURE — 3080F DIAST BP >= 90 MM HG: CPT | Mod: CPTII,S$GLB,, | Performed by: FAMILY MEDICINE

## 2023-12-06 PROCEDURE — 3008F PR BODY MASS INDEX (BMI) DOCUMENTED: ICD-10-PCS | Mod: CPTII,S$GLB,, | Performed by: FAMILY MEDICINE

## 2023-12-06 PROCEDURE — 1159F PR MEDICATION LIST DOCUMENTED IN MEDICAL RECORD: ICD-10-PCS | Mod: CPTII,S$GLB,, | Performed by: FAMILY MEDICINE

## 2023-12-06 PROCEDURE — 3080F PR MOST RECENT DIASTOLIC BLOOD PRESSURE >= 90 MM HG: ICD-10-PCS | Mod: CPTII,S$GLB,, | Performed by: FAMILY MEDICINE

## 2023-12-06 PROCEDURE — 99214 OFFICE O/P EST MOD 30 MIN: CPT | Mod: S$GLB,,, | Performed by: FAMILY MEDICINE

## 2023-12-06 PROCEDURE — 3075F PR MOST RECENT SYSTOLIC BLOOD PRESS GE 130-139MM HG: ICD-10-PCS | Mod: CPTII,S$GLB,, | Performed by: FAMILY MEDICINE

## 2023-12-06 PROCEDURE — 99999 PR PBB SHADOW E&M-EST. PATIENT-LVL IV: ICD-10-PCS | Mod: PBBFAC,,, | Performed by: FAMILY MEDICINE

## 2023-12-06 PROCEDURE — 1159F MED LIST DOCD IN RCRD: CPT | Mod: CPTII,S$GLB,, | Performed by: FAMILY MEDICINE

## 2023-12-06 PROCEDURE — 99999 PR PBB SHADOW E&M-EST. PATIENT-LVL IV: CPT | Mod: PBBFAC,,, | Performed by: FAMILY MEDICINE

## 2023-12-06 PROCEDURE — 3044F HG A1C LEVEL LT 7.0%: CPT | Mod: CPTII,S$GLB,, | Performed by: FAMILY MEDICINE

## 2023-12-06 RX ORDER — ALBUTEROL SULFATE 90 UG/1
2 AEROSOL, METERED RESPIRATORY (INHALATION) 4 TIMES DAILY
Qty: 18 G | Refills: 2 | Status: SHIPPED | OUTPATIENT
Start: 2023-12-06

## 2023-12-06 RX ORDER — METHYLPREDNISOLONE 4 MG/1
TABLET ORAL
Qty: 1 EACH | Refills: 0 | Status: SHIPPED | OUTPATIENT
Start: 2023-12-06 | End: 2023-12-19

## 2023-12-06 RX ORDER — ONDANSETRON 4 MG/1
4 TABLET, ORALLY DISINTEGRATING ORAL EVERY 6 HOURS PRN
Qty: 10 TABLET | Refills: 0 | Status: SHIPPED | OUTPATIENT
Start: 2023-12-06

## 2023-12-06 RX ORDER — FLUTICASONE PROPIONATE 50 MCG
1 SPRAY, SUSPENSION (ML) NASAL 2 TIMES DAILY
Qty: 18.2 ML | Refills: 1 | Status: SHIPPED | OUTPATIENT
Start: 2023-12-06 | End: 2024-01-09

## 2023-12-06 RX ORDER — LOSARTAN POTASSIUM AND HYDROCHLOROTHIAZIDE 12.5; 5 MG/1; MG/1
1 TABLET ORAL DAILY
Qty: 30 TABLET | Refills: 5 | Status: SHIPPED | OUTPATIENT
Start: 2023-12-06 | End: 2023-12-19

## 2023-12-06 NOTE — PROGRESS NOTES
Subjective:       Patient ID: Sonia Solorzano is a 38 y.o. female.    Chief Complaint: No chief complaint on file.    New to me patient here for UC visit.  1- Day 3 of dry cough, wheezing, congestion with PND and clear runny nose and ears full/fluid sensation.  Some nausea.  No SOB or pleuritic pain.  Hx intermittent Asthma.    2- high blood pressure  - ALL immediate family and first degree relatives have HTN.  Chart review shows multiple high BP reading and only occasional borderline-nml readings.  Today is 138/100.  Wants to start Rx.  Has pitting leg edema in evenings.  Doesn't use the Demadex as she feel it's too strong; concern re renal function.  BP used to go up with HA's and now it can be high even feels well. S/p  Shunt.   Recent CT Head showed no increase in cerebral fluid.  Nml renal function on 9/23 labs    Weigh is 300, down from 319.  Insurance has refused GLP1's and Bariatric Surgery.         Review of Systems   Constitutional:  Negative for fever.   HENT:  Positive for congestion, ear pain and postnasal drip.    Respiratory:  Positive for cough and wheezing. Negative for shortness of breath.    Cardiovascular:  Negative for chest pain.   Gastrointestinal:  Negative for abdominal pain and nausea.   Skin:  Negative for rash.   All other systems reviewed and are negative.      Objective:      Physical Exam  Constitutional:       General: She is not in acute distress.     Appearance: She is well-developed.   HENT:      Right Ear: A middle ear effusion is present. Tympanic membrane is bulging. Tympanic membrane is not erythematous.      Left Ear: Tympanic membrane is not erythematous.      Nose: Mucosal edema present.      Right Sinus: No maxillary sinus tenderness.      Left Sinus: No maxillary sinus tenderness.      Mouth/Throat:      Pharynx: Posterior oropharyngeal erythema present.   Cardiovascular:      Rate and Rhythm: Normal rate and regular rhythm.      Heart sounds: No murmur  heard.  Pulmonary:      Effort: Pulmonary effort is normal.      Breath sounds: Normal breath sounds.   Musculoskeletal:      Cervical back: Neck supple.      Right lower leg: No edema.      Left lower leg: No edema.   Lymphadenopathy:      Cervical: No cervical adenopathy.         Assessment:       1. Viral URI with cough    2. Essential hypertension    3. Right acute serous otitis media, recurrence not specified        Plan:       Viral URI with cough  -     methylPREDNISolone (MEDROL DOSEPACK) 4 mg tablet; use as directed  Dispense: 1 each; Refill: 0  -     fluticasone propionate (FLONASE) 50 mcg/actuation nasal spray; 1 spray (50 mcg total) by Each Nostril route 2 (two) times a day.  Dispense: 18.2 mL; Refill: 1  -     albuterol (PROVENTIL/VENTOLIN HFA) 90 mcg/actuation inhaler; Inhale 2 puffs into the lungs 4 (four) times daily.  Dispense: 18 g; Refill: 2  -     ondansetron (ZOFRAN-ODT) 4 MG TbDL; Take 1 tablet (4 mg total) by mouth every 6 (six) hours as needed (nausea). Allow to melt on tongue  Dispense: 10 tablet; Refill: 0    Essential hypertension  -     losartan-hydrochlorothiazide 50-12.5 mg (HYZAAR) 50-12.5 mg per tablet; Take 1 tablet by mouth once daily.  Dispense: 30 tablet; Refill: 5    Right acute serous otitis media, recurrence not specified      Patient Instructions   Push fluids intake.  Drink plenty of water.     Avoid any all salt    Mediterranean Diet     Any exercise you can do will help.      Contact your PCP if any worsening or for any new concerns as we discussed.

## 2023-12-06 NOTE — PATIENT INSTRUCTIONS
Push fluids intake.  Drink plenty of water.     Avoid any all salt    Mediterranean Diet     Any exercise you can do will help.      Contact your PCP if any worsening or for any new concerns as we discussed.

## 2023-12-09 ENCOUNTER — LAB VISIT (OUTPATIENT)
Dept: LAB | Facility: HOSPITAL | Age: 38
End: 2023-12-09
Attending: PHYSICIAN ASSISTANT
Payer: COMMERCIAL

## 2023-12-09 DIAGNOSIS — E28.2 PCOS (POLYCYSTIC OVARIAN SYNDROME): Chronic | ICD-10-CM

## 2023-12-09 DIAGNOSIS — E55.9 HYPOVITAMINOSIS D: ICD-10-CM

## 2023-12-09 DIAGNOSIS — E21.3 HYPERPARATHYROIDISM: ICD-10-CM

## 2023-12-09 LAB
25(OH)D3+25(OH)D2 SERPL-MCNC: 18 NG/ML (ref 30–96)
ALBUMIN SERPL BCP-MCNC: 3.6 G/DL (ref 3.5–5.2)
ALP SERPL-CCNC: 77 U/L (ref 55–135)
ALT SERPL W/O P-5'-P-CCNC: 12 U/L (ref 10–44)
ANION GAP SERPL CALC-SCNC: 11 MMOL/L (ref 8–16)
AST SERPL-CCNC: 6 U/L (ref 10–40)
BILIRUB SERPL-MCNC: 0.2 MG/DL (ref 0.1–1)
BUN SERPL-MCNC: 12 MG/DL (ref 6–20)
CA-I BLDV-SCNC: 1.49 MMOL/L (ref 1.06–1.42)
CALCIUM SERPL-MCNC: 11.5 MG/DL (ref 8.7–10.5)
CHLORIDE SERPL-SCNC: 102 MMOL/L (ref 95–110)
CO2 SERPL-SCNC: 27 MMOL/L (ref 23–29)
CREAT SERPL-MCNC: 0.8 MG/DL (ref 0.5–1.4)
EST. GFR  (NO RACE VARIABLE): >60 ML/MIN/1.73 M^2
ESTIMATED AVG GLUCOSE: 105 MG/DL (ref 68–131)
GLUCOSE SERPL-MCNC: 91 MG/DL (ref 70–110)
HBA1C MFR BLD: 5.3 % (ref 4.5–6.2)
POTASSIUM SERPL-SCNC: 4 MMOL/L (ref 3.5–5.1)
PROT SERPL-MCNC: 7.2 G/DL (ref 6–8.4)
PTH-INTACT SERPL-MCNC: 157.5 PG/ML (ref 9–77)
SODIUM SERPL-SCNC: 140 MMOL/L (ref 136–145)

## 2023-12-09 PROCEDURE — 82306 VITAMIN D 25 HYDROXY: CPT | Performed by: PHYSICIAN ASSISTANT

## 2023-12-09 PROCEDURE — 83970 ASSAY OF PARATHORMONE: CPT | Performed by: PHYSICIAN ASSISTANT

## 2023-12-09 PROCEDURE — 83525 ASSAY OF INSULIN: CPT | Performed by: PHYSICIAN ASSISTANT

## 2023-12-09 PROCEDURE — 82330 ASSAY OF CALCIUM: CPT | Performed by: PHYSICIAN ASSISTANT

## 2023-12-09 PROCEDURE — 36415 COLL VENOUS BLD VENIPUNCTURE: CPT | Performed by: PHYSICIAN ASSISTANT

## 2023-12-09 PROCEDURE — 80053 COMPREHEN METABOLIC PANEL: CPT | Performed by: PHYSICIAN ASSISTANT

## 2023-12-09 PROCEDURE — 83036 HEMOGLOBIN GLYCOSYLATED A1C: CPT | Performed by: PHYSICIAN ASSISTANT

## 2023-12-11 LAB — INSULIN AB SER QL: 40.3 MCIU/ML (ref 2.6–24.9)

## 2023-12-18 ENCOUNTER — HOSPITAL ENCOUNTER (EMERGENCY)
Facility: HOSPITAL | Age: 38
Discharge: HOME | End: 2023-12-19
Attending: STUDENT IN AN ORGANIZED HEALTH CARE EDUCATION/TRAINING PROGRAM
Payer: COMMERCIAL

## 2023-12-18 VITALS
RESPIRATION RATE: 20 BRPM | DIASTOLIC BLOOD PRESSURE: 62 MMHG | OXYGEN SATURATION: 97 % | SYSTOLIC BLOOD PRESSURE: 124 MMHG | HEART RATE: 75 BPM | WEIGHT: 293 LBS | HEIGHT: 67 IN | BODY MASS INDEX: 45.99 KG/M2 | TEMPERATURE: 98 F

## 2023-12-18 DIAGNOSIS — N83.201 CYST OF RIGHT OVARY: Primary | ICD-10-CM

## 2023-12-18 DIAGNOSIS — R10.31 RLQ ABDOMINAL PAIN: ICD-10-CM

## 2023-12-18 DIAGNOSIS — R82.71 BACTERIA IN URINE: ICD-10-CM

## 2023-12-18 LAB
ALBUMIN SERPL BCP-MCNC: 3.4 G/DL (ref 3.5–5.2)
ALP SERPL-CCNC: 88 U/L (ref 55–135)
ALT SERPL W/O P-5'-P-CCNC: 29 U/L (ref 10–44)
ANION GAP SERPL CALC-SCNC: 10 MMOL/L (ref 8–16)
AST SERPL-CCNC: 12 U/L (ref 10–40)
B-HCG UR QL: NEGATIVE
BACTERIA #/AREA URNS HPF: ABNORMAL /HPF
BASOPHILS # BLD AUTO: 0.04 K/UL (ref 0–0.2)
BASOPHILS NFR BLD: 0.3 % (ref 0–1.9)
BILIRUB SERPL-MCNC: 0.3 MG/DL (ref 0.1–1)
BILIRUB UR QL STRIP: NEGATIVE
BUN SERPL-MCNC: 10 MG/DL (ref 6–20)
CA-I BLDV-SCNC: 1.39 MMOL/L (ref 1.06–1.42)
CALCIUM SERPL-MCNC: 10.7 MG/DL (ref 8.7–10.5)
CHLORIDE SERPL-SCNC: 103 MMOL/L (ref 95–110)
CLARITY UR: CLEAR
CO2 SERPL-SCNC: 25 MMOL/L (ref 23–29)
COLOR UR: YELLOW
CREAT SERPL-MCNC: 0.7 MG/DL (ref 0.5–1.4)
CTP QC/QA: YES
DIFFERENTIAL METHOD BLD: ABNORMAL
EOSINOPHIL # BLD AUTO: 0.1 K/UL (ref 0–0.5)
EOSINOPHIL NFR BLD: 1.2 % (ref 0–8)
ERYTHROCYTE [DISTWIDTH] IN BLOOD BY AUTOMATED COUNT: 14.8 % (ref 11.5–14.5)
EST. GFR  (NO RACE VARIABLE): >60 ML/MIN/1.73 M^2
GLUCOSE SERPL-MCNC: 98 MG/DL (ref 70–110)
GLUCOSE UR QL STRIP: NEGATIVE
HCT VFR BLD AUTO: 37.3 % (ref 37–48.5)
HGB BLD-MCNC: 11.7 G/DL (ref 12–16)
HGB UR QL STRIP: ABNORMAL
IMM GRANULOCYTES # BLD AUTO: 0.06 K/UL (ref 0–0.04)
IMM GRANULOCYTES NFR BLD AUTO: 0.5 % (ref 0–0.5)
KETONES UR QL STRIP: NEGATIVE
LEUKOCYTE ESTERASE UR QL STRIP: ABNORMAL
LIPASE SERPL-CCNC: 26 U/L (ref 4–60)
LYMPHOCYTES # BLD AUTO: 3.3 K/UL (ref 1–4.8)
LYMPHOCYTES NFR BLD: 28 % (ref 18–48)
MCH RBC QN AUTO: 25.5 PG (ref 27–31)
MCHC RBC AUTO-ENTMCNC: 31.4 G/DL (ref 32–36)
MCV RBC AUTO: 81 FL (ref 82–98)
MICROSCOPIC COMMENT: ABNORMAL
MONOCYTES # BLD AUTO: 0.7 K/UL (ref 0.3–1)
MONOCYTES NFR BLD: 5.8 % (ref 4–15)
NEUTROPHILS # BLD AUTO: 7.6 K/UL (ref 1.8–7.7)
NEUTROPHILS NFR BLD: 64.2 % (ref 38–73)
NITRITE UR QL STRIP: NEGATIVE
NRBC BLD-RTO: 0 /100 WBC
PH UR STRIP: 6 [PH] (ref 5–8)
PLATELET # BLD AUTO: 351 K/UL (ref 150–450)
PMV BLD AUTO: 9.5 FL (ref 9.2–12.9)
POTASSIUM SERPL-SCNC: 3.5 MMOL/L (ref 3.5–5.1)
PROT SERPL-MCNC: 6.7 G/DL (ref 6–8.4)
PROT UR QL STRIP: NEGATIVE
RBC # BLD AUTO: 4.59 M/UL (ref 4–5.4)
RBC #/AREA URNS HPF: 23 /HPF (ref 0–4)
SODIUM SERPL-SCNC: 138 MMOL/L (ref 136–145)
SP GR UR STRIP: 1.01 (ref 1–1.03)
SQUAMOUS #/AREA URNS HPF: 2 /HPF
UNIDENT CRYS URNS QL MICRO: 2
URN SPEC COLLECT METH UR: ABNORMAL
UROBILINOGEN UR STRIP-ACNC: NEGATIVE EU/DL
WBC # BLD AUTO: 11.79 K/UL (ref 3.9–12.7)
WBC #/AREA URNS HPF: 7 /HPF (ref 0–5)

## 2023-12-18 PROCEDURE — 83690 ASSAY OF LIPASE: CPT | Performed by: PHYSICIAN ASSISTANT

## 2023-12-18 PROCEDURE — 81025 URINE PREGNANCY TEST: CPT | Performed by: PHYSICIAN ASSISTANT

## 2023-12-18 PROCEDURE — 85025 COMPLETE CBC W/AUTO DIFF WBC: CPT | Performed by: PHYSICIAN ASSISTANT

## 2023-12-18 PROCEDURE — 36415 COLL VENOUS BLD VENIPUNCTURE: CPT | Performed by: STUDENT IN AN ORGANIZED HEALTH CARE EDUCATION/TRAINING PROGRAM

## 2023-12-18 PROCEDURE — 25000003 PHARM REV CODE 250: Performed by: PHYSICIAN ASSISTANT

## 2023-12-18 PROCEDURE — 80053 COMPREHEN METABOLIC PANEL: CPT | Performed by: PHYSICIAN ASSISTANT

## 2023-12-18 PROCEDURE — 99284 EMERGENCY DEPT VISIT MOD MDM: CPT | Mod: 25

## 2023-12-18 PROCEDURE — 82330 ASSAY OF CALCIUM: CPT | Performed by: STUDENT IN AN ORGANIZED HEALTH CARE EDUCATION/TRAINING PROGRAM

## 2023-12-18 PROCEDURE — 81000 URINALYSIS NONAUTO W/SCOPE: CPT | Performed by: PHYSICIAN ASSISTANT

## 2023-12-18 PROCEDURE — 87086 URINE CULTURE/COLONY COUNT: CPT | Performed by: STUDENT IN AN ORGANIZED HEALTH CARE EDUCATION/TRAINING PROGRAM

## 2023-12-18 PROCEDURE — 96360 HYDRATION IV INFUSION INIT: CPT

## 2023-12-18 RX ADMIN — SODIUM CHLORIDE 1000 ML: 0.9 INJECTION, SOLUTION INTRAVENOUS at 08:12

## 2023-12-19 ENCOUNTER — OFFICE VISIT (OUTPATIENT)
Dept: ENDOCRINOLOGY | Facility: CLINIC | Age: 38
End: 2023-12-19
Payer: COMMERCIAL

## 2023-12-19 DIAGNOSIS — E21.3 HYPERPARATHYROIDISM: Primary | ICD-10-CM

## 2023-12-19 DIAGNOSIS — E28.2 PCOS (POLYCYSTIC OVARIAN SYNDROME): ICD-10-CM

## 2023-12-19 DIAGNOSIS — E55.9 HYPOVITAMINOSIS D: ICD-10-CM

## 2023-12-19 DIAGNOSIS — E66.01 CLASS 3 SEVERE OBESITY WITH BODY MASS INDEX (BMI) OF 45.0 TO 49.9 IN ADULT, UNSPECIFIED OBESITY TYPE, UNSPECIFIED WHETHER SERIOUS COMORBIDITY PRESENT: ICD-10-CM

## 2023-12-19 PROCEDURE — 3044F PR MOST RECENT HEMOGLOBIN A1C LEVEL <7.0%: ICD-10-PCS | Mod: CPTII,95,, | Performed by: PHYSICIAN ASSISTANT

## 2023-12-19 PROCEDURE — 3044F HG A1C LEVEL LT 7.0%: CPT | Mod: CPTII,95,, | Performed by: PHYSICIAN ASSISTANT

## 2023-12-19 PROCEDURE — 1159F MED LIST DOCD IN RCRD: CPT | Mod: CPTII,95,, | Performed by: PHYSICIAN ASSISTANT

## 2023-12-19 PROCEDURE — 99214 PR OFFICE/OUTPT VISIT, EST, LEVL IV, 30-39 MIN: ICD-10-PCS | Mod: 95,,, | Performed by: PHYSICIAN ASSISTANT

## 2023-12-19 PROCEDURE — 99214 OFFICE O/P EST MOD 30 MIN: CPT | Mod: 95,,, | Performed by: PHYSICIAN ASSISTANT

## 2023-12-19 PROCEDURE — 1159F PR MEDICATION LIST DOCUMENTED IN MEDICAL RECORD: ICD-10-PCS | Mod: CPTII,95,, | Performed by: PHYSICIAN ASSISTANT

## 2023-12-19 PROCEDURE — 1160F PR REVIEW ALL MEDS BY PRESCRIBER/CLIN PHARMACIST DOCUMENTED: ICD-10-PCS | Mod: CPTII,95,, | Performed by: PHYSICIAN ASSISTANT

## 2023-12-19 PROCEDURE — 1160F RVW MEDS BY RX/DR IN RCRD: CPT | Mod: CPTII,95,, | Performed by: PHYSICIAN ASSISTANT

## 2023-12-19 RX ORDER — SPIRONOLACTONE 25 MG/1
25 TABLET ORAL DAILY
Qty: 30 TABLET | Refills: 11 | Status: SHIPPED | OUTPATIENT
Start: 2023-12-19 | End: 2024-12-18

## 2023-12-19 RX ORDER — LOSARTAN POTASSIUM 25 MG/1
25 TABLET ORAL DAILY
Qty: 90 TABLET | Refills: 3 | Status: SHIPPED | OUTPATIENT
Start: 2023-12-19 | End: 2024-12-18
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
## 2023-12-19 NOTE — PROGRESS NOTES
CC: Hyperparathyroidism    The patient location is: Work  The chief complaint leading to consultation is: Hyperparathryoidism    Visit type: audiovisual    Face to Face time with patient: 15 min  20 minutes of total time spent on the encounter, which includes face to face time and non-face to face time preparing to see the patient (eg, review of tests), Obtaining and/or reviewing separately obtained history, Documenting clinical information in the electronic or other health record, Independently interpreting results (not separately reported) and communicating results to the patient/family/caregiver, or Care coordination (not separately reported).     Each patient to whom he or she provides medical services by telemedicine is:  (1) informed of the relationship between the physician and patient and the respective role of any other health care provider with respect to management of the patient; and (2) notified that he or she may decline to receive medical services by telemedicine and may withdraw from such care at any time.      HPI: Sonia Solorzano is a 38 y.o. female here for hyperparathyroidism along with pending conditions listed in the Visit Diagnosis.   Diagnosed in 9/22 on lab work.   +FHx of DM in her mother, mgm and pgf.   No fhx of HPT.      Pt has IIH and is s/p VPS on 9/19.    She had a renal stone ~16 years ago. No fractures.   Pt states she needs a knee replacement due to work injury.     + occ feels light headed in the afternoon. + nausea after meals. No vomiting or abdominal pain.    CT 12/23 shows a 3 mm nonobstructive renal stone in the lower left kidney.    PCOS     + acne  No facial hair or voice changes.  Reports pelvic u/s a few years ago.  Previously on Metformin 500 mg qd.   No muscle weakness or easy bruising.  Her cycles occur every other month.    Her mother went through menopause at 40.    Vitamin D-one tab daily.     PMHx, PSHx: reviewed in epic.    Social Hx: no ETOH/tobacco use. She  quit vaping ~2 mths ago. 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 He has two children 20 and 15 yo. Has a tubal ligation.    Wt Readings from Last 15 Encounters:   12/18/23 136.1 kg (300 lb)   12/06/23 (!) 136.5 kg (300 lb 14.9 oz)   11/24/23 130.2 kg (287 lb)   10/26/23 (!) 139.7 kg (308 lb)   10/17/23 (!) 139.8 kg (308 lb 3.3 oz)   09/22/23 (!) 137 kg (302 lb 0.5 oz)   08/15/23 (!) 137 kg (302 lb)   06/25/23 (!) 137.1 kg (302 lb 4 oz)   05/25/23 (!) 137.1 kg (302 lb 5.8 oz)   05/09/23 133.5 kg (294 lb 5 oz)   05/01/23 (!) 137.3 kg (302 lb 12.8 oz)   04/24/23 (!) 138 kg (304 lb 4.8 oz)   04/17/23 (!) 137.9 kg (304 lb)   04/13/23 (!) 137 kg (302 lb)   04/10/23 (!) 137.3 kg (302 lb 12.8 oz)      ROS:   Constitutional: + wt gain 15 lbs since 11/22.  Eyes: No recent visual changes  Cardiovascular: Denies current anginal symptoms  Respiratory: Denies current respiratory difficulty  Gastrointestinal: Denies recent bowel disturbances  GenitoUrinary - No dysuria  Skin: No new skin rash  Neurologic: + HA,   Musculoskeletal: + joint pain  Endocrine: no polyphagia, polydipsia or polyuria  Remainder ROS negative     LMP 09/13/2023 (Approximate) Comment: Hx PCOS with irregular menstrual cycles     Personally reviewed labs below:    Lab Results   Component Value Date    TSH 2.658 06/25/2023        Chemistry        Component Value Date/Time     12/18/2023 2033    K 3.5 12/18/2023 2033     12/18/2023 2033    CO2 25 12/18/2023 2033    BUN 10 12/18/2023 2033    CREATININE 0.7 12/18/2023 2033    CREATININE 0.9 08/15/2013 2233    GLU 98 12/18/2023 2033        Component Value Date/Time    CALCIUM 10.7 (H) 12/18/2023 2033    CALCIUM 9.7 08/15/2013 2233    ALKPHOS 88 12/18/2023 2033    AST 12 12/18/2023 2033    ALT 29 12/18/2023 2033    BILITOT 0.3 12/18/2023 2033    ESTGFRAFRICA >105 04/07/2022 1310    ESTGFRAFRICA >60.0 02/07/2022 1031    EGFRNONAA >60.0 02/07/2022 1031           Lab Results   Component Value Date     HGBA1C 5.3 12/09/2023    HGBA1C 5.0 06/25/2023    HGBA1C 5.5 02/07/2022        PE:  GENERAL: middle aged female, well developed, well nourished  NECK: Supple neck, No bruit  NEURO: steady gait, CN ll-Xll grossly intact  PSYCH: normal mood and affect    Assessment/Plan:   1. Hyperparathyroidism  Calcium, Timed Urine [facility]; 24 Hours    Creatinine, urine, timed 24 Hours    Insulin, Random    PTH, Intact    Calcium, Ionized    Renal Function Panel      2. PCOS (polycystic ovarian syndrome)  empagliflozin (JARDIANCE) 10 mg tablet      3. Class 3 severe obesity with body mass index (BMI) of 45.0 to 49.9 in adult, unspecified obesity type, unspecified whether serious comorbidity present        4. Hypovitaminosis D  Vitamin D         Hyperparathyroidism-  Ca and  PTH are elevated.   Ca has been >11.5 but has decreased.  VD is low.   Stop HCTZ.   Obtain 24 hour urine.    PCOS-testosterone was normal. Insulin is elevated.   Start jardiance 10 mg qd.. Continue Metformin.   HTN-start aldactone 25 mg qd and losartan. Stop Hyzaar.  Obesity-There is no height or weight on file to calculate BMI. Dex supression was normal.  Hypovitaminosis d-low-double intake of vd.    24 hour urine tests  F/u in 6 mths w/ labs prior

## 2023-12-19 NOTE — FIRST PROVIDER EVALUATION
Emergency Department TeleTriage Encounter Note      CHIEF COMPLAINT    Chief Complaint   Patient presents with    Flank Pain     R sided flank pain x1 week hematuria x1 week and dark urine        VITAL SIGNS   Initial Vitals [12/18/23 1900]   BP Pulse Resp Temp SpO2   (!) 170/115 87 20 98 °F (36.7 °C) 97 %      MAP       --            ALLERGIES    Review of patient's allergies indicates:   Allergen Reactions    Contrast media Anaphylaxis    Iodine and iodide containing products Anaphylaxis    Levaquin [levofloxacin] Anaphylaxis    Levofloxacin in d5w Anaphylaxis    Sulfa (sulfonamide antibiotics) Anaphylaxis and Hives    Iodinated contrast media Hives    Iodine     Magnesium      Pt reporting she is allergic to magnesium citrate oral drink.     Morphine Hives    Tree nuts     Adhesive Rash    Compazine [prochlorperazine] Anxiety     Restless legs    Depacon [valproate sodium] Hives     Pt experienced hives at IV site upon 8th day of depacon administration.  Hives resolved with stopping medication in 1.5 hours.    Nut [tree nut] Hives       PROVIDER TRIAGE NOTE      38 year old female with right flank pain, hematuria for one week. No fever.    PE:  Patient alert and oriented. No acute distress    Initial orders will be placed and care will be transferred to an alternate provider when patient is roomed for a full evaluation. Any additional orders and the final disposition will be determined by that provider.        ORDERS  Labs Reviewed   CBC W/ AUTO DIFFERENTIAL   COMPREHENSIVE METABOLIC PANEL   LIPASE   URINALYSIS, REFLEX TO URINE CULTURE   POCT URINE PREGNANCY       ED Orders (720h ago, onward)      Start Ordered     Status Ordering Provider    12/18/23 1930 12/18/23 1915  sodium chloride 0.9% bolus 1,000 mL 1,000 mL  ED 1 Time         Ordered RENUKA VERONICA    12/18/23 1916 12/18/23 1915  POCT urine pregnancy  Once         Ordered RENUKA VERONICA    12/18/23 1915 12/18/23 1915  CBC auto differential  STAT          Ordered JOHNYKUTTY, RENUKA    12/18/23 1915 12/18/23 1915  Comprehensive metabolic panel  STAT         Ordered JOHNYKUTTY, RENUKA    12/18/23 1915 12/18/23 1915  Lipase  STAT         Ordered JOHNYKUTTY, RENUKA    12/18/23 1915 12/18/23 1915  Urinalysis, Reflex to Urine Culture Urine, Clean Catch  STAT         Ordered JOHNYKUTTY, RENUKA    12/18/23 1915 12/18/23 1915  Insert Saline lock IV  Once         Ordered FLORINYKUTTY, RENUKA              Virtual Visit Note: The provider triage portion of this emergency department evaluation and documentation was performed via [employer], a HIPAA-compliant telemedicine application, in concert with a tele-presenter in the room. A face to face patient evaluation with one of my colleagues will occur once the patient is placed in an emergency department room.      DISCLAIMER: This note was prepared with [employer] voice recognition transcription software. Garbled syntax, mangled pronouns, and other bizarre constructions may be attributed to that software system.

## 2023-12-20 LAB
BACTERIA UR CULT: NORMAL
BACTERIA UR CULT: NORMAL

## 2023-12-21 ENCOUNTER — OFFICE VISIT (OUTPATIENT)
Dept: UROLOGY | Facility: CLINIC | Age: 38
End: 2023-12-21
Payer: COMMERCIAL

## 2023-12-21 DIAGNOSIS — R10.9 FLANK PAIN: ICD-10-CM

## 2023-12-21 DIAGNOSIS — N39.0 RECURRENT UTI (URINARY TRACT INFECTION): Primary | ICD-10-CM

## 2023-12-21 LAB
BACTERIA #/AREA URNS AUTO: ABNORMAL /HPF
BILIRUBIN, UA POC OHS: NEGATIVE
BLOOD, UA POC OHS: ABNORMAL
CLARITY, UA POC OHS: CLEAR
COLOR, UA POC OHS: YELLOW
GLUCOSE, UA POC OHS: NEGATIVE
KETONES, UA POC OHS: NEGATIVE
LEUKOCYTES, UA POC OHS: ABNORMAL
MICROSCOPIC COMMENT: ABNORMAL
NITRITE, UA POC OHS: NEGATIVE
PH, UA POC OHS: 6
PROTEIN, UA POC OHS: 100
RBC #/AREA URNS AUTO: >100 /HPF (ref 0–4)
SPECIFIC GRAVITY, UA POC OHS: 1.02
SQUAMOUS #/AREA URNS AUTO: 16 /HPF
UROBILINOGEN, UA POC OHS: 0.2
WBC #/AREA URNS AUTO: 84 /HPF (ref 0–5)
WBC CLUMPS UR QL AUTO: ABNORMAL

## 2023-12-21 PROCEDURE — 81001 URINALYSIS AUTO W/SCOPE: CPT | Performed by: NURSE PRACTITIONER

## 2023-12-21 PROCEDURE — 87086 URINE CULTURE/COLONY COUNT: CPT | Performed by: NURSE PRACTITIONER

## 2023-12-21 PROCEDURE — 99999 PR PBB SHADOW E&M-EST. PATIENT-LVL II: CPT | Mod: PBBFAC,,, | Performed by: NURSE PRACTITIONER

## 2023-12-21 PROCEDURE — 81003 URINALYSIS AUTO W/O SCOPE: CPT | Mod: QW,S$GLB,, | Performed by: NURSE PRACTITIONER

## 2023-12-21 PROCEDURE — 99214 OFFICE O/P EST MOD 30 MIN: CPT | Mod: S$GLB,,, | Performed by: NURSE PRACTITIONER
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
## 2023-12-22 LAB
BACTERIA UR CULT: NORMAL
BACTERIA UR CULT: NORMAL

## 2023-12-23 ENCOUNTER — LAB VISIT (OUTPATIENT)
Dept: LAB | Facility: HOSPITAL | Age: 38
End: 2023-12-23
Attending: PHYSICIAN ASSISTANT
Payer: COMMERCIAL

## 2023-12-23 DIAGNOSIS — E21.3 HYPERPARATHYROIDISM: ICD-10-CM

## 2023-12-23 LAB
CALCIUM 24H UR-MRATE: 25 MG/HR (ref 4–12)
CALCIUM UR-MCNC: 35.5 MG/DL
CALCIUM URINE (MG/SPEC): 604 MG/SPEC
CREAT 24H UR-MRATE: 60.9 MG/HR (ref 40–75)
CREAT UR-MCNC: 86 MG/DL (ref 15–325)
CREATININE, URINE (MG/SPEC): 1462 MG/SPEC
URINE COLLECTION DURATION: 24 HR
URINE COLLECTION DURATION: 24 HR
URINE VOLUME: 1700 ML
URINE VOLUME: 1700 ML

## 2023-12-23 PROCEDURE — 82340 ASSAY OF CALCIUM IN URINE: CPT | Performed by: PHYSICIAN ASSISTANT

## 2023-12-23 PROCEDURE — 82570 ASSAY OF URINE CREATININE: CPT | Performed by: PHYSICIAN ASSISTANT

## 2023-12-26 ENCOUNTER — TELEPHONE (OUTPATIENT)
Dept: ENDOCRINOLOGY | Facility: CLINIC | Age: 38
End: 2023-12-26
Payer: COMMERCIAL

## 2023-12-26 ENCOUNTER — NURSE TRIAGE (OUTPATIENT)
Dept: ADMINISTRATIVE | Facility: CLINIC | Age: 38
End: 2023-12-26
Payer: COMMERCIAL

## 2023-12-26 NOTE — TELEPHONE ENCOUNTER
----- Message from Ezio Todd DO sent at 12/26/2023  2:02 PM CST -----  Regarding: RE: advise  Contact: patient  May need to be evaluated by primary care team or urgent care. Her urine Ca is high, but would not usually cause recent fatigue.   ----- Message -----  From: Marla Novak LPN  Sent: 12/26/2023   1:44 PM CST  To: Ezio Todd DO  Subject: FW: advise                                       Spoke with patient regarding s/s of fatigue, nausea, hardly being able to get out of bed.  She did a 24 hour urine and asking if the way she feels has something to do with the results of this.  If so what should she do?  ----- Message -----  From: Ruddy Velasquez  Sent: 12/26/2023  12:02 PM CST  To: Karolina Naqvi Staff  Subject: advise                                           Type: Needs Medical Advice  Who Called:  Patient  Symptoms (please be specific):  weak/tired   How long has patient had these symptoms:  since 12/21   Pharmacy name and phone #:    [id number] - [place] - [address]  [address] [place] [address]  Phone: [phone] Fax: [phone]    Best Call Back Number: [phone]  Additional Information: Pt stated that she have been feeling really bad for the past few days and has been stuck in bed. Please call to advise. Thanks

## 2023-12-26 NOTE — TELEPHONE ENCOUNTER
Patient c/o intermittent tachycardia with SOB, feeling faint, nausea, and malaise. Report problems with thyroid over the past year. Also concerned about recent labs      Ref Range & Units 3 d ago  (12/23/23) 3 d ago  (12/23/23)    Urine Volume mL 1700    Urine Collection Duration Hr 24    Calcium, Urine <15.1 mg/dL 35.5 High     Calcium, 24 Hr Urine 4 - 12 mg/Hr 25 High     Calcium, Urine (mg/spec) mg/Spec 604      Component Ref Range & Units 5 d ago  (12/21/23)         RBC, UA 0 - 4 /hpf >100 High          WBC, UA 0 - 5 /hpf 84 High                Patient states that endocrinology is closed today. Advised per protocol to be seen in the office with PCP approval or go to the nearest UC/ED. Patient would like to be seen in the office if possible. Will route message for office to f/u with the patient. Patient verbalizes understanding.  Advised the patient to call back with any further questions or if symptoms worsen.  Best contact number is [phone].      Reason for Disposition   New or worsened shortness of breath with activity (dyspnea on exertion)    Additional Information   Negative: Passed out (i.e., lost consciousness, collapsed and was not responding)   Negative: Shock suspected (e.g., cold/pale/clammy skin, too weak to stand, low BP, rapid pulse)   Negative: Difficult to awaken or acting confused (e.g., disoriented, slurred speech)   Negative: Unable to walk, or can only walk with assistance (e.g., requires support)   Negative: Received SHOCK from implantable cardiac defibrillator and has persisting symptoms (i.e., palpitations, lightheadedness)   Negative: Dizziness, lightheadedness, or weakness and heart beating very rapidly (e.g., > 140 / minute)   Negative: Dizziness, lightheadedness, or weakness and heart beating very slowly (e.g., < 50 / minute)   Negative: Sounds like a life-threatening emergency to the triager   Negative: Visible sweat on face or sweat dripping down face   Negative: Difficulty  breathing   Negative: Dizziness, lightheadedness, or weakness   Negative: Heart beating very rapidly (e.g., > 140 / minute) and present now  (Exception: During exercise.)   Negative: Heart beating very slowly (e.g., < 50 / minute)  (Exception: Athlete and heart rate normal for caller.)    Protocols used: Heart Rate and Heartbeat [id number]

## 2023-12-26 NOTE — TELEPHONE ENCOUNTER
Called pt back to let her know Dr. Todd suggested she contact her pcp or go to the urgent care.  Her urine ca was high but usually doesn't cause fatigue.  Pt verbalized understanding.

## 2023-12-29 ENCOUNTER — CLINICAL SUPPORT (OUTPATIENT)
Dept: UROLOGY | Facility: CLINIC | Age: 38
End: 2023-12-29
Payer: COMMERCIAL

## 2023-12-29 ENCOUNTER — HOSPITAL ENCOUNTER (OUTPATIENT)
Dept: RADIOLOGY | Facility: HOSPITAL | Age: 38
Discharge: HOME | End: 2023-12-29
Attending: STUDENT IN AN ORGANIZED HEALTH CARE EDUCATION/TRAINING PROGRAM
Payer: COMMERCIAL

## 2023-12-29 DIAGNOSIS — Z12.31 OTHER SCREENING MAMMOGRAM: ICD-10-CM

## 2023-12-29 DIAGNOSIS — N39.0 RECURRENT UTI (URINARY TRACT INFECTION): Primary | ICD-10-CM

## 2023-12-29 LAB
HYALINE CASTS UR QL AUTO: 10 /LPF
MICROSCOPIC COMMENT: ABNORMAL
RBC #/AREA URNS AUTO: 10 /HPF (ref 0–4)
SQUAMOUS #/AREA URNS AUTO: 0 /HPF
WBC #/AREA URNS AUTO: 5 /HPF (ref 0–5)

## 2023-12-29 PROCEDURE — 77067 SCR MAMMO BI INCL CAD: CPT | Mod: 26,,, | Performed by: RADIOLOGY

## 2023-12-29 PROCEDURE — 51701 PR INSERTION OF NON-INDWELLING BLADDER CATHETERIZATION FOR RESIDUAL UR: ICD-10-PCS | Mod: S$GLB,,, | Performed by: NURSE PRACTITIONER

## 2023-12-29 PROCEDURE — 99499 UNLISTED E&M SERVICE: CPT | Mod: S$GLB,,, | Performed by: NURSE PRACTITIONER

## 2023-12-29 PROCEDURE — 81001 URINALYSIS AUTO W/SCOPE: CPT | Performed by: NURSE PRACTITIONER

## 2023-12-29 PROCEDURE — 77063 MAMMO DIGITAL SCREENING BILAT WITH TOMO: ICD-10-PCS | Mod: 26,,, | Performed by: RADIOLOGY

## 2023-12-29 PROCEDURE — 77067 SCR MAMMO BI INCL CAD: CPT | Mod: TC

## 2023-12-29 PROCEDURE — 77063 BREAST TOMOSYNTHESIS BI: CPT | Mod: 26,,, | Performed by: RADIOLOGY

## 2023-12-29 PROCEDURE — 99499 NO LOS: ICD-10-PCS | Mod: S$GLB,,, | Performed by: NURSE PRACTITIONER

## 2023-12-29 PROCEDURE — 51701 INSERT BLADDER CATHETER: CPT | Mod: S$GLB,,, | Performed by: NURSE PRACTITIONER

## 2023-12-29 PROCEDURE — 87086 URINE CULTURE/COLONY COUNT: CPT | Performed by: NURSE PRACTITIONER

## 2023-12-29 PROCEDURE — 77067 MAMMO DIGITAL SCREENING BILAT WITH TOMO: ICD-10-PCS | Mod: 26,,, | Performed by: RADIOLOGY

## 2023-12-29 NOTE — PROGRESS NOTES
Consent verbally obtained.Patient draped in sterile fashion. Betadine prep was applied to the urethral meatus. An in and out cath was performed after voiding.  The PVR was 40 ml.  Catheterized urine was clear, dark yellow urine. Patient tolerated well  Urine sent for ua micro and urine culture

## 2023-12-30 LAB — BACTERIA UR CULT: NO GROWTH

## 2024-01-02 ENCOUNTER — PATIENT MESSAGE (OUTPATIENT)
Dept: ENDOCRINOLOGY | Facility: CLINIC | Age: 39
End: 2024-01-02
Payer: COMMERCIAL

## 2024-01-02 DIAGNOSIS — E21.3 HYPERPARATHYROIDISM: Primary | ICD-10-CM

## 2024-01-09 ENCOUNTER — HOSPITAL ENCOUNTER (OUTPATIENT)
Dept: RADIOLOGY | Facility: HOSPITAL | Age: 39
Discharge: HOME | End: 2024-01-09
Attending: STUDENT IN AN ORGANIZED HEALTH CARE EDUCATION/TRAINING PROGRAM
Payer: COMMERCIAL

## 2024-01-09 ENCOUNTER — LAB VISIT (OUTPATIENT)
Dept: LAB | Facility: HOSPITAL | Age: 39
End: 2024-01-09
Attending: STUDENT IN AN ORGANIZED HEALTH CARE EDUCATION/TRAINING PROGRAM
Payer: COMMERCIAL

## 2024-01-09 ENCOUNTER — OFFICE VISIT (OUTPATIENT)
Dept: FAMILY MEDICINE | Facility: CLINIC | Age: 39
End: 2024-01-09
Payer: COMMERCIAL

## 2024-01-09 VITALS
SYSTOLIC BLOOD PRESSURE: 120 MMHG | BODY MASS INDEX: 45.99 KG/M2 | RESPIRATION RATE: 17 BRPM | TEMPERATURE: 98 F | OXYGEN SATURATION: 96 % | HEIGHT: 67 IN | DIASTOLIC BLOOD PRESSURE: 84 MMHG | HEART RATE: 98 BPM | WEIGHT: 293 LBS

## 2024-01-09 DIAGNOSIS — I10 ESSENTIAL HYPERTENSION: ICD-10-CM

## 2024-01-09 DIAGNOSIS — E28.2 PCOS (POLYCYSTIC OVARIAN SYNDROME): ICD-10-CM

## 2024-01-09 DIAGNOSIS — F31.9 BIPOLAR AFFECTIVE DISORDER, REMISSION STATUS UNSPECIFIED: ICD-10-CM

## 2024-01-09 DIAGNOSIS — E21.3 HYPERPARATHYROIDISM: ICD-10-CM

## 2024-01-09 DIAGNOSIS — R79.9 ABNORMAL FINDING OF BLOOD CHEMISTRY, UNSPECIFIED: ICD-10-CM

## 2024-01-09 DIAGNOSIS — M25.562 LEFT KNEE PAIN, UNSPECIFIED CHRONICITY: Primary | ICD-10-CM

## 2024-01-09 DIAGNOSIS — Z91.041 CONTRAST MEDIA ALLERGY: ICD-10-CM

## 2024-01-09 DIAGNOSIS — M25.562 LEFT KNEE PAIN, UNSPECIFIED CHRONICITY: ICD-10-CM

## 2024-01-09 DIAGNOSIS — R53.83 FATIGUE, UNSPECIFIED TYPE: ICD-10-CM

## 2024-01-09 DIAGNOSIS — E61.1 IRON DEFICIENCY: ICD-10-CM

## 2024-01-09 DIAGNOSIS — E78.2 MIXED HYPERLIPIDEMIA: ICD-10-CM

## 2024-01-09 DIAGNOSIS — Z00.00 HEALTHCARE MAINTENANCE: ICD-10-CM

## 2024-01-09 DIAGNOSIS — E66.01 CLASS 3 SEVERE OBESITY WITH BODY MASS INDEX (BMI) OF 45.0 TO 49.9 IN ADULT, UNSPECIFIED OBESITY TYPE, UNSPECIFIED WHETHER SERIOUS COMORBIDITY PRESENT: ICD-10-CM

## 2024-01-09 LAB
ANION GAP SERPL CALC-SCNC: 10 MMOL/L (ref 8–16)
BUN SERPL-MCNC: 11 MG/DL (ref 6–20)
CALCIUM SERPL-MCNC: 10.3 MG/DL (ref 8.7–10.5)
CHLORIDE SERPL-SCNC: 103 MMOL/L (ref 95–110)
CHOLEST SERPL-MCNC: 168 MG/DL (ref 120–199)
CHOLEST/HDLC SERPL: 4.2 {RATIO} (ref 2–5)
CO2 SERPL-SCNC: 26 MMOL/L (ref 23–29)
CREAT SERPL-MCNC: 0.9 MG/DL (ref 0.5–1.4)
EST. GFR  (NO RACE VARIABLE): >60 ML/MIN/1.73 M^2
GLUCOSE SERPL-MCNC: 87 MG/DL (ref 70–110)
HDLC SERPL-MCNC: 40 MG/DL (ref 40–75)
HDLC SERPL: 23.8 % (ref 20–50)
LDLC SERPL CALC-MCNC: 93 MG/DL (ref 63–159)
NONHDLC SERPL-MCNC: 128 MG/DL
POTASSIUM SERPL-SCNC: 4.3 MMOL/L (ref 3.5–5.1)
SODIUM SERPL-SCNC: 139 MMOL/L (ref 136–145)
TRIGL SERPL-MCNC: 175 MG/DL (ref 30–150)

## 2024-01-09 PROCEDURE — 99214 OFFICE O/P EST MOD 30 MIN: CPT | Mod: S$GLB,,, | Performed by: STUDENT IN AN ORGANIZED HEALTH CARE EDUCATION/TRAINING PROGRAM

## 2024-01-09 PROCEDURE — 1159F MED LIST DOCD IN RCRD: CPT | Mod: CPTII,S$GLB,, | Performed by: STUDENT IN AN ORGANIZED HEALTH CARE EDUCATION/TRAINING PROGRAM

## 2024-01-09 PROCEDURE — 3008F BODY MASS INDEX DOCD: CPT | Mod: CPTII,S$GLB,, | Performed by: STUDENT IN AN ORGANIZED HEALTH CARE EDUCATION/TRAINING PROGRAM

## 2024-01-09 PROCEDURE — 82525 ASSAY OF COPPER: CPT | Performed by: STUDENT IN AN ORGANIZED HEALTH CARE EDUCATION/TRAINING PROGRAM

## 2024-01-09 PROCEDURE — 80061 LIPID PANEL: CPT | Performed by: STUDENT IN AN ORGANIZED HEALTH CARE EDUCATION/TRAINING PROGRAM

## 2024-01-09 PROCEDURE — 3074F SYST BP LT 130 MM HG: CPT | Mod: CPTII,S$GLB,, | Performed by: STUDENT IN AN ORGANIZED HEALTH CARE EDUCATION/TRAINING PROGRAM

## 2024-01-09 PROCEDURE — 99999 PR PBB SHADOW E&M-EST. PATIENT-LVL V: CPT | Mod: PBBFAC,,, | Performed by: STUDENT IN AN ORGANIZED HEALTH CARE EDUCATION/TRAINING PROGRAM

## 2024-01-09 PROCEDURE — 84207 ASSAY OF VITAMIN B-6: CPT | Performed by: STUDENT IN AN ORGANIZED HEALTH CARE EDUCATION/TRAINING PROGRAM

## 2024-01-09 PROCEDURE — 3079F DIAST BP 80-89 MM HG: CPT | Mod: CPTII,S$GLB,, | Performed by: STUDENT IN AN ORGANIZED HEALTH CARE EDUCATION/TRAINING PROGRAM

## 2024-01-09 PROCEDURE — 73562 X-RAY EXAM OF KNEE 3: CPT | Mod: TC,LT

## 2024-01-09 PROCEDURE — 73562 X-RAY EXAM OF KNEE 3: CPT | Mod: 26,LT,, | Performed by: RADIOLOGY

## 2024-01-09 PROCEDURE — 36415 COLL VENOUS BLD VENIPUNCTURE: CPT | Performed by: STUDENT IN AN ORGANIZED HEALTH CARE EDUCATION/TRAINING PROGRAM

## 2024-01-09 PROCEDURE — 80048 BASIC METABOLIC PNL TOTAL CA: CPT | Performed by: STUDENT IN AN ORGANIZED HEALTH CARE EDUCATION/TRAINING PROGRAM

## 2024-01-09 RX ORDER — DICLOFENAC SODIUM 10 MG/G
2 GEL TOPICAL 4 TIMES DAILY
Qty: 100 G | Refills: 1 | Status: SHIPPED | OUTPATIENT
Start: 2024-01-09

## 2024-01-09 NOTE — PROGRESS NOTES
[facility] Note    Subjective:    The HPI and pertinent ROS is included in the Diagnostic Impression Remarks section at the end of the note. Please see below for further details. Chief complaint is at end of note.     Sonia is a pleasant intelligent patient who is here for evaluation.     Modified Medications    No medications on file       Data reviewed 274}  Previous medical records reviewed and summarized in plan section at end of note.      If you are due for any health screening(s) below please notify me so we can arrange them to be ordered and scheduled. Most healthy patients at your age complete them, but you are free to accept or refuse. If you can't do it, I'll definitely understand. If you can, I'd certainly appreciate it!     Tests to Keep You Healthy    Mammogram: Met on 12/29/2023  Cervical Cancer Screening: Met on 4/22/2021  Last Blood Pressure <= 139/89 (1/9/2024): NO      The following portions of the patient's history were reviewed and updated as appropriate: allergies, current medications, past family history, past medical history, past social history, past surgical history and problem list.    She  has a past medical history of Acute calculous cholecystitis (11/27/2020), Asthma (2014), Calculus of gallbladder with acute cholecystitis without obstruction (11/26/2020), Chronic anxiety (12/19/2014), COVID-19, GERD (gastroesophageal reflux disease) (10/25/2020), GI bleed (10/25/2020), Heart palpitations, Herniated disc, Hypertension, IBS (irritable bowel syndrome) (2015), Idiopathic intracranial hypertension, Insomnia (2018), Intractable migraine without aura and with status migrainosus (06/28/2022), Irritable bowel syndrome without diarrhea (09/03/2021), Lower back pain (2005), Migraine headache (2002), Obstructive sleep apnea, Palpitations (2015), PCOS (polycystic ovarian syndrome) (05/2022), and Sleep apnea (2006).  She  has a past surgical history that includes Dilation and  curettage of uterus (); epidural steriod injections ();  section, classic;  section, low transverse (); Tubal ligation (); Dilation and curettage of uterus; endometrioma (); Abdominal surgery; Esophagogastroduodenoscopy (N/A, 10/26/2020); Colonoscopy (N/A, 10/27/2020); Intraluminal gastrointestinal tract imaging via capsule (N/A, 2020); Laparoscopic cholecystectomy (N/A, 2020); Tonsillectomy; Knee arthroscopy w/ meniscectomy (Right, 2021); Cystoscopy (N/A, 10/27/2021); Magnetic resonance imaging (N/A, 2022); Cholecystectomy; Upper gastrointestinal endoscopy; Endoscopic insertion of ventriculoperitoneal shunt (Right, 2022); Endoscopic insertion of ventriculoperitoneal shunt (N/A, 2022); and Esophagogastroduodenoscopy (N/A, 3/2/2023).    She  reports that she has been smoking vaping with nicotine. She has been exposed to tobacco smoke. She has quit using smokeless tobacco. She reports that she does not currently use alcohol. She reports that she does not use drugs.  She family history includes Arthritis in her father; Asthma in her mother; Breast cancer in her mother; Breast cancer (age of onset: 40) in her maternal aunt; Cancer in her father, maternal grandfather, maternal grandmother, and mother; Colon cancer in her maternal grandfather; Diabetes in her maternal grandmother and paternal grandfather; Heart disease in her father and mother; Hyperlipidemia in her father and mother; Hypertension in her father.    Review of patient's allergies indicates:   Allergen Reactions    Contrast media Anaphylaxis    Iodine and iodide containing products Anaphylaxis    Levaquin [levofloxacin] Anaphylaxis    Levofloxacin in d5w Anaphylaxis    Sulfa (sulfonamide antibiotics) Anaphylaxis and Hives    Iodinated contrast media Hives    Iodine     Magnesium      Pt reporting she is allergic to magnesium citrate oral drink.     Morphine Hives    Tree nuts     Adhesive  "Rash    Compazine [prochlorperazine] Anxiety     Restless legs    Depacon [valproate sodium] Hives     Pt experienced hives at IV site upon 8th day of depacon administration.  Hives resolved with stopping medication in 1.5 hours.    Nut [tree nut] Hives       Tobacco Use: High Risk (1/9/2024)    Patient History     Smoking Tobacco Use: Every Day     Smokeless Tobacco Use: Former     Passive Exposure: Current     Physical Examination  General appearance: alert, cooperative, no distress  Neck: no thyromegaly, no neck stiffness  Lungs: clear to auscultation, no wheezes, rales or rhonchi, symmetric air entry  Heart: normal rate, regular rhythm, normal S1, S2, no murmurs, rubs, clicks or gallops  Abdomen: soft, nontender, nondistended, no rigidity, rebound, or guarding.   Back: no point tenderness over spine  Extremities: peripheral pulses normal, left kene swelling and tenderness to palpation of medial knee limited exam due to pain   Neurological:alert, oriented, normal speech, no new focal findings or movement disorder noted from baseline    BP Readings from Last 3 Encounters:   01/09/24 120/84   12/18/23 124/62   12/06/23 (!) 138/100     Wt Readings from Last 3 Encounters:   01/09/24 136 kg (299 lb 13.2 oz)   12/18/23 136.1 kg (300 lb)   12/06/23 (!) 136.5 kg (300 lb 14.9 oz)     /84 (BP Location: Right arm, Patient Position: Sitting, BP Method: Large (Manual))   Pulse 98   Temp 98.3 °F (36.8 °C) (Oral)   Resp 17   Ht 5' 7" (1.702 m)   Wt 136 kg (299 lb 13.2 oz)   LMP 12/13/2023 (Exact Date)   SpO2 96%   BMI 46.96 kg/m²    274}  Laboratory: I have reviewed old labs below:    274}    Lab Results   Component Value Date    WBC 11.79 12/18/2023    HGB 11.7 (L) 12/18/2023    HCT 37.3 12/18/2023    MCV 81 (L) 12/18/2023     12/18/2023     12/18/2023    K 3.5 12/18/2023     12/18/2023    CALCIUM 10.7 (H) 12/18/2023    PHOS 2.9 06/26/2023    CO2 25 12/18/2023    GLU 98 12/18/2023    BUN 10 " "12/18/2023    CREATININE 0.7 12/18/2023    EGFRNORACEVR >60 12/18/2023    ANIONGAP 10 12/18/2023    PROT 6.7 12/18/2023    ALBUMIN 3.4 (L) 12/18/2023    BILITOT 0.3 12/18/2023    ALKPHOS 88 12/18/2023    ALT 29 12/18/2023    AST 12 12/18/2023    INR 0.9 06/25/2023    CHOL 246 (H) 06/25/2023    TRIG 259 (H) 06/25/2023    HDL 50 06/25/2023    LDLCALC 144.2 06/25/2023    TSH 2.658 06/25/2023    HGBA1C 5.3 12/09/2023      Lab reviewed by me: Particular labs of significance that I will monitor, workup, or treat to improve are mentioned below in diagnostic impression remarks.    Imaging/EKG: I have reviewed the pertinent results and my findings are noted in remarks.  274}    CC:   Chief Complaint   Patient presents with    Hypertension    Knee Pain        274}    Assessment/Plan  Sonia Solorzano is a 38 y.o. female who presents to clinic with:  1. Left knee pain, unspecified chronicity    2. Healthcare maintenance    3. PCOS (polycystic ovarian syndrome)    4. Essential hypertension    5. Class 3 severe obesity with body mass index (BMI) of 45.0 to 49.9 in adult, unspecified obesity type, unspecified whether serious comorbidity present    6. Bipolar affective disorder, remission status unspecified    7. Hyperparathyroidism    8. Contrast media allergy    9. Iron deficiency    10. Fatigue, unspecified type    11. Mixed hyperlipidemia    12. Abnormal finding of blood chemistry, unspecified       274}  Diagnostic Impression Remarks + HPI     Documentation entered by me for this encounter may have been done in part using speech-recognition technology. Although I have made an effort to ensure accuracy, "sound like" errors may exist and should be interpreted in context.     Left knee pain-patient has had some chronic left knee pain but reports that a couple days ago she developed increased pain with swelling and had difficulty walking and she woke up in had severe pain along with swelling of the left knee.  Patient reports " that she did not identify any twisting locking or catching no trauma no falls.  Patient has had issues with the right knee.  Patient reports no rash and has some swelling of the left knee with some more medial pain.  Patient has been taking Tylenol and she has some side effects to NSAIDs.  Recommend to get x-ray I suspect a ligament injury and it will be difficult to get an MRI because she has to get these at [facility] will get her set up with Sports Medicine quickly to further evaluate can try topical Voltaren gel in the meantime   Hyperparathyroidism-patient recently stopped her thiazide and a CT scan was ordered with contrast patient was told that you could possibly get steroids along with an antihistamine patient had a anaphylaxis reaction and thus I would hold until she talks with allergy immunology about this risk hold on the contrast   Fatigue-likely multifactorial does have an iron-deficiency she seen Hematology for also she is hyperparathyroidism which is well known to cause fatigue will check some additional blood work factors   Hyperlipidemia control uncertain repeat blood work monitor       Bipolar disorder-stable no recent flares continue current meds no SI/plan    This is the extent of this pleasant patient's concerns at this present time. She did not feel chest pain upon exertion, dyspnea, nausea, vomiting, diaphoresis, or syncope. No pleuritic chest pain, unilateral leg swelling, pierre. Negative for unintentional weight loss night sweats, hematuria, and fevers. Sonia will return to clinic in a few months for further workup and reassessment or sooner as needed. She was instructed to call the clinic or go to the emergency department or urgent care immediately if her symptoms do not improve, worsens, or if any new symptoms develop. As we discussed that symptoms could worsen over the next 24 hours she was advised that if any increased swelling, pain, or numbness arise to go immediately to the ED.  Patient knows to call any time if an emergency arises. Shared decision making occurred and she verbalized understanding in agreement with this plan. I discussed imaging findings, diagnosis, possibilities, treatment options, medications, risks, and benefits. She had many questions regarding the options and long-term effects. All questions were answered. She expressed understanding after counseling regarding the diagnosis and recommendations. She was capable and demonstrated competence with understanding of these options. Shared decision making was performed resulting in her choosing the current treatment plan. Patient handout was given with instructions and recommendations. Advised the patient that if they become pregnant to alert us immediately to assess for medication changes. Having a healthy weight can decrease the risk of 13 cancers and is an important goal. I also discussed the importance of close follow up to discuss labs, change or modify her medications if needed, monitor side effects, and further evaluation of medical problems.     Additional workup planned: see labs ordered below.    See below for labs and meds ordered with associated diagnosis      1. Left knee pain, unspecified chronicity  - X-Ray Knee 3 View Left; Future  - Ambulatory referral/consult to Sports Medicine; Future  - diclofenac sodium (VOLTAREN) 1 % Gel; Apply 2 g topically 4 (four) times daily.  Dispense: 100 g; Refill: 1    2. Healthcare maintenance    3. PCOS (polycystic ovarian syndrome)    4. Essential hypertension  - Basic Metabolic Panel; Future    5. Class 3 severe obesity with body mass index (BMI) of 45.0 to 49.9 in adult, unspecified obesity type, unspecified whether serious comorbidity present    6. Bipolar affective disorder, remission status unspecified    7. Hyperparathyroidism    8. Contrast media allergy  - Ambulatory referral/consult to Allergy; Future    9. Iron deficiency    10. Fatigue, unspecified type    11. Mixed  hyperlipidemia  - Lipid Panel; Future    12. Abnormal finding of blood chemistry, unspecified  - VITAMIN B6; Future  - COPPER, SERUM; Future      Dorian Guerrero MD   274}    If you are due for any health screening(s) below please notify me so we can arrange them to be ordered and scheduled. Most healthy patients at your age complete them, but you are free to accept or refuse.     If you can't do it, I'll definitely understand. If you can, I'd certainly appreciate it!   Tests to Keep You Healthy    Mammogram: Met on 12/29/2023  Cervical Cancer Screening: Met on 4/22/2021  Last Blood Pressure <= 139/89 (1/9/2024): NO

## 2024-01-10 ENCOUNTER — OFFICE VISIT (OUTPATIENT)
Dept: ORTHOPEDICS | Facility: CLINIC | Age: 39
End: 2024-01-10
Payer: COMMERCIAL

## 2024-01-10 ENCOUNTER — OFFICE VISIT (OUTPATIENT)
Dept: OBSTETRICS AND GYNECOLOGY | Facility: CLINIC | Age: 39
End: 2024-01-10
Payer: COMMERCIAL

## 2024-01-10 ENCOUNTER — HOSPITAL ENCOUNTER (OUTPATIENT)
Dept: RADIOLOGY | Facility: HOSPITAL | Age: 39
Discharge: HOME | End: 2024-01-10
Attending: OBSTETRICS & GYNECOLOGY
Payer: COMMERCIAL

## 2024-01-10 VITALS
HEIGHT: 67 IN | WEIGHT: 293 LBS | BODY MASS INDEX: 45.99 KG/M2 | DIASTOLIC BLOOD PRESSURE: 90 MMHG | SYSTOLIC BLOOD PRESSURE: 150 MMHG

## 2024-01-10 VITALS — WEIGHT: 293 LBS | HEIGHT: 67 IN | BODY MASS INDEX: 45.99 KG/M2

## 2024-01-10 DIAGNOSIS — Z98.890 HISTORY OF ARTHROSCOPY OF RIGHT KNEE: ICD-10-CM

## 2024-01-10 DIAGNOSIS — N83.201 CYST OF RIGHT OVARY: ICD-10-CM

## 2024-01-10 DIAGNOSIS — S83.242A ACUTE MEDIAL MENISCUS TEAR OF LEFT KNEE, INITIAL ENCOUNTER: Primary | ICD-10-CM

## 2024-01-10 DIAGNOSIS — Z01.419 WELL WOMAN EXAM WITH ROUTINE GYNECOLOGICAL EXAM: Primary | ICD-10-CM

## 2024-01-10 DIAGNOSIS — N93.9 ABNORMAL UTERINE BLEEDING (AUB): ICD-10-CM

## 2024-01-10 DIAGNOSIS — R10.2 PELVIC PAIN: ICD-10-CM

## 2024-01-10 DIAGNOSIS — M25.562 LEFT KNEE PAIN, UNSPECIFIED CHRONICITY: ICD-10-CM

## 2024-01-10 DIAGNOSIS — E28.2 PCOS (POLYCYSTIC OVARIAN SYNDROME): Chronic | ICD-10-CM

## 2024-01-10 DIAGNOSIS — R31.29 OTHER MICROSCOPIC HEMATURIA: ICD-10-CM

## 2024-01-10 DIAGNOSIS — Z12.4 SCREENING FOR MALIGNANT NEOPLASM OF CERVIX: ICD-10-CM

## 2024-01-10 PROCEDURE — 88175 CYTOPATH C/V AUTO FLUID REDO: CPT | Performed by: PATHOLOGY

## 2024-01-10 PROCEDURE — 3077F SYST BP >= 140 MM HG: CPT | Mod: CPTII,S$GLB,, | Performed by: OBSTETRICS & GYNECOLOGY

## 2024-01-10 PROCEDURE — 76830 TRANSVAGINAL US NON-OB: CPT | Mod: 26,,, | Performed by: RADIOLOGY

## 2024-01-10 PROCEDURE — 99203 OFFICE O/P NEW LOW 30 MIN: CPT | Mod: S$GLB,,, | Performed by: FAMILY MEDICINE

## 2024-01-10 PROCEDURE — 76856 US EXAM PELVIC COMPLETE: CPT | Mod: 26,,, | Performed by: RADIOLOGY

## 2024-01-10 PROCEDURE — 76830 TRANSVAGINAL US NON-OB: CPT | Mod: TC

## 2024-01-10 PROCEDURE — 99386 PREV VISIT NEW AGE 40-64: CPT | Mod: 25,S$GLB,, | Performed by: OBSTETRICS & GYNECOLOGY

## 2024-01-10 PROCEDURE — 3008F BODY MASS INDEX DOCD: CPT | Mod: CPTII,S$GLB,, | Performed by: OBSTETRICS & GYNECOLOGY

## 2024-01-10 PROCEDURE — 99999 PR PBB SHADOW E&M-EST. PATIENT-LVL III: CPT | Mod: PBBFAC,,, | Performed by: FAMILY MEDICINE

## 2024-01-10 PROCEDURE — 88141 CYTOPATH C/V INTERPRET: CPT | Mod: ,,, | Performed by: PATHOLOGY

## 2024-01-10 PROCEDURE — 99999 PR PBB SHADOW E&M-EST. PATIENT-LVL III: CPT | Mod: PBBFAC,,, | Performed by: OBSTETRICS & GYNECOLOGY

## 2024-01-10 PROCEDURE — 3080F DIAST BP >= 90 MM HG: CPT | Mod: CPTII,S$GLB,, | Performed by: OBSTETRICS & GYNECOLOGY

## 2024-01-10 PROCEDURE — 3008F BODY MASS INDEX DOCD: CPT | Mod: CPTII,S$GLB,, | Performed by: FAMILY MEDICINE

## 2024-01-10 PROCEDURE — 1159F MED LIST DOCD IN RCRD: CPT | Mod: CPTII,S$GLB,, | Performed by: FAMILY MEDICINE

## 2024-01-10 PROCEDURE — 81003 URINALYSIS AUTO W/O SCOPE: CPT | Mod: QW,S$GLB,, | Performed by: OBSTETRICS & GYNECOLOGY

## 2024-01-10 PROCEDURE — 87624 HPV HI-RISK TYP POOLED RSLT: CPT | Performed by: OBSTETRICS & GYNECOLOGY

## 2024-01-10 PROCEDURE — 51701 INSERT BLADDER CATHETER: CPT | Mod: S$GLB,,, | Performed by: OBSTETRICS & GYNECOLOGY

## 2024-01-10 PROCEDURE — 87086 URINE CULTURE/COLONY COUNT: CPT | Performed by: OBSTETRICS & GYNECOLOGY

## 2024-01-10 NOTE — PROGRESS NOTES
Subjective:     Patient ID: Sonia Solorzano is a 38 y.o. female.    Chief Complaint: Pain of the Left Knee (Left knee pain/7/10 pain today/Right knee went out so patient used left to step down and heard a pop noise.)    38-year-old female here today with complaints of left knee pain.  Has significant history of pain in both the left and the right knee.  Has had issue with a right knee for many years now.  Has a history of an arthroscopic knee surgery with meniscectomy in that right knee.  During surgery she was discovered to have a chondral defect in the right knee as well.  Has failed all measures for the right knee continues to have pain.  Due to this she tends to favor the left knee.  In August of 2023 she states she was favoring the left knee when she twisted it and felt significant pain.  She was seen by Dr. Baker shortly after this who gave her a steroid injection and sent her to physical therapy.  Reports a steroid injection did not help with her pain at all.  Physical therapy did seem to help somewhat however her pain did return.  She woke up Monday with very significant pain in her left knee and significant swelling.  Reports popping in the knee.  Denies any giving out or locking up on her.  Reports significant pain in the left knee when trying to climb stairs especially climbing down stairs.  Was seen by her PCP yesterday who ordered an x-ray which showed well-preserved joint spacing and no obvious abnormalities.  Pain is mostly located in the medial aspect of the knee.  Only Tylenol has given her partial relief of the left knee.    Pain  Pertinent negatives include no chest pain, chills, congestion, coughing, headaches, rash or sore throat.       Review of Systems   Constitutional: Negative for chills and decreased appetite.   HENT:  Negative for congestion and sore throat.    Eyes:  Negative for blurred vision.   Cardiovascular:  Negative for chest pain, dyspnea on exertion and palpitations.    Respiratory:  Negative for cough and shortness of breath.    Skin:  Negative for rash.   Neurological:  Negative for difficulty with concentration, disturbances in coordination and headaches.   Psychiatric/Behavioral:  Negative for altered mental status, depression, hallucinations, memory loss and suicidal ideas.        Objective:       General    Nursing note and vitals reviewed.  Constitutional: She is oriented to person, place, and time. She appears well-developed and well-nourished.   HENT:   Nose: Nose normal.   Eyes: EOM are normal. Pupils are equal, round, and reactive to light.   Neck: Neck supple.   Cardiovascular:  Normal rate.            Pulmonary/Chest: Effort normal.   Abdominal: Soft.   Neurological: She is alert and oriented to person, place, and time. She has normal reflexes.   Psychiatric: She has a normal mood and affect. Her behavior is normal. Judgment and thought content normal.           Right Knee Exam     Inspection   Effusion: absent    Tenderness   The patient is tender to palpation of the medial joint line.    Range of Motion   Extension:  50   Flexion:  140     Tests   Meniscus   Vinicio:  Medial - negative Lateral - negative  Ligament Examination   Lachman: normal (-1 to 2mm)   PCL-Posterior Drawer: normal (0 to 2mm)     MCL - Valgus: normal (0 to 2mm)  LCL - Varus: normal  Patella   Patellar apprehension: negative  Passive Patellar Tilt: neutral  Patellar Tracking: normal    Other   Popliteal (Baker's) Cyst: absent  Sensation: normal    Left Knee Exam     Inspection   Effusion: present    Tenderness   The patient tender to palpation of the medial joint line.    Crepitus   The patient has crepitus of the patella and medial joint line.    Range of Motion   Extension:  50   Flexion:  140     Tests   Meniscus   Vinicio:  Medial - positive Lateral - negative  Stability   Lachman: normal (-1 to 2mm)   PCL-Posterior Drawer: normal (0 to 2mm)  MCL - Valgus: normal (0 to 2mm)  LCL - Varus:  normal (0 to 2mm)  Patella   Patellar apprehension: negative  Passive Patellar Tilt: neutral  Patellar Tracking: normal    Other   Popliteal (Baker's) Cyst: absent  Sensation: normal    Muscle Strength   Right Lower Extremity   Quadriceps:  5/5   Hamstrin/5   Left Lower Extremity   Quadriceps:  5/5   Hamstrin/5     Vascular Exam     Right Pulses  Dorsalis Pedis:      2+          Left Pulses  Dorsalis Pedis:      2+          Physical Exam  Vitals and nursing note reviewed.   Constitutional:       Appearance: She is well-developed and well-nourished.   HENT:      Nose: Nose normal.   Eyes:      Extraocular Movements: EOM normal.      Pupils: Pupils are equal, round, and reactive to light.   Cardiovascular:      Rate and Rhythm: Normal rate.      Pulses:           Dorsalis pedis pulses are 2+ on the right side and 2+ on the left side.   Pulmonary:      Effort: Pulmonary effort is normal.   Abdominal:      Palpations: Abdomen is soft.   Musculoskeletal:      Cervical back: Normal range of motion and neck supple.      Right knee: No effusion.      Instability Tests: Medial Vinicio test negative and lateral Vinicio test negative.      Left knee: Effusion present.      Instability Tests: Medial Vinicio test positive. Lateral Vinicio test negative.   Neurological:      Mental Status: She is alert and oriented to person, place, and time.      Deep Tendon Reflexes: Reflexes are normal and symmetric.   Psychiatric:         Mood and Affect: Mood and affect normal.         Behavior: Behavior normal.         Thought Content: Thought content normal.         Judgment: Judgment normal.       Assessment:     Encounter Diagnoses   Name Primary?    Left knee pain, unspecified chronicity     Acute medial meniscus tear of left knee, initial encounter Yes    History of arthroscopy of right knee        Plan:      Sonia was seen today for pain.    Diagnoses and all orders for this visit:    Acute medial meniscus tear of  left knee, initial encounter  -     MRI Knee Without Contrast Left; Future    Left knee pain, unspecified chronicity  -     Ambulatory referral/consult to Sports Medicine  -     MRI Knee Without Contrast Left; Future    History of arthroscopy of right knee  -     MRI Knee Without Contrast Left; Future      Based on physical exam and reviewing of past notes Dr. Baker which she almost certainly has the medial meniscus tear.  I will order an MRI of her left knee and have her follow up to review the results and discuss further treatment options.  In all likelihood she will need arthroscopic surgery of the left knee.
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
## 2024-01-11 ENCOUNTER — PATIENT MESSAGE (OUTPATIENT)
Dept: FAMILY MEDICINE | Facility: CLINIC | Age: 39
End: 2024-01-11
Payer: COMMERCIAL

## 2024-01-11 LAB — BACTERIA UR CULT: NO GROWTH

## 2024-01-12 DIAGNOSIS — E21.3 HYPERPARATHYROIDISM: Primary | ICD-10-CM

## 2024-01-12 LAB
BILIRUBIN, UA POC OHS: NEGATIVE
BLOOD, UA POC OHS: ABNORMAL
CLARITY, UA POC OHS: CLEAR
COLOR, UA POC OHS: YELLOW
COPPER SERPL-MCNC: 1696 UG/L (ref 810–1990)
GLUCOSE, UA POC OHS: 500
KETONES, UA POC OHS: NEGATIVE
LEUKOCYTES, UA POC OHS: NEGATIVE
NITRITE, UA POC OHS: NEGATIVE
PH, UA POC OHS: 7
PROTEIN, UA POC OHS: NEGATIVE
SPECIFIC GRAVITY, UA POC OHS: 1.02
UROBILINOGEN, UA POC OHS: 0.2

## 2024-01-15 LAB — PYRIDOXAL SERPL-MCNC: 5 UG/L (ref 5–50)

## 2024-01-16 ENCOUNTER — PATIENT MESSAGE (OUTPATIENT)
Dept: FAMILY MEDICINE | Facility: CLINIC | Age: 39
End: 2024-01-16
Payer: COMMERCIAL

## 2024-01-16 ENCOUNTER — HOSPITAL ENCOUNTER (OUTPATIENT)
Dept: RADIOLOGY | Facility: HOSPITAL | Age: 39
Discharge: HOME | End: 2024-01-16
Attending: PHYSICIAN ASSISTANT
Payer: COMMERCIAL

## 2024-01-16 DIAGNOSIS — E53.1 VITAMIN B6 DEFICIENCY: Primary | ICD-10-CM

## 2024-01-16 DIAGNOSIS — E21.3 HYPERPARATHYROIDISM: ICD-10-CM

## 2024-01-16 PROCEDURE — 76536 US EXAM OF HEAD AND NECK: CPT | Mod: TC

## 2024-01-16 PROCEDURE — 76536 US EXAM OF HEAD AND NECK: CPT | Mod: 26,,, | Performed by: RADIOLOGY

## 2024-01-16 RX ORDER — LANOLIN ALCOHOL/MO/W.PET/CERES
50 CREAM (GRAM) TOPICAL DAILY
Qty: 90 TABLET | Refills: 3 | Status: SHIPPED | OUTPATIENT
Start: 2024-01-16 | End: 2024-05-09

## 2024-01-17 ENCOUNTER — PATIENT MESSAGE (OUTPATIENT)
Dept: ORTHOPEDICS | Facility: CLINIC | Age: 39
End: 2024-01-17

## 2024-01-17 ENCOUNTER — PATIENT MESSAGE (OUTPATIENT)
Dept: NEUROSURGERY | Facility: CLINIC | Age: 39
End: 2024-01-17
Payer: COMMERCIAL

## 2024-01-17 ENCOUNTER — OFFICE VISIT (OUTPATIENT)
Dept: OBSTETRICS AND GYNECOLOGY | Facility: CLINIC | Age: 39
End: 2024-01-17
Payer: COMMERCIAL

## 2024-01-17 VITALS
HEIGHT: 67 IN | DIASTOLIC BLOOD PRESSURE: 60 MMHG | SYSTOLIC BLOOD PRESSURE: 126 MMHG | WEIGHT: 293 LBS | BODY MASS INDEX: 45.99 KG/M2

## 2024-01-17 DIAGNOSIS — E28.2 PCOS (POLYCYSTIC OVARIAN SYNDROME): ICD-10-CM

## 2024-01-17 DIAGNOSIS — E04.1 THYROID NODULE: ICD-10-CM

## 2024-01-17 DIAGNOSIS — N83.201 CYST OF RIGHT OVARY: ICD-10-CM

## 2024-01-17 DIAGNOSIS — N93.9 ABNORMAL UTERINE BLEEDING (AUB): Primary | ICD-10-CM

## 2024-01-17 DIAGNOSIS — R10.2 PELVIC PAIN: ICD-10-CM

## 2024-01-17 PROBLEM — E61.1 IRON DEFICIENCY: Status: RESOLVED | Noted: 2023-09-14 | Resolved: 2024-01-17

## 2024-01-17 LAB
HPV HR 12 DNA SPEC QL NAA+PROBE: NEGATIVE
HPV16 AG SPEC QL: NEGATIVE
HPV18 DNA SPEC QL NAA+PROBE: NEGATIVE

## 2024-01-17 PROCEDURE — 1159F MED LIST DOCD IN RCRD: CPT | Mod: CPTII,S$GLB,, | Performed by: OBSTETRICS & GYNECOLOGY

## 2024-01-17 PROCEDURE — 99999 PR PBB SHADOW E&M-EST. PATIENT-LVL IV: CPT | Mod: PBBFAC,,, | Performed by: OBSTETRICS & GYNECOLOGY

## 2024-01-17 PROCEDURE — 58100 BIOPSY OF UTERUS LINING: CPT | Mod: S$GLB,,, | Performed by: OBSTETRICS & GYNECOLOGY

## 2024-01-17 PROCEDURE — 3008F BODY MASS INDEX DOCD: CPT | Mod: CPTII,S$GLB,, | Performed by: OBSTETRICS & GYNECOLOGY

## 2024-01-17 PROCEDURE — 88305 TISSUE EXAM BY PATHOLOGIST: CPT | Mod: 26,,, | Performed by: PATHOLOGY

## 2024-01-17 PROCEDURE — 99499 UNLISTED E&M SERVICE: CPT | Mod: S$GLB,,, | Performed by: OBSTETRICS & GYNECOLOGY

## 2024-01-17 PROCEDURE — 88305 TISSUE EXAM BY PATHOLOGIST: CPT | Performed by: PATHOLOGY

## 2024-01-17 PROCEDURE — 3078F DIAST BP <80 MM HG: CPT | Mod: CPTII,S$GLB,, | Performed by: OBSTETRICS & GYNECOLOGY

## 2024-01-17 PROCEDURE — 3074F SYST BP LT 130 MM HG: CPT | Mod: CPTII,S$GLB,, | Performed by: OBSTETRICS & GYNECOLOGY

## 2024-01-17 RX ORDER — IBUPROFEN 400 MG/1
400 TABLET ORAL
Status: COMPLETED | OUTPATIENT
Start: 2024-01-17 | End: 2024-01-17

## 2024-01-17 RX ORDER — CEFAZOLIN SODIUM 2 G/50ML
2 SOLUTION INTRAVENOUS
Status: CANCELLED | OUTPATIENT
Start: 2024-01-17

## 2024-01-17 RX ORDER — SODIUM CHLORIDE 9 MG/ML
INJECTION, SOLUTION INTRAVENOUS CONTINUOUS
Status: CANCELLED | OUTPATIENT
Start: 2024-01-17

## 2024-01-17 RX ORDER — PHENAZOPYRIDINE HYDROCHLORIDE 200 MG/1
200 TABLET, FILM COATED ORAL
Status: CANCELLED | OUTPATIENT
Start: 2024-01-17 | End: 2024-01-17

## 2024-01-17 RX ORDER — MUPIROCIN 20 MG/G
OINTMENT TOPICAL
Status: CANCELLED | OUTPATIENT
Start: 2024-01-17

## 2024-01-17 RX ADMIN — IBUPROFEN 400 MG: 400 TABLET ORAL at 10:01
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
## 2024-01-18 DIAGNOSIS — E04.1 NODULAR THYROID DISEASE: Primary | ICD-10-CM

## 2024-01-18 LAB
FINAL PATHOLOGIC DIAGNOSIS: ABNORMAL
Lab: ABNORMAL

## 2024-01-19 ENCOUNTER — PATIENT MESSAGE (OUTPATIENT)
Dept: OBSTETRICS AND GYNECOLOGY | Facility: CLINIC | Age: 39
End: 2024-01-19
Payer: COMMERCIAL

## 2024-01-19 ENCOUNTER — TELEPHONE (OUTPATIENT)
Dept: RADIOLOGY | Facility: HOSPITAL | Age: 39
End: 2024-01-19

## 2024-01-19 DIAGNOSIS — E04.1 THYROID NODULE: Primary | ICD-10-CM

## 2024-01-22 LAB
FINAL PATHOLOGIC DIAGNOSIS: NORMAL
GROSS: NORMAL
Lab: NORMAL

## 2024-01-23 ENCOUNTER — TELEPHONE (OUTPATIENT)
Dept: OBSTETRICS AND GYNECOLOGY | Facility: CLINIC | Age: 39
End: 2024-01-23
Payer: COMMERCIAL

## 2024-01-23 NOTE — TELEPHONE ENCOUNTER
Contacted pt and she asked for sooner surgery date than 02/19/24.  Advised we currently do not have anything available prior to then.  Pt states she will keep her surgery date on 02/19/24.

## 2024-01-23 NOTE — TELEPHONE ENCOUNTER
----- Message from Brit Stephenson sent at 1/22/2024  2:58 PM CST -----  Regarding: Call back  Type:  Needs Medical Advice    Who Called: Pt    Would the patient rather a call back or a response via [employer]? Call back    Best Call Back Number: [phone]    Additional Information: Pt have procedure 2/19 and would like to have it reschedule and a call back. Thank you

## 2024-01-24 ENCOUNTER — LAB VISIT (OUTPATIENT)
Dept: LAB | Facility: HOSPITAL | Age: 39
End: 2024-01-24
Attending: INTERNAL MEDICINE
Payer: COMMERCIAL

## 2024-01-24 ENCOUNTER — TELEPHONE (OUTPATIENT)
Dept: OBSTETRICS AND GYNECOLOGY | Facility: CLINIC | Age: 39
End: 2024-01-24
Payer: COMMERCIAL

## 2024-01-24 ENCOUNTER — TELEPHONE (OUTPATIENT)
Dept: GYNECOLOGIC ONCOLOGY | Facility: CLINIC | Age: 39
End: 2024-01-24
Payer: COMMERCIAL

## 2024-01-24 DIAGNOSIS — N93.9 ABNORMAL UTERINE BLEEDING (AUB): Primary | ICD-10-CM

## 2024-01-24 DIAGNOSIS — N85.02 COMPLEX ENDOMETRIAL HYPERPLASIA WITH ATYPIA: ICD-10-CM

## 2024-01-24 DIAGNOSIS — E28.2 PCOS (POLYCYSTIC OVARIAN SYNDROME): ICD-10-CM

## 2024-01-24 DIAGNOSIS — D50.9 IRON DEFICIENCY ANEMIA, UNSPECIFIED IRON DEFICIENCY ANEMIA TYPE: ICD-10-CM

## 2024-01-24 LAB
BASOPHILS # BLD AUTO: 0.05 K/UL (ref 0–0.2)
BASOPHILS NFR BLD: 0.7 % (ref 0–1.9)
DIFFERENTIAL METHOD BLD: ABNORMAL
EOSINOPHIL # BLD AUTO: 0.2 K/UL (ref 0–0.5)
EOSINOPHIL NFR BLD: 2.2 % (ref 0–8)
ERYTHROCYTE [DISTWIDTH] IN BLOOD BY AUTOMATED COUNT: 15.6 % (ref 11.5–14.5)
FERRITIN SERPL-MCNC: 20 NG/ML (ref 20–300)
HCT VFR BLD AUTO: 38.3 % (ref 37–48.5)
HGB BLD-MCNC: 11.8 G/DL (ref 12–16)
IMM GRANULOCYTES # BLD AUTO: 0.03 K/UL (ref 0–0.04)
IMM GRANULOCYTES NFR BLD AUTO: 0.4 % (ref 0–0.5)
IRON SERPL-MCNC: 37 UG/DL (ref 30–160)
LYMPHOCYTES # BLD AUTO: 1.9 K/UL (ref 1–4.8)
LYMPHOCYTES NFR BLD: 27.1 % (ref 18–48)
MCH RBC QN AUTO: 25.6 PG (ref 27–31)
MCHC RBC AUTO-ENTMCNC: 30.8 G/DL (ref 32–36)
MCV RBC AUTO: 83 FL (ref 82–98)
MONOCYTES # BLD AUTO: 0.4 K/UL (ref 0.3–1)
MONOCYTES NFR BLD: 5.8 % (ref 4–15)
NEUTROPHILS # BLD AUTO: 4.4 K/UL (ref 1.8–7.7)
NEUTROPHILS NFR BLD: 63.8 % (ref 38–73)
NRBC BLD-RTO: 0 /100 WBC
PLATELET # BLD AUTO: 349 K/UL (ref 150–450)
PMV BLD AUTO: 9.9 FL (ref 9.2–12.9)
RBC # BLD AUTO: 4.61 M/UL (ref 4–5.4)
SATURATED IRON: 8 % (ref 20–50)
TOTAL IRON BINDING CAPACITY: 490 UG/DL (ref 250–450)
TRANSFERRIN SERPL-MCNC: 350 MG/DL (ref 200–375)
WBC # BLD AUTO: 6.89 K/UL (ref 3.9–12.7)

## 2024-01-24 PROCEDURE — 83540 ASSAY OF IRON: CPT | Performed by: INTERNAL MEDICINE

## 2024-01-24 PROCEDURE — 85025 COMPLETE CBC W/AUTO DIFF WBC: CPT | Performed by: INTERNAL MEDICINE

## 2024-01-24 PROCEDURE — 36415 COLL VENOUS BLD VENIPUNCTURE: CPT | Performed by: INTERNAL MEDICINE

## 2024-01-24 PROCEDURE — 82728 ASSAY OF FERRITIN: CPT | Performed by: INTERNAL MEDICINE

## 2024-01-24 NOTE — NURSING
New referral received from Dr Flanagan for recent diagnosis of complex hyperplasia with focal atypia and pt desire for surgery. Pt called and name and  verified. Explained my role as nurse navigator and direct number provided. Options for GYN/ONC providers, all clinic locations and soonest availability discussed with pt. Pt scheduled to see Dr Weaver in the next available [facility] appointment. Patient encouraged to call for any questions or concerns about her care or appointments. Pt verbalized understanding. Date time and location of appointment reviewed with pt.     Oncology Navigation   Intake  Cancer Type: Gynecologic (complex hyperplasia with focal atypia)  Internal / External Referral: Internal  Date of Referral: 24  Initial Nurse Navigator Contact: 24  Referral to Initial Contact Timeline (days): 0  Date Worked: 24  First Appointment Available: 24  Appointment Date: 24  First Available Date vs. Scheduled Date (days): 0     Treatment  Current Status: Staging work-up        Procedures: Biopsy; Ultrasound  Biopsy Schedule Date: 24 (EMB= complex hyperplasia with focal atypia)  Ultrasound Schedule Date: 01/10/24 (US- Neither ovary is seen.  A 2.8 cm cyst is seen in the right adnexa.)        Concerns: Medical history: PCOS, asthma, anxiety, GERD, IBS, incsomnia, sleep apnea without use of CPAP, and Migraines. Pt was scheudld on  with Dr Flanagan for a hysterectomy until her EMB results returned, pt anxious to get surgery scheduled     [employer]      Follow Up  No follow-ups on file.

## 2024-01-24 NOTE — TELEPHONE ENCOUNTER
The patient is a 38-year-old that is scheduled for hysterectomy on 02/19/2024 due to abnormal uterine bleeding and polycystic ovarian syndrome.    Preoperative endometrial biopsy noted complex endometrial hyperplasia with atypia.  In light of these findings the patient was contacted.  Her diagnosis was discussed and she has been referred to Gynecologic Oncology for further evaluation and surgical intervention.    Reagan Flanagan MD  OBGYN [facility]

## 2024-01-25 ENCOUNTER — OFFICE VISIT (OUTPATIENT)
Dept: GYNECOLOGIC ONCOLOGY | Facility: CLINIC | Age: 39
End: 2024-01-25
Payer: COMMERCIAL

## 2024-01-25 ENCOUNTER — TELEPHONE (OUTPATIENT)
Dept: OBSTETRICS AND GYNECOLOGY | Facility: CLINIC | Age: 39
End: 2024-01-25
Payer: COMMERCIAL

## 2024-01-25 ENCOUNTER — TELEPHONE (OUTPATIENT)
Dept: PREADMISSION TESTING | Facility: HOSPITAL | Age: 39
End: 2024-01-25
Payer: COMMERCIAL

## 2024-01-25 ENCOUNTER — TELEPHONE (OUTPATIENT)
Dept: GYNECOLOGIC ONCOLOGY | Facility: CLINIC | Age: 39
End: 2024-01-25
Payer: COMMERCIAL

## 2024-01-25 VITALS
BODY MASS INDEX: 45.99 KG/M2 | TEMPERATURE: 97 F | HEART RATE: 89 BPM | OXYGEN SATURATION: 98 % | WEIGHT: 293 LBS | RESPIRATION RATE: 16 BRPM | HEIGHT: 67 IN | DIASTOLIC BLOOD PRESSURE: 88 MMHG | SYSTOLIC BLOOD PRESSURE: 142 MMHG

## 2024-01-25 DIAGNOSIS — N83.209 CYST OF OVARY, UNSPECIFIED LATERALITY: Primary | ICD-10-CM

## 2024-01-25 DIAGNOSIS — N85.02 COMPLEX ENDOMETRIAL HYPERPLASIA WITH ATYPIA: ICD-10-CM

## 2024-01-25 DIAGNOSIS — N85.02 COMPLEX ENDOMETRIAL HYPERPLASIA WITH ATYPIA: Primary | ICD-10-CM

## 2024-01-25 DIAGNOSIS — N93.9 ABNORMAL UTERINE BLEEDING (AUB): ICD-10-CM

## 2024-01-25 PROCEDURE — 3077F SYST BP >= 140 MM HG: CPT | Mod: CPTII,S$GLB,, | Performed by: OBSTETRICS & GYNECOLOGY

## 2024-01-25 PROCEDURE — 4010F ACE/ARB THERAPY RXD/TAKEN: CPT | Mod: CPTII,S$GLB,, | Performed by: OBSTETRICS & GYNECOLOGY

## 2024-01-25 PROCEDURE — 3079F DIAST BP 80-89 MM HG: CPT | Mod: CPTII,S$GLB,, | Performed by: OBSTETRICS & GYNECOLOGY

## 2024-01-25 PROCEDURE — 99205 OFFICE O/P NEW HI 60 MIN: CPT | Mod: S$GLB,,, | Performed by: OBSTETRICS & GYNECOLOGY

## 2024-01-25 PROCEDURE — 99999 PR PBB SHADOW E&M-EST. PATIENT-LVL IV: CPT | Mod: PBBFAC,,, | Performed by: OBSTETRICS & GYNECOLOGY

## 2024-01-25 PROCEDURE — 3008F BODY MASS INDEX DOCD: CPT | Mod: CPTII,S$GLB,, | Performed by: OBSTETRICS & GYNECOLOGY

## 2024-01-25 NOTE — TELEPHONE ENCOUNTER
----- Message from Reagan Flanagan MD sent at 1/25/2024 10:06 AM CST -----  Regarding: Cancel surgery  Please cancel this patient's planned surgery on 2/19/2024.    She will be having surgery with Gynecologic Oncology

## 2024-01-25 NOTE — TELEPHONE ENCOUNTER
----- Message from Jodie Yoder RN sent at 1/25/2024  1:10 PM CST -----  Surgery date: 2/12/2024  PreOp appt:  N/A  Surgeon: Owen    Please call Pt and schedule the following preop appts:    [facility]  POC  Lab  EKG  or NP    Thank you!  Jodie

## 2024-01-26 ENCOUNTER — OFFICE VISIT (OUTPATIENT)
Dept: HEMATOLOGY/ONCOLOGY | Facility: CLINIC | Age: 39
End: 2024-01-26
Payer: COMMERCIAL

## 2024-01-26 ENCOUNTER — TELEPHONE (OUTPATIENT)
Dept: HEMATOLOGY/ONCOLOGY | Facility: CLINIC | Age: 39
End: 2024-01-26

## 2024-01-26 ENCOUNTER — TELEPHONE (OUTPATIENT)
Dept: GYNECOLOGIC ONCOLOGY | Facility: CLINIC | Age: 39
End: 2024-01-26
Payer: COMMERCIAL

## 2024-01-26 DIAGNOSIS — D50.9 IRON DEFICIENCY ANEMIA, UNSPECIFIED IRON DEFICIENCY ANEMIA TYPE: Primary | ICD-10-CM

## 2024-01-26 DIAGNOSIS — R79.89 ELEVATED PTHRP LEVEL: ICD-10-CM

## 2024-01-26 DIAGNOSIS — E83.52 SERUM CALCIUM ELEVATED: ICD-10-CM

## 2024-01-26 PROCEDURE — 99214 OFFICE O/P EST MOD 30 MIN: CPT | Mod: 95,,, | Performed by: INTERNAL MEDICINE

## 2024-01-26 PROCEDURE — 4010F ACE/ARB THERAPY RXD/TAKEN: CPT | Mod: CPTII,95,, | Performed by: INTERNAL MEDICINE

## 2024-01-26 NOTE — TELEPHONE ENCOUNTER
"Pt contacted navigator with complaints that she is having extreme difficulty in falling and staying asleep. Pt estimates only getting about 14 hours of sleep this week. Pt reports that falling asleep is hard because she can not "turnoff her mind" then if she is able to fall asleep the pain wakes her up and she needs to readjust and attempt the use of a heating pad. Pt reports speaking to her HEM/ONC but wanted to talk to Dr Weaver about if there was an OTC option that could assist her with sleeping. She expressed concern about "not wanting to mess anything up" and therefore wanted to obtain Dr Weaver's insight. Navigator informed pt that Dr Weaver would be updated on her concern and navigator would call her back with Dr Weaver's feedback.   "
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
## 2024-01-26 NOTE — TELEPHONE ENCOUNTER
"Pt updated on Dr Weaver's feedback and her messages read:    I love melatonin as an over the counter sleep aid. Works wonderfully and less grogginess the next morning that some people can get with things like tylenol PM or zzzquil.    Pt states that melatonin "does nothing" for her and does not help her sleep. Pt reports that in the past she tried half of a benadryl and she slept for 24 hours and it was too strong. Pt said she would try Tylenol PM this weekend. Pt instructed to contact navigator early next week if she does not get adequate sleep. Pt verbalized understanding.   "

## 2024-01-29 ENCOUNTER — HOSPITAL ENCOUNTER (OUTPATIENT)
Dept: RADIOLOGY | Facility: HOSPITAL | Age: 39
Discharge: HOME | End: 2024-01-29
Attending: PHYSICIAN ASSISTANT
Payer: COMMERCIAL

## 2024-01-29 ENCOUNTER — TELEPHONE (OUTPATIENT)
Dept: ORTHOPEDICS | Facility: CLINIC | Age: 39
End: 2024-01-29
Payer: COMMERCIAL

## 2024-01-29 DIAGNOSIS — E04.1 THYROID NODULE: ICD-10-CM

## 2024-01-29 PROCEDURE — 10005 FNA BX W/US GDN 1ST LES: CPT

## 2024-01-29 PROCEDURE — 88173 CYTOPATH EVAL FNA REPORT: CPT | Performed by: PHYSICIAN ASSISTANT

## 2024-01-29 PROCEDURE — 25000003 PHARM REV CODE 250: Performed by: RADIOLOGY

## 2024-01-29 PROCEDURE — 27000342 US FINE NEEDLE ASPIRATION THYROID, FIRST LESION

## 2024-01-29 RX ORDER — LIDOCAINE HYDROCHLORIDE 10 MG/ML
INJECTION, SOLUTION EPIDURAL; INFILTRATION; INTRACAUDAL; PERINEURAL
Status: COMPLETED | OUTPATIENT
Start: 2024-01-29 | End: 2024-01-29

## 2024-01-29 RX ADMIN — LIDOCAINE HYDROCHLORIDE 10 ML: 10 INJECTION, SOLUTION EPIDURAL; INFILTRATION; INTRACAUDAL; PERINEURAL at 01:01

## 2024-01-29 NOTE — PLAN OF CARE
Pt tolerated procedure well, bandaid to mid neck CDI, denies pain or nausea, d/c instructions given and explained per md order, verbalizes understanding, ambulatory for discharge via private vehicle without difficulty, NAD noted.

## 2024-01-29 NOTE — TELEPHONE ENCOUNTER
Called pt. Pt on the schedule this afternoon with Dr. Yang to review MRI knee. Pt has rescheduled the MRI to next Wednesday, 2/7/24. Pt states that she is being worked up for possible cancers and has surgeries scheduled. Pt asking if she can do a virtual visit with Dr. Yang for the results? Advised pt that I would speak to Dr. Yang and will call pt back. Thanks, Rita

## 2024-01-29 NOTE — TELEPHONE ENCOUNTER
Spoke to Dr. Yang. Dr. Yang states that he can send pt a message with her MRI Results in light of her other more pressing medical concerns. Called pt and advised. Appt has been canceled for today. Thanks, Rita

## 2024-01-29 NOTE — PLAN OF CARE
Pt ambulatory to u/s room without difficulty, AAO3, MAEx4, denies pain or sob, all questions answered, NAD noted.

## 2024-01-30 ENCOUNTER — OFFICE VISIT (OUTPATIENT)
Dept: NEUROSURGERY | Facility: CLINIC | Age: 39
End: 2024-01-30
Payer: COMMERCIAL

## 2024-01-30 ENCOUNTER — TELEPHONE (OUTPATIENT)
Dept: PREADMISSION TESTING | Facility: HOSPITAL | Age: 39
End: 2024-01-30
Payer: COMMERCIAL

## 2024-01-30 DIAGNOSIS — Z98.2 S/P VP SHUNT: ICD-10-CM

## 2024-01-30 DIAGNOSIS — G93.2 IIH (IDIOPATHIC INTRACRANIAL HYPERTENSION): Primary | Chronic | ICD-10-CM

## 2024-01-30 PROCEDURE — 99214 OFFICE O/P EST MOD 30 MIN: CPT | Mod: 95,,, | Performed by: PHYSICIAN ASSISTANT

## 2024-01-30 PROCEDURE — 4010F ACE/ARB THERAPY RXD/TAKEN: CPT | Mod: CPTII,95,, | Performed by: PHYSICIAN ASSISTANT
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
## 2024-01-31 ENCOUNTER — HOSPITAL ENCOUNTER (OUTPATIENT)
Dept: CARDIOLOGY | Facility: CLINIC | Age: 39
Discharge: HOME | End: 2024-01-31
Payer: COMMERCIAL

## 2024-01-31 ENCOUNTER — ANESTHESIA EVENT (OUTPATIENT)
Dept: SURGERY | Facility: HOSPITAL | Age: 39
DRG: 740 | End: 2024-01-31
Payer: COMMERCIAL

## 2024-01-31 ENCOUNTER — OFFICE VISIT (OUTPATIENT)
Dept: INTERNAL MEDICINE | Facility: CLINIC | Age: 39
End: 2024-01-31
Payer: COMMERCIAL

## 2024-01-31 ENCOUNTER — HOSPITAL ENCOUNTER (OUTPATIENT)
Dept: PREADMISSION TESTING | Facility: HOSPITAL | Age: 39
Discharge: HOME | End: 2024-01-31
Attending: OBSTETRICS & GYNECOLOGY
Payer: COMMERCIAL

## 2024-01-31 VITALS
HEIGHT: 67 IN | SYSTOLIC BLOOD PRESSURE: 142 MMHG | WEIGHT: 292 LBS | BODY MASS INDEX: 45.83 KG/M2 | DIASTOLIC BLOOD PRESSURE: 82 MMHG | HEART RATE: 88 BPM

## 2024-01-31 VITALS
TEMPERATURE: 98 F | WEIGHT: 293 LBS | OXYGEN SATURATION: 97 % | HEIGHT: 67 IN | BODY MASS INDEX: 45.99 KG/M2 | DIASTOLIC BLOOD PRESSURE: 85 MMHG | HEART RATE: 88 BPM | RESPIRATION RATE: 18 BRPM | SYSTOLIC BLOOD PRESSURE: 155 MMHG

## 2024-01-31 DIAGNOSIS — F31.9 BIPOLAR AFFECTIVE DISORDER, REMISSION STATUS UNSPECIFIED: Chronic | ICD-10-CM

## 2024-01-31 DIAGNOSIS — Z01.818 PREOPERATIVE TESTING: ICD-10-CM

## 2024-01-31 DIAGNOSIS — I10 PRIMARY HYPERTENSION: Chronic | ICD-10-CM

## 2024-01-31 DIAGNOSIS — K76.0 FATTY LIVER: ICD-10-CM

## 2024-01-31 DIAGNOSIS — G93.2 IIH (IDIOPATHIC INTRACRANIAL HYPERTENSION): Chronic | ICD-10-CM

## 2024-01-31 DIAGNOSIS — Z72.0 VAPES NICOTINE CONTAINING SUBSTANCE: ICD-10-CM

## 2024-01-31 DIAGNOSIS — K21.9 GASTROESOPHAGEAL REFLUX DISEASE, UNSPECIFIED WHETHER ESOPHAGITIS PRESENT: ICD-10-CM

## 2024-01-31 DIAGNOSIS — Z98.2 S/P VP SHUNT: ICD-10-CM

## 2024-01-31 DIAGNOSIS — K58.2 IRRITABLE BOWEL SYNDROME WITH BOTH CONSTIPATION AND DIARRHEA: ICD-10-CM

## 2024-01-31 DIAGNOSIS — Z01.818 PREOP EXAMINATION: Primary | ICD-10-CM

## 2024-01-31 DIAGNOSIS — R60.9 EDEMA, UNSPECIFIED TYPE: ICD-10-CM

## 2024-01-31 DIAGNOSIS — J45.20 MILD INTERMITTENT ASTHMA WITHOUT COMPLICATION: ICD-10-CM

## 2024-01-31 DIAGNOSIS — Z86.16 HISTORY OF COVID-19: ICD-10-CM

## 2024-01-31 DIAGNOSIS — E78.5 HYPERLIPIDEMIA, UNSPECIFIED HYPERLIPIDEMIA TYPE: ICD-10-CM

## 2024-01-31 DIAGNOSIS — E04.1 THYROID NODULE: ICD-10-CM

## 2024-01-31 DIAGNOSIS — E21.3 HYPERPARATHYROIDISM: ICD-10-CM

## 2024-01-31 DIAGNOSIS — G47.33 OBSTRUCTIVE SLEEP APNEA: Chronic | ICD-10-CM

## 2024-01-31 DIAGNOSIS — D50.9 IRON DEFICIENCY ANEMIA, UNSPECIFIED IRON DEFICIENCY ANEMIA TYPE: ICD-10-CM

## 2024-01-31 DIAGNOSIS — E28.2 PCOS (POLYCYSTIC OVARIAN SYNDROME): Chronic | ICD-10-CM

## 2024-01-31 DIAGNOSIS — G43.009 MIGRAINE WITHOUT AURA AND WITHOUT STATUS MIGRAINOSUS, NOT INTRACTABLE: ICD-10-CM

## 2024-01-31 PROBLEM — R51.9 NONINTRACTABLE HEADACHE: Status: RESOLVED | Noted: 2022-09-05 | Resolved: 2024-01-31

## 2024-01-31 PROBLEM — R29.818 TRANSIENT NEUROLOGICAL SYMPTOMS: Status: RESOLVED | Noted: 2022-09-05 | Resolved: 2024-01-31

## 2024-01-31 PROBLEM — R53.1 LEFT-SIDED WEAKNESS: Chronic | Status: RESOLVED | Noted: 2022-08-06 | Resolved: 2024-01-31

## 2024-01-31 PROCEDURE — 93005 ELECTROCARDIOGRAM TRACING: CPT | Mod: S$GLB,,, | Performed by: ANESTHESIOLOGY

## 2024-01-31 PROCEDURE — 99999 PR PBB SHADOW E&M-EST. PATIENT-LVL III: CPT | Mod: PBBFAC,,, | Performed by: HOSPITALIST

## 2024-01-31 PROCEDURE — 99417 PROLNG OP E/M EACH 15 MIN: CPT | Mod: S$GLB,,, | Performed by: HOSPITALIST

## 2024-01-31 PROCEDURE — 3079F DIAST BP 80-89 MM HG: CPT | Mod: CPTII,S$GLB,, | Performed by: HOSPITALIST

## 2024-01-31 PROCEDURE — 93010 ELECTROCARDIOGRAM REPORT: CPT | Mod: S$GLB,,, | Performed by: INTERNAL MEDICINE

## 2024-01-31 PROCEDURE — 3008F BODY MASS INDEX DOCD: CPT | Mod: CPTII,S$GLB,, | Performed by: HOSPITALIST

## 2024-01-31 PROCEDURE — 1159F MED LIST DOCD IN RCRD: CPT | Mod: CPTII,S$GLB,, | Performed by: HOSPITALIST

## 2024-01-31 PROCEDURE — 3077F SYST BP >= 140 MM HG: CPT | Mod: CPTII,S$GLB,, | Performed by: HOSPITALIST

## 2024-01-31 PROCEDURE — 1160F RVW MEDS BY RX/DR IN RCRD: CPT | Mod: CPTII,S$GLB,, | Performed by: HOSPITALIST

## 2024-01-31 PROCEDURE — 99215 OFFICE O/P EST HI 40 MIN: CPT | Mod: S$GLB,,, | Performed by: HOSPITALIST

## 2024-01-31 PROCEDURE — 4010F ACE/ARB THERAPY RXD/TAKEN: CPT | Mod: CPTII,S$GLB,, | Performed by: HOSPITALIST

## 2024-01-31 NOTE — ASSESSMENT & PLAN NOTE
Does not sound Cardiac   Tips to control reflux discussed     I suggest aspiration precautions     Contributing factors    BMI    She gets nauseated that she attributes to the abdominal pain I suggested care with aspiration  I suggested to eat lighter meal the night before surgery to prevent any aspiration

## 2024-01-31 NOTE — ASSESSMENT & PLAN NOTE
She is taking losartan in the morning time and I suggested for her not to take losartan on the morning of surgery February 12th    Hypertension-  Blood pressure is acceptable .  I suggest holding --Losartan-- on the morning of the surgery and can continue that  post operatively under blood pressure, electrolyte and renal function monitoring as long as they are acceptable.I suggest addressing pain control as uncontrolled pain can increased blood pressure

## 2024-01-31 NOTE — ASSESSMENT & PLAN NOTE
I suggested addressing hydration as well as constipation  Suggested working on bowel movements in preparation for surgery   Constipation- I suggest giving laxative regimen as opioid use,reduced ambulation  can increase the constipation

## 2024-01-31 NOTE — ASSESSMENT & PLAN NOTE
She is under endocrinology care   Her most recent calcium from the early part of January was normal    I suggested hydration , follow up   I suggest abhilash operative hydration ,calcium and Cardiac  monitoring due to the potential for Cardiac arrhythmia from hypercalcemia      She has parathyroid cancer in the family  She does not have any pituitary pancreas problems    No    -Breast tenderness   -Nipple discharge       Her TSH was normal    No    - Increased hand/ Foot size       No     - Polyuria  -Polydipsia    She is not known to have pancreas problems and she does not have any difficulty flushing the time

## 2024-01-31 NOTE — ASSESSMENT & PLAN NOTE
She is doing good from a migraine standpoint and it appears that her  shunt as well as blood pressure control helped her headache  She is taking Nurtec as needed

## 2024-01-31 NOTE — ASSESSMENT & PLAN NOTE
She had COVID about 2 years ago    She had not require supplemental oxygen use, intubation  She has been vaccinated against COVID    COVID screening     No fever   No cough   No SOB  No sore throat   No loss of taste or smell   No muscle aches

## 2024-01-31 NOTE — ASSESSMENT & PLAN NOTE
\    Not known to have     Heart failure   Liver failure   Kidney failure     Edema- I suggested avoidance of added salt,avoidance of NSAID's, unless advised or ordered  and suggested Limb elevation and eulalio hose use

## 2024-01-31 NOTE — ASSESSMENT & PLAN NOTE
She has had onset of asthma when she was 28 years of age   She has not required albuterol inhalation in a long time suggesting that her asthma is doing very well  She had asthma flare up and she had COVID in 2022  She seems to be doing good from an asthma standpoint and is currently not taking any maintenance inhaler treatments    asthma is controlled . I suggest consideration of inhaled bronchodilator use if the patient has perioperative bronchospasm

## 2024-01-31 NOTE — HPI
History of present illness- I had the pleasure of meeting this pleasant 38 y.o. lady in the pre op clinic prior to her elective Gynecological surgery. The patient is new to me . Ms Scott was accompanied by  Mr Adan.    I have obtained the history by speaking to the patient and by reviewing the electronic health records.    Events leading up to surgery / History of presenting illness -    I have obtained the information by speaking to her and her caring   She is very knowledgeable about her health  She has been having abnormal heavy uterine bleeding for about 1 years time having vaginal bleeding at a stretch for a month and a week in between and restarting bleeding  She also has a cyst on the right ovary  She had a biopsy that revealed abnormal cells and she is planning on having a hysterectomy and removal of right ovary    She has currently not having any vaginal bleeding  She has lower back pain on the right side radiating to groin area for couple months  Pain scale 4 to 5/10  She uses Tylenol and heating pad  She has not taking anti-inflammatory medication and I suggested not to take anti-inflammatory medication 1 week prior to surgery  Positioning sometimes increases pain  To her understanding this pain is related to the right-sided ovarian cyst    She started her menstrual cycles when she was about 5 years of age  She had regular menstrual cycles until about an year ago  She had some heavier cycles over the years  She has had blood transfusions, iron transfusions from heavy cycles    She was pregnant twice and has a son and daughter   Her daughter was born vaginally at 41 weeks of gestation   Her son was delivered through  section at 33 weeks due to head HELLP syndrome  She has no miscarriages   She is currently sexually active with a male partner  She had tubal ligation after the birth of her son in   He has currently not using any contraception  She has not pregnant to her  understanding    Relevant health conditions of significance for the perioperative period/ History of presenting illness -    Health conditions of significance for the perioperative period        PCOS   Hyperparathyroidism    shunt present  Irritable bowel syndrome   Asthma  BPI 45.73  Sleep apnea  Fatty liver  Bipolar  Hypertension  Hyperlipidemia    She lives with her  and 2 children age 16 and 21 in a single-level house  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

## 2024-01-31 NOTE — ASSESSMENT & PLAN NOTE
Her iron-deficiency anemia could be from heavy cycles   She does not have any overt GI or  blood losses  She had extensive GI workup  Thankfully her most recent hemoglobin was mildly low and her ferritin was normal  As per the history obtained from her she has microscopic blood in the urine which I suggested she follows up  Off note she has a left-sided kidney stone that could be contributing to the blood in urine

## 2024-01-31 NOTE — ASSESSMENT & PLAN NOTE
Not using CPAP   Suggested bringing for hospital use .   Informed the risk of worsening sleep apnea in the perioperative period and suggest using CPAP use any time in 24 hrs ( day or night )for planned sleep        I suggest follow up and suggest caution with usage of medication that can cause respiratory suppression in the perioperative period  potential ramifications of untreated sleep apnea, which could include daytime sleepiness, hypertension, heart disease and stroke were discussed    Avoidance of  supine sleep, weight gain , alcoholic beverages , care with , sedative , CNS depressant use indicated  since all of these can worsen KERI

## 2024-01-31 NOTE — ASSESSMENT & PLAN NOTE
She was able to conceive 2 children  We discussed holding Jardiance 3 days before surgery in the last day of use of Jardiance prior to surgery is January 8 th

## 2024-01-31 NOTE — OUTPATIENT SUBJECTIVE & OBJECTIVE
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       Review of Systems   Constitutional:  Negative for chills and fever.        She was 310 in the past and she is down to 292   HENT:          Sleep apnea   Eyes:         No new visual changes   Respiratory:          No cough , phlegm    No Hemoptysis   Cardiovascular:         As noted   Gastrointestinal:         No overt GI/ blood losses     Endocrine:        Prednisone use > 20 mg daily for 3 weeks- none   Genitourinary:  Negative for dysuria.        No urinary hesitancy    Musculoskeletal:         She has knee pain on both knees and is considering knee surgeries future   She has work-related knee injury   Skin:  Negative for rash.   Neurological:  Negative for syncope.        No unilateral weakness   Hematological:         Current use of Anticoagulants  none   Psychiatric/Behavioral:            No SI/HI   No vascular stenting    No past medical history pertinent negatives.      No anesthesia, bleeding, cardiac problems, PONV with previous surgeries/procedures.  Medications and Allergies reviewed in epic.   She does self-exam, clinical exam and mammogram  FH- No anesthesia,bleeding / venous thrombosis , in family      Physical Exam  Blood pressure (!) 142/82, pulse 88, height 5' 7" (1.702 m), weight 132.5 kg (292 lb), last menstrual period 01/11/2024.    I offered a sheet and the presence of a chaperone during physical examination   She was comfortable to proceed with the exam without the the presence of a chaperone  "       Physical Exam  Constitutional- Vitals - Body mass index is 45.73 kg/m².,   Vitals:    01/31/24 1329   BP: (!) 142/82   Pulse: 88     General appearance-Conscious,Coherent  Eyes- No conjunctival icterus,pupils  round  and reactive to light   ENT-Oral cavity- moist    , Hearing grossly normal   Neck- No thyromegaly ,Trachea -central, No jugular venous distension,   No Carotid Bruit   Cardiovascular -Heart Sounds- Normal  and  no murmur   , No gallop rhythm   Respiratory - Normal Respiratory Effort, Normal breath sounds,  no wheeze , and  no forced expiratory wheeze    Peripheral pitting pedal edema-- mild, no calf pain   Gastrointestinal -Soft abdomen, No palpable masses, Non Tender,Liver,Spleen not palpable. No-- free fluid and shifting dullness  Musculoskeletal- No finger Clubbing. Strength grossly normal   Lymphatic-No Palpable cervical, axillary,Inguinal lymphadenopathy   Psychiatric - normal effect,Orientation  Rt Dorsalis pedis pulses-palpable    Lt Dorsalis pedis pulses- palpable   Rt Posterior tibial pulses -palpable   Left posterior tibial pulses -palpable   Miscellaneous -  no asterixis,  no dupuytren's contracture,  no renal bruit, and  Tattoo       Investigations  Lab and Imaging have been reviewed in epic.    Review of Medicine tests    Feb 2022    1. No scintigraphic evidence of pharmacologically induced reversible ischemia or infarct.  2. Estimated ejection fraction of  71 %.      Oct 2023      Left Ventricle: The left ventricle is normal in size. Normal wall thickness. Normal wall motion. There is normal systolic function with a visually estimated ejection fraction of 60 - 65%. There is normal diastolic function.    Right Ventricle: Normal right ventricular cavity size. Wall thickness is normal. Right ventricle wall motion  is normal. Systolic function is normal.    Mitral Valve: There is trace regurgitation.    Tricuspid Valve: There is mild regurgitation.    IVC/SVC: Normal venous pressure at 3  mmHg.    Overall the study quality was technically difficult      EKG- I had independently reviewed the EKG from--1/31/2024  It was reported to be showing     Normal sinus rhythm   Normal ECG   When compared with ECG of 25-JUN-2023 19:51,   Nonspecific T wave abnormality no longer evident in Anterior-lateral leads   Review of clinical lab tests:  Lab Results   Component Value Date    CREATININE 0.9 01/09/2024    HGB 11.8 (L) 01/24/2024     01/24/2024                Review of old records- Was done and information gathered regards to events leading to surgery and health conditions of significance in the perioperative period.    Outpatient Subjective & Objective

## 2024-01-31 NOTE — ASSESSMENT & PLAN NOTE
She had a biopsy  She is considering having a total thyroidectomy because of family history of parathyroid cancer and she has hyperparathyroid

## 2024-01-31 NOTE — ASSESSMENT & PLAN NOTE
Weight related conditions     Known to have     HTN  Hyperlipidemia   Sleep apnea   Acid reflux   Fatty liver       Not troubled with / Not known to have     Type 2 Diabetes    Encouraged weight loss
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
## 2024-01-31 NOTE — PROGRESS NOTES
[facility]  Progress Note    Patient Name: Sonia Solorzano  MRN: [id number]  Date of Evaluation- 01/31/2024  PCP- Dorian Guerrero MD    Future cases for Sonia Solorzano [[id number]]       Case ID Status Date Time Bk Procedure Provider Location    [id number] [facility] 2/12/2024 11:00  XI ROBOTIC HYSTERECTOMY Kirsten Weaver MD [[id number]] [facility] OR 2ND FLR            HPI:  History of present illness- I had the pleasure of meeting this pleasant 38 y.o. lady in the pre op clinic prior to her elective Gynecological surgery. The patient is new to me . Ms Scott was accompanied by  Mr Adan.    I have obtained the history by speaking to the patient and by reviewing the electronic health records.    Events leading up to surgery / History of presenting illness -    I have obtained the information by speaking to her and her caring   She is very knowledgeable about her health  She has been having abnormal heavy uterine bleeding for about 1 years time having vaginal bleeding at a stretch for a month and a week in between and restarting bleeding  She also has a cyst on the right ovary  She had a biopsy that revealed abnormal cells and she is planning on having a hysterectomy and removal of right ovary    She has currently not having any vaginal bleeding  She has lower back pain on the right side radiating to groin area for couple months  Pain scale 4 to 5/10  She uses Tylenol and heating pad  She has not taking anti-inflammatory medication and I suggested not to take anti-inflammatory medication 1 week prior to surgery  Positioning sometimes increases pain  To her understanding this pain is related to the right-sided ovarian cyst    She started her menstrual cycles when she was about 5 years of age  She had regular menstrual cycles until about an year ago  She had some heavier cycles over the years  She has had blood transfusions, iron transfusions from heavy cycles    She was pregnant  twice and has a son and daughter   Her daughter was born vaginally at 41 weeks of gestation   Her son was delivered through  section at 33 weeks due to head HELLP syndrome  She has no miscarriages   She is currently sexually active with a male partner  She had tubal ligation after the birth of her son in   He has currently not using any contraception  She has not pregnant to her understanding    Relevant health conditions of significance for the perioperative period/ History of presenting illness -    Health conditions of significance for the perioperative period        PCOS   Hyperparathyroidism    shunt present  Irritable bowel syndrome   Asthma  BPI 45.73  Sleep apnea  Fatty liver  Bipolar  Hypertension  Hyperlipidemia    She lives with her  and 2 children age 16 and 21 in a single-level house  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                 Subjective/ Objective:     Chief complaint-Preoperative evaluation, Perioperative Medical management, complication reduction plan     Active cardiac conditions- none    Revised cardiac risk index predictors- none    Functional capacity -Examples of physical activity  , desk job, can take 1 flight of stairs----- She can undertake all the above activities without  chest pain,chest tightness, Shortness of breath ,dizziness,lightheadedness making her exercise tolerance more,   than 4 Mets.       Review of Systems   Constitutional:  Negative for chills and fever.        She was 310 in the past and she is down to 292   HENT:          Sleep apnea   Eyes:         No new visual changes   Respiratory:          No cough , phlegm    No Hemoptysis   Cardiovascular:         As noted   Gastrointestinal:         No overt GI/ blood losses     Endocrine:        Prednisone use  "> 20 mg daily for 3 weeks- none   Genitourinary:  Negative for dysuria.        No urinary hesitancy    Musculoskeletal:         She has knee pain on both knees and is considering knee surgeries future   She has work-related knee injury   Skin:  Negative for rash.   Neurological:  Negative for syncope.        No unilateral weakness   Hematological:         Current use of Anticoagulants  none   Psychiatric/Behavioral:            No SI/HI   No vascular stenting    No past medical history pertinent negatives.      No anesthesia, bleeding, cardiac problems, PONV with previous surgeries/procedures.  Medications and Allergies reviewed in epic.   She does self-exam, clinical exam and mammogram  FH- No anesthesia,bleeding / venous thrombosis , in family      Physical Exam  Blood pressure (!) 142/82, pulse 88, height 5' 7" (1.702 m), weight 132.5 kg (292 lb), last menstrual period 01/11/2024.    I offered a sheet and the presence of a chaperone during physical examination   She was comfortable to proceed with the exam without the the presence of a chaperone        Physical Exam  Constitutional- Vitals - Body mass index is 45.73 kg/m².,   Vitals:    01/31/24 1329   BP: (!) 142/82   Pulse: 88     General appearance-Conscious,Coherent  Eyes- No conjunctival icterus,pupils  round  and reactive to light   ENT-Oral cavity- moist    , Hearing grossly normal   Neck- No thyromegaly ,Trachea -central, No jugular venous distension,   No Carotid Bruit   Cardiovascular -Heart Sounds- Normal  and  no murmur   , No gallop rhythm   Respiratory - Normal Respiratory Effort, Normal breath sounds,  no wheeze , and  no forced expiratory wheeze    Peripheral pitting pedal edema-- mild, no calf pain   Gastrointestinal -Soft abdomen, No palpable masses, Non Tender,Liver,Spleen not palpable. No-- free fluid and shifting dullness  Musculoskeletal- No finger Clubbing. Strength grossly normal   Lymphatic-No Palpable cervical, axillary,Inguinal " lymphadenopathy   Psychiatric - normal effect,Orientation  Rt Dorsalis pedis pulses-palpable    Lt Dorsalis pedis pulses- palpable   Rt Posterior tibial pulses -palpable   Left posterior tibial pulses -palpable   Miscellaneous -  no asterixis,  no dupuytren's contracture,  no renal bruit, and  Tattoo       Investigations  Lab and Imaging have been reviewed in [facility].    Review of Medicine tests    Feb 2022    1. No scintigraphic evidence of pharmacologically induced reversible ischemia or infarct.  2. Estimated ejection fraction of  71 %.      Oct 2023      Left Ventricle: The left ventricle is normal in size. Normal wall thickness. Normal wall motion. There is normal systolic function with a visually estimated ejection fraction of 60 - 65%. There is normal diastolic function.    Right Ventricle: Normal right ventricular cavity size. Wall thickness is normal. Right ventricle wall motion  is normal. Systolic function is normal.    Mitral Valve: There is trace regurgitation.    Tricuspid Valve: There is mild regurgitation.    IVC/SVC: Normal venous pressure at 3 mmHg.    Overall the study quality was technically difficult      EKG- I had independently reviewed the EKG from--1/31/2024  It was reported to be showing     Normal sinus rhythm   Normal ECG   When compared with ECG of 25-JUN-2023 19:51,   Nonspecific T wave abnormality no longer evident in Anterior-lateral leads   Review of clinical lab tests:  Lab Results   Component Value Date    CREATININE 0.9 01/09/2024    HGB 11.8 (L) 01/24/2024     01/24/2024                Review of old records- Was done and information gathered regards to events leading to surgery and health conditions of significance in the perioperative period.        Preoperative cardiac risk assessment-  The patient does not have any active cardiac conditions . Revised cardiac risk index predictors-0 ---.Functional capacity is more than 4 Mets. She will be undergoing a Gynecological  procedure that carries a Moderate Risk risk     Risk of a major Cardiac event ( Defined as death, myocardial infarction, or cardiac arrest at 30 days after noncardiac surgery), based on RCRI score     3.9%         No further cardiac work up is indicated prior to proceeding with the surgery          American Society of Anesthesiologists Physical status classification ( ASA ) class: 3     Postoperative pulmonary complication risk assessment:      ARISCAT ( Canet) risk index- risk class -  Low          Assessment/Plan:     IIH (idiopathic intracranial hypertension)  She does have a  shunt placed for IIH 4 September 2022  She is doing good from IIH      Migraine without aura and without status migrainosus, not intractable  She is doing good from a migraine standpoint and it appears that her  shunt as well as blood pressure control helped her headache  She is taking Nurtec as needed    S/P  shunt  I suggested the anesthesia team be aware of the  shunt presence    Bipolar disorder, unspecified  She has depression type of bipolar  She is doing good from a mental health standpoint and she has a very supportive  and her children are supportive  Hopefully with improved physical health, her mental health will follow and she would feel better with the mental health    Unspecified asthma, uncomplicated  She has had onset of asthma when she was 28 years of age   She has not required albuterol inhalation in a long time suggesting that her asthma is doing very well  She had asthma flare up and she had COVID in 2022  She seems to be doing good from an asthma standpoint and is currently not taking any maintenance inhaler treatments    asthma is controlled . I suggest consideration of inhaled bronchodilator use if the patient has perioperative bronchospasm      HLD (hyperlipidemia)  HLD-I  suggest continuation of statin during the entire perioperative period.     Hypertension  She is taking losartan in the morning time  and I suggested for her not to take losartan on the morning of surgery February 12th    Hypertension-  Blood pressure is acceptable .  I suggest holding --Losartan-- on the morning of the surgery and can continue that  post operatively under blood pressure, electrolyte and renal function monitoring as long as they are acceptable.I suggest addressing pain control as uncontrolled pain can increased blood pressure      Iron deficiency anemia  Her iron-deficiency anemia could be from heavy cycles   She does not have any overt GI or  blood losses  She had extensive GI workup  Thankfully her most recent hemoglobin was mildly low and her ferritin was normal  As per the history obtained from her she has microscopic blood in the urine which I suggested she follows up  Off note she has a left-sided kidney stone that could be contributing to the blood in urine    Hyperparathyroidism  She is under endocrinology care   Her most recent calcium from the early part of January was normal    I suggested hydration , follow up   I suggest abhilash operative hydration ,calcium and Cardiac  monitoring due to the potential for Cardiac arrhythmia from hypercalcemia      She has parathyroid cancer in the family  She does not have any pituitary pancreas problems    No    -Breast tenderness   -Nipple discharge       Her TSH was normal    No    - Increased hand/ Foot size       No     - Polyuria  -Polydipsia    She is not known to have pancreas problems and she does not have any difficulty flushing the time          BMI 45.0-49.9, adult  Weight related conditions     Known to have     HTN  Hyperlipidemia   Sleep apnea   Acid reflux   Fatty liver       Not troubled with / Not known to have     Type 2 Diabetes    Encouraged weight loss     PCOS (polycystic ovarian syndrome)  She was able to conceive 2 children  We discussed holding Jardiance 3 days before surgery in the last day of use of Jardiance prior to surgery is January 8 th     Irritable  bowel syndrome with both constipation and diarrhea  I suggested addressing hydration as well as constipation  Suggested working on bowel movements in preparation for surgery   Constipation- I suggest giving laxative regimen as opioid use,reduced ambulation  can increase the constipation      Obstructive sleep apnea  Not using CPAP   Suggested bringing for hospital use .   Informed the risk of worsening sleep apnea in the perioperative period and suggest using CPAP use any time in 24 hrs ( day or night )for planned sleep        I suggest follow up and suggest caution with usage of medication that can cause respiratory suppression in the perioperative period  potential ramifications of untreated sleep apnea, which could include daytime sleepiness, hypertension, heart disease and stroke were discussed    Avoidance of  supine sleep, weight gain , alcoholic beverages , care with , sedative , CNS depressant use indicated  since all of these can worsen KERI         Acid reflux    Does not sound Cardiac   Tips to control reflux discussed     I suggest aspiration precautions     Contributing factors    BMI    She gets nauseated that she attributes to the abdominal pain I suggested care with aspiration  I suggested to eat lighter meal the night before surgery to prevent any aspiration    Fatty liver  CT from December 2023 reportedly showed  Diffuse fatty infiltration of the liver.  No masses     Spleen not enlarged    Her fatty liver is likely to be related to be weight related and not alcohol related    Her liver chemistry showed normal liver enzymes and an all platelet count and combined that with a nonenlarged spleen, she likely does not have any advanced liver disease like cirrhosis of liver  I have discussed with her the fatty liver is reversible and weight loss will help her      No history  of cirrhosis of liver or suggestions of Liver  decompensation   No Jaundice , dark urine , pale stool   She has not known to have  esophageal varices    History of COVID-19  She had COVID about 2 years ago    She had not require supplemental oxygen use, intubation  She has been vaccinated against COVID    COVID screening     No fever   No cough   No SOB  No sore throat   No loss of taste or smell   No muscle aches       Thyroid nodule  She had a biopsy  She is considering having a total thyroidectomy because of family history of parathyroid cancer and she has hyperparathyroid    Vapes nicotine containing substance  Does not have  COPD  She is planning on quitting vaping for surgery      Edema  \    Not known to have     Heart failure   Liver failure   Kidney failure     Edema- I suggested avoidance of added salt,avoidance of NSAID's, unless advised or ordered  and suggested Limb elevation and eulalio hose use         Preventive perioperative care    Thromboembolic prophylaxis:  Her risk factors for thrombosis include surgical procedure and age.I suggest  thromboembolic prophylaxis ( mechanical/pharmacological, weighing the risk benefits of pharmacological agent use considering abhilash procedural bleeding )  during the perioperative period.I suggested being active in the post operative period.      Postoperative pulmonary complication prophylaxis-Risk factors for post operative pulmonary complications include ASA class >2 and proximity of the surgical site to the lungs- I suggest incentive spirometry use, early ambulation, end tidal carbon dioxide monitoring, and pain control so as to avoid diaphragmatic splinting  , oral care , head end of bed elevation      Renal complication prophylaxis-Risk factors for renal complications include hypertension . I suggest keeping her well hydrated  in the perioperative period.    Surgical site Infection Prophylaxis-I  suggest appropriate antibiotic for Prophylaxis against Surgical site infections  No reported Staph infection  Skin antibacterial discussed            In view of gynecological procedure the patient  is  at risk of postoperative urinary retention.  I suggest avoidance / minimizing the of  Benzodiazepines,Anticholinergic medication,antihistamines ( Benadryl) , if possible in the perioperative period. I suggest using the minimum possible use of opioids for the minimum period of time in the perioperative period. Benadryl avoidance suggested      This visit was focused on Preoperative evaluation, Perioperative Medical management, complication reduction plans. I suggest that the patient follows up with primary care or relevant sub specialists for ongoing health care.    I appreciate the opportunity to be involved in this patients care. Please feel free to contact me if there were any questions about this consultation.    Patient is optimized    Patient/ care giver/ Family member was instructed to call and update me about any changes to health,  medication, office visits ,testing out side of the [facility] operative care center , hospitalizations between now and surgery      Sheri Woodson MD  Internal Medicine  [facility]   Cell Phone- [phone]        I have spent --90---- minutes of time which includes, time spent to prepare to see the patient , obtaining history ,performing examination, counseling/Educating the patient , Documenting clinical information in the record    --  2/5/2024- 1834    Called to follow up , spoke to to address any concerns with the up coming surgery or any questions on Medication instructions -  Doing well ,No changes to Medication, Health -

## 2024-01-31 NOTE — ASSESSMENT & PLAN NOTE
CT from December 2023 reportedly showed  Diffuse fatty infiltration of the liver.  No masses     Spleen not enlarged    Her fatty liver is likely to be related to be weight related and not alcohol related    Her liver chemistry showed normal liver enzymes and an all platelet count and combined that with a nonenlarged spleen, she likely does not have any advanced liver disease like cirrhosis of liver  I have discussed with her the fatty liver is reversible and weight loss will help her      No history  of cirrhosis of liver or suggestions of Liver  decompensation   No Jaundice , dark urine , pale stool   She has not known to have esophageal varices

## 2024-01-31 NOTE — ASSESSMENT & PLAN NOTE
She has depression type of bipolar  She is doing good from a mental health standpoint and she has a very supportive  and her children are supportive  Hopefully with improved physical health, her mental health will follow and she would feel better with the mental health

## 2024-02-05 ENCOUNTER — OFFICE VISIT (OUTPATIENT)
Dept: NEUROLOGY | Facility: CLINIC | Age: 39
End: 2024-02-05
Payer: COMMERCIAL

## 2024-02-05 DIAGNOSIS — G43.019 INTRACTABLE MIGRAINE WITHOUT AURA AND WITHOUT STATUS MIGRAINOSUS: Primary | ICD-10-CM

## 2024-02-05 PROCEDURE — 1159F MED LIST DOCD IN RCRD: CPT | Mod: CPTII,95,, | Performed by: NURSE PRACTITIONER

## 2024-02-05 PROCEDURE — 4010F ACE/ARB THERAPY RXD/TAKEN: CPT | Mod: CPTII,95,, | Performed by: NURSE PRACTITIONER

## 2024-02-05 PROCEDURE — 1160F RVW MEDS BY RX/DR IN RCRD: CPT | Mod: CPTII,95,, | Performed by: NURSE PRACTITIONER

## 2024-02-05 PROCEDURE — 99213 OFFICE O/P EST LOW 20 MIN: CPT | Mod: 95,,, | Performed by: NURSE PRACTITIONER

## 2024-02-05 NOTE — PATIENT INSTRUCTIONS
Please call our clinic at [phone] or send a message on the [facility] portal if there are any changes to the plan described below, for example,if you are not contacted for the requested tests, referral(s) within one week, if you are unable to receive the medications prescribed, or if you feel you need to change the treatment course for any reason.      TESTING:  -- none     REFERRALS:  -- none     PREVENTION (use daily regardless of headache):  -- continue magnesium in ONE of the following preparations -               1. Magnesium oxide 800mg daily (the most common over the counter kind, may causes loose stools)              2. Magnesium citrate 400-500mg daily (harder to find, but more neutral on the bowels)              3. Magnesium glycinate 400mg daily (hardest to find, look online, but most bowel-neutral, best absorbed)      AS-NEEDED TREATMENT (use total no more than 10 days per month unless otherwise stated):  -- continue Nurtec with next migraine. This dissolves under the tongue and is only taken once in 24 hour time frame
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
## 2024-02-08 ENCOUNTER — PATIENT MESSAGE (OUTPATIENT)
Dept: ENDOCRINOLOGY | Facility: CLINIC | Age: 39
End: 2024-02-08
Payer: COMMERCIAL

## 2024-02-09 ENCOUNTER — TELEPHONE (OUTPATIENT)
Dept: GYNECOLOGIC ONCOLOGY | Facility: CLINIC | Age: 39
End: 2024-02-09
Payer: COMMERCIAL

## 2024-02-12 ENCOUNTER — HOSPITAL ENCOUNTER (OUTPATIENT)
Facility: HOSPITAL | Age: 39
Discharge: HOME | DRG: 740 | End: 2024-02-13
Attending: OBSTETRICS & GYNECOLOGY | Admitting: OBSTETRICS & GYNECOLOGY
Payer: COMMERCIAL

## 2024-02-12 ENCOUNTER — ANESTHESIA (OUTPATIENT)
Dept: SURGERY | Facility: HOSPITAL | Age: 39
DRG: 740 | End: 2024-02-12
Payer: COMMERCIAL

## 2024-02-12 DIAGNOSIS — Z90.710 STATUS POST HYSTERECTOMY: Primary | ICD-10-CM

## 2024-02-12 DIAGNOSIS — N83.209 CYST OF OVARY, UNSPECIFIED LATERALITY: ICD-10-CM

## 2024-02-12 DIAGNOSIS — Z01.818 PREOPERATIVE TESTING: ICD-10-CM

## 2024-02-12 DIAGNOSIS — N85.02 COMPLEX ENDOMETRIAL HYPERPLASIA WITH ATYPIA: ICD-10-CM

## 2024-02-12 PROBLEM — R20.2 NUMBNESS AND TINGLING IN LEFT HAND: Status: ACTIVE | Noted: 2024-02-12

## 2024-02-12 PROBLEM — R20.0 NUMBNESS AND TINGLING IN LEFT HAND: Status: ACTIVE | Noted: 2024-02-12

## 2024-02-12 LAB
ABO + RH BLD: NORMAL
B-HCG UR QL: NEGATIVE
BLD GP AB SCN CELLS X3 SERPL QL: NORMAL
CTP QC/QA: YES
SPECIMEN OUTDATE: NORMAL

## 2024-02-12 PROCEDURE — 88341 IMHCHEM/IMCYTCHM EA ADD ANTB: CPT | Mod: 59 | Performed by: PATHOLOGY

## 2024-02-12 PROCEDURE — 88307 TISSUE EXAM BY PATHOLOGIST: CPT | Mod: 59 | Performed by: PATHOLOGY

## 2024-02-12 PROCEDURE — 88342 IMHCHEM/IMCYTCHM 1ST ANTB: CPT | Performed by: PATHOLOGY

## 2024-02-12 PROCEDURE — 38570 LAPAROSCOPY LYMPH NODE BIOP: CPT | Mod: AS,51,, | Performed by: PHYSICIAN ASSISTANT

## 2024-02-12 PROCEDURE — 71000045 HC DOSC ROUTINE RECOVERY EA ADD'L HR: Performed by: OBSTETRICS & GYNECOLOGY

## 2024-02-12 PROCEDURE — 36000712 HC OR TIME LEV V 1ST 15 MIN: Performed by: OBSTETRICS & GYNECOLOGY

## 2024-02-12 PROCEDURE — 63600175 PHARM REV CODE 636 W HCPCS: Performed by: OBSTETRICS & GYNECOLOGY

## 2024-02-12 PROCEDURE — 25000003 PHARM REV CODE 250: Performed by: OBSTETRICS & GYNECOLOGY

## 2024-02-12 PROCEDURE — 71000044 HC DOSC ROUTINE RECOVERY FIRST HOUR: Performed by: OBSTETRICS & GYNECOLOGY

## 2024-02-12 PROCEDURE — 36000713 HC OR TIME LEV V EA ADD 15 MIN: Performed by: OBSTETRICS & GYNECOLOGY

## 2024-02-12 PROCEDURE — 38900 IO MAP OF SENT LYMPH NODE: CPT | Mod: 50,AS,, | Performed by: PHYSICIAN ASSISTANT

## 2024-02-12 PROCEDURE — 37000008 HC ANESTHESIA 1ST 15 MINUTES: Performed by: OBSTETRICS & GYNECOLOGY

## 2024-02-12 PROCEDURE — 63600175 PHARM REV CODE 636 W HCPCS: Performed by: GENERAL PRACTICE

## 2024-02-12 PROCEDURE — 88360 TUMOR IMMUNOHISTOCHEM/MANUAL: CPT | Mod: 59 | Performed by: PATHOLOGY

## 2024-02-12 PROCEDURE — 58571 TLH W/T/O 250 G OR LESS: CPT | Mod: AS,,, | Performed by: PHYSICIAN ASSISTANT

## 2024-02-12 PROCEDURE — 88307 TISSUE EXAM BY PATHOLOGIST: CPT | Mod: 26,,, | Performed by: PATHOLOGY

## 2024-02-12 PROCEDURE — 20600001 HC STEP DOWN PRIVATE ROOM

## 2024-02-12 PROCEDURE — 81025 URINE PREGNANCY TEST: CPT | Performed by: OBSTETRICS & GYNECOLOGY

## 2024-02-12 PROCEDURE — 25000003 PHARM REV CODE 250: Performed by: ANESTHESIOLOGY

## 2024-02-12 PROCEDURE — D9220A PRA ANESTHESIA: Mod: ,,, | Performed by: ANESTHESIOLOGY

## 2024-02-12 PROCEDURE — 63600175 PHARM REV CODE 636 W HCPCS

## 2024-02-12 PROCEDURE — 25000003 PHARM REV CODE 250

## 2024-02-12 PROCEDURE — 27201423 OPTIME MED/SURG SUP & DEVICES STERILE SUPPLY: Performed by: OBSTETRICS & GYNECOLOGY

## 2024-02-12 PROCEDURE — 38900 IO MAP OF SENT LYMPH NODE: CPT | Mod: 50,,, | Performed by: OBSTETRICS & GYNECOLOGY

## 2024-02-12 PROCEDURE — 88360 TUMOR IMMUNOHISTOCHEM/MANUAL: CPT | Mod: 26,,, | Performed by: PATHOLOGY

## 2024-02-12 PROCEDURE — 88309 TISSUE EXAM BY PATHOLOGIST: CPT | Mod: 26,,, | Performed by: PATHOLOGY

## 2024-02-12 PROCEDURE — 58571 TLH W/T/O 250 G OR LESS: CPT | Mod: ,,, | Performed by: OBSTETRICS & GYNECOLOGY

## 2024-02-12 PROCEDURE — 37000009 HC ANESTHESIA EA ADD 15 MINS: Performed by: OBSTETRICS & GYNECOLOGY

## 2024-02-12 PROCEDURE — 25000003 PHARM REV CODE 250: Performed by: GENERAL PRACTICE

## 2024-02-12 PROCEDURE — 88342 IMHCHEM/IMCYTCHM 1ST ANTB: CPT | Mod: 26,59,, | Performed by: PATHOLOGY

## 2024-02-12 PROCEDURE — 88309 TISSUE EXAM BY PATHOLOGIST: CPT | Performed by: PATHOLOGY

## 2024-02-12 PROCEDURE — 38570 LAPAROSCOPY LYMPH NODE BIOP: CPT | Mod: 51,,, | Performed by: OBSTETRICS & GYNECOLOGY

## 2024-02-12 PROCEDURE — 86901 BLOOD TYPING SEROLOGIC RH(D): CPT | Performed by: OBSTETRICS & GYNECOLOGY

## 2024-02-12 PROCEDURE — G0378 HOSPITAL OBSERVATION PER HR: HCPCS

## 2024-02-12 PROCEDURE — 88341 IMHCHEM/IMCYTCHM EA ADD ANTB: CPT | Mod: 26,59,, | Performed by: PATHOLOGY

## 2024-02-12 RX ORDER — PROPOFOL 10 MG/ML
VIAL (ML) INTRAVENOUS
Status: DISCONTINUED | OUTPATIENT
Start: 2024-02-12 | End: 2024-02-12

## 2024-02-12 RX ORDER — ACETAMINOPHEN 500 MG
1000 TABLET ORAL EVERY 6 HOURS
Status: DISCONTINUED | OUTPATIENT
Start: 2024-02-13 | End: 2024-02-13

## 2024-02-12 RX ORDER — MIDAZOLAM HYDROCHLORIDE 1 MG/ML
INJECTION, SOLUTION INTRAMUSCULAR; INTRAVENOUS
Status: DISCONTINUED | OUTPATIENT
Start: 2024-02-12 | End: 2024-02-12

## 2024-02-12 RX ORDER — KETAMINE HCL IN 0.9 % NACL 50 MG/5 ML
SYRINGE (ML) INTRAVENOUS
Status: DISCONTINUED | OUTPATIENT
Start: 2024-02-12 | End: 2024-02-12

## 2024-02-12 RX ORDER — OXYCODONE HYDROCHLORIDE 5 MG/1
5 TABLET ORAL ONCE AS NEEDED
Status: COMPLETED | OUTPATIENT
Start: 2024-02-12 | End: 2024-02-12

## 2024-02-12 RX ORDER — HEPARIN SODIUM 5000 [USP'U]/ML
5000 INJECTION, SOLUTION INTRAVENOUS; SUBCUTANEOUS ONCE
Status: CANCELLED | OUTPATIENT
Start: 2024-02-12

## 2024-02-12 RX ORDER — ACETAMINOPHEN 500 MG
1000 TABLET ORAL EVERY 6 HOURS PRN
Status: DISCONTINUED | OUTPATIENT
Start: 2024-02-12 | End: 2024-02-14 | Stop reason: HOSPADM

## 2024-02-12 RX ORDER — FAMOTIDINE 20 MG/1
20 TABLET, FILM COATED ORAL
Status: CANCELLED | OUTPATIENT
Start: 2024-02-12

## 2024-02-12 RX ORDER — IBUPROFEN 200 MG
200 TABLET ORAL EVERY 6 HOURS
Status: DISCONTINUED | OUTPATIENT
Start: 2024-02-13 | End: 2024-02-13

## 2024-02-12 RX ORDER — ONDANSETRON HYDROCHLORIDE 2 MG/ML
INJECTION, SOLUTION INTRAVENOUS
Status: DISCONTINUED | OUTPATIENT
Start: 2024-02-12 | End: 2024-02-12

## 2024-02-12 RX ORDER — FAMOTIDINE 20 MG/1
20 TABLET, FILM COATED ORAL
Status: COMPLETED | OUTPATIENT
Start: 2024-02-12 | End: 2024-02-12

## 2024-02-12 RX ORDER — ONDANSETRON 8 MG/1
8 TABLET, ORALLY DISINTEGRATING ORAL EVERY 8 HOURS PRN
Status: DISCONTINUED | OUTPATIENT
Start: 2024-02-12 | End: 2024-02-14 | Stop reason: HOSPADM

## 2024-02-12 RX ORDER — HEPARIN SODIUM 5000 [USP'U]/ML
5000 INJECTION, SOLUTION INTRAVENOUS; SUBCUTANEOUS ONCE
Status: COMPLETED | OUTPATIENT
Start: 2024-02-12 | End: 2024-02-12

## 2024-02-12 RX ORDER — METOCLOPRAMIDE HYDROCHLORIDE 5 MG/ML
5 INJECTION INTRAMUSCULAR; INTRAVENOUS EVERY 6 HOURS PRN
Status: DISCONTINUED | OUTPATIENT
Start: 2024-02-12 | End: 2024-02-14 | Stop reason: HOSPADM

## 2024-02-12 RX ORDER — CEFAZOLIN SODIUM 1 G/3ML
INJECTION, POWDER, FOR SOLUTION INTRAMUSCULAR; INTRAVENOUS
Status: DISCONTINUED | OUTPATIENT
Start: 2024-02-12 | End: 2024-02-12

## 2024-02-12 RX ORDER — MUPIROCIN 20 MG/G
OINTMENT TOPICAL
Status: CANCELLED | OUTPATIENT
Start: 2024-02-12

## 2024-02-12 RX ORDER — DEXAMETHASONE SODIUM PHOSPHATE 4 MG/ML
INJECTION, SOLUTION INTRA-ARTICULAR; INTRALESIONAL; INTRAMUSCULAR; INTRAVENOUS; SOFT TISSUE
Status: DISCONTINUED | OUTPATIENT
Start: 2024-02-12 | End: 2024-02-12

## 2024-02-12 RX ORDER — KETOROLAC TROMETHAMINE 30 MG/ML
15 INJECTION, SOLUTION INTRAMUSCULAR; INTRAVENOUS EVERY 6 HOURS PRN
Status: DISCONTINUED | OUTPATIENT
Start: 2024-02-12 | End: 2024-02-14 | Stop reason: HOSPADM

## 2024-02-12 RX ORDER — ROCURONIUM BROMIDE 10 MG/ML
INJECTION, SOLUTION INTRAVENOUS
Status: DISCONTINUED | OUTPATIENT
Start: 2024-02-12 | End: 2024-02-12

## 2024-02-12 RX ORDER — ACETAMINOPHEN 500 MG
1000 TABLET ORAL
Status: COMPLETED | OUTPATIENT
Start: 2024-02-12 | End: 2024-02-12

## 2024-02-12 RX ORDER — HALOPERIDOL 5 MG/ML
0.5 INJECTION INTRAMUSCULAR EVERY 10 MIN PRN
Status: DISCONTINUED | OUTPATIENT
Start: 2024-02-12 | End: 2024-02-12 | Stop reason: HOSPADM

## 2024-02-12 RX ORDER — LIDOCAINE HYDROCHLORIDE 20 MG/ML
INJECTION, SOLUTION EPIDURAL; INFILTRATION; INTRACAUDAL; PERINEURAL
Status: DISCONTINUED | OUTPATIENT
Start: 2024-02-12 | End: 2024-02-12

## 2024-02-12 RX ORDER — SCOLOPAMINE TRANSDERMAL SYSTEM 1 MG/1
1 PATCH, EXTENDED RELEASE TRANSDERMAL
Status: DISCONTINUED | OUTPATIENT
Start: 2024-02-12 | End: 2024-02-12 | Stop reason: HOSPADM

## 2024-02-12 RX ORDER — SODIUM CHLORIDE 9 MG/ML
INJECTION, SOLUTION INTRAVENOUS CONTINUOUS
Status: DISCONTINUED | OUTPATIENT
Start: 2024-02-12 | End: 2024-02-14 | Stop reason: HOSPADM

## 2024-02-12 RX ORDER — FENTANYL CITRATE 50 UG/ML
25 INJECTION, SOLUTION INTRAMUSCULAR; INTRAVENOUS EVERY 5 MIN PRN
Status: DISCONTINUED | OUTPATIENT
Start: 2024-02-12 | End: 2024-02-12 | Stop reason: HOSPADM

## 2024-02-12 RX ORDER — SODIUM CHLORIDE 0.9 % (FLUSH) 0.9 %
10 SYRINGE (ML) INJECTION
Status: DISCONTINUED | OUTPATIENT
Start: 2024-02-12 | End: 2024-02-12 | Stop reason: HOSPADM

## 2024-02-12 RX ORDER — KETOROLAC TROMETHAMINE 30 MG/ML
15 INJECTION, SOLUTION INTRAMUSCULAR; INTRAVENOUS
Status: DISCONTINUED | OUTPATIENT
Start: 2024-02-12 | End: 2024-02-13

## 2024-02-12 RX ORDER — FENTANYL CITRATE 50 UG/ML
INJECTION, SOLUTION INTRAMUSCULAR; INTRAVENOUS
Status: DISCONTINUED | OUTPATIENT
Start: 2024-02-12 | End: 2024-02-12

## 2024-02-12 RX ORDER — NALOXONE HCL 0.4 MG/ML
0.02 VIAL (ML) INJECTION
Status: DISCONTINUED | OUTPATIENT
Start: 2024-02-12 | End: 2024-02-14 | Stop reason: HOSPADM

## 2024-02-12 RX ORDER — ONDANSETRON HYDROCHLORIDE 2 MG/ML
4 INJECTION, SOLUTION INTRAVENOUS EVERY 4 HOURS PRN
Status: DISCONTINUED | OUTPATIENT
Start: 2024-02-12 | End: 2024-02-14 | Stop reason: HOSPADM

## 2024-02-12 RX ORDER — LIDOCAINE HYDROCHLORIDE 10 MG/ML
1 INJECTION, SOLUTION EPIDURAL; INFILTRATION; INTRACAUDAL; PERINEURAL ONCE
Status: DISCONTINUED | OUTPATIENT
Start: 2024-02-12 | End: 2024-02-12 | Stop reason: HOSPADM

## 2024-02-12 RX ORDER — HALOPERIDOL 5 MG/ML
INJECTION INTRAMUSCULAR
Status: DISCONTINUED | OUTPATIENT
Start: 2024-02-12 | End: 2024-02-12

## 2024-02-12 RX ORDER — DEXMEDETOMIDINE HYDROCHLORIDE 100 UG/ML
INJECTION, SOLUTION INTRAVENOUS
Status: DISCONTINUED | OUTPATIENT
Start: 2024-02-12 | End: 2024-02-12

## 2024-02-12 RX ORDER — MUPIROCIN 20 MG/G
OINTMENT TOPICAL
Status: DISCONTINUED | OUTPATIENT
Start: 2024-02-12 | End: 2024-02-12 | Stop reason: HOSPADM

## 2024-02-12 RX ORDER — MUPIROCIN 20 MG/G
OINTMENT TOPICAL 2 TIMES DAILY
Status: DISCONTINUED | OUTPATIENT
Start: 2024-02-12 | End: 2024-02-14 | Stop reason: HOSPADM

## 2024-02-12 RX ORDER — SODIUM CHLORIDE 9 MG/ML
INJECTION, SOLUTION INTRAVENOUS CONTINUOUS
Status: CANCELLED | OUTPATIENT
Start: 2024-02-12

## 2024-02-12 RX ADMIN — OXYCODONE HYDROCHLORIDE 5 MG: 5 TABLET ORAL at 04:02

## 2024-02-12 RX ADMIN — FENTANYL CITRATE 100 MCG: 50 INJECTION INTRAMUSCULAR; INTRAVENOUS at 12:02

## 2024-02-12 RX ADMIN — Medication 30 MG: at 11:02

## 2024-02-12 RX ADMIN — MIDAZOLAM 2 MG: 1 INJECTION INTRAMUSCULAR; INTRAVENOUS at 11:02

## 2024-02-12 RX ADMIN — SODIUM CHLORIDE, SODIUM GLUCONATE, SODIUM ACETATE, POTASSIUM CHLORIDE, MAGNESIUM CHLORIDE, SODIUM PHOSPHATE, DIBASIC, AND POTASSIUM PHOSPHATE: .53; .5; .37; .037; .03; .012; .00082 INJECTION, SOLUTION INTRAVENOUS at 01:02

## 2024-02-12 RX ADMIN — Medication 10 MG: at 01:02

## 2024-02-12 RX ADMIN — ROCURONIUM BROMIDE 20 MG: 50 INJECTION, SOLUTION INTRAVENOUS at 01:02

## 2024-02-12 RX ADMIN — KETOROLAC TROMETHAMINE 15 MG: 30 INJECTION, SOLUTION INTRAMUSCULAR; INTRAVENOUS at 03:02

## 2024-02-12 RX ADMIN — ACETAMINOPHEN 1000 MG: 500 TABLET ORAL at 03:02

## 2024-02-12 RX ADMIN — FAMOTIDINE 20 MG: 20 TABLET, FILM COATED ORAL at 10:02

## 2024-02-12 RX ADMIN — FENTANYL CITRATE 25 MCG: 50 INJECTION INTRAMUSCULAR; INTRAVENOUS at 03:02

## 2024-02-12 RX ADMIN — SUGAMMADEX 400 MG: 100 INJECTION, SOLUTION INTRAVENOUS at 02:02

## 2024-02-12 RX ADMIN — IBUPROFEN 200 MG: 200 TABLET, FILM COATED ORAL at 11:02

## 2024-02-12 RX ADMIN — MUPIROCIN 1 G: 20 OINTMENT TOPICAL at 10:02

## 2024-02-12 RX ADMIN — ACETAMINOPHEN 1000 MG: 500 TABLET ORAL at 11:02

## 2024-02-12 RX ADMIN — Medication 10 MG: at 12:02

## 2024-02-12 RX ADMIN — CEFAZOLIN 3 G: 330 INJECTION, POWDER, FOR SOLUTION INTRAMUSCULAR; INTRAVENOUS at 11:02

## 2024-02-12 RX ADMIN — ROCURONIUM BROMIDE 20 MG: 50 INJECTION, SOLUTION INTRAVENOUS at 12:02

## 2024-02-12 RX ADMIN — HALOPERIDOL LACTATE 0.5 MG: 5 INJECTION, SOLUTION INTRAMUSCULAR at 11:02

## 2024-02-12 RX ADMIN — DEXMEDETOMIDINE 12 MCG: 200 INJECTION, SOLUTION INTRAVENOUS at 02:02

## 2024-02-12 RX ADMIN — DEXMEDETOMIDINE 12 MCG: 200 INJECTION, SOLUTION INTRAVENOUS at 12:02

## 2024-02-12 RX ADMIN — ACETAMINOPHEN 1000 MG: 500 TABLET ORAL at 10:02

## 2024-02-12 RX ADMIN — FENTANYL CITRATE 100 MCG: 50 INJECTION INTRAMUSCULAR; INTRAVENOUS at 11:02

## 2024-02-12 RX ADMIN — SCOPALAMINE 1 PATCH: 1 PATCH, EXTENDED RELEASE TRANSDERMAL at 10:02

## 2024-02-12 RX ADMIN — ROCURONIUM BROMIDE 80 MG: 50 INJECTION, SOLUTION INTRAVENOUS at 11:02

## 2024-02-12 RX ADMIN — HALOPERIDOL LACTATE 0.5 MG: 5 INJECTION, SOLUTION INTRAMUSCULAR at 03:02

## 2024-02-12 RX ADMIN — DEXAMETHASONE SODIUM PHOSPHATE 8 MG: 4 INJECTION INTRA-ARTICULAR; INTRALESIONAL; INTRAMUSCULAR; INTRAVENOUS; SOFT TISSUE at 11:02

## 2024-02-12 RX ADMIN — SODIUM CHLORIDE, SODIUM GLUCONATE, SODIUM ACETATE, POTASSIUM CHLORIDE, MAGNESIUM CHLORIDE, SODIUM PHOSPHATE, DIBASIC, AND POTASSIUM PHOSPHATE: .53; .5; .37; .037; .03; .012; .00082 INJECTION, SOLUTION INTRAVENOUS at 11:02

## 2024-02-12 RX ADMIN — LIDOCAINE HYDROCHLORIDE 100 MG: 20 INJECTION, SOLUTION EPIDURAL; INFILTRATION; INTRACAUDAL at 11:02

## 2024-02-12 RX ADMIN — HEPARIN SODIUM 5000 UNITS: 5000 INJECTION INTRAVENOUS; SUBCUTANEOUS at 10:02

## 2024-02-12 RX ADMIN — PROPOFOL 200 MG: 10 INJECTION, EMULSION INTRAVENOUS at 11:02

## 2024-02-12 RX ADMIN — ROCURONIUM BROMIDE 20 MG: 50 INJECTION, SOLUTION INTRAVENOUS at 02:02

## 2024-02-12 RX ADMIN — ONDANSETRON 4 MG: 2 INJECTION INTRAMUSCULAR; INTRAVENOUS at 02:02

## 2024-02-12 NOTE — INTERVAL H&P NOTE
Sonia Solorzano is 38 y.o.  presenting for scheduled procedure.    Temp:  [98.2 °F (36.8 °C)] 98.2 °F (36.8 °C)  Pulse:  [72] 72  Resp:  [18] 18  SpO2:  [97 %] 97 %  BP: (168)/(77) 168/77    General: NAD, alert, oriented, cooperative  HEENT: NCAT, EOM grossly intact  Lungs: Normal WOB  Heart: regular rate  Abdomen: soft, nondistended, nontender, no rebound or guarding    Consents in chart. Pre-operative heparin given at 1002 . All questions answered and concerns addressed. To OR for planned procedure.         Active Hospital Problems    Diagnosis  POA    *Complex endometrial hyperplasia with atypia [N85.02]  Unknown      Resolved Hospital Problems   No resolved problems to display.     Alex Arauz MD  OBGYN PGY-2

## 2024-02-12 NOTE — OP NOTE
DATE OF PROCEDURE:  02/12/2024     SURGEON:  Kirsten Weaver M.D.     ASSISTANTS: KLEBER [facility] Assist-- No qualified resident was available for the procedure. Alex Arauz MD (RES)     PREOPERATIVE DIAGNOSIS:   1. Complex endometrial hyperplasia with atypia  2. Right ovarian cyst      POSTOPERATIVE DIAGNOSES:    1. Complex endometrial hyperplasia with atypia  2. Right ovarian cyst     PROCEDURE PERFORMED:  Robotic-assisted total laparoscopic hysterectomy,   bilateral salpingectomy, right oophorectomy, bilateral sentinel lymph node mapping, bilateral sentinel lymph node dissection     ANESTHESIA:  General endotracheal anesthesia.     SPECIMENS REMOVED:  1.  Uterus and cervix, bilateral fallopian tubes and right ovary.  3.  Bilateral sentinel pelvic lymph nodes.     ESTIMATED BLOOD LOSS:  <20 mL.     COMPLICATIONS:  None.     FINDINGS: Enlarged 11cm uterus. Normal left ovary. Enlarged simple cystic appearing right ovary No ascites. No obvious intraperitoneal disease. +fluorescent sentinel lymph nodes bilaterally, 1 external iliac on the right and 1 external iliac on the left.          PROCEDURE IN DETAIL:  The patient was taken to the Operating Room.  Informed consent had been obtained.  She underwent general endotracheal anesthesia without difficulty, was prepped and draped in the normal sterile fashion in a dorsal lithotomy position.  Timeout was performed.  All parties agreed to the planned procedure.  Perioperative antibiotics were administered.  Vidal catheter was placed under sterile conditions.  Two cervical injections of 1.25mg/ml ICG were performed both superficial and deep at the 3 and 9 o'clock position. A total of 4cc was used. The VCare uterine manipulator was then secured in place in a standard fashion for uterine manipulation. Gloves were changed and attention was turned to the patient's abdomen.       Veress needle was gently inserted in the umbilicus. Intra-abdominal placement was  confirmed with low CO2 pressure and water drop test.  Abdomen was insufflated and pneumoperitoneum was obtained.  Skin incision was made superior to the umbilicus. Robotic trocar was introduced.  Intra-abdominal placement was confirmed.  Additional trocars were placed, 2 robotic trocars to the left of the camera, 1 robotic trocar to the right of the camera and an additional 8 mm assist port to the right of the camera.  The patient was placed in steep Trendelenburg. Robot was docked and operating surgeon reported to the console.       Survey of the abdomen and pelvis revealed the above findings. Bilateral round ligaments were identified.  These were cauterized and transected.  The posterior leaf of the broad ligament was then opened bilaterally facilitating access to the retroperitoneum. Paravesical and pararectal spaces were opened. We identified +fluorescent sentinel pelvic lymph nodes bilaterally and these were excised, location as above.      The infundibulopelvic ligaments on the right were then skeletonized with good visualization of the ureter beneath. These were cauterized and transected.  The left fallopian tube was released along the mesosalpinx to the uterine cornua. The left uterine-ovarian ligamentous complex was then isolated , cauterized and transected leaving the left ovary in situ. The anterior leaf of the broad ligament was then opened circumferentially.  Proper plane for the bladder flap was identified and the bladder was gently reflected off of the anterior aspect of the uterus and cervix to the level of the cervicovaginal junction. Bilateral uterine arteries were then skeletonized.  These were cauterized and transected.  The remainder of the uterosacral and cardinal ligaments were then also serially cauterized and transected.  Posterior colpotomy was initiated in the 6 o'clock position and carried around circumferentially.  The uterus, cervix, bilateral fallopian tubes and right ovary were then  removed through the vagina. Lymph nodes were removed through the vagina as well.        We then closed the vaginal cuff with a V-Loc running suture in a running fashion. Bilateral ureters were reinspected and both noted to be vermiculating equally and briskly. Pelvis was hemostatic. The robotic trocars were then removed under direct visualization and the robot was undocked. Skin incisions were then rendered hemostatic.  These were closed with 4-0 Monocryl in a subcuticular fashion and topped with sterile Dermabond. The patient tolerated the procedure well. Sponge, lap, needle and instrument counts were correct x2 as reported by the circulating nurse.  She was awakened from anesthesia and taken to recovery in stable condition.

## 2024-02-12 NOTE — LETTER
February 13, 2024         [address]  [place] [address]  Phone: [phone]  Fax: [phone]         Date of Visit: 02/12/2024-02/13/2024    To Whom It May Concern:    Loco Solorzano  was at [facility] on 02/12/2024-02/13/2024 to support his wife. He will also provide support on 2/14/2024. He may return to work/school on 02/15/2024 with no restrictions. If you have any questions or concerns, or if I can be of further assistance, please do not hesitate to contact me.    Sincerely,    Alex Arauz MD
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
## 2024-02-12 NOTE — PROGRESS NOTES
Endocrine Surgery History & Physical     REFERRING PROVIDER: ALEX Turcios PA-C    REASON FOR VISIT: Hyperparathyroidism    HPI: Sonia Solorzano is a 38 y.o. female patient with a history notable for PCOS, idiopathic intracranial hypertension s/p  shunt, bipolar II, HTN, recent hysterectomy for endometrial atypia, and obstructive sleep apnea who presents in consultation for primary hyperparathyroidism. Suspicion for hyperparathyroidism was raised on routine laboratory testing to have elevated hypercalcemia.    Details of the workup are as follows:     Recent laboratory studies:  Hypercalcemia noted since 9/2022  Max corrected calcium elevation to 12.3 mg/dL (calcium 11.7 with albumin of 3.2 in 9/2023)  Parathyroid hormone levels elevated with normal  Most recent PTH level was 157 with a corresponding calcium of 11.5, corrects to 11.8 for an albumin of 3.6  Phosphorus: 2.9  Vitamin D: 18 in 12/2023  24 hour urine calcium: 604 mg/24 hours, Benign Familial Hypocalciuric Hypercalcemia ruled out    Medications:  Vitamin D supplementation: none  Lithium, thiazide diuretics: was previously on thiazide. Off since 12/2023  Calcium supplements or calcimimetic: none    Symptoms:   The patient reports the following: [x]musculoskeletal pain, []fractures, [x]nephrolithiasis, []GERD, []peptic ulcer disease, []abdominal pain, []polydipsia, []polyuria, []pruritis  The patient reports the following neurocognitive symptoms: []brain fog, []concentration difficulties, [x]fatigue, [x]forgetfulness, [x]mood/psychiatric disturbances (Bipolar II, 2018)  The patient denies dysphagia, globus sensation, compression symptoms, or anterior neck pain.  The patient denies hoarseness, voice changes or increased need to clear the throat.  Bone mineral density: []Osteoporosis []Osteopenia []Normal bone density.  DEXA not performed    Surgical risk factors:  Risk of concurrent thyroid disease: low  Ultrasound of the thyroid: Jan 2024 with  1.7cm thyroid nodule on the left, biopsied with benign cytology on FNA  History of neck radiation: none  Prior neck surgery: none  Cardiovascular risk: echocardiogram normal 10/2023  Antiplatelet therapy and anticoagulation: none    Family history:  Family history of pancreatic cancer, parathyroid cancer    Localization studies done prior to this visit:  Ultrasound: no parathyroid visualized  Nuclear Medicine Parathyroid Scan: 10/2023 without a localized parathyroid adenoma  4D-CT Parathyroid scan: ordered but not performed, anaphylactic reaction to dye    LABORATORY STUDIES:  I personally and independently reviewed relevant lab test results, including the following:    Lab Results   Component Value Date    PHOS 2.9 2023    PHOS 2.6 (L) 2023    MG 2.0 2023    [id number] 18 (L) 2023    TSH 2.658 2023         PAST MEDICAL HISTORY:  Patient Active Problem List   Diagnosis    BMI 45.0-49.9, adult    Obstructive sleep apnea    Hypertension    Iron deficiency anemia due to chronic blood loss    Iron deficiency anemia    Bipolar disorder, unspecified    Irritable bowel syndrome with both constipation and diarrhea    Major depressive disorder, single episode, unspecified    Unspecified asthma, uncomplicated    Migraine without aura and without status migrainosus, not intractable    IIH (idiopathic intracranial hypertension)    PCOS (polycystic ovarian syndrome)    S/P  shunt    Functional neurological symptom disorder with mixed symptoms    Hyperparathyroidism    HLD (hyperlipidemia)    Acid reflux    Fatty liver    History of COVID-19    Thyroid nodule    Vapes nicotine containing substance    Edema    Complex endometrial hyperplasia with atypia    Numbness and tingling in left hand    S/p RA-TLH/BS/RO/SLND         PAST SURGICAL HISTORY:  Past Surgical History:   Procedure Laterality Date     SECTION, LOW TRANSVERSE      COLONOSCOPY N/A 10/27/2020    Procedure: COLONOSCOPY;   Surgeon: Patito Vergara MD;  Location: [facility] ENDO;  Service: Endoscopy;  Laterality: N/A;    CYSTOSCOPY N/A 10/27/2021    Procedure: CYSTOSCOPY;  Surgeon: Oh Velasquez Jr., MD;  [facility];  Service: Urology;  Laterality: N/A;    DILATION AND CURETTAGE OF UTERUS  2003    Uterine perforation for AUB    ENDOSCOPIC INSERTION OF VENTRICULOPERITONEAL SHUNT Right 09/19/2022    Procedure: INSERTION, SHUNT, VENTRICULOPERITONEAL, ENDOSCOPIC;  Surgeon: Fran Yoon MD;  [address];  Service: Neurosurgery;  Laterality: Right;  regular bed, supine    ENDOSCOPIC INSERTION OF VENTRICULOPERITONEAL SHUNT N/A 09/19/2022    Procedure: INSERTION, SHUNT, VENTRICULOPERITONEAL, ENDOSCOPIC;  Surgeon: Bandar Oneal Jr., MD;  [facility];  Service: General;  Laterality: N/A;    epidural steriod injections  2005    x3    ESOPHAGOGASTRODUODENOSCOPY N/A 10/26/2020    Procedure: EGD (ESOPHAGOGASTRODUODENOSCOPY);  Surgeon: Enrike Garcia MD;  Location: [facility];  Service: Endoscopy;  Laterality: N/A;    ESOPHAGOGASTRODUODENOSCOPY N/A 03/02/2023    Procedure: EGD (ESOPHAGOGASTRODUODENOSCOPY);  Surgeon: Patito Vergara MD;  Location: [facility] ENDO;  Service: Endoscopy;  Laterality: N/A;    INTRALUMINAL GASTROINTESTINAL TRACT IMAGING VIA CAPSULE N/A 11/20/2020    Procedure: IMAGING PROCEDURE, GI TRACT, INTRALUMINAL, VIA CAPSULE;  Surgeon: Patito Vergara MD;  Location: [facility];  Service: Endoscopy;  Laterality: N/A;    KNEE ARTHROSCOPY W/ MENISCECTOMY Right 05/26/2021    Procedure: ARTHROSCOPY, KNEE, WITH MENISCECTOMY;  Surgeon: López Baekr MD;  Location: [facility];  Service: Orthopedics;  Laterality: Right;    LAPAROSCOPIC CHOLECYSTECTOMY N/A 11/27/2020    Procedure: CHOLECYSTECTOMY, LAPAROSCOPIC;  Surgeon: Chente Campbell III, MD;  Location: [facility];  Service: General;  Laterality: N/A;    LAPAROSCOPY Right 2013    Endometriosis    LYMPH NODE BIOPSY Bilateral 2/12/2024    Procedure:  BIOPSY, LYMPH NODE;  Surgeon: Kirsten Weaver MD;  [facility];  Service: OB/GYN;  Laterality: Bilateral;  sentinal lymph node dissection    MAGNETIC RESONANCE IMAGING N/A 08/03/2022    Procedure: MRI (Magnetic Resonance Imagine);  Surgeon: Sanjana Surgeon;  Location: [facility];  Service: Anesthesiology;  Laterality: N/A;    MAPPING, LYMPH NODE, SENTINEL Bilateral 2/12/2024    Procedure: MAPPING, LYMPH NODE, SENTINEL;  Surgeon: Kirsten Weaver MD;  [facility];  Service: OB/GYN;  Laterality: Bilateral;    ROBOT-ASSISTED LAPAROSCOPIC ABDOMINAL HYSTERECTOMY USING DA VICKIE XI N/A 2/12/2024    Procedure: XI ROBOTIC HYSTERECTOMY;  Surgeon: Kirsten Weaver MD;  Location: [facility] OR [address] [place] [address];  Service: OB/GYN;  Laterality: N/A;  2.5 hr case    ROBOT-ASSISTED LAPAROSCOPIC SURGICAL REMOVAL OF OVARY USING DA VICKIE XI Right 2/12/2024    Procedure: XI ROBOTIC OOPHORECTOMY;  Surgeon: Kirsten Weaver MD;  Location: [address];  Service: OB/GYN;  Laterality: Right;    ROBOT-ASSISTED SURGICAL REMOVAL OF FALLOPIAN TUBE USING DA VICKIE XI Bilateral 2/12/2024    Procedure: XI ROBOTIC SALPINGECTOMY;  Surgeon: Kirsten Weaver MD;  Location: [address];  Service: OB/GYN;  Laterality: Bilateral;  US only --MD will determine at time of surgery    TONSILLECTOMY      as a child    TUBAL LIGATION  2008    UPPER GASTROINTESTINAL ENDOSCOPY          MEDICATIONS:  Current Outpatient Medications   Medication Sig Dispense Refill    acetaminophen (TYLENOL) 500 MG tablet Take 1,000 mg by mouth every 6 (six) hours as needed for Pain.      acetaminophen (TYLENOL) 650 MG TbSR Take 1 tablet (650 mg total) by mouth every 6 to 8 hours as needed (Alternate every 3 hours with ibuprofen). 60 tablet 1    albuterol (PROVENTIL/VENTOLIN HFA) 90 mcg/actuation inhaler Inhale 2 puffs into the lungs 4 (four) times daily. 18 g 2    aspirin/sod bicarb/citric acid (ALISON-SELTZER ORAL) Take by mouth as needed. Reflux       atorvastatin (LIPITOR) 40 MG tablet Take 1 tablet (40 mg total) by mouth once daily. 30 tablet 11    diclofenac sodium (VOLTAREN) 1 % Gel Apply 2 g topically 4 (four) times daily. (Patient not taking: Reported on 2/14/2024) 100 g 1    empagliflozin (JARDIANCE) 10 mg tablet Take 1 tablet (10 mg total) by mouth once daily. 30 tablet 11    ibuprofen (ADVIL,MOTRIN) 600 MG tablet Take 1 tablet (600 mg total) by mouth every 6 to 8 hours as needed for Pain (Alternate every 3 hours with Tylenol.). 60 tablet 1    loperamide HCl (IMODIUM A-D ORAL) Take by mouth as needed. diarrhea      losartan (COZAAR) 25 MG tablet Take 1 tablet (25 mg total) by mouth once daily. 90 tablet 3    ondansetron (ZOFRAN-ODT) 4 MG TbDL Take 1 tablet (4 mg total) by mouth every 6 (six) hours as needed (nausea). Allow to melt on tongue (Patient not taking: Reported on 2/14/2024) 10 tablet 0    oxyCODONE (ROXICODONE) 5 MG immediate release tablet Take 1 tablet (5 mg total) by mouth every 4 (four) hours as needed for Pain. 15 tablet 0    pyridoxine, vitamin B6, (B-6) 50 MG Tab Take 1 tablet (50 mg total) by mouth once daily. (Patient not taking: Reported on 2/14/2024) 90 tablet 3    rimegepant (NURTEC) 75 mg odt Take 1 tablet (75 mg total) by mouth daily as needed for Migraine. Place ODT tablet on the tongue; alternatively the ODT tablet may be placed under the tongue 8 tablet 11    senna (SENOKOT) 8.6 mg tablet Take 1 tablet by mouth daily as needed for Constipation. 30 tablet 0    spironolactone (ALDACTONE) 25 MG tablet Take 1 tablet (25 mg total) by mouth once daily. 30 tablet 11     No current facility-administered medications for this visit.       ALLERGIES:  Review of patient's allergies indicates:   Allergen Reactions    Contrast media Anaphylaxis    Iodine and iodide containing products Anaphylaxis    Levaquin [levofloxacin] Anaphylaxis    Levofloxacin in d5w Anaphylaxis    Sulfa (sulfonamide antibiotics) Anaphylaxis and Hives    Iodinated  contrast media Hives    Iodine     Magnesium      Pt reporting she is allergic to magnesium citrate oral drink.     Morphine Hives    Tree nuts     Adhesive Rash    Compazine [prochlorperazine] Anxiety     Restless legs    Depacon [valproate sodium] Hives     Pt experienced hives at IV site upon 8th day of depacon administration.  Hives resolved with stopping medication in 1.5 hours.    Nut [tree nut] Hives       SOCIAL HISTORY:  Social History     Socioeconomic History    Marital status:    Tobacco Use    Smoking status: Every Day     Types: Vaping with nicotine     Passive exposure: Current    Smokeless tobacco: Never   Substance and Sexual Activity    Alcohol use: Not Currently     Comment: socially, occasionally    Drug use: No    Sexual activity: Yes     Partners: Male     Birth control/protection: See Surgical Hx   Social History Narrative    ** Merged History Encounter **          Social Determinants of Health     Financial Resource Strain: High Risk (12/19/2023)    Overall Financial Resource Strain (CARDIA)     Difficulty of Paying Living Expenses: Very hard   Food Insecurity: No Food Insecurity (12/19/2023)    Hunger Vital Sign     Worried About Running Out of Food in the Last Year: Never true     Ran Out of Food in the Last Year: Never true   Transportation Needs: No Transportation Needs (12/19/2023)    PRAPARE - Transportation     Lack of Transportation (Medical): No     Lack of Transportation (Non-Medical): No   Physical Activity: Inactive (12/19/2023)    Exercise Vital Sign     Days of Exercise per Week: 0 days     Minutes of Exercise per Session: 0 min   Stress: Stress Concern Present (12/19/2023)    [employer] [place] [employer] - Occupational Stress Questionnaire     Feeling of Stress : Rather much   Social Connections: Unknown (12/19/2023)    Social Connection and Isolation Panel [NHANES]     Frequency of Communication with Friends and Family: Twice a week     Frequency of Social  Gatherings with Friends and Family: Never     Active Member of Clubs or Organizations: Yes     Attends Club or Organization Meetings: 1 to 4 times per year     Marital Status:    Housing Stability: Low Risk  (12/19/2023)    Housing Stability Vital Sign     Unable to Pay for Housing in the Last Year: No     Number of Places Lived in the Last Year: 1     Unstable Housing in the Last Year: No         FAMILY HISTORY:  Family History   Problem Relation Age of Onset    Breast cancer Mother     Cancer Mother     Heart disease Mother     Hyperlipidemia Mother     Asthma Mother     Cancer Father     Heart disease Father     Hypertension Father     Hyperlipidemia Father     Arthritis Father     Breast cancer Maternal Aunt 40    Diabetes Maternal Grandmother     Cancer Maternal Grandmother     Colon cancer Maternal Grandfather     Cancer Maternal Grandfather     Cancer Paternal Grandmother     Cancer Paternal Grandfather     Diabetes Paternal Grandfather     Colon polyps Neg Hx         REVIEW OF SYSTEMS:  A detailed review of systems has been reviewed with the patient, pertinent positives and negatives are presented in the note and is otherwise negative.    PHYSICAL EXAMINATION:  Vital Signs: BP (!) 150/74 (BP Location: Right forearm, Patient Position: Sitting)   Pulse 88   Temp 97.8 °F (36.6 °C)   Wt 133.3 kg (293 lb 14 oz)   LMP 01/11/2024 (Exact Date)   SpO2 95%   BMI 46.03 kg/m²     Constitutional: well-developed, well-nourished, no acute distress, obese  HENT: no lid lag, no exophthalmos, no scleral icterus, moist mucous membranes, normal dentition  Neck: supple, trachea in midline, thyroid is nontender and moves well with swallowing, no palpable lymph nodes, normal neck extension  Heme/Lymph: no cervical or supraclavicular lymphadenopathy  Respiratory: normal respiratory effort, no wheezes or stridor  Cardiovascular: regular rate and rhythm  Extremities: no edema  Skin: warm and dry, no rashes  Neurologic:  voice normal  Vascular: radial pulses palpable bilaterally  Psychiatric: affect normal    IMAGING STUDIES:  I personally and independently reviewed, visualized and interpreted the images of the below listed radiology studies.  My findings are of a benign appearing left thyroid nodule, right thyroid nodule was biopsied though there are no overtly suspicious features, and the NM scan does not demonstrate any uptake to suggest a dominant adenoma.     US Soft Tissue Head Neck Thyroid 01/16/2024  FINDINGS:  Right lobe of the thyroid measures 4.3 x 1.6 x 1.9 cm with an estimated volume of 6.5 mL.  Left lobe of the thyroid measures 5.8 x 1.7 x 1.5 cm with an estimated volume of 8.1 mL.  Isthmus measures 0.3 cm in thickness.  Total thyroid volume measures 14.6 mL.    Bilateral thyroid nodules are seen which are solid or predominantly solid with small cystic components.  The largest nodule on the right is seen within the lower pole and is predominantly hypoechoic and measuring up to 1.4 cm.  The largest nodule on the left is predominantly isoechoic and measures up to 1.7 cm.  No microcalcifications are noted.  No exophytic nodules or any discrete lesions by ultrasound subjacent to the thyroid gland.  Normal thyroid perfusion.  No pathologically enlarged cervical lymph nodes.    Impression  1. Predominantly solid hypoechoic nodule within the lower pole of the left thyroid measuring up to 1.7 cm which meets FNA criteria for biopsy at this time.    NM Parathyroid Scan with SPECT Routine 10/03/2023  FINDINGS:  Following the intravenous administration of radiopharmaceutical, immediate, two-hour delayed, and three-hour delayed anterior camera images of the neck were obtained. In order to increase sensitivity for subtle areas of abnormal tracer activity, SPECT imaging of the neck and chest in the axial and coronal planes was also performed.  Immediate images demonstrate normal tracer accumulation within the thyroid gland, salivary  glands, liver, and myocardium. 2 hour delayed images demonstrate significant washout of tracer activity from the thyroid gland, and at 3 hours, thyroid washout is near complete. There are no focal areas of persistent abnormal tracer accumulation in the neck to indicate parathyroid adenoma.  SPECT images of the neck and chest demonstrate no foci of abnormal uptake.    IMPRESSION:  1. Normal parathyroid scan.    IMPRESSION:  I had the pleasure of seeing Ms. Solorzano in Endocrine Surgical consultation regarding the biochemical diagnosis of primary hyperparathyroidism.     We discussed that hyperparathyroidism can compromise bone and cardiovascular health. In addition to classic symptoms such as kidney stones and bone loss, hyperparathyroidism can be associated with multiple symptoms including fatigue, depression, memory loss, pruritis, polyuria, nocturia, constipation, polydipsia, and musculoskeletal aches and pains. Hyperparathyroidism can also worsen hypertension.  I discussed the implications of non-operative observation as well as the standard of care surgical treatment of primary hyperparathyroidism.  Based on the current clinical findings and a thorough discussion about the risks, benefits, and alternatives, the patient elected to proceed with parathyroidectomy.      We extensively discussed the pros and cons for surgical intervention, in this case parathyroidectomy with intraoperative parathyroid hormone monitoring.  We discussed that in the majority of cases, a single adenoma is the culprit gland. However, multigland disease is possible and multigland disease has a lower likelihood of radiographic localization.  Parathyroidectomy for single gland disease is a same day surgery while four-gland parathyroid exploration may require an overnight stay. We discussed that in all cases, parathyroidectomy has an attendant risk of postoperative hypocalcemia which can be mitigated with calcium and vitamin D supplementation.  Other possible complications associated with this may include, but may not be restricted to hoarseness, recurrent laryngeal nerve injury - temporary or permanent, superior laryngeal nerve injury - temporary or permanent, hypocalcemia and hypoparathyroidism - temporary or permanent, neck hematoma, bleeding, infection, scarring, wound healing problems and possible death. We discussed that in rare cases, parathyroid exploration may be negative. Recurrent and persistent disease are also possible.      All questions were answered and the patient expressed understanding of all the risks, benefits and alternatives, and agreed to proceed with the plan despite the risks.     Problem List Items Addressed This Visit       S/P  shunt     Will ensure anesthesia and perioperative team aware of  shunt presence.         Hyperparathyroidism     Primary hyperparathyroidism with hypercalcemia, hypercalciuria, nephrolithiasis, and young age.  Imaging studies non-localizing and 4DCT likely not an option due to allergic reaction to contrast media in the past. Discussed need for four gland exploration in the absence of localization.    - OR for parathyroidectomy with intraoperative PTH monitoring  - Will need perioperative antibiotics due to the presence of a  shunt  - Recommend 5000 U daily of D3, available over the counter  - 4D CT neck imaging for assisted localization would be ideal. However, patient is reporting a history of anaphylaxis with dye. Positive localization is not needed nor does negative imaging preclude surgery for primary hyperparathyroidism. We will hold off on CT imaging and plan for bilateral exploration.   - She has an appointment with allergy medicine in March, will await their clinic visit to see if she may be able to tolerate a contrast load to have a CT performed for localization purposes.   - Defer necessity of DEXA imaging to endocrinology  - Maintain adequate hydration and avoid the use of calcium  supplements           Thyroid nodule     Thorough discussion today about presence of thyroid nodules and implications of surveillance versus surgery.  Presence of thyroid nodules, particularly with benign cytology of the dominant left nodule and benign radiographic appearance of the contralateral nodule.  Patient expressed concern and anxiety related to most recent thyroid biopsy and does not want to undergo biopsies in the future.  Family history unclear, parathyroid carcinoma is exceedingly rare and there does not appear to be a strong family history of thyroid disease to warrant further testing or prophylactic thyroid surgery.    - Currently no strong indication for total thyroidectomy and I do not recommend prophylactic total thyroidectomy.  - Should FNA biopsies of the thyroid be warranted in the future, can consider anxiolytics or coordinate biopsy with sedation.            Other Visit Diagnoses       Primary hyperparathyroidism    -  Primary    Relevant Orders    Case Request Operating Room: PARATHYROIDECTOMY (Completed)        Patient was seen and evaluated with surgery resident Shantanu Freed MD.    Raiza Epperson MD  Staff Surgeon  Endocrine Surgery  2/15/24

## 2024-02-12 NOTE — ANESTHESIA PROCEDURE NOTES
Intubation    Date/Time: 2/12/2024 11:33 AM    Performed by: Vidya Gooden MD  Authorized by: Jacqueline Song MD    Intubation:     Induction:  Intravenous    Intubated:  Postinduction    Mask Ventilation:  Easy with oral airway    Attempted By:  Resident anesthesiologist    Method of Intubation:  Video laryngoscopy    Blade:  Kurtz 3    Laryngeal View Grade: Grade I - full view of cords      Difficult Airway Encountered?: No      Complications:  None    Airway Device:  Oral endotracheal tube    Airway Device Size:  7.0    Style/Cuff Inflation:  Cuffed    Tube secured:  21    Secured at:  The lips    Placement Verified By:  Capnometry    Complicating Factors:  None    Findings Post-Intubation:  BS equal bilateral and atraumatic/condition of teeth unchanged

## 2024-02-12 NOTE — ANESTHESIA POSTPROCEDURE EVALUATION
Anesthesia Post Evaluation    Patient: Sonia Solorzano    Procedure(s) Performed: Procedure(s) (LRB):  XI ROBOTIC HYSTERECTOMY (N/A)  XI ROBOTIC SALPINGECTOMY (Bilateral)  MAPPING, LYMPH NODE, SENTINEL (Bilateral)  BIOPSY, LYMPH NODE (Bilateral)  XI ROBOTIC OOPHORECTOMY (Right)    Final Anesthesia Type: general      Patient location during evaluation: PACU  Patient participation: Yes- Able to Participate  Level of consciousness: awake and alert  Post-procedure vital signs: reviewed and stable  Pain management: adequate  Airway patency: patent    PONV status at discharge: No PONV  Anesthetic complications: no      Cardiovascular status: hemodynamically stable  Respiratory status: spontaneous ventilation  Follow-up not needed.              Vitals Value Taken Time   /61 02/12/24 1503   Temp 36.4 °C (97.5 °F) 02/12/24 1500   Pulse 75 02/12/24 1519   Resp 15 02/12/24 1519   SpO2 99 % 02/12/24 1519   Vitals shown include unvalidated device data.      No case tracking events are documented in the log.      Pain/Kevin Score: Pain Rating Prior to Med Admin: 3 (2/12/2024 10:03 AM)  Kevin Score: 8 (2/12/2024  3:15 PM)
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
## 2024-02-12 NOTE — PROGRESS NOTES
Notified  pt has bruising to midline abdominal incision and screams when only this incision is lightly touched. Also notified of pt pain level of 9/10 to abdomen and back. No S/S of active bleeding or vaginal bleeding noted. Bruising to incision is soft no hematoma palpated. Also notified of pt still requiring 2L NC despite being drowsy. Aware pt BP trending lower than baseline as well.   assessed pt and aware of current status. Plan to admit overnight for monitoring and pain control. OK to give oral oxycodone at this time.     MD Gooden also at bedside caring for pt pain levels. Aware pt has hx of sleep apnea and frequently drowsy and requiring 2L NC while complaining of 9/10 pain. OK to hold IV fentanyl. Aware pt will be be admitted.

## 2024-02-13 VITALS
HEART RATE: 78 BPM | DIASTOLIC BLOOD PRESSURE: 76 MMHG | WEIGHT: 292 LBS | BODY MASS INDEX: 45.83 KG/M2 | TEMPERATURE: 98 F | OXYGEN SATURATION: 97 % | SYSTOLIC BLOOD PRESSURE: 130 MMHG | HEIGHT: 67 IN | RESPIRATION RATE: 18 BRPM

## 2024-02-13 PROBLEM — Z90.710 STATUS POST LAPAROSCOPIC HYSTERECTOMY: Status: ACTIVE | Noted: 2024-02-13

## 2024-02-13 LAB
ANION GAP SERPL CALC-SCNC: 6 MMOL/L (ref 8–16)
BASOPHILS # BLD AUTO: 0.02 K/UL (ref 0–0.2)
BASOPHILS NFR BLD: 0.2 % (ref 0–1.9)
BUN SERPL-MCNC: 8 MG/DL (ref 6–20)
CALCIUM SERPL-MCNC: 9.2 MG/DL (ref 8.7–10.5)
CHLORIDE SERPL-SCNC: 107 MMOL/L (ref 95–110)
CO2 SERPL-SCNC: 24 MMOL/L (ref 23–29)
CREAT SERPL-MCNC: 0.7 MG/DL (ref 0.5–1.4)
DIFFERENTIAL METHOD BLD: ABNORMAL
EOSINOPHIL # BLD AUTO: 0 K/UL (ref 0–0.5)
EOSINOPHIL NFR BLD: 0 % (ref 0–8)
ERYTHROCYTE [DISTWIDTH] IN BLOOD BY AUTOMATED COUNT: 15.9 % (ref 11.5–14.5)
EST. GFR  (NO RACE VARIABLE): >60 ML/MIN/1.73 M^2
GLUCOSE SERPL-MCNC: 90 MG/DL (ref 70–110)
HCT VFR BLD AUTO: 34.7 % (ref 37–48.5)
HGB BLD-MCNC: 10.6 G/DL (ref 12–16)
IMM GRANULOCYTES # BLD AUTO: 0.08 K/UL (ref 0–0.04)
IMM GRANULOCYTES NFR BLD AUTO: 0.6 % (ref 0–0.5)
LYMPHOCYTES # BLD AUTO: 2 K/UL (ref 1–4.8)
LYMPHOCYTES NFR BLD: 15.2 % (ref 18–48)
MCH RBC QN AUTO: 25.4 PG (ref 27–31)
MCHC RBC AUTO-ENTMCNC: 30.5 G/DL (ref 32–36)
MCV RBC AUTO: 83 FL (ref 82–98)
MONOCYTES # BLD AUTO: 0.8 K/UL (ref 0.3–1)
MONOCYTES NFR BLD: 6.2 % (ref 4–15)
NEUTROPHILS # BLD AUTO: 10.2 K/UL (ref 1.8–7.7)
NEUTROPHILS NFR BLD: 77.8 % (ref 38–73)
NRBC BLD-RTO: 0 /100 WBC
PLATELET # BLD AUTO: 334 K/UL (ref 150–450)
PMV BLD AUTO: 9.7 FL (ref 9.2–12.9)
POTASSIUM SERPL-SCNC: 4.2 MMOL/L (ref 3.5–5.1)
RBC # BLD AUTO: 4.17 M/UL (ref 4–5.4)
SODIUM SERPL-SCNC: 137 MMOL/L (ref 136–145)
WBC # BLD AUTO: 13.13 K/UL (ref 3.9–12.7)

## 2024-02-13 PROCEDURE — 36415 COLL VENOUS BLD VENIPUNCTURE: CPT | Performed by: GENERAL PRACTICE

## 2024-02-13 PROCEDURE — 25000003 PHARM REV CODE 250: Performed by: GENERAL PRACTICE

## 2024-02-13 PROCEDURE — G0378 HOSPITAL OBSERVATION PER HR: HCPCS

## 2024-02-13 PROCEDURE — 80048 BASIC METABOLIC PNL TOTAL CA: CPT | Performed by: GENERAL PRACTICE

## 2024-02-13 PROCEDURE — 63600175 PHARM REV CODE 636 W HCPCS: Performed by: GENERAL PRACTICE

## 2024-02-13 PROCEDURE — 85025 COMPLETE CBC W/AUTO DIFF WBC: CPT | Performed by: GENERAL PRACTICE

## 2024-02-13 PROCEDURE — 99900035 HC TECH TIME PER 15 MIN (STAT)

## 2024-02-13 PROCEDURE — 96372 THER/PROPH/DIAG INJ SC/IM: CPT | Performed by: GENERAL PRACTICE

## 2024-02-13 PROCEDURE — 27000221 HC OXYGEN, UP TO 24 HOURS

## 2024-02-13 RX ORDER — DEXTROMETHORPHAN HYDROBROMIDE, GUAIFENESIN 5; 100 MG/5ML; MG/5ML
650 LIQUID ORAL
Qty: 60 TABLET | Refills: 1 | Status: SHIPPED | OUTPATIENT
Start: 2024-02-13

## 2024-02-13 RX ORDER — SENNOSIDES 8.6 MG/1
1 TABLET ORAL DAILY PRN
Qty: 30 TABLET | Refills: 0 | Status: SHIPPED | OUTPATIENT
Start: 2024-02-13 | End: 2024-05-09

## 2024-02-13 RX ORDER — DEXTROMETHORPHAN HYDROBROMIDE, GUAIFENESIN 5; 100 MG/5ML; MG/5ML
650 LIQUID ORAL
Qty: 60 TABLET | Refills: 1 | Status: SHIPPED | OUTPATIENT
Start: 2024-02-13 | End: 2024-02-13 | Stop reason: HOSPADM

## 2024-02-13 RX ORDER — OXYCODONE HYDROCHLORIDE 5 MG/1
5 TABLET ORAL EVERY 4 HOURS PRN
Qty: 15 TABLET | Refills: 0 | Status: SHIPPED | OUTPATIENT
Start: 2024-02-13 | End: 2024-02-13

## 2024-02-13 RX ORDER — OXYCODONE HYDROCHLORIDE 10 MG/1
10 TABLET ORAL EVERY 6 HOURS PRN
Status: DISCONTINUED | OUTPATIENT
Start: 2024-02-13 | End: 2024-02-14 | Stop reason: HOSPADM

## 2024-02-13 RX ORDER — IBUPROFEN 600 MG/1
600 TABLET ORAL
Status: DISCONTINUED | OUTPATIENT
Start: 2024-02-13 | End: 2024-02-14 | Stop reason: HOSPADM

## 2024-02-13 RX ORDER — ACETAMINOPHEN 325 MG/1
650 TABLET ORAL
Status: DISCONTINUED | OUTPATIENT
Start: 2024-02-13 | End: 2024-02-14 | Stop reason: HOSPADM

## 2024-02-13 RX ORDER — HYDRALAZINE HYDROCHLORIDE 20 MG/ML
10 INJECTION INTRAMUSCULAR; INTRAVENOUS EVERY 6 HOURS PRN
Status: DISCONTINUED | OUTPATIENT
Start: 2024-02-13 | End: 2024-02-14 | Stop reason: HOSPADM

## 2024-02-13 RX ORDER — OXYCODONE HYDROCHLORIDE 5 MG/1
5 TABLET ORAL EVERY 6 HOURS PRN
Status: DISCONTINUED | OUTPATIENT
Start: 2024-02-13 | End: 2024-02-14 | Stop reason: HOSPADM

## 2024-02-13 RX ORDER — OXYCODONE HYDROCHLORIDE 5 MG/1
5 TABLET ORAL EVERY 4 HOURS PRN
Qty: 15 TABLET | Refills: 0 | Status: SHIPPED | OUTPATIENT
Start: 2024-02-13 | End: 2024-05-09

## 2024-02-13 RX ORDER — IBUPROFEN 600 MG/1
600 TABLET ORAL
Qty: 60 TABLET | Refills: 1 | Status: SHIPPED | OUTPATIENT
Start: 2024-02-13 | End: 2024-02-13

## 2024-02-13 RX ORDER — DEXTROMETHORPHAN HYDROBROMIDE, GUAIFENESIN 5; 100 MG/5ML; MG/5ML
650 LIQUID ORAL
Qty: 60 TABLET | Refills: 1 | Status: SHIPPED | OUTPATIENT
Start: 2024-02-13 | End: 2024-02-13

## 2024-02-13 RX ORDER — SENNOSIDES 8.6 MG/1
1 TABLET ORAL DAILY PRN
Qty: 30 TABLET | Refills: 0 | Status: SHIPPED | OUTPATIENT
Start: 2024-02-13 | End: 2024-02-13

## 2024-02-13 RX ORDER — IBUPROFEN 600 MG/1
600 TABLET ORAL
Qty: 60 TABLET | Refills: 1 | Status: SHIPPED | OUTPATIENT
Start: 2024-02-13

## 2024-02-13 RX ADMIN — ACETAMINOPHEN 1000 MG: 500 TABLET ORAL at 05:02

## 2024-02-13 RX ADMIN — ACETAMINOPHEN 650 MG: 325 TABLET ORAL at 03:02

## 2024-02-13 RX ADMIN — OXYCODONE 5 MG: 5 TABLET ORAL at 06:02

## 2024-02-13 RX ADMIN — KETOROLAC TROMETHAMINE 15 MG: 30 INJECTION, SOLUTION INTRAMUSCULAR; INTRAVENOUS at 12:02

## 2024-02-13 RX ADMIN — OXYCODONE HYDROCHLORIDE 10 MG: 10 TABLET ORAL at 08:02

## 2024-02-13 RX ADMIN — IBUPROFEN 600 MG: 600 TABLET, FILM COATED ORAL at 05:02

## 2024-02-13 RX ADMIN — IBUPROFEN 600 MG: 600 TABLET, FILM COATED ORAL at 12:02

## 2024-02-13 RX ADMIN — OXYCODONE HYDROCHLORIDE 10 MG: 10 TABLET ORAL at 02:02

## 2024-02-13 RX ADMIN — IBUPROFEN 200 MG: 200 TABLET, FILM COATED ORAL at 05:02

## 2024-02-13 RX ADMIN — OXYCODONE 5 MG: 5 TABLET ORAL at 09:02

## 2024-02-13 NOTE — PROGRESS NOTES
Phoned on call neurosurgery Dr.Spencer Frederick and notified of pt status. At present, pt c/o heaviness to L arm and hand and numbness. Pt approx 35 mins ago displayed slurred speech but this has resolved. Pt also c/o mild headache to R side rated at a 3/10.  reports it is OK to transfer pt to her IP room and he will be by to see her as soon as he can. He reports he does not believe there is any shunt malfunction but reports he will come by and see pt and if he determines this could be an issue he will do scans to rule it out. This RN notified pt and her family to which they both verbalized agreement in transferring to floor for the time being.

## 2024-02-13 NOTE — PROGRESS NOTES
Pt reports her pain feels better and she feels like she needs to have a BM. Pt ambulated with standby assistance to restroom without difficulty. Gait steady and even. Pt able to void and wipe self without assistance. She then ambulated back to bed with no difficulties.

## 2024-02-13 NOTE — PLAN OF CARE
Pt involved in plan of care and communicating needs throughout shift.  Up in room and to bedside commode w/ 1 person assist; c/o abd and pelvis pain PRN and scheduled pain meds given. Tolerating diet, voiding without difficulty. All VSS; no acute events so far this shift.  Pt remaining free from falls or injury throughout shift; bed locked and in lowest position; call light within reach.  Pt instructed to call for assistance as needed.  Q1H rounding done on pt.

## 2024-02-13 NOTE — NURSING TRANSFER
Nursing Transfer Note      2/12/2024   7:43 PM    Nurse giving handoff: Lorri CONLEY  Nurse receiving handoff: Jonas CONLEY    Reason patient is being transferred: INPATIENT STATUS    Transfer To: 855    Transfer via bed    Transfer with 2L NC to O2    Transported by Lorri CONLEY, Viji Mccann tech    Transfer Vital Signs:  Blood Pressure:137/83  Heart Rate:86  O2:99  Temperature:98.2  Respirations:17    4eyes on Skin: yes    Chart send with patient: Yes    Notified: spouse    Patient reassessed at: 02/12/2024 1940 (date, time)  1  Upon arrival to floor: patient oriented to room, call bell in reach, and bed in lowest position. Pt is AAOX4, VSS, No S/S of acute distress. Visualized surgical site with oncoming RN. Pt ambulated and transferred to her new bed without assistance. Reviewed pts mentation, physical mobility, and pain status at bedside. Pt sitting in bed with her cell phone in her hand, and  at bedside.
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
## 2024-02-13 NOTE — HPI
37 yo with PMH IIH s/p VPS placed in 2022 hakim is set at 60. NSGY Consulted after pt underwent a robotic hysterectomy today and woke up with numbness in her left hand and forearm.

## 2024-02-13 NOTE — PROGRESS NOTES
now at bedside with pt evaluating for L arm heaviness, previously slurred speech, and pt not feeling well. Pt no longer having slurred speech, and pt reports her unwell feeling has gotten better. She is awake, alert, and oriented and actively participating in her care. All extremities moving equal and evenly, no deficits found. Per  pt is OK to transfer to floor.

## 2024-02-13 NOTE — PROGRESS NOTES
Progress Note  Gynecology    Admit Date: 2024  LOS: 1    Reason for Admission:  Status post laparoscopic hysterectomy    SUBJECTIVE:     Sonia Solorzano is a 38 y.o.  who is POD#1 Day Post-Op  s/p s/p RA-TLH/BS/RO/SLND  for the treatment of complex endometrial hyperplasia w/ atypia.    Pt doing well this morning. Patient did not receive narcotic pain medicine overnight. Pain minimally relived with scheduled pain medication. She is tolerating a regular diet without N/V. Voiding spontaneously via purewick due to weakness following surgery. She is not yet having bowel movements.    OBJECTIVE:     Vital Signs   Temp:  [97.5 °F (36.4 °C)-98.6 °F (37 °C)] 98.2 °F (36.8 °C)  Pulse:  [68-87] 68  Resp:  [15-20] 18  SpO2:  [94 %-100 %] 96 %  BP: ()/(53-89) 118/57      Intake/Output Summary (Last 24 hours) at 2024 0537  Last data filed at 2024 1910  Gross per 24 hour   Intake 2420 ml   Output 960 ml   Net 1460 ml       Physical Exam:  Gen: A&Ox3, NAD  CV: Regular rate  Pulm: Normal respiratory effort  Abd: soft, non-distended, appropriately tender to palpation without rebound or guarding  Inc: clean, dry, intact   Ext: No peripheral edema, TEDs/SCDs in place     Laboratory:  Lab Results   Component Value Date    WBC 6.89 2024    HGB 11.8 (L) 2024    HCT 38.3 2024    MCV 83 2024     2024         BMP  Lab Results   Component Value Date     2024    K 4.3 2024     2024    CO2 26 2024    BUN 11 2024    CREATININE 0.9 2024    CALCIUM 10.3 2024    ANIONGAP 10 2024    EGFRNORACEVR >60 2024     Lab Results   Component Value Date    ALT 29 2023    AST 12 2023    ALKPHOS 88 2023    BILITOT 0.3 2023         ASSESSMENT/PLAN:     Active Hospital Problems    Diagnosis  POA    *S/p RA-TLH/BS/RO/SLND [Z90.710]  Unknown    Complex endometrial hyperplasia with atypia [N85.02]  Yes     Numbness and tingling in left hand [R20.0, R20.2]  Yes      Resolved Hospital Problems   No resolved problems to display.       Assessment: 38 y.o.  s/p RA-TLH/BS/RO/SLND    Plan:   1. Post-op   - Routine post-op advances  - Continue PRN pain medications. Oxy 5/10 for severe pain.   - Encourage ambulation   - UOP adequate. Plan to d/c pure wick.  - Regular diet.  - Antiemetics prn nausea/vomiting.    2. HTN  - Hydral PRN    3. HLD  - continue home statin    4. KERI  - CPAP at home    5. Asthma  - Albuterol PRN    6. Mood disorder  - mood stable  - no acute needs    7. Migraines  - Asymptomatic  - nurtec held    8. IIH  - S/p  shunt     Dispo: As patient meets appropriate post-op milestones, plan for discharge to home today if pain is controlled.     Alex Arauz MD  OBGYN PGY-2

## 2024-02-13 NOTE — PLAN OF CARE
Pt slept well this shift. A&Ox4. VSS. Medicated for pain per MAR. Laparoscopic sites dermabond x4, tegaderm & gauze x1 CDI; midline site bruised but within marks still. No hematoma noted. No bleeding noted on abhilash pad. 2 L NC in place while sleeping d/t pt not wearing cpap. Safety precautions in place. Call light in reach. No further concerns noted at this time.    Problem: Adult Inpatient Plan of Care  Goal: Plan of Care Review  Outcome: Ongoing, Progressing  Goal: Patient-Specific Goal (Individualized)  Outcome: Ongoing, Progressing  Goal: Absence of Hospital-Acquired Illness or Injury  Outcome: Ongoing, Progressing  Goal: Optimal Comfort and Wellbeing  Outcome: Ongoing, Progressing  Goal: Readiness for Transition of Care  Outcome: Ongoing, Progressing     Problem: Bariatric Environmental Safety  Goal: Safety Maintained with Care  Outcome: Ongoing, Progressing     Problem: Infection  Goal: Absence of Infection Signs and Symptoms  Outcome: Ongoing, Progressing

## 2024-02-13 NOTE — PROGRESS NOTES
"Pt  came to nurses station and reports "she feels weird." This RN reported to bedside and assesed pt. Pt c/o numbness and heaviness to her L arm and hand, she has a slur with her speech, but actively participating in conversation. Pt able to answer questions and mentation is intact. She is AAOX4. Current VSS. Hand  are equal bilaterally and pt is currently pushing herself up in bed without difficulty utilizing both arms. She crossed her legs and adjusted her body without difficulty. Assessed mobility and no deficits found.  Pt states she all of a sudden "feels weird" and does not know why. She asks, "is it happening again?" This RN asked exactly what did she mean, pt states this is how she felt before she had her shunt placed. Pt was able to verbalize that last week she had an annual follow up with her neuro team who said there were no issues with her shunt at that time. Pt then reports she has a R sided headache rated at a 3/10 currently. Pt verbalized her anxiety that she thinks her shunt could be messed up. Pt VSS at this time, HR 70's and NSR- no sign of abnormality. Pt able to state her name, , todays date, and what surgery she had. She is able to go into detail on her past medical history. Pt slur appears to be resolving the more she speaks. Pt denies numbness or tingling to any other extremity but remains that her LUE is numb. She is able to feel this RN touching her arm but states it feels heavy. No discoloration or visible abnormalities noted. This RN remained continuosly at bedside and phoned  with anesthesia about pt current status.  reports she will be coming to bedside ASAP. Notified  and - currently waiting for response.   "

## 2024-02-13 NOTE — PROGRESS NOTES
Phoned  with neurology, was notified that this team is not in house and to change for a consult to neurosurgery. Spoke with  and it is ok to DC neurology order and place order to consult neurosurgery.
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
## 2024-02-13 NOTE — PROGRESS NOTES
Dr. Martin Frederick with Neurosurgery at bedside evaluating patient with thorough assessment. Per his assessment she is OK to be transferred to the floor with no acute abnormalities. Pt VSS, AAOX4, all extremities move strongly without difficulty. Pt states her concern about her blood pressure, but pt current BP is improved from her admitting pre op BP. Pt verbalized agreement to be transferred to her inpatient room.

## 2024-02-13 NOTE — ASSESSMENT & PLAN NOTE
37 yo with PMH IIH s/p VPS placed in 2022 hakim is set at 60. NSGY Consulted after pt underwent a robotic hysterectomy today and woke up with numbness in her left hand and forearm. Likely due to positioning of surgery    Recommendations:  -Pt evaluated by NSGY at bedside.  -Admitted to OBGYN for pain control  -No acute neurosurgical intervention at this time  -NSGY will sign off at this time.     Discussed with Dr. Mitchell

## 2024-02-13 NOTE — PROGRESS NOTES
at bedside assessing pt. Pt actively participating in conversation and assessment. Per anesthesia pt should be evaluated by surgery team to determine next steps. Per anesthesia this is not noted to be a stroke and does not appear to be of anesthetic complication. OK to wait for surgery teams response.     Response from  and Parth SHEPPARD to consult neurology.  reports she will be at bedside soon as possible.     This RN remained at bedside with pt. Pt is still AAOX4, VSS, sitting up right in bed and talking with staff. She is tolerating PO fluids and actively voiding into purewick.

## 2024-02-14 ENCOUNTER — PATIENT OUTREACH (OUTPATIENT)
Dept: ADMINISTRATIVE | Facility: CLINIC | Age: 39
End: 2024-02-14
Payer: COMMERCIAL

## 2024-02-14 NOTE — NURSING
Pt discharged to home per MD order. Discharge instructions and prescriptions explained to pt, will pick prescriptions from [facility] in slide. IV d/c.  Pt ambulating in room with steady gait; tolerating regular diet; voiding without difficulty; pain well-controlled with PO pain meds.  All VSS; O2 sats WNL. Pt left floor by wheelchair via hospital transportation; accompanied by .

## 2024-02-14 NOTE — DISCHARGE SUMMARY
Discharge Summary  Gynecology      Admit Date: 2/12/2024    Discharge Date and Time: 2/13/2024     Attending Physician: Kirsten Weaver MD    Principal Diagnoses: Status post laparoscopic hysterectomy    Active Hospital Problems    Diagnosis  POA    *S/p RA-TLH/BS/RO/SLND [Z90.710]  No    Complex endometrial hyperplasia with atypia [N85.02]  Yes    Numbness and tingling in left hand [R20.0, R20.2]  Yes      Resolved Hospital Problems   No resolved problems to display.       Procedures: Procedure(s) (LRB):  XI ROBOTIC HYSTERECTOMY (N/A)  XI ROBOTIC SALPINGECTOMY (Bilateral)  MAPPING, LYMPH NODE, SENTINEL (Bilateral)  BIOPSY, LYMPH NODE (Bilateral)  XI ROBOTIC OOPHORECTOMY (Right)    Discharged Condition: good    Hospital Course:   Consents were signed with patient. Patient was transported to OR for procedure(s) listed above. Please see OP note for further details. Tolerated procedure well and patient was taken to recovery in a stable condition. Patient was admitted overnight for pain control. Prior to discharge patient was able to void, ambulate, tolerate PO and pain was controlled with PO meds. Patient was given routine post-op instructions for which patient voiced understanding. Patient was subsequently discharged.    Consults: None    Significant Diagnostic Studies:  Recent Labs   Lab 02/13/24  0856   WBC 13.13*   HGB 10.6*   HCT 34.7*   MCV 83           Treatments:   1. Surgery as above    Disposition: Home or Self Care    Patient Instructions:   Current Discharge Medication List        START taking these medications    Details   acetaminophen (TYLENOL) 650 MG TbSR Take 1 tablet (650 mg total) by mouth every 6 to 8 hours as needed (Alternate every 3 hours with ibuprofen).  Qty: 60 tablet, Refills: 1      ibuprofen (ADVIL,MOTRIN) 600 MG tablet Take 1 tablet (600 mg total) by mouth every 6 to 8 hours as needed for Pain (Alternate every 3 hours with Tylenol.).  Qty: 60 tablet, Refills: 1      oxyCODONE  (ROXICODONE) 5 MG immediate release tablet Take 1 tablet (5 mg total) by mouth every 4 (four) hours as needed for Pain.  Qty: 15 tablet, Refills: 0    Comments: Quantity prescribed more than 7 day supply? No      senna (SENOKOT) 8.6 mg tablet Take 1 tablet by mouth daily as needed for Constipation.  Qty: 30 tablet, Refills: 0           CONTINUE these medications which have NOT CHANGED    Details   acetaminophen (TYLENOL) 500 MG tablet Take 1,000 mg by mouth every 6 (six) hours as needed for Pain.      pyridoxine, vitamin B6, (B-6) 50 MG Tab Take 1 tablet (50 mg total) by mouth once daily.  Qty: 90 tablet, Refills: 3    Associated Diagnoses: Vitamin B6 deficiency      rimegepant (NURTEC) 75 mg odt Take 1 tablet (75 mg total) by mouth daily as needed for Migraine. Place ODT tablet on the tongue; alternatively the ODT tablet may be placed under the tongue  Qty: 8 tablet, Refills: 11    Comments: Failed sumatriptan and rizatriptan  Associated Diagnoses: Intractable migraine without aura and with status migrainosus      albuterol (PROVENTIL/VENTOLIN HFA) 90 mcg/actuation inhaler Inhale 2 puffs into the lungs 4 (four) times daily.  Qty: 18 g, Refills: 2    Associated Diagnoses: Viral URI with cough      aspirin/sod bicarb/citric acid (ALISON-SELTZER ORAL) Take by mouth as needed. Reflux      atorvastatin (LIPITOR) 40 MG tablet Take 1 tablet (40 mg total) by mouth once daily.  Qty: 30 tablet, Refills: 11      diclofenac sodium (VOLTAREN) 1 % Gel Apply 2 g topically 4 (four) times daily.  Qty: 100 g, Refills: 1    Associated Diagnoses: Left knee pain, unspecified chronicity      empagliflozin (JARDIANCE) 10 mg tablet Take 1 tablet (10 mg total) by mouth once daily.  Qty: 30 tablet, Refills: 11    Associated Diagnoses: PCOS (polycystic ovarian syndrome)      loperamide HCl (IMODIUM A-D ORAL) Take by mouth as needed. diarrhea      losartan (COZAAR) 25 MG tablet Take 1 tablet (25 mg total) by mouth once daily.  Qty: 90 tablet,  Refills: 3    Comments: .      ondansetron (ZOFRAN-ODT) 4 MG TbDL Take 1 tablet (4 mg total) by mouth every 6 (six) hours as needed (nausea). Allow to melt on tongue  Qty: 10 tablet, Refills: 0    Associated Diagnoses: Viral URI with cough      spironolactone (ALDACTONE) 25 MG tablet Take 1 tablet (25 mg total) by mouth once daily.  Qty: 30 tablet, Refills: 11    Comments: .             Discharge Procedure Orders   Diet general     Lifting restrictions   Order Comments: LIFTING:  No lifting greater than 15 pounds for six weeks.     PELVIC REST:  No douching, tampons, or intercourse for 6 weeks.  If prescribed, vaginal estrogen cream may be used during the postoperative period.     Other restrictions (specify):   Order Comments: DRIVING:  No driving while on narcotics. Driving may be resumed initially with a competent passenger one to two weeks after surgery if no longer taking narcotics.     EXERCISE:  For six weeks your exercise should be limited to walking. You may walk as far as you wish, as long as you increase your level of exertion gradually and avoid slippery surfaces. You may climb stairs as needed to get around, but should not use stair climbing for exercise.     Leave dressing on - Keep it clean, dry, and intact until clinic visit     Remove dressing in 24 hours   Order Comments: If you have a bandage on wound, you may remove it the day after dismissal.  If you had steri-strips remove them once they begin to peel off (usually 2 weeks). Keep incision clean and dry.  Inspect the incision daily for signs and symptoms of infection.     Call MD for:  temperature >100.4     Call MD for:  persistent nausea and vomiting     Call MD for:  severe uncontrolled pain     Call MD for:  difficulty breathing, headache or visual disturbances     Call MD for:  redness, tenderness, or signs of infection (pain, swelling, redness, odor or green/yellow discharge around incision site)     Call MD for:  hives     Call MD for:    Order Comments: inability to void,urine is ketchup colored or you have large clots, vaginal bleeding is heavier than a period.    VAGINAL DISCHARGE: You may develop a vaginal discharge and intermittent vaginal spotting after surgery and up to 6 weeks postoperatively.  The discharge may have an odor and may change in color but it is normal.  This is due to dissolving stiches.  Contact your surgical team if you develop vaginal or vulvar irritation along with a discharge.  Also contact your surgical team if you have vaginal discharge that smells like urine or stool.    PAIN MEDICATIONS:     Take your pain medications as instructed. It is best to take pain medications before your pain becomes severe. This will allow you to take less medication yet have better pain relief. For the first 2 or 3 days it may be helpful to take your pain medications on a regular schedule (e.g. every 4 to 6 hours). This will help you to keep your pain under better control. You should then begin to take fewer medications each day until you no longer need them. Do not take pain medication on an empty stomach. This may lead to nausea and vomiting.    CONSTIPATION REMEDIES: Patients are often constipated after surgery or with use of oral narcotic medicine. You should continue to take the stool softener, Senokot-S during the next six weeks, and consume adequate amounts of water.  If you have not had a bowel movement for 3 days after dismissal, or are uncomfortable and unable to pass stool, please try one or all of the following measures:  1.  Milk of Magnesia - 30 cc by mouth every 12 hours   2.  Dulcolax suppository - One suppository per rectum every 4-6 hours   3.  Metamucil, Fibercon or other bulk former - use as directed  4.  Fleets Enema  5.  Prunes or Prune juice    If you continue to have constipation after trying the above remedies, you should contact your surgical team using the contact information listed above     EKG 12-lead    Standing Status: Future Number of Occurrences: 1 Standing Exp. Date: 01/25/25     Type & Screen   Standing Status: Future Number of Occurrences: 1 Standing Exp. Date: 03/25/25     Activity as tolerated   Order Comments: Return to normal activity slowly as you feel able.  For 6 weeks your exercise should be limited to walking.  You may walk as far as you wish, as long as you increase your level of exertion gradually and avoid slippery surfaces.     Shower on day dressing removed (No bath)   Order Comments: You may shower at any time but should avoid immersing any abdominal incisions in water for at least 2 weeks after surgery or until the wound is completely healed.  If given, please shower with Hibaclens soap until bottle is completely finish         Alex Arauz MD  OBGYN PGY-2

## 2024-02-14 NOTE — PROGRESS NOTES
C3 nurse spoke with Sonia Solorzano for a TCC post hospital discharge follow up call. The patient does not have a scheduled HOSFU appointment with Dorian Guerrero MD within 5-7 days post hospital discharge date 02/13/24. C3 nurse was unable to schedule HOSFU appointment in [facility].    Message sent to PCP staff requesting they contact patient and schedule follow up appointment.      Declined C3 nurse to schedule HOSPFU in [facility] and Declined Home NP visit. Message routed to PCP for a HOSPFU with [facility] PCP within 5-7 days of d/c. Instructed patient to call for HOSPFU. Stated understanding.

## 2024-02-15 ENCOUNTER — OFFICE VISIT (OUTPATIENT)
Dept: SURGERY | Facility: CLINIC | Age: 39
End: 2024-02-15
Payer: COMMERCIAL

## 2024-02-15 ENCOUNTER — TELEPHONE (OUTPATIENT)
Dept: GYNECOLOGIC ONCOLOGY | Facility: CLINIC | Age: 39
End: 2024-02-15
Payer: COMMERCIAL

## 2024-02-15 VITALS
BODY MASS INDEX: 46.03 KG/M2 | HEART RATE: 88 BPM | DIASTOLIC BLOOD PRESSURE: 74 MMHG | TEMPERATURE: 98 F | WEIGHT: 293 LBS | OXYGEN SATURATION: 95 % | SYSTOLIC BLOOD PRESSURE: 150 MMHG

## 2024-02-15 DIAGNOSIS — E04.1 THYROID NODULE: ICD-10-CM

## 2024-02-15 DIAGNOSIS — Z98.2 S/P VP SHUNT: ICD-10-CM

## 2024-02-15 DIAGNOSIS — E21.3 HYPERPARATHYROIDISM: Primary | ICD-10-CM

## 2024-02-15 DIAGNOSIS — E21.0 PRIMARY HYPERPARATHYROIDISM: ICD-10-CM

## 2024-02-15 PROCEDURE — 99999 PR PBB SHADOW E&M-EST. PATIENT-LVL IV: CPT | Mod: PBBFAC,,, | Performed by: STUDENT IN AN ORGANIZED HEALTH CARE EDUCATION/TRAINING PROGRAM

## 2024-02-15 PROCEDURE — 99215 OFFICE O/P EST HI 40 MIN: CPT | Mod: S$GLB,,, | Performed by: STUDENT IN AN ORGANIZED HEALTH CARE EDUCATION/TRAINING PROGRAM

## 2024-02-15 PROCEDURE — 1111F DSCHRG MED/CURRENT MED MERGE: CPT | Mod: CPTII,S$GLB,, | Performed by: STUDENT IN AN ORGANIZED HEALTH CARE EDUCATION/TRAINING PROGRAM

## 2024-02-15 PROCEDURE — 3008F BODY MASS INDEX DOCD: CPT | Mod: CPTII,S$GLB,, | Performed by: STUDENT IN AN ORGANIZED HEALTH CARE EDUCATION/TRAINING PROGRAM

## 2024-02-15 PROCEDURE — 3078F DIAST BP <80 MM HG: CPT | Mod: CPTII,S$GLB,, | Performed by: STUDENT IN AN ORGANIZED HEALTH CARE EDUCATION/TRAINING PROGRAM

## 2024-02-15 PROCEDURE — 3077F SYST BP >= 140 MM HG: CPT | Mod: CPTII,S$GLB,, | Performed by: STUDENT IN AN ORGANIZED HEALTH CARE EDUCATION/TRAINING PROGRAM

## 2024-02-15 PROCEDURE — 4010F ACE/ARB THERAPY RXD/TAKEN: CPT | Mod: CPTII,S$GLB,, | Performed by: STUDENT IN AN ORGANIZED HEALTH CARE EDUCATION/TRAINING PROGRAM

## 2024-02-15 NOTE — TELEPHONE ENCOUNTER
"POD3, pt contacted navigator with update that she began having rectal bleeding today. Pt noted blood in her stool and also bleeding when she wiped. Pt denies that the bleeding is vaginal. Pt reports having a BM on Tuesday, Wednesday and today but just noted the bleeding today. Pt reports incisional soreness and pain. She is taking Motrin, Tylenol and oxycodone with a small amount of relief, however pt states it "doesn't really help". Pt denies any other associated symptoms including fever. Pt informed that Dr Weaver would be updated on her rectal bleeding and our office would contact her back with guidance.   "

## 2024-02-16 ENCOUNTER — PATIENT MESSAGE (OUTPATIENT)
Dept: GYNECOLOGIC ONCOLOGY | Facility: CLINIC | Age: 39
End: 2024-02-16
Payer: COMMERCIAL

## 2024-02-16 ENCOUNTER — TELEPHONE (OUTPATIENT)
Dept: GYNECOLOGIC ONCOLOGY | Facility: CLINIC | Age: 39
End: 2024-02-16
Payer: COMMERCIAL

## 2024-02-16 DIAGNOSIS — K58.9 IRRITABLE BOWEL SYNDROME, UNSPECIFIED TYPE: Primary | ICD-10-CM

## 2024-02-16 RX ORDER — MESALAMINE 1000 MG/1
500 SUPPOSITORY RECTAL 2 TIMES DAILY
Qty: 30 SUPPOSITORY | Refills: 11 | Status: SHIPPED | OUTPATIENT
Start: 2024-02-16 | End: 2024-05-09

## 2024-02-16 NOTE — ASSESSMENT & PLAN NOTE
Primary hyperparathyroidism with hypercalcemia, hypercalciuria, nephrolithiasis, and young age.  Imaging studies non-localizing and 4DCT likely not an option due to allergic reaction to contrast media in the past. Discussed need for four gland exploration in the absence of localization.    - OR for parathyroidectomy with intraoperative PTH monitoring  - Will need perioperative antibiotics due to the presence of a  shunt  - Recommend 5000 U daily of D3, available over the counter  - 4D CT neck imaging for assisted localization would be ideal. However, patient is reporting a history of anaphylaxis with dye. Positive localization is not needed nor does negative imaging preclude surgery for primary hyperparathyroidism. We will hold off on CT imaging and plan for bilateral exploration.   - She has an appointment with allergy medicine in March, will await their clinic visit to see if she may be able to tolerate a contrast load to have a CT performed for localization purposes.   - Defer necessity of DEXA imaging to endocrinology  - Maintain adequate hydration and avoid the use of calcium supplements    
Thorough discussion today about presence of thyroid nodules and implications of surveillance versus surgery.  Presence of thyroid nodules, particularly with benign cytology of the dominant left nodule and benign radiographic appearance of the contralateral nodule.  Patient expressed concern and anxiety related to most recent thyroid biopsy and does not want to undergo biopsies in the future.  Family history unclear, parathyroid carcinoma is exceedingly rare and there does not appear to be a strong family history of thyroid disease to warrant further testing or prophylactic thyroid surgery.    - Currently no strong indication for total thyroidectomy and I do not recommend prophylactic total thyroidectomy.  - Should FNA biopsies of the thyroid be warranted in the future, can consider anxiolytics or coordinate biopsy with sedation.    
Will ensure anesthesia and perioperative team aware of  shunt presence.  
normal...

## 2024-02-16 NOTE — TELEPHONE ENCOUNTER
"POD4 Pt contacted navigator again to update Dr Weaver that she noticed pea sized clots in her BM this morning. Pt reports that she is currently having 4-5 BMs a day and they are "soft to diarrhea-like" in consistency. Pt reports currently taking her stool softener, her "regular meds", tylenol, Motrin and Oxycodone. Pt reports trying to hold off on her pain medications. Pt denies fever. Pt questioned on her bowel history and pt reports that she does have a history of hemorrhoids and her Gastroenterologist is Dr Patito Vergara. Pt reports "it has been awhile since I have seen her though" Pt informed that Dr Weaver would be updated and pt contacted back with feedback/guidance.       "

## 2024-02-16 NOTE — TELEPHONE ENCOUNTER
Pt updated that Dr Weaver has been in the OR all day and has not had the opportunity to review or provide feedback on her care. Our PA, Huma, did review her symptoms and recommends that she continue to keep her BMs soft with the softeners. She would also advise her to utilize the Tylenol and not the ibuprofen to assist with her pain. Pt should continue to monitor her BMs and bleeding. If pt begins having a gross amount of blood, spikes a fever, begins having severe abdominal pain or vomiting then she should proceed to the ED for evaluation. Pt expressed concern over stool softeners as now she feels she is having diarrhea with blood. Pt tld she can back off the softeners but do not allow her BMs to become so formed that she is straining or constipated. Pt encouraged to hydrate. Will contact pt on Monday to obtain update on her symptoms.

## 2024-02-17 NOTE — TELEPHONE ENCOUNTER
Spoke with patient. Reviewed diarrhea photo with blood in stool. She denies fever, N/V. Pain is manageable, using tylenol and ibuprofen. She has a history of IBS and hemorrhoids. Will hold ibuprofen for now. I sent rx in for mesalamine suppositories and gave her precautions for over the weekend.

## 2024-02-19 LAB
FINAL PATHOLOGIC DIAGNOSIS: NORMAL
GROSS: NORMAL
Lab: NORMAL

## 2024-02-19 NOTE — PHYSICIAN QUERY
PT Name: Sonia Solorzano  MR #: [id number]    DOCUMENTATION CLARIFICATION     CDS/: SHANE Luna,RNC-MNN         Contact information:[email or web address]  This form is a permanent document in the medical record.     Query Date: February 19, 2024    By submitting this query, we are merely seeking further clarification of documentation.  Please utilize your independent clinical judgment when addressing the question(s) below.    The medical record contains the following:  Pathology Findings Location in Medical Record   1. 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  See  synoptic report:  Procedure:  Total hysterectomy and bilateral salpingo-oophorectomy  Tumor size:  There are a few foci measuring 2-3 mm.  Histologic type:  Endometrioid carcinoma, NOS  Histologic grade:  FIGO grade 1  Myometrial invasion:  Not identified  Adenomyosis:  Present, uninvolved by carcinoma  Uterine serosa involvement:  Not identified  Cervical stromal involvement: Not identified  Other tissue or organ involvement:  None involved  Peritoneal/ascitic fluid:  Not submitted/unknown  Lymph-vascular invasion:  Not identified  Margins:  The margins of resection are negative for carcinoma  Regional lymph nodes:  A total of 9 bilateral pelvic sentinel lymph nodes (0/9) are identified with no evidence of metastatic carcinoma.  See below  Distant metastasis:  Not applicable  Special studies:  Immunostains for both ER and MA are positive with approximately 50% of tumor cells staining with moderate intensity.  Immunostains for PMS2, MSH2, MSH6 and MLH1 all show intact nuclear staining.  The controls stain appropriately.  Additional findings:  The right ovary has a hemorrhagic corpus luteum cyst.  The left and right fallopian tubes  have benign paratubal cysts  Tumor stage: pT1a Nx   FIGO IA   Pathology report 2/12       Please clarify the pathology findings.  [  X] Pathology findings noted above are ruled in/confirmed as diagnoses   [  ] Pathology findings noted above are not confirmed as diagnoses   [  ] Pathology findings noted above are incidental   [  ] Other diagnosis (please specify): ___________   [  ] Clinically Undetermined     Please document in your progress notes daily for the duration of treatment until resolved and include in your discharge summary.    Form No. [id number]

## 2024-02-19 NOTE — OPERATIVE NOTE ADDENDUM
Certification of Assistant at Surgery       Surgery Date: 2/12/2024     Participating Surgeons:  Surgeon(s) and Role:     * Kirsten Weaver MD - Primary     * Alex Arauz MD - Resident - Assisting    Procedures:  Procedure(s) (LRB):  XI ROBOTIC HYSTERECTOMY (N/A)  XI ROBOTIC SALPINGECTOMY (Bilateral)  MAPPING, LYMPH NODE, SENTINEL (Bilateral)  BIOPSY, LYMPH NODE (Bilateral)  XI ROBOTIC OOPHORECTOMY (Right)    Assistant Surgeon's Certification of Necessity:  I understand that section 1842 (b) (6) (d) of the Social Security Act generally prohibits Medicare Part B reasonable charge payment for the services of assistants at surgery in teaching hospitals when qualified residents are available to furnish such services. I certify that the services for which payment is claimed were medically necessary, and that no qualified resident was available to perform the services. I further understand that these services are subject to post-payment review by the Medicare carrier.      Huma Carrasco PA-c    02/19/2024  3:00 PM

## 2024-02-20 LAB
CYTOLOGY TISS FNA DOC CYTO: NORMAL
DX ICD CODE: NORMAL
FNA PERFORMED BY: NORMAL
PATH REPORT.SITE OF ORIGIN SPEC: NORMAL
PATH REPORT.SITE OF ORIGIN SPEC: NORMAL
PATHOLOGIST NAME: NORMAL

## 2024-02-22 ENCOUNTER — OFFICE VISIT (OUTPATIENT)
Dept: FAMILY MEDICINE | Facility: CLINIC | Age: 39
End: 2024-02-22
Payer: COMMERCIAL

## 2024-02-22 VITALS
SYSTOLIC BLOOD PRESSURE: 138 MMHG | OXYGEN SATURATION: 97 % | HEIGHT: 67 IN | TEMPERATURE: 98 F | WEIGHT: 289.88 LBS | HEART RATE: 113 BPM | BODY MASS INDEX: 45.5 KG/M2 | DIASTOLIC BLOOD PRESSURE: 82 MMHG

## 2024-02-22 DIAGNOSIS — U07.1 COVID-19 VIRUS DETECTED: ICD-10-CM

## 2024-02-22 DIAGNOSIS — U07.1 COVID-19: Primary | ICD-10-CM

## 2024-02-22 DIAGNOSIS — Z20.822 EXPOSURE TO COVID-19 VIRUS: ICD-10-CM

## 2024-02-22 DIAGNOSIS — R05.3 PERSISTENT COUGH: ICD-10-CM

## 2024-02-22 LAB
CTP QC/QA: YES
SARS-COV-2 RDRP RESP QL NAA+PROBE: POSITIVE

## 2024-02-22 PROCEDURE — 99999 PR PBB SHADOW E&M-EST. PATIENT-LVL V: CPT | Mod: PBBFAC,,, | Performed by: FAMILY MEDICINE

## 2024-02-22 PROCEDURE — 1159F MED LIST DOCD IN RCRD: CPT | Mod: CPTII,S$GLB,, | Performed by: FAMILY MEDICINE

## 2024-02-22 PROCEDURE — 99213 OFFICE O/P EST LOW 20 MIN: CPT | Mod: S$GLB,,, | Performed by: FAMILY MEDICINE

## 2024-02-22 PROCEDURE — 1160F RVW MEDS BY RX/DR IN RCRD: CPT | Mod: CPTII,S$GLB,, | Performed by: FAMILY MEDICINE

## 2024-02-22 PROCEDURE — 87635 SARS-COV-2 COVID-19 AMP PRB: CPT | Mod: QW,S$GLB,, | Performed by: FAMILY MEDICINE

## 2024-02-22 PROCEDURE — 1111F DSCHRG MED/CURRENT MED MERGE: CPT | Mod: CPTII,S$GLB,, | Performed by: FAMILY MEDICINE

## 2024-02-22 PROCEDURE — 3008F BODY MASS INDEX DOCD: CPT | Mod: CPTII,S$GLB,, | Performed by: FAMILY MEDICINE

## 2024-02-22 PROCEDURE — 4010F ACE/ARB THERAPY RXD/TAKEN: CPT | Mod: CPTII,S$GLB,, | Performed by: FAMILY MEDICINE

## 2024-02-22 PROCEDURE — 3079F DIAST BP 80-89 MM HG: CPT | Mod: CPTII,S$GLB,, | Performed by: FAMILY MEDICINE

## 2024-02-22 PROCEDURE — 3075F SYST BP GE 130 - 139MM HG: CPT | Mod: CPTII,S$GLB,, | Performed by: FAMILY MEDICINE

## 2024-02-22 RX ORDER — PROMETHAZINE HYDROCHLORIDE AND DEXTROMETHORPHAN HYDROBROMIDE 6.25; 15 MG/5ML; MG/5ML
5 SYRUP ORAL 4 TIMES DAILY PRN
Qty: 240 ML | Refills: 0 | Status: SHIPPED | OUTPATIENT
Start: 2024-02-22 | End: 2024-05-09

## 2024-02-22 RX ORDER — NIRMATRELVIR AND RITONAVIR 300-100 MG
KIT ORAL
Qty: 30 TABLET | Refills: 0 | Status: SHIPPED | OUTPATIENT
Start: 2024-02-22 | End: 2024-02-27

## 2024-02-22 NOTE — PROGRESS NOTES
Subjective:       Patient ID: Sonia Solorzano is a 38 y.o. female.    Chief Complaint: No chief complaint on file.    Patient here for UC visit.  Onset yesterday of ST and cough - non-productive.  Since worsening fatigue, chills, SOB, HA and calvin-central chest pain with breathing and coughing.   reported some wheezing during sleep; hx of Asthma.   positive this AM and pt is Covid positive here today.        Review of Systems   Constitutional:  Positive for chills, fatigue and fever.   HENT:  Positive for sore throat.    Respiratory:  Positive for cough, shortness of breath and wheezing.    Cardiovascular:  Negative for chest pain.   Gastrointestinal:  Negative for abdominal pain and nausea.   Musculoskeletal:  Positive for arthralgias and myalgias.   Skin:  Negative for rash.   All other systems reviewed and are negative.      Objective:      Physical Exam  Vitals reviewed.   Constitutional:       General: She is not in acute distress.     Appearance: She is well-developed. She is ill-appearing. She is not toxic-appearing.   HENT:      Right Ear: Tympanic membrane normal. Tympanic membrane is not erythematous.      Left Ear: Tympanic membrane normal. Tympanic membrane is not erythematous.      Nose: Mucosal edema present.      Right Sinus: No maxillary sinus tenderness.      Left Sinus: No maxillary sinus tenderness.      Mouth/Throat:      Pharynx: Posterior oropharyngeal erythema present.   Cardiovascular:      Rate and Rhythm: Normal rate and regular rhythm.      Heart sounds: No murmur heard.  Pulmonary:      Effort: Pulmonary effort is normal.      Breath sounds: Normal breath sounds.   Musculoskeletal:      Cervical back: Neck supple.   Lymphadenopathy:      Cervical: No cervical adenopathy.   Skin:     General: Skin is warm and dry.   Neurological:      Mental Status: She is alert.         Assessment:       1. COVID-19    2. Exposure to COVID-19 virus    3. Persistent cough        Plan:        COVID-19  -     nirmatrelvir-ritonavir (PAXLOVID) 300 mg (150 mg x 2)-100 mg copackaged tablets (EUA); Take 3 tablets by mouth 2 (two) times daily. Each dose contains 2 nirmatrelvir (pink tablets) and 1 ritonavir (white tablet). Take all 3 tablets together  Dispense: 30 tablet; Refill: 0  -     promethazine-dextromethorphan (PROMETHAZINE-DM) 6.25-15 mg/5 mL Syrp; Take 5 mLs by mouth 4 (four) times daily as needed (cough).  Dispense: 240 mL; Refill: 0    Exposure to COVID-19 virus  -     POCT COVID-19 Rapid Screening    Persistent cough  -     promethazine-dextromethorphan (PROMETHAZINE-DM) 6.25-15 mg/5 mL Syrp; Take 5 mLs by mouth 4 (four) times daily as needed (cough).  Dispense: 240 mL; Refill: 0      Patient Instructions   Hold Nurtec and Lipitor for one week.      COVID vitamins:  Vitamin C 1,000 mg three times a day  Vitamin D3 5,000 IU once a day  (you should take this for life as well)  Zinc 50 mg twice a day    Aspirin 81 mg a day (clot prevention) - 2 weeks    Push fluid intake - plenty of water.     Contact your PCP if any worsening or for any new concerns as we discussed.

## 2024-02-22 NOTE — PATIENT INSTRUCTIONS
Hold Nurtec and Lipitor for one week.      COVID vitamins:  Vitamin C 1,000 mg three times a day  Vitamin D3 5,000 IU once a day  (you should take this for life as well)  Zinc 50 mg twice a day    Aspirin 81 mg a day (clot prevention) - 2 weeks    Push fluid intake - plenty of water.     Contact your PCP if any worsening or for any new concerns as we discussed.

## 2024-02-23 ENCOUNTER — TELEPHONE (OUTPATIENT)
Dept: GYNECOLOGIC ONCOLOGY | Facility: CLINIC | Age: 39
End: 2024-02-23
Payer: COMMERCIAL

## 2024-02-23 NOTE — TELEPHONE ENCOUNTER
Spoke with patient. Notified us she had COVID. We also reviewed her pathology.    Stage IA, grade 1 endometrioid type endometrial adenocarcinoma, 2-3mm tumor size, no myometrial invasion, negative LVSI, negative sentinel lymph nodes.   Low risk, no adjuvant treatment.     Questions answered. She voiced understanding.

## 2024-02-23 NOTE — TELEPHONE ENCOUNTER
"POD11, Pt contacted navigator with update that she tested positive for COIVD yesterday and not feeling well. Pt reports having SOB, cough, sore throat, tired, Fever and "all the other COVID symptoms". Pt reports her symptoms began on Feb 21st. Pt reports not taking Motrin over concerns about her previously reported rectal bleeding. However, she is taking Tylenol extra strength around the clock and that has been lowering her fever from 102 down to 100. Pt reports her PCP provided her with paxlovid  and promethazxine. Pt updated that main recommendations at this time are rest, hydration and symptom management. Dr Weaver will be updated. Pt instructed to contact navigator next Wednesday to see if her Thursday post-op appointment needs to be adjusted. Pt states "I have seen my pathology  and I am anxious to review it with Dr Weaver." Pt updated that a virtual or call can be arranged to discuss pathology if an in person visit is not the safety option.   "

## 2024-02-26 NOTE — TELEPHONE ENCOUNTER
Called patient and she stated that she has left messages with main campus and imaging there about the MRI but no one has called her back. Informed patient that she had been scheduled on 8/3/22 at 12 and 12:30. It shows Anesthesia. She inquired about any special requirements before procedure and I informed patient that I was not sure but if like a procedure done here in the surgery center, the patient is contacted the say before with instructions. Patient verbalized understanding and appreciation for at least calling her back.      
1-2 cups/cans per day

## 2024-02-29 ENCOUNTER — OFFICE VISIT (OUTPATIENT)
Dept: GYNECOLOGIC ONCOLOGY | Facility: CLINIC | Age: 39
End: 2024-02-29
Payer: COMMERCIAL

## 2024-02-29 ENCOUNTER — PATIENT MESSAGE (OUTPATIENT)
Dept: HEMATOLOGY/ONCOLOGY | Facility: CLINIC | Age: 39
End: 2024-02-29
Payer: COMMERCIAL

## 2024-02-29 VITALS
RESPIRATION RATE: 16 BRPM | HEIGHT: 67 IN | BODY MASS INDEX: 45.4 KG/M2 | TEMPERATURE: 97 F | OXYGEN SATURATION: 97 % | SYSTOLIC BLOOD PRESSURE: 136 MMHG | WEIGHT: 289.25 LBS | HEART RATE: 95 BPM | DIASTOLIC BLOOD PRESSURE: 84 MMHG

## 2024-02-29 DIAGNOSIS — C54.1 ENDOMETRIAL CANCER: Primary | ICD-10-CM

## 2024-02-29 PROCEDURE — 4010F ACE/ARB THERAPY RXD/TAKEN: CPT | Mod: CPTII,S$GLB,, | Performed by: OBSTETRICS & GYNECOLOGY

## 2024-02-29 PROCEDURE — 3075F SYST BP GE 130 - 139MM HG: CPT | Mod: CPTII,S$GLB,, | Performed by: OBSTETRICS & GYNECOLOGY

## 2024-02-29 PROCEDURE — 99999 PR PBB SHADOW E&M-EST. PATIENT-LVL IV: CPT | Mod: PBBFAC,,, | Performed by: OBSTETRICS & GYNECOLOGY

## 2024-02-29 PROCEDURE — 99024 POSTOP FOLLOW-UP VISIT: CPT | Mod: S$GLB,,, | Performed by: OBSTETRICS & GYNECOLOGY

## 2024-02-29 PROCEDURE — 3079F DIAST BP 80-89 MM HG: CPT | Mod: CPTII,S$GLB,, | Performed by: OBSTETRICS & GYNECOLOGY

## 2024-02-29 PROCEDURE — 1159F MED LIST DOCD IN RCRD: CPT | Mod: CPTII,S$GLB,, | Performed by: OBSTETRICS & GYNECOLOGY

## 2024-02-29 NOTE — LETTER
February 29, 2024      [facility]  [address]  [place] [address]  Phone: [phone]       Patient: Sonia Solorzano   YOB: 1985  Date of Visit: 02/29/2024    To Whom It May Concern:    Ness Solorzano  was at [facility] on 02/29/2024. The patient if fully cleared to deliver a work presentation. Sonia is also cleared to work from home until we see her at her next visit on 4/4/24. If you have any questions or concerns, or if I can be of further assistance, please do not hesitate to contact me.    Sincerely,                  Dr Kirsten Weaver

## 2024-02-29 NOTE — PROGRESS NOTES
Subjective:      Patient ID: Sonia Solorzano is a 38 y.o. female.    Chief Complaint: Post Op- 2 weeks      HPI  S/p RTLH/BS/RO/SLND 2/12/2024    Pathology reviewed.   Stage IA, grade 1 endometrioid type endometrial adenocarcinoma, 2-3mm tumor size, no myometrial invasion, negative LVSI, negative sentinel lymph nodes.   Low risk, no adjuvant treatment.     Presents today for post operative visit. Recovering appropriately from surgery. Up and about, eating, +BM.    Recovering from recent COVID.     _____________________________  Referred by Dr. Flanagan for newly diagnosed complex endometrial hyperplasia with atypia.      Pelvic US  Uterus: 10.5 x 5.4 cm  Endometrium: Normal in this pre menopausal patient, measuring 12.1 mm.  Right ovary: The right ovary is not seen.  There is a 2.8 cm cyst in the right adnexa.  Left ovary: Not seen     Endometrial sampling 1/17/2024  Fragments of endometrial tissue showing changes compatible with complex hyperplasia with focal atypia.      Prior abdominal surgeries include c/s, lsc removal endometrioma in abdomen, lsc lillie, BTL,  shunt.     Review of Systems   Constitutional:  Negative for appetite change, chills, fatigue and fever.   HENT:  Negative for mouth sores.    Respiratory:  Negative for cough and shortness of breath.    Cardiovascular:  Negative for leg swelling.   Gastrointestinal:  Negative for abdominal pain, blood in stool, constipation and diarrhea.   Endocrine: Negative for cold intolerance.   Genitourinary:  Negative for dysuria and vaginal bleeding.   Musculoskeletal:  Negative for myalgias.   Skin:  Negative for rash.   Allergic/Immunologic: Negative.    Neurological:  Negative for weakness and numbness.   Hematological:  Negative for adenopathy. Does not bruise/bleed easily.   Psychiatric/Behavioral:  Negative for confusion.        Objective:   Physical Exam:   Constitutional: She is oriented to person, place, and time. She appears well-developed and  well-nourished.    HENT:   Head: Normocephalic and atraumatic.    Eyes: Pupils are equal, round, and reactive to light. EOM are normal.    Neck: No thyromegaly present.    Cardiovascular:  Normal rate, regular rhythm and intact distal pulses.             Pulmonary/Chest: Effort normal and breath sounds normal. No respiratory distress. She has no wheezes.        Abdominal: Soft. Bowel sounds are normal. She exhibits abdominal incision. She exhibits no distension and no mass. There is no abdominal tenderness.             Musculoskeletal: Normal range of motion and moves all extremeties.      Lymphadenopathy:     She has no cervical adenopathy.        Right: No supraclavicular adenopathy present.        Left: No supraclavicular adenopathy present.    Neurological: She is alert and oriented to person, place, and time.    Skin: Skin is warm and dry. No rash noted.    Psychiatric: She has a normal mood and affect.       Assessment:     1. Endometrial cancer        Plan:   No orders of the defined types were placed in this encounter.    Recovering well from surgery.   Low risk endometrial cancer as above.   Will plan for surveillance.   RTC 4-6 weeks for follow up post op exam.       Per SGO 2017 Guidelines, for low risk (stage I, low risk histology) follow-up is every 6 months for the first year and then 6 to 12 months during the second year and yearly thereafter.  After 2 years, the patient may be followed by a generalist or gynecologic oncologist.

## 2024-02-29 NOTE — LETTER
March 19, 2024    Sonia Solorzano  [address] [facility] [address]             [facility]  [address] [place] [address]  Phone: [phone] To whom it may concern,     Ms Sonia Solorzano is cleared to testify in court on Wednesday March 20th, 2024 despite currently being on medical leave. If you have any additional questions or concerns, or if more clarity is needed, please don't hesitate to call my office at [phone].    Sincerely,        Kirsten Wevaer MD

## 2024-03-06 ENCOUNTER — OFFICE VISIT (OUTPATIENT)
Dept: HEMATOLOGY/ONCOLOGY | Facility: CLINIC | Age: 39
End: 2024-03-06
Payer: COMMERCIAL

## 2024-03-06 VITALS
SYSTOLIC BLOOD PRESSURE: 160 MMHG | TEMPERATURE: 97 F | OXYGEN SATURATION: 97 % | HEIGHT: 67 IN | BODY MASS INDEX: 45.33 KG/M2 | HEART RATE: 95 BPM | DIASTOLIC BLOOD PRESSURE: 85 MMHG | WEIGHT: 288.81 LBS | RESPIRATION RATE: 14 BRPM

## 2024-03-06 DIAGNOSIS — D50.9 IRON DEFICIENCY ANEMIA, UNSPECIFIED IRON DEFICIENCY ANEMIA TYPE: Primary | ICD-10-CM

## 2024-03-06 DIAGNOSIS — Z80.3 FAMILY HISTORY OF BREAST CANCER: ICD-10-CM

## 2024-03-06 PROCEDURE — 1111F DSCHRG MED/CURRENT MED MERGE: CPT | Mod: CPTII,S$GLB,, | Performed by: INTERNAL MEDICINE

## 2024-03-06 PROCEDURE — 99214 OFFICE O/P EST MOD 30 MIN: CPT | Mod: S$GLB,,, | Performed by: INTERNAL MEDICINE

## 2024-03-06 PROCEDURE — 1159F MED LIST DOCD IN RCRD: CPT | Mod: CPTII,S$GLB,, | Performed by: INTERNAL MEDICINE

## 2024-03-06 PROCEDURE — 4010F ACE/ARB THERAPY RXD/TAKEN: CPT | Mod: CPTII,S$GLB,, | Performed by: INTERNAL MEDICINE

## 2024-03-06 PROCEDURE — 3008F BODY MASS INDEX DOCD: CPT | Mod: CPTII,S$GLB,, | Performed by: INTERNAL MEDICINE

## 2024-03-06 PROCEDURE — 99999 PR PBB SHADOW E&M-EST. PATIENT-LVL IV: CPT | Mod: PBBFAC,,, | Performed by: INTERNAL MEDICINE

## 2024-03-06 PROCEDURE — 3077F SYST BP >= 140 MM HG: CPT | Mod: CPTII,S$GLB,, | Performed by: INTERNAL MEDICINE

## 2024-03-06 PROCEDURE — 3079F DIAST BP 80-89 MM HG: CPT | Mod: CPTII,S$GLB,, | Performed by: INTERNAL MEDICINE
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
## 2024-03-08 ENCOUNTER — TELEPHONE (OUTPATIENT)
Dept: HEMATOLOGY/ONCOLOGY | Facility: CLINIC | Age: 39
End: 2024-03-08
Payer: COMMERCIAL

## 2024-03-11 ENCOUNTER — OFFICE VISIT (OUTPATIENT)
Dept: ALLERGY | Facility: CLINIC | Age: 39
End: 2024-03-11
Payer: COMMERCIAL

## 2024-03-11 VITALS — WEIGHT: 288 LBS | HEIGHT: 67 IN | BODY MASS INDEX: 45.2 KG/M2

## 2024-03-11 DIAGNOSIS — Z91.041 CONTRAST MEDIA ALLERGY: Primary | ICD-10-CM

## 2024-03-11 DIAGNOSIS — T78.2XXD ANAPHYLAXIS, SUBSEQUENT ENCOUNTER: ICD-10-CM

## 2024-03-11 PROCEDURE — 4010F ACE/ARB THERAPY RXD/TAKEN: CPT | Mod: CPTII,S$GLB,, | Performed by: STUDENT IN AN ORGANIZED HEALTH CARE EDUCATION/TRAINING PROGRAM

## 2024-03-11 PROCEDURE — 99204 OFFICE O/P NEW MOD 45 MIN: CPT | Mod: S$GLB,,, | Performed by: STUDENT IN AN ORGANIZED HEALTH CARE EDUCATION/TRAINING PROGRAM

## 2024-03-11 PROCEDURE — 99999 PR PBB SHADOW E&M-EST. PATIENT-LVL III: CPT | Mod: PBBFAC,,, | Performed by: STUDENT IN AN ORGANIZED HEALTH CARE EDUCATION/TRAINING PROGRAM

## 2024-03-11 PROCEDURE — 1159F MED LIST DOCD IN RCRD: CPT | Mod: CPTII,S$GLB,, | Performed by: STUDENT IN AN ORGANIZED HEALTH CARE EDUCATION/TRAINING PROGRAM

## 2024-03-11 PROCEDURE — 3008F BODY MASS INDEX DOCD: CPT | Mod: CPTII,S$GLB,, | Performed by: STUDENT IN AN ORGANIZED HEALTH CARE EDUCATION/TRAINING PROGRAM

## 2024-03-11 PROCEDURE — 1111F DSCHRG MED/CURRENT MED MERGE: CPT | Mod: CPTII,S$GLB,, | Performed by: STUDENT IN AN ORGANIZED HEALTH CARE EDUCATION/TRAINING PROGRAM

## 2024-03-18 ENCOUNTER — TELEPHONE (OUTPATIENT)
Dept: GYNECOLOGIC ONCOLOGY | Facility: CLINIC | Age: 39
End: 2024-03-18
Payer: COMMERCIAL

## 2024-03-18 NOTE — TELEPHONE ENCOUNTER
Pt contacted navigator requesting a letter/note providing her permission to testify in court on Wednesday March 20th despite being on medical leave. Navigator informed pt that the request would be relayed to Dr Weaver. If Dr Weaver approves then a letter could be composed and viewable in her mychart under the letters tab. Pt verbalized understanding.

## 2024-03-19 NOTE — TELEPHONE ENCOUNTER
Pt contacted and updated that letter for clearance to testify in court was composed and viewable in her MyChart under the letters tab. Pt verbalized understanding.

## 2024-03-21 ENCOUNTER — OFFICE VISIT (OUTPATIENT)
Dept: FAMILY MEDICINE | Facility: CLINIC | Age: 39
End: 2024-03-21
Payer: COMMERCIAL

## 2024-03-21 ENCOUNTER — LAB VISIT (OUTPATIENT)
Dept: LAB | Facility: HOSPITAL | Age: 39
End: 2024-03-21
Payer: COMMERCIAL

## 2024-03-21 VITALS
RESPIRATION RATE: 16 BRPM | TEMPERATURE: 98 F | BODY MASS INDEX: 44.61 KG/M2 | OXYGEN SATURATION: 97 % | DIASTOLIC BLOOD PRESSURE: 84 MMHG | HEART RATE: 86 BPM | WEIGHT: 284.19 LBS | HEIGHT: 67 IN | SYSTOLIC BLOOD PRESSURE: 132 MMHG

## 2024-03-21 DIAGNOSIS — R10.9 ABDOMINAL CRAMPING: ICD-10-CM

## 2024-03-21 DIAGNOSIS — R19.7 DIARRHEA, UNSPECIFIED TYPE: Primary | ICD-10-CM

## 2024-03-21 DIAGNOSIS — R19.7 DIARRHEA, UNSPECIFIED TYPE: ICD-10-CM

## 2024-03-21 DIAGNOSIS — F41.9 ANXIETY: ICD-10-CM

## 2024-03-21 DIAGNOSIS — G47.00 INSOMNIA, UNSPECIFIED TYPE: ICD-10-CM

## 2024-03-21 LAB
ALBUMIN SERPL BCP-MCNC: 3.6 G/DL (ref 3.5–5.2)
ALP SERPL-CCNC: 91 U/L (ref 55–135)
ALT SERPL W/O P-5'-P-CCNC: 22 U/L (ref 10–44)
ANION GAP SERPL CALC-SCNC: 8 MMOL/L (ref 8–16)
AST SERPL-CCNC: 17 U/L (ref 10–40)
BASOPHILS # BLD AUTO: 0.04 K/UL (ref 0–0.2)
BASOPHILS NFR BLD: 0.5 % (ref 0–1.9)
BILIRUB SERPL-MCNC: 0.3 MG/DL (ref 0.1–1)
BUN SERPL-MCNC: 9 MG/DL (ref 6–20)
CALCIUM SERPL-MCNC: 10.7 MG/DL (ref 8.7–10.5)
CHLORIDE SERPL-SCNC: 106 MMOL/L (ref 95–110)
CO2 SERPL-SCNC: 27 MMOL/L (ref 23–29)
CREAT SERPL-MCNC: 0.8 MG/DL (ref 0.5–1.4)
DIFFERENTIAL METHOD BLD: ABNORMAL
EOSINOPHIL # BLD AUTO: 0.2 K/UL (ref 0–0.5)
EOSINOPHIL NFR BLD: 2.8 % (ref 0–8)
ERYTHROCYTE [DISTWIDTH] IN BLOOD BY AUTOMATED COUNT: 15.9 % (ref 11.5–14.5)
EST. GFR  (NO RACE VARIABLE): >60 ML/MIN/1.73 M^2
GLUCOSE SERPL-MCNC: 84 MG/DL (ref 70–110)
HCT VFR BLD AUTO: 42.8 % (ref 37–48.5)
HGB BLD-MCNC: 12.6 G/DL (ref 12–16)
IMM GRANULOCYTES # BLD AUTO: 0.03 K/UL (ref 0–0.04)
IMM GRANULOCYTES NFR BLD AUTO: 0.4 % (ref 0–0.5)
LYMPHOCYTES # BLD AUTO: 2.2 K/UL (ref 1–4.8)
LYMPHOCYTES NFR BLD: 26.3 % (ref 18–48)
MCH RBC QN AUTO: 25.3 PG (ref 27–31)
MCHC RBC AUTO-ENTMCNC: 29.4 G/DL (ref 32–36)
MCV RBC AUTO: 86 FL (ref 82–98)
MONOCYTES # BLD AUTO: 0.5 K/UL (ref 0.3–1)
MONOCYTES NFR BLD: 6.6 % (ref 4–15)
NEUTROPHILS # BLD AUTO: 5.2 K/UL (ref 1.8–7.7)
NEUTROPHILS NFR BLD: 63.4 % (ref 38–73)
NRBC BLD-RTO: 0 /100 WBC
PLATELET # BLD AUTO: 373 K/UL (ref 150–450)
PMV BLD AUTO: 10.5 FL (ref 9.2–12.9)
POTASSIUM SERPL-SCNC: 4.3 MMOL/L (ref 3.5–5.1)
PROT SERPL-MCNC: 7 G/DL (ref 6–8.4)
RBC # BLD AUTO: 4.98 M/UL (ref 4–5.4)
SODIUM SERPL-SCNC: 141 MMOL/L (ref 136–145)
WBC # BLD AUTO: 8.19 K/UL (ref 3.9–12.7)

## 2024-03-21 PROCEDURE — 3008F BODY MASS INDEX DOCD: CPT | Mod: CPTII,S$GLB,,

## 2024-03-21 PROCEDURE — 36415 COLL VENOUS BLD VENIPUNCTURE: CPT | Mod: PO

## 2024-03-21 PROCEDURE — 4010F ACE/ARB THERAPY RXD/TAKEN: CPT | Mod: CPTII,S$GLB,,

## 2024-03-21 PROCEDURE — 3079F DIAST BP 80-89 MM HG: CPT | Mod: CPTII,S$GLB,,

## 2024-03-21 PROCEDURE — 80053 COMPREHEN METABOLIC PANEL: CPT

## 2024-03-21 PROCEDURE — 99999 PR PBB SHADOW E&M-EST. PATIENT-LVL V: CPT | Mod: PBBFAC,,,

## 2024-03-21 PROCEDURE — 1160F RVW MEDS BY RX/DR IN RCRD: CPT | Mod: CPTII,S$GLB,,

## 2024-03-21 PROCEDURE — 1159F MED LIST DOCD IN RCRD: CPT | Mod: CPTII,S$GLB,,

## 2024-03-21 PROCEDURE — 99214 OFFICE O/P EST MOD 30 MIN: CPT | Mod: S$GLB,,,

## 2024-03-21 PROCEDURE — 3075F SYST BP GE 130 - 139MM HG: CPT | Mod: CPTII,S$GLB,,

## 2024-03-21 PROCEDURE — 85025 COMPLETE CBC W/AUTO DIFF WBC: CPT

## 2024-03-21 RX ORDER — HYDROXYZINE HYDROCHLORIDE 25 MG/1
25 TABLET, FILM COATED ORAL 3 TIMES DAILY PRN
Qty: 30 TABLET | Refills: 2 | Status: SHIPPED | OUTPATIENT
Start: 2024-03-21

## 2024-03-21 RX ORDER — DICYCLOMINE HYDROCHLORIDE 10 MG/1
10 CAPSULE ORAL
Qty: 120 CAPSULE | Refills: 0 | Status: SHIPPED | OUTPATIENT
Start: 2024-03-21 | End: 2024-04-20

## 2024-03-21 NOTE — PROGRESS NOTES
Subjective:       Patient ID: Sonia Solorzano is a 38 y.o. female.    Chief Complaint: Diarrhea and Abdominal Pain    Patient presents to the clinic with complaint of diarrhea, abdominal pain, and insomnia/anxiety.     She had a hysterectomy on 2/12/24. States a day or so after she started having diarrhea. States for a few days she did have blood in her stool but that only lasted a few days. She has continued with diarrhea and abdominal cramping/pain ever since. States eating does not worsen diarrhea. States abdominal pain is to RUQ and radiates down toward RLQ. She does not have a gallbladder.     She is allergic to contrast dye and is working with allergy for this. She is scheduled for a parathyroidectomy and needs a contrasted CT scan prior.     She also reports insomonia/anxiety since surgery. She does have a history of bipolar but has not been on medications for a while now. She is dealing with hot flashes and night sweats as well.     Patient educated on plan of care, verbalized understanding.           Diarrhea   This is a new problem. The current episode started more than 1 month ago. The problem occurs 5 to 10 times per day. The problem has been unchanged. Associated symptoms include abdominal pain. Pertinent negatives include no chills, coughing, fever or vomiting. Nothing aggravates the symptoms. She has tried nothing for the symptoms. Improvement on treatment: Imodium helped at first but is no longer helping. Her past medical history is significant for irritable bowel syndrome.   Abdominal Pain  This is a new problem. The current episode started more than 1 month ago. The problem occurs intermittently. The problem has been unchanged. The pain is located in the RUQ. Associated symptoms include diarrhea. Pertinent negatives include no constipation, dysuria, fever, frequency, nausea or vomiting. Her past medical history is significant for irritable bowel syndrome.     Review of Systems   Constitutional:   Positive for appetite change and fatigue. Negative for activity change, chills, diaphoresis and fever.        Hot flashes  Night Sweats   HENT:  Negative for congestion, ear pain, postnasal drip, sinus pressure, sneezing and sore throat.    Eyes:  Negative for pain, discharge, redness and itching.   Respiratory:  Negative for apnea, cough, chest tightness, shortness of breath and wheezing.    Cardiovascular:  Negative for chest pain and leg swelling.   Gastrointestinal:  Positive for abdominal pain and diarrhea. Negative for abdominal distention, constipation, nausea and vomiting.   Genitourinary:  Negative for difficulty urinating, dysuria, flank pain and frequency.   Skin:  Negative for color change, rash and wound.   Neurological:  Negative for dizziness.   Psychiatric/Behavioral:  Positive for dysphoric mood and sleep disturbance. The patient is nervous/anxious.    All other systems reviewed and are negative.      Patient Active Problem List   Diagnosis    BMI 45.0-49.9, adult    Obstructive sleep apnea    Hypertension    Iron deficiency anemia due to chronic blood loss    Iron deficiency anemia    Bipolar disorder, unspecified    Irritable bowel syndrome with both constipation and diarrhea    Major depressive disorder, single episode, unspecified    Unspecified asthma, uncomplicated    Migraine without aura and without status migrainosus, not intractable    IIH (idiopathic intracranial hypertension)    PCOS (polycystic ovarian syndrome)    S/P  shunt    Functional neurological symptom disorder with mixed symptoms    Hyperparathyroidism    HLD (hyperlipidemia)    Acid reflux    Fatty liver    History of COVID-19    Thyroid nodule    Vapes nicotine containing substance    Edema    Complex endometrial hyperplasia with atypia    Numbness and tingling in left hand    S/p RA-TLH/BS/RO/SLND    Family history of breast cancer       Objective:      Physical Exam  Vitals and nursing note reviewed.   Constitutional:   "     General: She is not in acute distress.     Appearance: Normal appearance. She is well-developed.   HENT:      Head: Normocephalic.      Nose: Nose normal.   Eyes:      Conjunctiva/sclera: Conjunctivae normal.      Pupils: Pupils are equal, round, and reactive to light.   Cardiovascular:      Rate and Rhythm: Normal rate and regular rhythm.      Heart sounds: Normal heart sounds.   Pulmonary:      Effort: Pulmonary effort is normal. No respiratory distress.      Breath sounds: Normal breath sounds.   Abdominal:      General: Bowel sounds are increased. There is no distension.      Palpations: Abdomen is soft.      Tenderness: There is generalized abdominal tenderness.      Hernia: No hernia is present.       Musculoskeletal:      Cervical back: Normal range of motion and neck supple.   Skin:     General: Skin is warm and dry.      Findings: No rash.   Neurological:      Mental Status: She is alert and oriented to person, place, and time.   Psychiatric:         Behavior: Behavior normal.         Lab Results   Component Value Date    WBC 13.13 (H) 02/13/2024    HGB 10.6 (L) 02/13/2024    HCT 34.7 (L) 02/13/2024     02/13/2024    CHOL 168 01/09/2024    TRIG 175 (H) 01/09/2024    HDL 40 01/09/2024    ALT 29 12/18/2023    AST 12 12/18/2023     02/13/2024    K 4.2 02/13/2024     02/13/2024    CREATININE 0.7 02/13/2024    BUN 8 02/13/2024    CO2 24 02/13/2024    TSH 2.658 06/25/2023    INR 0.9 06/25/2023    HGBA1C 5.3 12/09/2023     The ASCVD Risk score (Cy DK, et al., 2019) failed to calculate for the following reasons:    The patient has a prior MI or stroke diagnosis  Visit Vitals  /84 (BP Location: Right arm, Patient Position: Sitting, BP Method: Large (Manual))   Pulse 86   Temp 98 °F (36.7 °C) (Oral)   Resp 16   Ht 5' 7" (1.702 m)   Wt 128.9 kg (284 lb 2.8 oz)   LMP 01/11/2024 (Exact Date)   SpO2 97%   BMI 44.51 kg/m²      Assessment:       1. Diarrhea, unspecified type    2. " Abdominal cramping    3. Insomnia, unspecified type    4. Anxiety        Plan:       1. Diarrhea, unspecified type  -     CBC W/ AUTO DIFFERENTIAL; Future; Expected date: 03/21/2024  -     COMPREHENSIVE METABOLIC PANEL; Future; Expected date: 03/21/2024  -     H. pylori antigen, stool; Future; Expected date: 03/21/2024  -     Pancreatic elastase, fecal; Future; Expected date: 03/21/2024  -     Fecal fat, qualitative; Future; Expected date: 03/21/2024  -     Occult blood x 1, stool; Future; Expected date: 03/21/2024  -     WBC, Stool; Future; Expected date: 03/21/2024  -     Rotavirus antigen, stool; Future; Expected date: 03/21/2024  -     Adenovirus Molecular Detection, PCR, Non-Blood Stool; Future; Expected date: 03/21/2024  -     Calprotectin, Stool; Future; Expected date: 03/21/2024  -     Giardia / Cryptosporidum, EIA; Future; Expected date: 03/21/2024  -     Stool Exam-Ova,Cysts,Parasites; Future; Expected date: 03/21/2024  -     Clostridium difficile EIA; Future; Expected date: 03/21/2024  -     Stool culture; Future; Expected date: 03/21/2024  -     dicyclomine (BENTYL) 10 MG capsule; Take 1 capsule (10 mg total) by mouth 4 (four) times daily before meals and nightly.  Dispense: 120 capsule; Refill: 0   - Daily probiotic   - If no improvement will send to GI   - The diagnosis, treatment plan, instructions for follow-up and reevaluation as well as ED precautions were discussed and understanding was verbalized. All questions or concerns have been addressed.     2. Abdominal cramping  -     dicyclomine (BENTYL) 10 MG capsule; Take 1 capsule (10 mg total) by mouth 4 (four) times daily before meals and nightly.  Dispense: 120 capsule; Refill: 0   - The diagnosis, treatment plan, instructions for follow-up and reevaluation as well as ED precautions were discussed and understanding was verbalized. All questions or concerns have been addressed.     3. Insomnia, unspecified type  -     hydrOXYzine HCL (ATARAX) 25 MG  tablet; Take 1 tablet (25 mg total) by mouth 3 (three) times daily as needed for Anxiety.  Dispense: 30 tablet; Refill: 2    4. Anxiety  -     hydrOXYzine HCL (ATARAX) 25 MG tablet; Take 1 tablet (25 mg total) by mouth 3 (three) times daily as needed for Anxiety.  Dispense: 30 tablet; Refill: 2       Follow up if symptoms worsen or fail to improve.      Future Appointments       Date Provider Specialty Appt Notes    4/3/2024 Rober Porter MD Allergy spt-Contrast    4/3/2024 Ariane Botello, [facility] Family Medicine 2 week follow up    4/4/2024 Kirsten Weaver MD Gynecologic Oncology 6 weeks post op    4/16/2024 Emile Anglin DNP Hematology and Oncology family history of breast cancer    4/25/2024 Raiza Epperson MD Surgery Pre-Op 5/17 4/26/2024 Melchor Barriga MD Hematology and Oncology VV/DARRION/Labs/3mfu    6/18/2024 ALEX Turcios PA-C Endocrinology f/u per Abigail (GD)    7/16/2024 Dorian Guerrero MD Family Medicine 6 months f/u

## 2024-03-21 NOTE — PATIENT INSTRUCTIONS
Thank you for allowing me to be part of your healthcare team at [facility]. It is a pleasure to care for you today.   Please take all of your medications as instructed and follow all new instructions from your visit today.  If you received labs or medical tests today you should hear information about results or scheduling either by phone or mychart within approximately a week.   If you have any questions or concerns please do not hesitate to call. Have a blessed day and I hope to see you again soon.  JUAREZ Mcneil
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
## 2024-04-03 ENCOUNTER — OFFICE VISIT (OUTPATIENT)
Dept: ALLERGY | Facility: CLINIC | Age: 39
End: 2024-04-03
Payer: COMMERCIAL

## 2024-04-03 ENCOUNTER — OFFICE VISIT (OUTPATIENT)
Dept: FAMILY MEDICINE | Facility: CLINIC | Age: 39
End: 2024-04-03
Payer: COMMERCIAL

## 2024-04-03 VITALS — HEIGHT: 67 IN | WEIGHT: 284.19 LBS | BODY MASS INDEX: 44.61 KG/M2

## 2024-04-03 DIAGNOSIS — R19.7 DIARRHEA, UNSPECIFIED TYPE: ICD-10-CM

## 2024-04-03 DIAGNOSIS — Z91.041 CONTRAST MEDIA ALLERGY: Primary | ICD-10-CM

## 2024-04-03 DIAGNOSIS — G47.00 INSOMNIA, UNSPECIFIED TYPE: Primary | ICD-10-CM

## 2024-04-03 DIAGNOSIS — I10 ESSENTIAL HYPERTENSION: ICD-10-CM

## 2024-04-03 DIAGNOSIS — T78.2XXD ANAPHYLAXIS, SUBSEQUENT ENCOUNTER: ICD-10-CM

## 2024-04-03 PROCEDURE — 99999 PR PBB SHADOW E&M-EST. PATIENT-LVL III: CPT | Mod: PBBFAC,,, | Performed by: STUDENT IN AN ORGANIZED HEALTH CARE EDUCATION/TRAINING PROGRAM

## 2024-04-03 PROCEDURE — 4010F ACE/ARB THERAPY RXD/TAKEN: CPT | Mod: CPTII,95,,

## 2024-04-03 PROCEDURE — 4010F ACE/ARB THERAPY RXD/TAKEN: CPT | Mod: CPTII,S$GLB,, | Performed by: STUDENT IN AN ORGANIZED HEALTH CARE EDUCATION/TRAINING PROGRAM

## 2024-04-03 PROCEDURE — 1160F RVW MEDS BY RX/DR IN RCRD: CPT | Mod: CPTII,95,,

## 2024-04-03 PROCEDURE — 99215 OFFICE O/P EST HI 40 MIN: CPT | Mod: 25,S$GLB,, | Performed by: STUDENT IN AN ORGANIZED HEALTH CARE EDUCATION/TRAINING PROGRAM

## 2024-04-03 PROCEDURE — 3008F BODY MASS INDEX DOCD: CPT | Mod: CPTII,S$GLB,, | Performed by: STUDENT IN AN ORGANIZED HEALTH CARE EDUCATION/TRAINING PROGRAM

## 2024-04-03 PROCEDURE — 1159F MED LIST DOCD IN RCRD: CPT | Mod: CPTII,95,,

## 2024-04-03 PROCEDURE — 99213 OFFICE O/P EST LOW 20 MIN: CPT | Mod: 95,,,

## 2024-04-03 PROCEDURE — 95004 PERQ TESTS W/ALRGNC XTRCS: CPT | Mod: S$GLB,,, | Performed by: STUDENT IN AN ORGANIZED HEALTH CARE EDUCATION/TRAINING PROGRAM

## 2024-04-03 NOTE — PROGRESS NOTES
Subjective:       Patient ID: Sonia Solorzano is a 38 y.o. female.  The patient location is: [place], [place]  The chief complaint leading to consultation is: 2 week follow up    Visit type: audiovisual    Face to Face time with patient: 8  20 minutes of total time spent on the encounter, which includes face to face time and non-face to face time preparing to see the patient (eg, review of tests), Obtaining and/or reviewing separately obtained history, Documenting clinical information in the electronic or other health record, Independently interpreting results (not separately reported) and communicating results to the patient/family/caregiver, or Care coordination (not separately reported).         Each patient to whom he or she provides medical services by telemedicine is:  (1) informed of the relationship between the physician and patient and the respective role of any other health care provider with respect to management of the patient; and (2) notified that he or she may decline to receive medical services by telemedicine and may withdraw from such care at any time.      Chief Complaint: 2 week follow up    Patient presents to the clinic for a 2 week follow up.     She states diarrhea has improved. Reports normal bowel movements. Continues with poor appetite.   She continues with intermittent abdominal pain and pelvic pressure since hysterectomy. She sees Gynecology tomorrow for a 6 week check up.     Continues with insomnia. States Hydroxyzine made her groggy the next morning.     She is scheduled for parathyroidectomy in May.     Has no other complaints or concerns today.     Patient educated on plan of care, verbalized understanding.         Review of Systems   Constitutional:  Negative for activity change, appetite change, chills, diaphoresis and fever.   HENT:  Negative for congestion, ear pain, postnasal drip, sinus pressure, sneezing and sore throat.    Eyes:  Negative for pain, discharge, redness and  itching.   Respiratory:  Negative for apnea, cough, chest tightness, shortness of breath and wheezing.    Cardiovascular:  Negative for chest pain and leg swelling.   Gastrointestinal:  Positive for abdominal pain. Negative for abdominal distention, constipation, diarrhea, nausea and vomiting.   Genitourinary:  Negative for difficulty urinating, dysuria, flank pain and frequency.   Skin:  Negative for color change, rash and wound.   Neurological:  Negative for dizziness.   Psychiatric/Behavioral:  Positive for sleep disturbance.    All other systems reviewed and are negative.      Patient Active Problem List   Diagnosis    BMI 45.0-49.9, adult    Obstructive sleep apnea    Hypertension    Iron deficiency anemia due to chronic blood loss    Iron deficiency anemia    Bipolar disorder, unspecified    Irritable bowel syndrome with both constipation and diarrhea    Major depressive disorder, single episode, unspecified    Unspecified asthma, uncomplicated    Migraine without aura and without status migrainosus, not intractable    IIH (idiopathic intracranial hypertension)    PCOS (polycystic ovarian syndrome)    S/P  shunt    Functional neurological symptom disorder with mixed symptoms    Hyperparathyroidism    HLD (hyperlipidemia)    Acid reflux    Fatty liver    History of COVID-19    Thyroid nodule    Vapes nicotine containing substance    Edema    Complex endometrial hyperplasia with atypia    Numbness and tingling in left hand    S/p RA-TLH/BS/RO/SLND    Family history of breast cancer       Objective:      Physical Exam  Constitutional:       General: She is not in acute distress.     Appearance: Normal appearance. She is not ill-appearing.   HENT:      Head: Normocephalic.   Eyes:      Conjunctiva/sclera: Conjunctivae normal.      Pupils: Pupils are equal, round, and reactive to light.      Comments: As seen on virtual visit   Pulmonary:      Effort: Pulmonary effort is normal. No respiratory distress.    Musculoskeletal:      Cervical back: Normal range of motion and neck supple.   Skin:     General: Skin is warm and dry.   Neurological:      General: No focal deficit present.      Mental Status: She is alert and oriented to person, place, and time.   Psychiatric:         Mood and Affect: Mood normal.         Behavior: Behavior normal.         Lab Results   Component Value Date    WBC 8.19 03/21/2024    HGB 12.6 03/21/2024    HCT 42.8 03/21/2024     03/21/2024    CHOL 168 01/09/2024    TRIG 175 (H) 01/09/2024    HDL 40 01/09/2024    ALT 22 03/21/2024    AST 17 03/21/2024     03/21/2024    K 4.3 03/21/2024     03/21/2024    CREATININE 0.8 03/21/2024    BUN 9 03/21/2024    CO2 27 03/21/2024    TSH 2.658 06/25/2023    INR 0.9 06/25/2023    HGBA1C 5.3 12/09/2023     The ASCVD Risk score (Power DK, et al., 2019) failed to calculate for the following reasons:    The patient has a prior MI or stroke diagnosis    Assessment:       1. Insomnia, unspecified type    2. Diarrhea, unspecified type    3. Essential hypertension        Plan:       1. Insomnia, unspecified type   - Can try 1/2 Hydroxyzine at night   - Magnesium supplement    2. Diarrhea, unspecified type   - Improved    3. Essential hypertension   - Stable-Continue Losartan   - Continue current plan of care       Follow up if symptoms worsen or fail to improve.      Future Appointments       Date Provider Specialty Appt Notes    4/4/2024 Kirsten Weaver MD Gynecologic Oncology 6 weeks post op    4/16/2024 Emile Anglin DNP Hematology and Oncology family history of breast cancer    4/25/2024 Raiza Epperson MD Surgery Pre-Op 5/17 4/26/2024 Melchor Barriga MD Hematology and Oncology VV/DARRION/Labs/3mfu    6/18/2024 ALEX Turcios PA-C Endocrinology f/u per Abigail (GD)    7/16/2024 Dorian Guerrero MD Family Medicine 6 months f/u

## 2024-04-03 NOTE — PATIENT INSTRUCTIONS
Rosendo Scott,     If you are due for any health screening(s) below please notify me so we can arrange them to be ordered and scheduled. Most healthy patients at your age complete them, but you are free to accept or refuse.     If you can't do it, I'll definitely understand. If you can, I'd certainly appreciate it!    Tests to Keep You Healthy    Mammogram: Met on 12/29/2023  Last Blood Pressure <= 139/89 (3/21/2024): Yes  Tobacco Cessation: NO      Were here to help you quit smoking     Our records indicated that you are still smoking. One of the best things you can do for your health is to stop smoking and we are here to help.     Talk with your provider about our Smoking Cessation Program and how we can support you on your journey.

## 2024-04-04 ENCOUNTER — OFFICE VISIT (OUTPATIENT)
Dept: GYNECOLOGIC ONCOLOGY | Facility: CLINIC | Age: 39
End: 2024-04-04
Payer: COMMERCIAL

## 2024-04-04 VITALS
WEIGHT: 287.25 LBS | RESPIRATION RATE: 17 BRPM | OXYGEN SATURATION: 96 % | BODY MASS INDEX: 45.08 KG/M2 | HEART RATE: 74 BPM | DIASTOLIC BLOOD PRESSURE: 85 MMHG | HEIGHT: 67 IN | TEMPERATURE: 97 F | SYSTOLIC BLOOD PRESSURE: 140 MMHG

## 2024-04-04 DIAGNOSIS — C54.1 ENDOMETRIAL CANCER: Primary | ICD-10-CM

## 2024-04-04 DIAGNOSIS — Z90.710 STATUS POST LAPAROSCOPIC HYSTERECTOMY: ICD-10-CM

## 2024-04-04 PROCEDURE — 99024 POSTOP FOLLOW-UP VISIT: CPT | Mod: S$GLB,,, | Performed by: OBSTETRICS & GYNECOLOGY

## 2024-04-04 PROCEDURE — 3079F DIAST BP 80-89 MM HG: CPT | Mod: CPTII,S$GLB,, | Performed by: OBSTETRICS & GYNECOLOGY

## 2024-04-04 PROCEDURE — 3077F SYST BP >= 140 MM HG: CPT | Mod: CPTII,S$GLB,, | Performed by: OBSTETRICS & GYNECOLOGY

## 2024-04-04 PROCEDURE — 4010F ACE/ARB THERAPY RXD/TAKEN: CPT | Mod: CPTII,S$GLB,, | Performed by: OBSTETRICS & GYNECOLOGY

## 2024-04-04 PROCEDURE — 1159F MED LIST DOCD IN RCRD: CPT | Mod: CPTII,S$GLB,, | Performed by: OBSTETRICS & GYNECOLOGY

## 2024-04-04 PROCEDURE — 99999 PR PBB SHADOW E&M-EST. PATIENT-LVL V: CPT | Mod: PBBFAC,,, | Performed by: OBSTETRICS & GYNECOLOGY

## 2024-04-04 NOTE — LETTER
April 4, 2024      [facility] - GYN Oncology  [address]  [place] [address]  Phone: [phone]       Patient: Sonia Solorzano   YOB: 1985  Date of Visit: 04/04/2024    To Whom It May Concern:    Sonia Solorzano  was at [facility] on 04/04/2024. The patient may return to work on 4/5/2024 with no restrictions. If you have any questions or concerns, or if I can be of further assistance, please do not hesitate to contact me.    Sincerely,                        Dr Kirsten Weaver

## 2024-04-04 NOTE — PROGRESS NOTES
Subjective:      Patient ID: Sonia Solorzano is a 38 y.o. female.    Chief Complaint: Post-op Evaluation      HPI  S/p RTLH/BS/RO/SLND 2/12/2024    Pathology reviewed.   Stage IA, grade 1 endometrioid type endometrial adenocarcinoma, 2-3mm tumor size, no myometrial invasion, negative LVSI, negative sentinel lymph nodes.   Low risk, no adjuvant treatment.     Presents today for follow up post operative visit. Continues to recover appropriately from surgery. Endorses some vasomotor symptoms. May consider effexor. Working with her PCP for anxiety and sleep.    _____________________________  Referred by Dr. Flanagan for newly diagnosed complex endometrial hyperplasia with atypia.      Pelvic US  Uterus: 10.5 x 5.4 cm  Endometrium: Normal in this pre menopausal patient, measuring 12.1 mm.  Right ovary: The right ovary is not seen.  There is a 2.8 cm cyst in the right adnexa.  Left ovary: Not seen     Endometrial sampling 1/17/2024  Fragments of endometrial tissue showing changes compatible with complex hyperplasia with focal atypia.      Prior abdominal surgeries include c/s, lsc removal endometrioma in abdomen, lsc lillie, BTL,  shunt.     Review of Systems   Constitutional:  Negative for appetite change, chills, fatigue and fever.   HENT:  Negative for mouth sores.    Respiratory:  Negative for cough and shortness of breath.    Cardiovascular:  Negative for leg swelling.   Gastrointestinal:  Negative for abdominal pain, blood in stool, constipation and diarrhea.   Endocrine: Negative for cold intolerance.   Genitourinary:  Negative for dysuria and vaginal bleeding.   Musculoskeletal:  Negative for myalgias.   Skin:  Negative for rash.   Allergic/Immunologic: Negative.    Neurological:  Negative for weakness and numbness.   Hematological:  Negative for adenopathy. Does not bruise/bleed easily.   Psychiatric/Behavioral:  Negative for confusion.        Objective:   Physical Exam:   Constitutional: She is oriented to  person, place, and time. She appears well-developed and well-nourished.    HENT:   Head: Normocephalic and atraumatic.    Eyes: Pupils are equal, round, and reactive to light. EOM are normal.    Neck: No thyromegaly present.    Cardiovascular:  Normal rate, regular rhythm and intact distal pulses.             Pulmonary/Chest: Effort normal and breath sounds normal. No respiratory distress. She has no wheezes.        Abdominal: Soft. Bowel sounds are normal. She exhibits abdominal incision. She exhibits no distension and no mass. There is no abdominal tenderness.     Genitourinary:    Vagina normal.      Pelvic exam was performed with patient supine.   There is an absent left adnexa. Cervix is absent.Uterus is absent.    Genitourinary Comments: Vaginal cuff healing appropriately.              Musculoskeletal: Normal range of motion and moves all extremeties.      Lymphadenopathy:     She has no cervical adenopathy.        Right: No supraclavicular adenopathy present.        Left: No supraclavicular adenopathy present.    Neurological: She is alert and oriented to person, place, and time.    Skin: Skin is warm and dry. No rash noted.    Psychiatric: She has a normal mood and affect.       Assessment:     1. Endometrial cancer    2. S/p RA-TLH/BS/RO/SLND          Plan:   No orders of the defined types were placed in this encounter.    Recovering well from surgery.   Low risk endometrial cancer as above.   Will plan for surveillance.   May consider Effexor. Working with her PCP for anxiety and sleep.   RTC 6 months or sooner if needed.     Per SGO 2017 Guidelines, for low risk (stage I, low risk histology) follow-up is every 6 months for the first year and then 6 to 12 months during the second year and yearly thereafter.  After 2 years, the patient may be followed by a generalist or gynecologic oncologist.

## 2024-04-07 ENCOUNTER — PATIENT MESSAGE (OUTPATIENT)
Dept: HEMATOLOGY/ONCOLOGY | Facility: CLINIC | Age: 39
End: 2024-04-07
Payer: COMMERCIAL

## 2024-04-15 ENCOUNTER — TELEPHONE (OUTPATIENT)
Dept: HEMATOLOGY/ONCOLOGY | Facility: CLINIC | Age: 39
End: 2024-04-15
Payer: COMMERCIAL

## 2024-04-15 NOTE — TELEPHONE ENCOUNTER
Called and spoke to patient to confirm appointment for 4/16/24 and to please complete the questionnaire before visit tomorrow

## 2024-04-16 ENCOUNTER — PATIENT MESSAGE (OUTPATIENT)
Dept: HEMATOLOGY/ONCOLOGY | Facility: CLINIC | Age: 39
End: 2024-04-16

## 2024-04-16 ENCOUNTER — OFFICE VISIT (OUTPATIENT)
Dept: HEMATOLOGY/ONCOLOGY | Facility: CLINIC | Age: 39
End: 2024-04-16
Payer: COMMERCIAL

## 2024-04-16 DIAGNOSIS — Z80.0 FAMILY HISTORY OF COLON CANCER: ICD-10-CM

## 2024-04-16 DIAGNOSIS — Z80.0 FAMILY HISTORY OF PANCREATIC CANCER: ICD-10-CM

## 2024-04-16 DIAGNOSIS — Z71.83 ENCOUNTER FOR NONPROCREATIVE GENETIC COUNSELING: Primary | ICD-10-CM

## 2024-04-16 DIAGNOSIS — Z80.3 FAMILY HISTORY OF BREAST CANCER: ICD-10-CM

## 2024-04-16 DIAGNOSIS — Z85.42 HISTORY OF ENDOMETRIAL CANCER: ICD-10-CM

## 2024-04-16 PROCEDURE — 4010F ACE/ARB THERAPY RXD/TAKEN: CPT | Mod: CPTII,95,, | Performed by: NURSE PRACTITIONER

## 2024-04-16 PROCEDURE — 99215 OFFICE O/P EST HI 40 MIN: CPT | Mod: 95,,, | Performed by: NURSE PRACTITIONER
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
## 2024-04-25 ENCOUNTER — PATIENT MESSAGE (OUTPATIENT)
Dept: SURGERY | Facility: CLINIC | Age: 39
End: 2024-04-25
Payer: COMMERCIAL

## 2024-04-30 ENCOUNTER — TELEPHONE (OUTPATIENT)
Dept: HEMATOLOGY/ONCOLOGY | Facility: CLINIC | Age: 39
End: 2024-04-30
Payer: COMMERCIAL

## 2024-04-30 NOTE — TELEPHONE ENCOUNTER
Lft msg to notify vv 05/01/24 will be rescheduled to 05/13/24 @ 1:00pm (pending override approval) due to scheduled change.

## 2024-05-01 ENCOUNTER — PATIENT MESSAGE (OUTPATIENT)
Dept: HEMATOLOGY/ONCOLOGY | Facility: CLINIC | Age: 39
End: 2024-05-01
Payer: COMMERCIAL

## 2024-05-07 ENCOUNTER — HOSPITAL ENCOUNTER (EMERGENCY)
Facility: HOSPITAL | Age: 39
Discharge: HOME | End: 2024-05-07
Attending: STUDENT IN AN ORGANIZED HEALTH CARE EDUCATION/TRAINING PROGRAM
Payer: COMMERCIAL

## 2024-05-07 ENCOUNTER — HOSPITAL ENCOUNTER (OUTPATIENT)
Dept: RADIOLOGY | Facility: HOSPITAL | Age: 39
Discharge: HOME | End: 2024-05-07
Attending: PHYSICIAN ASSISTANT
Payer: COMMERCIAL

## 2024-05-07 VITALS
OXYGEN SATURATION: 95 % | DIASTOLIC BLOOD PRESSURE: 78 MMHG | BODY MASS INDEX: 45 KG/M2 | RESPIRATION RATE: 20 BRPM | SYSTOLIC BLOOD PRESSURE: 152 MMHG | WEIGHT: 287.31 LBS | TEMPERATURE: 98 F | HEART RATE: 81 BPM

## 2024-05-07 DIAGNOSIS — E21.3 HYPERPARATHYROIDISM: ICD-10-CM

## 2024-05-07 DIAGNOSIS — T78.40XA ALLERGIC REACTION, INITIAL ENCOUNTER: Primary | ICD-10-CM

## 2024-05-07 PROCEDURE — 25000003 PHARM REV CODE 250: Performed by: STUDENT IN AN ORGANIZED HEALTH CARE EDUCATION/TRAINING PROGRAM

## 2024-05-07 PROCEDURE — 96375 TX/PRO/DX INJ NEW DRUG ADDON: CPT

## 2024-05-07 PROCEDURE — 25500020 PHARM REV CODE 255: Performed by: PHYSICIAN ASSISTANT

## 2024-05-07 PROCEDURE — 63600175 PHARM REV CODE 636 W HCPCS: Performed by: STUDENT IN AN ORGANIZED HEALTH CARE EDUCATION/TRAINING PROGRAM

## 2024-05-07 PROCEDURE — 70492 CT SFT TSUE NCK W/O & W/DYE: CPT | Mod: 26,,, | Performed by: RADIOLOGY

## 2024-05-07 PROCEDURE — 96374 THER/PROPH/DIAG INJ IV PUSH: CPT

## 2024-05-07 PROCEDURE — 70492 CT SFT TSUE NCK W/O & W/DYE: CPT | Mod: TC

## 2024-05-07 PROCEDURE — 99284 EMERGENCY DEPT VISIT MOD MDM: CPT | Mod: 25

## 2024-05-07 RX ORDER — DIPHENHYDRAMINE HYDROCHLORIDE 50 MG/ML
50 INJECTION INTRAMUSCULAR; INTRAVENOUS
Status: COMPLETED | OUTPATIENT
Start: 2024-05-07 | End: 2024-05-07

## 2024-05-07 RX ORDER — FAMOTIDINE 10 MG/ML
20 INJECTION INTRAVENOUS
Status: COMPLETED | OUTPATIENT
Start: 2024-05-07 | End: 2024-05-07

## 2024-05-07 RX ORDER — METHYLPREDNISOLONE SOD SUCC 125 MG
125 VIAL (EA) INJECTION
Status: COMPLETED | OUTPATIENT
Start: 2024-05-07 | End: 2024-05-07

## 2024-05-07 RX ADMIN — FAMOTIDINE 20 MG: 10 INJECTION, SOLUTION INTRAVENOUS at 06:05

## 2024-05-07 RX ADMIN — METHYLPREDNISOLONE SODIUM SUCCINATE 125 MG: 125 INJECTION, POWDER, FOR SOLUTION INTRAMUSCULAR; INTRAVENOUS at 06:05

## 2024-05-07 RX ADMIN — IOHEXOL 100 ML: 350 INJECTION, SOLUTION INTRAVENOUS at 05:05

## 2024-05-07 RX ADMIN — DIPHENHYDRAMINE HYDROCHLORIDE 50 MG: 50 INJECTION, SOLUTION INTRAMUSCULAR; INTRAVENOUS at 06:05
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
## 2024-05-08 ENCOUNTER — TELEPHONE (OUTPATIENT)
Dept: FAMILY MEDICINE | Facility: CLINIC | Age: 39
End: 2024-05-08
Payer: COMMERCIAL

## 2024-05-08 ENCOUNTER — PATIENT OUTREACH (OUTPATIENT)
Dept: EMERGENCY MEDICINE | Facility: HOSPITAL | Age: 39
End: 2024-05-08
Payer: COMMERCIAL

## 2024-05-08 LAB
CREAT SERPL-MCNC: 0.8 MG/DL (ref 0.5–1.4)
SAMPLE: NORMAL

## 2024-05-08 NOTE — DISCHARGE INSTRUCTIONS
It was a pleasure taking care of you today!     Diagnosis: Allergic Reaction    Please read the provided information about allergic reactions. Symptoms of a severe allergic reaction include (anaphylaxis):   Skin rashes, itching, and/or hives  Swelling of the face, lips, tongue, or throat, which may cause a change in your voice or difficulty swallowing the saliva in your mouth  Shortness of breath, trouble breathing, or wheezing (whistling sound during breathing)  Dizziness and/or passing out  Stomach pain, vomiting, or diarrhea  Feeling like something awful is about to happen    Home Care Instructions:   Take the prescribed steroid medication as directed  Take Benadryl according to packaging directions for symptoms including itchy rash  If you have symptoms of a severe allergic reaction (anaphylaxis), use the prescribed epinephrine autoinjector and return to the ER immediately  Medications: Continue taking your home medications as prescribed.     Follow-Up Plan:  Follow up with your primary care physician within 3-5 days to discuss your recent ER visit and any additional concerns that you may have  Additional testing and/or evaluation will be directed by your primary care physician and/or specialist  If you continue to have allergy issues, you may benefit from seeing an allergist. You should discuss this with your primary care physician    Please return to the ER if you experience reoccurrence of symptoms, other symptoms of severe allergic reaction, or if you develop nausea/vomiting that doesn't stop, inability to tolerate fluids without vomiting, severe back pain, chest pain, shortness of breath, blood in vomit or stool, falling out/passing out, or if you have additional concerns.

## 2024-05-08 NOTE — TELEPHONE ENCOUNTER
----- Message from Marjan Mustafa LPN sent at 5/8/2024  1:15 PM CDT -----  Regarding: Recent ED visit  Please f/u with Pt regarding recent ED visit and CT results. Thank you.

## 2024-05-08 NOTE — H&P (VIEW-ONLY)
Endocrine Surgery History & Physical     ENDOCRINOLOGIST: ALEX Turcios PA-C     REASON FOR VISIT: Hyperparathyroidism    HPI: Sonia Solorzano is a 38 y.o. female patient with a history notable for PCOS, idiopathic intracranial hypertension s/p  shunt, bipolar II, HTN, recent hysterectomy for endometrial atypia, and obstructive sleep apnea who presents in consultation for primary hyperparathyroidism. Suspicion for hyperparathyroidism was raised on routine laboratory testing to have elevated hypercalcemia.    Details of the workup are as follows:     Recent laboratory studies:  Hypercalcemia noted since 9/2022  Max corrected calcium elevation to 12.3 mg/dL (calcium 11.7 with albumin of 3.2 in 9/2023)  Parathyroid hormone levels elevated with normal  Most recent PTH level was 157 with a corresponding calcium of 11.5, corrects to 11.8 for an albumin of 3.6  Phosphorus: 2.9  Vitamin D: 18 in 12/2023  24 hour urine calcium: 604 mg/24 hours, Benign Familial Hypocalciuric Hypercalcemia ruled out    Medications:  Vitamin D supplementation: none  Lithium, thiazide diuretics: was previously on thiazide. Off since 12/2023  Calcium supplements or calcimimetic: none    Symptoms:   The patient reports the following: [x]musculoskeletal pain, []fractures, [x]nephrolithiasis, []GERD, []peptic ulcer disease, []abdominal pain, []polydipsia, []polyuria, []pruritis  The patient reports the following neurocognitive symptoms: []brain fog, []concentration difficulties, [x]fatigue, [x]forgetfulness, [x]mood/psychiatric disturbances (Bipolar II, 2018)  The patient denies dysphagia, globus sensation, compression symptoms, or anterior neck pain.  The patient denies hoarseness, voice changes or increased need to clear the throat.  Bone mineral density: []Osteoporosis []Osteopenia []Normal bone density.  DEXA not performed    Surgical risk factors:  Risk of concurrent thyroid disease: low  Ultrasound of the thyroid: Jan 2024 with  1.7cm thyroid nodule on the left, biopsied with benign cytology on FNA  History of neck radiation: none  Prior neck surgery: none  Cardiovascular risk: echocardiogram normal 10/2023  Antiplatelet therapy and anticoagulation: none    Family history:  Family history of pancreatic cancer, parathyroid cancer    Localization studies done prior to this visit:  Ultrasound: no parathyroid visualized  Nuclear Medicine Parathyroid Scan: 10/2023 without a localized parathyroid adenoma  4D-CT Parathyroid scan: ordered but not performed, anaphylactic reaction to dye      Interval History (5/09/24):  Patient denies any significant clinical changes since her last visit.  On her last visit we discussed performing a 40 parathyroid CT scan for better identification of deep cervical parathyroid glands.  The scan was performed this week but unfortunately patient had a contrast induced allergy requiring ED admission.  Patient is recovering well since that time.      LABORATORY STUDIES:  I personally and independently reviewed relevant lab test results, including the following:    Lab Results   Component Value Date    PHOS 2.9 06/26/2023    PHOS 2.6 (L) 05/25/2023    MG 2.0 09/22/2023    [id number] 18 (L) 12/09/2023    TSH 2.658 06/25/2023         PAST MEDICAL HISTORY:  Patient Active Problem List   Diagnosis    BMI 45.0-49.9, adult    Obstructive sleep apnea    Hypertension    Iron deficiency anemia due to chronic blood loss    Iron deficiency anemia    Bipolar disorder, unspecified    Irritable bowel syndrome with both constipation and diarrhea    Major depressive disorder, single episode, unspecified    Unspecified asthma, uncomplicated    Migraine without aura and without status migrainosus, not intractable    IIH (idiopathic intracranial hypertension)    PCOS (polycystic ovarian syndrome)    S/P  shunt    Functional neurological symptom disorder with mixed symptoms    Hyperparathyroidism    HLD (hyperlipidemia)    Acid reflux     Fatty liver    History of COVID-19    Thyroid nodule    Vapes nicotine containing substance    Edema    Complex endometrial hyperplasia with atypia    Numbness and tingling in left hand    S/p RA-TLH/BS/RO/SLND    Family history of breast cancer    Vitamin D insufficiency         PAST SURGICAL HISTORY:  Past Surgical History:   Procedure Laterality Date     SECTION, LOW TRANSVERSE      COLONOSCOPY N/A 10/27/2020    Procedure: COLONOSCOPY;  Surgeon: Patito Vergara MD;  Location: [facility] ENDO;  Service: Endoscopy;  Laterality: N/A;    CYSTOSCOPY N/A 10/27/2021    Procedure: CYSTOSCOPY;  Surgeon: Oh Velasquez Jr., MD;  Location: [facility] OR;  Service: Urology;  Laterality: N/A;    DILATION AND CURETTAGE OF UTERUS      Uterine perforation for AUB    ENDOSCOPIC INSERTION OF VENTRICULOPERITONEAL SHUNT Right 2022    Procedure: INSERTION, SHUNT, VENTRICULOPERITONEAL, ENDOSCOPIC;  Surgeon: Fran Yoon MD;  Location: [facility] OR [address] [place] [address];  Service: Neurosurgery;  Laterality: Right;  regular bed, supine    ENDOSCOPIC INSERTION OF VENTRICULOPERITONEAL SHUNT N/A 2022    Procedure: INSERTION, SHUNT, VENTRICULOPERITONEAL, ENDOSCOPIC;  Surgeon: Bandar Oneal Jr., MD;  [facility];  Service: General;  Laterality: N/A;    epidural steriod injections  2005    x3    ESOPHAGOGASTRODUODENOSCOPY N/A 10/26/2020    Procedure: EGD (ESOPHAGOGASTRODUODENOSCOPY);  Surgeon: Enrike Garcia MD;  Location: [facility] ENDO;  Service: Endoscopy;  Laterality: N/A;    ESOPHAGOGASTRODUODENOSCOPY N/A 2023    Procedure: EGD (ESOPHAGOGASTRODUODENOSCOPY);  Surgeon: Patito Vergara MD;  Location: [facility] ENDO;  Service: Endoscopy;  Laterality: N/A;    INTRALUMINAL GASTROINTESTINAL TRACT IMAGING VIA CAPSULE N/A 2020    Procedure: IMAGING PROCEDURE, GI TRACT, INTRALUMINAL, VIA CAPSULE;  Surgeon: Patito Vergara MD;  Location: [facility] ENDO;  Service: Endoscopy;  Laterality: N/A;    KNEE ARTHROSCOPY W/  MENISCECTOMY Right 05/26/2021    Procedure: ARTHROSCOPY, KNEE, WITH MENISCECTOMY;  Surgeon: López Baker MD;  Location: [facility] OR;  Service: Orthopedics;  Laterality: Right;    LAPAROSCOPIC CHOLECYSTECTOMY N/A 11/27/2020    Procedure: CHOLECYSTECTOMY, LAPAROSCOPIC;  Surgeon: Chente Campbell III, MD;  Location: [facility] OR;  Service: General;  Laterality: N/A;    LAPAROSCOPY Right 2013    Endometriosis    LYMPH NODE BIOPSY Bilateral 2/12/2024    Procedure: BIOPSY, LYMPH NODE;  Surgeon: Kirsten Weaver MD;  Location: [facility] OR [facility];  Service: OB/GYN;  Laterality: Bilateral;  sentinal lymph node dissection    MAGNETIC RESONANCE IMAGING N/A 08/03/2022    Procedure: MRI (Magnetic Resonance Imagine);  Surgeon: Sanjana Surgeon;  Location: [facility];  Service: Anesthesiology;  Laterality: N/A;    MAPPING, LYMPH NODE, SENTINEL Bilateral 2/12/2024    Procedure: MAPPING, LYMPH NODE, SENTINEL;  Surgeon: Kirsten Weaver MD;  [facility];  Service: OB/GYN;  Laterality: Bilateral;    ROBOT-ASSISTED LAPAROSCOPIC ABDOMINAL HYSTERECTOMY USING DA VICKIE XI N/A 2/12/2024    Procedure: XI ROBOTIC HYSTERECTOMY;  Surgeon: Kirsten Weaver MD;  [facility];  Service: OB/GYN;  Laterality: N/A;  2.5 hr case    ROBOT-ASSISTED LAPAROSCOPIC SURGICAL REMOVAL OF OVARY USING DA VICKIE XI Right 2/12/2024    Procedure: XI ROBOTIC OOPHORECTOMY;  Surgeon: Kirsten Weaver MD;  [facility];  Service: OB/GYN;  Laterality: Right;    ROBOT-ASSISTED SURGICAL REMOVAL OF FALLOPIAN TUBE USING DA VICKIE XI Bilateral 2/12/2024    Procedure: XI ROBOTIC SALPINGECTOMY;  Surgeon: Kirsten Weaver MD;  [facility];  Service: OB/GYN;  Laterality: Bilateral;  US only --MD will determine at time of surgery    TONSILLECTOMY      as a child    TUBAL LIGATION  2008    UPPER GASTROINTESTINAL ENDOSCOPY          MEDICATIONS:  Current Outpatient Medications   Medication Sig Dispense Refill    acetaminophen (TYLENOL) 500  MG tablet Take 1,000 mg by mouth every 6 (six) hours as needed for Pain.      acetaminophen (TYLENOL) 650 MG TbSR Take 1 tablet (650 mg total) by mouth every 6 to 8 hours as needed (Alternate every 3 hours with ibuprofen). 60 tablet 1    albuterol (PROVENTIL/VENTOLIN HFA) 90 mcg/actuation inhaler Inhale 2 puffs into the lungs 4 (four) times daily. 18 g 2    atorvastatin (LIPITOR) 40 MG tablet Take 1 tablet (40 mg total) by mouth once daily. 30 tablet 11    hydrOXYzine HCL (ATARAX) 25 MG tablet Take 1 tablet (25 mg total) by mouth 3 (three) times daily as needed for Anxiety. 30 tablet 2    ibuprofen (ADVIL,MOTRIN) 600 MG tablet Take 1 tablet (600 mg total) by mouth every 6 to 8 hours as needed for Pain (Alternate every 3 hours with Tylenol.). 60 tablet 1    loperamide HCl (IMODIUM A-D ORAL) Take by mouth as needed. diarrhea      losartan (COZAAR) 25 MG tablet Take 1 tablet (25 mg total) by mouth once daily. 90 tablet 3    rimegepant (NURTEC) 75 mg odt Take 1 tablet (75 mg total) by mouth daily as needed for Migraine. Place ODT tablet on the tongue; alternatively the ODT tablet may be placed under the tongue 8 tablet 11    spironolactone (ALDACTONE) 25 MG tablet Take 1 tablet (25 mg total) by mouth once daily. 30 tablet 11     No current facility-administered medications for this visit.       ALLERGIES:  Review of patient's allergies indicates:   Allergen Reactions    Contrast media Anaphylaxis    Iodine and iodide containing products Anaphylaxis    Levaquin [levofloxacin] Anaphylaxis    Levofloxacin in d5w Anaphylaxis    Sulfa (sulfonamide antibiotics) Anaphylaxis and Hives    Iodinated contrast media Hives    Iodine     Magnesium      Pt reporting she is allergic to magnesium citrate oral drink.     Morphine Hives    Tree nuts     Adhesive Rash    Compazine [prochlorperazine] Anxiety     Restless legs    Depacon [valproate sodium] Hives     Pt experienced hives at IV site upon 8th day of depacon administration.   Hives resolved with stopping medication in 1.5 hours.    Nut [tree nut] Hives       SOCIAL HISTORY:  Social History     Socioeconomic History    Marital status:    Tobacco Use    Smoking status: Every Day     Types: Vaping with nicotine     Passive exposure: Current    Smokeless tobacco: Never   Substance and Sexual Activity    Alcohol use: Not Currently     Comment: socially, occasionally    Drug use: No    Sexual activity: Yes     Partners: Male     Birth control/protection: See Surgical Hx   Social History Narrative    ** Merged History Encounter **          Social Determinants of Health     Financial Resource Strain: Low Risk  (5/8/2024)    Overall Financial Resource Strain (CARDIA)     Difficulty of Paying Living Expenses: Not hard at all   Recent Concern: Financial Resource Strain - Medium Risk (4/3/2024)    Overall Financial Resource Strain (CARDIA)     Difficulty of Paying Living Expenses: Somewhat hard   Food Insecurity: No Food Insecurity (4/3/2024)    Hunger Vital Sign     Worried About Running Out of Food in the Last Year: Never true     Ran Out of Food in the Last Year: Never true   Transportation Needs: No Transportation Needs (5/8/2024)    PRAPARE - Transportation     Lack of Transportation (Medical): No     Lack of Transportation (Non-Medical): No   Physical Activity: Inactive (4/3/2024)    Exercise Vital Sign     Days of Exercise per Week: 0 days     Minutes of Exercise per Session: 0 min   Stress: Stress Concern Present (4/3/2024)    [employer] - Occupational Stress Questionnaire     Feeling of Stress : Rather much   Housing Stability: Low Risk  (5/8/2024)    Housing Stability Vital Sign     Unable to Pay for Housing in the Last Year: No     Homeless in the Last Year: No         FAMILY HISTORY:  Family History   Problem Relation Name Age of Onset    Pancreatitis Mother Simran     Breast cancer Mother Simran 60 - 69        unilat    Heart disease Mother Simran   "   Hyperlipidemia Mother Simran     Asthma Mother Simran     Cancer Father Gene 69        lymphoma (subtype unk)    Colon cancer Father Gene 61    Skin cancer Father Gene         type unk; body location unk    Heart disease Father Gene     Hypertension Father Gene     Hyperlipidemia Father Gene     Arthritis Father Gene     Pancreatitis Maternal Grandmother Ennie         prior to panc. cancer    Pancreatic cancer Maternal Grandmother Ennie 80        subtype unk    Diabetes Maternal Grandmother Ennie     Aortic aneurysm Maternal Grandfather Josh         AAA (was COD)    Prostate cancer Maternal Grandfather Josh 89    Cancer Paternal Grandmother Arturo 70 - 79        brain primary vs brain mets--unk    Cancer Paternal Grandfather Gene, Sr. 50 - 59        lung    Diabetes Paternal Grandfather Gene, Sr.     Cancer Other          parathyroid    Cancer Paternal Cousin          ovarian    Endometrial cancer Paternal Cousin      Colon polyps Neg Hx          REVIEW OF SYSTEMS:  A detailed review of systems has been reviewed with the patient, pertinent positives and negatives are presented in the note and is otherwise negative.    PHYSICAL EXAMINATION:  Vital Signs: /82   Pulse 96   Ht 5' 7" (1.702 m)   Wt 132.6 kg (292 lb 5.3 oz)   LMP 01/11/2024 (Exact Date)   SpO2 96%   BMI 45.79 kg/m²     Constitutional: well-developed, well-nourished, no acute distress, obese  HENT: no lid lag, no exophthalmos, no scleral icterus, moist mucous membranes, normal dentition  Neck: supple, trachea in midline, thyroid is nontender and moves well with swallowing, no palpable lymph nodes, normal neck extension  Heme/Lymph: no cervical or supraclavicular lymphadenopathy  Respiratory: normal respiratory effort, no wheezes or stridor  Cardiovascular: regular rate and rhythm  Extremities: no edema  Skin: warm and dry, no rashes  Neurologic: voice normal  Vascular: radial pulses palpable bilaterally  Psychiatric: affect " normal    IMAGING STUDIES:  I personally and independently reviewed, visualized and interpreted the images of the below listed radiology studies.  My findings are of a benign appearing left thyroid nodule, right thyroid nodule was biopsied though there are no overtly suspicious features, and the NM scan does not demonstrate any uptake to suggest a dominant adenoma.     US Soft Tissue Head Neck Thyroid 01/16/2024  FINDINGS:  Right lobe of the thyroid measures 4.3 x 1.6 x 1.9 cm with an estimated volume of 6.5 mL.  Left lobe of the thyroid measures 5.8 x 1.7 x 1.5 cm with an estimated volume of 8.1 mL.  Isthmus measures 0.3 cm in thickness.  Total thyroid volume measures 14.6 mL.    Bilateral thyroid nodules are seen which are solid or predominantly solid with small cystic components.  The largest nodule on the right is seen within the lower pole and is predominantly hypoechoic and measuring up to 1.4 cm.  The largest nodule on the left is predominantly isoechoic and measures up to 1.7 cm.  No microcalcifications are noted.  No exophytic nodules or any discrete lesions by ultrasound subjacent to the thyroid gland.  Normal thyroid perfusion.  No pathologically enlarged cervical lymph nodes.    Impression  1. Predominantly solid hypoechoic nodule within the lower pole of the left thyroid measuring up to 1.7 cm which meets FNA criteria for biopsy at this time.    NM Parathyroid Scan with SPECT Routine 10/03/2023  FINDINGS:  Following the intravenous administration of radiopharmaceutical, immediate, two-hour delayed, and three-hour delayed anterior camera images of the neck were obtained. In order to increase sensitivity for subtle areas of abnormal tracer activity, SPECT imaging of the neck and chest in the axial and coronal planes was also performed.  Immediate images demonstrate normal tracer accumulation within the thyroid gland, salivary glands, liver, and myocardium. 2 hour delayed images demonstrate significant  washout of tracer activity from the thyroid gland, and at 3 hours, thyroid washout is near complete. There are no focal areas of persistent abnormal tracer accumulation in the neck to indicate parathyroid adenoma.  SPECT images of the neck and chest demonstrate no foci of abnormal uptake.    IMPRESSION:  1. Normal parathyroid scan.      CT NECK PARATHYROID (4D) (5/07/24)  TECHNIQUE:Axial CT images obtained throughout the region of the neck before and after the administration of 100 ml omnipaque 350 intravenous contrast via parathyroid protocol.  Axial, sagittal and coronal reconstructions were performed.   Motion patient motion artifact, particularly on the arterial phase images.     COMPARISON:US Head Neck Thyroid 01/29/2024, a nuclear medicine parathyroid scan 10/03/202, 3.     FINDINGS:  No arterial enhancing lesions to specifically indicate the presence of parathyroid adenoma.   No abnormal soft tissue mass or pathologic lymph node enlargement within the neck.   The soft tissues along the aerodigestive tract are within normal limits.   The partially visualized parotid and the submandibular glands demonstrate nothing unusual.   Thyroid gland slightly heterogeneous with a few nodules, better characterized on the recent ultrasound..   Major vessels of the neck appear patent.   Ventricular shunt partially visualized, with redundant loop in right sternocleidomastoid muscle.   Lung apices are clear.   There are minor osseous degenerative changes, but no lytic or blastic lesions are evident.     Impression:   Examination mildly confounded by patient motion artifact.  No arterial enhancing nodule identified to specifically indicate site of suspected parathyroid adenoma.  Multinodular thyroid, better characterized on recent thyroid ultrasound.    IMPRESSION:  I had the pleasure of seeing Ms. Solorzano in Endocrine Surgical consultation regarding the biochemical diagnosis of primary hyperparathyroidism.     We discussed  that hyperparathyroidism can compromise bone and cardiovascular health. In addition to classic symptoms such as kidney stones and bone loss, hyperparathyroidism can be associated with multiple symptoms including fatigue, depression, memory loss, pruritis, polyuria, nocturia, constipation, polydipsia, and musculoskeletal aches and pains. Hyperparathyroidism can also worsen hypertension.  I discussed the implications of non-operative observation as well as the standard of care surgical treatment of primary hyperparathyroidism.  Based on the current clinical findings and a thorough discussion about the risks, benefits, and alternatives, the patient elected to proceed with parathyroidectomy.      We extensively discussed the pros and cons for surgical intervention, in this case parathyroidectomy with intraoperative parathyroid hormone monitoring.  We discussed that in the majority of cases, a single adenoma is the culprit gland. However, multigland disease is possible and multigland disease has a lower likelihood of radiographic localization.  Parathyroidectomy for single gland disease is a same day surgery while four-gland parathyroid exploration may require an overnight stay. We discussed that in all cases, parathyroidectomy has an attendant risk of postoperative hypocalcemia which can be mitigated with calcium and vitamin D supplementation. Other possible complications associated with this may include, but may not be restricted to hoarseness, recurrent laryngeal nerve injury - temporary or permanent, superior laryngeal nerve injury - temporary or permanent, hypocalcemia and hypoparathyroidism - temporary or permanent, neck hematoma, bleeding, infection, scarring, wound healing problems and possible death. We discussed that in rare cases, parathyroid exploration may be negative. Recurrent and persistent disease are also possible.      All questions were answered and the patient expressed understanding of all the  risks, benefits and alternatives, and agreed to proceed with the plan despite the risks.     Problem List Items Addressed This Visit       BMI 45.0-49.9, adult     Noted.  Complicates perioperative management.     - Lifestyle modifications          Hyperparathyroidism - Primary     Primary hyperparathyroidism with hypercalcemia, hypercalciuria, nephrolithiasis, and young age.  Imaging studies non-localizing     - OR for parathyroidectomy with intraoperative PTH monitoring  - Will need perioperative antibiotics due to the presence of a  shunt  - Defer necessity of DEXA imaging to endocrinology  - Maintain adequate hydration and avoid the use of calcium supplements         Thyroid nodule     Thyroid nodules, benign cytology of the dominant left nodule and benign radiographic appearance of the contralateral nodule.       - Currently no strong indication for total thyroidectomy and I do not recommend prophylactic total thyroidectomy.  - Ongoing surveillance of thyroid nodules  - Should FNA biopsies of the thyroid be warranted in the future, can consider anxiolytics or coordinate biopsy with sedation.         Vitamin D insufficiency     Chronically insufficient. Vitamin D 18 in 12/2023    - Recommend 5000 U daily of D3, available over the counter  - At risk for elevated PTH levels post op from secondary hyperparathyroidism.           Patient was seen and evaluated with surgery resident Richard Moura MD.    Raiza Epperson MD  Staff Surgeon  Endocrine Surgery  5/9/24

## 2024-05-08 NOTE — ASSESSMENT & PLAN NOTE
Primary hyperparathyroidism with hypercalcemia, hypercalciuria, nephrolithiasis, and young age.  Imaging studies non-localizing     - OR for parathyroidectomy with intraoperative PTH monitoring  - Will need perioperative antibiotics due to the presence of a  shunt  - Defer necessity of DEXA imaging to endocrinology  - Maintain adequate hydration and avoid the use of calcium supplements
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
## 2024-05-08 NOTE — PROGRESS NOTES
Endocrine Surgery History & Physical     ENDOCRINOLOGIST: ALEX Turcios PA-C     REASON FOR VISIT: Hyperparathyroidism    HPI: Sonia Solorzano is a 38 y.o. female patient with a history notable for PCOS, idiopathic intracranial hypertension s/p  shunt, bipolar II, HTN, recent hysterectomy for endometrial atypia, and obstructive sleep apnea who presents in consultation for primary hyperparathyroidism. Suspicion for hyperparathyroidism was raised on routine laboratory testing to have elevated hypercalcemia.    Details of the workup are as follows:     Recent laboratory studies:  Hypercalcemia noted since 9/2022  Max corrected calcium elevation to 12.3 mg/dL (calcium 11.7 with albumin of 3.2 in 9/2023)  Parathyroid hormone levels elevated with normal  Most recent PTH level was 157 with a corresponding calcium of 11.5, corrects to 11.8 for an albumin of 3.6  Phosphorus: 2.9  Vitamin D: 18 in 12/2023  24 hour urine calcium: 604 mg/24 hours, Benign Familial Hypocalciuric Hypercalcemia ruled out    Medications:  Vitamin D supplementation: none  Lithium, thiazide diuretics: was previously on thiazide. Off since 12/2023  Calcium supplements or calcimimetic: none    Symptoms:   The patient reports the following: [x]musculoskeletal pain, []fractures, [x]nephrolithiasis, []GERD, []peptic ulcer disease, []abdominal pain, []polydipsia, []polyuria, []pruritis  The patient reports the following neurocognitive symptoms: []brain fog, []concentration difficulties, [x]fatigue, [x]forgetfulness, [x]mood/psychiatric disturbances (Bipolar II, 2018)  The patient denies dysphagia, globus sensation, compression symptoms, or anterior neck pain.  The patient denies hoarseness, voice changes or increased need to clear the throat.  Bone mineral density: []Osteoporosis []Osteopenia []Normal bone density.  DEXA not performed    Surgical risk factors:  Risk of concurrent thyroid disease: low  Ultrasound of the thyroid: Jan 2024 with  1.7cm thyroid nodule on the left, biopsied with benign cytology on FNA  History of neck radiation: none  Prior neck surgery: none  Cardiovascular risk: echocardiogram normal 10/2023  Antiplatelet therapy and anticoagulation: none    Family history:  Family history of pancreatic cancer, parathyroid cancer    Localization studies done prior to this visit:  Ultrasound: no parathyroid visualized  Nuclear Medicine Parathyroid Scan: 10/2023 without a localized parathyroid adenoma  4D-CT Parathyroid scan: ordered but not performed, anaphylactic reaction to dye      Interval History (5/09/24):  Patient denies any significant clinical changes since her last visit.  On her last visit we discussed performing a 40 parathyroid CT scan for better identification of deep cervical parathyroid glands.  The scan was performed this week but unfortunately patient had a contrast induced allergy requiring ED admission.  Patient is recovering well since that time.      LABORATORY STUDIES:  I personally and independently reviewed relevant lab test results, including the following:    Lab Results   Component Value Date    PHOS 2.9 06/26/2023    PHOS 2.6 (L) 05/25/2023    MG 2.0 09/22/2023    XHSMXQIO43PG 18 (L) 12/09/2023    TSH 2.658 06/25/2023         PAST MEDICAL HISTORY:  Patient Active Problem List   Diagnosis    BMI 45.0-49.9, adult    Obstructive sleep apnea    Hypertension    Iron deficiency anemia due to chronic blood loss    Iron deficiency anemia    Bipolar disorder, unspecified    Irritable bowel syndrome with both constipation and diarrhea    Major depressive disorder, single episode, unspecified    Unspecified asthma, uncomplicated    Migraine without aura and without status migrainosus, not intractable    IIH (idiopathic intracranial hypertension)    PCOS (polycystic ovarian syndrome)    S/P  shunt    Functional neurological symptom disorder with mixed symptoms    Hyperparathyroidism    HLD (hyperlipidemia)    Acid reflux     Fatty liver    History of COVID-19    Thyroid nodule    Vapes nicotine containing substance    Edema    Complex endometrial hyperplasia with atypia    Numbness and tingling in left hand    S/p RA-TLH/BS/RO/SLND    Family history of breast cancer    Vitamin D insufficiency         PAST SURGICAL HISTORY:  Past Surgical History:   Procedure Laterality Date     SECTION, LOW TRANSVERSE      COLONOSCOPY N/A 10/27/2020    Procedure: COLONOSCOPY;  Surgeon: Patito Vergara MD;  Location: [facility] ENDO;  Service: Endoscopy;  Laterality: N/A;    CYSTOSCOPY N/A 10/27/2021    Procedure: CYSTOSCOPY;  Surgeon: Oh Velasquez Jr., MD;  [facility]: [facility];  Service: Urology;  Laterality: N/A;    DILATION AND CURETTAGE OF UTERUS      Uterine perforation for AUB    ENDOSCOPIC INSERTION OF VENTRICULOPERITONEAL SHUNT Right 2022    Procedure: INSERTION, SHUNT, VENTRICULOPERITONEAL, ENDOSCOPIC;  Surgeon: Fran Yoon MD;  Location: [facility] OR [address] [place] [address];  Service: Neurosurgery;  Laterality: Right;  regular bed, supine    ENDOSCOPIC INSERTION OF VENTRICULOPERITONEAL SHUNT N/A 2022    Procedure: INSERTION, SHUNT, VENTRICULOPERITONEAL, ENDOSCOPIC;  Surgeon: Bandar Oneal Jr., MD;  [facility];  Service: General;  Laterality: N/A;    epidural steriod injections  2005    x3    ESOPHAGOGASTRODUODENOSCOPY N/A 10/26/2020    Procedure: EGD (ESOPHAGOGASTRODUODENOSCOPY);  Surgeon: Enrike Garcia MD;  Location: [facility] ENDO;  Service: Endoscopy;  Laterality: N/A;    ESOPHAGOGASTRODUODENOSCOPY N/A 2023    Procedure: EGD (ESOPHAGOGASTRODUODENOSCOPY);  Surgeon: Patito Vergara MD;  Location: [facility] ENDO;  Service: Endoscopy;  Laterality: N/A;    INTRALUMINAL GASTROINTESTINAL TRACT IMAGING VIA CAPSULE N/A 2020    Procedure: IMAGING PROCEDURE, GI TRACT, INTRALUMINAL, VIA CAPSULE;  Surgeon: Patito Vergara MD;  Location: [facility] ENDO;  Service: Endoscopy;  Laterality: N/A;    KNEE ARTHROSCOPY W/  MENISCECTOMY Right 05/26/2021    Procedure: ARTHROSCOPY, KNEE, WITH MENISCECTOMY;  Surgeon: López Baker MD;  Location: [facility] OR;  Service: Orthopedics;  Laterality: Right;    LAPAROSCOPIC CHOLECYSTECTOMY N/A 11/27/2020    Procedure: CHOLECYSTECTOMY, LAPAROSCOPIC;  Surgeon: Chente Campbell III, MD;  Location: [facility] OR;  Service: General;  Laterality: N/A;    LAPAROSCOPY Right 2013    Endometriosis    LYMPH NODE BIOPSY Bilateral 2/12/2024    Procedure: BIOPSY, LYMPH NODE;  Surgeon: Kirsten Weaver MD;  Location: [facility] OR [facility];  Service: OB/GYN;  Laterality: Bilateral;  sentinal lymph node dissection    MAGNETIC RESONANCE IMAGING N/A 08/03/2022    Procedure: MRI (Magnetic Resonance Imagine);  Surgeon: Sanjana Surgeon;  Location: [facility];  Service: Anesthesiology;  Laterality: N/A;    MAPPING, LYMPH NODE, SENTINEL Bilateral 2/12/2024    Procedure: MAPPING, LYMPH NODE, SENTINEL;  Surgeon: Kirsten Weaver MD;  [facility];  Service: OB/GYN;  Laterality: Bilateral;    ROBOT-ASSISTED LAPAROSCOPIC ABDOMINAL HYSTERECTOMY USING DA VICKIE XI N/A 2/12/2024    Procedure: XI ROBOTIC HYSTERECTOMY;  Surgeon: Kirsten Weaver MD;  [facility];  Service: OB/GYN;  Laterality: N/A;  2.5 hr case    ROBOT-ASSISTED LAPAROSCOPIC SURGICAL REMOVAL OF OVARY USING DA VICKIE XI Right 2/12/2024    Procedure: XI ROBOTIC OOPHORECTOMY;  Surgeon: Kirsten Weaver MD;  [facility];  Service: OB/GYN;  Laterality: Right;    ROBOT-ASSISTED SURGICAL REMOVAL OF FALLOPIAN TUBE USING DA VICKIE XI Bilateral 2/12/2024    Procedure: XI ROBOTIC SALPINGECTOMY;  Surgeon: Kirsten Weaver MD;  [facility];  Service: OB/GYN;  Laterality: Bilateral;  US only --MD will determine at time of surgery    TONSILLECTOMY      as a child    TUBAL LIGATION  2008    UPPER GASTROINTESTINAL ENDOSCOPY          MEDICATIONS:  Current Outpatient Medications   Medication Sig Dispense Refill    acetaminophen (TYLENOL) 500  MG tablet Take 1,000 mg by mouth every 6 (six) hours as needed for Pain.      acetaminophen (TYLENOL) 650 MG TbSR Take 1 tablet (650 mg total) by mouth every 6 to 8 hours as needed (Alternate every 3 hours with ibuprofen). 60 tablet 1    albuterol (PROVENTIL/VENTOLIN HFA) 90 mcg/actuation inhaler Inhale 2 puffs into the lungs 4 (four) times daily. 18 g 2    atorvastatin (LIPITOR) 40 MG tablet Take 1 tablet (40 mg total) by mouth once daily. 30 tablet 11    hydrOXYzine HCL (ATARAX) 25 MG tablet Take 1 tablet (25 mg total) by mouth 3 (three) times daily as needed for Anxiety. 30 tablet 2    ibuprofen (ADVIL,MOTRIN) 600 MG tablet Take 1 tablet (600 mg total) by mouth every 6 to 8 hours as needed for Pain (Alternate every 3 hours with Tylenol.). 60 tablet 1    loperamide HCl (IMODIUM A-D ORAL) Take by mouth as needed. diarrhea      losartan (COZAAR) 25 MG tablet Take 1 tablet (25 mg total) by mouth once daily. 90 tablet 3    rimegepant (NURTEC) 75 mg odt Take 1 tablet (75 mg total) by mouth daily as needed for Migraine. Place ODT tablet on the tongue; alternatively the ODT tablet may be placed under the tongue 8 tablet 11    spironolactone (ALDACTONE) 25 MG tablet Take 1 tablet (25 mg total) by mouth once daily. 30 tablet 11     No current facility-administered medications for this visit.       ALLERGIES:  Review of patient's allergies indicates:   Allergen Reactions    Contrast media Anaphylaxis    Iodine and iodide containing products Anaphylaxis    Levaquin [levofloxacin] Anaphylaxis    Levofloxacin in d5w Anaphylaxis    Sulfa (sulfonamide antibiotics) Anaphylaxis and Hives    Iodinated contrast media Hives    Iodine     Magnesium      Pt reporting she is allergic to magnesium citrate oral drink.     Morphine Hives    Tree nuts     Adhesive Rash    Compazine [prochlorperazine] Anxiety     Restless legs    Depacon [valproate sodium] Hives     Pt experienced hives at IV site upon 8th day of depacon administration.   Hives resolved with stopping medication in 1.5 hours.    Nut [tree nut] Hives       SOCIAL HISTORY:  Social History     Socioeconomic History    Marital status:    Tobacco Use    Smoking status: Every Day     Types: Vaping with nicotine     Passive exposure: Current    Smokeless tobacco: Never   Substance and Sexual Activity    Alcohol use: Not Currently     Comment: socially, occasionally    Drug use: No    Sexual activity: Yes     Partners: Male     Birth control/protection: See Surgical Hx   Social History Narrative    ** Merged History Encounter **          Social Determinants of Health     Financial Resource Strain: Low Risk  (5/8/2024)    Overall Financial Resource Strain (CARDIA)     Difficulty of Paying Living Expenses: Not hard at all   Recent Concern: Financial Resource Strain - Medium Risk (4/3/2024)    Overall Financial Resource Strain (CARDIA)     Difficulty of Paying Living Expenses: Somewhat hard   Food Insecurity: No Food Insecurity (4/3/2024)    Hunger Vital Sign     Worried About Running Out of Food in the Last Year: Never true     Ran Out of Food in the Last Year: Never true   Transportation Needs: No Transportation Needs (5/8/2024)    PRAPARE - Transportation     Lack of Transportation (Medical): No     Lack of Transportation (Non-Medical): No   Physical Activity: Inactive (4/3/2024)    Exercise Vital Sign     Days of Exercise per Week: 0 days     Minutes of Exercise per Session: 0 min   Stress: Stress Concern Present (4/3/2024)    [employer] - Occupational Stress Questionnaire     Feeling of Stress : Rather much   Housing Stability: Low Risk  (5/8/2024)    Housing Stability Vital Sign     Unable to Pay for Housing in the Last Year: No     Homeless in the Last Year: No         FAMILY HISTORY:  Family History   Problem Relation Name Age of Onset    Pancreatitis Mother Simran     Breast cancer Mother Simran 60 - 69        unilat    Heart disease Mother Simran   "   Hyperlipidemia Mother Simran     Asthma Mother Simran     Cancer Father Gene 69        lymphoma (subtype unk)    Colon cancer Father Gene 61    Skin cancer Father Gene         type unk; body location unk    Heart disease Father Gene     Hypertension Father Gene     Hyperlipidemia Father Gene     Arthritis Father Gene     Pancreatitis Maternal Grandmother Ennie         prior to panc. cancer    Pancreatic cancer Maternal Grandmother Ennie 80        subtype unk    Diabetes Maternal Grandmother Ennie     Aortic aneurysm Maternal Grandfather Josh         AAA (was COD)    Prostate cancer Maternal Grandfather Josh 89    Cancer Paternal Grandmother Arturo 70 - 79        brain primary vs brain mets--unk    Cancer Paternal Grandfather Gene, Sr. 50 - 59        lung    Diabetes Paternal Grandfather Gene, Sr.     Cancer Other          parathyroid    Cancer Paternal Cousin          ovarian    Endometrial cancer Paternal Cousin      Colon polyps Neg Hx          REVIEW OF SYSTEMS:  A detailed review of systems has been reviewed with the patient, pertinent positives and negatives are presented in the note and is otherwise negative.    PHYSICAL EXAMINATION:  Vital Signs: /82   Pulse 96   Ht 5' 7" (1.702 m)   Wt 132.6 kg (292 lb 5.3 oz)   LMP 01/11/2024 (Exact Date)   SpO2 96%   BMI 45.79 kg/m²     Constitutional: well-developed, well-nourished, no acute distress, obese  HENT: no lid lag, no exophthalmos, no scleral icterus, moist mucous membranes, normal dentition  Neck: supple, trachea in midline, thyroid is nontender and moves well with swallowing, no palpable lymph nodes, normal neck extension  Heme/Lymph: no cervical or supraclavicular lymphadenopathy  Respiratory: normal respiratory effort, no wheezes or stridor  Cardiovascular: regular rate and rhythm  Extremities: no edema  Skin: warm and dry, no rashes  Neurologic: voice normal  Vascular: radial pulses palpable bilaterally  Psychiatric: affect " normal    IMAGING STUDIES:  I personally and independently reviewed, visualized and interpreted the images of the below listed radiology studies.  My findings are of a benign appearing left thyroid nodule, right thyroid nodule was biopsied though there are no overtly suspicious features, and the NM scan does not demonstrate any uptake to suggest a dominant adenoma.     US Soft Tissue Head Neck Thyroid 01/16/2024  FINDINGS:  Right lobe of the thyroid measures 4.3 x 1.6 x 1.9 cm with an estimated volume of 6.5 mL.  Left lobe of the thyroid measures 5.8 x 1.7 x 1.5 cm with an estimated volume of 8.1 mL.  Isthmus measures 0.3 cm in thickness.  Total thyroid volume measures 14.6 mL.    Bilateral thyroid nodules are seen which are solid or predominantly solid with small cystic components.  The largest nodule on the right is seen within the lower pole and is predominantly hypoechoic and measuring up to 1.4 cm.  The largest nodule on the left is predominantly isoechoic and measures up to 1.7 cm.  No microcalcifications are noted.  No exophytic nodules or any discrete lesions by ultrasound subjacent to the thyroid gland.  Normal thyroid perfusion.  No pathologically enlarged cervical lymph nodes.    Impression  1. Predominantly solid hypoechoic nodule within the lower pole of the left thyroid measuring up to 1.7 cm which meets FNA criteria for biopsy at this time.    NM Parathyroid Scan with SPECT Routine 10/03/2023  FINDINGS:  Following the intravenous administration of radiopharmaceutical, immediate, two-hour delayed, and three-hour delayed anterior camera images of the neck were obtained. In order to increase sensitivity for subtle areas of abnormal tracer activity, SPECT imaging of the neck and chest in the axial and coronal planes was also performed.  Immediate images demonstrate normal tracer accumulation within the thyroid gland, salivary glands, liver, and myocardium. 2 hour delayed images demonstrate significant  washout of tracer activity from the thyroid gland, and at 3 hours, thyroid washout is near complete. There are no focal areas of persistent abnormal tracer accumulation in the neck to indicate parathyroid adenoma.  SPECT images of the neck and chest demonstrate no foci of abnormal uptake.    IMPRESSION:  1. Normal parathyroid scan.      CT NECK PARATHYROID (4D) (5/07/24)  TECHNIQUE:Axial CT images obtained throughout the region of the neck before and after the administration of 100 ml omnipaque 350 intravenous contrast via parathyroid protocol.  Axial, sagittal and coronal reconstructions were performed.   Motion patient motion artifact, particularly on the arterial phase images.     COMPARISON:US Head Neck Thyroid 01/29/2024, a nuclear medicine parathyroid scan 10/03/202, 3.     FINDINGS:  No arterial enhancing lesions to specifically indicate the presence of parathyroid adenoma.   No abnormal soft tissue mass or pathologic lymph node enlargement within the neck.   The soft tissues along the aerodigestive tract are within normal limits.   The partially visualized parotid and the submandibular glands demonstrate nothing unusual.   Thyroid gland slightly heterogeneous with a few nodules, better characterized on the recent ultrasound..   Major vessels of the neck appear patent.   Ventricular shunt partially visualized, with redundant loop in right sternocleidomastoid muscle.   Lung apices are clear.   There are minor osseous degenerative changes, but no lytic or blastic lesions are evident.     Impression:   Examination mildly confounded by patient motion artifact.  No arterial enhancing nodule identified to specifically indicate site of suspected parathyroid adenoma.  Multinodular thyroid, better characterized on recent thyroid ultrasound.    IMPRESSION:  I had the pleasure of seeing Ms. Solorzano in Endocrine Surgical consultation regarding the biochemical diagnosis of primary hyperparathyroidism.     We discussed  that hyperparathyroidism can compromise bone and cardiovascular health. In addition to classic symptoms such as kidney stones and bone loss, hyperparathyroidism can be associated with multiple symptoms including fatigue, depression, memory loss, pruritis, polyuria, nocturia, constipation, polydipsia, and musculoskeletal aches and pains. Hyperparathyroidism can also worsen hypertension.  I discussed the implications of non-operative observation as well as the standard of care surgical treatment of primary hyperparathyroidism.  Based on the current clinical findings and a thorough discussion about the risks, benefits, and alternatives, the patient elected to proceed with parathyroidectomy.      We extensively discussed the pros and cons for surgical intervention, in this case parathyroidectomy with intraoperative parathyroid hormone monitoring.  We discussed that in the majority of cases, a single adenoma is the culprit gland. However, multigland disease is possible and multigland disease has a lower likelihood of radiographic localization.  Parathyroidectomy for single gland disease is a same day surgery while four-gland parathyroid exploration may require an overnight stay. We discussed that in all cases, parathyroidectomy has an attendant risk of postoperative hypocalcemia which can be mitigated with calcium and vitamin D supplementation. Other possible complications associated with this may include, but may not be restricted to hoarseness, recurrent laryngeal nerve injury - temporary or permanent, superior laryngeal nerve injury - temporary or permanent, hypocalcemia and hypoparathyroidism - temporary or permanent, neck hematoma, bleeding, infection, scarring, wound healing problems and possible death. We discussed that in rare cases, parathyroid exploration may be negative. Recurrent and persistent disease are also possible.      All questions were answered and the patient expressed understanding of all the  risks, benefits and alternatives, and agreed to proceed with the plan despite the risks.     Problem List Items Addressed This Visit       BMI 45.0-49.9, adult     Noted.  Complicates perioperative management.     - Lifestyle modifications          Hyperparathyroidism - Primary     Primary hyperparathyroidism with hypercalcemia, hypercalciuria, nephrolithiasis, and young age.  Imaging studies non-localizing     - OR for parathyroidectomy with intraoperative PTH monitoring  - Will need perioperative antibiotics due to the presence of a  shunt  - Defer necessity of DEXA imaging to endocrinology  - Maintain adequate hydration and avoid the use of calcium supplements         Thyroid nodule     Thyroid nodules, benign cytology of the dominant left nodule and benign radiographic appearance of the contralateral nodule.       - Currently no strong indication for total thyroidectomy and I do not recommend prophylactic total thyroidectomy.  - Ongoing surveillance of thyroid nodules  - Should FNA biopsies of the thyroid be warranted in the future, can consider anxiolytics or coordinate biopsy with sedation.         Vitamin D insufficiency     Chronically insufficient. Vitamin D 18 in 12/2023    - Recommend 5000 U daily of D3, available over the counter  - At risk for elevated PTH levels post op from secondary hyperparathyroidism.           Patient was seen and evaluated with surgery resident Richard Moura MD.    Raiza Epperson MD  Staff Surgeon  Endocrine Surgery  5/9/24

## 2024-05-08 NOTE — CARE UPDATE
Pt complained of chest tightness, pruritus and HA following contrast enhanced CT. Pt reports that chest tightness feels similar to prior episode of contrast reaction. Denies SOB, symptoms of angioedema, CP, lightheadedness, dizziness, nausea, abdominal pain. Per EMR pt has an anaphylactic reaction to contrast. She was pre-medicated with prednisone and benadryl.   On evaluation, pt is NAD. VS: /89, HR 96, O2 97%. No visible urticaria, but pt is actively scratching her neck, UE and face.   Given history of anaphylactic reaction to IV iodinated contrast and complains of chest tightness, recommended that patient present to the ED for further evaluation and management. Both pt and family are in agreement with plan.       Ximena Reynoso MD  Radiology PGY V

## 2024-05-08 NOTE — ASSESSMENT & PLAN NOTE
Thyroid nodules, benign cytology of the dominant left nodule and benign radiographic appearance of the contralateral nodule.       - Currently no strong indication for total thyroidectomy and I do not recommend prophylactic total thyroidectomy.  - Ongoing surveillance of thyroid nodules  - Should FNA biopsies of the thyroid be warranted in the future, can consider anxiolytics or coordinate biopsy with sedation.

## 2024-05-08 NOTE — PROGRESS NOTES
Pt states she is doing ok. She has experienced allergic reactions before. Pt denies having any concerns at this time other than review her CT scan which has not been released at this time. Message sent to PCP for assistance with f/u and review of CT. Pt is aware. Pt encouraged to reach out with any concerns at they arise.

## 2024-05-09 ENCOUNTER — OFFICE VISIT (OUTPATIENT)
Dept: SURGERY | Facility: CLINIC | Age: 39
End: 2024-05-09
Payer: COMMERCIAL

## 2024-05-09 VITALS
WEIGHT: 292.31 LBS | HEIGHT: 67 IN | SYSTOLIC BLOOD PRESSURE: 130 MMHG | OXYGEN SATURATION: 96 % | BODY MASS INDEX: 45.88 KG/M2 | HEART RATE: 96 BPM | DIASTOLIC BLOOD PRESSURE: 82 MMHG

## 2024-05-09 DIAGNOSIS — E21.3 HYPERPARATHYROIDISM: Primary | ICD-10-CM

## 2024-05-09 DIAGNOSIS — E55.9 VITAMIN D INSUFFICIENCY: ICD-10-CM

## 2024-05-09 DIAGNOSIS — E04.1 THYROID NODULE: ICD-10-CM

## 2024-05-09 PROCEDURE — 3079F DIAST BP 80-89 MM HG: CPT | Mod: CPTII,S$GLB,, | Performed by: STUDENT IN AN ORGANIZED HEALTH CARE EDUCATION/TRAINING PROGRAM

## 2024-05-09 PROCEDURE — 99212 OFFICE O/P EST SF 10 MIN: CPT | Mod: S$GLB,,, | Performed by: STUDENT IN AN ORGANIZED HEALTH CARE EDUCATION/TRAINING PROGRAM

## 2024-05-09 PROCEDURE — 4010F ACE/ARB THERAPY RXD/TAKEN: CPT | Mod: CPTII,S$GLB,, | Performed by: STUDENT IN AN ORGANIZED HEALTH CARE EDUCATION/TRAINING PROGRAM

## 2024-05-09 PROCEDURE — 99999 PR PBB SHADOW E&M-EST. PATIENT-LVL III: CPT | Mod: PBBFAC,,, | Performed by: STUDENT IN AN ORGANIZED HEALTH CARE EDUCATION/TRAINING PROGRAM

## 2024-05-09 PROCEDURE — 3075F SYST BP GE 130 - 139MM HG: CPT | Mod: CPTII,S$GLB,, | Performed by: STUDENT IN AN ORGANIZED HEALTH CARE EDUCATION/TRAINING PROGRAM

## 2024-05-09 PROCEDURE — 3008F BODY MASS INDEX DOCD: CPT | Mod: CPTII,S$GLB,, | Performed by: STUDENT IN AN ORGANIZED HEALTH CARE EDUCATION/TRAINING PROGRAM

## 2024-05-13 ENCOUNTER — LAB VISIT (OUTPATIENT)
Dept: LAB | Facility: HOSPITAL | Age: 39
End: 2024-05-13
Attending: INTERNAL MEDICINE
Payer: COMMERCIAL

## 2024-05-13 ENCOUNTER — PATIENT MESSAGE (OUTPATIENT)
Dept: HEMATOLOGY/ONCOLOGY | Facility: CLINIC | Age: 39
End: 2024-05-13

## 2024-05-13 ENCOUNTER — OFFICE VISIT (OUTPATIENT)
Dept: HEMATOLOGY/ONCOLOGY | Facility: CLINIC | Age: 39
End: 2024-05-13
Payer: COMMERCIAL

## 2024-05-13 DIAGNOSIS — D50.9 IRON DEFICIENCY ANEMIA, UNSPECIFIED IRON DEFICIENCY ANEMIA TYPE: ICD-10-CM

## 2024-05-13 DIAGNOSIS — D50.9 IRON DEFICIENCY ANEMIA, UNSPECIFIED IRON DEFICIENCY ANEMIA TYPE: Primary | ICD-10-CM

## 2024-05-13 DIAGNOSIS — E83.52 SERUM CALCIUM ELEVATED: ICD-10-CM

## 2024-05-13 DIAGNOSIS — R79.89 ELEVATED PTHRP LEVEL: ICD-10-CM

## 2024-05-13 LAB
ALBUMIN SERPL BCP-MCNC: 3.4 G/DL (ref 3.5–5.2)
ALP SERPL-CCNC: 94 U/L (ref 55–135)
ALT SERPL W/O P-5'-P-CCNC: 25 U/L (ref 10–44)
ANION GAP SERPL CALC-SCNC: 8 MMOL/L (ref 8–16)
AST SERPL-CCNC: 9 U/L (ref 10–40)
BASOPHILS # BLD AUTO: 0.05 K/UL (ref 0–0.2)
BASOPHILS NFR BLD: 0.5 % (ref 0–1.9)
BILIRUB SERPL-MCNC: 0.2 MG/DL (ref 0.1–1)
BUN SERPL-MCNC: 9 MG/DL (ref 6–20)
CALCIUM SERPL-MCNC: 10 MG/DL (ref 8.7–10.5)
CHLORIDE SERPL-SCNC: 106 MMOL/L (ref 95–110)
CO2 SERPL-SCNC: 25 MMOL/L (ref 23–29)
CREAT SERPL-MCNC: 0.8 MG/DL (ref 0.5–1.4)
DIFFERENTIAL METHOD BLD: ABNORMAL
EOSINOPHIL # BLD AUTO: 0.2 K/UL (ref 0–0.5)
EOSINOPHIL NFR BLD: 1.6 % (ref 0–8)
ERYTHROCYTE [DISTWIDTH] IN BLOOD BY AUTOMATED COUNT: 15.6 % (ref 11.5–14.5)
EST. GFR  (NO RACE VARIABLE): >60 ML/MIN/1.73 M^2
FERRITIN SERPL-MCNC: 40 NG/ML (ref 20–300)
GLUCOSE SERPL-MCNC: 86 MG/DL (ref 70–110)
HCT VFR BLD AUTO: 41 % (ref 37–48.5)
HGB BLD-MCNC: 12.1 G/DL (ref 12–16)
IMM GRANULOCYTES # BLD AUTO: 0.04 K/UL (ref 0–0.04)
IMM GRANULOCYTES NFR BLD AUTO: 0.4 % (ref 0–0.5)
IRON SERPL-MCNC: 28 UG/DL (ref 30–160)
LYMPHOCYTES # BLD AUTO: 2.7 K/UL (ref 1–4.8)
LYMPHOCYTES NFR BLD: 29.3 % (ref 18–48)
MCH RBC QN AUTO: 25 PG (ref 27–31)
MCHC RBC AUTO-ENTMCNC: 29.5 G/DL (ref 32–36)
MCV RBC AUTO: 85 FL (ref 82–98)
MONOCYTES # BLD AUTO: 0.5 K/UL (ref 0.3–1)
MONOCYTES NFR BLD: 5.7 % (ref 4–15)
NEUTROPHILS # BLD AUTO: 5.8 K/UL (ref 1.8–7.7)
NEUTROPHILS NFR BLD: 62.5 % (ref 38–73)
NRBC BLD-RTO: 0 /100 WBC
PLATELET # BLD AUTO: 397 K/UL (ref 150–450)
PMV BLD AUTO: 10.1 FL (ref 9.2–12.9)
POTASSIUM SERPL-SCNC: 4.2 MMOL/L (ref 3.5–5.1)
PROT SERPL-MCNC: 6.5 G/DL (ref 6–8.4)
RBC # BLD AUTO: 4.84 M/UL (ref 4–5.4)
SATURATED IRON: 6 % (ref 20–50)
SODIUM SERPL-SCNC: 139 MMOL/L (ref 136–145)
TOTAL IRON BINDING CAPACITY: 505 UG/DL (ref 250–450)
TRANSFERRIN SERPL-MCNC: 361 MG/DL (ref 200–375)
WBC # BLD AUTO: 9.35 K/UL (ref 3.9–12.7)

## 2024-05-13 PROCEDURE — 85025 COMPLETE CBC W/AUTO DIFF WBC: CPT | Performed by: INTERNAL MEDICINE

## 2024-05-13 PROCEDURE — 4010F ACE/ARB THERAPY RXD/TAKEN: CPT | Mod: CPTII,95,, | Performed by: INTERNAL MEDICINE

## 2024-05-13 PROCEDURE — 36415 COLL VENOUS BLD VENIPUNCTURE: CPT | Performed by: INTERNAL MEDICINE

## 2024-05-13 PROCEDURE — 99214 OFFICE O/P EST MOD 30 MIN: CPT | Mod: 95,,, | Performed by: INTERNAL MEDICINE

## 2024-05-13 PROCEDURE — 80053 COMPREHEN METABOLIC PANEL: CPT | Performed by: INTERNAL MEDICINE

## 2024-05-13 PROCEDURE — 82728 ASSAY OF FERRITIN: CPT | Performed by: INTERNAL MEDICINE

## 2024-05-13 PROCEDURE — 83540 ASSAY OF IRON: CPT | Performed by: INTERNAL MEDICINE
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
## 2024-05-15 ENCOUNTER — TELEPHONE (OUTPATIENT)
Dept: HEMATOLOGY/ONCOLOGY | Facility: CLINIC | Age: 39
End: 2024-05-15
Payer: COMMERCIAL

## 2024-05-16 ENCOUNTER — TELEPHONE (OUTPATIENT)
Dept: DERMATOLOGY | Facility: CLINIC | Age: 39
End: 2024-05-16
Payer: COMMERCIAL

## 2024-05-16 ENCOUNTER — OFFICE VISIT (OUTPATIENT)
Dept: HEMATOLOGY/ONCOLOGY | Facility: CLINIC | Age: 39
End: 2024-05-16
Payer: COMMERCIAL

## 2024-05-16 ENCOUNTER — ANESTHESIA EVENT (OUTPATIENT)
Dept: SURGERY | Facility: HOSPITAL | Age: 39
End: 2024-05-16
Payer: COMMERCIAL

## 2024-05-16 ENCOUNTER — PATIENT MESSAGE (OUTPATIENT)
Dept: SURGERY | Facility: CLINIC | Age: 39
End: 2024-05-16
Payer: COMMERCIAL

## 2024-05-16 ENCOUNTER — TELEPHONE (OUTPATIENT)
Dept: SURGERY | Facility: CLINIC | Age: 39
End: 2024-05-16
Payer: COMMERCIAL

## 2024-05-16 DIAGNOSIS — E04.1 THYROID NODULE: ICD-10-CM

## 2024-05-16 DIAGNOSIS — Z80.0 FAMILY HISTORY OF COLON CANCER IN FATHER: ICD-10-CM

## 2024-05-16 DIAGNOSIS — R92.333 HETEROGENEOUSLY DENSE TISSUE OF BOTH BREASTS ON MAMMOGRAPHY: ICD-10-CM

## 2024-05-16 DIAGNOSIS — Z85.42 HISTORY OF ENDOMETRIAL CANCER: ICD-10-CM

## 2024-05-16 DIAGNOSIS — Z98.890 S/P PARATHYROIDECTOMY: Primary | ICD-10-CM

## 2024-05-16 DIAGNOSIS — Z00.00 GENERAL MEDICAL EXAM: ICD-10-CM

## 2024-05-16 DIAGNOSIS — Z91.89 INCREASED RISK OF BREAST CANCER: ICD-10-CM

## 2024-05-16 DIAGNOSIS — Z71.83 ENCOUNTER FOR NONPROCREATIVE GENETIC COUNSELING: Primary | ICD-10-CM

## 2024-05-16 DIAGNOSIS — E21.3 HYPERPARATHYROIDISM: ICD-10-CM

## 2024-05-16 DIAGNOSIS — Z90.89 S/P PARATHYROIDECTOMY: Primary | ICD-10-CM

## 2024-05-16 PROBLEM — E55.9 VITAMIN D INSUFFICIENCY: Status: ACTIVE | Noted: 2024-05-16

## 2024-05-16 PROCEDURE — 99213 OFFICE O/P EST LOW 20 MIN: CPT | Mod: 95,,, | Performed by: NURSE PRACTITIONER

## 2024-05-16 PROCEDURE — 4010F ACE/ARB THERAPY RXD/TAKEN: CPT | Mod: CPTII,95,, | Performed by: NURSE PRACTITIONER

## 2024-05-16 NOTE — PATIENT INSTRUCTIONS
"Sonia Solorzano ("Sonia"), 38 y.o., assigned female sex at birth, initially presented on 04/16/2024 for cancer-genetic risk assessment (upon referral by Melchor Barriga MD) and subsequently underwent hereditary cancer genetic testing.  This test revealed no known clinically significant genetic variants or variants of uncertain significance in the 91 genes tested.  She returns today for post-test genetic counseling.    The information below has been discussed with and/or provided in writing to Sonia.    Diagnoses and Orders for This Encounter:    ICD-10-CM ICD-9-CM   1. Encounter for nonprocreative genetic counseling  Z71.83 V26.33   2. History of endometrial cancer  Z85.42 V10.42   3. Thyroid nodule  E04.1 241.0   4. Increased risk of breast cancer  Z91.89 V15.89   5. Heterogeneously dense tissue of both breasts on mammography  R92.333 793.82   6. Hyperparathyroidism  E21.3 252.00   7. Family history of colon cancer in father  Z80.0 V16.0   8. General medical exam  Z00.00 V70.9     1. Encounter for nonprocreative genetic counseling    Germline (Hereditary) Cancer Susceptibility Gene Testing  This is a partial copy of the report.  The complete report can be found in the patient's medical record.      No known clinically significant mutations or variants of unknown significance were identified; in other words, your test came back negative (normal).     With regard to your own diagnosis of endometrioid adenocarcinoma of the endometrium, the negative (normal) results in the associated genes represent a true-negative (or an informative) result, meaning that that cancer of yours was more likely to have been sporadic than hereditary; however, these genetic testing results reduce--but do not eliminate--the possibility of previously diagnosed cancers of yours having been hereditary or the possibility of your developing a hereditary cancer in the future.    Your negative results for genes associated with " cancers/other conditions in your relatives, with which you have not personally been affected, are uninformative.  In other words, your affected relatives' cancers/other conditions may have been hereditary or may have been sporadic, and without knowing that information, your negative results in the associated genes only tell us that you likely don't have a hereditary predisposition to those cancers/other conditions; however, you can still develop those cancers/other conditions and in fact may even have an increased risk for them based in part on your family history.  If the appropriate, affected relatives are found to carry a pathogenic (harmful) variant that explains their cancers/other conditions, and you do not carry that variant, your negative results would then be considered a true-negative.    Health Maintenance / Cancer Risks and Risk Management    Only a small percentage (approximately 5%-10%) of cancers are hereditary, meaning caused by an inherited gene mutation; rather, most cancers are sporadic.  Environmental factors, lifestyle factors, and even factors beyond our control play a significant role in the development of many cancers.  As such, an individual can develop cancer even with negative genetic testing.  Furthermore, even with negative genetic testing, you can still be at increased risk for certain cancers, as you may independently have risk factors for those cancers and/or you may share risk factors with your family members affected by cancer.  For these reasons, it is strongly recommended that you ensure that your healthcare providers are aware of and up-to-date on your personal and family history so that your medical management including cancer screenings can be based in part off of this information.      The following cancer screenings are currently recommended for you (please note that these recommendations can change based on updates to clinical guidelines and/or with changes to your medical  "or family history and are therefore only considered accurate as of the date on which this letter was composed; additional and/or alternative screenings may be recommended by your healthcare providers, and recommendations from your healthcare providers directly managing these risks supersede the below recommendations):    Cancer in general:  Annual visit with your primary care provider (PCP) for history review and physical exam and age-appropriate testing.    Gynecologic cancers:  Annual visit with your gynecology provider, which may include pelvic exam and/or Pap smear/HPV testing.    Breast cancer:  There are models that help us to "calculate" your breast cancer risk.  Your current, estimated risks for developing breast cancer are as follows:    5-year:  1.1% (not considered increased risk)  10-year:  Approx 3.5% (does not reach the threshold at which risk-reducing medication is recommended unless otherwise contraindicated)  Lifetime:  Approx. 22.5% (increased risk)    The below recommendations are made based in part on the above risk scores.    Ongoing breast cancer risk assessment and breast cancer risk-reduction counseling with your breast specialist.  Ongoing breast awareness, meaning you are familiar with your breasts, perform breast self-exams at least monthly, and promptly report changes/concerns to your breast specialist.  Clinical breast exam by a healthcare provider every 6-12 months.    Annual mammogram.  Supplemental imaging to screen for breast cancer, and breast cancer risk-reduction measures, as recommended by your breast specialist.    Colorectal cancer:  Screening colonoscopy as recommended by your gastroenterology (GI) provider, starting by age 45.  With regard to family history, please let me know if any of the following applies, as such could change colorectal cancer screening recommendations for you:  If you learn moving forward that any blood relative of yours has been diagnosed with " colorectal cancer.  If a first-degree relative (i.e., your parent, sibling, or child) has a colorectal polyp history including any of the following characteristics:  Adenoma with high-grade dysplasia  Adenoma or sessile serrated polyp/adenoma 1 cm or larger in size  Villous or tubulovillous adenoma  Traditional serrated adenoma (TSA)  Sessile serrated lesion/polyp/adenoma with any dysplasia  Cumulatively 10 or more polyps    Skin cancer:  Annual (or more often if so recommended by your dermatology provider) total-body skin examination (TBSE) with a dermatology provider.    Referrals placed based on the above:  Please notify me if you do not hear from these office(s) within the next 2 weeks to schedule an appointment.  [facility]   Dermatology    If you are not established with a healthcare specialist listed above, please let me know so I can refer you.    Some information regarding general cancer risk reduction:  It is recommended to avoid tobacco use; be physically active; maintain a healthy weight; eat a diet rich in fruits, vegetables, and whole grains and low in saturated/trans fat, red meat, and processed meat; limit alcohol consumption (zero is best); protect against sexually transmitted infections; protect against the sun (by minimizing ultraviolet (UV) exposure, wearing UV-protective clothing, and using high-SPF sunblock), and avoid tanning beds; and get regular screenings for cancers as recommended by your healthcare team.    Regarding Your Relatives    Your relatives also may be at increased risk for certain cancers, depending upon their own personal and family history; therefore, it is important that they, too, ensure that their own healthcare providers are aware of and up-to-date on their personal and family history so that their medical management including cancer screenings can be based in part off of this information.      Additionally, it is possible for your relatives to have hereditary  cancer susceptibility gene mutations even when you have negative genetic testing.      Your relatives should speak with a healthcare provider about their cancer risks and whether genetic counseling and potentially testing may be indicated for them.  Anyone interested in pursuing cancer genetic counseling/testing may contact the [facility] at [phone] to schedule an appointment or may visit [email or web address] to locate a genetic specialist near them.    Cancer Genetics Follow-up    Moving forward, please update me with any changes to--or new information learned about--your personal or family history and with any relative's genetic testing results.  Barring any such changes, please follow up with me in 1 year and as you need or desire sooner.    In the meantime, please don't hesitate to reach out with any questions or concerns.    2. History of endometrial cancer  - Sonia is to continue care with her oncologist(s)    3. Thyroid nodule  01/29/2024 left thyroid nodule FNA with cytology indicating pattern consistent with benign follicular nodule  - Sonia is to continue care with the healthcare provider managing this finding/conditions; Sonia states that this surgeon tomorrow plans to look at her thyroid during operation for her parathyroid    4. Increased risk of breast cancer  - Ambulatory referral/consult to Breast Surgery; Future ([facility])    5. Heterogeneously dense tissue of both breasts on mammography  - Ambulatory referral/consult to Breast Surgery; Future ([facility])    6. Hyperparathyroidism  - Surgery to remove parathyroid scheduled for tomorrow; I have asked Sonia to update me as to the pathology, if pathology is not concerning may push recall with me back to a 3-year recall rather than a 1-year recall    7. Family history of colon cancer in father  - See #1    8. General medical exam  Total-body skin exams  - Ambulatory referral/consult to  Dermatology; Future     Follow-up:  Follow up in about 1 year (around 5/16/2025).  Follow up sooner as needed/desired.
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
## 2024-05-16 NOTE — ASSESSMENT & PLAN NOTE
Chronically insufficient. Vitamin D 18 in 12/2023    - Recommend 5000 U daily of D3, available over the counter  - At risk for elevated PTH levels post op from secondary hyperparathyroidism.
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
## 2024-05-16 NOTE — TELEPHONE ENCOUNTER
Left message on patient's voicemail for her to arrive [facility] tomorrow 5/17/24 at 5:15am for surgery with Dr. Epperson.  Pre-Op instructions reinforced.  Direct phone number given for her to call with any questions or concerns.

## 2024-05-16 NOTE — TELEPHONE ENCOUNTER
----- Message from Emile Anglin DNP sent at 5/16/2024  2:22 PM CDT -----  Please schedule patient for TBSE with Dr. Kruger or a colleague of hers.  Referral is in.  Thanks!

## 2024-05-17 ENCOUNTER — HOSPITAL ENCOUNTER (OUTPATIENT)
Facility: HOSPITAL | Age: 39
Discharge: HOME | End: 2024-05-17
Attending: STUDENT IN AN ORGANIZED HEALTH CARE EDUCATION/TRAINING PROGRAM | Admitting: STUDENT IN AN ORGANIZED HEALTH CARE EDUCATION/TRAINING PROGRAM
Payer: COMMERCIAL

## 2024-05-17 ENCOUNTER — ANESTHESIA (OUTPATIENT)
Dept: SURGERY | Facility: HOSPITAL | Age: 39
End: 2024-05-17
Payer: COMMERCIAL

## 2024-05-17 ENCOUNTER — PATIENT MESSAGE (OUTPATIENT)
Dept: SURGERY | Facility: HOSPITAL | Age: 39
End: 2024-05-17
Payer: COMMERCIAL

## 2024-05-17 VITALS
HEIGHT: 67 IN | RESPIRATION RATE: 16 BRPM | SYSTOLIC BLOOD PRESSURE: 129 MMHG | WEIGHT: 292 LBS | HEART RATE: 82 BPM | TEMPERATURE: 98 F | BODY MASS INDEX: 45.83 KG/M2 | DIASTOLIC BLOOD PRESSURE: 73 MMHG | OXYGEN SATURATION: 96 %

## 2024-05-17 DIAGNOSIS — E21.3 HYPERPARATHYROIDISM: ICD-10-CM

## 2024-05-17 LAB
PTH-INTACT SERPL-MCNC: 29.4 PG/ML (ref 9–77)
PTH-INTACT SERPL-MCNC: 318.6 PG/ML (ref 9–77)

## 2024-05-17 PROCEDURE — 88331 PATH CONSLTJ SURG 1 BLK 1SPC: CPT | Mod: 59 | Performed by: PATHOLOGY

## 2024-05-17 PROCEDURE — 63600175 PHARM REV CODE 636 W HCPCS: Performed by: ANESTHESIOLOGY

## 2024-05-17 PROCEDURE — 63600175 PHARM REV CODE 636 W HCPCS: Mod: JZ,JG | Performed by: STUDENT IN AN ORGANIZED HEALTH CARE EDUCATION/TRAINING PROGRAM

## 2024-05-17 PROCEDURE — 71000033 HC RECOVERY, INTIAL HOUR: Performed by: STUDENT IN AN ORGANIZED HEALTH CARE EDUCATION/TRAINING PROGRAM

## 2024-05-17 PROCEDURE — 36620 INSERTION CATHETER ARTERY: CPT | Mod: 59,,, | Performed by: ANESTHESIOLOGY

## 2024-05-17 PROCEDURE — 94761 N-INVAS EAR/PLS OXIMETRY MLT: CPT

## 2024-05-17 PROCEDURE — D9220A PRA ANESTHESIA: Mod: ,,, | Performed by: ANESTHESIOLOGY

## 2024-05-17 PROCEDURE — 63600175 PHARM REV CODE 636 W HCPCS: Performed by: STUDENT IN AN ORGANIZED HEALTH CARE EDUCATION/TRAINING PROGRAM

## 2024-05-17 PROCEDURE — 88305 TISSUE EXAM BY PATHOLOGIST: CPT | Performed by: PATHOLOGY

## 2024-05-17 PROCEDURE — 71000015 HC POSTOP RECOV 1ST HR: Performed by: STUDENT IN AN ORGANIZED HEALTH CARE EDUCATION/TRAINING PROGRAM

## 2024-05-17 PROCEDURE — 88305 TISSUE EXAM BY PATHOLOGIST: CPT | Mod: 26,,, | Performed by: PATHOLOGY

## 2024-05-17 PROCEDURE — 27201423 OPTIME MED/SURG SUP & DEVICES STERILE SUPPLY: Performed by: STUDENT IN AN ORGANIZED HEALTH CARE EDUCATION/TRAINING PROGRAM

## 2024-05-17 PROCEDURE — 37000008 HC ANESTHESIA 1ST 15 MINUTES: Performed by: STUDENT IN AN ORGANIZED HEALTH CARE EDUCATION/TRAINING PROGRAM

## 2024-05-17 PROCEDURE — 88331 PATH CONSLTJ SURG 1 BLK 1SPC: CPT | Mod: 26,,, | Performed by: PATHOLOGY

## 2024-05-17 PROCEDURE — 71000016 HC POSTOP RECOV ADDL HR: Performed by: STUDENT IN AN ORGANIZED HEALTH CARE EDUCATION/TRAINING PROGRAM

## 2024-05-17 PROCEDURE — 71000039 HC RECOVERY, EACH ADD'L HOUR: Performed by: STUDENT IN AN ORGANIZED HEALTH CARE EDUCATION/TRAINING PROGRAM

## 2024-05-17 PROCEDURE — 71000044 HC DOSC ROUTINE RECOVERY FIRST HOUR: Performed by: STUDENT IN AN ORGANIZED HEALTH CARE EDUCATION/TRAINING PROGRAM

## 2024-05-17 PROCEDURE — 37000009 HC ANESTHESIA EA ADD 15 MINS: Performed by: STUDENT IN AN ORGANIZED HEALTH CARE EDUCATION/TRAINING PROGRAM

## 2024-05-17 PROCEDURE — 25000003 PHARM REV CODE 250: Performed by: ANESTHESIOLOGY

## 2024-05-17 PROCEDURE — 36000707: Performed by: STUDENT IN AN ORGANIZED HEALTH CARE EDUCATION/TRAINING PROGRAM

## 2024-05-17 PROCEDURE — 60500 EXPLORE PARATHYROID GLANDS: CPT | Mod: ,,, | Performed by: STUDENT IN AN ORGANIZED HEALTH CARE EDUCATION/TRAINING PROGRAM

## 2024-05-17 PROCEDURE — 25000003 PHARM REV CODE 250: Performed by: STUDENT IN AN ORGANIZED HEALTH CARE EDUCATION/TRAINING PROGRAM

## 2024-05-17 PROCEDURE — 36000706: Performed by: STUDENT IN AN ORGANIZED HEALTH CARE EDUCATION/TRAINING PROGRAM

## 2024-05-17 PROCEDURE — 83970 ASSAY OF PARATHORMONE: CPT | Performed by: STUDENT IN AN ORGANIZED HEALTH CARE EDUCATION/TRAINING PROGRAM

## 2024-05-17 PROCEDURE — 27201037 HC PRESSURE MONITORING SET UP

## 2024-05-17 PROCEDURE — 27000221 HC OXYGEN, UP TO 24 HOURS

## 2024-05-17 RX ORDER — DEXAMETHASONE SODIUM PHOSPHATE 4 MG/ML
INJECTION, SOLUTION INTRA-ARTICULAR; INTRALESIONAL; INTRAMUSCULAR; INTRAVENOUS; SOFT TISSUE
Status: DISCONTINUED | OUTPATIENT
Start: 2024-05-17 | End: 2024-05-17

## 2024-05-17 RX ORDER — MIDAZOLAM HYDROCHLORIDE 1 MG/ML
INJECTION INTRAMUSCULAR; INTRAVENOUS
Status: DISCONTINUED | OUTPATIENT
Start: 2024-05-17 | End: 2024-05-17

## 2024-05-17 RX ORDER — SODIUM CHLORIDE 0.9 % (FLUSH) 0.9 %
10 SYRINGE (ML) INJECTION
Status: DISCONTINUED | OUTPATIENT
Start: 2024-05-17 | End: 2024-05-17 | Stop reason: HOSPADM

## 2024-05-17 RX ORDER — SUCCINYLCHOLINE CHLORIDE 20 MG/ML
INJECTION INTRAMUSCULAR; INTRAVENOUS
Status: DISCONTINUED | OUTPATIENT
Start: 2024-05-17 | End: 2024-05-17

## 2024-05-17 RX ORDER — DEXMEDETOMIDINE HYDROCHLORIDE 100 UG/ML
INJECTION, SOLUTION INTRAVENOUS
Status: DISCONTINUED | OUTPATIENT
Start: 2024-05-17 | End: 2024-05-17

## 2024-05-17 RX ORDER — ONDANSETRON HYDROCHLORIDE 2 MG/ML
INJECTION, SOLUTION INTRAVENOUS
Status: DISCONTINUED | OUTPATIENT
Start: 2024-05-17 | End: 2024-05-17

## 2024-05-17 RX ORDER — ONDANSETRON HYDROCHLORIDE 2 MG/ML
4 INJECTION, SOLUTION INTRAVENOUS EVERY 12 HOURS PRN
Status: DISCONTINUED | OUTPATIENT
Start: 2024-05-17 | End: 2024-05-17 | Stop reason: HOSPADM

## 2024-05-17 RX ORDER — BUPIVACAINE HYDROCHLORIDE 2.5 MG/ML
INJECTION, SOLUTION EPIDURAL; INFILTRATION; INTRACAUDAL
Status: DISCONTINUED | OUTPATIENT
Start: 2024-05-17 | End: 2024-05-17 | Stop reason: HOSPADM

## 2024-05-17 RX ORDER — CALCIUM CARBONATE 400(1000)
1 TABLET,CHEWABLE ORAL 2 TIMES DAILY
Status: ON HOLD | COMMUNITY
Start: 2024-05-17 | End: 2024-05-20 | Stop reason: HOSPADM

## 2024-05-17 RX ORDER — METOCLOPRAMIDE HYDROCHLORIDE 5 MG/ML
10 INJECTION INTRAMUSCULAR; INTRAVENOUS EVERY 10 MIN PRN
Status: DISCONTINUED | OUTPATIENT
Start: 2024-05-17 | End: 2024-05-17 | Stop reason: HOSPADM

## 2024-05-17 RX ORDER — ACETAMINOPHEN 10 MG/ML
INJECTION, SOLUTION INTRAVENOUS
Status: DISCONTINUED | OUTPATIENT
Start: 2024-05-17 | End: 2024-05-17

## 2024-05-17 RX ORDER — LIDOCAINE HYDROCHLORIDE 20 MG/ML
INJECTION, SOLUTION EPIDURAL; INFILTRATION; INTRACAUDAL; PERINEURAL
Status: DISCONTINUED | OUTPATIENT
Start: 2024-05-17 | End: 2024-05-17

## 2024-05-17 RX ORDER — PROPOFOL 10 MG/ML
VIAL (ML) INTRAVENOUS
Status: DISCONTINUED | OUTPATIENT
Start: 2024-05-17 | End: 2024-05-17

## 2024-05-17 RX ORDER — FENTANYL CITRATE 50 UG/ML
INJECTION, SOLUTION INTRAMUSCULAR; INTRAVENOUS
Status: DISCONTINUED | OUTPATIENT
Start: 2024-05-17 | End: 2024-05-17

## 2024-05-17 RX ORDER — TRAMADOL HYDROCHLORIDE 50 MG/1
50 TABLET ORAL ONCE AS NEEDED
Status: COMPLETED | OUTPATIENT
Start: 2024-05-17 | End: 2024-05-17

## 2024-05-17 RX ORDER — KETAMINE HCL IN 0.9 % NACL 50 MG/5 ML
SYRINGE (ML) INTRAVENOUS
Status: DISCONTINUED | OUTPATIENT
Start: 2024-05-17 | End: 2024-05-17

## 2024-05-17 RX ORDER — HYDROMORPHONE HYDROCHLORIDE 1 MG/ML
INJECTION, SOLUTION INTRAMUSCULAR; INTRAVENOUS; SUBCUTANEOUS
Status: DISCONTINUED | OUTPATIENT
Start: 2024-05-17 | End: 2024-05-17

## 2024-05-17 RX ORDER — HALOPERIDOL 5 MG/ML
0.5 INJECTION INTRAMUSCULAR EVERY 10 MIN PRN
Status: DISCONTINUED | OUTPATIENT
Start: 2024-05-17 | End: 2024-05-17 | Stop reason: HOSPADM

## 2024-05-17 RX ORDER — TRAMADOL HYDROCHLORIDE 50 MG/1
50 TABLET ORAL EVERY 6 HOURS
Qty: 12 TABLET | Refills: 0 | Status: ON HOLD | OUTPATIENT
Start: 2024-05-17 | End: 2024-05-20

## 2024-05-17 RX ORDER — HYDROMORPHONE HYDROCHLORIDE 1 MG/ML
0.2 INJECTION, SOLUTION INTRAMUSCULAR; INTRAVENOUS; SUBCUTANEOUS EVERY 5 MIN PRN
Status: DISCONTINUED | OUTPATIENT
Start: 2024-05-17 | End: 2024-05-17 | Stop reason: HOSPADM

## 2024-05-17 RX ORDER — SODIUM CHLORIDE 9 MG/ML
INJECTION, SOLUTION INTRAVENOUS CONTINUOUS
Status: DISCONTINUED | OUTPATIENT
Start: 2024-05-17 | End: 2024-05-17

## 2024-05-17 RX ADMIN — SUCCINYLCHOLINE CHLORIDE 120 MG: 20 INJECTION, SOLUTION INTRAMUSCULAR; INTRAVENOUS; PARENTERAL at 08:05

## 2024-05-17 RX ADMIN — HYDROMORPHONE HYDROCHLORIDE 0.2 MG: 1 INJECTION, SOLUTION INTRAMUSCULAR; INTRAVENOUS; SUBCUTANEOUS at 01:05

## 2024-05-17 RX ADMIN — DEXMEDETOMIDINE 16 MCG: 200 INJECTION, SOLUTION INTRAVENOUS at 09:05

## 2024-05-17 RX ADMIN — Medication 30 MG: at 08:05

## 2024-05-17 RX ADMIN — GLYCOPYRROLATE 0.2 MG: 0.2 INJECTION, SOLUTION INTRAMUSCULAR; INTRAVENOUS at 06:05

## 2024-05-17 RX ADMIN — Medication 10 MG: at 10:05

## 2024-05-17 RX ADMIN — SODIUM CHLORIDE, SODIUM GLUCONATE, SODIUM ACETATE, POTASSIUM CHLORIDE, MAGNESIUM CHLORIDE, SODIUM PHOSPHATE, DIBASIC, AND POTASSIUM PHOSPHATE: .53; .5; .37; .037; .03; .012; .00082 INJECTION, SOLUTION INTRAVENOUS at 09:05

## 2024-05-17 RX ADMIN — CEFAZOLIN 2 G: 2 INJECTION, POWDER, FOR SOLUTION INTRAMUSCULAR; INTRAVENOUS at 08:05

## 2024-05-17 RX ADMIN — ONDANSETRON 4 MG: 2 INJECTION INTRAMUSCULAR; INTRAVENOUS at 11:05

## 2024-05-17 RX ADMIN — HYDROMORPHONE HYDROCHLORIDE 0.2 MG: 1 INJECTION, SOLUTION INTRAMUSCULAR; INTRAVENOUS; SUBCUTANEOUS at 12:05

## 2024-05-17 RX ADMIN — LIDOCAINE HYDROCHLORIDE 100 MG: 20 INJECTION, SOLUTION EPIDURAL; INFILTRATION; INTRACAUDAL at 08:05

## 2024-05-17 RX ADMIN — HYDROMORPHONE HYDROCHLORIDE 1 MG: 1 INJECTION, SOLUTION INTRAMUSCULAR; INTRAVENOUS; SUBCUTANEOUS at 08:05

## 2024-05-17 RX ADMIN — SODIUM CHLORIDE: 0.9 INJECTION, SOLUTION INTRAVENOUS at 06:05

## 2024-05-17 RX ADMIN — ACETAMINOPHEN 1000 MG: 10 INJECTION, SOLUTION INTRAVENOUS at 10:05

## 2024-05-17 RX ADMIN — DEXAMETHASONE SODIUM PHOSPHATE 8 MG: 4 INJECTION INTRA-ARTICULAR; INTRALESIONAL; INTRAMUSCULAR; INTRAVENOUS; SOFT TISSUE at 08:05

## 2024-05-17 RX ADMIN — FENTANYL CITRATE 100 MCG: 50 INJECTION INTRAMUSCULAR; INTRAVENOUS at 08:05

## 2024-05-17 RX ADMIN — MIDAZOLAM HYDROCHLORIDE 2 MG: 2 INJECTION, SOLUTION INTRAMUSCULAR; INTRAVENOUS at 06:05

## 2024-05-17 RX ADMIN — DEXMEDETOMIDINE 12 MCG: 200 INJECTION, SOLUTION INTRAVENOUS at 12:05

## 2024-05-17 RX ADMIN — TRAMADOL HYDROCHLORIDE 50 MG: 50 TABLET, COATED ORAL at 01:05

## 2024-05-17 RX ADMIN — PROPOFOL 200 MG: 10 INJECTION, EMULSION INTRAVENOUS at 08:05

## 2024-05-17 NOTE — ANESTHESIA PROCEDURE NOTES
Arterial    Diagnosis: hyperparathyroidism    Patient location during procedure: done in OR    Staffing  Authorizing Provider: Akash Machado III, MD  Performing Provider: Akash Machado III, MD    Staffing  Performed by: Akash Machado III, MD  Authorized by: Akash Machado III, MD    Anesthesiologist was present at the time of the procedure.    Preanesthetic Checklist  Completed: patient identified, IV checked, site marked, risks and benefits discussed, surgical consent, monitors and equipment checked, pre-op evaluation, timeout performed and anesthesia consent givenArterial  Skin Prep: chlorhexidine gluconate  Orientation: left  Location: radial    Catheter Size: 20 G  Catheter placement by Ultrasound guidance. Heme positive aspiration all ports.   Vessel Caliber: medium, patent  Needle advanced into vessel with real time Ultrasound guidance.Insertion Attempts: 1  Assessment  Dressing: secured with tape and tegaderm  Patient: Tolerated well

## 2024-05-17 NOTE — TRANSFER OF CARE
"Anesthesia Transfer of Care Note    Patient: oSnia Solorzano    Procedure(s) Performed: Procedure(s) (LRB):  PARATHYROIDECTOMY (N/A)    Patient location: PACU    Anesthesia Type: general    Transport from OR: Transported from OR on 6-10 L/min O2 by face mask with adequate spontaneous ventilation    Post pain: adequate analgesia    Post assessment: no apparent anesthetic complications    Post vital signs: stable    Level of consciousness: sedated    Nausea/Vomiting: no nausea/vomiting    Complications: none    Transfer of care protocol was followed      Last vitals: Visit Vitals  /64   Pulse 83   Temp 36.6 °C (97.9 °F)   Resp 12   Ht 5' 7" (1.702 m)   Wt 132.5 kg (292 lb)   LMP 01/11/2024 (Exact Date)   SpO2 98%   Breastfeeding No   BMI 45.73 kg/m²     "

## 2024-05-17 NOTE — BRIEF OP NOTE
[facility] - Surgery ([facility])  Brief Operative Note    Surgery Date: 5/17/2024     Surgeons and Role:     * Raiza Epperson MD - Primary     * Richard Moura MD - Resident - Assisting        Pre-op Diagnosis:  Primary hyperparathyroidism [E21.0]    Post-op Diagnosis:  Post-Op Diagnosis Codes:     * Primary hyperparathyroidism [E21.0]    Procedure(s) (LRB):  PARATHYROIDECTOMY (N/A)    Anesthesia: General    Operative Findings: Four-gland parathyroid exploration performed. Right superior, left superior, and left inferior glands excised. Right inferior gland appeared normal. Remained in place. Appropriate drop in intraoperative PTH level.    Estimated Blood Loss: 20mL         Specimens:   Specimen (24h ago, onward)       Start     Ordered    05/17/24 1153  Specimen to Pathology, Surgery Thyroid, Parathyroid, and Adrenals  Once        Comments: Pre-op Diagnosis: Primary hyperparathyroidism [E21.0]Procedure(s):PARATHYROIDECTOMY Number of specimens: 7Name of specimens: 1. Right Superior Parathyroid biopsy - Frozen2. Right Inferior Parathyroid biopsy - Frozen3. Left Superior  Parathyroid biopsy - Frozen4. Left Inferior  Parathyroid biopsy - Frozen5. Right superior parathyroid gland with possible adenoma - Permanent6. Left superior parathyroid adenoma - Permanent7. Left Inferior parathyroid  adenoma - Permanent     References:    Click here for ordering Quick Tip   Question Answer Comment   Procedure Type: Thyroid, Parathyroid, and Adrenals    Specimen Class: Routine/Screening        05/17/24 1152                      Discharge Note    OUTCOME: Patient tolerated treatment/procedure well without complication and is now ready for discharge.    DISPOSITION: Home or Self Care    FINAL DIAGNOSIS:  Hyperparathyroidism    FOLLOWUP: In clinic    DISCHARGE INSTRUCTIONS:    Discharge Procedure Orders   Diet Adult Regular     No driving until:   Order Comments: No Driving while taking narcotic pain medication     Notify your  health care provider if you experience any of the following:  temperature >100.4     Notify your health care provider if you experience any of the following:  persistent nausea and vomiting or diarrhea     Notify your health care provider if you experience any of the following:  severe uncontrolled pain     Notify your health care provider if you experience any of the following:  redness, tenderness, or signs of infection (pain, swelling, redness, odor or green/yellow discharge around incision site)     No dressing needed     Activity as tolerated

## 2024-05-17 NOTE — DISCHARGE INSTRUCTIONS
Post-Operative Instructions: Parathyroidectomy    MEDICATIONS  You are being discharged home on the following medications:    Calcium Supplementation  Calcium Carbonate (Tums Ultra): 1 tablet, two times a day with meals  One Tums Ultra tablet has 1000 mg calcium carbonate which is equal to 400 mg elemental calcium.  *If you notice any numbness or tingling of the face, hands, or feet, take 2 extra tablets of Tums.  If symptoms do not subside within a half-hour, please call your surgeon's office.  Do NOT take your Tums in the morning of your lab draw.   Calcium carbonate (Tums) can cause constipation.  Please use over the counter stool softeners such as docusate (Colace) or laxatives such as polyethylene glycol (Miralax) to help avoid constipation.       Pain medication  Narcotic medication (Tramadol) has been prescribed by your doctor for post-operative pain.   If you prefer, you may take Tylenol (acetaminophen).  Cepacol lozenges: Use as needed for sore throat.  These can be purchased at the pharmacy.    You may resume taking your normal medications, with the EXCEPTION of the following:  Please check with your surgeon and internist/cardiologist for specific instructions about when you should re-start:  Apixaban (Eliquis), Clopidogrel (Plavix), Dabigatran (Pradaxa), Dipyridamole (Aggrenox), Rivaroxaban (Xarelto), Warfarin (Coumadin), or any other type of blood thinner you may be taking.  Aspirin & anti-inflammatories (i.e. Goody's, Excedrin, ibuprofen, Advil, Aleve): May begin 72 hours after surgery.  Vitamins, minerals, and herbal supplements: Please wait 1 week after surgery to restart these medications    WOUND CARE  Please use your ice pack for 30 minutes on / 30 minutes off for at least the first 7 days.  You will be discharged from the hospital with your incision covered by skin glue that will stay on until your postoperative appointment.  It is normal to develop a lump under the incision, this is expected and  is related to the healing process.  The lump will gradually go away with time.  You may shower when you return home after the surgery.  No bathing or swimming (do not submerge in water) until cleared by your surgeon.  Please notify your physician if your incision is red, warm/hot, if you have an increase in swelling, any new drainage, or if you have a fever >101.5 F.  Please call 911 or proceed to the Emergency Room if you have difficulty breathing.    ACTIVITIES  You may resume normal activities, depending on your energy level.  Please refrain from driving for at least 3 days, or until you have complete mobility of your neck.  Do not drive if you are taking narcotic pain medication.  Please refrain from heavy lifting (10 lbs.) for 10 days.    If you have any questions or concerns, please call the surgeon's office at [phone].      Future Appointments   Date Time Provider Department Center   5/31/2024 12:00 AM LAB, [facility] LAB [facility] [address]   6/3/2024  1:00 PM Raiza Epperson MD [facility]   6/11/2024 10:00 AM LAB, [facility] LAB [facility] [address]   6/18/2024  2:30 PM ALEX Turcios PA-C [facility] ENDOCRN [place]   7/16/2024  2:30 PM Dorian Guerrero MD [facility] MED [place]   [facility]2024  [facility] [place]   8/13/2024 11:20 AM Melchor Barriga MD [facility] at [facility]   9/20/2024  3:30 PM Susan Kruger MD [facility] DERM [place]   10/10/2024  8:30 AM Lzu Arthur, FNP [facility] GYNONC [facility] at [facility]      Do NOT take your calcium supplements in the morning of your lab appointment.

## 2024-05-17 NOTE — OP NOTE
[facility]  Endocrine Surgery  Operative Report         Date of Procedure: 5/17/2024     Procedure: Procedure(s) (LRB):  PARATHYROIDECTOMY (N/A)     Indications: This patient presents with primary hyperparathyroidism.  Preoperative imaging did not localize a dominant parathyroid adenoma.  The patient now presents for a parathyroidectomy with intraoperative PTH monitoring.     Surgeons and Role:     * Raiza Epperson MD - Primary     * Richard Moura MD - Resident - Assisting (PGY4)    Pre-Operative Diagnosis: Primary hyperparathyroidism [E21.0]    Post-Operative Diagnosis: Primary hyperparathyroidism [E21.0]    Anesthesia: General    Procedures:   Subtotal parathyroidectomy, bilateral exploration:  Excision of right superior parathyroid gland with possible adenoma  Excision of left superior parathyroid adenoma  Excision of left inferior parathyroid adenoma  Biopsy of right inferior parathyroid gland  Intraoperative monitoring and interpretation of bilateral cranial nerves (vagus and recurrent laryngeal nerves) using the Instructure system  Intraoperative PTH level sampling and interpretation     Intraoperative Findings:   All four parathyroid glands identified and confirmed parathyroid tissue with frozen sectioning.  Most normal appearing gland was the right inferior parathyroid gland.  Right superior parathyroid adenoma was identified and appeared to have a pale atypical adenoma versus lymph node adjacent to gland, both excised together.  Right inferior parathyroid gland was identified, tiny biopsy confirmed parathyroid tissue, as it was the most normal gland, it was maintained on a native vascular pedicle.  Left superior parathyroid gland appeared abnormally enlarged and dark in color.  Gland excised.  Left inferior parathyroid gland appeared abnormally enlarged and dark in color.  Gland excised.  Appropriate decrease in PTH following excision of three glands.  The bilateral recurrent  laryngeal nerves and vagus nerves were identified and preserved.  Function was verified using the nerve monitoring system.    Description of the Procedure:  The patient was seen in the Holding Room. The risks, benefits, complications, treatment options, and expected outcomes were discussed with the patient. The possibilities of reaction to medication, pulmonary aspiration, bleeding, infection, finding a normal parathyroid tissue, inability to identify all explored parathyroid glands, recurrent or persistent hyperparathyroidism, temporary or permanent hypocalcemia, temporary or permanent hypoparathyroidism, superior laryngeal or recurrent laryngeal nerve damage, the need for additional procedures, failure to diagnose a condition, creating a complication requiring transfusion or operation, stroke, death and imponderables. The patient concurred with the proposed plan and agreed to proceed despite the risks, giving informed consent.  The site of surgery was confirmed and marked. The patient was taken to Operating Room, identified as Sonia Solorzano and the procedure verified as parathyroidectomy with intraoperative PTH monitoring.     Induction of general anesthesia was performed, the patient was intubated with an electromyography endotracheal tube, and the anesthesia team placed all appropriate lines.  A baseline PTH level was drawn and resulted as 318.6.  The patient was positioned by the operating room, anesthesia, and surgical staff in a modified beachchair position with a shoulder roll, the neck supported in an extended position, the arms padded and tucked.  An intraoperative ultrasound was performed prior to incision and identified the anatomic landmarks of the thyroid and neck.  The surgical field was prepped and draped in standard sterile fashion.  A timeout was performed.  A 5 cm transverse cervical incision was created below the cricoid cartilage within a natural skin fold. Dissection was carried down  through the platysma layer. Once this was completed, sub-platysmal flaps were raised superiorly to the thyroid cartilage and inferiorly to the sternal notch, there were multiple branching vessels encountered and the sub-platysmal layer was not a clear plane. The strap muscles were identified and divided at the midline. Sharp and blunt dissection were used to mobilize the right thyroid lobe in a medial direction.  The surgical planes around the thyroid were noticeably sticky and this made mobilization of the thyroid challenging.  A vagus signal was confirmed with the nerve monitoring system.      The right parathyroid gland was identified along the inferolateral aspect of the right inferior thyroid lobe.  It appeared normal in size and color.  A tiny biopsy was taken from the apex of the gland and sent for frozen sectioning and confirmed parathyroid tissue.  The gland remains in situ and marked with a small clip.    The right superior parathyroid gland was identified just inferior to the tubercle and extending inferomedially deep to the course of the recurrent laryngeal nerve.  The gland itself appeared slightly dark in color but not particularly enlarged.  With circumferential dissection, however, it appeared to have a pale, bulbous, firm lesion attached to the inferior pole of the gland.  It was not clear if this was an atypical adenoma or a lymph node.  Thus, these were excised en bloc as one specimen.  On the back table, an incision was made into the pale lesion and it did not appear to have classic gross features of a typical parathyroid adenoma.  Thus, a biopsy of the parathyroid portion was sent for frozen sectioning which was reported to have some fat mixed in with the parathyroid parenchyma.    Vagus nerve signal was confirmed with the nerve monitoring system prior to proceeding to the contralateral side to complete a four gland exploration.  The left thyroid lobe was medialized and the carotid sheath was  exposed.  A vagus nerve signal was confirmed. The left inferior gland was identified in a mirrored position along the inferolateral aspect of the thyroid lobe.  The left inferior parathyroid gland was abnormal compared to the right inferior gland as it was slightly enlarged and dark in color.  It was initially kept in situ.    The left superior parathyroid gland was identified deep in the tracheoesophageal groove just lateral to the course of the recurrent laryngeal nerve.  The left superior parathyroid gland appeared slightly abnormal in size and color and was excised.  Frozen section biopsy confirmed parathyroid tissue and this was reported to be comparatively the most normal of the parathyroid biopsies.     The right inferior parathyroid gland was inspected and confirmed to be well vascularized.  Thus, the left inferior parathyroid was excised to complete the subtotal parathyroidectomy.    A postoperative PTH level was drawn about 10 minutes after excision of three glands and resulted as 29.4.    The surgical bed was irrigated and inspected carefully. Multiple Valsalva maneuvers were performed at 30-40 cm of water and additional hemostasis was achieved as necessary with focal application of bipolar cautery. This was augmented with Fibrillar which was placed in the surgical bed.  Recurrent laryngeal and vagus nerve function was verified bilaterally using the nerve monitor prior to closure.  0.25% Marcaine was injected into the subcutaneous tissue of the incision for post-op analgesia. The strap muscles were then closed with interrupted 3-0 Vicryl suture.  The platysma was closed with interrupted 3-0 Vicryl suture, and the skin incision was closed with a 6-0 Vicryl subcuticular knot-less closure. Sterile skin glue was applied to the incision.    A debrief was performed.  Specimens were confirmed.  Instrument, sponge, and needle counts were reported correct prior to closure and at the conclusion of the case.  The  family was updated at the completion of the case.    Complications: No    Estimated Blood Loss (EBL): less than 50 mL           Drains: None    Implants: None    Specimens:   Specimen (24h ago, onward)       Start     Ordered    05/17/24 1153  Specimen to Pathology, Surgery Thyroid, Parathyroid, and Adrenals  Once        Comments: Pre-op Diagnosis: Primary hyperparathyroidism [E21.0]Procedure(s):PARATHYROIDECTOMY Number of specimens: 7Name of specimens: 1. Right Superior Parathyroid biopsy - Frozen2. Right Inferior Parathyroid biopsy - Frozen3. Left Superior  Parathyroid biopsy - Frozen4. Left Inferior  Parathyroid biopsy - Frozen5. Right superior parathyroid gland with possible adenoma - Permanent6. Left superior parathyroid adenoma - Permanent7. Left Inferior parathyroid  adenoma - Permanent     References:    Click here for ordering Quick Tip   Question Answer Comment   Procedure Type: Thyroid, Parathyroid, and Adrenals    Specimen Class: Routine/Screening        05/17/24 1152                           Condition: stable    Disposition: PACU - hemodynamically stable.    Attestation: I was present and scrubbed for the entire procedure.

## 2024-05-17 NOTE — ANESTHESIA POSTPROCEDURE EVALUATION
Anesthesia Post Evaluation    Patient: Sonia Solorzano    Procedure(s) Performed: Procedure(s) (LRB):  PARATHYROIDECTOMY (N/A)    Final Anesthesia Type: general      Patient location during evaluation: PACU  Patient participation: Yes- Able to Participate  Level of consciousness: awake and alert  Post-procedure vital signs: reviewed and stable  Pain management: adequate  Airway patency: patent    PONV status at discharge: No PONV  Anesthetic complications: no      Cardiovascular status: blood pressure returned to baseline  Respiratory status: unassisted  Hydration status: euvolemic  Follow-up not needed.              Vitals Value Taken Time   /67 05/17/24 1402   Temp 36.3 °C (97.3 °F) 05/17/24 1215   Pulse 79 05/17/24 1413   Resp 15 05/17/24 1413   SpO2 89 % 05/17/24 1413   Vitals shown include unfiled device data.      Event Time   Out of Recovery 13:15:00         Pain/Kevin Score: Pain Rating Prior to Med Admin: 8 (5/17/2024  1:12 PM)  Kevin Score: 9 (5/17/2024  1:15 PM)

## 2024-05-17 NOTE — PLAN OF CARE
Discharge instructions given and explained to patient and family with verbalized understanding. VSS. Denies N/V, tolerating PO fluids. Voiding spontaneously. Rates pain level as tolerable. IV removed. Pt left floor via W/C, stable for transport, responsible person available for transport home.

## 2024-05-17 NOTE — LETTER
May 17, 2024         [address]  [facility] [address]  Phone: [phone]  Fax: [phone]       Patient: Sonia Solorzano   YOB: 1985  Date of Visit: 05/17/2024    To Whom It May Concern:    Ness Solorzano  was at [facility] on 05/17/2024. Mrs. Scott may begin working from home on 5/20/24. She may resume full duty on 5/27/24. If you have any questions or concerns, or if I can be of further assistance, please do not hesitate to contact me.    Sincerely,    Jacque Beal RN

## 2024-05-17 NOTE — ANESTHESIA PROCEDURE NOTES
Intubation    Date/Time: 5/17/2024 8:35 AM    Performed by: Akash Machado III, MD  Authorized by: Akash Machado III, MD    Intubation:     Induction:  Intravenous    Intubated:  Postinduction    Mask Ventilation:  Easy mask    Attempts:  1    Attempted By:  Staff anesthesiologist    Method of Intubation:  Video laryngoscopy    Blade:  Kurtz 3    Laryngeal View Grade: Grade IIA - cords partially seen      Difficult Airway Encountered?: No      Complications:  None    Airway Device:  EMG ETT (NIMS)    Airway Device Size:  7.0    Style/Cuff Inflation:  Cuffed (inflated to minimal occlusive pressure)    Tube secured:  21    Secured at:  The lips    Placement Verified By:  Capnometry    Complicating Factors:  None    Findings Post-Intubation:  BS equal bilateral and atraumatic/condition of teeth unchanged

## 2024-05-18 ENCOUNTER — NURSE TRIAGE (OUTPATIENT)
Dept: ADMINISTRATIVE | Facility: CLINIC | Age: 39
End: 2024-05-18
Payer: COMMERCIAL

## 2024-05-18 NOTE — TELEPHONE ENCOUNTER
S/p parathyroidectomy yesterday, 5/17/24. Pt reports that she was instructed to supplement with calcium prn for any sensations of numbness or tingling. Today, pt states that she has taken 4,500 mg of calcium d/t numbness and tingling in her fingers and face that has NOT resolved. She also reports that she feels as though her pain medication is not relieving her pain. Pt states that she has not been able to eat much. OCP is paged for further guidance.     Dr. Mcmanus advises that pt should come in to ED now for labwork and further evaluation. Pt is informed. She verbalizes understanding and is instructed to call back with any new/worsening sxs, questions, or concerns.   Reason for Disposition   [1] SEVERE post-op pain (e.g., excruciating, pain scale 8-10) AND [2] not controlled with pain medications    Additional Information   Negative: Sounds like a life-threatening emergency to the triager   Negative: [1] Widespread rash AND [2] bright red, sunburn-like   Negative: [1] SEVERE headache AND [2] after spinal (epidural) anesthesia   Negative: [1] Vomiting AND [2] persists > 4 hours   Negative: [1] Vomiting AND [2] abdomen looks much more swollen than usual   Negative: [1] Drinking very little AND [2] dehydration suspected (e.g., no urine > 12 hours, very dry mouth, very lightheaded)   Negative: Patient sounds very sick or weak to the triager   Negative: Sounds like a serious complication to the triager   Negative: Fever > 100.4 F (38.0 C)    Protocols used: Post-Op Symptoms and Ccaxdtraz-F-NC

## 2024-05-19 ENCOUNTER — TELEPHONE (OUTPATIENT)
Dept: SURGERY | Facility: HOSPITAL | Age: 39
End: 2024-05-19
Payer: COMMERCIAL

## 2024-05-19 ENCOUNTER — HOSPITAL ENCOUNTER (OUTPATIENT)
Facility: HOSPITAL | Age: 39
Discharge: HOME | DRG: 641 | End: 2024-05-20
Attending: EMERGENCY MEDICINE | Admitting: STUDENT IN AN ORGANIZED HEALTH CARE EDUCATION/TRAINING PROGRAM
Payer: COMMERCIAL

## 2024-05-19 DIAGNOSIS — R07.9 CHEST PAIN: ICD-10-CM

## 2024-05-19 DIAGNOSIS — E83.51 HYPOCALCEMIA: Primary | ICD-10-CM

## 2024-05-19 PROBLEM — Z90.89 HISTORY OF PARATHYROIDECTOMY: Status: ACTIVE | Noted: 2024-05-19

## 2024-05-19 PROBLEM — Z98.890 HISTORY OF PARATHYROIDECTOMY: Status: ACTIVE | Noted: 2024-05-19

## 2024-05-19 LAB
ALBUMIN SERPL BCP-MCNC: 3 G/DL (ref 3.5–5.2)
ALP SERPL-CCNC: 77 U/L (ref 55–135)
ALT SERPL W/O P-5'-P-CCNC: 26 U/L (ref 10–44)
ANION GAP SERPL CALC-SCNC: 10 MMOL/L (ref 8–16)
AST SERPL-CCNC: 14 U/L (ref 10–40)
BASOPHILS # BLD AUTO: 0.03 K/UL (ref 0–0.2)
BASOPHILS NFR BLD: 0.3 % (ref 0–1.9)
BILIRUB SERPL-MCNC: 0.3 MG/DL (ref 0.1–1)
BUN SERPL-MCNC: 11 MG/DL (ref 6–20)
CA-I BLDV-SCNC: 0.96 MMOL/L (ref 1.06–1.42)
CA-I BLDV-SCNC: 1.01 MMOL/L (ref 1.06–1.42)
CALCIUM SERPL-MCNC: 7.7 MG/DL (ref 8.7–10.5)
CHLORIDE SERPL-SCNC: 102 MMOL/L (ref 95–110)
CO2 SERPL-SCNC: 28 MMOL/L (ref 23–29)
CREAT SERPL-MCNC: 0.8 MG/DL (ref 0.5–1.4)
DIFFERENTIAL METHOD BLD: ABNORMAL
EOSINOPHIL # BLD AUTO: 0.2 K/UL (ref 0–0.5)
EOSINOPHIL NFR BLD: 2 % (ref 0–8)
ERYTHROCYTE [DISTWIDTH] IN BLOOD BY AUTOMATED COUNT: 16.1 % (ref 11.5–14.5)
EST. GFR  (NO RACE VARIABLE): >60 ML/MIN/1.73 M^2
GLUCOSE SERPL-MCNC: 87 MG/DL (ref 70–110)
HCT VFR BLD AUTO: 35 % (ref 37–48.5)
HGB BLD-MCNC: 10.5 G/DL (ref 12–16)
IMM GRANULOCYTES # BLD AUTO: 0.04 K/UL (ref 0–0.04)
IMM GRANULOCYTES NFR BLD AUTO: 0.4 % (ref 0–0.5)
LYMPHOCYTES # BLD AUTO: 3 K/UL (ref 1–4.8)
LYMPHOCYTES NFR BLD: 29.4 % (ref 18–48)
MCH RBC QN AUTO: 25.4 PG (ref 27–31)
MCHC RBC AUTO-ENTMCNC: 30 G/DL (ref 32–36)
MCV RBC AUTO: 85 FL (ref 82–98)
MONOCYTES # BLD AUTO: 0.7 K/UL (ref 0.3–1)
MONOCYTES NFR BLD: 6.5 % (ref 4–15)
NEUTROPHILS # BLD AUTO: 6.3 K/UL (ref 1.8–7.7)
NEUTROPHILS NFR BLD: 61.4 % (ref 38–73)
NRBC BLD-RTO: 0 /100 WBC
PLATELET # BLD AUTO: 304 K/UL (ref 150–450)
PMV BLD AUTO: 9.7 FL (ref 9.2–12.9)
POTASSIUM SERPL-SCNC: 3.3 MMOL/L (ref 3.5–5.1)
PROT SERPL-MCNC: 5.9 G/DL (ref 6–8.4)
RBC # BLD AUTO: 4.14 M/UL (ref 4–5.4)
SODIUM SERPL-SCNC: 140 MMOL/L (ref 136–145)
WBC # BLD AUTO: 10.17 K/UL (ref 3.9–12.7)

## 2024-05-19 PROCEDURE — 82330 ASSAY OF CALCIUM: CPT | Performed by: EMERGENCY MEDICINE

## 2024-05-19 PROCEDURE — 25000003 PHARM REV CODE 250

## 2024-05-19 PROCEDURE — 93010 ELECTROCARDIOGRAM REPORT: CPT | Mod: ,,, | Performed by: GENERAL PRACTICE

## 2024-05-19 PROCEDURE — 93005 ELECTROCARDIOGRAM TRACING: CPT

## 2024-05-19 PROCEDURE — 36415 COLL VENOUS BLD VENIPUNCTURE: CPT | Performed by: STUDENT IN AN ORGANIZED HEALTH CARE EDUCATION/TRAINING PROGRAM

## 2024-05-19 PROCEDURE — 96365 THER/PROPH/DIAG IV INF INIT: CPT

## 2024-05-19 PROCEDURE — 82330 ASSAY OF CALCIUM: CPT | Mod: 91 | Performed by: STUDENT IN AN ORGANIZED HEALTH CARE EDUCATION/TRAINING PROGRAM

## 2024-05-19 PROCEDURE — G0378 HOSPITAL OBSERVATION PER HR: HCPCS

## 2024-05-19 PROCEDURE — 25000003 PHARM REV CODE 250: Performed by: STUDENT IN AN ORGANIZED HEALTH CARE EDUCATION/TRAINING PROGRAM

## 2024-05-19 PROCEDURE — 11000001 HC ACUTE MED/SURG PRIVATE ROOM

## 2024-05-19 PROCEDURE — 96366 THER/PROPH/DIAG IV INF ADDON: CPT

## 2024-05-19 PROCEDURE — 80053 COMPREHEN METABOLIC PANEL: CPT | Performed by: EMERGENCY MEDICINE

## 2024-05-19 PROCEDURE — 99285 EMERGENCY DEPT VISIT HI MDM: CPT | Mod: 25

## 2024-05-19 PROCEDURE — 85025 COMPLETE CBC W/AUTO DIFF WBC: CPT | Performed by: EMERGENCY MEDICINE

## 2024-05-19 PROCEDURE — 25000003 PHARM REV CODE 250: Performed by: EMERGENCY MEDICINE

## 2024-05-19 PROCEDURE — 25000003 PHARM REV CODE 250: Mod: JZ,JG | Performed by: EMERGENCY MEDICINE

## 2024-05-19 PROCEDURE — 36415 COLL VENOUS BLD VENIPUNCTURE: CPT | Performed by: EMERGENCY MEDICINE

## 2024-05-19 RX ORDER — CALCIUM CARBONATE 200(500)MG
2000 TABLET,CHEWABLE ORAL 3 TIMES DAILY
Status: DISCONTINUED | OUTPATIENT
Start: 2024-05-19 | End: 2024-05-20 | Stop reason: HOSPADM

## 2024-05-19 RX ORDER — MAGNESIUM SULFATE HEPTAHYDRATE 40 MG/ML
2 INJECTION, SOLUTION INTRAVENOUS
Status: DISCONTINUED | OUTPATIENT
Start: 2024-05-19 | End: 2024-05-20 | Stop reason: HOSPADM

## 2024-05-19 RX ORDER — CALCITRIOL 0.25 UG/1
0.25 CAPSULE ORAL 2 TIMES DAILY
Status: DISCONTINUED | OUTPATIENT
Start: 2024-05-19 | End: 2024-05-20 | Stop reason: HOSPADM

## 2024-05-19 RX ORDER — CYCLOBENZAPRINE HCL 5 MG
10 TABLET ORAL 3 TIMES DAILY PRN
Status: DISCONTINUED | OUTPATIENT
Start: 2024-05-20 | End: 2024-05-20 | Stop reason: HOSPADM

## 2024-05-19 RX ORDER — CALCIUM GLUCONATE 20 MG/ML
1 INJECTION, SOLUTION INTRAVENOUS
Status: COMPLETED | OUTPATIENT
Start: 2024-05-19 | End: 2024-05-19

## 2024-05-19 RX ORDER — POTASSIUM CHLORIDE 20 MEQ/1
40 TABLET, EXTENDED RELEASE ORAL ONCE
Status: DISCONTINUED | OUTPATIENT
Start: 2024-05-19 | End: 2024-05-19

## 2024-05-19 RX ORDER — DIAZEPAM 5 MG/1
10 TABLET ORAL ONCE
Status: COMPLETED | OUTPATIENT
Start: 2024-05-19 | End: 2024-05-19

## 2024-05-19 RX ORDER — CALCIUM CARBONATE 200(500)MG
2000 TABLET,CHEWABLE ORAL
Status: COMPLETED | OUTPATIENT
Start: 2024-05-19 | End: 2024-05-19

## 2024-05-19 RX ORDER — MAGNESIUM SULFATE HEPTAHYDRATE 40 MG/ML
4 INJECTION, SOLUTION INTRAVENOUS
Status: DISCONTINUED | OUTPATIENT
Start: 2024-05-19 | End: 2024-05-20 | Stop reason: HOSPADM

## 2024-05-19 RX ORDER — SODIUM,POTASSIUM PHOSPHATES 280-250MG
2 POWDER IN PACKET (EA) ORAL
Status: DISCONTINUED | OUTPATIENT
Start: 2024-05-19 | End: 2024-05-20 | Stop reason: HOSPADM

## 2024-05-19 RX ORDER — CALCIUM GLUCONATE 20 MG/ML
1 INJECTION, SOLUTION INTRAVENOUS
Status: DISCONTINUED | OUTPATIENT
Start: 2024-05-19 | End: 2024-05-20 | Stop reason: HOSPADM

## 2024-05-19 RX ORDER — ENOXAPARIN SODIUM 100 MG/ML
40 INJECTION SUBCUTANEOUS
Status: DISCONTINUED | OUTPATIENT
Start: 2024-05-19 | End: 2024-05-20 | Stop reason: HOSPADM

## 2024-05-19 RX ORDER — SODIUM CHLORIDE 0.9 % (FLUSH) 0.9 %
10 SYRINGE (ML) INJECTION
Status: DISCONTINUED | OUTPATIENT
Start: 2024-05-19 | End: 2024-05-20 | Stop reason: HOSPADM

## 2024-05-19 RX ORDER — CALCIUM GLUCONATE 20 MG/ML
2 INJECTION, SOLUTION INTRAVENOUS
Status: DISCONTINUED | OUTPATIENT
Start: 2024-05-19 | End: 2024-05-20 | Stop reason: HOSPADM

## 2024-05-19 RX ORDER — CYCLOBENZAPRINE HCL 5 MG
10 TABLET ORAL 3 TIMES DAILY PRN
Status: DISCONTINUED | OUTPATIENT
Start: 2024-05-19 | End: 2024-05-19

## 2024-05-19 RX ORDER — PREDNISONE 50 MG/1
50 TABLET ORAL
Status: ON HOLD | COMMUNITY
Start: 2024-04-25 | End: 2024-05-20 | Stop reason: HOSPADM

## 2024-05-19 RX ORDER — NALOXONE HCL 0.4 MG/ML
0.02 VIAL (ML) INJECTION
Status: DISCONTINUED | OUTPATIENT
Start: 2024-05-19 | End: 2024-05-20 | Stop reason: HOSPADM

## 2024-05-19 RX ORDER — POTASSIUM CHLORIDE 7.45 MG/ML
40 INJECTION INTRAVENOUS
Status: DISCONTINUED | OUTPATIENT
Start: 2024-05-19 | End: 2024-05-20 | Stop reason: HOSPADM

## 2024-05-19 RX ORDER — LOSARTAN POTASSIUM 25 MG/1
25 TABLET ORAL DAILY
Status: DISCONTINUED | OUTPATIENT
Start: 2024-05-19 | End: 2024-05-20 | Stop reason: HOSPADM

## 2024-05-19 RX ORDER — ACETAMINOPHEN 325 MG/1
650 TABLET ORAL EVERY 8 HOURS PRN
Status: DISCONTINUED | OUTPATIENT
Start: 2024-05-19 | End: 2024-05-20 | Stop reason: HOSPADM

## 2024-05-19 RX ORDER — ATORVASTATIN CALCIUM 40 MG/1
40 TABLET, FILM COATED ORAL DAILY
Status: DISCONTINUED | OUTPATIENT
Start: 2024-05-19 | End: 2024-05-20 | Stop reason: HOSPADM

## 2024-05-19 RX ORDER — CALCIUM GLUCONATE 20 MG/ML
3 INJECTION, SOLUTION INTRAVENOUS
Status: DISCONTINUED | OUTPATIENT
Start: 2024-05-19 | End: 2024-05-20 | Stop reason: HOSPADM

## 2024-05-19 RX ORDER — POTASSIUM CHLORIDE 7.45 MG/ML
80 INJECTION INTRAVENOUS
Status: DISCONTINUED | OUTPATIENT
Start: 2024-05-19 | End: 2024-05-20 | Stop reason: HOSPADM

## 2024-05-19 RX ORDER — SPIRONOLACTONE 25 MG/1
25 TABLET ORAL DAILY
Status: DISCONTINUED | OUTPATIENT
Start: 2024-05-19 | End: 2024-05-20 | Stop reason: HOSPADM

## 2024-05-19 RX ORDER — POTASSIUM CHLORIDE 7.45 MG/ML
60 INJECTION INTRAVENOUS
Status: DISCONTINUED | OUTPATIENT
Start: 2024-05-19 | End: 2024-05-20 | Stop reason: HOSPADM

## 2024-05-19 RX ADMIN — CALCIUM CARBONATE (ANTACID) CHEW TAB 500 MG 2000 MG: 500 CHEW TAB at 11:05

## 2024-05-19 RX ADMIN — CALCIUM GLUCONATE 1 G: 20 INJECTION, SOLUTION INTRAVENOUS at 08:05

## 2024-05-19 RX ADMIN — CALCIUM CARBONATE (ANTACID) CHEW TAB 500 MG 2000 MG: 500 CHEW TAB at 08:05

## 2024-05-19 RX ADMIN — CALCITRIOL CAPSULES 0.25 MCG 0.25 MCG: 0.25 CAPSULE ORAL at 11:05

## 2024-05-19 RX ADMIN — SPIRONOLACTONE 25 MG: 25 TABLET ORAL at 01:05

## 2024-05-19 RX ADMIN — LOSARTAN POTASSIUM 25 MG: 25 TABLET, FILM COATED ORAL at 01:05

## 2024-05-19 RX ADMIN — CALCIUM GLUCONATE 1 G: 20 INJECTION, SOLUTION INTRAVENOUS at 11:05

## 2024-05-19 RX ADMIN — CALCIUM CARBONATE (ANTACID) CHEW TAB 500 MG 2000 MG: 500 CHEW TAB at 02:05

## 2024-05-19 RX ADMIN — ATORVASTATIN CALCIUM 40 MG: 40 TABLET, FILM COATED ORAL at 01:05

## 2024-05-19 RX ADMIN — CALCITRIOL CAPSULES 0.25 MCG 0.25 MCG: 0.25 CAPSULE ORAL at 08:05

## 2024-05-19 RX ADMIN — DIAZEPAM 10 MG: 5 TABLET ORAL at 07:05

## 2024-05-19 RX ADMIN — POTASSIUM BICARBONATE 50 MEQ: 977.5 TABLET, EFFERVESCENT ORAL at 02:05

## 2024-05-19 RX ADMIN — CALCIUM GLUCONATE 1 G: 20 INJECTION, SOLUTION INTRAVENOUS at 07:05

## 2024-05-19 NOTE — ASSESSMENT & PLAN NOTE
Secondary to parathyroidectomy.  -Tums 2 g TID  -Calcitriol 0.25 mcg BID  -PRN IV calcium repletion  -Trend Ca and iCa daily  -Telemetry  -Neurochecks Q4H
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
## 2024-05-19 NOTE — ASSESSMENT & PLAN NOTE
Body mass index is 45.73 kg/m². Morbid obesity complicates all aspects of disease management from diagnostic modalities to treatment.

## 2024-05-19 NOTE — PLAN OF CARE
[facility] - Med/Surg  Initial Discharge Assessment       Primary Care Provider: Dorian Guerrero MD    Admission Diagnosis: Hypocalcemia [E83.51]    Admission Date: 5/19/2024  Expected Discharge Date:     Transition of Care Barriers: None    Payor: [employer] / Plan: [place] [employer] CHOICE / Product Type: Commercial /     Extended Emergency Contact Information  Primary Emergency Contact: Loco Solorzano  Mobile Phone: [phone]  Relation: Spouse  Preferred language: English   needed? No  Secondary Emergency Contact: Shakir Goldberg  Address: [address] [place] [address] [facility]  Home Phone: [phone]  Mobile Phone: [phone]  Relation: Other  Preferred language: English    Discharge Plan A: Home with family  Discharge Plan B: Home    DC assessment completed with patient and spouse at bedside. Verified information on facesheet as correct. Denies POA. Lives at listed address with spouse and her 2 children (20yo &15yo). PCP is Dr. Rabago- reports last apt was a few months ago. Pharmacy for DC is [facility]. Denies hh/hd/dme/blood thinners/outpt services. Independent at baseline. Drives herself to apts. Spouse will provide transportation home upon DC. Denies recent inpt stay in last 30 days. Reports she doesn't always take medications as prescribed because she doesn't want too, cost is not an issue. Verified insurance on file. DC plan is home.         Initial Assessment (most recent)       Adult Discharge Assessment - 05/19/24 1626          Discharge Assessment    Assessment Type Discharge Planning Assessment     Confirmed/corrected address, phone number and insurance Yes     Confirmed Demographics Correct on Facesheet     Source of Information patient;family     Communicated [place] with patient/caregiver Yes     Reason For Admission hypocalcemia     People in Home spouse     Facility Arrived From: home     Do you expect to return to your  current living situation? No     Do you have help at home or someone to help you manage your care at home? Yes     Prior to hospitilization cognitive status: Alert/Oriented     Current cognitive status: Alert/Oriented     Walking or Climbing Stairs Difficulty no     Dressing/Bathing Difficulty no     Equipment Currently Used at Home none     Readmission within 30 days? No     Patient currently being followed by outpatient case management? No     Do you currently have service(s) that help you manage your care at home? No     Do you take prescription medications? Yes     Do you have prescription coverage? Yes     Do you have any problems affording any of your prescribed medications? No     Is the patient taking medications as prescribed? yes     Who is going to help you get home at discharge? spouse     How do you get to doctors appointments? car, drives self     Are you on dialysis? No     Do you take coumadin? No     Discharge Plan A Home with family     Discharge Plan B Home     DME Needed Upon Discharge  none     Discharge Plan discussed with: Patient;Spouse/sig other     Transition of Care Barriers None

## 2024-05-19 NOTE — HPI
Sonia Solorzano is a 38 year old female with a past medical history of parathyroidectomy 5/17, HTN, obesity, HLD, nicotine use, and anemia who presented with numbness and tingling to the face, tongue and lower extremities. She also endorses intermittent palpitations but denies shortness of breath or chest pains. Workup in the ED revealed a low calcium level. Dr. Raiza Epperson notified Dr. Kat of changes to make in the patient's calcium repletion which have been implemented.

## 2024-05-19 NOTE — Clinical Note
Loco Solorzano accompanied their spouse to the emergency department on 5/19/2024. They may return to work on 05/21/2024.  Mr. Solorzano is accompanying his spouse on her hospital admission.     If you have any questions or concerns, please don't hesitate to call.      Seb Gilmore RN

## 2024-05-19 NOTE — PLAN OF CARE
Nurses Note -- 4 Eyes      5/19/2024   5:28 PM      Skin assessed during: Admit      [x] No Altered Skin Integrity Present    [x]Prevention Measures Documented      [] Yes- Altered Skin Integrity Present or Discovered   [] LDA Added if Not in Epic (Describe Wound)   [] New Altered Skin Integrity was Present on Admit and Documented in LDA   [] Wound Image Taken    Wound Care Consulted? No    Attending Nurse:  Narendra España RN/Staff Member:   Loren
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
## 2024-05-19 NOTE — LETTER
Loco Solorzano accompanied their spouse, Sonia Solorzano, to the emergency department on 5/19/2024. They were discharged 5/20/2024. They may return to work on 5/21/2024.     If you have any questions or concerns, please don't hesitate to call.      Sarah Esquivel LPN

## 2024-05-19 NOTE — TELEPHONE ENCOUNTER
Called patient to follow up from message received about symptoms of hypocalcemia after parathyroidectomy on 05/17/24.    Patient reports perioral and extremity paresthesias, and laryngeal spasm.  Patient appropriately presented to ER.  CMP pending, ionized calcium <1.0.    Hypocalcemia grant expected at 48-72 hours postoperatively.  Anticipate need IV calcium to temporize and increase of oral regimen to 2000 mg calcium carbonate (800 mg elemental calcium) TID plus PRN dosing for symptoms of hypocalcemia, and addition of 0.25 mcg calcitriol BID.      Will plan close follow up with virtual visit tomorrow with [facility] to check calcium, albumin and phosphorus, PTH and Mg.     Raiza Epperson MD  Staff Surgeon  Endocrine Surgery  10:31 AM 05/19/2024     Called ER at [facility], updated staff Dr. Kat.  10:44 AM 05/19/2024

## 2024-05-19 NOTE — PROGRESS NOTES
Pharmacist Renal Dose Adjustment Note    Sonia Solorzano is a 38 y.o. female being treated with the medication Enoxaparin    Patient Data:    Vital Signs (Most Recent):  Temp: 98.2 °F (36.8 °C) (05/19/24 0835)  Pulse: 88 (05/19/24 0835)  Resp: 18 (05/19/24 0835)  BP: 139/74 (05/19/24 0835)  SpO2: 97 % (05/19/24 0835) Vital Signs (72h Range):  Temp:  [98.2 °F (36.8 °C)]   Pulse:  [88]   Resp:  [18]   BP: (139)/(74)   SpO2:  [97 %]      Recent Labs   Lab 05/13/24  0919 05/19/24  0950   CREATININE 0.8 0.8     Serum creatinine: 0.8 mg/dL 05/19/24 0950  Estimated creatinine clearance: 132.8 mL/min    The enoxaparin dose has been adjusted for Sonia Solorzano [id number] according to the pharmacy practice protocol.      Based on the patient's Estimated Creatinine Clearance: 132.8 mL/min (based on SCr of 0.8 mg/dL)., Body mass index is 45.73 kg/m²., and weight= 132.5 kg (292 lb), the enoxaparin dose has been adjusted 40 mg every 12 hours.    Thank you,  Ankita Moe

## 2024-05-19 NOTE — ASSESSMENT & PLAN NOTE
Chronic, controlled. Latest blood pressure and vitals reviewed-     Temp:  [98.2 °F (36.8 °C)-98.4 °F (36.9 °C)]   Pulse:  [86-88]   Resp:  [18]   BP: (128-139)/(72-74)   SpO2:  [97 %] .   Home meds for hypertension were reviewed and noted below.   Hypertension Medications               losartan (COZAAR) 25 MG tablet Take 1 tablet (25 mg total) by mouth once daily.    spironolactone (ALDACTONE) 25 MG tablet Take 1 tablet (25 mg total) by mouth once daily.            While in the hospital, will manage blood pressure as follows; Continue home antihypertensive regimen    Will utilize p.r.n. blood pressure medication only if patient's blood pressure greater than 180/110 and she develops symptoms such as worsening chest pain or shortness of breath.

## 2024-05-19 NOTE — NURSING
Pt called c/o of numbness tingling lips front of chest and abdomen. [facility] resource nurse called dr steele messaged via secure chat. Pt crying  at BS. CT head ordered Ca level ordered.  Pt to CT via bed with resource nurse.

## 2024-05-20 ENCOUNTER — TELEPHONE (OUTPATIENT)
Dept: SURGERY | Facility: HOSPITAL | Age: 39
End: 2024-05-20
Payer: COMMERCIAL

## 2024-05-20 VITALS
SYSTOLIC BLOOD PRESSURE: 121 MMHG | OXYGEN SATURATION: 94 % | TEMPERATURE: 98 F | DIASTOLIC BLOOD PRESSURE: 65 MMHG | BODY MASS INDEX: 45.81 KG/M2 | HEART RATE: 90 BPM | HEIGHT: 67 IN | RESPIRATION RATE: 18 BRPM | WEIGHT: 291.88 LBS

## 2024-05-20 DIAGNOSIS — Z90.89 S/P PARATHYROIDECTOMY: Primary | ICD-10-CM

## 2024-05-20 DIAGNOSIS — Z98.890 S/P PARATHYROIDECTOMY: Primary | ICD-10-CM

## 2024-05-20 PROBLEM — R60.9 EDEMA: Status: RESOLVED | Noted: 2024-01-31 | Resolved: 2024-05-20

## 2024-05-20 PROBLEM — E83.51 HYPOCALCEMIA: Status: RESOLVED | Noted: 2024-05-19 | Resolved: 2024-05-20

## 2024-05-20 LAB
ALBUMIN SERPL BCP-MCNC: 2.8 G/DL (ref 3.5–5.2)
ALP SERPL-CCNC: 87 U/L (ref 55–135)
ALT SERPL W/O P-5'-P-CCNC: 29 U/L (ref 10–44)
ANION GAP SERPL CALC-SCNC: 11 MMOL/L (ref 8–16)
AST SERPL-CCNC: 17 U/L (ref 10–40)
BASOPHILS # BLD AUTO: 0.03 K/UL (ref 0–0.2)
BASOPHILS NFR BLD: 0.3 % (ref 0–1.9)
BILIRUB SERPL-MCNC: 0.5 MG/DL (ref 0.1–1)
BUN SERPL-MCNC: 14 MG/DL (ref 6–20)
CA-I BLDV-SCNC: 1.13 MMOL/L (ref 1.06–1.42)
CALCIUM SERPL-MCNC: 8.7 MG/DL (ref 8.7–10.5)
CHLORIDE SERPL-SCNC: 103 MMOL/L (ref 95–110)
CO2 SERPL-SCNC: 28 MMOL/L (ref 23–29)
CREAT SERPL-MCNC: 0.8 MG/DL (ref 0.5–1.4)
DIFFERENTIAL METHOD BLD: ABNORMAL
EOSINOPHIL # BLD AUTO: 0.2 K/UL (ref 0–0.5)
EOSINOPHIL NFR BLD: 2.2 % (ref 0–8)
ERYTHROCYTE [DISTWIDTH] IN BLOOD BY AUTOMATED COUNT: 16.2 % (ref 11.5–14.5)
EST. GFR  (NO RACE VARIABLE): >60 ML/MIN/1.73 M^2
GLUCOSE SERPL-MCNC: 93 MG/DL (ref 70–110)
HCT VFR BLD AUTO: 35 % (ref 37–48.5)
HGB BLD-MCNC: 10.4 G/DL (ref 12–16)
IMM GRANULOCYTES # BLD AUTO: 0.06 K/UL (ref 0–0.04)
IMM GRANULOCYTES NFR BLD AUTO: 0.7 % (ref 0–0.5)
LYMPHOCYTES # BLD AUTO: 2.5 K/UL (ref 1–4.8)
LYMPHOCYTES NFR BLD: 27.4 % (ref 18–48)
MAGNESIUM SERPL-MCNC: 1.6 MG/DL (ref 1.6–2.6)
MCH RBC QN AUTO: 24.9 PG (ref 27–31)
MCHC RBC AUTO-ENTMCNC: 29.7 G/DL (ref 32–36)
MCV RBC AUTO: 84 FL (ref 82–98)
MONOCYTES # BLD AUTO: 0.6 K/UL (ref 0.3–1)
MONOCYTES NFR BLD: 6.2 % (ref 4–15)
NEUTROPHILS # BLD AUTO: 5.8 K/UL (ref 1.8–7.7)
NEUTROPHILS NFR BLD: 63.2 % (ref 38–73)
NRBC BLD-RTO: 0 /100 WBC
PHOSPHATE SERPL-MCNC: 5.8 MG/DL (ref 2.7–4.5)
PLATELET # BLD AUTO: 293 K/UL (ref 150–450)
PMV BLD AUTO: 9.7 FL (ref 9.2–12.9)
POTASSIUM SERPL-SCNC: 3.5 MMOL/L (ref 3.5–5.1)
PROT SERPL-MCNC: 5.6 G/DL (ref 6–8.4)
RBC # BLD AUTO: 4.17 M/UL (ref 4–5.4)
SODIUM SERPL-SCNC: 142 MMOL/L (ref 136–145)
WBC # BLD AUTO: 9.19 K/UL (ref 3.9–12.7)

## 2024-05-20 PROCEDURE — 25000003 PHARM REV CODE 250: Performed by: STUDENT IN AN ORGANIZED HEALTH CARE EDUCATION/TRAINING PROGRAM

## 2024-05-20 PROCEDURE — 96375 TX/PRO/DX INJ NEW DRUG ADDON: CPT

## 2024-05-20 PROCEDURE — 82330 ASSAY OF CALCIUM: CPT | Performed by: STUDENT IN AN ORGANIZED HEALTH CARE EDUCATION/TRAINING PROGRAM

## 2024-05-20 PROCEDURE — 25000003 PHARM REV CODE 250

## 2024-05-20 PROCEDURE — 63600175 PHARM REV CODE 636 W HCPCS: Performed by: STUDENT IN AN ORGANIZED HEALTH CARE EDUCATION/TRAINING PROGRAM

## 2024-05-20 PROCEDURE — 83735 ASSAY OF MAGNESIUM: CPT | Performed by: STUDENT IN AN ORGANIZED HEALTH CARE EDUCATION/TRAINING PROGRAM

## 2024-05-20 PROCEDURE — 96376 TX/PRO/DX INJ SAME DRUG ADON: CPT

## 2024-05-20 PROCEDURE — G0378 HOSPITAL OBSERVATION PER HR: HCPCS

## 2024-05-20 PROCEDURE — 84100 ASSAY OF PHOSPHORUS: CPT | Performed by: STUDENT IN AN ORGANIZED HEALTH CARE EDUCATION/TRAINING PROGRAM

## 2024-05-20 PROCEDURE — 80053 COMPREHEN METABOLIC PANEL: CPT | Performed by: STUDENT IN AN ORGANIZED HEALTH CARE EDUCATION/TRAINING PROGRAM

## 2024-05-20 PROCEDURE — 85025 COMPLETE CBC W/AUTO DIFF WBC: CPT | Performed by: STUDENT IN AN ORGANIZED HEALTH CARE EDUCATION/TRAINING PROGRAM

## 2024-05-20 PROCEDURE — 36415 COLL VENOUS BLD VENIPUNCTURE: CPT | Performed by: STUDENT IN AN ORGANIZED HEALTH CARE EDUCATION/TRAINING PROGRAM

## 2024-05-20 PROCEDURE — 96367 TX/PROPH/DG ADDL SEQ IV INF: CPT

## 2024-05-20 PROCEDURE — 96366 THER/PROPH/DIAG IV INF ADDON: CPT

## 2024-05-20 RX ORDER — TRAMADOL HYDROCHLORIDE 50 MG/1
50 TABLET ORAL EVERY 6 HOURS PRN
Start: 2024-05-20 | End: 2024-05-23

## 2024-05-20 RX ORDER — CALCITRIOL 0.25 UG/1
0.25 CAPSULE ORAL 2 TIMES DAILY
Qty: 60 CAPSULE | Refills: 2 | Status: SHIPPED | OUTPATIENT
Start: 2024-05-20 | End: 2024-05-23

## 2024-05-20 RX ORDER — CALCIUM CARBONATE 200(500)MG
1 TABLET,CHEWABLE ORAL DAILY
Qty: 30 TABLET | Refills: 11 | Status: SHIPPED | OUTPATIENT
Start: 2024-05-20 | End: 2024-05-23 | Stop reason: SDUPTHER

## 2024-05-20 RX ORDER — CALCIUM CARBONATE 200(500)MG
2000 TABLET,CHEWABLE ORAL 3 TIMES DAILY
Qty: 360 TABLET | Refills: 11 | Status: SHIPPED | OUTPATIENT
Start: 2024-05-20 | End: 2024-06-10 | Stop reason: ALTCHOICE

## 2024-05-20 RX ADMIN — POTASSIUM CHLORIDE 10 MEQ: 7.46 INJECTION, SOLUTION INTRAVENOUS at 06:05

## 2024-05-20 RX ADMIN — POTASSIUM CHLORIDE 10 MEQ: 7.46 INJECTION, SOLUTION INTRAVENOUS at 07:05

## 2024-05-20 RX ADMIN — CALCITRIOL CAPSULES 0.25 MCG 0.25 MCG: 0.25 CAPSULE ORAL at 08:05

## 2024-05-20 RX ADMIN — LOSARTAN POTASSIUM 25 MG: 25 TABLET, FILM COATED ORAL at 08:05

## 2024-05-20 RX ADMIN — ATORVASTATIN CALCIUM 40 MG: 40 TABLET, FILM COATED ORAL at 08:05

## 2024-05-20 RX ADMIN — CYCLOBENZAPRINE HYDROCHLORIDE 10 MG: 5 TABLET, FILM COATED ORAL at 02:05

## 2024-05-20 RX ADMIN — POTASSIUM CHLORIDE 10 MEQ: 7.46 INJECTION, SOLUTION INTRAVENOUS at 10:05

## 2024-05-20 RX ADMIN — SPIRONOLACTONE 25 MG: 25 TABLET ORAL at 08:05

## 2024-05-20 RX ADMIN — CALCIUM CARBONATE (ANTACID) CHEW TAB 500 MG 2000 MG: 500 CHEW TAB at 08:05

## 2024-05-20 RX ADMIN — MAGNESIUM SULFATE HEPTAHYDRATE 2 G: 40 INJECTION, SOLUTION INTRAVENOUS at 04:05

## 2024-05-20 NOTE — PLAN OF CARE
Pt clear for DC from CM standpoint. Discharging home.    Post-op appt on AVS.    Can DC after Dr. Basilia yates.      05/20/24 0477   Final Note   Assessment Type Final Discharge Note   Anticipated Discharge Disposition Home   Hospital Resources/Appts/Education Provided Appointments scheduled and added to AVS

## 2024-05-20 NOTE — PLAN OF CARE
Problem: Adult Inpatient Plan of Care  Goal: Plan of Care Review  Outcome: Met  Goal: Patient-Specific Goal (Individualized)  Outcome: Met  Goal: Absence of Hospital-Acquired Illness or Injury  Outcome: Met  Goal: Optimal Comfort and Wellbeing  Outcome: Met  Goal: Readiness for Transition of Care  Outcome: Met     Problem: Bariatric Environmental Safety  Goal: Safety Maintained with Care  Outcome: Met     Problem: Fall Injury Risk  Goal: Absence of Fall and Fall-Related Injury  Outcome: Met     Problem: Wound  Goal: Optimal Coping  Outcome: Met  Goal: Optimal Functional Ability  Outcome: Met  Goal: Absence of Infection Signs and Symptoms  Outcome: Met  Goal: Improved Oral Intake  Outcome: Met  Goal: Optimal Pain Control and Function  Outcome: Met  Goal: Skin Health and Integrity  Outcome: Met  Goal: Optimal Wound Healing  Outcome: Met     Problem: Electrolyte Imbalance  Goal: Electrolyte Balance  Outcome: Met

## 2024-05-20 NOTE — CARE UPDATE
Eleuterio NP to bedside at patient request. Patient crying with tetany in bilateral hands. Patient endorses severe cramping to arms, legs, chest, and abdomen. BP and heart rate elevated but patient actively crying. Oxygen saturation 98%. One time dose of valium 10mg ordered to assist with muscle cramping. Will reevaluate one hour after medication is administered to see if cramping is tolerable. Plan of care discussed with Dr. Cui who affirmed plan of care via secure chat.

## 2024-05-20 NOTE — PLAN OF CARE
Problem: Adult Inpatient Plan of Care  Goal: Plan of Care Review  Outcome: Progressing     Problem: Bariatric Environmental Safety  Goal: Safety Maintained with Care  Outcome: Progressing     Problem: Fall Injury Risk  Goal: Absence of Fall and Fall-Related Injury  Outcome: Progressing     Problem: Wound  Goal: Optimal Coping  Outcome: Progressing     Problem: Electrolyte Imbalance  Goal: Electrolyte Balance  Outcome: Progressing       Pt resting quietly, no complaints of further cramping noted and speech has been clear.  Plan of care reviewed with pt and spouse with understanding verbalized.  Call light within reach.

## 2024-05-20 NOTE — NURSING
Discharge instructions and education reviewed with patient at bedside.  Telemetry box and leads removed.  Peripheral IV removed with catheter intact.  Pt encouraged to follow up with surgery provider and watch for return of symptoms. Patient to be escorted to family vehicle for discharge home.

## 2024-05-20 NOTE — TELEPHONE ENCOUNTER
Called patient to check in after hospital discharge.  Still having perioral paresthesias.     Maintain 2000 mg calcium carbonate TID with meals/snacks plus PRN dosing.   Increase to 0.5 mcg BID calcitriol.      Patient instructed to take 1 extra calcitriol capsule today when she picks up her medication Rx and then resume with 2 capsules this evening.    Labs scheduled for 05/21.  Will make appropriate adjustments pending these labs and plan for repeat labs on Friday 05/24.    Raiza Epperson MD  05/20/2024  2:46 PM

## 2024-05-20 NOTE — ASSESSMENT & PLAN NOTE
Chronic, controlled. Latest blood pressure and vitals reviewed-     Temp:  [97.7 °F (36.5 °C)-98.6 °F (37 °C)]   Pulse:  []   Resp:  [18-19]   BP: (106-164)/(55-99)   SpO2:  [92 %-100 %] .   Home meds for hypertension were reviewed and noted below.   Hypertension Medications               losartan (COZAAR) 25 MG tablet Take 1 tablet (25 mg total) by mouth once daily.    spironolactone (ALDACTONE) 25 MG tablet Take 1 tablet (25 mg total) by mouth once daily.            While in the hospital, will manage blood pressure as follows; Continue home antihypertensive regimen    Will utilize p.r.n. blood pressure medication only if patient's blood pressure greater than 180/110 and she develops symptoms such as worsening chest pain or shortness of breath.

## 2024-05-20 NOTE — HOSPITAL COURSE
38 year old female with a past medical history of parathyroidectomy, HTN, obesity, HLD, nicotine use, and anemia who presented with hypocalcemia which resolved with increasing oral calcium repletion with calcitriol per Dr. Epperson's recommendations to Dr. Kat in the ED. Her symptoms resolved and she was discharged 5/20 with the current calcium repletion regimen. She will follow up with Dr. Epperson 6/3/2024.

## 2024-05-20 NOTE — DISCHARGE SUMMARY
[facility] Medicine  Discharge Summary      Patient Name: Sonia Solorzano  MRN: [id number]  VICKY: [id number]  Patient Class: IP- Inpatient  Admission Date: 5/19/2024  Hospital Length of Stay: 1 days  Discharge Date and Time: No discharge date for patient encounter.  Attending Physician: Emigdio Cui MD   Discharging Provider: Eimgdio Cui MD  Primary Care Provider: Dorian Guerrero MD    Primary Care Team: Networked reference to record PCT     HPI:   Sonia Solorzano is a 38 year old female with a past medical history of parathyroidectomy 5/17, HTN, obesity, HLD, nicotine use, and anemia who presented with numbness and tingling to the face, tongue and lower extremities. She also endorses intermittent palpitations but denies shortness of breath or chest pains. Workup in the ED revealed a low calcium level. Dr. Raiza Epperson notified Dr. Kat of changes to make in the patient's calcium repletion which have been implemented.    * No surgery found *      Hospital Course:   38 year old female with a past medical history of parathyroidectomy, HTN, obesity, HLD, nicotine use, and anemia who presented with hypocalcemia which resolved with increasing oral calcium repletion with calcitriol per Dr. Epperson's recommendations to Dr. Kat in the ED. Her symptoms resolved and she was discharged 5/20 with the current calcium repletion regimen. She will follow up with Dr. Epperson 6/3/2024.     Goals of Care Treatment Preferences:  Code Status: Full Code    Living Will: Yes              Consults:     Pulmonary  Unspecified asthma, uncomplicated  -PRN albuterol      Cardiac/Vascular  HLD (hyperlipidemia)  -Continue statin      Hypertension  Chronic, controlled. Latest blood pressure and vitals reviewed-     Temp:  [97.7 °F (36.5 °C)-98.6 °F (37 °C)]   Pulse:  []   Resp:  [18-19]   BP: (106-164)/(55-99)   SpO2:  [92 %-100 %] .   Home meds for hypertension were reviewed and noted below.    Hypertension Medications               losartan (COZAAR) 25 MG tablet Take 1 tablet (25 mg total) by mouth once daily.    spironolactone (ALDACTONE) 25 MG tablet Take 1 tablet (25 mg total) by mouth once daily.            While in the hospital, will manage blood pressure as follows; Continue home antihypertensive regimen    Will utilize p.r.n. blood pressure medication only if patient's blood pressure greater than 180/110 and she develops symptoms such as worsening chest pain or shortness of breath.    Endocrine  BMI 45.0-49.9, adult  Body mass index is 45.73 kg/m². Morbid obesity complicates all aspects of disease management from diagnostic modalities to treatment.         History of parathyroidectomy  Per Dr. Raiza Epperson.  -Follow up 6/3      Other  Vapes nicotine containing substance  -Patient to be counseled on cessation      Obstructive sleep apnea  Non-adherent with CPAP.        Final Active Diagnoses:    Diagnosis Date Noted POA    History of parathyroidectomy [Z98.890, Z90.89] 05/19/2024 Not Applicable    Vapes nicotine containing substance [Z72.0] 01/31/2024 Yes    HLD (hyperlipidemia) [E78.5] 06/26/2023 Yes    Unspecified asthma, uncomplicated [J45.909] 09/03/2021 Yes    Hypertension [I10] 10/14/2020 Yes     Chronic    BMI 45.0-49.9, adult [Z68.42] 08/17/2016 Not Applicable    Obstructive sleep apnea [G47.33] 08/17/2016 Yes     Chronic      Problems Resolved During this Admission:    Diagnosis Date Noted Date Resolved POA    PRINCIPAL PROBLEM:  Hypocalcemia [E83.51] 05/19/2024 05/20/2024 Yes       Discharged Condition: stable    Disposition: Home or Self Care    Follow Up:   Follow-up Information       Raiza Epperson MD Follow up on 6/3/2024.    Specialty: Surgery  Contact information:  [address] [place] [id number] [phone]                           Patient Instructions:      Diet Cardiac     Notify your health care provider if you experience any of the following:  increased  confusion or weakness     Notify your health care provider if you experience any of the following:  persistent dizziness, light-headedness, or visual disturbances     Notify your health care provider if you experience any of the following:  severe persistent headache     Notify your health care provider if you experience any of the following:  difficulty breathing or increased cough     Notify your health care provider if you experience any of the following:  severe uncontrolled pain     Notify your health care provider if you experience any of the following:  persistent nausea and vomiting or diarrhea     Notify your health care provider if you experience any of the following:  temperature >100.4     Activity as tolerated       Significant Diagnostic Studies: Labs: All labs within the past 24 hours have been reviewed    Pending Diagnostic Studies:       None           Medications:  Reconciled Home Medications:      Medication List        START taking these medications      calcitRIOL 0.25 MCG Cap  Commonly known as: ROCALTROL  Take 1 capsule (0.25 mcg total) by mouth 2 (two) times daily.            CHANGE how you take these medications      acetaminophen 650 MG Tbsr  Commonly known as: TYLENOL  Take 1 tablet (650 mg total) by mouth every 6 to 8 hours as needed (Alternate every 3 hours with ibuprofen).  What changed: Another medication with the same name was removed. Continue taking this medication, and follow the directions you see here.     * calcium carbonate 200 mg calcium (500 mg) chewable tablet  Commonly known as: TUMS  Take 1 tablet (500 mg total) by mouth once daily.  What changed:   medication strength  how much to take  when to take this     * calcium carbonate 200 mg calcium (500 mg) chewable tablet  Commonly known as: TUMS  Take 4 tablets (2,000 mg total) by mouth 3 (three) times daily.  What changed: You were already taking a medication with the same name, and this prescription was added. Make sure you  understand how and when to take each.     traMADoL 50 mg tablet  Commonly known as: ULTRAM  Take 1 tablet (50 mg total) by mouth every 6 (six) hours as needed for Pain.  What changed:   when to take this  reasons to take this           * This list has 2 medication(s) that are the same as other medications prescribed for you. Read the directions carefully, and ask your doctor or other care provider to review them with you.                CONTINUE taking these medications      albuterol 90 mcg/actuation inhaler  Commonly known as: PROVENTIL/VENTOLIN HFA  Inhale 2 puffs into the lungs 4 (four) times daily.     atorvastatin 40 MG tablet  Commonly known as: LIPITOR  Take 1 tablet (40 mg total) by mouth once daily.     hydrOXYzine HCL 25 MG tablet  Commonly known as: ATARAX  Take 1 tablet (25 mg total) by mouth 3 (three) times daily as needed for Anxiety.     ibuprofen 600 MG tablet  Commonly known as: ADVIL,MOTRIN  Take 1 tablet (600 mg total) by mouth every 6 to 8 hours as needed for Pain (Alternate every 3 hours with Tylenol.).     IMODIUM A-D ORAL  Take by mouth as needed. diarrhea     losartan 25 MG tablet  Commonly known as: COZAAR  Take 1 tablet (25 mg total) by mouth once daily.     NURTEC 75 mg odt  Generic drug: rimegepant  Take 1 tablet (75 mg total) by mouth daily as needed for Migraine. Place ODT tablet on the tongue; alternatively the ODT tablet may be placed under the tongue     spironolactone 25 MG tablet  Commonly known as: ALDACTONE  Take 1 tablet (25 mg total) by mouth once daily.            STOP taking these medications      predniSONE 50 MG Tab  Commonly known as: DELTASONE              Indwelling Lines/Drains at time of discharge:   Lines/Drains/Airways       None                   Time spent on the discharge of patient: 31 minutes         Emigdio Cui MD  Department of Hospital Medicine  [facility]/Surg

## 2024-05-21 ENCOUNTER — LAB VISIT (OUTPATIENT)
Dept: LAB | Facility: HOSPITAL | Age: 39
End: 2024-05-21
Attending: STUDENT IN AN ORGANIZED HEALTH CARE EDUCATION/TRAINING PROGRAM
Payer: COMMERCIAL

## 2024-05-21 DIAGNOSIS — Z98.890 S/P PARATHYROIDECTOMY: ICD-10-CM

## 2024-05-21 DIAGNOSIS — Z90.89 S/P PARATHYROIDECTOMY: ICD-10-CM

## 2024-05-21 LAB
ALBUMIN SERPL BCP-MCNC: 3 G/DL (ref 3.5–5.2)
ANION GAP SERPL CALC-SCNC: 13 MMOL/L (ref 8–16)
BUN SERPL-MCNC: 15 MG/DL (ref 6–20)
CALCIUM SERPL-MCNC: 8.3 MG/DL (ref 8.7–10.5)
CHLORIDE SERPL-SCNC: 103 MMOL/L (ref 95–110)
CO2 SERPL-SCNC: 23 MMOL/L (ref 23–29)
CREAT SERPL-MCNC: 0.8 MG/DL (ref 0.5–1.4)
EST. GFR  (NO RACE VARIABLE): >60 ML/MIN/1.73 M^2
FINAL PATHOLOGIC DIAGNOSIS: NORMAL
FROZEN SECTION DIAGNOSIS: NORMAL
FROZEN SECTION FOOTNOTE: NORMAL
GLUCOSE SERPL-MCNC: 154 MG/DL (ref 70–110)
GROSS: NORMAL
Lab: NORMAL
MAGNESIUM SERPL-MCNC: 1.7 MG/DL (ref 1.6–2.6)
PHOSPHATE SERPL-MCNC: 4 MG/DL (ref 2.7–4.5)
POTASSIUM SERPL-SCNC: 3.7 MMOL/L (ref 3.5–5.1)
PTH-INTACT SERPL-MCNC: 15.6 PG/ML (ref 9–77)
SODIUM SERPL-SCNC: 139 MMOL/L (ref 136–145)

## 2024-05-21 PROCEDURE — 83970 ASSAY OF PARATHORMONE: CPT | Performed by: STUDENT IN AN ORGANIZED HEALTH CARE EDUCATION/TRAINING PROGRAM

## 2024-05-21 PROCEDURE — 83735 ASSAY OF MAGNESIUM: CPT | Performed by: STUDENT IN AN ORGANIZED HEALTH CARE EDUCATION/TRAINING PROGRAM

## 2024-05-21 PROCEDURE — 80069 RENAL FUNCTION PANEL: CPT | Performed by: STUDENT IN AN ORGANIZED HEALTH CARE EDUCATION/TRAINING PROGRAM

## 2024-05-21 PROCEDURE — 36415 COLL VENOUS BLD VENIPUNCTURE: CPT | Performed by: STUDENT IN AN ORGANIZED HEALTH CARE EDUCATION/TRAINING PROGRAM

## 2024-05-22 LAB
OHS QRS DURATION: 88 MS
OHS QTC CALCULATION: 449 MS

## 2024-05-23 ENCOUNTER — PATIENT OUTREACH (OUTPATIENT)
Dept: ADMINISTRATIVE | Facility: CLINIC | Age: 39
End: 2024-05-23
Payer: COMMERCIAL

## 2024-05-23 ENCOUNTER — PATIENT MESSAGE (OUTPATIENT)
Dept: SURGERY | Facility: CLINIC | Age: 39
End: 2024-05-23

## 2024-05-23 ENCOUNTER — OFFICE VISIT (OUTPATIENT)
Dept: SURGERY | Facility: CLINIC | Age: 39
End: 2024-05-23
Payer: COMMERCIAL

## 2024-05-23 VITALS
HEIGHT: 67 IN | BODY MASS INDEX: 44.45 KG/M2 | TEMPERATURE: 98 F | SYSTOLIC BLOOD PRESSURE: 139 MMHG | HEART RATE: 94 BPM | DIASTOLIC BLOOD PRESSURE: 88 MMHG | WEIGHT: 283.19 LBS

## 2024-05-23 DIAGNOSIS — E55.9 VITAMIN D INSUFFICIENCY: ICD-10-CM

## 2024-05-23 DIAGNOSIS — Z90.89 S/P PARATHYROIDECTOMY: Primary | ICD-10-CM

## 2024-05-23 DIAGNOSIS — Z90.89 HISTORY OF PARATHYROIDECTOMY: ICD-10-CM

## 2024-05-23 DIAGNOSIS — Z98.890 HISTORY OF PARATHYROIDECTOMY: ICD-10-CM

## 2024-05-23 DIAGNOSIS — Z98.890 S/P PARATHYROIDECTOMY: Primary | ICD-10-CM

## 2024-05-23 PROBLEM — E21.3 HYPERPARATHYROIDISM: Status: RESOLVED | Noted: 2023-01-18 | Resolved: 2024-05-23

## 2024-05-23 PROCEDURE — 99999 PR PBB SHADOW E&M-EST. PATIENT-LVL III: CPT | Mod: PBBFAC,,, | Performed by: STUDENT IN AN ORGANIZED HEALTH CARE EDUCATION/TRAINING PROGRAM

## 2024-05-23 PROCEDURE — 3079F DIAST BP 80-89 MM HG: CPT | Mod: CPTII,S$GLB,, | Performed by: STUDENT IN AN ORGANIZED HEALTH CARE EDUCATION/TRAINING PROGRAM

## 2024-05-23 PROCEDURE — 3075F SYST BP GE 130 - 139MM HG: CPT | Mod: CPTII,S$GLB,, | Performed by: STUDENT IN AN ORGANIZED HEALTH CARE EDUCATION/TRAINING PROGRAM

## 2024-05-23 PROCEDURE — 99024 POSTOP FOLLOW-UP VISIT: CPT | Mod: S$GLB,,, | Performed by: STUDENT IN AN ORGANIZED HEALTH CARE EDUCATION/TRAINING PROGRAM

## 2024-05-23 PROCEDURE — 4010F ACE/ARB THERAPY RXD/TAKEN: CPT | Mod: CPTII,S$GLB,, | Performed by: STUDENT IN AN ORGANIZED HEALTH CARE EDUCATION/TRAINING PROGRAM

## 2024-05-23 RX ORDER — CALCITRIOL 0.25 UG/1
0.5 CAPSULE ORAL 2 TIMES DAILY
Start: 2024-05-23 | End: 2024-06-10

## 2024-05-23 NOTE — ASSESSMENT & PLAN NOTE
Primary hyperparathyroidism with hypercalcemia, hypercalciuria, nephrolithiasis, and young age s/p subtotal parathyroidectomy on 5/17/2024.  Having symptoms of hypoparathyroidism, maintaining adequate serum calcium level on current regimen.  Not requiring PRN dosing of calcium carbonate.     - Continue Calcitriol 0.5 mcg BID, med rec adjusted  - Discussed switching from Calcium Carbonate to Calcium Citrate q8h but due to concerns of poor GI absorption due to IBS/IBD symptoms.  Patient concerned with magnesium allergy, she will stick with TUMS Ultra 2 tablets q8 hours for now.  - Discussed recommendation to spread out dosing to every 8 hours for calcium carbonate to avoid early morning symptoms  - Reviewed pathology  - Incision care discussed, scar massage and routine scar care information provided  - RFP tomorrow  - Bowel regimen to avoid constipation with calcium carbonate  - Follow up as planned on/about 6/3 with labs prior

## 2024-05-23 NOTE — PROGRESS NOTES
Postoperative Endocrine Surgery Clinic Note    Reason for visit / Chief complaint: Postoperative evaluation  Endocrinologist: Karolina  Procedure:  Parathyroidectomy  Procedure Date: 5/17/2024    Subjective:     Sonia Solorzano returns today for postoperative evaluation, she is approximately 1 week post op.    Procedure:   Subtotal parathyroidectomy, bilateral exploration:  Excision of right superior parathyroid gland with possible adenoma  Excision of left superior parathyroid adenoma  Excision of left inferior parathyroid adenoma  Biopsy of right inferior parathyroid gland  Intraoperative monitoring and interpretation of bilateral cranial nerves (vagus and recurrent laryngeal nerves) using the Urbful system  Intraoperative PTH level sampling and interpretation    Operative findings:   All four parathyroid glands identified and confirmed parathyroid tissue with frozen sectioning.  Most normal appearing gland was the right inferior parathyroid gland.  Right superior parathyroid adenoma was identified and appeared to have a pale atypical adenoma versus lymph node adjacent to gland, both excised together.  Right inferior parathyroid gland was identified, tiny biopsy confirmed parathyroid tissue, as it was the most normal gland, it was maintained on a native vascular pedicle.  Left superior parathyroid gland appeared abnormally enlarged and dark in color.  Gland excised.  Left inferior parathyroid gland appeared abnormally enlarged and dark in color.  Gland excised.  Appropriate decrease in PTH following excision of three glands.  The bilateral recurrent laryngeal nerves and vagus nerves were identified and preserved.  Function was verified using the nerve monitoring system.    Unfortunately she has had a complicated postoperative course due to symptomatic hypocalcemia requiring brief hospitalization and increase calcium supplementation.  She denies signs or symptoms of hypocalcemia. She is currently taking  Calcitriol 0.5 BID as well as Calcium Carbonate 2000mcg TID. Her most recent labwork reveals a corrected calcium of 8.5 but she states she is still having intermittent episodes of perioral tingling in her face/lips and fingertips. Her phonation is at baseline.  Pain is well controlled.    Current Outpatient Medications   Medication Sig Dispense Refill    acetaminophen (TYLENOL) 650 MG TbSR Take 1 tablet (650 mg total) by mouth every 6 to 8 hours as needed (Alternate every 3 hours with ibuprofen). 60 tablet 1    albuterol (PROVENTIL/VENTOLIN HFA) 90 mcg/actuation inhaler Inhale 2 puffs into the lungs 4 (four) times daily. 18 g 2    atorvastatin (LIPITOR) 40 MG tablet Take 1 tablet (40 mg total) by mouth once daily. 30 tablet 11    calcitRIOL (ROCALTROL) 0.25 MCG Cap Take 1 capsule (0.25 mcg total) by mouth 2 (two) times daily. 60 capsule 2    calcium carbonate (TUMS) 200 mg calcium (500 mg) chewable tablet Take 4 tablets (2,000 mg total) by mouth 3 (three) times daily. 360 tablet 11    hydrOXYzine HCL (ATARAX) 25 MG tablet Take 1 tablet (25 mg total) by mouth 3 (three) times daily as needed for Anxiety. 30 tablet 2    ibuprofen (ADVIL,MOTRIN) 600 MG tablet Take 1 tablet (600 mg total) by mouth every 6 to 8 hours as needed for Pain (Alternate every 3 hours with Tylenol.). 60 tablet 1    loperamide HCl (IMODIUM A-D ORAL) Take by mouth as needed. diarrhea      losartan (COZAAR) 25 MG tablet Take 1 tablet (25 mg total) by mouth once daily. 90 tablet 3    rimegepant (NURTEC) 75 mg odt Take 1 tablet (75 mg total) by mouth daily as needed for Migraine. Place ODT tablet on the tongue; alternatively the ODT tablet may be placed under the tongue 8 tablet 11    spironolactone (ALDACTONE) 25 MG tablet Take 1 tablet (25 mg total) by mouth once daily. 30 tablet 11     No current facility-administered medications for this visit.     Review of patient's allergies indicates:   Allergen Reactions    Contrast media Anaphylaxis     "Iodine and iodide containing products Anaphylaxis    Levaquin [levofloxacin] Anaphylaxis    Levofloxacin in d5w Anaphylaxis    Sulfa (sulfonamide antibiotics) Anaphylaxis and Hives    Iodinated contrast media Hives    Iodine     Magnesium      Pt reporting she is allergic to magnesium citrate oral drink.     Morphine Hives    Tree nuts     Adhesive Rash    Compazine [prochlorperazine] Anxiety     Restless legs    Depacon [valproate sodium] Hives     Pt experienced hives at IV site upon 8th day of depacon administration.  Hives resolved with stopping medication in 1.5 hours.    Nut [tree nut] Hives       Review of Systems  Negative except as per HPI.     Objective:   /88   Pulse 94   Temp 97.9 °F (36.6 °C) (Oral)   Ht 5' 7" (1.702 m)   Wt 128.4 kg (283 lb 2.9 oz)   LMP 01/11/2024 (Exact Date)   BMI 44.35 kg/m²     General: alert, well appearing, and in no distress  Neck: neck is flat, no erythema or edema  Incision: well approximated, healing well  Neurological: phonation is normal    Labs:  Lab Results   Component Value Date    CALCIUM 8.3 (L) 05/21/2024    ALBUMIN 3.0 (L) 05/21/2024    PHOS 4.0 05/21/2024    [id number] 18 (L) 12/09/2023       Pathology:  Final Pathologic Diagnosis   Date Value Ref Range Status   05/17/2024   Final    1. Parathyroid, right superior, biopsy:   - Parathyroid tissue present   - Weight:  11.2 mg    2. Parathyroid, right inferior, biopsy:   - Parathyroid tissue present  - Weight:  1.7 mg      3. Parathyroid, left superior, biopsy:   - Parathyroid tissue present   - Weight:  17 mg     4. Parathyroid, left inferior, biopsy:   - Parathyroid tissue present   - Weight: 13.9 mg    5. Parathyroid, right superior, adenoma, excision:   - Enlarged hypercellular parathyroid gland   - Weight:  759.8 mg    6. Parathyroid, left superior, adenoma, excision:   - Enlarged and hypercellular parathyroid gland   - Weight: 56.3 mg     7. Parathyroid, left inferior, adenoma, excision:   - " Enlarged hypercellular parathyroid gland   - Weight:  54.9 mg         Comment:     Interp By ABELARDO Hairston MD, Signed on 05/21/2024 at 12:49         Assessment and Plan     Problem List Items Addressed This Visit       Vitamin D insufficiency    Current Assessment & Plan     Chronically insufficient. Vitamin D 18 in 12/2023.     - Recommend 5000 U daily of D3 once off calcitriol         History of parathyroidectomy    Current Assessment & Plan     Primary hyperparathyroidism with hypercalcemia, hypercalciuria, nephrolithiasis, and young age s/p subtotal parathyroidectomy on 5/17/2024.  Having symptoms of hypoparathyroidism, maintaining adequate serum calcium level on current regimen.  Not requiring PRN dosing of calcium carbonate.     - Continue Calcitriol 0.5 mcg BID, med rec adjusted  - Discussed switching from Calcium Carbonate to Calcium Citrate q8h but due to concerns of poor GI absorption due to IBS/IBD symptoms.  Patient concerned with magnesium allergy, she will stick with TUMS Ultra 2 tablets q8 hours for now.  - Discussed recommendation to spread out dosing to every 8 hours for calcium carbonate to avoid early morning symptoms  - Reviewed pathology  - Incision care discussed, scar massage and routine scar care information provided  - RFP tomorrow  - Bowel regimen to avoid constipation with calcium carbonate  - Follow up as planned on/about 6/3 with labs prior          Other Visit Diagnoses       S/P parathyroidectomy    -  Primary          Patient was seen and evaluated with surgery resident Richard Moura MD.    Raiza Epperson MD  Staff Surgeon  Endocrine Surgery  5/23/24

## 2024-05-23 NOTE — PROGRESS NOTES
C3 nurse spoke with Sonia Solorzano for a TCC post hospital discharge follow up call. The patient does not have a scheduled HOSFU appointment with Dorian Guerrero MD within 5-7 days post hospital discharge date of 5/20/24. Pt denies needing a HOSFU at this time with her PCP, stating she feels comfortable following up with her postoperative appointments. No messages routed at this time.

## 2024-05-23 NOTE — ASSESSMENT & PLAN NOTE
Chronically insufficient. Vitamin D 18 in 12/2023.     - Recommend 5000 U daily of D3 once off calcitriol

## 2024-05-24 ENCOUNTER — LAB VISIT (OUTPATIENT)
Dept: LAB | Facility: HOSPITAL | Age: 39
End: 2024-05-24
Attending: STUDENT IN AN ORGANIZED HEALTH CARE EDUCATION/TRAINING PROGRAM
Payer: COMMERCIAL

## 2024-05-24 DIAGNOSIS — Z98.890 S/P PARATHYROIDECTOMY: ICD-10-CM

## 2024-05-24 DIAGNOSIS — Z90.89 S/P PARATHYROIDECTOMY: ICD-10-CM

## 2024-05-24 LAB
ALBUMIN SERPL BCP-MCNC: 3.1 G/DL (ref 3.5–5.2)
ANION GAP SERPL CALC-SCNC: 12 MMOL/L (ref 8–16)
BUN SERPL-MCNC: 12 MG/DL (ref 6–20)
CALCIUM SERPL-MCNC: 8.9 MG/DL (ref 8.7–10.5)
CHLORIDE SERPL-SCNC: 101 MMOL/L (ref 95–110)
CO2 SERPL-SCNC: 26 MMOL/L (ref 23–29)
CREAT SERPL-MCNC: 0.8 MG/DL (ref 0.5–1.4)
EST. GFR  (NO RACE VARIABLE): >60 ML/MIN/1.73 M^2
GLUCOSE SERPL-MCNC: 96 MG/DL (ref 70–110)
PHOSPHATE SERPL-MCNC: 4 MG/DL (ref 2.7–4.5)
POTASSIUM SERPL-SCNC: 3.6 MMOL/L (ref 3.5–5.1)
SODIUM SERPL-SCNC: 139 MMOL/L (ref 136–145)

## 2024-05-24 PROCEDURE — 80069 RENAL FUNCTION PANEL: CPT | Performed by: STUDENT IN AN ORGANIZED HEALTH CARE EDUCATION/TRAINING PROGRAM

## 2024-05-24 PROCEDURE — 36415 COLL VENOUS BLD VENIPUNCTURE: CPT | Performed by: STUDENT IN AN ORGANIZED HEALTH CARE EDUCATION/TRAINING PROGRAM

## 2024-05-25 NOTE — PROGRESS NOTES
Calcium corrects to 9.6.  Will reduce calcitriol to 0.25 BID and maintain calcium carbonate 2000 mg q8h.    [facility] Staff - Please set up RFP for Wednesday 5/29 or Thursday 5/30.

## 2024-05-27 ENCOUNTER — PATIENT MESSAGE (OUTPATIENT)
Dept: SURGERY | Facility: CLINIC | Age: 39
End: 2024-05-27
Payer: COMMERCIAL

## 2024-06-03 ENCOUNTER — PATIENT MESSAGE (OUTPATIENT)
Dept: SURGERY | Facility: CLINIC | Age: 39
End: 2024-06-03
Payer: COMMERCIAL

## 2024-06-04 ENCOUNTER — LAB VISIT (OUTPATIENT)
Dept: LAB | Facility: HOSPITAL | Age: 39
End: 2024-06-04
Attending: STUDENT IN AN ORGANIZED HEALTH CARE EDUCATION/TRAINING PROGRAM
Payer: COMMERCIAL

## 2024-06-04 DIAGNOSIS — Z98.890 S/P PARATHYROIDECTOMY: ICD-10-CM

## 2024-06-04 DIAGNOSIS — Z90.89 S/P PARATHYROIDECTOMY: ICD-10-CM

## 2024-06-04 LAB
ALBUMIN SERPL BCP-MCNC: 3.2 G/DL (ref 3.5–5.2)
ANION GAP SERPL CALC-SCNC: 12 MMOL/L (ref 8–16)
BUN SERPL-MCNC: 12 MG/DL (ref 6–20)
CALCIUM SERPL-MCNC: 9.1 MG/DL (ref 8.7–10.5)
CHLORIDE SERPL-SCNC: 100 MMOL/L (ref 95–110)
CO2 SERPL-SCNC: 26 MMOL/L (ref 23–29)
CREAT SERPL-MCNC: 0.9 MG/DL (ref 0.5–1.4)
EST. GFR  (NO RACE VARIABLE): >60 ML/MIN/1.73 M^2
GLUCOSE SERPL-MCNC: 87 MG/DL (ref 70–110)
PHOSPHATE SERPL-MCNC: 4.1 MG/DL (ref 2.7–4.5)
POTASSIUM SERPL-SCNC: 3.8 MMOL/L (ref 3.5–5.1)
SODIUM SERPL-SCNC: 138 MMOL/L (ref 136–145)

## 2024-06-04 PROCEDURE — 80069 RENAL FUNCTION PANEL: CPT | Performed by: STUDENT IN AN ORGANIZED HEALTH CARE EDUCATION/TRAINING PROGRAM

## 2024-06-04 PROCEDURE — 36415 COLL VENOUS BLD VENIPUNCTURE: CPT | Performed by: STUDENT IN AN ORGANIZED HEALTH CARE EDUCATION/TRAINING PROGRAM

## 2024-06-05 ENCOUNTER — PATIENT OUTREACH (OUTPATIENT)
Dept: EMERGENCY MEDICINE | Facility: HOSPITAL | Age: 39
End: 2024-06-05
Payer: COMMERCIAL

## 2024-06-05 NOTE — PROGRESS NOTES
ED Navigator f/u from last encounter. Pt states that she is doing ok. And denies having any questions at this time.  Pt encouraged to reach out as the need arises via [phone]. ED Navigator will assist as needed.

## 2024-06-05 NOTE — PROGRESS NOTES
Calcium corrects to 9.7 on 0.25 mcg BID calcitriol and 2000 mg calcium carbonate q8h.   Will stop calcitriol and continue calcium carbonate at 2000 mg q8h.    [facility] Staff - Please coordinate with patient to have RFP drawn prior appointment on 6/10.

## 2024-06-06 ENCOUNTER — PATIENT MESSAGE (OUTPATIENT)
Dept: SURGERY | Facility: CLINIC | Age: 39
End: 2024-06-06
Payer: COMMERCIAL

## 2024-06-06 DIAGNOSIS — Z98.890 S/P PARATHYROIDECTOMY: Primary | ICD-10-CM

## 2024-06-06 DIAGNOSIS — Z90.89 S/P PARATHYROIDECTOMY: Primary | ICD-10-CM

## 2024-06-10 ENCOUNTER — LAB VISIT (OUTPATIENT)
Dept: LAB | Facility: HOSPITAL | Age: 39
End: 2024-06-10
Attending: STUDENT IN AN ORGANIZED HEALTH CARE EDUCATION/TRAINING PROGRAM
Payer: COMMERCIAL

## 2024-06-10 ENCOUNTER — OFFICE VISIT (OUTPATIENT)
Dept: SURGERY | Facility: CLINIC | Age: 39
End: 2024-06-10
Payer: COMMERCIAL

## 2024-06-10 ENCOUNTER — PATIENT MESSAGE (OUTPATIENT)
Dept: SURGERY | Facility: CLINIC | Age: 39
End: 2024-06-10
Payer: COMMERCIAL

## 2024-06-10 DIAGNOSIS — Z90.89 S/P PARATHYROIDECTOMY: ICD-10-CM

## 2024-06-10 DIAGNOSIS — Z98.890 HISTORY OF PARATHYROIDECTOMY: Primary | ICD-10-CM

## 2024-06-10 DIAGNOSIS — Z90.89 HISTORY OF PARATHYROIDECTOMY: Primary | ICD-10-CM

## 2024-06-10 DIAGNOSIS — K58.2 IRRITABLE BOWEL SYNDROME WITH BOTH CONSTIPATION AND DIARRHEA: ICD-10-CM

## 2024-06-10 DIAGNOSIS — Z98.890 S/P PARATHYROIDECTOMY: ICD-10-CM

## 2024-06-10 DIAGNOSIS — E55.9 VITAMIN D INSUFFICIENCY: ICD-10-CM

## 2024-06-10 LAB
ALBUMIN SERPL BCP-MCNC: 3 G/DL (ref 3.5–5.2)
ANION GAP SERPL CALC-SCNC: 11 MMOL/L (ref 8–16)
BUN SERPL-MCNC: 9 MG/DL (ref 6–20)
CALCIUM SERPL-MCNC: 7.3 MG/DL (ref 8.7–10.5)
CHLORIDE SERPL-SCNC: 106 MMOL/L (ref 95–110)
CO2 SERPL-SCNC: 24 MMOL/L (ref 23–29)
CREAT SERPL-MCNC: 0.8 MG/DL (ref 0.5–1.4)
EST. GFR  (NO RACE VARIABLE): >60 ML/MIN/1.73 M^2
GLUCOSE SERPL-MCNC: 105 MG/DL (ref 70–110)
PHOSPHATE SERPL-MCNC: 4.1 MG/DL (ref 2.7–4.5)
POTASSIUM SERPL-SCNC: 3.7 MMOL/L (ref 3.5–5.1)
SODIUM SERPL-SCNC: 141 MMOL/L (ref 136–145)

## 2024-06-10 PROCEDURE — 80069 RENAL FUNCTION PANEL: CPT | Performed by: STUDENT IN AN ORGANIZED HEALTH CARE EDUCATION/TRAINING PROGRAM

## 2024-06-10 PROCEDURE — 4010F ACE/ARB THERAPY RXD/TAKEN: CPT | Mod: CPTII,95,, | Performed by: STUDENT IN AN ORGANIZED HEALTH CARE EDUCATION/TRAINING PROGRAM

## 2024-06-10 PROCEDURE — 99024 POSTOP FOLLOW-UP VISIT: CPT | Mod: 95,,, | Performed by: STUDENT IN AN ORGANIZED HEALTH CARE EDUCATION/TRAINING PROGRAM

## 2024-06-10 PROCEDURE — 1159F MED LIST DOCD IN RCRD: CPT | Mod: CPTII,95,, | Performed by: STUDENT IN AN ORGANIZED HEALTH CARE EDUCATION/TRAINING PROGRAM

## 2024-06-10 PROCEDURE — 36415 COLL VENOUS BLD VENIPUNCTURE: CPT | Performed by: STUDENT IN AN ORGANIZED HEALTH CARE EDUCATION/TRAINING PROGRAM

## 2024-06-10 RX ORDER — VIT C/E/ZN/COPPR/LUTEIN/ZEAXAN 250MG-90MG
1000 CAPSULE ORAL DAILY
COMMUNITY
Start: 2024-06-10

## 2024-06-10 RX ORDER — CALCIUM CARBONATE 400(1000)
1 TABLET,CHEWABLE ORAL
COMMUNITY
Start: 2024-06-10

## 2024-06-10 RX ORDER — CALCIUM CARBONATE 200(500)MG
2 TABLET,CHEWABLE ORAL 3 TIMES DAILY
Qty: 180 TABLET | Refills: 11 | Status: CANCELLED | OUTPATIENT
Start: 2024-06-10 | End: 2025-06-10

## 2024-06-10 RX ORDER — CALCITRIOL 0.25 UG/1
0.25 CAPSULE ORAL 2 TIMES DAILY
Start: 2024-06-10 | End: 2024-07-10

## 2024-06-10 NOTE — PROGRESS NOTES
Postoperative Endocrine Surgery Clinic Note    Reason for visit / Chief complaint: Postoperative evaluation  Endocrinologist: Karolina  Procedure:  Parathyroidectomy  Procedure Date: 5/17/2024    Subjective:     Sonia Solorzano returns today for postoperative evaluation, she is approximately 1 week post op.    Procedure:   Subtotal parathyroidectomy, bilateral exploration:  Excision of right superior parathyroid gland with possible adenoma  Excision of left superior parathyroid adenoma  Excision of left inferior parathyroid adenoma  Biopsy of right inferior parathyroid gland  Intraoperative monitoring and interpretation of bilateral cranial nerves (vagus and recurrent laryngeal nerves) using the [employer] system  Intraoperative PTH level sampling and interpretation    Operative findings:   All four parathyroid glands identified and confirmed parathyroid tissue with frozen sectioning.  Most normal appearing gland was the right inferior parathyroid gland.  Right superior parathyroid adenoma was identified and appeared to have a pale atypical adenoma versus lymph node adjacent to gland, both excised together.  Right inferior parathyroid gland was identified, tiny biopsy confirmed parathyroid tissue, as it was the most normal gland, it was maintained on a native vascular pedicle.  Left superior parathyroid gland appeared abnormally enlarged and dark in color.  Gland excised.  Left inferior parathyroid gland appeared abnormally enlarged and dark in color.  Gland excised.  Appropriate decrease in PTH following excision of three glands.  The bilateral recurrent laryngeal nerves and vagus nerves were identified and preserved.  Function was verified using the nerve monitoring system.    Unfortunately she has had a complicated postoperative course due to symptomatic hypocalcemia requiring brief hospitalization and increase calcium supplementation.  She denies signs or symptoms of hypocalcemia. She is currently taking  Calcitriol 0.5 BID as well as Calcium Carbonate 2000mcg TID. Her most recent labwork reveals a corrected calcium of 8.5 but she states she is still having intermittent episodes of perioral tingling in her face/lips and fingertips. Her phonation is at baseline.  Pain is well controlled.    Interval History (6/03/24):  Patient presenting for virtual visit today for follow-up.  Currently on 2000 mg q8h calcium carbonate. Calcitrol weaned off after prior labs.  Labs drawn today, corrected calcium is 8.1 mg/dL. She was having some paresthesias and cramping.      Current Outpatient Medications   Medication Sig Dispense Refill    acetaminophen (TYLENOL) 650 MG TbSR Take 1 tablet (650 mg total) by mouth every 6 to 8 hours as needed (Alternate every 3 hours with ibuprofen). 60 tablet 1    albuterol (PROVENTIL/VENTOLIN HFA) 90 mcg/actuation inhaler Inhale 2 puffs into the lungs 4 (four) times daily. 18 g 2    atorvastatin (LIPITOR) 40 MG tablet Take 1 tablet (40 mg total) by mouth once daily. 30 tablet 11    hydrOXYzine HCL (ATARAX) 25 MG tablet Take 1 tablet (25 mg total) by mouth 3 (three) times daily as needed for Anxiety. 30 tablet 2    ibuprofen (ADVIL,MOTRIN) 600 MG tablet Take 1 tablet (600 mg total) by mouth every 6 to 8 hours as needed for Pain (Alternate every 3 hours with Tylenol.). 60 tablet 1    loperamide HCl (IMODIUM A-D ORAL) Take by mouth as needed. diarrhea      losartan (COZAAR) 25 MG tablet Take 1 tablet (25 mg total) by mouth once daily. 90 tablet 3    rimegepant (NURTEC) 75 mg odt Take 1 tablet (75 mg total) by mouth daily as needed for Migraine. Place ODT tablet on the tongue; alternatively the ODT tablet may be placed under the tongue 8 tablet 11    spironolactone (ALDACTONE) 25 MG tablet Take 1 tablet (25 mg total) by mouth once daily. 30 tablet 11    calcitRIOL (ROCALTROL) 0.25 MCG Cap Take 1 capsule (0.25 mcg total) by mouth 2 (two) times daily.      calcium carbonate (TUMS ULTRA) 400 mg calcium  (1,000 mg) Chew Take 1 tablet (400 mg total) by mouth 3 (three) times daily with meals.      cholecalciferol, vitamin D3, (VITAMIN D3) 25 mcg (1,000 unit) capsule Take 1 capsule (1,000 Units total) by mouth once daily.       No current facility-administered medications for this visit.     Review of patient's allergies indicates:   Allergen Reactions    Contrast media Anaphylaxis    Iodine and iodide containing products Anaphylaxis    Levaquin [levofloxacin] Anaphylaxis    Levofloxacin in d5w Anaphylaxis    Sulfa (sulfonamide antibiotics) Anaphylaxis and Hives    Iodinated contrast media Hives    Iodine     Magnesium      Pt reporting she is allergic to magnesium citrate oral drink.     Morphine Hives    Tree nuts     Adhesive Rash    Compazine [prochlorperazine] Anxiety     Restless legs    Depacon [valproate sodium] Hives     Pt experienced hives at IV site upon 8th day of depacon administration.  Hives resolved with stopping medication in 1.5 hours.    Nut [tree nut] Hives       Review of Systems  Negative except as per HPI.     Objective:   LMP 01/11/2024 (Exact Date)   Examination limited by virtual setting.      General: alert, well appearing, and in no distress  Neck: neck is flat, no erythema or edema  Incision: well appears well approximated  Neurological: phonation is normal    Labs:  Lab Results   Component Value Date    CALCIUM 7.3 (L) 06/10/2024    ALBUMIN 3.0 (L) 06/10/2024    PHOS 4.1 06/10/2024    [id number] 18 (L) 12/09/2023     Lab Results   Component Value Date    CALCIUM 9.1 06/04/2024    ALBUMIN 3.2 (L) 06/04/2024       Pathology:  Final Pathologic Diagnosis   Date Value Ref Range Status   05/17/2024   Final    1. Parathyroid, right superior, biopsy:   - Parathyroid tissue present   - Weight:  11.2 mg    2. Parathyroid, right inferior, biopsy:   - Parathyroid tissue present  - Weight:  1.7 mg      3. Parathyroid, left superior, biopsy:   - Parathyroid tissue present   - Weight:  17 mg     4.  Parathyroid, left inferior, biopsy:   - Parathyroid tissue present   - Weight: 13.9 mg    5. Parathyroid, right superior, adenoma, excision:   - Enlarged hypercellular parathyroid gland   - Weight:  759.8 mg    6. Parathyroid, left superior, adenoma, excision:   - Enlarged and hypercellular parathyroid gland   - Weight: 56.3 mg     7. Parathyroid, left inferior, adenoma, excision:   - Enlarged hypercellular parathyroid gland   - Weight:  54.9 mg         Comment:     Interp By ABELARDO Hairston MD, Signed on 05/21/2024 at 12:49         Assessment and Plan     Problem List Items Addressed This Visit       Irritable bowel syndrome with both constipation and diarrhea    Current Assessment & Plan     - Bowel regimen as above         Vitamin D insufficiency    Current Assessment & Plan     Chronically insufficient. Vitamin D 18 in 12/2023.    - 1000 IU over the counter vitamin D3 daily while on calcitriol  - Increase to 5000 IU once off calcitriol          History of parathyroidectomy - Primary    Current Assessment & Plan     Primary hyperparathyroidism with hypercalcemia, hypercalciuria, nephrolithiasis, and young age s/p subtotal parathyroidectomy on 5/17/2024.    Hypocalcemia off calcitriol, will adjust regimen as below.     - Restart Calcitriol 0.25 mcg BID  - Calcium carbonate 1000 mg TID  - 1000 IU over the counter vitamin D3 daily  - RFP in 2 weeks  - Virtual visit in 2 weeks  - Bowel regimen to avoid constipation with calcium carbonate  - RFP, PTH and vitamin D in 6 months          Raiza Epperson MD  Staff Surgeon  Endocrine Surgery  6/10/24

## 2024-06-10 NOTE — ASSESSMENT & PLAN NOTE
Chronically insufficient. Vitamin D 18 in 12/2023.    - 1000 IU over the counter vitamin D3 daily while on calcitriol  - Increase to 5000 IU once off calcitriol

## 2024-06-10 NOTE — ASSESSMENT & PLAN NOTE
Primary hyperparathyroidism with hypercalcemia, hypercalciuria, nephrolithiasis, and young age s/p subtotal parathyroidectomy on 5/17/2024.    Hypocalcemia off calcitriol, will adjust regimen as below.     - Restart Calcitriol 0.25 mcg BID  - Calcium carbonate 1000 mg TID  - 1000 IU over the counter vitamin D3 daily  - RFP in 2 weeks  - Virtual visit in 2 weeks  - Bowel regimen to avoid constipation with calcium carbonate  - RFP, PTH and vitamin D in 6 months

## 2024-06-10 NOTE — PATIENT INSTRUCTIONS
Roselia Scott,    As a review from today's visit, here is the plan:    - Restart Calcitriol 0.25 mcg 2 times a day (1 capsule twice a day)  - Calcium carbonate 1000 mg 3 times a day (1 Tums Ultra tablet three times a day with a meal or snack)  - 1000 IU over the counter vitamin D3 daily (this is available over the counter), I recommend taking this before you go to bed in the evening  - Calcium check with a RFP (renal function panel) in 2 weeks  - Virtual visit check in after your labs in 2 week    See you in a couple weeks!    Sincerely,  Dr. Epperson

## 2024-06-11 ENCOUNTER — PATIENT MESSAGE (OUTPATIENT)
Dept: SURGERY | Facility: CLINIC | Age: 39
End: 2024-06-11
Payer: COMMERCIAL

## 2024-06-17 NOTE — PROGRESS NOTES
Postoperative Endocrine Surgery Clinic Note    Reason for visit / Chief complaint: Postoperative evaluation  Endocrinologist: Karolina  Procedure:  Parathyroidectomy  Procedure Date: 5/17/2024    Subjective:     Procedure:   Subtotal parathyroidectomy, bilateral exploration:  Excision of right superior parathyroid gland with possible adenoma  Excision of left superior parathyroid adenoma  Excision of left inferior parathyroid adenoma  Biopsy of right inferior parathyroid gland  Intraoperative monitoring and interpretation of bilateral cranial nerves (vagus and recurrent laryngeal nerves) using the [employer] system  Intraoperative PTH level sampling and interpretation    Operative findings:   All four parathyroid glands identified and confirmed parathyroid tissue with frozen sectioning.  Most normal appearing gland was the right inferior parathyroid gland.  Right superior parathyroid adenoma was identified and appeared to have a pale atypical adenoma versus lymph node adjacent to gland, both excised together.  Right inferior parathyroid gland was identified, tiny biopsy confirmed parathyroid tissue, as it was the most normal gland, it was maintained on a native vascular pedicle.  Left superior parathyroid gland appeared abnormally enlarged and dark in color.  Gland excised.  Left inferior parathyroid gland appeared abnormally enlarged and dark in color.  Gland excised.  Appropriate decrease in PTH following excision of three glands.  The bilateral recurrent laryngeal nerves and vagus nerves were identified and preserved.  Function was verified using the nerve monitoring system.    Post op Visit 05/23/24: Unfortunately she has had a complicated postoperative course due to symptomatic hypocalcemia requiring brief hospitalization and increase calcium supplementation.  She denies signs or symptoms of hypocalcemia. She is currently taking Calcitriol 0.5 BID as well as Calcium Carbonate 2000mcg TID. Her most recent  labwork reveals a corrected calcium of 8.5 but she states she is still having intermittent episodes of perioral tingling in her face/lips and fingertips. Her phonation is at baseline.  Pain is well controlled.    - Continue Calcitriol 0.5 mcg BID, med rec adjusted  - Discussed switching from Calcium Carbonate to Calcium Citrate q8h but due to concerns of poor GI absorption due to IBS/IBD symptoms.  Patient concerned with magnesium allergy, she will stick with TUMS Ultra 2 tablets q8 hours for now.  - Discussed recommendation to spread out dosing to every 8 hours for calcium carbonate to avoid early morning symptoms  - Reviewed pathology  - Incision care discussed, scar massage and routine scar care information provided  - RFP tomorrow  - Bowel regimen to avoid constipation with calcium carbonate  - Follow up as planned on/about 6/3 with labs prior    Interval History (6/10/24):  Patient presenting for virtual visit today for follow-up.  Currently on 2000 mg q8h calcium carbonate. Calcitrol weaned off after prior labs.  Labs drawn today, corrected calcium is 8.1 mg/dL. She was having some paresthesias and cramping.      - Restart Calcitriol 0.25 mcg BID  - Calcium carbonate 1000 mg TID  - 1000 IU over the counter vitamin D3 daily  - RFP in 2 weeks  - Virtual visit in 2 weeks  - Bowel regimen to avoid constipation with calcium carbonate  - RFP, PTH and vitamin D in 6 months    Interval History (6/24/24):  Patient presenting for virtual visit today for follow-up. Currently taking Calcitriol 0.25mcg BID, Tums Ultra 1000mg TID, and Cholecalciferol 1000u QD. Labwork this morning showed a corrected calcium of 9.2. She is still experiencing intermittent episodes of extremity and perioral tingling.     Current Outpatient Medications   Medication Sig Dispense Refill    atorvastatin (LIPITOR) 40 MG tablet Take 1 tablet (40 mg total) by mouth once daily. 30 tablet 11    calcitRIOL (ROCALTROL) 0.25 MCG Cap Take 1 capsule (0.25  "mcg total) by mouth 2 (two) times daily.      cholecalciferol, vitamin D3, (VITAMIN D3) 25 mcg (1,000 unit) capsule Take 1 capsule (1,000 Units total) by mouth once daily.      hydrOXYzine HCL (ATARAX) 25 MG tablet Take 1 tablet (25 mg total) by mouth 3 (three) times daily as needed for Anxiety. 30 tablet 2    loperamide HCl (IMODIUM A-D ORAL) Take by mouth as needed. diarrhea      losartan (COZAAR) 25 MG tablet Take 1 tablet (25 mg total) by mouth once daily. 90 tablet 3    rimegepant (NURTEC) 75 mg odt Take 1 tablet (75 mg total) by mouth daily as needed for Migraine. Place ODT tablet on the tongue; alternatively the ODT tablet may be placed under the tongue 8 tablet 11    calcium carbonate (TUMS ULTRA) 400 mg calcium (1,000 mg) Chew Take 1 tablet (400 mg total) by mouth every meal as needed (For symptoms of hypocalcemia.).       No current facility-administered medications for this visit.     Review of patient's allergies indicates:   Allergen Reactions    Contrast media Anaphylaxis    Iodine and iodide containing products Anaphylaxis    Levaquin [levofloxacin] Anaphylaxis    Levofloxacin in d5w Anaphylaxis    Sulfa (sulfonamide antibiotics) Anaphylaxis and Hives    Adhesive tape-silicones     Iodinated contrast media Hives    Iodine     Magnesium      Pt reporting she is allergic to magnesium citrate oral drink.     Morphine Hives    Tree nuts     Adhesive Rash    Compazine [prochlorperazine] Anxiety     Restless legs    Depacon [valproate sodium] Hives     Pt experienced hives at IV site upon 8th day of depacon administration.  Hives resolved with stopping medication in 1.5 hours.    Nut [tree nut] Hives       Review of Systems  Negative except as per HPI.     Objective:   Ht 5' 7" (1.702 m)   Wt 128.4 kg (283 lb 1.1 oz)   LMP 01/11/2024 (Exact Date)   BMI 44.34 kg/m²   Examination limited by virtual setting.      General: alert, well appearing, and in no distress  Neck: neck appears flat without erythema or " edema  Incision: well appears well approximated  Neurological: phonation is normal    Labs:  Lab Results   Component Value Date    CALCIUM 8.6 (L) 06/24/2024    ALBUMIN 3.3 (L) 06/24/2024    PHOS 3.7 06/24/2024    [id number] 18 (L) 12/09/2023     Calcium corrects to 9.2 mg/dL    Pathology:  Final Pathologic Diagnosis   Date Value Ref Range Status   05/17/2024   Final    1. Parathyroid, right superior, biopsy:   - Parathyroid tissue present   - Weight:  11.2 mg    2. Parathyroid, right inferior, biopsy:   - Parathyroid tissue present  - Weight:  1.7 mg      3. Parathyroid, left superior, biopsy:   - Parathyroid tissue present   - Weight:  17 mg     4. Parathyroid, left inferior, biopsy:   - Parathyroid tissue present   - Weight: 13.9 mg    5. Parathyroid, right superior, adenoma, excision:   - Enlarged hypercellular parathyroid gland   - Weight:  759.8 mg    6. Parathyroid, left superior, adenoma, excision:   - Enlarged and hypercellular parathyroid gland   - Weight: 56.3 mg     7. Parathyroid, left inferior, adenoma, excision:   - Enlarged hypercellular parathyroid gland   - Weight:  54.9 mg         Comment:     Interp By ABELARDO Hairston MD, Signed on 05/21/2024 at 12:49         Assessment and Plan     Problem List Items Addressed This Visit       Vitamin D insufficiency    Current Assessment & Plan     Chronically insufficient. Vitamin D 18 in 12/2023.     - 1000 IU over the counter vitamin D3 daily while on calcitriol  - Increase to 5000 IU once off calcitriol          S/P parathyroidectomy - Primary    Current Assessment & Plan     Primary hyperparathyroidism with hypercalcemia, hypercalciuria, nephrolithiasis, and young age s/p subtotal parathyroidectomy on 5/17/2024.  Remains symptomatic, calcium corrects to normal range on current regimen.  Will wean calcium carbonate and continue calcitriol.      - Continue Calcitriol 0.25 mcg BID  - Calcium carbonate 1000 mg TID PRN for symptoms  - 1000 IU over the  counter vitamin D3 daily  - RFP in 2 weeks  - Virtual visit in 2 weeks  - Bowel regimen to avoid constipation with calcium carbonate  - RFP, PTH and vitamin D at 6 months post op          Patient was seen and evaluated with surgery resident NEIL Moura MD.     Raiza Epperson MD  Staff Surgeon  Endocrine Surgery  6/24/24      This visit was performed virtually in a private and secure setting utilizing the [employer] Virtual Visit portal equipped with voice and video.  The patient's identity was confirmed utilizing two different identifiers.  The conduct of a virtual visit was discussed with the patient, risks and benefits of virtual visits were explained including technical and equipment malfunction and the inability to have a complete physical exam.  All of the patient's questions were answered.  The patient expressed understanding of these risks and benefits and consented to complete today's visit virtually.  Virtual Visit consent was confirmed to be signed prior to the visit.

## 2024-06-21 ENCOUNTER — PATIENT MESSAGE (OUTPATIENT)
Dept: GYNECOLOGIC ONCOLOGY | Facility: CLINIC | Age: 39
End: 2024-06-21
Payer: COMMERCIAL

## 2024-06-21 ENCOUNTER — TELEPHONE (OUTPATIENT)
Dept: GYNECOLOGIC ONCOLOGY | Facility: CLINIC | Age: 39
End: 2024-06-21
Payer: COMMERCIAL

## 2024-06-21 NOTE — TELEPHONE ENCOUNTER
Luz PIZARRO contacted pt and reviewed symptoms. Appointment scheduled for Thursday 6/27/24 with Dr Weaver.

## 2024-06-21 NOTE — TELEPHONE ENCOUNTER
Called patient regarding reported pelvic pain and discomfort. She reports intermittent mild pain ongoing for the past several weeks which increased in severity yesterday. Describes accompanying rectal pain and pressure. Denies vaginal bleeding or discharge. Continues to have pain during and after sex. Has not been previously seen by pelvic PT. Appointment scheduled next week with Dr. Weaver.

## 2024-06-24 ENCOUNTER — OFFICE VISIT (OUTPATIENT)
Dept: SURGERY | Facility: CLINIC | Age: 39
End: 2024-06-24
Payer: COMMERCIAL

## 2024-06-24 ENCOUNTER — PATIENT MESSAGE (OUTPATIENT)
Dept: SURGERY | Facility: CLINIC | Age: 39
End: 2024-06-24
Payer: COMMERCIAL

## 2024-06-24 ENCOUNTER — LAB VISIT (OUTPATIENT)
Dept: LAB | Facility: HOSPITAL | Age: 39
End: 2024-06-24
Attending: STUDENT IN AN ORGANIZED HEALTH CARE EDUCATION/TRAINING PROGRAM
Payer: COMMERCIAL

## 2024-06-24 VITALS — BODY MASS INDEX: 44.43 KG/M2 | WEIGHT: 283.06 LBS | HEIGHT: 67 IN

## 2024-06-24 DIAGNOSIS — Z90.89 HISTORY OF PARATHYROIDECTOMY: ICD-10-CM

## 2024-06-24 DIAGNOSIS — Z98.890 HISTORY OF PARATHYROIDECTOMY: ICD-10-CM

## 2024-06-24 DIAGNOSIS — E55.9 VITAMIN D INSUFFICIENCY: ICD-10-CM

## 2024-06-24 DIAGNOSIS — Z90.89 S/P PARATHYROIDECTOMY: Primary | ICD-10-CM

## 2024-06-24 DIAGNOSIS — Z98.890 S/P PARATHYROIDECTOMY: Primary | ICD-10-CM

## 2024-06-24 LAB
ALBUMIN SERPL BCP-MCNC: 3.3 G/DL (ref 3.5–5.2)
ANION GAP SERPL CALC-SCNC: 12 MMOL/L (ref 8–16)
BUN SERPL-MCNC: 12 MG/DL (ref 6–20)
CALCIUM SERPL-MCNC: 8.6 MG/DL (ref 8.7–10.5)
CHLORIDE SERPL-SCNC: 106 MMOL/L (ref 95–110)
CO2 SERPL-SCNC: 23 MMOL/L (ref 23–29)
CREAT SERPL-MCNC: 0.8 MG/DL (ref 0.5–1.4)
EST. GFR  (NO RACE VARIABLE): >60 ML/MIN/1.73 M^2
GLUCOSE SERPL-MCNC: 109 MG/DL (ref 70–110)
PHOSPHATE SERPL-MCNC: 3.7 MG/DL (ref 2.7–4.5)
POTASSIUM SERPL-SCNC: 3.8 MMOL/L (ref 3.5–5.1)
SODIUM SERPL-SCNC: 141 MMOL/L (ref 136–145)

## 2024-06-24 PROCEDURE — 99024 POSTOP FOLLOW-UP VISIT: CPT | Mod: 95,,, | Performed by: STUDENT IN AN ORGANIZED HEALTH CARE EDUCATION/TRAINING PROGRAM

## 2024-06-24 PROCEDURE — 36415 COLL VENOUS BLD VENIPUNCTURE: CPT | Performed by: STUDENT IN AN ORGANIZED HEALTH CARE EDUCATION/TRAINING PROGRAM

## 2024-06-24 PROCEDURE — 4010F ACE/ARB THERAPY RXD/TAKEN: CPT | Mod: CPTII,95,, | Performed by: STUDENT IN AN ORGANIZED HEALTH CARE EDUCATION/TRAINING PROGRAM

## 2024-06-24 PROCEDURE — 80069 RENAL FUNCTION PANEL: CPT | Performed by: STUDENT IN AN ORGANIZED HEALTH CARE EDUCATION/TRAINING PROGRAM

## 2024-06-24 NOTE — ASSESSMENT & PLAN NOTE
Primary hyperparathyroidism with hypercalcemia, hypercalciuria, nephrolithiasis, and young age s/p subtotal parathyroidectomy on 5/17/2024.  Remains symptomatic, calcium corrects to normal range on current regimen.  Will wean calcium carbonate and continue calcitriol.      - Continue Calcitriol 0.25 mcg BID  - Calcium carbonate 1000 mg TID PRN for symptoms  - 1000 IU over the counter vitamin D3 daily  - RFP in 2 weeks  - Virtual visit in 2 weeks  - Bowel regimen to avoid constipation with calcium carbonate  - RFP, PTH and vitamin D at 6 months post op

## 2024-06-26 PROBLEM — C55 ENDOMETRIOID ADENOCARCINOMA OF UTERUS: Status: ACTIVE | Noted: 2024-06-26

## 2024-06-26 NOTE — PROGRESS NOTES
Subjective:      Patient ID: Sonia Solorzano is a 38 y.o. female.    Chief Complaint: Pelvic Pain    HPI  S/p RTLH/BS/RO/SLND 2/12/2024    Pathology:  Stage IA, grade 1 endometrioid type endometrial adenocarcinoma, 2-3mm tumor size, no myometrial invasion, negative LVSI, negative sentinel lymph nodes.   Low risk, no adjuvant treatment.     Today's visit:   Presents today for complaints of pelvic pain and discomfort. Describes as pressure with stabbing pain at times. Endorses pain during and following intercourse. Denies vaginal dryness. Reporting pitting edema to L[facility].   _____________________________  Referred by Dr. Flanagan for newly diagnosed complex endometrial hyperplasia with atypia.      Pelvic US  Uterus: 10.5 x 5.4 cm  Endometrium: Normal in this pre menopausal patient, measuring 12.1 mm.  Right ovary: The right ovary is not seen.  There is a 2.8 cm cyst in the right adnexa.  Left ovary: Not seen     Endometrial sampling 1/17/2024  Fragments of endometrial tissue showing changes compatible with complex hyperplasia with focal atypia.      Prior abdominal surgeries include c/s, lsc removal endometrioma in abdomen, lsc lillie, BTL,  shunt.     Review of Systems   Constitutional:  Negative for appetite change, chills, fatigue and fever.   HENT:  Negative for mouth sores.    Respiratory:  Negative for cough and shortness of breath.    Cardiovascular:  Positive for leg swelling.   Gastrointestinal:  Negative for abdominal pain, blood in stool, constipation and diarrhea.   Endocrine: Negative for cold intolerance.   Genitourinary:  Positive for dyspareunia, pelvic pain and vaginal pain. Negative for dysuria, vaginal bleeding and vaginal discharge.   Musculoskeletal:  Negative for myalgias.   Skin:  Negative for rash.   Allergic/Immunologic: Negative.    Neurological:  Negative for weakness and numbness.   Hematological:  Negative for adenopathy. Does not bruise/bleed easily.   Psychiatric/Behavioral:  Negative  for confusion.      Objective:   Physical Exam:   Constitutional: She is oriented to person, place, and time. She appears well-developed and well-nourished.    HENT:   Head: Normocephalic and atraumatic.    Eyes: Pupils are equal, round, and reactive to light. EOM are normal.    Neck: No thyromegaly present.    Cardiovascular:  Normal rate, regular rhythm and intact distal pulses.             Pulmonary/Chest: Effort normal and breath sounds normal. No respiratory distress. She has no wheezes.        Abdominal: Soft. Bowel sounds are normal. She exhibits abdominal incision. She exhibits no distension and no mass. There is no abdominal tenderness.     Genitourinary:    Vagina normal.      Pelvic exam was performed with patient supine.   There is an absent left adnexa. Cervix is absent.Uterus is absent.    Genitourinary Comments: Vaginal cuff healing appropriately.              Musculoskeletal: Normal range of motion and moves all extremeties.      Lymphadenopathy:     She has no cervical adenopathy.        Right: No supraclavicular adenopathy present.        Left: No supraclavicular adenopathy present.    Neurological: She is alert and oriented to person, place, and time.    Skin: Skin is warm and dry. No rash noted.    Psychiatric: She has a normal mood and affect.       Assessment:     1. Endometrioid adenocarcinoma of uterus    2. Pelvic pain    3. Swelling of left lower extremity      Plan:     Orders Placed This Encounter   Procedures    US Lower Extremity Veins Left    Ambulatory referral/consult to Physical/Occupational Therapy     Pelvic exam without concerning findings. Will refer to pelvic floor PT.   Left Venous US to r/o DVT.   Will keep routine surveillance visit as scheduled or return sooner if needed.     I spent approximately 30 minutes reviewing the available records and evaluating the patient, out of which over 50% of the time was spent face to face with the patient in counseling and coordinating  this patient's care.

## 2024-06-27 ENCOUNTER — OFFICE VISIT (OUTPATIENT)
Dept: GYNECOLOGIC ONCOLOGY | Facility: CLINIC | Age: 39
End: 2024-06-27
Payer: COMMERCIAL

## 2024-06-27 VITALS
HEIGHT: 67 IN | DIASTOLIC BLOOD PRESSURE: 100 MMHG | RESPIRATION RATE: 17 BRPM | OXYGEN SATURATION: 97 % | BODY MASS INDEX: 45.67 KG/M2 | WEIGHT: 291 LBS | HEART RATE: 91 BPM | TEMPERATURE: 97 F | SYSTOLIC BLOOD PRESSURE: 140 MMHG

## 2024-06-27 DIAGNOSIS — R10.2 PELVIC PAIN: ICD-10-CM

## 2024-06-27 DIAGNOSIS — C55 ENDOMETRIOID ADENOCARCINOMA OF UTERUS: Primary | ICD-10-CM

## 2024-06-27 DIAGNOSIS — M79.89 SWELLING OF LEFT LOWER EXTREMITY: ICD-10-CM

## 2024-06-27 PROCEDURE — 4010F ACE/ARB THERAPY RXD/TAKEN: CPT | Mod: CPTII,S$GLB,, | Performed by: OBSTETRICS & GYNECOLOGY

## 2024-06-27 PROCEDURE — 3077F SYST BP >= 140 MM HG: CPT | Mod: CPTII,S$GLB,, | Performed by: OBSTETRICS & GYNECOLOGY

## 2024-06-27 PROCEDURE — 3008F BODY MASS INDEX DOCD: CPT | Mod: CPTII,S$GLB,, | Performed by: OBSTETRICS & GYNECOLOGY

## 2024-06-27 PROCEDURE — 99999 PR PBB SHADOW E&M-EST. PATIENT-LVL V: CPT | Mod: PBBFAC,,, | Performed by: OBSTETRICS & GYNECOLOGY

## 2024-06-27 PROCEDURE — 99214 OFFICE O/P EST MOD 30 MIN: CPT | Mod: S$GLB,,, | Performed by: OBSTETRICS & GYNECOLOGY

## 2024-06-27 PROCEDURE — 3080F DIAST BP >= 90 MM HG: CPT | Mod: CPTII,S$GLB,, | Performed by: OBSTETRICS & GYNECOLOGY

## 2024-06-27 RX ORDER — CALCIUM CARBONATE 400(1000)
1 TABLET,CHEWABLE ORAL
COMMUNITY
Start: 2024-06-27

## 2024-06-29 ENCOUNTER — HOSPITAL ENCOUNTER (OUTPATIENT)
Dept: RADIOLOGY | Facility: HOSPITAL | Age: 39
Discharge: HOME | End: 2024-06-29
Attending: OBSTETRICS & GYNECOLOGY
Payer: COMMERCIAL

## 2024-06-29 DIAGNOSIS — M79.89 SWELLING OF LEFT LOWER EXTREMITY: ICD-10-CM

## 2024-06-29 PROCEDURE — 93971 EXTREMITY STUDY: CPT | Mod: TC,LT

## 2024-06-29 PROCEDURE — 93971 EXTREMITY STUDY: CPT | Mod: 26,LT,, | Performed by: RADIOLOGY

## 2024-07-02 ENCOUNTER — OFFICE VISIT (OUTPATIENT)
Dept: HEMATOLOGY/ONCOLOGY | Facility: CLINIC | Age: 39
End: 2024-07-02
Payer: COMMERCIAL

## 2024-07-02 ENCOUNTER — HOSPITAL ENCOUNTER (OUTPATIENT)
Dept: RADIOLOGY | Facility: HOSPITAL | Age: 39
Discharge: HOME | End: 2024-07-02
Attending: ORTHOPAEDIC SURGERY
Payer: OTHER MISCELLANEOUS

## 2024-07-02 ENCOUNTER — OFFICE VISIT (OUTPATIENT)
Dept: ORTHOPEDICS | Facility: CLINIC | Age: 39
End: 2024-07-02
Payer: OTHER MISCELLANEOUS

## 2024-07-02 VITALS — BODY MASS INDEX: 45.67 KG/M2 | HEIGHT: 67 IN | WEIGHT: 291 LBS

## 2024-07-02 DIAGNOSIS — R92.333 HETEROGENEOUSLY DENSE TISSUE OF BOTH BREASTS ON MAMMOGRAPHY: ICD-10-CM

## 2024-07-02 DIAGNOSIS — Z91.89 AT HIGH RISK FOR BREAST CANCER: Primary | ICD-10-CM

## 2024-07-02 DIAGNOSIS — Z98.890 HISTORY OF ARTHROSCOPY OF RIGHT KNEE: ICD-10-CM

## 2024-07-02 DIAGNOSIS — Z12.31 ENCOUNTER FOR SCREENING MAMMOGRAM FOR BREAST CANCER: ICD-10-CM

## 2024-07-02 DIAGNOSIS — Z80.3 FAMILY HISTORY OF BREAST CANCER: ICD-10-CM

## 2024-07-02 DIAGNOSIS — M17.11 PRIMARY OSTEOARTHRITIS OF RIGHT KNEE: Primary | ICD-10-CM

## 2024-07-02 PROCEDURE — 99999 PR PBB SHADOW E&M-EST. PATIENT-LVL III: CPT | Mod: PBBFAC,,, | Performed by: ORTHOPAEDIC SURGERY

## 2024-07-02 PROCEDURE — 73562 X-RAY EXAM OF KNEE 3: CPT | Mod: 26,RT,, | Performed by: RADIOLOGY

## 2024-07-02 PROCEDURE — 73562 X-RAY EXAM OF KNEE 3: CPT | Mod: TC,PN,RT

## 2024-07-02 PROCEDURE — 99213 OFFICE O/P EST LOW 20 MIN: CPT | Mod: S$GLB,,, | Performed by: ORTHOPAEDIC SURGERY

## 2024-07-02 NOTE — PROGRESS NOTES
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 cancer, data regarding the risk/benefit ratio for tamoxifen appear relatively favorable (category 1).  For postmenopausal individuals at elevated risk for breast cancer, data regarding the risk/benefit ratio for tamoxifen are influenced by age, presence of uterus, or comorbid conditions (category 1). There are insufficient data on ethnicity and rac  -Risks of Tamoxifen side effects include hot flashes, invasive endometrial cancer in women > 49 years of age (2.3/1000 compared to 0.9/1000), cataracts, increased risk of pulmonary embolism among others.    Raloxifene:    Data regarding raloxifene risk reduction are limited to postmenopausal individuals ?35 years of age with a Bre Model 5-year breast cancer risk ?1.7% or a 10-year risk by XENIA/Argelia-Diego of ?5% or a history of LCIS.  Raloxifene: 60 mg per day was found to be equivalent to tamoxifen for breast cancer risk reduction in the initial comparison. While raloxifene in long-term follow-up appears to be less efficacious in risk reduction than tamoxifen, consideration of toxicity may still lead to the choice of raloxifene over tamoxifen in individuals with an intact uterus.  There are no data regarding the use of raloxifene in individuals who are carriers of BRCA1/2 and other  pathogenic mutations or who have had prior thoracic radiation.  For postmenopausal individuals at elevated risk for breast cancer, data regarding the risk/benefit ratio for raloxifene are influenced by age or comorbid conditions (category 1). There are insufficient data on ethnicity and race.  Use of raloxifene for breast cancer risk reduction in premenopausal individuals is inappropriate unless part of a clinical trial.     Aromatase Inhibitors (exemestane and anastrozole)   Data regarding exemestane are from a single large randomized study limited to postmenopausal individuals ?35 years of age with a Bre Model 5-year breast cancer risk ?1.7% or a 10-year risk by XENIA/Tyrer-Cuzicke of ?5% or a history of LCIS.  Data regarding anastrozole are from a single large randomized study limited to postmenopausal individuals 40 to 70 years of age with the following risk compared with the general population: Aged 40 to 44 years - 4 times higher Aged 45 to 60 years - ?2 times higher Aged 60 to 70 years - ?1.5 times higher Individuals who did not meet these criteria but had a Tyrer-Cuzicke model 10-year breast cancer risk >5% were also included.  Exemestane: 25 mg per day was found to reduce the relative incidence of invasive breast cancer by 65% from 0.55% to 0.19% with a median follow-up of 3 years.  Anastrozole: 1 mg per day was found to reduce the relative incidence of breast cancer by 53% with a median follow-up of 5 years.  There are retrospective data that aromatase inhibitors can reduce the risk of contralateral breast cancer in BRCA1/2 patients with ER-positive breast cancer who take aromatase inhibitors as adjuvant agents.  For postmenopausal individuals at elevated risk for breast cancer, data regarding the risk/benefit ratio for aromatase inhibitor agents are influenced by age and comorbid conditions such as osteoporosis (category 1). There are insufficient data on ethnicity and race.  Use of aromatase inhibitors  for breast cancer risk reduction in premenopausal individuals is inappropriate         Assessment:     1. At high risk for breast cancer    2. Heterogeneously dense tissue of both breasts on mammography    3. Family history of breast cancer    4. Encounter for screening mammogram for breast cancer    5. BMI 45.0-49.9, adult          Breast Cancer Risk Stratification   Current, Estimated Breast Cancer Risk Model Used Patient's Score Risk for general population Patient's Risk Category   5-year Bre Model 1.1% 0.6%  [] N/A given age <35   [x] Average risk (<1.7%)   [] Increased risk (?1.7%)   10-year Tyrer-Cuzick v8.0b 3.74%   [x] <5%   [] ?5%    Lifetime (to age 85) Tyrer-Cuzick v8.0b 23.17% 11.52%  [] Average risk (<15%)   [] Intermediate risk (?15% - <20%)   [x] High risk (?20%)   According to the [employer] Society, patients with a lifetime breast cancer risk of 20% or higher might benefit from supplemental screening exams. Women with a 5-year risk greater than or equal to 1.7% may benefit from chemoprevention agents (tamoxifen, raloxifene, aromatase inhibitors) to reduce risk.        Patient's risk factors include but are not limited to:  Dense breast tissue  Family history  Early menarche.       Plan:       Patient does not qualify for chemoprevention but will continue to assess. Information above for educational purposes only.   We discussed high risk screening. She has a  shunt and is extremely claustrophobia so unable to do MRI. Unable to do KLEVER due allergy. Patient elects to proceed with alternating annual mammogram and annual breast US along with semiannual CBEs. She will alternate CBE here and with GYN/PCP.  Encouraged breast awareness, including monthly breast self-exams.   Recommend lifestyle modifications as above.   Refer to bariatrics  - request Dr. Favio Jiang.       Breast US now   MMG due 12/30/2024  RTC in  1 year    Questions were encouraged and answered to patient's satisfaction, and  patient verbalized understanding of information and agreement with the plan. Advised patient to RTC with any interval changes or concerns.      Route Chart for Scheduling    Med Onc Chart Routing  Urgent    Follow up with physician    Follow up with [facility] 1 year.   Infusion scheduling note    Injection scheduling note    Labs    Imaging   Breast US now please. MMG due 12/30/2024   Pharmacy appointment    Other referrals       Additional referrals needed  bariatrics               Patient is in agreement with the proposed treatment plan. All questions were answered to the patient's satisfaction. Pt knows to call clinic for any new or worsening symptoms and if anything is needed before the next clinic visit.    Anthony Lafleur, MSN, APRN, FNP-C  Nurse Practitioner to Dr. Hortencia Turcios  [facility]  [facility]  Hematology & Medical Oncology  [address] [place] [id number]  ph. [phone] ext [phone]  Fax. [phone]

## 2024-07-02 NOTE — PATIENT INSTRUCTIONS
General Education discussed:      1. General education: (not patient specific)    Risk factors associated with breast cancer are categorized into 2 groups: Modifiable and Non-modifiable. Modifiable risk factors include use of hormones, alcohol, smoking, diet and exercise. Non-modifiable risk factors include breast density, genetics, chest radiation, previous pregnancies, age of first period, and age of menopause.     Factors associated with greater breast cancer risk: (this list is not patient specific)  -Increasing age The risk of breast cancer increases with older age.  -Female sex  -White race (In the [place], the highest breast cancer risk occurs among White women, although breast cancer remains the most common cancer among women of every major ethnic/racial group )  -Weight and body fat in postmenopausal women -Obesity (defined as body mass index [BMI] ?30 kg/m2) is associated with an overall increase  in morbidity and mortality. However, the risk of breast cancer associated with BMI differs by menopausal status.   ?Postmenopausal women - A higher BMI and/or perimenopausal weight gain have been consistently associated with a higher risk of breast cancer among postmenopausal women. The association between a higher BMI and postmenopausal breast cancer risk may be mediated by higher estrogen levels resulting from the peripheral conversion of estrogen precursors (from adipose tissue) to estrogen    ?Inverse relationship in premenopausal women - Unlike postmenopausal women, an increased BMI is associated with a lower risk of breast cancer in premenopausal women, particularly in early adulthood.  The explanation of this finding remains unclear.  -Tall stature -women who were >175 cm (69 inches) tall were 20 percent more likely to develop breast cancer than those <160 cm (63 inches) tall.  -Benign breast disease  -Dense breast tissue -- The density of breast tissue reflects the relative amount of glandular and  connective tissue (parenchyma) to adipose tissue. Women with mammographically dense breast tissue, generally defined as dense tissue comprising ?75 percent of the breast, have a four to five times higher breast cancer risk compared with women of similar age with less or no dense tissue. Although breast density is a largely inherited trait, other factors can influence density. For example, lower density has been associated with higher levels of physical activity  and with a low-fat, high-carbohydrate diet. In postmenopausal women, estrogen and progesterone increase breast density  while the ER antagonist tamoxifen decreases breast density.  Despite the association of exogenous hormones with breast density, breast density is not strongly correlated with endogenous hormone levels. Breast tend to become more fatty with age.   Dense breasts -  occurring in an estimated 50% of women in their 40s, 40% in their 50s, and 25% of women older than 60. Density can make it more difficult to detect breast cancer and increases breast cancer risk, both of which suggest that additional imaging can improve early diagnosis of the disease.   -Bone mineral density -In multiple studies, women with higher bone density have a higher breast cancer risk  -Hormonal factors:   With regard to Breast cancer -- combined oral contraceptives (MIRTA)  appear to be associated with little to no increased risk of breast cancer based on observational data. Any effect appears to be temporary and limited to current or recent (within five to seven years) MIRTA use. I will put a link here for  review:  Combined estrogen-progestin contraception: Side effects and health concerns - UpToDate     HRT.:  Of note, IVF does not appear to increase the long-term risk of breast cancer, even in women with BRCA 1 and 2 mutations.     In the Women's Health Initiative (WHI), the risk of invasive breast cancer was significantly increased with combined hormone therapy (HT) at an  average follow-up of 5.6 years.     A 2019 meta-analysis of all available epidemiologic evidence on the association between menopausal hormone therapy (MHT) use and breast cancer risk has been published. The analysis included nearly 145,000 women with breast cancer (51 percent of whom had used MHT) and nearly 425,000 without breast cancer. Their findings included:  ?Similar to the WHI, estrogen-progestin regimens were associated with excess breast cancer risk. An excess risk was also seen with estrogen-only regimens (a reduction in risk was seen in the WHI). There was no excess risk with vaginal estrogens.   ?Breast cancer risk increased with the duration of systemic MHT use. Unlike previous studies, obesity was not associated with excess risk; instead, it attenuated risk.  ?The authors of the study calculated that for women of average weight, five years of MHT use starting at age 50 years would increase their 20-year risk of breast cancer (between the ages of 50 and 69 years) by approximately:  One in every 50 users of estrogen plus daily progestin  One in every 70 users of estrogen plus intermittent progestin   One in every 200 users of estrogen-only regimens   Of note: There were important limitations in this study.   While this meta-analysis has renewed concerns for some about the association between MHT and breast cancer, we continue to suggest an individualized approach when counseling symptomatic postmenopausal women about treatment. This includes putting the potential risk of breast cancer (and cardiovascular disease) in the context of the benefits of MHT (eg, relief of vasomotor symptoms, improved sleep and quality of life, and prevention of bone loss)  -Reproductive factors -Earlier menarche (before age 12) or later menopause (after age 52), Nulliparity, Increasing age at first full-term pregnancy.   (Of note, It is estimated that for every 12 months of breastfeeding, there was a 4.3 percent reduction in  the relative risk (RR) of breast cancer)  -Personal and family history of breast cancer  -Alcohol use and smoking  -Exposure to therapeutic ionizing radiation           2. Risk stratifying models:  There are several models available for stratifying breast cancer risk, and Elif is presently the model utilized by [facility] and is a model recommended per current NCCN guidelines.      Educational videos:   What Is a TC Score?    https://youtu.be/Gcfi5XMJebF     What If I Have a High-Risk TC Score?     https://youtu.be/[email or web address]      The Bre Model for Breast Cancer risk estimates the absolute 5 year risk and lifetime risk of developing breast cancer. Family history includes only first degree relatives with breast cancer, which is not enough information to estimate the risk of a patient having BRCA mutation. It also underestimates the cancer risk for patients with extensive family history. The Bre Model is a good predictor of risk for populations but not for individuals. It adjusts risk for race/ethnicity. It may underestimate breast cancer risk in patients with atypical hyperplasia and strong family history. The Bre Model was NOT designed to estimate risk for: Women with a prior diagnosis of breast cancer, lobular carcinoma in situ (LCIS), or ductal carcinoma in situ (DCIS);  Women who have received previous radiation therapy to the chest for treatment of Hodgkin lymphoma;  Women with gene mutations in BRCA1 or BRCA2, or those who are known to have certain genetic syndromes that increase risk for breast cancer; Women of age <35 or >85.    We discussed that there are limitations to every model for risk assessment, particularly that TC can overestimate risk in women with atypical hyperplasia and dense breasts and that Bre underestimates risk for those with a strong family history of breast or ovarian cancers as well as non-white women with atypical hyperplasia which can make them appear to not be  candidates for risk reducing therapies.               3. High risk patients:       INCREASED RISK SCREENING: per NCCN                      MRI breast:       The use of MRI for breast cancer detection is based on the concept of  tumor angiogenesis or neovascularity. Tumor-associated blood vessels have increased permeability, which leads to prompt uptake and release of gadolinium within the first one to two minutes after administration, leading to a pattern of rapid enhancement and washout on MRI.   Bilateral breast examination - Both breasts should be evaluated in an MRI study, for comparison purposes, even when concern about possible pathology involves only one breast.  Contrast - Intravenous gadolinium contrast must be used to maximize cancer detection and is administered before breast MRI to highlight the neovascularity associated with cancers. Contrast is not necessary when the study is performed to evaluate silicone implant integrity.  Allergic and anaphylactoid reactions to gadolinium are rare, but can occur. In addition, in patients with renal failure, gadolinium can cause contrast nephropathy and/or nephrogenic systemic fibrosis.   A few studies have also reported gadolinium deposition in the brain from repeated intravenous administration, with the degree of deposition varying based on the specific contrast agent. The clinical significance of this deposition remains unknown, and no data for humans exist to show any adverse effects or harm at this time.   https://www.fda.gov/drugs/drug-safety-and-availability/fda-drug-safety-communication-fda-identifies-no-harmful-effects-date-brain-retention-gadolinium  https://www.fda.gov/Drugs/DrugSafety/xnm904351.htm    -Contact insurance company with regards to coverage of MRI breasts.   -Cannot undergo an MRI if pregnant.   -MRI's may have false positives                 KLEVER (contrast enhanced mammogram):  KLEVER is the main alternative for anyone who benefits by but cannot  have a breast MRI with IV contrast.    Main indications:   High risk screening   Suspicious clinical symptoms with inconclusive mammogram and ultrasound   New breast cancer, evaluate extent of disease   Pre and post lele-adjuvant systemic therapy evaluation     For high-risk screening, KLEVER replaces the regular non-contrast mammogram and any other supplemental test         For age over 75 years, screening recommendations are considered on an individual basis.     Note: New American College of Radiology® (ACR®) breast cancer screening guidelines  now call for all women -- particularly Black and Ashkenazi Mormonism women -- to have risk assessment by age 25 to determine if screening earlier than age 40 is needed. The ACR continues to recommend annual screening starting at age 40 for women of average risk, but earlier and more intensive screening for high-risk patients. The new ACR guidelines  for high-risk women were published online May 3 in the Journal of the American College of Radiology (JACR ).      Early detection decreases breast cancer death. The ACR recommends annual screening beginning at age 40 for women of average risk and earlier and/or more intensive screening for women at higher-than-average risk. For most women at higher-than-average risk, the supplemental screening method of choice is breast MRI. Women with genetics-based increased risk, those with a calculated lifetime risk of 20% or more, and those exposed to chest radiation at young ages are recommended to undergo MRI surveillance starting at ages 25 to 30 and annual mammography (with a variable starting age between 25 and 40, depending on the type of risk). Mutation carriers can delay mammographic screening until age 40 if annual screening breast MRI is performed as recommended. Women diagnosed with breast cancer before age 50 or with personal histories of breast cancer and dense breasts should undergo annual supplemental breast MRI. Others with  personal histories, and those with atypia at biopsy, should strongly consider MRI screening, especially if other risk factors are present. For women with dense breasts who desire supplemental screening, breast MRI is recommended. For those who qualify for but cannot undergo breast MRI, contrast-enhanced mammography or ultrasound could be considered. All women should undergo risk assessment by age 25, especially Black women and women of Ashkenazi Mormon heritage, so that those at higher-than-average risk can be identified and appropriate screening initiated.      -RECOMMENDED LIFESTYLE MODIFICATIONS FOR HIGH RISK PATIENTS:    * Reviewed Lifestyle modifications which have shown benefit:  Limit alcohol consumption to less than 1 drink per day (1 ounce liquor, 6 oz wine, 8 oz beer) and nor more than 3 drinks per week.   Avoid smoking.  Exercise at least 150 minutes per week of moderate intensity aerobic activity or at least 75 minutes of vigorous activity. Exercise can lower the relative risk of breast cancer by ~18-20%.  Maintain healthy weight and avoid post-menopausal weight gain. Avoid processed foods and eat more lean proteins, fruits and vegetables.                               * Available resources include genetic counseling, nutrition, weight management.           -CHEMOPREVENTION:              * For women at high risk for breast cancer, endocrine therapy can reduce the risk of invasive and/or in situ breast cancers. (tamoxifen for premenopausal or postmenopausal women and raloxifene or exemestane for postmenopausal women).   -Above have been shown to lower the risk of breast cancer incidence, however there is no survival benefit in patients who don't have breast cancer.        Tamoxifen:    Data regarding tamoxifen risk reduction are limited to pre- and postmenopausal individuals ?35 years of age with a Bre Model 5-year breast cancer risk of ?1.7% or a 10-year risk by XENIA/Argelia-Dolorese of ?5% or a history of  LCIS.  Tamoxifen: 20 mg per day for 5 years was shown to reduce risk of breast cancer by 49%. Among individuals with a history of AH, this dose and duration of tamoxifen were associated with an 86% reduction in breast cancer risk. Low-dose tamoxifen (5 mg per day for 3-5 years)d is an option if patient is symptomatic on the 20-mg dose or if patient is unwilling or unable to take standard-dose tamoxifen.1 This low dosage needs further investigation in premenopausal individuals.  The efficacy of tamoxifen risk reduction in individuals who are carriers of BRCA1/2 and other pathogenic mutations is less well studied than in other risk groups. Limited data suggest there may be a benefit, likely a larger benefit, for BRCA2 carriers.  For healthy, premenopausal individuals at elevated risk for breast cancer, data regarding the risk/benefit ratio for tamoxifen appear relatively favorable (category 1).  For postmenopausal individuals at elevated risk for breast cancer, data regarding the risk/benefit ratio for tamoxifen are influenced by age, presence of uterus, or comorbid conditions (category 1). There are insufficient data on ethnicity and rac  -Risks of Tamoxifen side effects include hot flashes, invasive endometrial cancer in women > 49 years of age (2.3/1000 compared to 0.9/1000), cataracts, increased risk of pulmonary embolism among others.    Raloxifene:    Data regarding raloxifene risk reduction are limited to postmenopausal individuals ?35 years of age with a Bre Model 5-year breast cancer risk ?1.7% or a 10-year risk by XENIA/Bernadette of ?5% or a history of LCIS.  Raloxifene: 60 mg per day was found to be equivalent to tamoxifen for breast cancer risk reduction in the initial comparison. While raloxifene in long-term follow-up appears to be less efficacious in risk reduction than tamoxifen, consideration of toxicity may still lead to the choice of raloxifene over tamoxifen in individuals with an intact  uterus.  There are no data regarding the use of raloxifene in individuals who are carriers of BRCA1/2 and other pathogenic mutations or who have had prior thoracic radiation.  For postmenopausal individuals at elevated risk for breast cancer, data regarding the risk/benefit ratio for raloxifene are influenced by age or comorbid conditions (category 1). There are insufficient data on ethnicity and race.  Use of raloxifene for breast cancer risk reduction in premenopausal individuals is inappropriate unless part of a clinical trial.     Aromatase Inhibitors (exemestane and anastrozole)   Data regarding exemestane are from a single large randomized study limited to postmenopausal individuals ?35 years of age with a Bre Model 5-year breast cancer risk ?1.7% or a 10-year risk by XENIA/Tyrer-Cuzicke of ?5% or a history of LCIS.  Data regarding anastrozole are from a single large randomized study limited to postmenopausal individuals 40 to 70 years of age with the following risk compared with the general population: Aged 40 to 44 years - 4 times higher Aged 45 to 60 years - ?2 times higher Aged 60 to 70 years - ?1.5 times higher Individuals who did not meet these criteria but had a Tyrer-Cuzicke model 10-year breast cancer risk >5% were also included.  Exemestane: 25 mg per day was found to reduce the relative incidence of invasive breast cancer by 65% from 0.55% to 0.19% with a median follow-up of 3 years.  Anastrozole: 1 mg per day was found to reduce the relative incidence of breast cancer by 53% with a median follow-up of 5 years.  There are retrospective data that aromatase inhibitors can reduce the risk of contralateral breast cancer in BRCA1/2 patients with ER-positive breast cancer who take aromatase inhibitors as adjuvant agents.  For postmenopausal individuals at elevated risk for breast cancer, data regarding the risk/benefit ratio for aromatase inhibitor agents are influenced by age and comorbid conditions such  as osteoporosis (category 1). There are insufficient data on ethnicity and race.  Use of aromatase inhibitors for breast cancer risk reduction in premenopausal individuals is inappropriate         Assessment:     1. At high risk for breast cancer    2. Heterogeneously dense tissue of both breasts on mammography    3. Family history of breast cancer    4. Encounter for screening mammogram for breast cancer    5. BMI 45.0-49.9, adult          Breast Cancer Risk Stratification   Current, Estimated Breast Cancer Risk Model Used Patient's Score Risk for general population Patient's Risk Category   5-year Bre Model 1.1% 0.6%  [] N/A given age <35   [x] Average risk (<1.7%)   [] Increased risk (?1.7%)   10-year Tyrer-Cuzick v8.0b 3.74%   [x] <5%   [] ?5%    Lifetime (to age 85) Tyrer-Cuzick v8.0b 23.17% 11.52%  [] Average risk (<15%)   [] Intermediate risk (?15% - <20%)   [x] High risk (?20%)   According to the American Cancer Society, patients with a lifetime breast cancer risk of 20% or higher might benefit from supplemental screening exams. Women with a 5-year risk greater than or equal to 1.7% may benefit from chemoprevention agents (tamoxifen, raloxifene, aromatase inhibitors) to reduce risk.        Patient's risk factors include but are not limited to:  Dense breast tissue  Family history  Early menarche.       Plan:       Patient does not qualify for chemoprevention but will continue to assess. Information above for educational purposes only.   We discussed high risk screening. She has a  shunt and is extremely claustrophobia so unable to do MRI. Unable to do KLEVER due allergy. Patient elects to proceed with alternating annual mammogram and annual breast US along with semiannual CBEs. She will alternate CBE here and with GYN/PCP.  Encouraged breast awareness, including monthly breast self-exams.   Recommend lifestyle modifications as above.   Refer to bariatrics  - request Dr. Favio Jiang.       Breast US now    MMG due 12/30/2024  RTC in  1 year
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
## 2024-07-07 NOTE — PROGRESS NOTES
Postoperative Endocrine Surgery Clinic Note    Reason for visit / Chief complaint: Postoperative evaluation  Endocrinologist: Karolina  Procedure:  Parathyroidectomy  Procedure Date: 5/17/2024    Subjective:     Procedure:   Subtotal parathyroidectomy, bilateral exploration:  Excision of right superior parathyroid gland with possible adenoma  Excision of left superior parathyroid adenoma  Excision of left inferior parathyroid adenoma  Biopsy of right inferior parathyroid gland  Intraoperative monitoring and interpretation of bilateral cranial nerves (vagus and recurrent laryngeal nerves) using the Doorman system  Intraoperative PTH level sampling and interpretation    Operative findings:   All four parathyroid glands identified and confirmed parathyroid tissue with frozen sectioning.  Most normal appearing gland was the right inferior parathyroid gland.  Right superior parathyroid adenoma was identified and appeared to have a pale atypical adenoma versus lymph node adjacent to gland, both excised together.  Right inferior parathyroid gland was identified, tiny biopsy confirmed parathyroid tissue, as it was the most normal gland, it was maintained on a native vascular pedicle.  Left superior parathyroid gland appeared abnormally enlarged and dark in color.  Gland excised.  Left inferior parathyroid gland appeared abnormally enlarged and dark in color.  Gland excised.  Appropriate decrease in PTH following excision of three glands.  The bilateral recurrent laryngeal nerves and vagus nerves were identified and preserved.  Function was verified using the nerve monitoring system.    Post op Visit 05/23/24: Unfortunately she has had a complicated postoperative course due to symptomatic hypocalcemia requiring brief hospitalization and increase calcium supplementation.  She denies signs or symptoms of hypocalcemia. She is currently taking Calcitriol 0.5 BID as well as Calcium Carbonate 2000mcg TID. Her most recent  labwork reveals a corrected calcium of 8.5 but she states she is still having intermittent episodes of perioral tingling in her face/lips and fingertips. Her phonation is at baseline.  Pain is well controlled.    - Continue Calcitriol 0.5 mcg BID, med rec adjusted  - Discussed switching from Calcium Carbonate to Calcium Citrate q8h but due to concerns of poor GI absorption due to IBS/IBD symptoms.  Patient concerned with magnesium allergy, she will stick with TUMS Ultra 2 tablets q8 hours for now.  - Discussed recommendation to spread out dosing to every 8 hours for calcium carbonate to avoid early morning symptoms  - Reviewed pathology  - Incision care discussed, scar massage and routine scar care information provided  - RFP tomorrow  - Bowel regimen to avoid constipation with calcium carbonate  - Follow up as planned on/about 6/3 with labs prior    Interval History (6/10/24):  Patient presenting for virtual visit today for follow-up.  Currently on 2000 mg q8h calcium carbonate. Calcitrol weaned off after prior labs.  Labs drawn today, corrected calcium is 8.1 mg/dL. She was having some paresthesias and cramping.      - Restart Calcitriol 0.25 mcg BID  - Calcium carbonate 1000 mg TID  - 1000 IU over the counter vitamin D3 daily  - RFP in 2 weeks  - Virtual visit in 2 weeks  - Bowel regimen to avoid constipation with calcium carbonate  - RFP, PTH and vitamin D in 6 months    Interval History (6/24/24):  Patient presenting for virtual visit today for follow-up. Currently taking Calcitriol 0.25mcg BID, Tums Ultra 1000mg TID, and Cholecalciferol 1000u QD. Labwork this morning showed a corrected calcium of 9.2. She is still experiencing intermittent episodes of extremity and perioral tingling.     Interval history 7/8/2024: Virtual visit for post-op hypocalcemia. RFP panel today with calcium of 8.9 with albumin of 3.2 and that corrects to calcium of 9.5. She states she had only had 1 episode of symptoms form  hypocalcemia once since her last visit and it was when she forgot to take her medications. She has not needed supplemental tums.  She continues to take 1000 IU over the counter vitamin D3 daily.      Current Outpatient Medications   Medication Sig Dispense Refill    atorvastatin (LIPITOR) 40 MG tablet Take 1 tablet (40 mg total) by mouth once daily. 30 tablet 11    cholecalciferol, vitamin D3, (VITAMIN D3) 25 mcg (1,000 unit) capsule Take 1 capsule (1,000 Units total) by mouth once daily.      hydrOXYzine HCL (ATARAX) 25 MG tablet Take 1 tablet (25 mg total) by mouth 3 (three) times daily as needed for Anxiety. 30 tablet 2    loperamide HCl (IMODIUM A-D ORAL) Take by mouth as needed. diarrhea      losartan (COZAAR) 25 MG tablet Take 1 tablet (25 mg total) by mouth once daily. 90 tablet 3    rimegepant (NURTEC) 75 mg odt Take 1 tablet (75 mg total) by mouth daily as needed for Migraine. Place ODT tablet on the tongue; alternatively the ODT tablet may be placed under the tongue 8 tablet 11    calcitRIOL (ROCALTROL) 0.25 MCG Cap Take 1 capsule (0.25 mcg total) by mouth once daily.       No current facility-administered medications for this visit.     Review of patient's allergies indicates:   Allergen Reactions    Contrast media Anaphylaxis    Iodine and iodide containing products Anaphylaxis    Levaquin [levofloxacin] Anaphylaxis    Levofloxacin in d5w Anaphylaxis    Sulfa (sulfonamide antibiotics) Anaphylaxis and Hives    Adhesive tape-silicones     Iodinated contrast media Hives    Iodine     Magnesium      Pt reporting she is allergic to magnesium citrate oral drink.     Morphine Hives    Tree nuts     Adhesive Rash    Compazine [prochlorperazine] Anxiety     Restless legs    Depacon [valproate sodium] Hives     Pt experienced hives at IV site upon 8th day of depacon administration.  Hives resolved with stopping medication in 1.5 hours.    Nut [tree nut] Hives       Review of Systems  Negative except as per HPI.      Objective:   LMP 01/11/2024 (Exact Date)   Examination limited by virtual setting.      General: alert, well appearing, and in no distress  Neck: neck appears flat without erythema or edema  Incision: well appears well approximated  Neurological: phonation is normal    Labs:  Lab Results   Component Value Date    CALCIUM 8.9 07/08/2024    ALBUMIN 3.2 (L) 07/08/2024    PHOS 3.6 07/08/2024    [id number] 18 (L) 12/09/2023     Calcium corrects to 9.5 mg/dL    Pathology:  Final Pathologic Diagnosis   Date Value Ref Range Status   05/17/2024   Final    1. Parathyroid, right superior, biopsy:   - Parathyroid tissue present   - Weight:  11.2 mg    2. Parathyroid, right inferior, biopsy:   - Parathyroid tissue present  - Weight:  1.7 mg      3. Parathyroid, left superior, biopsy:   - Parathyroid tissue present   - Weight:  17 mg     4. Parathyroid, left inferior, biopsy:   - Parathyroid tissue present   - Weight: 13.9 mg    5. Parathyroid, right superior, adenoma, excision:   - Enlarged hypercellular parathyroid gland   - Weight:  759.8 mg    6. Parathyroid, left superior, adenoma, excision:   - Enlarged and hypercellular parathyroid gland   - Weight: 56.3 mg     7. Parathyroid, left inferior, adenoma, excision:   - Enlarged hypercellular parathyroid gland   - Weight:  54.9 mg         Comment:     Interp By ABELARDO Hairston MD, Signed on 05/21/2024 at 12:49         Assessment and Plan     Problem List Items Addressed This Visit       Vitamin D insufficiency    Current Assessment & Plan     Chronically insufficient. Vitamin D 18 in 12/2023.     - 1000 IU over the counter vitamin D3 daily while on calcitriol  - Increase to 5000 IU once off calcitriol          S/P parathyroidectomy - Primary    Current Assessment & Plan     Primary hyperparathyroidism with hypercalcemia, hypercalciuria, nephrolithiasis, and young age s/p subtotal parathyroidectomy on 5/17/2024.  Calcium in normal range on 0.25 calcitriol BID and 1000 IU  vitamin D3 daily. Symptoms continue to improve, she has not needed any calcium carbonate.  Magnesium in normal range.      - Decrease to Calcitriol 0.25 mcg daily (previously BID dosing)  - Calcium carbonate 1000 mg PRN for symptoms (did not take any since last dose adjustment)  - 1000 IU over the counter vitamin D3 daily  - RFP in 1 week   - Plan for RFP, PTH and vitamin D at 6 months post op (not ordered yet)          Other Visit Diagnoses       History of parathyroidectomy            Patient was seen and evaluated with surgery resident Martell Franco MD.     Raiza Epperson MD  Staff Surgeon  Endocrine Surgery  7/8/24

## 2024-07-08 ENCOUNTER — TELEPHONE (OUTPATIENT)
Dept: NEUROSURGERY | Facility: CLINIC | Age: 39
End: 2024-07-08
Payer: COMMERCIAL

## 2024-07-08 ENCOUNTER — OFFICE VISIT (OUTPATIENT)
Dept: SURGERY | Facility: CLINIC | Age: 39
End: 2024-07-08
Payer: COMMERCIAL

## 2024-07-08 ENCOUNTER — TELEPHONE (OUTPATIENT)
Dept: SURGERY | Facility: CLINIC | Age: 39
End: 2024-07-08
Payer: COMMERCIAL

## 2024-07-08 ENCOUNTER — PATIENT MESSAGE (OUTPATIENT)
Dept: NEUROSURGERY | Facility: CLINIC | Age: 39
End: 2024-07-08
Payer: COMMERCIAL

## 2024-07-08 ENCOUNTER — LAB VISIT (OUTPATIENT)
Dept: LAB | Facility: HOSPITAL | Age: 39
End: 2024-07-08
Attending: STUDENT IN AN ORGANIZED HEALTH CARE EDUCATION/TRAINING PROGRAM
Payer: COMMERCIAL

## 2024-07-08 ENCOUNTER — PATIENT MESSAGE (OUTPATIENT)
Dept: NEUROLOGY | Facility: CLINIC | Age: 39
End: 2024-07-08
Payer: COMMERCIAL

## 2024-07-08 DIAGNOSIS — Z98.890 S/P PARATHYROIDECTOMY: ICD-10-CM

## 2024-07-08 DIAGNOSIS — Z90.89 HISTORY OF PARATHYROIDECTOMY: ICD-10-CM

## 2024-07-08 DIAGNOSIS — E55.9 VITAMIN D INSUFFICIENCY: ICD-10-CM

## 2024-07-08 DIAGNOSIS — Z90.89 S/P PARATHYROIDECTOMY: ICD-10-CM

## 2024-07-08 DIAGNOSIS — Z98.890 HISTORY OF PARATHYROIDECTOMY: ICD-10-CM

## 2024-07-08 DIAGNOSIS — Z98.890 S/P PARATHYROIDECTOMY: Primary | ICD-10-CM

## 2024-07-08 DIAGNOSIS — Z90.89 S/P PARATHYROIDECTOMY: Primary | ICD-10-CM

## 2024-07-08 DIAGNOSIS — Z98.2 S/P VP SHUNT: Primary | ICD-10-CM

## 2024-07-08 LAB
ALBUMIN SERPL BCP-MCNC: 3.2 G/DL (ref 3.5–5.2)
ANION GAP SERPL CALC-SCNC: 10 MMOL/L (ref 8–16)
BUN SERPL-MCNC: 12 MG/DL (ref 6–20)
CALCIUM SERPL-MCNC: 8.9 MG/DL (ref 8.7–10.5)
CHLORIDE SERPL-SCNC: 103 MMOL/L (ref 95–110)
CO2 SERPL-SCNC: 26 MMOL/L (ref 23–29)
CREAT SERPL-MCNC: 0.8 MG/DL (ref 0.5–1.4)
EST. GFR  (NO RACE VARIABLE): >60 ML/MIN/1.73 M^2
GLUCOSE SERPL-MCNC: 90 MG/DL (ref 70–110)
MAGNESIUM SERPL-MCNC: 2 MG/DL (ref 1.6–2.6)
PHOSPHATE SERPL-MCNC: 3.6 MG/DL (ref 2.7–4.5)
POTASSIUM SERPL-SCNC: 3.9 MMOL/L (ref 3.5–5.1)
SODIUM SERPL-SCNC: 139 MMOL/L (ref 136–145)

## 2024-07-08 PROCEDURE — 4010F ACE/ARB THERAPY RXD/TAKEN: CPT | Mod: CPTII,95,, | Performed by: STUDENT IN AN ORGANIZED HEALTH CARE EDUCATION/TRAINING PROGRAM

## 2024-07-08 PROCEDURE — 36415 COLL VENOUS BLD VENIPUNCTURE: CPT | Performed by: STUDENT IN AN ORGANIZED HEALTH CARE EDUCATION/TRAINING PROGRAM

## 2024-07-08 PROCEDURE — 99024 POSTOP FOLLOW-UP VISIT: CPT | Mod: 95,,, | Performed by: STUDENT IN AN ORGANIZED HEALTH CARE EDUCATION/TRAINING PROGRAM

## 2024-07-08 PROCEDURE — 80069 RENAL FUNCTION PANEL: CPT | Performed by: STUDENT IN AN ORGANIZED HEALTH CARE EDUCATION/TRAINING PROGRAM

## 2024-07-08 PROCEDURE — 83735 ASSAY OF MAGNESIUM: CPT | Performed by: STUDENT IN AN ORGANIZED HEALTH CARE EDUCATION/TRAINING PROGRAM

## 2024-07-08 PROCEDURE — 1159F MED LIST DOCD IN RCRD: CPT | Mod: CPTII,95,, | Performed by: STUDENT IN AN ORGANIZED HEALTH CARE EDUCATION/TRAINING PROGRAM

## 2024-07-08 RX ORDER — CALCITRIOL 0.25 UG/1
0.25 CAPSULE ORAL DAILY
Start: 2024-07-08 | End: 2024-08-07

## 2024-07-08 NOTE — TELEPHONE ENCOUNTER
Call placed to pt. Reviewed plan of care to include labs and VV on 7/15. Appt details reviewed. Pt verbalized understanding.

## 2024-07-08 NOTE — ASSESSMENT & PLAN NOTE
Primary hyperparathyroidism with hypercalcemia, hypercalciuria, nephrolithiasis, and young age s/p subtotal parathyroidectomy on 5/17/2024.  Calcium in normal range on 0.25 calcitriol BID and 1000 IU vitamin D3 daily. Symptoms continue to improve, she has not needed any calcium carbonate.  Magnesium in normal range.      - Decrease to Calcitriol 0.25 mcg daily (previously BID dosing)  - Calcium carbonate 1000 mg PRN for symptoms (did not take any since last dose adjustment)  - 1000 IU over the counter vitamin D3 daily  - RFP in 1 week   - Plan for RFP, PTH and vitamin D at 6 months post op (not ordered yet)

## 2024-07-08 NOTE — TELEPHONE ENCOUNTER
----- Message from Elva Garrison sent at 7/8/2024  2:20 PM CDT -----  Regarding: shunt checked  Contact: [phone]  Patient is calling because her neurologist wants her to see her neurosurgeon to make sure her shunt  Is functioning. Please contact [phone]

## 2024-07-09 ENCOUNTER — TELEPHONE (OUTPATIENT)
Dept: BARIATRICS | Facility: CLINIC | Age: 39
End: 2024-07-09
Payer: COMMERCIAL

## 2024-07-11 ENCOUNTER — HOSPITAL ENCOUNTER (OUTPATIENT)
Dept: RADIOLOGY | Facility: HOSPITAL | Age: 39
Discharge: HOME | End: 2024-07-11
Attending: NEUROLOGICAL SURGERY
Payer: COMMERCIAL

## 2024-07-11 DIAGNOSIS — Z98.2 S/P VP SHUNT: ICD-10-CM

## 2024-07-11 PROCEDURE — 72020 X-RAY EXAM OF SPINE 1 VIEW: CPT | Mod: 26,,, | Performed by: RADIOLOGY

## 2024-07-11 PROCEDURE — 74018 RADEX ABDOMEN 1 VIEW: CPT | Mod: TC,PO

## 2024-07-11 PROCEDURE — 74018 RADEX ABDOMEN 1 VIEW: CPT | Mod: 26,,, | Performed by: RADIOLOGY

## 2024-07-11 PROCEDURE — 70250 X-RAY EXAM OF SKULL: CPT | Mod: 26,,, | Performed by: RADIOLOGY

## 2024-07-11 PROCEDURE — 71045 X-RAY EXAM CHEST 1 VIEW: CPT | Mod: 26,,, | Performed by: RADIOLOGY

## 2024-07-13 ENCOUNTER — HOSPITAL ENCOUNTER (OUTPATIENT)
Dept: RADIOLOGY | Facility: HOSPITAL | Age: 39
Discharge: HOME | End: 2024-07-13
Attending: NEUROLOGICAL SURGERY
Payer: COMMERCIAL

## 2024-07-13 DIAGNOSIS — Z98.2 S/P VP SHUNT: ICD-10-CM

## 2024-07-13 PROCEDURE — 70450 CT HEAD/BRAIN W/O DYE: CPT | Mod: TC

## 2024-07-13 PROCEDURE — 70450 CT HEAD/BRAIN W/O DYE: CPT | Mod: 26,,, | Performed by: RADIOLOGY

## 2024-07-15 ENCOUNTER — TELEPHONE (OUTPATIENT)
Dept: SURGERY | Facility: CLINIC | Age: 39
End: 2024-07-15
Payer: COMMERCIAL

## 2024-07-15 NOTE — TELEPHONE ENCOUNTER
"Returned call. Spoke to pt. Pt requesting to reschedule post op appt and labs to Thursday. Pt also requesting to reschedule labs. Pt denies fever, redness, drainage, from surgical site. Endorses vomiting and diarrhea since Saturday pm. Pt does not think this is surgically related but thinks she has a "stomach bug". Pt is requesting to reschedule appts to Thursday morning. Pt will check appt details on [facility]. Will update Dr. Epperson. Pt  verbalized understanding.  "

## 2024-07-15 NOTE — TELEPHONE ENCOUNTER
----- Message from Nicole Medina sent at 7/15/2024  8:36 AM CDT -----  Regarding: Appt Access  Contact: [phone]  Patient is calling to reschedule appt she has w provider today 7/15/24. I attempted to get pt rescheduled but someone was in pt's chart. Please give pt a call to get appt rescheduled.

## 2024-07-15 NOTE — TELEPHONE ENCOUNTER
Call placed to pt. Pt agreeable to reschedule VV to 7/22. Appt rescheduled. Details provided. Provided with lab details for 7/19. Pt verbalized understanding.

## 2024-07-16 ENCOUNTER — OFFICE VISIT (OUTPATIENT)
Dept: FAMILY MEDICINE | Facility: CLINIC | Age: 39
End: 2024-07-16
Payer: COMMERCIAL

## 2024-07-16 DIAGNOSIS — D50.9 IRON DEFICIENCY ANEMIA, UNSPECIFIED IRON DEFICIENCY ANEMIA TYPE: ICD-10-CM

## 2024-07-16 DIAGNOSIS — G47.00 INSOMNIA, UNSPECIFIED TYPE: ICD-10-CM

## 2024-07-16 DIAGNOSIS — I10 PRIMARY HYPERTENSION: Primary | Chronic | ICD-10-CM

## 2024-07-16 DIAGNOSIS — G43.009 MIGRAINE WITHOUT AURA AND WITHOUT STATUS MIGRAINOSUS, NOT INTRACTABLE: ICD-10-CM

## 2024-07-16 DIAGNOSIS — E78.5 HYPERLIPIDEMIA, UNSPECIFIED HYPERLIPIDEMIA TYPE: ICD-10-CM

## 2024-07-16 PROCEDURE — 99443 PR PHYSICIAN TELEPHONE EVALUATION 21-30 MIN: CPT | Mod: 95,,, | Performed by: STUDENT IN AN ORGANIZED HEALTH CARE EDUCATION/TRAINING PROGRAM

## 2024-07-16 PROCEDURE — 4010F ACE/ARB THERAPY RXD/TAKEN: CPT | Mod: CPTII,95,, | Performed by: STUDENT IN AN ORGANIZED HEALTH CARE EDUCATION/TRAINING PROGRAM

## 2024-07-16 RX ORDER — ATORVASTATIN CALCIUM 40 MG/1
40 TABLET, FILM COATED ORAL DAILY
Qty: 90 TABLET | Refills: 3 | Status: SHIPPED | OUTPATIENT
Start: 2024-07-16 | End: 2025-07-16

## 2024-07-16 RX ORDER — CANDESARTAN 4 MG/1
4 TABLET ORAL DAILY
Qty: 30 TABLET | Refills: 11 | Status: SHIPPED | OUTPATIENT
Start: 2024-07-16 | End: 2025-07-16

## 2024-07-16 NOTE — PROGRESS NOTES
Established Patient - Audio Only Telehealth Visit       274}  The patient location is: [place]   The chief complaint leading to consultation is:   Chief Complaint   Patient presents with    Hypertension    Headache     Visit type: Virtual visit with audio only (telephone)  Total time spent with patient: 24 min        The reason for the audio only service rather than synchronous audio and video virtual visit was related to technical difficulties or patient preference/necessity.     Each patient to whom I provide medical services by telemedicine is:  (1) informed of the relationship between the physician and patient and the respective role of any other health care provider with respect to management of the patient; and (2) notified that they may decline to receive medical services by telemedicine and may withdraw from such care at any time. Patient verbally consented to receive this service via voice-only telephone call.     274}     HPI: pt reports she ran out of losartan and has had elevated bp readings but nolog. She gets headaches when she stands.        Lab Results   Component Value Date    WBC 9.19 05/20/2024    HGB 10.4 (L) 05/20/2024    HCT 35.0 (L) 05/20/2024    MCV 84 05/20/2024     05/20/2024     07/08/2024    K 3.9 07/08/2024     07/08/2024    CALCIUM 8.9 07/08/2024    PHOS 3.6 07/08/2024    CO2 26 07/08/2024    GLU 90 07/08/2024    BUN 12 07/08/2024    CREATININE 0.8 07/08/2024    EGFRNORACEVR >60 07/08/2024    ANIONGAP 10 07/08/2024    PROT 5.6 (L) 05/20/2024    ALBUMIN 3.2 (L) 07/08/2024    BILITOT 0.5 05/20/2024    ALKPHOS 87 05/20/2024    ALT 29 05/20/2024    AST 17 05/20/2024    INR 0.9 06/25/2023    CHOL 168 01/09/2024    TRIG 175 (H) 01/09/2024    HDL 40 01/09/2024    LDLCALC 93.0 01/09/2024    TSH 2.658 06/25/2023    HGBA1C 5.3 12/09/2023          Assessment and plan:       HTN will try candesartan due to migraine treatment as well send log   Sonia was seen today for  hypertension and headache.    Diagnoses and all orders for this visit:    Primary hypertension    Migraine without aura and without status migrainosus, not intractable  -     candesartan (ATACAND) 4 MG tablet; Take 1 tablet (4 mg total) by mouth once daily.    Hyperlipidemia, unspecified hyperlipidemia type  -     atorvastatin (LIPITOR) 40 MG tablet; Take 1 tablet (40 mg total) by mouth once daily.    Insomnia, unspecified type    Iron deficiency anemia, unspecified iron deficiency anemia type         This service was not originating from a related E/M service provided within the previous 7 days nor will  to an E/M service or procedure within the next 24 hours or my soonest available appointment.  Prevailing standard of care was able to be met in this audio-only visit.

## 2024-07-19 ENCOUNTER — CLINICAL SUPPORT (OUTPATIENT)
Dept: REHABILITATION | Facility: HOSPITAL | Age: 39
End: 2024-07-19
Attending: OBSTETRICS & GYNECOLOGY
Payer: COMMERCIAL

## 2024-07-19 DIAGNOSIS — M62.89 HIGH-TONE PELVIC FLOOR DYSFUNCTION: Primary | ICD-10-CM

## 2024-07-19 DIAGNOSIS — N94.10 DYSPAREUNIA IN FEMALE: ICD-10-CM

## 2024-07-19 DIAGNOSIS — N39.8 DYSFUNCTIONAL VOIDING OF URINE: ICD-10-CM

## 2024-07-19 DIAGNOSIS — R19.8 ABNORMAL FREQUENCY OF DEFECATION: ICD-10-CM

## 2024-07-19 DIAGNOSIS — R10.2 PELVIC PAIN: ICD-10-CM

## 2024-07-19 PROCEDURE — 97530 THERAPEUTIC ACTIVITIES: CPT | Mod: PO

## 2024-07-19 PROCEDURE — 97162 PT EVAL MOD COMPLEX 30 MIN: CPT | Mod: PO

## 2024-07-19 NOTE — PROGRESS NOTES
"[facility]  Pelvic Health Physical Therapy Initial Evaluation    Date: 2024   Name: Sonia Solorzano  Clinic Number: [id number]  Therapy Diagnosis:   Encounter Diagnosis   Name Primary?    Pelvic pain      Physician: Kirsten Weaver MD    Physician Orders: PT Eval and Treat   Medical Diagnosis from Referral: Pelvic pain [R10.2]   Evaluation Date: 2024  Plan of Care Expiration: 10/19/2024  Visit # / Visits authorized:     FOTO 1 /3 on 2024      Time In: 8:30  Time Out: 9:15  Total Appointment Time (timed & untimed codes): 45 minutes    Precautions:  shunt     Subjective     Date of onset: 24    History of current condition - Sonia reports: hysterectomy  to remove uterine cancer. Still has pain - suprapubic pain, vaginal pain (sometimes has "lightning crotch" sharp pain in the vagina), and rectal pain - sometimes gets sharp pain in the rectum. When she has a really full bladder and she goes to void it feels like her bladder is falling out.   Sex is "very painful" during and afterwards.     Had suprapubic pain and lower back pain prior to surgery, had large cyst on ovary. New pain post-op is the vaginal and rectal pain. Denies symptoms consistent with proctalgia fugax but she does have increased rectal pain when she needs to have a bowel movement.   Has a  shut that drains into the pelvis.     OB/GYN History: , vaginal delivery, perineal laceration, and caesarean - first baby was standard vaginal delivery doesn't remember how far back the stitches went.   Sexually active? Yes  Pain with vaginal exams, intercourse or tampon use? Yes - sex is very pain, will "grin and bear it" - rarely orgasms (would be consistently orgasm before). Recently has gotten to the point where she can orgasm when she is on top, other positions she is unable to.     Pain:  Location: lower back and pelvis   Current 2/10, worst 5/10, best 0/10 but that's very rare  Description: " "aching/soreness with occasional sharp pain   Aggravating Factors/Activities that cause symptoms: needing to have a bowel movement increases rectal pain; sex is painful; otherwise the pain increases are random.    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 Not sure if part of the reason is that she is afraid her bladder will fall out.   Urine stream: strong  Complete emptying: Yes  Bladder leakage: No  Frequency of incidents: not applicable   Urinary Urgency: No  Frequency of bowel movements: varies - has more bowel movements than urination per day (3-4+), then will have a few days where she doesn't go at all.   Bowel Urgency - yes has IBS with diarrhea - has to go frequently (has more bowel movements than urination per day) - has had accidents before.   Difficulty initiating BM: No  Quality/Shape of BM: type 6-7; occasionally type 4; type 2 if she's constipated (hasn't gone for a few days)   Does Patient Feel Empty after BM? No  Fiber Supplements or Laxative Use? No; tries not to take imodium because she'll get constipated.   Colon leakage: Yes  Frequency of incidents: last one was about a year ago - has gotten good at catching it beforehand.    Amount leaked (bowels): full movement  Form of protection: none         Medical History: Sonia  has a past medical history of Asthma (2014), Bipolar disorder, Chronic anxiety (12/19/2014), COVID-19, GERD (gastroesophageal reflux disease) (10/25/2020), GI bleed (10/25/2020), " Heart palpitations, Herniated disc, Hyperlipidemia, Hyperparathyroidism (2023), Hypertension, IBS (irritable bowel syndrome) (), Idiopathic intracranial hypertension, Insomnia (), Intractable migraine without aura and with status migrainosus (2022), Irritable bowel syndrome without diarrhea (2021), Lower back pain (), Migraine headache (), Palpitations (), PCOS (polycystic ovarian syndrome) (2022), Sleep apnea, unspecified, and Uterine cancer.     Surgical History: Sonia Solorzano  has a past surgical history that includes Dilation and curettage of uterus (); epidural steriod injections ();  section, low transverse (); Tubal ligation (); Laparoscopy (Right, ); Esophagogastroduodenoscopy (N/A, 10/26/2020); Colonoscopy (N/A, 10/27/2020); Intraluminal gastrointestinal tract imaging via capsule (N/A, 2020); Laparoscopic cholecystectomy (N/A, 2020); Tonsillectomy; Knee arthroscopy w/ meniscectomy (Right, 2021); Cystoscopy (N/A, 10/27/2021); Magnetic resonance imaging (N/A, 2022); Upper gastrointestinal endoscopy; Endoscopic insertion of ventriculoperitoneal shunt (Right, 2022); Endoscopic insertion of ventriculoperitoneal shunt (N/A, 2022); Esophagogastroduodenoscopy (N/A, 2023); Robot-assisted laparoscopic abdominal hysterectomy using da Rajan Xi (N/A, 2024); Robot-assisted surgical removal of fallopian tube using da Rajan Xi (Bilateral, 2024); mapping, lymph node, sentinel (Bilateral, 2024); Lymph node biopsy (Bilateral, 2024); Robot-assisted laparoscopic surgical removal of ovary using da Rajan Xi (Right, 2024); and Parathyroidectomy (N/A, 2024).    Medications: Sonia has a current medication list which includes the following prescription(s): atorvastatin, calcitriol, candesartan, cholecalciferol (vitamin d3), hydroxyzine hcl, loperamide hcl, and nurtec.    Allergies:    Review of patient's allergies indicates:   Allergen Reactions    Contrast media Anaphylaxis    Iodine and iodide containing products Anaphylaxis    Levaquin [levofloxacin] Anaphylaxis    Levofloxacin in d5w Anaphylaxis    Sulfa (sulfonamide antibiotics) Anaphylaxis and Hives    Adhesive tape-silicones     Iodinated contrast media Hives    Iodine     Magnesium      Pt reporting she is allergic to magnesium citrate oral drink.     Morphine Hives    Tree nuts     Adhesive Rash    Compazine [prochlorperazine] Anxiety     Restless legs    Depacon [valproate sodium] Hives     Pt experienced hives at IV site upon 8th day of depacon administration.  Hives resolved with stopping medication in 1.5 hours.    Nut [tree nut] Hives          Prior Therapy/Previous treatment included: none   Occupation: administration with EMS  Prior Level of Function: independent   Current Level of Function: see above       Pts goals: resolve pain and bowel/bladder dysfunction     OBJECTIVE     Informed verbal consent provided 7/19/2024 prior to intravaginal exam.  Chaperone: declined       BREATHING MECHANICS ASSESSMENT   Thorax Assessment During Quiet Respiration: WNL excursion of ribcage and WNL excursion of abdominal wall  Thorax Assessment During Deep Respiration: WNL excursion of ribcage and WNL excursion of abdominal wall      VAGINAL PELVIC FLOOR EXAM    EXTERNAL ASSESSMENT  Introitus: WNL  Skin condition: WNL  Scarring: none   Sensation: WNL   Pain: none  Voluntary contraction: visible lift  Voluntary relaxation: visible drop  Involuntary contraction: visible drop  Bearing down: trace drop   Perineal descent: absent        INTERNAL ASSESSMENT  Pain: trigger points as follows: pain with wincing noted with light palpation throughout layer 3 pelvic floor muscles    Sensation: able to localized pressure appropriately   Vaginal vault: stenotic   Muscle Bulk: hypertonus   Muscle Power: 3/5  Muscle Endurance: 7 sec    Quality of contraction:  decreased hold and incomplete relaxation   Specificity: WNL   Coordination: tends to hold breath during PFM contration   Prolapse check: descent of anterior wall approaching introitus with bearing down       Limitation/Restriction for FOTO Pelvic Survey    Therapist reviewed FOTO scores for Sonia Solorzano on 7/19/2024.   FOTO documents entered into [employer] - see Media section.       TREATMENT     Total Treatment time (time-based codes) separate from Evaluation: 30 minutes      Therapeutic Activity Patient participated in dynamic functional therapeutic activities to improve functional performance for 30 minutes. Including: Education as described below:     Patient Education provided:   general anatomy/physiology of urinary/ bowel  system and benefits of treatment were discussed with the pt. Additionally, anatomy/physiology of pelvic floor, fluid intake/dietary modifications, and addition of soluble fiber  were reviewed.     Home Exercises provided:  Written Home Exercises provided: provide written home exercise program next session - advised patient to perform deep breathing to reduce pain and add soluble fiber to aid in stool consistency   Exercises were reviewed and Sonia was able to demonstrate them prior to the end of the session.    Sonia demonstrated good  understanding of the education provided.     See EMR under Patient Instructions for exercises provided 7/19/2024.    Assessment     Sonia is a 38 y.o. female referred to outpatient Physical Therapy with a medical diagnosis of Pelvic pain [R10.2] . Pt presents with pelvic floor tenderness, decreased pelvic muscle strength, decreased endurance of the pelvic muscles, decreased phasic ability of the pelvic muscles, increased tension of the pelvic muscles, poor quality of pelvic muscle contraction, poor coordination of pelvic floor muscles during ADL's leading to urinary or fecal leakage, dysfunctional voiding, dysfunctional defecation, and unable to  co-contract or co-relax abdominal wall and pelvic floor muscles. Presents with high tone PF dysfunction further exacerbated following surgery. Advised patient to add soluble fiber to aid in stool consistency. Patient noted increased vaginal soreness after exam - suggested deep breathing at home to reduce pain. Will give full home exercise program next session.      Pt prognosis is Good.   Pt will benefit from skilled outpatient Physical Therapy to address the deficits stated above and in the chart below, provide pt/family education, and to maximize pt's level of independence.     Plan of care discussed with patient: Yes  Pt's spiritual, cultural and educational needs considered and patient is agreeable to the plan of care and goals as stated below:     Anticipated Barriers for therapy: co-morbidities     Medical Necessity is demonstrated by the following  History  Co-morbidities and personal factors that may impact the plan of care [] LOW: no personal factors / co-morbidities  [x] MODERATE: 1-2 personal factors / co-morbidities  [] HIGH: 3+ personal factors / co-morbidities    Moderate / High Support Documentation:   Co-morbidities affecting plan of care:  has a past medical history of Asthma (2014), Bipolar disorder, Chronic anxiety (12/19/2014), COVID-19, GERD (gastroesophageal reflux disease) (10/25/2020), GI bleed (10/25/2020), Heart palpitations, Herniated disc, Hyperlipidemia, Hyperparathyroidism (01/18/2023), Hypertension, IBS (irritable bowel syndrome) (2015), Idiopathic intracranial hypertension, Insomnia (2018), Intractable migraine without aura and with status migrainosus (06/28/2022), Irritable bowel syndrome without diarrhea (09/03/2021), Lower back pain (2005), Migraine headache (2002), Palpitations (2015), PCOS (polycystic ovarian syndrome) (05/2022), Sleep apnea, unspecified, and Uterine cancer.     Personal Factors:   no deficits     Examination  Body Structures and Functions, activity limitations  and participation restrictions that may impact the plan of care [] LOW: addressing 1-2 elements  [x] MODERATE: 3+ elements  [] HIGH: 4+ elements (please support below)    Moderate / High Support Documentation: see evaluation     Clinical Presentation [] LOW: stable  [x] MODERATE: Evolving  [] HIGH: Unstable     Decision Making/ Complexity Score: moderate         Goals:  Short Term Goals: 6 weeks   - Pt will demonstrate excellent knowledge and adherence to HEP to facilitate optimal recovery.  - Pt will demonstrate proper PFM contraction, relaxation, and lengthening coordinated with TA and breath for improved muscle coordination needed for functional activity.    Long Term Goals: 12 weeks   - Pt will demonstrate excellent knowledge and adherence to HEP for continued self-maintenance of symptoms.  - Pt will report FOTO score of 10% improvement or more indicating clinically relevant increase in function.  - Pt will report voiding interval of 2-3 hours for improved ADL tolerance.  - Pt will report ability to delay fecal urge for at least 10 minutes to maintain continence with ADL/IADLs.   - Pt will report stool consistency of Morral Stool Scale type 4-5 at least 80% of the time for improved bowel function.   - Pt will demonstrate PFM strength of at least 3/5 MMT for improved strength needed to maintain continence.   - Pt will report bearing down appropriately 100% of the time for improved bowel function and decreased stress on adjacent pelvic structures.   - Pt will demonstrate independence with pressure-management strategies to decreased stress on adjacent pelvic structures.   - Pt will be able to successfully complete sexual intercourse without pain for improved ADL tolerance.       Plan     Plan of care Certification: 7/19/2024 to 10/19/2024.    Outpatient Physical Therapy 1 times weekly for 12 weeks to include the following interventions: therapeutic exercises, therapeutic activity, neuromuscular re-education,  manual therapy, modalities PRN, patient/family education, dry needling, and self care/home management    Ania Peterson, PT, DPT, WCS

## 2024-07-19 NOTE — PATIENT INSTRUCTIONS
Soluble Fiber Supplement (ex: Metamucil, Benefiber):     Used to bulk stool so it is solid but easy to pass  Start with 1 tsp per day  Every 3-5 days, add an additional tsp   Stop when you reach a dose that gives stools that are soft but formed  Do not exceed 6 tsp/day   Make sure to keep up with your water intake to avoid constipation

## 2024-07-19 NOTE — PLAN OF CARE
"[facility]  Pelvic Health Physical Therapy Initial Evaluation    Date: 2024   Name: Sonia Solorzano  Clinic Number: [id number]  Therapy Diagnosis:   Encounter Diagnosis   Name Primary?    Pelvic pain      Physician: Kirsten Weaver MD    Physician Orders: PT Eval and Treat   Medical Diagnosis from Referral: Pelvic pain [R10.2]   Evaluation Date: 2024  Plan of Care Expiration: 10/19/2024  Visit # / Visits authorized:     FOTO 1 /3 on 2024      Time In: 8:30  Time Out: 9:15  Total Appointment Time (timed & untimed codes): 45 minutes    Precautions:  shunt     Subjective     Date of onset: 24    History of current condition - Sonia reports: hysterectomy  to remove uterine cancer. Still has pain - suprapubic pain, vaginal pain (sometimes has "lightning crotch" sharp pain in the vagina), and rectal pain - sometimes gets sharp pain in the rectum. When she has a really full bladder and she goes to void it feels like her bladder is falling out.   Sex is "very painful" during and afterwards.     Had suprapubic pain and lower back pain prior to surgery, had large cyst on ovary. New pain post-op is the vaginal and rectal pain. Denies symptoms consistent with proctalgia fugax but she does have increased rectal pain when she needs to have a bowel movement.   Has a  shut that drains into the pelvis.     OB/GYN History: , vaginal delivery, perineal laceration, and caesarean - first baby was standard vaginal delivery doesn't remember how far back the stitches went.   Sexually active? Yes  Pain with vaginal exams, intercourse or tampon use? Yes - sex is very pain, will "grin and bear it" - rarely orgasms (would be consistently orgasm before). Recently has gotten to the point where she can orgasm when she is on top, other positions she is unable to.     Pain:  Location: lower back and pelvis   Current 2/10, worst 5/10, best 0/10 but that's very rare  Description: " "aching/soreness with occasional sharp pain   Aggravating Factors/Activities that cause symptoms: needing to have a bowel movement increases rectal pain; sex is painful; otherwise the pain increases are random.    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 Not sure if part of the reason is that she is afraid her bladder will fall out.   Urine stream: strong  Complete emptying: Yes  Bladder leakage: No  Frequency of incidents: not applicable   Urinary Urgency: No  Frequency of bowel movements: varies - has more bowel movements than urination per day (3-4+), then will have a few days where she doesn't go at all.   Bowel Urgency - yes has IBS with diarrhea - has to go frequently (has more bowel movements than urination per day) - has had accidents before.   Difficulty initiating BM: No  Quality/Shape of BM: type 6-7; occasionally type 4; type 2 if she's constipated (hasn't gone for a few days)   Does Patient Feel Empty after BM? No  Fiber Supplements or Laxative Use? No; tries not to take imodium because she'll get constipated.   Colon leakage: Yes  Frequency of incidents: last one was about a year ago - has gotten good at catching it beforehand.    Amount leaked (bowels): full movement  Form of protection: none         Medical History: Sonia  has a past medical history of Asthma (2014), Bipolar disorder, Chronic anxiety (12/19/2014), COVID-19, GERD (gastroesophageal reflux disease) (10/25/2020), GI bleed (10/25/2020), " Heart palpitations, Herniated disc, Hyperlipidemia, Hyperparathyroidism (2023), Hypertension, IBS (irritable bowel syndrome) (), Idiopathic intracranial hypertension, Insomnia (), Intractable migraine without aura and with status migrainosus (2022), Irritable bowel syndrome without diarrhea (2021), Lower back pain (), Migraine headache (), Palpitations (), PCOS (polycystic ovarian syndrome) (2022), Sleep apnea, unspecified, and Uterine cancer.     Surgical History: Sonia Solorzano  has a past surgical history that includes Dilation and curettage of uterus (); epidural steriod injections ();  section, low transverse (); Tubal ligation (); Laparoscopy (Right, ); Esophagogastroduodenoscopy (N/A, 10/26/2020); Colonoscopy (N/A, 10/27/2020); Intraluminal gastrointestinal tract imaging via capsule (N/A, 2020); Laparoscopic cholecystectomy (N/A, 2020); Tonsillectomy; Knee arthroscopy w/ meniscectomy (Right, 2021); Cystoscopy (N/A, 10/27/2021); Magnetic resonance imaging (N/A, 2022); Upper gastrointestinal endoscopy; Endoscopic insertion of ventriculoperitoneal shunt (Right, 2022); Endoscopic insertion of ventriculoperitoneal shunt (N/A, 2022); Esophagogastroduodenoscopy (N/A, 2023); Robot-assisted laparoscopic abdominal hysterectomy using da Rajan Xi (N/A, 2024); Robot-assisted surgical removal of fallopian tube using da Rajan Xi (Bilateral, 2024); mapping, lymph node, sentinel (Bilateral, 2024); Lymph node biopsy (Bilateral, 2024); Robot-assisted laparoscopic surgical removal of ovary using da Rajan Xi (Right, 2024); and Parathyroidectomy (N/A, 2024).    Medications: Sonia has a current medication list which includes the following prescription(s): atorvastatin, calcitriol, candesartan, cholecalciferol (vitamin d3), hydroxyzine hcl, loperamide hcl, and nurtec.    Allergies:    Review of patient's allergies indicates:   Allergen Reactions    Contrast media Anaphylaxis    Iodine and iodide containing products Anaphylaxis    Levaquin [levofloxacin] Anaphylaxis    Levofloxacin in d5w Anaphylaxis    Sulfa (sulfonamide antibiotics) Anaphylaxis and Hives    Adhesive tape-silicones     Iodinated contrast media Hives    Iodine     Magnesium      Pt reporting she is allergic to magnesium citrate oral drink.     Morphine Hives    Tree nuts     Adhesive Rash    Compazine [prochlorperazine] Anxiety     Restless legs    Depacon [valproate sodium] Hives     Pt experienced hives at IV site upon 8th day of depacon administration.  Hives resolved with stopping medication in 1.5 hours.    Nut [tree nut] Hives          Prior Therapy/Previous treatment included: none   Occupation: administration with EMS  Prior Level of Function: independent   Current Level of Function: see above       Pts goals: resolve pain and bowel/bladder dysfunction     OBJECTIVE     Informed verbal consent provided 7/19/2024 prior to intravaginal exam.  Chaperone: declined       BREATHING MECHANICS ASSESSMENT   Thorax Assessment During Quiet Respiration: WNL excursion of ribcage and WNL excursion of abdominal wall  Thorax Assessment During Deep Respiration: WNL excursion of ribcage and WNL excursion of abdominal wall      VAGINAL PELVIC FLOOR EXAM    EXTERNAL ASSESSMENT  Introitus: WNL  Skin condition: WNL  Scarring: none   Sensation: WNL   Pain: none  Voluntary contraction: visible lift  Voluntary relaxation: visible drop  Involuntary contraction: visible drop  Bearing down: trace drop   Perineal descent: absent        INTERNAL ASSESSMENT  Pain: trigger points as follows: pain with wincing noted with light palpation throughout layer 3 pelvic floor muscles    Sensation: able to localized pressure appropriately   Vaginal vault: stenotic   Muscle Bulk: hypertonus   Muscle Power: 3/5  Muscle Endurance: 7 sec    Quality of contraction:  decreased hold and incomplete relaxation   Specificity: WNL   Coordination: tends to hold breath during PFM contration   Prolapse check: descent of anterior wall approaching introitus with bearing down       Limitation/Restriction for FOTO Pelvic Survey    Therapist reviewed FOTO scores for Sonia Solorzano on 7/19/2024.   FOTO documents entered into [employer] ([employer]) - see Media section.       TREATMENT     Total Treatment time (time-based codes) separate from Evaluation: 30 minutes      Therapeutic Activity Patient participated in dynamic functional therapeutic activities to improve functional performance for 30 minutes. Including: Education as described below:     Patient Education provided:   general anatomy/physiology of urinary/ bowel  system and benefits of treatment were discussed with the pt. Additionally, anatomy/physiology of pelvic floor, fluid intake/dietary modifications, and addition of soluble fiber were reviewed.     Home Exercises provided:  Written Home Exercises provided: provide written home exercise program next session - advised patient to perform deep breathing to reduce pain and add soluble fiber to aid in stool consistency   Exercises were reviewed and Sonia was able to demonstrate them prior to the end of the session.    Sonia demonstrated good  understanding of the education provided.     See EMR under Patient Instructions for exercises provided 7/19/2024.    Assessment     Sonia is a 38 y.o. female referred to outpatient Physical Therapy with a medical diagnosis of Pelvic pain [R10.2] . Pt presents with pelvic floor tenderness, decreased pelvic muscle strength, decreased endurance of the pelvic muscles, decreased phasic ability of the pelvic muscles, increased tension of the pelvic muscles, poor quality of pelvic muscle contraction, poor coordination of pelvic floor muscles during ADL's leading to urinary or fecal leakage, dysfunctional voiding, dysfunctional defecation, and unable to  co-contract or co-relax abdominal wall and pelvic floor muscles. Presents with high tone PF dysfunction further exacerbated following surgery. Advised patient to add soluble fiber to aid in stool consistency. Patient noted increased vaginal soreness after exam - suggested deep breathing at home to reduce pain. Will give full home exercise program next session.      Pt prognosis is Good.   Pt will benefit from skilled outpatient Physical Therapy to address the deficits stated above and in the chart below, provide pt/family education, and to maximize pt's level of independence.     Plan of care discussed with patient: Yes  Pt's spiritual, cultural and educational needs considered and patient is agreeable to the plan of care and goals as stated below:     Anticipated Barriers for therapy: co-morbidities     Medical Necessity is demonstrated by the following  History  Co-morbidities and personal factors that may impact the plan of care [] LOW: no personal factors / co-morbidities  [x] MODERATE: 1-2 personal factors / co-morbidities  [] HIGH: 3+ personal factors / co-morbidities    Moderate / High Support Documentation:   Co-morbidities affecting plan of care:  has a past medical history of Asthma (2014), Bipolar disorder, Chronic anxiety (12/19/2014), COVID-19, GERD (gastroesophageal reflux disease) (10/25/2020), GI bleed (10/25/2020), Heart palpitations, Herniated disc, Hyperlipidemia, Hyperparathyroidism (01/18/2023), Hypertension, IBS (irritable bowel syndrome) (2015), Idiopathic intracranial hypertension, Insomnia (2018), Intractable migraine without aura and with status migrainosus (06/28/2022), Irritable bowel syndrome without diarrhea (09/03/2021), Lower back pain (2005), Migraine headache (2002), Palpitations (2015), PCOS (polycystic ovarian syndrome) (05/2022), Sleep apnea, unspecified, and Uterine cancer.     Personal Factors:   no deficits     Examination  Body Structures and Functions, activity limitations  and participation restrictions that may impact the plan of care [] LOW: addressing 1-2 elements  [x] MODERATE: 3+ elements  [] HIGH: 4+ elements (please support below)    Moderate / High Support Documentation: see evaluation     Clinical Presentation [] LOW: stable  [x] MODERATE: Evolving  [] HIGH: Unstable     Decision Making/ Complexity Score: moderate         Goals:  Short Term Goals: 6 weeks   - Pt will demonstrate excellent knowledge and adherence to HEP to facilitate optimal recovery.  - Pt will demonstrate proper PFM contraction, relaxation, and lengthening coordinated with TA and breath for improved muscle coordination needed for functional activity.    Long Term Goals: 12 weeks   - Pt will demonstrate excellent knowledge and adherence to HEP for continued self-maintenance of symptoms.  - Pt will report FOTO score of 10% improvement or more indicating clinically relevant increase in function.  - Pt will report voiding interval of 2-3 hours for improved ADL tolerance.  - Pt will report ability to delay fecal urge for at least 10 minutes to maintain continence with ADL/IADLs.   - Pt will report stool consistency of [place] Stool Scale type 4-5 at least 80% of the time for improved bowel function.   - Pt will demonstrate PFM strength of at least 3/5 MMT for improved strength needed to maintain continence.   - Pt will report bearing down appropriately 100% of the time for improved bowel function and decreased stress on adjacent pelvic structures.   - Pt will demonstrate independence with pressure-management strategies to decreased stress on adjacent pelvic structures.   - Pt will be able to successfully complete sexual intercourse without pain for improved ADL tolerance.       Plan     Plan of care Certification: 7/19/2024 to 10/19/2024.    Outpatient Physical Therapy 1 times weekly for 12 weeks to include the following interventions: therapeutic exercises, therapeutic activity, neuromuscular re-education,  manual therapy, modalities PRN, patient/family education, dry needling, and self care/home management    Ania Peterson, PT, DPT, WCS

## 2024-07-22 ENCOUNTER — OFFICE VISIT (OUTPATIENT)
Dept: SURGERY | Facility: CLINIC | Age: 39
End: 2024-07-22
Payer: COMMERCIAL

## 2024-07-22 ENCOUNTER — TELEPHONE (OUTPATIENT)
Dept: SURGERY | Facility: CLINIC | Age: 39
End: 2024-07-22
Payer: COMMERCIAL

## 2024-07-22 ENCOUNTER — LAB VISIT (OUTPATIENT)
Dept: LAB | Facility: HOSPITAL | Age: 39
End: 2024-07-22
Attending: STUDENT IN AN ORGANIZED HEALTH CARE EDUCATION/TRAINING PROGRAM
Payer: COMMERCIAL

## 2024-07-22 VITALS — BODY MASS INDEX: 45.67 KG/M2 | WEIGHT: 291 LBS | HEIGHT: 67 IN

## 2024-07-22 DIAGNOSIS — E55.9 VITAMIN D INSUFFICIENCY: ICD-10-CM

## 2024-07-22 DIAGNOSIS — Z90.89 S/P PARATHYROIDECTOMY: Primary | ICD-10-CM

## 2024-07-22 DIAGNOSIS — Z98.890 S/P PARATHYROIDECTOMY: ICD-10-CM

## 2024-07-22 DIAGNOSIS — Z98.890 S/P PARATHYROIDECTOMY: Primary | ICD-10-CM

## 2024-07-22 DIAGNOSIS — Z90.89 S/P PARATHYROIDECTOMY: ICD-10-CM

## 2024-07-22 LAB
ALBUMIN SERPL BCP-MCNC: 3 G/DL (ref 3.5–5.2)
ANION GAP SERPL CALC-SCNC: 9 MMOL/L (ref 8–16)
BUN SERPL-MCNC: 12 MG/DL (ref 6–20)
CALCIUM SERPL-MCNC: 8.5 MG/DL (ref 8.7–10.5)
CHLORIDE SERPL-SCNC: 103 MMOL/L (ref 95–110)
CO2 SERPL-SCNC: 26 MMOL/L (ref 23–29)
CREAT SERPL-MCNC: 0.7 MG/DL (ref 0.5–1.4)
EST. GFR  (NO RACE VARIABLE): >60 ML/MIN/1.73 M^2
GLUCOSE SERPL-MCNC: 106 MG/DL (ref 70–110)
PHOSPHATE SERPL-MCNC: 3.2 MG/DL (ref 2.7–4.5)
POTASSIUM SERPL-SCNC: 4 MMOL/L (ref 3.5–5.1)
SODIUM SERPL-SCNC: 138 MMOL/L (ref 136–145)

## 2024-07-22 PROCEDURE — 99024 POSTOP FOLLOW-UP VISIT: CPT | Mod: 95,,, | Performed by: STUDENT IN AN ORGANIZED HEALTH CARE EDUCATION/TRAINING PROGRAM

## 2024-07-22 PROCEDURE — 1159F MED LIST DOCD IN RCRD: CPT | Mod: CPTII,95,, | Performed by: STUDENT IN AN ORGANIZED HEALTH CARE EDUCATION/TRAINING PROGRAM

## 2024-07-22 PROCEDURE — 36415 COLL VENOUS BLD VENIPUNCTURE: CPT | Performed by: STUDENT IN AN ORGANIZED HEALTH CARE EDUCATION/TRAINING PROGRAM

## 2024-07-22 PROCEDURE — 4010F ACE/ARB THERAPY RXD/TAKEN: CPT | Mod: CPTII,95,, | Performed by: STUDENT IN AN ORGANIZED HEALTH CARE EDUCATION/TRAINING PROGRAM

## 2024-07-22 PROCEDURE — 80069 RENAL FUNCTION PANEL: CPT | Performed by: STUDENT IN AN ORGANIZED HEALTH CARE EDUCATION/TRAINING PROGRAM

## 2024-07-22 NOTE — TELEPHONE ENCOUNTER
----- Message from Ilya Alarcon MA sent at 7/22/2024  9:26 AM CDT -----  Can you put the order in  ----- Message -----  From: Raiza Epperson MD  Sent: 7/22/2024   9:12 AM CDT  To: [facility] Eastman Staff    Please schedule an RFP in 1 week.

## 2024-07-22 NOTE — ASSESSMENT & PLAN NOTE
Primary hyperparathyroidism with hypercalcemia, hypercalciuria, nephrolithiasis, and young age s/p subtotal parathyroidectomy (right inferior parathyroid gland remains in situ) on 5/17/2024.  Calcium corrects to normal (9.3 mg/dL)     - Stop calcitriol  - Increase to 5000 IU daily  - RFP in 1 week  - Plan for RFP, PTH and vitamin D at 6 months post op (scheduled for November)

## 2024-07-22 NOTE — PROGRESS NOTES
Postoperative Endocrine Surgery Clinic Note    Reason for visit / Chief complaint: Postoperative evaluation  Endocrinologist: Karolina  Procedure:  Parathyroidectomy  Procedure Date: 5/17/2024    Subjective:     Procedure:   Subtotal parathyroidectomy, bilateral exploration:  Excision of right superior parathyroid gland with possible adenoma  Excision of left superior parathyroid adenoma  Excision of left inferior parathyroid adenoma  Biopsy of right inferior parathyroid gland  Intraoperative monitoring and interpretation of bilateral cranial nerves (vagus and recurrent laryngeal nerves) using the Acsendo system  Intraoperative PTH level sampling and interpretation    Operative findings:   All four parathyroid glands identified and confirmed parathyroid tissue with frozen sectioning.  Most normal appearing gland was the right inferior parathyroid gland.  Right superior parathyroid adenoma was identified and appeared to have a pale atypical adenoma versus lymph node adjacent to gland, both excised together.  Right inferior parathyroid gland was identified, tiny biopsy confirmed parathyroid tissue, as it was the most normal gland, it was maintained on a native vascular pedicle.  Left superior parathyroid gland appeared abnormally enlarged and dark in color.  Gland excised.  Left inferior parathyroid gland appeared abnormally enlarged and dark in color.  Gland excised.  Appropriate decrease in PTH following excision of three glands.  The bilateral recurrent laryngeal nerves and vagus nerves were identified and preserved.  Function was verified using the nerve monitoring system.    Post op Visit 05/23/24: Unfortunately she has had a complicated postoperative course due to symptomatic hypocalcemia requiring brief hospitalization and increase calcium supplementation.  She denies signs or symptoms of hypocalcemia. She is currently taking Calcitriol 0.5 BID as well as Calcium Carbonate 2000mcg TID. Her most recent  labwork reveals a corrected calcium of 8.5 but she states she is still having intermittent episodes of perioral tingling in her face/lips and fingertips. Her phonation is at baseline.  Pain is well controlled.    - Continue Calcitriol 0.5 mcg BID, med rec adjusted  - Discussed switching from Calcium Carbonate to Calcium Citrate q8h but due to concerns of poor GI absorption due to IBS/IBD symptoms.  Patient concerned with magnesium allergy, she will stick with TUMS Ultra 2 tablets q8 hours for now.  - Discussed recommendation to spread out dosing to every 8 hours for calcium carbonate to avoid early morning symptoms  - Reviewed pathology  - Incision care discussed, scar massage and routine scar care information provided  - RFP tomorrow  - Bowel regimen to avoid constipation with calcium carbonate  - Follow up as planned on/about 6/3 with labs prior    Interval History (6/10/24):  Patient presenting for virtual visit today for follow-up.  Currently on 2000 mg q8h calcium carbonate. Calcitrol weaned off after prior labs.  Labs drawn today, corrected calcium is 8.1 mg/dL. She was having some paresthesias and cramping.      - Restart Calcitriol 0.25 mcg BID  - Calcium carbonate 1000 mg TID  - 1000 IU over the counter vitamin D3 daily  - RFP in 2 weeks  - Virtual visit in 2 weeks  - Bowel regimen to avoid constipation with calcium carbonate  - RFP, PTH and vitamin D in 6 months    Interval History (6/24/24):  Patient presenting for virtual visit today for follow-up. Currently taking Calcitriol 0.25mcg BID, Tums Ultra 1000mg TID, and Cholecalciferol 1000u QD. Labwork this morning showed a corrected calcium of 9.2. She is still experiencing intermittent episodes of extremity and perioral tingling.     Interval history 7/8/2024: Virtual visit for post-op hypocalcemia. RFP panel today with calcium of 8.9 with albumin of 3.2 and that corrects to calcium of 9.5. She states she had only had 1 episode of symptoms form  hypocalcemia once since her last visit and it was when she forgot to take her medications. She has not needed supplemental tums.  She continues to take 1000 IU over the counter vitamin D3 daily.    Interval history 7/22/2024:   Patient connected for a virtual follow up visit.  She had her labs drawn this morning, calcium corrects to 9.3 mg/dL for an albumin of 3.0. She remains on 0.25 mcg daily calcitriol and has not taken any Tums.  She had a single episode of mild tingling.  She is still taking the 1000 IU over the counter vitamin D3 daily.      Current Outpatient Medications   Medication Sig Dispense Refill    atorvastatin (LIPITOR) 40 MG tablet Take 1 tablet (40 mg total) by mouth once daily. 90 tablet 3    calcitRIOL (ROCALTROL) 0.25 MCG Cap Take 1 capsule (0.25 mcg total) by mouth once daily.      candesartan (ATACAND) 4 MG tablet Take 1 tablet (4 mg total) by mouth once daily. 30 tablet 11    cholecalciferol, vitamin D3, (VITAMIN D3) 25 mcg (1,000 unit) capsule Take 1 capsule (1,000 Units total) by mouth once daily.      hydrOXYzine HCL (ATARAX) 25 MG tablet Take 1 tablet (25 mg total) by mouth 3 (three) times daily as needed for Anxiety. 30 tablet 2    loperamide HCl (IMODIUM A-D ORAL) Take by mouth as needed. diarrhea      rimegepant (NURTEC) 75 mg odt Take 1 tablet (75 mg total) by mouth daily as needed for Migraine. Place ODT tablet on the tongue; alternatively the ODT tablet may be placed under the tongue 8 tablet 11     No current facility-administered medications for this visit.     Review of patient's allergies indicates:   Allergen Reactions    Contrast media Anaphylaxis    Iodine and iodide containing products Anaphylaxis    Levaquin [levofloxacin] Anaphylaxis    Levofloxacin in d5w Anaphylaxis    Sulfa (sulfonamide antibiotics) Anaphylaxis and Hives    Adhesive tape-silicones     Iodinated contrast media Hives    Iodine     Magnesium      Pt reporting she is allergic to magnesium citrate oral  "drink.     Morphine Hives    Tree nuts     Adhesive Rash    Compazine [prochlorperazine] Anxiety     Restless legs    Depacon [valproate sodium] Hives     Pt experienced hives at IV site upon 8th day of depacon administration.  Hives resolved with stopping medication in 1.5 hours.    Nut [tree nut] Hives       Review of Systems  Negative except as per HPI.     Objective:   Ht 5' 7" (1.702 m)   Wt 132 kg (291 lb 0.1 oz)   LMP 01/11/2024 (Exact Date)   BMI 45.58 kg/m²   Examination limited by virtual setting.      General: alert, well appearing, and in no distress  Neck: neck appears flat without erythema or edema  Incision: well appears well approximated  Neurological: phonation is normal    Labs:  Lab Results   Component Value Date    CALCIUM 8.5 (L) 07/22/2024    ALBUMIN 3.0 (L) 07/22/2024    PHOS 3.2 07/22/2024    [id number] 18 (L) 12/09/2023 07/08/24: Calcium corrects to 9.5 mg/dL  07/22/24: Calcium corrects to 9.3 mg/dL    Pathology:  Final Pathologic Diagnosis   Date Value Ref Range Status   05/17/2024   Final    1. Parathyroid, right superior, biopsy:   - Parathyroid tissue present   - Weight:  11.2 mg    2. Parathyroid, right inferior, biopsy:   - Parathyroid tissue present  - Weight:  1.7 mg      3. Parathyroid, left superior, biopsy:   - Parathyroid tissue present   - Weight:  17 mg     4. Parathyroid, left inferior, biopsy:   - Parathyroid tissue present   - Weight: 13.9 mg    5. Parathyroid, right superior, adenoma, excision:   - Enlarged hypercellular parathyroid gland   - Weight:  759.8 mg    6. Parathyroid, left superior, adenoma, excision:   - Enlarged and hypercellular parathyroid gland   - Weight: 56.3 mg     7. Parathyroid, left inferior, adenoma, excision:   - Enlarged hypercellular parathyroid gland   - Weight:  54.9 mg         Comment:     Interp By ABELARDO Hairston MD, Signed on 05/21/2024 at 12:49         Assessment and Plan     Problem List Items Addressed This Visit       Vitamin " D insufficiency    Current Assessment & Plan     Chronically insufficient. Vitamin D 18 in 12/2023.     - Increase to 5000 IU once off calcitriol         S/P parathyroidectomy - Primary    Current Assessment & Plan     Primary hyperparathyroidism with hypercalcemia, hypercalciuria, nephrolithiasis, and young age s/p subtotal parathyroidectomy (right inferior parathyroid gland remains in situ) on 5/17/2024.  Calcium corrects to normal (9.3 mg/dL)     - Stop calcitriol  - Increase to 5000 IU daily  - RFP in 1 week  - Plan for RFP, PTH and vitamin D at 6 months post op (scheduled for November)          Raiza Epperson MD  Staff Surgeon  Endocrine Surgery  7/22/24

## 2024-07-25 ENCOUNTER — HOSPITAL ENCOUNTER (OUTPATIENT)
Dept: RADIOLOGY | Facility: HOSPITAL | Age: 39
Discharge: HOME | End: 2024-07-25
Attending: NURSE PRACTITIONER
Payer: COMMERCIAL

## 2024-07-25 DIAGNOSIS — Z80.3 FAMILY HISTORY OF BREAST CANCER: ICD-10-CM

## 2024-07-25 DIAGNOSIS — R92.333 HETEROGENEOUSLY DENSE TISSUE OF BOTH BREASTS ON MAMMOGRAPHY: ICD-10-CM

## 2024-07-25 DIAGNOSIS — Z91.89 AT HIGH RISK FOR BREAST CANCER: ICD-10-CM

## 2024-07-25 PROCEDURE — 76641 ULTRASOUND BREAST COMPLETE: CPT | Mod: 26,50,, | Performed by: RADIOLOGY

## 2024-07-25 PROCEDURE — 76641 ULTRASOUND BREAST COMPLETE: CPT | Mod: TC,50

## 2024-08-06 ENCOUNTER — CLINICAL SUPPORT (OUTPATIENT)
Dept: REHABILITATION | Facility: HOSPITAL | Age: 39
End: 2024-08-06
Payer: COMMERCIAL

## 2024-08-06 DIAGNOSIS — R19.8 ABNORMAL FREQUENCY OF DEFECATION: ICD-10-CM

## 2024-08-06 DIAGNOSIS — N39.8 DYSFUNCTIONAL VOIDING OF URINE: ICD-10-CM

## 2024-08-06 DIAGNOSIS — N94.10 DYSPAREUNIA IN FEMALE: ICD-10-CM

## 2024-08-06 DIAGNOSIS — M62.89 HIGH-TONE PELVIC FLOOR DYSFUNCTION: Primary | ICD-10-CM

## 2024-08-06 PROCEDURE — 97140 MANUAL THERAPY 1/> REGIONS: CPT | Mod: PO,CQ

## 2024-08-07 ENCOUNTER — DOCUMENTATION ONLY (OUTPATIENT)
Dept: REHABILITATION | Facility: HOSPITAL | Age: 39
End: 2024-08-07
Payer: COMMERCIAL

## 2024-08-09 ENCOUNTER — OFFICE VISIT (OUTPATIENT)
Dept: BARIATRICS | Facility: CLINIC | Age: 39
End: 2024-08-09
Payer: COMMERCIAL

## 2024-08-09 VITALS
BODY MASS INDEX: 47.06 KG/M2 | OXYGEN SATURATION: 94 % | DIASTOLIC BLOOD PRESSURE: 89 MMHG | SYSTOLIC BLOOD PRESSURE: 140 MMHG | WEIGHT: 292.81 LBS | HEART RATE: 106 BPM | HEIGHT: 66 IN

## 2024-08-09 DIAGNOSIS — K76.0 FATTY LIVER: ICD-10-CM

## 2024-08-09 DIAGNOSIS — Z71.3 ENCOUNTER FOR WEIGHT LOSS COUNSELING: ICD-10-CM

## 2024-08-09 DIAGNOSIS — E66.01 CLASS 3 SEVERE OBESITY DUE TO EXCESS CALORIES WITH SERIOUS COMORBIDITY AND BODY MASS INDEX (BMI) OF 45.0 TO 49.9 IN ADULT: Primary | ICD-10-CM

## 2024-08-09 DIAGNOSIS — G47.33 OBSTRUCTIVE SLEEP APNEA: Chronic | ICD-10-CM

## 2024-08-09 DIAGNOSIS — C55 MALIGNANT NEOPLASM OF UTERUS, UNSPECIFIED SITE: ICD-10-CM

## 2024-08-09 DIAGNOSIS — I10 PRIMARY HYPERTENSION: Chronic | ICD-10-CM

## 2024-08-09 DIAGNOSIS — E78.5 HYPERLIPIDEMIA, UNSPECIFIED HYPERLIPIDEMIA TYPE: ICD-10-CM

## 2024-08-09 DIAGNOSIS — G93.2 IIH (IDIOPATHIC INTRACRANIAL HYPERTENSION): Chronic | ICD-10-CM

## 2024-08-09 DIAGNOSIS — E28.2 PCOS (POLYCYSTIC OVARIAN SYNDROME): Chronic | ICD-10-CM

## 2024-08-09 PROCEDURE — 99999 PR PBB SHADOW E&M-EST. PATIENT-LVL IV: CPT | Mod: PBBFAC,,, | Performed by: STUDENT IN AN ORGANIZED HEALTH CARE EDUCATION/TRAINING PROGRAM

## 2024-08-09 PROCEDURE — 4010F ACE/ARB THERAPY RXD/TAKEN: CPT | Mod: CPTII,S$GLB,, | Performed by: STUDENT IN AN ORGANIZED HEALTH CARE EDUCATION/TRAINING PROGRAM

## 2024-08-09 PROCEDURE — 1159F MED LIST DOCD IN RCRD: CPT | Mod: CPTII,S$GLB,, | Performed by: STUDENT IN AN ORGANIZED HEALTH CARE EDUCATION/TRAINING PROGRAM

## 2024-08-09 PROCEDURE — 3077F SYST BP >= 140 MM HG: CPT | Mod: CPTII,S$GLB,, | Performed by: STUDENT IN AN ORGANIZED HEALTH CARE EDUCATION/TRAINING PROGRAM

## 2024-08-09 PROCEDURE — 3079F DIAST BP 80-89 MM HG: CPT | Mod: CPTII,S$GLB,, | Performed by: STUDENT IN AN ORGANIZED HEALTH CARE EDUCATION/TRAINING PROGRAM

## 2024-08-09 PROCEDURE — 99204 OFFICE O/P NEW MOD 45 MIN: CPT | Mod: S$GLB,,, | Performed by: STUDENT IN AN ORGANIZED HEALTH CARE EDUCATION/TRAINING PROGRAM

## 2024-08-09 PROCEDURE — 3008F BODY MASS INDEX DOCD: CPT | Mod: CPTII,S$GLB,, | Performed by: STUDENT IN AN ORGANIZED HEALTH CARE EDUCATION/TRAINING PROGRAM

## 2024-08-09 PROCEDURE — 1160F RVW MEDS BY RX/DR IN RCRD: CPT | Mod: CPTII,S$GLB,, | Performed by: STUDENT IN AN ORGANIZED HEALTH CARE EDUCATION/TRAINING PROGRAM

## 2024-08-09 RX ORDER — SEMAGLUTIDE 1 MG/.5ML
1 INJECTION, SOLUTION SUBCUTANEOUS
Qty: 2 ML | Refills: 0 | Status: SHIPPED | OUTPATIENT
Start: 2024-10-04 | End: 2024-10-26

## 2024-08-09 RX ORDER — SEMAGLUTIDE 0.25 MG/.5ML
0.25 INJECTION, SOLUTION SUBCUTANEOUS
Qty: 2 ML | Refills: 0 | Status: SHIPPED | OUTPATIENT
Start: 2024-08-09 | End: 2024-08-31

## 2024-08-09 RX ORDER — SEMAGLUTIDE 0.5 MG/.5ML
0.5 INJECTION, SOLUTION SUBCUTANEOUS
Qty: 2 ML | Refills: 0 | Status: SHIPPED | OUTPATIENT
Start: 2024-09-06 | End: 2024-09-28

## 2024-08-09 NOTE — PATIENT INSTRUCTIONS
Start Wegovy 0.25 mg once a week x 4 weeks, then 0.5 mg once a week for 4 weeks, then 1 mg once a week x 4 weeks.    Decrease portions as soon as you start Wegovy. Avoid fried, greasy, fatty foods.     Some nausea in the first couple weeks is not unusual as your body adjusts to the medication.     If you experience persistent severe abdominal pain that radiates to your back, please call the office.             Copyright © 2011, [facility]. For more information about The Healthy Eating Plate, please see The Nutrition Source, Department of Nutrition, [employer], www.thenutritionsource.org, and [employer], www.[email or web address].[email or web address].      Meal Planning & Grocery Shopping    Meal planning builds the foundation for healthy eating. When you have structured ideas for healthy meals and foods available at home to prepare those meals, weight control becomes easier.  If only healthy foods are available at home, then you will be much more likely to eat healthy foods. And you will be less likely to go to a restaurant or  a fast food meal, which tend to be unhealthy and higher in calories than meals prepared at home.      Take 5-10 minutes each week to plan meals for the next 7 days.  Make a grocery list based on the meal plan.    Grocery Shopping Tips:  Shop on a full stomach.  Schedule your shopping for times when you are most motivated and able to be disciplined about your purchases. For example, after a stressful day at work it may be difficult to make the healthiest choices. Shopping at other times, such as early in the morning or after dinner, may be easier.  Focus your shopping on the outside aisles of the store, which tend to contain more fresh foods and lower calorie foods. The inside aisles tend to have more processed foods.  Stick to your list. Avoid buying unhealthy items just because they are on sale.   Compare nutrition labels to check the number of calories  and percentage of fat.      What to buy:    Vegetables  Fresh vegetables  Frozen vegetables with no sauce or added salt  Canned vegetables with no sauce or added salt    Protein  Lean meats, such as chicken and turkey  Limit red meats, such as beef to no more than 1x/week  Limit processed meats, such as cold cuts, koehler, sausage, and hot dogs. Look for brands that have no nitrites and are minimally processed. Consider turkey sausage or turkey koehler.  Fish and Shellfish  Eggs  Dried beans  Canned beans (reduced sodium)    Fat  Use healthy oils, such as olive oil or canola oil, for cooking, salad dressings, etc.  Unflavored nuts and seeds  Nut butters (no added sugar)    Dairy  Yogurt (no sugar added)  Cheese  Low-fat milk  Unsweetened nondairy milks (almond milk, soy milk, etc)    Fruit  Fresh Fruit  Frozen fruit with no added sugar  Canned fruit with no added sugar  Dried fruit with no added sugar  100% fruit juice    Whole Grains  Single ingredient grains, such as oats, quinoa, brown rice  Whole-wheat pasta  Sprouted whole-grain bread    What to avoid:  - Avoid fried foods  - Avoid foods with added sugar  - Avoid sugar-sweetened beverages  - Avoid ultra-processed foods

## 2024-08-09 NOTE — PROGRESS NOTES
Subjective     Patient ID: Sonia Solorzano is a 38 y.o. female.    Chief Complaint: Obesity and Consult    Patient presents for treatment of obesity.     Co-morbidities   HTN  HLD  KERI  Fatty Liver  GERD  PCOS - has taken Metformin in past, d/c when IIH developed  Bipolar  Migraines  IBS  IIH  H/o uterine cancer    H/o hysterectomy 2/2024    Negative for thyroid cancer    Weight History  Lowest adult weight: 250 lbs  Highest adult weight: 325 lbs     History of Weight Loss Efforts    Current Physical Activity  No regular exercise routine  Limited by knee pain and restrictions, also still recovering from hysterectomy    Current Eating Habits  Breakfast - cereal (cinnamon toast crunch, samuel charms); waffle; chocolate milk  Lunch - skips  Dinner - crockpot meals, usually pasta with chicken; hamburger helper  Snacks - cheese crackers  Beverages - soft drink (about 1/day); sweet tea; water; coffee    Medical Weight Loss  8/9/2024: 292.8 lbs, BMI 47.3, BFP 54.3%, .2 lbs, SMM 74.5 lbs, BMR 1680 kcal          Review of Systems   Constitutional:  Negative for chills and fever.   Respiratory:  Negative for shortness of breath.    Cardiovascular:  Negative for chest pain and palpitations.   Gastrointestinal:  Negative for abdominal pain, nausea and vomiting.   Neurological:  Negative for dizziness and light-headedness.   Psychiatric/Behavioral:  The patient is not nervous/anxious.           Objective    Latest Reference Range & Units 07/22/24 07:56   Sodium 136 - 145 mmol/L 138   Potassium 3.5 - 5.1 mmol/L 4.0   Chloride 95 - 110 mmol/L 103   CO2 23 - 29 mmol/L 26   Anion Gap 8 - 16 mmol/L 9   BUN 6 - 20 mg/dL 12   Creatinine 0.5 - 1.4 mg/dL 0.7   eGFR >60 mL/min/1.73 m^2 >60   Glucose 70 - 110 mg/dL 106   Calcium 8.7 - 10.5 mg/dL 8.5 (L)   Phosphorus Level 2.7 - 4.5 mg/dL 3.2   Albumin 3.5 - 5.2 g/dL 3.0 (L)   (L): Data is abnormally low    Vitals:    08/09/24 1411   BP: (!) 140/89   Pulse: 106       Physical  Exam  Vitals reviewed.   Constitutional:       General: She is not in acute distress.     Appearance: Normal appearance. She is obese. She is not ill-appearing, toxic-appearing or diaphoretic.   HENT:      Head: Normocephalic and atraumatic.   Cardiovascular:      Rate and Rhythm: Normal rate.   Pulmonary:      Effort: Pulmonary effort is normal. No respiratory distress.   Skin:     General: Skin is warm and dry.   Neurological:      Mental Status: She is alert and oriented to person, place, and time.            Assessment and Plan     1. Class 3 severe obesity due to excess calories with serious comorbidity and body mass index (BMI) of 45.0 to 49.9 in adult  -     Ambulatory referral/consult to Bariatric/Obesity Medicine  -     semaglutide, weight loss, (WEGOVY) 0.25 mg/0.5 mL PnIj; Inject 0.25 mg into the skin every 7 days. for 4 doses  Dispense: 2 mL; Refill: 0  -     semaglutide, weight loss, (WEGOVY) 0.5 mg/0.5 mL PnIj; Inject 0.5 mg into the skin every 7 days. for 4 doses  Dispense: 2 mL; Refill: 0  -     semaglutide, weight loss, (WEGOVY) 1 mg/0.5 mL PnIj; Inject 1 mg into the skin every 7 days. for 4 doses  Dispense: 2 mL; Refill: 0    2. Obstructive sleep apnea  -     semaglutide, weight loss, (WEGOVY) 0.25 mg/0.5 mL PnIj; Inject 0.25 mg into the skin every 7 days. for 4 doses  Dispense: 2 mL; Refill: 0  -     semaglutide, weight loss, (WEGOVY) 0.5 mg/0.5 mL PnIj; Inject 0.5 mg into the skin every 7 days. for 4 doses  Dispense: 2 mL; Refill: 0  -     semaglutide, weight loss, (WEGOVY) 1 mg/0.5 mL PnIj; Inject 1 mg into the skin every 7 days. for 4 doses  Dispense: 2 mL; Refill: 0    3. Primary hypertension  -     semaglutide, weight loss, (WEGOVY) 0.25 mg/0.5 mL PnIj; Inject 0.25 mg into the skin every 7 days. for 4 doses  Dispense: 2 mL; Refill: 0  -     semaglutide, weight loss, (WEGOVY) 0.5 mg/0.5 mL PnIj; Inject 0.5 mg into the skin every 7 days. for 4 doses  Dispense: 2 mL; Refill: 0  -      semaglutide, weight loss, (WEGOVY) 1 mg/0.5 mL PnIj; Inject 1 mg into the skin every 7 days. for 4 doses  Dispense: 2 mL; Refill: 0    4. PCOS (polycystic ovarian syndrome)  -     semaglutide, weight loss, (WEGOVY) 0.25 mg/0.5 mL PnIj; Inject 0.25 mg into the skin every 7 days. for 4 doses  Dispense: 2 mL; Refill: 0  -     semaglutide, weight loss, (WEGOVY) 0.5 mg/0.5 mL PnIj; Inject 0.5 mg into the skin every 7 days. for 4 doses  Dispense: 2 mL; Refill: 0  -     semaglutide, weight loss, (WEGOVY) 1 mg/0.5 mL PnIj; Inject 1 mg into the skin every 7 days. for 4 doses  Dispense: 2 mL; Refill: 0    5. Hyperlipidemia, unspecified hyperlipidemia type  -     semaglutide, weight loss, (WEGOVY) 0.25 mg/0.5 mL PnIj; Inject 0.25 mg into the skin every 7 days. for 4 doses  Dispense: 2 mL; Refill: 0  -     semaglutide, weight loss, (WEGOVY) 0.5 mg/0.5 mL PnIj; Inject 0.5 mg into the skin every 7 days. for 4 doses  Dispense: 2 mL; Refill: 0  -     semaglutide, weight loss, (WEGOVY) 1 mg/0.5 mL PnIj; Inject 1 mg into the skin every 7 days. for 4 doses  Dispense: 2 mL; Refill: 0    6. Fatty liver  -     semaglutide, weight loss, (WEGOVY) 0.25 mg/0.5 mL PnIj; Inject 0.25 mg into the skin every 7 days. for 4 doses  Dispense: 2 mL; Refill: 0  -     semaglutide, weight loss, (WEGOVY) 0.5 mg/0.5 mL PnIj; Inject 0.5 mg into the skin every 7 days. for 4 doses  Dispense: 2 mL; Refill: 0  -     semaglutide, weight loss, (WEGOVY) 1 mg/0.5 mL PnIj; Inject 1 mg into the skin every 7 days. for 4 doses  Dispense: 2 mL; Refill: 0    7. IIH (idiopathic intracranial hypertension)    8. Malignant neoplasm of uterus, unspecified site    9. Encounter for weight loss counseling        - Log all food and beverage intake with a daily calorie goal of 1400 calories per day    - Activity as tolerated

## 2024-08-12 ENCOUNTER — PATIENT MESSAGE (OUTPATIENT)
Dept: BARIATRICS | Facility: CLINIC | Age: 39
End: 2024-08-12
Payer: COMMERCIAL

## 2024-08-13 ENCOUNTER — CLINICAL SUPPORT (OUTPATIENT)
Dept: REHABILITATION | Facility: HOSPITAL | Age: 39
End: 2024-08-13
Payer: COMMERCIAL

## 2024-08-13 ENCOUNTER — LAB VISIT (OUTPATIENT)
Dept: LAB | Facility: HOSPITAL | Age: 39
End: 2024-08-13
Attending: INTERNAL MEDICINE
Payer: COMMERCIAL

## 2024-08-13 ENCOUNTER — OFFICE VISIT (OUTPATIENT)
Dept: HEMATOLOGY/ONCOLOGY | Facility: CLINIC | Age: 39
End: 2024-08-13
Payer: COMMERCIAL

## 2024-08-13 ENCOUNTER — LAB VISIT (OUTPATIENT)
Dept: LAB | Facility: HOSPITAL | Age: 39
End: 2024-08-13
Attending: STUDENT IN AN ORGANIZED HEALTH CARE EDUCATION/TRAINING PROGRAM
Payer: COMMERCIAL

## 2024-08-13 DIAGNOSIS — M62.89 HIGH-TONE PELVIC FLOOR DYSFUNCTION: Primary | ICD-10-CM

## 2024-08-13 DIAGNOSIS — Z98.890 S/P PARATHYROIDECTOMY: ICD-10-CM

## 2024-08-13 DIAGNOSIS — N39.8 DYSFUNCTIONAL VOIDING OF URINE: ICD-10-CM

## 2024-08-13 DIAGNOSIS — D50.9 IRON DEFICIENCY ANEMIA, UNSPECIFIED IRON DEFICIENCY ANEMIA TYPE: ICD-10-CM

## 2024-08-13 DIAGNOSIS — E21.3 HYPERPARATHYROIDISM: ICD-10-CM

## 2024-08-13 DIAGNOSIS — N94.10 DYSPAREUNIA IN FEMALE: ICD-10-CM

## 2024-08-13 DIAGNOSIS — R19.8 ABNORMAL FREQUENCY OF DEFECATION: ICD-10-CM

## 2024-08-13 DIAGNOSIS — Z80.3 FAMILY HISTORY OF BREAST CANCER: Primary | ICD-10-CM

## 2024-08-13 DIAGNOSIS — Z90.89 S/P PARATHYROIDECTOMY: ICD-10-CM

## 2024-08-13 LAB
ALBUMIN SERPL BCP-MCNC: 3.3 G/DL (ref 3.5–5.2)
ANION GAP SERPL CALC-SCNC: 12 MMOL/L (ref 8–16)
BASOPHILS # BLD AUTO: 0.04 K/UL (ref 0–0.2)
BASOPHILS NFR BLD: 0.4 % (ref 0–1.9)
BUN SERPL-MCNC: 15 MG/DL (ref 6–20)
CALCIUM SERPL-MCNC: 8.2 MG/DL (ref 8.7–10.5)
CHLORIDE SERPL-SCNC: 104 MMOL/L (ref 95–110)
CO2 SERPL-SCNC: 25 MMOL/L (ref 23–29)
CREAT SERPL-MCNC: 0.8 MG/DL (ref 0.5–1.4)
DIFFERENTIAL METHOD BLD: ABNORMAL
EOSINOPHIL # BLD AUTO: 0.2 K/UL (ref 0–0.5)
EOSINOPHIL NFR BLD: 1.4 % (ref 0–8)
ERYTHROCYTE [DISTWIDTH] IN BLOOD BY AUTOMATED COUNT: 16 % (ref 11.5–14.5)
EST. GFR  (NO RACE VARIABLE): >60 ML/MIN/1.73 M^2
FERRITIN SERPL-MCNC: 28 NG/ML (ref 20–300)
GLUCOSE SERPL-MCNC: 94 MG/DL (ref 70–110)
HCT VFR BLD AUTO: 37.4 % (ref 37–48.5)
HGB BLD-MCNC: 11.2 G/DL (ref 12–16)
IMM GRANULOCYTES # BLD AUTO: 0.06 K/UL (ref 0–0.04)
IMM GRANULOCYTES NFR BLD AUTO: 0.5 % (ref 0–0.5)
IRON SERPL-MCNC: <10 UG/DL (ref 30–160)
LYMPHOCYTES # BLD AUTO: 2.5 K/UL (ref 1–4.8)
LYMPHOCYTES NFR BLD: 22.8 % (ref 18–48)
MCH RBC QN AUTO: 24.8 PG (ref 27–31)
MCHC RBC AUTO-ENTMCNC: 29.9 G/DL (ref 32–36)
MCV RBC AUTO: 83 FL (ref 82–98)
MONOCYTES # BLD AUTO: 0.6 K/UL (ref 0.3–1)
MONOCYTES NFR BLD: 5.6 % (ref 4–15)
NEUTROPHILS # BLD AUTO: 7.6 K/UL (ref 1.8–7.7)
NEUTROPHILS NFR BLD: 69.3 % (ref 38–73)
NRBC BLD-RTO: 0 /100 WBC
PHOSPHATE SERPL-MCNC: 3.6 MG/DL (ref 2.7–4.5)
PLATELET # BLD AUTO: 422 K/UL (ref 150–450)
PMV BLD AUTO: 9.6 FL (ref 9.2–12.9)
POTASSIUM SERPL-SCNC: 3.9 MMOL/L (ref 3.5–5.1)
RBC # BLD AUTO: 4.51 M/UL (ref 4–5.4)
SATURATED IRON: ABNORMAL % (ref 20–50)
SODIUM SERPL-SCNC: 141 MMOL/L (ref 136–145)
TOTAL IRON BINDING CAPACITY: ABNORMAL UG/DL (ref 250–450)
TRANSFERRIN SERPL-MCNC: <75 MG/DL (ref 200–375)
WBC # BLD AUTO: 10.92 K/UL (ref 3.9–12.7)

## 2024-08-13 PROCEDURE — 97140 MANUAL THERAPY 1/> REGIONS: CPT | Mod: PO,CQ

## 2024-08-13 PROCEDURE — 97530 THERAPEUTIC ACTIVITIES: CPT | Mod: PO,CQ

## 2024-08-13 PROCEDURE — 84466 ASSAY OF TRANSFERRIN: CPT | Performed by: INTERNAL MEDICINE

## 2024-08-13 PROCEDURE — G2211 COMPLEX E/M VISIT ADD ON: HCPCS | Mod: 95,,, | Performed by: INTERNAL MEDICINE

## 2024-08-13 PROCEDURE — 82728 ASSAY OF FERRITIN: CPT | Performed by: INTERNAL MEDICINE

## 2024-08-13 PROCEDURE — 80069 RENAL FUNCTION PANEL: CPT | Performed by: STUDENT IN AN ORGANIZED HEALTH CARE EDUCATION/TRAINING PROGRAM

## 2024-08-13 PROCEDURE — 85025 COMPLETE CBC W/AUTO DIFF WBC: CPT | Performed by: INTERNAL MEDICINE

## 2024-08-13 PROCEDURE — 99214 OFFICE O/P EST MOD 30 MIN: CPT | Mod: 95,,, | Performed by: INTERNAL MEDICINE

## 2024-08-13 PROCEDURE — 4010F ACE/ARB THERAPY RXD/TAKEN: CPT | Mod: CPTII,95,, | Performed by: INTERNAL MEDICINE

## 2024-08-13 NOTE — PROGRESS NOTES
Pelvic Health Physical Therapy   Treatment Note     Name: Sonia [place]wood  Clinic Number: [id number]    Therapy Diagnosis:   Encounter Diagnoses   Name Primary?    High-tone pelvic floor dysfunction Yes    Dyspareunia in female     Dysfunctional voiding of urine     Abnormal frequency of defecation      Physician: Kirsten Weaver MD    Visit Date: 8/13/2024  Physician Orders: PT Eval and Treat   Medical Diagnosis from Referral: Pelvic pain [R10.2]   Evaluation Date: 7/19/2024  Plan of Care Expiration: 10/19/2024  Visit # / Visits authorized: 2/ 12     FOTO 1 /3 on 7/19/2024        Time In: 3:15  Time Out: 4:30  Total Appointment Time (timed & untimed codes): 75 minutes     Precautions:  shunt     Subjective     Pt reports: Severe pain in pelvis with sharp shooting pain towards her rectum. She reported significant soreness following previous visit that subsided within 48 hours. She also reported pain in bilateral knees.  She was compliant with home exercise program.  Response to previous treatment: moderate soreness   Functional change: ongoing     Pain: level ranges from 0/10 to 10/10 when sharp pain occurs   Location: bilateral groin      Objective     Sonia received therapeutic exercises to develop  strength, endurance, ROM, flexibility, posture, and core stabilization for 0 minutes including:       Sonia received the following manual therapy techniques: to develop flexibility, extensibility, and desensitization for 60 minutes including: trigger point/myofascial release of   and soft tissue mobilization of Pelvic Floor musculature    Verbal consent given prior to internal exam 8/13/2024     Internal soft tissue manipulation included targeted levator ani and external included coccyx and sacral muscle attachments.       Sonia participated in neuromuscular re-education activities to develop Coordination, Control, Down training, Posture, Proprioception, Kinesthetic, Balance, and Sense for 0 minutes  including:       Sonia participated in dynamic functional therapeutic activities to improve functional performance for 15  minutes, including:     Performance of and education in self truncal MLD for decongestion of trunk, activation of lymphatics and desensitization techniques for abdomen, anterior pelvis and proximal quad region.     Home Exercises Provided and Patient Education Provided     Education provided:   - anatomy/physiology of pelvic floor  Discussed progression of plan of care with patient; educated pt in activity modification; reviewed HEP with pt. Pt demonstrated and verbalized understanding of all instruction and was not provided with a handout of HEP (see Patient Instructions).  -     Written Home Exercises Provided: Patient instructed to cont prior HEP.  Exercises were reviewed and Sonia was able to demonstrate them prior to the end of the session.  Sonia demonstrated good  understanding of the education provided.     See EMR under Patient Instructions for exercises provided prior visit.    Assessment     Patient agreed to intravaginal manual therapy today which revealed severe hypertension throughout Pelvic Floor with significant tenderness upon palpation on muscle attachments near coccyx. Patient reported feeling pressure in anterior pelvic region with palpation of posterior muscles. Upon further questioning patient revealed that she has been having increased swelling in both abdomen and bilateral lower extremities. After consulting with lymphedema certified therapist Savannah Flanagan D.P.T Abdominal massage performed with patient to activate lymphatics and decongest abdomen. Care was taken to avoid pressure on abdominal shunt. She was educated on self performance as well desensitizing techniques for the entire region. It is hopeful that these techniques will improve patient's tolerance for Pelvic Floor therapy and interventions.      Sonia Is progressing well towards her goals.    Pt prognosis is Good.     Pt will continue to benefit from skilled outpatient physical therapy to address the deficits listed in the problem list box on initial evaluation, provide pt/family education and to maximize pt's level of independence in the home and community environment.     Pt's spiritual, cultural and educational needs considered and pt agreeable to plan of care and goals.     Anticipated barriers to physical therapy: co-morbidities     Goals: Goals:  Short Term Goals: 6 weeks   - Pt will demonstrate excellent knowledge and adherence to HEP to facilitate optimal recovery.  - Pt will demonstrate proper PFM contraction, relaxation, and lengthening coordinated with TA and breath for improved muscle coordination needed for functional activity.     Long Term Goals: 12 weeks   - Pt will demonstrate excellent knowledge and adherence to HEP for continued self-maintenance of symptoms.  - Pt will report FOTO score of 10% improvement or more indicating clinically relevant increase in function.  - Pt will report voiding interval of 2-3 hours for improved ADL tolerance.  - Pt will report ability to delay fecal urge for at least 10 minutes to maintain continence with ADL/IADLs.   - Pt will report stool consistency of Page Stool Scale type 4-5 at least 80% of the time for improved bowel function.   - Pt will demonstrate PFM strength of at least 3/5 MMT for improved strength needed to maintain continence.   - Pt will report bearing down appropriately 100% of the time for improved bowel function and decreased stress on adjacent pelvic structures.   - Pt will demonstrate independence with pressure-management strategies to decreased stress on adjacent pelvic structures.   - Pt will be able to successfully complete sexual intercourse without pain for improved ADL tolerance.     Plan     Plan of care Certification: 7/19/2024 to 10/19/2024.     Outpatient Physical Therapy 1 times weekly for 12 weeks    Mge Salamanca  PTA
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
## 2024-08-13 NOTE — PROGRESS NOTES
Primary hyperparathyroidism with hypercalcemia, hypercalciuria, nephrolithiasis, and young age s/p subtotal parathyroidectomy (right inferior parathyroid gland remains in situ) on 5/17/2024.    Calcium corrects to 8.8 for hypoalbuminemia, off all calcium and calcitriol.    - Remain off calcitriol/ calcium carbonate  - Continue 5000 IU vitamin D3 daily

## 2024-08-16 ENCOUNTER — NURSE TRIAGE (OUTPATIENT)
Dept: ADMINISTRATIVE | Facility: CLINIC | Age: 39
End: 2024-08-16
Payer: COMMERCIAL

## 2024-08-16 ENCOUNTER — TELEPHONE (OUTPATIENT)
Dept: NEUROSURGERY | Facility: CLINIC | Age: 39
End: 2024-08-16
Payer: COMMERCIAL

## 2024-08-16 DIAGNOSIS — G93.2 BENIGN INTRACRANIAL HYPERTENSION: Primary | ICD-10-CM

## 2024-08-16 DIAGNOSIS — Z98.2 S/P VP SHUNT: ICD-10-CM

## 2024-08-16 NOTE — TELEPHONE ENCOUNTER
Sonia c/o instant headache with standing that affects hearing and vision. Resolves with sitting down. Symptoms occurring for a week. New symptom is numbness and tingling only on anterior body with standing up. Resolves with sitting down. Pt reports she has a  shunt that was placed in 2022. Advised pt per triage protocol to go to nearest ED now for physician nya. Instructed to call  now if no immediate . V/u.   Reason for Disposition   Headache (with neurologic deficit)    Additional Information   Negative: Difficult to awaken or acting confused (e.g., disoriented, slurred speech)   Negative: New neurologic deficit that is present NOW, sudden onset of ANY of the following: * Weakness of the face, arm, or leg on one side of the body* Numbness of the face, arm, or leg on one side of the body* Loss of speech or garbled speech   Negative: Sounds like a life-threatening emergency to the triager   Negative: SEVERE weakness (i.e., unable to walk or barely able to walk, requires support) and new-onset or worsening    Protocols used: Neurologic Deficit-A-[place]

## 2024-08-16 NOTE — TELEPHONE ENCOUNTER
"Pt urged to go to the ER; pt stated "I will be heading that way within the next hour." Pt advised to go as soon as she can due to her symptoms. Pt verbalized understanding.  "

## 2024-08-16 NOTE — TELEPHONE ENCOUNTER
Returned call to pt. Informed the appt with the PA. Pt agreed. Asked pt if she is planning to go to the ER. Pt stated NO. Advised the pt to go to the ER.

## 2024-08-16 NOTE — TELEPHONE ENCOUNTER
----- Message from Clara Kunz sent at 8/16/2024  3:35 PM CDT -----  Regarding: appt  Contact: [phone]  Pt asking to EP appt type for headaches. Pls call to discuss.

## 2024-08-21 ENCOUNTER — OFFICE VISIT (OUTPATIENT)
Dept: NEUROSURGERY | Facility: CLINIC | Age: 39
End: 2024-08-21
Payer: COMMERCIAL

## 2024-08-21 ENCOUNTER — CLINICAL SUPPORT (OUTPATIENT)
Dept: REHABILITATION | Facility: HOSPITAL | Age: 39
End: 2024-08-21
Payer: COMMERCIAL

## 2024-08-21 DIAGNOSIS — R19.8 ABNORMAL FREQUENCY OF DEFECATION: ICD-10-CM

## 2024-08-21 DIAGNOSIS — N39.8 DYSFUNCTIONAL VOIDING OF URINE: ICD-10-CM

## 2024-08-21 DIAGNOSIS — G93.2 IIH (IDIOPATHIC INTRACRANIAL HYPERTENSION): Primary | ICD-10-CM

## 2024-08-21 DIAGNOSIS — N94.10 DYSPAREUNIA IN FEMALE: ICD-10-CM

## 2024-08-21 DIAGNOSIS — M62.89 HIGH-TONE PELVIC FLOOR DYSFUNCTION: Primary | ICD-10-CM

## 2024-08-21 PROCEDURE — 97140 MANUAL THERAPY 1/> REGIONS: CPT | Mod: PO

## 2024-08-21 PROCEDURE — 97530 THERAPEUTIC ACTIVITIES: CPT | Mod: PO

## 2024-08-21 PROCEDURE — 99212 OFFICE O/P EST SF 10 MIN: CPT | Mod: 95,,, | Performed by: PHYSICIAN ASSISTANT

## 2024-08-21 PROCEDURE — 4010F ACE/ARB THERAPY RXD/TAKEN: CPT | Mod: CPTII,95,, | Performed by: PHYSICIAN ASSISTANT
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
## 2024-08-21 NOTE — PROGRESS NOTES
Pelvic Health Physical Therapy   Treatment Note     Name: Sonia [place]  Clinic Number: [id number]    Therapy Diagnosis:   Encounter Diagnoses   Name Primary?    High-tone pelvic floor dysfunction Yes    Dyspareunia in female     Dysfunctional voiding of urine     Abnormal frequency of defecation      Physician: Kirsten Weaver MD    Visit Date: 8/21/2024  Physician Orders: PT Eval and Treat   Medical Diagnosis from Referral: Pelvic pain [R10.2]   Evaluation Date: 7/19/2024  Plan of Care Expiration: 10/19/2024  Visit # / Visits authorized: 2/ 12     FOTO 1 /3 on 7/19/2024        Time In: 3:15  Time Out: 4:30  Total Appointment Time (timed & untimed codes): 75 minutes     Precautions:  shunt     Subjective     Pt reports: doesn't feel like the lymphatic techniques have helped; her pelvic pain has been pretty bad this week but doesn't think that it is a direct result of the lymphatic drainage. She has been lifting more at work the last week and was doing a lot around the house this last weekend.     Has noticed that if she sits on the toilet for a bowel movement longer than 90 sec her legs go numb. Also notes her arm will go numb if she lays with her arm over her head.   No other bowel or bladder changes.     She was compliant with home exercise program.  Response to previous treatment: moderate soreness   Functional change: ongoing     Pain: level ranges from 0/10 to 10/10 when sharp pain occurs   Location: bilateral groin      Objective     Sonia received therapeutic exercises to develop  strength, endurance, ROM, flexibility, posture, and core stabilization for 0 minutes including:       Sonia received the following manual therapy techniques: to develop flexibility, extensibility, and desensitization for 30 minutes including: trigger point/myofascial release of   and soft tissue mobilization of Pelvic Floor musculature    Verbal consent given prior to internal exam 8/21/2024     external soft  tissue manipulation included targeted levator ani and external included coccyx and sacral muscle attachments.       Sonia participated in neuromuscular re-education activities to develop Coordination, Control, Down training, Posture, Proprioception, Kinesthetic, Balance, and Sense for 0 minutes including:       Sonia participated in dynamic functional therapeutic activities to improve functional performance for 45 minutes, including:     Performance of and education in self truncal MLD for decongestion of trunk, activation of lymphatics and desensitization techniques for abdomen, anterior pelvis and proximal quad region. - provided by Savannah Flanagan, physical therapist, CLT    Home Exercises Provided and Patient Education Provided     Education provided:   - anatomy/physiology of pelvic floor  Discussed progression of plan of care with patient; educated pt in activity modification; reviewed HEP with pt. Pt demonstrated and verbalized understanding of all instruction and was not provided with a handout of HEP (see Patient Instructions).  -     Written Home Exercises Provided: Patient instructed to cont prior HEP.  Exercises were reviewed and Sonia was able to demonstrate them prior to the end of the session.  Sonia demonstrated good  understanding of the education provided.     See EMR under Patient Instructions for exercises provided prior visit.    Assessment     After consulting with lymphedema certified therapist Savannah HARRISPMALGORZATA Abdominal massage performed with patient to activate lymphatics and decongest abdomen. Care was taken to avoid pressure on abdominal shunt. She was educated on self performance as well desensitizing techniques for the entire region. It is hopeful that these techniques will improve patient's tolerance for Pelvic Floor therapy and interventions.    Patient to follow up with neurologist today or tomorrow and possibly have shunt adjusted     Sonia Is progressing well  towards her goals.   Pt prognosis is Good.     Pt will continue to benefit from skilled outpatient physical therapy to address the deficits listed in the problem list box on initial evaluation, provide pt/family education and to maximize pt's level of independence in the home and community environment.     Pt's spiritual, cultural and educational needs considered and pt agreeable to plan of care and goals.     Anticipated barriers to physical therapy: co-morbidities     Goals: Goals:  Short Term Goals: 6 weeks   - Pt will demonstrate excellent knowledge and adherence to HEP to facilitate optimal recovery.  - Pt will demonstrate proper PFM contraction, relaxation, and lengthening coordinated with TA and breath for improved muscle coordination needed for functional activity.     Long Term Goals: 12 weeks   - Pt will demonstrate excellent knowledge and adherence to HEP for continued self-maintenance of symptoms.  - Pt will report FOTO score of 10% improvement or more indicating clinically relevant increase in function.  - Pt will report voiding interval of 2-3 hours for improved ADL tolerance.  - Pt will report ability to delay fecal urge for at least 10 minutes to maintain continence with ADL/IADLs.   - Pt will report stool consistency of [place] Stool Scale type 4-5 at least 80% of the time for improved bowel function.   - Pt will demonstrate PFM strength of at least 3/5 MMT for improved strength needed to maintain continence.   - Pt will report bearing down appropriately 100% of the time for improved bowel function and decreased stress on adjacent pelvic structures.   - Pt will demonstrate independence with pressure-management strategies to decreased stress on adjacent pelvic structures.   - Pt will be able to successfully complete sexual intercourse without pain for improved ADL tolerance.     Plan     Plan of care Certification: 7/19/2024 to 10/19/2024.     Outpatient Physical Therapy 1 times weekly for 12  eder Peterson, PT

## 2024-08-22 ENCOUNTER — TELEPHONE (OUTPATIENT)
Dept: NEUROSURGERY | Facility: CLINIC | Age: 39
End: 2024-08-22
Payer: COMMERCIAL
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
## 2024-08-27 ENCOUNTER — HOSPITAL ENCOUNTER (OUTPATIENT)
Dept: RADIOLOGY | Facility: HOSPITAL | Age: 39
Discharge: HOME | End: 2024-08-27
Attending: PHYSICIAN ASSISTANT
Payer: COMMERCIAL

## 2024-08-27 ENCOUNTER — TELEPHONE (OUTPATIENT)
Dept: OPHTHALMOLOGY | Facility: CLINIC | Age: 39
End: 2024-08-27
Payer: COMMERCIAL

## 2024-08-27 ENCOUNTER — OFFICE VISIT (OUTPATIENT)
Dept: NEUROSURGERY | Facility: CLINIC | Age: 39
End: 2024-08-27
Payer: COMMERCIAL

## 2024-08-27 VITALS — HEART RATE: 89 BPM | DIASTOLIC BLOOD PRESSURE: 85 MMHG | SYSTOLIC BLOOD PRESSURE: 129 MMHG

## 2024-08-27 DIAGNOSIS — G93.2 IIH (IDIOPATHIC INTRACRANIAL HYPERTENSION): Primary | ICD-10-CM

## 2024-08-27 DIAGNOSIS — G93.2 IIH (IDIOPATHIC INTRACRANIAL HYPERTENSION): ICD-10-CM

## 2024-08-27 PROCEDURE — 99213 OFFICE O/P EST LOW 20 MIN: CPT | Mod: S$GLB,,, | Performed by: PHYSICIAN ASSISTANT

## 2024-08-27 PROCEDURE — 3074F SYST BP LT 130 MM HG: CPT | Mod: CPTII,S$GLB,, | Performed by: PHYSICIAN ASSISTANT

## 2024-08-27 PROCEDURE — 4010F ACE/ARB THERAPY RXD/TAKEN: CPT | Mod: CPTII,S$GLB,, | Performed by: PHYSICIAN ASSISTANT

## 2024-08-27 PROCEDURE — 70250 X-RAY EXAM OF SKULL: CPT | Mod: 26,,, | Performed by: RADIOLOGY

## 2024-08-27 PROCEDURE — 62252 CSF SHUNT REPROGRAM: CPT | Mod: S$GLB,,, | Performed by: PHYSICIAN ASSISTANT

## 2024-08-27 PROCEDURE — 70450 CT HEAD/BRAIN W/O DYE: CPT | Mod: 26,,, | Performed by: RADIOLOGY

## 2024-08-27 PROCEDURE — 99999 PR PBB SHADOW E&M-EST. PATIENT-LVL III: CPT | Mod: PBBFAC,,, | Performed by: PHYSICIAN ASSISTANT

## 2024-08-27 PROCEDURE — 70450 CT HEAD/BRAIN W/O DYE: CPT | Mod: TC

## 2024-08-27 PROCEDURE — 1159F MED LIST DOCD IN RCRD: CPT | Mod: CPTII,S$GLB,, | Performed by: PHYSICIAN ASSISTANT

## 2024-08-27 PROCEDURE — 70250 X-RAY EXAM OF SKULL: CPT | Mod: TC

## 2024-08-27 PROCEDURE — 3079F DIAST BP 80-89 MM HG: CPT | Mod: CPTII,S$GLB,, | Performed by: PHYSICIAN ASSISTANT

## 2024-08-27 NOTE — TELEPHONE ENCOUNTER
----- Message from Latha Sherwood PA-C sent at 8/27/2024  9:19 AM CDT -----  Hey! This is a [facility] patient that was seeing Dr. Forrest prior, last visit in 2022. Hoping we can get her scheduled to see Dr. Andre. I know she has a lengthy wait list so I wanted to her on that! Thanks!    Latha Sherwood PA-C  Neurosurgery   [facility]

## 2024-08-27 NOTE — PROGRESS NOTES
Procedure:  Shunt reprogramming  Indication: IIH - positional headaches      The [place] Hakim  was placed over the shunt valve, and the setting was reset to 60 to 90 mm of H2O. The patient tolerated this well, with no complications. She was informed to call the clinic with any further questions or concerns in the meantime.      Latha Sherwood PA-C  Neurosurgery   [facility]

## 2024-08-29 NOTE — PATIENT INSTRUCTIONS
Self-massage for the trunk     1. At the base of your neck above your collarbones - gentle circles, x 10  2. Squeeze your arms against your sides, x 10  3. Deep breaths: 5 seconds inhale, 5 second exhale x 3  4. Scoops at the inguinal line towards your belly button, x5  5. Deep breaths: 5 seconds inhale, 5 second exhale x 3  6. Squeeze your arms against your sides, x 10  7. At the base of your neck above your collarbones - gentle circles, x 10    Complete minimum of 3 x/day, as often as every 2 hours.    DRINK WATER, DRINK WATER, DRINK WATER  Elevate your arm/legs throughout the day

## 2024-09-03 ENCOUNTER — PATIENT MESSAGE (OUTPATIENT)
Dept: BARIATRICS | Facility: CLINIC | Age: 39
End: 2024-09-03
Payer: COMMERCIAL

## 2024-09-03 ENCOUNTER — CLINICAL SUPPORT (OUTPATIENT)
Dept: REHABILITATION | Facility: HOSPITAL | Age: 39
End: 2024-09-03
Payer: COMMERCIAL

## 2024-09-03 DIAGNOSIS — M62.89 HIGH-TONE PELVIC FLOOR DYSFUNCTION: Primary | ICD-10-CM

## 2024-09-03 DIAGNOSIS — R19.8 ABNORMAL FREQUENCY OF DEFECATION: ICD-10-CM

## 2024-09-03 DIAGNOSIS — N39.8 DYSFUNCTIONAL VOIDING OF URINE: ICD-10-CM

## 2024-09-03 DIAGNOSIS — N94.10 DYSPAREUNIA IN FEMALE: ICD-10-CM

## 2024-09-03 PROCEDURE — 97140 MANUAL THERAPY 1/> REGIONS: CPT | Mod: PO

## 2024-09-03 NOTE — PROGRESS NOTES
Pelvic Health Physical Therapy   Treatment Note     Name: Sonia [place] [facility] Number: [id number]    Therapy Diagnosis:   Encounter Diagnoses   Name Primary?    High-tone pelvic floor dysfunction Yes    Dyspareunia in female     Dysfunctional voiding of urine     Abnormal frequency of defecation      Physician: Kirsten Weaver MD    Visit Date: 9/3/2024  Physician Orders: PT Eval and Treat   Medical Diagnosis from Referral: Pelvic pain [R10.2]   Evaluation Date: 7/19/2024  Plan of Care Expiration: 10/19/2024  Visit # / Visits authorized: 2/ 12     FOTO 1 /3 on 7/19/2024        Time In: 3:15  Time Out: 4:30  Total Appointment Time (timed & untimed codes): 75 minutes     Precautions:  shunt     Subjective     Pt reports: got her shunt adjusted last week, headaches have been less intense. Hasn't noticed a change in the pelvic pain.   Hasn't noticed a change with the lymphatic decongestion home exercise program despite doing it regularly.       She was compliant with home exercise program.  Response to previous treatment: moderate soreness   Functional change: ongoing     Pain: level ranges from 0/10 to 10/10 when sharp pain occurs   Location: bilateral groin      Objective     Sonia received therapeutic exercises to develop  strength, endurance, ROM, flexibility, posture, and core stabilization for 0 minutes including:       Sonia received the following manual therapy techniques: to develop flexibility, extensibility, and desensitization for 45 minutes including: trigger point/myofascial release of   and soft tissue mobilization of Pelvic Floor musculature  external soft tissue manipulation included targeted levator ani and external included coccyx and sacral muscle attachments.   Verbal consent given prior to internal exam 9/3/2024           Sonia participated in neuromuscular re-education activities to develop Coordination, Control, Down training, Posture, Proprioception, Kinesthetic, Balance,  and Sense for 0 minutes including:       Sonia participated in dynamic functional therapeutic activities to improve functional performance for 00 minutes, including:     Performance of and education in self truncal MLD for decongestion of trunk, activation of lymphatics and desensitization techniques for abdomen, anterior pelvis and proximal quad region. - provided by Savannah Flanagan, physical therapist, CLT    Home Exercises Provided and Patient Education Provided     Education provided:   - anatomy/physiology of pelvic floor  Discussed progression of plan of care with patient; educated pt in activity modification; reviewed HEP with pt. Pt demonstrated and verbalized understanding of all instruction and was not provided with a handout of HEP (see Patient Instructions).  -     Written Home Exercises Provided: Patient instructed to cont prior HEP.  Exercises were reviewed and Sonia was able to demonstrate them prior to the end of the session.  Sonia demonstrated good  understanding of the education provided.     See EMR under Patient Instructions for exercises provided prior visit.    Assessment     Reassessment of internal pelvic floor muscles performed per patient consent. Patient still presents with hypersensitivity of pelvic floor muscles with pain referral noted as running anterior to lower abdomen. Increased muscle bulk but not firm as would expect with high tone pelvic floor muscles. Patient also notes bilateral LE pitting edema that had resolved after hysterectomy but has started again - worse later in the day, better upon waking in the morning. Will message referring provider about lymphedema consult.     Sonia Is progressing well towards her goals.   Pt prognosis is Good.     Pt will continue to benefit from skilled outpatient physical therapy to address the deficits listed in the problem list box on initial evaluation, provide pt/family education and to maximize pt's level of independence in  the home and community environment.     Pt's spiritual, cultural and educational needs considered and pt agreeable to plan of care and goals.     Anticipated barriers to physical therapy: co-morbidities     Goals: Goals:  Short Term Goals: 6 weeks   - Pt will demonstrate excellent knowledge and adherence to HEP to facilitate optimal recovery.  - Pt will demonstrate proper PFM contraction, relaxation, and lengthening coordinated with TA and breath for improved muscle coordination needed for functional activity.     Long Term Goals: 12 weeks   - Pt will demonstrate excellent knowledge and adherence to HEP for continued self-maintenance of symptoms.  - Pt will report FOTO score of 10% improvement or more indicating clinically relevant increase in function.  - Pt will report voiding interval of 2-3 hours for improved ADL tolerance.  - Pt will report ability to delay fecal urge for at least 10 minutes to maintain continence with ADL/IADLs.   - Pt will report stool consistency of [place] Stool Scale type 4-5 at least 80% of the time for improved bowel function.   - Pt will demonstrate PFM strength of at least 3/5 MMT for improved strength needed to maintain continence.   - Pt will report bearing down appropriately 100% of the time for improved bowel function and decreased stress on adjacent pelvic structures.   - Pt will demonstrate independence with pressure-management strategies to decreased stress on adjacent pelvic structures.   - Pt will be able to successfully complete sexual intercourse without pain for improved ADL tolerance.     Plan     Plan of care Certification: 7/19/2024 to 10/19/2024.     Outpatient Physical Therapy 1 times weekly for 12 weeks    Ania Peterson PT

## 2024-09-05 DIAGNOSIS — I89.0 LYMPHEDEMA: Primary | ICD-10-CM

## 2024-09-06 ENCOUNTER — OFFICE VISIT (OUTPATIENT)
Dept: DERMATOLOGY | Facility: CLINIC | Age: 39
End: 2024-09-06
Payer: COMMERCIAL

## 2024-09-06 VITALS — BODY MASS INDEX: 47.05 KG/M2 | WEIGHT: 292.75 LBS | HEIGHT: 66 IN

## 2024-09-06 DIAGNOSIS — D22.9 MULTIPLE BENIGN NEVI: Primary | ICD-10-CM

## 2024-09-06 DIAGNOSIS — Z00.00 GENERAL MEDICAL EXAM: ICD-10-CM

## 2024-09-06 DIAGNOSIS — L81.4 SOLAR LENTIGO: ICD-10-CM

## 2024-09-06 DIAGNOSIS — Z12.83 SKIN CANCER SCREENING: ICD-10-CM

## 2024-09-06 NOTE — PROGRESS NOTES
Subjective:      Patient ID:  Sonia Solorzano is a 38 y.o. female who presents for   Chief Complaint   Patient presents with    Skin Check     TBSC      LOV 4/5/22 () NUB    1.  Skin, left flank, shave biopsy:   - Melanocytic nevus, compound type with congenital features.   This lesion is benign.     Patient here today for TBSC    H/O Uterine cancer- Hysterectomy 02/2024    Derm Hx:  Denies Phx of NMSC  Fhx of MM in father.   MGM with pancreatic cancer      Current Outpatient Medications:   ·  atorvastatin (LIPITOR) 40 MG tablet, Take 1 tablet (40 mg total) by mouth once daily., Disp: 90 tablet, Rfl: 3  ·  candesartan (ATACAND) 4 MG tablet, Take 1 tablet (4 mg total) by mouth once daily., Disp: 30 tablet, Rfl: 11  ·  cholecalciferol, vitamin D3, (VITAMIN D3) 25 mcg (1,000 unit) capsule, Take 1 capsule (1,000 Units total) by mouth once daily., Disp: , Rfl:   ·  hydrOXYzine HCL (ATARAX) 25 MG tablet, Take 1 tablet (25 mg total) by mouth 3 (three) times daily as needed for Anxiety., Disp: 30 tablet, Rfl: 2  ·  loperamide HCl (IMODIUM A-D ORAL), Take by mouth as needed. diarrhea, Disp: , Rfl:   ·  rimegepant (NURTEC) 75 mg odt, Take 1 tablet (75 mg total) by mouth daily as needed for Migraine. Place ODT tablet on the tongue; alternatively the ODT tablet may be placed under the tongue, Disp: 8 tablet, Rfl: 11  ·  semaglutide, weight loss, (WEGOVY) 0.5 mg/0.5 mL PnIj, Inject 0.5 mg into the skin every 7 days. for 4 doses (Patient not taking: Reported on 9/6/2024), Disp: 2 mL, Rfl: 0  ·  [START ON 10/4/2024] semaglutide, weight loss, (WEGOVY) 1 mg/0.5 mL PnIj, Inject 1 mg into the skin every 7 days. for 4 doses (Patient not taking: Reported on 9/6/2024), Disp: 2 mL, Rfl: 0          Review of Systems   Constitutional:  Negative for fever, chills and fatigue.   Skin:  Negative for daily sunscreen use and activity-related sunscreen use.   Hematologic/Lymphatic: Bruises/bleeds easily.       Objective:    Physical Exam   Constitutional: She appears well-developed and well-nourished. No distress.   Neurological: She is alert and oriented to person, place, and time. She is not disoriented.   Psychiatric: She has a normal mood and affect.   Skin:   Areas Examined (abnormalities noted in diagram):   Scalp / Hair Palpated and Inspected  Head / Face Inspection Performed  Neck Inspection Performed  Chest / Axilla Inspection Performed  Abdomen Inspection Performed  Genitals / Buttocks / Groin Inspection Performed  Back Inspection Performed  RUE Inspected  LUE Inspection Performed  RLE Inspected  LLE Inspection Performed  Nails and Digits Inspection Performed                         Diagram Legend     Erythematous scaling macule/papule c/w actinic keratosis       Vascular papule c/w angioma      Pigmented verrucoid papule/plaque c/w seborrheic keratosis      Yellow umbilicated papule c/w sebaceous hyperplasia      Irregularly shaped tan macule c/w lentigo     1-2 mm smooth white papules consistent with Milia      Movable subcutaneous cyst with punctum c/w epidermal inclusion cyst      Subcutaneous movable cyst c/w pilar cyst      Firm pink to brown papule c/w dermatofibroma      Pedunculated fleshy papule(s) c/w skin tag(s)      Evenly pigmented macule c/w junctional nevus     Mildly variegated pigmented, slightly irregular-bordered macule c/w mildly atypical nevus      Flesh colored to evenly pigmented papule c/w intradermal nevus       Pink pearly papule/plaque c/w basal cell carcinoma      Erythematous hyperkeratotic cursted plaque c/w SCC      Surgical scar with no sign of skin cancer recurrence      Open and closed comedones      Inflammatory papules and pustules      Verrucoid papule consistent consistent with wart     Erythematous eczematous patches and plaques     Dystrophic onycholytic nail with subungual debris c/w onychomycosis     Umbilicated papule    Erythematous-base heme-crusted tan verrucoid plaque  consistent with inflamed seborrheic keratosis     Erythematous Silvery Scaling Plaque c/w Psoriasis     See annotation      Assessment / Plan:        Multiple benign nevi  Patient instructed in importance in daily broad spectrum sun protection of at least spf 30. Mineral sunscreen ingredients preferred (Zinc +/- Titanium) and can be found OTC.   Patient encouraged to wear hat for all outdoor exposure.   Also discussed sun avoidance and use of protective clothing.    General medical exam  -     Ambulatory referral/consult to Dermatology    Solar lentigo  This is a benign hyperpigmented sun induced lesion. Daily sun protection will reduce the number of new lesions. Treatment of these benign lesions are considered cosmetic.    Skin cancer screening  Total body skin examination performed today including at least 12 points as noted in physical examination. No lesions suspicious for malignancy noted.    Xanthelasma  B lower medial lids  On statin, managed by PCP           No follow-ups on file.

## 2024-09-13 ENCOUNTER — CLINICAL SUPPORT (OUTPATIENT)
Dept: REHABILITATION | Facility: HOSPITAL | Age: 39
End: 2024-09-13
Payer: COMMERCIAL

## 2024-09-13 ENCOUNTER — CLINICAL SUPPORT (OUTPATIENT)
Dept: REHABILITATION | Facility: HOSPITAL | Age: 39
End: 2024-09-13
Attending: OBSTETRICS & GYNECOLOGY
Payer: COMMERCIAL

## 2024-09-13 DIAGNOSIS — N39.8 DYSFUNCTIONAL VOIDING OF URINE: ICD-10-CM

## 2024-09-13 DIAGNOSIS — M62.89 HIGH-TONE PELVIC FLOOR DYSFUNCTION: Primary | ICD-10-CM

## 2024-09-13 DIAGNOSIS — I89.0 LYMPHEDEMA: ICD-10-CM

## 2024-09-13 DIAGNOSIS — R19.8 ABNORMAL FREQUENCY OF DEFECATION: ICD-10-CM

## 2024-09-13 DIAGNOSIS — N94.10 DYSPAREUNIA IN FEMALE: ICD-10-CM

## 2024-09-13 PROCEDURE — 97165 OT EVAL LOW COMPLEX 30 MIN: CPT | Mod: PO

## 2024-09-13 PROCEDURE — 97140 MANUAL THERAPY 1/> REGIONS: CPT | Mod: PO,CQ

## 2024-09-13 PROCEDURE — 97535 SELF CARE MNGMENT TRAINING: CPT | Mod: PO

## 2024-09-13 NOTE — PLAN OF CARE
[facility]  Occupational Therapy Initial Evaluation     Name: Sonia Solorzano  Clinic Number: [id number]    Therapy Diagnosis:   Encounter Diagnosis   Name Primary?    Lymphedema      Physician: Kirsten Weaver MD    Physician Orders: Evaluation and treatment  Medical Diagnosis:     I89.0 (ICD-10-CM) - Lymphedema     Evaluation Date: 9/13/2024  Insurance Authorization period Expiration: 09/12/2024-12/31/2024  Plan of Care Certification Period: 09/13/2024-12/13/2024  FOTO 1: 09/13/2024  FOTO 2:  FOTO 3:  Visit # / Visits Authortized: 1 / 20  Time In:7:10  Time Out: 8:00  Total Billable Time: 50 minutes- Evaluation, Manual Therapy    Precautions: Standard and cancer    Subjective     Sonia rates pain at a 4/10 where 0 = no pain and 10 = maximal pain. Best pain gets 2/10. Worst pain gets 10/10.  She describes the pain as muscle spasms, stabbing and sharp shooting.   Patient's goals are as follows: To reduce pain and swelling in the abdomen.    History of Present Illness: She was diagnosed with uterine cancer February 2024 and underwent a hysterectomy with 2 lymph nodes removed in February 2024.  She has 1 ovary left.  No chemotherapy and no radiation.      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    Sonia Solorzano  has a past surgical history that includes Dilation and curettage of uterus (); epidural steriod injections ();  section, low transverse (); Tubal ligation (); Laparoscopy (Right, 2013); Esophagogastroduodenoscopy (N/A, 10/26/2020); Colonoscopy (N/A, 10/27/2020); Intraluminal gastrointestinal tract imaging via capsule (N/A, 2020); Laparoscopic cholecystectomy (N/A, 2020); Tonsillectomy; Knee arthroscopy w/ meniscectomy (Right, 2021); Cystoscopy (N/A, 10/27/2021); Magnetic resonance imaging (N/A, 2022); Upper gastrointestinal endoscopy; Endoscopic insertion of ventriculoperitoneal shunt (Right, 2022); Endoscopic insertion of ventriculoperitoneal shunt (N/A, 2022); Esophagogastroduodenoscopy (N/A, 2023); Robot-assisted laparoscopic abdominal hysterectomy using da Rajan Xi (N/A, 2024); Robot-assisted surgical removal of fallopian tube using da Rajan Xi (Bilateral, 2024); mapping, lymph node, sentinel (Bilateral, 2024); Lymph node biopsy (Bilateral, 2024); Robot-assisted laparoscopic surgical removal of ovary using da Rajan Xi (Right, 2024); and Parathyroidectomy (N/A, 2024).    Sonia has a current medication list which includes the following prescription(s): atorvastatin, candesartan, cholecalciferol (vitamin d3), hydroxyzine hcl, loperamide hcl, nurtec, wegovy, and [START ON 10/4/2024] wegovy.    Review of patient's allergies indicates:   Allergen Reactions    Contrast media Anaphylaxis    Iodine and iodide containing products Anaphylaxis    Levaquin [levofloxacin] Anaphylaxis    Levofloxacin in d5w Anaphylaxis    Sulfa (sulfonamide antibiotics) Anaphylaxis and Hives    Adhesive tape-silicones     Iodinated contrast media Hives    Iodine     Magnesium      Pt reporting she is allergic to magnesium citrate oral drink.      Morphine Hives    Tree nuts     Adhesive Rash    Compazine [prochlorperazine] Anxiety     Restless legs    Depacon [valproate sodium] Hives     Pt experienced hives at IV site upon 8th day of depacon administration.  Hives resolved with stopping medication in 1.5 hours.    Nut [tree nut] Hives          Objective     Amount of Swelling: Moderate  Location of Swelling: Bilateral lower extremities beginning at the ankles, to the popliteal fold  and up into the abdomen.  Skin Integrity: Visible skin intact  Palpation/Texture: smooth and soft  Circulation: warm, well perfused  Posture: Fair    Range of Motion: Within Normal Limits Bilateral lower extremities     Strength: Within Normal Limits Bilateral lower extremities     Adaptive Device used for ambulation: none    Belly button: 125  Hips: 150  Lower Extremity Girth Measurements (in centimeters)  LANDMARK  Right Lower Extremity  Left Lower Extremity    Superior border of the Patella  + 30 88 89   Superior border of the Patella+ 20  74 74.5   Superior border of the Patella +10 60 60   Knee 44 47   40 cm  41.5 43.5   30 cm 44 45.5   20 cm  30.5 29.5   10 cm  23 22.5   Malleolus 24.5 23.5   Ankle- heel to dorsum of foot 31 31   Arch 22 21.5   Total Girth Measurement 482.5 487.5   Total Girth Difference 5    Total Girth Reduction       Single Limb Involvement  Mild = <10 cm< 5 cm (at site ) of Greatest Girth Difference  Mild/Mod = 10 cm-20 cm Total Girth Difference or 5.1 cm -10 cm Greatest Girth Difference  Moderate = 20 -40 cm Total Girth Difference or 10.1 cm -15cm Greatest Girth Difference  Moderate/Severe = 40.1 cm Total Girth Difference or 15.1 cm -20 cm Greatest Girth Difference    Bilateral Limb Involvement  Moderate- 10-cm-15 cm TGD or < 5 cm GGD  Moderate/Severe- 15.1-20 cm TGD or 5.1 cm -10 cm GGD  Severe => 20 cm TGD or >10 cm GGD    Sensation: intact to light touch    Treatment     Treatment Time In (separate from total time) : 7:45  Treatment Time Out  (separate from total time : 8:00    Sonia received Self Care for 15 minutes as follows:    MULTILAYERED BANDAGING:  Issued supplies and bandaged Both Lower Extremities with 6cm, 8cm,10 cm rosidal rolls - cotton stockinette, cotton padding applied from the ankle to the popliteal fold, bandaged from the toes to the popliteal fold in a figure 8 pattern with short stretch compression bandages,  To leave intact 12 -24 hours, discharge with any problems,  Return rolled bandages next session.      Issued Home Exercise Program and patient verbally understood the instructions as they were given and demonstrated proper form and technique during therapy. Patient was advise to perform these exercises free of pain, and to stop performing them if pain occurs.    Patient/Family Education: role of Occupational Therapy, goals for Occupational Therapy, scheduling/cancellations - patient verbalized understanding. Discussed insurance limitations with patient. Patient was educated in lymphedema etiology and management plans.  Patient was provided with written  risk reductions and precautions for managing lymphedema.      Assessment     This patient presents with chronic swelling in the Bilateral lower extremities resulting in: lymphedema of the Bilateral lower extremities, increased pain, increased stiffness in the ankles and knees, as well as difficulty performing lower body tasks, and placing the patient at higher risk of infection. Following medical record review it is determined that pt will benefit from occupational therapy services in order to maximize pain free and/or functional use of Bilateral lower extremities. The following goals were discussed with the patient and patient is in agreement with them as to be addressed in the treatment plan. The patient's rehab potential is Good.     Anticipated barriers to occupational therapy: none  Pt has no cultural, educational or language barriers to learning provided.    Profile and  History Assessment of Occupational Performance Level of Clinical Decision Making Complexity Score   Occupational Profile:   Sonia Solorzano is a 38 y.o. female who lives with their spouse and is currently employed as paramedic. Sonia Solorzano has difficulty with  Finding pants to fit abdomen  housework/household chores  affecting his/her daily functional abilities. His/her main goal for therapy is to reduce pain and swelling.     Comorbidities:    has a past medical history of Asthma, Bipolar disorder, Chronic anxiety, COVID-19, GERD (gastroesophageal reflux disease), GI bleed, Heart palpitations, Herniated disc, Hyperlipidemia, Hyperparathyroidism, Hypertension, IBS (irritable bowel syndrome), Idiopathic intracranial hypertension, Insomnia, Intractable migraine without aura and with status migrainosus, Irritable bowel syndrome without diarrhea, Lower back pain, Migraine headache, Palpitations, PCOS (polycystic ovarian syndrome), Sleep apnea, unspecified, and Uterine cancer.    Medical and Therapy History Review:   Brief               Performance Deficits    Physical:  Skin Integrity/Scar Formation  Edema  Pain    Cognitive:  No Deficits    Psychosocial:    No Deficits     Clinical Decision Making:  high    Assessment Process:  Problem-Focused Assessments    Modification/Need for Assistance:  Not Necessary    Intervention Selection:  Several Treatment Options       low  Based on PMHX, co morbidities , data from assessments and functional level of assistance required with task and clinical presentation directly impacting function.         Short Term Goals: (4 weeks)  Patient to be Independent and compliant with Home Exercise Program to increase lymphatic flow.  Decrease girth in abdomen by 3 cm for improved functional use of Bilateral lower extremities.  Patient will demonstrate 100% knowledge of lymphedema precautions and signs of infection.   Patient will perform self-bandaging techniques.  Patient will  perform self lymph drainage techniques to increase lymph uptake and direct lymph flow.  Patient will tolerate daily activities with multilayered bandaging or compression garment.  Skin integrity improve to Good, skin will be superficially hydrated, fungal infection will resolve, color will fade to pink and temperature will be room temperature.    Long Term Goals: (12 weeks)  Decrease girth in abdomen by 6 cm for improved functional use of Bilateral lower extremities.  Patient will show reduction in girth to mild or less with improved contour of limb.  Patient to mitul/doff compression garment with daily compliance.   Patient will demonstrate the ability to independently manage condition by discharge.    Plan     Patient to be seen 1-2 x per week for 90 days for the medically necessary treatments to include: decongestive massage- Manual lymphatic drainage, Kinesio tape, skin care, multi layered bandaging, education in lymphedema precautions, self massage, self bandaging, and assistance in obtaining appropriate compression garment.  Therapeutic exercise, postural correction, and progression of Home Exercise Program.      Ximena Sierra, OT, CLT

## 2024-09-13 NOTE — PROGRESS NOTES
Pelvic Health Physical Therapy   Treatment Note     Name: Sonia Cal[place]wood  Clinic Number: [id number]    Therapy Diagnosis:   Encounter Diagnoses   Name Primary?    High-tone pelvic floor dysfunction Yes    Dyspareunia in female     Dysfunctional voiding of urine     Abnormal frequency of defecation      Physician: Kirsten Weaver MD    Visit Date: 9/13/2024  Physician Orders: PT Eval and Treat   Medical Diagnosis from Referral: Pelvic pain [R10.2]   Evaluation Date: 7/19/2024  Plan of Care Expiration: 10/19/2024  Visit # / Visits authorized: 5/ 12     FOTO 1 /3 on 7/19/2024        Time In: 8:30  Time Out: 9:10  Total Appointment Time (timed & untimed codes): 40 minutes     Precautions:  shunt     Subjective     Pt reports: continued reduction of headache intensity. No discernable change of pelvic pain but she did report an overall reduction of rectal pain. She was evaluated for lymphedema therapy this morning.       She was compliant with home exercise program.  Response to previous treatment: moderate soreness   Functional change: ongoing     Pain: level ranges from 0/10 to 10/10 when sharp pain occurs   Location: bilateral groin      Objective     Sonia received therapeutic exercises to develop  strength, endurance, ROM, flexibility, posture, and core stabilization for 0 minutes including:       Sonia received the following manual therapy techniques: to develop flexibility, extensibility, and desensitization for 40 minutes including: trigger point/myofascial release of   and soft tissue mobilization of Pelvic Floor musculature    Verbal consent given prior to internal exam 9/13/2024     Right: high tone with increased muscle spasms with referral pain pattern to rectum   Left moderate tone referral pain to anterior pubic region.       Sonia participated in neuromuscular re-education activities to develop Coordination, Control, Down training, Posture, Proprioception, Kinesthetic, Balance, and  Sense for 0 minutes including:       Sonia participated in dynamic functional therapeutic activities to improve functional performance for 00 minutes, including:     Performance of and education in self truncal MLD for decongestion of trunk, activation of lymphatics and desensitization techniques for abdomen, anterior pelvis and proximal quad region. - provided by Savannah Flanagan, physical therapist, CLT    Home Exercises Provided and Patient Education Provided     Education provided:   - anatomy/physiology of pelvic floor  Discussed progression of plan of care with patient; educated pt in activity modification; reviewed HEP with pt. Pt demonstrated and verbalized understanding of all instruction and was not provided with a handout of HEP (see Patient Instructions).  -     Written Home Exercises Provided: Patient instructed to cont prior HEP.  Exercises were reviewed and Sonia was able to demonstrate them prior to the end of the session.  Sonia demonstrated good  understanding of the education provided.     See EMR under Patient Instructions for exercises provided prior visit.    Assessment     Reassessment of internal pelvic floor muscles performed per patient consent. Patient still presents with hypersensitivity of pelvic floor muscles with pain referral noted above. Reduced muscle bulk though tone remains high indicating a potential decrease of fluid retention in these tissues indicating good results from decongestion exercises as well as shunt adjustment. . She tolerated manual therapy today with minimal complaints. Residual soreness to be assessed at next visit.     Sonia Is progressing well towards her goals.   Pt prognosis is Good.     Pt will continue to benefit from skilled outpatient physical therapy to address the deficits listed in the problem list box on initial evaluation, provide pt/family education and to maximize pt's level of independence in the home and community environment.      Pt's spiritual, cultural and educational needs considered and pt agreeable to plan of care and goals.     Anticipated barriers to physical therapy: co-morbidities     Goals: Goals:  Short Term Goals: 6 weeks   - Pt will demonstrate excellent knowledge and adherence to HEP to facilitate optimal recovery.  - Pt will demonstrate proper PFM contraction, relaxation, and lengthening coordinated with TA and breath for improved muscle coordination needed for functional activity.     Long Term Goals: 12 weeks   - Pt will demonstrate excellent knowledge and adherence to HEP for continued self-maintenance of symptoms.  - Pt will report FOTO score of 10% improvement or more indicating clinically relevant increase in function.  - Pt will report voiding interval of 2-3 hours for improved ADL tolerance.  - Pt will report ability to delay fecal urge for at least 10 minutes to maintain continence with ADL/IADLs.   - Pt will report stool consistency of Frederick Stool Scale type 4-5 at least 80% of the time for improved bowel function.   - Pt will demonstrate PFM strength of at least 3/5 MMT for improved strength needed to maintain continence.   - Pt will report bearing down appropriately 100% of the time for improved bowel function and decreased stress on adjacent pelvic structures.   - Pt will demonstrate independence with pressure-management strategies to decreased stress on adjacent pelvic structures.   - Pt will be able to successfully complete sexual intercourse without pain for improved ADL tolerance.     Plan     Plan of care Certification: 7/19/2024 to 10/19/2024.     Outpatient Physical Therapy 1 times weekly for 12 weeks    Meg Salamanca PTA

## 2024-09-23 ENCOUNTER — CLINICAL SUPPORT (OUTPATIENT)
Dept: REHABILITATION | Facility: HOSPITAL | Age: 39
End: 2024-09-23
Payer: COMMERCIAL

## 2024-09-23 ENCOUNTER — TELEPHONE (OUTPATIENT)
Dept: NEUROSURGERY | Facility: CLINIC | Age: 39
End: 2024-09-23
Payer: COMMERCIAL

## 2024-09-23 DIAGNOSIS — N39.8 DYSFUNCTIONAL VOIDING OF URINE: ICD-10-CM

## 2024-09-23 DIAGNOSIS — R19.8 ABNORMAL FREQUENCY OF DEFECATION: ICD-10-CM

## 2024-09-23 DIAGNOSIS — N94.10 DYSPAREUNIA IN FEMALE: ICD-10-CM

## 2024-09-23 DIAGNOSIS — M62.89 HIGH-TONE PELVIC FLOOR DYSFUNCTION: Primary | ICD-10-CM

## 2024-09-23 PROCEDURE — 97112 NEUROMUSCULAR REEDUCATION: CPT | Mod: PO,CQ

## 2024-09-23 NOTE — PROGRESS NOTES
The patient location is: [place]  The chief complaint leading to consultation is: IIH s/p VPS, follow up    Visit type: audiovisual    Face to Face time with patient: 15 minutes  50 minutes of total time spent on the encounter, which includes face to face time and non-face to face time preparing to see the patient (eg, review of tests), Obtaining and/or reviewing separately obtained history, Documenting clinical information in the electronic or other health record, Independently interpreting results (not separately reported) and communicating results to the patient/family/caregiver, or Care coordination (not separately reported).         Each patient to whom he or she provides medical services by telemedicine is:  (1) informed of the relationship between the physician and patient and the respective role of any other health care provider with respect to management of the patient; and (2) notified that he or she may decline to receive medical services by telemedicine and may withdraw from such care at any time.    Notes:     History of Present Illness:  The patient is a 37 y/o female who presents today for a telemedicine follow-up after a shunt adjustment. She has a past medical history of IIH s/p VPS placement with a Hakim valve, initially programmed to 170 on September 19, 2022. The valve was gradually lowered down to setting of 60, and her symptoms were well managed at this setting until July 2024, when she began experiencing positional headaches. She was seen by Latha Sherwood PA-C, on August 27, 2024, for a shunt adjustment, increasing the setting from 60 to 90 mm H2O per Dr. Yoon.    During todays visit, the patient reports complete resolution of her positional headaches. However, she expresses concern about generalized intermittent numbness and tingling that have been ongoing for the past year. The numbness may happen in her face and in any of her extremities, but not simultaneously. She notes that these  sensations can occur with slight contact pressure on her extremities, or sometimes with different positions such as raising her arms or laying in bed, typically lasting less than 90 seconds and resolving with movement. She endorses pain with the numbness. The patient denies any history of diabetes and reports no other acute symptoms. She was on gabapentin briefly in the past for dysesthesias after her shunt surgery but is no longer taking it as that had resolved. She denies any focal weakness.    Assessment/Plan    The patient is a 38-year-old female with a past medical history of IIH s/p VPS placement with a Hakim valve, initially programmed to 170 on September 19, 2022, by Dr. Yoon, gradually adjusted down to 60, leading to effective symptom management until the onset of positional headaches in July 2024. Shunt was reprogrammed from 60 to 90, resulting in the complete resolution of her positional headaches.     However, the patient reports ongoing generalized intermittent numbness/dysesthesias affecting her face and both upper and lower extremities for approximately the past year. Given that the numbness has persisted despite various shunt adjustments, I believe it is unrelated to her intracranial hypertension.    For management, I will prescribe a low-dose gabapentin at 100 mg to be taken three times daily and will monitor her progress at the next follow-up appointment for any improvements in her symptoms. Additionally, I will order an EMG/NCS of the upper and lower extremities for further evaluation of her paresthesia.    Plan:  Medications: Prescribe gabapentin 100 mg to be taken three times daily (tid) for management of generalized paresthesia.  Follow-Up: Schedule a follow-up appointment to monitor the patients symptoms and response to gabapentin after EMG completed.  Diagnostics: Order EMG/NCS of the upper and lower extremities for further evaluation of ongoing paresthesia.  Education: Discuss with the  patient the potential side effects of gabapentin and advise her to report any concerning symptoms.      China Saucedo PA-C  Neurosurgery  [facility]      Time spent on this encounter: 50 minutes. This includes face to face time and non-face to face time preparing to see the patient (eg, review of tests), obtaining and/or reviewing separately obtained history, documenting clinical information in the electronic or other health record, independently interpreting results and communicating results to the patient/family/caregiver, or care coordinator.

## 2024-09-23 NOTE — TELEPHONE ENCOUNTER
Called and confirmed with patient that they were still able to make it for their appointment on:    Future Appointments   Date Time Provider Department Center   9/23/2024  3:15 PM Meg Salamanca, PTA [facility] [place] [facility]   9/24/2024  9:00 AM China Saucedo PA-C [facility] NEUROS8 [facility]   10/4/2024  3:30 PM Cecy Castaneda MD [facility] BARIAT [facility]   10/10/2024  8:30 AM Luz Arthur FNP [facility] GYNON OHS at [facility]   10/10/2024  3:15 PM Ania Peterson, PT [facility] [place] [facility]   10/15/2024  2:00 PM Ximena Sierra, OT [facility] [place] [facility]   10/17/2024  3:15 PM Meg Salamanca, PTA [facility] [place] [facility]   10/21/2024  3:30 PM Ximena Sierra, OT [facility] [place] [facility]   10/28/2024  3:30 PM Ximena Sierra, OT [facility] [place] [facility]   11/4/2024  5:30 PM Ximena Sierra, OT [facility] [place] [facility]   11/6/2024  4:00 PM Ania Peterson, PT [facility] [place] [facility]   11/11/2024  9:00 AM LAB, [facility] LAB [place] [facility]   11/11/2024  5:30 PM Ximena Sierra, OT [facility] [place] [facility]   11/14/2024 11:20 AM [facility]   11/15/2024 12:00 PM LAB, [facility] LAB [facility] [address]   11/18/2024  5:30 PM Ximena Sierra, OT [facility] [place] [facility]   11/25/2024  5:30 PM Ximena Sierra, OT [facility] [place] [facility]   12/30/2024  8:15 AM [facility] MAMMO1 [facility] [place]   1/16/2025  7:30 AM Dorian Guerrero MD [facility] [place]

## 2024-09-23 NOTE — PROGRESS NOTES
Pelvic Health Physical Therapy   Treatment Note     Name: Sonia Calvertwood  Clinic Number: [id number]    Therapy Diagnosis:   Encounter Diagnoses   Name Primary?    High-tone pelvic floor dysfunction Yes    Dyspareunia in female     Dysfunctional voiding of urine     Abnormal frequency of defecation      Physician: Kirsten Weaver MD    Visit Date: 9/23/2024  Physician Orders: PT Eval and Treat   Medical Diagnosis from Referral: Pelvic pain [R10.2]   Evaluation Date: 7/19/2024  Plan of Care Expiration: 10/19/2024  Visit # / Visits authorized: 6/ 12     FOTO 1 /3 on 7/19/2024        Time In: 3:30  Time Out: 4:30  Total Appointment Time (timed & untimed codes): 60 minutes     Precautions:  shunt     Subjective     Pt reports: continued reduction of headache intensity. No discernable change of pelvic pain but she did report an overall reduction of rectal pain. She reports general frustration and depression from dealing with the pain for so long. She also still has the back pain that she associated with the uterine cancer and wants additional scans to ensure she does not have any other cancerous growths.       She was compliant with home exercise program.  Response to previous treatment: moderate soreness   Functional change: ongoing     Pain: level ranges from 0/10 to 10/10 when sharp pain occurs   Location: bilateral groin      Objective     Sonia received therapeutic exercises to develop  strength, endurance, ROM, flexibility, posture, and core stabilization for 0 minutes including:       Sonia received the following manual therapy techniques: to develop flexibility, extensibility, and desensitization for 0 minutes including: trigger point/myofascial release of   and soft tissue mobilization of Pelvic Floor musculature    Verbal consent given prior to internal exam 9/23/2024     Right: high tone with increased muscle spasms with referral pain pattern to rectum   Left moderate tone referral pain to  anterior pubic region.       Sonia participated in neuromuscular re-education activities to develop Coordination, Control, Down training, Posture, Proprioception, Kinesthetic, Balance, and Sense for 60 minutes including:     Gentle stretching of pelvic and groin region including   Modified child's pose   Piriformis stretch  Butterfly stretch  Single knee to chest   Happy baby       Sonia participated in dynamic functional therapeutic activities to improve functional performance for 00 minutes, including:     Performance of and education in self truncal MLD for decongestion of trunk, activation of lymphatics and desensitization techniques for abdomen, anterior pelvis and proximal quad region. - provided by Savannah Flanagan, physical therapist, CLT    Home Exercises Provided and Patient Education Provided     Education provided:   - anatomy/physiology of pelvic floor  Discussed progression of plan of care with patient; educated pt in activity modification; reviewed HEP with pt. Pt demonstrated and verbalized understanding of all instruction and was not provided with a handout of HEP (see Patient Instructions).  -     Written Home Exercises Provided: Patient instructed to cont prior HEP.  Exercises were reviewed and Sonia was able to demonstrate them prior to the end of the session.  Sonia demonstrated good  understanding of the education provided.     See EMR under Patient Instructions for exercises provided prior visit.    Assessment     Today's treatment focused on gentle stretching for lengthening of hip and pelvis. Patient was encouraged to see Padmaja Dexter a psychologist that specializes in chronic pain management. Patient is scheduled to see her oncologist next week and is planning on asking for additional scans to clear her from any potential cancerous growths.     Sonia Is progressing well towards her goals.   Pt prognosis is Good.     Pt will continue to benefit from skilled outpatient  physical therapy to address the deficits listed in the problem list box on initial evaluation, provide pt/family education and to maximize pt's level of independence in the home and community environment.     Pt's spiritual, cultural and educational needs considered and pt agreeable to plan of care and goals.     Anticipated barriers to physical therapy: co-morbidities     Goals: Goals:  Short Term Goals: 6 weeks   - Pt will demonstrate excellent knowledge and adherence to HEP to facilitate optimal recovery.  - Pt will demonstrate proper PFM contraction, relaxation, and lengthening coordinated with TA and breath for improved muscle coordination needed for functional activity.     Long Term Goals: 12 weeks   - Pt will demonstrate excellent knowledge and adherence to HEP for continued self-maintenance of symptoms.  - Pt will report FOTO score of 10% improvement or more indicating clinically relevant increase in function.  - Pt will report voiding interval of 2-3 hours for improved ADL tolerance.  - Pt will report ability to delay fecal urge for at least 10 minutes to maintain continence with ADL/IADLs.   - Pt will report stool consistency of Donie Stool Scale type 4-5 at least 80% of the time for improved bowel function.   - Pt will demonstrate PFM strength of at least 3/5 MMT for improved strength needed to maintain continence.   - Pt will report bearing down appropriately 100% of the time for improved bowel function and decreased stress on adjacent pelvic structures.   - Pt will demonstrate independence with pressure-management strategies to decreased stress on adjacent pelvic structures.   - Pt will be able to successfully complete sexual intercourse without pain for improved ADL tolerance.     Plan     Plan of care Certification: 7/19/2024 to 10/19/2024.     Outpatient Physical Therapy 1 times weekly for 12 weeks    Meg Salamanca PTA

## 2024-09-24 ENCOUNTER — PATIENT MESSAGE (OUTPATIENT)
Dept: NEUROSURGERY | Facility: CLINIC | Age: 39
End: 2024-09-24

## 2024-09-24 ENCOUNTER — OFFICE VISIT (OUTPATIENT)
Dept: NEUROSURGERY | Facility: CLINIC | Age: 39
End: 2024-09-24
Payer: COMMERCIAL

## 2024-09-24 DIAGNOSIS — G62.9 POLYNEUROPATHY: Primary | ICD-10-CM

## 2024-09-24 PROCEDURE — 4010F ACE/ARB THERAPY RXD/TAKEN: CPT | Mod: CPTII,95,, | Performed by: PHYSICIAN ASSISTANT

## 2024-09-24 PROCEDURE — 99215 OFFICE O/P EST HI 40 MIN: CPT | Mod: 95,,, | Performed by: PHYSICIAN ASSISTANT

## 2024-09-24 RX ORDER — GABAPENTIN 100 MG/1
100 CAPSULE ORAL 3 TIMES DAILY
Qty: 90 CAPSULE | Refills: 11 | Status: SHIPPED | OUTPATIENT
Start: 2024-09-24 | End: 2025-09-24

## 2024-10-09 NOTE — PROGRESS NOTES
Subjective:      Patient ID: Sonia Solorzano is a 38 y.o. female.    Chief Complaint: Endometrioid adenocarcinoma of uterus (Follow up)    HPI  S/p RTLH/BS/RO/SLND 2/12/2024    Pathology:  Stage IA, grade 1 endometrioid type endometrial adenocarcinoma, 2-3mm tumor size, no myometrial invasion, negative LVSI, negative sentinel lymph nodes.   Low risk, no adjuvant treatment.     Today's visit:   Presents today for 6 month follow up. Has continued complaints of pelvic pain and discomfort. Describes as pressure with stabbing pain at times. Has been working with pelvic floor therapy. Reports she is not having intercourse due to pain. Denies vaginal dryness. Reports 3-4 episodes of vaginal spotting since last visit in June. Additionally, reports intermittent rectal pain. Pain is not associated with BMs and denies rectal bleeding or blood in stool. Denies change in bowel or bladder habits. Has daily BMs and denies straining. Also continues to report swelling of bilateral lower extremities. LLE US in June without evidence of DVT. Is seeing occupational therapy for lymphedema assessment and management.   _________________________________________________________________  Referred by Dr. Flanagan for newly diagnosed complex endometrial hyperplasia with atypia.      Pelvic US  Uterus: 10.5 x 5.4 cm  Endometrium: Normal in this pre menopausal patient, measuring 12.1 mm.  Right ovary: The right ovary is not seen.  There is a 2.8 cm cyst in the right adnexa.  Left ovary: Not seen     Endometrial sampling 1/17/2024  Fragments of endometrial tissue showing changes compatible with complex hyperplasia with focal atypia.      Prior abdominal surgeries include c/s, lsc removal endometrioma in abdomen, lsc lillie, BTL,  shunt.     Review of Systems   Constitutional:  Negative for appetite change, chills, fatigue and fever.   HENT:  Negative for mouth sores.    Respiratory:  Negative for cough and shortness of breath.     Cardiovascular:  Positive for leg swelling.   Gastrointestinal:  Positive for rectal pain. Negative for abdominal distention, abdominal pain, anal bleeding, blood in stool, constipation, diarrhea, nausea and vomiting.   Endocrine: Negative for cold intolerance.   Genitourinary:  Positive for dyspareunia, pelvic pain and vaginal pain. Negative for dysuria, vaginal bleeding and vaginal discharge.   Musculoskeletal:  Negative for myalgias.   Skin:  Negative for rash.   Allergic/Immunologic: Negative.    Neurological:  Negative for weakness and numbness.   Hematological:  Negative for adenopathy. Does not bruise/bleed easily.   Psychiatric/Behavioral:  Negative for confusion.      Objective:   Physical Exam:   Constitutional: She is oriented to person, place, and time. She appears well-developed and well-nourished.    HENT:   Head: Normocephalic and atraumatic.    Eyes: Pupils are equal, round, and reactive to light. EOM are normal.    Neck: No thyromegaly present.    Cardiovascular:  Normal rate, regular rhythm and intact distal pulses.             Pulmonary/Chest: Effort normal and breath sounds normal. No respiratory distress. She has no wheezes.        Abdominal: Soft. She exhibits no distension and no mass. There is no abdominal tenderness.     Genitourinary: Rectum:      No anal fissure or external hemorrhoid.      Pelvic exam was performed with patient supine.   The external female genitalia was normal.   There is an absent left adnexa. Vagina exhibits no lesion. There is tenderness in the vagina. No bleeding in the vagina. Cervix is absent.Uterus is absent.           Musculoskeletal: Normal range of motion and moves all extremeties.      Lymphadenopathy:     She has no cervical adenopathy. No inguinal adenopathy noted on the right or left side.        Right: No supraclavicular adenopathy present.        Left: No supraclavicular adenopathy present.    Neurological: She is alert and oriented to person, place,  and time.    Skin: Skin is warm and dry. No rash noted.    Psychiatric: She has a normal mood and affect. She has a tearful affect.       Assessment:     1. Endometrioid adenocarcinoma of uterus    2. Encounter for follow-up surveillance of endometrial cancer    3. Anxiety        Plan:     Orders Placed This Encounter   Procedures    CT Abdomen Pelvis  Without Contrast     Pelvic exam without concerning findings.   Will obtain CT given persistent pain and reported vaginal spotting. Patient reports reaction to contrast despite premedications which required ER visit. Will get non-contrast CT.   Continue pelvic floor PT. Last seen on 9/23/24. Was recommended to see Padmaja Dexter a psychologist that specializes in chronic pain management.   Encouraged to reach out to PCP or Psychiatrist for Anxiety. Will defer given history of bipolar disorder with reported episodes of mushtaq.

## 2024-10-10 ENCOUNTER — TELEPHONE (OUTPATIENT)
Dept: NEUROSURGERY | Facility: CLINIC | Age: 39
End: 2024-10-10
Payer: COMMERCIAL

## 2024-10-10 NOTE — TELEPHONE ENCOUNTER
Called and confirmed appointment for patient on:    Future Appointments   Date Time Provider Department Center   10/10/2024  3:15 PM Ania Peterson, PT [facility] [place] [facility]   10/11/2024  8:30 AM Luz Arthur, SHIRINP [facility] GYNONC OHS at [facility]   10/15/2024  2:00 PM Ximena Sierra, OT [facility] [place] [facility]   10/17/2024  3:15 PM Meg Salamanca, PTA [facility] [place] [facility]   10/21/2024  3:30 PM Sierra, Ximena, OT [facility] [place] [facility]   10/28/2024  3:30 PM Sierra, Ximena, OT [facility] [place] [facility]   11/4/2024  5:30 PM Coni Sierraanda, OT [facility] [place] [facility]   11/6/2024  4:00 PM Ania Peterson, PT [facility] [place] [facility]   11/11/2024  9:00 AM LAB, [facility] LAB [place] [facility]   11/11/2024  5:30 PM Ximena Sierra, OT [facility] [place] [facility]   11/14/2024 11:20 AM [facility]   11/15/2024 12:00 PM LAB, [facility] LAB [facility]   11/18/2024 10:00 AM Akash Pardo MD [address] SEEMA [place]   11/18/2024  5:30 PM Coni Sierraanda, OT [facility] [place] [facility]   11/25/2024  5:30 PM Coni Sierraanda, OT [facility] [place] [facility]   11/26/2024  1:00 PM China Saucedo PA-C [facility] NEUROS8 [facility]   12/30/2024  8:15 AM [facility] MAMMO1 [facility] [place]   1/16/2025  7:30 AM Dorian Guerrero MD [facility] [place]

## 2024-10-11 ENCOUNTER — OFFICE VISIT (OUTPATIENT)
Dept: GYNECOLOGIC ONCOLOGY | Facility: CLINIC | Age: 39
End: 2024-10-11
Payer: COMMERCIAL

## 2024-10-11 VITALS
OXYGEN SATURATION: 96 % | TEMPERATURE: 97 F | WEIGHT: 293 LBS | RESPIRATION RATE: 18 BRPM | HEART RATE: 80 BPM | DIASTOLIC BLOOD PRESSURE: 81 MMHG | SYSTOLIC BLOOD PRESSURE: 129 MMHG | HEIGHT: 66 IN | BODY MASS INDEX: 47.09 KG/M2

## 2024-10-11 DIAGNOSIS — Z08 ENCOUNTER FOR FOLLOW-UP SURVEILLANCE OF ENDOMETRIAL CANCER: ICD-10-CM

## 2024-10-11 DIAGNOSIS — F41.9 ANXIETY: ICD-10-CM

## 2024-10-11 DIAGNOSIS — C55 ENDOMETRIOID ADENOCARCINOMA OF UTERUS: Primary | ICD-10-CM

## 2024-10-11 DIAGNOSIS — Z85.42 ENCOUNTER FOR FOLLOW-UP SURVEILLANCE OF ENDOMETRIAL CANCER: ICD-10-CM

## 2024-10-11 PROCEDURE — 99999 PR PBB SHADOW E&M-EST. PATIENT-LVL IV: CPT | Mod: PBBFAC,,,

## 2024-10-11 RX ORDER — VENLAFAXINE HYDROCHLORIDE 75 MG/1
75 CAPSULE, EXTENDED RELEASE ORAL DAILY
Qty: 30 CAPSULE | Refills: 11 | Status: SHIPPED | OUTPATIENT
Start: 2024-10-11 | End: 2024-10-11 | Stop reason: CLARIF

## 2024-10-11 RX ORDER — PREDNISONE 50 MG/1
TABLET ORAL
Qty: 3 TABLET | Refills: 0 | Status: CANCELLED | OUTPATIENT
Start: 2024-10-11

## 2024-10-11 RX ORDER — DIPHENHYDRAMINE HCL 50 MG
CAPSULE ORAL
Qty: 1 CAPSULE | Refills: 0 | Status: CANCELLED | OUTPATIENT
Start: 2024-10-11

## 2024-10-17 ENCOUNTER — TELEPHONE (OUTPATIENT)
Dept: RADIOLOGY | Facility: HOSPITAL | Age: 39
End: 2024-10-17

## 2024-10-18 ENCOUNTER — HOSPITAL ENCOUNTER (OUTPATIENT)
Dept: RADIOLOGY | Facility: HOSPITAL | Age: 39
Discharge: HOME | End: 2024-10-18
Payer: COMMERCIAL

## 2024-10-18 PROCEDURE — 74176 CT ABD & PELVIS W/O CONTRAST: CPT | Mod: TC

## 2024-10-18 PROCEDURE — 74176 CT ABD & PELVIS W/O CONTRAST: CPT | Mod: 26,,, | Performed by: RADIOLOGY

## 2024-10-21 ENCOUNTER — TELEPHONE (OUTPATIENT)
Dept: GYNECOLOGIC ONCOLOGY | Facility: CLINIC | Age: 39
End: 2024-10-21
Payer: COMMERCIAL

## 2024-10-21 ENCOUNTER — HOSPITAL ENCOUNTER (OUTPATIENT)
Dept: RADIOLOGY | Facility: HOSPITAL | Age: 39
Discharge: HOME | End: 2024-10-21
Payer: COMMERCIAL

## 2024-10-21 DIAGNOSIS — N83.202 CYST OF LEFT OVARY: ICD-10-CM

## 2024-10-21 DIAGNOSIS — N83.202 CYST OF LEFT OVARY: Primary | ICD-10-CM

## 2024-10-21 PROCEDURE — 76830 TRANSVAGINAL US NON-OB: CPT | Mod: TC

## 2024-10-21 PROCEDURE — 76830 TRANSVAGINAL US NON-OB: CPT | Mod: 26,,, | Performed by: RADIOLOGY

## 2024-10-21 PROCEDURE — 76856 US EXAM PELVIC COMPLETE: CPT | Mod: TC

## 2024-10-21 PROCEDURE — 76856 US EXAM PELVIC COMPLETE: CPT | Mod: 26,,, | Performed by: RADIOLOGY

## 2024-10-24 ENCOUNTER — TELEPHONE (OUTPATIENT)
Dept: GYNECOLOGIC ONCOLOGY | Facility: CLINIC | Age: 39
End: 2024-10-24
Payer: COMMERCIAL

## 2024-10-24 DIAGNOSIS — N83.202 CYST OF LEFT OVARY: Primary | ICD-10-CM

## 2024-10-24 NOTE — TELEPHONE ENCOUNTER
Called patient with TVUS results. Recommend 3 month follow-up exam with TVUS prior. Resume care with pelvic floor therapy for further recommendations on pelvic pain/ dysfunction.

## 2024-10-25 NOTE — TELEPHONE ENCOUNTER
Pt contacted and scheduled for US and follow up with Dr Weaver in 3 month. Date, time and location reviewed with pt. Appointment letter for both apportionments mailed to pt.

## 2024-11-04 ENCOUNTER — TELEPHONE (OUTPATIENT)
Dept: ORTHOPEDICS | Facility: CLINIC | Age: 39
End: 2024-11-04
Payer: COMMERCIAL

## 2024-11-04 ENCOUNTER — HOSPITAL ENCOUNTER (OUTPATIENT)
Dept: RADIOLOGY | Facility: HOSPITAL | Age: 39
Discharge: HOME | End: 2024-11-04
Attending: PHYSICIAN ASSISTANT
Payer: COMMERCIAL

## 2024-11-04 ENCOUNTER — CLINICAL SUPPORT (OUTPATIENT)
Dept: REHABILITATION | Facility: HOSPITAL | Age: 39
End: 2024-11-04
Payer: COMMERCIAL

## 2024-11-04 ENCOUNTER — HOSPITAL ENCOUNTER (OUTPATIENT)
Dept: RADIOLOGY | Facility: HOSPITAL | Age: 39
Discharge: HOME | End: 2024-11-04
Attending: ORTHOPAEDIC SURGERY
Payer: COMMERCIAL

## 2024-11-04 ENCOUNTER — OFFICE VISIT (OUTPATIENT)
Dept: NEUROSURGERY | Facility: CLINIC | Age: 39
End: 2024-11-04
Payer: COMMERCIAL

## 2024-11-04 ENCOUNTER — PATIENT MESSAGE (OUTPATIENT)
Dept: NEUROSURGERY | Facility: CLINIC | Age: 39
End: 2024-11-04

## 2024-11-04 ENCOUNTER — TELEPHONE (OUTPATIENT)
Dept: NEUROSURGERY | Facility: CLINIC | Age: 39
End: 2024-11-04
Payer: COMMERCIAL

## 2024-11-04 DIAGNOSIS — Z98.2 S/P VP SHUNT: ICD-10-CM

## 2024-11-04 DIAGNOSIS — I89.0 LYMPHEDEMA: Primary | ICD-10-CM

## 2024-11-04 DIAGNOSIS — Z98.2 S/P VP SHUNT: Primary | ICD-10-CM

## 2024-11-04 PROCEDURE — 97140 MANUAL THERAPY 1/> REGIONS: CPT | Mod: PO

## 2024-11-04 PROCEDURE — 70250 X-RAY EXAM OF SKULL: CPT | Mod: 26,,, | Performed by: RADIOLOGY

## 2024-11-04 PROCEDURE — 4010F ACE/ARB THERAPY RXD/TAKEN: CPT | Mod: CPTII,S$GLB,, | Performed by: PHYSICIAN ASSISTANT

## 2024-11-04 PROCEDURE — 70250 X-RAY EXAM OF SKULL: CPT | Mod: TC

## 2024-11-04 PROCEDURE — 73721 MRI JNT OF LWR EXTRE W/O DYE: CPT | Mod: TC,RT

## 2024-11-04 PROCEDURE — 99212 OFFICE O/P EST SF 10 MIN: CPT | Mod: S$GLB,,, | Performed by: PHYSICIAN ASSISTANT

## 2024-11-04 PROCEDURE — 97535 SELF CARE MNGMENT TRAINING: CPT | Mod: PO

## 2024-11-04 PROCEDURE — 73721 MRI JNT OF LWR EXTRE W/O DYE: CPT | Mod: 26,RT,, | Performed by: RADIOLOGY

## 2024-11-04 PROCEDURE — 99999 PR PBB SHADOW E&M-EST. PATIENT-LVL I: CPT | Mod: PBBFAC,,, | Performed by: PHYSICIAN ASSISTANT

## 2024-11-04 NOTE — PROGRESS NOTES
Procedure:  Shunt reprogramming  Indication: [facility]       oSnia Solorzano presents to clinic today to have her shunt reprogrammed following an MRI. The shunt  was placed over the shunt valve, and the setting was reset to 90 mm of H2O. Confirmed on XR. The patient tolerated this well, with no complications. She was informed to call the clinic with any further questions or concerns in the meantime.      Christie Robb PA-C  Neurosurgery

## 2024-11-04 NOTE — TELEPHONE ENCOUNTER
----- Message from Jaspal Castaneda sent at 11/4/2024  9:06 AM CST -----  Patient returned your call

## 2024-11-04 NOTE — TELEPHONE ENCOUNTER
Called and spoke to patient who stated that she is on her way to clinic for shunt check/adjusting.     ----- Message from Delfin sent at 11/4/2024  9:10 AM CST -----  Regarding: pt advice  Contact: pt @ [phone]  Pt is needing an xray and is at imaging center and requesting a call back. Please call to advise further. Thank you for all you are doing.

## 2024-11-04 NOTE — TELEPHONE ENCOUNTER
----- Message from Hina sent at 11/4/2024  8:50 AM CST -----  Pt calling to get orders for xray to make sure her shunt setting didn't  reset , please call     Confirmed patient's contact info below:  Contact Name: Sonia Solorzano  Phone Number: [phone]

## 2024-11-05 NOTE — PROGRESS NOTES
Occupational Therapy Daily Treatment Note     Name: Sonia Solorzano  Clinic Number: [id number]    Therapy Diagnosis:   Encounter Diagnosis   Name Primary?    Lymphedema Yes     Physician: Kirsten Weaver MD    Visit Date: 11/4/2024  Physician Orders: Evaluation and treatment  Medical Diagnosis:     I89.0 (ICD-10-CM) - Lymphedema      Evaluation Date: 9/13/2024  Insurance Authorization period Expiration: 09/12/2024-12/31/2024  Plan of Care Certification Period: 09/13/2024-12/13/2024  FOTO 1: 09/13/2024  FOTO 2:  FOTO 3:  Visit # / Visits Authortized: 2 / 20  Time In:5:30  Time Out: 6:30  Total Billable Time: 60 minutes- Self Care, Manual Therapy     Precautions: Standard and cancer    Subjective     Patient reports: That her feet and lower legs are swollen today.  She was compliant with home exercise program.    Functional change: none    Pain: 5/10  Location: Bilateral lower extremities       Objective     Response to previous treatment: She was only seen for the evaluation last session.  She has not been to therapy since the evaluation as she was waiting to see her Oncologist first.  Treatment:   Sonia received the following manual therapy techniques:- Myofacial release, Manual Lymphatic Drainage, and Soft tissue Mobilization was performed and compression bandages and kinesio tape was applied to the: Bilateral lower extremities for 45 minutes.    MANUAL LYMPHATIC DRAINAGE:    While seated with both legs in a dependent position drainage of entire Lower Extremities especially lower leg, ankle, and foot with return proximally,  Use of Aquaphor due to dryness.   Educated in self massage to abdominal areas, Both inguinal areas, thigh, and remaining Lower extremities within reach.    Sonia received Self Care for 15 minutes as follows:  MULTILAYERED BANDAGING:  issued supplies and bandaged Bilateral Lower Extremities with cotton stockinette, cotton cast padding, Lopress compression bandages 6 cm, 8cm, 10 cm  applied from the base of the toes to the popliteal fold applied in a figure 8 pattern.  Instructed to leave intact 12-48 hours as tolerated, discontinue with any problems, return rolled bandages next session.     Home Exercises Provided and Patient Education Provided     Education provided:      PATIENT/FAMILY Education: bandaging wear schedule,  Home Exercise Program,  Beginning of self massage,  Self or assisted bandaging, compression options, and Risk reduction    Written Home Exercises Provided:   Exercises were reviewed and Sonia was able to demonstrate them prior to the end of the session.  Sonia demonstrated good  understanding of the education provided.       Assessment     She arrived to therapy from work.  She was agreeable to Manual lymph drainage and compression bandaging.  She tolerated the Manual lymph drainage without an issue.  There was some swelling in the lower legs and feet noted this date.  She has pain in her abdomen due to IBS and an ovarian cyst.  Sonia Is progressing well towards her goals.   Patient prognosis is Good.     Patient will continue to benefit from skilled outpatient occupational therapy to address the deficits listed in the problem list box on initial evaluation, provide pt/family education and to maximize pt's level of independence in the home and community environment.     Patient's spiritual, cultural and educational needs considered and pt agreeable to plan of care and goals.     Anticipated barriers to occupational therapy: none    Goals:   Short Term Goals: (4 weeks)  In Progress  Patient to be Independent and compliant with Home Exercise Program to increase lymphatic flow.  In Progress  Decrease girth in abdomen by 3 cm for improved functional use of Bilateral lower extremities.  In Progress  Patient will demonstrate 100% knowledge of lymphedema precautions and signs of infection.   In Progress  Patient will perform self-bandaging techniques.  In  Progress  Patient will perform self lymph drainage techniques to increase lymph uptake and direct lymph flow.  In Progress  Patient will tolerate daily activities with multilayered bandaging or compression garment.  Skin integrity improve to Good, skin will be superficially hydrated, fungal infection will resolve, color will fade to pink and temperature will be room temperature.     Long Term Goals: (12 weeks)  In Progress  Decrease girth in abdomen by 6 cm for improved functional use of Bilateral lower extremities.  In Progress  Patient will show reduction in girth to mild or less with improved contour of limb.  In Progress  Patient to mitul/doff compression garment with daily compliance.   In Progress  Patient will demonstrate the ability to independently manage condition by discharge.     Plan      Patient to be seen 1-2 x per week for 90 days for the medically necessary treatments to include: decongestive massage- Manual lymphatic drainage, Kinesio tape, skin care, multi layered bandaging, education in lymphedema precautions, self massage, self bandaging, and assistance in obtaining appropriate compression garment.  Therapeutic exercise, postural correction, and progression of Home Exercise Program.       Ximena Sierra, OT, CLT

## 2024-11-11 ENCOUNTER — TELEPHONE (OUTPATIENT)
Dept: RADIOLOGY | Facility: HOSPITAL | Age: 39
End: 2024-11-11
Payer: COMMERCIAL

## 2024-11-11 NOTE — TELEPHONE ENCOUNTER
----- Message from Nicole Medina sent at 11/8/2024 11:23 AM CST -----  Regarding: Appt Access  Contact: [phone]  Patient is calling to schedule breast US at [facility]. Please give pt a call to schedule.

## 2024-11-15 ENCOUNTER — TELEPHONE (OUTPATIENT)
Dept: ORTHOPEDICS | Facility: CLINIC | Age: 39
End: 2024-11-15
Payer: COMMERCIAL

## 2024-11-15 NOTE — TELEPHONE ENCOUNTER
----- Message from Salud sent at 11/15/2024 10:06 AM CST -----  Regarding: call back  Type: Patient Call Back    Who called: ant Padilla    What is the request in detail: requesting copy of report from recent MRI, fax is [phone]    Can the clinic reply by [facility]?no    Would the patient rather a call back or a response via [facility]? call    Best call back number: [phone]    Additional Information:

## 2024-11-18 ENCOUNTER — PATIENT MESSAGE (OUTPATIENT)
Dept: NEUROSURGERY | Facility: CLINIC | Age: 39
End: 2024-11-18
Payer: COMMERCIAL

## 2024-11-18 ENCOUNTER — HOSPITAL ENCOUNTER (OUTPATIENT)
Dept: RADIOLOGY | Facility: HOSPITAL | Age: 39
Discharge: HOME | End: 2024-11-18
Attending: NURSE PRACTITIONER
Payer: COMMERCIAL

## 2024-11-18 ENCOUNTER — OFFICE VISIT (OUTPATIENT)
Dept: PHYSICAL MEDICINE AND REHAB | Facility: CLINIC | Age: 39
End: 2024-11-18
Payer: COMMERCIAL

## 2024-11-18 ENCOUNTER — CLINICAL SUPPORT (OUTPATIENT)
Dept: REHABILITATION | Facility: HOSPITAL | Age: 39
End: 2024-11-18
Payer: COMMERCIAL

## 2024-11-18 VITALS — BODY MASS INDEX: 46.77 KG/M2 | HEIGHT: 66 IN | WEIGHT: 291 LBS

## 2024-11-18 DIAGNOSIS — R20.0 NUMBNESS AND TINGLING SENSATION OF SKIN: Primary | ICD-10-CM

## 2024-11-18 DIAGNOSIS — G62.9 POLYNEUROPATHY: ICD-10-CM

## 2024-11-18 DIAGNOSIS — I89.0 LYMPHEDEMA: Primary | ICD-10-CM

## 2024-11-18 DIAGNOSIS — R92.8 ABNORMAL FINDING ON BREAST IMAGING: ICD-10-CM

## 2024-11-18 DIAGNOSIS — R20.2 NUMBNESS AND TINGLING SENSATION OF SKIN: Primary | ICD-10-CM

## 2024-11-18 PROCEDURE — 99499 UNLISTED E&M SERVICE: CPT | Mod: S$GLB,,, | Performed by: STUDENT IN AN ORGANIZED HEALTH CARE EDUCATION/TRAINING PROGRAM

## 2024-11-18 PROCEDURE — 95886 MUSC TEST DONE W/N TEST COMP: CPT | Mod: S$GLB,,, | Performed by: STUDENT IN AN ORGANIZED HEALTH CARE EDUCATION/TRAINING PROGRAM

## 2024-11-18 PROCEDURE — 77062 BREAST TOMOSYNTHESIS BI: CPT | Mod: 26,,, | Performed by: RADIOLOGY

## 2024-11-18 PROCEDURE — 76642 ULTRASOUND BREAST LIMITED: CPT | Mod: TC,LT

## 2024-11-18 PROCEDURE — 76642 ULTRASOUND BREAST LIMITED: CPT | Mod: 26,50,, | Performed by: RADIOLOGY

## 2024-11-18 PROCEDURE — 76642 ULTRASOUND BREAST LIMITED: CPT | Mod: TC,50

## 2024-11-18 PROCEDURE — 77066 DX MAMMO INCL CAD BI: CPT | Mod: TC

## 2024-11-18 PROCEDURE — 97140 MANUAL THERAPY 1/> REGIONS: CPT | Mod: PO

## 2024-11-18 PROCEDURE — 77066 DX MAMMO INCL CAD BI: CPT | Mod: 26,,, | Performed by: RADIOLOGY

## 2024-11-18 PROCEDURE — 76642 ULTRASOUND BREAST LIMITED: CPT | Mod: TC,RT

## 2024-11-18 PROCEDURE — 99999 PR PBB SHADOW E&M-EST. PATIENT-LVL I: CPT | Mod: PBBFAC,,, | Performed by: STUDENT IN AN ORGANIZED HEALTH CARE EDUCATION/TRAINING PROGRAM

## 2024-11-18 PROCEDURE — 95913 NRV CNDJ TEST 13/> STUDIES: CPT | Mod: S$GLB,,, | Performed by: STUDENT IN AN ORGANIZED HEALTH CARE EDUCATION/TRAINING PROGRAM

## 2024-11-18 NOTE — PROGRESS NOTES
[facility]  Physical Medicine and Rehabilitation   [address] [place] [address], [address]  [place] [address]       Full Name: Sonia Solorzano Gender: Female  Patient ID: [id number] YOB: 1985      Visit Date: 11/18/2024 10:05  Age: 38 Years 11 Months Old  Examining Physician: Akash Pardo MD  Referring Physician:  China Saucedo PA-C  Height: 5 feet 6 inch  Weight: 291 lbs  Conclusion: Numbness and tingling throughout her entire body.       Sensory NCS      Nerve / Sites Rec. Site Onset Lat Peak Lat NP Amp PP Amp Segments Distance Peak Diff Velocity     ms ms µV µV  cm ms m/s   L Median - Digit II (Antidromic)      Wrist Dig II 2.14 2.86 18.2 27.6 Wrist - Dig II 13  61      Ref.   <=3.40 >=15.0 >=20.0 Ref.      R Median - Digit II (Antidromic)      Wrist Dig II 2.14 2.76 38.5 62.6 Wrist - Dig II 13  61      Ref.   <=3.40 >=15.0 >=20.0 Ref.      L Ulnar - Digit V (Antidromic)      Wrist Dig V 1.82 2.40 14.4 35.4 Wrist - Dig V 11  60      Ref.   <=3.10 >=10.0 >=15.0 Ref.      R Ulnar - Digit V (Antidromic)      Wrist Dig V 1.51 2.03 11.1 19.4 Wrist - Dig V 11  73      Ref.   <=3.10 >=10.0 >=15.0 Ref.      L Radial - Anatomical snuff box (Forearm)      Forearm Wrist 1.15 1.72 15.4 29.8 Forearm - Wrist 10  87      Ref.   <=2.90 >=15.0 >=15.0 Ref.      R Radial - Anatomical snuff box (Forearm)      Forearm Wrist 1.88 2.50 18.8  Forearm - Wrist 10  53      Ref.   <=2.90 >=15.0 >=15.0 Ref.      L Sural - Ankle (Calf)      Calf Ankle 3.39 4.11 7.3 10.4 Calf - Ankle 14  41      Ref.   <=4.20 >=5.0 >=5.0 Ref.      R Sural - Ankle (Calf)      Calf Ankle 2.45 3.54 12.4 10.8 Calf - Ankle 14  57      Ref.   <=4.20 >=5.0 >=5.0 Ref.      L Superficial peroneal - Ankle      Lat leg Ankle 3.02 3.91 5.8 9.3 Lat leg - Ankle 14  46      Ref.   <=4.40 >=5.0 >=5.0 Ref.      R Superficial peroneal - Ankle      Lat leg Ankle 2.86 3.75 8.7 11.5 Lat leg - Ankle 14  49      Ref.   <=4.40 >=5.0 >=5.0 Ref.       L Median, Ulnar - Transcarpal comparison      Median Palm Wrist 1.25 1.72 17.1 22.4 Median Palm - Wrist 8  64      Ulnar Palm Wrist 1.09 1.77 14.4 9.6 Ulnar Palm - Wrist 8  73         Median Palm - Ulnar Palm  -0.05          Ref.  <=0.40    R Median, Ulnar - Transcarpal comparison      Median Palm Wrist 1.30 1.77 16.9 27.3 Median Palm - Wrist 8  61      Ulnar Palm Wrist 1.30 1.82 7.6 8.1 Ulnar Palm - Wrist 8  61         Median Palm - Ulnar Palm  -0.05          Ref.  <=0.40        Motor NCS      Nerve / Sites Muscle Latency Ref. Amplitude Ref. Amp % Duration Segments Distance Lat Diff Velocity Ref.     ms ms mV mV % ms  cm ms m/s m/s   L Ulnar - ADM      Wrist ADM 2.45 <=3.60 14.4 >=5.0 100 5.63 Wrist - ADM 7         B.Elbow ADM 5.00  14.3  99.1 5.78 B.Elbow - Wrist 19 2.55 74 >=49      A.Elbow ADM 6.67  14.0  97.2 6.09 A.Elbow - B.Elbow 10 1.67 60 >=49   R Ulnar - ADM      Wrist ADM 2.24 <=3.60 16.0 >=5.0 100 4.79 Wrist - ADM 7         B.Elbow ADM 4.79  14.7  91.7 4.79 B.Elbow - Wrist 19 2.55 74 >=49      A.Elbow ADM 6.20  14.5  90.1 4.79 A.Elbow - B.Elbow 10 1.41 71 >=49   L Peroneal - EDB      Ankle EDB 3.33 <=6.20 6.8 >=2.0 100 4.90 Ankle - EDB 8         Fib head EDB 10.52  6.2  92.3 5.83 Fib head - Ankle 31 7.19 43 >=39      Pop fossa EDB 12.34  5.7  84 5.42 Pop fossa - Fib head 10 1.82 55 >=39   R Peroneal - EDB      Ankle EDB 2.66 <=6.20 6.1 >=2.0 100 5.63 Ankle - EDB 8         Fib head EDB 10.21  5.2  84.8 6.20 Fib head - Ankle 31 7.55 41 >=39      Pop fossa EDB 11.88  4.9  79.4 5.78 Pop fossa - Fib head 10 1.67 60 >=39   L Tibial - AH      Ankle AH 3.91 <=6.00 11.5 >=3.0 100 4.32 Ankle - AH 8         Pop fossa AH 12.66  7.5  65.6 5.05 Pop fossa - Ankle 42 8.75 48 >=39   R Tibial - AH      Ankle AH 3.13 <=6.00 4.8 >=3.0 100 4.74 Ankle - AH 8         Pop fossa AH 12.03  4.1  86.4 5.78 Pop fossa - Ankle 42 8.91 47 >=39   L Median - APB      Wrist APB 2.92 <=4.40 8.2 >=4.0 100 5.57 Wrist - APB 7          Elbow APB 6.46  8.0  97.7 5.89 Elbow - Wrist 19 3.54 54 >=49   R Median - APB      Wrist APB 2.60 <=4.40 9.2 >=4.0 100 5.10 Wrist - APB 7         Elbow APB 6.35  8.0  86.9 5.31 Elbow - Wrist 19 3.75 51 >=49       EMG Summary Table     Spontaneous MUAP Recruitment   Muscle IA Fib PSW Fasc Other Amp Dur. PPP Pattern   L. Deltoid N None None None . N N N N   R. Deltoid N None None None . N N N N   L. Biceps brachii N None None None . N N N N   R. Biceps brachii N None None None . N N N N   L. Triceps brachii N None None None . N N N N   R. Triceps brachii N None None None . N N N N   L. Pronator teres N None None None . N N N N   R. Pronator teres N None None None . N N N N   L. First dorsal interosseous N None None None . N N N N   R. First dorsal interosseous N None None None . N N N N   L. Abductor pollicis brevis N None None None . N N N N   R. Abductor pollicis brevis N None None None . N N N N   L. Vastus medialis N None None None . N N N N   R. Vastus medialis N None None None . N N N N   L. Tibialis anterior N None None None . N N N N   R. Tibialis anterior N None None None . N N N N   L. Tibialis posterior N None None None . N N N N   R. Tibialis posterior N None None None . N N N N   L. Peroneus longus N None None None . N N N N   R. Peroneus longus N None None None . N N N N   L. Gastrocnemius (Medial head) N None None None . N N N N   R. Gastrocnemius (Medial head) N None None None . N N N N       Summary    The motor conduction test was normal in all 8 of the tested nerves: L Ulnar - ADM, R Ulnar - ADM, L Peroneal - EDB, R Peroneal - EDB, L Tibial - AH, R Tibial - AH, L Median - APB, R Median - APB.    The sensory conduction test was performed on 12 nerve(s). The results were normal in 10 nerve(s): L Median - Digit II (Antidromic), R Median - Digit II (Antidromic), L Ulnar - Digit V (Antidromic), R Ulnar - Digit V (Antidromic), L Radial - Anatomical snuff box (Forearm), R Radial - Anatomical snuff box  (Forearm), L Sural - Ankle (Calf), R Sural - Ankle (Calf), L Superficial peroneal - Ankle, R Superficial peroneal - Ankle. Findings were unremarkable in 2 nerve(s): L Median, Ulnar - Transcarpal comparison, R Median, Ulnar - Transcarpal comparison. There were no results outside the specified normal range.    The needle EMG study was normal in all 22 tested muscles: L. Deltoid, R. Deltoid, L. Biceps brachii, R. Biceps brachii, L. Triceps brachii, R. Triceps brachii, L. Pronator teres, R. Pronator teres, L. First dorsal interosseous, R. First dorsal interosseous, L. Abductor pollicis brevis, R. Abductor pollicis brevis, L. Vastus medialis, R. Vastus medialis, L. Tibialis anterior, R. Tibialis anterior, L. Tibialis posterior, R. Tibialis posterior, L. Peroneus longus, R. Peroneus longus, L. Gastrocnemius (Medial head), R. Gastrocnemius (Medial head).    Conclusion:     Normal study    EMG and nerve conduction study of the upper and lower extremities was normal.  There was no electrodiagnostic evidence of radiculopathy, plexopathy, nerve entrapment, large fiber peripheral polyneuropathy or myopathy.     Plan: Discussed results with patient. Follow-up with referring provider for additional details.   ______________________  Akash Pardo MD

## 2024-11-19 ENCOUNTER — TELEPHONE (OUTPATIENT)
Dept: RADIOLOGY | Facility: HOSPITAL | Age: 39
End: 2024-11-19
Payer: COMMERCIAL

## 2024-11-19 NOTE — TELEPHONE ENCOUNTER
Spoke with patient. Reviewed breast biopsy procedure and reviewed instructions for breast biopsy. Patient expressed understanding and all questions were answered. Provided patient with my phone number to call for any further concerns or questions.   Patient scheduled breast biopsy at the [facility] on 12/5/2024.

## 2024-11-21 ENCOUNTER — TELEPHONE (OUTPATIENT)
Dept: URGENT CARE | Facility: CLINIC | Age: 39
End: 2024-11-21
Payer: COMMERCIAL

## 2024-11-21 NOTE — TELEPHONE ENCOUNTER
Left message and call back number to get clarification on where her MRI of the Knee  was completed.

## 2024-11-22 NOTE — PROGRESS NOTES
Occupational Therapy Daily Treatment Note     Name: Sonia Calvertwood  Clinic Number: [id number]    Therapy Diagnosis:   Encounter Diagnosis   Name Primary?    Lymphedema Yes     Physician: Kirsten Weaver MD    Visit Date: 11/18/2024  Physician Orders: Evaluation and treatment  Medical Diagnosis:     I89.0 (ICD-10-CM) - Lymphedema      Evaluation Date: 9/13/2024  Insurance Authorization period Expiration: 09/12/2024-12/31/2024  Plan of Care Certification Period: 09/13/2024-12/13/2024  FOTO 1: 09/13/2024  FOTO 2:  FOTO 3:  Visit # / Visits Authortized: 3 / 20  Time In:5:30  Time Out: 6:30  Total Billable Time: 60 minutes- Self Care, Manual Therapy     Precautions: Standard and cancer    Subjective     Patient reports: That her feet and lower legs are swollen today.  She was compliant with home exercise program.    Functional change: none    Pain: 5/10  Location: Bilateral lower extremities       Objective     Response to previous treatment: She reports swelling in the feet and legs.  She reports last session that she could not believe how much the kinesio tape helped her abdomen.    Treatment:   Sonia received the following manual therapy techniques:- Myofacial release, Manual Lymphatic Drainage, and Soft tissue Mobilization was performed and compression bandages and kinesio tape was applied to the: Bilateral lower extremities for 45 minutes.    MANUAL LYMPHATIC DRAINAGE:    While seated with both legs in a dependent position drainage of entire Lower Extremities especially lower leg, ankle, and foot with return proximally,  Use of Aquaphor due to dryness.   Educated in self massage to abdominal areas, Both inguinal areas, thigh, and remaining Lower extremities within reach.    Kinesio tape was applied to the Bilateral lower extremities in a basketweave application with 25% tension from proximal to distal on the medial and lateral surface to increase lymph uptake and direct lymph flow.     Home Exercises  Provided and Patient Education Provided     Education provided:      PATIENT/FAMILY Education: bandaging wear schedule,  Home Exercise Program,  Beginning of self massage,  Self or assisted bandaging, compression options, and Risk reduction    Written Home Exercises Provided:   Exercises were reviewed and Sonia was able to demonstrate them prior to the end of the session.  Sonia demonstrated good  understanding of the education provided.       Assessment     She arrived to therapy from work.  She was agreeable to Manual lymph drainage.  She did not bring her compression bandages.  She tolerated the Manual lymph drainage without an issue.  There was some swelling in the lower legs and feet noted this date.  She has pain in her abdomen due to IBS and an ovarian cyst.  Sonia Is progressing well towards her goals.   Patient prognosis is Good.     Patient will continue to benefit from skilled outpatient occupational therapy to address the deficits listed in the problem list box on initial evaluation, provide pt/family education and to maximize pt's level of independence in the home and community environment.     Patient's spiritual, cultural and educational needs considered and pt agreeable to plan of care and goals.     Anticipated barriers to occupational therapy: none    Goals:   Short Term Goals: (4 weeks)  In Progress  Patient to be Independent and compliant with Home Exercise Program to increase lymphatic flow.  In Progress  Decrease girth in abdomen by 3 cm for improved functional use of Bilateral lower extremities.  In Progress  Patient will demonstrate 100% knowledge of lymphedema precautions and signs of infection.   In Progress  Patient will perform self-bandaging techniques.  In Progress  Patient will perform self lymph drainage techniques to increase lymph uptake and direct lymph flow.  In Progress  Patient will tolerate daily activities with multilayered bandaging or compression garment.  Skin  integrity improve to Good, skin will be superficially hydrated, fungal infection will resolve, color will fade to pink and temperature will be room temperature.     Long Term Goals: (12 weeks)  In Progress  Decrease girth in abdomen by 6 cm for improved functional use of Bilateral lower extremities.  In Progress  Patient will show reduction in girth to mild or less with improved contour of limb.  In Progress  Patient to mitul/doff compression garment with daily compliance.   In Progress  Patient will demonstrate the ability to independently manage condition by discharge.     Plan      Patient to be seen 1-2 x per week for 90 days for the medically necessary treatments to include: decongestive massage- Manual lymphatic drainage, Kinesio tape, skin care, multi layered bandaging, education in lymphedema precautions, self massage, self bandaging, and assistance in obtaining appropriate compression garment.  Therapeutic exercise, postural correction, and progression of Home Exercise Program.       Ximena Sierra, OT, CLT

## 2024-11-25 ENCOUNTER — CLINICAL SUPPORT (OUTPATIENT)
Dept: REHABILITATION | Facility: HOSPITAL | Age: 39
End: 2024-11-25
Payer: COMMERCIAL

## 2024-11-25 DIAGNOSIS — I89.0 LYMPHEDEMA: Primary | ICD-10-CM

## 2024-11-25 PROCEDURE — 97535 SELF CARE MNGMENT TRAINING: CPT | Mod: PO

## 2024-11-25 PROCEDURE — 97140 MANUAL THERAPY 1/> REGIONS: CPT | Mod: PO

## 2024-11-26 ENCOUNTER — OFFICE VISIT (OUTPATIENT)
Dept: NEUROSURGERY | Facility: CLINIC | Age: 39
End: 2024-11-26
Attending: ORTHOPAEDIC SURGERY
Payer: COMMERCIAL

## 2024-11-26 DIAGNOSIS — G62.9 POLYNEUROPATHY: Primary | ICD-10-CM

## 2024-11-26 PROCEDURE — 99213 OFFICE O/P EST LOW 20 MIN: CPT | Mod: 95,,, | Performed by: STUDENT IN AN ORGANIZED HEALTH CARE EDUCATION/TRAINING PROGRAM

## 2024-11-29 NOTE — PROGRESS NOTES
Occupational Therapy Daily Treatment Note     Name: Sonia MO [place]  Clinic Number: [id number]    Therapy Diagnosis:   Encounter Diagnosis   Name Primary?    Lymphedema Yes     Physician: Kirsten Weaver MD    Visit Date: 11/25/2024  Physician Orders: Evaluation and treatment  Medical Diagnosis:     I89.0 (ICD-10-CM) - Lymphedema      Evaluation Date: 9/13/2024  Insurance Authorization period Expiration: 09/12/2024-12/31/2024  Plan of Care Certification Period: 09/13/2024-12/13/2024  FOTO 1: 09/13/2024  FOTO 2:  FOTO 3:  Visit # / Visits Authortized: 4 / 20  Time In:5:30  Time Out: 6:30  Total Billable Time: 60 minutes- Self Care, Manual Therapy     Precautions: Standard and cancer    Subjective     Patient reports: That her feet and lower legs are swollen today and she is agreeable to being wrapped. She threw away her last set of wraps forgetting that she was to return them to be reused.  She was compliant with home exercise program.    Functional change: none    Pain: 5/10  Location: Bilateral lower extremities       Objective     Response to previous treatment: She reports swelling in the feet and legs.  There is consgestion of fluid around the ankles and in the lower legs.    Treatment:   Sonia received the following manual therapy techniques:- Myofacial release, Manual Lymphatic Drainage, and Soft tissue Mobilization was performed and compression bandages and kinesio tape was applied to the: Bilateral lower extremities for 45 minutes.    MANUAL LYMPHATIC DRAINAGE:    While seated with both legs in a dependent position drainage of entire Lower Extremities especially lower leg, ankle, and foot with return proximally,  Use of Aquaphor due to dryness.   Educated in self massage to abdominal areas, Both inguinal areas, thigh, and remaining Lower extremities within reach.    Kinesio tape was applied to the Bilateral lower extremities in a basketweave application with 25% tension from proximal to distal on  the medial and lateral surface to increase lymph uptake and direct lymph flow.     Sonia received Self Care for 15 minutes as follows:    MULTILAYERED BANDAGING:  Issued supplies and bandaged Both Lower Extremities with 6cm, 8cm,10 cm rosidal rolls - cotton stockinette, cotton padding applied from the ankle to the popliteal fold, bandaged from the toes to the popliteal fold in a figure 8 pattern with short stretch compression bandages,  To leave intact 12 -24 hours, discharge with any problems,  Return rolled bandages next session.      Home Exercises Provided and Patient Education Provided     Education provided:      PATIENT/FAMILY Education: bandaging wear schedule,  Home Exercise Program,  Beginning of self massage,  Self or assisted bandaging, compression options, and Risk reduction    Written Home Exercises Provided:   Exercises were reviewed and Sonia was able to demonstrate them prior to the end of the session.  Sonia demonstrated good  understanding of the education provided.       Assessment     She arrived to therapy from work.  She was agreeable to Manual lymph drainage.  She did not bring her compression bandages but was agreeable to being wrapped.  She tolerated the Manual lymph drainage without an issue.  There was some swelling in the lower legs and feet noted this date.  She has pain in her abdomen due to IBS and an ovarian cyst.  Sonia Is progressing well towards her goals.   Patient prognosis is Good.     Patient will continue to benefit from skilled outpatient occupational therapy to address the deficits listed in the problem list box on initial evaluation, provide pt/family education and to maximize pt's level of independence in the home and community environment.     Patient's spiritual, cultural and educational needs considered and pt agreeable to plan of care and goals.     Anticipated barriers to occupational therapy: none    Goals:   Short Term Goals: (4 weeks)  In  Progress  Patient to be Independent and compliant with Home Exercise Program to increase lymphatic flow.  In Progress  Decrease girth in abdomen by 3 cm for improved functional use of Bilateral lower extremities.  In Progress  Patient will demonstrate 100% knowledge of lymphedema precautions and signs of infection.   In Progress  Patient will perform self-bandaging techniques.  In Progress  Patient will perform self lymph drainage techniques to increase lymph uptake and direct lymph flow.  In Progress  Patient will tolerate daily activities with multilayered bandaging or compression garment.  Skin integrity improve to Good, skin will be superficially hydrated, fungal infection will resolve, color will fade to pink and temperature will be room temperature.     Long Term Goals: (12 weeks)  In Progress  Decrease girth in abdomen by 6 cm for improved functional use of Bilateral lower extremities.  In Progress  Patient will show reduction in girth to mild or less with improved contour of limb.  In Progress  Patient to mitul/doff compression garment with daily compliance.   In Progress  Patient will demonstrate the ability to independently manage condition by discharge.     Plan      Patient to be seen 1-2 x per week for 90 days for the medically necessary treatments to include: decongestive massage- Manual lymphatic drainage, Kinesio tape, skin care, multi layered bandaging, education in lymphedema precautions, self massage, self bandaging, and assistance in obtaining appropriate compression garment.  Therapeutic exercise, postural correction, and progression of Home Exercise Program.       Ximena Sierra, OT, CLT

## 2024-12-02 ENCOUNTER — LAB VISIT (OUTPATIENT)
Dept: LAB | Facility: HOSPITAL | Age: 39
End: 2024-12-02
Attending: INTERNAL MEDICINE
Payer: COMMERCIAL

## 2024-12-02 DIAGNOSIS — D50.9 IRON DEFICIENCY ANEMIA, UNSPECIFIED IRON DEFICIENCY ANEMIA TYPE: ICD-10-CM

## 2024-12-02 DIAGNOSIS — Z80.3 FAMILY HISTORY OF BREAST CANCER: ICD-10-CM

## 2024-12-02 DIAGNOSIS — E21.3 HYPERPARATHYROIDISM: ICD-10-CM

## 2024-12-02 LAB
ALBUMIN SERPL BCP-MCNC: 3.2 G/DL (ref 3.5–5.2)
ALP SERPL-CCNC: 81 U/L (ref 40–150)
ALT SERPL W/O P-5'-P-CCNC: 14 U/L (ref 10–44)
ANION GAP SERPL CALC-SCNC: 12 MMOL/L (ref 8–16)
AST SERPL-CCNC: 9 U/L (ref 10–40)
BASOPHILS # BLD AUTO: 0.03 K/UL (ref 0–0.2)
BASOPHILS NFR BLD: 0.4 % (ref 0–1.9)
BILIRUB SERPL-MCNC: 0.3 MG/DL (ref 0.1–1)
BUN SERPL-MCNC: 13 MG/DL (ref 6–20)
CALCIUM SERPL-MCNC: 8.5 MG/DL (ref 8.7–10.5)
CHLORIDE SERPL-SCNC: 103 MMOL/L (ref 95–110)
CO2 SERPL-SCNC: 25 MMOL/L (ref 23–29)
CREAT SERPL-MCNC: 0.8 MG/DL (ref 0.5–1.4)
DIFFERENTIAL METHOD BLD: ABNORMAL
EOSINOPHIL # BLD AUTO: 0.1 K/UL (ref 0–0.5)
EOSINOPHIL NFR BLD: 1.7 % (ref 0–8)
ERYTHROCYTE [DISTWIDTH] IN BLOOD BY AUTOMATED COUNT: 16.3 % (ref 11.5–14.5)
EST. GFR  (NO RACE VARIABLE): >60 ML/MIN/1.73 M^2
FERRITIN SERPL-MCNC: 41 NG/ML (ref 20–300)
GLUCOSE SERPL-MCNC: 107 MG/DL (ref 70–110)
HCT VFR BLD AUTO: 39 % (ref 37–48.5)
HGB BLD-MCNC: 11.7 G/DL (ref 12–16)
IMM GRANULOCYTES # BLD AUTO: 0.03 K/UL (ref 0–0.04)
IMM GRANULOCYTES NFR BLD AUTO: 0.4 % (ref 0–0.5)
IRON SERPL-MCNC: 37 UG/DL (ref 30–160)
LYMPHOCYTES # BLD AUTO: 2.1 K/UL (ref 1–4.8)
LYMPHOCYTES NFR BLD: 25.3 % (ref 18–48)
MCH RBC QN AUTO: 25.1 PG (ref 27–31)
MCHC RBC AUTO-ENTMCNC: 30 G/DL (ref 32–36)
MCV RBC AUTO: 84 FL (ref 82–98)
MONOCYTES # BLD AUTO: 0.5 K/UL (ref 0.3–1)
MONOCYTES NFR BLD: 6.4 % (ref 4–15)
NEUTROPHILS # BLD AUTO: 5.4 K/UL (ref 1.8–7.7)
NEUTROPHILS NFR BLD: 65.8 % (ref 38–73)
NRBC BLD-RTO: 0 /100 WBC
PLATELET # BLD AUTO: 320 K/UL (ref 150–450)
PMV BLD AUTO: 9.5 FL (ref 9.2–12.9)
POTASSIUM SERPL-SCNC: 3.6 MMOL/L (ref 3.5–5.1)
PROT SERPL-MCNC: 6.5 G/DL (ref 6–8.4)
RBC # BLD AUTO: 4.67 M/UL (ref 4–5.4)
SATURATED IRON: 9 % (ref 20–50)
SODIUM SERPL-SCNC: 140 MMOL/L (ref 136–145)
TOTAL IRON BINDING CAPACITY: 434 UG/DL (ref 250–450)
TRANSFERRIN SERPL-MCNC: 310 MG/DL (ref 200–375)
WBC # BLD AUTO: 8.14 K/UL (ref 3.9–12.7)

## 2024-12-02 PROCEDURE — 80053 COMPREHEN METABOLIC PANEL: CPT | Performed by: INTERNAL MEDICINE

## 2024-12-02 PROCEDURE — 85025 COMPLETE CBC W/AUTO DIFF WBC: CPT | Performed by: INTERNAL MEDICINE

## 2024-12-02 PROCEDURE — 84466 ASSAY OF TRANSFERRIN: CPT | Performed by: INTERNAL MEDICINE

## 2024-12-02 PROCEDURE — 82728 ASSAY OF FERRITIN: CPT | Performed by: INTERNAL MEDICINE

## 2024-12-02 PROCEDURE — 36415 COLL VENOUS BLD VENIPUNCTURE: CPT | Performed by: INTERNAL MEDICINE

## 2024-12-03 ENCOUNTER — OFFICE VISIT (OUTPATIENT)
Dept: OBSTETRICS AND GYNECOLOGY | Facility: CLINIC | Age: 39
End: 2024-12-03
Payer: COMMERCIAL

## 2024-12-03 ENCOUNTER — PATIENT MESSAGE (OUTPATIENT)
Dept: OBSTETRICS AND GYNECOLOGY | Facility: CLINIC | Age: 39
End: 2024-12-03

## 2024-12-03 ENCOUNTER — OFFICE VISIT (OUTPATIENT)
Dept: HEMATOLOGY/ONCOLOGY | Facility: CLINIC | Age: 39
End: 2024-12-03
Payer: COMMERCIAL

## 2024-12-03 VITALS
WEIGHT: 292.13 LBS | DIASTOLIC BLOOD PRESSURE: 80 MMHG | BODY MASS INDEX: 46.95 KG/M2 | SYSTOLIC BLOOD PRESSURE: 122 MMHG | HEIGHT: 66 IN | RESPIRATION RATE: 18 BRPM | HEART RATE: 92 BPM

## 2024-12-03 DIAGNOSIS — N83.202 CYST OF LEFT OVARY: ICD-10-CM

## 2024-12-03 DIAGNOSIS — R10.2 PELVIC PAIN: ICD-10-CM

## 2024-12-03 DIAGNOSIS — N95.1 MENOPAUSAL SYMPTOMS: ICD-10-CM

## 2024-12-03 DIAGNOSIS — D50.9 IRON DEFICIENCY ANEMIA, UNSPECIFIED IRON DEFICIENCY ANEMIA TYPE: Primary | ICD-10-CM

## 2024-12-03 DIAGNOSIS — N85.02 COMPLEX ENDOMETRIAL HYPERPLASIA WITH ATYPIA: Primary | ICD-10-CM

## 2024-12-03 PROCEDURE — 4010F ACE/ARB THERAPY RXD/TAKEN: CPT | Mod: CPTII,S$GLB,, | Performed by: OBSTETRICS & GYNECOLOGY

## 2024-12-03 PROCEDURE — 4010F ACE/ARB THERAPY RXD/TAKEN: CPT | Mod: CPTII,95,, | Performed by: INTERNAL MEDICINE

## 2024-12-03 PROCEDURE — G2211 COMPLEX E/M VISIT ADD ON: HCPCS | Mod: 95,,, | Performed by: INTERNAL MEDICINE

## 2024-12-03 PROCEDURE — 3074F SYST BP LT 130 MM HG: CPT | Mod: CPTII,S$GLB,, | Performed by: OBSTETRICS & GYNECOLOGY

## 2024-12-03 PROCEDURE — 87086 URINE CULTURE/COLONY COUNT: CPT | Performed by: OBSTETRICS & GYNECOLOGY

## 2024-12-03 PROCEDURE — 1159F MED LIST DOCD IN RCRD: CPT | Mod: CPTII,S$GLB,, | Performed by: OBSTETRICS & GYNECOLOGY

## 2024-12-03 PROCEDURE — 99213 OFFICE O/P EST LOW 20 MIN: CPT | Mod: 95,,, | Performed by: INTERNAL MEDICINE

## 2024-12-03 PROCEDURE — 99999 PR PBB SHADOW E&M-EST. PATIENT-LVL III: CPT | Mod: PBBFAC,,, | Performed by: OBSTETRICS & GYNECOLOGY

## 2024-12-03 PROCEDURE — 3079F DIAST BP 80-89 MM HG: CPT | Mod: CPTII,S$GLB,, | Performed by: OBSTETRICS & GYNECOLOGY

## 2024-12-03 PROCEDURE — 3008F BODY MASS INDEX DOCD: CPT | Mod: CPTII,S$GLB,, | Performed by: OBSTETRICS & GYNECOLOGY

## 2024-12-03 PROCEDURE — 99215 OFFICE O/P EST HI 40 MIN: CPT | Mod: S$GLB,,, | Performed by: OBSTETRICS & GYNECOLOGY

## 2024-12-03 RX ORDER — ESTRADIOL 10 UG/1
10 INSERT VAGINAL
Qty: 24 TABLET | Refills: 3 | Status: SHIPPED | OUTPATIENT
Start: 2024-12-05 | End: 2025-12-05
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
## 2024-12-04 NOTE — TELEPHONE ENCOUNTER
LOV 12/03, Please Advise, should Pt come back in before 1 month F/U    moderate assist (50% patients effort)

## 2024-12-05 ENCOUNTER — HOSPITAL ENCOUNTER (OUTPATIENT)
Dept: RADIOLOGY | Facility: HOSPITAL | Age: 39
Discharge: HOME | End: 2024-12-05
Attending: NURSE PRACTITIONER
Payer: COMMERCIAL

## 2024-12-05 DIAGNOSIS — R92.8 ABNORMAL FINDING ON BREAST IMAGING: Primary | ICD-10-CM

## 2024-12-05 LAB
BACTERIA UR CULT: NORMAL
BACTERIA UR CULT: NORMAL

## 2024-12-05 PROCEDURE — 63600175 PHARM REV CODE 636 W HCPCS: Performed by: NURSE PRACTITIONER

## 2024-12-05 PROCEDURE — A4648 IMPLANTABLE TISSUE MARKER: HCPCS

## 2024-12-05 PROCEDURE — 19081 BX BREAST 1ST LESION STRTCTC: CPT | Mod: RT,,, | Performed by: RADIOLOGY

## 2024-12-05 RX ORDER — LIDOCAINE HYDROCHLORIDE AND EPINEPHRINE 10; 20 UG/ML; MG/ML
20 INJECTION, SOLUTION INFILTRATION; PERINEURAL ONCE
Status: COMPLETED | OUTPATIENT
Start: 2024-12-05 | End: 2024-12-05

## 2024-12-05 RX ORDER — LIDOCAINE HYDROCHLORIDE 10 MG/ML
3 INJECTION, SOLUTION EPIDURAL; INFILTRATION; INTRACAUDAL; PERINEURAL ONCE
Status: COMPLETED | OUTPATIENT
Start: 2024-12-05 | End: 2024-12-05

## 2024-12-05 RX ADMIN — LIDOCAINE HYDROCHLORIDE 30 MG: 10 INJECTION, SOLUTION EPIDURAL; INFILTRATION; INTRACAUDAL; PERINEURAL at 02:12

## 2024-12-05 RX ADMIN — LIDOCAINE HYDROCHLORIDE,EPINEPHRINE BITARTRATE 30 ML: 20; .01 INJECTION, SOLUTION INFILTRATION; PERINEURAL at 02:12

## 2024-12-06 ENCOUNTER — LAB VISIT (OUTPATIENT)
Dept: LAB | Facility: HOSPITAL | Age: 39
End: 2024-12-06
Attending: OBSTETRICS & GYNECOLOGY
Payer: COMMERCIAL

## 2024-12-06 DIAGNOSIS — R10.2 PELVIC PAIN: ICD-10-CM

## 2024-12-06 DIAGNOSIS — N95.1 MENOPAUSAL SYMPTOMS: ICD-10-CM

## 2024-12-06 DIAGNOSIS — N83.202 CYST OF LEFT OVARY: ICD-10-CM

## 2024-12-06 PROCEDURE — 83001 ASSAY OF GONADOTROPIN (FSH): CPT | Performed by: OBSTETRICS & GYNECOLOGY

## 2024-12-06 PROCEDURE — 83002 ASSAY OF GONADOTROPIN (LH): CPT | Performed by: OBSTETRICS & GYNECOLOGY

## 2024-12-06 PROCEDURE — 86305 HUMAN EPIDIDYMIS PROTEIN 4: CPT | Performed by: OBSTETRICS & GYNECOLOGY

## 2024-12-06 PROCEDURE — 36415 COLL VENOUS BLD VENIPUNCTURE: CPT | Performed by: OBSTETRICS & GYNECOLOGY

## 2024-12-06 PROCEDURE — 82670 ASSAY OF TOTAL ESTRADIOL: CPT | Performed by: OBSTETRICS & GYNECOLOGY

## 2024-12-07 LAB
CANCER AG125 SERPL IA-ACNC: 8 U/ML
FSH SERPL-ACNC: 16.9 IU/L
HE4 SERPL-SCNC: 44 PMOL/L
LH SERPL-ACNC: 11.9 IU/L
ROMA SCORE POSTMEN SERPL: 0.67
ROMA SCORE PREMEN SERPL: 0.54

## 2024-12-09 LAB — ESTRADIOL SERPL-MCNC: 10 PG/ML

## 2024-12-10 ENCOUNTER — TELEPHONE (OUTPATIENT)
Dept: SURGERY | Facility: CLINIC | Age: 39
End: 2024-12-10
Payer: COMMERCIAL

## 2024-12-10 NOTE — TELEPHONE ENCOUNTER
Called patient with biopsy results from 12/5/2024. Biopsy results showed Complex Sclerosing Lesion (CSL). Discussed what this means and the results were reviewed by Dr. Guevara . These results are benign, concordant and a surgical consult is recommended. Patient was scheduled on 12/11/2024 with Dr. Disla. Reviewed Breast center location. Patient verbalized understanding.      ----- Message from Ananda Guevara MD sent at 12/9/2024  2:35 PM CST -----  Benign and concordant.  Given complex sclerosing lesion, recommend Breast Surgery consultation for possible excision to exclude upstage of disease.  A reflector is in place for surgical localization.    Thank you,    Ananda Guevara M.D.

## 2024-12-11 ENCOUNTER — OFFICE VISIT (OUTPATIENT)
Dept: SURGERY | Facility: CLINIC | Age: 39
End: 2024-12-11
Payer: COMMERCIAL

## 2024-12-11 ENCOUNTER — OFFICE VISIT (OUTPATIENT)
Dept: UROLOGY | Facility: CLINIC | Age: 39
End: 2024-12-11
Payer: COMMERCIAL

## 2024-12-11 VITALS
HEART RATE: 93 BPM | DIASTOLIC BLOOD PRESSURE: 88 MMHG | WEIGHT: 291 LBS | BODY MASS INDEX: 46.77 KG/M2 | HEIGHT: 66 IN | SYSTOLIC BLOOD PRESSURE: 145 MMHG

## 2024-12-11 DIAGNOSIS — N39.3 STRESS INCONTINENCE, FEMALE: Primary | ICD-10-CM

## 2024-12-11 DIAGNOSIS — M62.89 HIGH-TONE PELVIC FLOOR DYSFUNCTION: ICD-10-CM

## 2024-12-11 DIAGNOSIS — R10.2 VAGINAL PAIN: ICD-10-CM

## 2024-12-11 DIAGNOSIS — N64.89 COMPLEX SCLEROSING LESION OF RIGHT BREAST: Primary | ICD-10-CM

## 2024-12-11 LAB
BILIRUBIN, UA POC OHS: NEGATIVE
BLOOD, UA POC OHS: NEGATIVE
CLARITY, UA POC OHS: CLEAR
COLOR, UA POC OHS: YELLOW
GLUCOSE, UA POC OHS: NEGATIVE
KETONES, UA POC OHS: NEGATIVE
LEUKOCYTES, UA POC OHS: NEGATIVE
NITRITE, UA POC OHS: NEGATIVE
PH, UA POC OHS: 7
PROTEIN, UA POC OHS: NEGATIVE
SPECIFIC GRAVITY, UA POC OHS: 1.02
UROBILINOGEN, UA POC OHS: 0.2

## 2024-12-11 PROCEDURE — 3008F BODY MASS INDEX DOCD: CPT | Mod: CPTII,S$GLB,, | Performed by: SURGERY

## 2024-12-11 PROCEDURE — 99203 OFFICE O/P NEW LOW 30 MIN: CPT | Mod: S$GLB,,, | Performed by: SURGERY

## 2024-12-11 PROCEDURE — 1160F RVW MEDS BY RX/DR IN RCRD: CPT | Mod: CPTII,S$GLB,, | Performed by: SURGERY

## 2024-12-11 PROCEDURE — 3077F SYST BP >= 140 MM HG: CPT | Mod: CPTII,S$GLB,, | Performed by: SURGERY

## 2024-12-11 PROCEDURE — 3079F DIAST BP 80-89 MM HG: CPT | Mod: CPTII,S$GLB,, | Performed by: SURGERY

## 2024-12-11 PROCEDURE — 99999 PR PBB SHADOW E&M-EST. PATIENT-LVL V: CPT | Mod: PBBFAC,,, | Performed by: SURGERY

## 2024-12-11 PROCEDURE — 99999 PR PBB SHADOW E&M-EST. PATIENT-LVL IV: CPT | Mod: PBBFAC,,, | Performed by: NURSE PRACTITIONER

## 2024-12-11 PROCEDURE — 4010F ACE/ARB THERAPY RXD/TAKEN: CPT | Mod: CPTII,S$GLB,, | Performed by: SURGERY

## 2024-12-11 PROCEDURE — 1159F MED LIST DOCD IN RCRD: CPT | Mod: CPTII,S$GLB,, | Performed by: SURGERY

## 2024-12-11 RX ORDER — MIRABEGRON 25 MG/1
25 TABLET, FILM COATED, EXTENDED RELEASE ORAL DAILY
Qty: 30 TABLET | Refills: 11 | Status: SHIPPED | OUTPATIENT
Start: 2024-12-11 | End: 2024-12-12

## 2024-12-11 RX ORDER — SODIUM CHLORIDE 9 MG/ML
INJECTION, SOLUTION INTRAVENOUS CONTINUOUS
OUTPATIENT
Start: 2024-12-11
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
## 2024-12-12 RX ORDER — OXYBUTYNIN CHLORIDE 5 MG/1
5 TABLET ORAL 2 TIMES DAILY
Qty: 60 TABLET | Refills: 5 | Status: SHIPPED | OUTPATIENT
Start: 2024-12-12 | End: 2025-01-11

## 2024-12-13 NOTE — PROGRESS NOTES
The patient location is: [place]  The chief complaint leading to consultation is: IIH s/p VPS, follow up    Visit type: audiovisual    Face to Face time with patient: 15 minutes  50 minutes of total time spent on the encounter, which includes face to face time and non-face to face time preparing to see the patient (eg, review of tests), Obtaining and/or reviewing separately obtained history, Documenting clinical information in the electronic or other health record, Independently interpreting results (not separately reported) and communicating results to the patient/family/caregiver, or Care coordination (not separately reported).         Each patient to whom he or she provides medical services by telemedicine is:  (1) informed of the relationship between the physician and patient and the respective role of any other health care provider with respect to management of the patient; and (2) notified that he or she may decline to receive medical services by telemedicine and may withdraw from such care at any time.    Notes:     History of Present Illness:  The patient is a 37 y/o female who presents today for a telemedicine follow-up after a shunt adjustment. She has a past medical history of IIH s/p VPS placement with a Hakim valve, initially programmed to 170 on September 19, 2022. The valve was gradually lowered down to setting of 60, and her symptoms were well managed at this setting until July 2024, when she began experiencing positional headaches. She was seen by Latha Sherwood PA-C, on August 27, 2024, for a shunt adjustment, increasing the setting from 60 to 90 mm H2O per Dr. Yoon.    During todays visit, the patient reports complete resolution of her positional headaches. However, she expresses concern about generalized intermittent numbness and tingling that have been ongoing for the past year. The numbness may happen in her face and in any of her extremities, but not simultaneously. She notes that these  sensations can occur with slight contact pressure on her extremities, or sometimes with different positions such as raising her arms or laying in bed, typically lasting less than 90 seconds and resolving with movement. She endorses pain with the numbness. The patient denies any history of diabetes and reports no other acute symptoms. She was on gabapentin briefly in the past for dysesthesias after her shunt surgery but is no longer taking it as that had resolved. She denies any focal weakness.    Interval History 11/26/24:  Patient presents for follow up after completion of EMG. Her EMG study was normal without any electrodiagnostic evidence of radiculopathy, plexopathy, nerve entrapment, large fiber peripheral polyneuropathy myopathy. She continues to have intermittent dysesthesias of her face and all 4 extremities, but these symptoms are stable without any interval progression.     Assessment/Plan    The patient is a 38-year-old female with a past medical history of IIH s/p VPS placement with a Hakim valve, initially programmed to 170 on September 19, 2022, by Dr. Yoon, gradually adjusted down to 60, leading to effective symptom management until the onset of positional headaches in July 2024. Shunt was reprogrammed from 60 to 90, resulting in the complete resolution of her positional headaches.     However, the patient reports ongoing generalized intermittent numbness/dysesthesias affecting her face and both upper and lower extremities for approximately the past year. Given that the numbness has persisted despite various shunt adjustments, I believe it is unrelated to her intracranial hypertension. Since her last visit, she underwent EMG study with normal results.     For management, I recommend she continue low-dose gabapentin at 100 mg to be taken three times daily and will monitor her progress at the next follow-up appointment for any improvements in her symptoms. Additionally, I will refer her to Neurology for  further evaluation of her paresthesia.    She may follow up annually for evaluation of her shunt.

## 2024-12-18 ENCOUNTER — ANESTHESIA EVENT (OUTPATIENT)
Dept: SURGERY | Facility: OTHER | Age: 39
End: 2024-12-18
Payer: COMMERCIAL

## 2024-12-20 ENCOUNTER — HOSPITAL ENCOUNTER (OUTPATIENT)
Dept: PREADMISSION TESTING | Facility: OTHER | Age: 39
Discharge: HOME | End: 2024-12-20
Attending: SURGERY
Payer: COMMERCIAL

## 2024-12-20 VITALS
HEIGHT: 66 IN | SYSTOLIC BLOOD PRESSURE: 119 MMHG | HEART RATE: 92 BPM | BODY MASS INDEX: 46.77 KG/M2 | DIASTOLIC BLOOD PRESSURE: 80 MMHG | RESPIRATION RATE: 18 BRPM | OXYGEN SATURATION: 97 % | WEIGHT: 291 LBS | TEMPERATURE: 98 F

## 2024-12-20 RX ORDER — SODIUM CHLORIDE, SODIUM LACTATE, POTASSIUM CHLORIDE, CALCIUM CHLORIDE 600; 310; 30; 20 MG/100ML; MG/100ML; MG/100ML; MG/100ML
INJECTION, SOLUTION INTRAVENOUS CONTINUOUS
Status: CANCELLED | OUTPATIENT
Start: 2024-12-20

## 2024-12-20 RX ORDER — LIDOCAINE HYDROCHLORIDE 10 MG/ML
0.5 INJECTION, SOLUTION EPIDURAL; INFILTRATION; INTRACAUDAL; PERINEURAL ONCE
Status: CANCELLED | OUTPATIENT
Start: 2024-12-20 | End: 2024-12-20

## 2024-12-20 RX ORDER — ALBUTEROL SULFATE 2.5 MG/.5ML
2.5 SOLUTION RESPIRATORY (INHALATION)
Status: CANCELLED | OUTPATIENT
Start: 2024-12-20 | End: 2024-12-20

## 2024-12-20 RX ORDER — ACETAMINOPHEN 500 MG
1000 TABLET ORAL
Status: CANCELLED | OUTPATIENT
Start: 2024-12-20 | End: 2024-12-20

## 2024-12-20 RX ORDER — ACETAMINOPHEN 500 MG
500 TABLET ORAL EVERY 6 HOURS PRN
COMMUNITY

## 2024-12-20 RX ORDER — ALPRAZOLAM 0.5 MG/1
0.5 TABLET ORAL
Status: CANCELLED | OUTPATIENT
Start: 2024-12-20 | End: 2024-12-20

## 2024-12-20 NOTE — ANESTHESIA PREPROCEDURE EVALUATION
12/20/2024  Sonia Solorzano is a 39 y.o., female.      Pre-op Assessment    I have reviewed the Patient Summary Reports.     I have reviewed the Nursing Notes. I have reviewed the NPO Status.   I have reviewed the Medications.     Review of Systems  Anesthesia Hx:             Denies Family Hx of Anesthesia complications.   Personal Hx of Anesthesia complications (Anxiety/claustaphobia with mask placement)                    Social:  Smoker       Hematology/Oncology:       -- Anemia:               Hematology Comments: Hb 11.7      --  Cancer in past history (Uterine):                 Oncology Comments: uterine     Cardiovascular:     Hypertension    Dysrhythmias (palpitations)       hyperlipidemia    Echo:   Left Ventricle: The left ventricle is normal in size. Normal wall thickness. Normal wall motion. There is normal systolic function with a visually estimated ejection fraction of 60 - 65%. There is normal diastolic function.  ·  Right Ventricle: Normal right ventricular cavity size. Wall thickness is normal. Right ventricle wall motion  is normal. Systolic function is normal.  ·  Mitral Valve: There is trace regurgitation.  ·  Tricuspid Valve: There is mild regurgitation.  ·  IVC/SVC: Normal venous pressure at 3 mmHg.  ·  Overall the study quality was technically difficult                             Pulmonary:    Asthma mild   Sleep Apnea                Renal/:  Renal/ Normal                 Hepatic/GI:     GERD Liver Disease, (NAFLD)  IBS             Musculoskeletal:         Spine Disorders: lumbar Disc disease and Chronic Pain           Neurological:      Headaches     Idiopathic intracranial hypertension s/p  shunt      Chronic Pain Syndrome                         Endocrine:     Hyperparathyroidism s/p parathyroidectomy      Morbid Obesity / BMI > 40  Psych:  Psychiatric History (Bipolar)   depression bipolar               Physical Exam  General: Cooperative, Alert and Oriented    Airway:  Mallampati: III   Mouth Opening: Normal  TM Distance: Normal  Tongue: Normal  Neck ROM: Normal ROM    Dental:  Intact        Anesthesia Plan  Type of Anesthesia, risks & benefits discussed:    Anesthesia Type: Gen ETT  Intra-op Monitoring Plan: Standard ASA Monitors  Post Op Pain Control Plan: multimodal analgesia  Induction:  IV  Airway Plan: Video, Post-Induction  Informed Consent: Informed consent signed with the Patient and all parties understand the risks and agree with anesthesia plan.  All questions answered.   ASA Score: 3  Anesthesia Plan Notes: Recent labs in [facility]  Pre-op versed for anxiety    Ready For Surgery From Anesthesia Perspective.     .

## 2024-12-20 NOTE — DISCHARGE INSTRUCTIONS
Information to Prepare you for your Surgery    PRE-ADMIT TESTING -  [phone]   -Sarah  [address] [place] [address] [address]  CORNER OF [facility]      Your surgery has been scheduled at [facility]. We are pleased to have the opportunity to serve you. For Further Information please call [phone].    On the day of surgery please report to the Information Desk on the 1st floor.    CONTACT YOUR PHYSICIAN'S OFFICE THE DAY PRIOR TO YOUR SURGERY TO OBTAIN YOUR ARRIVAL TIME.     The evening before surgery do not eat anything after 9 p.m. ( this includes hard candy, chewing gum and mints). You may only have GATORADE, POWERADE AND WATER  from 9 p.m. until you leave your home.    AT LEAST 12-24 OUNCES BEFORE YOU LEAVE YOU HOUSE IN THE MORNING TO COME TO SURGERY.    DO NOT DRINK ANY LIQUIDS ON THE WAY TO THE HOSPITAL.      Why does your anesthesiologist allow you to drink Gatorade/Powerade before surgery?  Gatorade/Powerade helps to increase your comfort before surgery and to decrease your nausea after surgery. The carbohydrates in Gatorade/Powerade help reduce your body's stress response to surgery.    Outpatient Surgery- May allow 2 adult (18 and older) Support Persons (1 being the designated ) for all surgical/procedural patients. A breastfeeding mother will be allowed her infant and 2 adult Support Persons. No one under the age of 18 will be allowed in the building. No swapping out of visitors in the [facility].      SPECIAL MEDICATION INSTRUCTIONS: TAKE medications checked off by the Anesthesiologist on your Medication List.    Please bring blood pressure medications  the day of surgery.         Surgery Patients:    If you take ASPIRIN - Your PHYSICIAN/SURGEON will need to inform you IF/OR when you need to stop taking aspirin prior to your surgery.     The week prior to surgery do not ot take any medications containing  IBUPROFEN or NSAIDS ( Advil, Motrin, Goodys, BC, Aleve, Naproxen,  Ketorolac, Meloxicam, Mobic, Toradol,etc) If you are not sure if you should take a medicine please call your surgeon's office.  Ok to take Tylenol    Do Not Wear any make-up (especially eye make-up) to surgery. Please remove any false eyelashes or eyelash extensions. If you arrive the day of surgery with makeup/eyelashes on you will be required to remove prior to surgery. (There is a risk of corneal abrasions if eye makeup/eyelash extensions are not removed)      Leave all valuables at home.   Do Not wear any jewelry or watches, including any metal in body piercings. Jewelry must be removed prior to coming to the hospital.  There is a possibility that rings that are unable to be removed may be cut off if they are on the surgical extremity.    Please remove all hair extensions, wigs, clips and any other metal accessories/ ornaments from your hair.  These items may pose a flammable/fire risk in Surgery and must be removed.    Do not shave your surgical area at least 5 days prior to your surgery. The surgical prep will be performed at the hospital according to Infection Control regulations.    Contact Lens must be removed before surgery. Either do not wear the contact lens or bring a case and solution for storage.  Please bring a container for eyeglasses or dentures as required.  Bring any paperwork your physician has provided, such as consent forms,  history and physicals, doctor's orders, etc.   Bring comfortable clothes that are loose fitting to wear upon discharge. Take into consideration the type of surgery being performed.  Maintain your diet as advised per your physician the day prior to surgery.      Adequate rest the night before surgery is advised.   Park in the Parking lot behind the hospital or in the [facility] across the street from the parking lot. Parking is complimentary.  If you will be discharged the same day as your  procedure, please arrange for a responsible adult to drive you home or to accompany you if traveling by taxi.   YOU WILL NOT BE PERMITTED TO DRIVE OR TO LEAVE THE HOSPITAL ALONE AFTER SURGERY.   If you are being discharged the same day, it is strongly recommended that you arrange for someone to remain with you for the first 24 hrs following your surgery.    The Surgeon will speak to your family/visitor after your surgery regarding the outcome of your surgery and post op care.  The Surgeon may speak to you after your surgery, but there is a possibility you may not remember the details.  Please check with your family members regarding the conversation with the Surgeon.    We strongly recommend whoever is bringing you home be present for discharge instructions.  This will ensure a thorough understanding for your post op home care.    If the patient has fever, cough, or signs/symptoms of Flu or Covid please do not come in for your surgery. Contact your surgeon and your primary care physician for further instructions.           Thank you for your cooperation.  The Staff of [facility].            Bathing Instructions with Hibiclens or Dial Soap    Shower the evening before and morning of your procedure with Chlorhexidine (Hibiclens)  do not use Chlorhexidine on your face or genitals. Do not get in your eyes.  Wash your face with water and your regular face wash/soap  Use your regular shampoo  Apply Chlorhexidine (Hibiclens) directly on your skin or on a wet washcloth and wash gently. When showering: Move away from the shower stream when applying Chlorhexidine (Hibiclens) to avoid rinsing off too soon.  Rinse thoroughly with warm water  Do not dilute Chlorhexidine (Hibiclens)   Dry off as usual, do not use any deodorant, powder, body lotions, perfume, after shave or cologne.     Thanks ,   Sarah

## 2024-12-23 ENCOUNTER — TELEPHONE (OUTPATIENT)
Dept: SURGERY | Facility: CLINIC | Age: 39
End: 2024-12-23
Payer: COMMERCIAL

## 2024-12-23 NOTE — TELEPHONE ENCOUNTER
Spoke to patient to give surgery arrival time of 0830 Thursday at [facility].  Pt verbalized understanding of arrival time and location.  Patient had no other concerns at this time.

## 2024-12-26 ENCOUNTER — HOSPITAL ENCOUNTER (OUTPATIENT)
Facility: OTHER | Age: 39
Discharge: HOME | End: 2024-12-26
Attending: SURGERY | Admitting: SURGERY
Payer: COMMERCIAL

## 2024-12-26 ENCOUNTER — ANESTHESIA (OUTPATIENT)
Dept: SURGERY | Facility: OTHER | Age: 39
End: 2024-12-26
Payer: COMMERCIAL

## 2024-12-26 ENCOUNTER — HOSPITAL ENCOUNTER (OUTPATIENT)
Dept: RADIOLOGY | Facility: OTHER | Age: 39
Discharge: HOME | End: 2024-12-26
Attending: SURGERY | Admitting: SURGERY
Payer: COMMERCIAL

## 2024-12-26 DIAGNOSIS — N64.89 COMPLEX SCLEROSING LESION OF RIGHT BREAST: ICD-10-CM

## 2024-12-26 PROCEDURE — 71000016 HC POSTOP RECOV ADDL HR: Performed by: SURGERY

## 2024-12-26 PROCEDURE — 63600175 PHARM REV CODE 636 W HCPCS: Performed by: SURGERY

## 2024-12-26 PROCEDURE — 19125 EXCISION BREAST LESION: CPT | Mod: RT,,, | Performed by: SURGERY

## 2024-12-26 PROCEDURE — 63600175 PHARM REV CODE 636 W HCPCS: Performed by: NURSE ANESTHETIST, CERTIFIED REGISTERED

## 2024-12-26 PROCEDURE — 25000003 PHARM REV CODE 250: Performed by: NURSE ANESTHETIST, CERTIFIED REGISTERED

## 2024-12-26 PROCEDURE — 37000009 HC ANESTHESIA EA ADD 15 MINS: Performed by: SURGERY

## 2024-12-26 PROCEDURE — 25000242 PHARM REV CODE 250 ALT 637 W/ HCPCS: Performed by: ANESTHESIOLOGY

## 2024-12-26 PROCEDURE — 27201423 OPTIME MED/SURG SUP & DEVICES STERILE SUPPLY: Performed by: SURGERY

## 2024-12-26 PROCEDURE — 71000015 HC POSTOP RECOV 1ST HR: Performed by: SURGERY

## 2024-12-26 PROCEDURE — 71000039 HC RECOVERY, EACH ADD'L HOUR: Performed by: SURGERY

## 2024-12-26 PROCEDURE — 94761 N-INVAS EAR/PLS OXIMETRY MLT: CPT

## 2024-12-26 PROCEDURE — 94640 AIRWAY INHALATION TREATMENT: CPT

## 2024-12-26 PROCEDURE — 63600175 PHARM REV CODE 636 W HCPCS: Performed by: ANESTHESIOLOGY

## 2024-12-26 PROCEDURE — 37000008 HC ANESTHESIA 1ST 15 MINUTES: Performed by: SURGERY

## 2024-12-26 PROCEDURE — 88342 IMHCHEM/IMCYTCHM 1ST ANTB: CPT | Performed by: STUDENT IN AN ORGANIZED HEALTH CARE EDUCATION/TRAINING PROGRAM

## 2024-12-26 PROCEDURE — 25000003 PHARM REV CODE 250: Performed by: ANESTHESIOLOGY

## 2024-12-26 PROCEDURE — 36000706: Performed by: SURGERY

## 2024-12-26 PROCEDURE — 63600175 PHARM REV CODE 636 W HCPCS: Mod: JG | Performed by: SURGERY

## 2024-12-26 PROCEDURE — 76098 X-RAY EXAM SURGICAL SPECIMEN: CPT | Mod: TC

## 2024-12-26 PROCEDURE — 88307 TISSUE EXAM BY PATHOLOGIST: CPT | Performed by: STUDENT IN AN ORGANIZED HEALTH CARE EDUCATION/TRAINING PROGRAM

## 2024-12-26 PROCEDURE — 36000707: Performed by: SURGERY

## 2024-12-26 PROCEDURE — 71000033 HC RECOVERY, INTIAL HOUR: Performed by: SURGERY

## 2024-12-26 RX ORDER — ONDANSETRON HYDROCHLORIDE 2 MG/ML
INJECTION, SOLUTION INTRAVENOUS
Status: DISCONTINUED | OUTPATIENT
Start: 2024-12-26 | End: 2024-12-26

## 2024-12-26 RX ORDER — LIDOCAINE HYDROCHLORIDE 20 MG/ML
INJECTION INTRAVENOUS
Status: DISCONTINUED | OUTPATIENT
Start: 2024-12-26 | End: 2024-12-26

## 2024-12-26 RX ORDER — KETAMINE HCL IN 0.9 % NACL 50 MG/5 ML
SYRINGE (ML) INTRAVENOUS
Status: DISCONTINUED | OUTPATIENT
Start: 2024-12-26 | End: 2024-12-26 | Stop reason: HOSPADM

## 2024-12-26 RX ORDER — ACETAMINOPHEN 500 MG
1000 TABLET ORAL
Status: DISCONTINUED | OUTPATIENT
Start: 2024-12-26 | End: 2024-12-26 | Stop reason: HOSPADM

## 2024-12-26 RX ORDER — PROPOFOL 10 MG/ML
VIAL (ML) INTRAVENOUS
Status: DISCONTINUED | OUTPATIENT
Start: 2024-12-26 | End: 2024-12-26

## 2024-12-26 RX ORDER — DEXAMETHASONE SODIUM PHOSPHATE 4 MG/ML
INJECTION, SOLUTION INTRA-ARTICULAR; INTRALESIONAL; INTRAMUSCULAR; INTRAVENOUS; SOFT TISSUE
Status: DISCONTINUED | OUTPATIENT
Start: 2024-12-26 | End: 2024-12-26

## 2024-12-26 RX ORDER — OXYCODONE HYDROCHLORIDE 5 MG/1
5 TABLET ORAL EVERY 6 HOURS PRN
Qty: 8 TABLET | Refills: 0 | Status: SHIPPED | OUTPATIENT
Start: 2024-12-26

## 2024-12-26 RX ORDER — ROCURONIUM BROMIDE 10 MG/ML
INJECTION, SOLUTION INTRAVENOUS
Status: DISCONTINUED | OUTPATIENT
Start: 2024-12-26 | End: 2024-12-26

## 2024-12-26 RX ORDER — SODIUM CHLORIDE 9 MG/ML
INJECTION, SOLUTION INTRAVENOUS CONTINUOUS
Status: DISCONTINUED | OUTPATIENT
Start: 2024-12-26 | End: 2024-12-26 | Stop reason: HOSPADM

## 2024-12-26 RX ORDER — ALBUTEROL SULFATE 2.5 MG/.5ML
2.5 SOLUTION RESPIRATORY (INHALATION)
Status: COMPLETED | OUTPATIENT
Start: 2024-12-26 | End: 2024-12-26

## 2024-12-26 RX ORDER — SODIUM CHLORIDE, SODIUM LACTATE, POTASSIUM CHLORIDE, CALCIUM CHLORIDE 600; 310; 30; 20 MG/100ML; MG/100ML; MG/100ML; MG/100ML
INJECTION, SOLUTION INTRAVENOUS CONTINUOUS
Status: DISCONTINUED | OUTPATIENT
Start: 2024-12-26 | End: 2024-12-26 | Stop reason: HOSPADM

## 2024-12-26 RX ORDER — BUPIVACAINE HYDROCHLORIDE 2.5 MG/ML
INJECTION, SOLUTION EPIDURAL; INFILTRATION; INTRACAUDAL
Status: DISCONTINUED | OUTPATIENT
Start: 2024-12-26 | End: 2024-12-26 | Stop reason: HOSPADM

## 2024-12-26 RX ORDER — GLUCAGON 1 MG
1 KIT INJECTION
Status: DISCONTINUED | OUTPATIENT
Start: 2024-12-26 | End: 2024-12-26 | Stop reason: HOSPADM

## 2024-12-26 RX ORDER — ALPRAZOLAM 0.5 MG/1
0.5 TABLET ORAL
Status: DISCONTINUED | OUTPATIENT
Start: 2024-12-26 | End: 2024-12-26 | Stop reason: HOSPADM

## 2024-12-26 RX ORDER — PROCHLORPERAZINE EDISYLATE 5 MG/ML
5 INJECTION INTRAMUSCULAR; INTRAVENOUS EVERY 30 MIN PRN
Status: DISCONTINUED | OUTPATIENT
Start: 2024-12-26 | End: 2024-12-26 | Stop reason: HOSPADM

## 2024-12-26 RX ORDER — FENTANYL CITRATE 50 UG/ML
INJECTION, SOLUTION INTRAMUSCULAR; INTRAVENOUS
Status: DISCONTINUED | OUTPATIENT
Start: 2024-12-26 | End: 2024-12-26

## 2024-12-26 RX ORDER — MIDAZOLAM HYDROCHLORIDE 1 MG/ML
INJECTION INTRAMUSCULAR; INTRAVENOUS
Status: DISCONTINUED | OUTPATIENT
Start: 2024-12-26 | End: 2024-12-26

## 2024-12-26 RX ORDER — HYDROMORPHONE HYDROCHLORIDE 2 MG/ML
0.4 INJECTION, SOLUTION INTRAMUSCULAR; INTRAVENOUS; SUBCUTANEOUS EVERY 5 MIN PRN
Status: DISCONTINUED | OUTPATIENT
Start: 2024-12-26 | End: 2024-12-26 | Stop reason: HOSPADM

## 2024-12-26 RX ORDER — PROCHLORPERAZINE EDISYLATE 5 MG/ML
INJECTION INTRAMUSCULAR; INTRAVENOUS
Status: DISCONTINUED | OUTPATIENT
Start: 2024-12-26 | End: 2024-12-26

## 2024-12-26 RX ORDER — CEFAZOLIN SODIUM 1 G/3ML
3 INJECTION, POWDER, FOR SOLUTION INTRAMUSCULAR; INTRAVENOUS
Status: COMPLETED | OUTPATIENT
Start: 2024-12-26 | End: 2024-12-26

## 2024-12-26 RX ORDER — SODIUM CHLORIDE 0.9 % (FLUSH) 0.9 %
3 SYRINGE (ML) INJECTION
Status: DISCONTINUED | OUTPATIENT
Start: 2024-12-26 | End: 2024-12-26 | Stop reason: HOSPADM

## 2024-12-26 RX ORDER — LIDOCAINE HYDROCHLORIDE 10 MG/ML
0.5 INJECTION, SOLUTION EPIDURAL; INFILTRATION; INTRACAUDAL; PERINEURAL ONCE
Status: DISCONTINUED | OUTPATIENT
Start: 2024-12-26 | End: 2024-12-26 | Stop reason: HOSPADM

## 2024-12-26 RX ORDER — OXYCODONE HYDROCHLORIDE 5 MG/1
5 TABLET ORAL
Status: DISCONTINUED | OUTPATIENT
Start: 2024-12-26 | End: 2024-12-26 | Stop reason: HOSPADM

## 2024-12-26 RX ORDER — MEPERIDINE HYDROCHLORIDE 25 MG/ML
12.5 INJECTION INTRAMUSCULAR; INTRAVENOUS; SUBCUTANEOUS ONCE AS NEEDED
Status: DISCONTINUED | OUTPATIENT
Start: 2024-12-26 | End: 2024-12-26 | Stop reason: HOSPADM

## 2024-12-26 RX ADMIN — SUGAMMADEX 400 MG: 100 INJECTION, SOLUTION INTRAVENOUS at 12:12

## 2024-12-26 RX ADMIN — PROCHLORPERAZINE EDISYLATE 2.5 MG: 5 INJECTION INTRAMUSCULAR; INTRAVENOUS at 12:12

## 2024-12-26 RX ADMIN — ROCURONIUM BROMIDE 50 MG: 10 INJECTION INTRAVENOUS at 11:12

## 2024-12-26 RX ADMIN — DEXAMETHASONE SODIUM PHOSPHATE 4 MG: 4 INJECTION, SOLUTION INTRAMUSCULAR; INTRAVENOUS at 11:12

## 2024-12-26 RX ADMIN — CEFAZOLIN 3 G: 330 INJECTION, POWDER, FOR SOLUTION INTRAMUSCULAR; INTRAVENOUS at 11:12

## 2024-12-26 RX ADMIN — FENTANYL CITRATE 100 MCG: 50 INJECTION, SOLUTION INTRAMUSCULAR; INTRAVENOUS at 11:12

## 2024-12-26 RX ADMIN — PROPOFOL 200 MG: 10 INJECTION, EMULSION INTRAVENOUS at 11:12

## 2024-12-26 RX ADMIN — ROCURONIUM BROMIDE 20 MG: 10 INJECTION INTRAVENOUS at 11:12

## 2024-12-26 RX ADMIN — ALBUTEROL SULFATE 2.5 MG: 2.5 SOLUTION RESPIRATORY (INHALATION) at 09:12

## 2024-12-26 RX ADMIN — SODIUM CHLORIDE, SODIUM LACTATE, POTASSIUM CHLORIDE, AND CALCIUM CHLORIDE: 600; 310; 30; 20 INJECTION, SOLUTION INTRAVENOUS at 10:12

## 2024-12-26 RX ADMIN — FENTANYL CITRATE 50 MCG: 50 INJECTION, SOLUTION INTRAMUSCULAR; INTRAVENOUS at 11:12

## 2024-12-26 RX ADMIN — ONDANSETRON HYDROCHLORIDE 4 MG: 2 INJECTION INTRAMUSCULAR; INTRAVENOUS at 11:12

## 2024-12-26 RX ADMIN — ONDANSETRON HYDROCHLORIDE 4 MG: 2 INJECTION INTRAMUSCULAR; INTRAVENOUS at 12:12

## 2024-12-26 RX ADMIN — OXYCODONE HYDROCHLORIDE 5 MG: 5 TABLET ORAL at 01:12

## 2024-12-26 RX ADMIN — Medication 30 MG: at 11:12

## 2024-12-26 RX ADMIN — LIDOCAINE HYDROCHLORIDE 100 MG: 20 INJECTION, SOLUTION INTRAVENOUS at 11:12

## 2024-12-26 RX ADMIN — MIDAZOLAM HYDROCHLORIDE 2 MG: 1 INJECTION, SOLUTION INTRAMUSCULAR; INTRAVENOUS at 10:12

## 2024-12-26 RX ADMIN — OXYCODONE HYDROCHLORIDE 5 MG: 5 TABLET ORAL at 02:12

## 2024-12-26 NOTE — PLAN OF CARE
Sonia Solorzano has met all discharge criteria from Phase II. Vital Signs are stable, ambulating  without difficulty. Discharge instructions given, patient verbalized understanding. Discharged from facility via wheelchair in stable condition.

## 2024-12-26 NOTE — DISCHARGE INSTRUCTIONS
POSTOPERATIVE INSTRUCTIONS FOLLOWING BIOPSY OR LUMPECTOMY    The following are post-operative instructions that will help you to recover from your surgery.  Please read over these instructions carefully and contact us if we can answer any of your questions or concerns.    Wound Care  A surgical bra may be placed around your chest after your surgery.  If you are given the bra, please wear it as close to 24 hours a day as possible until your post-operative clinic appointment (2 weeks after surgery).  If the elastic around the bra irritates your skin, you may wear a soft t-shirt underneath the bra.  You may go without wearing the bra long enough to shower, to launder and dry the bra.  If the bra is extremely uncomfortable, you may wear a supportive sports bra instead after 2 days.  You may shower the day after surgery.  Do not take a tub bath and do not soak the surgical site for 2 weeks.  If you had lymph node surgery a blue dye was utilized. This may turn your skin, urine or stool temporarily blue. This is expected and will improve in a few days.     Activity   You should be able to return to your regular activities 2 days after your surgery.  However, do not engage in strenuous activities in which you use your upper body such as:  golf, tennis, aerobics, washing windows, raking the yard, mopping, vacuuming, heavy lifting (>15 lbs,e.g children) until you are seen for your follow-up appointment in clinic (about 2 weeks).  Please wait at least 24 hrs after anesthesia to drive. You can not drive if you are taking narcotic pain medicine. Otherwise you may drive as long as you fell comfortable to do so.     Medication for pain  To decrease your need for narcotic painful please consider taking over the counter pain reliever scheduled for 5 days post op.     Over the counter medications:  Tylenol  1000 mg twice a day for 5 days then as needed.   Naproxen (Aleve)  440 mg twice a day for 5 days then as needed. * If you can  not tolerate NSAIDs than you can skip this part of the regimen. Consider taking with food to limit GI upset.     Narcotics:  Oxycodone 5 mg as needed. Can take every 6 hours as needed.   May not need to take if pain controlled with over the counter medications.   Do not combine with other narcotics or benzodiazepines.   You should not drive or operate machinery while taking these.    Please take narcotics with food.    Narcotics can cause, or worsen constipation.  If taking narcotics you will need to increase your fluid intake, eat high fiber foods (such as fruits and bran) and make sure that you are up and walking. You may need to take an over the counter stool softener for constipation.      Please report the following:  Temperature greater than 101 degrees  Discharge or bad odor from the wound  Excessive bleeding, such as bloody dressing or extreme bruising  Redness at incision and/or drain sites  Swelling or buildup of fluid around incision    Additional information  I will see you approximately 2 weeks following your surgery.  If this follow-up appointment has not been made, please call the office.    If you have any questions or problems, please call my office or my nurse.  Dr. Tonya Disla MD  If you have questions, please call my nurse: Nichole at [phone] or [place] at [phone]    After hours and on weekends, you may call the main [facility] line at [phone] and ask to have the general surgery resident paged or have me paged.    If directed to the emergency room it would be best to proceed to the [facility] ER. However if very ill or unable to travel safely then please present to your nearest ER.

## 2024-12-26 NOTE — ANESTHESIA POSTPROCEDURE EVALUATION
Anesthesia Post Evaluation    Patient: Sonia Solorzano    Procedure(s) Performed: Procedure(s) (LRB):  EXCISION, LESION, BREAST RIGHT with radiological marker (Right)    Final Anesthesia Type: general      Patient location during evaluation: PACU  Patient participation: Yes- Able to Participate  Level of consciousness: awake and alert  Post-procedure vital signs: reviewed and stable  Pain management: adequate  Airway patency: patent    PONV status at discharge: No PONV  Anesthetic complications: no      Cardiovascular status: blood pressure returned to baseline  Respiratory status: unassisted and spontaneous ventilation  Hydration status: euvolemic  Follow-up not needed.              Vitals Value Taken Time   /99 12/26/24 1246   Temp 36.6 °C (97.8 °F) 12/26/24 1239   Pulse 97 12/26/24 1301   Resp 16 12/26/24 1239   SpO2 98 % 12/26/24 1301   Vitals shown include unfiled device data.      No case tracking events are documented in the log.      Pain/Kevin Score: Kevin Score: 8 (12/26/2024 12:39 PM)

## 2024-12-26 NOTE — TRANSFER OF CARE
"Anesthesia Transfer of Care Note    Patient: Sonia Solorzano    Procedure(s) Performed: Procedure(s) (LRB):  EXCISION, LESION, BREAST RIGHT with radiological marker (Right)    Patient location: PACU    Anesthesia Type: general    Transport from OR: Transported from OR on 6-10 L/min O2 by face mask with adequate spontaneous ventilation    Post pain: adequate analgesia    Post assessment: no apparent anesthetic complications and tolerated procedure well    Post vital signs: stable    Level of consciousness: awake    Nausea/Vomiting: nausea and vomiting (Resolved with meds)    Complications: none    Transfer of care protocol was followed      Last vitals: Visit Vitals  BP (!) 150/99 (BP Location: Left arm, Patient Position: Lying)   Pulse 102   Temp 36.6 °C (97.8 °F) (Temporal)   Resp 16   Ht 5' 6" (1.676 m)   Wt 132 kg (291 lb)   LMP 01/11/2024 (Exact Date)   SpO2 96%   Breastfeeding No   BMI 46.97 kg/m²     "

## 2024-12-26 NOTE — BRIEF OP NOTE
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 Radiologic marker confirmed to be in specimen via mozart.     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 Right breast excisional biopsy Short stitch: SUPERIOR, long stitch: LATERAL     References:    Click here for ordering Quick Tip   Question Answer Comment   Procedure Type: Breast    Specimen Class: Routine/Screening    Release to patient Immediate        12/26/24 1203                      Discharge Note    OUTCOME: Patient tolerated treatment/procedure well without complication and is now ready for discharge.    DISPOSITION: Home or Self Care    FINAL DIAGNOSIS:  <principal problem not specified>    FOLLOWUP: In clinic    DISCHARGE INSTRUCTIONS:    Discharge Procedure Orders   Diet Adult Regular     Notify your health care provider if you experience any of the following:  temperature >100.4     Notify your health care provider if you experience any of the following:  persistent nausea and vomiting or diarrhea     Notify your health care provider if you experience any of the following:  severe uncontrolled pain     Notify your health  care provider if you experience any of the following:  redness, tenderness, or signs of infection (pain, swelling, redness, odor or green/yellow discharge around incision site)

## 2024-12-26 NOTE — ANESTHESIA PROCEDURE NOTES
Intubation    Date/Time: 12/26/2024 11:08 AM    Performed by: Gracia Robles  Authorized by: Cruz Molina MD    Intubation:     Induction:  Intravenous    Intubated:  Postinduction    Mask Ventilation:  Easy with oral airway    Attempts:  1    Attempted By:  Student (JHONATHAN spring)    Method of Intubation:  Video laryngoscopy    Blade:  Kurtz 3    Laryngeal View Grade: Grade I - full view of cords      Difficult Airway Encountered?: No      Complications:  None    Airway Device:  Oral endotracheal tube    Airway Device Size:  7.5    Style/Cuff Inflation:  Cuffed (inflated to minimal occlusive pressure)    Tube secured:  22    Secured at:  The lips    Placement Verified By:  Capnometry, Chest x-ray and Colorimetric ETCO2 device    Complicating Factors:  None, obesity and small mouth    Findings Post-Intubation:  BS equal bilateral and atraumatic/condition of teeth unchanged

## 2024-12-27 VITALS
SYSTOLIC BLOOD PRESSURE: 140 MMHG | RESPIRATION RATE: 18 BRPM | WEIGHT: 291 LBS | TEMPERATURE: 99 F | HEIGHT: 66 IN | OXYGEN SATURATION: 96 % | DIASTOLIC BLOOD PRESSURE: 82 MMHG | BODY MASS INDEX: 46.77 KG/M2 | HEART RATE: 96 BPM

## 2024-12-31 LAB
FINAL PATHOLOGIC DIAGNOSIS: NORMAL
GROSS: NORMAL
Lab: NORMAL

## 2025-01-13 ENCOUNTER — TELEPHONE (OUTPATIENT)
Dept: FAMILY MEDICINE | Facility: CLINIC | Age: 40
End: 2025-01-13
Payer: COMMERCIAL

## 2025-01-13 NOTE — TELEPHONE ENCOUNTER
LM, appt 1/16/25  Needs to be reschedule, provider out clinic that morning   Portal message sent   
Left UE -  no motor or sensory deficits.

## 2025-01-14 ENCOUNTER — OFFICE VISIT (OUTPATIENT)
Dept: SURGERY | Facility: CLINIC | Age: 40
End: 2025-01-14
Payer: COMMERCIAL

## 2025-01-14 VITALS
HEIGHT: 66 IN | SYSTOLIC BLOOD PRESSURE: 140 MMHG | DIASTOLIC BLOOD PRESSURE: 73 MMHG | OXYGEN SATURATION: 100 % | WEIGHT: 291 LBS | HEART RATE: 68 BPM | BODY MASS INDEX: 46.77 KG/M2

## 2025-01-14 DIAGNOSIS — N64.89 COMPLEX SCLEROSING LESION OF RIGHT BREAST: Primary | ICD-10-CM

## 2025-01-14 PROCEDURE — 1159F MED LIST DOCD IN RCRD: CPT | Mod: CPTII,S$GLB,, | Performed by: SURGERY

## 2025-01-14 PROCEDURE — 1160F RVW MEDS BY RX/DR IN RCRD: CPT | Mod: CPTII,S$GLB,, | Performed by: SURGERY

## 2025-01-14 PROCEDURE — 99024 POSTOP FOLLOW-UP VISIT: CPT | Mod: S$GLB,,, | Performed by: SURGERY

## 2025-01-14 PROCEDURE — 3077F SYST BP >= 140 MM HG: CPT | Mod: CPTII,S$GLB,, | Performed by: SURGERY

## 2025-01-14 PROCEDURE — 3078F DIAST BP <80 MM HG: CPT | Mod: CPTII,S$GLB,, | Performed by: SURGERY

## 2025-01-23 ENCOUNTER — HOSPITAL ENCOUNTER (OUTPATIENT)
Dept: RADIOLOGY | Facility: HOSPITAL | Age: 40
Discharge: HOME | End: 2025-01-23
Payer: COMMERCIAL

## 2025-01-23 DIAGNOSIS — N83.202 CYST OF LEFT OVARY: ICD-10-CM

## 2025-01-23 PROCEDURE — 76830 TRANSVAGINAL US NON-OB: CPT | Mod: TC

## 2025-01-23 PROCEDURE — 76830 TRANSVAGINAL US NON-OB: CPT | Mod: 26,,, | Performed by: RADIOLOGY

## 2025-01-24 ENCOUNTER — OFFICE VISIT (OUTPATIENT)
Dept: FAMILY MEDICINE | Facility: CLINIC | Age: 40
End: 2025-01-24
Payer: COMMERCIAL

## 2025-01-24 DIAGNOSIS — I10 PRIMARY HYPERTENSION: Chronic | ICD-10-CM

## 2025-01-24 DIAGNOSIS — G43.009 MIGRAINE WITHOUT AURA AND WITHOUT STATUS MIGRAINOSUS, NOT INTRACTABLE: ICD-10-CM

## 2025-01-24 DIAGNOSIS — G93.2 IIH (IDIOPATHIC INTRACRANIAL HYPERTENSION): Chronic | ICD-10-CM

## 2025-01-24 DIAGNOSIS — Z00.00 HEALTHCARE MAINTENANCE: ICD-10-CM

## 2025-01-24 DIAGNOSIS — E66.9 OBESITY, UNSPECIFIED CLASS, UNSPECIFIED OBESITY TYPE, UNSPECIFIED WHETHER SERIOUS COMORBIDITY PRESENT: Primary | ICD-10-CM

## 2025-01-24 DIAGNOSIS — E78.2 MIXED HYPERLIPIDEMIA: ICD-10-CM

## 2025-01-24 RX ORDER — NALTREXONE HYDROCHLORIDE 50 MG/1
TABLET, FILM COATED ORAL
Qty: 30 TABLET | Refills: 2 | Status: SHIPPED | OUTPATIENT
Start: 2025-01-24

## 2025-01-24 RX ORDER — ONDANSETRON 4 MG/1
4 TABLET, ORALLY DISINTEGRATING ORAL EVERY 6 HOURS PRN
Qty: 30 TABLET | Refills: 0 | Status: SHIPPED | OUTPATIENT
Start: 2025-01-24 | End: 2025-02-23

## 2025-01-24 RX ORDER — BUPROPION HYDROCHLORIDE 150 MG/1
150 TABLET, EXTENDED RELEASE ORAL 2 TIMES DAILY
Qty: 60 TABLET | Refills: 11 | Status: SHIPPED | OUTPATIENT
Start: 2025-01-24 | End: 2026-01-24

## 2025-01-24 NOTE — PROGRESS NOTES
Established Patient - Audio Only Telehealth Visit          274}  The patient location is: [place]   The chief complaint leading to consultation is:   Chief Complaint   Patient presents with    Obesity     Visit type: Virtual visit with audio only (telephone)  Visit type: Virtual visit with audio only (telephone)  Total time spent in medical discussion with patient: 28 minutes  Total time spent on date of the encounter:32 minutes     The reason for the audio only service rather than synchronous audio and video virtual visit was related to technical difficulties or patient preference/necessity.     Each patient to whom I provide medical services by telemedicine is:  (1) informed of the relationship between the physician and patient and the respective role of any other health care provider with respect to management of the patient; and (2) notified that they may decline to receive medical services by telemedicine and may withdraw from such care at any time. Patient verbally consented to receive this service via voice-only telephone call.        274}     HPI:   History of Present Illness  The patient presents for evaluation of obesity, migraines, hyperlipidemia, hypertension, and idiopathic intracranial hypertension.    She reports difficulty in losing weight, which she attributes to her knee arthritis limiting her mobility. She has previously consulted a weight  and is open to pharmacological interventions. She has a history of elevated fasting insulin levels. Her appetite is generally low, and she occasionally experiences significant nausea. Past treatments include metformin, Topamax, and Adipex, the latter of which was discontinued due to potential side effects as advised by her specialist. She has also taken Wellbutrin without any adverse effects.    Her migraines have shown improvement and are currently stable.    Supplemental Information  She reports that her last blood pressure reading was  likely high related to a procedure.    MEDICATIONS  Past: metformin, Topamax, Adipex, Wellbutrin          Lab Results   Component Value Date    WBC 8.14 12/02/2024    HGB 11.7 (L) 12/02/2024    HCT 39.0 12/02/2024    MCV 84 12/02/2024     12/02/2024     12/02/2024    K 3.6 12/02/2024     12/02/2024    CALCIUM 8.5 (L) 12/02/2024    PHOS 3.6 08/13/2024    CO2 25 12/02/2024     12/02/2024    BUN 13 12/02/2024    CREATININE 0.8 12/02/2024    EGFRNORACEVR >60 12/02/2024    ANIONGAP 12 12/02/2024    PROT 6.5 12/02/2024    ALBUMIN 3.2 (L) 12/02/2024    BILITOT 0.3 12/02/2024    ALKPHOS 81 12/02/2024    ALT 14 12/02/2024    AST 9 (L) 12/02/2024    INR 0.9 06/25/2023    CHOL 168 01/09/2024    TRIG 175 (H) 01/09/2024    HDL 40 01/09/2024    LDLCALC 93.0 01/09/2024    TSH 2.658 06/25/2023    HGBA1C 5.3 12/09/2023          Assessment and plan:    Assessment & Plan  1. Obesity.  A comprehensive discussion was held regarding the potential risks and benefits associated with various medications. She has previously tried Topamax, but it will be avoided due to its side effects. The use of GLP-1 agonists was also discussed, but unfortunately, these are not covered by her insurance. An alternative option of obtaining a vial directly from the  was considered, but the cost remains prohibitive at approximately $ 450. Consequently, this option will be deferred for now. Contrave was discussed as a potential treatment, but due to its high cost, a generic alternative will be used. Prescriptions for Wellbutrin and naltrexone will be provided. Potential side effects such as nausea and constipation were discussed. To mitigate these, the pill will be halved. A prescription for Zofran will also be provided. Progress will be monitored closely.    2. Migraines.  Her condition is currently stable. She will continue with her current medication regimen and her progress will be monitored.    3. Hyperlipidemia.  Her  condition is currently stable. She will continue with her statin medication and her progress will be monitored.    4. Hypertension.  She will continue with her current medication regimen and her progress will be monitored.    5. Idiopathic intracranial hypertension.  Her condition is currently stable. She will continue to be monitored.       This service was not originating from a related E/M service provided within the previous 7 days nor will  to an E/M service or procedure within the next 24 hours or my soonest available appointment.  Prevailing standard of care was able to be met in this audio-only visit.

## 2025-01-30 NOTE — PROGRESS NOTES
REFERRING PHYSICIAN:  No referring provider defined for this encounter.       Dorian Guerrero MD    DATE: 1/14/2025    DIAGNOSIS:    This is a 39 y.o. female with CSL of the right breast.    TREATMENT SUMMARY:  The patient is status post right excisional breast biopsy.  Final pathology showed   1 - RIGHT BREAST, PARTIAL MASTECTOMY (LUMPECTOMY):     - Complex sclerosing lesion (CSL).  See comment.    - NEGATIVE for atypia and malignancy.   - A biopsy reflector tag is found grossly imbedded in and subsequently retrieved from slice 9.     Additional Findings: Background breast tissue with sclerosing adenosis, usual ductal hyperplasia, apocrine metaplasia, fibroadenomatoid change, focal fat necrosis, and stromal fibrosis.     Comment: The following immunohistochemical (IHC) stain(s) are performed with appropriate control tissue reviewed in order to further characterize the process:     1Q/1S - p63: Retained myoepithelial staining.     All margins are negative for complex sclerosing lesion (closest: lateral, 4 mm). All the tissue has been submitted for histologic evaluation.     INTERVAL HISTORY:   Sonia Solorzano comes in for a post-op check. Her pain is well controlled.      PHYSICAL EXAMINATION:   General:  This is a well appearing female with appropriate speech, affect and gait.     Breast:  Incision clean, dry, and intact    IMPRESSION:   The patient has had an uneventful postoperative course.  Radiology calculated Tyrer-Cuzick score was 22.95%.     PLAN:   Established patient of the high-risk clinic.  We will continue to follow up.  Plan for ultrasound in July.   Mammograms will be ordered through the high-risk clinic.    Can follow up with the breast surgery Clinic on an as-needed basis.  The patient is advised in continued exam of the breast chest wall and to report to this office sooner should she note any areas of abnormality or concern.

## 2025-02-06 ENCOUNTER — OFFICE VISIT (OUTPATIENT)
Dept: GYNECOLOGIC ONCOLOGY | Facility: CLINIC | Age: 40
End: 2025-02-06
Payer: COMMERCIAL

## 2025-02-06 VITALS
OXYGEN SATURATION: 98 % | RESPIRATION RATE: 16 BRPM | BODY MASS INDEX: 47.09 KG/M2 | TEMPERATURE: 98 F | HEIGHT: 66 IN | DIASTOLIC BLOOD PRESSURE: 82 MMHG | HEART RATE: 88 BPM | WEIGHT: 293 LBS | SYSTOLIC BLOOD PRESSURE: 134 MMHG

## 2025-02-06 DIAGNOSIS — C55 ENDOMETRIOID ADENOCARCINOMA OF UTERUS: ICD-10-CM

## 2025-02-06 DIAGNOSIS — R10.2 VAGINAL PAIN: Primary | ICD-10-CM

## 2025-02-06 PROCEDURE — 3079F DIAST BP 80-89 MM HG: CPT | Mod: CPTII,S$GLB,, | Performed by: OBSTETRICS & GYNECOLOGY

## 2025-02-06 PROCEDURE — 3008F BODY MASS INDEX DOCD: CPT | Mod: CPTII,S$GLB,, | Performed by: OBSTETRICS & GYNECOLOGY

## 2025-02-06 PROCEDURE — 99999 PR PBB SHADOW E&M-EST. PATIENT-LVL IV: CPT | Mod: PBBFAC,,, | Performed by: OBSTETRICS & GYNECOLOGY

## 2025-02-06 PROCEDURE — 3075F SYST BP GE 130 - 139MM HG: CPT | Mod: CPTII,S$GLB,, | Performed by: OBSTETRICS & GYNECOLOGY

## 2025-02-06 PROCEDURE — 99214 OFFICE O/P EST MOD 30 MIN: CPT | Mod: S$GLB,,, | Performed by: OBSTETRICS & GYNECOLOGY

## 2025-02-06 PROCEDURE — 1159F MED LIST DOCD IN RCRD: CPT | Mod: CPTII,S$GLB,, | Performed by: OBSTETRICS & GYNECOLOGY

## 2025-02-06 NOTE — PROGRESS NOTES
Subjective:      Patient ID: Sonia Solorzano is a 39 y.o. female.    Chief Complaint: 3 Month Follow Up (Endometrioid adenocarcinoma of uterus//)    HPI  S/p RTLH/BS/RO/SLND 2/12/2024    Pathology:  Stage IA, grade 1 endometrioid type endometrial adenocarcinoma, 2-3mm tumor size, no myometrial invasion, negative LVSI, negative sentinel lymph nodes.   Low risk, no adjuvant treatment.     Today's visit:   Presents today for surveillance visit.   Disease free interval 1 year.     Pelvic US 1/2025 shows resolution of left ovarian cyst,     Using vaginal estrogen. Endorses dyspareunia.   Has tried pelvic floor PT.   _________________________________________________________________  Referred by Dr. Flanagan for newly diagnosed complex endometrial hyperplasia with atypia.      Pelvic US  Uterus: 10.5 x 5.4 cm  Endometrium: Normal in this pre menopausal patient, measuring 12.1 mm.  Right ovary: The right ovary is not seen.  There is a 2.8 cm cyst in the right adnexa.  Left ovary: Not seen     Endometrial sampling 1/17/2024  Fragments of endometrial tissue showing changes compatible with complex hyperplasia with focal atypia.      Prior abdominal surgeries include c/s, lsc removal endometrioma in abdomen, lsc lillie, BTL,  shunt.     Review of Systems   Constitutional:  Negative for appetite change, chills, fatigue and fever.   HENT:  Negative for mouth sores.    Respiratory:  Negative for cough and shortness of breath.    Cardiovascular:  Positive for leg swelling.   Gastrointestinal:  Positive for rectal pain. Negative for abdominal distention, abdominal pain, anal bleeding, blood in stool, constipation, diarrhea, nausea and vomiting.   Endocrine: Negative for cold intolerance.   Genitourinary:  Positive for dyspareunia, pelvic pain and vaginal pain. Negative for dysuria, vaginal bleeding and vaginal discharge.   Musculoskeletal:  Negative for myalgias.   Skin:  Negative for rash.   Allergic/Immunologic: Negative.     Neurological:  Negative for weakness and numbness.   Hematological:  Negative for adenopathy. Does not bruise/bleed easily.   Psychiatric/Behavioral:  Negative for confusion.      Objective:   Physical Exam:   Constitutional: She is oriented to person, place, and time. She appears well-developed and well-nourished.    HENT:   Head: Normocephalic and atraumatic.    Eyes: Pupils are equal, round, and reactive to light. EOM are normal.    Neck: No thyromegaly present.    Cardiovascular:  Normal rate, regular rhythm and intact distal pulses.             Pulmonary/Chest: Effort normal and breath sounds normal. No respiratory distress. She has no wheezes.        Abdominal: Soft. She exhibits no distension and no mass. There is no abdominal tenderness.     Genitourinary: Rectum:      No anal fissure or external hemorrhoid.      Pelvic exam was performed with patient supine.   The external female genitalia was normal.   There is an absent left adnexa. Vagina exhibits no lesion. There is tenderness in the vagina. No bleeding in the vagina. Cervix is absent.Uterus is absent.           Musculoskeletal: Normal range of motion and moves all extremeties.      Lymphadenopathy:     She has no cervical adenopathy. No inguinal adenopathy noted on the right or left side.        Right: No supraclavicular adenopathy present.        Left: No supraclavicular adenopathy present.    Neurological: She is alert and oriented to person, place, and time.    Skin: Skin is warm and dry. No rash noted.    Psychiatric: She has a normal mood and affect. She has a tearful affect.       Assessment:     1. Vaginal pain    2. Endometrioid adenocarcinoma of uterus          Plan:     No orders of the defined types were placed in this encounter.    No evidence of disease on today's exam.   Disease free interval 1 year.     Dyspareunia: using vaginal estrogen, has tried pelvic floor PT, will rx vaginal valium suppositories compound    Recommend continued  surveillance. RTC 6 months or sooner if needed.     I spent approximately 30 minutes reviewing the available records and evaluating the patient, out of which over 50% of the time was spent face to face with the patient in counseling and coordinating this patient's care.

## 2025-02-09 ENCOUNTER — E-VISIT (OUTPATIENT)
Dept: FAMILY MEDICINE | Facility: CLINIC | Age: 40
End: 2025-02-09
Payer: COMMERCIAL

## 2025-02-09 DIAGNOSIS — R50.9 FEVER, UNSPECIFIED FEVER CAUSE: Primary | ICD-10-CM

## 2025-02-09 DIAGNOSIS — R53.83 FATIGUE, UNSPECIFIED TYPE: ICD-10-CM

## 2025-02-09 RX ORDER — BALOXAVIR MARBOXIL 80 MG/1
80 TABLET, FILM COATED ORAL ONCE
Qty: 1 TABLET | Refills: 0 | Status: CANCELLED | OUTPATIENT
Start: 2025-02-09 | End: 2025-02-09

## 2025-02-09 NOTE — PROGRESS NOTES
Patient ID: Sonia Solorzano is a 39 y.o. female.    Chief Complaint: General Illness (Entered automatically based on patient selection in [employer].)          274}  The patient initiated a request through [employer] on 2/9/2025 for evaluation and management with a chief complaint of General Illness (Entered automatically based on patient selection in [employer].)     I evaluated the questionnaire submission on 02/10/2025 .    Total Time (in minutes): 12     [facility] Peq Evisit Supergroup-Common Problems    2/9/2025  4:49 PM CST - Filed by Patient   What do you need help with? Other Concern   Do you agree to participate in an E-Visit? Yes   If you have any of the following symptoms, please present to your local emergency room or call 911:  I acknowledge   Do you have any of the following pregnancy-related conditions? None   What is the main issue you would like addressed today? Fever, weakness, body aches, fatigue, nausea   Please describe your symptoms Fever, weakness, body aches, fatigue   Where is your problem located? Systemic   How severe are your symptoms? Moderate   Have you had these symptoms before? Yes   How long have you been having these symptoms? For a few days   Please list any medications or treatments you have used for your condition and indicate if it was effective or not. Tylenol no   What makes this feel better? Nothing   What makes this feel worse? Moving around   Are these symptoms related to a condition that you currently have? I am not sure   What is the condition? Multihealth problems   When were you last seen for this condition?    Please describe any probable cause for these symptoms Breast surgery a month ago slow healing. More than likely not infection source but a differential   Provide any additional information you feel is important.    Please attach any relevant images or files    Are you able to take your vital signs? Yes   Systolic Blood Pressure: 115   Diastolic Blood Pressure: 80   Weight:  295   Height: 66   Pulse: 90   Temperature: 101.3   Respiration rate: 22   Pulse Oxygen:           Active Problem List with Overview Notes    Diagnosis Date Noted    Numbness and tingling sensation of skin 11/18/2024    High-tone pelvic floor dysfunction 07/19/2024    Dyspareunia in female 07/19/2024    Dysfunctional voiding of urine 07/19/2024    Abnormal frequency of defecation 07/19/2024    Uterine cancer 06/26/2024    S/P parathyroidectomy 05/19/2024    Vitamin D insufficiency 05/16/2024    Family history of breast cancer 03/06/2024    S/p RA-TLH/BS/RO/SLND 02/13/2024    Complex endometrial hyperplasia with atypia 02/12/2024    Numbness and tingling in left hand 02/12/2024    Acid reflux 01/31/2024    Fatty liver 01/31/2024    History of COVID-19 01/31/2024    Thyroid nodule 01/31/2024    Vapes nicotine containing substance 01/31/2024    HLD (hyperlipidemia) 06/26/2023    Functional neurological symptom disorder with mixed symptoms 10/31/2022    S/P  shunt 09/28/2022    PCOS (polycystic ovarian syndrome) 08/07/2022    IIH (idiopathic intracranial hypertension) 08/06/2022    Migraine without aura and without status migrainosus, not intractable 06/28/2022     Headaches are typically unilateral, moderate to severe in intensity, worsen with activity, pounding in quality and associated with sensitivity to light and sound.   Gradual progression pattern, lack of red flag features on history, and normal neurological exam are reassuring for primary as opposed to secondary etiology of headaches thus imaging will not be pursued for this history and this exam at this time.  Rare attacks. Will maximize acute attacks.  For acute escalations, start Nurtec.      Bipolar disorder, unspecified 09/03/2021    Irritable bowel syndrome with both constipation and diarrhea 09/03/2021    Major depressive disorder, single episode, unspecified 09/03/2021    Unspecified asthma, uncomplicated 09/03/2021    Iron deficiency anemia  11/20/2020    Iron deficiency anemia due to chronic blood loss 11/04/2020    Hypertension 10/14/2020    BMI 45.0-49.9, adult 08/17/2016    Obstructive sleep apnea 08/17/2016      Recent Labs Obtained:  Lab Results   Component Value Date    WBC 8.14 12/02/2024    HGB 11.7 (L) 12/02/2024    HCT 39.0 12/02/2024    MCV 84 12/02/2024     12/02/2024     12/02/2024    K 3.6 12/02/2024     12/02/2024    CREATININE 0.8 12/02/2024    EGFRNORACEVR >60 12/02/2024    HGBA1C 5.3 12/09/2023    TSH 2.658 06/25/2023      Review of patient's allergies indicates:   Allergen Reactions    Contrast media Anaphylaxis    Iodine and iodide containing products Anaphylaxis    Levaquin [levofloxacin] Anaphylaxis    Levofloxacin in d5w Anaphylaxis    Sulfa (sulfonamide antibiotics) Anaphylaxis and Hives    Tree nuts Anaphylaxis    Adhesive tape-silicones Hives    Magnesium Hives     Pt reporting she is allergic to magnesium citrate oral drink.     Morphine Hives     Reports tolerating all other opioids, only had a reaction to morphine    Compazine [prochlorperazine] Anxiety     Restless legs    Depacon [valproate sodium] Hives     Pt experienced hives at IV site upon 8th day of depacon administration.  Hives resolved with stopping medication in 1.5 hours.       Encounter Diagnoses   Name Primary?    Fever, unspecified fever cause Yes    Fatigue, unspecified type         Orders Placed This Encounter   Procedures    Influenza A & B by Molecular     Standing Status:   Future     Number of Occurrences:   1     Standing Expiration Date:   8/10/2025     Order Specific Question:   Send normal result to authorizing provider's In Basket if patient is active on MyChart:     Answer:   Yes    Urinalysis, Reflex to Urine Culture Urine, Clean Catch     Standing Status:   Future     Standing Expiration Date:   4/11/2026     Order Specific Question:   Preferred Collection Type     Answer:   Urine, Clean Catch     Order Specific Question:    Specimen Source     Answer:   Urine    CBC Without Differential     Standing Status:   Future     Standing Expiration Date:   4/11/2026     Order Specific Question:   Send normal result to authorizing provider's In Basket if patient is active on MyChart:     Answer:   No    Comprehensive Metabolic Panel     Standing Status:   Future     Standing Expiration Date:   4/11/2026     Order Specific Question:   Send normal result to authorizing provider's In Basket if patient is active on MyChart:     Answer:   No    POCT COVID-19 Rapid Screening     Standing Status:   Future     Number of Occurrences:   1     Standing Expiration Date:   4/11/2026      Medications Ordered This Encounter   Medications    doxycycline (VIBRA-TABS) 100 MG tablet     Sig: Take 1 tablet (100 mg total) by mouth 2 (two) times daily. for 7 days     Dispense:  14 tablet     Refill:  0        E-Visit Time Tracking:    Day 1 Time (in minutes): 6  Day 2 Time (in minutes): 6    Total Time (in minutes): 12      274}

## 2025-02-10 ENCOUNTER — HOSPITAL ENCOUNTER (OUTPATIENT)
Dept: RADIOLOGY | Facility: CLINIC | Age: 40
Discharge: HOME | End: 2025-02-10
Attending: STUDENT IN AN ORGANIZED HEALTH CARE EDUCATION/TRAINING PROGRAM
Payer: COMMERCIAL

## 2025-02-10 ENCOUNTER — LAB VISIT (OUTPATIENT)
Dept: LAB | Facility: HOSPITAL | Age: 40
End: 2025-02-10
Attending: STUDENT IN AN ORGANIZED HEALTH CARE EDUCATION/TRAINING PROGRAM
Payer: COMMERCIAL

## 2025-02-10 ENCOUNTER — CLINICAL SUPPORT (OUTPATIENT)
Dept: FAMILY MEDICINE | Facility: CLINIC | Age: 40
End: 2025-02-10
Payer: COMMERCIAL

## 2025-02-10 DIAGNOSIS — R50.9 FEVER, UNSPECIFIED FEVER CAUSE: ICD-10-CM

## 2025-02-10 DIAGNOSIS — R53.83 FATIGUE, UNSPECIFIED TYPE: ICD-10-CM

## 2025-02-10 LAB
ALBUMIN SERPL BCP-MCNC: 3.2 G/DL (ref 3.5–5.2)
ALP SERPL-CCNC: 83 U/L (ref 40–150)
ALT SERPL W/O P-5'-P-CCNC: 17 U/L (ref 10–44)
ANION GAP SERPL CALC-SCNC: 11 MMOL/L (ref 8–16)
AST SERPL-CCNC: 12 U/L (ref 10–40)
BILIRUB SERPL-MCNC: 0.2 MG/DL (ref 0.1–1)
BUN SERPL-MCNC: 10 MG/DL (ref 6–20)
CALCIUM SERPL-MCNC: 8.1 MG/DL (ref 8.7–10.5)
CHLORIDE SERPL-SCNC: 101 MMOL/L (ref 95–110)
CO2 SERPL-SCNC: 27 MMOL/L (ref 23–29)
CREAT SERPL-MCNC: 0.7 MG/DL (ref 0.5–1.4)
CTP QC/QA: YES
ERYTHROCYTE [DISTWIDTH] IN BLOOD BY AUTOMATED COUNT: 15.9 % (ref 11.5–14.5)
EST. GFR  (NO RACE VARIABLE): >60 ML/MIN/1.73 M^2
GLUCOSE SERPL-MCNC: 87 MG/DL (ref 70–110)
HCT VFR BLD AUTO: 39.4 % (ref 37–48.5)
HGB BLD-MCNC: 11.4 G/DL (ref 12–16)
INFLUENZA A, MOLECULAR: NEGATIVE
INFLUENZA B, MOLECULAR: NEGATIVE
MAGNESIUM SERPL-MCNC: 1.9 MG/DL (ref 1.6–2.6)
MCH RBC QN AUTO: 24.5 PG (ref 27–31)
MCHC RBC AUTO-ENTMCNC: 28.9 G/DL (ref 32–36)
MCV RBC AUTO: 85 FL (ref 82–98)
PLATELET # BLD AUTO: 423 K/UL (ref 150–450)
PMV BLD AUTO: 10.2 FL (ref 9.2–12.9)
POTASSIUM SERPL-SCNC: 3.6 MMOL/L (ref 3.5–5.1)
PROT SERPL-MCNC: 6.9 G/DL (ref 6–8.4)
RBC # BLD AUTO: 4.65 M/UL (ref 4–5.4)
SARS-COV-2 RDRP RESP QL NAA+PROBE: NEGATIVE
SODIUM SERPL-SCNC: 139 MMOL/L (ref 136–145)
SPECIMEN SOURCE: NORMAL
WBC # BLD AUTO: 12.71 K/UL (ref 3.9–12.7)

## 2025-02-10 PROCEDURE — 83735 ASSAY OF MAGNESIUM: CPT | Performed by: STUDENT IN AN ORGANIZED HEALTH CARE EDUCATION/TRAINING PROGRAM

## 2025-02-10 PROCEDURE — 71046 X-RAY EXAM CHEST 2 VIEWS: CPT | Mod: TC,FY,PO

## 2025-02-10 PROCEDURE — 87502 INFLUENZA DNA AMP PROBE: CPT | Mod: PO | Performed by: STUDENT IN AN ORGANIZED HEALTH CARE EDUCATION/TRAINING PROGRAM

## 2025-02-10 PROCEDURE — 71046 X-RAY EXAM CHEST 2 VIEWS: CPT | Mod: 26,,, | Performed by: RADIOLOGY

## 2025-02-10 PROCEDURE — 87635 SARS-COV-2 COVID-19 AMP PRB: CPT | Mod: QW,S$GLB,, | Performed by: STUDENT IN AN ORGANIZED HEALTH CARE EDUCATION/TRAINING PROGRAM

## 2025-02-10 PROCEDURE — 80053 COMPREHEN METABOLIC PANEL: CPT | Performed by: STUDENT IN AN ORGANIZED HEALTH CARE EDUCATION/TRAINING PROGRAM

## 2025-02-10 PROCEDURE — 36415 COLL VENOUS BLD VENIPUNCTURE: CPT | Mod: PO | Performed by: STUDENT IN AN ORGANIZED HEALTH CARE EDUCATION/TRAINING PROGRAM

## 2025-02-10 PROCEDURE — 85027 COMPLETE CBC AUTOMATED: CPT | Performed by: STUDENT IN AN ORGANIZED HEALTH CARE EDUCATION/TRAINING PROGRAM

## 2025-02-10 RX ORDER — DOXYCYCLINE HYCLATE 100 MG
100 TABLET ORAL 2 TIMES DAILY
Qty: 14 TABLET | Refills: 0 | Status: SHIPPED | OUTPATIENT
Start: 2025-02-10 | End: 2025-02-17

## 2025-02-10 NOTE — PROGRESS NOTES
Pt tested fro COVID and Flu; COVID was ran rapidly and returned negative; Flu test was given to the lab with cosign by Ms. Raiza De La Fuente MA

## 2025-02-11 ENCOUNTER — PATIENT MESSAGE (OUTPATIENT)
Dept: FAMILY MEDICINE | Facility: CLINIC | Age: 40
End: 2025-02-11

## 2025-02-11 ENCOUNTER — HOSPITAL ENCOUNTER (OUTPATIENT)
Dept: RADIOLOGY | Facility: HOSPITAL | Age: 40
Discharge: HOME | End: 2025-02-11
Attending: STUDENT IN AN ORGANIZED HEALTH CARE EDUCATION/TRAINING PROGRAM
Payer: COMMERCIAL

## 2025-02-11 ENCOUNTER — OFFICE VISIT (OUTPATIENT)
Dept: FAMILY MEDICINE | Facility: CLINIC | Age: 40
End: 2025-02-11
Payer: COMMERCIAL

## 2025-02-11 VITALS
DIASTOLIC BLOOD PRESSURE: 86 MMHG | HEIGHT: 66 IN | BODY MASS INDEX: 46.77 KG/M2 | WEIGHT: 291 LBS | TEMPERATURE: 98 F | OXYGEN SATURATION: 94 % | HEART RATE: 93 BPM | SYSTOLIC BLOOD PRESSURE: 134 MMHG | RESPIRATION RATE: 16 BRPM

## 2025-02-11 DIAGNOSIS — R50.9 FEVER, UNSPECIFIED FEVER CAUSE: Primary | ICD-10-CM

## 2025-02-11 DIAGNOSIS — R10.2 PELVIC PAIN: ICD-10-CM

## 2025-02-11 DIAGNOSIS — R59.0 LOCALIZED ENLARGED LYMPH NODES: ICD-10-CM

## 2025-02-11 DIAGNOSIS — D72.829 LEUKOCYTOSIS, UNSPECIFIED TYPE: ICD-10-CM

## 2025-02-11 DIAGNOSIS — R53.83 FATIGUE, UNSPECIFIED TYPE: ICD-10-CM

## 2025-02-11 DIAGNOSIS — F31.9 BIPOLAR AFFECTIVE DISORDER, REMISSION STATUS UNSPECIFIED: ICD-10-CM

## 2025-02-11 DIAGNOSIS — E66.01 MORBID OBESITY: ICD-10-CM

## 2025-02-11 PROCEDURE — 3008F BODY MASS INDEX DOCD: CPT | Mod: CPTII,S$GLB,, | Performed by: STUDENT IN AN ORGANIZED HEALTH CARE EDUCATION/TRAINING PROGRAM

## 2025-02-11 PROCEDURE — 74176 CT ABD & PELVIS W/O CONTRAST: CPT | Mod: 26,,, | Performed by: RADIOLOGY

## 2025-02-11 PROCEDURE — 99214 OFFICE O/P EST MOD 30 MIN: CPT | Mod: S$GLB,,, | Performed by: STUDENT IN AN ORGANIZED HEALTH CARE EDUCATION/TRAINING PROGRAM

## 2025-02-11 PROCEDURE — 3075F SYST BP GE 130 - 139MM HG: CPT | Mod: CPTII,S$GLB,, | Performed by: STUDENT IN AN ORGANIZED HEALTH CARE EDUCATION/TRAINING PROGRAM

## 2025-02-11 PROCEDURE — 99999 PR PBB SHADOW E&M-EST. PATIENT-LVL V: CPT | Mod: PBBFAC,,, | Performed by: STUDENT IN AN ORGANIZED HEALTH CARE EDUCATION/TRAINING PROGRAM

## 2025-02-11 PROCEDURE — 3079F DIAST BP 80-89 MM HG: CPT | Mod: CPTII,S$GLB,, | Performed by: STUDENT IN AN ORGANIZED HEALTH CARE EDUCATION/TRAINING PROGRAM

## 2025-02-11 PROCEDURE — 1159F MED LIST DOCD IN RCRD: CPT | Mod: CPTII,S$GLB,, | Performed by: STUDENT IN AN ORGANIZED HEALTH CARE EDUCATION/TRAINING PROGRAM

## 2025-02-11 PROCEDURE — 74176 CT ABD & PELVIS W/O CONTRAST: CPT | Mod: TC

## 2025-02-11 NOTE — PROGRESS NOTES
[facility] Note    Subjective:    The HPI and pertinent ROS is included in the Diagnostic Impression Remarks section at the end of the note. Please see below for further details. Chief complaint is at end of note.     Sonia is a pleasant intelligent patient who is here for evaluation.     Modified Medications    No medications on file       Data reviewed 274}  Previous medical records reviewed and summarized in plan section at end of note.      If you are due for any health screening(s) below please notify me so we can arrange them to be ordered and scheduled. Most healthy patients at your age complete them, but you are free to accept or refuse. If you can't do it, I'll definitely understand. If you can, I'd certainly appreciate it!     Tests to Keep You Healthy    Mammogram: Met on 11/18/2024  Last Blood Pressure <= 139/89 (2/11/2025): Yes  Tobacco Cessation: NO      The following portions of the patient's history were reviewed and updated as appropriate: allergies, current medications, past family history, past medical history, past social history, past surgical history and problem list.    She  has a past medical history of Asthma (2014), Bipolar disorder, Chronic anxiety (12/19/2014), COVID-19, GERD (gastroesophageal reflux disease) (10/25/2020), GI bleed (10/25/2020), Heart palpitations, Herniated disc, Hyperlipidemia, Hyperparathyroidism (01/18/2023), Hypertension, IBS (irritable bowel syndrome) (2015), Idiopathic intracranial hypertension, Insomnia (2018), Intractable migraine without aura and with status migrainosus (06/28/2022), Irritable bowel syndrome without diarrhea (09/03/2021), Lower back pain (2005), Migraine headache (2002), Palpitations (2015), PCOS (polycystic ovarian syndrome) (05/2022), Sleep apnea, unspecified, Uterine cancer, Wears contact lenses, and Wears prescription eyeglasses.  She  has a past surgical history that includes Dilation and curettage of uterus (2003);  epidural steriod injections ();  section, low transverse (); Tubal ligation (); Laparoscopy (Right, ); Esophagogastroduodenoscopy (N/A, 10/26/2020); Colonoscopy (N/A, 10/27/2020); Intraluminal gastrointestinal tract imaging via capsule (N/A, 2020); Laparoscopic cholecystectomy (N/A, 2020); Tonsillectomy; Knee arthroscopy w/ meniscectomy (Right, 2021); Cystoscopy (N/A, 10/27/2021); Magnetic resonance imaging (N/A, 2022); Upper gastrointestinal endoscopy; Endoscopic insertion of ventriculoperitoneal shunt (Right, 2022); Endoscopic insertion of ventriculoperitoneal shunt (N/A, 2022); Esophagogastroduodenoscopy (N/A, 2023); Robot-assisted laparoscopic abdominal hysterectomy using da Rajan Xi (N/A, 2024); Robot-assisted surgical removal of fallopian tube using da Rajan Xi (Bilateral, 2024); mapping, lymph node, sentinel (Bilateral, 2024); Lymph node biopsy (Bilateral, 2024); Robot-assisted laparoscopic surgical removal of ovary using da Rajan Xi (Right, 2024); Parathyroidectomy (N/A, 2024); and Excision of breast lesion (Right, 2024).    She  reports that she has been smoking vaping with nicotine. She started smoking about 8 years ago. She has been exposed to tobacco smoke. She has never used smokeless tobacco. She reports that she does not currently use alcohol. She reports that she does not use drugs.  She family history includes Aortic aneurysm in her maternal grandfather; Arthritis in her father; Asthma in her mother; Breast cancer (age of onset: 60 - 69) in her mother; Cancer in an other family member; Cancer (age of onset: 50 - 59) in her paternal grandfather; Cancer (age of onset: 69) in her father; Cancer (age of onset: 70 - 79) in her paternal grandmother; Colon cancer (age of onset: 61) in her father; Diabetes in her maternal grandmother and paternal grandfather; Endometrial cancer in her paternal cousin; Heart  disease in her father and mother; Hyperlipidemia in her father and mother; Hypertension in her father; Melanoma in her father; Ovarian cancer in her paternal cousin; Pancreatic cancer (age of onset: 80) in her maternal grandmother; Pancreatitis in her maternal grandmother and mother; Prostate cancer (age of onset: 89) in her maternal grandfather; Skin cancer in her father.    Review of patient's allergies indicates:   Allergen Reactions    Contrast media Anaphylaxis    Iodine and iodide containing products Anaphylaxis    Levaquin [levofloxacin] Anaphylaxis    Levofloxacin in d5w Anaphylaxis    Sulfa (sulfonamide antibiotics) Anaphylaxis and Hives    Tree nuts Anaphylaxis    Adhesive tape-silicones Hives    Magnesium Hives     Pt reporting she is allergic to magnesium citrate oral drink.     Morphine Hives     Reports tolerating all other opioids, only had a reaction to morphine    Compazine [prochlorperazine] Anxiety     Restless legs    Depacon [valproate sodium] Hives     Pt experienced hives at IV site upon 8th day of depacon administration.  Hives resolved with stopping medication in 1.5 hours.       Tobacco Use: High Risk (2/11/2025)    Patient History     Smoking Tobacco Use: Every Day     Smokeless Tobacco Use: Never     Passive Exposure: Current     Physical Examination  Physical Exam  Lungs are clear. No crackles heard.  No murmur detected in the heart.    Vital Signs  Temperature is 100.3. Heart rate is 93. Blood pressure is normal.     General appearance: alert, cooperative, no distress  Neck: no thyromegaly, no neck stiffness  Lungs: clear to auscultation, no wheezes, rales or rhonchi, symmetric air entry  Heart: normal rate, regular rhythm, normal S1, S2, no murmurs, rubs, clicks or gallops  Abdomen: soft, nontender, nondistended, no rigidity, rebound, or guarding.   Back: no point tenderness over spine  Extremities: peripheral pulses normal, no unilateral leg swelling or calf tenderness  "  Neurological:alert, oriented, normal speech, no new focal findings or movement disorder noted from baseline      BP Readings from Last 3 Encounters:   02/11/25 134/86   02/06/25 134/82   01/14/25 (!) 140/73     Wt Readings from Last 3 Encounters:   02/11/25 132 kg (291 lb 0.1 oz)   02/06/25 134.4 kg (296 lb 4.8 oz)   01/14/25 132 kg (291 lb)     /86 (BP Location: Right arm, Patient Position: Sitting)   Pulse 93   Temp 98.3 °F (36.8 °C) (Oral)   Resp 16   Ht 5' 6" (1.676 m)   Wt 132 kg (291 lb 0.1 oz)   LMP 01/11/2024 (Exact Date)   SpO2 (!) 94%   BMI 46.97 kg/m²    274}  Laboratory: I have reviewed old labs below:    274}    Lab Results   Component Value Date    WBC 12.71 (H) 02/10/2025    HGB 11.4 (L) 02/10/2025    HCT 39.4 02/10/2025    MCV 85 02/10/2025     02/10/2025     02/10/2025    K 3.6 02/10/2025     02/10/2025    CALCIUM 8.1 (L) 02/10/2025    PHOS 3.6 08/13/2024    CO2 27 02/10/2025    GLU 87 02/10/2025    BUN 10 02/10/2025    CREATININE 0.7 02/10/2025    EGFRNORACEVR >60.0 02/10/2025    ANIONGAP 11 02/10/2025    PROT 6.9 02/10/2025    ALBUMIN 3.2 (L) 02/10/2025    BILITOT 0.2 02/10/2025    ALKPHOS 83 02/10/2025    ALT 17 02/10/2025    AST 12 02/10/2025    INR 0.9 06/25/2023    CHOL 168 01/09/2024    TRIG 175 (H) 01/09/2024    HDL 40 01/09/2024    LDLCALC 93.0 01/09/2024    TSH 2.658 06/25/2023    HGBA1C 5.3 12/09/2023      Lab reviewed by me: Particular labs of significance that I will monitor, workup, or treat to improve are mentioned below in diagnostic impression remarks.    Results  Laboratory Studies  White blood cell count was high. Urine was clear.    Imaging  X-ray did not show pneumonia.       Imaging/EKG: I have reviewed the pertinent results and my findings are noted in remarks.  274}    CC:   Chief Complaint   Patient presents with    Results    Fever        274}    History of Present Illness  The patient is a 39-year-old female who presents for evaluation of " fever, pelvic pain, and diarrhea.    She reports experiencing dysuria, which she attributes to her hysterectomy, and persistent pelvic discomfort. She describes the pain as originating from her bladder, extending into the pelvic wall, particularly when she has been withholding urination for an extended period. This discomfort is exacerbated when her bladder is full. She has been experiencing pelvic pain for over a year, the cause of which remains undetermined. She reports no vaginal discharge or pelvic pain. She has not been prescribed any steroids recently.    Additionally, she reports diarrhea but no abdominal pain. Her current symptoms include fever, weakness, fatigue, body aches, and chills. The highest recorded temperature at home was 101 degrees, and today it was 100.3 degrees. She took Tylenol approximately 2 hours ago. She reports feeling extremely flushed and has lost her appetite and fluid intake due to constant nausea. She does not require medication for nausea at this time.    She reports no known exposure to sick individuals, although she did attend a [employer] event where she was in close proximity to many people. She reports no cough or accidental needle sticks. She began taking doxycycline late last night and took it this morning. She reports no recent outdoor activities.    She underwent surgery on her right breast the day after [place], which initially caused some healing issues, but these have since healed without associated pain. She recalls vomiting during the surgery but is uncertain if she aspirated.    ALLERGIES  The patient has an allergy to CONTRAST.    MEDICATIONS  Current: Doxycycline, Tylenol.       Assessment/Plan  Sonia Solorzano is a 39 y.o. female who presents to clinic with:  1. Fever, unspecified fever cause    2. Fatigue, unspecified type    3. Leukocytosis, unspecified type    4. Localized enlarged lymph nodes    5. Pelvic pain    6. Bipolar affective disorder, remission  status unspecified    7. Class 3 severe obesity due to excess calories with serious comorbidity and body mass index (BMI) of 45.0 to 49.9 in adult       274}    Assessment & Plan  1. Fever.  The etiology of the fever remains uncertain. Her lungs are clear, and her urine is clear. She has a chronic history of pelvic pain, but there is no discharge, and the physical examination is unremarkable. Endocarditis and pneumonia are unlikely due to the absence of rash and petechiae. The elevated white blood cell count suggests a possible viral infection. Her vitals are stable, and she does not exhibit signs of sepsis. Both her heart rate and blood pressure are within normal ranges. A CT scan will be ordered to further evaluate the condition. Doxycycline will be prescribed. The results of the imaging will be followed up on. She is advised to continue her antibiotic regimen.    2. Fatigue.  The fatigue is likely related to the infection. She is advised to monitor her symptoms and maintain hydration.    3. Pelvic pain.  She reports pain during urination, particularly when her bladder is full, which has been ongoing since her hysterectomy. A CT scan of the pelvis without contrast will be ordered to investigate the cause of the pain. A urine culture will also be conducted to rule out a urinary tract infection.    PROCEDURE  The patient underwent a hysterectomy.  She had surgery on her right breast the day before [place].      Bipolar disorder-stable no recent flares continue current meds no SI/plan    Morbid obesity-recommend healthy diet, exercise, and monitor A1c, lipids, liver enzymes, and TSH.       This note was generated with the assistance of ambient listening technology. Verbal consent was obtained by the patient and accompanying visitor(s) for the recording of patient appointment to facilitate this note. I attest to having reviewed and edited the generated note for accuracy, though some syntax or spelling errors may  persist. Please contact the author of this note for any clarification.      1. Fever, unspecified fever cause  - POCT Urinalysis(Instrument); Future  - Urine culture; Future  - Urinalysis Microscopic  - Urine culture  - POCT Urinalysis(Instrument)    2. Fatigue, unspecified type    3. Leukocytosis, unspecified type    4. Localized enlarged lymph nodes  - CT Abdomen Pelvis  Without Contrast; Future    5. Pelvic pain  - POCT Urinalysis(Instrument); Future  - Urine culture; Future  - Urinalysis Microscopic  - CT Abdomen Pelvis  Without Contrast; Future  - Urine culture  - POCT Urinalysis(Instrument)    6. Bipolar affective disorder, remission status unspecified    7. Class 3 severe obesity due to excess calories with serious comorbidity and body mass index (BMI) of 45.0 to 49.9 in adult      Dorian Guerrero MD   274}    If you are due for any health screening(s) below please notify me so we can arrange them to be ordered and scheduled. Most healthy patients at your age complete them, but you are free to accept or refuse.     If you can't do it, I'll definitely understand. If you can, I'd certainly appreciate it!   Tests to Keep You Healthy    Mammogram: Met on 11/18/2024  Last Blood Pressure <= 139/89 (2/11/2025): Yes  Tobacco Cessation: NO

## 2025-02-11 NOTE — LETTER
February 11, 2025      [facility]  [address] [place] [place] [address]  Phone: [phone]  Fax: [phone]       Patient: Sonia Solorzano   YOB: 1985  Date of Visit: 02/11/2025    To Whom It May Concern:    Ness Solorzano  was at [facility] on 02/8/2025. The patient may return to work/school on 2/13/25 with no restrictions. If you have any questions or concerns, or if I can be of further assistance, please do not hesitate to contact me.    Sincerely,      Dorian Guerrero MD

## 2025-03-13 ENCOUNTER — OFFICE VISIT (OUTPATIENT)
Dept: NEUROLOGY | Facility: CLINIC | Age: 40
End: 2025-03-13
Payer: COMMERCIAL

## 2025-03-13 DIAGNOSIS — R20.2 NUMBNESS AND TINGLING SENSATION OF SKIN: Primary | ICD-10-CM

## 2025-03-13 DIAGNOSIS — R20.0 NUMBNESS AND TINGLING SENSATION OF SKIN: Primary | ICD-10-CM

## 2025-03-13 PROCEDURE — 99215 OFFICE O/P EST HI 40 MIN: CPT | Mod: S$GLB,,, | Performed by: NURSE PRACTITIONER

## 2025-03-13 PROCEDURE — 1159F MED LIST DOCD IN RCRD: CPT | Mod: CPTII,S$GLB,, | Performed by: NURSE PRACTITIONER

## 2025-03-13 PROCEDURE — 99999 PR PBB SHADOW E&M-EST. PATIENT-LVL III: CPT | Mod: PBBFAC,,, | Performed by: NURSE PRACTITIONER

## 2025-03-13 PROCEDURE — 1160F RVW MEDS BY RX/DR IN RCRD: CPT | Mod: CPTII,S$GLB,, | Performed by: NURSE PRACTITIONER

## 2025-03-13 NOTE — ASSESSMENT & PLAN NOTE
Reports of numbness and tingling to all extremities (not simultaneously) and to face and mouth.   Onset ~ 1.5 years ago after removal of parathyroid gland.   Symptoms are triggered by certain positions (sitting, resting arms on object).   She denies weakness, pain, gait changes outside of right leg limp, relapsing or remitting symptoms, visual loss. Mood is stable  Recent CTH noted; shunt in place  EMG/NCS unrevealing for neuropathy or radiculopathy    - low suspicion for SFN  Neuro exam non focal; antalgic gait due to right knee pain.   Etiology unclear   - face and UE tingling could be a direct result from parathyroidectomy   - LE could be underlying spine issue  Will obtain serologies to r/o other etiology\  Consider spine and brain MRI but this offers low yield. Pt opting to make this last resort  Would like for her to return to PT as chiro has offered relief in the past but unsure if this can occur with shunt
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
## 2025-03-25 ENCOUNTER — PATIENT MESSAGE (OUTPATIENT)
Dept: FAMILY MEDICINE | Facility: CLINIC | Age: 40
End: 2025-03-25
Payer: COMMERCIAL
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
## 2025-04-10 ENCOUNTER — CLINICAL SUPPORT (OUTPATIENT)
Dept: OPHTHALMOLOGY | Facility: CLINIC | Age: 40
End: 2025-04-10
Payer: COMMERCIAL

## 2025-04-10 ENCOUNTER — OFFICE VISIT (OUTPATIENT)
Dept: OPHTHALMOLOGY | Facility: CLINIC | Age: 40
End: 2025-04-10
Payer: COMMERCIAL

## 2025-04-10 DIAGNOSIS — H53.15 VISUAL DISTORTIONS OF SHAPE AND SIZE: ICD-10-CM

## 2025-04-10 DIAGNOSIS — G93.2 IIH (IDIOPATHIC INTRACRANIAL HYPERTENSION): Primary | Chronic | ICD-10-CM

## 2025-04-10 DIAGNOSIS — Z98.2 S/P VP SHUNT: ICD-10-CM

## 2025-04-10 PROCEDURE — 99999 PR PBB SHADOW E&M-EST. PATIENT-LVL III: CPT | Mod: PBBFAC,,, | Performed by: STUDENT IN AN ORGANIZED HEALTH CARE EDUCATION/TRAINING PROGRAM

## 2025-04-10 NOTE — PROGRESS NOTES
oct/hvf ou done/ou/rel/fix/coop.good./patient chart checked for allergies /patient allergic to adhesives and tape used pirate patch for test/od.-3.00 +0.50 x160/os.-3.00 +0.75 x64/ej        Assessment /Plan     For exam results, see Encounter Report.    There are no diagnoses linked to this encounter.

## 2025-05-06 ENCOUNTER — TELEPHONE (OUTPATIENT)
Facility: CLINIC | Age: 40
End: 2025-05-06
Payer: COMMERCIAL

## 2025-05-13 ENCOUNTER — TELEPHONE (OUTPATIENT)
Dept: HEMATOLOGY/ONCOLOGY | Facility: CLINIC | Age: 40
End: 2025-05-13
Payer: COMMERCIAL

## 2025-05-13 ENCOUNTER — PATIENT MESSAGE (OUTPATIENT)
Dept: HEMATOLOGY/ONCOLOGY | Facility: CLINIC | Age: 40
End: 2025-05-13
Payer: COMMERCIAL

## 2025-05-13 NOTE — TELEPHONE ENCOUNTER
Unable to LVM to let pt know that her virtual appt with  today on 05/13/2025 at 3:00 PM was rescheduled unfortunately due to labs not being completed prior to appt. I rescheduled her labs for May 19, 2025 at 11:30 AM and her virtual follow up for May 22, 2025 at 3:20 PM. If pt needs to reschedule or have any questions or concerns she can contact the office at [phone].

## 2025-05-13 NOTE — OP NOTE
DATE OF PROCEDURE: 12/26/2024    SURGEON: Surgeons and Role:     * MATY Disla MD - Primary    PREOPERATIVE DIAGNOSIS: CSL of the right breast upper outer quadrant    POSTOPERATIVE DIAGNOSIS: same    ANESTHESIA: general    PROCEDURES PERFORMED:    right breast reflector localization excisional biopsy      PROCEDURE IN DETAIL:   The patient underwent informed consent.  The reflector was placed in the upper outer quadrant of the right breast. The localization films were reviewed.    She was then brought to the Operating Room and placed in the supine position. Anesthesia  was administered.  The right breast, anterior chest, arm and axilla were then prepped and draped in a sterile fashion.     We turned our attention to the right breast. Local anesthetic was injected in the area. An incision was made in the periareolar position of the right breast. The specimen was dissected circumferentially around the lesion using the reflector and probe as a guide.  The localization lumpectomy specimen was marked using short stitch superior, long stitch lateral.  It was then submitted for specimen radiograph, which confirmed the reflector, clip and area of interest within the specimen.     Within the lumpectomy cavity, hemostasis was achieved with cautery. The wound was irrigated until clear. There was no evidence of bleeding. It was closed in multiple layers with deep dermal and subcutaneous interrupted Vicryl sutures and a running 4-0 Monocryl subcuticular skin closure.    Dermabond was applied. Sterile fluff gauze was placed and a post-procedure bra was placed. She tolerated the procedure well without complication and was turned over to Anesthesia for transport to the recovery area in a satisfactory condition. All specimens were sent to Pathology for permanent sectioning.    ESTIMATED BLOOD LOSS:   4 ml    COMPLICATIONS: none    DISPOSITION:  PACU--hemodynamically stable    ATTESTATION:  I was present and scrubbed for the  entire procedure.     [Annual] : an annual visit.

## 2025-05-27 ENCOUNTER — OFFICE VISIT (OUTPATIENT)
Dept: ORTHOPEDICS | Facility: CLINIC | Age: 40
End: 2025-05-27
Payer: COMMERCIAL

## 2025-05-27 ENCOUNTER — HOSPITAL ENCOUNTER (OUTPATIENT)
Dept: RADIOLOGY | Facility: HOSPITAL | Age: 40
Discharge: HOME | End: 2025-05-27
Attending: ORTHOPAEDIC SURGERY
Payer: COMMERCIAL

## 2025-05-27 VITALS — HEIGHT: 66 IN | BODY MASS INDEX: 46.97 KG/M2

## 2025-05-27 DIAGNOSIS — M17.11 PRIMARY OSTEOARTHRITIS OF RIGHT KNEE: Primary | ICD-10-CM

## 2025-05-27 PROCEDURE — 73562 X-RAY EXAM OF KNEE 3: CPT | Mod: TC,PN,RT

## 2025-05-27 PROCEDURE — 73562 X-RAY EXAM OF KNEE 3: CPT | Mod: 26,RT,, | Performed by: RADIOLOGY

## 2025-05-27 PROCEDURE — 99999 PR PBB SHADOW E&M-EST. PATIENT-LVL III: CPT | Mod: PBBFAC,,, | Performed by: ORTHOPAEDIC SURGERY

## 2025-05-27 PROCEDURE — 99213 OFFICE O/P EST LOW 20 MIN: CPT | Mod: S$GLB,,, | Performed by: ORTHOPAEDIC SURGERY
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
## 2025-05-28 ENCOUNTER — TELEPHONE (OUTPATIENT)
Dept: HEMATOLOGY/ONCOLOGY | Facility: CLINIC | Age: 40
End: 2025-05-28
Payer: COMMERCIAL

## 2025-05-28 ENCOUNTER — OFFICE VISIT (OUTPATIENT)
Dept: UROGYNECOLOGY | Facility: CLINIC | Age: 40
End: 2025-05-28
Payer: COMMERCIAL

## 2025-05-28 VITALS
SYSTOLIC BLOOD PRESSURE: 151 MMHG | HEIGHT: 67 IN | DIASTOLIC BLOOD PRESSURE: 105 MMHG | HEART RATE: 81 BPM | BODY MASS INDEX: 44.84 KG/M2 | WEIGHT: 285.69 LBS

## 2025-05-28 DIAGNOSIS — M62.89 HIGH-TONE PELVIC FLOOR DYSFUNCTION: Primary | ICD-10-CM

## 2025-05-28 DIAGNOSIS — M79.18 MYOFASCIAL PAIN: ICD-10-CM

## 2025-05-28 DIAGNOSIS — R39.89 BLADDER PAIN: ICD-10-CM

## 2025-05-28 DIAGNOSIS — N39.3 STRESS INCONTINENCE, FEMALE: ICD-10-CM

## 2025-05-28 LAB
BILIRUBIN, UA POC OHS: NEGATIVE
BLOOD, UA POC OHS: ABNORMAL
CLARITY, UA POC OHS: CLEAR
COLOR, UA POC OHS: YELLOW
GLUCOSE, UA POC OHS: NEGATIVE
KETONES, UA POC OHS: NEGATIVE
LEUKOCYTES, UA POC OHS: NEGATIVE
NITRITE, UA POC OHS: NEGATIVE
PH, UA POC OHS: 6
POC RESIDUAL URINE VOLUME: 0 ML (ref 0–100)
PROTEIN, UA POC OHS: 30
SPECIFIC GRAVITY, UA POC OHS: 1.02
UROBILINOGEN, UA POC OHS: 0.2

## 2025-05-28 PROCEDURE — 99999 PR PBB SHADOW E&M-EST. PATIENT-LVL V: CPT | Mod: PBBFAC,,, | Performed by: OBSTETRICS & GYNECOLOGY

## 2025-05-28 PROCEDURE — 87798 DETECT AGENT NOS DNA AMP: CPT | Mod: 59 | Performed by: OBSTETRICS & GYNECOLOGY

## 2025-05-28 PROCEDURE — 87563 M. GENITALIUM AMP PROBE: CPT | Performed by: OBSTETRICS & GYNECOLOGY

## 2025-05-28 NOTE — TELEPHONE ENCOUNTER
LVM to confirm pt upcoming appt with  on 6/4/2025. Office number was left on  for any further questions or concerns.
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
## 2025-05-29 ENCOUNTER — TELEPHONE (OUTPATIENT)
Dept: UROGYNECOLOGY | Facility: CLINIC | Age: 40
End: 2025-05-29
Payer: COMMERCIAL

## 2025-05-29 ENCOUNTER — PATIENT MESSAGE (OUTPATIENT)
Dept: UROGYNECOLOGY | Facility: CLINIC | Age: 40
End: 2025-05-29
Payer: COMMERCIAL

## 2025-05-29 DIAGNOSIS — N39.3 STRESS INCONTINENCE, FEMALE: ICD-10-CM

## 2025-05-29 DIAGNOSIS — R39.89 BLADDER PAIN: Primary | ICD-10-CM

## 2025-05-30 LAB
MYCOPLASMA GENITALIUM RESULT: NEGATIVE
SPECIMEN SOURCE: NORMAL

## 2025-05-31 ENCOUNTER — LAB VISIT (OUTPATIENT)
Dept: LAB | Facility: HOSPITAL | Age: 40
End: 2025-05-31
Attending: OBSTETRICS & GYNECOLOGY
Payer: COMMERCIAL

## 2025-05-31 DIAGNOSIS — N39.3 STRESS INCONTINENCE, FEMALE: ICD-10-CM

## 2025-05-31 DIAGNOSIS — R39.89 BLADDER PAIN: ICD-10-CM

## 2025-05-31 DIAGNOSIS — D50.9 IRON DEFICIENCY ANEMIA, UNSPECIFIED IRON DEFICIENCY ANEMIA TYPE: ICD-10-CM

## 2025-05-31 LAB
ABSOLUTE EOSINOPHIL (SMH): 0.11 K/UL
ABSOLUTE MONOCYTE (SMH): 0.41 K/UL (ref 0.3–1)
ABSOLUTE NEUTROPHIL COUNT (SMH): 6.2 K/UL (ref 1.8–7.7)
BASOPHILS # BLD AUTO: 0.03 K/UL
BASOPHILS NFR BLD AUTO: 0.3 %
BILIRUB UR QL STRIP.AUTO: NEGATIVE
CLARITY UR: ABNORMAL
COLOR UR AUTO: YELLOW
ERYTHROCYTE [DISTWIDTH] IN BLOOD BY AUTOMATED COUNT: 15.9 % (ref 11.5–14.5)
FERRITIN SERPL-MCNC: 26.6 NG/ML (ref 20–300)
GLUCOSE UR QL STRIP: NEGATIVE
HCT VFR BLD AUTO: 39.5 % (ref 37–48.5)
HGB BLD-MCNC: 11.9 GM/DL (ref 12–16)
HGB UR QL STRIP: NEGATIVE
IMM GRANULOCYTES # BLD AUTO: 0.03 K/UL (ref 0–0.04)
IMM GRANULOCYTES NFR BLD AUTO: 0.3 % (ref 0–0.5)
IRON SATN MFR SERPL: 6 % (ref 20–50)
IRON SERPL-MCNC: 30 UG/DL (ref 30–160)
KETONES UR QL STRIP: NEGATIVE
LEUKOCYTE ESTERASE UR QL STRIP: NEGATIVE
LYMPHOCYTES # BLD AUTO: 2.32 K/UL (ref 1–4.8)
MCH RBC QN AUTO: 25.3 PG (ref 27–31)
MCHC RBC AUTO-ENTMCNC: 30.1 G/DL (ref 32–36)
MCV RBC AUTO: 84 FL (ref 82–98)
NITRITE UR QL STRIP: NEGATIVE
NUCLEATED RBC (/100WBC) (SMH): 0 /100 WBC
PH UR STRIP: 7 [PH]
PLATELET # BLD AUTO: 384 K/UL (ref 150–450)
PMV BLD AUTO: 9.9 FL (ref 9.2–12.9)
PROT UR QL STRIP: ABNORMAL
RBC # BLD AUTO: 4.7 M/UL (ref 4–5.4)
RELATIVE EOSINOPHIL (SMH): 1.2 % (ref 0–8)
RELATIVE LYMPHOCYTE (SMH): 25.4 % (ref 18–48)
RELATIVE MONOCYTE (SMH): 4.5 % (ref 4–15)
RELATIVE NEUTROPHIL (SMH): 68.3 % (ref 38–73)
SP GR UR STRIP: >=1.03
SPECIMEN SOURCE: NORMAL
TIBC SERPL-MCNC: 509 UG/DL (ref 250–450)
TRANSFERRIN SERPL-MCNC: 344 MG/DL (ref 200–375)
U PARVUM DNA SPEC QL NAA+PROBE: NEGATIVE
U UREALYTICUM DNA SPEC QL NAA+PROBE: NEGATIVE
UROBILINOGEN UR STRIP-ACNC: NEGATIVE EU/DL
WBC # BLD AUTO: 9.13 K/UL (ref 3.9–12.7)

## 2025-05-31 PROCEDURE — 84466 ASSAY OF TRANSFERRIN: CPT

## 2025-05-31 PROCEDURE — 36415 COLL VENOUS BLD VENIPUNCTURE: CPT

## 2025-05-31 PROCEDURE — 81003 URINALYSIS AUTO W/O SCOPE: CPT

## 2025-05-31 PROCEDURE — 85025 COMPLETE CBC W/AUTO DIFF WBC: CPT

## 2025-05-31 PROCEDURE — 82728 ASSAY OF FERRITIN: CPT

## 2025-05-31 PROCEDURE — 87086 URINE CULTURE/COLONY COUNT: CPT

## 2025-06-02 ENCOUNTER — TELEPHONE (OUTPATIENT)
Dept: HEMATOLOGY/ONCOLOGY | Facility: CLINIC | Age: 40
End: 2025-06-02
Payer: COMMERCIAL

## 2025-06-03 ENCOUNTER — RESULTS FOLLOW-UP (OUTPATIENT)
Dept: UROGYNECOLOGY | Facility: CLINIC | Age: 40
End: 2025-06-03

## 2025-06-03 LAB — BACTERIA UR CULT: NO GROWTH

## 2025-06-04 ENCOUNTER — OFFICE VISIT (OUTPATIENT)
Dept: HEMATOLOGY/ONCOLOGY | Facility: CLINIC | Age: 40
End: 2025-06-04
Payer: COMMERCIAL

## 2025-06-04 DIAGNOSIS — D50.9 IRON DEFICIENCY ANEMIA, UNSPECIFIED IRON DEFICIENCY ANEMIA TYPE: Primary | ICD-10-CM

## 2025-06-04 DIAGNOSIS — M79.18 MYOFASCIAL PAIN: Primary | ICD-10-CM

## 2025-06-04 DIAGNOSIS — R39.89 BLADDER PAIN: ICD-10-CM

## 2025-06-04 DIAGNOSIS — M62.89 HIGH-TONE PELVIC FLOOR DYSFUNCTION: ICD-10-CM

## 2025-06-11 ENCOUNTER — TELEPHONE (OUTPATIENT)
Dept: FAMILY MEDICINE | Facility: CLINIC | Age: 40
End: 2025-06-11
Payer: COMMERCIAL

## 2025-06-11 NOTE — TELEPHONE ENCOUNTER
Copied from CRM #[id number]. Topic: General Inquiry - Return Call  >> Jun 11, 2025  2:51 PM Nena wrote:  Type:  Patient Returning Call    Who Called:self  Who Left Message for Patient:fabricio  Does the patient know what this is regarding?:yes, a pre op appt  Would the patient rather a call back or a response via [facility]? call  Best Call Back Number:[phone]   Additional Information: please advise and thank you.

## 2025-06-16 ENCOUNTER — RESULTS FOLLOW-UP (OUTPATIENT)
Dept: FAMILY MEDICINE | Facility: CLINIC | Age: 40
End: 2025-06-16

## 2025-06-16 ENCOUNTER — LAB VISIT (OUTPATIENT)
Dept: LAB | Facility: HOSPITAL | Age: 40
End: 2025-06-16
Attending: PHYSICIAN ASSISTANT
Payer: COMMERCIAL

## 2025-06-16 ENCOUNTER — OFFICE VISIT (OUTPATIENT)
Dept: FAMILY MEDICINE | Facility: CLINIC | Age: 40
End: 2025-06-16
Payer: COMMERCIAL

## 2025-06-16 ENCOUNTER — HOSPITAL ENCOUNTER (OUTPATIENT)
Dept: RADIOLOGY | Facility: CLINIC | Age: 40
Discharge: HOME | End: 2025-06-16
Attending: PHYSICIAN ASSISTANT
Payer: COMMERCIAL

## 2025-06-16 VITALS
HEIGHT: 67 IN | HEART RATE: 80 BPM | OXYGEN SATURATION: 96 % | BODY MASS INDEX: 45.81 KG/M2 | SYSTOLIC BLOOD PRESSURE: 132 MMHG | DIASTOLIC BLOOD PRESSURE: 88 MMHG | TEMPERATURE: 98 F | WEIGHT: 291.88 LBS | RESPIRATION RATE: 12 BRPM

## 2025-06-16 DIAGNOSIS — M62.89 HIGH-TONE PELVIC FLOOR DYSFUNCTION: ICD-10-CM

## 2025-06-16 DIAGNOSIS — E78.5 HYPERLIPIDEMIA, UNSPECIFIED HYPERLIPIDEMIA TYPE: ICD-10-CM

## 2025-06-16 DIAGNOSIS — N94.10 DYSPAREUNIA IN FEMALE: ICD-10-CM

## 2025-06-16 DIAGNOSIS — G47.33 OBSTRUCTIVE SLEEP APNEA: Chronic | ICD-10-CM

## 2025-06-16 DIAGNOSIS — I10 PRIMARY HYPERTENSION: Chronic | ICD-10-CM

## 2025-06-16 DIAGNOSIS — N85.02 COMPLEX ENDOMETRIAL HYPERPLASIA WITH ATYPIA: ICD-10-CM

## 2025-06-16 DIAGNOSIS — Z01.818 PRE-OP EXAM: ICD-10-CM

## 2025-06-16 DIAGNOSIS — Z01.818 PRE-OP EXAM: Primary | ICD-10-CM

## 2025-06-16 LAB
ABSOLUTE EOSINOPHIL (OHS): 0.12 K/UL
ABSOLUTE MONOCYTE (OHS): 0.51 K/UL (ref 0.3–1)
ABSOLUTE NEUTROPHIL COUNT (OHS): 7.12 K/UL (ref 1.8–7.7)
ALBUMIN SERPL BCP-MCNC: 3.3 G/DL (ref 3.5–5.2)
ALP SERPL-CCNC: 83 UNIT/L (ref 40–150)
ALT SERPL W/O P-5'-P-CCNC: 14 UNIT/L (ref 10–44)
ANION GAP (OHS): 10 MMOL/L (ref 8–16)
AST SERPL-CCNC: 11 UNIT/L (ref 11–45)
BASOPHILS # BLD AUTO: 0.05 K/UL
BASOPHILS NFR BLD AUTO: 0.5 %
BILIRUB SERPL-MCNC: 0.2 MG/DL (ref 0.1–1)
BUN SERPL-MCNC: 11 MG/DL (ref 6–20)
CALCIUM SERPL-MCNC: 7.9 MG/DL (ref 8.7–10.5)
CHLORIDE SERPL-SCNC: 104 MMOL/L (ref 95–110)
CO2 SERPL-SCNC: 26 MMOL/L (ref 23–29)
CREAT SERPL-MCNC: 0.7 MG/DL (ref 0.5–1.4)
ERYTHROCYTE [DISTWIDTH] IN BLOOD BY AUTOMATED COUNT: 15.9 % (ref 11.5–14.5)
GFR SERPLBLD CREATININE-BSD FMLA CKD-EPI: >60 ML/MIN/1.73/M2
GLUCOSE SERPL-MCNC: 93 MG/DL (ref 70–110)
HCT VFR BLD AUTO: 37.3 % (ref 37–48.5)
HGB BLD-MCNC: 11.6 GM/DL (ref 12–16)
IMM GRANULOCYTES # BLD AUTO: 0.05 K/UL (ref 0–0.04)
IMM GRANULOCYTES NFR BLD AUTO: 0.5 % (ref 0–0.5)
LYMPHOCYTES # BLD AUTO: 2.15 K/UL (ref 1–4.8)
MCH RBC QN AUTO: 26 PG (ref 27–31)
MCHC RBC AUTO-ENTMCNC: 31.1 G/DL (ref 32–36)
MCV RBC AUTO: 83 FL (ref 82–98)
NUCLEATED RBC (/100WBC) (OHS): 0 /100 WBC
OHS QRS DURATION: 86 MS
OHS QTC CALCULATION: 448 MS
PLATELET # BLD AUTO: 348 K/UL (ref 150–450)
PMV BLD AUTO: 9.8 FL (ref 9.2–12.9)
POTASSIUM SERPL-SCNC: 4 MMOL/L (ref 3.5–5.1)
PROT SERPL-MCNC: 6.2 GM/DL (ref 6–8.4)
RBC # BLD AUTO: 4.47 M/UL (ref 4–5.4)
RELATIVE EOSINOPHIL (OHS): 1.2 %
RELATIVE LYMPHOCYTE (OHS): 21.5 % (ref 18–48)
RELATIVE MONOCYTE (OHS): 5.1 % (ref 4–15)
RELATIVE NEUTROPHIL (OHS): 71.2 % (ref 38–73)
SODIUM SERPL-SCNC: 140 MMOL/L (ref 136–145)
WBC # BLD AUTO: 10 K/UL (ref 3.9–12.7)

## 2025-06-16 PROCEDURE — 3079F DIAST BP 80-89 MM HG: CPT | Mod: CPTII,S$GLB,, | Performed by: PHYSICIAN ASSISTANT

## 2025-06-16 PROCEDURE — 1160F RVW MEDS BY RX/DR IN RCRD: CPT | Mod: CPTII,S$GLB,, | Performed by: PHYSICIAN ASSISTANT

## 2025-06-16 PROCEDURE — G2211 COMPLEX E/M VISIT ADD ON: HCPCS | Mod: S$GLB,,, | Performed by: PHYSICIAN ASSISTANT

## 2025-06-16 PROCEDURE — 82040 ASSAY OF SERUM ALBUMIN: CPT

## 2025-06-16 PROCEDURE — 99214 OFFICE O/P EST MOD 30 MIN: CPT | Mod: S$GLB,,, | Performed by: PHYSICIAN ASSISTANT

## 2025-06-16 PROCEDURE — 3075F SYST BP GE 130 - 139MM HG: CPT | Mod: CPTII,S$GLB,, | Performed by: PHYSICIAN ASSISTANT

## 2025-06-16 PROCEDURE — 99999 PR PBB SHADOW E&M-EST. PATIENT-LVL IV: CPT | Mod: PBBFAC,,, | Performed by: PHYSICIAN ASSISTANT

## 2025-06-16 PROCEDURE — 71045 X-RAY EXAM CHEST 1 VIEW: CPT | Mod: 26,,, | Performed by: RADIOLOGY

## 2025-06-16 PROCEDURE — 93005 ELECTROCARDIOGRAM TRACING: CPT | Mod: S$GLB,,, | Performed by: PHYSICIAN ASSISTANT

## 2025-06-16 PROCEDURE — 85025 COMPLETE CBC W/AUTO DIFF WBC: CPT | Mod: PO

## 2025-06-16 PROCEDURE — 71045 X-RAY EXAM CHEST 1 VIEW: CPT | Mod: TC,FY,PO

## 2025-06-16 PROCEDURE — 1159F MED LIST DOCD IN RCRD: CPT | Mod: CPTII,S$GLB,, | Performed by: PHYSICIAN ASSISTANT

## 2025-06-16 PROCEDURE — 93010 ELECTROCARDIOGRAM REPORT: CPT | Mod: S$GLB,,, | Performed by: INTERNAL MEDICINE

## 2025-06-16 PROCEDURE — 4010F ACE/ARB THERAPY RXD/TAKEN: CPT | Mod: CPTII,S$GLB,, | Performed by: PHYSICIAN ASSISTANT

## 2025-06-16 PROCEDURE — 3008F BODY MASS INDEX DOCD: CPT | Mod: CPTII,S$GLB,, | Performed by: PHYSICIAN ASSISTANT

## 2025-06-16 PROCEDURE — 36415 COLL VENOUS BLD VENIPUNCTURE: CPT | Mod: PO

## 2025-06-16 RX ORDER — OLMESARTAN MEDOXOMIL 20 MG/1
20 TABLET ORAL DAILY
Qty: 31 EACH | Refills: 11 | Status: SHIPPED | OUTPATIENT
Start: 2025-06-16 | End: 2026-06-16

## 2025-06-16 NOTE — PROGRESS NOTES
Patient ID: Sonia Solorzano is a 39 y.o. female.        History of Present Illness    Ms. Solorzano presents today for pre-operative evaluation for upcoming urological surgery She experiences chronic pelvic, rectal, and vaginal discomfort since her hysterectomy. She reports pain with urination. Urinalysis showed blood in urine without evidence of infection. She has a history of stage 1A uterine cancer diagnosed last year. Prior to diagnosis, she experienced heavy bleeding with large clots for a year, requiring pad changes every 20 minutes, accompanied by constant pain. She also has Idiopathic Intracranial Hypertension (IIH) and sleep apnea, which is currently untreated due to extreme claustrophobia with CPAP mask. She is currently taking naltrexone and Imodium as needed. She discontinued Candesartan due to cost concerns. She is awaiting a lidocaine suppository from a compound pharmacy. She has not yet started Gabapentin, atorvastatin, or estradiol.         Review of patient's allergies indicates:   Allergen Reactions    Contrast media Anaphylaxis    Iodine and iodide containing products Anaphylaxis    Levaquin [levofloxacin] Anaphylaxis    Levofloxacin in d5w Anaphylaxis    Sulfa (sulfonamide antibiotics) Anaphylaxis and Hives    Tree nuts Anaphylaxis    Adhesive tape-silicones Hives    Magnesium Hives     Pt reporting she is allergic to magnesium citrate oral drink.     Morphine Hives     Reports tolerating all other opioids, only had a reaction to morphine    Compazine [prochlorperazine] Anxiety     Restless legs    Depacon [valproate sodium] Hives     Pt experienced hives at IV site upon 8th day of depacon administration.  Hives resolved with stopping medication in 1.5 hours.       Current Medications[1]    Lab Results   Component Value Date    WBC 10.00 06/16/2025    HGB 11.6 (L) 06/16/2025    HCT 37.3 06/16/2025     06/16/2025    CHOL 168 01/09/2024    TRIG 175 (H) 01/09/2024    HDL 40 01/09/2024     ALT 17 02/10/2025    AST 12 02/10/2025     02/10/2025    K 3.6 02/10/2025     02/10/2025    CREATININE 0.7 02/10/2025    BUN 10 02/10/2025    CO2 27 02/10/2025    TSH 2.658 06/25/2023    INR 0.9 06/25/2023    HGBA1C 5.3 12/09/2023       Review of Systems   Constitutional:  Negative for activity change, appetite change and fever.   HENT:  Negative for postnasal drip, rhinorrhea and sinus pressure.    Eyes:  Negative for visual disturbance.   Respiratory:  Negative for cough and shortness of breath.    Cardiovascular:  Negative for chest pain.   Gastrointestinal:  Negative for abdominal distention and abdominal pain.   Genitourinary:  Positive for pelvic pain (chronic). Negative for difficulty urinating and dysuria.   Musculoskeletal:  Negative for arthralgias and myalgias.   Neurological:  Negative for headaches.   Hematological:  Negative for adenopathy.   Psychiatric/Behavioral:  The patient is not nervous/anxious.        Objective:   Physical Exam  Constitutional:       Appearance: Normal appearance.   HENT:      Head: Normocephalic and atraumatic.   Eyes:      Conjunctiva/sclera: Conjunctivae normal.   Cardiovascular:      Rate and Rhythm: Normal rate and regular rhythm.   Pulmonary:      Effort: Pulmonary effort is normal. No respiratory distress.      Breath sounds: Normal breath sounds. No wheezing.   Abdominal:      General: There is no distension.      Palpations: There is no mass.      Tenderness: There is no abdominal tenderness.   Musculoskeletal:      Right lower leg: No edema.      Left lower leg: No edema.   Lymphadenopathy:      Cervical: No cervical adenopathy.   Skin:     Findings: No erythema.   Neurological:      Mental Status: She is alert and oriented to person, place, and time.   Psychiatric:         Behavior: Behavior normal.                Assessment/Plan     1. Pre-op exam    2. Primary hypertension    3. Hyperlipidemia, unspecified hyperlipidemia type    4. Complex endometrial  hyperplasia with atypia    5. Dyspareunia in female    6. Obstructive sleep apnea    7. High-tone pelvic floor dysfunction       Assessment & Plan    Reviewed upcoming cystoscopy and nerve block procedure with Dr. Machado on 24th.  Evaluated history of sleep apnea and its potential impact on anesthesia.  Assessed stability of recent labs from end of May.  Discontinued candesartan and started olmesartan 20 mg daily for BP management due to cost concerns.         Sonia was seen today for pre-op exam and medication problem.    Diagnoses and all orders for this visit:    Pre-op exam  -     IN OFFICE EKG 12-LEAD (to Muse)  -     Comprehensive Metabolic Panel; Future  -     CBC Auto Differential; Future  -     Cancel: X-Ray Chest PA And Lateral; Future    Primary hypertension  -     olmesartan (BENICAR) 20 MG tablet; Take 1 tablet (20 mg total) by mouth once daily.  Controlled  Low salt diet  Change to benicar due to cost of previously prescribed medication  Hyperlipidemia, unspecified hyperlipidemia type  High fiber diet  Continue current medication  Complex endometrial hyperplasia with atypia  S/p hysterectomy  Dyspareunia in female  Upcoming procedure with urogynecology   Obstructive sleep apnea  Not currently on cpap  High-tone pelvic floor dysfunction  Upcoming procedure with urogynecology with pelvic floor therapy to follow                   This note was generated with the assistance of ambient listening technology. Verbal consent was obtained by the patient and accompanying visitor(s) for the recording of patient appointment to facilitate this note. I attest to having reviewed and edited the generated note for accuracy, though some syntax or spelling errors may persist. Please contact the author of this note for any clarification.           [1]   Current Outpatient Medications:     acetaminophen (TYLENOL) 500 MG tablet, Take 500 mg by mouth every 6 (six) hours as needed for Pain., Disp: , Rfl:     atorvastatin  (LIPITOR) 40 MG tablet, Take 1 tablet (40 mg total) by mouth once daily., Disp: 90 tablet, Rfl: 3    buPROPion (WELLBUTRIN SR) 150 MG TBSR 12 hr tablet, Take 1 tablet (150 mg total) by mouth 2 (two) times daily., Disp: 60 tablet, Rfl: 11    loperamide HCl (IMODIUM A-D ORAL), Take by mouth as needed. diarrhea, Disp: , Rfl:     naltrexone (DEPADE) 50 mg tablet, Take 1/2 table po daily, Disp: 30 tablet, Rfl: 2    estradioL (VAGIFEM) 10 mcg Tab, Place 1 tablet (10 mcg total) vaginally twice a week. Start with one tablet per vagina q.h.s. x7 than one tablet per vagina twice weekly (Patient not taking: Reported on 5/27/2025), Disp: 24 tablet, Rfl: 3    gabapentin (NEURONTIN) 100 MG capsule, Take 1 capsule (100 mg total) by mouth 3 (three) times daily. (Patient not taking: Reported on 5/27/2025), Disp: 90 capsule, Rfl: 11    LIDOCAINE 2 %, VALIUM 5 MG, BACLOFEN 4 % SUPPOSITORY, Place 1 suppository vaginally once daily. Insert one every night intravaginally. May also insert one additional suppository one hour before PT. (Patient not taking: Reported on 6/16/2025), Disp: 40 each, Rfl: 5    olmesartan (BENICAR) 20 MG tablet, Take 1 tablet (20 mg total) by mouth once daily., Disp: 31 each, Rfl: 11

## 2025-06-18 ENCOUNTER — RESULTS FOLLOW-UP (OUTPATIENT)
Dept: FAMILY MEDICINE | Facility: CLINIC | Age: 40
End: 2025-06-18

## 2025-06-23 ENCOUNTER — ANESTHESIA EVENT (OUTPATIENT)
Dept: SURGERY | Facility: HOSPITAL | Age: 40
End: 2025-06-23
Payer: COMMERCIAL

## 2025-06-23 ENCOUNTER — HOSPITAL ENCOUNTER (OUTPATIENT)
Dept: PREADMISSION TESTING | Facility: HOSPITAL | Age: 40
Discharge: HOME | End: 2025-06-23
Attending: OBSTETRICS & GYNECOLOGY
Payer: COMMERCIAL

## 2025-06-23 NOTE — OR NURSING
PAT done via phone. Preop instructions reviewed and questions answered. Sent to [employer], for review.

## 2025-06-24 ENCOUNTER — HOSPITAL ENCOUNTER (OUTPATIENT)
Facility: HOSPITAL | Age: 40
Discharge: HOME | End: 2025-06-24
Attending: OBSTETRICS & GYNECOLOGY | Admitting: OBSTETRICS & GYNECOLOGY
Payer: COMMERCIAL

## 2025-06-24 ENCOUNTER — PATIENT MESSAGE (OUTPATIENT)
Dept: SURGERY | Facility: HOSPITAL | Age: 40
End: 2025-06-24

## 2025-06-24 ENCOUNTER — ANESTHESIA (OUTPATIENT)
Dept: SURGERY | Facility: HOSPITAL | Age: 40
End: 2025-06-24
Payer: COMMERCIAL

## 2025-06-24 ENCOUNTER — TELEPHONE (OUTPATIENT)
Dept: UROGYNECOLOGY | Facility: CLINIC | Age: 40
End: 2025-06-24
Payer: COMMERCIAL

## 2025-06-24 DIAGNOSIS — M62.89 HIGH-TONE PELVIC FLOOR DYSFUNCTION: Primary | ICD-10-CM

## 2025-06-24 PROCEDURE — 63600175 PHARM REV CODE 636 W HCPCS: Performed by: OBSTETRICS & GYNECOLOGY

## 2025-06-24 PROCEDURE — 25000003 PHARM REV CODE 250: Performed by: OBSTETRICS & GYNECOLOGY

## 2025-06-24 PROCEDURE — 94799 UNLISTED PULMONARY SVC/PX: CPT

## 2025-06-24 PROCEDURE — 71000033 HC RECOVERY, INTIAL HOUR: Performed by: OBSTETRICS & GYNECOLOGY

## 2025-06-24 PROCEDURE — 36000706: Performed by: OBSTETRICS & GYNECOLOGY

## 2025-06-24 PROCEDURE — 25000003 PHARM REV CODE 250: Performed by: ANESTHESIOLOGY

## 2025-06-24 PROCEDURE — 27200651 HC AIRWAY, LMA: Performed by: ANESTHESIOLOGY

## 2025-06-24 PROCEDURE — 37000009 HC ANESTHESIA EA ADD 15 MINS: Performed by: OBSTETRICS & GYNECOLOGY

## 2025-06-24 PROCEDURE — 99900035 HC TECH TIME PER 15 MIN (STAT)

## 2025-06-24 PROCEDURE — 36000707: Performed by: OBSTETRICS & GYNECOLOGY

## 2025-06-24 PROCEDURE — 37000008 HC ANESTHESIA 1ST 15 MINUTES: Performed by: OBSTETRICS & GYNECOLOGY

## 2025-06-24 PROCEDURE — 64430 NJX AA&/STRD PUDENDAL NERVE: CPT | Mod: 50,,, | Performed by: OBSTETRICS & GYNECOLOGY

## 2025-06-24 PROCEDURE — 71000039 HC RECOVERY, EACH ADD'L HOUR: Performed by: OBSTETRICS & GYNECOLOGY

## 2025-06-24 PROCEDURE — 63600175 PHARM REV CODE 636 W HCPCS: Performed by: NURSE ANESTHETIST, CERTIFIED REGISTERED

## 2025-06-24 PROCEDURE — 71000015 HC POSTOP RECOV 1ST HR: Performed by: OBSTETRICS & GYNECOLOGY

## 2025-06-24 PROCEDURE — 63600175 PHARM REV CODE 636 W HCPCS: Performed by: ANESTHESIOLOGY

## 2025-06-24 RX ORDER — PROPOFOL 10 MG/ML
VIAL (ML) INTRAVENOUS
Status: DISCONTINUED | OUTPATIENT
Start: 2025-06-24 | End: 2025-06-24

## 2025-06-24 RX ORDER — OXYCODONE AND ACETAMINOPHEN 5; 325 MG/1; MG/1
1 TABLET ORAL EVERY 4 HOURS PRN
Qty: 15 TABLET | Refills: 0 | Status: SHIPPED | OUTPATIENT
Start: 2025-06-24

## 2025-06-24 RX ORDER — FAMOTIDINE 20 MG/1
20 TABLET, FILM COATED ORAL
Status: COMPLETED | OUTPATIENT
Start: 2025-06-24 | End: 2025-06-24

## 2025-06-24 RX ORDER — MUPIROCIN 20 MG/G
OINTMENT TOPICAL
Status: DISCONTINUED | OUTPATIENT
Start: 2025-06-24 | End: 2025-06-24 | Stop reason: HOSPADM

## 2025-06-24 RX ORDER — MIDAZOLAM HYDROCHLORIDE 1 MG/ML
INJECTION INTRAMUSCULAR; INTRAVENOUS
Status: DISCONTINUED | OUTPATIENT
Start: 2025-06-24 | End: 2025-06-24

## 2025-06-24 RX ORDER — BUPIVACAINE HYDROCHLORIDE 2.5 MG/ML
INJECTION, SOLUTION EPIDURAL; INFILTRATION; INTRACAUDAL; PERINEURAL
Status: DISCONTINUED | OUTPATIENT
Start: 2025-06-24 | End: 2025-06-24 | Stop reason: HOSPADM

## 2025-06-24 RX ORDER — OXYCODONE HYDROCHLORIDE 5 MG/1
5 TABLET ORAL
Status: DISCONTINUED | OUTPATIENT
Start: 2025-06-24 | End: 2025-06-24 | Stop reason: HOSPADM

## 2025-06-24 RX ORDER — SCOPOLAMINE 1 MG/3D
1 PATCH, EXTENDED RELEASE TRANSDERMAL ONCE
Status: DISCONTINUED | OUTPATIENT
Start: 2025-06-24 | End: 2025-06-24 | Stop reason: HOSPADM

## 2025-06-24 RX ORDER — ACETAMINOPHEN 10 MG/ML
INJECTION, SOLUTION INTRAVENOUS
Status: DISCONTINUED | OUTPATIENT
Start: 2025-06-24 | End: 2025-06-24

## 2025-06-24 RX ORDER — TRIAMCINOLONE ACETONIDE 40 MG/ML
INJECTION, SUSPENSION INTRA-ARTICULAR; INTRAMUSCULAR
Status: DISCONTINUED | OUTPATIENT
Start: 2025-06-24 | End: 2025-06-24 | Stop reason: HOSPADM

## 2025-06-24 RX ORDER — LIDOCAINE HYDROCHLORIDE 20 MG/ML
JELLY TOPICAL
Status: DISCONTINUED | OUTPATIENT
Start: 2025-06-24 | End: 2025-06-24 | Stop reason: HOSPADM

## 2025-06-24 RX ORDER — LIDOCAINE HYDROCHLORIDE 10 MG/ML
1 INJECTION, SOLUTION EPIDURAL; INFILTRATION; INTRACAUDAL; PERINEURAL ONCE
Status: DISCONTINUED | OUTPATIENT
Start: 2025-06-24 | End: 2025-06-24 | Stop reason: HOSPADM

## 2025-06-24 RX ORDER — LIDOCAINE HYDROCHLORIDE 20 MG/ML
INJECTION INTRAVENOUS
Status: DISCONTINUED | OUTPATIENT
Start: 2025-06-24 | End: 2025-06-24

## 2025-06-24 RX ORDER — FENTANYL CITRATE 50 UG/ML
INJECTION, SOLUTION INTRAMUSCULAR; INTRAVENOUS
Status: DISCONTINUED | OUTPATIENT
Start: 2025-06-24 | End: 2025-06-24

## 2025-06-24 RX ORDER — DEXAMETHASONE SODIUM PHOSPHATE 4 MG/ML
INJECTION, SOLUTION INTRA-ARTICULAR; INTRALESIONAL; INTRAMUSCULAR; INTRAVENOUS; SOFT TISSUE
Status: DISCONTINUED | OUTPATIENT
Start: 2025-06-24 | End: 2025-06-24

## 2025-06-24 RX ORDER — METOCLOPRAMIDE HYDROCHLORIDE 5 MG/ML
10 INJECTION INTRAMUSCULAR; INTRAVENOUS EVERY 10 MIN PRN
Status: DISCONTINUED | OUTPATIENT
Start: 2025-06-24 | End: 2025-06-24 | Stop reason: HOSPADM

## 2025-06-24 RX ORDER — ONDANSETRON HYDROCHLORIDE 2 MG/ML
INJECTION, SOLUTION INTRAVENOUS
Status: DISCONTINUED | OUTPATIENT
Start: 2025-06-24 | End: 2025-06-24

## 2025-06-24 RX ORDER — FENTANYL CITRATE 50 UG/ML
25 INJECTION, SOLUTION INTRAMUSCULAR; INTRAVENOUS EVERY 5 MIN PRN
Status: COMPLETED | OUTPATIENT
Start: 2025-06-24 | End: 2025-06-24

## 2025-06-24 RX ORDER — CEFAZOLIN 2 G/1
2 INJECTION, POWDER, FOR SOLUTION INTRAMUSCULAR; INTRAVENOUS
Status: DISCONTINUED | OUTPATIENT
Start: 2025-06-24 | End: 2025-06-24 | Stop reason: HOSPADM

## 2025-06-24 RX ADMIN — FENTANYL CITRATE 25 MCG: 50 INJECTION INTRAMUSCULAR; INTRAVENOUS at 09:06

## 2025-06-24 RX ADMIN — ONDANSETRON 4 MG: 2 INJECTION INTRAMUSCULAR; INTRAVENOUS at 07:06

## 2025-06-24 RX ADMIN — SODIUM CHLORIDE, SODIUM GLUCONATE, SODIUM ACETATE, POTASSIUM CHLORIDE AND MAGNESIUM CHLORIDE: 526; 502; 368; 37; 30 INJECTION, SOLUTION INTRAVENOUS at 06:06

## 2025-06-24 RX ADMIN — MUPIROCIN 1 G: 20 OINTMENT TOPICAL at 06:06

## 2025-06-24 RX ADMIN — PROPOFOL 200 MG: 10 INJECTION, EMULSION INTRAVENOUS at 07:06

## 2025-06-24 RX ADMIN — CEFAZOLIN 2 G: 2 INJECTION, POWDER, FOR SOLUTION INTRAMUSCULAR; INTRAVENOUS at 07:06

## 2025-06-24 RX ADMIN — DEXAMETHASONE SODIUM PHOSPHATE 4 MG: 4 INJECTION, SOLUTION INTRA-ARTICULAR; INTRALESIONAL; INTRAMUSCULAR; INTRAVENOUS; SOFT TISSUE at 07:06

## 2025-06-24 RX ADMIN — FENTANYL CITRATE 50 MCG: 50 INJECTION, SOLUTION INTRAMUSCULAR; INTRAVENOUS at 07:06

## 2025-06-24 RX ADMIN — OXYCODONE HYDROCHLORIDE 5 MG: 5 TABLET ORAL at 09:06

## 2025-06-24 RX ADMIN — ACETAMINOPHEN 1000 MG: 10 INJECTION INTRAVENOUS at 07:06

## 2025-06-24 RX ADMIN — SCOPOLAMINE 1 PATCH: 1.5 PATCH, EXTENDED RELEASE TRANSDERMAL at 06:06

## 2025-06-24 RX ADMIN — LIDOCAINE HYDROCHLORIDE 100 MG: 20 INJECTION, SOLUTION INTRAVENOUS at 07:06

## 2025-06-24 RX ADMIN — FAMOTIDINE 20 MG: 20 TABLET, FILM COATED ORAL at 06:06

## 2025-06-24 RX ADMIN — FENTANYL CITRATE 50 MCG: 50 INJECTION, SOLUTION INTRAMUSCULAR; INTRAVENOUS at 08:06

## 2025-06-24 RX ADMIN — MIDAZOLAM HYDROCHLORIDE 2 MG: 1 INJECTION, SOLUTION INTRAMUSCULAR; INTRAVENOUS at 07:06

## 2025-06-24 NOTE — PROGRESS NOTES
Pt prepared for surgery. Pt resting in bed,  signed up for text messaging. Belongings brought over to post op cabinet.  Fall risk and Allergy armband placed. SCDs on.

## 2025-06-24 NOTE — ANESTHESIA POSTPROCEDURE EVALUATION
Anesthesia Post Evaluation    Patient: Sonia Solorzano    Procedure(s) Performed: Procedure(s) (LRB):  CYSTOSCOPY (N/A)  BLOCK, NERVE, PUDENDAL (Bilateral)    Final Anesthesia Type: general      Patient location during evaluation: PACU  Patient participation: Yes- Able to Participate  Level of consciousness: awake and alert and oriented  Post-procedure vital signs: reviewed and stable  Pain management: adequate  Airway patency: patent    PONV status at discharge: No PONV  Anesthetic complications: no      Cardiovascular status: blood pressure returned to baseline and stable  Respiratory status: unassisted and spontaneous ventilation  Hydration status: euvolemic  Follow-up not needed.              Vitals Value Taken Time   /64 06/24/25 09:38   Temp   06/24/25 09:40   Pulse 62 06/24/25 09:39   Resp 13 06/24/25 09:39   SpO2 95 % 06/24/25 09:39   Vitals shown include unfiled device data.      No case tracking events are documented in the log.      Pain/Kevin Score: Pain Rating Prior to Med Admin: 7 (6/24/2025  9:20 AM)  Kevin Score: 10 (6/24/2025  8:45 AM)

## 2025-06-24 NOTE — ANESTHESIA PREPROCEDURE EVALUATION
06/24/2025  Sonia Solorzano is a 39 y.o., female.      Pre-op Assessment    I have reviewed the Patient Summary Reports.     I have reviewed the Nursing Notes. I have reviewed the NPO Status.   I have reviewed the Medications.     Review of Systems  Anesthesia Hx:  No problems with previous Anesthesia  PONV                Social:  Smoker       Cardiovascular:     Hypertension, well controlled           hyperlipidemia                               Pulmonary:    Asthma asymptomatic   Sleep Apnea                Renal/:  Renal/ Normal                 Hepatic/GI:     GERD Liver Disease,  IBS               Neurological:    Neuromuscular Disease,  Headaches                                 Endocrine:  Endocrine Normal    PCOS        Morbid Obesity / BMI > 40  Psych:  Psychiatric History (Bipolar) anxiety               Physical Exam  General: Well nourished, Cooperative, Alert and Oriented    Airway:  Mallampati: II   Mouth Opening: Normal  TM Distance: Normal  Neck ROM: Normal ROM    Anesthesia Plan  Type of Anesthesia, risks & benefits discussed:    Anesthesia Type: Gen ETT, Gen Supraglottic Airway, Gen Natural Airway, MAC  Intra-op Monitoring Plan: Standard ASA Monitors  Post Op Pain Control Plan: multimodal analgesia  Induction:  IV  Airway Plan: Direct, Video and Fiberoptic, Post-Induction  Informed Consent: Informed consent signed with the Patient and all parties understand the risks and agree with anesthesia plan.  All questions answered.   ASA Score: 3    Ready For Surgery From Anesthesia Perspective.   .

## 2025-06-24 NOTE — TELEPHONE ENCOUNTER
"Spoke with patient. She reported that there was some blood on her pad. Was a little concerned as it was more than just "spotting" as we had discussed. Reassured her that sometimes there is a little heavier bleeding as long as she is not needing to change a saturated pad per hour it should continue to improve. Has better control of her bladder as well now that she is off the bed pan. Has some leakage but not as bad. Advised to use a stool under her feet and to relax the muscles as much as possible. Given that we injected the pudendal nerve there may be some leakage which should improve. Some pain with urination. Her pain is overall better however and not like what she had been experiencing.     Lynnette Cifuentes  .   "

## 2025-06-24 NOTE — DISCHARGE SUMMARY
"[facility]  Obstetrics & Gynecology  Discharge Summary    Patient Name: Sonia Solorzano  MRN: [id number]  Admission Date: 2025  Hospital Length of Stay: 0 days  Discharge Date and Time: 2025 8:17 AM  Attending Physician: Lynnette Cifuentes MD   Discharging Provider: Lynnette Cifuentes MD  Primary Care Provider: Dorian Guerrero MD    HPI:  39 y.o. female  s/p RATLH/ BS/ RO, SLND on 24 for stage 1A, grade 1 endometrioid type EM adenocarcinoma presents today for cystoscopy and pudendal nerve block due to c/o "constant pain". Reports that symptoms started after her hysterectomy. Reports that the pain is in the lower pelvis and "shoots into the vagina and rectum". With a full bladder when she urinates she feels that her "bladder is going to come through her vagina and urethra". [place] is also very painful and she has decreased libido.   Reports that she began experiencing low back and pelvic pain starting in . Also had "constant and heavy menstrual cycles. States however that symptoms got worse after the hysterectomy.   She has done pelvic floor PT. Saw Meg Salamanca DPT. Did PT for a few weeks but felt that symptoms got worse. Tried to do therapy externally and internally but reports it was so painful that she had 'tears coming down". Was informed that the therapist could feel the muscles spasming. Was too painful.      Patient was provided with a prescription for some vaginal medication but never picked it up.     Patient states that during the act of intercourse feels that the"vagina is sealed shut" and "takes a long time to get the penis in". Has a constant pain following intercourse. 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 Only took it once.      The pain is constant. When moving more the pain is "ten times worse". When resting the pain is better. On average the pain is about a 4/10 and when more active becomes 7/10. " "Radiates from pelvis to vagina and rectum. Painful to urinate but not always. Mostly when she has a full bladder and has not voided in a while.   She voids approximately every 2 hours but can wait as long as 5 hours. She used to wake frequently which was attributed to sleep apnea. She is waking once per night at this time if at all. She denies any accidental loss of urine an urge. Has had a couple of episode of stress urinary incontinence with a cough but this is not a common occurrence.      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 Shut was placed in 2022.     She denies vaginal bulging, pressure or heaviness.      She states that she has "constant diarrhea". Has IBS and is s/p cholecystectomy. She denies accidental bowel leakage.      She drinks 60 ounces of water, 1 cup  of coffee in the morning and may occasionally have soda at lunch or sweet tea in the evening.        Hospital Course:  Patient underwent cystoscopy, pudendal nerve block bilaterally and bilateral trigger point injections of the levator ani and coccygeus muscles. She tolerated the procedure well and was taken to the recovery room in a stable condition.     Goals of Care Treatment Preferences:  Code Status: Full Code    Living Will: Yes              Procedure(s) (LRB):  CYSTOSCOPY (N/A)  BLOCK, NERVE, PUDENDAL (Bilateral)         Significant Diagnostic Studies: N/A    Pending Diagnostic Studies:       None          There are no hospital problems to display for this patient.       Discharged Condition: good    Disposition: Home or Self Care    Follow Up:    Patient Instructions:   No discharge procedures on file.  Medications:  Reconciled Home Medications:      Medication List        ASK your doctor about these medications      acetaminophen 500 MG tablet  Commonly known as: TYLENOL  Take 500 mg by mouth every 6 (six) hours as needed for Pain.     atorvastatin 40 MG tablet  Commonly known as: " LIPITOR  Take 1 tablet (40 mg total) by mouth once daily.     buPROPion 150 MG TBSR 12 hr tablet  Commonly known as: WELLBUTRIN SR  Take 1 tablet (150 mg total) by mouth 2 (two) times daily.     estradioL 10 mcg Tab  Commonly known as: VAGIFEM  Place 1 tablet (10 mcg total) vaginally twice a week. Start with one tablet per vagina q.h.s. x7 than one tablet per vagina twice weekly     gabapentin 100 MG capsule  Commonly known as: NEURONTIN  Take 1 capsule (100 mg total) by mouth 3 (three) times daily.     IMODIUM A-D ORAL  Take by mouth as needed. diarrhea     LIDOCAINE 2 %, VALIUM 5 MG, BACLOFEN 4 % SUPPOSITORY  Place 1 suppository vaginally once daily. Insert one every night intravaginally. May also insert one additional suppository one hour before PT.     naltrexone 50 mg tablet  Commonly known as: DEPADE  Take 1/2 table po daily     olmesartan 20 MG tablet  Commonly known as: BENICAR  Take 1 tablet (20 mg total) by mouth once daily.              Lynnette Cifuentes MD  Obstetrics & Gynecology  [facility]

## 2025-06-24 NOTE — ANESTHESIA PROCEDURE NOTES
Intubation    Date/Time: 6/24/2025 7:35 AM    Performed by: Aman Garza CRNA  Authorized by: Irineo Benavidez MD    Intubation:     Induction:  Intravenous    Intubated:  Postinduction    Mask Ventilation:  Not attempted    Attempts:  1    Attempted By:  CRNA    Difficult Airway Encountered?: No      Complications:  None    Airway Device:  Supraglottic airway/LMA    Airway Device Size:  3.0    Secured at:  The lips    Placement Verified By:  Capnometry    Complicating Factors:  None    Findings Post-Intubation:  Atraumatic/condition of teeth unchanged and BS equal bilateral

## 2025-06-24 NOTE — DISCHARGE INSTRUCTIONS
"Discharge Instructions: After Your Surgery/Procedure  Youve just had surgery. During surgery you were given medicine called anesthesia to keep you relaxed and free of pain. After surgery you may have some pain or nausea. This is common. Here are some tips for feeling better and getting well after surgery.     Stay on schedule with your medication.   Going home  Your doctor or nurse will show you how to take care of yourself when you go home. He or she will also answer your questions. Have an adult family member or friend drive you home.      For your safety we recommend these precaution for the first 24 hours after your procedure:  Do not drive or use heavy equipment.  Do not make important decisions or sign legal papers.  Do not drink alcohol.  Have someone stay with you, if needed. He or she can watch for problems and help keep you safe.  Your concentration, balance, coordination, and judgement may be impaired for many hours after anesthesia.  Use caution when ambulating or standing up.     You may feel weak and "washed out" after anesthesia and surgery.      Subtle residual effects of general anesthesia or sedation with regional / local anesthesia can last more than 24 hours.  Rest for the remainder of the day or longer if your Doctor/Surgeon has advised you to do so.  Although you may feel normal within the first 24 hours, your reflexes and mental ability may be impaired without you realizing it.  You may feel dizzy, lightheaded or sleepy for 24 hours or longer.      Be sure to go to all follow-up visits with your doctor. And rest after your surgery for as long as your doctor tells you to.  Coping with pain  If you have pain after surgery, pain medicine will help you feel better. Take it as told, before pain becomes severe. Also, ask your doctor or pharmacist about other ways to control pain. This might be with heat, ice, or relaxation. And follow any other instructions your surgeon or nurse gives you.  Tips " for taking pain medicine  To get the best relief possible, remember these points:  Pain medicines can upset your stomach. Taking them with a little food may help.  Most pain relievers taken by mouth need at least 20 to 30 minutes to start to work.  Taking medicine on a schedule can help you remember to take it. Try to time your medicine so that you can take it before starting an activity. This might be before you get dressed, go for a walk, or sit down for dinner.  Constipation is a common side effect of pain medicines. Call your doctor before taking any medicines such as laxatives or stool softeners to help ease constipation. Also ask if you should skip any foods. Drinking lots of fluids and eating foods such as fruits and vegetables that are high in fiber can also help. Remember, do not take laxatives unless your surgeon has prescribed them.  Drinking alcohol and taking pain medicine can cause dizziness and slow your breathing. It can even be deadly. Do not drink alcohol while taking pain medicine.  Pain medicine can make you react more slowly to things. Do not drive or run machinery while taking pain medicine.  Your health care provider may tell you to take acetaminophen to help ease your pain. Ask him or her how much you are supposed to take each day. Acetaminophen or other pain relievers may interact with your prescription medicines or other over-the-counter (OTC) drugs. Some prescription medicines have acetaminophen and other ingredients. Using both prescription and OTC acetaminophen for pain can cause you to overdose. Read the labels on your OTC medicines with care. This will help you to clearly know the list of ingredients, how much to take, and any warnings. It may also help you not take too much acetaminophen. If you have questions or do not understand the information, ask your pharmacist or health care provider to explain it to you before you take the OTC medicine.  Managing nausea  Some people have an  upset stomach after surgery. This is often because of anesthesia, pain, or pain medicine, or the stress of surgery. These tips will help you handle nausea and eat healthy foods as you get better. If you were on a special food plan before surgery, ask your doctor if you should follow it while you get better. These tips may help:  Do not push yourself to eat. Your body will tell you when to eat and how much.  Start off with clear liquids and soup. They are easier to digest.  Next try semi-solid foods, such as mashed potatoes, applesauce, and gelatin, as you feel ready.  Slowly move to solid foods. Dont eat fatty, rich, or spicy foods at first.  Do not force yourself to have 3 large meals a day. Instead eat smaller amounts more often.  Take pain medicines with a small amount of solid food, such as crackers or toast, to avoid nausea.     Call your surgeon if  You still have pain an hour after taking medicine. The medicine may not be strong enough.  You feel too sleepy, dizzy, or groggy. The medicine may be too strong.  You have side effects like nausea, vomiting, or skin changes, such as rash, itching, or hives.       If you have obstructive sleep apnea  You were given anesthesia medicine during surgery to keep you comfortable and free of pain. After surgery, you may have more apnea spells because of this medicine and other medicines you were given. The spells may last longer than usual.   At home:  Keep using the continuous positive airway pressure (CPAP) device when you sleep. Unless your health care provider tells you not to, use it when you sleep, day or night. CPAP is a common device used to treat obstructive sleep apnea.  Talk with your provider before taking any pain medicine, muscle relaxants, or sedatives. Your provider will tell you about the possible dangers of taking these medicines.  © 9839-7685 [employer]. [address] [place] [address], [place] [address]. All rights reserved. This information is  not intended as a substitute for professional medical care. Always follow your healthcare professional's instructions.          Using an Incentive Spirometer    An incentive spirometer is a device that helps you do deep breathing exercises. These exercises expand your lungs, aid in circulation, and help prevent pneumonia. Deep breathing exercises also help you breathe better and improve the function of your lungs by:  Keeping your lungs clear  Strengthening your breathing muscles  Helping prevent respiratory complications or problems  The incentive spirometer gives you a way to take an active part in recover. A nurse or therapist will teach you breathing exercises. To do these exercises, you will breathe in through your mouth and not your nose. The incentive spirometer only works correctly if you breathe in through your mouth.  Steps to clear lungs  Step 1. Exhale normally. Then, inhale normally.  Relax and breathe out.  Step 2. Place your lips tightly around the mouthpiece.  Make sure the device is upright and not tilted.  Step 3. Inhale as much air as you can through the mouthpiece (don't breath through your nose).  Inhale slowly and deeply.  Hold your breath long enough to keep the balls or disk raised for at least 3 to 5 seconds, or as instructed by your healthcare provider.  Some spirometers have an indicator to let you know that you are breathing in too fast. If the indicator goes off, breathe in more slowly.  Step 4. Repeat the exercise regularly.  Do this exercise every hour while you're awake, or as instructed by your healthcare provider.  If you were taught deep breathing and coughing exercises, do them regularly as instructed by your healthcare provider.       We hope your stay was comfortable as you heal now, mend and rest.    For we have enjoyed taking care of you by giving your our best.    And as you get better, by regaining your health and strength;   We count it as a privilege to have served you and  hope your time at [facility] was well spent.      Thank  You!!!

## 2025-06-24 NOTE — OP NOTE
[facility]  Urogynecology  Operative Note    SUMMARY     Date of Procedure: 6/24/2025     Procedure: Procedure(s) (LRB):  CYSTOSCOPY (N/A)  BLOCK, NERVE, PUDENDAL (Bilateral)     Trigger point injections (>5 sites)    Surgeons and Role:     * Lynnette Cifuentes MD - Primary    Assisting Surgeon: None    Pre-Operative Diagnosis: Myofascial pain [M79.18]  High-tone pelvic floor dysfunction [M62.89]  Bladder pain [R39.89]    Post-Operative Diagnosis: Post-Op Diagnosis Codes:     * Myofascial pain [M79.18]     * High-tone pelvic floor dysfunction [M62.89]     * Bladder pain [R39.89]    Anesthesia: General    Operative Findings (including complications, if any):   Normal appearing bladder with mild trabeculation. No lesion, tumors or foreign body. Bilateral ureteral efflux. Squamous metaplasia in trigone. Normal appearing urethra.     Description of Technical Procedures:     Patient was positioned on the table in the lithotomy position, and was prepped and draped in a usual sterile fashion. Anaesthetic gel was administered inside the urethra, and the bladder was surveyed using a 70 degree rigid cystoscope with the above findings noted. The bladder was drained using the cystoscope sheath and withdrawing the lens.      Attention was next turned to performing the pudendal nerve block and trigger point injections of the levator ani and coccygeus muscles bilaterally.     The right ischial spine was palpated.  9 mL of 0.5% marcaine mixed with 1 mL Kenalog were injected about 1 cm medial and cephalad to the right ischial spine along the sacrospinous ligament at this point.  The same procedure was performed at the patient's left side, injecting 9 mL of 0.5% marcaine mixed with 1 mL Kenalog about 1 cm cephalad and medial to the left ischial spine.  The vaginal epithelium over bilateral injection sites was then gently massaged.  The remaining 10 mL of the 0.5% marcaine were injected into the superficial  muscles of the levator ani. Pressure was held at the injection sites to ensure hemostasis. Minimal bleeding noted at the bowen of the procedure.     Patient tolerated the procedure well. Sponge, lap and needle counts were correct x 2 at the end of the procedure.       Estimated Blood Loss (EBL): <50 mL           Implants: * No implants in log *    Specimens:   Specimen (24h ago, onward)      None                    Condition: Good    Disposition: PACU - hemodynamically stable.    Attestation: I was present and scrubbed for the entire procedure.      yLnnette Cifuentes MD, MPH  Division of Urogynecology  [address] [place] [address], [address]  [place], [place] [id number] [phone]

## 2025-06-24 NOTE — OP NOTE
"[facility]  Obstetrics & Gynecology  Discharge Summary    Patient Name: Sonia Solorzano  MRN: [id number]  Admission Date: 2025  Hospital Length of Stay: 0 days  Discharge Date and Time: 2025 8:17 AM  Attending Physician: Lynnette Cifuentes MD   Discharging Provider: Lynnette Cifuentes MD  Primary Care Provider: Dorian Guerrero MD    HPI:  39 y.o. female  s/p RATLH/ BS/ RO, SLND on 24 for stage 1A, grade 1 endometrioid type EM adenocarcinoma presents today for cystoscopy and pudendal nerve block due to c/o "constant pain". Reports that symptoms started after her hysterectomy. Reports that the pain is in the lower pelvis and "shoots into the vagina and rectum". With a full bladder when she urinates she feels that her "bladder is going to come through her vagina and urethra". [place] is also very painful and she has decreased libido.   Reports that she began experiencing low back and pelvic pain starting in . Also had "constant and heavy menstrual cycles. States however that symptoms got worse after the hysterectomy.   She has done pelvic floor PT. Saw Meg Salamanca DPT. Did PT for a few weeks but felt that symptoms got worse. Tried to do therapy externally and internally but reports it was so painful that she had 'tears coming down". Was informed that the therapist could feel the muscles spasming. Was too painful.      Patient was provided with a prescription for some vaginal medication but never picked it up.     Patient states that during the act of intercourse feels that the"vagina is sealed shut" and "takes a long time to get the penis in". Has a constant pain following intercourse. 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 Only took it once.      The pain is constant. When moving more the pain is "ten times worse". When resting the pain is better. On average the pain is about a 4/10 and when more active becomes 7/10. " "Radiates from pelvis to vagina and rectum. Painful to urinate but not always. Mostly when she has a full bladder and has not voided in a while.   She voids approximately every 2 hours but can wait as long as 5 hours. She used to wake frequently which was attributed to sleep apnea. She is waking once per night at this time if at all. She denies any accidental loss of urine an urge. Has had a couple of episode of stress urinary incontinence with a cough but this is not a common occurrence.      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 Shut was placed in 2022.     She denies vaginal bulging, pressure or heaviness.      She states that she has "constant diarrhea". Has IBS and is s/p cholecystectomy. She denies accidental bowel leakage.      She drinks 60 ounces of water, 1 cup  of coffee in the morning and may occasionally have soda at lunch or sweet tea in the evening.        Hospital Course:  Patient underwent cystoscopy, pudendal nerve block bilaterally and bilateral trigger point injections of the levator ani and coccygeus muscles. She tolerated the procedure well and was taken to the recovery room in a stable condition.     Goals of Care Treatment Preferences:  Code Status: Full Code    Living Will: Yes              Procedure(s) (LRB):  CYSTOSCOPY (N/A)  BLOCK, NERVE, PUDENDAL (Bilateral)         Significant Diagnostic Studies: N/A    Pending Diagnostic Studies:       None          There are no hospital problems to display for this patient.       Discharged Condition: good    Disposition: Home or Self Care    Follow Up:    Patient Instructions:   No discharge procedures on file.  Medications:  Reconciled Home Medications:      Medication List        ASK your doctor about these medications      acetaminophen 500 MG tablet  Commonly known as: TYLENOL  Take 500 mg by mouth every 6 (six) hours as needed for Pain.     atorvastatin 40 MG tablet  Commonly known as: " LIPITOR  Take 1 tablet (40 mg total) by mouth once daily.     buPROPion 150 MG TBSR 12 hr tablet  Commonly known as: WELLBUTRIN SR  Take 1 tablet (150 mg total) by mouth 2 (two) times daily.     estradioL 10 mcg Tab  Commonly known as: VAGIFEM  Place 1 tablet (10 mcg total) vaginally twice a week. Start with one tablet per vagina q.h.s. x7 than one tablet per vagina twice weekly     gabapentin 100 MG capsule  Commonly known as: NEURONTIN  Take 1 capsule (100 mg total) by mouth 3 (three) times daily.     IMODIUM A-D ORAL  Take by mouth as needed. diarrhea     LIDOCAINE 2 %, VALIUM 5 MG, BACLOFEN 4 % SUPPOSITORY  Place 1 suppository vaginally once daily. Insert one every night intravaginally. May also insert one additional suppository one hour before PT.     naltrexone 50 mg tablet  Commonly known as: DEPADE  Take 1/2 table po daily     olmesartan 20 MG tablet  Commonly known as: BENICAR  Take 1 tablet (20 mg total) by mouth once daily.              Lynnette Cifuentes MD  Obstetrics & Gynecology  [facility]

## 2025-06-24 NOTE — PLAN OF CARE
Patient able to void with no problems noted.  Tolerating po intake well with no complaints of nausea/vomiting.  No complaints of pain at this time.  Discharge instructions given to pt and family/friend, verbalized understanding and questions answered. Handouts provided. Belongings given back to pt. IV removed- catheter intact. Discharge via wheelchair.

## 2025-06-24 NOTE — TRANSFER OF CARE
"Anesthesia Transfer of Care Note    Patient: Sonia Solorzano    Procedure(s) Performed: Procedure(s) (LRB):  CYSTOSCOPY (N/A)  BLOCK, NERVE, PUDENDAL (Bilateral)    Patient location: PACU    Anesthesia Type: general    Transport from OR: Transported from OR on 6-10 L/min O2 by face mask with adequate spontaneous ventilation    Post pain: adequate analgesia    Post assessment: no apparent anesthetic complications    Post vital signs: stable    Level of consciousness: sedated    Nausea/Vomiting: no nausea/vomiting    Complications: none    Transfer of care protocol was followed    Last vitals: Visit Vitals  /64   Pulse 77   Temp 36.9 °C (98.4 °F) (Skin)   Resp 15   Ht 5' 7" (1.702 m)   Wt 131.5 kg (290 lb)   LMP 01/11/2024 (Exact Date)   SpO2 96%   Breastfeeding No   BMI 45.42 kg/m²     "

## 2025-06-25 ENCOUNTER — PATIENT MESSAGE (OUTPATIENT)
Dept: UROGYNECOLOGY | Facility: CLINIC | Age: 40
End: 2025-06-25
Payer: COMMERCIAL

## 2025-06-25 ENCOUNTER — HOSPITAL ENCOUNTER (EMERGENCY)
Facility: HOSPITAL | Age: 40
Discharge: HOME | End: 2025-06-25
Attending: EMERGENCY MEDICINE | Admitting: EMERGENCY MEDICINE
Payer: COMMERCIAL

## 2025-06-25 VITALS
RESPIRATION RATE: 20 BRPM | HEIGHT: 67 IN | HEART RATE: 57 BPM | DIASTOLIC BLOOD PRESSURE: 73 MMHG | WEIGHT: 289.88 LBS | SYSTOLIC BLOOD PRESSURE: 147 MMHG | BODY MASS INDEX: 45.5 KG/M2 | OXYGEN SATURATION: 97 % | TEMPERATURE: 98 F

## 2025-06-25 VITALS
SYSTOLIC BLOOD PRESSURE: 120 MMHG | TEMPERATURE: 98 F | HEART RATE: 61 BPM | RESPIRATION RATE: 16 BRPM | BODY MASS INDEX: 45.52 KG/M2 | HEIGHT: 67 IN | DIASTOLIC BLOOD PRESSURE: 56 MMHG | WEIGHT: 290 LBS | OXYGEN SATURATION: 98 %

## 2025-06-25 DIAGNOSIS — R07.9 CHEST PAIN: ICD-10-CM

## 2025-06-25 DIAGNOSIS — H57.02 ANISOCORIA: Primary | ICD-10-CM

## 2025-06-25 PROBLEM — H53.8 BLURRED VISION: Status: ACTIVE | Noted: 2025-06-25

## 2025-06-25 PROBLEM — Z86.16 HISTORY OF COVID-19: Status: RESOLVED | Noted: 2024-01-31 | Resolved: 2025-06-25

## 2025-06-25 LAB
ABSOLUTE EOSINOPHIL (OHS): 0 K/UL
ABSOLUTE MONOCYTE (OHS): 0.63 K/UL (ref 0.3–1)
ABSOLUTE NEUTROPHIL COUNT (OHS): 13.66 K/UL (ref 1.8–7.7)
ALBUMIN SERPL BCP-MCNC: 3.8 G/DL (ref 3.5–5.2)
ALP SERPL-CCNC: 91 UNIT/L (ref 40–150)
ALT SERPL W/O P-5'-P-CCNC: 12 UNIT/L (ref 10–44)
ANION GAP (OHS): 10 MMOL/L (ref 8–16)
AST SERPL-CCNC: 8 UNIT/L (ref 11–45)
BASOPHILS # BLD AUTO: 0.02 K/UL
BASOPHILS NFR BLD AUTO: 0.1 %
BILIRUB SERPL-MCNC: 0.1 MG/DL (ref 0.1–1)
BUN SERPL-MCNC: 16 MG/DL (ref 6–20)
CALCIUM SERPL-MCNC: 8.8 MG/DL (ref 8.7–10.5)
CHLORIDE SERPL-SCNC: 105 MMOL/L (ref 95–110)
CO2 SERPL-SCNC: 24 MMOL/L (ref 23–29)
CREAT SERPL-MCNC: 0.8 MG/DL (ref 0.5–1.4)
ERYTHROCYTE [DISTWIDTH] IN BLOOD BY AUTOMATED COUNT: 16 % (ref 11.5–14.5)
GFR SERPLBLD CREATININE-BSD FMLA CKD-EPI: >60 ML/MIN/1.73/M2
GLUCOSE SERPL-MCNC: 98 MG/DL (ref 70–110)
HCT VFR BLD AUTO: 38.7 % (ref 37–48.5)
HGB BLD-MCNC: 11.7 GM/DL (ref 12–16)
IMM GRANULOCYTES # BLD AUTO: 0.09 K/UL (ref 0–0.04)
IMM GRANULOCYTES NFR BLD AUTO: 0.6 % (ref 0–0.5)
INDIRECT COOMBS: NORMAL
LYMPHOCYTES # BLD AUTO: 1.88 K/UL (ref 1–4.8)
MCH RBC QN AUTO: 25.6 PG (ref 27–31)
MCHC RBC AUTO-ENTMCNC: 30.2 G/DL (ref 32–36)
MCV RBC AUTO: 85 FL (ref 82–98)
NUCLEATED RBC (/100WBC) (OHS): 0 /100 WBC
PLATELET # BLD AUTO: 386 K/UL (ref 150–450)
PMV BLD AUTO: 10.5 FL (ref 9.2–12.9)
POTASSIUM SERPL-SCNC: 3.9 MMOL/L (ref 3.5–5.1)
PROT SERPL-MCNC: 7.6 GM/DL (ref 6–8.4)
RBC # BLD AUTO: 4.57 M/UL (ref 4–5.4)
RELATIVE EOSINOPHIL (OHS): 0 %
RELATIVE LYMPHOCYTE (OHS): 11.5 % (ref 18–48)
RELATIVE MONOCYTE (OHS): 3.9 % (ref 4–15)
RELATIVE NEUTROPHIL (OHS): 83.9 % (ref 38–73)
RH BLD: NORMAL
SODIUM SERPL-SCNC: 139 MMOL/L (ref 136–145)
SPECIMEN OUTDATE: NORMAL
WBC # BLD AUTO: 16.28 K/UL (ref 3.9–12.7)

## 2025-06-25 PROCEDURE — 99285 EMERGENCY DEPT VISIT HI MDM: CPT | Mod: 25

## 2025-06-25 PROCEDURE — 99284 EMERGENCY DEPT VISIT MOD MDM: CPT | Mod: ,,, | Performed by: STUDENT IN AN ORGANIZED HEALTH CARE EDUCATION/TRAINING PROGRAM

## 2025-06-25 PROCEDURE — 94761 N-INVAS EAR/PLS OXIMETRY MLT: CPT

## 2025-06-25 PROCEDURE — 86901 BLOOD TYPING SEROLOGIC RH(D): CPT | Performed by: EMERGENCY MEDICINE

## 2025-06-25 PROCEDURE — 80053 COMPREHEN METABOLIC PANEL: CPT | Performed by: EMERGENCY MEDICINE

## 2025-06-25 PROCEDURE — 85025 COMPLETE CBC W/AUTO DIFF WBC: CPT | Performed by: EMERGENCY MEDICINE

## 2025-06-25 RX ORDER — TALC
6 POWDER (GRAM) TOPICAL NIGHTLY PRN
Status: DISCONTINUED | OUTPATIENT
Start: 2025-06-25 | End: 2025-06-26 | Stop reason: HOSPADM

## 2025-06-25 RX ORDER — ACETAMINOPHEN 325 MG/1
650 TABLET ORAL EVERY 4 HOURS PRN
Status: DISCONTINUED | OUTPATIENT
Start: 2025-06-25 | End: 2025-06-26 | Stop reason: HOSPADM

## 2025-06-25 RX ORDER — BUPROPION HYDROCHLORIDE 150 MG/1
150 TABLET ORAL 2 TIMES DAILY
Status: DISCONTINUED | OUTPATIENT
Start: 2025-06-25 | End: 2025-06-26 | Stop reason: HOSPADM

## 2025-06-25 RX ORDER — LOSARTAN POTASSIUM 50 MG/1
50 TABLET ORAL DAILY
Status: DISCONTINUED | OUTPATIENT
Start: 2025-06-26 | End: 2025-06-26 | Stop reason: HOSPADM

## 2025-06-25 RX ORDER — ACETAMINOPHEN 500 MG
1000 TABLET ORAL
Status: DISCONTINUED | OUTPATIENT
Start: 2025-06-25 | End: 2025-06-26 | Stop reason: HOSPADM

## 2025-06-25 RX ORDER — SODIUM CHLORIDE 0.9 % (FLUSH) 0.9 %
10 SYRINGE (ML) INJECTION EVERY 6 HOURS PRN
Status: DISCONTINUED | OUTPATIENT
Start: 2025-06-25 | End: 2025-06-26 | Stop reason: HOSPADM

## 2025-06-25 RX ORDER — SODIUM CHLORIDE 0.9 % (FLUSH) 0.9 %
10 SYRINGE (ML) INJECTION
Status: DISCONTINUED | OUTPATIENT
Start: 2025-06-25 | End: 2025-06-26 | Stop reason: HOSPADM

## 2025-06-25 RX ORDER — POLYETHYLENE GLYCOL 3350 17 G/17G
17 POWDER, FOR SOLUTION ORAL DAILY PRN
Status: DISCONTINUED | OUTPATIENT
Start: 2025-06-25 | End: 2025-06-26 | Stop reason: HOSPADM

## 2025-06-25 RX ORDER — AMOXICILLIN 250 MG
1 CAPSULE ORAL 2 TIMES DAILY PRN
Status: DISCONTINUED | OUTPATIENT
Start: 2025-06-25 | End: 2025-06-26 | Stop reason: HOSPADM

## 2025-06-25 RX ORDER — OXYCODONE AND ACETAMINOPHEN 5; 325 MG/1; MG/1
1 TABLET ORAL EVERY 4 HOURS PRN
Refills: 0 | Status: DISCONTINUED | OUTPATIENT
Start: 2025-06-25 | End: 2025-06-26 | Stop reason: HOSPADM

## 2025-06-25 RX ORDER — NALOXONE HCL 0.4 MG/ML
0.02 VIAL (ML) INJECTION
Status: DISCONTINUED | OUTPATIENT
Start: 2025-06-25 | End: 2025-06-26 | Stop reason: HOSPADM

## 2025-06-25 RX ORDER — ATORVASTATIN CALCIUM 40 MG/1
40 TABLET, FILM COATED ORAL DAILY
Status: DISCONTINUED | OUTPATIENT
Start: 2025-06-26 | End: 2025-06-26 | Stop reason: HOSPADM

## 2025-06-25 RX ORDER — TALC
6 POWDER (GRAM) TOPICAL NIGHTLY PRN
Status: DISCONTINUED | OUTPATIENT
Start: 2025-06-25 | End: 2025-06-25

## 2025-06-25 RX ORDER — ONDANSETRON HYDROCHLORIDE 2 MG/ML
4 INJECTION, SOLUTION INTRAVENOUS EVERY 8 HOURS PRN
Status: DISCONTINUED | OUTPATIENT
Start: 2025-06-25 | End: 2025-06-26 | Stop reason: HOSPADM
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
## 2025-06-25 NOTE — Clinical Note
Diagnosis: Anisocoria [379.41.ICD-9-CM]   Future Attending Provider: BK METZGER [[id number]]   Reason for IP Medical Treatment  (Clinical interventions that can only be accomplished in the IP setting? ) :: anisocoria   Plans for Post-Acute care--if anticipated (pick the single best option):: A. No post acute care anticipated at this time

## 2025-06-25 NOTE — HPI
Vascular Neurology is consulted for sudden onset anisocoria in the case of Ms. Sonia Solorzano, a 39 year old female with a past history of IIH s/p  shunt placement (OP@time of diagnosis [id number]), migraines, and HTN. Patient underwent cystoscopy and pudendal nerve block yesterday for workup of hematuria and treatment of post-hysterectomy pain. She was put under general anesthesia. Today, 6/25, patient reports that she was straining to have a bowel movement when she acutely noticed blurry vision in her L eye. When she looked in the mirror, she noticed the difference in her pupils. She denies headache and neck pain presently or at the time of symptom onset. She denies double vision or other focal neurological deficit. She denies similar symptoms associated with uncontrolled IIH.     On presentation to [facility] ED, patient is afebrile and mildly hypertensive at 146/77. Basic labs are remarkable for leukocytosis of 16.28 in the setting of recent surgery and otherwise unremarkable for toxic/metabolic derangement. Patient reports history of anaphylaxis to contrast media and severe claustrophobia.
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
## 2025-06-25 NOTE — ASSESSMENT & PLAN NOTE
Ms. Solorzano is a 39 year old female with a past history of IIH s/p  shunt placement, migraines, and HTN that presents with the report of acute onset anisocoria. Her neurological exam is without focal deficit aside from noted anisocoria. There is mild worsening in the dark. This is more consistent with a Brittanie's syndrome, but she has no accompanying ptosis or anhidrosis. She also denies neck pain, further lowering concern for dissection. Differential is wide including complication of recent anesthesia, primary ophthalmological pathology, venous sinus thrombosis, and complication of  shunt. Would expect further deficit of CN III if this were due to compression.     Recommendations  -Obtain CTH  -If CTH normal, would proceed to MRI Brain without contrast, MRA Brain without contrast, and MRA neck without contrast  -Patient likely to require sedation for MR imaging  -Obtain Xray shunt series to evaluate VPS  -Recommend Ophthalmology consult  -Vascular Neurology will continue to follow. Thank you for your consult. Please reach out with questions or changes in the clinical picture.

## 2025-06-26 NOTE — NURSING
AVS reviewed with no concerns voiced w/patient and  at side.  Pt up ambulating independently leaving unit for d/c.  No s/s of distress.

## 2025-06-26 NOTE — NURSING
Primary team left bedside not long ago reporting as per ophthalmology symptoms likely r/t anti-nausea patch and okay to be d/c'd and to come back if symptoms worsen.  Pt in agreement with no concerns voiced.

## 2025-06-26 NOTE — CONSULTS
"Consultation Report  Ophthalmology Service    Date: 06/25/2025    Reason for Consult: "reason? aniscocoria"     History of Present Illness: Sonia Solorzano is a 39 y.o. female with PMH of PCOS, anemia, HTN, HLD, IIH s/p  shunt 9/2022, migraine, HTN, obesity, who presents to [facility] ED for approximately 4 hrs of subjective mild blurred vision in the left eye and difference in pupil size with left being larger than the right. Pt states she was having a bowel movement when she noticed blurred vision in the left eye. She then looked in the mirror and noticed her left pupil was larger than her right. Says the vision is better and at baseline nearly when in different lighting settings from what she can notice. Denies any headaches, pain or pain with EOM, flashes, floaters, or curtain-veil in visual field ou. Pt denies any recent use of antiperspirant wipes, gardening or new plants at home, or new products. OF NOTE, pt had a cystoscopy yesterday and has a scopolamine patch behind her left ear (ipsilateral to dilated left pupil) and says she was given this for nausea control and told to keep it on for the next 48 hours. The patch was removed during interview and patient subjectively says her vision was normalizing towards the end of the exam and interview.     POcularHx: Denies history of ocular problems or past ocular surgeries.    Current eye gtts: Denies     Family Hx: Denies family history of glaucoma, macular degeneration, or blindness. family history includes Aortic aneurysm in her maternal grandfather; Arthritis in her father; Asthma in her mother; Breast cancer (age of onset: 60 - 69) in her mother; Cancer in an other family member; Cancer (age of onset: 50 - 59) in her paternal grandfather; Cancer (age of onset: 69) in her father; Cancer (age of onset: 70 - 79) in her paternal grandmother; Colon cancer (age of onset: 61) in her father; Diabetes in her maternal grandmother, mother, and paternal grandfather; " Endometrial cancer in her paternal cousin; Heart disease in her father and mother; Hyperlipidemia in her father and mother; Hypertension in her father; Melanoma in her father; Ovarian cancer in her paternal cousin; Pancreatic cancer (age of onset: 80) in her maternal grandmother; Pancreatitis in her maternal grandmother and mother; Prostate cancer (age of onset: 89) in her maternal grandfather; Skin cancer in her father.     PMHx:  has a past medical history of Asthma (), Bipolar disorder, Chronic anxiety (2014), COVID-19, Endometrioid adenocarcinoma of uterus, GERD (gastroesophageal reflux disease) (10/25/2020), GI bleed (10/25/2020), Heart palpitations, Herniated disc, History of COVID-19 (2024), Hyperlipidemia, Hyperparathyroidism (2023), Hypertension, IBS (irritable bowel syndrome) (), Idiopathic intracranial hypertension, Insomnia (), Intractable migraine without aura and with status migrainosus (2022), Irritable bowel syndrome without diarrhea (2021), Lower back pain (), Migraine headache (), Palpitations (), PCOS (polycystic ovarian syndrome) (2022), PONV (postoperative nausea and vomiting), Sleep apnea, unspecified, Wears contact lenses, and Wears prescription eyeglasses.     PSurgHx:  has a past surgical history that includes Dilation and curettage of uterus (); epidural steriod injections ();  section, low transverse (); Tubal ligation (); Laparoscopy (Right, ); Esophagogastroduodenoscopy (N/A, 10/26/2020); Colonoscopy (N/A, 10/27/2020); Intraluminal gastrointestinal tract imaging via capsule (N/A, 2020); Laparoscopic cholecystectomy (N/A, 2020); Tonsillectomy; Knee arthroscopy w/ meniscectomy (Right, 2021); Cystoscopy (N/A, 10/27/2021); Magnetic resonance imaging (N/A, 2022); Upper gastrointestinal endoscopy; Endoscopic insertion of ventriculoperitoneal shunt (Right, 2022); Endoscopic  insertion of ventriculoperitoneal shunt (N/A, 09/19/2022); Esophagogastroduodenoscopy (N/A, 03/02/2023); Robot-assisted laparoscopic abdominal hysterectomy using da Rajan Xi (N/A, 02/12/2024); Robot-assisted surgical removal of fallopian tube using da Rajan Xi (Bilateral, 02/12/2024); mapping, lymph node, sentinel (Bilateral, 02/12/2024); Lymph node biopsy (Bilateral, 02/12/2024); Robot-assisted laparoscopic surgical removal of ovary using da Rajan Xi (Right, 02/12/2024); Parathyroidectomy (N/A, 05/17/2024); Excision of breast lesion (Right, 12/26/2024); Cystoscopy (N/A, 6/24/2025); and Injection of anesthetic agent around pudendal nerve (Bilateral, 6/24/2025).     Home Medications:   Prior to Admission medications    Medication Sig Start Date End Date Taking? Authorizing Provider   atorvastatin (LIPITOR) 40 MG tablet Take 1 tablet (40 mg total) by mouth once daily. 7/16/24 7/16/25 Yes Dorian Guerrero MD   LIDOCAINE 2 %, VALIUM 5 MG, BACLOFEN 4 % SUPPOSITORY Place 1 suppository vaginally once daily. Insert one every night intravaginally. May also insert one additional suppository one hour before PT. 5/28/25 8/26/25 Yes Lynnette Cifuentes MD   olmesartan (BENICAR) 20 MG tablet Take 1 tablet (20 mg total) by mouth once daily. 6/16/25 6/16/26 Yes Brittany Álvarez PA-C   estradioL (VAGIFEM) 10 mcg Tab Place 1 tablet (10 mcg total) vaginally twice a week. Start with one tablet per vagina q.h.s. x7 than one tablet per vagina twice weekly 12/5/24 6/25/25 Yes Reagan Flanagan MD   acetaminophen (TYLENOL) 500 MG tablet Take 500 mg by mouth every 6 (six) hours as needed for Pain.    Provider, Historical   buPROPion (WELLBUTRIN SR) 150 MG TBSR 12 hr tablet Take 1 tablet (150 mg total) by mouth 2 (two) times daily. 1/24/25 1/24/26  Dorian Guerrero MD   gabapentin (NEURONTIN) 100 MG capsule Take 1 capsule (100 mg total) by mouth 3 (three) times daily. 9/24/24 9/24/25  China Saucedo PA-C   loperamide HCl  (IMODIUM A-D ORAL) Take by mouth as needed. diarrhea    Provider, Historical   naltrexone (DEPADE) 50 mg tablet Take 1/2 table po daily 1/24/25   Dorian Guerrero MD   oxyCODONE-acetaminophen (PERCOCET) 5-325 mg per tablet Take 1 tablet by mouth every 4 (four) hours as needed for Pain. 6/24/25   Lynnette Cifuentes MD        Medications this encounter:    acetaminophen  1,000 mg Oral ED 1 Time    [START ON 6/26/2025] atorvastatin  40 mg Oral Daily    buPROPion  150 mg Oral BID    [START ON 6/26/2025] losartan  50 mg Oral Daily       Allergies: is allergic to contrast media, iodine and iodide containing products, levaquin [levofloxacin], levofloxacin in d5w, sulfa (sulfonamide antibiotics), tree nuts, adhesive tape-silicones, magnesium, morphine, compazine [prochlorperazine], and depacon [valproate sodium].     Social:  reports that she has been smoking vaping with nicotine. She started smoking about 8 years ago. She has been exposed to tobacco smoke. She has never used smokeless tobacco. She reports that she does not currently use alcohol. She reports that she does not use drugs.     ROS: As per HPI    Ocular examination/Dilated fundus examination:  Base Eye Exam       Visual Acuity (Snellen - Linear)         Right Left    Dist cc 20/20 20/20              Tonometry (Tonopen, 8:55 PM)         Right Left    Pressure 12 13              Pupils         Dark Light Shape React APD    Right 4.5 2.5 Round Brisk None    Left 6.5 6 Round brisk (but minimal) None              Visual Fields         Right Left     Full Full              Extraocular Movement         Right Left     Full, Ortho Full, Ortho              Neuro/Psych       Oriented x3: Yes    Mood/Affect: Normal                  Additional Tests       Color         Right Left    Ishihara 12/12 12/12                  Slit Lamp and Fundus Exam       External Exam         Right Left    External Normal Normal              Slit Lamp Exam         Right Left    Lids/Lashes  "Telangiectasia Telangiectasia    Conjunctiva/Sclera White and quiet White and quiet    Cornea Clear Clear    Anterior Chamber Deep and quiet Deep and quiet    Iris Round and reactive Round and reactive    Lens Clear Clear    Anterior Vitreous Normal Normal    No ptosis OU              Fundus Exam         Right Left    Disc pink and sharp pink and sharp    C/D Ratio 0.35 0.35    Macula Normal Normal    Vessels Normal Normal    Undilated exam (in order to continue to assess pupils following exam)                      Initial photo external- 6/25/25      Scopolamine patch behind left ear       Assessment/Plan:     Pharmacologic anisocoria   - pt had a cystoscopy yesterday and has a scopolamine patch behind her left ear (ipsilateral to dilated left pupil) and says she was given this for nausea control and told to keep it on for the next 48 hours  - patch was removed during interview and patient subjectively says her vision was normalizing towards the end of the exam and interview  - pupils initially OD (4-2.25) OS (6.5 ->6) both briskly reactive, left less reactive than right, no APD   - patch removed, anisocoria noticed to be less towards end of exam and interview (OS constricted to 3.6 mm when measured with slit lamp in the light, dark approximately 5 mm).   - discussed with ED attending Dr. Giron that scopolamine is highly suspected to be etiology of anisocoria given exam and history   - Full EOMS OU, ortho, no ptosis, no evidence of CN III palsy     2. IIH s/p  shunt   - follows with Dr. Andre (last seen 4/2025  - per her note "diagnosis of IIH following neuroimaging with stigmata of elevated ICP and lumbar puncture with very elevated opening pressure of 58 cm of H2O in 8/2022. Providers were unable to adequately control her symptoms with acetazolamide and decision was made to place VPS. Neuro-ophthalmologic examination was notable for good visual acuities, full color vision, normal ocular motility and very " "small comitant exophoria. OCT with stably normal peripapillary RNFL thicknesses. Formal visual fields were full OD and OS. OCT RNFL normal OU"   - today, full color plates OU, VA 20/20 OU, no APD OU, optic nerves sharp and pink OU without edema   - follow up with Dr. Andre routinely as scheduled         Cruz Vines MD   [employer] Ophthalmology PGY2  06/25/2025  8:57 PM        Common Ophthalmologic Abbreviations  OD: right eye  OS: left eye  OU: both eyes  IOP: intraocular pressure  VA: visual acuity  PH: pinhole  HM: hand motion  LP: light perception  NLP: no light perception  DFE: dilated fundus examination  SLE: slit lamp examination  RD: retinal detachment   AT: artificial tears  PFAT: preservative free artificial tears    "

## 2025-06-30 ENCOUNTER — PATIENT OUTREACH (OUTPATIENT)
Dept: ADMINISTRATIVE | Facility: HOSPITAL | Age: 40
End: 2025-06-30
Payer: COMMERCIAL

## 2025-06-30 NOTE — PROGRESS NOTES
"Rising risk of ED/ Admissions report.    Patient offered sooner appointment in Primary Care. Patient declined at this time and informed to call PRN.    Note added to 07/31/2025 primary care appointment note - patient identified on rising risk ED/ hospital admission report            Care Coordination Encounter Details:       MyChart Portal Status:         [x]  Reviewed MyChart Portal Status offered / enrolled if applicable        Additional Notes:     MyChart Outcomes: Pt is enrolled & active          Updates Requested / Reviewed:        Updated Care Coordination Note, Care Everywhere, and Immunizations Reconciliation Completed or Queried: [place]         Health Maintenance Screening(s) Due:      Health Maintenance Topics Overdue:      VBHM Score: 0     Patient is not due for any topics at this time.                      Additional Notes:  Staff message to Dr. Guerrero's staff per patient's request. Message regarding "pelvic wall therapy".           Chronic Disease Management:     Hypertension Measures        BP Readings from Last 1 Encounters:   06/25/25 (!) 147/73           [x]  Reviewed chart for active Hypertension diagnosis     [x]  Reviewed & documented Home BP Cuff     []  Documented a Remote BP if needed & applicable     []  Scheduled necessary follow up appointments with Primary Care if needed           Provider Team Continuity:     Last PCP Visit Date: 2/11/2025          [x]  Reviewed Primary Care Provider Visits, Annual Wellness Visit, and Future          Appointments to ensure appointments have been scheduled and/or           completed         Social Determinants of Health          [x]  Reviewed, completed, and/or updated the following sections:                  Food Insecurity, Transportation Needs, Financial Resource Strain,                 Tobacco Use        Additional Notes:    Recent review noted.         Care Management, Digital Medicine, and/or Education Referrals    OPCM Risk Score: 68.7         " Next Steps - Referral Actions: Digital Medicine Outcomes and Actions Taken: Pt Declined or Not Eligible        Additional Notes:      No CHW referral placed during this call.

## 2025-07-01 ENCOUNTER — HOSPITAL ENCOUNTER (EMERGENCY)
Facility: HOSPITAL | Age: 40
Discharge: HOME | End: 2025-07-01
Attending: STUDENT IN AN ORGANIZED HEALTH CARE EDUCATION/TRAINING PROGRAM
Payer: COMMERCIAL

## 2025-07-01 VITALS
DIASTOLIC BLOOD PRESSURE: 69 MMHG | HEART RATE: 53 BPM | RESPIRATION RATE: 13 BRPM | SYSTOLIC BLOOD PRESSURE: 123 MMHG | TEMPERATURE: 98 F | OXYGEN SATURATION: 99 %

## 2025-07-01 DIAGNOSIS — R00.1 BRADYCARDIA: ICD-10-CM

## 2025-07-01 DIAGNOSIS — R55 NEAR SYNCOPE: Primary | ICD-10-CM

## 2025-07-01 DIAGNOSIS — R19.7 DIARRHEA, UNSPECIFIED TYPE: ICD-10-CM

## 2025-07-01 LAB
ABSOLUTE EOSINOPHIL (OHS): 0.04 K/UL
ABSOLUTE MONOCYTE (OHS): 0.89 K/UL (ref 0.3–1)
ABSOLUTE NEUTROPHIL COUNT (OHS): 11.76 K/UL (ref 1.8–7.7)
ALBUMIN SERPL BCP-MCNC: 3.3 G/DL (ref 3.5–5.2)
ALP SERPL-CCNC: 85 UNIT/L (ref 40–150)
ALT SERPL W/O P-5'-P-CCNC: 13 UNIT/L (ref 10–44)
ANION GAP (OHS): 9 MMOL/L (ref 8–16)
AST SERPL-CCNC: 6 UNIT/L (ref 11–45)
BASOPHILS # BLD AUTO: 0.04 K/UL
BASOPHILS NFR BLD AUTO: 0.3 %
BILIRUB SERPL-MCNC: 0.2 MG/DL (ref 0.1–1)
BILIRUB UR QL STRIP.AUTO: NEGATIVE
BNP SERPL-MCNC: 29 PG/ML (ref 0–99)
BUN SERPL-MCNC: 18 MG/DL (ref 6–20)
CALCIUM SERPL-MCNC: 8.1 MG/DL (ref 8.7–10.5)
CHLORIDE SERPL-SCNC: 107 MMOL/L (ref 95–110)
CLARITY UR: CLEAR
CO2 SERPL-SCNC: 25 MMOL/L (ref 23–29)
COLOR UR AUTO: NORMAL
CREAT SERPL-MCNC: 0.8 MG/DL (ref 0.5–1.4)
ERYTHROCYTE [DISTWIDTH] IN BLOOD BY AUTOMATED COUNT: 15.6 % (ref 11.5–14.5)
GFR SERPLBLD CREATININE-BSD FMLA CKD-EPI: >60 ML/MIN/1.73/M2
GLUCOSE SERPL-MCNC: 80 MG/DL (ref 70–110)
GLUCOSE UR QL STRIP: NEGATIVE
HCT VFR BLD AUTO: 39.6 % (ref 37–48.5)
HCV AB SERPL QL IA: NORMAL
HGB BLD-MCNC: 12 GM/DL (ref 12–16)
HGB UR QL STRIP: NEGATIVE
HIV 1+2 AB+HIV1 P24 AG SERPL QL IA: NORMAL
IMM GRANULOCYTES # BLD AUTO: 0.1 K/UL (ref 0–0.04)
IMM GRANULOCYTES NFR BLD AUTO: 0.7 % (ref 0–0.5)
KETONES UR QL STRIP: NEGATIVE
LEUKOCYTE ESTERASE UR QL STRIP: NEGATIVE
LYMPHOCYTES # BLD AUTO: 1.94 K/UL (ref 1–4.8)
MAGNESIUM SERPL-MCNC: 2.2 MG/DL (ref 1.6–2.6)
MCH RBC QN AUTO: 25.5 PG (ref 27–31)
MCHC RBC AUTO-ENTMCNC: 30.3 G/DL (ref 32–36)
MCV RBC AUTO: 84 FL (ref 82–98)
NITRITE UR QL STRIP: NEGATIVE
NUCLEATED RBC (/100WBC) (OHS): 0 /100 WBC
OHS QRS DURATION: 74 MS
OHS QRS DURATION: 78 MS
OHS QTC CALCULATION: 422 MS
OHS QTC CALCULATION: 451 MS
PH UR STRIP: 7 [PH]
PLATELET # BLD AUTO: 437 K/UL (ref 150–450)
PMV BLD AUTO: 10.1 FL (ref 9.2–12.9)
POTASSIUM SERPL-SCNC: 3.8 MMOL/L (ref 3.5–5.1)
PROT SERPL-MCNC: 6.5 GM/DL (ref 6–8.4)
PROT UR QL STRIP: NEGATIVE
RBC # BLD AUTO: 4.7 M/UL (ref 4–5.4)
RELATIVE EOSINOPHIL (OHS): 0.3 %
RELATIVE LYMPHOCYTE (OHS): 13.1 % (ref 18–48)
RELATIVE MONOCYTE (OHS): 6 % (ref 4–15)
RELATIVE NEUTROPHIL (OHS): 79.6 % (ref 38–73)
SODIUM SERPL-SCNC: 141 MMOL/L (ref 136–145)
SP GR UR STRIP: 1.01
TSH SERPL-ACNC: 0.76 UIU/ML (ref 0.4–4)
UROBILINOGEN UR STRIP-ACNC: NEGATIVE EU/DL
WBC # BLD AUTO: 14.77 K/UL (ref 3.9–12.7)

## 2025-07-01 PROCEDURE — 81003 URINALYSIS AUTO W/O SCOPE: CPT | Performed by: NURSE PRACTITIONER

## 2025-07-01 PROCEDURE — 93005 ELECTROCARDIOGRAM TRACING: CPT

## 2025-07-01 PROCEDURE — 83880 ASSAY OF NATRIURETIC PEPTIDE: CPT | Performed by: NURSE PRACTITIONER

## 2025-07-01 PROCEDURE — 93010 ELECTROCARDIOGRAM REPORT: CPT | Mod: ,,, | Performed by: INTERNAL MEDICINE

## 2025-07-01 PROCEDURE — 99285 EMERGENCY DEPT VISIT HI MDM: CPT | Mod: 25

## 2025-07-01 PROCEDURE — 96374 THER/PROPH/DIAG INJ IV PUSH: CPT

## 2025-07-01 PROCEDURE — 96361 HYDRATE IV INFUSION ADD-ON: CPT

## 2025-07-01 PROCEDURE — 82565 ASSAY OF CREATININE: CPT | Performed by: NURSE PRACTITIONER

## 2025-07-01 PROCEDURE — 85025 COMPLETE CBC W/AUTO DIFF WBC: CPT | Performed by: NURSE PRACTITIONER

## 2025-07-01 PROCEDURE — 63600175 PHARM REV CODE 636 W HCPCS: Performed by: PHYSICIAN ASSISTANT

## 2025-07-01 PROCEDURE — 83735 ASSAY OF MAGNESIUM: CPT | Performed by: NURSE PRACTITIONER

## 2025-07-01 PROCEDURE — 86803 HEPATITIS C AB TEST: CPT | Performed by: PHYSICIAN ASSISTANT

## 2025-07-01 PROCEDURE — 87389 HIV-1 AG W/HIV-1&-2 AB AG IA: CPT | Performed by: PHYSICIAN ASSISTANT

## 2025-07-01 PROCEDURE — 84443 ASSAY THYROID STIM HORMONE: CPT | Performed by: NURSE PRACTITIONER

## 2025-07-01 PROCEDURE — 82040 ASSAY OF SERUM ALBUMIN: CPT | Performed by: NURSE PRACTITIONER

## 2025-07-01 RX ORDER — ONDANSETRON HYDROCHLORIDE 2 MG/ML
4 INJECTION, SOLUTION INTRAVENOUS
Status: COMPLETED | OUTPATIENT
Start: 2025-07-01 | End: 2025-07-01

## 2025-07-01 RX ADMIN — SODIUM CHLORIDE, POTASSIUM CHLORIDE, SODIUM LACTATE AND CALCIUM CHLORIDE 1000 ML: 600; 310; 30; 20 INJECTION, SOLUTION INTRAVENOUS at 12:07

## 2025-07-01 RX ADMIN — ONDANSETRON 4 MG: 2 INJECTION INTRAMUSCULAR; INTRAVENOUS at 12:07

## 2025-07-01 NOTE — Clinical Note
"Sonia"Dinorah Solorzano was seen and treated in our emergency department on 7/1/2025.  She may return with limitations on 07/02/2025.  Ms. Solorzano can work from home the next 2 days and can return to work 7/4/2025.     Sincerely,      Lisa Carpio PA-C    "
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
## 2025-07-01 NOTE — DISCHARGE INSTRUCTIONS
Diagnosis: Near-syncope, diarrhea, bradycardia    Your heart rate was somewhat on the low side in the 50s while in the emergency department.  Your lab tests, EKG and head CT did not show any concerning findings.  Think it is possible this is causing your symptoms.  I would not take your Benicar tomorrow and keep an eye on your blood pressure and heart rate.  Make sure to continue staying hydrated.  I ordered a Holter monitor, you can call the cardiology clinic to see if they have 1 or will send 1 to your house.    I sent a referral to our Cardiology doctors for follow-up.  Please call to schedule follow up appointment.  If you start to have any worsening symptoms please return to the emergency department.

## 2025-07-01 NOTE — FIRST PROVIDER EVALUATION
Medical screening examination initiated.  I have conducted a focused provider triage encounter, findings are as follows:    Brief history of present illness:  Patient is a 39-year-old female presenting for near syncopal episode and dizziness while driving to work this morning.  Patient states she did not eat breakfast but this is normal for her.  Patient did recently start a new blood pressure medication.  Patient states she was driving when she started to have tunnel visit and an urge to have a bowel movement.  Patient denies any actual syncope.  Patient denies any associated chest pain or shortness of breath.  Patient does endorse mild palpitations after the episode.    Vitals:    07/01/25 1029   BP: (!) 125/58   BP Location: Right arm   Pulse: (!) 59   Resp: 17   Temp: 97.3 °F (36.3 °C)   TempSrc: Oral   SpO2: 97%       Pertinent physical exam:  Vital signs stable.  No acute distress noted.    Brief workup plan:  Labs, imaging    Preliminary workup initiated; this workup will be continued and followed by the physician or advanced practice provider that is assigned to the patient when roomed.

## 2025-07-02 LAB — HOLD SPECIMEN: NORMAL

## 2025-07-04 LAB — HOLD SPECIMEN: NORMAL

## 2025-07-07 ENCOUNTER — PATIENT OUTREACH (OUTPATIENT)
Dept: ADMINISTRATIVE | Facility: HOSPITAL | Age: 40
End: 2025-07-07
Payer: COMMERCIAL

## 2025-07-07 NOTE — PROGRESS NOTES
Rising risk of ED/ Admissions report.    Patient's Primary Care hospital follow up appointment is already scheduled for: 07/08/2025    Note added to 07/08/2025 primary care appointment note - patient identified on rising risk ED/ hospital admission report

## 2025-07-08 ENCOUNTER — OFFICE VISIT (OUTPATIENT)
Dept: CARDIOLOGY | Facility: CLINIC | Age: 40
End: 2025-07-08
Payer: COMMERCIAL

## 2025-07-08 ENCOUNTER — PATIENT MESSAGE (OUTPATIENT)
Dept: OPHTHALMOLOGY | Facility: CLINIC | Age: 40
End: 2025-07-08
Payer: COMMERCIAL

## 2025-07-08 ENCOUNTER — PATIENT MESSAGE (OUTPATIENT)
Dept: NEUROSURGERY | Facility: CLINIC | Age: 40
End: 2025-07-08
Payer: COMMERCIAL

## 2025-07-08 ENCOUNTER — TELEPHONE (OUTPATIENT)
Dept: FAMILY MEDICINE | Facility: CLINIC | Age: 40
End: 2025-07-08
Payer: COMMERCIAL

## 2025-07-08 ENCOUNTER — LAB VISIT (OUTPATIENT)
Dept: LAB | Facility: HOSPITAL | Age: 40
End: 2025-07-08
Payer: COMMERCIAL

## 2025-07-08 ENCOUNTER — PATIENT MESSAGE (OUTPATIENT)
Dept: FAMILY MEDICINE | Facility: CLINIC | Age: 40
End: 2025-07-08

## 2025-07-08 ENCOUNTER — OFFICE VISIT (OUTPATIENT)
Dept: FAMILY MEDICINE | Facility: CLINIC | Age: 40
End: 2025-07-08
Payer: COMMERCIAL

## 2025-07-08 VITALS
OXYGEN SATURATION: 100 % | BODY MASS INDEX: 43.6 KG/M2 | HEART RATE: 84 BPM | DIASTOLIC BLOOD PRESSURE: 76 MMHG | WEIGHT: 277.75 LBS | HEIGHT: 67 IN | SYSTOLIC BLOOD PRESSURE: 122 MMHG

## 2025-07-08 VITALS
HEART RATE: 76 BPM | WEIGHT: 278 LBS | DIASTOLIC BLOOD PRESSURE: 78 MMHG | OXYGEN SATURATION: 99 % | TEMPERATURE: 98 F | HEIGHT: 67 IN | SYSTOLIC BLOOD PRESSURE: 124 MMHG | BODY MASS INDEX: 43.63 KG/M2

## 2025-07-08 DIAGNOSIS — F41.1 GAD (GENERALIZED ANXIETY DISORDER): Primary | ICD-10-CM

## 2025-07-08 DIAGNOSIS — G93.2 IIH (IDIOPATHIC INTRACRANIAL HYPERTENSION): Chronic | ICD-10-CM

## 2025-07-08 DIAGNOSIS — R55 NEAR SYNCOPE: ICD-10-CM

## 2025-07-08 DIAGNOSIS — D50.9 IRON DEFICIENCY ANEMIA, UNSPECIFIED IRON DEFICIENCY ANEMIA TYPE: ICD-10-CM

## 2025-07-08 DIAGNOSIS — E66.813 CLASS 3 SEVERE OBESITY DUE TO EXCESS CALORIES WITH SERIOUS COMORBIDITY AND BODY MASS INDEX (BMI) OF 45.0 TO 49.9 IN ADULT: ICD-10-CM

## 2025-07-08 DIAGNOSIS — R42 LIGHTHEADEDNESS: ICD-10-CM

## 2025-07-08 DIAGNOSIS — R00.1 BRADYCARDIA: ICD-10-CM

## 2025-07-08 DIAGNOSIS — C55 MALIGNANT NEOPLASM OF UTERUS, UNSPECIFIED SITE: ICD-10-CM

## 2025-07-08 DIAGNOSIS — I10 PRIMARY HYPERTENSION: Chronic | ICD-10-CM

## 2025-07-08 DIAGNOSIS — E66.01 CLASS 3 SEVERE OBESITY DUE TO EXCESS CALORIES WITH SERIOUS COMORBIDITY AND BODY MASS INDEX (BMI) OF 45.0 TO 49.9 IN ADULT: ICD-10-CM

## 2025-07-08 LAB
ABSOLUTE EOSINOPHIL (OHS): 0.12 K/UL
ABSOLUTE MONOCYTE (OHS): 0.63 K/UL (ref 0.3–1)
ABSOLUTE NEUTROPHIL COUNT (OHS): 8.41 K/UL (ref 1.8–7.7)
ALBUMIN SERPL BCP-MCNC: 3.5 G/DL (ref 3.5–5.2)
ALP SERPL-CCNC: 91 UNIT/L (ref 40–150)
ALT SERPL W/O P-5'-P-CCNC: 15 UNIT/L (ref 10–44)
ANION GAP (OHS): 7 MMOL/L (ref 8–16)
AST SERPL-CCNC: 11 UNIT/L (ref 11–45)
BASOPHILS # BLD AUTO: 0.05 K/UL
BASOPHILS NFR BLD AUTO: 0.4 %
BILIRUB SERPL-MCNC: 0.4 MG/DL (ref 0.1–1)
BUN SERPL-MCNC: 13 MG/DL (ref 6–20)
CALCIUM SERPL-MCNC: 8.1 MG/DL (ref 8.7–10.5)
CHLORIDE SERPL-SCNC: 104 MMOL/L (ref 95–110)
CO2 SERPL-SCNC: 27 MMOL/L (ref 23–29)
CREAT SERPL-MCNC: 0.9 MG/DL (ref 0.5–1.4)
ERYTHROCYTE [DISTWIDTH] IN BLOOD BY AUTOMATED COUNT: 15.9 % (ref 11.5–14.5)
GFR SERPLBLD CREATININE-BSD FMLA CKD-EPI: >60 ML/MIN/1.73/M2
GLUCOSE SERPL-MCNC: 102 MG/DL (ref 70–110)
HCT VFR BLD AUTO: 41 % (ref 37–48.5)
HGB BLD-MCNC: 12.2 GM/DL (ref 12–16)
IMM GRANULOCYTES # BLD AUTO: 0.07 K/UL (ref 0–0.04)
IMM GRANULOCYTES NFR BLD AUTO: 0.6 % (ref 0–0.5)
LYMPHOCYTES # BLD AUTO: 2.14 K/UL (ref 1–4.8)
MCH RBC QN AUTO: 25.2 PG (ref 27–31)
MCHC RBC AUTO-ENTMCNC: 29.8 G/DL (ref 32–36)
MCV RBC AUTO: 85 FL (ref 82–98)
NUCLEATED RBC (/100WBC) (OHS): 0 /100 WBC
PLATELET # BLD AUTO: 403 K/UL (ref 150–450)
PMV BLD AUTO: 10.3 FL (ref 9.2–12.9)
POTASSIUM SERPL-SCNC: 4 MMOL/L (ref 3.5–5.1)
PROT SERPL-MCNC: 6.8 GM/DL (ref 6–8.4)
RBC # BLD AUTO: 4.84 M/UL (ref 4–5.4)
RELATIVE EOSINOPHIL (OHS): 1.1 %
RELATIVE LYMPHOCYTE (OHS): 18.7 % (ref 18–48)
RELATIVE MONOCYTE (OHS): 5.5 % (ref 4–15)
RELATIVE NEUTROPHIL (OHS): 73.7 % (ref 38–73)
SODIUM SERPL-SCNC: 138 MMOL/L (ref 136–145)
WBC # BLD AUTO: 11.42 K/UL (ref 3.9–12.7)

## 2025-07-08 PROCEDURE — 99215 OFFICE O/P EST HI 40 MIN: CPT | Mod: S$GLB,,,

## 2025-07-08 PROCEDURE — 3074F SYST BP LT 130 MM HG: CPT | Mod: CPTII,S$GLB,, | Performed by: INTERNAL MEDICINE

## 2025-07-08 PROCEDURE — 85025 COMPLETE CBC W/AUTO DIFF WBC: CPT

## 2025-07-08 PROCEDURE — 4010F ACE/ARB THERAPY RXD/TAKEN: CPT | Mod: CPTII,S$GLB,,

## 2025-07-08 PROCEDURE — 3078F DIAST BP <80 MM HG: CPT | Mod: CPTII,S$GLB,, | Performed by: INTERNAL MEDICINE

## 2025-07-08 PROCEDURE — G2211 COMPLEX E/M VISIT ADD ON: HCPCS | Mod: S$GLB,,,

## 2025-07-08 PROCEDURE — 99204 OFFICE O/P NEW MOD 45 MIN: CPT | Mod: S$GLB,,, | Performed by: INTERNAL MEDICINE

## 2025-07-08 PROCEDURE — 36415 COLL VENOUS BLD VENIPUNCTURE: CPT | Mod: PO

## 2025-07-08 PROCEDURE — 1159F MED LIST DOCD IN RCRD: CPT | Mod: CPTII,S$GLB,, | Performed by: INTERNAL MEDICINE

## 2025-07-08 PROCEDURE — 99999 PR PBB SHADOW E&M-EST. PATIENT-LVL IV: CPT | Mod: PBBFAC,,,

## 2025-07-08 PROCEDURE — 82040 ASSAY OF SERUM ALBUMIN: CPT

## 2025-07-08 PROCEDURE — 3078F DIAST BP <80 MM HG: CPT | Mod: CPTII,S$GLB,,

## 2025-07-08 PROCEDURE — 3008F BODY MASS INDEX DOCD: CPT | Mod: CPTII,S$GLB,,

## 2025-07-08 PROCEDURE — 4010F ACE/ARB THERAPY RXD/TAKEN: CPT | Mod: CPTII,S$GLB,, | Performed by: INTERNAL MEDICINE

## 2025-07-08 PROCEDURE — 3008F BODY MASS INDEX DOCD: CPT | Mod: CPTII,S$GLB,, | Performed by: INTERNAL MEDICINE

## 2025-07-08 PROCEDURE — 99999 PR PBB SHADOW E&M-EST. PATIENT-LVL III: CPT | Mod: PBBFAC,,, | Performed by: INTERNAL MEDICINE

## 2025-07-08 PROCEDURE — 3074F SYST BP LT 130 MM HG: CPT | Mod: CPTII,S$GLB,,

## 2025-07-08 PROCEDURE — 1159F MED LIST DOCD IN RCRD: CPT | Mod: CPTII,S$GLB,,

## 2025-07-08 RX ORDER — FLUOXETINE 10 MG/1
10 CAPSULE ORAL DAILY
Qty: 30 CAPSULE | Refills: 0 | Status: SHIPPED | OUTPATIENT
Start: 2025-07-08 | End: 2025-08-07
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
## 2025-07-08 NOTE — TELEPHONE ENCOUNTER
"----- Message from Shade Briggs sent at 6/30/2025 12:40 PM CDT -----  Regarding: Please advise patient  Hi,  I spoke to Ms. Scott today. She would like to speak to someone regarding "pelvic wall therapy". I did ask her if she had spoken to anyone in [place] and she said she had not had a chance yet today. I did notice she have an appointment with them on 07/23/2025. Please advise patient on how to proceed. Thank you in advance.    Shade"

## 2025-07-08 NOTE — TELEPHONE ENCOUNTER
Spoke to pt who states she was trying to reach Physical therapy to schedule. Pt already has a referral from her OB/GYN.  Provided pt with number to Physical Therapy

## 2025-07-09 ENCOUNTER — TELEPHONE (OUTPATIENT)
Dept: FAMILY MEDICINE | Facility: CLINIC | Age: 40
End: 2025-07-09
Payer: COMMERCIAL

## 2025-07-09 ENCOUNTER — PATIENT MESSAGE (OUTPATIENT)
Dept: FAMILY MEDICINE | Facility: CLINIC | Age: 40
End: 2025-07-09
Payer: COMMERCIAL

## 2025-07-09 NOTE — TELEPHONE ENCOUNTER
Hi,     Usually for return to work notes we only do restrictions and not clarify home vs in office. Do they have a specific form that they have for providers to fill out for restrictions?     Brittani Rivas PA-C

## 2025-07-09 NOTE — TELEPHONE ENCOUNTER
----- Message from Brittani Rivas PA-C sent at 7/9/2025  2:11 PM CDT -----  White count is improved and blood counts are stable and at your baseline. Metabolic panel is normal and calcium levels are stable. We will continue to trend this to ensure no worsening of these   levels. Please let me know if you have any further questions or concerns.     Brittani Rivas PA-C    ----- Message -----  From: Lab, Background User  Sent: 7/8/2025   6:18 PM CDT  To: Brittani Rivas PA-C

## 2025-07-09 NOTE — TELEPHONE ENCOUNTER
Patient was notified that her return to work letter was ready to be pickup. Also available via [facility].

## 2025-07-10 ENCOUNTER — TELEPHONE (OUTPATIENT)
Dept: FAMILY MEDICINE | Facility: CLINIC | Age: 40
End: 2025-07-10
Payer: COMMERCIAL

## 2025-07-10 NOTE — TELEPHONE ENCOUNTER
Note has been rephrased. I did not specify a date, pending cardiac monitor.     Brittani Rivas PA-C

## 2025-07-14 ENCOUNTER — TELEPHONE (OUTPATIENT)
Dept: OPHTHALMOLOGY | Facility: CLINIC | Age: 40
End: 2025-07-14
Payer: COMMERCIAL

## 2025-07-14 DIAGNOSIS — G93.2 IIH (IDIOPATHIC INTRACRANIAL HYPERTENSION): Primary | Chronic | ICD-10-CM

## 2025-07-17 ENCOUNTER — PATIENT MESSAGE (OUTPATIENT)
Dept: UROGYNECOLOGY | Facility: CLINIC | Age: 40
End: 2025-07-17
Payer: COMMERCIAL

## 2025-07-18 ENCOUNTER — CLINICAL SUPPORT (OUTPATIENT)
Dept: OPHTHALMOLOGY | Facility: CLINIC | Age: 40
End: 2025-07-18
Payer: COMMERCIAL

## 2025-07-18 ENCOUNTER — HOSPITAL ENCOUNTER (OUTPATIENT)
Dept: CARDIOLOGY | Facility: CLINIC | Age: 40
Discharge: HOME | End: 2025-07-18
Attending: INTERNAL MEDICINE
Payer: COMMERCIAL

## 2025-07-18 DIAGNOSIS — R39.89 BLADDER PAIN: ICD-10-CM

## 2025-07-18 DIAGNOSIS — G93.2 IIH (IDIOPATHIC INTRACRANIAL HYPERTENSION): Chronic | ICD-10-CM

## 2025-07-18 DIAGNOSIS — M79.18 MYOFASCIAL PAIN: Primary | ICD-10-CM

## 2025-07-18 DIAGNOSIS — M62.89 HIGH-TONE PELVIC FLOOR DYSFUNCTION: ICD-10-CM

## 2025-07-18 DIAGNOSIS — R00.1 BRADYCARDIA: ICD-10-CM

## 2025-07-18 DIAGNOSIS — R55 NEAR SYNCOPE: ICD-10-CM

## 2025-07-18 NOTE — PROGRESS NOTES
oct/hvf ou done/ou/rel/fix/coop.good/patient chart checked for allergies/od.-3.00 +0.50 x160/os.-3.00 +0.75 x64/ej.        Assessment /Plan     For exam results, see Encounter Report.    IIH (idiopathic intracranial hypertension)  -     Stone Visual Field - OU - Extended - Both Eyes  -     OCT - Optic Nerve

## 2025-07-21 ENCOUNTER — TELEPHONE (OUTPATIENT)
Dept: HEMATOLOGY/ONCOLOGY | Facility: CLINIC | Age: 40
End: 2025-07-21
Payer: COMMERCIAL

## 2025-07-22 ENCOUNTER — OFFICE VISIT (OUTPATIENT)
Dept: OPTOMETRY | Facility: CLINIC | Age: 40
End: 2025-07-22
Payer: COMMERCIAL

## 2025-07-22 DIAGNOSIS — R42 DIZZINESS: Primary | ICD-10-CM

## 2025-07-22 DIAGNOSIS — H53.149 ACCOMMODATIVE ASTHENOPIA: ICD-10-CM

## 2025-07-22 PROBLEM — H57.02 ANISOCORIA: Status: RESOLVED | Noted: 2025-06-25 | Resolved: 2025-07-22

## 2025-07-22 PROCEDURE — 99203 OFFICE O/P NEW LOW 30 MIN: CPT | Mod: S$GLB,,, | Performed by: OPTOMETRIST

## 2025-07-22 PROCEDURE — 4010F ACE/ARB THERAPY RXD/TAKEN: CPT | Mod: CPTII,S$GLB,, | Performed by: OPTOMETRIST

## 2025-07-22 PROCEDURE — 1159F MED LIST DOCD IN RCRD: CPT | Mod: CPTII,S$GLB,, | Performed by: OPTOMETRIST

## 2025-07-22 PROCEDURE — 99999 PR PBB SHADOW E&M-EST. PATIENT-LVL III: CPT | Mod: PBBFAC,,, | Performed by: OPTOMETRIST

## 2025-07-22 NOTE — PROGRESS NOTES
HPI    Va?-dle-6/25 hvf and Thai 4/25    Pt complains of dizziness when driving -looking from close to far away.   Some dizziness when eye movement. Feels shunt may need to be turned up.   Last glasses rx 2 years ago. Denies any flashes or floaters. Was told HVF   was fine. No gtts. Occasional headaches.   Last edited by Jo Badillo on 7/22/2025  8:04 AM.            Assessment /Plan     For exam results, see Encounter Report.    Dizziness    Accommodative asthenopia      1,2.   Recent episode of pharm dilation OS / anisocoria has resolved following scopolamine patching --pupils perrl today   IOP/ NDFE stable/ normal today     Complaint of some dizziness and focus lag when switching from distance / near / distance tasks  Update specs, does appreciate near vision add ---s/s due in part to presbyopia     Gave copy, fill and begin wear ---message if new issues   RTC 1 year annual exam / refraction w/ me  F/u per Dr Andre recommendation

## 2025-07-23 ENCOUNTER — OFFICE VISIT (OUTPATIENT)
Dept: NEUROSURGERY | Facility: CLINIC | Age: 40
End: 2025-07-23
Payer: COMMERCIAL

## 2025-07-23 ENCOUNTER — PATIENT MESSAGE (OUTPATIENT)
Dept: NEUROSURGERY | Facility: CLINIC | Age: 40
End: 2025-07-23
Payer: COMMERCIAL

## 2025-07-23 ENCOUNTER — PATIENT MESSAGE (OUTPATIENT)
Dept: UROGYNECOLOGY | Facility: CLINIC | Age: 40
End: 2025-07-23
Payer: COMMERCIAL

## 2025-07-23 DIAGNOSIS — G93.2 PSEUDOTUMOR CEREBRI: Primary | ICD-10-CM

## 2025-07-23 PROCEDURE — 4010F ACE/ARB THERAPY RXD/TAKEN: CPT | Mod: CPTII,S$GLB,, | Performed by: NEUROLOGICAL SURGERY

## 2025-07-23 PROCEDURE — 99214 OFFICE O/P EST MOD 30 MIN: CPT | Mod: S$GLB,,, | Performed by: NEUROLOGICAL SURGERY

## 2025-07-23 PROCEDURE — 1159F MED LIST DOCD IN RCRD: CPT | Mod: CPTII,S$GLB,, | Performed by: NEUROLOGICAL SURGERY

## 2025-07-23 PROCEDURE — 62252 CSF SHUNT REPROGRAM: CPT | Mod: S$GLB,,, | Performed by: NEUROLOGICAL SURGERY

## 2025-07-23 PROCEDURE — 99999 PR PBB SHADOW E&M-EST. PATIENT-LVL II: CPT | Mod: PBBFAC,,, | Performed by: NEUROLOGICAL SURGERY
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
## 2025-07-24 ENCOUNTER — OFFICE VISIT (OUTPATIENT)
Dept: OTOLARYNGOLOGY | Facility: CLINIC | Age: 40
End: 2025-07-24
Payer: COMMERCIAL

## 2025-07-24 ENCOUNTER — PATIENT MESSAGE (OUTPATIENT)
Dept: OTOLARYNGOLOGY | Facility: CLINIC | Age: 40
End: 2025-07-24

## 2025-07-24 VITALS
SYSTOLIC BLOOD PRESSURE: 140 MMHG | BODY MASS INDEX: 43.81 KG/M2 | HEIGHT: 67 IN | DIASTOLIC BLOOD PRESSURE: 96 MMHG | WEIGHT: 279.13 LBS | HEART RATE: 84 BPM

## 2025-07-24 DIAGNOSIS — H81.20 VESTIBULAR NEURONITIS, UNSPECIFIED LATERALITY: ICD-10-CM

## 2025-07-24 DIAGNOSIS — R42 DIZZINESS: Primary | ICD-10-CM

## 2025-07-24 DIAGNOSIS — R26.89 IMBALANCE: ICD-10-CM

## 2025-07-24 PROCEDURE — 99999 PR PBB SHADOW E&M-EST. PATIENT-LVL V: CPT | Mod: PBBFAC,,, | Performed by: PHYSICIAN ASSISTANT

## 2025-07-24 NOTE — PATIENT INSTRUCTIONS
"Endolymphatic hydrops diet  Hydrops Diet    The fluid filled hearing and balance structures of the inner ear normally function independent of the body's overall fluid/blood system. In a normal inner ear, the fluid is maintained at a constant volume and contains the specific concentrations of the sodium, potassium, chloride and other electrolytes. This fluid bathes the sensory cells of the inner ear and allow them to function normally.     With injury or degeneration of the inner ear structures, independent control is lost and the volume and concentration of the inner ear fluid fluctuates with changes in the body's fluid. This fluctuation causes the symptoms of hydrops - pressure or fullness is the ears, tinnitus (ringing in the ears) and hearing loss. It is possible to have hydrops without having all three of the mentioned symptoms.    Aim for a 2 grams sodium intake diet. High sodium intake results in fluctuations in the inner ear fluid pressure and may increase your symptoms. Aim for a diet high in fresh fruits, vegetables and whole grains, and low in canned, frozen or processed foods.   One teaspoon of table salt has about 2 grams of sodium. Note that sodium (one of the two elements in table salt) is not exactly the same as sodium chloride (salt). There are many other foods and chemicals that we ingest that contain sodium.     Drink adequate amounts of fluid daily. This should include water, milk and low-sugar fruit juices (for example, cranberry or cranapple). Try to anticipate fluid loss which will occur with exercise or heat, and replace these fluids before they are lost. Some studies suggest that drinking more water helps, perhaps because it dilutes out the salt. Don't overdo it though - -one can get "water intoxication".     Avoid caffeine-containing fluids and foods (such as coffee, tea and chocolate). Caffeine has stimulant properties that may make Meniere's symptoms worse. Caffeine also may make tinnitus " louder. Two regular cups of coffee/day, or the equivalent in other beverages, is ususually safe however.     Limit your alcohol intake to one glass of beer or wine each day. Alcohol, especially red wine is a migraine trigger. Migraine and Meniere's are linked.     Avoid foods containing MSG (monosodium glutamate). This is often present in pre-packaged food products (such as flavored chips) and in Chinese food.     Distribute your food and fluid intake evenly throughout the day and from day to day. Eat approximately the same amount of food at each meal and do not skip meals. If you eat snacks, have them at regular times.

## 2025-07-24 NOTE — PROGRESS NOTES
"[facility]    Subjective:      Patient: Sonia Solorzano Patient PCP: Dorian Guerrero MD         :  1985     Sex:  female      MRN:  [id number]          Date of Visit: 2025      Chief Complaint: Dizziness    Patient ID: Sonia Solorzano is a 39 y.o. female who presents to office for evaluation of dizziness. She underwent a pudendal nerve block and cystoscopy in early this month. During the procedure, a Scopolamine patch was placed, leading to an anticholinergic reaction. The following day, she was transported via ambulance to the emergency room due to a blown pupil. After patch removal, she returned to normal status within approximately 10 minutes. CT showed no signs of stroke. This was a separate issue than her recent issues with dizziness. She reports chronic dizziness that began after an acute episode of vertigo driving to work, initially presenting with ear fullness and tinnitus in one ear. The initial episode included vertigo with a tunneling sensation and near-syncope. At the hospital, she was noted to be hypotensive with heart rate in the 50s for 3 days. Currently, she experiences constant but milder dizziness, described as a sensation of weight starting in the back of her head and moving forward. She denies current hearing loss, tinnitus, chest pain, or palpitations. She reports sensitivity to loud noises which exacerbates her headaches, requiring her to rest in a room for the remainder of the day. She wears bifocals and is awaiting results from a recent 3-day heart monitor. She has Idiopathic Intracranial Hypertension (IIH) with previous left-sided paralysis, described as a "fan positive stroke" without actual stroke occurrence. She has a shunt that requires adjustment and sedation for MRI due to extreme claustrophobia. Since shunt placement, she has experienced severe symptoms including extreme sensitivity to loud noises, migraines, and dizziness. She has a history of knee " replacement. She has had issues with imbalance associated with her dizziness that is separate from imbalance secondary to her joint issues.     Past Medical History  She has a past medical history of Asthma, Bipolar disorder, Chronic anxiety, COVID-19, Endometrioid adenocarcinoma of uterus, GERD (gastroesophageal reflux disease), GI bleed, Heart palpitations, Herniated disc, History of COVID-19, Hyperlipidemia, Hyperparathyroidism, Hypertension, IBS (irritable bowel syndrome), Idiopathic intracranial hypertension, Insomnia, Intractable migraine without aura and with status migrainosus, Irritable bowel syndrome without diarrhea, Lower back pain, Migraine headache, Palpitations, PCOS (polycystic ovarian syndrome), PONV (postoperative nausea and vomiting), Sleep apnea, unspecified, Wears contact lenses, and Wears prescription eyeglasses.    Family History  Her family history includes Aortic aneurysm in her maternal grandfather; Arthritis in her father; Asthma in her mother; Breast cancer (age of onset: 60 - 69) in her mother; Cancer in an other family member; Cancer (age of onset: 50 - 59) in her paternal grandfather; Cancer (age of onset: 69) in her father; Cancer (age of onset: 70 - 79) in her paternal grandmother; Colon cancer (age of onset: 61) in her father; Diabetes in her maternal grandmother, mother, and paternal grandfather; Endometrial cancer in her paternal cousin; Heart disease in her father and mother; Hyperlipidemia in her father and mother; Hypertension in her father; Macular degeneration in her maternal grandmother and mother; Melanoma in her father; Ovarian cancer in her paternal cousin; Pancreatic cancer (age of onset: 80) in her maternal grandmother; Pancreatitis in her maternal grandmother and mother; Prostate cancer (age of onset: 89) in her maternal grandfather; Skin cancer in her father.    Past Surgical History:   Procedure Laterality Date     SECTION, LOW TRANSVERSE      COLONOSCOPY  N/A 10/27/2020    Procedure: COLONOSCOPY;  Surgeon: Patito Vergara MD;  Location: [facility] ENDO;  Service: Endoscopy;  Laterality: N/A;    CYSTOSCOPY N/A 10/27/2021    Procedure: CYSTOSCOPY;  Surgeon: Oh Velasquez Jr., MD;  Location: [facility] OR;  Service: Urology;  Laterality: N/A;    CYSTOSCOPY N/A 6/24/2025    Procedure: CYSTOSCOPY;  Surgeon: Lynnette Cifuentes MD;  Location: [facility] OR;  Service: Gynecology Urology;  Laterality: N/A;    DILATION AND CURETTAGE OF UTERUS  2003    Uterine perforation for AUB    ENDOSCOPIC INSERTION OF VENTRICULOPERITONEAL SHUNT Right 09/19/2022    Procedure: INSERTION, SHUNT, VENTRICULOPERITONEAL, ENDOSCOPIC;  Surgeon: Fran Yoon MD;  Location: [address];  Service: Neurosurgery;  Laterality: Right;  regular bed, supine    ENDOSCOPIC INSERTION OF VENTRICULOPERITONEAL SHUNT N/A 09/19/2022    Procedure: INSERTION, SHUNT, VENTRICULOPERITONEAL, ENDOSCOPIC;  Surgeon: Bandar Oneal Jr., MD;  [address];  Service: General;  Laterality: N/A;    epidural steriod injections  2005    x3    ESOPHAGOGASTRODUODENOSCOPY N/A 10/26/2020    Procedure: EGD (ESOPHAGOGASTRODUODENOSCOPY);  Surgeon: Enrike Garcia MD;  Location: [facility];  Service: Endoscopy;  Laterality: N/A;    ESOPHAGOGASTRODUODENOSCOPY N/A 03/02/2023    Procedure: EGD (ESOPHAGOGASTRODUODENOSCOPY);  Surgeon: Patito Vergara MD;  Location: [facility];  Service: Endoscopy;  Laterality: N/A;    EXCISION OF BREAST LESION Right 12/26/2024    Procedure: EXCISION, LESION, BREAST RIGHT with radiological marker;  Surgeon: MATY Disla MD;  Location: [facility] OR;  Service: General;  Laterality: Right;    INJECTION OF ANESTHETIC AGENT AROUND PUDENDAL NERVE Bilateral 6/24/2025    Procedure: BLOCK, NERVE, PUDENDAL;  Surgeon: Lynnette Cifuentes MD;  Location: [facility] OR;  Service: Gynecology Urology;  Laterality: Bilateral;  Will need a pudendal nerve block kit with an [place] trumpet; 30 ml marcaine 0.25% plain and 2 mL kenalog     INTRALUMINAL GASTROINTESTINAL TRACT IMAGING VIA CAPSULE N/A 11/20/2020    Procedure: IMAGING PROCEDURE, GI TRACT, INTRALUMINAL, VIA CAPSULE;  Surgeon: Patito Vergara MD;  Location: [facility] ENDO;  Service: Endoscopy;  Laterality: N/A;    KNEE ARTHROSCOPY W/ MENISCECTOMY Right 05/26/2021    Procedure: ARTHROSCOPY, KNEE, WITH MENISCECTOMY;  Surgeon: López Baker MD;  Location: [facility] OR;  Service: Orthopedics;  Laterality: Right;    LAPAROSCOPIC CHOLECYSTECTOMY N/A 11/27/2020    Procedure: CHOLECYSTECTOMY, LAPAROSCOPIC;  Surgeon: Chente Campbell III, MD;  Location: [facility] OR;  Service: General;  Laterality: N/A;    LAPAROSCOPY Right 2013    Endometrioma    LYMPH NODE BIOPSY Bilateral 02/12/2024    Procedure: BIOPSY, LYMPH NODE;  Surgeon: Kirsten Weaver MD;  Location: [facility] OR 2ND FLR;  Service: OB/GYN;  Laterality: Bilateral;  sentinal lymph node dissection    MAGNETIC RESONANCE IMAGING N/A 08/03/2022    Procedure: MRI (Magnetic Resonance Imagine);  Surgeon: Sanjana Surgeon;  Location: [facility];  Service: Anesthesiology;  Laterality: N/A;    MAPPING, LYMPH NODE, SENTINEL Bilateral 02/12/2024    Procedure: MAPPING, LYMPH NODE, SENTINEL;  Surgeon: Kirsten Weaver MD;  Location: [facility] OR 2ND FLR;  Service: OB/GYN;  Laterality: Bilateral;    PARATHYROIDECTOMY N/A 05/17/2024    Procedure: PARATHYROIDECTOMY;  Surgeon: Raiza Epperson MD;  Location: [facility] OR 2ND FLR;  Service: General;  Laterality: N/A;    ROBOT-ASSISTED LAPAROSCOPIC ABDOMINAL HYSTERECTOMY USING DA VICKIE XI N/A 02/12/2024    Procedure: XI ROBOTIC HYSTERECTOMY;  Surgeon: iKrsten Weaver MD;  Location: [facility] OR 2ND FLR;  Service: OB/GYN;  Laterality: N/A;  2.5 hr case    ROBOT-ASSISTED LAPAROSCOPIC SURGICAL REMOVAL OF OVARY USING DA VICKIE XI Right 02/12/2024    Procedure: XI ROBOTIC OOPHORECTOMY;  Surgeon: Kirsten Weaver MD;  Location: [facility] OR 2ND FLR;  Service: OB/GYN;  Laterality: Right;    ROBOT-ASSISTED SURGICAL REMOVAL OF FALLOPIAN TUBE USING DA  VICKIE XI Bilateral 02/12/2024    Procedure: XI ROBOTIC SALPINGECTOMY;  Surgeon: Kirsten Weaver MD;  Location: [facility] OR [address] [place] [address];  Service: OB/GYN;  Laterality: Bilateral;  US only --MD will determine at time of surgery    TONSILLECTOMY      as a child    TUBAL LIGATION  2008    UPPER GASTROINTESTINAL ENDOSCOPY       Social History     Tobacco Use    Smoking status: Every Day     Types: Vaping with nicotine     Start date: 2017     Passive exposure: Current    Smokeless tobacco: Never   Vaping Use    Vaping status: Every Day    Substances: Nicotine   Substance and Sexual Activity    Alcohol use: Not Currently    Drug use: No    Sexual activity: Yes     Partners: Male     Birth control/protection: See Surgical Hx     Medications  She has a current medication list which includes the following prescription(s): acetaminophen, bupropion, fluoxetine, lidocaine, loperamide hcl, atorvastatin, naltrexone, and olmesartan.    Review of patient's allergies indicates:   Allergen Reactions    Contrast media Anaphylaxis    Iodine and iodide containing products Anaphylaxis    Levaquin [levofloxacin] Anaphylaxis    Levofloxacin in d5w Anaphylaxis    Sulfa (sulfonamide antibiotics) Anaphylaxis and Hives    Tree nuts Anaphylaxis    Adhesive tape-silicones Hives    Magnesium Hives     Pt reporting she is allergic to magnesium citrate oral drink.     Morphine Hives     Reports tolerating all other opioids, only had a reaction to morphine    Compazine [prochlorperazine] Anxiety     Restless legs    Depacon [valproate sodium] Hives     Pt experienced hives at IV site upon 8th day of depacon administration.  Hives resolved with stopping medication in 1.5 hours.     All medications, allergies, and past history have been reviewed.    Objective:      Vitals:      7/22/2025     8:04 AM 7/23/2025     8:37 AM 7/24/2025     9:50 AM   Vitals - 1 value per visit   SYSTOLIC   144   DIASTOLIC   80   Pulse   80   Weight (lb)   279.1   Weight (kg)    "126.6   Height   5' 7" (1.702 m)   BMI (Calculated)   43.7   Pain Score Zero Four        Vitals:    07/24/25 0950 07/24/25 1045 07/24/25 1046 07/24/25 1047   BP: (!) 144/80 135/86 (!) 133/91 (!) 140/96   BP Location:  Left arm Left arm Left arm   Patient Position:  Lying Sitting Standing   Pulse: 80 70 72 84   Weight: 126.6 kg (279 lb 1.6 oz)      Height: 5' 7" (1.702 m)          Body surface area is 2.45 meters squared.    Physical Exam  Constitutional:       General: She is not in acute distress.     Appearance: Normal appearance. She is not ill-appearing.   HENT:      Head: Normocephalic and atraumatic.      Right Ear: Tympanic membrane, ear canal and external ear normal.      Left Ear: Tympanic membrane, ear canal and external ear normal.      Nose: Nose normal.      Mouth/Throat:      Lips: Pink. No lesions.      Mouth: Mucous membranes are moist. No oral lesions.      Tongue: No lesions.      Palate: No lesions.      Pharynx: Oropharynx is clear. Uvula midline. No pharyngeal swelling, oropharyngeal exudate, posterior oropharyngeal erythema or uvula swelling.   Eyes:      General:         Right eye: No discharge.         Left eye: No discharge.      Extraocular Movements: Extraocular movements intact.      Conjunctiva/sclera: Conjunctivae normal.   Pulmonary:      Effort: Pulmonary effort is normal.   Neurological:      General: No focal deficit present.      Mental Status: She is alert and oriented to person, place, and time. Mental status is at baseline.   Psychiatric:         Mood and Affect: Mood normal.         Behavior: Behavior normal.         Thought Content: Thought content normal.         Judgment: Judgment normal.     Fukuda Step Test: Positive Right deviation  Romberg (eyes closed): Forward and backward sway without falling.  Paramjit-Hallpike:   Negative Right  Negative Left.    Labs:  WBC   Date Value Ref Range Status   07/08/2025 11.42 3.90 - 12.70 K/uL Final     Platelet Count   Date Value Ref Range " Status   07/08/2025 403 150 - 450 K/uL Final     Creatinine   Date Value Ref Range Status   07/08/2025 0.9 0.5 - 1.4 mg/dL Final     TSH   Date Value Ref Range Status   07/01/2025 0.760 0.400 - 4.000 uIU/mL Final     Glucose   Date Value Ref Range Status   07/08/2025 102 70 - 110 mg/dL Final     Hemoglobin A1C   Date Value Ref Range Status   12/09/2023 5.3 4.5 - 6.2 % Final     Comment:     According to ADA guidelines, hemoglobin A1C <7.0% represents  optimal control in non-pregnant diabetic patients.  Different  metrics may apply to specific populations.   Standards of Medical Care in Diabetes - 2016.    For the purpose of screening for the presence of diabetes:  <5.7%     Consistent with the absence of diabetes  5.7-6.4%  Consistent with increasing risk for diabetes   (prediabetes)  >or=6.5%  Consistent with diabetes    Currently no consensus exists for use of hemoglobin A1C  for diagnosis of diabetes for children.     ECHOCARDIOGRAM RESULTS  Results for orders placed during the hospital encounter of 10/26/23    Echo    Interpretation Summary    Left Ventricle: The left ventricle is normal in size. Normal wall thickness. Normal wall motion. There is normal systolic function with a visually estimated ejection fraction of 60 - 65%. There is normal diastolic function.    Right Ventricle: Normal right ventricular cavity size. Wall thickness is normal. Right ventricle wall motion  is normal. Systolic function is normal.    Mitral Valve: There is trace regurgitation.    Tricuspid Valve: There is mild regurgitation.    IVC/SVC: Normal venous pressure at 3 mmHg.    Overall the study quality was technically difficult        CURRENT/PREVIOUS VISIT EKG  Results for orders placed or performed in visit on 07/08/25   IN OFFICE EKG 12-LEAD (to [place])    Collection Time: 07/08/25  4:17 PM   Result Value Ref Range    QRS Duration 76 ms    [facility] QTC Calculation 455 ms    Narrative    Test Reason : R55,R00.1,    Vent. Rate :  82 BPM      Atrial Rate :  82 BPM     P-R Int : 164 ms          QRS Dur :  76 ms      QT Int : 390 ms       P-R-T Axes :  33  11  15 degrees    QTcB Int : 455 ms    Normal sinus rhythm  Cannot rule out Anterior infarct ,age undetermined  Abnormal ECG  When compared with ECG of 01-Jul-2025 14:04,  Vent. rate has increased by  31 bpm  Minimal criteria for Anterior infarct are now Present    Referred By: LETICIA MOJICA           Confirmed By:      No valid procedures specified.   No results found for this or any previous visit.      Audiogram Summary: None at present.  All lab results and imaging results have been reviewed.    Assessment:        ICD-10-CM ICD-9-CM   1. Dizziness  R42 780.4   2. Vestibular neuronitis, unspecified laterality  H81.20 386.12   3. Imbalance  R26.89 781.2            Plan:     VESTIBULAR NEURONITIS:  - Considered vestibular neuritis as potential cause of acute vertigo followed by ongoing dizziness which is slowly improving.  - Described vestibular physical therapy as a treatment to accelerate brain compensation for dizziness, potentially reducing recovery time.  - Explained the vestibulo-cochlear nerve anatomy and its potential involvement in both balance and hearing issues.  - Ordered VNG (videonystagmography) test to assess balance nerve function.  - Ordered VEMP test to evaluate for third window etiology, which can cause dizziness with loud sounds.  - Ordered comprehensive audiogram that can be completed when getting VNG and VEMP or sooner if pt would like.  - Referred to vestibular physical therapy.    VISUAL DISTURBANCES:  - Patient to try new bifocal glasses and reassess vision; if issues persist, follow up with eye doctor.    ADVERSE EFFECT OF DRUGS:  - Ruled out anticholinergic poisoning from previous Scopolamine patch as cause of current symptoms.    FOLLOW-UP:  - Will defer MRI for now. Pt has shunt and had recent CT without any concerning findings. Shunt series normal. May order MRI IAC  w/wo at follow up dependant upon VNG and VEMP.  - Patient to follow hydrops diet for now.   - Follow up after VNG and VEMP.        This note was generated with the assistance of ambient listening technology. Verbal consent was obtained by the patient and accompanying visitor(s) for the recording of patient appointment to facilitate this note. I attest to having reviewed and edited the generated note for accuracy, though some syntax or spelling errors may persist. Please contact the author of this note for any clarification.

## 2025-07-25 ENCOUNTER — CLINICAL SUPPORT (OUTPATIENT)
Dept: REHABILITATION | Facility: HOSPITAL | Age: 40
End: 2025-07-25
Attending: OBSTETRICS & GYNECOLOGY
Payer: COMMERCIAL

## 2025-07-25 DIAGNOSIS — R42 DYSEQUILIBRIUM: ICD-10-CM

## 2025-07-25 DIAGNOSIS — R42 DIZZINESS: Primary | ICD-10-CM

## 2025-07-25 DIAGNOSIS — R42 LIGHTHEADEDNESS: ICD-10-CM

## 2025-07-25 PROCEDURE — 97112 NEUROMUSCULAR REEDUCATION: CPT | Mod: PO

## 2025-07-25 PROCEDURE — 97161 PT EVAL LOW COMPLEX 20 MIN: CPT | Mod: PO

## 2025-07-25 NOTE — PROGRESS NOTES
Outpatient Rehab    Physical Therapy Evaluation    Patient Name: Sonia Solorzano  MRN: [id number]  YOB: 1985  Encounter Date: 7/25/2025    Therapy Diagnosis:   Encounter Diagnoses   Name Primary?    Dizziness Yes    Lightheadedness     Dysequilibrium      Physician: Yuri Hawkins,*    Physician Orders: Eval and Treat  Medical Diagnosis: Dizziness  Surgical Diagnosis: Not applicable for this Episode   Surgical Date: Not applicable for this Episode  Days Since Last Surgery: Not applicable for this Episode    Visit # / Visits Authorized:  1 / 1  Insurance Authorization Period: 7/24/2025 to 12/31/2025  Date of Evaluation: 7/25/2025  Plan of Care Certification: 7/25/2025 to 09/06/2025     Time In: 0915   Time Out: 1000  Total Time (in minutes): 45 minutes  Total Billable Time (in minutes): 45 minutes    Intake Outcome Measure for FOTO Survey    Therapist reviewed FOTO scores for Sonia Solorzano on 7/25/2025.   FOTO report - see Media section or FOTO account episode details.     Intake Score (%): 50    Precautions:  Additional Precautions and Protocol Details: Fall; history of uterine cancer, asthma, HTN, idiopathic intracranial hypertension, migraine, right knee injury    Subjective   History of Present Illness  Sonia is a 39 y.o. female who reports to physical therapy with a chief concern of lightheadedness with head movements and busy environments.     The patient reports a medical diagnosis of dizziness. The patient has experienced this issue since 07/24/25.   Diagnostic tests related to this condition: CT scan.   CT Scan Details: CT HEAD WITHOUT CONTRAST   (07/01/2025)   CLINICAL HISTORY:  Dizziness, persistent/recurrent, cardiac or vascular cause suspected;     TECHNIQUE:  Multiple sequential 5 mm axial images of the head without contrast.  Coronal and sagittal reformatted imaging from the axial acquisition.     COMPARISON:  06/25/2025     FINDINGS:  Right parietal coursing  ventricular catheter stable in course and positioning.  Stable small caliber ventricles without hydrocephalus.  There is no evidence for acute intracranial hemorrhage or sulcal effacement..  There is no midline shift or mass effect.  Visualized paranasal sinuses and mastoid air cells are clear.     Impression:     Stable course and positioning right parietal ventricular catheter with stable configuration of ventricles without hydrocephalus.     No evidence for acute intracranial hemorrhage or new acute parenchymal attenuation     Further evaluation as warranted clinically.    History of Present Condition/Illness: Patient reports sudden onset tunnel vision and severe lightheadedness while driving several weeks ago - recalls that her symptoms continued throughout the day; Sonia reports that her symptoms improved as the days went on but endorses episodes of symptom elevation in busy environments or after quick head movements; patient's condition is further complicated by history of migraine, right knee injury and idiopathic intracranial hypertension.    Activities of Daily Living  Social history was obtained from Patient.    General Prior Level of Function Comments: independent  General Current Level of Function Comments: minimal difficulty with adl's       Previously independent with activities of daily living? Yes     Currently independent with activities of daily living? No  Activities currently needing assistance include Functional mobility, Transfers, and Bed mobility.        Previously independent with instrumental activities of daily living? Yes     Currently independent with instrumental activities of daily living? No  Activities currently needing assistance include: Community mobility, Driving, and Grocery/shopping.            Pain     Patient reports a current pain level of 0/10. Pain at best is reported as 0/10. Pain at worst is reported as 10/10.   Location: migraine headache; also reports history of  bilateral neck pain (4/10, 4/10, 10/10)  Clinical Progression (since onset): Unchanged  Pain Qualities: Aching, Dull  Pain-Relieving Factors: Rest, Medications - prescription  Pain-Aggravating Factors: Stress, Head movements, Other (Comment)  Other Pain-Aggravating Factors: loud noises         Review of Systems  Patient reports: Headache, Motion Sickness, Anxiety, Light-Headedness, Neck Pain, Blurred Vision, Imbalance, and Phonophobia        Living Arrangements  Living Situation  Housing: Home independently  Living Arrangements: Spouse/significant other  Support Systems: Spouse/significant other    Home Setup  Type of Structure: House  Home Access: Level entry  Number of Levels in Home: One level        Employment  Employment Status: Employed part-time   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)      Past Medical History/Physical Systems Review:   Sonia Solorzano  has a past medical history of Asthma, Bipolar disorder, Chronic anxiety, COVID-19, Endometrioid adenocarcinoma of uterus, GERD (gastroesophageal reflux disease), GI bleed, Heart palpitations, Herniated disc, History of COVID-19, Hyperlipidemia, Hyperparathyroidism, Hypertension, IBS (irritable bowel syndrome), Idiopathic intracranial hypertension, Insomnia, Intractable migraine without aura and with status migrainosus, Irritable bowel syndrome without diarrhea, Lower back pain, Migraine headache, Palpitations, PCOS (polycystic ovarian syndrome), PONV (postoperative nausea and vomiting), Sleep apnea, unspecified, Wears contact lenses, and Wears prescription eyeglasses.    Sonia Solorzano  has a past surgical history that includes Dilation and curettage of uterus (); epidural steriod injections ();  section, low transverse (); Tubal ligation (); Laparoscopy (Right, 2013); Esophagogastroduodenoscopy (N/A, 10/26/2020); Colonoscopy (N/A, 10/27/2020); Intraluminal gastrointestinal tract imaging via capsule (N/A, 2020);  Laparoscopic cholecystectomy (N/A, 11/27/2020); Tonsillectomy; Knee arthroscopy w/ meniscectomy (Right, 05/26/2021); Cystoscopy (N/A, 10/27/2021); Magnetic resonance imaging (N/A, 08/03/2022); Upper gastrointestinal endoscopy; Endoscopic insertion of ventriculoperitoneal shunt (Right, 09/19/2022); Endoscopic insertion of ventriculoperitoneal shunt (N/A, 09/19/2022); Esophagogastroduodenoscopy (N/A, 03/02/2023); Robot-assisted laparoscopic abdominal hysterectomy using da Rajan Xi (N/A, 02/12/2024); Robot-assisted surgical removal of fallopian tube using da Rajan Xi (Bilateral, 02/12/2024); mapping, lymph node, sentinel (Bilateral, 02/12/2024); Lymph node biopsy (Bilateral, 02/12/2024); Robot-assisted laparoscopic surgical removal of ovary using da Rajan Xi (Right, 02/12/2024); Parathyroidectomy (N/A, 05/17/2024); Excision of breast lesion (Right, 12/26/2024); Cystoscopy (N/A, 6/24/2025); and Injection of anesthetic agent around pudendal nerve (Bilateral, 6/24/2025).    Sonia has a current medication list which includes the following prescription(s): acetaminophen, atorvastatin, bupropion, fluoxetine, lidocaine, loperamide hcl, naltrexone, and olmesartan.    Review of patient's allergies indicates:   Allergen Reactions    Contrast media Anaphylaxis    Iodine and iodide containing products Anaphylaxis    Levaquin [levofloxacin] Anaphylaxis    Levofloxacin in d5w Anaphylaxis    Sulfa (sulfonamide antibiotics) Anaphylaxis and Hives    Tree nuts Anaphylaxis    Adhesive tape-silicones Hives    Magnesium Hives     Pt reporting she is allergic to magnesium citrate oral drink.     Morphine Hives     Reports tolerating all other opioids, only had a reaction to morphine    Compazine [prochlorperazine] Anxiety     Restless legs    Depacon [valproate sodium] Hives     Pt experienced hives at IV site upon 8th day of depacon administration.  Hives resolved with stopping medication in 1.5 hours.        Objective   Ocular  Structure and Alignment  Right Ocular Alignment: Intact  Left Ocular Alignment: Intact       Ocular Movement  Static Visual Fixation: Intact  Static Visual Fixation Details: no symptom elevation reported during gaze evoked activities  Right Eye Ocular Range of Motion: Intact  Left Eye Ocular Range of Motion: Intact  Pursuits: Smooth and accurate  Horizontal Saccades: Intact  Vertical Saccades: Intact  Patient Symptoms/Response to Ocular Movement Testing: minimal symptom elevation reported after smooth pursuits - none reported after saccades           Subcranial Range of Motion   Active Restricted? Passive Restricted? Pain   Flexion         Protraction         Retraction           Cervical Range of Motion   Active (deg) Passive (deg) Pain   Flexion 45       Extension 55       Right Lateral Flexion 35       Right Rotation 55       Left Lateral Flexion 35       Left Rotation 55         Minimal symptom elevation reported after cervical spine active range of motion          Vestibulo-Ocular Reflex (VOR) Tests       Intact: Gaze Stabilization  Impaired: Dynamic Visual Acuity (DVA) Test  Dynamic Visual Acuity (DVA) Test Details: modified; increased blurred vision with head movement  Gaze Stabilization Details: no symptom elevation reported with VOR1 activities    Vestibular Positional and Balance Testing       Fukuda Stepping Test Details: no turn made during Fakuda test  Modified Clinical Test of Sensory Interaction and Balance (CTSIB-M): Stand on AirEx (eyes closed) = minimal/moderate sway           Transfers Assessment  Sit to Stand Assistance: Supervision      Ambulation Assistance Required  Surface With  Assistive Device Without Assistive Device Details   Level Supervision        Uneven         Curb                Treatment:  Balance/Neuromuscular Re-Education  NMR 1: x 5 ea seated saccades = side to side, up and down  NMR 2: x 5 ea seated VOR1 = side to side, up and down  NMR 4: x 20 seconds stand on AirEx = eyes  closed  NMR 5: x 50 stepping in place = eyes closed    Time Entry(in minutes):  PT Evaluation (Low) Time Entry: 30  Neuromuscular Re-Education Time Entry: 15    Assessment & Plan   Assessment  Sonia presents with a condition of Low complexity.   Presentation of Symptoms: Stable       Functional Limitations: Activity tolerance, Completing self-care activities, Driving, Functional mobility, Increased risk of fall, Maintaining balance, Transfers  Impairments: Activity intolerance, Impaired balance, Lack of appropriate home exercise program  Personal Factors Affecting Prognosis: Physical limitations    Patient Goal for Therapy (PT): decrease symptoms  Prognosis: Fair  Assessment Details: Patient's symptoms appear to be related to a possible vestibular neuritis.    Plan  From a physical therapy perspective, the patient would benefit from: Skilled Rehab Services    Planned therapy interventions include: Therapeutic exercise, Therapeutic activities, Neuromuscular re-education, ADLs/IADLs, Canalith repositioning, and Gait training.            Visit Frequency: 2 times Per Week for 6 Weeks.  Other/tapered frequency details: Starting week of 07/28/2025    This plan was discussed with Patient and Caregiver.   Discussion participants: Agreed Upon Plan of Care             The patient's spiritual, cultural, and educational needs were considered, and the patient is agreeable to the plan of care and goals.     Education  Education was done with Patient. The patient's learning style includes Listening. The patient Verbalizes understanding.         Treatment plan       Goals:   Active       Long Term Goals       Patient to demonstrate competence with home exercise program to maintain therapeutic gains.       Start:  07/25/25    Expected End:  09/06/25            Patient to ambulate 20 feet in less than 7 seconds to improve andres/symmetry.       Start:  07/25/25    Expected End:  09/06/25            Patient to report that quick  movements of her head sometimes increases her problem.       Start:  07/25/25    Expected End:  09/06/25               Short Term Goals       Maintain patient's complaints of dizziness to less than 5/10 during performance of activities of daily living for independence of self care activities.       Start:  07/25/25    Expected End:  08/16/25            Patient to tolerate x 45 seconds full Romberg stance, eyes closed to improve upright tolerance.       Start:  07/25/25    Expected End:  08/16/25            Patient to begin adaptation home exercise program.       Start:  07/25/25    Expected End:  08/16/25                Rakan Amado, PT

## 2025-07-28 ENCOUNTER — OFFICE VISIT (OUTPATIENT)
Dept: HEMATOLOGY/ONCOLOGY | Facility: CLINIC | Age: 40
End: 2025-07-28
Payer: COMMERCIAL

## 2025-07-28 DIAGNOSIS — Z12.31 ENCOUNTER FOR SCREENING MAMMOGRAM FOR BREAST CANCER: ICD-10-CM

## 2025-07-28 DIAGNOSIS — Z91.89 AT HIGH RISK FOR BREAST CANCER: ICD-10-CM

## 2025-07-28 DIAGNOSIS — Z00.00 PREVENTATIVE HEALTH CARE: ICD-10-CM

## 2025-07-28 DIAGNOSIS — Z80.3 FAMILY HISTORY OF BREAST CANCER: ICD-10-CM

## 2025-07-28 DIAGNOSIS — R92.333 HETEROGENEOUSLY DENSE TISSUE OF BOTH BREASTS ON MAMMOGRAPHY: ICD-10-CM

## 2025-07-28 DIAGNOSIS — Z80.0 FAMILY HISTORY OF COLON CANCER IN FATHER: ICD-10-CM

## 2025-07-28 DIAGNOSIS — R42 VERTIGO: Primary | ICD-10-CM

## 2025-07-28 DIAGNOSIS — Z85.42 HISTORY OF ENDOMETRIAL CANCER: ICD-10-CM

## 2025-07-28 DIAGNOSIS — R42 DIZZINESS: Primary | ICD-10-CM

## 2025-07-28 DIAGNOSIS — Z12.39 BREAST CANCER SCREENING, HIGH RISK PATIENT: ICD-10-CM

## 2025-07-28 PROCEDURE — 1159F MED LIST DOCD IN RCRD: CPT | Mod: CPTII,95,, | Performed by: NURSE PRACTITIONER

## 2025-07-28 PROCEDURE — 4010F ACE/ARB THERAPY RXD/TAKEN: CPT | Mod: CPTII,95,, | Performed by: NURSE PRACTITIONER

## 2025-07-28 PROCEDURE — 98007 SYNCH AUDIO-VIDEO EST HI 40: CPT | Mod: 95,,, | Performed by: NURSE PRACTITIONER

## 2025-07-28 PROCEDURE — G2211 COMPLEX E/M VISIT ADD ON: HCPCS | Mod: 95,,, | Performed by: NURSE PRACTITIONER

## 2025-07-28 RX ORDER — MECLIZINE HYDROCHLORIDE 25 MG/1
25 TABLET ORAL 3 TIMES DAILY PRN
Qty: 30 TABLET | Refills: 0 | Status: CANCELLED | OUTPATIENT
Start: 2025-07-28

## 2025-07-28 RX ORDER — MECLIZINE HCL 12.5 MG 12.5 MG/1
12.5 TABLET ORAL 3 TIMES DAILY PRN
Qty: 30 TABLET | Refills: 0 | Status: SHIPPED | OUTPATIENT
Start: 2025-07-28

## 2025-07-28 NOTE — Clinical Note
Hi! I saw patient today and I told her I would reach out to ask... Do you think a Rx of Antivert would help until she gets in with PT for her dizziness? She is pretty miserable with the dizzy spells.  Thanks in advance,  REBEL
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
## 2025-07-28 NOTE — Clinical Note
"Hi! This patient is s/p biopsy and lumpectomy for a CSL 11/2024. What 6 month f/u imaging would you recommend. Dr. Disla's note is not clear to me as she put "Plan for ultrasound in July.   Mammograms will be ordered through the high-risk clinic".  This is a high risk patient who gets MMG alternating with breast US (can't do MRI or KLEVER) and I would like to separate them 6 months apart but wasn't sure the best follow up imaging for her case as she is due now.  Thanks,  PJ"

## 2025-07-30 ENCOUNTER — TELEPHONE (OUTPATIENT)
Dept: HEMATOLOGY/ONCOLOGY | Facility: CLINIC | Age: 40
End: 2025-07-30
Payer: COMMERCIAL

## 2025-07-30 DIAGNOSIS — F41.1 GAD (GENERALIZED ANXIETY DISORDER): ICD-10-CM

## 2025-07-30 RX ORDER — FLUOXETINE 10 MG/1
10 CAPSULE ORAL
Qty: 90 CAPSULE | Refills: 1 | Status: SHIPPED | OUTPATIENT
Start: 2025-07-30

## 2025-07-30 NOTE — TELEPHONE ENCOUNTER
Please see pharmacy comment below:   Pharmacy comment: REQUEST FOR 90 DAYS PRESCRIPTION. DX Code Needed.     LR--7/8/25       LOV--7/8/25       FOV--7/31/25

## 2025-07-31 ENCOUNTER — LAB VISIT (OUTPATIENT)
Dept: LAB | Facility: HOSPITAL | Age: 40
End: 2025-07-31
Attending: STUDENT IN AN ORGANIZED HEALTH CARE EDUCATION/TRAINING PROGRAM
Payer: COMMERCIAL

## 2025-07-31 ENCOUNTER — PATIENT MESSAGE (OUTPATIENT)
Dept: CARDIOLOGY | Facility: CLINIC | Age: 40
End: 2025-07-31
Payer: COMMERCIAL

## 2025-07-31 ENCOUNTER — OFFICE VISIT (OUTPATIENT)
Dept: FAMILY MEDICINE | Facility: CLINIC | Age: 40
End: 2025-07-31
Payer: COMMERCIAL

## 2025-07-31 ENCOUNTER — CLINICAL SUPPORT (OUTPATIENT)
Dept: REHABILITATION | Facility: HOSPITAL | Age: 40
End: 2025-07-31
Payer: COMMERCIAL

## 2025-07-31 VITALS
BODY MASS INDEX: 44.29 KG/M2 | WEIGHT: 282.19 LBS | OXYGEN SATURATION: 98 % | HEART RATE: 65 BPM | HEIGHT: 67 IN | TEMPERATURE: 98 F | SYSTOLIC BLOOD PRESSURE: 120 MMHG | RESPIRATION RATE: 16 BRPM | DIASTOLIC BLOOD PRESSURE: 84 MMHG

## 2025-07-31 DIAGNOSIS — R42 DIZZINESS: Primary | ICD-10-CM

## 2025-07-31 DIAGNOSIS — R42 DIZZINESS: ICD-10-CM

## 2025-07-31 DIAGNOSIS — E78.5 HYPERLIPIDEMIA, UNSPECIFIED HYPERLIPIDEMIA TYPE: ICD-10-CM

## 2025-07-31 DIAGNOSIS — R79.9 ABNORMAL FINDING OF BLOOD CHEMISTRY, UNSPECIFIED: ICD-10-CM

## 2025-07-31 DIAGNOSIS — I10 PRIMARY HYPERTENSION: Chronic | ICD-10-CM

## 2025-07-31 DIAGNOSIS — Z98.2 S/P VP SHUNT: ICD-10-CM

## 2025-07-31 DIAGNOSIS — F41.1 GAD (GENERALIZED ANXIETY DISORDER): ICD-10-CM

## 2025-07-31 LAB
(HCYS)2 SERPL-MCNC: 9.1 UMOL/L (ref 4–15.5)
25(OH)D3+25(OH)D2 SERPL-MCNC: 43 NG/ML (ref 30–96)
CA-I BLD-SCNC: 1.13 MMOL/L (ref 1.06–1.42)

## 2025-07-31 PROCEDURE — 1159F MED LIST DOCD IN RCRD: CPT | Mod: CPTII,S$GLB,, | Performed by: STUDENT IN AN ORGANIZED HEALTH CARE EDUCATION/TRAINING PROGRAM

## 2025-07-31 PROCEDURE — G2211 COMPLEX E/M VISIT ADD ON: HCPCS | Mod: S$GLB,,, | Performed by: STUDENT IN AN ORGANIZED HEALTH CARE EDUCATION/TRAINING PROGRAM

## 2025-07-31 PROCEDURE — 82607 VITAMIN B-12: CPT

## 2025-07-31 PROCEDURE — 3079F DIAST BP 80-89 MM HG: CPT | Mod: CPTII,S$GLB,, | Performed by: STUDENT IN AN ORGANIZED HEALTH CARE EDUCATION/TRAINING PROGRAM

## 2025-07-31 PROCEDURE — 3074F SYST BP LT 130 MM HG: CPT | Mod: CPTII,S$GLB,, | Performed by: STUDENT IN AN ORGANIZED HEALTH CARE EDUCATION/TRAINING PROGRAM

## 2025-07-31 PROCEDURE — 4010F ACE/ARB THERAPY RXD/TAKEN: CPT | Mod: CPTII,S$GLB,, | Performed by: STUDENT IN AN ORGANIZED HEALTH CARE EDUCATION/TRAINING PROGRAM

## 2025-07-31 PROCEDURE — 84425 ASSAY OF VITAMIN B-1: CPT

## 2025-07-31 PROCEDURE — 83735 ASSAY OF MAGNESIUM: CPT

## 2025-07-31 PROCEDURE — 36415 COLL VENOUS BLD VENIPUNCTURE: CPT | Mod: PO

## 2025-07-31 PROCEDURE — 83921 ORGANIC ACID SINGLE QUANT: CPT

## 2025-07-31 PROCEDURE — 97112 NEUROMUSCULAR REEDUCATION: CPT | Mod: PO

## 2025-07-31 PROCEDURE — 99214 OFFICE O/P EST MOD 30 MIN: CPT | Mod: S$GLB,,, | Performed by: STUDENT IN AN ORGANIZED HEALTH CARE EDUCATION/TRAINING PROGRAM

## 2025-07-31 PROCEDURE — 3008F BODY MASS INDEX DOCD: CPT | Mod: CPTII,S$GLB,, | Performed by: STUDENT IN AN ORGANIZED HEALTH CARE EDUCATION/TRAINING PROGRAM

## 2025-07-31 PROCEDURE — 99999 PR PBB SHADOW E&M-EST. PATIENT-LVL IV: CPT | Mod: PBBFAC,,, | Performed by: STUDENT IN AN ORGANIZED HEALTH CARE EDUCATION/TRAINING PROGRAM

## 2025-07-31 PROCEDURE — 83921 ORGANIC ACID SINGLE QUANT: CPT | Mod: 59

## 2025-07-31 PROCEDURE — 82330 ASSAY OF CALCIUM: CPT

## 2025-07-31 PROCEDURE — 83090 ASSAY OF HOMOCYSTEINE: CPT

## 2025-07-31 PROCEDURE — 84207 ASSAY OF VITAMIN B-6: CPT

## 2025-07-31 PROCEDURE — 82306 VITAMIN D 25 HYDROXY: CPT

## 2025-07-31 RX ORDER — ATORVASTATIN CALCIUM 40 MG/1
40 TABLET, FILM COATED ORAL DAILY
Qty: 90 TABLET | Refills: 3 | Status: SHIPPED | OUTPATIENT
Start: 2025-07-31 | End: 2026-07-31

## 2025-07-31 NOTE — PROGRESS NOTES
Outpatient Rehab    Physical Therapy Visit    Patient Name: Sonia Solorzano  MRN: [id number]  YOB: 1985  Encounter Date: 7/31/2025    Therapy Diagnosis:   Encounter Diagnosis   Name Primary?    Dizziness Yes     Physician: Yuri Hawkins,*    Physician Orders: Eval and Treat  Medical Diagnosis: Dizziness  Surgical Diagnosis: Not applicable for this Episode   Surgical Date: Not applicable for this Episode  Days Since Last Surgery: Not applicable for this Episode    Visit # / Visits Authorized:  1 / 10  Insurance Authorization Period: 7/24/2025 to 12/31/2025  Date of Evaluation: 7/25/2025  Plan of Care Certification: 7/25/2025 to 9/6/2025     Time In: 1104   Time Out: 1144  Total Time (in minutes): 40 minutes  Total Billable Time (in minutes): 40 minutes    FOTO:  Intake Score (%): 50  Survey Score 2 (%): Not applicable for this Episode  Survey Score 3 (%): Not applicable for this Episode    Precautions:  Additional Precautions and Protocol Details: Fall; history of uterine cancer, asthma, HTN, idiopathic intracranial hypertension, migraine, right knee injury      Subjective   patient reports moderate dizziness (4/10); minimal neck and headache pain reported.  Pain reported as 2/10. bilateral neck with radiation up into head    Objective            Treatment:  Balance/Neuromuscular Re-Education  NMR 1: x 10 ea seated head movement exercises while holding target = bilateral rotation*, flexion/extension, bilateral tilting  NMR 2: x 45 seconds ea full Romberg training = eyes open*, eyes closed*  NMR 3: x 10 ea single step training = forwards/backwards*, side to side  NMR 4: x 28 standing two-square saccades (repeating random numbers) = side to side  NMR 5: x 28 ea standing VOR1 (repeating random numbers) = side to side*, up and down*  NMR 6: x 30 seconds ea standing smooth pursuits (single target) = side to side, up and down  NMR 7: 4 x 15 ft ea VOR1 gait (single target)* = side to side, up and  down  NMR 8: 4 x 15 ft ea balance gait = eyes closed*, backwards  NMR 9: x 15 ea heel raises/toe raises*    Time Entry(in minutes):  Neuromuscular Re-Education Time Entry: 40    Assessment & Plan   Assessment: Patient reported minimal symptom elevation after seated head rotation exercise - none reported after remaining head movement exercises; minimal sway noted during full Romberg training, eyes open - occasional loss of balance demonstrated while eyes closed; minimal imbalance reported during forward/backwards single step training; cognitive task during standing saccades and VOR1 activities set at repeating random numbers; minimal symptom elevation reported after VOR1 exercises - none reported after remaining adaptation exercises; minimal symptom elevation reported after VOR1 gait throughout; minimal path deviation noted during gait with eyes closed; occasional loss of balance demonstrated during heel raise/toe raise exercise.  Evaluation/Treatment Tolerance: Patient tolerated treatment well    The patient will continue to benefit from skilled outpatient physical therapy in order to address the deficits listed in the problem list on the initial evaluation, provide patient and family education, and maximize the patients level of independence in the home and community environments.     The patient's spiritual, cultural, and educational needs were considered, and the patient is agreeable to the plan of care and goals.     Education  Education was done with Patient. The patient's learning style includes Demonstration, Listening, and Pictures/video. The patient Verbalizes understanding.         Proper therapeutic exercise technique; provided patient with adaptation home exercise program - instructed to perform twice a day.       Plan: continue to progress balance/vestibular training to patient's tolerance    Goals:   Active       Long Term Goals       Patient to demonstrate competence with home exercise program to  maintain therapeutic gains. (Progressing)       Start:  07/25/25    Expected End:  09/06/25            Patient to ambulate 20 feet in less than 7 seconds to improve andres/symmetry. (Progressing)       Start:  07/25/25    Expected End:  09/06/25            Patient to report that quick movements of her head sometimes increases her problem. (Progressing)       Start:  07/25/25    Expected End:  09/06/25               Short Term Goals       Maintain patient's complaints of dizziness to less than 5/10 during performance of activities of daily living for independence of self care activities. (Progressing)       Start:  07/25/25    Expected End:  08/16/25            Patient to tolerate x 45 seconds full Romberg stance, eyes closed to improve upright tolerance. (Progressing)       Start:  07/25/25    Expected End:  08/16/25            Patient to begin adaptation home exercise program. (Progressing)       Start:  07/25/25    Expected End:  08/16/25                Rakan Amado, PT

## 2025-07-31 NOTE — PROGRESS NOTES
[facility] Note    Subjective:    The HPI and pertinent ROS is included in the Diagnostic Impression Remarks section at the end of the note. Please see below for further details. Chief complaint is at end of note.     Sonia is a pleasant intelligent patient who is here for evaluation.     Modified Medications    Modified Medication Previous Medication    ATORVASTATIN (LIPITOR) 40 MG TABLET atorvastatin (LIPITOR) 40 MG tablet       Take 1 tablet (40 mg total) by mouth once daily.    Take 1 tablet (40 mg total) by mouth once daily.       Data reviewed    274}  Previous medical records reviewed and summarized in plan section at end of note.      If you are due for any health screening(s) below please notify me so we can arrange them to be ordered and scheduled. Most healthy patients at your age complete them, but you are free to accept or refuse. If you can't do it, I'll definitely understand. If you can, I'd certainly appreciate it!     Tests to Keep You Healthy    Mammogram: Met on 11/18/2024  Last Blood Pressure <= 139/89 (7/31/2025): Yes  Tobacco Cessation: NO      The following portions of the patient's history were reviewed and updated as appropriate: allergies, current medications, past family history, past medical history, past social history, past surgical history and problem list.    She  has a past medical history of Asthma (2014), Bipolar disorder, Chronic anxiety (12/19/2014), COVID-19, Endometrioid adenocarcinoma of uterus, GERD (gastroesophageal reflux disease) (10/25/2020), GI bleed (10/25/2020), Heart palpitations, Herniated disc, History of COVID-19 (01/31/2024), Hyperlipidemia, Hyperparathyroidism (01/18/2023), Hypertension, IBS (irritable bowel syndrome) (2015), Idiopathic intracranial hypertension, Insomnia (2018), Intractable migraine without aura and with status migrainosus (06/28/2022), Irritable bowel syndrome without diarrhea (09/03/2021), Lower back pain (2005), Migraine  headache (), Palpitations (), PCOS (polycystic ovarian syndrome) (2022), PONV (postoperative nausea and vomiting), Sleep apnea, unspecified, Wears contact lenses, and Wears prescription eyeglasses.  She  has a past surgical history that includes Dilation and curettage of uterus (); epidural steriod injections ();  section, low transverse (); Tubal ligation (); Laparoscopy (Right, ); Esophagogastroduodenoscopy (N/A, 10/26/2020); Colonoscopy (N/A, 10/27/2020); Intraluminal gastrointestinal tract imaging via capsule (N/A, 2020); Laparoscopic cholecystectomy (N/A, 2020); Tonsillectomy; Knee arthroscopy w/ meniscectomy (Right, 2021); Cystoscopy (N/A, 10/27/2021); Magnetic resonance imaging (N/A, 2022); Upper gastrointestinal endoscopy; Endoscopic insertion of ventriculoperitoneal shunt (Right, 2022); Endoscopic insertion of ventriculoperitoneal shunt (N/A, 2022); Esophagogastroduodenoscopy (N/A, 2023); Robot-assisted laparoscopic abdominal hysterectomy using da Rajan Xi (N/A, 2024); Robot-assisted surgical removal of fallopian tube using da Rajan Xi (Bilateral, 2024); mapping, lymph node, sentinel (Bilateral, 2024); Lymph node biopsy (Bilateral, 2024); Robot-assisted laparoscopic surgical removal of ovary using da Rajan Xi (Right, 2024); Parathyroidectomy (N/A, 2024); Excision of breast lesion (Right, 2024); Cystoscopy (N/A, 2025); and Injection of anesthetic agent around pudendal nerve (Bilateral, 2025).    She  reports that she has been smoking vaping with nicotine. She started smoking about 8 years ago. She has been exposed to tobacco smoke. She has never used smokeless tobacco. She reports that she does not currently use alcohol. She reports that she does not use drugs.  She family history includes Aortic aneurysm in her maternal grandfather; Arthritis in her father; Asthma in her  mother; Breast cancer (age of onset: 60 - 69) in her mother; Cancer in an other family member; Cancer (age of onset: 50 - 59) in her paternal grandfather; Cancer (age of onset: 69) in her father; Cancer (age of onset: 70 - 79) in her paternal grandmother; Colon cancer (age of onset: 61) in her father; Diabetes in her maternal grandmother, mother, and paternal grandfather; Endometrial cancer in her paternal cousin; Heart disease in her father and mother; Hyperlipidemia in her father and mother; Hypertension in her father; Macular degeneration in her maternal grandmother and mother; Melanoma in her father; Ovarian cancer in her paternal cousin; Pancreatic cancer (age of onset: 80) in her maternal grandmother; Pancreatitis in her maternal grandmother and mother; Prostate cancer (age of onset: 89) in her maternal grandfather; Skin cancer in her father.    Review of patient's allergies indicates:   Allergen Reactions    Contrast media Anaphylaxis    Iodine and iodide containing products Anaphylaxis    Levaquin [levofloxacin] Anaphylaxis    Levofloxacin in d5w Anaphylaxis    Sulfa (sulfonamide antibiotics) Anaphylaxis and Hives    Tree nuts Anaphylaxis    Adhesive tape-silicones Hives    Magnesium Hives     Pt reporting she is allergic to magnesium citrate oral drink.     Morphine Hives     Reports tolerating all other opioids, only had a reaction to morphine    Compazine [prochlorperazine] Anxiety     Restless legs    Depacon [valproate sodium] Hives     Pt experienced hives at IV site upon 8th day of depacon administration.  Hives resolved with stopping medication in 1.5 hours.       Tobacco Use: High Risk (7/31/2025)    Patient History     Smoking Tobacco Use: Every Day     Smokeless Tobacco Use: Never     Passive Exposure: Current     Physical Examination  General appearance: alert, cooperative, no distress  Neck: no thyromegaly, no neck stiffness  Lungs: clear to auscultation, no wheezes, rales or rhonchi, symmetric  "air entry  Heart: normal rate, regular rhythm, normal S1, S2, no murmurs, rubs, clicks or gallops  Abdomen: soft, nontender, nondistended, no rigidity, rebound, or guarding.   Back: no point tenderness over spine  Extremities: peripheral pulses normal, no unilateral leg swelling or calf tenderness   Neurological:alert, oriented, normal speech, no new focal findings or movement disorder noted from baseline           BP Readings from Last 3 Encounters:   07/31/25 120/84   07/24/25 (!) 140/96   07/08/25 122/76     Wt Readings from Last 3 Encounters:   07/31/25 128 kg (282 lb 3 oz)   07/24/25 126.6 kg (279 lb 1.6 oz)   07/08/25 126 kg (277 lb 12.5 oz)     /84 (BP Location: Right arm, Patient Position: Sitting)   Pulse 65   Temp 98.3 °F (36.8 °C) (Oral)   Resp 16   Ht 5' 7" (1.702 m)   Wt 128 kg (282 lb 3 oz)   LMP 01/11/2024 (Exact Date)   SpO2 98%   BMI 44.20 kg/m²       274}  Laboratory: I have reviewed old labs below:       274}    Lab Results   Component Value Date    WBC 11.42 07/08/2025    HGB 12.2 07/08/2025    HCT 41.0 07/08/2025    MCV 85 07/08/2025     07/08/2025     07/08/2025    K 4.0 07/08/2025     07/08/2025    CALCIUM 8.1 (L) 07/08/2025    PHOS 3.6 08/13/2024    CO2 27 07/08/2025    GLUCOSE 83 08/15/2013    BUN 13 07/08/2025    CREATININE 0.9 07/08/2025    EGFRNORACEVR >60 07/08/2025    LABPROT 10.2 06/25/2023    ALBUMIN 3.5 07/08/2025    BILITOT 0.4 07/08/2025    ALKPHOS 91 07/08/2025    ALT 15 07/08/2025    AST 11 07/08/2025    INR 0.9 06/25/2023    CHOL 168 01/09/2024    TRIG 175 (H) 01/09/2024    HDL 40 01/09/2024    LDLCALC 93.0 01/09/2024    TSH 0.760 07/01/2025    HGBA1C 5.3 12/09/2023      Lab reviewed by me: Particular labs of significance that I will monitor, workup, or treat to improve are mentioned below in diagnostic impression remarks.      Imaging/EKG: I have reviewed the pertinent results and my findings are noted in remarks.     274}    CC:   Chief " Complaint   Patient presents with    Follow-up    Anxiety           274}    History of Present Illness    CHIEF COMPLAINT:  Patient presents today for evaluation of dizziness.    DIZZINESS AND VESTIBULAR NEURITIS:  She reports intermittent dizziness with episodes lasting a few seconds. Initial major episode [place] where she felt like she was going to pass out and had to pull over. Dizziness was preceded by ear fullness and a severe bowel movement sensation with cramping. Symptoms are triggered by certain head movements, particularly when driving and looking out the side window, experiencing a sense of visual motion discordance. She describes having sway when standing with eyes closed and feels unsteady as if intoxicated. Symptoms do not appear to be related to orthostatic changes. ENT diagnosed vestibular neuritis and she has initiated physical therapy.    CARDIOVASCULAR:  She experienced low heart rate during recent ER visit, with heart rate dropping to 51, which is significantly lower than her usual range of 80-90 BPM. The bradycardia persisted for three days and then returned to normal, with current heart rate remaining in the 50-60 range. She was on day two of a new blood pressure medication during the ER visit, which was stopped due to low blood pressure. Cardiologist placed her on a three-day holter monitor on the 18th of the month, with results still pending. Cardiologist does not believe the bradycardia is related to sick sinus syndrome or a heart block, and does not anticipate need for a pacemaker.    NEUROLOGIC:  She reports persistent tingling and numbness affecting limbs, face, and teeth for several months. Neurology evaluation was inconclusive despite multiple tests. She also experiences unilateral tinnitus, specifically ringing in one ear. Neurosurgeon adjusted shunt settings to drain less fluid following her weight loss.    RECENT PROCEDURES:  She underwent a recent  Pudendal nerve block and cystoscopy procedure during which an anticholinergic patch was applied, resulting in a blown pupil. New bifocals were prescribed which have provided partial improvement of symptoms.      ROS:  Constitutional: +dizziness  Head: +tongue pain  ENT: +tinnitus, +ear pressure  Cardiovascular: +feelings of slow heart rate  Gastrointestinal: +change in bowel habits  Neurological: +numbness, +tingling  Endocrine: +hot flashes          Assessment/Plan  Sonia Solorzano is a 39 y.o. female who presents to clinic with:  1. Dizziness    2. Hyperlipidemia, unspecified hyperlipidemia type    3. S/P  shunt    4. Primary hypertension    5. Abnormal finding of blood chemistry, unspecified          274}    Assessment & Plan    R42 Dizziness  E78.5 Hyperlipidemia, unspecified hyperlipidemia type  Z98.2 S/P  shunt  I10 Primary hypertension  R79.9 Abnormal finding of blood chemistry, unspecified    PLAN SUMMARY:  - Order comprehensive blood panel including B6, B12, folate, magnesium, calcium, and vitamin D levels  - Start OTC B complex vitamin supplement  - Await results of 3-day Holter monitor for cardiac rhythm assessment  - Patient to contact office when lab results are available  - Follow-up in 2 weeks to review Holter monitor results and discuss lab findings    DIZZINESS:  - Considered vestibular neuritis but atypical due to atypical intermittent nature of symptoms lasting only seconds rather than constant dizziness.  - Ruled out structural brain issues due to previous CTs.  - Consider possible vitamin deficiency (B6, B12, folate) or electrolyte imbalance (magnesium, calcium) as cause of dizziness and paresthesias.  - Discussed potential link between B vitamin deficiencies and neurological symptoms like tingling and numbness.  - Started OTC B complex vitamin supplement while awaiting blood test results.  - Clarified that meclizine use for brief dizzy spells may not be effective and could potentially  delay natural recovery if used regularly.  - Evaluated bradycardia as potentially separate issue from dizziness.  - Awaiting results of 3-day Holter monitor to assess cardiac rhythm.  - Follow up in 2 weeks for review of Holter monitor results.    ABNORMAL FINDING OF BLOOD CHEMISTRY, UNSPECIFIED:  - Low vitamin D noted, which may affect calcium absorption.  - Ordered comprehensive blood panel including B6, B12, folate, magnesium, calcium, and vitamin D levels.  - Patient to contact the office when lab results are available to discuss findings and potential treatment adjustments.           Hypertension -stable continue current meds monitor no headache or chest pain f/u blood work   HLD stable continue current meds monitor blood work rec mediterranean diet     DIMAS-stable continue current meds monitor     This note was generated with the assistance of ambient listening technology. Verbal consent was obtained by the patient and accompanying visitor(s) for the recording of patient appointment to facilitate this note. I attest to having reviewed and edited the generated note for accuracy, though some syntax or spelling errors may persist. Please contact the author of this note for any clarification.       1. Dizziness  - Vitamin B6; Future  - Homocysteine, Serum; Future  - Methylmalonic Acid, Serum; Future  - Vitamin B12 Deficiency Panel; Future  - Magnesium, RBC; Future  - Vitamin B1; Future    2. Hyperlipidemia, unspecified hyperlipidemia type  - atorvastatin (LIPITOR) 40 MG tablet; Take 1 tablet (40 mg total) by mouth once daily.  Dispense: 90 tablet; Refill: 3    3. S/P  shunt    4. Primary hypertension    5. Abnormal finding of blood chemistry, unspecified  - Vitamin B6; Future  - Homocysteine, Serum; Future  - Methylmalonic Acid, Serum; Future  - Vitamin B12 Deficiency Panel; Future  - Magnesium, RBC; Future  - Vitamin B1; Future  - Calcium, Ionized; Future  - Vitamin D; Future      Dorian Guerrero MD       274}    If you are due for any health screening(s) below please notify me so we can arrange them to be ordered and scheduled. Most healthy patients at your age complete them, but you are free to accept or refuse.     If you can't do it, I'll definitely understand. If you can, I'd certainly appreciate it!   Tests to Keep You Healthy    Mammogram: Met on 11/18/2024  Last Blood Pressure <= 139/89 (7/31/2025): Yes  Tobacco Cessation: NO

## 2025-08-04 ENCOUNTER — TELEPHONE (OUTPATIENT)
Dept: FAMILY MEDICINE | Facility: CLINIC | Age: 40
End: 2025-08-04
Payer: COMMERCIAL

## 2025-08-04 DIAGNOSIS — E53.8 B12 DEFICIENCY: Primary | ICD-10-CM

## 2025-08-04 LAB — VIT B12 SERPL-MCNC: 179 NG/L (ref 180–914)

## 2025-08-04 RX ORDER — CYANOCOBALAMIN 1000 UG/ML
1000 INJECTION, SOLUTION INTRAMUSCULAR; SUBCUTANEOUS
Status: SHIPPED | OUTPATIENT
Start: 2025-08-04 | End: 2026-06-30

## 2025-08-05 LAB — W METHYLMALONIC ACID: 0.27 UMOL/L

## 2025-08-06 ENCOUNTER — CLINICAL SUPPORT (OUTPATIENT)
Dept: REHABILITATION | Facility: HOSPITAL | Age: 40
End: 2025-08-06
Payer: COMMERCIAL

## 2025-08-06 ENCOUNTER — CLINICAL SUPPORT (OUTPATIENT)
Dept: INTERNAL MEDICINE | Facility: CLINIC | Age: 40
End: 2025-08-06
Payer: COMMERCIAL

## 2025-08-06 DIAGNOSIS — R42 DIZZINESS: Primary | ICD-10-CM

## 2025-08-06 DIAGNOSIS — D50.0 IRON DEFICIENCY ANEMIA DUE TO CHRONIC BLOOD LOSS: Primary | ICD-10-CM

## 2025-08-06 LAB
METHYLMALONATE SERPL-SCNC: 0.13 NMOL/ML
W MAGNESIUM RBC: 4.2 MG/DL
W VITAMIN B1: 69 UG/L
W VITAMIN B6: <2 UG/L

## 2025-08-06 PROCEDURE — 97112 NEUROMUSCULAR REEDUCATION: CPT | Mod: PO

## 2025-08-06 PROCEDURE — 96372 THER/PROPH/DIAG INJ SC/IM: CPT | Mod: S$GLB,,, | Performed by: STUDENT IN AN ORGANIZED HEALTH CARE EDUCATION/TRAINING PROGRAM

## 2025-08-06 RX ADMIN — CYANOCOBALAMIN 1000 MCG: 1000 INJECTION, SOLUTION INTRAMUSCULAR; SUBCUTANEOUS at 09:08

## 2025-08-06 NOTE — PROGRESS NOTES
Pt here for B12 1000 mcg injection.  Verified name and .  IM given in left deltoid.  No bleeding or pain at injection site.  Pt waited 15 minutes in office. No adverse effects.  Scheduled appt for 1 month for next injection.

## 2025-08-06 NOTE — PROGRESS NOTES
Outpatient Rehab    Physical Therapy Visit    Patient Name: Sonia Solorzano  MRN: [id number]  YOB: 1985  Encounter Date: 8/6/2025    Therapy Diagnosis:   Encounter Diagnosis   Name Primary?    Dizziness Yes     Physician: Yuri Hawkins,*    Physician Orders: Eval and Treat  Medical Diagnosis: Dizziness  Surgical Diagnosis: Not applicable for this Episode   Surgical Date: Not applicable for this Episode  Days Since Last Surgery: Not applicable for this Episode    Visit # / Visits Authorized:  2 / 10  Insurance Authorization Period: 7/24/2025 to 12/31/2025  Date of Evaluation: 7/25/2025  Plan of Care Certification: 7/25/2025 to 9/6/2025      Time In: 1018   Time Out: 1059  Total Time (in minutes): 41 minutes  Total Billable Time (in minutes): 41 minutes    FOTO:  Intake Score (%): 50  Survey Score 2 (%): Not applicable for this Episode  Survey Score 3 (%): Not applicable for this Episode    Precautions:  Additional Precautions and Protocol Details: Fall; history of uterine cancer, asthma, HTN, idiopathic intracranial hypertension, migraine, right knee injury      Subjective   patient reports mild improvement in her dizziness (3/10); elevated neck and headache pain reported.  Pain reported as 7/10. bilateral neck with radiation up into head    Objective          Treatment:  Balance/Neuromuscular Re-Education  NMR 1: x 5 ea habituation exercises = sit to stands, trunk tilts*, lean over  NMR 2: x 3 ea standing bilateral 90 degree turns  NMR 3: x 15 ea standing theraball bounce/toss while holding target  NMR 4: x 30 seconds ea modified single leg stance training with contralateral foot on 5-inch stool  NMR 5: x 1 minute viewing driving video (town  NMR 6: x 3 gait in square pattern while facing same direction (3 ft x 3 ft)  NMR 7: x 15 four corners on command  NMR 8: x 10 stepping over 4-inch obstacle with turns*    Time Entry(in minutes):  Neuromuscular Re-Education Time Entry: 41    Assessment  & Plan   Assessment: Patient reported minimal symptom elevation after standing trunk tilt exercise - none reported after remaining habituation exercises; no symptom elevation reported after standing 90 degree turns and while performing theraball bounce/toss exercise; no sway noted during modified single leg stance training; no loss of balance demonstrated during gait in square pattern and during four corners on command; minimal symptom elevation reported after driving video and after stepping over obstacle with turns.  Evaluation/Treatment Tolerance: Patient limited by pain    The patient will continue to benefit from skilled outpatient physical therapy in order to address the deficits listed in the problem list on the initial evaluation, provide patient and family education, and maximize the patients level of independence in the home and community environments.     The patient's spiritual, cultural, and educational needs were considered, and the patient is agreeable to the plan of care and goals.     Education  Education was done with Patient. The patient's learning style includes Listening. The patient Verbalizes understanding.         Proper therapeutic exercise technique; provided patient with information on YouTube channel with videos regarding driving and walking in stores; instructed to perform adaptation home exercise program twice a day (limit head movement on days with elevated pain levels).       Plan: continue to progress balance/vestibular training to patient's tolerance    Goals:   Active       Long Term Goals       Patient to demonstrate competence with home exercise program to maintain therapeutic gains. (Progressing)       Start:  07/25/25    Expected End:  09/06/25            Patient to ambulate 20 feet in less than 7 seconds to improve andres/symmetry. (Progressing)       Start:  07/25/25    Expected End:  09/06/25            Patient to report that quick movements of her head sometimes increases  her problem. (Progressing)       Start:  07/25/25    Expected End:  09/06/25               Short Term Goals       Maintain patient's complaints of dizziness to less than 5/10 during performance of activities of daily living for independence of self care activities. (Progressing)       Start:  07/25/25    Expected End:  08/16/25            Patient to tolerate x 45 seconds full Romberg stance, eyes closed to improve upright tolerance. (Progressing)       Start:  07/25/25    Expected End:  08/16/25            Patient to begin adaptation home exercise program. (Met)       Start:  07/25/25    Expected End:  08/16/25    Resolved:  08/06/25             Rakan Amado, PT

## 2025-08-08 ENCOUNTER — CLINICAL SUPPORT (OUTPATIENT)
Dept: REHABILITATION | Facility: HOSPITAL | Age: 40
End: 2025-08-08
Payer: COMMERCIAL

## 2025-08-08 DIAGNOSIS — R42 DIZZINESS: Primary | ICD-10-CM

## 2025-08-08 PROCEDURE — 97112 NEUROMUSCULAR REEDUCATION: CPT | Mod: PO

## 2025-08-08 NOTE — PROGRESS NOTES
Outpatient Rehab    Physical Therapy Visit    Patient Name: Sonia Solorzano  MRN: [id number]  YOB: 1985  Encounter Date: 8/8/2025    Therapy Diagnosis:   Encounter Diagnosis   Name Primary?    Dizziness Yes     Physician: Yuri Hawkins*    Physician Orders: Eval and Treat  Medical Diagnosis: Dizziness  Surgical Diagnosis: Not applicable for this Episode   Surgical Date: Not applicable for this Episode  Days Since Last Surgery: Not applicable for this Episode    Visit # / Visits Authorized:  3 / 22  Insurance Authorization Period: 7/24/2025 to 12/31/2025  Date of Evaluation: 7/25/2025  Plan of Care Certification: 7/25/2025 to 9/6/2025      Time In: 1016   Time Out: 1059  Total Time (in minutes): 43 minutes  Total Billable Time (in minutes): 43 minutes    FOTO:  Intake Score (%): 50  Survey Score 2 (%): Not applicable for this Episode  Survey Score 3 (%): Not applicable for this Episode    Precautions:  Additional Precautions and Protocol Details: Fall; history of uterine cancer, asthma, HTN, idiopathic intracranial hypertension, migraine, right knee injury      Subjective   patient reports denies dizziness but does note elevated neck and headache pain; states was able to drive a short distance recently without symptom elevation.  Pain reported as 8/10. lt neck with radiation up into head    Objective          Treatment:  Balance/Neuromuscular Re-Education  NMR 1: x 10 ea standing head movement exercises while holding target = bilateral rotation, flexion/extension, bilateral tilting*  NMR 2: x 45 seconds ea bilateral 50% Romberg training = eyes open*/*  NMR 3: x 5 ea bilateral single step training with static head turns = forwards*/*  NMR 4: x 20 two-square saccades (repeating random words) while in staggered stance = side to side  NMR 5: x 20 ea VOR1 (repeating random words) while in staggered stance = side to side*, up and down*  NMR 6: x 30 seconds ea smooth pursuits (single target)  while in staggered stance = side to side, up and down  NMR 7: 2 x 10 standing locating number targets with laser assist  NMR 8: x 10 standing target training with head turns with laser assist*  NMR 9: 4 x 7 ft heel toe gait*  NMR 10: x 70 ft ea VOR1 gait (repeating random numbers/letter) = side to side, up and down    Time Entry(in minutes):  Neuromuscular Re-Education Time Entry: 43    Assessment & Plan   Assessment: Patient was able to perform head movement exercises in standing; minimal symptom elevation after head tilt exercise - none reported after remaining head movement exercises; Romberg training progressed to bilateral 50%, eyes open - minimal sway noted throughout; single step training progressed to static head turns - occasional loss of balance noted during first few repetitions; cognitive task during saccades and VOR1 activities progressed to repeating random words while in staggered stance; minimal symptom elevation reported after VOR1 exercises - none reported after remaining adaptation exercises; minimal symptom elevation reported after standing target training with laser assist - none reported after locating targets with laser assist; occasional single upper extremity support needed during heel-toe gait; cognitive task added to VOR1 gait (repeating random numbers/letters) - longer distance performed and no symptom elevation reported.  Evaluation/Treatment Tolerance: Patient tolerated treatment well    The patient will continue to benefit from skilled outpatient physical therapy in order to address the deficits listed in the problem list on the initial evaluation, provide patient and family education, and maximize the patients level of independence in the home and community environments.     The patient's spiritual, cultural, and educational needs were considered, and the patient is agreeable to the plan of care and goals.     Education  Education was done with Patient. The patient's learning style  includes Listening. The patient Verbalizes understanding.         Proper therapeutic exercise technique; continue adaptation home exercise program twice a day - attempt in standing.       Plan: continue to progress balance/vestibular training to patient's tolerance    Goals:   Active       Long Term Goals       Patient to demonstrate competence with home exercise program to maintain therapeutic gains. (Progressing)       Start:  07/25/25    Expected End:  09/06/25            Patient to ambulate 20 feet in less than 7 seconds to improve andres/symmetry. (Progressing)       Start:  07/25/25    Expected End:  09/06/25            Patient to report that quick movements of her head sometimes increases her problem. (Progressing)       Start:  07/25/25    Expected End:  09/06/25               Short Term Goals       Maintain patient's complaints of dizziness to less than 5/10 during performance of activities of daily living for independence of self care activities. (Progressing)       Start:  07/25/25    Expected End:  08/16/25            Patient to tolerate x 45 seconds full Romberg stance, eyes closed to improve upright tolerance. (Progressing)       Start:  07/25/25    Expected End:  08/16/25            Patient to begin adaptation home exercise program. (Met)       Start:  07/25/25    Expected End:  08/16/25    Resolved:  08/06/25             Rakan Amado, PT

## 2025-08-11 ENCOUNTER — CLINICAL SUPPORT (OUTPATIENT)
Dept: REHABILITATION | Facility: HOSPITAL | Age: 40
End: 2025-08-11
Payer: COMMERCIAL

## 2025-08-11 DIAGNOSIS — R42 DIZZINESS: Primary | ICD-10-CM

## 2025-08-11 PROCEDURE — 97112 NEUROMUSCULAR REEDUCATION: CPT | Mod: PO

## 2025-08-12 ENCOUNTER — CLINICAL SUPPORT (OUTPATIENT)
Dept: REHABILITATION | Facility: HOSPITAL | Age: 40
End: 2025-08-12
Payer: COMMERCIAL

## 2025-08-12 DIAGNOSIS — R42 DIZZINESS: Primary | ICD-10-CM

## 2025-08-12 PROCEDURE — 97112 NEUROMUSCULAR REEDUCATION: CPT | Mod: PO

## 2025-08-18 ENCOUNTER — OFFICE VISIT (OUTPATIENT)
Dept: CARDIOLOGY | Facility: CLINIC | Age: 40
End: 2025-08-18
Payer: COMMERCIAL

## 2025-08-18 VITALS
DIASTOLIC BLOOD PRESSURE: 90 MMHG | OXYGEN SATURATION: 95 % | BODY MASS INDEX: 44.23 KG/M2 | HEIGHT: 67 IN | HEART RATE: 97 BPM | WEIGHT: 281.81 LBS | SYSTOLIC BLOOD PRESSURE: 128 MMHG

## 2025-08-18 DIAGNOSIS — Z71.2 ENCOUNTER TO DISCUSS TEST RESULTS: Primary | ICD-10-CM

## 2025-08-18 DIAGNOSIS — G93.2 IIH (IDIOPATHIC INTRACRANIAL HYPERTENSION): Chronic | ICD-10-CM

## 2025-08-18 DIAGNOSIS — C55 MALIGNANT NEOPLASM OF UTERUS, UNSPECIFIED SITE: ICD-10-CM

## 2025-08-18 DIAGNOSIS — G47.33 OBSTRUCTIVE SLEEP APNEA: Chronic | ICD-10-CM

## 2025-08-18 DIAGNOSIS — Z98.2 S/P VP SHUNT: ICD-10-CM

## 2025-08-18 PROCEDURE — 99214 OFFICE O/P EST MOD 30 MIN: CPT | Mod: S$GLB,,, | Performed by: NURSE PRACTITIONER

## 2025-08-18 PROCEDURE — 3008F BODY MASS INDEX DOCD: CPT | Mod: CPTII,S$GLB,, | Performed by: NURSE PRACTITIONER

## 2025-08-18 PROCEDURE — 4010F ACE/ARB THERAPY RXD/TAKEN: CPT | Mod: CPTII,S$GLB,, | Performed by: NURSE PRACTITIONER

## 2025-08-18 PROCEDURE — 1160F RVW MEDS BY RX/DR IN RCRD: CPT | Mod: CPTII,S$GLB,, | Performed by: NURSE PRACTITIONER

## 2025-08-18 PROCEDURE — 99999 PR PBB SHADOW E&M-EST. PATIENT-LVL III: CPT | Mod: PBBFAC,,, | Performed by: NURSE PRACTITIONER

## 2025-08-18 PROCEDURE — 3080F DIAST BP >= 90 MM HG: CPT | Mod: CPTII,S$GLB,, | Performed by: NURSE PRACTITIONER

## 2025-08-18 PROCEDURE — 1159F MED LIST DOCD IN RCRD: CPT | Mod: CPTII,S$GLB,, | Performed by: NURSE PRACTITIONER

## 2025-08-18 PROCEDURE — 3074F SYST BP LT 130 MM HG: CPT | Mod: CPTII,S$GLB,, | Performed by: NURSE PRACTITIONER

## 2025-08-20 ENCOUNTER — CLINICAL SUPPORT (OUTPATIENT)
Dept: REHABILITATION | Facility: HOSPITAL | Age: 40
End: 2025-08-20
Payer: COMMERCIAL

## 2025-08-20 DIAGNOSIS — R42 DIZZINESS: Primary | ICD-10-CM

## 2025-08-20 PROCEDURE — 97112 NEUROMUSCULAR REEDUCATION: CPT | Mod: PO

## 2025-08-26 ENCOUNTER — CLINICAL SUPPORT (OUTPATIENT)
Dept: REHABILITATION | Facility: HOSPITAL | Age: 40
End: 2025-08-26
Payer: COMMERCIAL

## 2025-08-26 DIAGNOSIS — R42 DIZZINESS: Primary | ICD-10-CM

## 2025-08-26 PROCEDURE — 97112 NEUROMUSCULAR REEDUCATION: CPT | Mod: PO

## 2025-09-02 ENCOUNTER — CLINICAL SUPPORT (OUTPATIENT)
Dept: REHABILITATION | Facility: HOSPITAL | Age: 40
End: 2025-09-02
Payer: COMMERCIAL

## 2025-09-02 DIAGNOSIS — R42 DIZZINESS: Primary | ICD-10-CM

## 2025-09-02 PROCEDURE — 97112 NEUROMUSCULAR REEDUCATION: CPT | Mod: PO

## 2025-09-03 ENCOUNTER — CLINICAL SUPPORT (OUTPATIENT)
Dept: INTERNAL MEDICINE | Facility: CLINIC | Age: 40
End: 2025-09-03
Payer: COMMERCIAL

## 2025-09-03 DIAGNOSIS — D50.0 IRON DEFICIENCY ANEMIA DUE TO CHRONIC BLOOD LOSS: Primary | ICD-10-CM

## 2025-09-03 PROCEDURE — 96372 THER/PROPH/DIAG INJ SC/IM: CPT | Mod: S$GLB,,, | Performed by: STUDENT IN AN ORGANIZED HEALTH CARE EDUCATION/TRAINING PROGRAM

## 2025-09-03 RX ADMIN — CYANOCOBALAMIN 1000 MCG: 1000 INJECTION, SOLUTION INTRAMUSCULAR; SUBCUTANEOUS at 09:09

## 2025-09-04 ENCOUNTER — CLINICAL SUPPORT (OUTPATIENT)
Dept: REHABILITATION | Facility: HOSPITAL | Age: 40
End: 2025-09-04
Payer: COMMERCIAL

## 2025-09-04 DIAGNOSIS — R42 DIZZINESS: Primary | ICD-10-CM

## 2025-09-04 PROCEDURE — 97112 NEUROMUSCULAR REEDUCATION: CPT | Mod: PO

## (undated) DEVICE — MARKERS SPHERZ PASSIVE

## (undated) DEVICE — SYR B-D DISP CONTROL 10CC100/C

## (undated) DEVICE — DRAPE ARM DAVINCI XI

## (undated) DEVICE — SUT PDS II 2-0 CT-2 VIL

## (undated) DEVICE — DRAPE STERI INSTRUMENT 1018

## (undated) DEVICE — GOWN ECLIPSE REINF LV4 XLNG XL

## (undated) DEVICE — PAD BOVIE ADULT

## (undated) DEVICE — DRAPE STERI-DRAPE 1000 17X11IN

## (undated) DEVICE — TROCAR KII BLLN 12MM 10CM

## (undated) DEVICE — TROCAR ENDOPATH XCEL 5MM 7.5CM

## (undated) DEVICE — DRAPE HALF SURGICAL 40X58IN

## (undated) DEVICE — SUT VICRYL 3-0 27 SH

## (undated) DEVICE — KIT ANTIFOG

## (undated) DEVICE — DRAPE INCISE IOBAN 2 23X17IN

## (undated) DEVICE — COVER LIGHT HANDLE

## (undated) DEVICE — SOLUTION NACL 0.9% 3000ML

## (undated) DEVICE — GLOVE BIOGEL SKINSENSE PI 6.5

## (undated) DEVICE — GLOVE SENSICARE PI GRN 7

## (undated) DEVICE — CATH PASSER DISPOSABLE

## (undated) DEVICE — CARTRIDGE OIL

## (undated) DEVICE — ELECTRODE REM PLYHSV RETURN 9

## (undated) DEVICE — COVER TIP CURVED SCISSORS XI

## (undated) DEVICE — GOWN X-LG STERILE BACK

## (undated) DEVICE — MARKER SKIN STND TIP BLUE BARR

## (undated) DEVICE — SUT MONOCRYL 4-0 PS-2

## (undated) DEVICE — SUT MCRYL PLUS 4-0 PS2 27IN

## (undated) DEVICE — NDL SPINAL 20GX3.5 HUB

## (undated) DEVICE — SLEEVE SCD EXPRESS KNEE MEDIUM

## (undated) DEVICE — DRAPE EENT SPLIT STERILE

## (undated) DEVICE — SEE MEDLINE ITEM 157117

## (undated) DEVICE — CHLORAPREP 10.5 ML APPLICATOR

## (undated) DEVICE — SOL WATER STRL IRR 1000ML

## (undated) DEVICE — CONTAINER SPEC OR STRL 4.5OZ

## (undated) DEVICE — BLADE SURG CARBON STEEL SZ11

## (undated) DEVICE — SOL IRRI STRL WATER 1000ML

## (undated) DEVICE — SOL 9P NACL IRR PIC IL

## (undated) DEVICE — SHEET DRAPE MEDIUM

## (undated) DEVICE — GOWN POLY REINF SET SLV XL

## (undated) DEVICE — SOL LR INJ 1000ML BG

## (undated) DEVICE — PACK CUSTOM UNIV BASIN SLI

## (undated) DEVICE — SYR ONLY LUER LOCK 20CC

## (undated) DEVICE — SET TRI-LUMEN FILTERED TUBE

## (undated) DEVICE — TUBE FRAZIER 5MM 2FT SOFT TIP

## (undated) DEVICE — LEGGING CLEAR POLY 2/PACK

## (undated) DEVICE — PAD SANITARY MAXI 11IN

## (undated) DEVICE — TOWEL OR DISP STRL BLUE 4/PK

## (undated) DEVICE — NDL HYPO REG 25G X 1 1/2

## (undated) DEVICE — CLOSURE SKIN STERI STRIP 1/4X3

## (undated) DEVICE — SOL POVIDONE SCRUB IODINE 4 OZ

## (undated) DEVICE — SUT CTD VICRYL 2.0

## (undated) DEVICE — SOL ELECTROLUBE ANTI-STIC

## (undated) DEVICE — SEAL UNIVERSAL 5MM-8MM XI

## (undated) DEVICE — DURAPREP SURG SCRUB 26ML

## (undated) DEVICE — DIFFUSER

## (undated) DEVICE — SYR 50CC LL

## (undated) DEVICE — CONTAINER SPECIMEN OR STER 4OZ

## (undated) DEVICE — TRAY SKIN SCRUB WET PREMIUM

## (undated) DEVICE — DRAPE THREE-QTR REINF 53X77IN

## (undated) DEVICE — SUT VICRYL CTD 2-0 GI 27 SH

## (undated) DEVICE — CANNULA LAP SEAL Z THRD 5X100

## (undated) DEVICE — CLOSURE SKIN STERI STRIP 1/2X4

## (undated) DEVICE — SOL NACL IRR 1000ML BTL

## (undated) DEVICE — DRAPE LAVH SURG 109X109X100IN

## (undated) DEVICE — GLOVE SENSICARE PI ALOE 7.5

## (undated) DEVICE — SYR 10CC LUER LOCK

## (undated) DEVICE — ADHESIVE DERMABOND ADVANCED

## (undated) DEVICE — SUT GUT PL. 4-0 27 FS-2

## (undated) DEVICE — PENCIL SMK EVAC CONNECTOR 10FT

## (undated) DEVICE — SUT ETHIBOND EXCEL 0 MO6 18

## (undated) DEVICE — SUT VICRYL 6-0 P1 18IN UD

## (undated) DEVICE — SUT LIGACLIP SMALL XTRA

## (undated) DEVICE — TRAY SKIN SCRUB DRY PREMIUM

## (undated) DEVICE — DRAPE CORETEMP FLD WRM 56X62IN

## (undated) DEVICE — UNDERGLOVES BIOGEL PI SIZE 8

## (undated) DEVICE — TRAY CATH 1-LYR URIMTR 16FR

## (undated) DEVICE — SOLUTION IRRI NS BOTTLE 1000ML R5200-01

## (undated) DEVICE — STRAP OR TABLE 5IN X 72IN

## (undated) DEVICE — DRAPE LEGGINGS CUFF 33X51IN

## (undated) DEVICE — DRAPE COLUMN DAVINCI XI

## (undated) DEVICE — APPLICATOR CHLORAPREP ORN 26ML

## (undated) DEVICE — SUT 2-0 12-18IN SILK

## (undated) DEVICE — GOWN SURGICAL X-LARGE

## (undated) DEVICE — LUBRICANT SURGILUBE 2 OZ

## (undated) DEVICE — SUT CTD VICRYL CR/RB-1 4-0

## (undated) DEVICE — CORD BIPOLAR 12 FOOT

## (undated) DEVICE — SUT VICRYL PLUS 3-0 SH 18IN

## (undated) DEVICE — Device

## (undated) DEVICE — TRAY CATH FOL SIL URIMTR 16FR

## (undated) DEVICE — DISSECTOR 5MM ENDOPATH

## (undated) DEVICE — SUT 3-0 12-18IN SILK

## (undated) DEVICE — GLOVE SURG ULTRA TOUCH 7.5

## (undated) DEVICE — NDL INSUF ULTRA VERESS 120MM

## (undated) DEVICE — PACK SET UP 190 OMC-NS

## (undated) DEVICE — GAUZE SPONGE PEANUT STRL

## (undated) DEVICE — BAG TISS RETRV MONARCH 10MM

## (undated) DEVICE — UNDERGLOVE BIOGEL PI SZ 6.5 LF

## (undated) DEVICE — IRRIGATOR SUCTION W/TIP

## (undated) DEVICE — DRESSING TRANS 4X4 TEGADERM

## (undated) DEVICE — SET DECANTER MEDICHOICE

## (undated) DEVICE — SEE MEDLINE ITEM 152622

## (undated) DEVICE — BANDAGE ACE STERILE 6 REB3116

## (undated) DEVICE — DISSECTOR LIGASURE EXACT 21CM

## (undated) DEVICE — SUT PROLENE 0 MO6 30IN BLUE

## (undated) DEVICE — SUT 4-0 12-18IN SILK BLACK

## (undated) DEVICE — PACK CUSTOM ENDO CHOLO SLI

## (undated) DEVICE — SYR SLIP TIP 1CC

## (undated) DEVICE — PAD PINK TRENDELENBURG POS XL

## (undated) DEVICE — SCISSOR 5MMX35CM DIRECT DRIVE

## (undated) DEVICE — DRESSING TELFA N ADH 3X8

## (undated) DEVICE — CUFF TOURNIQUET 34DUAL PRT 5921-034-235

## (undated) DEVICE — UNDERGLOVE BIOGEL MICRO BLUE SZ 8.5

## (undated) DEVICE — SOL IRR NACL .9% 3000ML

## (undated) DEVICE — PACK SIRUS BASIC V SURG STRL

## (undated) DEVICE — SYR DISP LL 5CC

## (undated) DEVICE — GLOVE SENSICARE PI GRN 7.5

## (undated) DEVICE — APPLIER CLIP EPIX UNIV 5X34

## (undated) DEVICE — KIT ANTIFOG W/SPONG & FLUID

## (undated) DEVICE — SLEEVE SCD EXPRESS CALF MEDIUM

## (undated) DEVICE — SET CYSTO IRRIGATION UNIV SPIK

## (undated) DEVICE — GLOVE SENSICARE PI ALOE 7

## (undated) DEVICE — PEN NEURO ENDOSCOPE RIGID

## (undated) DEVICE — SUT 2/0 30IN SILK BLK BRAI

## (undated) DEVICE — GLOVE BIOGEL PI GOLD SZ  8.5

## (undated) DEVICE — DRAPE UND BUTT W/POUCH 40X44IN

## (undated) DEVICE — SPONGE BULKEE II ABSRB 6X6.75

## (undated) DEVICE — ELECTRODE BLADE INSULATED 1 IN

## (undated) DEVICE — GAUZE SPONGE 4X4 12PLY

## (undated) DEVICE — LINER SUCTION 3000CC

## (undated) DEVICE — SEE MEDLINE ITEM 156905

## (undated) DEVICE — PROBE SIMULATOR KRAFF

## (undated) DEVICE — SOL POVIDONE PREP IODINE 4 OZ

## (undated) DEVICE — APPLIER CLIP LIAGCLIP 9.375IN

## (undated) DEVICE — CLIP LIGACLIP XTRA TITANIUM

## (undated) DEVICE — WATER STERILE INJ 500ML BAG

## (undated) DEVICE — COVER LIGHT HANDLE 80/CA

## (undated) DEVICE — BIT DRILL PERFORATOR DISP 14MM

## (undated) DEVICE — SET TUBE PNEUMOCLEAR SE HI FLO

## (undated) DEVICE — GLOVE SENSICARE PI ALOE 6.5

## (undated) DEVICE — DRAPE SCOPE PILLOW WARMER

## (undated) DEVICE — SWABSTICK BENZOIN 4 IN

## (undated) DEVICE — DRAPE INSTR MAGNETIC 10X16IN

## (undated) DEVICE — NDL HYPO 27G X 1 1/2

## (undated) DEVICE — TROCAR KII FIOS 5MM X 100MM

## (undated) DEVICE — STYLET AXIEM 23CM SINGLE COIL

## (undated) DEVICE — DRESSING TEGADERM 2 3/8 X 2.75

## (undated) DEVICE — MANIPULATOR VCARE PLUS 34MM

## (undated) DEVICE — PADDING CAST 6 STERILE 30-322

## (undated) DEVICE — NDL SAFETY 21G X 1 1/2 ECLPSE

## (undated) DEVICE — SPONGE GAUZE 16PLY 4X4

## (undated) DEVICE — TRACKER PATIENT NON INVASIVE

## (undated) DEVICE — PAD CURAD NONADH 3X4IN

## (undated) DEVICE — SCRUB 10% POVIDONE IODINE 4OZ

## (undated) DEVICE — NDL ECLIPSE SAF REG 25GX1.5IN

## (undated) DEVICE — MARKER SKIN RULER STERILE

## (undated) DEVICE — DRAPE UINDERBUT GRAD PCH

## (undated) DEVICE — BLADE SENSICLIP CLIPPER SURG

## (undated) DEVICE — IRRIGATION SET Y-TYPE TUR/BLAD

## (undated) DEVICE — TRAY MINOR GEN SURG OMC

## (undated) DEVICE — NDL SYR 3CC HYPO 25GX5/8 LL

## (undated) DEVICE — OBTURATOR BLADELESS 8MM XI

## (undated) DEVICE — TUBING CYSTO DOUBLE 654301

## (undated) DEVICE — DRAPE ABDOMINAL TIBURON 14X11

## (undated) DEVICE — IRRIGATOR ENDOSCOPY DISP.

## (undated) DEVICE — SUTURE ETHILON 3-0 PS-1 18 1663H

## (undated) DEVICE — ADHESIVE MASTISOL VIAL 48/BX

## (undated) DEVICE — PACK ARTHROSCOPY SMHS009-07

## (undated) DEVICE — DRAPE ORTH SPLIT 77X108IN